# Patient Record
Sex: MALE | Race: WHITE | NOT HISPANIC OR LATINO | Employment: OTHER | URBAN - METROPOLITAN AREA
[De-identification: names, ages, dates, MRNs, and addresses within clinical notes are randomized per-mention and may not be internally consistent; named-entity substitution may affect disease eponyms.]

---

## 2014-06-19 LAB — CRC RECOMMENDATION EXT: NORMAL

## 2017-01-10 ENCOUNTER — LAB VISIT (OUTPATIENT)
Dept: LAB | Facility: HOSPITAL | Age: 57
End: 2017-01-10
Payer: MEDICARE

## 2017-01-10 ENCOUNTER — OFFICE VISIT (OUTPATIENT)
Dept: HEPATOLOGY | Facility: CLINIC | Age: 57
End: 2017-01-10
Payer: MEDICARE

## 2017-01-10 VITALS
HEART RATE: 82 BPM | BODY MASS INDEX: 31.36 KG/M2 | TEMPERATURE: 97 F | DIASTOLIC BLOOD PRESSURE: 69 MMHG | OXYGEN SATURATION: 94 % | SYSTOLIC BLOOD PRESSURE: 112 MMHG | RESPIRATION RATE: 18 BRPM | WEIGHT: 224 LBS | HEIGHT: 71 IN

## 2017-01-10 DIAGNOSIS — K70.30 CIRRHOSIS, LAENNEC'S: ICD-10-CM

## 2017-01-10 DIAGNOSIS — R18.8 OTHER ASCITES: ICD-10-CM

## 2017-01-10 DIAGNOSIS — K74.60 HEPATIC CIRRHOSIS, UNSPECIFIED HEPATIC CIRRHOSIS TYPE: Primary | ICD-10-CM

## 2017-01-10 DIAGNOSIS — I85.01 BLEEDING ESOPHAGEAL VARICES, UNSPECIFIED ESOPHAGEAL VARICES TYPE: ICD-10-CM

## 2017-01-10 DIAGNOSIS — F10.11 ALCOHOL ABUSE, IN REMISSION: ICD-10-CM

## 2017-01-10 DIAGNOSIS — K76.82 HEPATIC ENCEPHALOPATHY: ICD-10-CM

## 2017-01-10 LAB — AFP SERPL-MCNC: 4 NG/ML

## 2017-01-10 PROCEDURE — 99215 OFFICE O/P EST HI 40 MIN: CPT | Mod: S$PBB,,, | Performed by: INTERNAL MEDICINE

## 2017-01-10 PROCEDURE — 99214 OFFICE O/P EST MOD 30 MIN: CPT | Mod: PBBFAC | Performed by: INTERNAL MEDICINE

## 2017-01-10 PROCEDURE — 99999 PR PBB SHADOW E&M-EST. PATIENT-LVL IV: CPT | Mod: PBBFAC,,, | Performed by: INTERNAL MEDICINE

## 2017-01-10 RX ORDER — METFORMIN HYDROCHLORIDE 500 MG/1
500 TABLET ORAL 2 TIMES DAILY
Refills: 5 | COMMUNITY
Start: 2017-01-02 | End: 2018-10-10 | Stop reason: ALTCHOICE

## 2017-01-10 RX ORDER — CIPROFLOXACIN 500 MG/1
500 TABLET ORAL EVERY 12 HOURS
Refills: 0 | Status: ON HOLD | COMMUNITY
Start: 2016-10-26 | End: 2017-01-26 | Stop reason: ALTCHOICE

## 2017-01-10 NOTE — PATIENT INSTRUCTIONS
1. Increase lactulose to have 4-5 BMs per day. You must take the lactulose daily unless you have >>>5 BMS  2. Ultrasound to rule out fluid in [place]  3. Labs today  4. Upper endoscopy in [facility]  5. Change metformin to alternate diabetes medicine- you are at risk for lactic acidosis with this medicine if you have cirrhosis  6. Cut out salt for edema - I will check labs and see if I can increase the water pills.

## 2017-01-10 NOTE — PROGRESS NOTES
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 (stopped drinking several years ago-2012). His liver disease has been complicated by an esophageal variceal bleed, ascites. MELD has been <15. Labs from Oct reveal: Tbil 1.7, alb 3.7, INR 1.3, plts 51. EGD at [facility] 01/15 showed no varices.    Today, similar to his last visit, he is a bit confused. His mother confirms that he is not taking adequate amounts of lactulose.    He has seen a urologist and continues to intermittently be catheterized.HE is doing it 3 times per day. He is due to have a procedure on his prostate in the near future.(He underwent cystoscopy that did not show a malignancy)  MRI in Nov.- no cancer or fluid. May have partial thrombus in the [facility]    Outpatient Encounter Prescriptions as of 1/10/2017   Medication Sig Dispense Refill    B.ANI/L.ACI/L.SAL/L.PLAN/L.JITENDRA (PROBIOTIC FORMULA ORAL) Take by mouth.      ciprofloxacin HCl (CIPRO) 500 MG tablet Take 500 mg by mouth every 12 (twelve) hours.  0    cyclobenzaprine (FLEXERIL) 10 MG tablet Take 10 mg by mouth once daily.       escitalopram oxalate (LEXAPRO) 10 MG tablet Take 10 mg by mouth once daily.      fluticasone (FLONASE) 50 mcg/actuation nasal spray 1 spray by Each Nare route once daily.       furosemide (LASIX) 20 MG tablet TAKE 4 TABLETS BY MOUTH ONCE DAILY 120 tablet 7    gabapentin (NEURONTIN) 300 MG capsule Take 300 mg by mouth 3 (three) times daily.      hydrocodone-acetaminophen 10-325mg (NORCO)  mg Tab Take 10 tablets by mouth 3 (three) times daily.       insulin aspart protamine-insulin aspart (NOVOLOG 70/30) 100 unit/mL (70-30) InPn pen Inject 25 Units into the skin 3 (three) times daily before meals.       INSULIN GLARGINE,HUM.REC.ANLOG (LANTUS SOLOSTAR SUBQ) Inject 65 Units into the skin every evening.       lactobacillus acidophilus Cap Take 1 capsule by mouth once  daily.      lactulose (CHRONULAC) 10 gram/15 mL solution Take 30 mLs by mouth 3 (three) times daily.       levocetirizine (XYZAL) 5 MG tablet Take 5 mg by mouth once daily.  2    lisinopril 10 MG tablet Take 10 mg by mouth once daily.      metformin (GLUCOPHAGE) 500 MG tablet Take 500 mg by mouth 2 (two) times daily.  5    nebivolol (BYSTOLIC) 10 MG Tab Take 10 mg by mouth once daily.      NOVOLOG FLEXPEN 100 unit/mL InPn pen       pantoprazole (PROTONIX) 40 MG tablet Take 40 mg by mouth once daily.  3    rifAXIMin (XIFAXAN) 550 mg Tab Take 550 mg by mouth 2 (two) times daily.      spironolactone (ALDACTONE) 100 MG tablet Take 1 tablet (100 mg total) by mouth once daily. 30 tablet 11    tamsulosin (FLOMAX) 0.4 mg Cp24 Take 0.4 mg by mouth once daily.      trazodone (DESYREL) 150 MG tablet Take 300 mg by mouth nightly as needed.       ondansetron (ZOFRAN) 8 MG tablet Take 8 mg by mouth every 8 (eight) hours as needed.        No facility-administered encounter medications on file as of 1/10/2017.      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    HENT: Negative.    Eyes: Negative.    Respiratory: Negative.    Cardiovascular: Positive for leg swelling.    Gastrointestinal: Positive for abdominal distention.   Genitourinary: Negative.    Musculoskeletal: Negative.    Skin: Negative.    Neurological: Negative.    Psychiatric/Behavioral: Positive for decreased concentration.     Vitals:    01/10/17 0906   BP: 112/69   Pulse: 82   Resp: 18   Temp: 97.3 °F (36.3 °C)       Physical Exam   Constitutional: He is oriented to person, place, and time. He appears well-developed and well-nourished.   HENT:   Head: Normocephalic and atraumatic.   Eyes: Conjunctivae and EOM are normal. Pupils are equal, round, and reactive to light. No scleral icterus.   Neck: Normal range of motion. Neck supple. No thyromegaly present.   Cardiovascular: Normal rate, regular rhythm and normal heart sounds.    Pulmonary/Chest: Effort normal and breath sounds normal. He has no rales.   Abdominal: Soft. Bowel sounds are normal. He exhibits distension (umbilical hernia, easily reducible). He exhibits no mass. There is no tenderness.   Musculoskeletal: Normal range of motion. He exhibits edema.   Neurological: He is alert and oriented to person, place, and time.   Skin: Skin is warm and dry. No rash noted.   Psychiatric: He has a normal mood and affect.   Vitals reviewed.      Lab Results   Component Value Date     10/03/2016     11/19/2015    BUN 8 10/18/2016    BUN 6 10/03/2016    CREATININE 0.8 10/18/2016    CREATININE 0.8 10/03/2016    CALCIUM 8.6 10/18/2016    CALCIUM 8.5 (L) 10/03/2016     10/18/2016     10/03/2016    K 4.0 10/18/2016    K 3.3 (L) 10/03/2016     10/18/2016     10/03/2016    PROT 7.3 10/18/2016    PROT 7.4 11/19/2015    CO2 24.0 10/18/2016    CO2 25 10/03/2016    ANIONGAP 10 10/03/2016    WBC 4.6 10/18/2016    WBC 3.98 10/03/2016    RBC 5.28 10/18/2016    HGB 13.8 10/18/2016    HCT 45 10/18/2016    MCV 81 (L) 10/03/2016    MCH 26.9 (L) 10/03/2016    MCHC 33.3 10/03/2016     Lab Results   Component Value Date    RDW 17.3 (A) 10/18/2016    PLT  51 (L) 10/03/2016    MPV  10/18/2016      Comment:      n/a    MPV n/a 11/19/2015    GRAN 2.3 10/03/2016    GRAN 57.5 10/03/2016    LYMPH 30 10/18/2016    LYMPH 1.1 10/03/2016    MONO 10 10/18/2016    MONO 0.5 10/03/2016    EOSINOPHIL 2 10/18/2016    BASOPHIL 1 10/18/2016    EOS 0.1 10/03/2016    BASO 0.01 10/03/2016    APTT 24.3 07/08/2014    GROUPTRH A NEG 01/05/2015    CHOL 178 10/18/2016    TRIG 134 10/18/2016    TRIG 174 (H) 07/08/2014    HDL 35 10/18/2016    HDL 29 (L) 07/08/2014    CHOLHDL 25.7 07/08/2014    TOTALCHOLEST 3.9 07/08/2014    ALBUMIN 3.80 10/18/2016    ALBUMIN 3.7 10/03/2016    BILIDIR 0.80 (A) 10/18/2016    AST 32 10/18/2016    AST 51 (H) 10/03/2016    ALT 32 10/18/2016    ALT 27 10/03/2016    ALKPHOS 107 10/03/2016    LABPROT 13.3 (H) 10/03/2016    INR 1.3 (H) 10/03/2016    INR 1.1 01/15/2016    PSA 0.24 08/13/2014    QUANTIFERON Negative 08/13/2014     MELD-Na score: 14 at 10/3/2016  1:35 PM  MELD score: 13 at 10/3/2016  1:35 PM  Calculated from:  Serum Creatinine: 0.8 mg/dL (Rounded to 1) at 10/3/2016  1:35 PM  Serum Sodium: 136 mmol/L at 10/3/2016  1:35 PM  Total Bilirubin: 2.9 mg/dL at 10/3/2016  1:35 PM  INR(ratio): 1.3 at 10/3/2016  1:35 PM  Age: 56 years     Assessment and Plan:    Colin Reilly is a 56 y.o. male withCirrhosis  1. HE: Continue lactulose - titrate to have 4-5 bowel movements a day and keep a stool chart. Continue rifaximin  2. Cirrhosis, decompensated: check MELD labs today and every 12 weeks. Refer for liver transplant when >15.   3. Repeat upper endoscopy in 2017 to screen for EV.  4. Ascites and edema : check u/s to r/o ascites  5. Decompensated cirrhosis. Remains medically early for liver transplant. MRI now to screen for HCC  6. Return 3 months  7. Continue insulin for DM. meformin should be changed  8. BPH: f/u urology as scheduled.  .    Return 3 months. .

## 2017-01-10 NOTE — MR AVS SNAPSHOT
[facility] - Hepatology  [address]  [place] [address]  Phone: [phone]  Fax: [phone]                  Colin Reilly   1/10/2017 9:20 AM   Office Visit    Description:  Male : 1960   Provider:  Elizabeth Meneses MD   Department:  [facility] - Hepatology           Reason for Visit     Cirrhosis           Diagnoses this Visit        Comments    Hepatic cirrhosis, unspecified hepatic cirrhosis type    -  Primary            To Do List           Future Appointments        Provider Department Dept Phone    2017 7:00 AM LABORATORY, [facility] - [phone]    2017 7:00 AM LABORATORY, [facility] - [phone]    10/3/2017 7:00 AM LABORATORY, [facility] - [phone]      Goals (5 Years of Data)     None      [facility]     [facility] On [facility] Nurse Care Line -  Assistance  Registered nurses in the [facility] provide clinical advisement, health education, appointment booking, and other advisory services.  Call for this free service at [phone].             Medications           Message regarding Medications     Verify the changes and/or additions to your medication regime listed below are the same as discussed with your clinician today.  If any of these changes or additions are incorrect, please notify your healthcare provider.             Verify that the below list of medications is an accurate representation of the medications you are currently taking.  If none reported, the list may be blank. If incorrect, please contact your healthcare provider. Carry this list with you in case of emergency.           Current Medications     B.ANI/L.ACI/L.SAL/L.PLAN/L.JITENDRA (PROBIOTIC FORMULA ORAL) Take by mouth.    ciprofloxacin HCl (CIPRO) 500 MG tablet Take 500 mg by mouth every 12 (twelve) hours.    cyclobenzaprine (FLEXERIL) 10 MG tablet Take 10 mg by mouth  once daily.     escitalopram oxalate (LEXAPRO) 10 MG tablet Take 10 mg by mouth once daily.    fluticasone (FLONASE) 50 mcg/actuation nasal spray 1 spray by Each Nare route once daily.     furosemide (LASIX) 20 MG tablet TAKE 4 TABLETS BY MOUTH ONCE DAILY    gabapentin (NEURONTIN) 300 MG capsule Take 300 mg by mouth 3 (three) times daily.    hydrocodone-acetaminophen 10-325mg (NORCO)  mg Tab Take 10 tablets by mouth 3 (three) times daily.     insulin aspart protamine-insulin aspart (NOVOLOG 70/30) 100 unit/mL (70-30) InPn pen Inject 25 Units into the skin 3 (three) times daily before meals.     INSULIN GLARGINE,HUM.REC.ANLOG (LANTUS SOLOSTAR SUBQ) Inject 65 Units into the skin every evening.     lactobacillus acidophilus Cap Take 1 capsule by mouth once daily.    lactulose (CHRONULAC) 10 gram/15 mL solution Take 30 mLs by mouth 3 (three) times daily.     levocetirizine (XYZAL) 5 MG tablet Take 5 mg by mouth once daily.    lisinopril 10 MG tablet Take 10 mg by mouth once daily.    metformin (GLUCOPHAGE) 500 MG tablet Take 500 mg by mouth 2 (two) times daily.    nebivolol (BYSTOLIC) 10 MG Tab Take 10 mg by mouth once daily.    NOVOLOG FLEXPEN 100 unit/mL InPn pen     pantoprazole (PROTONIX) 40 MG tablet Take 40 mg by mouth once daily.    rifAXIMin (XIFAXAN) 550 mg Tab Take 550 mg by mouth 2 (two) times daily.    spironolactone (ALDACTONE) 100 MG tablet Take 1 tablet (100 mg total) by mouth once daily.    tamsulosin (FLOMAX) 0.4 mg Cp24 Take 0.4 mg by mouth once daily.    trazodone (DESYREL) 150 MG tablet Take 300 mg by mouth nightly as needed.     ondansetron (ZOFRAN) 8 MG tablet Take 8 mg by mouth every 8 (eight) hours as needed.            Clinical Reference Information           Vital Signs - Last Recorded  Most recent update: 1/10/2017  9:07 AM by Tasha Garsia MA    BP Pulse Temp Resp    112/69 (BP Location: Right arm, Patient Position: Sitting, BP Method: Automatic) 82 97.3 °F (36.3 °C) (Oral) 18     "Ht Wt SpO2 BMI    5' 11" (1.803 m) 101.6 kg (223 lb 15.8 oz) (!) 94% 31.24 kg/m2      Blood Pressure          Most Recent Value    BP  112/69      Allergies as of 1/10/2017     No Known Allergies      Immunizations Administered on Date of Encounter - 1/10/2017     None      Orders Placed During Today's Visit      Normal Orders This Visit    Case request GI: ESOPHAGOGASTRODUODENOSCOPY (EGD)     Future Labs/Procedures Expected by Expires    AFP tumor marker  1/10/2017 3/11/2018    CBC auto differential  1/10/2017 1/10/2018    Comprehensive metabolic panel  1/10/2017 1/10/2018    DRUGS OF ABUSE SCREEN, BLOOD  1/10/2017 3/11/2018    Ethanol  1/10/2017 3/11/2018    Phosphatidylethanol (PETH)  1/10/2017 3/11/2018    Protime-INR  1/10/2017 1/10/2018    US Abdomen Limited  1/10/2017 1/10/2018      Maintenance Dialysis History     Patient has no recorded history of maintenance dialysis.      [facility] Sign-Up     Activating your [facility] account is as easy as 1-2-3!     1) Visit [email or web address], select Sign Up Now, enter this activation code and your date of birth, then select Next.  [email or web address]  Expires: 2/24/2017 10:01 AM      2) Create a username and password to use when you visit [facility] in the future and select a security question in case you lose your password and select Next.    3) Enter your e-mail address and click Sign Up!    Additional Information  If you have questions, please e-mail [email or web address] or call [phone] to talk to our [facility] staff. Remember, [facility] is NOT to be used for urgent needs. For medical emergencies, dial 911.         Instructions    1. Increase lactulose to have 4-5 BMs per day. You must take the lactulose daily unless you have >>>5 BMS  2. Ultrasound to rule out fluid in [place]  3. Labs today  4. Upper endoscopy in [place]  5. Change metformin to alternate diabetes medicine- you are at risk for lactic acidosis with this medicine if you have cirrhosis  6. Cut " out salt for edema - I will check labs and see if I can increase the water pills.

## 2017-01-11 ENCOUNTER — TELEPHONE (OUTPATIENT)
Dept: HEPATOLOGY | Facility: CLINIC | Age: 57
End: 2017-01-11

## 2017-01-11 NOTE — TELEPHONE ENCOUNTER
----- Message from Elizabeth Meneses MD sent at 1/11/2017 12:32 PM CST -----  The Labs are stable - please let patient know.

## 2017-01-16 ENCOUNTER — TELEPHONE (OUTPATIENT)
Dept: RADIOLOGY | Facility: HOSPITAL | Age: 57
End: 2017-01-16

## 2017-01-17 ENCOUNTER — HOSPITAL ENCOUNTER (OUTPATIENT)
Dept: RADIOLOGY | Facility: HOSPITAL | Age: 57
Discharge: HOME | End: 2017-01-17
Attending: INTERNAL MEDICINE
Payer: MEDICARE

## 2017-01-17 DIAGNOSIS — K74.60 HEPATIC CIRRHOSIS, UNSPECIFIED HEPATIC CIRRHOSIS TYPE: ICD-10-CM

## 2017-01-17 PROCEDURE — 76705 ECHO EXAM OF ABDOMEN: CPT | Mod: 26,,, | Performed by: RADIOLOGY

## 2017-01-17 PROCEDURE — 76705 ECHO EXAM OF ABDOMEN: CPT | Mod: TC,PO

## 2017-01-19 ENCOUNTER — TELEPHONE (OUTPATIENT)
Dept: HEPATOLOGY | Facility: CLINIC | Age: 57
End: 2017-01-19

## 2017-01-19 NOTE — TELEPHONE ENCOUNTER
Message from Dr Meneses: No cancer - please let patient know.  I spoke with patient relayed message from Dr Meneses. Patient says thank you for the good news.

## 2017-01-26 ENCOUNTER — ANESTHESIA (OUTPATIENT)
Dept: ENDOSCOPY | Facility: HOSPITAL | Age: 57
End: 2017-01-26
Payer: MEDICARE

## 2017-01-26 ENCOUNTER — HOSPITAL ENCOUNTER (OUTPATIENT)
Facility: HOSPITAL | Age: 57
Discharge: HOME | End: 2017-01-26
Attending: INTERNAL MEDICINE | Admitting: INTERNAL MEDICINE
Payer: MEDICARE

## 2017-01-26 ENCOUNTER — SURGERY (OUTPATIENT)
Age: 57
End: 2017-01-26

## 2017-01-26 ENCOUNTER — ANESTHESIA EVENT (OUTPATIENT)
Dept: ENDOSCOPY | Facility: HOSPITAL | Age: 57
End: 2017-01-26
Payer: MEDICARE

## 2017-01-26 VITALS
RESPIRATION RATE: 18 BRPM | TEMPERATURE: 98 F | OXYGEN SATURATION: 96 % | SYSTOLIC BLOOD PRESSURE: 141 MMHG | HEART RATE: 80 BPM | HEIGHT: 71 IN | DIASTOLIC BLOOD PRESSURE: 87 MMHG | BODY MASS INDEX: 30.52 KG/M2 | WEIGHT: 218 LBS

## 2017-01-26 VITALS — RESPIRATION RATE: 18 BRPM

## 2017-01-26 DIAGNOSIS — K74.60 CIRRHOSIS: ICD-10-CM

## 2017-01-26 LAB
GLUCOSE SERPL-MCNC: 184 MG/DL (ref 70–110)
POCT GLUCOSE: 184 MG/DL (ref 70–110)

## 2017-01-26 PROCEDURE — 27201022: Performed by: INTERNAL MEDICINE

## 2017-01-26 PROCEDURE — 37000009 HC ANESTHESIA EA ADD 15 MINS: Performed by: INTERNAL MEDICINE

## 2017-01-26 PROCEDURE — 82962 GLUCOSE BLOOD TEST: CPT | Performed by: INTERNAL MEDICINE

## 2017-01-26 PROCEDURE — 37000008 HC ANESTHESIA 1ST 15 MINUTES: Performed by: INTERNAL MEDICINE

## 2017-01-26 PROCEDURE — 43244 EGD VARICES LIGATION: CPT | Mod: ,,, | Performed by: INTERNAL MEDICINE

## 2017-01-26 PROCEDURE — 25000003 PHARM REV CODE 250: Performed by: NURSE ANESTHETIST, CERTIFIED REGISTERED

## 2017-01-26 PROCEDURE — 43244 EGD VARICES LIGATION: CPT | Performed by: INTERNAL MEDICINE

## 2017-01-26 PROCEDURE — 25000003 PHARM REV CODE 250: Performed by: INTERNAL MEDICINE

## 2017-01-26 PROCEDURE — 63600175 PHARM REV CODE 636 W HCPCS: Performed by: NURSE ANESTHETIST, CERTIFIED REGISTERED

## 2017-01-26 RX ORDER — SODIUM CHLORIDE, SODIUM LACTATE, POTASSIUM CHLORIDE, CALCIUM CHLORIDE 600; 310; 30; 20 MG/100ML; MG/100ML; MG/100ML; MG/100ML
INJECTION, SOLUTION INTRAVENOUS CONTINUOUS PRN
Status: DISCONTINUED | OUTPATIENT
Start: 2017-01-26 | End: 2017-01-26

## 2017-01-26 RX ORDER — SODIUM CHLORIDE, SODIUM LACTATE, POTASSIUM CHLORIDE, CALCIUM CHLORIDE 600; 310; 30; 20 MG/100ML; MG/100ML; MG/100ML; MG/100ML
INJECTION, SOLUTION INTRAVENOUS CONTINUOUS
Status: DISCONTINUED | OUTPATIENT
Start: 2017-01-26 | End: 2017-01-26 | Stop reason: HOSPADM

## 2017-01-26 RX ORDER — PROPOFOL 10 MG/ML
INJECTION, EMULSION INTRAVENOUS
Status: DISCONTINUED | OUTPATIENT
Start: 2017-01-26 | End: 2017-01-26

## 2017-01-26 RX ORDER — LIDOCAINE HCL/PF 100 MG/5ML
SYRINGE (ML) INTRAVENOUS
Status: DISCONTINUED | OUTPATIENT
Start: 2017-01-26 | End: 2017-01-26

## 2017-01-26 RX ORDER — GLYCOPYRROLATE 0.2 MG/ML
INJECTION INTRAMUSCULAR; INTRAVENOUS
Status: DISCONTINUED | OUTPATIENT
Start: 2017-01-26 | End: 2017-01-26

## 2017-01-26 RX ADMIN — PROPOFOL 40 MG: 10 INJECTION, EMULSION INTRAVENOUS at 08:01

## 2017-01-26 RX ADMIN — PROPOFOL 70 MG: 10 INJECTION, EMULSION INTRAVENOUS at 08:01

## 2017-01-26 RX ADMIN — SODIUM CHLORIDE, SODIUM LACTATE, POTASSIUM CHLORIDE, AND CALCIUM CHLORIDE: 600; 310; 30; 20 INJECTION, SOLUTION INTRAVENOUS at 08:01

## 2017-01-26 RX ADMIN — SODIUM CHLORIDE, SODIUM LACTATE, POTASSIUM CHLORIDE, AND CALCIUM CHLORIDE: .6; .31; .03; .02 INJECTION, SOLUTION INTRAVENOUS at 08:01

## 2017-01-26 RX ADMIN — LIDOCAINE HYDROCHLORIDE 50 MG: 20 INJECTION, SOLUTION INTRAVENOUS at 08:01

## 2017-01-26 RX ADMIN — GLYCOPYRROLATE 0.2 MG: 0.2 INJECTION INTRAMUSCULAR; INTRAVENOUS at 08:01

## 2017-01-26 NOTE — PLAN OF CARE
Physician @ bedside to discuss with patient and family results and plan of care. Patient AAOX4, no distress noted.

## 2017-01-26 NOTE — ANESTHESIA RELEASE NOTE
"Anesthesia Release from PACU Note    Patient: Colin Reilly    Procedure(s) Performed: Procedure(s) (LRB):  ESOPHAGOGASTRODUODENOSCOPY (EGD) (Left)    Anesthesia type: MAC    Post pain: Adequate analgesia    Post assessment: no apparent anesthetic complications, tolerated procedure well and no evidence of recall    Last Vitals:   Visit Vitals    /69    Pulse 71    Temp 36.8 °C (98.2 °F)    Resp 18    Ht 5' 11" (1.803 m)    Wt 98.9 kg (218 lb)    SpO2 99%    BMI 30.4 kg/m2       Post vital signs: stable    Level of consciousness: responds to stimulation    Nausea/Vomiting: no nausea/no vomiting    Complications: none    Airway Patency: patent    Respiratory: unassisted    Cardiovascular: stable and blood pressure at baseline    Hydration: euvolemic  "

## 2017-01-26 NOTE — PLAN OF CARE
See anesthesia for meds, vs, and monitoring.Pt adequately sedated per all staff in procedure room. Final timeout

## 2017-01-26 NOTE — ANESTHESIA POSTPROCEDURE EVALUATION
"Anesthesia Post Evaluation    Patient: Colin Reilly    Procedure(s) Performed: Procedure(s) (LRB):  ESOPHAGOGASTRODUODENOSCOPY (EGD) (Left)    Final Anesthesia Type: MAC  Patient location during evaluation: PACU  Patient participation: Yes- Able to Participate  Level of consciousness: awake, awake and alert and oriented  Post-procedure vital signs: reviewed and stable  Pain management: adequate  Airway patency: patent  PONV status at discharge: No PONV  Anesthetic complications: no      Cardiovascular status: blood pressure returned to baseline  Respiratory status: unassisted, spontaneous ventilation and room air  Hydration status: euvolemic  Follow-up not needed.        Visit Vitals    /69    Pulse 71    Temp 36.8 °C (98.2 °F)    Resp 18    Ht 5' 11" (1.803 m)    Wt 98.9 kg (218 lb)    SpO2 99%    BMI 30.4 kg/m2       Pain/Lobito Score: No Data Recorded      "

## 2017-01-26 NOTE — DISCHARGE INSTRUCTIONS
Esophageal Varices     With esophageal varices, blood vessels in the esophagus become abnormally enlarged. They may then burst (rupture) and bleed.   Esophageal varices are enlarged veins at the lower end of the esophagus. The esophagus is the tube that carries food from your mouth to your stomach. Varices most often occur because of problems with blood flow in the liver caused by chronic liver disease. Normally, a blood vessel called the portal vein carries blood from the digestive organs to the liver. But with liver disease, blood flow can be blocked due to scarring of the liver. This increases the blood pressure in the portal vein (a condition known as portal hypertension). Blood then backs up in nearby veins in the esophagus and stomach, causing varices. Varices are a serious and deadly problem. Treatment is needed to prevent them from bursting (rupturing) and bleeding. If bleeding occurs, it can be fatal.  Symptoms of esophageal varices  Symptoms do not occur unless the varices are bleeding. This is an emergency problem. If you have any of the following symptoms, get medical attention right away:  · Vomiting blood  · Black, tarry, or bloody stools  · Feeling lightheaded, or fainting (loss of consciousness)  Diagnosing esophageal \varices  Youll likely be checked for varices if you have liver disease or other health problems that can cause them. Your healthcare provider will ask about your symptoms and health history. Youll also be examined. Tests are then done to confirm the problem. Tests can include:  · Upper endoscopy. This is done to see inside the upper digestive tract. During the test, an endoscope is used. This is a thin, flexible tube with a tiny camera on the end. Its inserted through your mouth. Its then guided down through your esophagus, stomach, and first part of your small intestine. This allows the provider to check for varices and find any bleeding.  · Imaging tests. These provide pictures  of the liver or blood flow in the liver. They allow the provider to check for enlarged veins around the liver and assess the risk of bleeding. Common imaging tests done include ultrasound and CT scans.  Treating esophageal varices  The goal of treatment is to reduce the risk of bleeding or to control bleeding. Treatment can include 1 or more of the following:  · Medicines. These may be prescribed to lower the blood pressure inside the enlarged veins. This reduces the risk of bleeding. Beta-blockers are the most common medicine used.  · Endoscopic therapy. These are treatments for enlarged or bleeding veins that are done using an endoscope. With ligation, small rubber bands are placed around the veins to close them off and stop any bleeding. With sclerotherapy, a blood-clotting medicine is injected into the veins to cause scarring and shrink them.  · Balloon tamponade. A tube with a balloon is guided down into your esophagus and stomach. The balloon is then filled with air. This puts pressure on enlarged or bleeding veins to control bleeding. This is a short-term (temporary) way to control bleeding until other treatments are available.   · Surgery. This may be done to place a tubelike device (stent) in the liver. The stent helps redirect blood flow in the liver to lower the blood pressure in enlarged veins. Sometimes, the enlarged veins may be connected to other nearby veins to redirect blood flow. In severe cases, a liver transplant may be needed. For this surgery, a diseased liver is replaced with a healthy liver from another person.   Follow-up  Regular visits with your provider are needed to check for bleeding of the varices. If bleeding occurs, it is likely to occur again. More treatments will then be needed in the future. Once endoscopic therapy (banding) is performed, regular follow-up endoscopic scans with banding are done to completely get rid of the varices. If you are given medicines to take by mouth, be  sure to take them as directed. Work closely with your provider to manage your condition. Know when to seek emergency care.  © 5557-8760 [email or web address], [employer]. [address] [place] [address], [place] [address]. All rights reserved. This information is not intended as a substitute for professional medical care. Always follow your healthcare professional's instructions.

## 2017-01-26 NOTE — H&P
Short Stay Endoscopy History and Physical    PCP - Preston Matthew Ii, MD    Procedure - EGD  ASA - II  Mallampati - per anesthesia  History of Anesthesia problems - no  Family history Anesthesia problems -  no     HPI:    Reason for EGD: surveillance of varices    Heartburn: No  Dysphagia: No  Follow up of ulcer: No  Anemia - no  GI bleeding - no  Abdominal pain: No  Diarrhea - no    ROS:  CONSTITUTIONAL: Denies weight change,  fatigue, fevers, chills, night sweats.  CARDIOVASCULAR: Denies chest pain, shortness of breath, orthopnea and edema.  RESPIRATORY: Denies cough, hemoptysis, dyspnea, and wheezing.  GI: See HPI.    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    Diabetes 7/8/2014    Esophageal varices     GERD (gastroesophageal reflux disease)     Hepatic encephalopathy 7/8/2014    Hepatitis C virus infection 7/8/2014     tx with interferon 3-4 mos- stopped for unclear reasons Tattoos-first at age 17 only risk factor    HTN (hypertension) 7/8/2014    Hypertension     Insulin dependent diabetes mellitus     Other ascites 7/8/2014    Renal mass, left 7/8/2014     6.3 x 5.7 x 5.6 complex mass    Splenomegaly 7/8/2014    Umbilical hernia 7/8/2014       Surgical History:   Past Surgical History   Procedure Laterality Date    Hernia repair      Cholecystectomy      Carpal tunnel release Bilateral     Neck surgery       fusion on C5 and C6    Vericose veins removed      Ulnar nerve transposition Left        Family History:  Family History   Problem Relation Age of Onset    Hyperlipidemia Mother     No Known Problems Father 36     accident on oil rig    No Known Problems Sister     Cancer Brother     Alcohol abuse Brother     No Known Problems Sister        Social History:   Social History   Substance Use Topics    Smoking  status: Former Smoker     Types: Cigarettes     Quit date: 1/4/2010    Smokeless tobacco: Former User     Quit date: 2/24/1990      Comment: former 1 pack/week quit 1/4/2010    Alcohol use No      Comment: former heavy beer but quit 1/4/2010       Allergies:   Review of patient's allergies indicates:  No Known Allergies    Medications:   No current facility-administered medications on file prior to encounter.      Current Outpatient Prescriptions on File Prior to Encounter   Medication Sig Dispense Refill    B.ANI/L.ACI/L.SAL/L.PLAN/L.JITENDRA (PROBIOTIC FORMULA ORAL) Take by mouth.      ciprofloxacin HCl (CIPRO) 500 MG tablet Take 500 mg by mouth every 12 (twelve) hours.  0    cyclobenzaprine (FLEXERIL) 10 MG tablet Take 10 mg by mouth once daily.       escitalopram oxalate (LEXAPRO) 10 MG tablet Take 10 mg by mouth once daily.      fluticasone (FLONASE) 50 mcg/actuation nasal spray 1 spray by Each Nare route once daily.       furosemide (LASIX) 20 MG tablet TAKE 4 TABLETS BY MOUTH ONCE DAILY 120 tablet 7    gabapentin (NEURONTIN) 300 MG capsule Take 300 mg by mouth 3 (three) times daily.      hydrocodone-acetaminophen 10-325mg (NORCO)  mg Tab Take 10 tablets by mouth 3 (three) times daily.       insulin aspart protamine-insulin aspart (NOVOLOG 70/30) 100 unit/mL (70-30) InPn pen Inject 25 Units into the skin 3 (three) times daily before meals.       INSULIN GLARGINE,HUM.REC.ANLOG (LANTUS SOLOSTAR SUBQ) Inject 65 Units into the skin every evening.       lactobacillus acidophilus Cap Take 1 capsule by mouth once daily.      lactulose (CHRONULAC) 10 gram/15 mL solution Take 30 mLs by mouth 3 (three) times daily.       levocetirizine (XYZAL) 5 MG tablet Take 5 mg by mouth once daily.  2    lisinopril 10 MG tablet Take 10 mg by mouth once daily.      metformin (GLUCOPHAGE) 500 MG tablet Take 500 mg by mouth 2 (two) times daily.  5    nebivolol (BYSTOLIC) 10 MG Tab Take 10 mg by mouth once daily.       NOVOLOG FLEXPEN 100 unit/mL InPn pen       ondansetron (ZOFRAN) 8 MG tablet Take 8 mg by mouth every 8 (eight) hours as needed.       pantoprazole (PROTONIX) 40 MG tablet Take 40 mg by mouth once daily.  3    rifAXIMin (XIFAXAN) 550 mg Tab Take 550 mg by mouth 2 (two) times daily.      spironolactone (ALDACTONE) 100 MG tablet Take 1 tablet (100 mg total) by mouth once daily. 30 tablet 11    tamsulosin (FLOMAX) 0.4 mg Cp24 Take 0.4 mg by mouth once daily.      trazodone (DESYREL) 150 MG tablet Take 300 mg by mouth nightly as needed.          Physical Exam:  Vital Signs: There were no vitals filed for this visit.  General Appearance: Well appearing in no acute distress  ENT: OP clear  Chest: CTA B  CV: RRR, no m/r/g  Abd: s/nt/nd/nabs  Ext: no edema    Labs:  Lab Results   Component Value Date    WBC 4.6 10/18/2016    HGB 13.8 10/18/2016    HCT 45 10/18/2016    MCV 81 (L) 10/03/2016    PLT 51 (L) 10/03/2016     Lab Results   Component Value Date    INR 1.3 (H) 10/03/2016    INR 1.3 (H) 04/05/2016    INR 1.1 01/15/2016     Lab Results   Component Value Date    IRON 50 08/13/2014    TIBC 392 08/13/2014    FERRITIN 16 (L) 08/13/2014         Assessment:  Patient Active Problem List   Diagnosis    Esophageal varices with bleeding    Cirrhosis, Laennec's    Umbilical hernia    Splenomegaly    GERD (gastroesophageal reflux disease)    Diabetes    HTN (hypertension)    Other ascites    Alcohol abuse, in remission    Hepatic encephalopathy    Chronic back pain     Cirrhosis  Esophageal varices surveillance    Plan:  I have explained the risks and benefits of endoscopy procedures to the patient including but not limited to bleeding, perforation, infection, and death. The patient wishes to proceed.

## 2017-01-26 NOTE — IP AVS SNAPSHOT
[address] [place] [address]           Patient Discharge Instructions     Our goal is to set you up for success. This packet includes information on your condition, medications, and your home care. It will help you to care for yourself so you don't get sicker and need to go back to the hospital.     Please ask your nurse if you have any questions.        There are many details to remember when preparing to leave the hospital. Here is what you will need to do:    1. Take your medicine. If you are prescribed medications, review your Medication List in the following pages. You may have new medications to  at the pharmacy and others that you'll need to stop taking. Review the instructions for how and when to take your medications. Talk with your doctor or nurses if you are unsure of what to do.     2. Go to your follow-up appointments. Specific follow-up information is listed in the following pages. Your may be contacted by a transition nurse or clinical provider about future appointments. Be sure we have all of the phone numbers to reach you, if needed. Please contact your provider's office if you are unable to make an appointment.     3. Watch for warning signs. Your doctor or nurse will give you detailed warning signs to watch for and when to call for assistance. These instructions may also include educational information about your condition. If you experience any of warning signs to your health, call your doctor.               ** Verify the list of medication(s) below is accurate and up to date. Carry this with you in case of emergency. If your medications have changed, please notify your healthcare provider.             Medication List      CONTINUE taking these medications        Additional Info                      cyclobenzaprine 10 MG tablet   Commonly known as:  FLEXERIL   Refills:  0   Dose:  10 mg    Instructions:  Take 10 mg by mouth once daily.      Begin Date    AM    Noon    PM    Bedtime       escitalopram oxalate 10 MG tablet   Commonly known as:  LEXAPRO   Refills:  0   Dose:  10 mg    Instructions:  Take 10 mg by mouth once daily.     Begin Date    AM    Noon    PM    Bedtime       FLOMAX 0.4 mg Cp24   Refills:  0   Dose:  0.4 mg   Generic drug:  tamsulosin    Instructions:  Take 0.4 mg by mouth once daily.     Begin Date    AM    Noon    PM    Bedtime       fluticasone 50 mcg/actuation nasal spray   Commonly known as:  FLONASE   Refills:  0   Dose:  1 spray    Instructions:  1 spray by Each Nare route once daily.     Begin Date    AM    Noon    PM    Bedtime       furosemide 20 MG tablet   Commonly known as:  LASIX   Quantity:  120 tablet   Refills:  7    Instructions:  TAKE 4 TABLETS BY MOUTH ONCE DAILY     Begin Date    AM    Noon    PM    Bedtime       gabapentin 300 MG capsule   Commonly known as:  NEURONTIN   Refills:  0   Dose:  300 mg    Instructions:  Take 300 mg by mouth 3 (three) times daily.     Begin Date    AM    Noon    PM    Bedtime       hydrocodone-acetaminophen 10-325mg  mg Tab   Commonly known as:  NORCO   Refills:  0   Dose:  10 tablet    Instructions:  Take 10 tablets by mouth 3 (three) times daily.     Begin Date    AM    Noon    PM    Bedtime       insulin aspart protamine-insulin aspart 100 unit/mL (70-30) Inpn pen   Commonly known as:  NovoLOG 70/30   Refills:  0   Dose:  25 Units    Instructions:  Inject 25 Units into the skin 3 (three) times daily before meals.     Begin Date    AM    Noon    PM    Bedtime       Lactobacillus acidophilus Cap   Refills:  0   Dose:  1 capsule    Instructions:  Take 1 capsule by mouth once daily.     Begin Date    AM    Noon    PM    Bedtime       lactulose 10 gram/15 mL solution   Commonly known as:  CHRONULAC   Refills:  0   Dose:  30 mL    Instructions:  Take 30 mLs by mouth 3 (three) times daily.     Begin Date    AM    Noon    PM    Bedtime       LANTUS SOLOSTAR SUBQ   Refills:  0    Dose:  65 Units    Instructions:  Inject 65 Units into the skin every evening.     Begin Date    AM    Noon    PM    Bedtime       levocetirizine 5 MG tablet   Commonly known as:  XYZAL   Refills:  2   Dose:  5 mg    Instructions:  Take 5 mg by mouth once daily.     Begin Date    AM    Noon    PM    Bedtime       lisinopril 10 MG tablet   Refills:  0   Dose:  10 mg    Instructions:  Take 10 mg by mouth once daily.     Begin Date    AM    Noon    PM    Bedtime       metformin 500 MG tablet   Commonly known as:  GLUCOPHAGE   Refills:  5   Dose:  500 mg    Instructions:  Take 500 mg by mouth 2 (two) times daily.     Begin Date    AM    Noon    PM    Bedtime       nebivolol 10 MG Tab   Commonly known as:  BYSTOLIC   Refills:  0   Dose:  10 mg    Instructions:  Take 10 mg by mouth once daily.     Begin Date    AM    Noon    PM    Bedtime       NOVOLOG FLEXPEN 100 unit/mL Inpn pen   Refills:  0   Generic drug:  insulin aspart      Begin Date    AM    Noon    PM    Bedtime       ondansetron 8 MG tablet   Commonly known as:  ZOFRAN   Refills:  0   Dose:  8 mg    Instructions:  Take 8 mg by mouth every 8 (eight) hours as needed.     Begin Date    AM    Noon    PM    Bedtime       pantoprazole 40 MG tablet   Commonly known as:  PROTONIX   Refills:  3   Dose:  40 mg    Instructions:  Take 40 mg by mouth once daily.     Begin Date    AM    Noon    PM    Bedtime       PROBIOTIC FORMULA ORAL   Refills:  0    Instructions:  Take by mouth.     Begin Date    AM    Noon    PM    Bedtime       rifAXIMin 550 mg Tab   Commonly known as:  XIFAXAN   Refills:  0   Dose:  550 mg    Instructions:  Take 550 mg by mouth 2 (two) times daily.     Begin Date    AM    Noon    PM    Bedtime       spironolactone 100 MG tablet   Commonly known as:  ALDACTONE   Quantity:  30 tablet   Refills:  11   Dose:  100 mg    Instructions:  Take 1 tablet (100 mg total) by mouth once daily.     Begin Date    AM    Noon    PM    Bedtime       trazodone 150 MG  tablet   Commonly known as:  DESYREL   Refills:  0   Dose:  300 mg    Instructions:  Take 300 mg by mouth nightly as needed.     Begin Date    AM    Noon    PM    Bedtime                  Please bring to all follow up appointments:    1. A copy of your discharge instructions.  2. All medicines you are currently taking in their original bottles.  3. Identification and insurance card.    Please arrive 15 minutes ahead of scheduled appointment time.    Please call 24 hours in advance if you must reschedule your appointment and/or time.        Your Scheduled Appointments     Apr 04, 2017  7:00 AM CDT   Non-Fasting Lab with LABORATORY, [facility] ([facility])    [address] [place] [id number]   [phone]            Jul 04, 2017  7:00 AM CDT   Non-Fasting Lab with LABORATORY, [facility] [place] [facility])    [address] [place] [id number]   [phone]            Oct 03, 2017  7:00 AM CDT   Non-Fasting Lab with LABORATORY, [facility]    [address] [place] [id number]   [phone]              Your Future Surgeries/Procedures     Jan 26, 2017   Surgery with Savannah Llanos MD   [facility] ([facility]) [address] [place] [address]   [phone]              Follow-up Information     Follow up with Preston Matthew Ii, MD.    Specialty:  Internal Medicine    Contact information:    [address] [place] [address] [address] [place] [address]  [phone]          Discharge Instructions     Future Orders    Activity as tolerated     Diet general     Questions:    Total calories:      Fat restriction, if any:      Protein restriction, if any:      Na restriction, if any:      Fluid restriction:      Additional restrictions:          Discharge  Instructions         Esophageal Varices     With esophageal varices, blood vessels in the esophagus become abnormally enlarged. They may then burst (rupture) and bleed.   Esophageal varices are enlarged veins at the lower end of the esophagus. The esophagus is the tube that carries food from your mouth to your stomach. Varices most often occur because of problems with blood flow in the liver caused by chronic liver disease. Normally, a blood vessel called the portal vein carries blood from the digestive organs to the liver. But with liver disease, blood flow can be blocked due to scarring of the liver. This increases the blood pressure in the portal vein (a condition known as portal hypertension). Blood then backs up in nearby veins in the esophagus and stomach, causing varices. Varices are a serious and deadly problem. Treatment is needed to prevent them from bursting (rupturing) and bleeding. If bleeding occurs, it can be fatal.  Symptoms of esophageal varices  Symptoms do not occur unless the varices are bleeding. This is an emergency problem. If you have any of the following symptoms, get medical attention right away:  · Vomiting blood  · Black, tarry, or bloody stools  · Feeling lightheaded, or fainting (loss of consciousness)  Diagnosing esophageal \varices  Youll likely be checked for varices if you have liver disease or other health problems that can cause them. Your healthcare provider will ask about your symptoms and health history. Youll also be examined. Tests are then done to confirm the problem. Tests can include:  · Upper endoscopy. This is done to see inside the upper digestive tract. During the test, an endoscope is used. This is a thin, flexible tube with a tiny camera on the end. Its inserted through your mouth. Its then guided down through your esophagus, stomach, and first part of your small intestine. This allows the provider to check for varices and find any bleeding.  · Imaging  tests. These provide pictures of the liver or blood flow in the liver. They allow the provider to check for enlarged veins around the liver and assess the risk of bleeding. Common imaging tests done include ultrasound and CT scans.  Treating esophageal varices  The goal of treatment is to reduce the risk of bleeding or to control bleeding. Treatment can include 1 or more of the following:  · Medicines. These may be prescribed to lower the blood pressure inside the enlarged veins. This reduces the risk of bleeding. Beta-blockers are the most common medicine used.  · Endoscopic therapy. These are treatments for enlarged or bleeding veins that are done using an endoscope. With ligation, small rubber bands are placed around the veins to close them off and stop any bleeding. With sclerotherapy, a blood-clotting medicine is injected into the veins to cause scarring and shrink them.  · Balloon tamponade. A tube with a balloon is guided down into your esophagus and stomach. The balloon is then filled with air. This puts pressure on enlarged or bleeding veins to control bleeding. This is a short-term (temporary) way to control bleeding until other treatments are available.   · Surgery. This may be done to place a tubelike device (stent) in the liver. The stent helps redirect blood flow in the liver to lower the blood pressure in enlarged veins. Sometimes, the enlarged veins may be connected to other nearby veins to redirect blood flow. In severe cases, a liver transplant may be needed. For this surgery, a diseased liver is replaced with a healthy liver from another person.   Follow-up  Regular visits with your provider are needed to check for bleeding of the varices. If bleeding occurs, it is likely to occur again. More treatments will then be needed in the future. Once endoscopic therapy (banding) is performed, regular follow-up endoscopic scans with banding are done to completely get rid of the varices. If you are given  "medicines to take by mouth, be sure to take them as directed. Work closely with your provider to manage your condition. Know when to seek emergency care.  © 5809-6826 [employer]. [address] [place] [address], [place] [address]. All rights reserved. This information is not intended as a substitute for professional medical care. Always follow your healthcare professional's instructions.            Admission Information     Date & Time Provider Department CSN    1/26/2017  7:45 AM Savannah Llanos MD [facility] [id number]      Care Providers     Provider Role Specialty Primary office phone    Savannah Llanos MD Attending Provider Gastroenterology [phone]    Savannah Llanos MD Surgeon  Gastroenterology [phone]      Your Vitals Were     BP Pulse Temp Resp Height Weight    110/69 71 98.2 °F (36.8 °C) 18 5' 11" (1.803 m) 98.9 kg (218 lb)    SpO2 BMI             99% 30.4 kg/m2         Recent Lab Values        7/8/2014 10/18/2016                       12:25 PM           A1C 9.8 (H) 9.3                     Allergies as of 1/26/2017     No Known Allergies      [facility]     [facility] Nurse Care Line - 24/7 Assistance  Unless otherwise directed by your provider, please contact [facility], our nurse care line that is available for 24/7 assistance.     Registered nurses in the [facility] provide clinical advisement, health education, appointment booking, and other advisory services.  Call for this free service at [phone].        Advance Directives     An advance directive is a document which, in the event you are no longer able to make decisions for yourself, tells your healthcare team what kind of treatment you do or do not want to receive, or who you would like to make those decisions for you.  If you do not currently have an advance directive, [facility] encourages you to create one.  For more information call:  [phone] ([phone]), " [phone] ([phone]),  or log on to [email or web address].        Smoking Cessation     If you would like to quit smoking:   You may be eligible for free services if you are a [place] resident and started smoking cigarettes before September 1, 1988.  Call the [employer]oking [employer]essation [employer] ([employer]) toll free at (442) 491-4538 or (270) 335-9216.   Call 6-358-QUIT-NOW if you do not meet the above criteria.            Language Assistance Services     ATTENTION: Language assistance services are available, free of charge. Please call [phone].      ATENCIÓN: Si habla español, tiene a lugo disposición servicios gratuitos de asistencia lingüística. Llame al [phone].     CHÚ Ý: N?u b?n nói Ti?ng Vi?t, có các d?ch v? h? tr? ngôn ng? mi?n phí dành cho b?n. G?i s? [phone].        Diabetes Discharge Instructions                                   [facility] Sign-Up     Activating your [facility] account is as easy as 1-2-3!     1) Visit [email or web address], select Sign Up Now, enter this activation code and your date of birth, then select Next.  [email or web address]  Expires: 2/24/2017 10:01 AM      2) Create a username and password to use when you visit [facility] in the future and select a security question in case you lose your password and select Next.    3) Enter your e-mail address and click Sign Up!    Additional Information  If you have questions, please e-mail [email or web address] or call [phone] to talk to our [facility] staff. Remember, [facility] is NOT to be used for urgent needs. For medical emergencies, dial 911.          [facility] complies with applicable Federal civil rights laws and does not discriminate on the basis of race, color, national origin, age, disability, or sex.

## 2017-01-26 NOTE — TRANSFER OF CARE
"Anesthesia Transfer of Care Note    Patient: Colin Reilly    Procedure(s) Performed: Procedure(s) (LRB):  ESOPHAGOGASTRODUODENOSCOPY (EGD) (Left)    Patient location: PACU    Anesthesia Type: MAC    Transport from OR: Transported from OR on room air with adequate spontaneous ventilation    Post pain: adequate analgesia    Post assessment: no apparent anesthetic complications    Post vital signs: stable    Level of consciousness: responds to stimulation    Nausea/Vomiting: no nausea/vomiting    Complications: none          Last vitals:   Visit Vitals    /69    Pulse 71    Temp 36.8 °C (98.2 °F)    Resp 18    Ht 5' 11" (1.803 m)    Wt 98.9 kg (218 lb)    SpO2 99%    BMI 30.4 kg/m2     "

## 2017-01-26 NOTE — ANESTHESIA PREPROCEDURE EVALUATION
01/26/2017  Colin Reilly is a 56 y.o., male.    [facility] Anesthesia Evaluation    I have reviewed the Patient Summary Reports.    I have reviewed the Nursing Notes.   I have reviewed the Medications.     Review of Systems  Anesthesia Hx:  No problems with previous Anesthesia  Denies Family Hx of Anesthesia complications.   Denies Personal Hx of Anesthesia complications.   Social:  Former Smoker    Hematology/Oncology:        Hematology Comments: See labs   Cardiovascular:   Exercise tolerance: good Hypertension, well controlled Stress test neg.   Pulmonary:  Pulmonary Normal    Renal/:  Renal/ Normal     Hepatic/GI:   GERD, well controlled Liver Disease, Hepatitis, C Cirrhosis   Musculoskeletal:  Musculoskeletal Normal    Neurological:  Neurology Normal    Endocrine:   Diabetes, type 2, using insulin    Dermatological:  Skin Normal    Psych:  Psychiatric Normal           Physical Exam  General:  Obesity    Airway/Jaw/Neck:  Airway Findings: Mouth Opening: Normal Tongue: Normal  General Airway Assessment: Adult, Average  Mallampati: II  TM Distance: Normal, at least 6 cm       Chest/Lungs:  Chest/Lungs Findings: Clear to auscultation, Normal Respiratory Rate     Heart/Vascular:  Heart Findings: Rate: Normal  Rhythm: Regular Rhythm  Sounds: Normal        Mental Status:  Mental Status Findings:  Cooperative, Alert and Oriented         Anesthesia Plan  Type of Anesthesia, risks & benefits discussed:  Anesthesia Type:  MAC  Patient's Preference:   Intra-op Monitoring Plan:   Intra-op Monitoring Plan Comments:   Post Op Pain Control Plan:   Post Op Pain Control Plan Comments:   Induction:   IV  Beta Blocker:  Patient is on a Beta-Blocker and has received one dose within the past 24 hours (No further documentation required).       Informed Consent: Patient understands risks and agrees with Anesthesia plan.   Questions answered. Anesthesia consent signed with patient.  ASA Score: 3     Day of Surgery Review of History & Physical: I have interviewed and examined the patient. I have reviewed the patient's H&P dated: 1/26/17. There are no significant changes.  H&P update referred to the surgeon.         Ready For Surgery From Anesthesia Perspective.

## 2017-01-26 NOTE — BRIEF OP NOTE
[facility] -   Brief Operative Note     SUMMARY     Surgery Date: 1/26/2017     Surgeon(s) and Role:     * Savannah Llanos MD - Primary    Assisting Surgeon: None    Pre-op Diagnosis:  Hepatic cirrhosis, unspecified hepatic cirrhosis type [K74.60]    Post-op Diagnosis:  Post-Op Diagnosis Codes:     * Hepatic cirrhosis, unspecified hepatic cirrhosis type [K74.60]    Procedure(s) (LRB):  ESOPHAGOGASTRODUODENOSCOPY (EGD) (Left)    Anesthesia: Monitor Anesthesia Care    Description of the findings of the procedure: Procedure completed. See Procedure note for details.     Findings/Key Components:  Procedure completed. See Procedure note for details.     Prosthetic/Devices: None    Estimated Blood Loss: None         Specimens:   Specimen     None          Discharge Note    SUMMARY     Admit Date: 1/26/2017    Discharge Date and Time: 1/26/2017    Hospital Course (synopsis of major diagnoses, care, treatment, and services provided during the course of the hospital stay): Procedure completed. See Procedure note for details.     Final Diagnosis: Post-Op Diagnosis Codes:     * Hepatic cirrhosis, unspecified hepatic cirrhosis type [K74.60]    Disposition: Discharge home when discharge criteria met.    Follow Up/Patient Instructions: With primary care physician as previously scheduled.    Medications:  Reconciled Home Medications: Current Discharge Medication List      CONTINUE these medications which have NOT CHANGED    Details   cyclobenzaprine (FLEXERIL) 10 MG tablet Take 10 mg by mouth once daily.       escitalopram oxalate (LEXAPRO) 10 MG tablet Take 10 mg by mouth once daily.      fluticasone (FLONASE) 50 mcg/actuation nasal spray 1 spray by Each Nare route once daily.       furosemide (LASIX) 20 MG tablet TAKE 4 TABLETS BY MOUTH ONCE DAILY  Qty: 120 tablet, Refills: 7    Associated Diagnoses: Cirrhosis, Laennec's      gabapentin (NEURONTIN) 300 MG capsule Take 300 mg by mouth 3 (three) times daily.       hydrocodone-acetaminophen 10-325mg (NORCO)  mg Tab Take 10 tablets by mouth 3 (three) times daily.       insulin aspart protamine-insulin aspart (NOVOLOG 70/30) 100 unit/mL (70-30) InPn pen Inject 25 Units into the skin 3 (three) times daily before meals.       INSULIN GLARGINE,HUM.REC.ANLOG (LANTUS SOLOSTAR SUBQ) Inject 65 Units into the skin every evening.       lactobacillus acidophilus Cap Take 1 capsule by mouth once daily.      lactulose (CHRONULAC) 10 gram/15 mL solution Take 30 mLs by mouth 3 (three) times daily.       levocetirizine (XYZAL) 5 MG tablet Take 5 mg by mouth once daily.  Refills: 2      lisinopril 10 MG tablet Take 10 mg by mouth once daily.      metformin (GLUCOPHAGE) 500 MG tablet Take 500 mg by mouth 2 (two) times daily.  Refills: 5      nebivolol (BYSTOLIC) 10 MG Tab Take 10 mg by mouth once daily.      pantoprazole (PROTONIX) 40 MG tablet Take 40 mg by mouth once daily.  Refills: 3      rifAXIMin (XIFAXAN) 550 mg Tab Take 550 mg by mouth 2 (two) times daily.      spironolactone (ALDACTONE) 100 MG tablet Take 1 tablet (100 mg total) by mouth once daily.  Qty: 30 tablet, Refills: 11    Associated Diagnoses: Edema, unspecified type      tamsulosin (FLOMAX) 0.4 mg Cp24 Take 0.4 mg by mouth once daily.      trazodone (DESYREL) 150 MG tablet Take 300 mg by mouth nightly as needed.       B.ANI/L.ACI/L.SAL/L.PLAN/L.JITENDRA (PROBIOTIC FORMULA ORAL) Take by mouth.      NOVOLOG FLEXPEN 100 unit/mL InPn pen       ondansetron (ZOFRAN) 8 MG tablet Take 8 mg by mouth every 8 (eight) hours as needed.              Discharge Procedure Orders  Diet general     Activity as tolerated     Case request GI: ESOPHAGOGASTRODUODENOSCOPY (EGD)   Order Specific Question Answer Comments   CPT Code: [place] UPPER GI ENDOSCOPY,DIAGNOSIS [11348]        Follow-up Information     Follow up with Preston Matthew Ii, MD.    Specialty:  Internal Medicine    Contact information:    [address] [place] [address] DR Lilo KRISHNAN  [id number]  [phone]

## 2017-02-15 ENCOUNTER — ANESTHESIA EVENT (OUTPATIENT)
Dept: ENDOSCOPY | Facility: HOSPITAL | Age: 57
End: 2017-02-15
Payer: MEDICARE

## 2017-02-15 ENCOUNTER — HOSPITAL ENCOUNTER (OUTPATIENT)
Facility: HOSPITAL | Age: 57
Discharge: HOME | End: 2017-02-15
Attending: INTERNAL MEDICINE | Admitting: INTERNAL MEDICINE
Payer: MEDICARE

## 2017-02-15 ENCOUNTER — SURGERY (OUTPATIENT)
Age: 57
End: 2017-02-15

## 2017-02-15 ENCOUNTER — ANESTHESIA (OUTPATIENT)
Dept: ENDOSCOPY | Facility: HOSPITAL | Age: 57
End: 2017-02-15
Payer: MEDICARE

## 2017-02-15 VITALS
OXYGEN SATURATION: 96 % | SYSTOLIC BLOOD PRESSURE: 102 MMHG | HEIGHT: 71 IN | DIASTOLIC BLOOD PRESSURE: 58 MMHG | RESPIRATION RATE: 18 BRPM | TEMPERATURE: 98 F | HEART RATE: 82 BPM

## 2017-02-15 VITALS — RESPIRATION RATE: 12 BRPM

## 2017-02-15 DIAGNOSIS — K74.60 CIRRHOSIS: ICD-10-CM

## 2017-02-15 DIAGNOSIS — I85.01 BLEEDING ESOPHAGEAL VARICES, UNSPECIFIED ESOPHAGEAL VARICES TYPE: Primary | ICD-10-CM

## 2017-02-15 PROCEDURE — 37000009 HC ANESTHESIA EA ADD 15 MINS: Performed by: INTERNAL MEDICINE

## 2017-02-15 PROCEDURE — 25000003 PHARM REV CODE 250: Performed by: NURSE ANESTHETIST, CERTIFIED REGISTERED

## 2017-02-15 PROCEDURE — 43235 EGD DIAGNOSTIC BRUSH WASH: CPT | Mod: ,,, | Performed by: INTERNAL MEDICINE

## 2017-02-15 PROCEDURE — 82962 GLUCOSE BLOOD TEST: CPT | Performed by: INTERNAL MEDICINE

## 2017-02-15 PROCEDURE — 43235 EGD DIAGNOSTIC BRUSH WASH: CPT | Performed by: INTERNAL MEDICINE

## 2017-02-15 PROCEDURE — 25000003 PHARM REV CODE 250: Performed by: INTERNAL MEDICINE

## 2017-02-15 PROCEDURE — 37000008 HC ANESTHESIA 1ST 15 MINUTES: Performed by: INTERNAL MEDICINE

## 2017-02-15 PROCEDURE — 63600175 PHARM REV CODE 636 W HCPCS: Performed by: NURSE ANESTHETIST, CERTIFIED REGISTERED

## 2017-02-15 RX ORDER — PANTOPRAZOLE SODIUM 40 MG/1
40 TABLET, DELAYED RELEASE ORAL 2 TIMES DAILY
Qty: 180 TABLET | Refills: 1 | Status: ON HOLD | OUTPATIENT
Start: 2017-02-15 | End: 2020-02-26 | Stop reason: SDUPTHER

## 2017-02-15 RX ORDER — SODIUM CHLORIDE, SODIUM LACTATE, POTASSIUM CHLORIDE, CALCIUM CHLORIDE 600; 310; 30; 20 MG/100ML; MG/100ML; MG/100ML; MG/100ML
INJECTION, SOLUTION INTRAVENOUS CONTINUOUS PRN
Status: DISCONTINUED | OUTPATIENT
Start: 2017-02-15 | End: 2017-02-15

## 2017-02-15 RX ORDER — PROPOFOL 10 MG/ML
INJECTION, EMULSION INTRAVENOUS
Status: DISCONTINUED | OUTPATIENT
Start: 2017-02-15 | End: 2017-02-15

## 2017-02-15 RX ORDER — SODIUM CHLORIDE, SODIUM LACTATE, POTASSIUM CHLORIDE, CALCIUM CHLORIDE 600; 310; 30; 20 MG/100ML; MG/100ML; MG/100ML; MG/100ML
INJECTION, SOLUTION INTRAVENOUS CONTINUOUS
Status: DISCONTINUED | OUTPATIENT
Start: 2017-02-15 | End: 2017-02-15 | Stop reason: HOSPADM

## 2017-02-15 RX ORDER — LIDOCAINE HCL/PF 100 MG/5ML
SYRINGE (ML) INTRAVENOUS
Status: DISCONTINUED | OUTPATIENT
Start: 2017-02-15 | End: 2017-02-15

## 2017-02-15 RX ADMIN — PROPOFOL 140 MG: 10 INJECTION, EMULSION INTRAVENOUS at 07:02

## 2017-02-15 RX ADMIN — LIDOCAINE HYDROCHLORIDE 75 MG: 20 INJECTION, SOLUTION INTRAVENOUS at 07:02

## 2017-02-15 RX ADMIN — SODIUM CHLORIDE, SODIUM LACTATE, POTASSIUM CHLORIDE, AND CALCIUM CHLORIDE: 600; 310; 30; 20 INJECTION, SOLUTION INTRAVENOUS at 07:02

## 2017-02-15 RX ADMIN — SODIUM CHLORIDE, SODIUM LACTATE, POTASSIUM CHLORIDE, AND CALCIUM CHLORIDE: .6; .31; .03; .02 INJECTION, SOLUTION INTRAVENOUS at 06:02

## 2017-02-15 NOTE — ANESTHESIA PREPROCEDURE EVALUATION
02/15/2017  Colin Reilly is a 56 y.o., male.    [facility] Anesthesia Evaluation    I have reviewed the Patient Summary Reports.    I have reviewed the Nursing Notes.   I have reviewed the Medications.     Review of Systems  Anesthesia Hx:  No problems with previous Anesthesia  Denies Family Hx of Anesthesia complications.   Denies Personal Hx of Anesthesia complications.   Social:  Former Smoker    Hematology/Oncology:        Hematology Comments: No labs since 10/2016 on file   Cardiovascular:   Exercise tolerance: good Hypertension, well controlled Denies MI.  Denies CAD.    Stress test neg.   Pulmonary:  Pulmonary Normal    Renal/:   BPH Has to cath self for urination   Hepatic/GI:   GERD, well controlled Liver Disease, Hepatitis, C Cirrhosis   Musculoskeletal:  Musculoskeletal Normal    Neurological:  Neurology Normal    Endocrine:   Diabetes, well controlled, type 2, using insulin    Dermatological:  Skin Normal    Psych:  Psychiatric Normal           Physical Exam  General:  Obesity    Airway/Jaw/Neck:  Airway Findings: Mouth Opening: Normal Tongue: Normal  General Airway Assessment: Adult, Average  Mallampati: II  TM Distance: Normal, at least 6 cm      Dental:  Dental Findings:    Chest/Lungs:  Chest/Lungs Findings: Clear to auscultation, Normal Respiratory Rate     Heart/Vascular:  Heart Findings: Rate: Normal  Rhythm: Regular Rhythm  Sounds: Normal        Mental Status:  Mental Status Findings:  Cooperative, Alert and Oriented         Anesthesia Plan  Type of Anesthesia, risks & benefits discussed:  Anesthesia Type:  MAC  Patient's Preference:   Intra-op Monitoring Plan:   Intra-op Monitoring Plan Comments:   Post Op Pain Control Plan:   Post Op Pain Control Plan Comments:   Induction:   IV  Beta Blocker:  Patient is on a Beta-Blocker and has received one dose within the past 24 hours (No further  documentation required).       Informed Consent: Patient understands risks and agrees with Anesthesia plan.  Questions answered. Anesthesia consent signed with patient.  ASA Score: 3     Day of Surgery Review of History & Physical: I have interviewed and examined the patient. I have reviewed the patient's H&P dated: 1/26/17. There are no significant changes.  H&P update referred to the surgeon.         Ready For Surgery From Anesthesia Perspective.

## 2017-02-15 NOTE — H&P
Short Stay Endoscopy History and Physical    PCP - Preston Matthew Ii, MD    Procedure - EGD  ASA - II  Mallampati - per anesthesia  History of Anesthesia problems - no  Family history Anesthesia problems -  no     HPI:    Reason for EGD: esophageal varices  Heartburn: No  Dysphagia: No  Follow up of ulcer: No  Anemia - no  GI bleeding - no  Abdominal pain: No  Diarrhea - no    ROS:  CONSTITUTIONAL: Denies weight change,  fatigue, fevers, chills, night sweats.  CARDIOVASCULAR: Denies chest pain, shortness of breath, orthopnea and edema.  RESPIRATORY: Denies cough, hemoptysis, dyspnea, and wheezing.  GI: See HPI.    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    Diabetes 7/8/2014    Esophageal varices     GERD (gastroesophageal reflux disease)     Hepatic encephalopathy 7/8/2014    Hepatitis C virus infection 7/8/2014     tx with interferon 3-4 mos- stopped for unclear reasons Tattoos-first at age 17 only risk factor    HTN (hypertension) 7/8/2014    Hypertension     Insulin dependent diabetes mellitus     Other ascites 7/8/2014    Renal mass, left 7/8/2014     6.3 x 5.7 x 5.6 complex mass    Splenomegaly 7/8/2014    Umbilical hernia 7/8/2014       Surgical History:   Past Surgical History   Procedure Laterality Date    Hernia repair      Cholecystectomy      Carpal tunnel release Bilateral     Neck surgery       fusion on C5 and C6    Vericose veins removed      Ulnar nerve transposition Left        Family History:  Family History   Problem Relation Age of Onset    Hyperlipidemia Mother     No Known Problems Father 36     accident on oil rig    No Known Problems Sister     Cancer Brother     Alcohol abuse Brother     No Known Problems Sister        Social History:   Social History   Substance Use Topics    Smoking status:  Former Smoker     Types: Cigarettes     Quit date: 1/4/2010    Smokeless tobacco: Former User     Quit date: 2/24/1990      Comment: former 1 pack/week quit 1/4/2010    Alcohol use No      Comment: former heavy beer but quit 1/4/2010       Allergies:   Review of patient's allergies indicates:  No Known Allergies    Medications:   No current facility-administered medications on file prior to encounter.      Current Outpatient Prescriptions on File Prior to Encounter   Medication Sig Dispense Refill    B.ANI/L.ACI/L.SAL/L.PLAN/L.JITENDRA (PROBIOTIC FORMULA ORAL) Take by mouth.      cyclobenzaprine (FLEXERIL) 10 MG tablet Take 10 mg by mouth once daily.       escitalopram oxalate (LEXAPRO) 10 MG tablet Take 10 mg by mouth once daily.      fluticasone (FLONASE) 50 mcg/actuation nasal spray 1 spray by Each Nare route once daily.       furosemide (LASIX) 20 MG tablet TAKE 4 TABLETS BY MOUTH ONCE DAILY 120 tablet 7    gabapentin (NEURONTIN) 300 MG capsule Take 300 mg by mouth 3 (three) times daily.      hydrocodone-acetaminophen 10-325mg (NORCO)  mg Tab Take 10 tablets by mouth 3 (three) times daily.       insulin aspart protamine-insulin aspart (NOVOLOG 70/30) 100 unit/mL (70-30) InPn pen Inject 25 Units into the skin 3 (three) times daily before meals.       lactobacillus acidophilus Cap Take 1 capsule by mouth once daily.      lactulose (CHRONULAC) 10 gram/15 mL solution Take 30 mLs by mouth 3 (three) times daily.       levocetirizine (XYZAL) 5 MG tablet Take 5 mg by mouth once daily.  2    lisinopril 10 MG tablet Take 10 mg by mouth once daily.      metformin (GLUCOPHAGE) 500 MG tablet Take 500 mg by mouth 2 (two) times daily.  5    nebivolol (BYSTOLIC) 10 MG Tab Take 10 mg by mouth once daily.      pantoprazole (PROTONIX) 40 MG tablet Take 40 mg by mouth once daily.  3    rifAXIMin (XIFAXAN) 550 mg Tab Take 550 mg by mouth 2 (two) times daily.      spironolactone (ALDACTONE) 100 MG tablet Take 1  tablet (100 mg total) by mouth once daily. 30 tablet 11    tamsulosin (FLOMAX) 0.4 mg Cp24 Take 0.4 mg by mouth once daily.      trazodone (DESYREL) 150 MG tablet Take 300 mg by mouth nightly as needed.       INSULIN GLARGINE,HUM.REC.ANLOG (LANTUS SOLOSTAR SUBQ) Inject 65 Units into the skin every evening.       NOVOLOG FLEXPEN 100 unit/mL InPn pen       [DISCONTINUED] ondansetron (ZOFRAN) 8 MG tablet Take 8 mg by mouth every 8 (eight) hours as needed.          Physical Exam:  Vital Signs:   Vitals:    02/15/17 0654   BP: (!) 166/76   Pulse: 76   Resp: 18   Temp: 98.2 °F (36.8 °C)     General Appearance: Well appearing in no acute distress  ENT: OP clear  Chest: CTA B  CV: RRR, no m/r/g  Abd: s/nt/nd/nabs  Ext: no edema    Labs:  Lab Results   Component Value Date    WBC 4.6 10/18/2016    HGB 13.8 10/18/2016    HCT 45 10/18/2016    MCV 81 (L) 10/03/2016    PLT 51 (L) 10/03/2016     Lab Results   Component Value Date    INR 1.3 (H) 10/03/2016    INR 1.3 (H) 04/05/2016    INR 1.1 01/15/2016     Lab Results   Component Value Date    IRON 50 08/13/2014    TIBC 392 08/13/2014    FERRITIN 16 (L) 08/13/2014         Assessment:  Patient Active Problem List   Diagnosis    Esophageal varices with bleeding    Cirrhosis, Laennec's    Umbilical hernia    Splenomegaly    GERD (gastroesophageal reflux disease)    Diabetes    HTN (hypertension)    Other ascites    Alcohol abuse, in remission    Hepatic encephalopathy    Chronic back pain    Cirrhosis     Esophageal varices    Plan:  I have explained the risks and benefits of endoscopy procedures to the patient including but not limited to bleeding, perforation, infection, and death. The patient wishes to proceed.

## 2017-02-15 NOTE — ANESTHESIA RELEASE NOTE
"Anesthesia Release from PACU Note    Patient: Colin Reilly    Procedure(s) Performed: Procedure(s) (LRB):  ESOPHAGOGASTRODUODENOSCOPY (EGD) (N/A)    Anesthesia type: MAC    Post pain: Adequate analgesia    Post assessment: no apparent anesthetic complications, tolerated procedure well and no evidence of recall    Last Vitals:   Visit Vitals    BP (!) 103/53 (BP Location: Right leg, Patient Position: Lying, BP Method: Automatic)    Pulse 76    Temp 36.8 °C (98.2 °F) (Oral)    Resp 16    Ht 5' 11" (1.803 m)    SpO2 95%       Post vital signs: stable    Level of consciousness: awake and alert     Nausea/Vomiting: no nausea/no vomiting    Complications: none    Airway Patency: patent    Respiratory: unassisted, spontaneous ventilation, room air    Cardiovascular: stable    Hydration: euvolemic  "

## 2017-02-15 NOTE — ANESTHESIA POSTPROCEDURE EVALUATION
"Anesthesia Post Evaluation    Patient: Colin Reilly    Procedure(s) Performed: Procedure(s) (LRB):  ESOPHAGOGASTRODUODENOSCOPY (EGD) (N/A)    Final Anesthesia Type: MAC  Patient location during evaluation: PACU  Patient participation: Yes- Able to Participate  Level of consciousness: awake and alert and oriented  Post-procedure vital signs: reviewed and stable  Pain management: adequate  Airway patency: patent  PONV status at discharge: No PONV  Anesthetic complications: no      Cardiovascular status: blood pressure returned to baseline  Respiratory status: unassisted, room air and spontaneous ventilation  Hydration status: euvolemic  Follow-up not needed.        Visit Vitals    BP (!) 103/53 (BP Location: Right leg, Patient Position: Lying, BP Method: Automatic)    Pulse 76    Temp 36.8 °C (98.2 °F) (Oral)    Resp 16    Ht 5' 11" (1.803 m)    SpO2 95%       Pain/Lobito Score: Pain Assessment Performed: Yes (2/15/2017  7:51 AM)  Presence of Pain: non-verbal indicators absent (2/15/2017  7:51 AM)  Pain Rating Prior to Med Admin: 0 (2/15/2017  7:51 AM)  Pain Rating Post Med Admin: 0 (2/15/2017  7:51 AM)  Lobito Score: 9 (2/15/2017  7:51 AM)      "

## 2017-02-15 NOTE — DISCHARGE INSTRUCTIONS
Esophageal Varices     With esophageal varices, blood vessels in the esophagus become abnormally enlarged. They may then burst (rupture) and bleed.   Esophageal varices are enlarged veins at the lower end of the esophagus. The esophagus is the tube that carries food from your mouth to your stomach. Varices most often occur because of problems with blood flow in the liver caused by chronic liver disease. Normally, a blood vessel called the portal vein carries blood from the digestive organs to the liver. But with liver disease, blood flow can be blocked due to scarring of the liver. This increases the blood pressure in the portal vein (a condition known as portal hypertension). Blood then backs up in nearby veins in the esophagus and stomach, causing varices. Varices are a serious and deadly problem. Treatment is needed to prevent them from bursting (rupturing) and bleeding. If bleeding occurs, it can be fatal.  Symptoms of esophageal varices  Symptoms do not occur unless the varices are bleeding. This is an emergency problem. If you have any of the following symptoms, get medical attention right away:  · Vomiting blood  · Black, tarry, or bloody stools  · Feeling lightheaded, or fainting (loss of consciousness)  Diagnosing esophageal \varices  Youll likely be checked for varices if you have liver disease or other health problems that can cause them. Your healthcare provider will ask about your symptoms and health history. Youll also be examined. Tests are then done to confirm the problem. Tests can include:  · Upper endoscopy. This is done to see inside the upper digestive tract. During the test, an endoscope is used. This is a thin, flexible tube with a tiny camera on the end. Its inserted through your mouth. Its then guided down through your esophagus, stomach, and first part of your small intestine. This allows the provider to check for varices and find any bleeding.  · Imaging tests. These provide pictures  of the liver or blood flow in the liver. They allow the provider to check for enlarged veins around the liver and assess the risk of bleeding. Common imaging tests done include ultrasound and CT scans.  Treating esophageal varices  The goal of treatment is to reduce the risk of bleeding or to control bleeding. Treatment can include 1 or more of the following:  · Medicines. These may be prescribed to lower the blood pressure inside the enlarged veins. This reduces the risk of bleeding. Beta-blockers are the most common medicine used.  · Endoscopic therapy. These are treatments for enlarged or bleeding veins that are done using an endoscope. With ligation, small rubber bands are placed around the veins to close them off and stop any bleeding. With sclerotherapy, a blood-clotting medicine is injected into the veins to cause scarring and shrink them.  · Balloon tamponade. A tube with a balloon is guided down into your esophagus and stomach. The balloon is then filled with air. This puts pressure on enlarged or bleeding veins to control bleeding. This is a short-term (temporary) way to control bleeding until other treatments are available.   · Surgery. This may be done to place a tubelike device (stent) in the liver. The stent helps redirect blood flow in the liver to lower the blood pressure in enlarged veins. Sometimes, the enlarged veins may be connected to other nearby veins to redirect blood flow. In severe cases, a liver transplant may be needed. For this surgery, a diseased liver is replaced with a healthy liver from another person.   Follow-up  Regular visits with your provider are needed to check for bleeding of the varices. If bleeding occurs, it is likely to occur again. More treatments will then be needed in the future. Once endoscopic therapy (banding) is performed, regular follow-up endoscopic scans with banding are done to completely get rid of the varices. If you are given medicines to take by mouth, be  sure to take them as directed. Work closely with your provider to manage your condition. Know when to seek emergency care.  Date Last Reviewed: 7/1/2016  © 1415-4002 [employer] [address] [place] [address], [place] [address]. All rights reserved. This information is not intended as a substitute for professional medical care. Always follow your healthcare professional's instructions.

## 2017-02-15 NOTE — IP AVS SNAPSHOT
[address] [place] [address]           Patient Discharge Instructions     Our goal is to set you up for success. This packet includes information on your condition, medications, and your home care. It will help you to care for yourself so you don't get sicker and need to go back to the hospital.     Please ask your nurse if you have any questions.        There are many details to remember when preparing to leave the hospital. Here is what you will need to do:    1. Take your medicine. If you are prescribed medications, review your Medication List in the following pages. You may have new medications to  at the pharmacy and others that you'll need to stop taking. Review the instructions for how and when to take your medications. Talk with your doctor or nurses if you are unsure of what to do.     2. Go to your follow-up appointments. Specific follow-up information is listed in the following pages. Your may be contacted by a transition nurse or clinical provider about future appointments. Be sure we have all of the phone numbers to reach you, if needed. Please contact your provider's office if you are unable to make an appointment.     3. Watch for warning signs. Your doctor or nurse will give you detailed warning signs to watch for and when to call for assistance. These instructions may also include educational information about your condition. If you experience any of warning signs to your health, call your doctor.               ** Verify the list of medication(s) below is accurate and up to date. Carry this with you in case of emergency. If your medications have changed, please notify your healthcare provider.             Medication List      CHANGE how you take these medications        Additional Info                      pantoprazole 40 MG tablet   Commonly known as:  PROTONIX   Quantity:  180 tablet   Refills:  1   Dose:  40 mg   What changed:  when to  take this    Instructions:  Take 1 tablet (40 mg total) by mouth 2 (two) times daily.     Begin Date    AM    Noon    PM    Bedtime         CONTINUE taking these medications        Additional Info                      cyclobenzaprine 10 MG tablet   Commonly known as:  FLEXERIL   Refills:  0   Dose:  10 mg    Instructions:  Take 10 mg by mouth once daily.     Begin Date    AM    Noon    PM    Bedtime       escitalopram oxalate 10 MG tablet   Commonly known as:  LEXAPRO   Refills:  0   Dose:  10 mg    Instructions:  Take 10 mg by mouth once daily.     Begin Date    AM    Noon    PM    Bedtime       FLOMAX 0.4 mg Cp24   Refills:  0   Dose:  0.4 mg   Generic drug:  tamsulosin    Instructions:  Take 0.4 mg by mouth once daily.     Begin Date    AM    Noon    PM    Bedtime       fluticasone 50 mcg/actuation nasal spray   Commonly known as:  FLONASE   Refills:  0   Dose:  1 spray    Instructions:  1 spray by Each Nare route once daily.     Begin Date    AM    Noon    PM    Bedtime       furosemide 20 MG tablet   Commonly known as:  LASIX   Quantity:  120 tablet   Refills:  7    Instructions:  TAKE 4 TABLETS BY MOUTH ONCE DAILY     Begin Date    AM    Noon    PM    Bedtime       gabapentin 300 MG capsule   Commonly known as:  NEURONTIN   Refills:  0   Dose:  300 mg    Instructions:  Take 300 mg by mouth 3 (three) times daily.     Begin Date    AM    Noon    PM    Bedtime       hydrocodone-acetaminophen 10-325mg  mg Tab   Commonly known as:  NORCO   Refills:  0   Dose:  10 tablet    Instructions:  Take 10 tablets by mouth 3 (three) times daily.     Begin Date    AM    Noon    PM    Bedtime       insulin aspart protamine-insulin aspart 100 unit/mL (70-30) Inpn pen   Commonly known as:  NovoLOG 70/30   Refills:  0   Dose:  25 Units    Instructions:  Inject 25 Units into the skin 3 (three) times daily before meals.     Begin Date    AM    Noon    PM    Bedtime       Lactobacillus acidophilus Cap   Refills:  0   Dose:  1  capsule    Instructions:  Take 1 capsule by mouth once daily.     Begin Date    AM    Noon    PM    Bedtime       lactulose 10 gram/15 mL solution   Commonly known as:  CHRONULAC   Refills:  0   Dose:  30 mL    Instructions:  Take 30 mLs by mouth 3 (three) times daily.     Begin Date    AM    Noon    PM    Bedtime       LANTUS SOLOSTAR SUBQ   Refills:  0   Dose:  65 Units    Instructions:  Inject 65 Units into the skin every evening.     Begin Date    AM    Noon    PM    Bedtime       levocetirizine 5 MG tablet   Commonly known as:  XYZAL   Refills:  2   Dose:  5 mg    Instructions:  Take 5 mg by mouth once daily.     Begin Date    AM    Noon    PM    Bedtime       lisinopril 10 MG tablet   Refills:  0   Dose:  10 mg    Instructions:  Take 10 mg by mouth once daily.     Begin Date    AM    Noon    PM    Bedtime       metformin 500 MG tablet   Commonly known as:  GLUCOPHAGE   Refills:  5   Dose:  500 mg    Instructions:  Take 500 mg by mouth 2 (two) times daily.     Begin Date    AM    Noon    PM    Bedtime       nebivolol 10 MG Tab   Commonly known as:  BYSTOLIC   Refills:  0   Dose:  10 mg    Instructions:  Take 10 mg by mouth once daily.     Begin Date    AM    Noon    PM    Bedtime       NOVOLOG FLEXPEN 100 unit/mL Inpn pen   Refills:  0   Generic drug:  insulin aspart      Begin Date    AM    Noon    PM    Bedtime       PROBIOTIC FORMULA ORAL   Refills:  0    Instructions:  Take by mouth.     Begin Date    AM    Noon    PM    Bedtime       rifAXIMin 550 mg Tab   Commonly known as:  XIFAXAN   Refills:  0   Dose:  550 mg    Instructions:  Take 550 mg by mouth 2 (two) times daily.     Begin Date    AM    Noon    PM    Bedtime       spironolactone 100 MG tablet   Commonly known as:  ALDACTONE   Quantity:  30 tablet   Refills:  11   Dose:  100 mg    Instructions:  Take 1 tablet (100 mg total) by mouth once daily.     Begin Date    AM    Noon    PM    Bedtime       trazodone 150 MG tablet   Commonly known as:  DESYREL    Refills:  0   Dose:  300 mg    Instructions:  Take 300 mg by mouth nightly as needed.     Begin Date    AM    Noon    PM    Bedtime            Where to Get Your Medications      These medications were sent to [facility] [place]  [address]  [address] [place] [address]     Phone:  [phone]     pantoprazole 40 MG tablet                  Please bring to all follow up appointments:    1. A copy of your discharge instructions.  2. All medicines you are currently taking in their original bottles.  3. Identification and insurance card.    Please arrive 15 minutes ahead of scheduled appointment time.    Please call 24 hours in advance if you must reschedule your appointment and/or time.        Your Scheduled Appointments     Apr 04, 2017  7:00 AM CDT   Non-Fasting Lab with LABORATORY, [facility] ([facility]) [address] [place] [address]   [phone]            Jul 04, 2017  7:00 AM CDT   Non-Fasting Lab with LABORATORY, [facility]) [address] [place] [address]   [phone]            Oct 03, 2017  7:00 AM CDT   Non-Fasting Lab with LABORATORY, [facility]) [address] [place] [address]   [phone]              Your Future Surgeries/Procedures     Feb 15, 2017   Surgery with Savannah Llanos MD   [facility] ([facility]) [address] [place] [address]   [phone]              Follow-up Information     Follow up with Preston Matthew Ii, MD.    Specialty:  Internal Medicine    Contact information:    [address] [place] [address] DR [place] [address] [phone]          Discharge Instructions     Future Orders    Activity as tolerated     Diet general     Questions:     Total calories:      Fat restriction, if any:      Protein restriction, if any:      Na restriction, if any:      Fluid restriction:      Additional restrictions:          Discharge Instructions         Esophageal Varices     With esophageal varices, blood vessels in the esophagus become abnormally enlarged. They may then burst (rupture) and bleed.   Esophageal varices are enlarged veins at the lower end of the esophagus. The esophagus is the tube that carries food from your mouth to your stomach. Varices most often occur because of problems with blood flow in the liver caused by chronic liver disease. Normally, a blood vessel called the portal vein carries blood from the digestive organs to the liver. But with liver disease, blood flow can be blocked due to scarring of the liver. This increases the blood pressure in the portal vein (a condition known as portal hypertension). Blood then backs up in nearby veins in the esophagus and stomach, causing varices. Varices are a serious and deadly problem. Treatment is needed to prevent them from bursting (rupturing) and bleeding. If bleeding occurs, it can be fatal.  Symptoms of esophageal varices  Symptoms do not occur unless the varices are bleeding. This is an emergency problem. If you have any of the following symptoms, get medical attention right away:  · Vomiting blood  · Black, tarry, or bloody stools  · Feeling lightheaded, or fainting (loss of consciousness)  Diagnosing esophageal \varices  Youll likely be checked for varices if you have liver disease or other health problems that can cause them. Your healthcare provider will ask about your symptoms and health history. Youll also be examined. Tests are then done to confirm the problem. Tests can include:  · Upper endoscopy. This is done to see inside the upper digestive tract. During the test, an endoscope is used. This is a thin, flexible tube with a tiny camera on the end. Its inserted through your mouth.  Its then guided down through your esophagus, stomach, and first part of your small intestine. This allows the provider to check for varices and find any bleeding.  · Imaging tests. These provide pictures of the liver or blood flow in the liver. They allow the provider to check for enlarged veins around the liver and assess the risk of bleeding. Common imaging tests done include ultrasound and CT scans.  Treating esophageal varices  The goal of treatment is to reduce the risk of bleeding or to control bleeding. Treatment can include 1 or more of the following:  · Medicines. These may be prescribed to lower the blood pressure inside the enlarged veins. This reduces the risk of bleeding. Beta-blockers are the most common medicine used.  · Endoscopic therapy. These are treatments for enlarged or bleeding veins that are done using an endoscope. With ligation, small rubber bands are placed around the veins to close them off and stop any bleeding. With sclerotherapy, a blood-clotting medicine is injected into the veins to cause scarring and shrink them.  · Balloon tamponade. A tube with a balloon is guided down into your esophagus and stomach. The balloon is then filled with air. This puts pressure on enlarged or bleeding veins to control bleeding. This is a short-term (temporary) way to control bleeding until other treatments are available.   · Surgery. This may be done to place a tubelike device (stent) in the liver. The stent helps redirect blood flow in the liver to lower the blood pressure in enlarged veins. Sometimes, the enlarged veins may be connected to other nearby veins to redirect blood flow. In severe cases, a liver transplant may be needed. For this surgery, a diseased liver is replaced with a healthy liver from another person.   Follow-up  Regular visits with your provider are needed to check for bleeding of the varices. If bleeding occurs, it is likely to occur again. More treatments will then be needed in  "the future. Once endoscopic therapy (banding) is performed, regular follow-up endoscopic scans with banding are done to completely get rid of the varices. If you are given medicines to take by mouth, be sure to take them as directed. Work closely with your provider to manage your condition. Know when to seek emergency care.  Date Last Reviewed: 7/1/2016  © 5127-2061 [employer]. [address] [place] [address], [place] [address]. All rights reserved. This information is not intended as a substitute for professional medical care. Always follow your healthcare professional's instructions.            Admission Information     Date & Time Provider Department CSN    2/15/2017  6:37 AM Savannah Llanos MD [facility] -  [id number]      Care Providers     Provider Role Specialty Primary office phone    Savannah Llanos MD Attending Provider Gastroenterology [phone]    Savannah Llanos MD Surgeon  Gastroenterology [phone]      Your Vitals Were     BP Pulse Temp Resp Height SpO2    103/53 (BP Location: Right leg, Patient Position: Lying, BP Method: Automatic) 76 98.2 °F (36.8 °C) (Oral) 16 5' 11" (1.803 m) 95%      Recent Lab Values        7/8/2014 10/18/2016                       12:25 PM           A1C 9.8 (H) 9.3                     Allergies as of 2/15/2017     No Known Allergies      [facility]     [facility] Nurse Care Line - 24/7 Assistance  Unless otherwise directed by your provider, please contact [facility], our nurse care line that is available for 24/7 assistance.     Registered nurses in the [facility] provide clinical advisement, health education, appointment booking, and other advisory services.  Call for this free service at [phone].        Advance Directives     An advance directive is a document which, in the event you are no longer able to make decisions for yourself, tells your healthcare team what kind of treatment you do or do not " want to receive, or who you would like to make those decisions for you.  If you do not currently have an advance directive, [facility] encourages you to create one.  For more information call:  [phone]), [phone] ([phone]),  or log on to [email or web address].        Smoking Cessation     If you would like to quit smoking:   You may be eligible for free services if you are a [place] resident and started smoking cigarettes before September 1, 1988.  Call the Smoking Cessation Trust (SCT) toll free at (266) 929-7605 or (959) 859-8990.   Call 2-077-QUIT-NOW if you do not meet the above criteria.            Language Assistance Services     ATTENTION: Language assistance services are available, free of charge. Please call [phone].      ATENCIÓN: Si habla español, tiene a lugo disposición servicios gratuitos de asistencia lingüística. Llame al [phone].     CHÚ Ý: N?u b?n nói Ti?ng Vi?t, có các d?ch v? h? tr? ngôn ng? mi?n phí dành cho b?n. G?i s? [phone].        Diabetes Discharge Instructions                                   [facility] Sign-Up     Activating your [facility] account is as easy as 1-2-3!     1) Visit [email or web address], select Sign Up Now, enter this activation code and your date of birth, then select Next.  [email or web address]  Expires: 2/24/2017 10:01 AM      2) Create a username and password to use when you visit [facility] in the future and select a security question in case you lose your password and select Next.    3) Enter your e-mail address and click Sign Up!    Additional Information  If you have questions, please e-mail [email or web address] or call [phone] to talk to our [facility] staff. Remember, [facility] is NOT to be used for urgent needs. For medical emergencies, dial 911.          [facility] complies with applicable Federal civil rights laws and does not discriminate on the basis of race, color, national origin, age,  disability, or sex.

## 2017-02-15 NOTE — BRIEF OP NOTE
[facility]  Brief Operative Note     SUMMARY     Surgery Date: 2/15/2017     Surgeon(s) and Role:     * Savannah Llanos MD - Primary    Assisting Surgeon: None    Pre-op Diagnosis:  Cirrhosis [K74.60]    Post-op Diagnosis:  Post-Op Diagnosis Codes:     * Cirrhosis [K74.60]    Procedure(s) (LRB):  ESOPHAGOGASTRODUODENOSCOPY (EGD) (N/A)    Anesthesia: Monitor Anesthesia Care    Description of the findings of the procedure: Procedure completed. See Procedure note for details.     Findings/Key Components:  Procedure completed. See Procedure note for details.     Prosthetic/Devices: None    Estimated Blood Loss: None         Specimens:   Specimen     None          Discharge Note    SUMMARY     Admit Date: 2/15/2017    Discharge Date and Time: 2/15/2017    Hospital Course (synopsis of major diagnoses, care, treatment, and services provided during the course of the hospital stay): Procedure completed. See Procedure note for details.     Final Diagnosis: Post-Op Diagnosis Codes:     * Cirrhosis [K74.60]    Disposition: Discharge home when discharge criteria met.    Follow Up/Patient Instructions: With primary care physician as previously scheduled.    Medications:  Reconciled Home Medications: Current Discharge Medication List      CONTINUE these medications which have CHANGED    Details   pantoprazole (PROTONIX) 40 MG tablet Take 1 tablet (40 mg total) by mouth 2 (two) times daily.  Qty: 180 tablet, Refills: 1         CONTINUE these medications which have NOT CHANGED    Details   B.ANI/L.ACI/L.SAL/L.PLAN/L.JITENDRA (PROBIOTIC FORMULA ORAL) Take by mouth.      cyclobenzaprine (FLEXERIL) 10 MG tablet Take 10 mg by mouth once daily.       escitalopram oxalate (LEXAPRO) 10 MG tablet Take 10 mg by mouth once daily.      fluticasone (FLONASE) 50 mcg/actuation nasal spray 1 spray by Each Nare route once daily.       furosemide (LASIX) 20 MG tablet TAKE 4 TABLETS BY MOUTH ONCE DAILY  Qty: 120 tablet, Refills: 7     Associated Diagnoses: Cirrhosis, Laennec's      gabapentin (NEURONTIN) 300 MG capsule Take 300 mg by mouth 3 (three) times daily.      hydrocodone-acetaminophen 10-325mg (NORCO)  mg Tab Take 10 tablets by mouth 3 (three) times daily.       insulin aspart protamine-insulin aspart (NOVOLOG 70/30) 100 unit/mL (70-30) InPn pen Inject 25 Units into the skin 3 (three) times daily before meals.       lactobacillus acidophilus Cap Take 1 capsule by mouth once daily.      lactulose (CHRONULAC) 10 gram/15 mL solution Take 30 mLs by mouth 3 (three) times daily.       levocetirizine (XYZAL) 5 MG tablet Take 5 mg by mouth once daily.  Refills: 2      lisinopril 10 MG tablet Take 10 mg by mouth once daily.      metformin (GLUCOPHAGE) 500 MG tablet Take 500 mg by mouth 2 (two) times daily.  Refills: 5      nebivolol (BYSTOLIC) 10 MG Tab Take 10 mg by mouth once daily.      rifAXIMin (XIFAXAN) 550 mg Tab Take 550 mg by mouth 2 (two) times daily.      spironolactone (ALDACTONE) 100 MG tablet Take 1 tablet (100 mg total) by mouth once daily.  Qty: 30 tablet, Refills: 11    Associated Diagnoses: Edema, unspecified type      tamsulosin (FLOMAX) 0.4 mg Cp24 Take 0.4 mg by mouth once daily.      trazodone (DESYREL) 150 MG tablet Take 300 mg by mouth nightly as needed.       INSULIN GLARGINE,HUM.REC.ANLOG (LANTUS SOLOSTAR SUBQ) Inject 65 Units into the skin every evening.       NOVOLOG FLEXPEN 100 unit/mL InPn pen              Discharge Procedure Orders  Diet general     Activity as tolerated     Case request GI: ESOPHAGOGASTRODUODENOSCOPY (EGD)   Order Specific Question Answer Comments   CPT Code: DE UPPER GI ENDOSCOPY,DIAGNOSIS [86838]        Follow-up Information     Follow up with Preston Matthew Ii, MD.    Specialty:  Internal Medicine    Contact information:    [address] [place] [address] DR [place] [place] [address] [phone]

## 2017-02-24 LAB — POCT GLUCOSE: 191 MG/DL (ref 70–110)

## 2017-03-16 ENCOUNTER — HISTORICAL (OUTPATIENT)
Dept: ADMINISTRATIVE | Facility: HOSPITAL | Age: 57
End: 2017-03-16

## 2017-03-22 ENCOUNTER — TELEPHONE (OUTPATIENT)
Dept: HEPATOLOGY | Facility: CLINIC | Age: 57
End: 2017-03-22

## 2017-03-22 ENCOUNTER — TELEPHONE (OUTPATIENT)
Dept: TRANSPLANT | Facility: HOSPITAL | Age: 57
End: 2017-03-22

## 2017-03-22 ENCOUNTER — NURSE TRIAGE (OUTPATIENT)
Dept: ADMINISTRATIVE | Facility: CLINIC | Age: 57
End: 2017-03-22

## 2017-03-22 DIAGNOSIS — N40.0 PROSTATE HYPERTROPHY: Primary | ICD-10-CM

## 2017-03-22 NOTE — TELEPHONE ENCOUNTER
MA spoke with message given. Pt verbalized understanding. Number given to schedule appt with Urology ([id number]).   Pt stated he also had labs done @ [facility] he wanted Dr. Meneses to see. I told patient I would get results and give them to Dr. Meneses,     Called [facility] spoke with Candice she will fax results from 03/20/2017.

## 2017-03-22 NOTE — TELEPHONE ENCOUNTER
Reason for Disposition   [1] Follow-up call from patient regarding patient's clinical status AND [2] information urgent    Answer Assessment - Initial Assessment Questions  Pt reported he has been having some problems with voiding, is having to use a catheter. Reported he has an enlarged prostate which they can't operate on as he was told he could bleed to death. Has had some liver pain this week. Had called earlier as he was requesting to speak to his dr. Attempt was made to call back at number he left which was his mother's home. He has a new number and requesting to speak to someone in dr's office.    Protocols used:  PCP CALL - NO TRIAGE-A-    Advised I would send a message to office for call back.

## 2017-03-22 NOTE — TELEPHONE ENCOUNTER
----- Message from Hetal Gee sent at 3/22/2017 12:38 PM CDT -----  Contact: patient  Would like to speak with Dr Meneses states that its important please call

## 2017-03-22 NOTE — TELEPHONE ENCOUNTER
Contact: patient  Would like to speak with Dr Meneses states that its important please call .    Call returned to the patient at the number listed. No Answer. Left message to return call to the clinic regarding his need to speak with Dr Meneses. DB

## 2017-03-22 NOTE — TELEPHONE ENCOUNTER
MA spoke with message given. Pt verbalized understanding. Number given to schedule appt with Urology ([id number]).   Pt stated he also had labs done @ [facility] he wanted Dr. Meneses to see. I told patient I would get results and give them to Dr. Meneses,      Called Anjel Carodna spoke with Candice she will fax results from 03/20/2017.

## 2017-03-23 ENCOUNTER — TELEPHONE (OUTPATIENT)
Dept: HEPATOLOGY | Facility: CLINIC | Age: 57
End: 2017-03-23

## 2017-03-23 DIAGNOSIS — K74.60 CIRRHOSIS OF LIVER WITH ASCITES, UNSPECIFIED HEPATIC CIRRHOSIS TYPE: Primary | ICD-10-CM

## 2017-03-23 DIAGNOSIS — R18.8 CIRRHOSIS OF LIVER WITH ASCITES, UNSPECIFIED HEPATIC CIRRHOSIS TYPE: Primary | ICD-10-CM

## 2017-03-23 LAB
ALBUMIN/GLOB SERPL ELPH: 1.1 {RATIO}
ALBUMIN: 3.3 (ref 3.4–5)
ALP SERPL-CCNC: 102 U/L (ref 25–125)
ALT SERPL W P-5'-P-CCNC: 42 U/L (ref 12–78)
AST SERPL-CCNC: 42 U/L (ref 15–37)
BILIRUB CONJ+UNCONJ SERPL-MCNC: 0.9 MG/DL (ref 0–0.8)
BILIRUB SERPL-MCNC: 1.5 MG/DL (ref 0.1–1.4)
BILIRUBIN DIRECT+TOT PNL SERPL-MCNC: 0.6 MG/DL (ref 0.01–0.4)
BUN BLD-MCNC: 5 MG/DL (ref 4–21)
CALCIUM SERPL-MCNC: 8 MG/DL (ref 8.7–10.7)
CHLORIDE SERPL-SCNC: 99 MMOL/L (ref 99–108)
CO2 SERPL-SCNC: 25 MMOL/L (ref 13–22)
CREAT SERPL-MCNC: 0.8 MG/DL (ref 0.6–1.3)
EGFR IF AFRICAN AMERICAN: >60
EOSINOPHIL NFR BLD: 3 %
ERYTHROCYTE [DISTWIDTH] IN BLOOD BY AUTOMATED COUNT: 17.1 % (ref 11.5–14.5)
EST. GFR  (NON AFRICAN AMERICAN): 60 MG/DL
GLOBULIN: 3
GLUCOSE SERPL-MCNC: 459 MG/DL (ref 74–106)
HCT VFR BLD AUTO: 37 % (ref 41–53)
HGB BLD-MCNC: 11.8 G/DL (ref 13.5–17.5)
INR PPP: 1.46 (ref 0–1.27)
LYMPHOCYTES %: 20 % (ref 18–52)
LYMPHOCYTES %: 6 % (ref 18–52)
MCH RBC QN AUTO: 28.3 PG (ref 26–34)
MCHC RBC AUTO-ENTMCNC: 32 G/DL (ref 30–37)
MCV RBC AUTO: 87.8 FL (ref 82–108)
MONOCYTES %: 12
NEUTROPHILS ABSOLUTE COUNT: 1 /ΜL (ref 2–9)
NEUTROPHILS RELATIVE PERCENT: 58 % (ref 46–78)
PLATELET # BLD AUTO: 39 K/ΜL (ref 150–399)
POTASSIUM SERPL-SCNC: 4.3 MMOL/L (ref 3.4–5.3)
PROTHROMBIN TIME, POC: 17.5 (ref 2–3)
RBC # BLD AUTO: 4.17 10^6/ΜL (ref 4.5–5.9)
SODIUM BLD-SCNC: 135 MMOL/L (ref 137–147)
TOTAL PROTEIN: 6.3 G/DL (ref 6.4–8.2)
WBC # BLD AUTO: 2 10^3/ML

## 2017-03-23 NOTE — TELEPHONE ENCOUNTER
Message from Dr Meneses.  Set up appt with urology at [facility]    Spoke with a patient on yesterday and he stated it is too hard to come to [place] for appointments. [place] closer.   Called [facility] where the patient states he has been getting his care and having his EGD's with Dr Llanos done.  Urology Consult available with Dr Mookie Mac on 5/4/17 at 3 pm. Appt letter placed in the mail. DB

## 2017-03-24 ENCOUNTER — TELEPHONE (OUTPATIENT)
Dept: HEPATOLOGY | Facility: CLINIC | Age: 57
End: 2017-03-24

## 2017-03-24 ENCOUNTER — TELEPHONE (OUTPATIENT)
Dept: GASTROENTEROLOGY | Facility: CLINIC | Age: 57
End: 2017-03-24

## 2017-03-24 NOTE — TELEPHONE ENCOUNTER
----- Message from Chelsea Clifford MA sent at 3/24/2017 11:58 AM CDT -----      ----- Message -----     From: Millicent Guillaume     Sent: 3/24/2017  10:46 AM       To: [facility]au[facility] Clinical Staff    would like callback as to having surgery before 5/4..[phone].

## 2017-03-24 NOTE — TELEPHONE ENCOUNTER
Returned call to pt who insisted that he needed to have the EGD done sooner than May 2017. Rescheduled EGD for 4/3/17 per pt request. New written instructions mailed to pt.

## 2017-03-24 NOTE — TELEPHONE ENCOUNTER
MA call patient to inform him that his labs are stable except his BS very high. Patient stated he already saw his PCP yesterday about this. He is trying his best to bring it down. RASHAD

## 2017-03-24 NOTE — TELEPHONE ENCOUNTER
----- Message from Elizabeth Meneses MD sent at 3/24/2017 12:49 PM CDT -----  The Labs are stable except BS very high- to see pcp - please let patient know.

## 2017-03-27 DIAGNOSIS — R60.9 EDEMA, UNSPECIFIED TYPE: ICD-10-CM

## 2017-03-27 RX ORDER — SPIRONOLACTONE 100 MG/1
100 TABLET, FILM COATED ORAL DAILY
Qty: 30 TABLET | Refills: 11 | Status: SHIPPED | OUTPATIENT
Start: 2017-03-27 | End: 2018-01-21 | Stop reason: SDUPTHER

## 2017-03-27 NOTE — TELEPHONE ENCOUNTER
----- Message from Hetal eGe sent at 3/27/2017  9:35 AM CDT -----  Contact: patient  Requesting a refill on spironolactone (ALDACTONE) 100 MG tablet would like for it to be sent to [facility] # [phone] and if needed pt can be reached at [phone]

## 2017-03-29 ENCOUNTER — TELEPHONE (OUTPATIENT)
Dept: HEPATOLOGY | Facility: CLINIC | Age: 57
End: 2017-03-29

## 2017-03-29 NOTE — TELEPHONE ENCOUNTER
----- Message from Heather Reynolds sent at 3/29/2017 10:52 AM CDT -----  Contact: pt   ..Requesting prescription refill for spironolactone (ALDACTONE) 100 MG tablet, please call

## 2017-03-29 NOTE — TELEPHONE ENCOUNTER
MA called patient back, inform patient that MD already sent his prescription to his [facility] pharmacy 3/27/17 Monday. Patient understood and verbalized understanding.RASHAD

## 2017-03-31 ENCOUNTER — TELEPHONE (OUTPATIENT)
Dept: GASTROENTEROLOGY | Facility: CLINIC | Age: 57
End: 2017-03-31

## 2017-03-31 NOTE — TELEPHONE ENCOUNTER
----- Message from Eduardo Damico sent at 3/31/2017 11:47 AM CDT -----  Pt is requesting a call from nurse in regards to r/s his procedure to Tuesday.              Please call pt back at [phone]

## 2017-04-04 ENCOUNTER — LAB VISIT (OUTPATIENT)
Dept: LAB | Facility: HOSPITAL | Age: 57
End: 2017-04-04
Attending: INTERNAL MEDICINE
Payer: MEDICARE

## 2017-04-04 DIAGNOSIS — K70.30 CIRRHOSIS, LAENNEC'S: ICD-10-CM

## 2017-04-04 LAB — AFP SERPL-MCNC: 4.8 NG/ML

## 2017-04-04 PROCEDURE — 82105 ALPHA-FETOPROTEIN SERUM: CPT

## 2017-04-04 PROCEDURE — 36415 COLL VENOUS BLD VENIPUNCTURE: CPT

## 2017-04-10 ENCOUNTER — ANESTHESIA (OUTPATIENT)
Dept: ENDOSCOPY | Facility: HOSPITAL | Age: 57
End: 2017-04-10
Payer: MEDICARE

## 2017-04-10 ENCOUNTER — ANESTHESIA EVENT (OUTPATIENT)
Dept: ENDOSCOPY | Facility: HOSPITAL | Age: 57
End: 2017-04-10
Payer: MEDICARE

## 2017-04-10 ENCOUNTER — HOSPITAL ENCOUNTER (OUTPATIENT)
Facility: HOSPITAL | Age: 57
Discharge: HOME | End: 2017-04-10
Attending: INTERNAL MEDICINE | Admitting: INTERNAL MEDICINE
Payer: MEDICARE

## 2017-04-10 ENCOUNTER — SURGERY (OUTPATIENT)
Age: 57
End: 2017-04-10

## 2017-04-10 VITALS
BODY MASS INDEX: 30.24 KG/M2 | DIASTOLIC BLOOD PRESSURE: 63 MMHG | SYSTOLIC BLOOD PRESSURE: 131 MMHG | WEIGHT: 216 LBS | TEMPERATURE: 98 F | HEART RATE: 81 BPM | OXYGEN SATURATION: 97 % | HEIGHT: 71 IN | RESPIRATION RATE: 16 BRPM

## 2017-04-10 VITALS — RESPIRATION RATE: 9 BRPM

## 2017-04-10 DIAGNOSIS — K74.60 CIRRHOSIS: ICD-10-CM

## 2017-04-10 LAB — POCT GLUCOSE: 186 MG/DL (ref 70–110)

## 2017-04-10 PROCEDURE — 43235 EGD DIAGNOSTIC BRUSH WASH: CPT | Performed by: INTERNAL MEDICINE

## 2017-04-10 PROCEDURE — 63600175 PHARM REV CODE 636 W HCPCS: Performed by: NURSE ANESTHETIST, CERTIFIED REGISTERED

## 2017-04-10 PROCEDURE — 43235 EGD DIAGNOSTIC BRUSH WASH: CPT | Mod: ,,, | Performed by: INTERNAL MEDICINE

## 2017-04-10 PROCEDURE — 37000008 HC ANESTHESIA 1ST 15 MINUTES: Performed by: INTERNAL MEDICINE

## 2017-04-10 PROCEDURE — 25000003 PHARM REV CODE 250: Performed by: NURSE ANESTHETIST, CERTIFIED REGISTERED

## 2017-04-10 PROCEDURE — 82962 GLUCOSE BLOOD TEST: CPT | Performed by: INTERNAL MEDICINE

## 2017-04-10 PROCEDURE — 25000003 PHARM REV CODE 250: Performed by: INTERNAL MEDICINE

## 2017-04-10 RX ORDER — PROPOFOL 10 MG/ML
INJECTION, EMULSION INTRAVENOUS
Status: DISCONTINUED | OUTPATIENT
Start: 2017-04-10 | End: 2017-04-10

## 2017-04-10 RX ORDER — SODIUM CHLORIDE, SODIUM LACTATE, POTASSIUM CHLORIDE, CALCIUM CHLORIDE 600; 310; 30; 20 MG/100ML; MG/100ML; MG/100ML; MG/100ML
INJECTION, SOLUTION INTRAVENOUS CONTINUOUS PRN
Status: DISCONTINUED | OUTPATIENT
Start: 2017-04-10 | End: 2017-04-10

## 2017-04-10 RX ORDER — LIDOCAINE HCL/PF 100 MG/5ML
SYRINGE (ML) INTRAVENOUS
Status: DISCONTINUED | OUTPATIENT
Start: 2017-04-10 | End: 2017-04-10

## 2017-04-10 RX ORDER — SODIUM CHLORIDE, SODIUM LACTATE, POTASSIUM CHLORIDE, CALCIUM CHLORIDE 600; 310; 30; 20 MG/100ML; MG/100ML; MG/100ML; MG/100ML
INJECTION, SOLUTION INTRAVENOUS CONTINUOUS
Status: DISCONTINUED | OUTPATIENT
Start: 2017-04-10 | End: 2017-04-10 | Stop reason: HOSPADM

## 2017-04-10 RX ADMIN — LIDOCAINE HYDROCHLORIDE 100 MG: 20 INJECTION, SOLUTION INTRAVENOUS at 01:04

## 2017-04-10 RX ADMIN — SODIUM CHLORIDE, SODIUM LACTATE, POTASSIUM CHLORIDE, AND CALCIUM CHLORIDE: 600; 310; 30; 20 INJECTION, SOLUTION INTRAVENOUS at 01:04

## 2017-04-10 RX ADMIN — PROPOFOL 200 MG: 10 INJECTION, EMULSION INTRAVENOUS at 01:04

## 2017-04-10 RX ADMIN — PROPOFOL 50 MG: 10 INJECTION, EMULSION INTRAVENOUS at 01:04

## 2017-04-10 RX ADMIN — SODIUM CHLORIDE, SODIUM LACTATE, POTASSIUM CHLORIDE, AND CALCIUM CHLORIDE: .6; .31; .03; .02 INJECTION, SOLUTION INTRAVENOUS at 11:04

## 2017-04-10 NOTE — ANESTHESIA POSTPROCEDURE EVALUATION
"Anesthesia Post Evaluation    Patient: Colin Reilly    Procedure(s) Performed: Procedure(s) (LRB):  ESOPHAGOGASTRODUODENOSCOPY (EGD) (N/A)    Final Anesthesia Type: MAC  Patient location during evaluation: PACU  Patient participation: Yes- Able to Participate  Level of consciousness: awake and alert and oriented  Post-procedure vital signs: reviewed and stable  Pain management: adequate  Airway patency: patent  PONV status at discharge: No PONV  Anesthetic complications: no      Cardiovascular status: blood pressure returned to baseline  Respiratory status: unassisted, spontaneous ventilation and room air  Hydration status: euvolemic  Follow-up not needed.        Visit Vitals    BP (!) 113/51 (BP Location: Right leg, Patient Position: Lying, BP Method: Automatic)    Pulse 77    Temp 37 °C (98.6 °F) (Oral)    Resp 16    Ht 5' 11" (1.803 m)    Wt 98 kg (216 lb)    SpO2 (!) 94%    BMI 30.13 kg/m2       Pain/Lobito Score: No Data Recorded      "

## 2017-04-10 NOTE — BRIEF OP NOTE
[facility] -   Brief Operative Note     SUMMARY     Surgery Date: 4/10/2017     Surgeon(s) and Role:     * Savannah Llanos MD - Primary    Assisting Surgeon: None    Pre-op Diagnosis:  Cirrhosis [K74.60]    Post-op Diagnosis:  Post-Op Diagnosis Codes:     * Cirrhosis [K74.60]    Procedure(s) (LRB):  ESOPHAGOGASTRODUODENOSCOPY (EGD) (N/A)    Anesthesia: Monitor Anesthesia Care    Description of the findings of the procedure: Procedure completed. See Procedure note for details.     Findings/Key Components:  Procedure completed. See Procedure note for details.     Prosthetic/Devices: None    Estimated Blood Loss: None         Specimens:   Specimen     None          Discharge Note    SUMMARY     Admit Date: 4/10/2017    Discharge Date and Time: 4/10/2017    Hospital Course (synopsis of major diagnoses, care, treatment, and services provided during the course of the hospital stay): Procedure completed. See Procedure note for details.     Final Diagnosis: Post-Op Diagnosis Codes:     * Cirrhosis [K74.60]    Disposition: Discharge home when discharge criteria met.    Follow Up/Patient Instructions: With primary care physician as previously scheduled.    Medications:  Reconciled Home Medications: Current Discharge Medication List      CONTINUE these medications which have NOT CHANGED    Details   B.ANI/L.ACI/L.SAL/L.PLAN/L.JITENDRA (PROBIOTIC FORMULA ORAL) Take by mouth.      cyclobenzaprine (FLEXERIL) 10 MG tablet Take 10 mg by mouth once daily.       escitalopram oxalate (LEXAPRO) 10 MG tablet Take 10 mg by mouth once daily.      furosemide (LASIX) 20 MG tablet TAKE 4 TABLETS BY MOUTH ONCE DAILY  Qty: 120 tablet, Refills: 7    Associated Diagnoses: Cirrhosis, Laennec's      gabapentin (NEURONTIN) 300 MG capsule Take 300 mg by mouth 3 (three) times daily.      hydrocodone-acetaminophen 10-325mg (NORCO)  mg Tab Take 10 tablets by mouth 3 (three) times daily.       insulin aspart protamine-insulin aspart  (NOVOLOG 70/30) 100 unit/mL (70-30) InPn pen Inject 25 Units into the skin 3 (three) times daily before meals.       INSULIN GLARGINE,HUM.REC.ANLOG (LANTUS SOLOSTAR SUBQ) Inject 65 Units into the skin every evening.       lactobacillus acidophilus Cap Take 1 capsule by mouth once daily.      lisinopril 10 MG tablet Take 10 mg by mouth once daily.      metformin (GLUCOPHAGE) 500 MG tablet Take 500 mg by mouth 2 (two) times daily.  Refills: 5      nebivolol (BYSTOLIC) 10 MG Tab Take 10 mg by mouth once daily.      NOVOLOG FLEXPEN 100 unit/mL InPn pen       pantoprazole (PROTONIX) 40 MG tablet Take 1 tablet (40 mg total) by mouth 2 (two) times daily.  Qty: 180 tablet, Refills: 1      rifAXIMin (XIFAXAN) 550 mg Tab Take 550 mg by mouth 2 (two) times daily.      spironolactone (ALDACTONE) 100 MG tablet Take 1 tablet (100 mg total) by mouth once daily.  Qty: 30 tablet, Refills: 11    Associated Diagnoses: Edema, unspecified type      tamsulosin (FLOMAX) 0.4 mg Cp24 Take 0.4 mg by mouth once daily.      trazodone (DESYREL) 150 MG tablet Take 300 mg by mouth nightly as needed.       fluticasone (FLONASE) 50 mcg/actuation nasal spray 1 spray by Each Nare route once daily.       lactulose (CHRONULAC) 10 gram/15 mL solution Take 30 mLs by mouth 3 (three) times daily.       levocetirizine (XYZAL) 5 MG tablet Take 5 mg by mouth once daily.  Refills: 2             Discharge Procedure Orders  Diet general     Activity as tolerated       Follow-up Information     Follow up with Preston Matthew Ii, MD.    Specialty:  Internal Medicine    Contact information:    [address] [place] [address] [address] [address] [phone]

## 2017-04-10 NOTE — TRANSFER OF CARE
"Anesthesia Transfer of Care Note    Patient: Colin Reilly    Procedure(s) Performed: Procedure(s) (LRB):  ESOPHAGOGASTRODUODENOSCOPY (EGD) (N/A)    Patient location: PACU    Anesthesia Type: MAC    Transport from OR: Transported from OR on room air with adequate spontaneous ventilation    Post pain: adequate analgesia    Post assessment: no apparent anesthetic complications    Post vital signs: stable    Level of consciousness: awake    Nausea/Vomiting: no nausea/vomiting    Complications: none          Last vitals:   Visit Vitals    BP (!) 113/51 (BP Location: Right leg, Patient Position: Lying, BP Method: Automatic)    Pulse 77    Temp 37 °C (98.6 °F) (Oral)    Resp 16    Ht 5' 11" (1.803 m)    Wt 98 kg (216 lb)    SpO2 (!) 94%    BMI 30.13 kg/m2     "

## 2017-04-10 NOTE — ANESTHESIA RELEASE NOTE
"Anesthesia Release from PACU Note    Patient: Colin Reilly    Procedure(s) Performed: Procedure(s) (LRB):  ESOPHAGOGASTRODUODENOSCOPY (EGD) (N/A)    Anesthesia type: MAC    Post pain: Adequate analgesia    Post assessment: no apparent anesthetic complications, tolerated procedure well and no evidence of recall    Last Vitals:   Visit Vitals    BP (!) 113/51 (BP Location: Right leg, Patient Position: Lying, BP Method: Automatic)    Pulse 77    Temp 37 °C (98.6 °F) (Oral)    Resp 16    Ht 5' 11" (1.803 m)    Wt 98 kg (216 lb)    SpO2 (!) 94%    BMI 30.13 kg/m2       Post vital signs: stable    Level of consciousness: awake, alert  and oriented    Nausea/Vomiting: no nausea/no vomiting    Complications: none    Airway Patency: patent    Respiratory: unassisted, spontaneous ventilation, room air    Cardiovascular: stable    Hydration: euvolemic  "

## 2017-04-10 NOTE — ANESTHESIA PREPROCEDURE EVALUATION
04/10/2017  Colin Reilly is a 56 y.o., male.    [facility] Anesthesia Evaluation    I have reviewed the Patient Summary Reports.    I have reviewed the Nursing Notes.   I have reviewed the Medications.     Review of Systems  Anesthesia Hx:  No problems with previous Anesthesia    Social:  Non-Smoker, No Alcohol Use    Hematology/Oncology:  Hematology Normal   Oncology Normal     EENT/Dental:EENT/Dental Normal   Cardiovascular:   Hypertension, well controlled    Renal/:  Renal/ Normal     Hepatic/GI:   GERD, well controlled Liver Disease, Hepatitis 0600 last drink off fluid.   Musculoskeletal:   Arthritis     Neurological:  Neurology Normal    Endocrine:   Diabetes, well controlled, type 2, using insulin    Dermatological:  Skin Normal    Psych:  Psychiatric Normal           Physical Exam  General:  Well nourished    Airway/Jaw/Neck:  Airway Findings: Mallampati: II                Anesthesia Plan  Type of Anesthesia, risks & benefits discussed:  Anesthesia Type:  MAC  Patient's Preference:   Intra-op Monitoring Plan:   Intra-op Monitoring Plan Comments:   Post Op Pain Control Plan:   Post Op Pain Control Plan Comments:   Induction:   IV  Beta Blocker:  Patient is not currently on a Beta-Blocker (No further documentation required).       Informed Consent: Patient understands risks and agrees with Anesthesia plan.  Questions answered. Anesthesia consent signed with patient.  ASA Score: 3     Day of Surgery Review of History & Physical: I have interviewed and examined the patient. I have reviewed the patient's H&P dated: 04/10/17. There are no significant changes.  H&P update referred to the surgeon.         Ready For Surgery From Anesthesia Perspective.

## 2017-04-10 NOTE — DISCHARGE INSTRUCTIONS
Esophageal Varices     With esophageal varices, blood vessels in the esophagus become abnormally enlarged. They may then burst (rupture) and bleed.   Esophageal varices are enlarged veins at the lower end of the esophagus. The esophagus is the tube that carries food from your mouth to your stomach. Varices most often occur because of problems with blood flow in the liver caused by chronic liver disease. Normally, a blood vessel called the portal vein carries blood from the digestive organs to the liver. But with liver disease, blood flow can be blocked due to scarring of the liver. This increases the blood pressure in the portal vein (a condition known as portal hypertension). Blood then backs up in nearby veins in the esophagus and stomach, causing varices. Varices are a serious and deadly problem. Treatment is needed to prevent them from bursting (rupturing) and bleeding. If bleeding occurs, it can be fatal.  Symptoms of esophageal varices  Symptoms do not occur unless the varices are bleeding. This is an emergency problem. If you have any of the following symptoms, get medical attention right away:  · Vomiting blood  · Black, tarry, or bloody stools  · Feeling lightheaded, or fainting (loss of consciousness)  Diagnosing esophageal \varices  Youll likely be checked for varices if you have liver disease or other health problems that can cause them. Your healthcare provider will ask about your symptoms and health history. Youll also be examined. Tests are then done to confirm the problem. Tests can include:  · Upper endoscopy. This is done to see inside the upper digestive tract. During the test, an endoscope is used. This is a thin, flexible tube with a tiny camera on the end. Its inserted through your mouth. Its then guided down through your esophagus, stomach, and first part of your small intestine. This allows the provider to check for varices and find any bleeding.  · Imaging tests. These provide pictures  of the liver or blood flow in the liver. They allow the provider to check for enlarged veins around the liver and assess the risk of bleeding. Common imaging tests done include ultrasound and CT scans.  Treating esophageal varices  The goal of treatment is to reduce the risk of bleeding or to control bleeding. Treatment can include 1 or more of the following:  · Medicines. These may be prescribed to lower the blood pressure inside the enlarged veins. This reduces the risk of bleeding. Beta-blockers are the most common medicine used.  · Endoscopic therapy. These are treatments for enlarged or bleeding veins that are done using an endoscope. With ligation, small rubber bands are placed around the veins to close them off and stop any bleeding. With sclerotherapy, a blood-clotting medicine is injected into the veins to cause scarring and shrink them.  · Balloon tamponade. A tube with a balloon is guided down into your esophagus and stomach. The balloon is then filled with air. This puts pressure on enlarged or bleeding veins to control bleeding. This is a short-term (temporary) way to control bleeding until other treatments are available.   · Surgery. This may be done to place a tubelike device (stent) in the liver. The stent helps redirect blood flow in the liver to lower the blood pressure in enlarged veins. Sometimes, the enlarged veins may be connected to other nearby veins to redirect blood flow. In severe cases, a liver transplant may be needed. For this surgery, a diseased liver is replaced with a healthy liver from another person.   Follow-up  Regular visits with your provider are needed to check for bleeding of the varices. If bleeding occurs, it is likely to occur again. More treatments will then be needed in the future. Once endoscopic therapy (banding) is performed, regular follow-up endoscopic scans with banding are done to completely get rid of the varices. If you are given medicines to take by mouth, be  sure to take them as directed. Work closely with your provider to manage your condition. Know when to seek emergency care.  Date Last Reviewed: 7/1/2016  © 9293-4055 [employer]. [address] [place] [address], [place] [address]. All rights reserved. This information is not intended as a substitute for professional medical care. Always follow your healthcare professional's instructions.

## 2017-04-10 NOTE — H&P
Short Stay Endoscopy History and Physical    PCP - Preston Matthew Ii, MD    Procedure - EGD  ASA - II  Mallampati - per anesthesia  History of Anesthesia problems - no  Family history Anesthesia problems -  no     HPI:    Reason for EGD: follow up of varices  Heartburn: No  Dysphagia: No  Follow up of ulcer: No  Anemia - yes  GI bleeding - no  Abdominal pain: No  Diarrhea - no    ROS:  CONSTITUTIONAL: Denies weight change,  fatigue, fevers, chills, night sweats.  CARDIOVASCULAR: Denies chest pain, shortness of breath, orthopnea and edema.  RESPIRATORY: Denies cough, hemoptysis, dyspnea, and wheezing.  GI: See HPI.    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    Diabetes 7/8/2014    Esophageal varices     GERD (gastroesophageal reflux disease)     Hepatic encephalopathy 7/8/2014    Hepatitis C virus infection 7/8/2014    tx with interferon 3-4 mos- stopped for unclear reasons Tattoos-first at age 17 only risk factor    HTN (hypertension) 7/8/2014    Hypertension     Insulin dependent diabetes mellitus     Other ascites 7/8/2014    Renal mass, left 7/8/2014    6.3 x 5.7 x 5.6 complex mass    Splenomegaly 7/8/2014    Umbilical hernia 7/8/2014       Surgical History:   Past Surgical History:   Procedure Laterality Date    CARPAL TUNNEL RELEASE Bilateral     CHOLECYSTECTOMY      HERNIA REPAIR      NECK SURGERY      fusion on C5 and C6    ULNAR NERVE TRANSPOSITION Left     vericose veins removed         Family History:  Family History   Problem Relation Age of Onset    Hyperlipidemia Mother     No Known Problems Father 36     accident on oil rig    No Known Problems Sister     Cancer Brother     Alcohol abuse Brother     No Known Problems Sister        Social History:   Social History   Substance Use Topics    Smoking  status: Former Smoker     Types: Cigarettes     Quit date: 1/4/2010    Smokeless tobacco: Former User     Quit date: 2/24/1990      Comment: former 1 pack/week quit 1/4/2010    Alcohol use No      Comment: former heavy beer but quit 1/4/2010       Allergies:   Review of patient's allergies indicates:  No Known Allergies    Medications:   No current facility-administered medications on file prior to encounter.      Current Outpatient Prescriptions on File Prior to Encounter   Medication Sig Dispense Refill    B.ANI/L.ACI/L.SAL/L.PLAN/L.JITENDRA (PROBIOTIC FORMULA ORAL) Take by mouth.      cyclobenzaprine (FLEXERIL) 10 MG tablet Take 10 mg by mouth once daily.       escitalopram oxalate (LEXAPRO) 10 MG tablet Take 10 mg by mouth once daily.      furosemide (LASIX) 20 MG tablet TAKE 4 TABLETS BY MOUTH ONCE DAILY 120 tablet 7    gabapentin (NEURONTIN) 300 MG capsule Take 300 mg by mouth 3 (three) times daily.      hydrocodone-acetaminophen 10-325mg (NORCO)  mg Tab Take 10 tablets by mouth 3 (three) times daily.       insulin aspart protamine-insulin aspart (NOVOLOG 70/30) 100 unit/mL (70-30) InPn pen Inject 25 Units into the skin 3 (three) times daily before meals.       INSULIN GLARGINE,HUM.REC.ANLOG (LANTUS SOLOSTAR SUBQ) Inject 65 Units into the skin every evening.       lactobacillus acidophilus Cap Take 1 capsule by mouth once daily.      lisinopril 10 MG tablet Take 10 mg by mouth once daily.      metformin (GLUCOPHAGE) 500 MG tablet Take 500 mg by mouth 2 (two) times daily.  5    nebivolol (BYSTOLIC) 10 MG Tab Take 10 mg by mouth once daily.      NOVOLOG FLEXPEN 100 unit/mL InPn pen       pantoprazole (PROTONIX) 40 MG tablet Take 1 tablet (40 mg total) by mouth 2 (two) times daily. 180 tablet 1    rifAXIMin (XIFAXAN) 550 mg Tab Take 550 mg by mouth 2 (two) times daily.      tamsulosin (FLOMAX) 0.4 mg Cp24 Take 0.4 mg by mouth once daily.      trazodone (DESYREL) 150 MG tablet Take 300 mg by  mouth nightly as needed.       fluticasone (FLONASE) 50 mcg/actuation nasal spray 1 spray by Each Nare route once daily.       lactulose (CHRONULAC) 10 gram/15 mL solution Take 30 mLs by mouth 3 (three) times daily.       levocetirizine (XYZAL) 5 MG tablet Take 5 mg by mouth once daily.  2       Physical Exam:  Vital Signs:   Vitals:    04/10/17 1146   BP: (!) 140/74   Pulse: 70   Resp: 18   Temp: 98.6 °F (37 °C)     General Appearance: Well appearing in no acute distress  ENT: OP clear  Chest: CTA B  CV: RRR, no m/r/g  Abd: s/nt/nd/nabs  Ext: no edema    Labs:  Lab Results   Component Value Date    WBC 2.0 03/20/2017    HGB 11.8 (A) 03/20/2017    HCT 37 (A) 03/20/2017    MCV 87.8 03/20/2017    PLT 39 (A) 03/20/2017     Lab Results   Component Value Date    INR 1.46 (A) 03/20/2017    INR 1.3 (H) 10/03/2016    INR 1.3 (H) 04/05/2016    PROTIME 17.5 (A) 03/20/2017     Lab Results   Component Value Date    IRON 50 08/13/2014    TIBC 392 08/13/2014    FERRITIN 16 (L) 08/13/2014         Assessment:  Patient Active Problem List   Diagnosis    Esophageal varices with bleeding    Cirrhosis, Laennec's    Umbilical hernia    Splenomegaly    GERD (gastroesophageal reflux disease)    Diabetes    HTN (hypertension)    Other ascites    Alcohol abuse, in remission    Hepatic encephalopathy    Chronic back pain    Cirrhosis       Esophageal varices  Plan:  I have explained the risks and benefits of endoscopy procedures to the patient including but not limited to bleeding, perforation, infection, and death. The patient wishes to proceed.

## 2017-04-10 NOTE — IP AVS SNAPSHOT
[address] [place] [address]           Patient Discharge Instructions   Our goal is to set you up for success. This packet includes information on your condition, medications, and your home care.  It will help you care for yourself to prevent having to return to the hospital.     Please ask your nurse if you have any questions.      There are many details to remember when preparing to leave the hospital. Here is what you will need to do:    1. Take your medicine. If you are prescribed medications, review your Medication List on the following pages. You may have new medications to  at the pharmacy and others that you'll need to stop taking. Review the instructions for how and when to take your medications. Talk with your doctor or nurses if you are unsure of what to do.     2. Go to your follow-up appointments. Specific follow-up information is listed in the following pages. Your may be contacted by a nurse or clinical provider about future appointments. Be sure we have all of the phone numbers to reach you. Please contact your provider's office if you are unable to make an appointment.     3. Watch for warning signs. Your doctor or nurse will give you detailed warning signs to watch for and when to call for assistance. These instructions may also include educational information about your condition. If you experience any of warning signs to your health, call your doctor.               ** Verify the list of medication(s) below is accurate and up to date. Carry this with you in case of emergency. If your medications have changed, please notify your healthcare provider.             Medication List      CONTINUE taking these medications        Additional Info                      cyclobenzaprine 10 MG tablet   Commonly known as:  FLEXERIL   Refills:  0   Dose:  10 mg    Instructions:  Take 10 mg by mouth once daily.     Begin Date    AM    Noon    PM    Bedtime        escitalopram oxalate 10 MG tablet   Commonly known as:  LEXAPRO   Refills:  0   Dose:  10 mg    Instructions:  Take 10 mg by mouth once daily.     Begin Date    AM    Noon    PM    Bedtime       FLOMAX 0.4 mg Cp24   Refills:  0   Dose:  0.4 mg   Generic drug:  tamsulosin    Instructions:  Take 0.4 mg by mouth once daily.     Begin Date    AM    Noon    PM    Bedtime       fluticasone 50 mcg/actuation nasal spray   Commonly known as:  FLONASE   Refills:  0   Dose:  1 spray    Instructions:  1 spray by Each Nare route once daily.     Begin Date    AM    Noon    PM    Bedtime       furosemide 20 MG tablet   Commonly known as:  LASIX   Quantity:  120 tablet   Refills:  7    Instructions:  TAKE 4 TABLETS BY MOUTH ONCE DAILY     Begin Date    AM    Noon    PM    Bedtime       gabapentin 300 MG capsule   Commonly known as:  NEURONTIN   Refills:  0   Dose:  300 mg    Instructions:  Take 300 mg by mouth 3 (three) times daily.     Begin Date    AM    Noon    PM    Bedtime       hydrocodone-acetaminophen 10-325mg  mg Tab   Commonly known as:  NORCO   Refills:  0   Dose:  10 tablet    Instructions:  Take 10 tablets by mouth 3 (three) times daily.     Begin Date    AM    Noon    PM    Bedtime       insulin aspart protamine-insulin aspart 100 unit/mL (70-30) Inpn pen   Commonly known as:  NovoLOG 70/30   Refills:  0   Dose:  25 Units    Instructions:  Inject 25 Units into the skin 3 (three) times daily before meals.     Begin Date    AM    Noon    PM    Bedtime       Lactobacillus acidophilus Cap   Refills:  0   Dose:  1 capsule    Instructions:  Take 1 capsule by mouth once daily.     Begin Date    AM    Noon    PM    Bedtime       lactulose 10 gram/15 mL solution   Commonly known as:  CHRONULAC   Refills:  0   Dose:  30 mL    Instructions:  Take 30 mLs by mouth 3 (three) times daily.     Begin Date    AM    Noon    PM    Bedtime       LANTUS SOLOSTAR SUBQ   Refills:  0   Dose:  65 Units    Instructions:  Inject 65  Units into the skin every evening.     Begin Date    AM    Noon    PM    Bedtime       levocetirizine 5 MG tablet   Commonly known as:  XYZAL   Refills:  2   Dose:  5 mg    Instructions:  Take 5 mg by mouth once daily.     Begin Date    AM    Noon    PM    Bedtime       lisinopril 10 MG tablet   Refills:  0   Dose:  10 mg    Instructions:  Take 10 mg by mouth once daily.     Begin Date    AM    Noon    PM    Bedtime       metformin 500 MG tablet   Commonly known as:  GLUCOPHAGE   Refills:  5   Dose:  500 mg    Instructions:  Take 500 mg by mouth 2 (two) times daily.     Begin Date    AM    Noon    PM    Bedtime       nebivolol 10 MG Tab   Commonly known as:  BYSTOLIC   Refills:  0   Dose:  10 mg    Instructions:  Take 10 mg by mouth once daily.     Begin Date    AM    Noon    PM    Bedtime       NOVOLOG FLEXPEN 100 unit/mL Inpn pen   Refills:  0   Generic drug:  insulin aspart      Begin Date    AM    Noon    PM    Bedtime       pantoprazole 40 MG tablet   Commonly known as:  PROTONIX   Quantity:  180 tablet   Refills:  1   Dose:  40 mg    Instructions:  Take 1 tablet (40 mg total) by mouth 2 (two) times daily.     Begin Date    AM    Noon    PM    Bedtime       PROBIOTIC FORMULA ORAL   Refills:  0    Instructions:  Take by mouth.     Begin Date    AM    Noon    PM    Bedtime       rifAXIMin 550 mg Tab   Commonly known as:  XIFAXAN   Refills:  0   Dose:  550 mg    Instructions:  Take 550 mg by mouth 2 (two) times daily.     Begin Date    AM    Noon    PM    Bedtime       spironolactone 100 MG tablet   Commonly known as:  ALDACTONE   Quantity:  30 tablet   Refills:  11   Dose:  100 mg    Instructions:  Take 1 tablet (100 mg total) by mouth once daily.     Begin Date    AM    Noon    PM    Bedtime       trazodone 150 MG tablet   Commonly known as:  DESYREL   Refills:  0   Dose:  300 mg    Instructions:  Take 300 mg by mouth nightly as needed.     Begin Date    AM    Noon    PM    Bedtime                  Please bring  to all follow up appointments:    1. A copy of your discharge instructions.  2. All medicines you are currently taking in their original bottles.  3. Identification and insurance card.    Please arrive 15 minutes ahead of scheduled appointment time.    Please call 24 hours in advance if you must reschedule your appointment and/or time.        Your Scheduled Appointments     May 04, 2017  3:00 PM CDT   Consult with MD Thomas [facility] ([facility])    [address] [facility] [address]   [phone]            Jul 04, 2017  7:00 AM CDT   Non-Fasting Lab with LABORATORY, [facility] ([facility])    [address] [facility] [id number] [phone]            Oct 03, 2017  7:00 AM CDT   Non-Fasting Lab with LABORATORY, [facility] ([facility])    [address] [place] [id number] [phone]              Follow-up Information     Follow up with Preston Matthew Ii, MD.    Specialty:  Internal Medicine    Contact information:    [address] [place] [address] [address] [place] [address] [phone]          Discharge Instructions     Future Orders    Activity as tolerated     Diet general     Questions:    Total calories:      Fat restriction, if any:      Protein restriction, if any:      Na restriction, if any:      Fluid restriction:      Additional restrictions:          Discharge Instructions         Esophageal Varices     With esophageal varices, blood vessels in the esophagus become abnormally enlarged. They may then burst (rupture) and bleed.   Esophageal varices are enlarged veins at the lower end of the esophagus. The esophagus is the tube that carries food from your mouth to your stomach. Varices most often occur because of problems with blood flow in the liver caused by chronic liver disease. Normally, a blood vessel  called the portal vein carries blood from the digestive organs to the liver. But with liver disease, blood flow can be blocked due to scarring of the liver. This increases the blood pressure in the portal vein (a condition known as portal hypertension). Blood then backs up in nearby veins in the esophagus and stomach, causing varices. Varices are a serious and deadly problem. Treatment is needed to prevent them from bursting (rupturing) and bleeding. If bleeding occurs, it can be fatal.  Symptoms of esophageal varices  Symptoms do not occur unless the varices are bleeding. This is an emergency problem. If you have any of the following symptoms, get medical attention right away:  · Vomiting blood  · Black, tarry, or bloody stools  · Feeling lightheaded, or fainting (loss of consciousness)  Diagnosing esophageal \varices  Youll likely be checked for varices if you have liver disease or other health problems that can cause them. Your healthcare provider will ask about your symptoms and health history. Youll also be examined. Tests are then done to confirm the problem. Tests can include:  · Upper endoscopy. This is done to see inside the upper digestive tract. During the test, an endoscope is used. This is a thin, flexible tube with a tiny camera on the end. Its inserted through your mouth. Its then guided down through your esophagus, stomach, and first part of your small intestine. This allows the provider to check for varices and find any bleeding.  · Imaging tests. These provide pictures of the liver or blood flow in the liver. They allow the provider to check for enlarged veins around the liver and assess the risk of bleeding. Common imaging tests done include ultrasound and CT scans.  Treating esophageal varices  The goal of treatment is to reduce the risk of bleeding or to control bleeding. Treatment can include 1 or more of the following:  · Medicines. These may be prescribed to lower the blood pressure  inside the enlarged veins. This reduces the risk of bleeding. Beta-blockers are the most common medicine used.  · Endoscopic therapy. These are treatments for enlarged or bleeding veins that are done using an endoscope. With ligation, small rubber bands are placed around the veins to close them off and stop any bleeding. With sclerotherapy, a blood-clotting medicine is injected into the veins to cause scarring and shrink them.  · Balloon tamponade. A tube with a balloon is guided down into your esophagus and stomach. The balloon is then filled with air. This puts pressure on enlarged or bleeding veins to control bleeding. This is a short-term (temporary) way to control bleeding until other treatments are available.   · Surgery. This may be done to place a tubelike device (stent) in the liver. The stent helps redirect blood flow in the liver to lower the blood pressure in enlarged veins. Sometimes, the enlarged veins may be connected to other nearby veins to redirect blood flow. In severe cases, a liver transplant may be needed. For this surgery, a diseased liver is replaced with a healthy liver from another person.   Follow-up  Regular visits with your provider are needed to check for bleeding of the varices. If bleeding occurs, it is likely to occur again. More treatments will then be needed in the future. Once endoscopic therapy (banding) is performed, regular follow-up endoscopic scans with banding are done to completely get rid of the varices. If you are given medicines to take by mouth, be sure to take them as directed. Work closely with your provider to manage your condition. Know when to seek emergency care.  Date Last Reviewed: 7/1/2016 © 2000-2016 [employer] [address] [place] [address], [place] [address]. All rights reserved. This information is not intended as a substitute for professional medical care. Always follow your healthcare professional's instructions.            Admission Information   "   Date & Time Provider Department CSN    4/10/2017  9:55 AM Savannah Llanos MD [facility] [id number]      Care Providers     Provider Role Specialty Primary office phone    Savannah Llanos MD Attending Provider Gastroenterology [phone]    Savannah Llanos MD Surgeon  Gastroenterology [phone]      Your Vitals Were     BP Pulse Temp Resp Height Weight    113/51 (BP Location: Right leg, Patient Position: Lying, BP Method: Automatic) 77 98.6 °F (37 °C) (Oral) 16 5' 11" (1.803 m) 98 kg (216 lb)    SpO2 BMI             94% 30.13 kg/m2         Recent Lab Values        7/8/2014 10/18/2016                       12:25 PM           A1C 9.8 (H) 9.3                     Allergies as of 4/10/2017     No Known Allergies      [facility]     [facility] Care Line - 24/7 Assistance  Unless otherwise directed by your provider, please contact [facility], our nurse care line that is available for 24/7 assistance.     Registered nurses in the [facility] provide clinical advisement, health education, appointment booking, and other advisory services.  Call for this free service at [phone].        Advance Directives     An advance directive is a document which, in the event you are no longer able to make decisions for yourself, tells your healthcare team what kind of treatment you do or do not want to receive, or who you would like to make those decisions for you.  If you do not currently have an advance directive, [facility] encourages you to create one.  For more information call:  [phone]), [phone] ([phone]),  or log on to [email or web address].        Smoking Cessation     If you would like to quit smoking:   You may be eligible for free services if you are a [place] resident and started smoking cigarettes before September 1, 1988.  Call the Smoking Cessation Trust (SCT) toll free at (127) 553-7752 or (303) 157-0973.   Call " 1-800-QUIT-NOW if you do not meet the above criteria.   Contact us via email: [email or web address]   View our website for more information: [email or web address]        Language Assistance Services     ATTENTION: Language assistance services are available, free of charge. Please call [phone].      ATENCIÓN: Si habla espbrenda, tiene a lugo disposición servicios gratuitos de asistencia lingüística. Llame al [phone].     CHÚ Ý: N?u b?n nói Ti?ng Vi?t, có các d?ch v? h? tr? ngôn ng? mi?n phí dành cho b?n. G?i s? [phone].        Diabetes Discharge Instructions                                   [facility] Sign-Up     Activating your [facility] account is as easy as 1-2-3!     1) Visit [email or web address], select Sign Up Now, enter this activation code and your date of birth, then select Next.  [email or web address]  Expires: 5/15/2017  2:18 PM      2) Create a username and password to use when you visit [facility] in the future and select a security question in case you lose your password and select Next.    3) Enter your e-mail address and click Sign Up!    Additional Information  If you have questions, please e-mail [email or web address] or call [phone] to talk to our [facility] staff. Remember, [facility] is NOT to be used for urgent needs. For medical emergencies, dial 911.          [facility] complies with applicable Federal civil rights laws and does not discriminate on the basis of race, color, national origin, age, disability, or sex.

## 2017-04-28 ENCOUNTER — HISTORICAL (OUTPATIENT)
Dept: ADMINISTRATIVE | Facility: HOSPITAL | Age: 57
End: 2017-04-28

## 2017-04-28 LAB
ABS NEUT (OLG): 1.98 X10(3)/MCL (ref 2.1–9.2)
ALBUMIN SERPL-MCNC: 3.7 GM/DL (ref 3.4–5)
ALBUMIN/GLOB SERPL: 1.1 RATIO (ref 1.1–2)
ALP SERPL-CCNC: 86 UNIT/L (ref 50–136)
ALT SERPL-CCNC: 28 UNIT/L (ref 12–78)
ANISOCYTOSIS BLD QL SMEAR: 1
APPEARANCE, UA: CLEAR
AST SERPL-CCNC: 30 UNIT/L (ref 15–37)
BACTERIA SPEC CULT: ABNORMAL /HPF
BASOPHILS NFR BLD MANUAL: 1 % (ref 0–2)
BILIRUB SERPL-MCNC: 2.1 MG/DL (ref 0.2–1)
BILIRUB UR QL STRIP: NEGATIVE
BILIRUBIN DIRECT+TOT PNL SERPL-MCNC: 0.8 MG/DL (ref 0–0.5)
BILIRUBIN DIRECT+TOT PNL SERPL-MCNC: 1.3 MG/DL (ref 0–0.8)
BUN SERPL-MCNC: 8 MG/DL (ref 7–18)
CALCIUM SERPL-MCNC: 8.5 MG/DL (ref 8.5–10.1)
CHLORIDE SERPL-SCNC: 104 MMOL/L (ref 98–107)
CHOLEST SERPL-MCNC: 145 MG/DL (ref 0–200)
CHOLEST/HDLC SERPL: 4.1 {RATIO} (ref 0–5)
CO2 SERPL-SCNC: 25 MMOL/L (ref 21–32)
COLOR UR: YELLOW
CREAT SERPL-MCNC: 0.7 MG/DL (ref 0.7–1.3)
CREAT UR-MCNC: 44.2 MG/DL
EOSINOPHIL NFR BLD MANUAL: 1 % (ref 0–8)
ERYTHROCYTE [DISTWIDTH] IN BLOOD BY AUTOMATED COUNT: 18.1 % (ref 11.5–17)
EST. AVERAGE GLUCOSE BLD GHB EST-MCNC: 186 MG/DL
GLOBULIN SER-MCNC: 3.3 GM/DL (ref 2.4–3.5)
GLUCOSE (UA): ABNORMAL
GLUCOSE SERPL-MCNC: 411 MG/DL (ref 74–106)
HBA1C MFR BLD: 8.1 % (ref 4.2–6.3)
HCT VFR BLD AUTO: 42.4 % (ref 42–52)
HDLC SERPL-MCNC: 35 MG/DL (ref 35–60)
HGB BLD-MCNC: 13.8 GM/DL (ref 14–18)
HGB UR QL STRIP: NEGATIVE
KETONES UR QL STRIP: NEGATIVE
LDLC SERPL CALC-MCNC: 85 MG/DL (ref 0–129)
LEUKOCYTE ESTERASE UR QL STRIP: NEGATIVE
LYMPHOCYTES NFR BLD MANUAL: 2 %
LYMPHOCYTES NFR BLD MANUAL: 22 % (ref 13–40)
MCH RBC QN AUTO: 27.9 PG (ref 27–31)
MCHC RBC AUTO-ENTMCNC: 32.5 GM/DL (ref 33–36)
MCV RBC AUTO: 85.7 FL (ref 80–94)
MICROALBUMIN UR-MCNC: 0.6 MG/DL
MICROALBUMIN/CREAT RATIO PNL UR: 13.6 MG/GM CR (ref 0–30)
MONOCYTES NFR BLD MANUAL: 7 % (ref 2–11)
NEUTROPHILS NFR BLD MANUAL: 66 % (ref 47–80)
NITRITE UR QL STRIP: NEGATIVE
OVALOCYTES BLD QL SMEAR: 1
PH UR STRIP: 5.5 [PH] (ref 5–9)
PLATELET # BLD AUTO: 48 X10(3)/MCL (ref 130–400)
PLATELET # BLD EST: NORMAL 10*3/UL
PMV BLD AUTO: ABNORMAL FL (ref 7.4–10.4)
POIKILOCYTOSIS BLD QL SMEAR: 1
POTASSIUM SERPL-SCNC: 4 MMOL/L (ref 3.5–5.1)
PROT SERPL-MCNC: 7 GM/DL (ref 6.4–8.2)
PROT UR QL STRIP: NEGATIVE
RBC # BLD AUTO: 4.95 X10(6)/MCL (ref 4.7–6.1)
RBC #/AREA URNS HPF: ABNORMAL /[HPF]
SODIUM SERPL-SCNC: 141 MMOL/L (ref 136–145)
SP GR UR STRIP: 1.02 (ref 1–1.03)
SQUAMOUS EPITHELIAL, UA: ABNORMAL
TRIGL SERPL-MCNC: 123 MG/DL (ref 30–150)
UROBILINOGEN UR STRIP-ACNC: 0.2
VLDLC SERPL CALC-MCNC: 25 MG/DL
WBC # SPEC AUTO: 3.6 X10(3)/MCL (ref 4.5–11.5)
WBC #/AREA URNS HPF: ABNORMAL /HPF

## 2017-05-04 ENCOUNTER — OFFICE VISIT (OUTPATIENT)
Dept: UROLOGY | Facility: CLINIC | Age: 57
End: 2017-05-04
Payer: MEDICARE

## 2017-05-04 VITALS
SYSTOLIC BLOOD PRESSURE: 100 MMHG | WEIGHT: 209.69 LBS | BODY MASS INDEX: 29.24 KG/M2 | HEART RATE: 80 BPM | DIASTOLIC BLOOD PRESSURE: 60 MMHG

## 2017-05-04 DIAGNOSIS — N40.0 BENIGN PROSTATIC HYPERPLASIA, PRESENCE OF LOWER URINARY TRACT SYMPTOMS UNSPECIFIED, UNSPECIFIED MORPHOLOGY: Primary | ICD-10-CM

## 2017-05-04 PROCEDURE — 99214 OFFICE O/P EST MOD 30 MIN: CPT | Mod: S$PBB,,, | Performed by: UROLOGY

## 2017-05-04 PROCEDURE — 99999 PR PBB SHADOW E&M-EST. PATIENT-LVL III: CPT | Mod: PBBFAC,,, | Performed by: UROLOGY

## 2017-05-04 PROCEDURE — 99213 OFFICE O/P EST LOW 20 MIN: CPT | Mod: PBBFAC | Performed by: UROLOGY

## 2017-05-04 NOTE — MR AVS SNAPSHOT
[facility]  [address] [facility] [address]  Phone: [phone]  Fax: [phone]                  Colin Reilly   2017 3:00 PM   Office Visit    Description:  Male : 1960   Provider:  Mookie Mac IV, MD   [facility]           Reason for Visit     Consult           Diagnoses this Visit        Comments    Benign prostatic hyperplasia, presence of lower urinary tract symptoms unspecified, unspecified morphology    -  Primary            To Do List           Future Appointments        Provider Department Dept Phone    2017 1:00 PM Mookie Mac IV, MD [facility] [phone]    2017 7:00 AM LABORATORY, [facility] -  [phone]    10/3/2017 7:00 AM LAB[facility]ATORY, [facility] -  [phone]      Goals (5 Years of Data)     None      [facility]     [facility] Care Line -  Assistance  Unless otherwise directed by your provider, please contact [facility], our nurse care line that is available for  assistance.     Registered nurses in the [facility] provide: appointment scheduling, clinical advisement, health education, and other advisory services.  Call: [phone] (toll free)               Medications           Message regarding Medications     Verify the changes and/or additions to your medication regime listed below are the same as discussed with your clinician today.  If any of these changes or additions are incorrect, please notify your healthcare provider.             Verify that the below list of medications is an accurate representation of the medications you are currently taking.  If none reported, the list may be blank. If incorrect, please contact your healthcare provider. Carry this list with you in case of emergency.           Current Medications     B.ANI/L.ACI/L.SAL/L.PLAN/L.JITENDRA (PROBIOTIC FORMULA ORAL) Take  by mouth.    cyclobenzaprine (FLEXERIL) 10 MG tablet Take 10 mg by mouth once daily.     escitalopram oxalate (LEXAPRO) 10 MG tablet Take 10 mg by mouth once daily.    fluticasone (FLONASE) 50 mcg/actuation nasal spray 1 spray by Each Nare route once daily.     furosemide (LASIX) 20 MG tablet TAKE 4 TABLETS BY MOUTH ONCE DAILY    gabapentin (NEURONTIN) 300 MG capsule Take 300 mg by mouth 3 (three) times daily.    hydrocodone-acetaminophen 10-325mg (NORCO)  mg Tab Take 10 tablets by mouth 3 (three) times daily.     insulin aspart protamine-insulin aspart (NOVOLOG 70/30) 100 unit/mL (70-30) InPn pen Inject 25 Units into the skin 3 (three) times daily before meals.     INSULIN GLARGINE,HUM.REC.ANLOG (LANTUS SOLOSTAR SUBQ) Inject 65 Units into the skin every evening.     lactobacillus acidophilus Cap Take 1 capsule by mouth once daily.    lactulose (CHRONULAC) 10 gram/15 mL solution Take 30 mLs by mouth 3 (three) times daily.     levocetirizine (XYZAL) 5 MG tablet Take 5 mg by mouth once daily.    lisinopril 10 MG tablet Take 10 mg by mouth once daily.    metformin (GLUCOPHAGE) 500 MG tablet Take 500 mg by mouth 2 (two) times daily.    nebivolol (BYSTOLIC) 10 MG Tab Take 10 mg by mouth once daily.    NOVOLOG FLEXPEN 100 unit/mL InPn pen     pantoprazole (PROTONIX) 40 MG tablet Take 1 tablet (40 mg total) by mouth 2 (two) times daily.    rifAXIMin (XIFAXAN) 550 mg Tab Take 550 mg by mouth 2 (two) times daily.    spironolactone (ALDACTONE) 100 MG tablet Take 1 tablet (100 mg total) by mouth once daily.    tamsulosin (FLOMAX) 0.4 mg Cp24 Take 0.4 mg by mouth once daily.    trazodone (DESYREL) 150 MG tablet Take 300 mg by mouth nightly as needed.            Clinical Reference Information           Your Vitals Were     BP Pulse Weight BMI       100/60 80 95.1 kg (209 lb 10.5 oz) 29.24 kg/m2       Blood Pressure          Most Recent Value    BP  100/60      Allergies as of 5/4/2017     No Known Allergies       Immunizations Administered on Date of Encounter - 5/4/2017     None      Maintenance Dialysis History     Patient has no recorded history of maintenance dialysis.      [facility] Sign-Up     Activating your [facility] account is as easy as 1-2-3!     1) Visit [email or web address], select Sign Up Now, enter this activation code and your date of birth, then select Next.  [email or web address]  Expires: 5/15/2017  2:18 PM      2) Create a username and password to use when you visit [facility] in the future and select a security question in case you lose your password and select Next.    3) Enter your e-mail address and click Sign Up!    Additional Information  If you have questions, please e-mail [email or web address] or call [phone] to talk to our [facility] staff. Remember, [facility] is NOT to be used for urgent needs. For medical emergencies, dial 911.         Language Assistance Services     ATTENTION: Language assistance services are available, free of charge. Please call [phone].      ATENCIÓN: Si habla español, tiene a lugo disposición servicios gratuitos de asistencia lingüística. Llame al [phone].     CHÚ Ý: N?u b?n nói Ti?ng Vi?t, có các d?ch v? h? tr? ngôn ng? mi?n phí dành cho b?n. G?i s? [phone].         [facility] complies with applicable Federal civil rights laws and does not discriminate on the basis of race, color, national origin, age, disability, or sex.

## 2017-05-04 NOTE — PROGRESS NOTES
Request for surgery clearance along with Dr. Mac's clinic note given to pt so that he could take it to Dr. Matthew for clearance.  Pt instructed to have Dr. Matthew fax us a letter of surgery clearance or he could bring it to us when he returns for his cystoscopy appt.  Patient verbalized understanding.

## 2017-05-04 NOTE — PROGRESS NOTES
Chief Complaint: History of Gross Hematuria    HPI:   5/4/17: Pt has been well followed in last year with two cysto (Seth and Deondre) and CT (normal).  CT Urogram 4/16 normal.  Has problems of SP 2-3 times a day comes and goes.  Drinks a lot of water not much caffeine.  Dr. Ann did SHIVA 3 mo ago.  Not really sure why he is here today we talked it around a good bit.  Pt does CIC 3-4 times a day.  He can't say if or why his bladder cant be fixed.  Doesn't know his history very well.  Has done CIC about a year or so.  Pt is interested in having TURP and looking at Dr. Stock cysto last year it seems indicated.  4/16: Deondre: 55 y.o. male  who presents for gross hematuria.  He reports gross hematuria a couple of months.  It occurred twice over the course of a day.  He reports lower abdominal pain at the time of gross hematuria.  He reports lower left back pain just before experiencing gross hematuria.  The back pain lasted for 2 days.  He reports getting his routine back pain injection 2 days after the pain began.   He has an urologist (Dr. Ann) in [place].  He denies having a work up for gross hematuria.  He reports hesitancy.  He takes flomax.  He is satisfied with how he is urinating with flomax.   He reports a good urinary stream and complete bladder emptying.  He has no urinary complaints.    He is on the liver transplant list for cirrhosis due to alcohol abuse. He reports 2 uncles having prostate cancer.     Allergies:  Review of patient's allergies indicates no known allergies.    Medications:  has a current medication list which includes the following prescription(s): b.ani/l.aci/l.sal/l.plan/l.dory, cyclobenzaprine, escitalopram oxalate, fluticasone, furosemide, gabapentin, hydrocodone-acetaminophen 10-325mg, insulin aspart protamine-insulin aspart, insulin glargine,hum.rec.anlog, lactobacillus acidophilus, lactulose, levocetirizine, lisinopril, metformin, nebivolol, novolog flexpen, pantoprazole,  rifaximin, spironolactone, tamsulosin, and trazodone.    Review of Systems:  General: No fever, chills, fatigability, or weight loss.  Skin: No rashes, itching, or changes in color or texture of skin.  Chest: Denies COPE, cyanosis, wheezing, cough, and sputum production.  Abdomen: Appetite fine. No weight loss. Denies diarrhea, abdominal pain, hematemesis, or blood in stool.  Musculoskeletal: No joint stiffness or swelling. Denies back pain.  : As above.  All other review of systems negative.    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    PSH:   has a past surgical history that includes Hernia repair; Cholecystectomy; Carpal tunnel release (Bilateral); Neck surgery; vericose veins removed; and Ulnar nerve transposition (Left).    FamHx: family history includes Alcohol abuse in his brother; Cancer in his brother; Hyperlipidemia in his mother; No Known Problems in his sister and sister; No Known Problems (age of onset: 36) in his father.    SocHx:  reports that he quit smoking about 7 years ago. His smoking use included Cigarettes. He quit smokeless tobacco use about 27 years ago. He reports that he does not drink alcohol or use illicit drugs.      Physical Exam:  Vitals:    05/04/17 1518   BP: 100/60   Pulse: 80     General: A&Ox3, no apparent distress, no deformities  Neck: No masses, normal thyroid  Lungs: normal inspiration, no use of accessory muscles  Heart: normal pulse, no arrhythmias  Abdomen: Soft, NT, ND  Skin: The skin is warm and dry. No  jaundice.  Ext: No c/c/e.  : deferred to cysto    Labs/Studies:   Urinalysis performed in clinic, summary: UA normal exc 1000 glucose  PSA    4/17: 4.8    Impression/Plan:   1. RTC to cysto and see if TURP is still indicated.  Needs PCP clearance enroute.

## 2017-05-04 NOTE — LETTER
May 4, 2017      Elizabeth Meneses MD  [address]  [facility] [address]           [facility]  [address] [place] [id number]  Phone: [phone]  Fax: [phone]          Patient: Colin Reilly   MR Number: [id number]   YOB: 1960   Date of Visit: 5/4/2017       Dear Dr. Elizabeth Meneses:    Thank you for referring Colin Reilly to me for evaluation. Attached you will find relevant portions of my assessment and plan of care.    If you have questions, please do not hesitate to call me. I look forward to following Colin Reilly along with you.    Sincerely,    Mookie Mac IV, MD    Enclosure  CC:  No Recipients    If you would like to receive this communication electronically, please contact [email or web address] or [phone] to request more information on [facility] access.    For providers and/or their staff who would like to refer a patient to [facility], please contact us through our one-stop-shop provider referral line, [facility], at [phone].    If you feel you have received this communication in error or would no longer like to receive these types of communications, please e-mail [email or web address]

## 2017-06-01 ENCOUNTER — OFFICE VISIT (OUTPATIENT)
Dept: UROLOGY | Facility: CLINIC | Age: 57
End: 2017-06-01
Payer: MEDICARE

## 2017-06-01 VITALS — WEIGHT: 209 LBS | BODY MASS INDEX: 29.15 KG/M2

## 2017-06-01 DIAGNOSIS — N40.1 BPH ASSOCIATED WITH NOCTURIA: Primary | ICD-10-CM

## 2017-06-01 DIAGNOSIS — N32.81 OAB (OVERACTIVE BLADDER): ICD-10-CM

## 2017-06-01 DIAGNOSIS — R35.1 BPH ASSOCIATED WITH NOCTURIA: Primary | ICD-10-CM

## 2017-06-01 LAB
BILIRUB SERPL-MCNC: NORMAL MG/DL
BLOOD URINE, POC: NORMAL
COLOR, POC UA: YELLOW
GLUCOSE UR QL STRIP: NORMAL
KETONES UR QL STRIP: NORMAL
LEUKOCYTE ESTERASE URINE, POC: NORMAL
NITRITE, POC UA: NORMAL
PH, POC UA: 6
POC RESIDUAL URINE VOLUME: 2 ML (ref 0–100)
PROTEIN, POC: NORMAL
SPECIFIC GRAVITY, POC UA: 1.01
UROBILINOGEN, POC UA: NORMAL

## 2017-06-01 PROCEDURE — 99999 PR PBB SHADOW E&M-EST. PATIENT-LVL I: CPT | Mod: PBBFAC,,, | Performed by: UROLOGY

## 2017-06-01 PROCEDURE — 81002 URINALYSIS NONAUTO W/O SCOPE: CPT | Mod: PBBFAC | Performed by: UROLOGY

## 2017-06-01 PROCEDURE — 52000 CYSTOURETHROSCOPY: CPT | Mod: S$GLB,,, | Performed by: UROLOGY

## 2017-06-01 PROCEDURE — 51798 US URINE CAPACITY MEASURE: CPT | Mod: PBBFAC | Performed by: UROLOGY

## 2017-06-01 PROCEDURE — 99211 OFF/OP EST MAY X REQ PHY/QHP: CPT | Mod: PBBFAC,25 | Performed by: UROLOGY

## 2017-06-01 PROCEDURE — 99499 UNLISTED E&M SERVICE: CPT | Mod: S$GLB,,, | Performed by: UROLOGY

## 2017-06-01 PROCEDURE — 52000 CYSTOURETHROSCOPY: CPT | Mod: PBBFAC | Performed by: UROLOGY

## 2017-06-01 RX ORDER — OXYBUTYNIN CHLORIDE 5 MG/1
5 TABLET ORAL NIGHTLY
Qty: 30 TABLET | Refills: 11 | Status: SHIPPED | OUTPATIENT
Start: 2017-06-01 | End: 2018-06-19 | Stop reason: SDUPTHER

## 2017-06-01 RX ORDER — TAMSULOSIN HYDROCHLORIDE 0.4 MG/1
0.4 CAPSULE ORAL DAILY
Qty: 30 CAPSULE | Refills: 11 | Status: SHIPPED | OUTPATIENT
Start: 2017-06-01 | End: 2018-06-19 | Stop reason: SDUPTHER

## 2017-06-01 NOTE — PROGRESS NOTES
Chief Complaint: History of Gross Hematuria    HPI:   6/1/17: MRI shows no  findings.  Cysto today shows BPH with moderate obstruction.  Describes OAB symptoms with CIC a few times at night with no product - does the CIC because he feels he needs to void.  5/4/17: Pt has been well followed in last year with two cysto (Seth and Deondre) and CT (normal).  CT Urogram 4/16 normal.  Has problems of SP 2-3 times a day comes and goes.  Drinks a lot of water not much caffeine.  Dr. Ann did SHIVA 3 mo ago.  Not really sure why he is here today we talked it around a good bit.  Pt does CIC 3-4 times a day.  He can't say if or why his bladder cant be fixed.  Doesn't know his history very well.  Has done CIC about a year or so.  Pt is interested in having TURP and looking at Dr. Montrell tang last year it seems indicated.  4/16: [place]: 55 y.o. male  who presents for gross hematuria.  He reports gross hematuria a couple of months.  It occurred twice over the course of a day.  He reports lower abdominal pain at the time of gross hematuria.  He reports lower left back pain just before experiencing gross hematuria.  The back pain lasted for 2 days.  He reports getting his routine back pain injection 2 days after the pain began.   He has an urologist (Dr. Ann) in [place].  He denies having a work up for gross hematuria.  He reports hesitancy.  He takes flomax.  He is satisfied with how he is urinating with flomax.   He reports a good urinary stream and complete bladder emptying.  He has no urinary complaints.    He is on the liver transplant list for cirrhosis due to alcohol abuse. He reports 2 uncles having prostate cancer.     Allergies:  Review of patient's allergies indicates no known allergies.    Medications:  has a current medication list which includes the following prescription(s): b.ani/l.aci/l.sal/l.plan/l.dory, cyclobenzaprine, escitalopram oxalate, fluticasone, furosemide, gabapentin, hydrocodone-acetaminophen  10-325mg, insulin aspart protamine-insulin aspart, insulin glargine,hum.rec.anlog, lactobacillus acidophilus, lactulose, levocetirizine, lisinopril, metformin, nebivolol, novolog flexpen, pantoprazole, rifaximin, spironolactone, tamsulosin, and trazodone.    Review of Systems:  General: No fever, chills, fatigability, or weight loss.  Skin: No rashes, itching, or changes in color or texture of skin.  Chest: Denies COPE, cyanosis, wheezing, cough, and sputum production.  Abdomen: Appetite fine. No weight loss. Denies diarrhea, abdominal pain, hematemesis, or blood in stool.  Musculoskeletal: No joint stiffness or swelling. Denies back pain.  : As above.  All other review of systems negative.    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    PSH:   has a past surgical history that includes Hernia repair; Cholecystectomy; Carpal tunnel release (Bilateral); Neck surgery; vericose veins removed; and Ulnar nerve transposition (Left).    FamHx: family history includes Alcohol abuse in his brother; Cancer in his brother; Hyperlipidemia in his mother; No Known Problems in his sister and sister; No Known Problems (age of onset: 36) in his father.    SocHx:  reports that he quit smoking about 7 years ago. His smoking use included Cigarettes. He quit smokeless tobacco use about 27 years ago. He reports that he does not drink alcohol or use drugs.      Physical Exam:  There were no vitals filed for this visit.  General: A&Ox3, no apparent distress, no deformities  Neck:  No masses, normal thyroid  Lungs: normal inspiration, no use of accessory muscles  Heart: normal pulse, no arrhythmias  Abdomen: Soft, NT, ND  Skin: The skin is warm and dry. No jaundice.  Ext: No c/c/e.  : SHIVA normal    Labs/Studies:   Urinalysis performed in clinic, summary: UA normal exc 1000 glucose  PSA    5/16: 0.18  Calibrated Uroflow:    6/1/17: voided 428 ml with Qmax 16 ml/sec and normal pattern      Procedure: Diagnostic Cystoscopy    Procedure in Detail: After proper consents were obtained, the patient was prepped and draped in normal sterile fashion for diagnostic cystoscopy. 5 ml of lidocaine jelly was instilled in the urethra. The flexible cystoscope was then introduced into the urethra, and advanced into the bladder under direct vision. The urethral mucosa appeared normal, and no strictures were noted. The sphincter appeared to be normal, and the veru montanum was unremarkable. The prostatic mucosa and the lateral lobes of the prostate were opposed and mildly obstructive. The bladder neck was normal. Inspection of the interior of the bladder was then carried out. The trigone was unremarkable, with no mucosal lesions. The ureteral orifices were normal in position and configuration. Systematic inspection of the mucosa of the bladder it was then carried out, rotating the cystoscope so that all areas of the left and right lateral walls, the dome of the bladder, and the posterior wall were all visualized. The cystoscope was then advanced further into the bladder, and maximum deflection of the scope was performed so that the bladder neck could be inspected. No mucosal lesions were noted there. The cystoscope was then removed, and the procedure terminated.     Findings: BPH with obstruction    Impression/Plan:   1. BPH but voids fine.  OAB evident.  Caffeine cessation.  CIC unnecessary.  Reviewed.    2. Continue flomax. Added oxybutinin qhs.  PSA/RTC 1 year

## 2017-07-03 ENCOUNTER — TELEPHONE (OUTPATIENT)
Dept: HEPATOLOGY | Facility: CLINIC | Age: 57
End: 2017-07-03

## 2017-07-03 NOTE — TELEPHONE ENCOUNTER
MA called patient back, patient would like to reschedule his labs in [facility] location 7/5/17.     Rescheduled patient as requested. RASHAD

## 2017-07-03 NOTE — TELEPHONE ENCOUNTER
----- Message from Marni Giles sent at 7/3/2017  2:41 PM CDT -----  Contact: self  Colin has his labs scheduled in [place] and pt is stating that he does not want to go to [place] and wants to have his labs done in [place].    Please contact Colin at [phone]    Thank you

## 2017-07-05 ENCOUNTER — HISTORICAL (OUTPATIENT)
Dept: ADMINISTRATIVE | Facility: HOSPITAL | Age: 57
End: 2017-07-05

## 2017-07-05 LAB
ABS NEUT (OLG): 1.46 X10(3)/MCL (ref 2.1–9.2)
ALBUMIN SERPL-MCNC: 3.4 GM/DL (ref 3.4–5)
ALBUMIN/GLOB SERPL: 1.1 {RATIO}
ALP SERPL-CCNC: 100 UNIT/L (ref 50–136)
ALT SERPL-CCNC: 32 UNIT/L (ref 12–78)
AST SERPL-CCNC: 38 UNIT/L (ref 15–37)
BILIRUB SERPL-MCNC: 1.8 MG/DL (ref 0.2–1)
BILIRUBIN DIRECT+TOT PNL SERPL-MCNC: 0.9 MG/DL (ref 0–0.2)
BILIRUBIN DIRECT+TOT PNL SERPL-MCNC: 0.9 MG/DL (ref 0–0.8)
BUN SERPL-MCNC: 4 MG/DL (ref 7–18)
CALCIUM SERPL-MCNC: 8.4 MG/DL (ref 8.5–10.1)
CHLORIDE SERPL-SCNC: 104 MMOL/L (ref 98–107)
CO2 SERPL-SCNC: 27 MMOL/L (ref 21–32)
CREAT SERPL-MCNC: 0.53 MG/DL (ref 0.7–1.3)
EOSINOPHIL NFR BLD MANUAL: 6 % (ref 0–8)
ERYTHROCYTE [DISTWIDTH] IN BLOOD BY AUTOMATED COUNT: 17.8 % (ref 11.5–17)
EST. AVERAGE GLUCOSE BLD GHB EST-MCNC: 237 MG/DL
GLOBULIN SER-MCNC: 3 GM/DL (ref 2.4–3.5)
GLUCOSE SERPL-MCNC: 176 MG/DL (ref 74–106)
HBA1C MFR BLD: 9.9 % (ref 4.2–6.3)
HCT VFR BLD AUTO: 40.6 % (ref 42–52)
HGB BLD-MCNC: 12.9 GM/DL (ref 14–18)
LYMPHOCYTES NFR BLD MANUAL: 16 % (ref 13–40)
MCH RBC QN AUTO: 28.2 PG (ref 27–31)
MCHC RBC AUTO-ENTMCNC: 31.8 GM/DL (ref 33–36)
MCV RBC AUTO: 88.6 FL (ref 80–94)
MONOCYTES NFR BLD MANUAL: 16 % (ref 2–11)
NEUTROPHILS NFR BLD MANUAL: 62 % (ref 47–80)
PLATELET # BLD AUTO: 46 X10(3)/MCL (ref 130–400)
PLATELET # BLD EST: ABNORMAL 10*3/UL
PMV BLD AUTO: ABNORMAL FL (ref 7.4–10.4)
POTASSIUM SERPL-SCNC: 3.8 MMOL/L (ref 3.5–5.1)
PROT SERPL-MCNC: 6.4 GM/DL (ref 6.4–8.2)
RBC # BLD AUTO: 4.58 X10(6)/MCL (ref 4.7–6.1)
SODIUM SERPL-SCNC: 138 MMOL/L (ref 136–145)
WBC # SPEC AUTO: 2.7 X10(3)/MCL (ref 4.5–11.5)

## 2017-07-06 ENCOUNTER — TELEPHONE (OUTPATIENT)
Dept: UROLOGY | Facility: CLINIC | Age: 57
End: 2017-07-06

## 2017-07-06 NOTE — TELEPHONE ENCOUNTER
----- Message from Anuel Ramos LPN sent at 7/6/2017  8:52 AM CDT -----  Contact: [phone]       ----- Message -----  From: Darion Aguilar  Sent: 7/6/2017   8:34 AM  To: [facility] Staff    Pt would like to know lab results.  Please call back at [phone]. Thanks-[facility]

## 2017-07-06 NOTE — TELEPHONE ENCOUNTER
Patient states he had labs drawn in [place] instead of having to drive all the way to [place]. He is requesting our fax number so the results can be faxed to our office. Fax number provided.

## 2017-07-06 NOTE — TELEPHONE ENCOUNTER
----- Message from Taylor Garay sent at 7/6/2017 12:05 PM CDT -----  This patient said he was returning your call.   Call him at [phone].                                              trotter

## 2017-07-24 ENCOUNTER — HISTORICAL (OUTPATIENT)
Dept: ADMINISTRATIVE | Facility: HOSPITAL | Age: 57
End: 2017-07-24

## 2017-07-24 LAB
ABS NEUT (OLG): 1.86 X10(3)/MCL (ref 2.1–9.2)
ALBUMIN SERPL-MCNC: 3.3 GM/DL (ref 3.4–5)
ALBUMIN/GLOB SERPL: 1 RATIO (ref 1.1–2)
ALP SERPL-CCNC: 99 UNIT/L (ref 50–136)
ALT SERPL-CCNC: 31 UNIT/L (ref 12–78)
AST SERPL-CCNC: 34 UNIT/L (ref 15–37)
BASOPHILS # BLD AUTO: 0 X10(3)/MCL (ref 0–0.2)
BASOPHILS NFR BLD AUTO: 1 %
BILIRUB SERPL-MCNC: 2.3 MG/DL (ref 0.2–1)
BILIRUBIN DIRECT+TOT PNL SERPL-MCNC: 1.1 MG/DL (ref 0–0.5)
BILIRUBIN DIRECT+TOT PNL SERPL-MCNC: 1.2 MG/DL (ref 0–0.8)
BUN SERPL-MCNC: 6 MG/DL (ref 7–18)
CALCIUM SERPL-MCNC: 8.5 MG/DL (ref 8.5–10.1)
CHLORIDE SERPL-SCNC: 105 MMOL/L (ref 98–107)
CO2 SERPL-SCNC: 23 MMOL/L (ref 21–32)
CREAT SERPL-MCNC: 0.7 MG/DL (ref 0.7–1.3)
EOSINOPHIL # BLD AUTO: 0.1 X10(3)/MCL (ref 0–0.9)
EOSINOPHIL NFR BLD AUTO: 2 %
ERYTHROCYTE [DISTWIDTH] IN BLOOD BY AUTOMATED COUNT: 18.6 % (ref 11.5–17)
EST. AVERAGE GLUCOSE BLD GHB EST-MCNC: 214 MG/DL
GLOBULIN SER-MCNC: 3.3 GM/DL (ref 2.4–3.5)
GLUCOSE SERPL-MCNC: 347 MG/DL (ref 74–106)
HBA1C MFR BLD: 9.1 % (ref 4.2–6.3)
HCT VFR BLD AUTO: 41 % (ref 42–52)
HGB BLD-MCNC: 13.1 GM/DL (ref 14–18)
LYMPHOCYTES # BLD AUTO: 0.7 X10(3)/MCL (ref 0.6–4.6)
LYMPHOCYTES NFR BLD AUTO: 24 %
MCH RBC QN AUTO: 28.5 PG (ref 27–31)
MCHC RBC AUTO-ENTMCNC: 32 GM/DL (ref 33–36)
MCV RBC AUTO: 89.1 FL (ref 80–94)
MONOCYTES # BLD AUTO: 0.3 X10(3)/MCL (ref 0.1–1.3)
MONOCYTES NFR BLD AUTO: 10 %
NEUTROPHILS # BLD AUTO: 1.86 X10(3)/MCL (ref 2.1–9.2)
NEUTROPHILS NFR BLD AUTO: 62 %
PLATELET # BLD AUTO: 42 X10(3)/MCL (ref 130–400)
PMV BLD AUTO: ABNORMAL FL (ref 7.4–10.4)
POTASSIUM SERPL-SCNC: 4 MMOL/L (ref 3.5–5.1)
PROT SERPL-MCNC: 6.6 GM/DL (ref 6.4–8.2)
RBC # BLD AUTO: 4.6 X10(6)/MCL (ref 4.7–6.1)
SODIUM SERPL-SCNC: 139 MMOL/L (ref 136–145)
WBC # SPEC AUTO: 3 X10(3)/MCL (ref 4.5–11.5)

## 2017-08-17 ENCOUNTER — TELEPHONE (OUTPATIENT)
Dept: HEPATOLOGY | Facility: CLINIC | Age: 57
End: 2017-08-17

## 2017-08-17 NOTE — TELEPHONE ENCOUNTER
----- Message from Luann Garcia sent at 8/16/2017  3:50 PM CDT -----  Contact: pt  Calling to get an appt FU with Dr Meneses please call him @ # [phone]

## 2017-08-18 ENCOUNTER — TELEPHONE (OUTPATIENT)
Dept: GASTROENTEROLOGY | Facility: CLINIC | Age: 57
End: 2017-08-18

## 2017-08-18 NOTE — TELEPHONE ENCOUNTER
----- Message from Patricia Patel sent at 8/17/2017  5:22 PM CDT -----  Contact: PT  PT is calling to get a colonoscopy    Callback: [phone]

## 2017-08-18 NOTE — TELEPHONE ENCOUNTER
Returned call to pt who asked to be in clinic sooner than 9/15/17. Scheduled appt with Maykel Miramontes for 8/25/17.

## 2017-08-25 ENCOUNTER — OFFICE VISIT (OUTPATIENT)
Dept: GASTROENTEROLOGY | Facility: CLINIC | Age: 57
End: 2017-08-25
Payer: MEDICARE

## 2017-08-25 ENCOUNTER — LAB VISIT (OUTPATIENT)
Dept: LAB | Facility: HOSPITAL | Age: 57
End: 2017-08-25
Attending: PHYSICIAN ASSISTANT
Payer: MEDICARE

## 2017-08-25 VITALS
DIASTOLIC BLOOD PRESSURE: 74 MMHG | WEIGHT: 218.25 LBS | BODY MASS INDEX: 31.25 KG/M2 | HEART RATE: 78 BPM | HEIGHT: 70 IN | SYSTOLIC BLOOD PRESSURE: 124 MMHG

## 2017-08-25 DIAGNOSIS — K70.31 ALCOHOLIC CIRRHOSIS OF LIVER WITH ASCITES: ICD-10-CM

## 2017-08-25 DIAGNOSIS — I85.00 ESOPHAGEAL VARICES WITHOUT BLEEDING, UNSPECIFIED ESOPHAGEAL VARICES TYPE: ICD-10-CM

## 2017-08-25 DIAGNOSIS — R13.14 DYSPHAGIA, PHARYNGOESOPHAGEAL: ICD-10-CM

## 2017-08-25 DIAGNOSIS — K62.5 RECTAL BLEEDING: ICD-10-CM

## 2017-08-25 DIAGNOSIS — K70.31 ALCOHOLIC CIRRHOSIS OF LIVER WITH ASCITES: Primary | ICD-10-CM

## 2017-08-25 LAB
ALBUMIN SERPL BCP-MCNC: 3.6 G/DL
ALP SERPL-CCNC: 84 U/L
ALT SERPL W/O P-5'-P-CCNC: 23 U/L
ANION GAP SERPL CALC-SCNC: 8 MMOL/L
AST SERPL-CCNC: 40 U/L
BASOPHILS # BLD AUTO: 0.02 K/UL
BASOPHILS NFR BLD: 0.8 %
BILIRUB SERPL-MCNC: 1.9 MG/DL
BUN SERPL-MCNC: 6 MG/DL
CALCIUM SERPL-MCNC: 8.4 MG/DL
CHLORIDE SERPL-SCNC: 106 MMOL/L
CO2 SERPL-SCNC: 22 MMOL/L
CREAT SERPL-MCNC: 0.8 MG/DL
DIFFERENTIAL METHOD: ABNORMAL
EOSINOPHIL # BLD AUTO: 0.1 K/UL
EOSINOPHIL NFR BLD: 2.3 %
ERYTHROCYTE [DISTWIDTH] IN BLOOD BY AUTOMATED COUNT: 18 %
EST. GFR  (AFRICAN AMERICAN): >60 ML/MIN/1.73 M^2
EST. GFR  (NON AFRICAN AMERICAN): >60 ML/MIN/1.73 M^2
GLUCOSE SERPL-MCNC: 278 MG/DL
HCT VFR BLD AUTO: 39.8 %
HGB BLD-MCNC: 13.3 G/DL
INR PPP: 1.3
LYMPHOCYTES # BLD AUTO: 0.9 K/UL
LYMPHOCYTES NFR BLD: 34.8 %
MCH RBC QN AUTO: 28.7 PG
MCHC RBC AUTO-ENTMCNC: 33.4 G/DL
MCV RBC AUTO: 86 FL
MONOCYTES # BLD AUTO: 0.2 K/UL
MONOCYTES NFR BLD: 8.6 %
NEUTROPHILS # BLD AUTO: 1.4 K/UL
NEUTROPHILS NFR BLD: 53.5 %
PLATELET # BLD AUTO: 40 K/UL
PLATELET BLD QL SMEAR: ABNORMAL
PMV BLD AUTO: ABNORMAL FL
POTASSIUM SERPL-SCNC: 3.5 MMOL/L
PROT SERPL-MCNC: 6.9 G/DL
PROTHROMBIN TIME: 13.5 SEC
RBC # BLD AUTO: 4.63 M/UL
SODIUM SERPL-SCNC: 136 MMOL/L
WBC # BLD AUTO: 2.56 K/UL

## 2017-08-25 PROCEDURE — 99214 OFFICE O/P EST MOD 30 MIN: CPT | Mod: S$GLB,,, | Performed by: PHYSICIAN ASSISTANT

## 2017-08-25 PROCEDURE — 3078F DIAST BP <80 MM HG: CPT | Mod: S$GLB,,, | Performed by: PHYSICIAN ASSISTANT

## 2017-08-25 PROCEDURE — 36415 COLL VENOUS BLD VENIPUNCTURE: CPT

## 2017-08-25 PROCEDURE — 3008F BODY MASS INDEX DOCD: CPT | Mod: S$GLB,,, | Performed by: PHYSICIAN ASSISTANT

## 2017-08-25 PROCEDURE — 86803 HEPATITIS C AB TEST: CPT

## 2017-08-25 PROCEDURE — 80053 COMPREHEN METABOLIC PANEL: CPT

## 2017-08-25 PROCEDURE — 85025 COMPLETE CBC W/AUTO DIFF WBC: CPT

## 2017-08-25 PROCEDURE — 3074F SYST BP LT 130 MM HG: CPT | Mod: S$GLB,,, | Performed by: PHYSICIAN ASSISTANT

## 2017-08-25 PROCEDURE — 85610 PROTHROMBIN TIME: CPT

## 2017-08-25 PROCEDURE — 99999 PR PBB SHADOW E&M-EST. PATIENT-LVL V: CPT | Mod: PBBFAC,,, | Performed by: PHYSICIAN ASSISTANT

## 2017-08-25 RX ORDER — SODIUM, POTASSIUM,MAG SULFATES 17.5-3.13G
SOLUTION, RECONSTITUTED, ORAL ORAL
Qty: 354 ML | Refills: 0 | Status: ON HOLD | OUTPATIENT
Start: 2017-08-25 | End: 2017-09-08 | Stop reason: HOSPADM

## 2017-08-25 RX ORDER — MIRTAZAPINE 15 MG/1
TABLET, FILM COATED ORAL
Refills: 2 | COMMUNITY
Start: 2017-08-04 | End: 2018-10-10 | Stop reason: ALTCHOICE

## 2017-08-25 RX ORDER — ESCITALOPRAM OXALATE 20 MG/1
20 TABLET ORAL DAILY
Refills: 2 | COMMUNITY
Start: 2017-08-04 | End: 2022-11-15

## 2017-08-25 NOTE — PATIENT INSTRUCTIONS

## 2017-08-25 NOTE — PROGRESS NOTES
Subjective:       Patient ID: Colin Reilly is a 56 y.o. male.    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 He stopped drinking around 2012. He reportedly had hepatitis C, but HCV AB in 2014 was negative. He was last seen by Dr. Meneses in January in [place]. He has also had several EGDs with Dr. Llanos. The patient is new to me.     Today he complains of rectal bleeding, nausea and abdominal pain. He first noticed the bleeding about a month ago. He reports having a BM 4-5 times a day because of Lactulose. The bleeding comes and goes. He says it is dark in color; almost black. He denies rectal pain or itching. He also has lower abdominal pain which comes and goes. He says it is a squeezing pain. It is temporarily relieved by a BM. He abdomen also swells at times.     He reports nausea, but no vomiting. He had dysphagia with certain foods like rice. He says it gets stuck at the base of his neck. He admits to eating fast and gulping. He had drank 12 Diet Cokes in the last two days. He denies regular NSAID use. No fever. No jaundice. He is not encephalopathic. It is not clear if he has had a colonoscopy before. We called his GI in [place], but they only had EGDs on record.    Review of Systems   Constitutional: Negative for fatigue and fever.   HENT: Negative for hearing loss.    Eyes: Negative for visual disturbance.   Respiratory: Negative for cough.    Cardiovascular: Negative for palpitations.   Gastrointestinal:        As per HPI.   Genitourinary: Positive for frequency. Negative for difficulty urinating, dysuria and hematuria.   Musculoskeletal: Positive for arthralgias and back pain.   Skin: Negative for color change and rash.   Neurological: Positive for numbness and headaches. Negative for dizziness, seizures, syncope and weakness.    Hematological: Bruises/bleeds easily.   Psychiatric/Behavioral: The patient is nervous/anxious.        Objective:      Physical Exam   Constitutional: He is oriented to person, place, and time. He appears well-developed and well-nourished.   HENT:   Head: Normocephalic and atraumatic.   Eyes: EOM are normal.   Neck: Normal range of motion. Neck supple.   Cardiovascular: Normal rate, regular rhythm and normal heart sounds.    No murmur heard.  Pulmonary/Chest: Effort normal and breath sounds normal. No respiratory distress. He has no wheezes.   Abdominal: Bowel sounds are normal. He exhibits distension (slightly tight). He exhibits no mass. There is no hepatomegaly. There is no tenderness.   RECTAL: External exam was normal. No hemorrhoids or fissure. Normal sphincter tone. No internal palpable mass. No gross blood.    Musculoskeletal: He exhibits no edema.   Neurological: He is alert and oriented to person, place, and time. No cranial nerve deficit. Gait normal.   Skin: Skin is warm and dry. No rash noted.   Psychiatric: He has a normal mood and affect.       Assessment:       1. Alcoholic cirrhosis of liver with ascites    2. Esophageal varices without bleeding, unspecified esophageal varices type    3. Rectal bleeding        Plan:       Colonoscopy -  I have explained the risks, benefits, and alternatives of the procedure(s) in detail. The patient voices understanding and all questions have been answered. The patient agrees to proceed as planned.   Due for repeat EGD in October.  Recommend low sodium diet.   Decrease intake of Diet Coke.   Continue Lactulose and Rifaximin at current dosing.   He would likely benefit from an Esophagram and Modified barium swallow to work up his dysphagia.     Orders Placed This Encounter   Procedures    US Abdomen Limited    CBC auto differential    Comprehensive metabolic panel    Hepatitis C antibody    Protime-INR     Return in about 4 weeks (around 9/22/2017).    Thank  you for the opportunity to participate in the care of this patient. This consult was designated to me by my supervising physician. A report will be sent to the referring provider.   Maykel Miramontes PA-C.

## 2017-08-28 LAB — HCV AB SERPL QL IA: NEGATIVE

## 2017-09-01 DIAGNOSIS — K70.30 CIRRHOSIS, LAENNEC'S: ICD-10-CM

## 2017-09-05 RX ORDER — FUROSEMIDE 20 MG/1
TABLET ORAL
Qty: 120 TABLET | Refills: 7 | Status: SHIPPED | OUTPATIENT
Start: 2017-09-05 | End: 2018-04-28 | Stop reason: SDUPTHER

## 2017-09-08 ENCOUNTER — ANESTHESIA EVENT (OUTPATIENT)
Dept: ENDOSCOPY | Facility: HOSPITAL | Age: 57
End: 2017-09-08
Payer: MEDICARE

## 2017-09-08 ENCOUNTER — ANESTHESIA (OUTPATIENT)
Dept: ENDOSCOPY | Facility: HOSPITAL | Age: 57
End: 2017-09-08
Payer: MEDICARE

## 2017-09-08 ENCOUNTER — SURGERY (OUTPATIENT)
Age: 57
End: 2017-09-08

## 2017-09-08 ENCOUNTER — HOSPITAL ENCOUNTER (OUTPATIENT)
Facility: HOSPITAL | Age: 57
Discharge: HOME | End: 2017-09-08
Attending: INTERNAL MEDICINE | Admitting: INTERNAL MEDICINE
Payer: MEDICARE

## 2017-09-08 VITALS
RESPIRATION RATE: 13 BRPM | WEIGHT: 230 LBS | BODY MASS INDEX: 32.2 KG/M2 | OXYGEN SATURATION: 99 % | TEMPERATURE: 99 F | DIASTOLIC BLOOD PRESSURE: 77 MMHG | SYSTOLIC BLOOD PRESSURE: 119 MMHG | HEIGHT: 71 IN | RESPIRATION RATE: 16 BRPM | HEART RATE: 80 BPM

## 2017-09-08 DIAGNOSIS — K62.5 RECTAL BLEEDING: ICD-10-CM

## 2017-09-08 LAB — POCT GLUCOSE: 247 MG/DL (ref 70–110)

## 2017-09-08 PROCEDURE — 37000009 HC ANESTHESIA EA ADD 15 MINS: Performed by: INTERNAL MEDICINE

## 2017-09-08 PROCEDURE — 37000008 HC ANESTHESIA 1ST 15 MINUTES: Performed by: INTERNAL MEDICINE

## 2017-09-08 PROCEDURE — 45378 DIAGNOSTIC COLONOSCOPY: CPT | Mod: ,,, | Performed by: INTERNAL MEDICINE

## 2017-09-08 PROCEDURE — 25000003 PHARM REV CODE 250: Performed by: INTERNAL MEDICINE

## 2017-09-08 PROCEDURE — 63600175 PHARM REV CODE 636 W HCPCS: Performed by: NURSE ANESTHETIST, CERTIFIED REGISTERED

## 2017-09-08 PROCEDURE — 45378 DIAGNOSTIC COLONOSCOPY: CPT | Performed by: INTERNAL MEDICINE

## 2017-09-08 RX ORDER — LIDOCAINE HCL/PF 100 MG/5ML
SYRINGE (ML) INTRAVENOUS
Status: DISCONTINUED | OUTPATIENT
Start: 2017-09-08 | End: 2017-09-08

## 2017-09-08 RX ORDER — SODIUM CHLORIDE, SODIUM LACTATE, POTASSIUM CHLORIDE, CALCIUM CHLORIDE 600; 310; 30; 20 MG/100ML; MG/100ML; MG/100ML; MG/100ML
INJECTION, SOLUTION INTRAVENOUS CONTINUOUS
Status: DISCONTINUED | OUTPATIENT
Start: 2017-09-08 | End: 2017-09-08 | Stop reason: HOSPADM

## 2017-09-08 RX ORDER — PROPOFOL 10 MG/ML
INJECTION, EMULSION INTRAVENOUS
Status: DISCONTINUED | OUTPATIENT
Start: 2017-09-08 | End: 2017-09-08

## 2017-09-08 RX ADMIN — PROPOFOL 40 MG: 10 INJECTION, EMULSION INTRAVENOUS at 01:09

## 2017-09-08 RX ADMIN — LIDOCAINE HYDROCHLORIDE 40 MG: 20 INJECTION, SOLUTION INTRAVENOUS at 01:09

## 2017-09-08 RX ADMIN — PROPOFOL 20 MG: 10 INJECTION, EMULSION INTRAVENOUS at 02:09

## 2017-09-08 RX ADMIN — PROPOFOL 70 MG: 10 INJECTION, EMULSION INTRAVENOUS at 01:09

## 2017-09-08 RX ADMIN — SODIUM CHLORIDE, SODIUM LACTATE, POTASSIUM CHLORIDE, AND CALCIUM CHLORIDE: .6; .31; .03; .02 INJECTION, SOLUTION INTRAVENOUS at 12:09

## 2017-09-08 RX ADMIN — PROPOFOL 40 MG: 10 INJECTION, EMULSION INTRAVENOUS at 02:09

## 2017-09-08 RX ADMIN — PROPOFOL 30 MG: 10 INJECTION, EMULSION INTRAVENOUS at 02:09

## 2017-09-08 RX ADMIN — PROPOFOL 90 MG: 10 INJECTION, EMULSION INTRAVENOUS at 01:09

## 2017-09-08 NOTE — ANESTHESIA PREPROCEDURE EVALUATION
09/08/2017  Colin Reilly is a 56 y.o., male.    Anesthesia Evaluation    I have reviewed the Patient Summary Reports.    I have reviewed the Nursing Notes.   I have reviewed the Medications.     Review of Systems  Anesthesia Hx:  No problems with previous Anesthesia  History of prior surgery of interest to airway management or planning: Denies Family Hx of Anesthesia complications.   Denies Personal Hx of Anesthesia complications.   Social:  Former Smoker Etoh abuse in remission.   Hematology/Oncology:         -- Anemia: Hematology Comments: Thrombocytopenia (Platelets 40k on 08/25)   Cardiovascular:   Hypertension    Pulmonary:  Pulmonary Normal    Renal/:   Hx Renal Mass Left   Hepatic/GI:   GERD Liver Disease, Hepatitis, C Hx Esophageal Varices  Hx Splenomegally  Hx Cirrhosis   Musculoskeletal:  Spine Disorders: Chronic Pain    Neurological:   Hx Hepatic Encephalopathy   Endocrine:   Diabetes        Physical Exam  General:  Well nourished    Airway/Jaw/Neck:  Airway Findings: Mouth Opening: Normal Tongue: Normal  General Airway Assessment: Adult  Mallampati: II  TM Distance: Normal, at least 6 cm          Chest/Lungs:  Chest/Lungs Findings: Normal Respiratory Rate     Heart/Vascular:  Heart Findings: Rate: Normal             Anesthesia Plan  Type of Anesthesia, risks & benefits discussed:  Anesthesia Type:  MAC  Patient's Preference:   Intra-op Monitoring Plan: standard ASA monitors  Intra-op Monitoring Plan Comments:   Post Op Pain Control Plan:   Post Op Pain Control Plan Comments:   Induction:   IV  Beta Blocker:  Patient is on a Beta-Blocker and has received one dose within the past 24 hours (No further documentation required).       Informed Consent: Patient understands risks and agrees with Anesthesia plan.  Questions answered. Anesthesia consent signed with patient.  ASA Score: 3     Day of  Surgery Review of History & Physical:    H&P update referred to the surgeon.         Ready For Surgery From Anesthesia Perspective.

## 2017-09-08 NOTE — ANESTHESIA POSTPROCEDURE EVALUATION
"Anesthesia Post Evaluation    Patient: Colin Reilly    Procedure(s) Performed: Procedure(s) (LRB):  COLONOSCOPY (N/A)    Final Anesthesia Type: MAC  Patient location during evaluation: GI PACU  Patient participation: Yes- Able to Participate  Level of consciousness: awake and alert, awake and oriented  Post-procedure vital signs: reviewed and stable  Pain management: adequate  Airway patency: patent  PONV status at discharge: No PONV  Anesthetic complications: no      Cardiovascular status: blood pressure returned to baseline and hemodynamically stable  Respiratory status: unassisted, spontaneous ventilation and room air  Hydration status: euvolemic  Follow-up not needed.        Visit Vitals  /61 (BP Location: Left arm, Patient Position: Lying)   Pulse 92   Temp 37 °C (98.6 °F) (Oral)   Resp 16   Ht 5' 11" (1.803 m)   Wt 104.3 kg (230 lb)   SpO2 99%   BMI 32.08 kg/m²       Pain/Lobito Score: Pain Assessment Performed: Yes (9/8/2017 12:43 PM)  Presence of Pain: complains of pain/discomfort (9/8/2017 12:43 PM)  Lobito Score: 9 (9/8/2017  2:12 PM)      "

## 2017-09-08 NOTE — H&P
Short Stay Endoscopy History and Physical    PCP - Preston Matthew Ii, MD    Procedure - Colonoscopy  ASA - II  Mallampati - per anesthesia  History of Anesthesia problems - no  Family history Anesthesia problems -  no     HPI:  This is a 56 y.o. male here for evaluation of :  rectal bleeding    Average Risk Screening:no  Family history of colon cancer: No  History of polyps: No  Anemia:yes  Blood in stools: yes  Diarrhea: yes  Abdominal Pain: yes    Review of Systems:  CONSTITUTIONAL: Denies weight change,  fatigue, fevers, chills, night sweats.  CARDIOVASCULAR: Denies chest pain, shortness of breath, orthopnea and edema.  RESPIRATORY: Denies cough, hemoptysis, dyspnea, and wheezing.  GI: See HPI.    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    Esophageal varices     GERD (gastroesophageal reflux disease)     Hepatic encephalopathy 7/8/2014    Hepatitis C virus infection 07/08/2014    tx with interferon 3-4 mos- stopped for unclear reasons Tattoos-first at age 17 only risk factor (HCVAB negative 08/2017)    HTN (hypertension) 7/8/2014    Hypertension     Insulin dependent diabetes mellitus     Renal mass, left 7/8/2014    6.3 x 5.7 x 5.6 complex mass    Splenomegaly 7/8/2014    Umbilical hernia 7/8/2014       Surgical History:   Past Surgical History:   Procedure Laterality Date    CARPAL TUNNEL RELEASE Bilateral     CHOLECYSTECTOMY      ESOPHAGOGASTRODUODENOSCOPY  01/2014    ESOPHAGOGASTRODUODENOSCOPY  02/15/2017    grade II varices    ESOPHAGOGASTRODUODENOSCOPY  04/10/2017    grade I varices    ESOPHAGOGASTRODUODENOSCOPY W/ BANDING  01/26/2017    grade II varices    HERNIA REPAIR      NECK SURGERY      fusion on C5 and C6    ULNAR NERVE TRANSPOSITION Left     vericose veins removed         Family History:   Family History   Problem  Relation Age of Onset    Hyperlipidemia Mother     No Known Problems Father 36     accident on oil rig    No Known Problems Sister     Cancer Brother     Alcohol abuse Brother     No Known Problems Sister        Social History:   Social History   Substance Use Topics    Smoking status: Former Smoker     Types: Cigarettes     Quit date: 1/4/2010    Smokeless tobacco: Former User     Quit date: 2/24/1990      Comment: former 1 pack/week quit 1/4/2010    Alcohol use No      Comment: former heavy beer but quit 1/4/2010       Allergies: Reviewed.    Medications:  No current facility-administered medications on file prior to encounter.      Current Outpatient Prescriptions on File Prior to Encounter   Medication Sig Dispense Refill    B.ANI/L.ACI/L.SAL/L.PLAN/L.JITENDRA (PROBIOTIC FORMULA ORAL) Take by mouth.      cyclobenzaprine (FLEXERIL) 10 MG tablet Take 10 mg by mouth once daily.       escitalopram oxalate (LEXAPRO) 20 MG tablet TK 1 T PO QD. DISCONTINUE TRAZODONE  2    fluticasone (FLONASE) 50 mcg/actuation nasal spray 1 spray by Each Nare route once daily.       gabapentin (NEURONTIN) 300 MG capsule Take 300 mg by mouth 3 (three) times daily.      insulin aspart protamine-insulin aspart (NOVOLOG 70/30) 100 unit/mL (70-30) InPn pen Inject 25 Units into the skin 3 (three) times daily before meals.       INSULIN GLARGINE,HUM.REC.ANLOG (LANTUS SOLOSTAR SUBQ) Inject 65 Units into the skin every evening.       lactobacillus acidophilus Cap Take 1 capsule by mouth once daily.      lactulose (CHRONULAC) 10 gram/15 mL solution Take 30 mLs by mouth 3 (three) times daily.       levocetirizine (XYZAL) 5 MG tablet Take 5 mg by mouth once daily.  2    lisinopril 10 MG tablet Take 10 mg by mouth once daily.      metformin (GLUCOPHAGE) 500 MG tablet Take 500 mg by mouth 2 (two) times daily.  5    mirtazapine (REMERON) 15 MG tablet TK 1 T PO HS  2    nebivolol (BYSTOLIC) 10 MG Tab Take 10 mg by mouth once daily.       NOVOLOG FLEXPEN 100 unit/mL InPn pen       oxybutynin (DITROPAN) 5 MG Tab Take 1 tablet (5 mg total) by mouth every evening. 30 tablet 11    rifAXIMin (XIFAXAN) 550 mg Tab Take 550 mg by mouth 2 (two) times daily.      spironolactone (ALDACTONE) 100 MG tablet Take 1 tablet (100 mg total) by mouth once daily. 30 tablet 11    SUPREP BOWEL PREP KIT 17.5-3.13-1.6 gram SolR Use as directed 354 mL 0    tamsulosin (FLOMAX) 0.4 mg Cp24 Take 1 capsule (0.4 mg total) by mouth once daily. 30 capsule 11    hydrocodone-acetaminophen 10-325mg (NORCO)  mg Tab Take 10 tablets by mouth 3 (three) times daily.       pantoprazole (PROTONIX) 40 MG tablet Take 1 tablet (40 mg total) by mouth 2 (two) times daily. 180 tablet 1    trazodone (DESYREL) 150 MG tablet Take 300 mg by mouth nightly as needed.          Physical Exam:  Vital Signs:   Vitals:    09/08/17 1244   BP: 128/70   Pulse: 84   Resp: 18   Temp: 98.6 °F (37 °C)     General Appearance: Well appearing in no acute distress  ENT: OP clear  Chest: CTA B  CV: RRR, no m/r/g  Abd: s/nt/nd/nabs  Ext: no edema    Labs:  Lab Results   Component Value Date    WBC 2.56 (L) 08/25/2017    HGB 13.3 (L) 08/25/2017    HCT 39.8 (L) 08/25/2017    MCV 86 08/25/2017    PLT 40 (L) 08/25/2017     Lab Results   Component Value Date    INR 1.3 (H) 08/25/2017    INR 1.46 (A) 03/20/2017    INR 1.3 (H) 10/03/2016    PROTIME 17.5 (A) 03/20/2017     Lab Results   Component Value Date    IRON 50 08/13/2014    TIBC 392 08/13/2014    FERRITIN 16 (L) 08/13/2014         IMPRESSION:  Patient Active Problem List   Diagnosis    Esophageal varices with bleeding    Cirrhosis, Laennec's    Umbilical hernia    Splenomegaly    GERD (gastroesophageal reflux disease)    Diabetes    HTN (hypertension)    Other ascites    Alcohol abuse, in remission    Hepatic encephalopathy    Chronic back pain    Cirrhosis    Rectal bleeding   Abdominal pain  Anemia      Plan:  I have explained the risks  and benefits of colonoscopy to the patient including but not limited to bleeding, perforation, infection, and death. The patient wishes to proceed with colonoscopy.

## 2017-10-17 ENCOUNTER — HISTORICAL (OUTPATIENT)
Dept: ADMINISTRATIVE | Facility: HOSPITAL | Age: 57
End: 2017-10-17

## 2017-10-17 LAB
ABS NEUT (OLG): 1.84 X10(3)/MCL (ref 2.1–9.2)
ALBUMIN SERPL-MCNC: 3.6 GM/DL (ref 3.4–5)
ALBUMIN/GLOB SERPL: 1.2 {RATIO}
ALP SERPL-CCNC: 101 UNIT/L (ref 50–136)
ALT SERPL-CCNC: 28 UNIT/L (ref 12–78)
APPEARANCE, UA: CLEAR
AST SERPL-CCNC: 32 UNIT/L (ref 15–37)
BACTERIA SPEC CULT: ABNORMAL /HPF
BASOPHILS # BLD AUTO: 0 X10(3)/MCL (ref 0–0.2)
BASOPHILS NFR BLD AUTO: 1 %
BILIRUB SERPL-MCNC: 2 MG/DL (ref 0.2–1)
BILIRUB UR QL STRIP: ABNORMAL
BILIRUBIN DIRECT+TOT PNL SERPL-MCNC: 0.9 MG/DL (ref 0–0.2)
BILIRUBIN DIRECT+TOT PNL SERPL-MCNC: 1.1 MG/DL (ref 0–0.8)
BUN SERPL-MCNC: 7 MG/DL (ref 7–18)
CALCIUM SERPL-MCNC: 8 MG/DL (ref 8.5–10.1)
CHLORIDE SERPL-SCNC: 103 MMOL/L (ref 98–107)
CHOLEST SERPL-MCNC: 151 MG/DL (ref 0–200)
CHOLEST/HDLC SERPL: 4.9 {RATIO} (ref 0–5)
CO2 SERPL-SCNC: 25 MMOL/L (ref 21–32)
COLOR UR: ABNORMAL
CREAT SERPL-MCNC: 0.62 MG/DL (ref 0.7–1.3)
CREAT UR-MCNC: 186 MG/DL
EOSINOPHIL # BLD AUTO: 0.1 X10(3)/MCL (ref 0–0.9)
EOSINOPHIL NFR BLD AUTO: 2 %
ERYTHROCYTE [DISTWIDTH] IN BLOOD BY AUTOMATED COUNT: 16.5 % (ref 11.5–17)
EST. AVERAGE GLUCOSE BLD GHB EST-MCNC: 286 MG/DL
GLOBULIN SER-MCNC: 3.1 GM/DL (ref 2.4–3.5)
GLUCOSE (UA): ABNORMAL
GLUCOSE SERPL-MCNC: 266 MG/DL (ref 74–106)
HBA1C MFR BLD: 11.6 % (ref 4.2–6.3)
HCT VFR BLD AUTO: 40.4 % (ref 42–52)
HDLC SERPL-MCNC: 31 MG/DL (ref 35–60)
HGB BLD-MCNC: 13.5 GM/DL (ref 14–18)
HGB UR QL STRIP: NEGATIVE
KETONES UR QL STRIP: NEGATIVE
LDLC SERPL CALC-MCNC: 90 MG/DL (ref 0–129)
LEUKOCYTE ESTERASE UR QL STRIP: ABNORMAL
LYMPHOCYTES # BLD AUTO: 0.6 X10(3)/MCL (ref 0.6–4.6)
LYMPHOCYTES NFR BLD AUTO: 20 %
MCH RBC QN AUTO: 29.2 PG (ref 27–31)
MCHC RBC AUTO-ENTMCNC: 33.4 GM/DL (ref 33–36)
MCV RBC AUTO: 87.4 FL (ref 80–94)
MICROALBUMIN UR-MCNC: 1.8 MG/DL
MICROALBUMIN/CREAT RATIO PNL UR: 9.5 MG/GM CR (ref 0–30)
MONOCYTES # BLD AUTO: 0.3 X10(3)/MCL (ref 0.1–1.3)
MONOCYTES NFR BLD AUTO: 12 %
NEUTROPHILS # BLD AUTO: 1.84 X10(3)/MCL (ref 1.4–7.9)
NEUTROPHILS NFR BLD AUTO: 66 %
NITRITE UR QL STRIP: NEGATIVE
PH UR STRIP: 6.5 [PH] (ref 5–9)
PLATELET # BLD AUTO: 44 X10(3)/MCL (ref 130–400)
PMV BLD AUTO: ABNORMAL FL (ref 7.4–10.4)
POTASSIUM SERPL-SCNC: 4.2 MMOL/L (ref 3.5–5.1)
PROT SERPL-MCNC: 6.7 GM/DL (ref 6.4–8.2)
PROT UR QL STRIP: ABNORMAL
RBC # BLD AUTO: 4.62 X10(6)/MCL (ref 4.7–6.1)
RBC #/AREA URNS HPF: ABNORMAL /[HPF]
SODIUM SERPL-SCNC: 138 MMOL/L (ref 136–145)
SP GR UR STRIP: 1.03 (ref 1–1.03)
SQUAMOUS EPITHELIAL, UA: ABNORMAL
TRIGL SERPL-MCNC: 148 MG/DL (ref 30–150)
TSH SERPL-ACNC: 0.97 MIU/ML (ref 0.36–3.74)
UROBILINOGEN UR STRIP-ACNC: 1
VLDLC SERPL CALC-MCNC: 30 MG/DL
WBC # SPEC AUTO: 2.8 X10(3)/MCL (ref 4.5–11.5)
WBC #/AREA URNS HPF: 22 /HPF (ref 0–3)

## 2017-10-19 LAB — FINAL CULTURE: NO GROWTH

## 2018-01-21 DIAGNOSIS — R60.9 EDEMA, UNSPECIFIED TYPE: ICD-10-CM

## 2018-01-21 RX ORDER — SPIRONOLACTONE 100 MG/1
TABLET, FILM COATED ORAL
Qty: 30 TABLET | Refills: 0 | Status: SHIPPED | OUTPATIENT
Start: 2018-01-21 | End: 2018-03-05 | Stop reason: SDUPTHER

## 2018-01-30 ENCOUNTER — HISTORICAL (OUTPATIENT)
Dept: ADMINISTRATIVE | Facility: HOSPITAL | Age: 58
End: 2018-01-30

## 2018-01-30 LAB
ABS NEUT (OLG): 3.04 X10(3)/MCL (ref 2.1–9.2)
ALBUMIN SERPL-MCNC: 3.3 GM/DL (ref 3.4–5)
ALBUMIN/GLOB SERPL: 0.9 {RATIO}
ALP SERPL-CCNC: 131 UNIT/L (ref 50–136)
ALT SERPL-CCNC: 30 UNIT/L (ref 12–78)
AST SERPL-CCNC: 42 UNIT/L (ref 15–37)
BASOPHILS # BLD AUTO: 0 X10(3)/MCL (ref 0–0.2)
BASOPHILS NFR BLD AUTO: 1 %
BILIRUB SERPL-MCNC: 2.4 MG/DL (ref 0.2–1)
BILIRUBIN DIRECT+TOT PNL SERPL-MCNC: 1.2 MG/DL (ref 0–0.2)
BILIRUBIN DIRECT+TOT PNL SERPL-MCNC: 1.2 MG/DL (ref 0–0.8)
BUN SERPL-MCNC: 6 MG/DL (ref 7–18)
CALCIUM SERPL-MCNC: 8.1 MG/DL (ref 8.5–10.1)
CHLORIDE SERPL-SCNC: 104 MMOL/L (ref 98–107)
CO2 SERPL-SCNC: 22 MMOL/L (ref 21–32)
CREAT SERPL-MCNC: 0.84 MG/DL (ref 0.7–1.3)
EOSINOPHIL # BLD AUTO: 0.1 X10(3)/MCL (ref 0–0.9)
EOSINOPHIL NFR BLD AUTO: 2 %
ERYTHROCYTE [DISTWIDTH] IN BLOOD BY AUTOMATED COUNT: 20.6 % (ref 11.5–17)
EST. AVERAGE GLUCOSE BLD GHB EST-MCNC: 249 MG/DL
GLOBULIN SER-MCNC: 3.7 GM/DL (ref 2.4–3.5)
GLUCOSE SERPL-MCNC: 242 MG/DL (ref 74–106)
HBA1C MFR BLD: 10.3 % (ref 4.2–6.3)
HCT VFR BLD AUTO: 41 % (ref 42–52)
HGB BLD-MCNC: 13.2 GM/DL (ref 14–18)
LYMPHOCYTES # BLD AUTO: 1 X10(3)/MCL (ref 0.6–4.6)
LYMPHOCYTES NFR BLD AUTO: 22 %
MCH RBC QN AUTO: 28 PG (ref 27–31)
MCHC RBC AUTO-ENTMCNC: 32.2 GM/DL (ref 33–36)
MCV RBC AUTO: 87 FL (ref 80–94)
MONOCYTES # BLD AUTO: 0.4 X10(3)/MCL (ref 0.1–1.3)
MONOCYTES NFR BLD AUTO: 8 %
NEUTROPHILS # BLD AUTO: 3.04 X10(3)/MCL (ref 1.4–7.9)
NEUTROPHILS NFR BLD AUTO: 66 %
PLATELET # BLD AUTO: 61 X10(3)/MCL (ref 130–400)
PMV BLD AUTO: ABNORMAL FL (ref 9.4–12.4)
POTASSIUM SERPL-SCNC: 4.1 MMOL/L (ref 3.5–5.1)
PROT SERPL-MCNC: 7 GM/DL (ref 6.4–8.2)
RBC # BLD AUTO: 4.71 X10(6)/MCL (ref 4.7–6.1)
SODIUM SERPL-SCNC: 135 MMOL/L (ref 136–145)
WBC # SPEC AUTO: 4.6 X10(3)/MCL (ref 4.5–11.5)

## 2018-02-05 ENCOUNTER — OFFICE VISIT (OUTPATIENT)
Dept: GASTROENTEROLOGY | Facility: CLINIC | Age: 58
End: 2018-02-05
Payer: MEDICARE

## 2018-02-05 VITALS
BODY MASS INDEX: 28.73 KG/M2 | HEIGHT: 71 IN | HEART RATE: 80 BPM | DIASTOLIC BLOOD PRESSURE: 56 MMHG | WEIGHT: 205.25 LBS | SYSTOLIC BLOOD PRESSURE: 110 MMHG

## 2018-02-05 DIAGNOSIS — R13.19 ESOPHAGEAL DYSPHAGIA: Primary | ICD-10-CM

## 2018-02-05 DIAGNOSIS — Z12.11 SPECIAL SCREENING FOR MALIGNANT NEOPLASMS, COLON: ICD-10-CM

## 2018-02-05 DIAGNOSIS — I85.10 SECONDARY ESOPHAGEAL VARICES WITHOUT BLEEDING: ICD-10-CM

## 2018-02-05 DIAGNOSIS — K76.6 PORTAL HYPERTENSION: ICD-10-CM

## 2018-02-05 DIAGNOSIS — K70.30 ALCOHOLIC CIRRHOSIS OF LIVER WITHOUT ASCITES: ICD-10-CM

## 2018-02-05 PROCEDURE — 99999 PR PBB SHADOW E&M-EST. PATIENT-LVL III: CPT | Mod: PBBFAC,,, | Performed by: INTERNAL MEDICINE

## 2018-02-05 PROCEDURE — 99215 OFFICE O/P EST HI 40 MIN: CPT | Mod: S$PBB,,, | Performed by: INTERNAL MEDICINE

## 2018-02-05 PROCEDURE — 99213 OFFICE O/P EST LOW 20 MIN: CPT | Mod: PBBFAC | Performed by: INTERNAL MEDICINE

## 2018-02-05 RX ORDER — SODIUM, POTASSIUM,MAG SULFATES 17.5-3.13G
SOLUTION, RECONSTITUTED, ORAL ORAL
Qty: 1 BOTTLE | Refills: 0 | Status: ON HOLD | OUTPATIENT
Start: 2018-02-05 | End: 2018-03-14 | Stop reason: CLARIF

## 2018-02-05 NOTE — PROGRESS NOTES
Subjective:       Patient ID: Colin Reilly is a 57 y.o. male.    Chief Complaint: Dysphagia (blood in sputum)    Dysphagia: Patient complains of difficulty swallowing. The dysphagia occurs with solids Symptoms have been present for approximately several months. The symptoms are stable. The dysphagia has been intermittent and has not been progressive.  He complains of occasional heartburn.  He denies melena, hematochezia, hematemesis, and coffee ground emesis.  This has been associated with no other symptoms.  He denies belching and eructation, bilious reflux, choking on food, cough and deep pressure at base of neck.  Mr. Reilly has a history of cirrhosis and esophageal varices that have been banded in the past. His last EGD was in April of 2017 and he had an incomplete colonoscopy September 2017 due to poor prep.     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    Esophageal varices     GERD (gastroesophageal reflux disease)     Hepatic encephalopathy 7/8/2014    Hepatitis C virus infection 07/08/2014    tx with interferon 3-4 mos- stopped for unclear reasons Tattoos-first at age 17 only risk factor (HCVAB negative 08/2017)    HTN (hypertension) 7/8/2014    Hypertension     Insulin dependent diabetes mellitus     Renal mass, left 7/8/2014    6.3 x 5.7 x 5.6 complex mass    Splenomegaly 7/8/2014    Umbilical hernia 7/8/2014     Past Surgical History:   Procedure Laterality Date    CARPAL TUNNEL RELEASE Bilateral     CHOLECYSTECTOMY      COLONOSCOPY N/A  9/8/2017    Procedure: COLONOSCOPY;  Surgeon: Savannah Llanos MD;  Location[facility];  Service: Endoscopy;  Laterality: N/A;    ESOPHAGOGASTRODUODENOSCOPY  01/2014    ESOPHAGOGASTRODUODENOSCOPY  02/15/2017    grade II varices    ESOPHAGOGASTRODUODENOSCOPY  04/10/2017    grade I varices    ESOPHAGOGASTRODUODENOSCOPY W/ BANDING  01/26/2017    grade II varices    HERNIA REPAIR      NECK SURGERY      fusion on C5 and C6    ULNAR NERVE TRANSPOSITION Left     vericose veins removed       Current Outpatient Prescriptions on File Prior to Visit   Medication Sig Dispense Refill    B.ANI/L.ACI/L.SAL/L.PLAN/L.JITENDRA (PROBIOTIC FORMULA ORAL) Take by mouth.      cyclobenzaprine (FLEXERIL) 10 MG tablet Take 10 mg by mouth once daily.       escitalopram oxalate (LEXAPRO) 20 MG tablet TK 1 T PO QD. DISCONTINUE TRAZODONE  2    fluticasone (FLONASE) 50 mcg/actuation nasal spray 1 spray by Each Nare route once daily.       furosemide (LASIX) 20 MG tablet TAKE 4 TABLETS BY MOUTH ONCE DAILY 120 tablet 7    gabapentin (NEURONTIN) 300 MG capsule Take 300 mg by mouth 3 (three) times daily.      hydrocodone-acetaminophen 10-325mg (NORCO)  mg Tab Take 10 tablets by mouth 3 (three) times daily.       insulin aspart protamine-insulin aspart (NOVOLOG 70/30) 100 unit/mL (70-30) InPn pen Inject 25 Units into the skin 3 (three) times daily before meals.       INSULIN GLARGINE,HUM.REC.ANLOG (LANTUS SOLOSTAR SUBQ) Inject 65 Units into the skin every evening.       lactobacillus acidophilus Cap Take 1 capsule by mouth once daily.      lactulose (CHRONULAC) 10 gram/15 mL solution Take 30 mLs by mouth 3 (three) times daily.       levocetirizine (XYZAL) 5 MG tablet Take 5 mg by mouth once daily.  2    lisinopril 10 MG tablet Take 10 mg by mouth once daily.      metformin (GLUCOPHAGE) 500 MG tablet Take 500 mg by mouth 2 (two) times daily.  5    mirtazapine (REMERON) 15 MG tablet TK 1 T PO HS  2    nebivolol (BYSTOLIC)  10 MG Tab Take 10 mg by mouth once daily.      NOVOLOG FLEXPEN 100 unit/mL InPn pen       oxybutynin (DITROPAN) 5 MG Tab Take 1 tablet (5 mg total) by mouth every evening. 30 tablet 11    rifAXIMin (XIFAXAN) 550 mg Tab Take 550 mg by mouth 2 (two) times daily.      spironolactone (ALDACTONE) 100 MG tablet TAKE 1 TABLET(100 MG) BY MOUTH EVERY DAY 30 tablet 0    tamsulosin (FLOMAX) 0.4 mg Cp24 Take 1 capsule (0.4 mg total) by mouth once daily. 30 capsule 11    trazodone (DESYREL) 150 MG tablet Take 300 mg by mouth nightly as needed.       pantoprazole (PROTONIX) 40 MG tablet Take 1 tablet (40 mg total) by mouth 2 (two) times daily. 180 tablet 1     No current facility-administered medications on file prior to visit.      Review of patient's allergies indicates:  No Known Allergies  Social History     Social History    Marital status:      Spouse name: N/A    Number of children: N/A    Years of education: N/A     Occupational History     Disabled     Social History Main Topics    Smoking status: Former Smoker     Types: Cigarettes     Quit date: 1/4/2010    Smokeless tobacco: Former User     Quit date: 2/24/1990      Comment: former 1 pack/week quit 1/4/2010    Alcohol use No      Comment: former heavy beer but quit 1/4/2010    Drug use: No    Sexual activity: No      Comment:      Other Topics Concern    Not on file     Social History Narrative           Review of Systems   Constitutional: Negative for activity change, appetite change, fatigue, fever and unexpected weight change.   HENT: Positive for trouble swallowing. Negative for congestion, ear pain, hearing loss, mouth sores and voice change.    Eyes: Negative for pain, redness and itching.   Respiratory: Negative for apnea, cough, choking, chest tightness, shortness of breath and wheezing.    Cardiovascular: Negative for chest pain, palpitations and leg swelling.   Gastrointestinal: Positive for diarrhea. Negative for  abdominal distention, abdominal pain, anal bleeding, blood in stool, constipation, nausea and vomiting.   Endocrine: Negative for cold intolerance, heat intolerance and polyphagia.   Genitourinary: Negative for dysuria, hematuria and urgency.   Musculoskeletal: Negative for arthralgias, joint swelling and neck pain.   Skin: Negative for pallor and rash.   Allergic/Immunologic: Negative for environmental allergies and food allergies.   Neurological: Negative for dizziness, facial asymmetry and light-headedness.   Hematological: Negative for adenopathy. Does not bruise/bleed easily.   Psychiatric/Behavioral: Negative for agitation, behavioral problems, confusion and decreased concentration.       Objective:      Physical Exam   Constitutional: He is oriented to person, place, and time. He appears well-developed and well-nourished.   HENT:   Head: Normocephalic and atraumatic.   Eyes: Conjunctivae are normal. Pupils are equal, round, and reactive to light.   Neck: Normal range of motion. Neck supple. No tracheal deviation present. No thyromegaly present.   Cardiovascular: Normal rate and regular rhythm.    Pulmonary/Chest: Effort normal and breath sounds normal. He has no wheezes. He has no rales. He exhibits no tenderness.   Abdominal: Soft. Bowel sounds are normal. He exhibits no distension and no mass. There is no tenderness. There is no guarding.   Musculoskeletal: Normal range of motion.   Lymphadenopathy:     He has no cervical adenopathy.   Neurological: He is alert and oriented to person, place, and time. No cranial nerve deficit.   Skin: Skin is warm and dry.   Psychiatric: He has a normal mood and affect. His behavior is normal.   Nursing note and vitals reviewed.      Results for NEGAR HARDING (MRN [id number]) as of 2/5/2018 08:51   Ref. Range 7/8/2014 12:25 8/13/2014 09:15 11/19/2015 00:00 1/15/2016 00:00 1/15/2016 00:00 10/18/2016 00:00 8/25/2017 12:03   Hep B Core Total Ab Unknown  Negative        Hep B S  Ab Unknown  Negative        Hepatitis B Surface Ag Unknown  Negative          Lab Summary Latest Ref Rng & Units 3/20/2017 8/25/2017   Hemoglobin 14.0 - 18.0 g/dL 11.8(A) 13.3(L)   Hematocrit 40.0 - 54.0 % 37(A) 39.8(L)   White count 3.90 - 12.70 K/uL 2.0 2.56(L)   Platelet count 150 - 350 K/uL 39(A) 40(L)   Sodium 136 - 145 mmol/L 135(A) 136   Potassium 3.5 - 5.1 mmol/L 4.3 3.5   Calcium 8.7 - 10.5 mg/dL 8.0(A) 8.4(L)   Phosphorus - (None) (None)   Creatinine 0.5 - 1.4 mg/dL 0.8 0.8   AST 10 - 40 U/L 42(A) 40   Alk Phos 55 - 135 U/L 102 84   Bilirubin 0.1 - 1.0 mg/dL 1.5(A) 1.9(H)   Glucose 70 - 110 mg/dL 459(A) 278(H)   Cholesterol - (None) (None)   HDL cholesterol - (None) (None)   Triglycerides - (None) (None)   LDL calc - (None) (None)   Total protein 6.0 - 8.4 g/dL (None) 6.9   Albumin 3.5 - 5.2 g/dL (None) 3.6   A1C - (None) (None)   PSA Screen - (None) (None)   Some recent data might be hidden         Assessment:       1. Esophageal dysphagia    2. Special screening for malignant neoplasms, colon    3. Alcoholic cirrhosis of liver without ascites    4. Portal hypertension    5. Secondary esophageal varices without bleeding        Plan:            Esophageal dysphagia  -     Case request GI: ESOPHAGOGASTRODUODENOSCOPY (EGD), COLONOSCOPY    Special screening for malignant neoplasms, colon  Comments:  Patient had poor prep in 2017  Orders:  -     Case request GI: ESOPHAGOGASTRODUODENOSCOPY (EGD), COLONOSCOPY  -     sodium,potassium,mag sulfates (SUPREP BOWEL PREP KIT) 17.5-3.13-1.6 gram SolR; As directed  Dispense: 1 Bottle; Refill: 0    Alcoholic cirrhosis of liver without ascites    Portal hypertension    Secondary esophageal varices without bleeding      Risks, benefits and alternative options were discussed with the patient. Risks including but not limited to infection, bleeding, heart or respiratory problems and perforation that may require surgery were all explained to the patient. The patient had a  chance for questions if there were doubts or concerns about the test. The referring provider will be notified that our consultation is complete and available through the patient's records.    Thanks for letting us participate in the care of this nice patient,

## 2018-02-05 NOTE — PATIENT INSTRUCTIONS
Esophageal dysphagia  -     Case request GI: ESOPHAGOGASTRODUODENOSCOPY (EGD), COLONOSCOPY    Special screening for malignant neoplasms, colon  Comments:  Patient had poor prep in 2017  Orders:  -     Case request GI: ESOPHAGOGASTRODUODENOSCOPY (EGD), COLONOSCOPY  -     sodium,potassium,mag sulfates (SUPREP BOWEL PREP KIT) 17.5-3.13-1.6 gram SolR; As directed  Dispense: 1 Bottle; Refill: 0    Alcoholic cirrhosis of liver without ascites    Portal hypertension    Secondary esophageal varices without bleeding      Thanks for trusting us with your healthcare needs and using [facility]. If you want to ask us a question, you can do so by replying to this message or by calling [phone].    Sincerely,    Peter Dover M.D.      To rate your experience with Dr. Dover please click on the link below:    [email or web address]          Cirrhosis    The liver is found on the right side of your belly (abdomen). It is just below the rib cage. The liver has many important jobs. It removes toxins from the blood. It also helps your blood clot to stop bleeding. Cirrhosis happens when the liver is scarred or injured. This damage is permanent. It can cause your liver to stop working (liver failure).   The two most common causes of cirrhosis are long-term heavy alcohol use and having hepatitis B or C. Other things that can damage the liver include toxins, certain medicines, and certain viruses.  Common symptoms of cirrhosis include:  · Tiredness or weakness  · Loss of appetite  · Nausea and vomiting  · Easy bleeding and bruising  · Swelling of the belly (abdomen)  · Weight loss  · Yellowing of the eyes or skin (jaundice)  · Itching  · Confusion  Treatment helps ease symptoms and prevent more liver damage. You may also get treatment to fight the hepatitis virus. Quitting alcohol will help slow down the disease getting worse. It may also prevent more complications. If cirrhosis gets worse  and becomes life threatening, you may need a liver transplant.   Home care  · Don't take medicines that can make liver damage worse. Your healthcare provider will tell you if any of the medicines you now take need to be changed. Talk with your provider before taking any medicine not prescribed. These include dietary supplements and herbs. Some of these may make liver damage worse.  · Talk with your healthcare provider about medicines that have acetaminophen or NSAIDs such as ibuprofen and naproxen. These can also harm your liver.   · Stop drinking alcohol. If you find it hard to stop drinking, seek professional help. Consider joining Alcoholics Anonymous or another type of treatment program for support.  · If you use IV drugs, you are at high risk for hepatitis B and C. Seek help to stop.   · Be sure to ask your healthcare provider about recommended vaccines. These include vaccines for viruses that can cause liver disease.  Follow-up care  Follow up with your healthcare provider or as advised by our staff.  For more information and to learn about support groups for people with liver disease, contact:  · [employer], www.liverfoundation.org, 818.661.9883  · [employer], www.hepfi.org, 468.442.2623  When to seek medical advice  Call your healthcare provider right away if you have any of the following:  · Rapid weight gain with increased size of your belly (abdomen) or leg swelling  · Yellow color of your skin or eyes (jaundice) gets worse  · Excess bleeding from cuts or injuries  Date Last Reviewed: 6/22/2015  © 7197-8069 [employer]. [address] [place] [address], [place] [address]. All rights reserved. This information is not intended as a substitute for professional medical care. Always follow your healthcare professional's instructions.        Esophageal Varices     With esophageal varices, blood vessels in the esophagus become abnormally enlarged. They may then burst  (rupture) and bleed.   Esophageal varices are enlarged veins at the lower end of the esophagus. The esophagus is the tube that carries food from your mouth to your stomach. Varices most often occur because of problems with blood flow in the liver caused by chronic liver disease. Normally, a blood vessel called the portal vein carries blood from the digestive organs to the liver. But with liver disease, blood flow can be blocked due to scarring of the liver. This increases the blood pressure in the portal vein (a condition known as portal hypertension). Blood then backs up in nearby veins in the esophagus and stomach, causing varices. Varices are a serious and deadly problem. Treatment is needed to prevent them from bursting (rupturing) and bleeding. If bleeding occurs, it can be fatal.  Symptoms of esophageal varices  Symptoms do not occur unless the varices are bleeding. This is an emergency problem. If you have any of the following symptoms, get medical attention right away:  · Vomiting blood  · Black, tarry, or bloody stools  · Feeling lightheaded, or fainting (loss of consciousness)  Diagnosing esophageal \varices  Youll likely be checked for varices if you have liver disease or other health problems that can cause them. Your healthcare provider will ask about your symptoms and health history. Youll also be examined. Tests are then done to confirm the problem. Tests can include:  · Upper endoscopy. This is done to see inside the upper digestive tract. During the test, an endoscope is used. This is a thin, flexible tube with a tiny camera on the end. Its inserted through your mouth. Its then guided down through your esophagus, stomach, and first part of your small intestine. This allows the provider to check for varices and find any bleeding.  · Imaging tests. These provide pictures of the liver or blood flow in the liver. They allow the provider to check for enlarged veins around the liver and assess the  risk of bleeding. Common imaging tests done include ultrasound and CT scans.  Treating esophageal varices  The goal of treatment is to reduce the risk of bleeding or to control bleeding. Treatment can include 1 or more of the following:  · Medicines. These may be prescribed to lower the blood pressure inside the enlarged veins. This reduces the risk of bleeding. Beta-blockers are the most common medicine used.  · Endoscopic therapy. These are treatments for enlarged or bleeding veins that are done using an endoscope. With ligation, small rubber bands are placed around the veins to close them off and stop any bleeding. With sclerotherapy, a blood-clotting medicine is injected into the veins to cause scarring and shrink them.  · Balloon tamponade. A tube with a balloon is guided down into your esophagus and stomach. The balloon is then filled with air. This puts pressure on enlarged or bleeding veins to control bleeding. This is a short-term (temporary) way to control bleeding until other treatments are available.   · Surgery. This may be done to place a tubelike device (stent) in the liver. The stent helps redirect blood flow in the liver to lower the blood pressure in enlarged veins. Sometimes, the enlarged veins may be connected to other nearby veins to redirect blood flow. In severe cases, a liver transplant may be needed. For this surgery, a diseased liver is replaced with a healthy liver from another person.   Follow-up  Regular visits with your provider are needed to check for bleeding of the varices. If bleeding occurs, it is likely to occur again. More treatments will then be needed in the future. Once endoscopic therapy (banding) is performed, regular follow-up endoscopic scans with banding are done to completely get rid of the varices. If you are given medicines to take by mouth, be sure to take them as directed. Work closely with your provider to manage your condition. Know when to seek emergency  care.  Date Last Reviewed: 7/1/2016  © 9283-9780 [employer]. [address] [place] [address], [place] [address]. All rights reserved. This information is not intended as a substitute for professional medical care. Always follow your healthcare professional's instructions.

## 2018-02-22 ENCOUNTER — HOSPITAL ENCOUNTER (OUTPATIENT)
Dept: MEDSURG UNIT | Facility: HOSPITAL | Age: 58
End: 2018-02-25
Attending: INTERNAL MEDICINE | Admitting: INTERNAL MEDICINE

## 2018-02-22 LAB
ABS NEUT (OLG): 6.46 X10(3)/MCL (ref 2.1–9.2)
ALBUMIN SERPL-MCNC: 3.4 GM/DL (ref 3.4–5)
ALBUMIN/GLOB SERPL: 0.9 RATIO (ref 1.1–2)
ALP SERPL-CCNC: 115 UNIT/L (ref 50–136)
ALT SERPL-CCNC: 31 UNIT/L (ref 12–78)
AMMONIA PLAS-MSCNC: 34 MCMOL/L (ref 11–32)
AMYLASE SERPL-CCNC: 46 UNIT/L (ref 25–115)
ANISOCYTOSIS BLD QL SMEAR: 1
APPEARANCE, UA: CLEAR
APTT PPP: 32.4 SECOND(S) (ref 24.8–36.9)
AST SERPL-CCNC: 37 UNIT/L (ref 15–37)
BACTERIA SPEC CULT: ABNORMAL /HPF
BASOPHILS # BLD AUTO: 0 X10(3)/MCL (ref 0–0.2)
BASOPHILS NFR BLD AUTO: 0 %
BILIRUB SERPL-MCNC: 2.1 MG/DL (ref 0.2–1)
BILIRUB UR QL STRIP: NEGATIVE
BILIRUBIN DIRECT+TOT PNL SERPL-MCNC: 1 MG/DL (ref 0–0.5)
BILIRUBIN DIRECT+TOT PNL SERPL-MCNC: 1.1 MG/DL (ref 0–0.8)
BUN SERPL-MCNC: 7 MG/DL (ref 7–18)
CALCIUM SERPL-MCNC: 8.4 MG/DL (ref 8.5–10.1)
CHLORIDE SERPL-SCNC: 104 MMOL/L (ref 98–107)
CK MB SERPL-MCNC: 0.7 NG/ML (ref 0.5–3.6)
CK SERPL-CCNC: 88 UNIT/L (ref 39–308)
CO2 SERPL-SCNC: 24 MMOL/L (ref 21–32)
COLOR UR: YELLOW
CREAT SERPL-MCNC: 0.61 MG/DL (ref 0.7–1.3)
EOSINOPHIL # BLD AUTO: 0 X10(3)/MCL (ref 0–0.9)
EOSINOPHIL NFR BLD AUTO: 0 %
ERYTHROCYTE [DISTWIDTH] IN BLOOD BY AUTOMATED COUNT: 20.3 % (ref 11.5–17)
GLOBULIN SER-MCNC: 3.6 GM/DL (ref 2.4–3.5)
GLUCOSE (UA): ABNORMAL
GLUCOSE SERPL-MCNC: 138 MG/DL (ref 74–106)
HCT VFR BLD AUTO: 39.8 % (ref 42–52)
HGB BLD-MCNC: 13.1 GM/DL (ref 14–18)
HGB UR QL STRIP: NEGATIVE
INR PPP: 1.32 (ref 0–1.27)
KETONES UR QL STRIP: NEGATIVE
LACTATE SERPL-SCNC: 1.8 MMOL/L (ref 0.4–2)
LEUKOCYTE ESTERASE UR QL STRIP: ABNORMAL
LIPASE SERPL-CCNC: 60 UNIT/L (ref 73–393)
LYMPHOCYTES # BLD AUTO: 0.7 X10(3)/MCL (ref 0.6–4.6)
LYMPHOCYTES NFR BLD AUTO: 9 % (ref 13–40)
MACROCYTES BLD QL SMEAR: 1
MAGNESIUM SERPL-MCNC: 1.8 MG/DL (ref 1.8–2.4)
MCH RBC QN AUTO: 28.6 PG (ref 27–31)
MCHC RBC AUTO-ENTMCNC: 32.9 GM/DL (ref 33–36)
MCV RBC AUTO: 86.9 FL (ref 80–94)
MONOCYTES # BLD AUTO: 0.6 X10(3)/MCL (ref 0.1–1.3)
MONOCYTES NFR BLD AUTO: 8 %
NEUTROPHILS # BLD AUTO: 6.46 X10(3)/MCL (ref 2.1–9.2)
NEUTROPHILS NFR BLD AUTO: 82 %
NITRITE UR QL STRIP: NEGATIVE
OVALOCYTES BLD QL SMEAR: 1
PH UR STRIP: 8 [PH] (ref 5–9)
PLATELET # BLD AUTO: 45 X10(3)/MCL (ref 130–400)
PLATELET # BLD EST: NORMAL 10*3/UL
PMV BLD AUTO: ABNORMAL FL (ref 7.4–10.4)
POIKILOCYTOSIS BLD QL SMEAR: 1
POTASSIUM SERPL-SCNC: 3.9 MMOL/L (ref 3.5–5.1)
PROT SERPL-MCNC: 7 GM/DL (ref 6.4–8.2)
PROT UR QL STRIP: NEGATIVE
PROTHROMBIN TIME: 16.8 SECOND(S) (ref 12.2–14.7)
RBC # BLD AUTO: 4.58 X10(6)/MCL (ref 4.7–6.1)
RBC #/AREA URNS HPF: ABNORMAL /[HPF]
SODIUM SERPL-SCNC: 135 MMOL/L (ref 136–145)
SP GR UR STRIP: 1.02 (ref 1–1.03)
SQUAMOUS EPITHELIAL, UA: ABNORMAL
TROPONIN I SERPL-MCNC: 0.02 NG/ML (ref 0.02–0.49)
TSH SERPL-ACNC: 1.42 MIU/ML (ref 0.36–3.74)
UA WBC MAN: ABNORMAL /HPF
UROBILINOGEN UR STRIP-ACNC: 1
WBC # SPEC AUTO: 7.9 X10(3)/MCL (ref 4.5–11.5)

## 2018-02-23 LAB
ABS NEUT (OLG): 3.7 X10(3)/MCL (ref 2.1–9.2)
BASOPHILS # BLD AUTO: 0 X10(3)/MCL (ref 0–0.2)
BASOPHILS NFR BLD AUTO: 0 %
EOSINOPHIL # BLD AUTO: 0 X10(3)/MCL (ref 0–0.9)
EOSINOPHIL NFR BLD AUTO: 0 %
ERYTHROCYTE [DISTWIDTH] IN BLOOD BY AUTOMATED COUNT: 20.4 % (ref 11.5–17)
HCT VFR BLD AUTO: 35.8 % (ref 42–52)
HGB BLD-MCNC: 12 GM/DL (ref 14–18)
LYMPHOCYTES # BLD AUTO: 0.4 X10(3)/MCL (ref 0.6–4.6)
LYMPHOCYTES NFR BLD AUTO: 10 %
MCH RBC QN AUTO: 28.7 PG (ref 27–31)
MCHC RBC AUTO-ENTMCNC: 33.5 GM/DL (ref 33–36)
MCV RBC AUTO: 85.6 FL (ref 80–94)
MONOCYTES # BLD AUTO: 0.4 X10(3)/MCL (ref 0.1–1.3)
MONOCYTES NFR BLD AUTO: 10 %
NEUTROPHILS # BLD AUTO: 3.7 X10(3)/MCL (ref 2.1–9.2)
NEUTROPHILS NFR BLD AUTO: 79 %
PLATELET # BLD AUTO: 40 X10(3)/MCL (ref 130–400)
PMV BLD AUTO: ABNORMAL FL (ref 7.4–10.4)
RBC # BLD AUTO: 4.18 X10(6)/MCL (ref 4.7–6.1)
WBC # SPEC AUTO: 4.7 X10(3)/MCL (ref 4.5–11.5)

## 2018-02-24 LAB
ABS NEUT (OLG): 0.91 X10(3)/MCL (ref 2.1–9.2)
ALBUMIN SERPL-MCNC: 2.8 GM/DL (ref 3.4–5)
ALBUMIN/GLOB SERPL: 0.9 {RATIO}
ALP SERPL-CCNC: 98 UNIT/L (ref 50–136)
ALT SERPL-CCNC: 26 UNIT/L (ref 12–78)
ANISOCYTOSIS BLD QL SMEAR: 1
AST SERPL-CCNC: 43 UNIT/L (ref 15–37)
BASOPHILS NFR BLD MANUAL: 2 % (ref 0–2)
BILIRUB SERPL-MCNC: 1.8 MG/DL (ref 0.2–1)
BILIRUBIN DIRECT+TOT PNL SERPL-MCNC: 0.8 MG/DL (ref 0–0.8)
BILIRUBIN DIRECT+TOT PNL SERPL-MCNC: 1 MG/DL (ref 0–0.2)
BUN SERPL-MCNC: 7 MG/DL (ref 7–18)
CALCIUM SERPL-MCNC: 7.6 MG/DL (ref 8.5–10.1)
CHLORIDE SERPL-SCNC: 106 MMOL/L (ref 98–107)
CO2 SERPL-SCNC: 23 MMOL/L (ref 21–32)
CREAT SERPL-MCNC: 0.78 MG/DL (ref 0.7–1.3)
EOSINOPHIL NFR BLD MANUAL: 1 % (ref 0–8)
ERYTHROCYTE [DISTWIDTH] IN BLOOD BY AUTOMATED COUNT: 20.2 % (ref 11.5–17)
GLOBULIN SER-MCNC: 3 GM/DL (ref 2.4–3.5)
GLUCOSE SERPL-MCNC: 460 MG/DL (ref 74–106)
HCT VFR BLD AUTO: 34.2 % (ref 42–52)
HGB BLD-MCNC: 11.2 GM/DL (ref 14–18)
LYMPHOCYTES NFR BLD MANUAL: 25 % (ref 13–40)
MACROCYTES BLD QL SMEAR: 1
MCH RBC QN AUTO: 29.1 PG (ref 27–31)
MCHC RBC AUTO-ENTMCNC: 32.7 GM/DL (ref 33–36)
MCV RBC AUTO: 88.8 FL (ref 80–94)
MONOCYTES NFR BLD MANUAL: 11 % (ref 2–11)
NEUTROPHILS NFR BLD MANUAL: 61 % (ref 47–80)
PLATELET # BLD AUTO: 27 X10(3)/MCL (ref 130–400)
PLATELET # BLD EST: NORMAL 10*3/UL
PMV BLD AUTO: ABNORMAL FL (ref 7.4–10.4)
POTASSIUM SERPL-SCNC: 4.4 MMOL/L (ref 3.5–5.1)
PROT SERPL-MCNC: 5.8 GM/DL (ref 6.4–8.2)
RBC # BLD AUTO: 3.85 X10(6)/MCL (ref 4.7–6.1)
RBC MORPH BLD: NORMAL
SODIUM SERPL-SCNC: 138 MMOL/L (ref 136–145)
WBC # SPEC AUTO: 2 X10(3)/MCL (ref 4.5–11.5)

## 2018-02-25 LAB
ABS NEUT (OLG): 0.9 X10(3)/MCL (ref 2.1–9.2)
ANISOCYTOSIS BLD QL SMEAR: 1
BASOPHILS NFR BLD MANUAL: 3 % (ref 0–2)
BUN SERPL-MCNC: 4 MG/DL (ref 7–18)
CALCIUM SERPL-MCNC: 8.1 MG/DL (ref 8.5–10.1)
CHLORIDE SERPL-SCNC: 103 MMOL/L (ref 98–107)
CO2 SERPL-SCNC: 28 MMOL/L (ref 21–32)
CREAT SERPL-MCNC: 0.61 MG/DL (ref 0.7–1.3)
EOSINOPHIL NFR BLD MANUAL: 6 % (ref 0–8)
ERYTHROCYTE [DISTWIDTH] IN BLOOD BY AUTOMATED COUNT: 20.3 % (ref 11.5–17)
GLUCOSE SERPL-MCNC: 341 MG/DL (ref 74–106)
HCT VFR BLD AUTO: 34.7 % (ref 42–52)
HGB BLD-MCNC: 11.3 GM/DL (ref 14–18)
LYMPHOCYTES NFR BLD MANUAL: 24 % (ref 13–40)
MACROCYTES BLD QL SMEAR: 1
MCH RBC QN AUTO: 29.3 PG (ref 27–31)
MCHC RBC AUTO-ENTMCNC: 32.6 GM/DL (ref 33–36)
MCV RBC AUTO: 89.9 FL (ref 80–94)
MONOCYTES NFR BLD MANUAL: 17 % (ref 2–11)
NEUTROPHILS NFR BLD MANUAL: 50 % (ref 47–80)
PLATELET # BLD AUTO: 34 X10(3)/MCL (ref 130–400)
PLATELET # BLD EST: ABNORMAL 10*3/UL
PMV BLD AUTO: ABNORMAL FL (ref 7.4–10.4)
POIKILOCYTOSIS BLD QL SMEAR: 1
POTASSIUM SERPL-SCNC: 3.8 MMOL/L (ref 3.5–5.1)
RBC # BLD AUTO: 3.86 X10(6)/MCL (ref 4.7–6.1)
SODIUM SERPL-SCNC: 137 MMOL/L (ref 136–145)
WBC # SPEC AUTO: 2 X10(3)/MCL (ref 4.5–11.5)

## 2018-03-01 ENCOUNTER — DOCUMENTATION ONLY (OUTPATIENT)
Dept: ENDOSCOPY | Facility: HOSPITAL | Age: 58
End: 2018-03-01

## 2018-03-01 NOTE — PROGRESS NOTES
3/1/2018 Pt rescheduled for c-scope due to endo lab schedule change.  Updated instructions mailed.

## 2018-03-05 DIAGNOSIS — R60.9 EDEMA, UNSPECIFIED TYPE: ICD-10-CM

## 2018-03-05 RX ORDER — SPIRONOLACTONE 100 MG/1
TABLET, FILM COATED ORAL
Qty: 30 TABLET | Refills: 0 | Status: ON HOLD | OUTPATIENT
Start: 2018-03-05 | End: 2018-03-14 | Stop reason: CLARIF

## 2018-03-14 ENCOUNTER — ANESTHESIA (OUTPATIENT)
Dept: ENDOSCOPY | Facility: HOSPITAL | Age: 58
End: 2018-03-14
Payer: MEDICARE

## 2018-03-14 ENCOUNTER — HOSPITAL ENCOUNTER (OUTPATIENT)
Facility: HOSPITAL | Age: 58
Discharge: HOME | End: 2018-03-14
Attending: INTERNAL MEDICINE | Admitting: INTERNAL MEDICINE
Payer: MEDICARE

## 2018-03-14 ENCOUNTER — SURGERY (OUTPATIENT)
Age: 58
End: 2018-03-14

## 2018-03-14 ENCOUNTER — ANESTHESIA EVENT (OUTPATIENT)
Dept: ENDOSCOPY | Facility: HOSPITAL | Age: 58
End: 2018-03-14
Payer: MEDICARE

## 2018-03-14 VITALS
BODY MASS INDEX: 29.4 KG/M2 | OXYGEN SATURATION: 95 % | HEART RATE: 81 BPM | WEIGHT: 210 LBS | RESPIRATION RATE: 18 BRPM | HEIGHT: 71 IN | TEMPERATURE: 97 F | SYSTOLIC BLOOD PRESSURE: 119 MMHG | DIASTOLIC BLOOD PRESSURE: 66 MMHG

## 2018-03-14 DIAGNOSIS — R13.10 DYSPHAGIA: ICD-10-CM

## 2018-03-14 DIAGNOSIS — I85.01 BLEEDING ESOPHAGEAL VARICES, UNSPECIFIED ESOPHAGEAL VARICES TYPE: Primary | ICD-10-CM

## 2018-03-14 LAB — POCT GLUCOSE: 195 MG/DL (ref 70–110)

## 2018-03-14 PROCEDURE — 88305 TISSUE EXAM BY PATHOLOGIST: CPT | Performed by: PATHOLOGY

## 2018-03-14 PROCEDURE — 37000008 HC ANESTHESIA 1ST 15 MINUTES: Performed by: INTERNAL MEDICINE

## 2018-03-14 PROCEDURE — 27201012 HC FORCEPS, HOT/COLD, DISP: Performed by: INTERNAL MEDICINE

## 2018-03-14 PROCEDURE — 82962 GLUCOSE BLOOD TEST: CPT | Performed by: INTERNAL MEDICINE

## 2018-03-14 PROCEDURE — 45380 COLONOSCOPY AND BIOPSY: CPT | Performed by: INTERNAL MEDICINE

## 2018-03-14 PROCEDURE — 43235 EGD DIAGNOSTIC BRUSH WASH: CPT | Performed by: INTERNAL MEDICINE

## 2018-03-14 PROCEDURE — 25000003 PHARM REV CODE 250: Performed by: INTERNAL MEDICINE

## 2018-03-14 PROCEDURE — 45380 COLONOSCOPY AND BIOPSY: CPT | Mod: ,,, | Performed by: INTERNAL MEDICINE

## 2018-03-14 PROCEDURE — 37000009 HC ANESTHESIA EA ADD 15 MINS: Performed by: INTERNAL MEDICINE

## 2018-03-14 PROCEDURE — 43235 EGD DIAGNOSTIC BRUSH WASH: CPT | Mod: 51,,, | Performed by: INTERNAL MEDICINE

## 2018-03-14 PROCEDURE — 88305 TISSUE EXAM BY PATHOLOGIST: CPT | Mod: 26,,, | Performed by: PATHOLOGY

## 2018-03-14 PROCEDURE — 25000003 PHARM REV CODE 250: Performed by: NURSE ANESTHETIST, CERTIFIED REGISTERED

## 2018-03-14 PROCEDURE — 63600175 PHARM REV CODE 636 W HCPCS: Performed by: NURSE ANESTHETIST, CERTIFIED REGISTERED

## 2018-03-14 RX ORDER — PROPOFOL 10 MG/ML
VIAL (ML) INTRAVENOUS
Status: DISCONTINUED | OUTPATIENT
Start: 2018-03-14 | End: 2018-03-14

## 2018-03-14 RX ORDER — GLYCOPYRROLATE 0.2 MG/ML
INJECTION INTRAMUSCULAR; INTRAVENOUS
Status: DISCONTINUED | OUTPATIENT
Start: 2018-03-14 | End: 2018-03-14

## 2018-03-14 RX ORDER — SODIUM CHLORIDE, SODIUM LACTATE, POTASSIUM CHLORIDE, CALCIUM CHLORIDE 600; 310; 30; 20 MG/100ML; MG/100ML; MG/100ML; MG/100ML
INJECTION, SOLUTION INTRAVENOUS CONTINUOUS
Status: DISCONTINUED | OUTPATIENT
Start: 2018-03-14 | End: 2018-03-14 | Stop reason: HOSPADM

## 2018-03-14 RX ORDER — LIDOCAINE HYDROCHLORIDE 10 MG/ML
INJECTION INFILTRATION; PERINEURAL
Status: DISCONTINUED | OUTPATIENT
Start: 2018-03-14 | End: 2018-03-14

## 2018-03-14 RX ADMIN — PROPOFOL 20 MG: 10 INJECTION, EMULSION INTRAVENOUS at 11:03

## 2018-03-14 RX ADMIN — PROPOFOL 30 MG: 10 INJECTION, EMULSION INTRAVENOUS at 11:03

## 2018-03-14 RX ADMIN — LIDOCAINE HYDROCHLORIDE 50 MG: 10 INJECTION, SOLUTION INFILTRATION; PERINEURAL at 11:03

## 2018-03-14 RX ADMIN — GLYCOPYRROLATE 0.2 MG: 0.2 INJECTION INTRAMUSCULAR; INTRAVENOUS at 11:03

## 2018-03-14 RX ADMIN — PROPOFOL 50 MG: 10 INJECTION, EMULSION INTRAVENOUS at 11:03

## 2018-03-14 RX ADMIN — PROPOFOL 40 MG: 10 INJECTION, EMULSION INTRAVENOUS at 11:03

## 2018-03-14 RX ADMIN — PROPOFOL 70 MG: 10 INJECTION, EMULSION INTRAVENOUS at 11:03

## 2018-03-14 RX ADMIN — SODIUM CHLORIDE, SODIUM LACTATE, POTASSIUM CHLORIDE, AND CALCIUM CHLORIDE: .6; .31; .03; .02 INJECTION, SOLUTION INTRAVENOUS at 09:03

## 2018-03-14 NOTE — H&P
Short Stay Endoscopy History and Physical    PCP - Preston Matthew Ii, MD    Procedure - EGD and colonoscopy  ASA - III  Mallampati - per anesthesia  History of Anesthesia problems - no  Family history Anesthesia problems -  no     HPI:  This is a 57 y.o. male here for evaluation of :     EGD  Reflux - yes  Dysphagia - yes  Has h/o varices  Abdominal pain - no  Diarrhea - no  Anemia - no  GI bleeding - no  Other - no    Colonoscopy  Screening -yes  History of polyps - no  Diarrhea - no  Anemia - no  Blood in stools - no  Abdominal pain - no  Other - no    ROS:  CONSTITUTIONAL: Denies weight change,  fatigue, fevers, chills, night sweats.  CARDIOVASCULAR: Denies chest pain, shortness of breath, orthopnea and edema.  RESPIRATORY: Denies cough, hemoptysis, dyspnea, and wheezing.  GI: See HPI.    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    Esophageal varices     GERD (gastroesophageal reflux disease)     Hepatic encephalopathy 7/8/2014    Hepatitis C virus infection 07/08/2014    tx with interferon 3-4 mos- stopped for unclear reasons Tattoos-first at age 17 only risk factor (HCVAB negative 08/2017)    HTN (hypertension) 7/8/2014    Hypertension     Insulin dependent diabetes mellitus     Renal mass, left 7/8/2014    6.3 x 5.7 x 5.6 complex mass    Splenomegaly 7/8/2014    Umbilical hernia 7/8/2014       Surgical History:   Past Surgical History:   Procedure Laterality Date    CARPAL TUNNEL RELEASE Bilateral     CHOLECYSTECTOMY      COLONOSCOPY N/A 9/8/2017    Procedure: COLONOSCOPY;  Surgeon: Savannah Llanos MD;  Location: [facility];  Service: Endoscopy;  Laterality: N/A;    ESOPHAGOGASTRODUODENOSCOPY  01/2014    ESOPHAGOGASTRODUODENOSCOPY  02/15/2017    grade II varices    ESOPHAGOGASTRODUODENOSCOPY  04/10/2017    grade I varices     ESOPHAGOGASTRODUODENOSCOPY W/ BANDING  01/26/2017    grade II varices    HERNIA REPAIR      NECK SURGERY      fusion on C5 and C6    ULNAR NERVE TRANSPOSITION Left     vericose veins removed         Family History:   Family History   Problem Relation Age of Onset    Hyperlipidemia Mother     No Known Problems Father 36     accident on oil rig    No Known Problems Sister     Cancer Brother      kidney    Alcohol abuse Brother     No Known Problems Sister        Social History:   Social History   Substance Use Topics    Smoking status: Former Smoker     Types: Cigarettes     Quit date: 1/4/2010    Smokeless tobacco: Former User     Types: Chew     Quit date: 2/24/1990      Comment: former 1 pack/week quit 1/4/2010    Alcohol use No      Comment: former heavy beer but quit 1/4/2010       Allergies:   Review of patient's allergies indicates:  No Known Allergies     Medications:   No current facility-administered medications on file prior to encounter.      Current Outpatient Prescriptions on File Prior to Encounter   Medication Sig Dispense Refill    B.ANI/L.ACI/L.SAL/L.PLAN/L.JITENDRA (PROBIOTIC FORMULA ORAL) Take by mouth.      cyclobenzaprine (FLEXERIL) 10 MG tablet Take 10 mg by mouth once daily.       escitalopram oxalate (LEXAPRO) 20 MG tablet TK 1 T PO QD. DISCONTINUE TRAZODONE  2    fluticasone (FLONASE) 50 mcg/actuation nasal spray 1 spray by Each Nare route once daily.       furosemide (LASIX) 20 MG tablet TAKE 4 TABLETS BY MOUTH ONCE DAILY 120 tablet 7    gabapentin (NEURONTIN) 300 MG capsule Take 300 mg by mouth 3 (three) times daily.      hydrocodone-acetaminophen 10-325mg (NORCO)  mg Tab Take 10 tablets by mouth 3 (three) times daily.       insulin aspart protamine-insulin aspart (NOVOLOG 70/30) 100 unit/mL (70-30) InPn pen Inject 25 Units into the skin 3 (three) times daily before meals.       INSULIN GLARGINE,HUM.REC.ANLOG (LANTUS SOLOSTAR SUBQ) Inject 65 Units into the skin every  evening.       lactobacillus acidophilus Cap Take 1 capsule by mouth once daily.      lactulose (CHRONULAC) 10 gram/15 mL solution Take 30 mLs by mouth 3 (three) times daily.       levocetirizine (XYZAL) 5 MG tablet Take 5 mg by mouth once daily.  2    lisinopril 10 MG tablet Take 10 mg by mouth once daily.      metformin (GLUCOPHAGE) 500 MG tablet Take 500 mg by mouth 2 (two) times daily.  5    mirtazapine (REMERON) 15 MG tablet TK 1 T PO HS  2    nebivolol (BYSTOLIC) 10 MG Tab Take 10 mg by mouth once daily.      oxybutynin (DITROPAN) 5 MG Tab Take 1 tablet (5 mg total) by mouth every evening. 30 tablet 11    pantoprazole (PROTONIX) 40 MG tablet Take 1 tablet (40 mg total) by mouth 2 (two) times daily. 180 tablet 1    tamsulosin (FLOMAX) 0.4 mg Cp24 Take 1 capsule (0.4 mg total) by mouth once daily. 30 capsule 11    trazodone (DESYREL) 150 MG tablet Take 300 mg by mouth nightly as needed.       [DISCONTINUED] NOVOLOG FLEXPEN 100 unit/mL InPn pen       [DISCONTINUED] rifAXIMin (XIFAXAN) 550 mg Tab Take 550 mg by mouth 2 (two) times daily.      [DISCONTINUED] sodium,potassium,mag sulfates (SUPREP BOWEL PREP KIT) 17.5-3.13-1.6 gram SolR As directed 1 Bottle 0       Physical Exam:  Vital Signs:   Vitals:    03/14/18 0938   BP: (!) 140/81   Pulse: 91   Resp: 20   Temp: 98.2 °F (36.8 °C)     General Appearance: Well appearing in no acute distress  ENT: OP clear  Chest: CTA B  CV: RRR, no m/r/g  Abd: s/nt/nd/nabs  Ext: no edema    Labs:Reviewed  Lab Results   Component Value Date    WBC 2.67 (L) 03/14/2018    HGB 11.9 (L) 03/14/2018    HCT 35.8 (L) 03/14/2018    MCV 87 03/14/2018    PLT 53 (L) 03/14/2018     Lab Results   Component Value Date    APTT 24.3 07/08/2014     No components found for: [id number]  Lab Results   Component Value Date    IRON 50 08/13/2014    TIBC 392 08/13/2014    FERRITIN 16 (L) 08/13/2014     Dysphagia  Esophageal varices  Screen for colon cancer    Plan:   I have explained the risks  and benefits of upper endoscopy and colonoscopy to the patient including but not limited to bleeding, perforation, infection, and death. The patient wishes to proceed.

## 2018-03-14 NOTE — ANESTHESIA POSTPROCEDURE EVALUATION
"Anesthesia Post Evaluation    Patient: Colin Reilly    Procedure(s) Performed: Procedure(s) (LRB):  ESOPHAGOGASTRODUODENOSCOPY (EGD) (Left)  COLONOSCOPY (Left)    Final Anesthesia Type: MAC  Patient location during evaluation: PACU  Patient participation: Yes- Able to Participate  Level of consciousness: awake  Post-procedure vital signs: reviewed and stable  Pain management: adequate  Airway patency: patent  PONV status at discharge: No PONV  Anesthetic complications: no      Cardiovascular status: blood pressure returned to baseline and hemodynamically stable  Respiratory status: unassisted, room air and spontaneous ventilation  Hydration status: euvolemic  Follow-up not needed.        Visit Vitals  BP (!) 140/81 (BP Location: Left arm, Patient Position: Lying)   Pulse 91   Temp 36.8 °C (98.2 °F) (Oral)   Resp 20   Ht 5' 11" (1.803 m)   Wt 95.3 kg (210 lb)   SpO2 97%   BMI 29.29 kg/m²       Pain/Lobito Score: Pain Assessment Performed: Yes (3/14/2018  9:42 AM)  Presence of Pain: denies (3/14/2018  9:42 AM)      "

## 2018-03-14 NOTE — DISCHARGE INSTRUCTIONS
Colonoscopy     A camera attached to a flexible tube with a viewing lens is used to take video pictures.     Colonoscopy is a test to view the inside of your lower digestive tract (colon and rectum). Sometimes it can show the last part of the small intestine (ileum). During the test, small pieces of tissue may be removed for testing. This is called a biopsy. Small growths, such as polyps, may also be removed.   Why is colonoscopy done?  The test is done to help look for colon cancer. And it can help find the source of abdominal pain, bleeding, and changes in bowel habits. It may be needed once a year, depending on factors such as your:  · Age  · Health history  · Family health history  · Symptoms  · Results from any prior colonoscopy  Risks and possible complications  These include:  · Bleeding               · A puncture or tear in the colon   · Risks of anesthesia  · A cancer lesion not being seen  Getting ready   To prepare for the test:  · Talk with your healthcare provider about the risks of the test (see below). Also ask your healthcare provider about alternatives to the test.  · Tell your healthcare provider about any medicines you take. Also tell him or her about any health conditions you may have.  · Make sure your rectum and colon are empty for the test. Follow the diet and bowel prep instructions exactly. If you dont, the test may need to be rescheduled.  · Plan for a friend or family member to drive you home after the test.     Colonoscopy provides an inside view of the entire colon.     You may discuss the results with your doctor right away or at a future visit.  During the test   The test is usually done in the hospital on an outpatient basis. This means you go home the same day. The procedure takes about 30 minutes. During that time:  · You are given relaxing (sedating) medicine through an IV line. You may be drowsy, or fully asleep.  · The healthcare provider will first give you a physical exam to  check for anal and rectal problems.  · Then the anus is lubricated and the scope inserted.  · If you are awake, you may have a feeling similar to needing to have a bowel movement. You may also feel pressure as air is pumped into the colon. Its OK to pass gas during the procedure.  · Biopsy, polyp removal, or other treatments may be done during the test.  After the test   You may have gas right after the test. It can help to try to pass it to help prevent later bloating. Your healthcare provider may discuss the results with you right away. Or you may need to schedule a follow-up visit to talk about the results. After the test, you can go back to your normal eating and other activities. You may be tired from the sedation and need to rest for a few hours.  Date Last Reviewed: 11/1/2016 © 2000-2017 [employer]. [address] [place] [address]. All rights reserved. This information is not intended as a substitute for professional medical care. Always follow your healthcare professional's instructions.        Understanding Colon and Rectal Polyps    The colon (also called the large intestine) is a muscular tube that forms the last part of the digestive tract. It absorbs water and stores food waste. The colon is about 4 to 6 feet long. The rectum is the last 6 inches of the colon. The colon and rectum have a smooth lining composed of millions of cells. Changes in these cells can lead to growths in the colon that can become cancerous and should be removed. Multiple tests are available to screen for colon cancer, but the colonoscopy is the most recommended test. During colonoscopy, these polyps can be removed. How often you need this test depends on many things including your condition, your family history, symptoms, and what the findings were at the previous colonoscopy.   When the colon lining changes  Changes that happen in the cells that line the colon or rectum can lead to growths called  polyps. Over a period of years, polyps can turn cancerous. Removing polyps early may prevent cancer from ever forming.  Polyps  Polyps are fleshy clumps of tissue that form on the lining of the colon or rectum. Small polyps are usually benign (not cancerous). However, over time, cells in a polyp can change and become cancerous. Certain types of polyps known as adenomatous polyps are premalignant. The risk for invasive cancer increases with the size of the polyp and certain cell and gene features. This means that they can become cancerous if they're not removed. Hyperplastic polyps are benign. They can grow quite large and not turn cancerous.   Cancer  Almost all colorectal cancers start when polyp cells begin growing abnormally. As a cancerous tumor grows, it may involve more and more of the colon or rectum. In time, cancer can also grow beyond the colon or rectum and spread to nearby organs or to glands called lymph nodes. The cells can also travel to other parts of the body. This is known as metastasis. The earlier a cancerous tumor is removed, the better the chance of preventing its spread.    Date Last Reviewed: 8/1/2016  © 8020-1362 [employer]. [address] [place] [address]. All rights reserved. This information is not intended as a substitute for professional medical care. Always follow your healthcare professional's instructions.        Upper GI Endoscopy     During endoscopy, a long, flexible tube is used to view the inside of your upper GI tract.      Upper GI endoscopy allows your healthcare provider to look directly into the beginning of your gastrointestinal (GI) tract. The esophagus, stomach, and duodenum (the first part of the small intestine) make up the upper GI tract.   Before the exam  Follow these and any other instructions you are given before your endoscopy. If you dont follow the healthcare providers instructions carefully, the test may need to be canceled or done  over:  · Don't eat or drink anything after midnight the night before your exam. If your exam is in the afternoon, drink only clear liquids in the morning. Don't eat or drink anything for 8 hours before the exam. In some cases, you may be able to take medicines with sips of water until 2 hours before the procedure. Speak with your healthcare provider about this.   · Bring your X-rays and any other test results you have.  · Because you will be sedated, arrange for an adult to drive you home after the exam.  · Tell your healthcare provider before the exam if you are taking any medicines or have any medical problems.  The procedure  Here is what to expect:  · You will lie on the endoscopy table. Usually patients lie on the left side.  · You will be monitored and given oxygen.  · Your throat may be numbed with a spray or gargle. You are given medicine through an intravenous (IV) line that will help you relax and remain comfortable. You may be awake or asleep during the procedure.  · The healthcare provider will put the endoscope in your mouth and down your esophagus. It is thinner than most pieces of food that you swallow. It will not affect your breathing. The medicine helps keep you from gagging.  · Air is put into your GI tract to expand it. It can make you burp.  · During the procedure, the healthcare provider can take biopsies (tissue samples), remove abnormalities, such as polyps, or treat abnormalities through a variety of devices placed through the endoscope. You will not feel this.   · The endoscope carries images of your upper GI tract to a video screen. If you are awake, you may be able to look at the images.  · After the procedure is done, you will rest for a time. An adult must drive you home.  When to call your healthcare provider  Contact your healthcare provider if you have:  · Black or tarry stools, or blood in your stool  · Fever  · Pain in your belly that does not go away  · Nausea and vomiting, or  vomiting blood   Date Last Reviewed: 7/1/2016 © 2000-2017 [employer]. [address] [place] [address], [place] [address]. All rights reserved. This information is not intended as a substitute for professional medical care. Always follow your healthcare professional's instructions.        Esophageal Varices     With esophageal varices, blood vessels in the esophagus become abnormally enlarged. They may then burst (rupture) and bleed.   Esophageal varices are enlarged veins at the lower end of the esophagus. The esophagus is the tube that carries food from your mouth to your stomach. Varices most often occur because of problems with blood flow in the liver caused by chronic liver disease. Normally, a blood vessel called the portal vein carries blood from the digestive organs to the liver. But with liver disease, blood flow can be blocked due to scarring of the liver. This increases the blood pressure in the portal vein (a condition known as portal hypertension). Blood then backs up in nearby veins in the esophagus and stomach, causing varices. Varices are a serious and deadly problem. Treatment is needed to prevent them from bursting (rupturing) and bleeding. If bleeding occurs, it can be fatal.  Symptoms of esophageal varices  Symptoms do not occur unless the varices are bleeding. This is an emergency problem. If you have any of the following symptoms, get medical attention right away:  · Vomiting blood  · Black, tarry, or bloody stools  · Feeling lightheaded, or fainting (loss of consciousness)  Diagnosing esophageal \varices  Youll likely be checked for varices if you have liver disease or other health problems that can cause them. Your healthcare provider will ask about your symptoms and health history. Youll also be examined. Tests are then done to confirm the problem. Tests can include:  · Upper endoscopy. This is done to see inside the upper digestive tract. During the test, an endoscope is used. This  is a thin, flexible tube with a tiny camera on the end. Its inserted through your mouth. Its then guided down through your esophagus, stomach, and first part of your small intestine. This allows the provider to check for varices and find any bleeding.  · Imaging tests. These provide pictures of the liver or blood flow in the liver. They allow the provider to check for enlarged veins around the liver and assess the risk of bleeding. Common imaging tests done include ultrasound and CT scans.  Treating esophageal varices  The goal of treatment is to reduce the risk of bleeding or to control bleeding. Treatment can include 1 or more of the following:  · Medicines. These may be prescribed to lower the blood pressure inside the enlarged veins. This reduces the risk of bleeding. Beta-blockers are the most common medicine used.  · Endoscopic therapy. These are treatments for enlarged or bleeding veins that are done using an endoscope. With ligation, small rubber bands are placed around the veins to close them off and stop any bleeding. With sclerotherapy, a blood-clotting medicine is injected into the veins to cause scarring and shrink them.  · Balloon tamponade. A tube with a balloon is guided down into your esophagus and stomach. The balloon is then filled with air. This puts pressure on enlarged or bleeding veins to control bleeding. This is a short-term (temporary) way to control bleeding until other treatments are available.   · Surgery. This may be done to place a tubelike device (stent) in the liver. The stent helps redirect blood flow in the liver to lower the blood pressure in enlarged veins. Sometimes, the enlarged veins may be connected to other nearby veins to redirect blood flow. In severe cases, a liver transplant may be needed. For this surgery, a diseased liver is replaced with a healthy liver from another person.   Follow-up  Regular visits with your provider are needed to check for bleeding of the  varices. If bleeding occurs, it is likely to occur again. More treatments will then be needed in the future. Once endoscopic therapy (banding) is performed, regular follow-up endoscopic scans with banding are done to completely get rid of the varices. If you are given medicines to take by mouth, be sure to take them as directed. Work closely with your provider to manage your condition. Know when to seek emergency care.  Date Last Reviewed: 7/1/2016  © 0964-8179 [employer]. [address] [place] [address], [place] [address]. All rights reserved. This information is not intended as a substitute for professional medical care. Always follow your healthcare professional's instructions.

## 2018-03-14 NOTE — ANESTHESIA PREPROCEDURE EVALUATION
03/14/2018  Colin Reilly is a 57 y.o., male.    Anesthesia Evaluation    I have reviewed the Patient Summary Reports.    I have reviewed the Nursing Notes.   I have reviewed the Medications.     Review of Systems  Anesthesia Hx:  No problems with previous Anesthesia  Denies Family Hx of Anesthesia complications.   Denies Personal Hx of Anesthesia complications.   Social:  Former Smoker, No Alcohol Use    Hematology/Oncology:  Hematology Normal   Oncology Normal     Cardiovascular:   Hypertension Denies MI.   Denies CABG/stent.         Pulmonary:   Denies COPD.  Denies Asthma.  Denies Sleep Apnea.    Renal/:   Denies Chronic Renal Disease.  Renal mass, left   Hepatic/GI:   Bowel Prep. GERD Liver Disease, Hepatitis, C Ascites  Hepatic encephalopathy  Esophageal varices  Cirrhosis, Laennec's  Splenomegaly   Musculoskeletal:  Musculoskeletal Normal    Neurological:   Denies CVA. Denies Seizures.   Chronic Pain Syndrome   Endocrine:   Diabetes Denies Hypothyroidism. Denies Hyperthyroidism.        Physical Exam  General:  Well nourished    Airway/Jaw/Neck:  Airway Findings: Mouth Opening: Normal Tongue: Normal  General Airway Assessment: Adult  Mallampati: II      Dental:  Dental Findings: In tact   Chest/Lungs:  Chest/Lungs Findings: Clear to auscultation, Normal Respiratory Rate     Heart/Vascular:  Heart Findings: Rate: Normal  Rhythm: Regular Rhythm  Sounds: Normal             Anesthesia Plan  Type of Anesthesia, risks & benefits discussed:  Anesthesia Type:  MAC  Patient's Preference:   Intra-op Monitoring Plan: standard ASA monitors  Intra-op Monitoring Plan Comments:   Post Op Pain Control Plan:   Post Op Pain Control Plan Comments:   Induction:   IV  Beta Blocker:  Patient is on a Beta-Blocker and has received one dose within the past 24 hours (No further documentation required).       Informed Consent:  Patient understands risks and agrees with Anesthesia plan.  Questions answered. Anesthesia consent signed with patient.  ASA Score: 2     Day of Surgery Review of History & Physical: I have interviewed and examined the patient. I have reviewed the patient's H&P dated:  There are no significant changes.  H&P update referred to the surgeon.         Ready For Surgery From Anesthesia Perspective.

## 2018-03-14 NOTE — PLAN OF CARE
MD at , speaking with deanly, all questions answered, pt denies c/o pain, VSS, dc instructions reviewed, verbalized understanding, pt ok to dc to home

## 2018-03-14 NOTE — TRANSFER OF CARE
"Anesthesia Transfer of Care Note    Patient: Colin Reilly    Procedure(s) Performed: Procedure(s) (LRB):  ESOPHAGOGASTRODUODENOSCOPY (EGD) (Left)  COLONOSCOPY (Left)    Patient location: PACU    Anesthesia Type: MAC    Transport from OR: Transported from OR on room air with adequate spontaneous ventilation    Post pain: adequate analgesia    Post assessment: no apparent anesthetic complications and tolerated procedure well    Post vital signs: stable    Level of consciousness: awake    Nausea/Vomiting: no nausea/vomiting    Complications: none    Transfer of care protocol was followed      Last vitals:   Visit Vitals  BP (!) 140/81 (BP Location: Left arm, Patient Position: Lying)   Pulse 91   Temp 36.8 °C (98.2 °F) (Oral)   Resp 20   Ht 5' 11" (1.803 m)   Wt 95.3 kg (210 lb)   SpO2 97%   BMI 29.29 kg/m²     "

## 2018-03-14 NOTE — ANESTHESIA RELEASE NOTE
"Anesthesia Release from PACU Note    Patient: Colin Reilly    Procedure(s) Performed: Procedure(s) (LRB):  ESOPHAGOGASTRODUODENOSCOPY (EGD) (Left)  COLONOSCOPY (Left)    Anesthesia type: MAC    Post pain: Adequate analgesia    Post assessment: no apparent anesthetic complications, tolerated procedure well and no evidence of recall    Last Vitals:   Visit Vitals  BP (!) 140/81 (BP Location: Left arm, Patient Position: Lying)   Pulse 91   Temp 36.8 °C (98.2 °F) (Oral)   Resp 20   Ht 5' 11" (1.803 m)   Wt 95.3 kg (210 lb)   SpO2 97%   BMI 29.29 kg/m²       Post vital signs: stable    Level of consciousness: awake    Nausea/Vomiting: no nausea/no vomiting    Complications: none    Airway Patency: patent    Respiratory: unassisted, spontaneous ventilation, room air    Cardiovascular: stable and blood pressure at baseline    Hydration: euvolemic  "

## 2018-03-15 ENCOUNTER — TELEPHONE (OUTPATIENT)
Dept: GASTROENTEROLOGY | Facility: CLINIC | Age: 58
End: 2018-03-15

## 2018-03-15 NOTE — TELEPHONE ENCOUNTER
----- Message from Tiffany Shin sent at 3/15/2018 11:20 AM CDT -----  Contact: pt  The pt states he is returning a missed call, the pt can be reached at [phone]///[id number]

## 2018-03-26 NOTE — PROVATION PATIENT INSTRUCTIONS
Discharge Summary/Instructions after an Endoscopic Procedure  Patient Name: Colin Reilly  Patient MRN: [id number]  Patient YOB: 1960  Wednesday, March 14, 2018 Savannah Llanos MD  RESTRICTIONS:  During your procedure today, you received medications for sedation.  These   medications may affect your judgment, balance and coordination.  Therefore,   for 24 hours, you have the following restrictions:   - DO NOT drive a car, operate machinery, make legal/financial decisions,   sign important papers or drink alcohol.    ACTIVITY:  The following day: return to full activity including work, except no heavy   lifting, straining or running for 3 days if polyps were removed.  DIET:  Eat and drink normally unless instructed otherwise.     TREATMENT FOR COMMON SIDE EFFECTS:  - Mild abdominal pain, nausea, belching, bloating or excessive gas:  rest,   eat lightly and use a heating pad.  - Sore Throat: treat with throat lozenges and/or gargle with warm salt   water.  - Because air was used during the procedure, expelling large amounts of air   from your rectum or belching is normal.  - If a bowel prep was taken, you may not have a bowel movement for 1-3 days.    This is normal.  SYMPTOMS TO WATCH FOR AND REPORT TO YOUR PHYSICIAN:  1. Abdominal pain or bloating, other than gas cramps.  2. Chest pain.  3. Back pain.  4. Signs of infection such as: chills or fever occurring within 24 hours   after the procedure.  5. Rectal bleeding, which would show as bright red, maroon, or black stools.   (A tablespoon of blood from the rectum is not serious, especially if   hemorrhoids are present.)  6. Vomiting.  7. Weakness or dizziness.  GO DIRECTLY TO THE NEAREST EMERGENCY ROOM IF YOU HAVE ANY OF THE FOLLOWING:      Difficulty breathing              Chills and/or fever over 101 F   Persistent vomiting and/or vomiting blood   Severe abdominal pain   Severe chest pain   Black, tarry stools   Bleeding- more than one  tablespoon   Any other symptom or condition that you feel may need urgent attention  Your doctor recommends these additional instructions:  If any biopsies were taken, your doctors clinic will contact you in 1 to 2   weeks with any results.  You have a contact number available for emergencies.  The signs and symptoms   of potential delayed complications were discussed with you.  You may return   to normal activities tomorrow.  Written discharge instructions were   provided to you.   Resume your previous diet.   Continue your present medications.   We are waiting for your pathology results.   Your physician has recommended a repeat colonoscopy in five years for   surveillance.   Return to your primary care physician as previously scheduled.  For questions, problems or results please call your physician Savannah Llanos MD at Work:  [phone]  If you have any questions about the above instructions, call the GI   department at [phone] or call the endoscopy unit at [phone]   from 7am until 3 pm.  [facility], EMERGENCY ROOM PHONE NUMBER:   [phone]  IF A COMPLICATION OR EMERGENCY SITUATION ARISES AND YOU ARE UNABLE TO REACH   YOUR PHYSICIAN - GO DIRECTLY TO THE EMERGENCY ROOM.  I have read or have had read to me these discharge instructions for my   procedure and have received a written copy.  I understand these   instructions and will follow-up with my physician if I have any questions.     __________________________________       _____________________________________  Nurse Signature                                          Patient/Designated   Responsible Party Signature  Savannah Llanos MD  3/14/2018 11:56:34 AM  This report has been verified and signed electronically.

## 2018-03-29 ENCOUNTER — TELEPHONE (OUTPATIENT)
Dept: GASTROENTEROLOGY | Facility: CLINIC | Age: 58
End: 2018-03-29

## 2018-03-29 NOTE — TELEPHONE ENCOUNTER
----- Message from Nicole Daniels sent at 3/29/2018  2:10 PM CDT -----  Contact: patient  Returning your call. Please call patient @ [phone]. Thanks, alexsandra

## 2018-04-03 DIAGNOSIS — R60.9 EDEMA, UNSPECIFIED TYPE: ICD-10-CM

## 2018-04-03 RX ORDER — SPIRONOLACTONE 100 MG/1
TABLET, FILM COATED ORAL
Qty: 30 TABLET | Refills: 0 | Status: SHIPPED | OUTPATIENT
Start: 2018-04-03 | End: 2018-04-28 | Stop reason: SDUPTHER

## 2018-04-12 NOTE — TELEPHONE ENCOUNTER
----- Message from Riley Vidales sent at 4/12/2018 11:03 AM CDT -----  Contact: Pt  Pt calling to get a refill on Xifan, he would like to know if he still needs to take it due to him having it prescribed by a doctor at [facility]. Pt stated he used pt assistance to get the medication in the past.     Patient Assistance   Contact number: [phone]    Dr. Jeancarlos Sanchez ([facility])   Office number: [phone]      Call back number: [phone]

## 2018-04-18 ENCOUNTER — TELEPHONE (OUTPATIENT)
Dept: PHARMACY | Facility: CLINIC | Age: 58
End: 2018-04-18

## 2018-04-28 DIAGNOSIS — K70.30 CIRRHOSIS, LAENNEC'S: ICD-10-CM

## 2018-04-28 DIAGNOSIS — R60.9 EDEMA, UNSPECIFIED TYPE: ICD-10-CM

## 2018-04-29 RX ORDER — FUROSEMIDE 20 MG/1
TABLET ORAL
Qty: 120 TABLET | Refills: 0 | Status: SHIPPED | OUTPATIENT
Start: 2018-04-29 | End: 2018-06-05 | Stop reason: SDUPTHER

## 2018-04-29 RX ORDER — SPIRONOLACTONE 100 MG/1
TABLET, FILM COATED ORAL
Qty: 30 TABLET | Refills: 0 | Status: SHIPPED | OUTPATIENT
Start: 2018-04-29 | End: 2019-01-05 | Stop reason: SDUPTHER

## 2018-05-15 ENCOUNTER — TELEPHONE (OUTPATIENT)
Dept: PHARMACY | Facility: CLINIC | Age: 58
End: 2018-05-15

## 2018-05-17 ENCOUNTER — TELEPHONE (OUTPATIENT)
Dept: PHARMACY | Facility: CLINIC | Age: 58
End: 2018-05-17

## 2018-06-04 ENCOUNTER — HISTORICAL (OUTPATIENT)
Dept: ADMINISTRATIVE | Facility: HOSPITAL | Age: 58
End: 2018-06-04

## 2018-06-04 LAB
ABS NEUT (OLG): 1.9 X10(3)/MCL (ref 2.1–9.2)
ALBUMIN SERPL-MCNC: 3.6 GM/DL (ref 3.4–5)
ALBUMIN/GLOB SERPL: 1 {RATIO}
ALP SERPL-CCNC: 126 UNIT/L (ref 50–136)
ALT SERPL-CCNC: 33 UNIT/L (ref 12–78)
ANISOCYTOSIS BLD QL SMEAR: 1
APPEARANCE, UA: CLEAR
AST SERPL-CCNC: 28 UNIT/L (ref 15–37)
BACTERIA SPEC CULT: ABNORMAL /HPF
BASOPHILS NFR BLD MANUAL: 1 % (ref 0–2)
BILIRUB SERPL-MCNC: 1.5 MG/DL (ref 0.2–1)
BILIRUB UR QL STRIP: NEGATIVE
BILIRUBIN DIRECT+TOT PNL SERPL-MCNC: 0.7 MG/DL (ref 0–0.2)
BILIRUBIN DIRECT+TOT PNL SERPL-MCNC: 0.8 MG/DL (ref 0–0.8)
BUN SERPL-MCNC: 5 MG/DL (ref 7–18)
CALCIUM SERPL-MCNC: 8.3 MG/DL (ref 8.5–10.1)
CHLORIDE SERPL-SCNC: 104 MMOL/L (ref 98–107)
CHOLEST SERPL-MCNC: 146 MG/DL (ref 0–200)
CHOLEST/HDLC SERPL: 4.4 {RATIO} (ref 0–5)
CO2 SERPL-SCNC: 26 MMOL/L (ref 21–32)
COLOR UR: YELLOW
CREAT SERPL-MCNC: 0.69 MG/DL (ref 0.7–1.3)
CREAT UR-MCNC: 37 MG/DL
EOSINOPHIL NFR BLD MANUAL: 3 % (ref 0–8)
ERYTHROCYTE [DISTWIDTH] IN BLOOD BY AUTOMATED COUNT: 17 % (ref 11.5–17)
EST. AVERAGE GLUCOSE BLD GHB EST-MCNC: 217 MG/DL
GLOBULIN SER-MCNC: 3.6 GM/DL (ref 2.4–3.5)
GLUCOSE (UA): ABNORMAL
GLUCOSE SERPL-MCNC: 303 MG/DL (ref 74–106)
HBA1C MFR BLD: 9.2 % (ref 4.2–6.3)
HCT VFR BLD AUTO: 41 % (ref 42–52)
HDLC SERPL-MCNC: 33 MG/DL (ref 35–60)
HGB BLD-MCNC: 13.4 GM/DL (ref 14–18)
HGB UR QL STRIP: NEGATIVE
KETONES UR QL STRIP: NEGATIVE
LDLC SERPL CALC-MCNC: 77 MG/DL (ref 0–129)
LEUKOCYTE ESTERASE UR QL STRIP: NEGATIVE
LYMPHOCYTES NFR BLD MANUAL: 23 % (ref 13–40)
MCH RBC QN AUTO: 29.7 PG (ref 27–31)
MCHC RBC AUTO-ENTMCNC: 32.7 GM/DL (ref 33–36)
MCV RBC AUTO: 90.9 FL (ref 80–94)
MICROALBUMIN UR-MCNC: 1.1 MG/DL
MICROALBUMIN/CREAT RATIO PNL UR: 29.7 MG/GM CR (ref 0–30)
MONOCYTES NFR BLD MANUAL: 5 % (ref 2–11)
NEUTROPHILS NFR BLD MANUAL: 65 % (ref 47–80)
NITRITE UR QL STRIP: NEGATIVE
PH UR STRIP: 6 [PH] (ref 5–9)
PLATELET # BLD AUTO: 40 X10(3)/MCL (ref 130–400)
PLATELET # BLD EST: NORMAL 10*3/UL
PMV BLD AUTO: ABNORMAL FL (ref 9.4–12.4)
POTASSIUM SERPL-SCNC: 3.4 MMOL/L (ref 3.5–5.1)
PROT SERPL-MCNC: 7.2 GM/DL (ref 6.4–8.2)
PROT UR QL STRIP: NEGATIVE
RBC # BLD AUTO: 4.51 X10(6)/MCL (ref 4.7–6.1)
RBC #/AREA URNS HPF: ABNORMAL /[HPF]
SODIUM SERPL-SCNC: 140 MMOL/L (ref 136–145)
SP GR UR STRIP: 1.01 (ref 1–1.03)
SQUAMOUS EPITHELIAL, UA: ABNORMAL
TRIGL SERPL-MCNC: 180 MG/DL (ref 30–150)
TSH SERPL-ACNC: 1.72 MIU/L (ref 0.36–3.74)
UROBILINOGEN UR STRIP-ACNC: 0.2
VLDLC SERPL CALC-MCNC: 36 MG/DL
WBC # SPEC AUTO: 3 X10(3)/MCL (ref 4.5–11.5)
WBC #/AREA URNS HPF: ABNORMAL /HPF

## 2018-06-05 DIAGNOSIS — K70.30 CIRRHOSIS, LAENNEC'S: ICD-10-CM

## 2018-06-05 RX ORDER — FUROSEMIDE 20 MG/1
TABLET ORAL
Qty: 120 TABLET | Refills: 0 | Status: SHIPPED | OUTPATIENT
Start: 2018-06-05 | End: 2018-07-02 | Stop reason: SDUPTHER

## 2018-06-19 ENCOUNTER — LAB VISIT (OUTPATIENT)
Dept: LAB | Facility: HOSPITAL | Age: 58
End: 2018-06-19
Attending: UROLOGY
Payer: MEDICARE

## 2018-06-19 ENCOUNTER — OFFICE VISIT (OUTPATIENT)
Dept: UROLOGY | Facility: CLINIC | Age: 58
End: 2018-06-19
Payer: MEDICARE

## 2018-06-19 VITALS
DIASTOLIC BLOOD PRESSURE: 64 MMHG | WEIGHT: 213.38 LBS | HEIGHT: 71 IN | BODY MASS INDEX: 29.87 KG/M2 | HEART RATE: 73 BPM | SYSTOLIC BLOOD PRESSURE: 110 MMHG

## 2018-06-19 DIAGNOSIS — Z12.5 ENCOUNTER FOR SCREENING FOR MALIGNANT NEOPLASM OF PROSTATE: ICD-10-CM

## 2018-06-19 DIAGNOSIS — N40.0 BENIGN PROSTATIC HYPERPLASIA, UNSPECIFIED WHETHER LOWER URINARY TRACT SYMPTOMS PRESENT: ICD-10-CM

## 2018-06-19 DIAGNOSIS — N32.81 OAB (OVERACTIVE BLADDER): ICD-10-CM

## 2018-06-19 DIAGNOSIS — N40.0 BENIGN PROSTATIC HYPERPLASIA, UNSPECIFIED WHETHER LOWER URINARY TRACT SYMPTOMS PRESENT: Primary | ICD-10-CM

## 2018-06-19 LAB
BILIRUB SERPL-MCNC: NORMAL MG/DL
BLOOD URINE, POC: NORMAL
COLOR, POC UA: YELLOW
COMPLEXED PSA SERPL-MCNC: 0.44 NG/ML
GLUCOSE UR QL STRIP: NORMAL
KETONES UR QL STRIP: NORMAL
LEUKOCYTE ESTERASE URINE, POC: NORMAL
NITRITE, POC UA: NORMAL
PH, POC UA: 6
PROTEIN, POC: NORMAL
SPECIFIC GRAVITY, POC UA: 1
UROBILINOGEN, POC UA: NORMAL

## 2018-06-19 PROCEDURE — 99214 OFFICE O/P EST MOD 30 MIN: CPT | Mod: S$PBB,,, | Performed by: UROLOGY

## 2018-06-19 PROCEDURE — 36415 COLL VENOUS BLD VENIPUNCTURE: CPT

## 2018-06-19 PROCEDURE — 99212 OFFICE O/P EST SF 10 MIN: CPT | Mod: PBBFAC | Performed by: UROLOGY

## 2018-06-19 PROCEDURE — 84153 ASSAY OF PSA TOTAL: CPT

## 2018-06-19 PROCEDURE — 99999 PR PBB SHADOW E&M-EST. PATIENT-LVL II: CPT | Mod: PBBFAC,,, | Performed by: UROLOGY

## 2018-06-19 PROCEDURE — 81002 URINALYSIS NONAUTO W/O SCOPE: CPT | Mod: PBBFAC | Performed by: UROLOGY

## 2018-06-19 RX ORDER — OXYBUTYNIN CHLORIDE 5 MG/1
5 TABLET ORAL NIGHTLY
Qty: 30 TABLET | Refills: 11 | Status: ON HOLD | OUTPATIENT
Start: 2018-06-19 | End: 2019-10-18 | Stop reason: HOSPADM

## 2018-06-19 RX ORDER — SILDENAFIL CITRATE 20 MG/1
20 TABLET ORAL
Qty: 50 TABLET | Refills: 11 | Status: ON HOLD | OUTPATIENT
Start: 2018-06-19 | End: 2019-10-18 | Stop reason: CLARIF

## 2018-06-19 RX ORDER — TAMSULOSIN HYDROCHLORIDE 0.4 MG/1
0.4 CAPSULE ORAL DAILY
Qty: 30 CAPSULE | Refills: 11 | Status: SHIPPED | OUTPATIENT
Start: 2018-06-19 | End: 2021-02-08

## 2018-06-19 RX ORDER — FINASTERIDE 5 MG/1
5 TABLET, FILM COATED ORAL DAILY
Qty: 30 TABLET | Refills: 11 | Status: SHIPPED | OUTPATIENT
Start: 2018-06-19 | End: 2022-11-15

## 2018-06-19 NOTE — PROGRESS NOTES
Chief Complaint: BPH    HPI:   6/19/18: Doing fine on the flomax, needs CIC if he doesn't use it.  Using oxybutinin at night.  Good daytime stream no problems.  Didn't get PSA.  His plan is working with the CIC intermittently.  Reviewed history in detail.  6/1/17: MRI shows no  findings.  Cysto today shows BPH with moderate obstruction.  Describes OAB symptoms with CIC a few times at night with no product - does the CIC because he feels he needs to void.  5/4/17: Pt has been well followed in last year with two cysto (Seth and Deondre) and CT (normal).  CT Urogram 4/16 normal.  Has problems of SP 2-3 times a day comes and goes.  Drinks a lot of water not much caffeine.  Dr. Ann did SHIVA 3 mo ago.  Not really sure why he is here today we talked it around a good bit.  Pt does CIC 3-4 times a day.  He can't say if or why his bladder cant be fixed.  Doesn't know his history very well.  Has done CIC about a year or so.  Pt is interested in having TURP and looking at Dr. Montrell tang last year it seems indicated.  4/16: Deondre: 55 y.o. male  who presents for gross hematuria.  He reports gross hematuria a couple of months.  It occurred twice over the course of a day.  He reports lower abdominal pain at the time of gross hematuria.  He reports lower left back pain just before experiencing gross hematuria.  The back pain lasted for 2 days.  He reports getting his routine back pain injection 2 days after the pain began.   He has an urologist (Dr. Ann) in [place].  He denies having a work up for gross hematuria.  He reports hesitancy.  He takes flomax.  He is satisfied with how he is urinating with flomax.   He reports a good urinary stream and complete bladder emptying.  He has no urinary complaints.    He is on the liver transplant list for cirrhosis due to alcohol abuse. He reports 2 uncles having prostate cancer.     Allergies:  Patient has no known allergies.    Medications:  has a current medication list which  includes the following prescription(s): b.ani/l.aci/l.sal/l.plan/l.dory, cyclobenzaprine, escitalopram oxalate, fluticasone, furosemide, gabapentin, hydrocodone-acetaminophen, insulin aspart protamine-insulin aspart, insulin glargine,hum.rec.anlog, lactobacillus acidophilus, lactulose, levocetirizine, lisinopril, metformin, mirtazapine, nebivolol, oxybutynin, rifaximin, spironolactone, tamsulosin, trazodone, and pantoprazole.    Review of Systems:  General: No fever, chills, fatigability, or weight loss.  Skin: No rashes, itching, or changes in color or texture of skin.  Chest: Denies COPE, cyanosis, wheezing, cough, and sputum production.  Abdomen: Appetite fine. No weight loss. Denies diarrhea, abdominal pain, hematemesis, or blood in stool.  Musculoskeletal: No joint stiffness or swelling. Denies back pain.  : As above.  All other review of systems negative.    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    PSH:   has a past surgical history that includes Hernia repair; Cholecystectomy; Carpal tunnel release (Bilateral); Neck surgery; vericose veins removed; Ulnar nerve transposition (Left); Esophagogastroduodenoscopy (01/2014); Esophagogastroduodenoscopy w/ banding (01/26/2017); Esophagogastroduodenoscopy (02/15/2017); Esophagogastroduodenoscopy (04/10/2017); Colonoscopy (N/A, 9/8/2017); and Colonoscopy (Left, 3/14/2018).    FamHx: family history includes Alcohol abuse in his brother; Cancer in his brother; Hyperlipidemia in his mother; No Known Problems in his sister and sister; No Known Problems (age of  onset: 36) in his father.    SocHx:  reports that he quit smoking about 8 years ago. His smoking use included Cigarettes. He quit smokeless tobacco use about 28 years ago. His smokeless tobacco use included Chew. He reports that he does not drink alcohol or use drugs.      Physical Exam:  Vitals:    06/19/18 1515   BP: 110/64   Pulse: 73     General: A&Ox3, no apparent distress, no deformities  Neck: No masses, normal thyroid  Lungs: normal inspiration, no use of accessory muscles  Heart: normal pulse, no arrhythmias  Abdomen: Soft, NT, ND  Skin: The skin is warm and dry. No jaundice.  Ext: No c/c/e.  :   6/17: SHIVA normal    Labs/Studies:   Urinalysis performed in clinic, summary: UA normal exc 1000 glucose  PSA    5/16: 0.18  Calibrated Uroflow:    6/1/17: voided 428 ml with Qmax 16 ml/sec and normal pattern    Impression/Plan:   1. BPH but voids fine.  OAB evident.  Caffeine cessation.  CIC unnecessary I thought but pt feels it is needed.  Reviewed.    2. Continue flomax,oxybutinin qhs.  Adding finasteride.  PSA/RTC 1 year  3. Sildenafil rx for ED

## 2018-07-02 DIAGNOSIS — K70.30 CIRRHOSIS, LAENNEC'S: ICD-10-CM

## 2018-07-02 RX ORDER — FUROSEMIDE 20 MG/1
TABLET ORAL
Qty: 120 TABLET | Refills: 0 | Status: SHIPPED | OUTPATIENT
Start: 2018-07-02 | End: 2018-08-06 | Stop reason: SDUPTHER

## 2018-07-06 ENCOUNTER — TELEPHONE (OUTPATIENT)
Dept: RADIOLOGY | Facility: HOSPITAL | Age: 58
End: 2018-07-06

## 2018-07-09 ENCOUNTER — HOSPITAL ENCOUNTER (OUTPATIENT)
Dept: RADIOLOGY | Facility: HOSPITAL | Age: 58
Discharge: HOME | End: 2018-07-09
Attending: UROLOGY
Payer: MEDICARE

## 2018-07-09 DIAGNOSIS — N32.81 OAB (OVERACTIVE BLADDER): ICD-10-CM

## 2018-07-09 DIAGNOSIS — N40.0 BENIGN PROSTATIC HYPERPLASIA, UNSPECIFIED WHETHER LOWER URINARY TRACT SYMPTOMS PRESENT: ICD-10-CM

## 2018-07-09 PROCEDURE — 76770 US EXAM ABDO BACK WALL COMP: CPT | Mod: TC,PO

## 2018-07-09 PROCEDURE — 76770 US EXAM ABDO BACK WALL COMP: CPT | Mod: 26,,, | Performed by: RADIOLOGY

## 2018-07-17 ENCOUNTER — TELEPHONE (OUTPATIENT)
Dept: UROLOGY | Facility: CLINIC | Age: 58
End: 2018-07-17

## 2018-07-17 NOTE — TELEPHONE ENCOUNTER
----- Message from Mary Urbano sent at 7/16/2018  4:48 PM CDT -----  Contact: self [phone]  Would like to consult with nurse regarding test results, please call back at [phone].  Md [place]

## 2018-08-06 DIAGNOSIS — K70.30 CIRRHOSIS, LAENNEC'S: ICD-10-CM

## 2018-08-06 RX ORDER — FUROSEMIDE 20 MG/1
TABLET ORAL
Qty: 360 TABLET | Refills: 0 | Status: SHIPPED | OUTPATIENT
Start: 2018-08-06 | End: 2019-03-04 | Stop reason: SDUPTHER

## 2018-08-06 RX ORDER — FUROSEMIDE 20 MG/1
TABLET ORAL
Qty: 360 TABLET | Refills: 0 | Status: SHIPPED | OUTPATIENT
Start: 2018-08-06 | End: 2018-10-10 | Stop reason: ALTCHOICE

## 2018-09-04 ENCOUNTER — TELEPHONE (OUTPATIENT)
Dept: HEPATOLOGY | Facility: CLINIC | Age: 58
End: 2018-09-04

## 2018-09-04 NOTE — TELEPHONE ENCOUNTER
----- Message from Margarette Romero sent at 9/4/2018  9:28 AM CDT -----  Contact: patient      ----- Message -----  From: Chaya Merino  Sent: 9/4/2018   8:42 AM  To: Hepatology Scheduling    Needs Advice    Reason for call:  Pt called to reschedule appt with    Communication Preference: [phone]  Additional Information: n/a

## 2018-09-05 ENCOUNTER — TELEPHONE (OUTPATIENT)
Dept: HEPATOLOGY | Facility: CLINIC | Age: 58
End: 2018-09-05

## 2018-09-05 NOTE — TELEPHONE ENCOUNTER
----- Message from Tasha Denson MA sent at 9/5/2018  3:15 PM CDT -----      ----- Message -----  From: Марина Montano  Sent: 9/5/2018  12:41 PM  To: [facility] Hepat Non-Clinical Staff    Pt needs to resched appt     Contact [phone]

## 2018-09-06 ENCOUNTER — TELEPHONE (OUTPATIENT)
Dept: HEPATOLOGY | Facility: CLINIC | Age: 58
End: 2018-09-06

## 2018-09-06 NOTE — TELEPHONE ENCOUNTER
----- Message from Margarette Romero sent at 9/6/2018  3:42 PM CDT -----  Contact: PATIENT      ----- Message -----  From: Angelique Yoo  Sent: 9/6/2018   3:36 PM  To: Hepatology Scheduling    Patient Requesting Appointment.     Reason for appt.: schedule Appointments   When is the first available appointment? n/a  Communication Preference: [phone]  Additional Information: n/a

## 2018-09-19 RX ORDER — SPIRONOLACTONE 25 MG/1
TABLET ORAL
Qty: 360 TABLET | Refills: 0 | Status: SHIPPED | OUTPATIENT
Start: 2018-09-19 | End: 2018-10-10 | Stop reason: ALTCHOICE

## 2018-10-10 ENCOUNTER — LAB VISIT (OUTPATIENT)
Dept: LAB | Facility: OTHER | Age: 58
End: 2018-10-10
Attending: INTERNAL MEDICINE
Payer: MEDICARE

## 2018-10-10 ENCOUNTER — OFFICE VISIT (OUTPATIENT)
Dept: HEPATOLOGY | Facility: CLINIC | Age: 58
End: 2018-10-10
Attending: INTERNAL MEDICINE
Payer: MEDICARE

## 2018-10-10 VITALS
TEMPERATURE: 98 F | OXYGEN SATURATION: 97 % | HEART RATE: 108 BPM | HEIGHT: 71 IN | DIASTOLIC BLOOD PRESSURE: 92 MMHG | SYSTOLIC BLOOD PRESSURE: 129 MMHG | RESPIRATION RATE: 16 BRPM | BODY MASS INDEX: 29.66 KG/M2 | WEIGHT: 211.88 LBS

## 2018-10-10 DIAGNOSIS — K74.60 HEPATIC CIRRHOSIS, UNSPECIFIED HEPATIC CIRRHOSIS TYPE, UNSPECIFIED WHETHER ASCITES PRESENT: Primary | ICD-10-CM

## 2018-10-10 DIAGNOSIS — K70.30 CIRRHOSIS, LAENNEC'S: ICD-10-CM

## 2018-10-10 DIAGNOSIS — K76.82 HEPATIC ENCEPHALOPATHY: ICD-10-CM

## 2018-10-10 DIAGNOSIS — K74.60 HEPATIC CIRRHOSIS, UNSPECIFIED HEPATIC CIRRHOSIS TYPE, UNSPECIFIED WHETHER ASCITES PRESENT: ICD-10-CM

## 2018-10-10 DIAGNOSIS — I85.01 BLEEDING ESOPHAGEAL VARICES, UNSPECIFIED ESOPHAGEAL VARICES TYPE: ICD-10-CM

## 2018-10-10 LAB
AFP SERPL-MCNC: 5.6 NG/ML
AFP SERPL-MCNC: 5.6 NG/ML
ALBUMIN SERPL BCP-MCNC: 3.4 G/DL
ALBUMIN SERPL BCP-MCNC: 3.4 G/DL
ALP SERPL-CCNC: 154 U/L
ALP SERPL-CCNC: 154 U/L
ALT SERPL W/O P-5'-P-CCNC: 30 U/L
ALT SERPL W/O P-5'-P-CCNC: 30 U/L
ANION GAP SERPL CALC-SCNC: 10 MMOL/L
ANION GAP SERPL CALC-SCNC: 10 MMOL/L
ANISOCYTOSIS BLD QL SMEAR: SLIGHT
ANISOCYTOSIS BLD QL SMEAR: SLIGHT
AST SERPL-CCNC: 55 U/L
AST SERPL-CCNC: 55 U/L
BASOPHILS # BLD AUTO: 0.03 K/UL
BASOPHILS # BLD AUTO: 0.03 K/UL
BASOPHILS NFR BLD: 0.9 %
BASOPHILS NFR BLD: 0.9 %
BILIRUB SERPL-MCNC: 3.7 MG/DL
BILIRUB SERPL-MCNC: 3.7 MG/DL
BUN SERPL-MCNC: 4 MG/DL
BUN SERPL-MCNC: 4 MG/DL
CALCIUM SERPL-MCNC: 8.3 MG/DL
CALCIUM SERPL-MCNC: 8.3 MG/DL
CHLORIDE SERPL-SCNC: 106 MMOL/L
CHLORIDE SERPL-SCNC: 106 MMOL/L
CO2 SERPL-SCNC: 22 MMOL/L
CO2 SERPL-SCNC: 22 MMOL/L
CREAT SERPL-MCNC: 0.7 MG/DL
CREAT SERPL-MCNC: 0.7 MG/DL
DIFFERENTIAL METHOD: ABNORMAL
DIFFERENTIAL METHOD: ABNORMAL
EOSINOPHIL # BLD AUTO: 0.1 K/UL
EOSINOPHIL # BLD AUTO: 0.1 K/UL
EOSINOPHIL NFR BLD: 2.6 %
EOSINOPHIL NFR BLD: 2.6 %
ERYTHROCYTE [DISTWIDTH] IN BLOOD BY AUTOMATED COUNT: 18.8 %
ERYTHROCYTE [DISTWIDTH] IN BLOOD BY AUTOMATED COUNT: 18.8 %
EST. GFR  (AFRICAN AMERICAN): >60 ML/MIN/1.73 M^2
EST. GFR  (AFRICAN AMERICAN): >60 ML/MIN/1.73 M^2
EST. GFR  (NON AFRICAN AMERICAN): >60 ML/MIN/1.73 M^2
EST. GFR  (NON AFRICAN AMERICAN): >60 ML/MIN/1.73 M^2
GIANT PLATELETS BLD QL SMEAR: PRESENT
GIANT PLATELETS BLD QL SMEAR: PRESENT
GLUCOSE SERPL-MCNC: 172 MG/DL
GLUCOSE SERPL-MCNC: 172 MG/DL
HCT VFR BLD AUTO: 41.1 %
HCT VFR BLD AUTO: 41.1 %
HGB BLD-MCNC: 13.5 G/DL
HGB BLD-MCNC: 13.5 G/DL
INR PPP: 1.2
INR PPP: 1.2
LYMPHOCYTES # BLD AUTO: 0.9 K/UL
LYMPHOCYTES # BLD AUTO: 0.9 K/UL
LYMPHOCYTES NFR BLD: 24.5 %
LYMPHOCYTES NFR BLD: 24.5 %
MCH RBC QN AUTO: 28.4 PG
MCH RBC QN AUTO: 28.4 PG
MCHC RBC AUTO-ENTMCNC: 32.8 G/DL
MCHC RBC AUTO-ENTMCNC: 32.8 G/DL
MCV RBC AUTO: 86 FL
MCV RBC AUTO: 86 FL
MONOCYTES # BLD AUTO: 0.4 K/UL
MONOCYTES # BLD AUTO: 0.4 K/UL
MONOCYTES NFR BLD: 12.1 %
MONOCYTES NFR BLD: 12.1 %
NEUTROPHILS # BLD AUTO: 2.1 K/UL
NEUTROPHILS # BLD AUTO: 2.1 K/UL
NEUTROPHILS NFR BLD: 59.9 %
NEUTROPHILS NFR BLD: 59.9 %
PLATELET # BLD AUTO: 53 K/UL
PLATELET # BLD AUTO: 53 K/UL
PLATELET BLD QL SMEAR: ABNORMAL
PLATELET BLD QL SMEAR: ABNORMAL
PMV BLD AUTO: ABNORMAL FL
PMV BLD AUTO: ABNORMAL FL
POLYCHROMASIA BLD QL SMEAR: ABNORMAL
POLYCHROMASIA BLD QL SMEAR: ABNORMAL
POTASSIUM SERPL-SCNC: 3.7 MMOL/L
POTASSIUM SERPL-SCNC: 3.7 MMOL/L
PROT SERPL-MCNC: 7.2 G/DL
PROT SERPL-MCNC: 7.2 G/DL
PROTHROMBIN TIME: 13.1 SEC
PROTHROMBIN TIME: 13.1 SEC
RBC # BLD AUTO: 4.76 M/UL
RBC # BLD AUTO: 4.76 M/UL
SODIUM SERPL-SCNC: 138 MMOL/L
SODIUM SERPL-SCNC: 138 MMOL/L
WBC # BLD AUTO: 3.47 K/UL
WBC # BLD AUTO: 3.47 K/UL

## 2018-10-10 PROCEDURE — 85025 COMPLETE CBC W/AUTO DIFF WBC: CPT

## 2018-10-10 PROCEDURE — 36415 COLL VENOUS BLD VENIPUNCTURE: CPT

## 2018-10-10 PROCEDURE — 82105 ALPHA-FETOPROTEIN SERUM: CPT

## 2018-10-10 PROCEDURE — 99215 OFFICE O/P EST HI 40 MIN: CPT | Mod: S$GLB,,, | Performed by: INTERNAL MEDICINE

## 2018-10-10 PROCEDURE — 80053 COMPREHEN METABOLIC PANEL: CPT

## 2018-10-10 PROCEDURE — 85610 PROTHROMBIN TIME: CPT

## 2018-10-10 NOTE — PATIENT INSTRUCTIONS
1. Blood tests here today  2. Blood tests in 3 months in [place]  3. US in [place] now  4. US and blood tests in 6 months here on the day you see me in f/u  5. Get records form [facility]  Return 6 months

## 2018-12-19 ENCOUNTER — HISTORICAL (OUTPATIENT)
Dept: ADMINISTRATIVE | Facility: HOSPITAL | Age: 58
End: 2018-12-19

## 2018-12-19 LAB
ABS NEUT (OLG): 1.98 X10(3)/MCL (ref 2.1–9.2)
ALBUMIN SERPL-MCNC: 3 GM/DL (ref 3.4–5)
ALBUMIN/GLOB SERPL: 0.9 RATIO (ref 1.1–2)
ALP SERPL-CCNC: 146 UNIT/L (ref 50–136)
ALT SERPL-CCNC: 31 UNIT/L (ref 12–78)
APPEARANCE, UA: CLEAR
AST SERPL-CCNC: 63 UNIT/L (ref 15–37)
BACTERIA SPEC CULT: ABNORMAL /HPF
BASOPHILS # BLD AUTO: 0 X10(3)/MCL (ref 0–0.2)
BASOPHILS NFR BLD AUTO: 1 %
BILIRUB SERPL-MCNC: 2.3 MG/DL (ref 0.2–1)
BILIRUB UR QL STRIP: NEGATIVE
BILIRUBIN DIRECT+TOT PNL SERPL-MCNC: 1.1 MG/DL (ref 0–0.8)
BILIRUBIN DIRECT+TOT PNL SERPL-MCNC: 1.2 MG/DL (ref 0–0.5)
BUN SERPL-MCNC: 6 MG/DL (ref 7–18)
CALCIUM SERPL-MCNC: 8.1 MG/DL (ref 8.5–10.1)
CHLORIDE SERPL-SCNC: 103 MMOL/L (ref 98–107)
CHOLEST SERPL-MCNC: 117 MG/DL (ref 0–200)
CHOLEST/HDLC SERPL: 3.5 {RATIO} (ref 0–5)
CO2 SERPL-SCNC: 26 MMOL/L (ref 21–32)
COLOR UR: YELLOW
CREAT SERPL-MCNC: 0.58 MG/DL (ref 0.7–1.3)
CREAT UR-MCNC: <13 MG/DL
EOSINOPHIL # BLD AUTO: 0.1 X10(3)/MCL (ref 0–0.9)
EOSINOPHIL NFR BLD AUTO: 2 %
ERYTHROCYTE [DISTWIDTH] IN BLOOD BY AUTOMATED COUNT: 18.3 % (ref 11.5–17)
EST. AVERAGE GLUCOSE BLD GHB EST-MCNC: 177 MG/DL
GLOBULIN SER-MCNC: 3.3 GM/DL (ref 2.4–3.5)
GLUCOSE (UA): NEGATIVE
GLUCOSE SERPL-MCNC: 174 MG/DL (ref 74–106)
HBA1C MFR BLD: 7.8 % (ref 4.2–6.3)
HCT VFR BLD AUTO: 36.3 % (ref 42–52)
HDLC SERPL-MCNC: 33 MG/DL (ref 35–60)
HGB BLD-MCNC: 10.9 GM/DL (ref 14–18)
HGB UR QL STRIP: NEGATIVE
KETONES UR QL STRIP: NEGATIVE
LDLC SERPL CALC-MCNC: 55 MG/DL (ref 0–129)
LEUKOCYTE ESTERASE UR QL STRIP: ABNORMAL
LYMPHOCYTES # BLD AUTO: 0.6 X10(3)/MCL (ref 0.6–4.6)
LYMPHOCYTES NFR BLD AUTO: 20 %
MCH RBC QN AUTO: 28.5 PG (ref 27–31)
MCHC RBC AUTO-ENTMCNC: 30 GM/DL (ref 33–36)
MCV RBC AUTO: 95 FL (ref 80–94)
MICROALBUMIN UR-MCNC: <0.5 MG/DL
MONOCYTES # BLD AUTO: 0.4 X10(3)/MCL (ref 0.1–1.3)
MONOCYTES NFR BLD AUTO: 12 %
NEUTROPHILS # BLD AUTO: 1.98 X10(3)/MCL (ref 2.1–9.2)
NEUTROPHILS NFR BLD AUTO: 65 %
NITRITE UR QL STRIP: NEGATIVE
PH UR STRIP: 5.5 [PH] (ref 5–9)
PLATELET # BLD AUTO: 51 X10(3)/MCL (ref 130–400)
PMV BLD AUTO: 11.9 FL (ref 9.4–12.4)
POTASSIUM SERPL-SCNC: 4 MMOL/L (ref 3.5–5.1)
PROT SERPL-MCNC: 6.3 GM/DL (ref 6.4–8.2)
PROT UR QL STRIP: NEGATIVE
RBC # BLD AUTO: 3.82 X10(6)/MCL (ref 4.7–6.1)
RBC #/AREA URNS HPF: ABNORMAL /[HPF]
SODIUM SERPL-SCNC: 138 MMOL/L (ref 136–145)
SP GR UR STRIP: 1.01 (ref 1–1.03)
SQUAMOUS EPITHELIAL, UA: ABNORMAL
TRIGL SERPL-MCNC: 146 MG/DL (ref 30–150)
TSH SERPL-ACNC: 2.57 MIU/L (ref 0.36–3.74)
UROBILINOGEN UR STRIP-ACNC: 0.2
VLDLC SERPL CALC-MCNC: 29 MG/DL
WBC # SPEC AUTO: 3 X10(3)/MCL (ref 4.5–11.5)
WBC #/AREA URNS HPF: ABNORMAL /HPF

## 2018-12-19 RX ORDER — SPIRONOLACTONE 25 MG/1
TABLET ORAL
Qty: 360 TABLET | Refills: 0 | Status: SHIPPED | OUTPATIENT
Start: 2018-12-19 | End: 2019-03-27 | Stop reason: SDUPTHER

## 2019-01-05 DIAGNOSIS — R60.9 EDEMA, UNSPECIFIED TYPE: ICD-10-CM

## 2019-01-05 RX ORDER — SPIRONOLACTONE 100 MG/1
TABLET, FILM COATED ORAL
Qty: 90 TABLET | Refills: 0 | Status: SHIPPED | OUTPATIENT
Start: 2019-01-05 | End: 2019-04-08 | Stop reason: ALTCHOICE

## 2019-01-25 ENCOUNTER — HISTORICAL (OUTPATIENT)
Dept: RADIOLOGY | Facility: HOSPITAL | Age: 59
End: 2019-01-25

## 2019-01-25 LAB
ALBUMIN SERPL-MCNC: 3.2 GM/DL (ref 3.4–5)
ALBUMIN/GLOB SERPL: 0.9 {RATIO}
ALP SERPL-CCNC: 139 UNIT/L (ref 50–136)
ALT SERPL-CCNC: 28 UNIT/L (ref 12–78)
AMMONIA PLAS-MSCNC: 87 MCMOL/L (ref 11–32)
ANISOCYTOSIS BLD QL SMEAR: 1
APTT PPP: 34.6 SECOND(S) (ref 24.8–36.9)
AST SERPL-CCNC: 31 UNIT/L (ref 15–37)
BILIRUB SERPL-MCNC: 2.4 MG/DL (ref 0.2–1)
BILIRUBIN DIRECT+TOT PNL SERPL-MCNC: 1.2 MG/DL (ref 0–0.2)
BILIRUBIN DIRECT+TOT PNL SERPL-MCNC: 1.2 MG/DL (ref 0–0.8)
BUN SERPL-MCNC: 7 MG/DL (ref 7–18)
CALCIUM SERPL-MCNC: 8.6 MG/DL (ref 8.5–10.1)
CHLORIDE SERPL-SCNC: 104 MMOL/L (ref 98–107)
CO2 SERPL-SCNC: 26 MMOL/L (ref 21–32)
CREAT SERPL-MCNC: 0.58 MG/DL (ref 0.7–1.3)
ERYTHROCYTE [DISTWIDTH] IN BLOOD BY AUTOMATED COUNT: 17.1 % (ref 11.5–17)
GLOBULIN SER-MCNC: 3.5 GM/DL (ref 2.4–3.5)
GLUCOSE SERPL-MCNC: 243 MG/DL (ref 74–106)
HCT VFR BLD AUTO: 38.2 % (ref 42–52)
HGB BLD-MCNC: 12.1 GM/DL (ref 14–18)
HYPOCHROMIA BLD QL SMEAR: 0
INR PPP: 1.4 (ref 0–1.3)
MACROCYTES BLD QL SMEAR: 1
MCH RBC QN AUTO: 28.1 PG (ref 27–31)
MCHC RBC AUTO-ENTMCNC: 31.7 GM/DL (ref 33–36)
MCV RBC AUTO: 88.8 FL (ref 80–94)
MICROCYTES BLD QL SMEAR: 0
PLATELET # BLD AUTO: 48 X10(3)/MCL (ref 130–400)
PLATELET # BLD EST: ABNORMAL 10*3/UL
PMV BLD AUTO: ABNORMAL FL (ref 7.4–10.4)
POIKILOCYTOSIS BLD QL SMEAR: 1
POLYCHROMASIA BLD QL SMEAR: 0
POTASSIUM SERPL-SCNC: 4.2 MMOL/L (ref 3.5–5.1)
PROT SERPL-MCNC: 6.7 GM/DL (ref 6.4–8.2)
PROTHROMBIN TIME: 17.9 SECOND(S) (ref 12.2–14.7)
RBC # BLD AUTO: 4.3 X10(6)/MCL (ref 4.7–6.1)
SODIUM SERPL-SCNC: 136 MMOL/L (ref 136–145)
WBC # SPEC AUTO: 3.2 X10(3)/MCL (ref 4.5–11.5)

## 2019-01-31 ENCOUNTER — HISTORICAL (OUTPATIENT)
Dept: ADMINISTRATIVE | Facility: HOSPITAL | Age: 59
End: 2019-01-31

## 2019-01-31 LAB
APPEARANCE, UA: CLEAR
BACTERIA SPEC CULT: ABNORMAL /HPF
BILIRUB UR QL STRIP: NEGATIVE
COLOR UR: YELLOW
GLUCOSE (UA): ABNORMAL
HGB UR QL STRIP: NEGATIVE
KETONES UR QL STRIP: NEGATIVE
LEUKOCYTE ESTERASE UR QL STRIP: NEGATIVE
NITRITE UR QL STRIP: NEGATIVE
PH UR STRIP: 5 [PH] (ref 5–9)
PROT UR QL STRIP: NEGATIVE
RBC #/AREA URNS HPF: ABNORMAL /[HPF]
SP GR UR STRIP: 1.02 (ref 1–1.03)
SQUAMOUS EPITHELIAL, UA: ABNORMAL
UROBILINOGEN UR STRIP-ACNC: 1
WBC #/AREA URNS HPF: ABNORMAL /HPF

## 2019-02-16 ENCOUNTER — TELEPHONE (OUTPATIENT)
Dept: TRANSPLANT | Facility: CLINIC | Age: 59
End: 2019-02-16

## 2019-02-16 DIAGNOSIS — K76.82 HEPATIC ENCEPHALOPATHY: Primary | ICD-10-CM

## 2019-02-16 NOTE — TELEPHONE ENCOUNTER
Rifaximin ordered- sent to local pharmacy- if he cant get it I will need to reorder through specialty pharmacy- please let pt know

## 2019-02-18 ENCOUNTER — TELEPHONE (OUTPATIENT)
Dept: HEPATOLOGY | Facility: CLINIC | Age: 59
End: 2019-02-18

## 2019-02-18 DIAGNOSIS — K76.82 HEPATIC ENCEPHALOPATHY: ICD-10-CM

## 2019-02-21 ENCOUNTER — HISTORICAL (OUTPATIENT)
Dept: ENDOSCOPY | Facility: HOSPITAL | Age: 59
End: 2019-02-21

## 2019-02-21 LAB — CRC RECOMMENDATION EXT: NORMAL

## 2019-02-22 ENCOUNTER — TELEPHONE (OUTPATIENT)
Dept: PHARMACY | Facility: CLINIC | Age: 59
End: 2019-02-22

## 2019-03-04 DIAGNOSIS — K70.30 CIRRHOSIS, LAENNEC'S: ICD-10-CM

## 2019-03-04 RX ORDER — FUROSEMIDE 20 MG/1
TABLET ORAL
Qty: 120 TABLET | Refills: 0 | Status: SHIPPED | OUTPATIENT
Start: 2019-03-04 | End: 2019-04-08

## 2019-03-21 ENCOUNTER — HISTORICAL (OUTPATIENT)
Dept: ENDOSCOPY | Facility: HOSPITAL | Age: 59
End: 2019-03-21

## 2019-03-22 ENCOUNTER — TELEPHONE (OUTPATIENT)
Dept: HEPATOLOGY | Facility: CLINIC | Age: 59
End: 2019-03-22

## 2019-03-22 DIAGNOSIS — K76.82 HEPATIC ENCEPHALOPATHY: ICD-10-CM

## 2019-03-25 ENCOUNTER — HISTORICAL (OUTPATIENT)
Dept: ADMINISTRATIVE | Facility: HOSPITAL | Age: 59
End: 2019-03-25

## 2019-03-25 LAB
ABS NEUT (OLG): 1.55 X10(3)/MCL (ref 2.1–9.2)
ALBUMIN SERPL-MCNC: 3.1 GM/DL (ref 3.4–5)
ALBUMIN/GLOB SERPL: 0.9 RATIO (ref 1.1–2)
ALP SERPL-CCNC: 149 UNIT/L (ref 50–136)
ALT SERPL-CCNC: 35 UNIT/L (ref 12–78)
APPEARANCE, UA: CLEAR
AST SERPL-CCNC: 40 UNIT/L (ref 15–37)
BACTERIA SPEC CULT: ABNORMAL /HPF
BASOPHILS NFR BLD MANUAL: 1 % (ref 0–2)
BILIRUB SERPL-MCNC: 2.4 MG/DL (ref 0.2–1)
BILIRUB UR QL STRIP: ABNORMAL
BILIRUBIN DIRECT+TOT PNL SERPL-MCNC: 1.1 MG/DL (ref 0–0.8)
BILIRUBIN DIRECT+TOT PNL SERPL-MCNC: 1.3 MG/DL (ref 0–0.5)
BUN SERPL-MCNC: 6 MG/DL (ref 7–18)
CALCIUM SERPL-MCNC: 8 MG/DL (ref 8.5–10.1)
CHLORIDE SERPL-SCNC: 106 MMOL/L (ref 98–107)
CHOLEST SERPL-MCNC: 166 MG/DL (ref 0–200)
CHOLEST/HDLC SERPL: 5.4 {RATIO} (ref 0–5)
CO2 SERPL-SCNC: 26 MMOL/L (ref 21–32)
COLOR UR: ABNORMAL
CREAT SERPL-MCNC: 0.52 MG/DL (ref 0.7–1.3)
CREAT UR-MCNC: 209 MG/DL
EOSINOPHIL NFR BLD MANUAL: 1 % (ref 0–8)
ERYTHROCYTE [DISTWIDTH] IN BLOOD BY AUTOMATED COUNT: 17.2 % (ref 11.5–17)
EST. AVERAGE GLUCOSE BLD GHB EST-MCNC: 235 MG/DL
GLOBULIN SER-MCNC: 3.3 GM/DL (ref 2.4–3.5)
GLUCOSE (UA): ABNORMAL
GLUCOSE SERPL-MCNC: 194 MG/DL (ref 74–106)
HBA1C MFR BLD: 9.8 % (ref 4.2–6.3)
HCT VFR BLD AUTO: 39 % (ref 42–52)
HDLC SERPL-MCNC: 31 MG/DL (ref 35–60)
HGB BLD-MCNC: 12.2 GM/DL (ref 14–18)
HGB UR QL STRIP: NEGATIVE
KETONES UR QL STRIP: ABNORMAL
LDLC SERPL CALC-MCNC: 102 MG/DL (ref 0–129)
LEUKOCYTE ESTERASE UR QL STRIP: NEGATIVE
LYMPHOCYTES NFR BLD MANUAL: 18 % (ref 13–40)
MCH RBC QN AUTO: 26.7 PG (ref 27–31)
MCHC RBC AUTO-ENTMCNC: 31.3 GM/DL (ref 33–36)
MCV RBC AUTO: 85.3 FL (ref 80–94)
MICROALBUMIN UR-MCNC: 1.6 MG/DL
MICROALBUMIN/CREAT RATIO PNL UR: 7.7 MG/GM CR (ref 0–30)
MONOCYTES NFR BLD MANUAL: 4 % (ref 2–11)
NEUTROPHILS NFR BLD MANUAL: 76 % (ref 47–80)
NITRITE UR QL STRIP: NEGATIVE
PH UR STRIP: 6 [PH] (ref 5–9)
PLATELET # BLD AUTO: 43 X10(3)/MCL (ref 130–400)
PLATELET # BLD EST: ABNORMAL 10*3/UL
PMV BLD AUTO: 12.2 FL (ref 7.4–10.4)
POIKILOCYTOSIS BLD QL SMEAR: 1
POTASSIUM SERPL-SCNC: 3.7 MMOL/L (ref 3.5–5.1)
PROT SERPL-MCNC: 6.4 GM/DL (ref 6.4–8.2)
PROT UR QL STRIP: NEGATIVE
RBC # BLD AUTO: 4.57 X10(6)/MCL (ref 4.7–6.1)
RBC #/AREA URNS HPF: ABNORMAL /[HPF]
SODIUM SERPL-SCNC: 139 MMOL/L (ref 136–145)
SP GR UR STRIP: 1.03 (ref 1–1.03)
SQUAMOUS EPITHELIAL, UA: ABNORMAL
TRIGL SERPL-MCNC: 166 MG/DL (ref 30–150)
TSH SERPL-ACNC: 2.64 MIU/L (ref 0.36–3.74)
UROBILINOGEN UR STRIP-ACNC: 1
VLDLC SERPL CALC-MCNC: 33 MG/DL
WBC # SPEC AUTO: 2.8 X10(3)/MCL (ref 4.5–11.5)
WBC #/AREA URNS HPF: ABNORMAL /HPF

## 2019-03-26 ENCOUNTER — TELEPHONE (OUTPATIENT)
Dept: PHARMACY | Facility: CLINIC | Age: 59
End: 2019-03-26

## 2019-03-26 NOTE — TELEPHONE ENCOUNTER
I called the drug company and patient's application is benefit review, they are just waiting on the insurance company to send what their cist is to the patient.

## 2019-03-27 RX ORDER — SPIRONOLACTONE 25 MG/1
TABLET ORAL
Qty: 360 TABLET | Refills: 0 | Status: SHIPPED | OUTPATIENT
Start: 2019-03-27 | End: 2019-04-08

## 2019-04-08 ENCOUNTER — LAB VISIT (OUTPATIENT)
Dept: LAB | Facility: OTHER | Age: 59
End: 2019-04-08
Attending: INTERNAL MEDICINE
Payer: MEDICARE

## 2019-04-08 ENCOUNTER — OFFICE VISIT (OUTPATIENT)
Dept: HEPATOLOGY | Facility: CLINIC | Age: 59
End: 2019-04-08
Attending: INTERNAL MEDICINE
Payer: MEDICARE

## 2019-04-08 VITALS
RESPIRATION RATE: 17 BRPM | WEIGHT: 227.06 LBS | OXYGEN SATURATION: 95 % | BODY MASS INDEX: 30.75 KG/M2 | DIASTOLIC BLOOD PRESSURE: 70 MMHG | SYSTOLIC BLOOD PRESSURE: 108 MMHG | TEMPERATURE: 98 F | HEART RATE: 82 BPM | HEIGHT: 72 IN

## 2019-04-08 DIAGNOSIS — K74.60 HEPATIC CIRRHOSIS, UNSPECIFIED HEPATIC CIRRHOSIS TYPE, UNSPECIFIED WHETHER ASCITES PRESENT: ICD-10-CM

## 2019-04-08 DIAGNOSIS — K70.30 CIRRHOSIS, LAENNEC'S: ICD-10-CM

## 2019-04-08 DIAGNOSIS — I85.01 BLEEDING ESOPHAGEAL VARICES, UNSPECIFIED ESOPHAGEAL VARICES TYPE: ICD-10-CM

## 2019-04-08 DIAGNOSIS — K76.82 HEPATIC ENCEPHALOPATHY: ICD-10-CM

## 2019-04-08 DIAGNOSIS — K74.60 HEPATIC CIRRHOSIS, UNSPECIFIED HEPATIC CIRRHOSIS TYPE, UNSPECIFIED WHETHER ASCITES PRESENT: Primary | ICD-10-CM

## 2019-04-08 DIAGNOSIS — F10.11 ALCOHOL ABUSE, IN REMISSION: ICD-10-CM

## 2019-04-08 LAB
ALBUMIN SERPL BCP-MCNC: 3.1 G/DL (ref 3.5–5.2)
ALP SERPL-CCNC: 126 U/L (ref 55–135)
ALT SERPL W/O P-5'-P-CCNC: 32 U/L (ref 10–44)
ANION GAP SERPL CALC-SCNC: 9 MMOL/L (ref 8–16)
ANISOCYTOSIS BLD QL SMEAR: SLIGHT
AST SERPL-CCNC: 67 U/L (ref 10–40)
BASOPHILS # BLD AUTO: 0.03 K/UL (ref 0–0.2)
BASOPHILS NFR BLD: 0.6 % (ref 0–1.9)
BILIRUB SERPL-MCNC: 2.6 MG/DL (ref 0.1–1)
BUN SERPL-MCNC: 6 MG/DL (ref 6–20)
CALCIUM SERPL-MCNC: 8.4 MG/DL (ref 8.7–10.5)
CHLORIDE SERPL-SCNC: 104 MMOL/L (ref 95–110)
CO2 SERPL-SCNC: 23 MMOL/L (ref 23–29)
CREAT SERPL-MCNC: 0.7 MG/DL (ref 0.5–1.4)
DIFFERENTIAL METHOD: ABNORMAL
EOSINOPHIL # BLD AUTO: 0.2 K/UL (ref 0–0.5)
EOSINOPHIL NFR BLD: 4.1 % (ref 0–8)
ERYTHROCYTE [DISTWIDTH] IN BLOOD BY AUTOMATED COUNT: 18.3 % (ref 11.5–14.5)
EST. GFR  (AFRICAN AMERICAN): >60 ML/MIN/1.73 M^2
EST. GFR  (NON AFRICAN AMERICAN): >60 ML/MIN/1.73 M^2
GLUCOSE SERPL-MCNC: 150 MG/DL (ref 70–110)
HCT VFR BLD AUTO: 36.2 % (ref 40–54)
HGB BLD-MCNC: 12 G/DL (ref 14–18)
INR PPP: 1.2 (ref 0.8–1.2)
LYMPHOCYTES # BLD AUTO: 1.3 K/UL (ref 1–4.8)
LYMPHOCYTES NFR BLD: 27.1 % (ref 18–48)
MCH RBC QN AUTO: 27.6 PG (ref 27–31)
MCHC RBC AUTO-ENTMCNC: 33.1 G/DL (ref 32–36)
MCV RBC AUTO: 83 FL (ref 82–98)
MONOCYTES # BLD AUTO: 0.6 K/UL (ref 0.3–1)
MONOCYTES NFR BLD: 12.2 % (ref 4–15)
NEUTROPHILS # BLD AUTO: 2.6 K/UL (ref 1.8–7.7)
NEUTROPHILS NFR BLD: 56 % (ref 38–73)
OVALOCYTES BLD QL SMEAR: ABNORMAL
PLATELET # BLD AUTO: 65 K/UL (ref 150–350)
PLATELET BLD QL SMEAR: ABNORMAL
PMV BLD AUTO: ABNORMAL FL (ref 9.2–12.9)
POIKILOCYTOSIS BLD QL SMEAR: SLIGHT
POTASSIUM SERPL-SCNC: 3.3 MMOL/L (ref 3.5–5.1)
PROT SERPL-MCNC: 6.2 G/DL (ref 6–8.4)
PROTHROMBIN TIME: 13.6 SEC (ref 9–12.5)
RBC # BLD AUTO: 4.35 M/UL (ref 4.6–6.2)
SODIUM SERPL-SCNC: 136 MMOL/L (ref 136–145)
WBC # BLD AUTO: 4.68 K/UL (ref 3.9–12.7)

## 2019-04-08 PROCEDURE — 99215 OFFICE O/P EST HI 40 MIN: CPT | Mod: S$GLB,,, | Performed by: INTERNAL MEDICINE

## 2019-04-08 PROCEDURE — 36415 COLL VENOUS BLD VENIPUNCTURE: CPT

## 2019-04-08 PROCEDURE — 80321 ALCOHOLS BIOMARKERS 1OR 2: CPT

## 2019-04-08 PROCEDURE — 99215 PR OFFICE/OUTPT VISIT, EST, LEVL V, 40-54 MIN: ICD-10-PCS | Mod: S$GLB,,, | Performed by: INTERNAL MEDICINE

## 2019-04-08 PROCEDURE — 85025 COMPLETE CBC W/AUTO DIFF WBC: CPT

## 2019-04-08 PROCEDURE — 80053 COMPREHEN METABOLIC PANEL: CPT

## 2019-04-08 PROCEDURE — 85610 PROTHROMBIN TIME: CPT

## 2019-04-08 RX ORDER — FUROSEMIDE 20 MG/1
120 TABLET ORAL DAILY
Qty: 180 TABLET | Refills: 11 | Status: ON HOLD | OUTPATIENT
Start: 2019-04-08 | End: 2019-10-18 | Stop reason: SDUPTHER

## 2019-04-08 RX ORDER — SPIRONOLACTONE 25 MG/1
TABLET ORAL
Qty: 360 TABLET | Refills: 11 | Status: ON HOLD | OUTPATIENT
Start: 2019-04-08 | End: 2019-10-18 | Stop reason: SDUPTHER

## 2019-04-08 NOTE — LETTER
April 14, 2019      Preston Matthew II, MD  [address] [place] [address] [address]  [place] [address]           [facility] Hepat[facility] [id number]  [address] [facility] [address]  [facility] [address]  Phone: [phone]  Fax: [phone]          Patient: Colin Reilly   MR Number: [id number]   YOB: 1960   Date of Visit: 4/8/2019       Dear Dr. Preston Matthew II:    Thank you for referring Colin Reilly to me for evaluation. Attached you will find relevant portions of my assessment and plan of care.    If you have questions, please do not hesitate to call me. I look forward to following Colin Reilly along with you.    Sincerely,    Elizabeth Meneses MD    Enclosure  CC:  No Recipients    If you would like to receive this communication electronically, please contact [email or web address] or [phone] to request more information on [employer] access.    For providers and/or their staff who would like to refer a patient to [facility], please contact us through our one-stop-shop provider referral line, [facility], at [phone].    If you feel you have received this communication in error or would no longer like to receive these types of communications, please e-mail [email or web address]

## 2019-04-08 NOTE — PATIENT INSTRUCTIONS
1. Labs today  2. Labs every 3 months in [place]  3. Increase diuretics: lasix 120 mg daily and aldactone to 150 mg daily  4. Do CT scan locally in [place]  5. Schedule a repeat EGD now  Return 3 months

## 2019-04-15 LAB — PHOSPHATIDYLETHANOL (PETH): NEGATIVE NG/ML

## 2019-04-22 ENCOUNTER — TELEPHONE (OUTPATIENT)
Dept: HEPATOLOGY | Facility: CLINIC | Age: 59
End: 2019-04-22

## 2019-04-24 DIAGNOSIS — R60.9 EDEMA, UNSPECIFIED TYPE: ICD-10-CM

## 2019-04-24 RX ORDER — SPIRONOLACTONE 100 MG/1
TABLET, FILM COATED ORAL
Qty: 90 TABLET | Refills: 0 | Status: SHIPPED | OUTPATIENT
Start: 2019-04-24 | End: 2019-08-11 | Stop reason: SDUPTHER

## 2019-05-01 ENCOUNTER — TELEPHONE (OUTPATIENT)
Dept: PHARMACY | Facility: CLINIC | Age: 59
End: 2019-05-01

## 2019-05-01 NOTE — TELEPHONE ENCOUNTER
Patient was through the drug program Xifaxan, a denial letter will be mailed out to the patient and doctor office. The patient can submit an appeal once they receive denial letter.

## 2019-05-29 ENCOUNTER — TELEPHONE (OUTPATIENT)
Dept: PHARMACY | Facility: CLINIC | Age: 59
End: 2019-05-29

## 2019-07-08 ENCOUNTER — HISTORICAL (OUTPATIENT)
Dept: LAB | Facility: HOSPITAL | Age: 59
End: 2019-07-08

## 2019-07-08 LAB
ABS NEUT (OLG): 1.9 X10(3)/MCL (ref 1.5–6.9)
ALBUMIN SERPL-MCNC: 2.8 GM/DL (ref 3.4–5)
ALBUMIN/GLOB SERPL: 0.7 RATIO
ALP SERPL-CCNC: 161 UNIT/L (ref 30–113)
ALT SERPL-CCNC: 24 UNIT/L (ref 10–45)
APPEARANCE, UA: CLEAR
AST SERPL-CCNC: 36 UNIT/L (ref 15–37)
BACTERIA SPEC CULT: NORMAL /HPF
BASOPHILS NFR BLD MANUAL: 0 % (ref 0–1)
BILIRUB SERPL-MCNC: 2.4 MG/DL (ref 0.1–0.9)
BILIRUB UR QL STRIP: NEGATIVE
BILIRUBIN DIRECT+TOT PNL SERPL-MCNC: 1.1 MG/DL
BILIRUBIN DIRECT+TOT PNL SERPL-MCNC: 1.3 MG/DL (ref 0–0.3)
BUN SERPL-MCNC: 5 MG/DL (ref 10–20)
CALCIUM SERPL-MCNC: 8 MG/DL (ref 8–10.5)
CHLORIDE SERPL-SCNC: 93 MMOL/L (ref 100–108)
CHOLEST SERPL-MCNC: 108 MG/DL (ref 140–200)
CHOLEST/HDLC SERPL: 4 MG/DL (ref 0–8)
CO2 SERPL-SCNC: 26 MMOL/L (ref 21–35)
COLOR UR: ABNORMAL
CREAT SERPL-MCNC: 0.88 MG/DL (ref 0.7–1.3)
EOSINOPHIL NFR BLD MANUAL: 2 % (ref 0–5)
ERYTHROCYTE [DISTWIDTH] IN BLOOD BY AUTOMATED COUNT: 16.6 % (ref 11.5–17)
EST. AVERAGE GLUCOSE BLD GHB EST-MCNC: 240 MG/DL
GLOBULIN SER-MCNC: 4.1 GM/DL
GLUCOSE (UA): >=1000 MG/DL
GLUCOSE SERPL-MCNC: 557 MG/DL (ref 75–116)
GRANULOCYTES NFR BLD MANUAL: 69 % (ref 47–80)
HBA1C MFR BLD: 10 % (ref 4.8–6)
HCT VFR BLD AUTO: 38.2 % (ref 42–52)
HDLC SERPL-MCNC: 25 MG/DL (ref 35–59)
HGB BLD-MCNC: 12.1 GM/DL (ref 14–18)
HGB UR QL STRIP: NEGATIVE
KETONES UR QL STRIP: NEGATIVE
LDLC SERPL CALC-MCNC: 68 MG/DL (ref 100–129)
LEUKOCYTE ESTERASE UR QL STRIP: ABNORMAL
LYMPHOCYTES NFR BLD MANUAL: 17 % (ref 15–40)
MCH RBC QN AUTO: 28 PG (ref 27–34)
MCHC RBC AUTO-ENTMCNC: 32 GM/DL (ref 31–36)
MCV RBC AUTO: 90 FL (ref 80–99)
MONOCYTES NFR BLD MANUAL: 12 % (ref 4–12)
NITRITE UR QL STRIP: NEGATIVE
PH UR STRIP: 7 [PH]
PLATELET # BLD AUTO: 51 X10(3)/MCL (ref 140–400)
PLATELET # BLD EST: ABNORMAL 10*3/UL
PMV BLD AUTO: ABNORMAL FL (ref 6.8–10)
POTASSIUM SERPL-SCNC: 3.7 MMOL/L (ref 3.6–5.2)
PROT SERPL-MCNC: 6.9 GM/DL (ref 6.4–8.2)
PROT UR QL STRIP: NEGATIVE
RBC # BLD AUTO: 4.26 X10(6)/MCL (ref 4.7–6.1)
RBC #/AREA URNS HPF: NORMAL /HPF
RBC MORPH BLD: NORMAL
SODIUM SERPL-SCNC: 128 MMOL/L (ref 135–145)
SP GR UR STRIP: <=1.005
SQUAMOUS EPITHELIAL, UA: NORMAL /LPF
TRIGL SERPL-MCNC: 176 MG/DL (ref 35–150)
TSH SERPL-ACNC: 1.48 MIU/ML (ref 0.36–3.74)
UROBILINOGEN UR STRIP-ACNC: 0.2 EU/DL
VLDLC SERPL CALC-MCNC: 35 MG/DL
WBC # SPEC AUTO: 2.8 X10(3)/MCL (ref 4.5–11.5)
WBC #/AREA URNS HPF: NORMAL /HPF

## 2019-07-31 NOTE — PROGRESS NOTES
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 (stopped drinking several years ago-2012). His liver disease has been complicated by an esophageal variceal bleed, ascites. MELD has been <15. Labs from 4/8/19 reveal: Tbil 2.6, alb 3.1, INR 1.2, plts 65. EGD 03/18 showed small varices.    His HE is well controlled. He is due for an US to screen for HCC. He has experienced an increase in his weight and has gained 15 lbs. He is not eating a low salt diet    MELD-Na score: 13 at 10/10/2018  2:23 PM  MELD score: 13 at 10/10/2018  2:23 PM  Calculated from:  Serum Creatinine: 0.7 mg/dL (Rounded to 1 mg/dL) at 10/10/2018  2:23 PM  Serum Sodium: 138 mmol/L (Rounded to 137 mmol/L) at 10/10/2018  2:23 PM  Total Bilirubin: 3.7 mg/dL at 10/10/2018  2:23 PM  INR(ratio): 1.2 at 10/10/2018  2:23 PM  Age: 58 years    Outpatient Encounter Medications as of 4/8/2019   Medication Sig Dispense Refill    B.ANI/L.ACI/L.SAL/L.PLAN/L.JITENDRA (PROBIOTIC FORMULA ORAL) Take by mouth.      cyclobenzaprine (FLEXERIL) 10 MG tablet Take 10 mg by mouth once daily.       escitalopram oxalate (LEXAPRO) 20 MG tablet TK 1 T PO QD. DISCONTINUE TRAZODONE  2    fluticasone (FLONASE) 50 mcg/actuation nasal spray 1 spray by Each Nare route once daily.       furosemide (LASIX) 20 MG tablet TAKE 4 TABLETS BY MOUTH ONCE DAILY 120 tablet 0    gabapentin (NEURONTIN) 300 MG capsule Take 600 mg by mouth 2 (two) times daily.       hydrocodone-acetaminophen 10-325mg (NORCO)  mg Tab Take 10 tablets by mouth 3 (three) times daily.       insulin aspart protamine-insulin aspart (NOVOLOG 70/30) 100 unit/mL (70-30) InPn pen Inject 25 Units into the skin 3 (three) times daily before meals.       INSULIN GLARGINE,HUM.REC.ANLOG (LANTUS SOLOSTAR SUBQ) Inject 75 Units into the skin every evening.       lactobacillus acidophilus Cap Take 1 capsule by mouth once daily.       lactulose (CHRONULAC) 10 gram/15 mL solution Take 30 mLs by mouth 3 (three) times daily.       lisinopril 10 MG tablet Take 10 mg by mouth once daily.      nebivolol (BYSTOLIC) 10 MG Tab Take 10 mg by mouth once daily.      oxybutynin (DITROPAN) 5 MG Tab Take 1 tablet (5 mg total) by mouth every evening. 30 tablet 11    rifAXIMin (XIFAXAN) 550 mg Tab Take 1 tablet (550 mg total) by mouth 2 (two) times daily. 30 tablet 11    sildenafil (REVATIO) 20 mg Tab Take 1 tablet (20 mg total) by mouth as needed. 50 tablet 11    spironolactone (ALDACTONE) 100 MG tablet TAKE 1 TABLET(100 MG) BY MOUTH EVERY DAY 90 tablet 0    spironolactone (ALDACTONE) 25 MG tablet TAKE 4 TABLETS BY MOUTH DAILY FOR FLUID 360 tablet 0    tamsulosin (FLOMAX) 0.4 mg Cp24 Take 1 capsule (0.4 mg total) by mouth once daily. 30 capsule 11    finasteride (PROSCAR) 5 mg tablet Take 1 tablet (5 mg total) by mouth once daily. 30 tablet 11    pantoprazole (PROTONIX) 40 MG tablet Take 1 tablet (40 mg total) by mouth 2 (two) times daily. 180 tablet 1     No facility-administered encounter medications on file as of 4/8/2019.      Review of patient's allergies indicates:  No Known Allergies  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    Esophageal varices     GERD (gastroesophageal reflux disease)     Hepatic encephalopathy 7/8/2014    Hepatitis C virus infection 07/08/2014    tx with interferon 3-4 mos- stopped for unclear reasons Tattoos-first at age 17 only risk factor (HCVAB negative 08/2017)    HTN (hypertension) 7/8/2014    Hypertension     Insulin dependent diabetes mellitus     Renal mass, left 7/8/2014    6.3 x 5.7 x 5.6 complex mass    Splenomegaly 7/8/2014    Umbilical hernia 7/8/2014       Review of Systems   Constitutional: Negative.    HENT: Negative.    Eyes: Negative.    Respiratory:  Negative.    Genitourinary: Negative.    Musculoskeletal: Negative.    Skin: Negative.    Neurological: Negative.      Vitals:    04/08/19 1435   BP: 108/70   Pulse: 82   Resp: 17   Temp: 98.2 °F (36.8 °C)       Physical Exam   Constitutional: He is oriented to person, place, and time. He appears well-developed and well-nourished.   HENT:   Head: Normocephalic and atraumatic.   Eyes: Pupils are equal, round, and reactive to light. Conjunctivae and EOM are normal. No scleral icterus.   Neck: Normal range of motion. Neck supple. No thyromegaly present.   Cardiovascular: Normal rate, regular rhythm and normal heart sounds.   Pulmonary/Chest: Effort normal and breath sounds normal. He has no rales.   Abdominal: Soft. Bowel sounds are normal. He exhibits distension (umbilical hernia, easily reducible). He exhibits no mass. There is no tenderness.   Musculoskeletal: Normal range of motion. He exhibits edema.   Neurological: He is alert and oriented to person, place, and time.   Skin: Skin is warm and dry. No rash noted.   Psychiatric: He has a normal mood and affect.   Vitals reviewed.      Lab Results   Component Value Date     (H) 10/10/2018     (H) 10/10/2018     11/19/2015    BUN 4 (L) 10/10/2018    BUN 4 (L) 10/10/2018    BUN 5 03/20/2017    CREATININE 0.7 10/10/2018    CREATININE 0.7 10/10/2018    CREATININE 0.8 10/18/2016    CALCIUM 8.3 (L) 10/10/2018    CALCIUM 8.3 (L) 10/10/2018    CALCIUM 8.6 10/18/2016     10/10/2018     10/10/2018     10/18/2016    K 3.7 10/10/2018    K 3.7 10/10/2018    K 4.0 10/18/2016     10/10/2018     10/10/2018     10/18/2016    PROT 7.2 10/10/2018    PROT 7.2 10/10/2018    CO2 22 (L) 10/10/2018    CO2 22 (L) 10/10/2018    CO2 24.0 10/18/2016    ANIONGAP 10 10/10/2018    ANIONGAP 10 10/10/2018    WBC 3.47 (L) 10/10/2018    WBC 3.47 (L) 10/10/2018    WBC 4.6 10/18/2016    RBC 4.76 10/10/2018    RBC 4.76 10/10/2018    HGB 13.5 (L)  10/10/2018    HGB 13.5 (L) 10/10/2018    HCT 41.1 10/10/2018    HCT 41.1 10/10/2018    MCV 86 10/10/2018    MCV 86 10/10/2018    MCH 28.4 10/10/2018    MCH 28.4 10/10/2018    MCHC 32.8 10/10/2018    MCHC 32.8 10/10/2018     Lab Results   Component Value Date    RDW 18.8 (H) 10/10/2018    RDW 18.8 (H) 10/10/2018    PLT 53 (L) 10/10/2018    PLT 53 (L) 10/10/2018    MPV SEE COMMENT 10/10/2018    MPV SEE COMMENT 10/10/2018    MPV n/a 11/19/2015    GRAN 2.1 10/10/2018    GRAN 59.9 10/10/2018    GRAN 2.1 10/10/2018    GRAN 59.9 10/10/2018    LYMPH 0.9 (L) 10/10/2018    LYMPH 24.5 10/10/2018    LYMPH 0.9 (L) 10/10/2018    LYMPH 24.5 10/10/2018    MONO 0.4 10/10/2018    MONO 12.1 10/10/2018    MONO 0.4 10/10/2018    MONO 12.1 10/10/2018    EOSINOPHIL 2.6 10/10/2018    EOSINOPHIL 2.6 10/10/2018    BASOPHIL 0.9 10/10/2018    BASOPHIL 0.9 10/10/2018    EOS 0.1 10/10/2018    EOS 0.1 10/10/2018    BASO 0.03 10/10/2018    BASO 0.03 10/10/2018    APTT 24.3 07/08/2014    GROUPTRH A NEG 01/05/2015    CHOL 178 10/18/2016    TRIG 134 10/18/2016    HDL 35 10/18/2016    CHOLHDL 25.7 07/08/2014    TOTALCHOLEST 3.9 07/08/2014    ALBUMIN 3.4 (L) 10/10/2018    ALBUMIN 3.4 (L) 10/10/2018    ALBUMIN 3.80 10/18/2016    BILIDIR 1.2 (H) 03/14/2018    AST 55 (H) 10/10/2018    AST 55 (H) 10/10/2018    AST 32 10/18/2016    ALT 30 10/10/2018    ALT 30 10/10/2018    ALT 32 10/18/2016    ALKPHOS 154 (H) 10/10/2018    ALKPHOS 154 (H) 10/10/2018    LABPROT 13.1 (H) 10/10/2018    LABPROT 13.1 (H) 10/10/2018    INR 1.2 10/10/2018    INR 1.2 10/10/2018    INR 1.1 01/15/2016    PSA 0.44 06/19/2018    QUANTIFERON Negative 08/13/2014     MELD-Na score: 13 at 10/10/2018  2:23 PM  MELD score: 13 at 10/10/2018  2:23 PM  Calculated from:  Serum Creatinine: 0.7 mg/dL (Rounded to 1 mg/dL) at 10/10/2018  2:23 PM  Serum Sodium: 138 mmol/L (Rounded to 137 mmol/L) at 10/10/2018  2:23 PM  Total Bilirubin: 3.7 mg/dL at 10/10/2018  2:23 PM  INR(ratio): 1.2 at 10/10/2018   2:23 PM  Age: 58 years     Assessment and Plan:    Colin Reilly is a 58 y.o. male withCirrhosis  1. HE: Continue lactulose. Continue rifaximin  2. Cirrhosis, decompensated: check MELD labs today and every 12 weeks. Refer for liver transplant when >15.   3. Repeat upper endoscopy in 2019 to screen for EV-had EV banded  4. Ascites and edema : continue diuretics  5. Decompensated cirrhosis. Remains medically early for liver transplant. US now to screen for HCC    .    Return 6 months. . . .    no

## 2019-08-11 DIAGNOSIS — R60.9 EDEMA, UNSPECIFIED TYPE: ICD-10-CM

## 2019-08-12 RX ORDER — SPIRONOLACTONE 100 MG/1
TABLET, FILM COATED ORAL
Qty: 90 TABLET | Refills: 0 | Status: ON HOLD | OUTPATIENT
Start: 2019-08-12 | End: 2019-10-18 | Stop reason: HOSPADM

## 2019-08-23 ENCOUNTER — TELEPHONE (OUTPATIENT)
Dept: HEPATOLOGY | Facility: CLINIC | Age: 59
End: 2019-08-23

## 2019-08-23 NOTE — TELEPHONE ENCOUNTER
----- Message from Caron Catherine sent at 8/22/2019  4:20 PM CDT -----  Contact: Pt  Needs Advice    Reason for call: Schedule f/u appt        Communication Preference: [phone]     Additional Information: N/A

## 2019-09-13 ENCOUNTER — TELEPHONE (OUTPATIENT)
Dept: HEPATOLOGY | Facility: CLINIC | Age: 59
End: 2019-09-13

## 2019-09-13 NOTE — TELEPHONE ENCOUNTER
MA called patient reschedule his 9/20 appt he said he have a ride issue. He accepted 10/3 mailed new appt reminder to patient.

## 2019-10-15 ENCOUNTER — TELEPHONE (OUTPATIENT)
Dept: HEPATOLOGY | Facility: CLINIC | Age: 59
End: 2019-10-15

## 2019-10-15 NOTE — TELEPHONE ENCOUNTER
----- Message from Elizabeth Meneses MD sent at 10/15/2019 11:46 AM CDT -----  Contact:   Pt  [phone]  If he is leaking ascites- he needs to go to the ER. Please call  ----- Message -----  From: Gwendolyn Flores MA  Sent: 10/15/2019  11:21 AM CDT  To: Elizabeth Meneses MD        ----- Message -----  From: Moriah Johnston  Sent: 10/15/2019  10:57 AM CDT  To: [facility] Staff    Pt called stating that his stomach has blown up a bit much,  Pt having water leaking out of his right side through his skin..  Pt seeking further medical attention ..   Call  The pt back to discuss

## 2019-10-15 NOTE — TELEPHONE ENCOUNTER
Call returned to the patient.  He stated he is leaking on his abd and near his belly button. My shirt was all wet. I am swollen and having pain.  Dr Meneses wants you to go to the ER.  I will do that.  Where will you go?  I will go to [facility].  Please keep us updated on what how you are feeling and what they said. Pt agreed.

## 2019-10-16 ENCOUNTER — HOSPITAL ENCOUNTER (OUTPATIENT)
Facility: HOSPITAL | Age: 59
LOS: 1 days | Discharge: HOME | End: 2019-10-18
Attending: EMERGENCY MEDICINE | Admitting: HOSPITALIST
Payer: MEDICARE

## 2019-10-16 ENCOUNTER — TELEPHONE (OUTPATIENT)
Dept: HEPATOLOGY | Facility: CLINIC | Age: 59
End: 2019-10-16

## 2019-10-16 ENCOUNTER — TELEPHONE (OUTPATIENT)
Dept: TRANSPLANT | Facility: CLINIC | Age: 59
End: 2019-10-16

## 2019-10-16 DIAGNOSIS — K76.6 PORTAL HYPERTENSION: ICD-10-CM

## 2019-10-16 DIAGNOSIS — R60.1 ANASARCA: ICD-10-CM

## 2019-10-16 DIAGNOSIS — E11.9 TYPE 2 DIABETES MELLITUS WITHOUT COMPLICATION, WITH LONG-TERM CURRENT USE OF INSULIN: ICD-10-CM

## 2019-10-16 DIAGNOSIS — R18.8 OTHER ASCITES: ICD-10-CM

## 2019-10-16 DIAGNOSIS — F10.11 ALCOHOL ABUSE, IN REMISSION: ICD-10-CM

## 2019-10-16 DIAGNOSIS — K74.60 DECOMPENSATED HEPATIC CIRRHOSIS: Primary | ICD-10-CM

## 2019-10-16 DIAGNOSIS — R10.84 GENERALIZED ABDOMINAL PAIN: ICD-10-CM

## 2019-10-16 DIAGNOSIS — I10 ESSENTIAL HYPERTENSION: ICD-10-CM

## 2019-10-16 DIAGNOSIS — K76.82 HEPATIC ENCEPHALOPATHY: ICD-10-CM

## 2019-10-16 DIAGNOSIS — I85.01 BLEEDING ESOPHAGEAL VARICES, UNSPECIFIED ESOPHAGEAL VARICES TYPE: ICD-10-CM

## 2019-10-16 DIAGNOSIS — R19.8 UMBILICUS DISCHARGE: ICD-10-CM

## 2019-10-16 DIAGNOSIS — K21.9 GASTROESOPHAGEAL REFLUX DISEASE WITHOUT ESOPHAGITIS: ICD-10-CM

## 2019-10-16 DIAGNOSIS — Z79.4 TYPE 2 DIABETES MELLITUS WITHOUT COMPLICATION, WITH LONG-TERM CURRENT USE OF INSULIN: ICD-10-CM

## 2019-10-16 DIAGNOSIS — K72.90 DECOMPENSATED HEPATIC CIRRHOSIS: Primary | ICD-10-CM

## 2019-10-16 DIAGNOSIS — K70.31 ASCITES DUE TO ALCOHOLIC CIRRHOSIS: ICD-10-CM

## 2019-10-16 DIAGNOSIS — D62 ACUTE BLOOD LOSS ANEMIA: ICD-10-CM

## 2019-10-16 DIAGNOSIS — R16.1 SPLENOMEGALY: ICD-10-CM

## 2019-10-16 DIAGNOSIS — G89.29 CHRONIC LOW BACK PAIN WITH SCIATICA, SCIATICA LATERALITY UNSPECIFIED, UNSPECIFIED BACK PAIN LATERALITY: ICD-10-CM

## 2019-10-16 DIAGNOSIS — K31.89 PORTAL HYPERTENSIVE GASTROPATHY: ICD-10-CM

## 2019-10-16 DIAGNOSIS — R18.8 OTHER ASCITES: Primary | ICD-10-CM

## 2019-10-16 DIAGNOSIS — R60.9 EDEMA, UNSPECIFIED TYPE: ICD-10-CM

## 2019-10-16 DIAGNOSIS — M54.40 CHRONIC LOW BACK PAIN WITH SCIATICA, SCIATICA LATERALITY UNSPECIFIED, UNSPECIFIED BACK PAIN LATERALITY: ICD-10-CM

## 2019-10-16 DIAGNOSIS — K70.30 CIRRHOSIS, LAENNEC'S: ICD-10-CM

## 2019-10-16 DIAGNOSIS — K76.6 PORTAL HYPERTENSIVE GASTROPATHY: ICD-10-CM

## 2019-10-16 LAB
ABO + RH BLD: NORMAL
ALBUMIN SERPL BCP-MCNC: 2.8 G/DL (ref 3.5–5.2)
ALP SERPL-CCNC: 156 U/L (ref 55–135)
ALT SERPL W/O P-5'-P-CCNC: 26 U/L (ref 10–44)
ANION GAP SERPL CALC-SCNC: 8 MMOL/L (ref 8–16)
APTT BLDCRRT: 28.1 SEC (ref 21–32)
AST SERPL-CCNC: 64 U/L (ref 10–40)
BASOPHILS # BLD AUTO: 0.02 K/UL (ref 0–0.2)
BASOPHILS NFR BLD: 0.7 % (ref 0–1.9)
BILIRUB SERPL-MCNC: 2.3 MG/DL (ref 0.1–1)
BLD GP AB SCN CELLS X3 SERPL QL: NORMAL
BUN SERPL-MCNC: 5 MG/DL (ref 6–20)
CALCIUM SERPL-MCNC: 7.5 MG/DL (ref 8.7–10.5)
CHLORIDE SERPL-SCNC: 104 MMOL/L (ref 95–110)
CO2 SERPL-SCNC: 25 MMOL/L (ref 23–29)
CREAT SERPL-MCNC: 0.8 MG/DL (ref 0.5–1.4)
DIFFERENTIAL METHOD: ABNORMAL
EOSINOPHIL # BLD AUTO: 0.1 K/UL (ref 0–0.5)
EOSINOPHIL NFR BLD: 2.5 % (ref 0–8)
ERYTHROCYTE [DISTWIDTH] IN BLOOD BY AUTOMATED COUNT: 19.5 % (ref 11.5–14.5)
EST. GFR  (AFRICAN AMERICAN): >60 ML/MIN/1.73 M^2
EST. GFR  (NON AFRICAN AMERICAN): >60 ML/MIN/1.73 M^2
GLUCOSE SERPL-MCNC: 134 MG/DL (ref 70–110)
HCT VFR BLD AUTO: 32.3 % (ref 40–54)
HGB BLD-MCNC: 10.3 G/DL (ref 14–18)
IMM GRANULOCYTES # BLD AUTO: 0.01 K/UL (ref 0–0.04)
IMM GRANULOCYTES NFR BLD AUTO: 0.4 % (ref 0–0.5)
INR PPP: 1.6 (ref 0.8–1.2)
LACTATE SERPL-SCNC: 1.5 MMOL/L (ref 0.5–2.2)
LIPASE SERPL-CCNC: 46 U/L (ref 4–60)
LYMPHOCYTES # BLD AUTO: 0.8 K/UL (ref 1–4.8)
LYMPHOCYTES NFR BLD: 27.5 % (ref 18–48)
MCH RBC QN AUTO: 28.8 PG (ref 27–31)
MCHC RBC AUTO-ENTMCNC: 31.9 G/DL (ref 32–36)
MCV RBC AUTO: 90 FL (ref 82–98)
MONOCYTES # BLD AUTO: 0.3 K/UL (ref 0.3–1)
MONOCYTES NFR BLD: 10.4 % (ref 4–15)
NEUTROPHILS # BLD AUTO: 1.6 K/UL (ref 1.8–7.7)
NEUTROPHILS NFR BLD: 58.5 % (ref 38–73)
NRBC BLD-RTO: 0 /100 WBC
PLATELET # BLD AUTO: 54 K/UL (ref 150–350)
PMV BLD AUTO: ABNORMAL FL (ref 9.2–12.9)
POTASSIUM SERPL-SCNC: 3.5 MMOL/L (ref 3.5–5.1)
PROT SERPL-MCNC: 6.3 G/DL (ref 6–8.4)
PROTHROMBIN TIME: 15.8 SEC (ref 9–12.5)
RBC # BLD AUTO: 3.58 M/UL (ref 4.6–6.2)
SODIUM SERPL-SCNC: 137 MMOL/L (ref 136–145)
WBC # BLD AUTO: 2.8 K/UL (ref 3.9–12.7)

## 2019-10-16 PROCEDURE — 83690 ASSAY OF LIPASE: CPT

## 2019-10-16 PROCEDURE — 96375 TX/PRO/DX INJ NEW DRUG ADDON: CPT

## 2019-10-16 PROCEDURE — 96374 THER/PROPH/DIAG INJ IV PUSH: CPT

## 2019-10-16 PROCEDURE — 99285 EMERGENCY DEPT VISIT HI MDM: CPT | Mod: ,,, | Performed by: EMERGENCY MEDICINE

## 2019-10-16 PROCEDURE — 83036 HEMOGLOBIN GLYCOSYLATED A1C: CPT

## 2019-10-16 PROCEDURE — 86850 RBC ANTIBODY SCREEN: CPT

## 2019-10-16 PROCEDURE — 85025 COMPLETE CBC W/AUTO DIFF WBC: CPT

## 2019-10-16 PROCEDURE — 85730 THROMBOPLASTIN TIME PARTIAL: CPT

## 2019-10-16 PROCEDURE — C9113 INJ PANTOPRAZOLE SODIUM, VIA: HCPCS | Performed by: STUDENT IN AN ORGANIZED HEALTH CARE EDUCATION/TRAINING PROGRAM

## 2019-10-16 PROCEDURE — 83605 ASSAY OF LACTIC ACID: CPT

## 2019-10-16 PROCEDURE — 85610 PROTHROMBIN TIME: CPT

## 2019-10-16 PROCEDURE — 80053 COMPREHEN METABOLIC PANEL: CPT

## 2019-10-16 PROCEDURE — 63600175 PHARM REV CODE 636 W HCPCS: Performed by: STUDENT IN AN ORGANIZED HEALTH CARE EDUCATION/TRAINING PROGRAM

## 2019-10-16 PROCEDURE — 99285 PR EMERGENCY DEPT VISIT,LEVEL V: ICD-10-PCS | Mod: ,,, | Performed by: EMERGENCY MEDICINE

## 2019-10-16 RX ORDER — PANTOPRAZOLE SODIUM 40 MG/10ML
80 INJECTION, POWDER, LYOPHILIZED, FOR SOLUTION INTRAVENOUS
Status: COMPLETED | OUTPATIENT
Start: 2019-10-16 | End: 2019-10-16

## 2019-10-16 RX ORDER — OCTREOTIDE ACETATE 100 UG/ML
50 INJECTION, SOLUTION INTRAVENOUS; SUBCUTANEOUS
Status: COMPLETED | OUTPATIENT
Start: 2019-10-16 | End: 2019-10-16

## 2019-10-16 RX ORDER — LACTULOSE 10 G/15ML
15 SOLUTION ORAL
Status: DISCONTINUED | OUTPATIENT
Start: 2019-10-16 | End: 2019-10-16

## 2019-10-16 RX ORDER — CIPROFLOXACIN 500 MG/1
500 TABLET ORAL EVERY 12 HOURS
Qty: 20 TABLET | Refills: 1 | Status: ON HOLD | OUTPATIENT
Start: 2019-10-16 | End: 2019-10-18 | Stop reason: CLARIF

## 2019-10-16 RX ORDER — MORPHINE SULFATE 4 MG/ML
4 INJECTION, SOLUTION INTRAMUSCULAR; INTRAVENOUS
Status: COMPLETED | OUTPATIENT
Start: 2019-10-16 | End: 2019-10-16

## 2019-10-16 RX ORDER — ONDANSETRON 2 MG/ML
4 INJECTION INTRAMUSCULAR; INTRAVENOUS
Status: COMPLETED | OUTPATIENT
Start: 2019-10-16 | End: 2019-10-16

## 2019-10-16 RX ADMIN — PANTOPRAZOLE SODIUM 80 MG: 40 INJECTION, POWDER, FOR SOLUTION INTRAVENOUS at 09:10

## 2019-10-16 RX ADMIN — ONDANSETRON 4 MG: 2 INJECTION INTRAMUSCULAR; INTRAVENOUS at 09:10

## 2019-10-16 RX ADMIN — OCTREOTIDE ACETATE 50 MCG: 100 INJECTION, SOLUTION INTRAVENOUS; SUBCUTANEOUS at 09:10

## 2019-10-16 RX ADMIN — MORPHINE SULFATE 4 MG: 4 INJECTION INTRAVENOUS at 09:10

## 2019-10-16 NOTE — TELEPHONE ENCOUNTER
Message from Dr Meneses from earlier today;  He needs to be admitted. Please have him come to our hospital. Sounds like he has a leaking umbilical hernia. Please have him start cipro 500 mg bid. He may need surgical repair.     Patient called back at home and stated Dr Meneses had called him. This is just a Follow Up Call to you.  I am preparing to come over there.  I have started taking my Cipro. Bring it with when you come to continue to take until you are admitted.  Voiced understanding.

## 2019-10-16 NOTE — TELEPHONE ENCOUNTER
Message from Dr Meneses;  He needs to be admitted. Please have him come to our hospital. Sounds like he has a leaking umbilical hernia. Please have him start cipro 500 mg bid. He may need surgical repair.    Patient called back at home and stated Dr Meneses had called him. This is just a Follow Up Call to you.  I am preparing to come over there.  I have started taking my Cipro. Bring it with when you come to continue to take until you are admitted.  Voiced understanding.

## 2019-10-16 NOTE — TELEPHONE ENCOUNTER
Call returned to the patient.  He wanted to let us know what was said at [facility] on yesterday 10/14/19.    They did an US and I had not fluid to tap.  Then they did a CT Scan because I had to drink that stuff.  My bowels are swollen and inflamed.    The fluid is still leaking through my skin on my shirt.  I need something for pain.  Where are you having pain? My belly button/hernia is poking out and my right rib cage. I have had 2 surgeries for that belly hernia, but it appears to just come around that area.  How long have you been having the pain?  For about 2-3 weeks. My stomach is big.  On a scale of 0-10, how would you classify your pain?  It is a 10.  They gave me 12 medicines and antibiotics yesterday.  We hope you feel better.    I will let Rafael Meneses know how you are doing? Please keep us updated.

## 2019-10-16 NOTE — TELEPHONE ENCOUNTER
Patient c/o leaking umbilical hernia. May need surgical repair. To start cipro 500 mg bid and come to our ER to be admitted.

## 2019-10-16 NOTE — TELEPHONE ENCOUNTER
Returned patient's call-umbilicus still leaking. Pt states has noticed some purulent material in the area as well. Recommend admission. May need surgery. Pt to come to [facility] ER and be evaluated. Admit to IML service. Provided pt with my cell number.

## 2019-10-17 ENCOUNTER — ANESTHESIA EVENT (OUTPATIENT)
Dept: ENDOSCOPY | Facility: HOSPITAL | Age: 59
End: 2019-10-17
Payer: MEDICARE

## 2019-10-17 PROBLEM — Z79.4 TYPE 2 DIABETES MELLITUS WITHOUT COMPLICATION, WITH LONG-TERM CURRENT USE OF INSULIN: Status: ACTIVE | Noted: 2019-10-17

## 2019-10-17 PROBLEM — R60.1 ANASARCA: Status: ACTIVE | Noted: 2019-10-17

## 2019-10-17 PROBLEM — K74.60 DECOMPENSATED HEPATIC CIRRHOSIS: Status: ACTIVE | Noted: 2019-10-17

## 2019-10-17 PROBLEM — R19.8 UMBILICUS DISCHARGE: Status: ACTIVE | Noted: 2019-10-17

## 2019-10-17 PROBLEM — K70.31 ASCITES DUE TO ALCOHOLIC CIRRHOSIS: Status: ACTIVE | Noted: 2019-10-17

## 2019-10-17 PROBLEM — E11.9 TYPE 2 DIABETES MELLITUS WITHOUT COMPLICATION, WITH LONG-TERM CURRENT USE OF INSULIN: Status: ACTIVE | Noted: 2019-10-17

## 2019-10-17 PROBLEM — D62 ACUTE BLOOD LOSS ANEMIA: Status: ACTIVE | Noted: 2019-10-17

## 2019-10-17 PROBLEM — K72.90 DECOMPENSATED HEPATIC CIRRHOSIS: Status: ACTIVE | Noted: 2019-10-17

## 2019-10-17 PROBLEM — K31.89 PORTAL HYPERTENSIVE GASTROPATHY: Status: ACTIVE | Noted: 2019-10-17

## 2019-10-17 PROBLEM — E87.70 FLUID OVERLOAD: Status: ACTIVE | Noted: 2019-10-17

## 2019-10-17 PROBLEM — K76.6 PORTAL HYPERTENSION: Status: ACTIVE | Noted: 2019-10-17

## 2019-10-17 PROBLEM — K42.9 UMBILICAL HERNIA WITHOUT OBSTRUCTION AND WITHOUT GANGRENE: Status: ACTIVE | Noted: 2019-10-17

## 2019-10-17 PROBLEM — K76.6 PORTAL HYPERTENSIVE GASTROPATHY: Status: ACTIVE | Noted: 2019-10-17

## 2019-10-17 LAB
BILIRUB UR QL STRIP: NEGATIVE
CLARITY UR REFRACT.AUTO: CLEAR
COLOR UR AUTO: NORMAL
ESTIMATED AVG GLUCOSE: 217 MG/DL (ref 68–131)
GLUCOSE SERPL-MCNC: 103 MG/DL (ref 70–110)
GLUCOSE UR QL STRIP: NEGATIVE
HBA1C MFR BLD HPLC: 9.2 % (ref 4–5.6)
HGB UR QL STRIP: NEGATIVE
KETONES UR QL STRIP: NEGATIVE
LEUKOCYTE ESTERASE UR QL STRIP: NEGATIVE
NITRITE UR QL STRIP: NEGATIVE
PH UR STRIP: 6 [PH] (ref 5–8)
POCT GLUCOSE: 103 MG/DL (ref 70–110)
POCT GLUCOSE: 116 MG/DL (ref 70–110)
POCT GLUCOSE: 156 MG/DL (ref 70–110)
POCT GLUCOSE: 183 MG/DL (ref 70–110)
POCT GLUCOSE: 87 MG/DL (ref 70–110)
PROT UR QL STRIP: NEGATIVE
SP GR UR STRIP: 1.02 (ref 1–1.03)
URN SPEC COLLECT METH UR: NORMAL

## 2019-10-17 PROCEDURE — 25000003 PHARM REV CODE 250: Performed by: HOSPITALIST

## 2019-10-17 PROCEDURE — C9399 UNCLASSIFIED DRUGS OR BIOLOG: HCPCS | Performed by: HOSPITALIST

## 2019-10-17 PROCEDURE — 96375 TX/PRO/DX INJ NEW DRUG ADDON: CPT

## 2019-10-17 PROCEDURE — 63600175 PHARM REV CODE 636 W HCPCS: Performed by: HOSPITALIST

## 2019-10-17 PROCEDURE — 99223 PR INITIAL HOSPITAL CARE,LEVL III: ICD-10-PCS | Mod: ,,, | Performed by: HOSPITALIST

## 2019-10-17 PROCEDURE — 99285 EMERGENCY DEPT VISIT HI MDM: CPT | Mod: 25

## 2019-10-17 PROCEDURE — 63600175 PHARM REV CODE 636 W HCPCS: Performed by: STUDENT IN AN ORGANIZED HEALTH CARE EDUCATION/TRAINING PROGRAM

## 2019-10-17 PROCEDURE — 99284 PR EMERGENCY DEPT VISIT,LEVEL IV: ICD-10-PCS | Mod: ,,, | Performed by: INTERNAL MEDICINE

## 2019-10-17 PROCEDURE — 99284 EMERGENCY DEPT VISIT MOD MDM: CPT | Mod: ,,, | Performed by: INTERNAL MEDICINE

## 2019-10-17 PROCEDURE — G0378 HOSPITAL OBSERVATION PER HR: HCPCS

## 2019-10-17 PROCEDURE — 25500020 PHARM REV CODE 255: Performed by: HOSPITALIST

## 2019-10-17 PROCEDURE — 81003 URINALYSIS AUTO W/O SCOPE: CPT

## 2019-10-17 PROCEDURE — 96367 TX/PROPH/DG ADDL SEQ IV INF: CPT

## 2019-10-17 PROCEDURE — 80321 ALCOHOLS BIOMARKERS 1OR 2: CPT

## 2019-10-17 PROCEDURE — 99223 1ST HOSP IP/OBS HIGH 75: CPT | Mod: ,,, | Performed by: HOSPITALIST

## 2019-10-17 RX ORDER — GLUCAGON 1 MG
1 KIT INJECTION
Status: DISCONTINUED | OUTPATIENT
Start: 2019-10-17 | End: 2019-10-18 | Stop reason: HOSPADM

## 2019-10-17 RX ORDER — IBUPROFEN 200 MG
24 TABLET ORAL
Status: DISCONTINUED | OUTPATIENT
Start: 2019-10-17 | End: 2019-10-18 | Stop reason: HOSPADM

## 2019-10-17 RX ORDER — GABAPENTIN 300 MG/1
600 CAPSULE ORAL 3 TIMES DAILY
Status: DISCONTINUED | OUTPATIENT
Start: 2019-10-17 | End: 2019-10-18 | Stop reason: HOSPADM

## 2019-10-17 RX ORDER — ACETAMINOPHEN 325 MG/1
650 TABLET ORAL EVERY 8 HOURS PRN
Status: DISCONTINUED | OUTPATIENT
Start: 2019-10-17 | End: 2019-10-18 | Stop reason: HOSPADM

## 2019-10-17 RX ORDER — LACTULOSE 10 G/15ML
30 SOLUTION ORAL 3 TIMES DAILY
Status: DISCONTINUED | OUTPATIENT
Start: 2019-10-17 | End: 2019-10-18 | Stop reason: HOSPADM

## 2019-10-17 RX ORDER — FUROSEMIDE 10 MG/ML
80 INJECTION INTRAMUSCULAR; INTRAVENOUS ONCE
Status: COMPLETED | OUTPATIENT
Start: 2019-10-17 | End: 2019-10-17

## 2019-10-17 RX ORDER — FUROSEMIDE 40 MG/1
80 TABLET ORAL DAILY
Status: DISCONTINUED | OUTPATIENT
Start: 2019-10-18 | End: 2019-10-17

## 2019-10-17 RX ORDER — SULFAMETHOXAZOLE AND TRIMETHOPRIM 800; 160 MG/1; MG/1
1 TABLET ORAL 2 TIMES DAILY
Status: DISCONTINUED | OUTPATIENT
Start: 2019-10-17 | End: 2019-10-18 | Stop reason: HOSPADM

## 2019-10-17 RX ORDER — HYDROCODONE BITARTRATE AND ACETAMINOPHEN 10; 325 MG/1; MG/1
1 TABLET ORAL EVERY 8 HOURS PRN
Status: DISCONTINUED | OUTPATIENT
Start: 2019-10-17 | End: 2019-10-18 | Stop reason: HOSPADM

## 2019-10-17 RX ORDER — FUROSEMIDE 10 MG/ML
80 INJECTION INTRAMUSCULAR; INTRAVENOUS 2 TIMES DAILY
Status: DISCONTINUED | OUTPATIENT
Start: 2019-10-17 | End: 2019-10-18 | Stop reason: HOSPADM

## 2019-10-17 RX ORDER — MORPHINE SULFATE 4 MG/ML
4 INJECTION, SOLUTION INTRAMUSCULAR; INTRAVENOUS
Status: COMPLETED | OUTPATIENT
Start: 2019-10-17 | End: 2019-10-17

## 2019-10-17 RX ORDER — FLUTICASONE PROPIONATE 50 MCG
1 SPRAY, SUSPENSION (ML) NASAL DAILY
Status: DISCONTINUED | OUTPATIENT
Start: 2019-10-17 | End: 2019-10-18 | Stop reason: HOSPADM

## 2019-10-17 RX ORDER — CIPROFLOXACIN 500 MG/1
500 TABLET ORAL EVERY 12 HOURS
Status: DISCONTINUED | OUTPATIENT
Start: 2019-10-17 | End: 2019-10-17

## 2019-10-17 RX ORDER — CEFTRIAXONE 1 G/1
1 INJECTION, POWDER, FOR SOLUTION INTRAMUSCULAR; INTRAVENOUS
Status: DISCONTINUED | OUTPATIENT
Start: 2019-10-18 | End: 2019-10-17

## 2019-10-17 RX ORDER — SULFAMETHOXAZOLE AND TRIMETHOPRIM 800; 160 MG/1; MG/1
1 TABLET ORAL 2 TIMES DAILY
COMMUNITY
End: 2019-12-05

## 2019-10-17 RX ORDER — OMEPRAZOLE 40 MG/1
40 CAPSULE, DELAYED RELEASE ORAL DAILY
Status: ON HOLD | COMMUNITY
End: 2019-10-18 | Stop reason: HOSPADM

## 2019-10-17 RX ORDER — POTASSIUM CHLORIDE 20 MEQ/1
40 TABLET, EXTENDED RELEASE ORAL ONCE
Status: COMPLETED | OUTPATIENT
Start: 2019-10-17 | End: 2019-10-17

## 2019-10-17 RX ORDER — SPIRONOLACTONE 100 MG/1
100 TABLET, FILM COATED ORAL DAILY
Status: DISCONTINUED | OUTPATIENT
Start: 2019-10-17 | End: 2019-10-18 | Stop reason: HOSPADM

## 2019-10-17 RX ORDER — HYDROCODONE BITARTRATE AND ACETAMINOPHEN 5; 300 MG/1; MG/1
1 TABLET ORAL EVERY 6 HOURS PRN
Status: ON HOLD | COMMUNITY
End: 2019-10-18 | Stop reason: SDUPTHER

## 2019-10-17 RX ORDER — NEBIVOLOL 5 MG/1
10 TABLET ORAL DAILY
Status: DISCONTINUED | OUTPATIENT
Start: 2019-10-17 | End: 2019-10-18 | Stop reason: HOSPADM

## 2019-10-17 RX ORDER — CIPROFLOXACIN 2 MG/ML
400 INJECTION, SOLUTION INTRAVENOUS
Status: DISCONTINUED | OUTPATIENT
Start: 2019-10-17 | End: 2019-10-17

## 2019-10-17 RX ORDER — FUROSEMIDE 40 MG/1
80 TABLET ORAL DAILY
Status: DISCONTINUED | OUTPATIENT
Start: 2019-10-17 | End: 2019-10-17

## 2019-10-17 RX ORDER — ESCITALOPRAM OXALATE 20 MG/1
20 TABLET ORAL DAILY
Status: DISCONTINUED | OUTPATIENT
Start: 2019-10-17 | End: 2019-10-18 | Stop reason: HOSPADM

## 2019-10-17 RX ORDER — LISINOPRIL 10 MG/1
10 TABLET ORAL DAILY
Status: DISCONTINUED | OUTPATIENT
Start: 2019-10-17 | End: 2019-10-18

## 2019-10-17 RX ORDER — INSULIN ASPART 100 [IU]/ML
7 INJECTION, SOLUTION INTRAVENOUS; SUBCUTANEOUS
Status: DISCONTINUED | OUTPATIENT
Start: 2019-10-17 | End: 2019-10-18 | Stop reason: HOSPADM

## 2019-10-17 RX ORDER — IBUPROFEN 200 MG
16 TABLET ORAL
Status: DISCONTINUED | OUTPATIENT
Start: 2019-10-17 | End: 2019-10-18 | Stop reason: HOSPADM

## 2019-10-17 RX ORDER — INSULIN ASPART 100 [IU]/ML
1-10 INJECTION, SOLUTION INTRAVENOUS; SUBCUTANEOUS
Status: DISCONTINUED | OUTPATIENT
Start: 2019-10-17 | End: 2019-10-18 | Stop reason: HOSPADM

## 2019-10-17 RX ORDER — TAMSULOSIN HYDROCHLORIDE 0.4 MG/1
0.4 CAPSULE ORAL DAILY
Status: DISCONTINUED | OUTPATIENT
Start: 2019-10-17 | End: 2019-10-18 | Stop reason: HOSPADM

## 2019-10-17 RX ORDER — FINASTERIDE 5 MG/1
5 TABLET, FILM COATED ORAL DAILY
Status: DISCONTINUED | OUTPATIENT
Start: 2019-10-17 | End: 2019-10-18 | Stop reason: HOSPADM

## 2019-10-17 RX ORDER — ONDANSETRON 2 MG/ML
4 INJECTION INTRAMUSCULAR; INTRAVENOUS EVERY 6 HOURS PRN
Status: DISCONTINUED | OUTPATIENT
Start: 2019-10-17 | End: 2019-10-18 | Stop reason: HOSPADM

## 2019-10-17 RX ORDER — PANTOPRAZOLE SODIUM 40 MG/1
40 TABLET, DELAYED RELEASE ORAL 2 TIMES DAILY
Status: DISCONTINUED | OUTPATIENT
Start: 2019-10-17 | End: 2019-10-18 | Stop reason: HOSPADM

## 2019-10-17 RX ORDER — IPRATROPIUM BROMIDE AND ALBUTEROL SULFATE 2.5; .5 MG/3ML; MG/3ML
3 SOLUTION RESPIRATORY (INHALATION) EVERY 4 HOURS PRN
Status: DISCONTINUED | OUTPATIENT
Start: 2019-10-17 | End: 2019-10-18 | Stop reason: HOSPADM

## 2019-10-17 RX ADMIN — CEFTRIAXONE 2 G: 2 INJECTION, SOLUTION INTRAVENOUS at 01:10

## 2019-10-17 RX ADMIN — PANTOPRAZOLE SODIUM 40 MG: 40 TABLET, DELAYED RELEASE ORAL at 10:10

## 2019-10-17 RX ADMIN — GABAPENTIN 600 MG: 300 CAPSULE ORAL at 03:10

## 2019-10-17 RX ADMIN — IOHEXOL 100 ML: 350 INJECTION, SOLUTION INTRAVENOUS at 03:10

## 2019-10-17 RX ADMIN — INSULIN DETEMIR 25 UNITS: 100 INJECTION, SOLUTION SUBCUTANEOUS at 04:10

## 2019-10-17 RX ADMIN — SPIRONOLACTONE 100 MG: 25 TABLET, FILM COATED ORAL at 08:10

## 2019-10-17 RX ADMIN — RIFAXIMIN 550 MG: 550 TABLET ORAL at 10:10

## 2019-10-17 RX ADMIN — FINASTERIDE 5 MG: 5 TABLET, FILM COATED ORAL at 08:10

## 2019-10-17 RX ADMIN — LACTULOSE 30 G: 20 SOLUTION ORAL at 03:10

## 2019-10-17 RX ADMIN — TAMSULOSIN HYDROCHLORIDE 0.4 MG: 0.4 CAPSULE ORAL at 08:10

## 2019-10-17 RX ADMIN — HYDROCODONE BITARTRATE AND ACETAMINOPHEN 1 TABLET: 10; 325 TABLET ORAL at 05:10

## 2019-10-17 RX ADMIN — POTASSIUM CHLORIDE 40 MEQ: 1500 TABLET, EXTENDED RELEASE ORAL at 05:10

## 2019-10-17 RX ADMIN — FUROSEMIDE 80 MG: 10 INJECTION, SOLUTION INTRAMUSCULAR; INTRAVENOUS at 05:10

## 2019-10-17 RX ADMIN — NEBIVOLOL HYDROCHLORIDE 10 MG: 2.5 TABLET ORAL at 11:10

## 2019-10-17 RX ADMIN — INSULIN ASPART 7 UNITS: 100 INJECTION, SOLUTION INTRAVENOUS; SUBCUTANEOUS at 01:10

## 2019-10-17 RX ADMIN — ESCITALOPRAM 20 MG: 5 TABLET, FILM COATED ORAL at 08:10

## 2019-10-17 RX ADMIN — SULFAMETHOXAZOLE AND TRIMETHOPRIM 1 TABLET: 800; 160 TABLET ORAL at 10:10

## 2019-10-17 RX ADMIN — INSULIN DETEMIR 25 UNITS: 100 INJECTION, SOLUTION SUBCUTANEOUS at 10:10

## 2019-10-17 RX ADMIN — PANTOPRAZOLE SODIUM 40 MG: 40 TABLET, DELAYED RELEASE ORAL at 08:10

## 2019-10-17 RX ADMIN — LACTULOSE 30 G: 20 SOLUTION ORAL at 10:10

## 2019-10-17 RX ADMIN — LISINOPRIL 10 MG: 10 TABLET ORAL at 08:10

## 2019-10-17 RX ADMIN — MORPHINE SULFATE 4 MG: 4 INJECTION INTRAVENOUS at 01:10

## 2019-10-17 RX ADMIN — GABAPENTIN 600 MG: 300 CAPSULE ORAL at 10:10

## 2019-10-17 RX ADMIN — GABAPENTIN 600 MG: 300 CAPSULE ORAL at 08:10

## 2019-10-17 RX ADMIN — PHYTONADIONE 10 MG: 10 INJECTION, EMULSION INTRAMUSCULAR; INTRAVENOUS; SUBCUTANEOUS at 05:10

## 2019-10-17 RX ADMIN — INSULIN ASPART 7 UNITS: 100 INJECTION, SOLUTION INTRAVENOUS; SUBCUTANEOUS at 06:10

## 2019-10-17 RX ADMIN — HYDROCODONE BITARTRATE AND ACETAMINOPHEN 1 TABLET: 10; 325 TABLET ORAL at 06:10

## 2019-10-17 RX ADMIN — RIFAXIMIN 550 MG: 550 TABLET ORAL at 08:10

## 2019-10-17 RX ADMIN — INSULIN ASPART 7 UNITS: 100 INJECTION, SOLUTION INTRAVENOUS; SUBCUTANEOUS at 08:10

## 2019-10-17 RX ADMIN — FUROSEMIDE 80 MG: 10 INJECTION, SOLUTION INTRAMUSCULAR; INTRAVENOUS at 04:10

## 2019-10-17 RX ADMIN — LACTULOSE 30 G: 20 SOLUTION ORAL at 08:10

## 2019-10-17 NOTE — ED NOTES
Hourly rounding complete. Pt lying in stretcher in low and locked position, side rails raised x2, call light and pt belongings in reach. Pt on continuous cardiac monitoring monitoring, BP circulating every 30 minutes, and pulse oximetry. Vital signs stable and pt in NAD. Pt verbalized no needs at this time. Family member x1 at bedside.

## 2019-10-17 NOTE — ASSESSMENT & PLAN NOTE
Mr Reilly is a 59 year old man with history of ETOH ESLD, with decompensations including variceal bleed, ascites, HCV s/p INF, encephalopathy, T2DM, who is presenting to the hospital with concern for umbilical hernia drainage.    Umbilical hernia is currently soft, easily reducible, and without evidence of skin breakdown or discharge. CTAP reviewed without significant ascites or other acute findings.    Plan  - antibiotics: ceftriaxone  - infectious workup: blood cultures, UA negative, CXR negative. CTAP without significant ascites for diagnostic paracentesis  - umbilical hernia. Currently soft, easily reducible and without skin breakdown. Discussed s/s concerning for incarceration/strangulation. No evidence to support at this time and given ESLD will not recommend elective surgery at this time.  - mentation appropriate. Continue lactulose and rifaxmin  - variceal status: EGD (3/14/18) Gr 1 varices  - HCC screening: negative CTAP (10/17/19). AFP 5.6 (10/2018), will update.  - Hx ETOH abuse. Check PETH.

## 2019-10-17 NOTE — ED NOTES
Bed: [facility]  Expected date:   Expected time:   Means of arrival:   Comments:

## 2019-10-17 NOTE — CONSULTS
[facility]  Hepatology  Consult Note    Patient Name: Colin Reilly  MRN: [id number]  Admission Date: 10/16/2019  Hospital Length of Stay: 0 days  Attending Provider: Annika Caro MD   Primary Care Physician: Preston Matthew Ii, MD  Principal Problem:Decompensated hepatic cirrhosis    Inpatient consult to Hepatology  Consult performed by: Casimiro Cadet MD  Consult ordered by: Annika Caro MD        Subjective:     Transplant status: No    HPI:  Mr Reilly is a 59 year old man with history of ETOH ESLD, with decompensations including variceal bleed, ascites, HCV s/p INF, encephalopathy, T2DM, who is presenting to the hospital with concern for umbilical hernia drainage.    He followed with Dr. Meneses, last seen in clinic 4/14/19, at which point was doing well. He had a MELD 13. Was planned for continued routine ESLD surveillance.    He reports he started to experience umbilical hernia leaking several days ago. He called the clinic and was advised to go to the ED for evaluation. He was seen by local ED ([facility]) and was discharged on antibiotics (cipro). He was subsequently recommended to present to our ED for evaluation due to concern for ongoing leaking.    He reports this has not happened previously. He notes he will intermittently develop pain at his umbilical hernia and has become hard in the past, but is generally soft/reducible. He notes the output was generally clear/straw colored but wife reported seeing a greenish tinge. No bloody output. Has decreased in output but reports it was present on admission. No diagnostic para at OSH. Denies any obstipation/constipation. Endorses tolerance of PO, no n/v. No fever/chill or sweats. Endorses increased peripheral edema.      Review of Systems   Constitutional: Negative for activity change, appetite change, chills, diaphoresis, fatigue, fever and unexpected weight change.   HENT: Negative for sore throat and trouble swallowing.     Eyes: Negative for visual disturbance.   Respiratory: Negative for chest tightness and shortness of breath.    Cardiovascular: Negative for chest pain and leg swelling.   Gastrointestinal: Positive for abdominal pain (currently resolved). Negative for abdominal distention, anal bleeding, blood in stool, constipation, diarrhea, nausea and vomiting.   Genitourinary: Negative for dysuria and hematuria.   Musculoskeletal: Negative for arthralgias and myalgias.   Skin: Negative for rash.   Neurological: Negative for dizziness, weakness, light-headedness and headaches.   Psychiatric/Behavioral: Negative for agitation and confusion.       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    Esophageal varices     GERD (gastroesophageal reflux disease)     Hepatic encephalopathy 7/8/2014    Hepatitis C virus infection 07/08/2014    tx with interferon 3-4 mos- stopped for unclear reasons Tattoos-first at age 17 only risk factor (HCVAB negative 08/2017)    HTN (hypertension) 7/8/2014    Hypertension     Insulin dependent diabetes mellitus     Renal mass, left 7/8/2014    6.3 x 5.7 x 5.6 complex mass    Splenomegaly 7/8/2014    Umbilical hernia 7/8/2014       Past Surgical History:   Procedure Laterality Date    CARPAL TUNNEL RELEASE Bilateral     CHOLECYSTECTOMY      COLONOSCOPY N/A 9/8/2017    Procedure: COLONOSCOPY;  Surgeon: Savannah Llanos MD;  Location: [facility];  Service: Endoscopy;  Laterality: N/A;    COLONOSCOPY Left 3/14/2018    Procedure: COLONOSCOPY;  Surgeon: Savannah Llanos MD;  Location: [facility];  Service: Endoscopy;  Laterality: Left;    ESOPHAGOGASTRODUODENOSCOPY  01/2014    ESOPHAGOGASTRODUODENOSCOPY  02/15/2017    grade II varices    ESOPHAGOGASTRODUODENOSCOPY  04/10/2017    grade I varices    ESOPHAGOGASTRODUODENOSCOPY W/ BANDING  01/26/2017     grade II varices    HERNIA REPAIR      NECK SURGERY      fusion on C5 and C6    ULNAR NERVE TRANSPOSITION Left     vericose veins removed         Family history of liver disease: No    Review of patient's allergies indicates:  No Known Allergies    Tobacco Use    Smoking status: Former Smoker     Types: Cigarettes     Last attempt to quit: 2010     Years since quittin.7    Smokeless tobacco: Former User     Types: Chew     Quit date: 1990    Tobacco comment: former 1 pack/week quit 2010   Substance and Sexual Activity    Alcohol use: No     Comment: former heavy beer but quit 2010    Drug use: No    Sexual activity: Never     Comment:          (Not in a hospital admission)    Objective:     Vital Signs (Most Recent):  Temp: 98.2 °F (36.8 °C) (10/17/19 0950)  Pulse: 78 (10/17/19 1427)  Resp: 18 (10/17/19 1427)  BP: 120/67 (10/17/19 1427)  SpO2: 97 % (10/17/19 1427) Vital Signs (24h Range):  Temp:  [98 °F (36.7 °C)-99.4 °F (37.4 °C)] 98.2 °F (36.8 °C)  Pulse:  [76-96] 78  Resp:  [13-20] 18  SpO2:  [93 %-100 %] 97 %  BP: (115-142)/(60-86) 120/67     Weight: 104.3 kg (230 lb) (10/16/19 1949)  Body mass index is 32.08 kg/m².    Physical Exam   Constitutional: He is oriented to person, place, and time. No distress.   Appears comfortable and no distress, talkative and friendly. Accompanied by mother.   HENT:   Head: Normocephalic and atraumatic.   Mouth/Throat: Oropharynx is clear and moist. No oropharyngeal exudate.   Eyes: Conjunctivae are normal. No scleral icterus.   Neck: No JVD present.   Cardiovascular: Normal rate, regular rhythm, normal heart sounds and intact distal pulses.   Pulmonary/Chest: Effort normal and breath sounds normal. No respiratory distress.   Abdominal: Soft. Bowel sounds are normal. He exhibits no distension and no mass. There is no tenderness. There is no rebound and no guarding.   Mild distension, tympanic. No obvious drainage from abdomen or umbilical  hernia. Hernia is soft, reducible.   Musculoskeletal: He exhibits edema. He exhibits no tenderness.   Lymphadenopathy:     He has no cervical adenopathy.   Neurological: He is alert and oriented to person, place, and time.   No asterixis.   Skin: Skin is warm. Capillary refill takes less than 2 seconds. He is not diaphoretic.   Psychiatric: He has a normal mood and affect. His behavior is normal. Judgment and thought content normal.   Nursing note and vitals reviewed.      MELD-Na score: 15 at 10/16/2019  9:14 PM  MELD score: 15 at 10/16/2019  9:14 PM  Calculated from:  Serum Creatinine: 0.8 mg/dL (Rounded to 1 mg/dL) at 10/16/2019  9:14 PM  Serum Sodium: 137 mmol/L at 10/16/2019  9:14 PM  Total Bilirubin: 2.3 mg/dL at 10/16/2019  9:14 PM  INR(ratio): 1.6 at 10/16/2019  9:14 PM  Age: 59 years    Significant Labs:  Labs within the past month have been reviewed.    Significant Imaging:  Labs: Reviewed    Assessment/Plan:     Cirrhosis  Mr Reilly is a 59 year old man with history of ETOH ESLD, with decompensations including variceal bleed, ascites, HCV s/p INF, encephalopathy, T2DM, who is presenting to the hospital with concern for umbilical hernia drainage.    Umbilical hernia is currently soft, easily reducible, and without evidence of skin breakdown or discharge. CTAP reviewed without significant ascites or other acute findings.    Plan  - antibiotics: ceftriaxone  - infectious workup: blood cultures, UA negative, CXR negative. CTAP without significant ascites for diagnostic paracentesis  - umbilical hernia. Currently soft, easily reducible and without skin breakdown. Discussed s/s concerning for incarceration/strangulation. No evidence to support at this time and given ESLD will not recommend elective surgery at this time.  - mentation appropriate. Continue lactulose and rifaxmin  - variceal status: EGD (3/14/18) Gr 1 varices  - HCC screening: negative CTAP (10/17/19). AFP 5.6 (10/2018), will update.  - Hx ETOH  abuse. Check PETH.        Thank you for your consult. I will follow-up with patient. Please contact us if you have any additional questions.    Casimiro Caedt MD  Hepatology  [facility]

## 2019-10-17 NOTE — HPI
Mr Reilly is a 59 year old man with history of ETOH ESLD, with decompensations including variceal bleed, ascites, HCV s/p INF, encephalopathy, T2DM, who is presenting to the hospital with concern for umbilical hernia drainage.    He followed with Dr. Meneses, last seen in clinic 4/14/19, at which point was doing well. He had a MELD 13. Was planned for continued routine ESLD surveillance.    He reports he started to experience umbilical hernia leaking several days ago. He called the clinic and was advised to go to the ED for evaluation. He was seen by local ED ([facility]) and was discharged on antibiotics (cipro). He was subsequently recommended to present to our ED for evaluation due to concern for ongoing leaking.    He reports this has not happened previously. He notes he will intermittently develop pain at his umbilical hernia and has become hard in the past, but is generally soft/reducible. He notes the output was generally clear/straw colored but wife reported seeing a greenish tinge. No bloody output. Has decreased in output but reports it was present on admission. No diagnostic para at OSH. Denies any obstipation/constipation. Endorses tolerance of PO, no n/v. No fever/chill or sweats. Endorses increased peripheral edema.

## 2019-10-17 NOTE — ED NOTES
Pt resting in bed. NAD, VSS, RR equal and unlabored. Side rails up x2 and bed wheels locked. Call bell within reach of patient and family. Will continue to monitor

## 2019-10-17 NOTE — SUBJECTIVE & OBJECTIVE
Review of Systems   Constitutional: Negative for activity change, appetite change, chills, diaphoresis, fatigue, fever and unexpected weight change.   HENT: Negative for sore throat and trouble swallowing.    Eyes: Negative for visual disturbance.   Respiratory: Negative for chest tightness and shortness of breath.    Cardiovascular: Negative for chest pain and leg swelling.   Gastrointestinal: Positive for abdominal pain (currently resolved). Negative for abdominal distention, anal bleeding, blood in stool, constipation, diarrhea, nausea and vomiting.   Genitourinary: Negative for dysuria and hematuria.   Musculoskeletal: Negative for arthralgias and myalgias.   Skin: Negative for rash.   Neurological: Negative for dizziness, weakness, light-headedness and headaches.   Psychiatric/Behavioral: Negative for agitation and confusion.       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    Esophageal varices     GERD (gastroesophageal reflux disease)     Hepatic encephalopathy 7/8/2014    Hepatitis C virus infection 07/08/2014    tx with interferon 3-4 mos- stopped for unclear reasons Tattoos-first at age 17 only risk factor (HCVAB negative 08/2017)    HTN (hypertension) 7/8/2014    Hypertension     Insulin dependent diabetes mellitus     Renal mass, left 7/8/2014    6.3 x 5.7 x 5.6 complex mass    Splenomegaly 7/8/2014    Umbilical hernia 7/8/2014       Past Surgical History:   Procedure Laterality Date    CARPAL TUNNEL RELEASE Bilateral     CHOLECYSTECTOMY      COLONOSCOPY N/A 9/8/2017    Procedure: COLONOSCOPY;  Surgeon: Savannah Llanos MD;  Location: [facility];  Service: Endoscopy;  Laterality: N/A;    COLONOSCOPY Left 3/14/2018    Procedure: COLONOSCOPY;  Surgeon: Savannah Llanos MD;  Location: [facility];  Service: Endoscopy;  Laterality: Left;     ESOPHAGOGASTRODUODENOSCOPY  2014    ESOPHAGOGASTRODUODENOSCOPY  02/15/2017    grade II varices    ESOPHAGOGASTRODUODENOSCOPY  04/10/2017    grade I varices    ESOPHAGOGASTRODUODENOSCOPY W/ BANDING  2017    grade II varices    HERNIA REPAIR      NECK SURGERY      fusion on C5 and C6    ULNAR NERVE TRANSPOSITION Left     vericose veins removed         Family history of liver disease: No    Review of patient's allergies indicates:  No Known Allergies    Tobacco Use    Smoking status: Former Smoker     Types: Cigarettes     Last attempt to quit: 2010     Years since quittin.7    Smokeless tobacco: Former User     Types: Chew     Quit date: 1990    Tobacco comment: former 1 pack/week quit 2010   Substance and Sexual Activity    Alcohol use: No     Comment: former heavy beer but quit 2010    Drug use: No    Sexual activity: Never     Comment:          (Not in a hospital admission)    Objective:     Vital Signs (Most Recent):  Temp: 98.2 °F (36.8 °C) (10/17/19 0950)  Pulse: 78 (10/17/19 1427)  Resp: 18 (10/17/19 1427)  BP: 120/67 (10/17/19 1427)  SpO2: 97 % (10/17/19 1427) Vital Signs (24h Range):  Temp:  [98 °F (36.7 °C)-99.4 °F (37.4 °C)] 98.2 °F (36.8 °C)  Pulse:  [76-96] 78  Resp:  [13-20] 18  SpO2:  [93 %-100 %] 97 %  BP: (115-142)/(60-86) 120/67     Weight: 104.3 kg (230 lb) (10/16/19 1949)  Body mass index is 32.08 kg/m².    Physical Exam   Constitutional: He is oriented to person, place, and time. No distress.   Appears comfortable and no distress, talkative and friendly. Accompanied by mother.   HENT:   Head: Normocephalic and atraumatic.   Mouth/Throat: Oropharynx is clear and moist. No oropharyngeal exudate.   Eyes: Conjunctivae are normal. No scleral icterus.   Neck: No JVD present.   Cardiovascular: Normal rate, regular rhythm, normal heart sounds and intact distal pulses.   Pulmonary/Chest: Effort normal and breath sounds normal. No respiratory distress.    Abdominal: Soft. Bowel sounds are normal. He exhibits no distension and no mass. There is no tenderness. There is no rebound and no guarding.   Mild distension, tympanic. No obvious drainage from abdomen or umbilical hernia. Hernia is soft, reducible.   Musculoskeletal: He exhibits edema. He exhibits no tenderness.   Lymphadenopathy:     He has no cervical adenopathy.   Neurological: He is alert and oriented to person, place, and time.   No asterixis.   Skin: Skin is warm. Capillary refill takes less than 2 seconds. He is not diaphoretic.   Psychiatric: He has a normal mood and affect. His behavior is normal. Judgment and thought content normal.   Nursing note and vitals reviewed.      MELD-Na score: 15 at 10/16/2019  9:14 PM  MELD score: 15 at 10/16/2019  9:14 PM  Calculated from:  Serum Creatinine: 0.8 mg/dL (Rounded to 1 mg/dL) at 10/16/2019  9:14 PM  Serum Sodium: 137 mmol/L at 10/16/2019  9:14 PM  Total Bilirubin: 2.3 mg/dL at 10/16/2019  9:14 PM  INR(ratio): 1.6 at 10/16/2019  9:14 PM  Age: 59 years    Significant Labs:  Labs within the past month have been reviewed.    Significant Imaging:  Labs: Reviewed

## 2019-10-17 NOTE — H&P
"[facility] Medicine  History & Physical    Patient Name: Colin Reilly  MRN: [id number]  Admission Date: 10/16/2019  Attending Physician: Annika Caro MD   Primary Care Provider: Preston Matthew Ii, MD    [facility] Medicine Team: Networked reference to record PCT  Jennifer Fung DO     Patient information was obtained from patient and ER records.     Subjective:     Principal Problem:Decompensated hepatic cirrhosis    Chief Complaint:   Chief Complaint   Patient presents with    Liver Failure     Patient reports that he is having liver problems. States that he is "leaking fluid from my stomach". Patient's abdomin is swollen.         HPI:     Patient is a 59 year old male with PMH HTN, IDDM2, BPH, chronic low back pain with opoid dependence, alcoholic liver cirrhosis (remained sober for several years) complicated by ascites (no previous paracentesis), portal hypertension, grade I EV, portal hypertensive gastropathy, umbilical hernia, splenomegaly, thrombocytopenia, hepatic encephalopathy presents to ED with worsening abdominal distention, diffuse abdominal pain and umbilical hernia leaking greenish yellow fluid. Symptoms progressive over last couple of months. He called hepatologist Dr. Keating about above symptoms who advised him to come to hospital for evaluation. Patient went to local ER ([facility]) yesterday when he was discharged on bactrim for suspected SBP. He also endorses bilateral leg swelling, weight gain and weeping of fluid from abdominal wall. Reports compliance with diuretics (lasix and aldactone), and denies fever, chills, N/V, chest pain, sob, confusion or lethargy. He endorses occasional red blood in his stool which he thinks is from bleeding hemorrhoid. He is found to have gross fluid overload with large ascites and bilateral leg swelling.               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    Esophageal varices     GERD (gastroesophageal reflux disease)     Hepatic encephalopathy 7/8/2014    Hepatitis C virus infection 07/08/2014    tx with interferon 3-4 mos- stopped for unclear reasons Tattoos-first at age 17 only risk factor (HCVAB negative 08/2017)    HTN (hypertension) 7/8/2014    Hypertension     Insulin dependent diabetes mellitus     Renal mass, left 7/8/2014    6.3 x 5.7 x 5.6 complex mass    Splenomegaly 7/8/2014    Umbilical hernia 7/8/2014       Past Surgical History:   Procedure Laterality Date    CARPAL TUNNEL RELEASE Bilateral     CHOLECYSTECTOMY      COLONOSCOPY N/A 9/8/2017    Procedure: COLONOSCOPY;  Surgeon: Savannah Llanos MD;  Location: [facility] ENDO;  Service: Endoscopy;  Laterality: N/A;    COLONOSCOPY Left 3/14/2018    Procedure: COLONOSCOPY;  Surgeon: Savannah Llanos MD;  Location: [facility] ENDO;  Service: Endoscopy;  Laterality: Left;    ESOPHAGOGASTRODUODENOSCOPY  01/2014    ESOPHAGOGASTRODUODENOSCOPY  02/15/2017    grade II varices    ESOPHAGOGASTRODUODENOSCOPY  04/10/2017    grade I varices    ESOPHAGOGASTRODUODENOSCOPY W/ BANDING  01/26/2017    grade II varices    HERNIA REPAIR      NECK SURGERY      fusion on C5 and C6    ULNAR NERVE TRANSPOSITION Left     vericose veins removed         Review of patient's allergies indicates:  No Known Allergies    No current facility-administered medications on file prior to encounter.      Current Outpatient Medications on File Prior to Encounter   Medication Sig    ciprofloxacin HCl (CIPRO) 500 MG tablet Take 1 tablet (500 mg total) by mouth every 12 (twelve) hours.    cyclobenzaprine (FLEXERIL) 10 MG tablet Take 10 mg by mouth once daily.     fluticasone (FLONASE) 50 mcg/actuation nasal spray 1 spray by Each Nare route once daily.     furosemide (LASIX) 20 MG tablet Take 6 tablets (120 mg total) by mouth once daily.    gabapentin  (NEURONTIN) 300 MG capsule Take 600 mg by mouth 3 (three) times daily.     insulin aspart protamine-insulin aspart (NOVOLOG 70/30) 100 unit/mL (70-30) InPn pen Inject 28 Units into the skin 3 (three) times daily before meals.     INSULIN GLARGINE,HUM.REC.ANLOG (LANTUS SOLOSTAR SUBQ) Inject 74 Units into the skin every evening.     lactobacillus acidophilus Cap Take 1 capsule by mouth once daily.    lactulose (CHRONULAC) 10 gram/15 mL solution Take 30 mLs by mouth 3 (three) times daily.     nebivolol (BYSTOLIC) 10 MG Tab Take 10 mg by mouth once daily.    pantoprazole (PROTONIX) 40 MG tablet Take 1 tablet (40 mg total) by mouth 2 (two) times daily.    rifAXIMin (XIFAXAN) 550 mg Tab Take 1 tablet (550 mg total) by mouth 2 (two) times daily.    spironolactone (ALDACTONE) 100 MG tablet TAKE 1 TABLET(100 MG) BY MOUTH EVERY DAY    spironolactone (ALDACTONE) 25 MG tablet TAKE 6 TABLETS BY MOUTH DAILY FOR FLUID    tamsulosin (FLOMAX) 0.4 mg Cp24 Take 1 capsule (0.4 mg total) by mouth once daily.    B.ANI/L.ACI/L.SAL/L.PLAN/L.JITENDRA (PROBIOTIC FORMULA ORAL) Take by mouth.    escitalopram oxalate (LEXAPRO) 20 MG tablet TK 1 T PO QD. DISCONTINUE TRAZODONE    finasteride (PROSCAR) 5 mg tablet Take 1 tablet (5 mg total) by mouth once daily.    hydrocodone-acetaminophen 10-325mg (NORCO)  mg Tab Take 10 tablets by mouth 3 (three) times daily.     lisinopril 10 MG tablet Take 10 mg by mouth once daily.    oxybutynin (DITROPAN) 5 MG Tab Take 1 tablet (5 mg total) by mouth every evening.    sildenafil (REVATIO) 20 mg Tab Take 1 tablet (20 mg total) by mouth as needed.     Family History     Problem Relation (Age of Onset)    Alcohol abuse Brother    Cancer Brother    Hyperlipidemia Mother    No Known Problems Father (36), Sister, Sister        Tobacco Use    Smoking status: Former Smoker     Types: Cigarettes     Last attempt to quit: 2010     Years since quittin.7    Smokeless tobacco: Former User      Types: Chew     Quit date: 2/24/1990    Tobacco comment: former 1 pack/week quit 1/4/2010   Substance and Sexual Activity    Alcohol use: No     Comment: former heavy beer but quit 1/4/2010    Drug use: No    Sexual activity: Never     Comment:      Review of Systems   Constitutional: Positive for unexpected weight change (weight gain ). Negative for activity change, appetite change, chills, diaphoresis, fatigue and fever.   HENT: Negative for congestion, dental problem, drooling, ear discharge, ear pain, facial swelling, hearing loss, mouth sores, nosebleeds, postnasal drip, rhinorrhea, sinus pressure, sneezing, sore throat, tinnitus, trouble swallowing and voice change.    Eyes: Negative for photophobia, pain, discharge, redness, itching and visual disturbance.   Respiratory: Negative for apnea, cough, choking, chest tightness, shortness of breath, wheezing and stridor.    Cardiovascular: Positive for leg swelling. Negative for chest pain and palpitations.   Gastrointestinal: Positive for abdominal distention, abdominal pain and blood in stool. Negative for anal bleeding, constipation, diarrhea, nausea, rectal pain and vomiting.        Leaking greenish yellow fluid from the umbilicus    Endocrine: Negative for cold intolerance, heat intolerance, polydipsia, polyphagia and polyuria.   Genitourinary: Negative for decreased urine volume, difficulty urinating, discharge, dysuria, enuresis, flank pain, frequency, genital sores, hematuria, penile pain, penile swelling, scrotal swelling, testicular pain and urgency.   Musculoskeletal: Negative for arthralgias, back pain, gait problem, joint swelling, myalgias, neck pain and neck stiffness.   Skin: Negative for color change, pallor, rash and wound.   Allergic/Immunologic: Negative for environmental allergies, food allergies and immunocompromised state.   Neurological: Negative for dizziness, tremors, seizures, syncope, facial asymmetry, speech difficulty,  weakness, light-headedness, numbness and headaches.   Hematological: Negative for adenopathy. Does not bruise/bleed easily.   Psychiatric/Behavioral: Negative for agitation, behavioral problems, confusion, decreased concentration, dysphoric mood, hallucinations, self-injury, sleep disturbance and suicidal ideas. The patient is not nervous/anxious and is not hyperactive.      Objective:     Vital Signs (Most Recent):  Temp: 99.4 °F (37.4 °C) (10/16/19 1949)  Pulse: 79 (10/17/19 0447)  Resp: 20 (10/17/19 0201)  BP: 122/71 (10/17/19 0447)  SpO2: (!) 94 % (10/17/19 0447) Vital Signs (24h Range):  Temp:  [99.4 °F (37.4 °C)] 99.4 °F (37.4 °C)  Pulse:  [76-96] 79  Resp:  [17-20] 20  SpO2:  [93 %-100 %] 94 %  BP: (115-142)/(60-86) 122/71     Weight: 104.3 kg (230 lb)  Body mass index is 32.08 kg/m².    Physical Exam   Constitutional: He is oriented to person, place, and time. He appears well-developed and well-nourished. No distress.   Not in distress   HENT:   Head: Normocephalic and atraumatic.   Nose: Nose normal.   Mouth/Throat: Oropharynx is clear and moist. No oropharyngeal exudate.   Eyes: Pupils are equal, round, and reactive to light. EOM are normal. Scleral icterus is present.   Neck: Normal range of motion. Neck supple. No JVD present. No tracheal deviation present. No thyromegaly present.   Cardiovascular: Normal rate, regular rhythm, normal heart sounds and intact distal pulses.   No murmur heard.  Pulmonary/Chest: Effort normal and breath sounds normal. No respiratory distress. He has no wheezes. He has no rales. He exhibits no tenderness.   Abdominal: Soft. Bowel sounds are normal. He exhibits distension (distented abdomen with large ascites ). He exhibits no mass. There is tenderness (mild diffuse TTP ). There is no rebound and no guarding. A hernia (umbilicar hearnia leaking serosanguinous fluid ) is present.   Musculoskeletal: Normal range of motion. He exhibits edema (3+ BL LE pitting edema ). He exhibits  no tenderness.   Lymphadenopathy:     He has no cervical adenopathy.   Neurological: He is alert and oriented to person, place, and time. He has normal reflexes. He displays normal reflexes. No cranial nerve deficit. He exhibits normal muscle tone. Coordination normal.   Positive mild asterixis    Skin: Skin is warm and dry. No rash noted. He is not diaphoretic. No erythema.   Psychiatric: He has a normal mood and affect. Judgment and thought content normal.         CRANIAL NERVES     CN III, IV, VI   Pupils are equal, round, and reactive to light.  Extraocular motions are normal.       Significant Labs:   Recent Results (from the past 24 hour(s))   CBC W/ AUTO DIFFERENTIAL    Collection Time: 10/16/19  9:14 PM   Result Value Ref Range    WBC 2.80 (L) 3.90 - 12.70 K/uL    RBC 3.58 (L) 4.60 - 6.20 M/uL    Hemoglobin 10.3 (L) 14.0 - 18.0 g/dL    Hematocrit 32.3 (L) 40.0 - 54.0 %    Mean Corpuscular Volume 90 82 - 98 fL    Mean Corpuscular Hemoglobin 28.8 27.0 - 31.0 pg    Mean Corpuscular Hemoglobin Conc 31.9 (L) 32.0 - 36.0 g/dL    RDW 19.5 (H) 11.5 - 14.5 %    Platelets 54 (L) 150 - 350 K/uL    MPV SEE COMMENT 9.2 - 12.9 fL    Immature Granulocytes 0.4 0.0 - 0.5 %    Gran # (ANC) 1.6 (L) 1.8 - 7.7 K/uL    Immature Grans (Abs) 0.01 0.00 - 0.04 K/uL    Lymph # 0.8 (L) 1.0 - 4.8 K/uL    Mono # 0.3 0.3 - 1.0 K/uL    Eos # 0.1 0.0 - 0.5 K/uL    Baso # 0.02 0.00 - 0.20 K/uL    nRBC 0 0 /100 WBC    Gran% 58.5 38.0 - 73.0 %    Lymph% 27.5 18.0 - 48.0 %    Mono% 10.4 4.0 - 15.0 %    Eosinophil% 2.5 0.0 - 8.0 %    Basophil% 0.7 0.0 - 1.9 %    Differential Method Automated    Comp. Metabolic Panel    Collection Time: 10/16/19  9:14 PM   Result Value Ref Range    Sodium 137 136 - 145 mmol/L    Potassium 3.5 3.5 - 5.1 mmol/L    Chloride 104 95 - 110 mmol/L    CO2 25 23 - 29 mmol/L    Glucose 134 (H) 70 - 110 mg/dL    BUN, Bld 5 (L) 6 - 20 mg/dL    Creatinine 0.8 0.5 - 1.4 mg/dL    Calcium 7.5 (L) 8.7 - 10.5 mg/dL    Total  Protein 6.3 6.0 - 8.4 g/dL    Albumin 2.8 (L) 3.5 - 5.2 g/dL    Total Bilirubin 2.3 (H) 0.1 - 1.0 mg/dL    Alkaline Phosphatase 156 (H) 55 - 135 U/L    AST 64 (H) 10 - 40 U/L    ALT 26 10 - 44 U/L    Anion Gap 8 8 - 16 mmol/L    eGFR if African American >60.0 >60 mL/min/1.73 m^2    eGFR if non African American >60.0 >60 mL/min/1.73 m^2   Lipase    Collection Time: 10/16/19  9:14 PM   Result Value Ref Range    Lipase 46 4 - 60 U/L   Lactic acid, plasma    Collection Time: 10/16/19  9:14 PM   Result Value Ref Range    Lactate (Lactic Acid) 1.5 0.5 - 2.2 mmol/L   Protime-INR    Collection Time: 10/16/19  9:14 PM   Result Value Ref Range    Prothrombin Time 15.8 (H) 9.0 - 12.5 sec    INR 1.6 (H) 0.8 - 1.2   Type & Screen    Collection Time: 10/16/19  9:14 PM   Result Value Ref Range    Group & Rh A NEG     Indirect Shani NEG    APTT    Collection Time: 10/16/19  9:14 PM   Result Value Ref Range    aPTT 28.1 21.0 - 32.0 sec   Urinalysis, Reflex to Urine Culture Urine, Clean Catch    Collection Time: 10/17/19  1:14 AM   Result Value Ref Range    Specimen UA Urine, Clean Catch     Color, UA Denita Yellow, Straw, Denita    Appearance, UA Clear Clear    pH, UA 6.0 5.0 - 8.0    Specific Gravity, UA 1.020 1.005 - 1.030    Protein, UA Negative Negative    Glucose, UA Negative Negative    Ketones, UA Negative Negative    Bilirubin (UA) Negative Negative    Occult Blood UA Negative Negative    Nitrite, UA Negative Negative    Leukocytes, UA Negative Negative   POCT Glucose, Hand-Held Device    Collection Time: 10/17/19  3:36 AM   Result Value Ref Range    POC Glucose 103 70 - 110 MG/DL         Significant Imaging: I have reviewed and interpreted all pertinent imaging results/findings within the past 24 hours.     EGD (03/14/18):    Impression:             - Z-line irregular, 40 cm from the incisors.                        - Non-bleeding grade I esophageal varices.                        - Portal hypertensive gastropathy.                         - Normal examined duodenum.                        - No specimens collected.    Assessment/Plan:     MELD-Na score: 15 at 10/16/2019  9:14 PM  MELD score: 15 at 10/16/2019  9:14 PM  Calculated from:  Serum Creatinine: 0.8 mg/dL (Rounded to 1 mg/dL) at 10/16/2019  9:14 PM  Serum Sodium: 137 mmol/L at 10/16/2019  9:14 PM  Total Bilirubin: 2.3 mg/dL at 10/16/2019  9:14 PM  INR(ratio): 1.6 at 10/16/2019  9:14 PM  Age: 59 years    Active Diagnoses:    Diagnosis Date Noted POA    PRINCIPAL PROBLEM:  Decompensated hepatic cirrhosis [K72.90] 10/17/2019 Yes    Ascites due to alcoholic cirrhosis [K70.31] 10/17/2019 Yes    Portal hypertension [K76.6] 10/17/2019 Yes    Portal hypertensive gastropathy [K76.6, K31.89] 10/17/2019 Yes    Fluid overload [E87.70] 10/17/2019 Yes    Type 2 diabetes mellitus without complication, with long-term current use of insulin [E11.9, Z79.4] 10/17/2019 Not Applicable    Cirrhosis [K74.60] 01/26/2017 Yes    Alcohol abuse, in remission [F10.11] 07/08/2014 Yes    Chronic back pain [M54.9, G89.29] 07/08/2014 Yes    GERD (gastroesophageal reflux disease) [K21.9] 07/08/2014 Yes    Hepatic encephalopathy [K72.90] 07/08/2014 Yes    HTN (hypertension) [I10] 07/08/2014 Yes    Other ascites [R18.8] 07/08/2014 Yes    Splenomegaly [R16.1] 07/08/2014 Yes    Umbilical hernia [K42.9] 07/08/2014 Yes      Problems Resolved During this Admission:     #Decompensated alcoholic liver cirrhosis with fluid overload  Umbilical hernia leaking ascitic fluid   Ascites  Leg swelling     MELD-Na score: 15 at 10/16/2019  9:14 PM  MELD score: 15 at 10/16/2019  9:14 PM  Calculated from:  Serum Creatinine: 0.8 mg/dL (Rounded to 1 mg/dL) at 10/16/2019  9:14 PM  Serum Sodium: 137 mmol/L at 10/16/2019  9:14 PM  Total Bilirubin: 2.3 mg/dL at 10/16/2019  9:14 PM  INR(ratio): 1.6 at 10/16/2019  9:14 PM  Age: 59 years     -LFT, INR, platelet and renal function is at baseline   -he does have large ascites with  leaking thru umbilical hernia   -will give lasix 80 mg IV X 1 now and then continue home dose po lasix and aldactone   -moderate diffuse abdominal TTP w/o signs of acute abdomen   -he is afebrile, no leukocytosis and does not appear toxic   -received empiric dose of rocephin in ED   -continue rocephin 2 gm daily   -need diagnostic and therapeutic paracentesis in am   -CT abdomen triple phase liver as ordered by hepatology   -lactulose and rifaximin for HE   -check PETH, FOBT, ammonia level  -continue bystolic daily for portal hypertension   -monitor MELD closely with daily labs     #HTN  -continue home dose lisinopril     #IDDM2  -on high dose basal and prepandial insulin at home   -will start on levemir 25U and novolog 7U TIDWM   -adjust insulin dose based on CBG and PO intake   -check A1C    #Chronic low back pain   -continue home regimen of norco, gabapentin     #BPH  -continue home dose proscar and flomax    VTE Risk Mitigation (From admission, onward)    None            Jennifer Fung DO  Department of Hospital Medicine   [facility]

## 2019-10-17 NOTE — ED PROVIDER NOTES
"Encounter Date: 10/16/2019       History     Chief Complaint   Patient presents with    Liver Failure     Patient reports that he is having liver problems. States that he is "leaking fluid from my stomach". Patient's abdomin is swollen.      59M with pmhx cirrhosis c/b ascites (no previous paracentesis), DM, HTN and umbilical hernia now presents to the ER with 3 weeks of gradually worsening abd distention, generalized sharp abd pain, leaking purulent fluid initially from his umbilical hernia and later from multiple sites on the right side of his distended abdomen. Patient also reports blood streaks in his vomitus (5 episodes since morning today) and blood streaks in his stool and blood on toilet paper x 3-4 days but not today. Mentions blood in stool is not new for him but the last time he had hematemesis was 1 month ago. Patient also reports intermittent confusion which has worsened lately. Reports worsening chest tightness, SOB in supine position and leg swelling during the same time associated with leg pain.     Denies headache, nausea, fever, chills, dizziness, diarrhea, cough, weakness/numbness/tingling.  Patient went to an OSH in [place] where he was given norco and bactrim yesterday. Patient has taken two doses of bactrim already.        Review of patient's allergies indicates:  No Known Allergies  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    Esophageal varices     GERD (gastroesophageal reflux disease)     Hepatic encephalopathy 7/8/2014    Hepatitis C virus infection 07/08/2014    tx with interferon 3-4 mos- stopped for unclear reasons Tattoos-first at age 17 only risk factor (HCVAB negative 08/2017)    HTN (hypertension) 7/8/2014    Hypertension     Insulin dependent diabetes mellitus     Renal mass, left 7/8/2014    6.3 x 5.7 x 5.6 complex mass    Splenomegaly " 2014    Umbilical hernia 2014     Past Surgical History:   Procedure Laterality Date    CARPAL TUNNEL RELEASE Bilateral     CHOLECYSTECTOMY      COLONOSCOPY N/A 2017    Procedure: COLONOSCOPY;  Surgeon: Savannah Llanos MD;  Location: [facility] ENDO;  Service: Endoscopy;  Laterality: N/A;    COLONOSCOPY Left 3/14/2018    Procedure: COLONOSCOPY;  Surgeon: Savannah Llanos MD;  Location: [facility] ENDO;  Service: Endoscopy;  Laterality: Left;    ESOPHAGOGASTRODUODENOSCOPY  2014    ESOPHAGOGASTRODUODENOSCOPY  02/15/2017    grade II varices    ESOPHAGOGASTRODUODENOSCOPY  04/10/2017    grade I varices    ESOPHAGOGASTRODUODENOSCOPY W/ BANDING  2017    grade II varices    HERNIA REPAIR      NECK SURGERY      fusion on C5 and C6    ULNAR NERVE TRANSPOSITION Left     vericose veins removed       Family History   Problem Relation Age of Onset    Hyperlipidemia Mother     No Known Problems Father 36        accident on oil rig    No Known Problems Sister     Cancer Brother         kidney    Alcohol abuse Brother     No Known Problems Sister      Social History     Tobacco Use    Smoking status: Former Smoker     Types: Cigarettes     Last attempt to quit: 2010     Years since quittin.7    Smokeless tobacco: Former User     Types: Chew     Quit date: 1990    Tobacco comment: former 1 pack/week quit 2010   Substance Use Topics    Alcohol use: No     Comment: former heavy beer but quit 2010    Drug use: No     Review of Systems   Constitutional: Negative for appetite change, chills and fever.   HENT: Negative for trouble swallowing.    Respiratory: Positive for chest tightness. Negative for cough and shortness of breath.    Cardiovascular: Positive for leg swelling. Negative for chest pain and palpitations.   Gastrointestinal: Positive for abdominal distention, abdominal pain, nausea and vomiting. Negative for constipation and diarrhea.   Genitourinary: Negative for  decreased urine volume, dysuria and hematuria.   Musculoskeletal: Positive for back pain.   Neurological: Negative for dizziness, weakness, light-headedness, numbness and headaches.   Psychiatric/Behavioral: Positive for confusion.       Physical Exam     Initial Vitals [10/16/19 1949]   BP Pulse Resp Temp SpO2   136/69 96 18 99.4 °F (37.4 °C) 98 %      MAP       --         Physical Exam    Constitutional: He appears well-developed and well-nourished. He is not diaphoretic. No distress.   HENT:   Head: Normocephalic and atraumatic.   Mouth/Throat: No oropharyngeal exudate.   Eyes: Conjunctivae and EOM are normal. Scleral icterus is present.   Neck: Normal range of motion. Neck supple. No JVD present.   Cardiovascular: Normal rate, regular rhythm and normal heart sounds.   Pulmonary/Chest: Breath sounds normal. No respiratory distress. He has no wheezes. He has no rales.   Abdominal: Soft. Bowel sounds are normal. He exhibits distension. There is tenderness (max in RUQ).   Protruded- reducible umbilical hernia, non-tender   Musculoskeletal: Normal range of motion. He exhibits no edema or tenderness.   Neurological: He is alert and oriented to person, place, and time. He has normal strength. No sensory deficit. GCS score is 15. GCS eye subscore is 4. GCS verbal subscore is 5. GCS motor subscore is 6.   Skin: Skin is warm and dry.         ED Course   Procedures  Labs Reviewed   CBC W/ AUTO DIFFERENTIAL   COMPREHENSIVE METABOLIC PANEL   LIPASE   URINALYSIS, REFLEX TO URINE CULTURE   LACTIC ACID, PLASMA   PROTIME-INR   TYPE & SCREEN     EKG Readings: (Independently Interpreted)   NSR         Imaging Results    None          Medical Decision Making:   Initial Assessment:   59M pmhx cirrhosis and umbilical hernia presents to the ER with worsening ascites and symptoms of GI bleeding (hematemesis and hematochezia) concerning for esophageal variceal bleed vs gastric variceal bleed vs gastric ulcer vs portal thrombosis vs SBP  vs complicated umbilical hernia vs Portal Hypertensive Gastropathy bleed.  Clinical Tests:   Lab Tests: Ordered  Radiological Study: Ordered  Medical Tests: Ordered  ED Management:  CBC, CMP, Lipase, Lactate, INR, UA    Initial workup showed leucopenia, elevated INR  Lipase and lactate wnl  UA not concerning for infection    Given protonix 80 iv, morphine for pain, ceftriaxone for prophylaxis, octreotide one dose 50mcg. Patient comfortable and mild pain on reassessment.    Based on the encounter with hepatologist and concerned about GI bleed vs SBP vs complicated umbilical hernia patient was admitted to the IML team for management.                      Clinical Impression:       ICD-10-CM ICD-9-CM   1. Ascites due to alcoholic cirrhosis K70.31 571.2   2. Generalized abdominal pain R10.84 789.07         Disposition:   Disposition: Admitted                        Armin Fitzpatrick MD  Resident  10/17/19 0153

## 2019-10-17 NOTE — ED NOTES
Pt c/o 10/10 RUQ abdominal pain, abdominal swelling and leaking fluid, chest pain, leg swelling, SOB, HA, N/V, seeing occasional spots in vision and itchiness all over body. Pt had 5 hematemesis episodes today. Bowel movements loose. Pt reports burning with urination, dark yellow urine. Pt denies fever, chills, cough, dizziness. Pt has +3 pitting edema in bilateral lower extremities, <3 second CR, and +2 DP. Pt abdomen distended, firm, and leaking clear and yellow fluid. Pt AAOx4.

## 2019-10-17 NOTE — ED NOTES
Patient on stretcher, Bed placed in low/locked position, side rails up x 2. Patient placed into position of comfort. Patient in NAD and offers no complaints at this time. Will continue to monitor.

## 2019-10-17 NOTE — UM SECONDARY REVIEW
EHR Determination :Obs approp  per Dr Sammy Martinez,  secure chat meesage sent to admitting service making them aware,[facility]

## 2019-10-17 NOTE — ANESTHESIA PREPROCEDURE EVALUATION
[facility]  Anesthesia Pre-Operative Evaluation         Patient Name: Colin Reilly  YOB: 1960  MRN: [id number]    SUBJECTIVE:     Pre-operative evaluation for Procedure(s) (LRB):  EGD (ESOPHAGOGASTRODUODENOSCOPY) (N/A)     10/17/2019    Colin Reilly is a 59 y.o. male w/ a significant PMHx of HTN, IDDM2, BPH, HCV (s/p treatment), chronic low back pain with opoid dependence, EtOH cirrhosis complicated by ascites (no previous paracentesis), portal hypertension, grade I EV, portal hypertensive gastropathy, umbilical hernia, splenomegaly, thrombocytopenia, hepatic encephalopathy presents to ED with worsening abdominal distention, diffuse abdominal pain and umbilical hernia leaking greenish yellow fluid..    Patient now presents for the above procedure(s).      LDA:        Peripheral IV - Single Lumen 10/17/19 1210 20 G Left Antecubital (Active)   Site Assessment Clean;Dry 10/17/2019 12:10 PM   Line Status Blood return noted;Saline locked;Flushed 10/17/2019 12:10 PM   Number of days: 0       Prev airway: None documented.    Drips: None documented.      Patient Active Problem List   Diagnosis    Esophageal varices with bleeding    Cirrhosis, Laennec's    Umbilical hernia    Splenomegaly    GERD (gastroesophageal reflux disease)    Diabetes    HTN (hypertension)    Other ascites    Alcohol abuse, in remission    Hepatic encephalopathy    Chronic back pain    Cirrhosis    Rectal bleeding    Dysphagia    Decompensated hepatic cirrhosis    Ascites due to alcoholic cirrhosis    Portal hypertension    Portal hypertensive gastropathy    Fluid overload    Type 2 diabetes mellitus without complication, with long-term current use of insulin       Review of patient's allergies indicates:  No Known Allergies    Current Inpatient Medications:   [START ON 10/18/2019] cefTRIAXone (ROCEPHIN) IVPB  1 g Intravenous Q24H    escitalopram oxalate  20 mg Oral Daily    finasteride  5 mg  Oral Daily    fluticasone propionate  1 spray Each Nostril Daily    [START ON 10/18/2019] furosemide  80 mg Oral Daily    gabapentin  600 mg Oral TID    insulin aspart U-100  7 Units Subcutaneous TIDWM    insulin detemir U-100  25 Units Subcutaneous QHS    lactulose  30 g Oral TID    lisinopril  10 mg Oral Daily    nebivolol  10 mg Oral Daily    pantoprazole  40 mg Oral BID    rifAXIMin  550 mg Oral BID    spironolactone  100 mg Oral Daily    tamsulosin  0.4 mg Oral Daily       No current facility-administered medications on file prior to encounter.      Current Outpatient Medications on File Prior to Encounter   Medication Sig Dispense Refill    ciprofloxacin HCl (CIPRO) 500 MG tablet Take 1 tablet (500 mg total) by mouth every 12 (twelve) hours. 20 tablet 1    cyclobenzaprine (FLEXERIL) 10 MG tablet Take 10 mg by mouth once daily.       fluticasone (FLONASE) 50 mcg/actuation nasal spray 1 spray by Each Nare route once daily.       furosemide (LASIX) 20 MG tablet Take 6 tablets (120 mg total) by mouth once daily. 180 tablet 11    gabapentin (NEURONTIN) 300 MG capsule Take 600 mg by mouth 3 (three) times daily.       insulin aspart protamine-insulin aspart (NOVOLOG 70/30) 100 unit/mL (70-30) InPn pen Inject 28 Units into the skin 3 (three) times daily before meals.       INSULIN GLARGINE,HUM.REC.ANLOG (LANTUS SOLOSTAR SUBQ) Inject 74 Units into the skin every evening.       lactobacillus acidophilus Cap Take 1 capsule by mouth once daily.      lactulose (CHRONULAC) 10 gram/15 mL solution Take 30 mLs by mouth 3 (three) times daily.       nebivolol (BYSTOLIC) 10 MG Tab Take 10 mg by mouth once daily.      pantoprazole (PROTONIX) 40 MG tablet Take 1 tablet (40 mg total) by mouth 2 (two) times daily. 180 tablet 1    rifAXIMin (XIFAXAN) 550 mg Tab Take 1 tablet (550 mg total) by mouth 2 (two) times daily. 30 tablet 11    spironolactone (ALDACTONE) 100 MG tablet TAKE 1 TABLET(100 MG) BY MOUTH  EVERY DAY 90 tablet 0    spironolactone (ALDACTONE) 25 MG tablet TAKE 6 TABLETS BY MOUTH DAILY FOR FLUID 360 tablet 11    tamsulosin (FLOMAX) 0.4 mg Cp24 Take 1 capsule (0.4 mg total) by mouth once daily. 30 capsule 11    B.ANI/L.ACI/L.SAL/L.PLAN/L.JITENDRA (PROBIOTIC FORMULA ORAL) Take by mouth.      escitalopram oxalate (LEXAPRO) 20 MG tablet TK 1 T PO QD. DISCONTINUE TRAZODONE  2    finasteride (PROSCAR) 5 mg tablet Take 1 tablet (5 mg total) by mouth once daily. 30 tablet 11    hydrocodone-acetaminophen 10-325mg (NORCO)  mg Tab Take 10 tablets by mouth 3 (three) times daily.       lisinopril 10 MG tablet Take 10 mg by mouth once daily.      oxybutynin (DITROPAN) 5 MG Tab Take 1 tablet (5 mg total) by mouth every evening. 30 tablet 11    sildenafil (REVATIO) 20 mg Tab Take 1 tablet (20 mg total) by mouth as needed. 50 tablet 11       Past Surgical History:   Procedure Laterality Date    CARPAL TUNNEL RELEASE Bilateral     CHOLECYSTECTOMY      COLONOSCOPY N/A 9/8/2017    Procedure: COLONOSCOPY;  Surgeon: Savannah Llanos MD;  Location: [facility] ENDO;  Service: Endoscopy;  Laterality: N/A;    COLONOSCOPY Left 3/14/2018    Procedure: COLONOSCOPY;  Surgeon: Savannah Llanos MD;  Location: [facility] ENDO;  Service: Endoscopy;  Laterality: Left;    ESOPHAGOGASTRODUODENOSCOPY  01/2014    ESOPHAGOGASTRODUODENOSCOPY  02/15/2017    grade II varices    ESOPHAGOGASTRODUODENOSCOPY  04/10/2017    grade I varices    ESOPHAGOGASTRODUODENOSCOPY W/ BANDING  01/26/2017    grade II varices    HERNIA REPAIR      NECK SURGERY      fusion on C5 and C6    ULNAR NERVE TRANSPOSITION Left     vericose veins removed         Social History     Socioeconomic History    Marital status:      Spouse name: Not on file    Number of children: Not on file    Years of education: Not on file    Highest education level: Not on file   Occupational History     Employer: Disabled   Social Needs    Financial resource  strain: Not on file    Food insecurity:     Worry: Not on file     Inability: Not on file    Transportation needs:     Medical: Not on file     Non-medical: Not on file   Tobacco Use    Smoking status: Former Smoker     Types: Cigarettes     Last attempt to quit: 2010     Years since quittin.7    Smokeless tobacco: Former User     Types: Chew     Quit date: 1990    Tobacco comment: former 1 pack/week quit 2010   Substance and Sexual Activity    Alcohol use: No     Comment: former heavy beer but quit 2010    Drug use: No    Sexual activity: Never     Comment:    Lifestyle    Physical activity:     Days per week: Not on file     Minutes per session: Not on file    Stress: Not on file   Relationships    Social connections:     Talks on phone: Not on file     Gets together: Not on file     Attends [employer] service: Not on file     Active member of club or organization: Not on file     Attends meetings of clubs or organizations: Not on file     Relationship status: Not on file   Other Topics Concern    Patient feels they ought to cut down on drinking/drug use Not Asked    Patient annoyed by others criticizing their drinking/drug use Not Asked    Patient has felt bad or guilty about drinking/drug use Not Asked    Patient has had a drink/used drugs as an eye opener in the AM Not Asked   Social History Narrative           OBJECTIVE:     Vital Signs Range (Last 24H):  Temp:  [36.7 °C (98 °F)-37.4 °C (99.4 °F)]   Pulse:  [76-96]   Resp:  [13-20]   BP: (115-142)/(60-86)   SpO2:  [93 %-100 %]       Significant Labs:  Lab Results   Component Value Date    WBC 2.80 (L) 10/16/2019    HGB 10.3 (L) 10/16/2019    HCT 32.3 (L) 10/16/2019    PLT 54 (L) 10/16/2019    CHOL 178 10/18/2016    TRIG 134 10/18/2016    HDL 35 10/18/2016    ALT 26 10/16/2019    AST 64 (H) 10/16/2019     10/16/2019    K 3.5 10/16/2019     10/16/2019    CREATININE 0.8 10/16/2019    BUN 5 (L)  10/16/2019    CO2 25 10/16/2019    PSA 0.44 06/19/2018    INR 1.6 (H) 10/16/2019    HGBA1C 9.2 (H) 10/16/2019       Diagnostic Studies: No relevant studies.    EKG:   Results for orders placed or performed in visit on 10/16/19   EKG 12-lead    Collection Time: 10/16/19  8:10 PM    Narrative    Test Reason : R07.9    Vent. Rate : 085 BPM     Atrial Rate : 085 BPM     P-R Int : 158 ms          QRS Dur : 100 ms      QT Int : 420 ms       P-R-T Axes : 059 -26 021 degrees     QTc Int : 499 ms    Normal sinus rhythm  Low voltage QRS in the anterior leads  Possible anterolateral infarct  Abnormal ECG  When compared with ECG of 19-SEP-2014 13:03,  Poor R wave progression now present across the precordial leads  Confirmed by Lexus Wise MD ([id number]) on 10/17/2019 1:20:19 PM    Referred By: [email or web address]   SELF           Confirmed By:Lexus Wise MD       2D ECHO:  TTE:  No results found for this or any previous visit.    KIMBERLEY:  No results found for this or any previous visit.    ASSESSMENT/PLAN:                                                                                                            10/17/2019  Colin Reilly is a 59 y.o., male.    Anesthesia Evaluation    I have reviewed the Patient Summary Reports.     I have reviewed the Nursing Notes.   I have reviewed the Medications.     Review of Systems  Anesthesia Hx:  No problems with previous Anesthesia  Denies Family Hx of Anesthesia complications.   Denies Personal Hx of Anesthesia complications.   Social:  Former Smoker, No Alcohol Use    Hematology/Oncology:     Oncology Normal    -- Anemia: Hematology Comments: 10.3 / 32.3    Cardiovascular:   Hypertension Denies MI.   Denies CABG/stent.         Pulmonary:   Denies COPD.  Denies Asthma.  Denies Sleep Apnea.    Renal/:   Denies Chronic Renal Disease.  Renal mass, left   Hepatic/GI:   Bowel Prep. GERD Liver Disease, Hepatitis, C Ascites  Hepatic encephalopathy  Esophageal varices  Cirrhosis,  Laennec's  Splenomegaly   Musculoskeletal:  Musculoskeletal Normal    Neurological:   Denies CVA. Denies Seizures.   Chronic Pain Syndrome   Endocrine:   Diabetes, poorly controlled, type 2 Denies Hypothyroidism. Denies Hyperthyroidism.        Physical Exam  General:  Well nourished, Obesity    Airway/Jaw/Neck:  Airway Findings: Mouth Opening: Normal Tongue: Normal  General Airway Assessment: Adult, Good  Mallampati: I        Eyes/Ears/Nose:  EYES/EARS/NOSE FINDINGS: Normal   Dental:  Dental Findings: In tact   Chest/Lungs:  Chest/Lungs Findings: Clear to auscultation, Normal Respiratory Rate     Heart/Vascular:  Heart Findings: Rate: Normal  Rhythm: Regular Rhythm  Sounds: Normal     Abdomen:  Abdomen Findings:  Normal, Soft, Ascites     Musculoskeletal:  Musculoskeletal Findings: Normal   Skin:  Skin Findings: Normal    Mental Status:  Mental Status Findings:  Cooperative, Alert and Oriented         Anesthesia Plan  Type of Anesthesia, risks & benefits discussed:  Anesthesia Type:  general, MAC  Patient's Preference:   Intra-op Monitoring Plan: standard ASA monitors  Intra-op Monitoring Plan Comments:   Post Op Pain Control Plan: per primary service following discharge from PACU  Post Op Pain Control Plan Comments:   Induction:   IV  Beta Blocker:  Patient is not currently on a Beta-Blocker (No further documentation required).       Informed Consent: Patient understands risks and agrees with Anesthesia plan.  Questions answered. Anesthesia consent signed with patient.  ASA Score: 3     Day of Surgery Review of History & Physical:    H&P update referred to the provider.         Ready For Surgery From Anesthesia Perspective.

## 2019-10-17 NOTE — ED NOTES
Dr. Fung made aware pt self catheterizes self at home when unable to empty entire bladder. Per Dr. Fung VORB straight cath if unable to void PRN.

## 2019-10-17 NOTE — ED NOTES
Hourly rounding complete. Pt lying in stretcher in low and locked position, side rails raised x2, call light and pt belongings in reach. Pt on continuous cardiac monitoring monitoring, BP circulating every 30 minutes, and pulse oximetry. Vital signs stable and pt in NAD. Pt verbalized no needs at this time.

## 2019-10-17 NOTE — ED NOTES
Assumed care of patient at this time.     Patient identifiers verified and correct for Colin Reilly.    LOC: The patient is awake, alert and aware of environment with an appropriate affect, the patient is oriented x 3 and speaking appropriately.  APPEARANCE: Patient resting comfortably and in no acute distress, patient is clean and well groomed, patient's clothing is properly fastened.  SKIN: The skin is warm and dry, color consistent with ethnicity, patient has normal skin turgor and moist mucus membranes, skin intact, no breakdown or bruising noted.  MUSCULOSKELETAL: Patient moving all extremities spontaneously. Patient has bilateral LE swelling.   RESPIRATORY: Airway is open and patent, respirations are spontaneous.  CARDIAC: Patient has a normal rate, no periphreal edema noted, capillary refill < 3 seconds.  ABDOMEN: pt has hx of ascites. Abdomen is swollen and taut. Pt reports pain in the RUQ region. 5/10 at this time

## 2019-10-18 ENCOUNTER — ANESTHESIA (OUTPATIENT)
Dept: ENDOSCOPY | Facility: HOSPITAL | Age: 59
End: 2019-10-18
Payer: MEDICARE

## 2019-10-18 VITALS
HEIGHT: 71 IN | HEART RATE: 98 BPM | RESPIRATION RATE: 16 BRPM | SYSTOLIC BLOOD PRESSURE: 122 MMHG | BODY MASS INDEX: 33.02 KG/M2 | OXYGEN SATURATION: 99 % | WEIGHT: 235.88 LBS | DIASTOLIC BLOOD PRESSURE: 95 MMHG | TEMPERATURE: 98 F

## 2019-10-18 DIAGNOSIS — K22.10 ULCER OF ESOPHAGUS WITHOUT BLEEDING: Primary | ICD-10-CM

## 2019-10-18 DIAGNOSIS — K74.60 ESOPHAGEAL VARICES IN CIRRHOSIS: ICD-10-CM

## 2019-10-18 DIAGNOSIS — I85.10 ESOPHAGEAL VARICES IN CIRRHOSIS: ICD-10-CM

## 2019-10-18 LAB
ALBUMIN SERPL BCP-MCNC: 2.6 G/DL (ref 3.5–5.2)
ALP SERPL-CCNC: 136 U/L (ref 55–135)
ALT SERPL W/O P-5'-P-CCNC: 23 U/L (ref 10–44)
ANION GAP SERPL CALC-SCNC: 6 MMOL/L (ref 8–16)
AST SERPL-CCNC: 56 U/L (ref 10–40)
BASOPHILS # BLD AUTO: 0.02 K/UL (ref 0–0.2)
BASOPHILS NFR BLD: 0.9 % (ref 0–1.9)
BILIRUB SERPL-MCNC: 2.9 MG/DL (ref 0.1–1)
BUN SERPL-MCNC: 7 MG/DL (ref 6–20)
CALCIUM SERPL-MCNC: 7.9 MG/DL (ref 8.7–10.5)
CHLORIDE SERPL-SCNC: 106 MMOL/L (ref 95–110)
CO2 SERPL-SCNC: 26 MMOL/L (ref 23–29)
CREAT SERPL-MCNC: 0.6 MG/DL (ref 0.5–1.4)
DIFFERENTIAL METHOD: ABNORMAL
EOSINOPHIL # BLD AUTO: 0.1 K/UL (ref 0–0.5)
EOSINOPHIL NFR BLD: 2.6 % (ref 0–8)
ERYTHROCYTE [DISTWIDTH] IN BLOOD BY AUTOMATED COUNT: 19.2 % (ref 11.5–14.5)
EST. GFR  (AFRICAN AMERICAN): >60 ML/MIN/1.73 M^2
EST. GFR  (NON AFRICAN AMERICAN): >60 ML/MIN/1.73 M^2
GLUCOSE SERPL-MCNC: 86 MG/DL (ref 70–110)
HCT VFR BLD AUTO: 33.2 % (ref 40–54)
HGB BLD-MCNC: 10.2 G/DL (ref 14–18)
IMM GRANULOCYTES # BLD AUTO: 0.01 K/UL (ref 0–0.04)
IMM GRANULOCYTES NFR BLD AUTO: 0.4 % (ref 0–0.5)
LYMPHOCYTES # BLD AUTO: 0.7 K/UL (ref 1–4.8)
LYMPHOCYTES NFR BLD: 30.4 % (ref 18–48)
MAGNESIUM SERPL-MCNC: 1.9 MG/DL (ref 1.6–2.6)
MCH RBC QN AUTO: 28.7 PG (ref 27–31)
MCHC RBC AUTO-ENTMCNC: 30.7 G/DL (ref 32–36)
MCV RBC AUTO: 94 FL (ref 82–98)
MONOCYTES # BLD AUTO: 0.3 K/UL (ref 0.3–1)
MONOCYTES NFR BLD: 15 % (ref 4–15)
NEUTROPHILS # BLD AUTO: 1.2 K/UL (ref 1.8–7.7)
NEUTROPHILS NFR BLD: 50.7 % (ref 38–73)
NRBC BLD-RTO: 0 /100 WBC
PHOSPHATE SERPL-MCNC: 2.8 MG/DL (ref 2.7–4.5)
PLATELET # BLD AUTO: 44 K/UL (ref 150–350)
PMV BLD AUTO: 11.7 FL (ref 9.2–12.9)
POCT GLUCOSE: 72 MG/DL (ref 70–110)
POCT GLUCOSE: 72 MG/DL (ref 70–110)
POCT GLUCOSE: 73 MG/DL (ref 70–110)
POTASSIUM SERPL-SCNC: 3.5 MMOL/L (ref 3.5–5.1)
PROT SERPL-MCNC: 5.9 G/DL (ref 6–8.4)
RBC # BLD AUTO: 3.55 M/UL (ref 4.6–6.2)
SODIUM SERPL-SCNC: 138 MMOL/L (ref 136–145)
WBC # BLD AUTO: 2.27 K/UL (ref 3.9–12.7)

## 2019-10-18 PROCEDURE — 82962 GLUCOSE BLOOD TEST: CPT | Mod: 91 | Performed by: INTERNAL MEDICINE

## 2019-10-18 PROCEDURE — 25000003 PHARM REV CODE 250: Performed by: NURSE ANESTHETIST, CERTIFIED REGISTERED

## 2019-10-18 PROCEDURE — 99217 PR OBSERVATION CARE DISCHARGE: ICD-10-PCS | Mod: ,,, | Performed by: HOSPITALIST

## 2019-10-18 PROCEDURE — G0378 HOSPITAL OBSERVATION PER HR: HCPCS

## 2019-10-18 PROCEDURE — 43235 EGD DIAGNOSTIC BRUSH WASH: CPT | Performed by: INTERNAL MEDICINE

## 2019-10-18 PROCEDURE — 37000008 HC ANESTHESIA 1ST 15 MINUTES: Performed by: INTERNAL MEDICINE

## 2019-10-18 PROCEDURE — 80053 COMPREHEN METABOLIC PANEL: CPT

## 2019-10-18 PROCEDURE — 85025 COMPLETE CBC W/AUTO DIFF WBC: CPT

## 2019-10-18 PROCEDURE — D9220A PRA ANESTHESIA: Mod: CRNA,,, | Performed by: NURSE ANESTHETIST, CERTIFIED REGISTERED

## 2019-10-18 PROCEDURE — 63600175 PHARM REV CODE 636 W HCPCS: Performed by: NURSE ANESTHETIST, CERTIFIED REGISTERED

## 2019-10-18 PROCEDURE — 37000009 HC ANESTHESIA EA ADD 15 MINS: Performed by: INTERNAL MEDICINE

## 2019-10-18 PROCEDURE — 43235 EGD DIAGNOSTIC BRUSH WASH: CPT | Mod: ,,, | Performed by: INTERNAL MEDICINE

## 2019-10-18 PROCEDURE — D9220A PRA ANESTHESIA: ICD-10-PCS | Mod: ANES,,, | Performed by: ANESTHESIOLOGY

## 2019-10-18 PROCEDURE — D9220A PRA ANESTHESIA: Mod: ANES,,, | Performed by: ANESTHESIOLOGY

## 2019-10-18 PROCEDURE — 84100 ASSAY OF PHOSPHORUS: CPT

## 2019-10-18 PROCEDURE — 99217 PR OBSERVATION CARE DISCHARGE: CPT | Mod: ,,, | Performed by: HOSPITALIST

## 2019-10-18 PROCEDURE — 36415 COLL VENOUS BLD VENIPUNCTURE: CPT

## 2019-10-18 PROCEDURE — 43235 PR EGD, FLEX, DIAGNOSTIC: ICD-10-PCS | Mod: ,,, | Performed by: INTERNAL MEDICINE

## 2019-10-18 PROCEDURE — D9220A PRA ANESTHESIA: ICD-10-PCS | Mod: CRNA,,, | Performed by: NURSE ANESTHETIST, CERTIFIED REGISTERED

## 2019-10-18 PROCEDURE — 82962 GLUCOSE BLOOD TEST: CPT | Performed by: INTERNAL MEDICINE

## 2019-10-18 PROCEDURE — 83735 ASSAY OF MAGNESIUM: CPT

## 2019-10-18 RX ORDER — INSULIN ASPART 100 [IU]/ML
24 INJECTION, SOLUTION INTRAVENOUS; SUBCUTANEOUS
Status: ON HOLD | COMMUNITY
End: 2020-05-24 | Stop reason: HOSPADM

## 2019-10-18 RX ORDER — PANTOPRAZOLE SODIUM 40 MG/1
40 TABLET, DELAYED RELEASE ORAL EVERY 12 HOURS
Qty: 60 TABLET | Refills: 2 | Status: SHIPPED | OUTPATIENT
Start: 2019-10-18 | End: 2019-10-18 | Stop reason: CLARIF

## 2019-10-18 RX ORDER — PROPOFOL 10 MG/ML
VIAL (ML) INTRAVENOUS CONTINUOUS PRN
Status: DISCONTINUED | OUTPATIENT
Start: 2019-10-18 | End: 2019-10-18

## 2019-10-18 RX ORDER — LIDOCAINE HCL/PF 100 MG/5ML
SYRINGE (ML) INTRAVENOUS
Status: DISCONTINUED | OUTPATIENT
Start: 2019-10-18 | End: 2019-10-18

## 2019-10-18 RX ORDER — FUROSEMIDE 40 MG/1
120 TABLET ORAL DAILY
Qty: 90 TABLET | Refills: 2 | Status: SHIPPED | OUTPATIENT
Start: 2019-10-18 | End: 2019-12-06

## 2019-10-18 RX ORDER — MIDAZOLAM HYDROCHLORIDE 1 MG/ML
INJECTION, SOLUTION INTRAMUSCULAR; INTRAVENOUS
Status: DISCONTINUED | OUTPATIENT
Start: 2019-10-18 | End: 2019-10-18

## 2019-10-18 RX ORDER — GLYCOPYRROLATE 0.2 MG/ML
INJECTION INTRAMUSCULAR; INTRAVENOUS
Status: DISCONTINUED | OUTPATIENT
Start: 2019-10-18 | End: 2019-10-18

## 2019-10-18 RX ORDER — HYDROCODONE BITARTRATE AND ACETAMINOPHEN 5; 325 MG/1; MG/1
1 TABLET ORAL DAILY PRN
Qty: 10 TABLET | Refills: 0 | Status: ON HOLD | OUTPATIENT
Start: 2019-10-18 | End: 2020-02-26 | Stop reason: HOSPADM

## 2019-10-18 RX ORDER — PROPOFOL 10 MG/ML
VIAL (ML) INTRAVENOUS
Status: DISCONTINUED | OUTPATIENT
Start: 2019-10-18 | End: 2019-10-18

## 2019-10-18 RX ORDER — SPIRONOLACTONE 100 MG/1
100 TABLET, FILM COATED ORAL DAILY
Qty: 30 TABLET | Refills: 2 | Status: SHIPPED | OUTPATIENT
Start: 2019-10-18 | End: 2019-12-06

## 2019-10-18 RX ADMIN — GLYCOPYRROLATE 0.2 MG: 0.2 INJECTION, SOLUTION INTRAMUSCULAR; INTRAVENOUS at 11:10

## 2019-10-18 RX ADMIN — PROPOFOL 50 MG: 10 INJECTION, EMULSION INTRAVENOUS at 11:10

## 2019-10-18 RX ADMIN — BENZOCAINE 1 EACH: 220 SPRAY, METERED PERIODONTAL at 11:10

## 2019-10-18 RX ADMIN — PROPOFOL 225 MCG/KG/MIN: 10 INJECTION, EMULSION INTRAVENOUS at 11:10

## 2019-10-18 RX ADMIN — LIDOCAINE HYDROCHLORIDE 100 MG: 20 INJECTION, SOLUTION INTRAVENOUS at 11:10

## 2019-10-18 RX ADMIN — MIDAZOLAM HYDROCHLORIDE 2 MG: 1 INJECTION, SOLUTION INTRAMUSCULAR; INTRAVENOUS at 11:10

## 2019-10-18 NOTE — H&P
[facility]  History & Physical    Subjective:      Chief Complaint/Reason for Admission:     EGD for varices    Colin Reilly is a 59 y.o. male.    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    Esophageal varices     GERD (gastroesophageal reflux disease)     Hepatic encephalopathy 7/8/2014    Hepatitis C virus infection 07/08/2014    tx with interferon 3-4 mos- stopped for unclear reasons Tattoos-first at age 17 only risk factor (HCVAB negative 08/2017)    HTN (hypertension) 7/8/2014    Hypertension     Insulin dependent diabetes mellitus     Renal mass, left 7/8/2014    6.3 x 5.7 x 5.6 complex mass    Splenomegaly 7/8/2014    Umbilical hernia 7/8/2014     Past Surgical History:   Procedure Laterality Date    CARPAL TUNNEL RELEASE Bilateral     CHOLECYSTECTOMY      COLONOSCOPY N/A 9/8/2017    Procedure: COLONOSCOPY;  Surgeon: Savannah Llanos MD;  Location: [facility];  Service: Endoscopy;  Laterality: N/A;    COLONOSCOPY Left 3/14/2018    Procedure: COLONOSCOPY;  Surgeon: Savannah Llanos MD;  Location: [facility];  Service: Endoscopy;  Laterality: Left;    ESOPHAGOGASTRODUODENOSCOPY  01/2014    ESOPHAGOGASTRODUODENOSCOPY  02/15/2017    grade II varices    ESOPHAGOGASTRODUODENOSCOPY  04/10/2017    grade I varices    ESOPHAGOGASTRODUODENOSCOPY W/ BANDING  01/26/2017    grade II varices    HERNIA REPAIR      NECK SURGERY      fusion on C5 and C6    ULNAR NERVE TRANSPOSITION Left     vericose veins removed       Family History   Problem Relation Age of Onset    Hyperlipidemia Mother     No Known Problems Father 36        accident on oil rig    No Known Problems Sister     Cancer Brother         kidney    Alcohol abuse Brother     No Known Problems Sister      Social History     Tobacco Use    Smoking status: Former Smoker      Types: Cigarettes     Last attempt to quit: 2010     Years since quittin.7    Smokeless tobacco: Former User     Types: Chew     Quit date: 1990    Tobacco comment: former 1 pack/week quit 2010   Substance Use Topics    Alcohol use: No     Comment: former heavy beer but quit 2010    Drug use: No       PTA Medications   Medication Sig    cyclobenzaprine (FLEXERIL) 10 MG tablet Take 10 mg by mouth 2 (two) times daily.     escitalopram oxalate (LEXAPRO) 20 MG tablet Take 20 mg by mouth once daily.     finasteride (PROSCAR) 5 mg tablet Take 1 tablet (5 mg total) by mouth once daily.    furosemide (LASIX) 20 MG tablet Take 6 tablets (120 mg total) by mouth once daily.    gabapentin (NEURONTIN) 300 MG capsule Take 300 mg by mouth 3 (three) times daily.     HYDROcodone-acetaminophen 5-300 mg Tab Take 1 tablet by mouth every 6 (six) hours as needed (pain).    insulin aspart protamine-insulin aspart (NOVOLOG 70/30) 100 unit/mL (70-30) InPn pen Inject 24 Units into the skin 3 (three) times daily before meals.     insulin detemir U-100 (LEVEMIR) 100 unit/mL injection Inject 74 Units into the skin every evening.    lactulose (CHRONULAC) 10 gram/15 mL solution Take 30 mLs by mouth 3 (three) times daily. May take up to  4 to 5 times daily if needed for bowel movements    nebivolol (BYSTOLIC) 10 MG Tab Take 10 mg by mouth once daily.    omeprazole (PRILOSEC) 40 MG capsule Take 40 mg by mouth once daily.    oxybutynin (DITROPAN) 5 MG Tab Take 1 tablet (5 mg total) by mouth every evening.    pantoprazole (PROTONIX) 40 MG tablet Take 1 tablet (40 mg total) by mouth 2 (two) times daily.    rifAXIMin (XIFAXAN) 550 mg Tab Take 1 tablet (550 mg total) by mouth 2 (two) times daily.    spironolactone (ALDACTONE) 100 MG tablet TAKE 1 TABLET(100 MG) BY MOUTH EVERY DAY    spironolactone (ALDACTONE) 25 MG tablet TAKE 6 TABLETS BY MOUTH DAILY FOR FLUID (Patient taking differently: TAKE 4 TABLETS BY MOUTH  DAILY FOR FLUID)    sulfamethoxazole-trimethoprim 800-160mg (BACTRIM DS) 800-160 mg Tab Take 1 tablet by mouth 2 (two) times daily.    tamsulosin (FLOMAX) 0.4 mg Cp24 Take 1 capsule (0.4 mg total) by mouth once daily.    ciprofloxacin HCl (CIPRO) 500 MG tablet Take 1 tablet (500 mg total) by mouth every 12 (twelve) hours.    fluticasone (FLONASE) 50 mcg/actuation nasal spray 1 spray by Each Nare route once daily.     hydrocodone-acetaminophen 10-325mg (NORCO)  mg Tab Take 10 tablets by mouth 3 (three) times daily.     lactobacillus acidophilus Cap Take 1 capsule by mouth once daily.    lisinopril 10 MG tablet Take 10 mg by mouth once daily.    sildenafil (REVATIO) 20 mg Tab Take 1 tablet (20 mg total) by mouth as needed.     Review of patient's allergies indicates:  No Known Allergies     Review of Systems   Constitutional: Negative for chills, fever and weight loss.   Respiratory: Negative for shortness of breath and wheezing.    Cardiovascular: Negative for chest pain.   Gastrointestinal: Negative for abdominal pain.       Objective:      Vital Signs (Most Recent)  Temp: 98 °F (36.7 °C) (10/18/19 1015)  Pulse: 95 (10/18/19 1015)  Resp: 16 (10/18/19 1015)  BP: (!) 142/82 (10/18/19 1015)  SpO2: 100 % (10/18/19 1015)    Vital Signs Range (Last 24H):  Temp:  [96.1 °F (35.6 °C)-98 °F (36.7 °C)]   Pulse:  [78-95]   Resp:  [16-18]   BP: (111-142)/(56-82)   SpO2:  [95 %-100 %]     Physical Exam   Constitutional: He appears well-developed and well-nourished.   Cardiovascular: Normal rate.   Pulmonary/Chest: Effort normal.   Abdominal: Soft.   Skin: Skin is warm and dry.   Psychiatric: He has a normal mood and affect. His behavior is normal. Judgment and thought content normal.            Assessment:      Active Hospital Problems    Diagnosis  POA    *Decompensated hepatic cirrhosis [K72.90]  Yes    Ascites due to alcoholic cirrhosis [K70.31]  Yes    Portal hypertension [K76.6]  Yes    Portal hypertensive  gastropathy [K76.6, K31.89]  Yes    Anasarca [R60.1]  Yes    Type 2 diabetes mellitus without complication, with long-term current use of insulin [E11.9, Z79.4]  Not Applicable    Acute blood loss anemia [D62]  Yes    Umbilicus discharge [R19.8]  Yes    Umbilical hernia without obstruction and without gangrene [K42.9]  Yes    Alcohol abuse, in remission [F10.11]  Yes     Quit 01/04, 2011      Chronic back pain [M54.9, G89.29]  Yes    GERD (gastroesophageal reflux disease) [K21.9]  Yes    Hepatic encephalopathy [K72.90]  Yes    HTN (hypertension) [I10]  Yes    Other ascites [R18.8]  Yes     Started around the time of the variceal bleeding      Splenomegaly [R16.1]  Yes      Resolved Hospital Problems   No resolved problems to display.       Plan:    EGD for esophageal varies surveillance

## 2019-10-18 NOTE — ANESTHESIA POSTPROCEDURE EVALUATION
Anesthesia Post Evaluation    Patient: Colin Reilly    Procedure(s) Performed: Procedure(s) (LRB):  EGD (ESOPHAGOGASTRODUODENOSCOPY) (N/A)    Final Anesthesia Type: general  Patient location during evaluation: [facility]  Patient participation: Yes- Able to Participate  Level of consciousness: awake and alert  Post-procedure vital signs: reviewed and stable  Pain management: adequate  Airway patency: patent  PONV status at discharge: No PONV  Anesthetic complications: no      Cardiovascular status: blood pressure returned to baseline  Respiratory status: unassisted  Hydration status: euvolemic  Follow-up not needed.          Vitals Value Taken Time   /63 10/18/2019 12:17 PM   Temp 36.7 °C (98 °F) 10/18/2019 11:58 AM   Pulse 102 10/18/2019 12:22 PM   Resp 20 10/18/2019 12:15 PM   SpO2 99 % 10/18/2019 12:22 PM   Vitals shown include unvalidated device data.      No case tracking events are documented in the log.      Pain/Lobito Score: Pain Rating Prior to Med Admin: 10 (10/17/2019  6:46 PM)  Pain Rating Post Med Admin: 0 (10/17/2019  7:46 PM)  Lobito Score: 9 (10/18/2019 12:08 PM)

## 2019-10-18 NOTE — PLAN OF CARE
HOSPITAL MEDICINE PLAN OF CARE    Patient admitted over night. Chart reviewed. Patient seen and evaluated on rounds today. Doing well with no acute distress.      escitalopram oxalate  20 mg Oral Daily    finasteride  5 mg Oral Daily    fluticasone propionate  1 spray Each Nostril Daily    furosemide  80 mg Intravenous BID    gabapentin  600 mg Oral TID    insulin aspart U-100  7 Units Subcutaneous TIDWM    insulin detemir U-100  25 Units Subcutaneous QHS    lactulose  30 g Oral TID    lisinopril  10 mg Oral Daily    nebivolol  10 mg Oral Daily    pantoprazole  40 mg Oral BID    rifAXIMin  550 mg Oral BID    spironolactone  100 mg Oral Daily    sulfamethoxazole-trimethoprim 800-160mg  1 tablet Oral BID    tamsulosin  0.4 mg Oral Daily             CT abd pelv    Cirrhotic liver morphology with stigmata of portal hypertension including massive splenomegaly, small volume ascites, and several large collateral vessels in the abdomen and abdominal wall including gastroesophageal varices.    Partially occlusive thrombus involving the extrahepatic portal vein and SMV, though improved when compared with prior abdominal MRI.    Umbilical hernia containing uncomplicated small bowel, mesenteric fat, and fluid.    Enlarged periportal/peripancreatic lymph nodes, possibly related to underlying chronic liver disease.    Diffuse mesenteric and body wall edema.      Plan :      Umbilical hernia without obstruction and without gangrene  Umbilicus discharge  - patient with green discharge from the umbilicus for 1 day. No fevers.  No erythema or abscess noted, CT abd with no incarceration or intra abd abscess. Likely discharge ? Small draining abscess  - no ascites pocket on CT for safe para  - PO bactrim   - discharge already resoled     Decompensated ETOH cirrhosis  Anasarca  MELD-Na score: 15 at 10/16/2019  9:14 PM  MELD score: 15 at 10/16/2019  9:14 PM  Calculated from:  Serum Creatinine: 0.8 mg/dL (Rounded to 1  mg/dL) at 10/16/2019  9:14 PM  Serum Sodium: 137 mmol/L at 10/16/2019  9:14 PM  Total Bilirubin: 2.3 mg/dL at 10/16/2019  9:14 PM  INR(ratio): 1.6 at 10/16/2019  9:14 PM  Age: 59 years     - sees [facility] in clinic  - meds as above- diuretics, lactulose, rifax  - no ascites pocket on CT for safe para  - IV lasix started for mildly increased LE edema and abd wall edema that has shown some fluid weeping   - check US liver given worsening fluid retention   - PETH pending     Acute blood loss anemia  - hbg from 13--> 10. Hx of EV banding 2 weeks ago. Has had some blood speckles in vomitus and concern for melenotic / bloody BMs  - plan for EGD on 10/18 , NPO after mn    Dispo= 1-2 days, based on EGD results    Based on patient's insurance, he qualifies for OBS status     Signing Physician:     Annika Caro MD  Department of Hospital Medicine   [facility]  Pager [phone]  Spectra [phone]  10/17/2019

## 2019-10-18 NOTE — DISCHARGE SUMMARY
DISCHARGE SUMMARY  Hospital Medicine    Team: [facility]    Patient Name: Colin Reilly  YOB: 1960    Admit Date: 10/16/2019    Discharge Date: 10/18/19    Discharge Attending Physician: KATIA Caro MD    Chief Complaint: umbilicus discharge , hepatology clinic advised patient to come to [facility] Diagnoses:  Active Hospital Problems    Diagnosis  POA    *Decompensated hepatic cirrhosis [K72.90]  Yes    Ascites due to alcoholic cirrhosis [K70.31]  Yes    Portal hypertension [K76.6]  Yes    Portal hypertensive gastropathy [K76.6, K31.89]  Yes    Anasarca [R60.1]  Yes    Type 2 diabetes mellitus without complication, with long-term current use of insulin [E11.9, Z79.4]  Not Applicable    Acute blood loss anemia [D62]  Yes    Umbilicus discharge [R19.8]  Yes    Umbilical hernia without obstruction and without gangrene [K42.9]  Yes    Alcohol abuse, in remission [F10.11]  Yes     Quit 01/04, 2011      Chronic back pain [M54.9, G89.29]  Yes    GERD (gastroesophageal reflux disease) [K21.9]  Yes    Hepatic encephalopathy [K72.90]  Yes    HTN (hypertension) [I10]  Yes    Other ascites [R18.8]  Yes     Started around the time of the variceal bleeding      Splenomegaly [R16.1]  Yes      Resolved Hospital Problems   No resolved problems to display.       Discharged Condition: Admit problems have resolved     HOSPITAL COURSE:      Initial Presentation:    As per admitting provider,     Patient is a 59 year old male with PMH HTN, IDDM2, BPH, chronic low back pain with opoid dependence, alcoholic liver cirrhosis (remained sober for several years) complicated by ascites (no previous paracentesis), portal hypertension, grade I EV, portal hypertensive gastropathy, umbilical hernia, splenomegaly, thrombocytopenia, hepatic encephalopathy presents to ED with worsening abdominal distention, diffuse abdominal pain and umbilical hernia leaking greenish yellow fluid. Symptoms progressive  over last couple of months. He called hepatologist Dr. Keating about above symptoms who advised him to come to hospital for evaluation. Patient went to local ER ([facility]) yesterday when he was discharged on bactrim for suspected SBP. He also endorses bilateral leg swelling, weight gain and weeping of fluid from abdominal wall. Reports compliance with diuretics (lasix and aldactone), and denies fever, chills, N/V, chest pain, sob, confusion or lethargy. He endorses occasional red blood in his stool which he thinks is from bleeding hemorrhoid. He is found to have gross fluid overload with large ascites and bilateral leg swelling.     **Addendum: [facility] - likely discharged on bactrim for NOT suspected SBP, but for skin cellulitis / small abscess       Course of Principle Problem for Admission:    Umbilical hernia without obstruction and without gangrene  Umbilicus discharge  - patient with green discharge from the umbilicus for 1 day. No fevers.  No erythema or abscess noted, CT abd with no incarceration or intra abd abscess. Likely uncertain discharge ? Small draining abscess  - [facility] - likely discharged on bactrim for NOT suspected SBP, but for skin cellulitis / small abscess  - no ascites pocket on CT for safe para  - no surgery required   - PO bactrim to complete 5 days course   - discharge already resoled . No pain. Recommended AGAINST any elective surgical repair       On the day of d/c, patient was doing well with no acute issues.   hgb stable. EGD with no bleeding, report below, favorable results  Umbilicus discharge resolved spontaneous. Recommended completing Bactrim course that was prescribed by OSH, prior to patient was advised to come to [facility] for eval     Physical Exam:  Constitutional:non-distressed, not diaphoretic.   HENT: NC/AT, external ears normal, oropharynx clear, MMM w/o exudates.   Eyes: PERRL, EOMI, conjunctiva normal, no discharge b/l, no scleral  icterus   Neck: normal ROM, supple  CV: RRR, no m/r/g, no carotid bruits, +2 peripheral pulses.  Pulmonary/Chest wall: Breathing comfortably w/o distress, on RA, CTAB  GI: Soft, non-tender, (+) BS, (+) BM   +midly distended  , small umbilical hernia as below, no drainage   Musculoskeletal: Normal ROM, no atrophy,  + 1 pitting edema in LE bilaterally    Neurological: AAO x 4, CN II-XI in tact, nl sensation, nl strength/tone  No asterixis   Skin: warm, dry   +pallor  Psych: normal mood and affect, normal behavior, thought content and judgement.              Other Medical Problems Addressed in the Hospital:    Decompensated ETOH cirrhosis  Anasarca  MELD-Na score: 15 at 10/16/2019  9:14 PM  MELD score: 15 at 10/16/2019  9:14 PM  Calculated from:  Serum Creatinine: 0.8 mg/dL (Rounded to 1 mg/dL) at 10/16/2019  9:14 PM  Serum Sodium: 137 mmol/L at 10/16/2019  9:14 PM  Total Bilirubin: 2.3 mg/dL at 10/16/2019  9:14 PM  INR(ratio): 1.6 at 10/16/2019  9:14 PM  Age: 59 years      - sees [facility] [place] [place] hepatology in clinic  - meds as above- diuretics, lactulose, rifax  - no ascites pocket on CT for safe para  - IV lasix started for mildly increased LE edema and abd wall edema that has shown some fluid weeping   - PETH pending   - d/c meds as below , cont PTA diuretics      Acute blood loss anemia  - hbg from 13--> 10. Hx of EV banding 2 weeks ago. Has had some blood speckles in vomitus and concern for melenotic / bloody BMs  - plan for EGD on 10/18 showed Prior esophageal Non-Bleeding  banding ulcers seen.   - PO PPI      CONSULTS: hepatology     PROCEDURES: EGD    Labs:    Chemistries:   Recent Labs   Lab 10/16/19  2114 10/18/19  0605    138   K 3.5 3.5    106   CO2 25 26   BUN 5* 7   CREATININE 0.8 0.6   CALCIUM 7.5* 7.9*   PROT 6.3 5.9*   BILITOT 2.3* 2.9*   ALKPHOS 156* 136*   ALT 26 23   AST 64* 56*   MG  --  1.9   PHOS  --  2.8        WBC:   Recent Labs   Lab 10/16/19  2114 10/18/19  0605   WBC 2.80* 2.27*     Bands:      CBC/Anemia Labs: Coags:    Recent Labs   Lab 10/16/19  2114 10/18/19  0605   WBC 2.80* 2.27*   HGB 10.3* 10.2*   HCT 32.3* 33.2*   PLT 54* 44*   MCV 90 94   RDW 19.5* 19.2*    Recent Labs   Lab 10/16/19  2114   INR 1.6*   APTT 28.1        Diagnostic Results:    CT abd pelv     Cirrhotic liver morphology with stigmata of portal hypertension including massive splenomegaly, small volume ascites, and several large collateral vessels in the abdomen and abdominal wall including gastroesophageal varices.    Partially occlusive thrombus involving the extrahepatic portal vein and SMV, though improved when compared with prior abdominal MRI.    Umbilical hernia containing uncomplicated small bowel, mesenteric fat, and fluid.    Enlarged periportal/peripancreatic lymph nodes, possibly related to underlying chronic liver disease.    Diffuse mesenteric and body wall edema.      EGD 10/18    Impression:           - Normal examined duodenum.                        - Portal hypertensive gastropathy.                        - 1 cm hiatal hernia. Prior esophageal Non-Bleeding                         banding ulcers seen.                        - No specimens collected.  Recommendation:       - Return patient to hospital [facility] for ongoing care.                        - Follow an antireflux regimen indefinitely.                        - Use Protonix (pantoprazole) 40 mg by mouth every                         12 hours for 12 weeks.                        - Repeat upper endoscopy in 3-6 months for                         surveillance.      Disposition:  Home       Future Scheduled Appointments:  Future Appointments   Date Time Provider Department Center   10/23/2019 10:40 AM Elizabeth Meneses MD [facility] [place]       Follow-up Plans from This Hospitalization:  Hep     Discharge Medication List:       Colin Reilly   Home Medication Instructions ART:[id number]    Printed on:10/19/19 1116   Medication Information                       cyclobenzaprine (FLEXERIL) 10 MG tablet  Take 10 mg by mouth 2 (two) times daily.              escitalopram oxalate (LEXAPRO) 20 MG tablet  Take 20 mg by mouth once daily.              finasteride (PROSCAR) 5 mg tablet  Take 1 tablet (5 mg total) by mouth once daily.             furosemide (LASIX) 40 MG tablet  Take 3 tablets (120 mg total) by mouth once daily.             gabapentin (NEURONTIN) 300 MG capsule  Take 300 mg by mouth 3 (three) times daily.              HYDROcodone-acetaminophen (NORCO) 5-325 mg per tablet  Take 1 tablet by mouth daily as needed (severe pain).             insulin aspart U-100 (NOVOLOG) 100 unit/mL injection  Inject 24 Units into the skin 3 (three) times daily before meals.             insulin detemir U-100 (LEVEMIR) 100 unit/mL injection  Inject 74 Units into the skin every evening.             lactulose (CHRONULAC) 10 gram/15 mL solution  Take 30 mLs by mouth 3 (three) times daily. May take up to  4 to 5 times daily if needed for bowel movements             nebivolol (BYSTOLIC) 10 MG Tab  Take 10 mg by mouth once daily.             pantoprazole (PROTONIX) 40 MG tablet  Take 1 tablet (40 mg total) by mouth 2 (two) times daily.             rifAXIMin (XIFAXAN) 550 mg Tab  Take 1 tablet (550 mg total) by mouth 2 (two) times daily.             spironolactone (ALDACTONE) 100 MG tablet  Take 1 tablet (100 mg total) by mouth once daily.             sulfamethoxazole-trimethoprim 800-160mg (BACTRIM DS) 800-160 mg Tab  Take 1 tablet by mouth 2 (two) times daily.             tamsulosin (FLOMAX) 0.4 mg Cp24  Take 1 capsule (0.4 mg total) by mouth once daily.                   At the time of discharge patient was told to take all medications as prescribed, to keep all followup appointments, and to call their primary care physician or return to the emergency room if they have any worsening or concerning symptoms.    Time spent on the discharge of the patient including review of hospital course  with the patient. reviewing discharge medications and arranging follow-up care 45 minutes.  Patient was seen and examined on the date of discharge and determined to be suitable for discharge.        Signing Physician:  Annika Caro MD

## 2019-10-18 NOTE — [employer] PATIENT INSTRUCTIONS
Discharge Summary/Instructions after an Endoscopic Procedure  Patient Name: Colin Reilly  Patient MRN: [id number]  Patient YOB: 1960  Friday, October 18, 2019  Flavio Escalera MD  RESTRICTIONS:  During your procedure today, you received medications for sedation.  These   medications may affect your judgment, balance and coordination.  Therefore,   for 24 hours, you have the following restrictions:   - DO NOT drive a car, operate machinery, make legal/financial decisions,   sign important papers or drink alcohol.    ACTIVITY:  Today: no heavy lifting, straining or running due to procedural   sedation/anesthesia.  The following day: return to full activity including work.  DIET:  Eat and drink normally unless instructed otherwise.     TREATMENT FOR COMMON SIDE EFFECTS:  - Mild abdominal pain, nausea, belching, bloating or excessive gas:  rest,   eat lightly and use a heating pad.  - Sore Throat: treat with throat lozenges and/or gargle with warm salt   water.  - Because air was used during the procedure, expelling large amounts of air   from your rectum or belching is normal.  - If a bowel prep was taken, you may not have a bowel movement for 1-3 days.    This is normal.  SYMPTOMS TO WATCH FOR AND REPORT TO YOUR PHYSICIAN:  1. Abdominal pain or bloating, other than gas cramps.  2. Chest pain.  3. Back pain.  4. Signs of infection such as: chills or fever occurring within 24 hours   after the procedure.  5. Rectal bleeding, which would show as bright red, maroon, or black stools.   (A tablespoon of blood from the rectum is not serious, especially if   hemorrhoids are present.)  6. Vomiting.  7. Weakness or dizziness.  GO DIRECTLY TO THE NEAREST EMERGENCY ROOM IF YOU HAVE ANY OF THE FOLLOWING:      Difficulty breathing              Chills and/or fever over 101 F   Persistent vomiting and/or vomiting blood   Severe abdominal pain   Severe chest pain   Black, tarry stools   Bleeding- more than one  tablespoon   Any other symptom or condition that you feel may need urgent attention  Your doctor recommends these additional instructions:  If any biopsies were taken, your doctors clinic will contact you in 1 to 2   weeks with any results.  - Return patient to hospital [facility] for ongoing care.   - Follow an antireflux regimen indefinitely.   - Use Protonix (pantoprazole) 40 mg by mouth every 12 hours for 12 weeks.   - Repeat upper endoscopy in 3-6 months for surveillance.   - Consider avoiding all non-steroidal anti-inflammatory drugs (aspirin,   ibuprofen, naproxen, etc.), unless needed for cardiovascular protection.    Recommend you discuss with your prescribing doctor (of your aspirin) to see   if cardiovascular benefits of your aspirin outweigh the risks of GI   bleeding.  - Discontinue alcohol consumption indefinitely.   - The findings and recommendations were discussed with the patient's primary   physician.   - The findings and recommendations were discussed with the patient.  For questions, problems or results please call your physician - Flavio Escalera MD at Work:  [phone].  [facility], EMERGENCY ROOM PHONE NUMBER: [phone]  IF A COMPLICATION OR EMERGENCY SITUATION ARISES AND YOU ARE UNABLE TO REACH   YOUR PHYSICIAN - GO DIRECTLY TO THE EMERGENCY ROOM.  Flavio Escalera MD  10/18/2019 11:59:13 AM  This report has been verified and signed electronically.  [employer]

## 2019-10-18 NOTE — TRANSFER OF CARE
"Anesthesia Transfer of Care Note    Patient: Colin Reilly    Procedure(s) Performed: Procedure(s) (LRB):  EGD (ESOPHAGOGASTRODUODENOSCOPY) (N/A)    Patient location: PACU    Anesthesia Type: general    Transport from OR: Transported from OR on room air with adequate spontaneous ventilation    Post pain: adequate analgesia    Post assessment: no apparent anesthetic complications and tolerated procedure well    Post vital signs: stable    Level of consciousness: awake, alert and oriented    Nausea/Vomiting: no nausea/vomiting    Complications: none    Transfer of care protocol was followed      Last vitals:   Visit Vitals  BP (!) 142/82 (BP Location: Left arm, Patient Position: Lying)   Pulse 95   Temp 36.7 °C (98 °F) (Temporal)   Resp 16   Ht 5' 11" (1.803 m)   Wt 107 kg (235 lb 14.3 oz)   SpO2 100%   BMI 32.90 kg/m²     "

## 2019-10-18 NOTE — NURSING TRANSFER
Nursing Transfer Note      10/18/2019     Transfer To: [id number] From [facility]    Transfer via wheelchair    Transfer with belongings    Transported by pct    Medicines sent: n/a    Chart send with patient: Yes    Notified: Nathalie    Patient reassessed at: 10/18/19 1235 (date, time)    Upon arrival to floor: bed in lowest position

## 2019-10-18 NOTE — PLAN OF CARE
Patient is currently off of the floor for a procedure, unable to complete the discharge planning assessment at this time. Will continue to follow.    Triny Swift RN  Ext [phone]

## 2019-10-18 NOTE — DISCHARGE INSTRUCTIONS

## 2019-10-21 NOTE — PLAN OF CARE
Future Appointments   Date Time Provider Department Center   10/23/2019 10:40 AM Elizabeth Meneses MD [facility] [place]          10/21/19 0927   Final Note   Assessment Type Final Discharge Note   Anticipated Discharge Disposition Home   What phone number can be called within the next 1-3 days to see how you are doing after discharge? [phone]   Hospital Follow Up  Appt(s) scheduled? Yes   Right Care Referral Info   Post Acute Recommendation No Care

## 2019-10-21 NOTE — PLAN OF CARE
10/21/19 0928   Post-Acute Status   Post-Acute Authorization Other   Other Status No Post-Acute Service Needs   Discharge Delays None known at this time

## 2019-10-23 LAB — PHOSPHATIDYLETHANOL (PETH): NEGATIVE NG/ML

## 2019-10-29 ENCOUNTER — TELEPHONE (OUTPATIENT)
Dept: TRANSPLANT | Facility: CLINIC | Age: 59
End: 2019-10-29

## 2019-11-04 ENCOUNTER — TELEPHONE (OUTPATIENT)
Dept: TRANSPLANT | Facility: CLINIC | Age: 59
End: 2019-11-04

## 2019-11-04 NOTE — TELEPHONE ENCOUNTER
----- Message from Nitinnayan Torres sent at 11/4/2019  4:57 PM CST -----  Contact: PT    Returned call to pt to let him know that he has been cleared for liver txp and his liver txp coord will call him and his caregiver to go over dates for him to start his work up.     ----- Message -----  From: Caron Catherine  Sent: 11/4/2019   4:24 PM CST  To: [facility] Pre-Liver Transplant Non-Clinical    Pt calling to inform his coordinator he received a letter from [employer] saying he was approved.    Would like to know if Dr. Meneses received a letter also    Call Back: [phone]

## 2019-11-08 ENCOUNTER — TELEPHONE (OUTPATIENT)
Dept: TRANSPLANT | Facility: CLINIC | Age: 59
End: 2019-11-08

## 2019-11-08 NOTE — TELEPHONE ENCOUNTER
Phone call returned to pt. Confirmed that financial clearance has been obtained from the insurance company to initiate liver transplant evaluation.  Informed patient that evaluation will take approx 2 to 5 days to complete depending on rather standard or Fast pass evaluation scheduled.  Informed pt that all testing will be done outpatient.  Patient voiced understanding of the need to have his caregiver present for the  and surgeon consult, as well as for the patient education.  All questions/ concerns regarding liver transplant evaluation answered/ addressed.  Patient requesting to schedule Fast Pass evaluation.   Will schedule appts for first available, 12/5 and 12//6.

## 2019-11-18 ENCOUNTER — TELEPHONE (OUTPATIENT)
Dept: TRANSPLANT | Facility: CLINIC | Age: 59
End: 2019-11-18

## 2019-11-18 NOTE — TELEPHONE ENCOUNTER
Patient's phone call returned.  Patient calling to confirm dates for liver transplant evaluation.  Informed patient that he will be scheduled for evaluation 12/5 and 12/6 as previously discussed.  Informed patient that he will receive a phone call closer to the date of appts to confirm that he remains available and to discuss appts in greater detail, including special instructions.  He verbalized understanding and denies any further questions at this time.

## 2019-11-25 DIAGNOSIS — K74.60 HEPATIC CIRRHOSIS, UNSPECIFIED HEPATIC CIRRHOSIS TYPE, UNSPECIFIED WHETHER ASCITES PRESENT: ICD-10-CM

## 2019-11-25 DIAGNOSIS — I85.01 BLEEDING ESOPHAGEAL VARICES, UNSPECIFIED ESOPHAGEAL VARICES TYPE: ICD-10-CM

## 2019-11-25 DIAGNOSIS — E11.9 TYPE 2 DIABETES MELLITUS WITHOUT COMPLICATION, WITH LONG-TERM CURRENT USE OF INSULIN: Primary | ICD-10-CM

## 2019-11-25 DIAGNOSIS — Z76.82 ORGAN TRANSPLANT CANDIDATE: ICD-10-CM

## 2019-11-25 DIAGNOSIS — K72.90 DECOMPENSATED HEPATIC CIRRHOSIS: ICD-10-CM

## 2019-11-25 DIAGNOSIS — K70.31 ASCITES DUE TO ALCOHOLIC CIRRHOSIS: ICD-10-CM

## 2019-11-25 DIAGNOSIS — K74.60 DECOMPENSATED HEPATIC CIRRHOSIS: ICD-10-CM

## 2019-11-25 DIAGNOSIS — Z11.4 ENCOUNTER FOR SCREENING FOR HUMAN IMMUNODEFICIENCY VIRUS (HIV): ICD-10-CM

## 2019-11-25 DIAGNOSIS — K70.30 CIRRHOSIS, LAENNEC'S: ICD-10-CM

## 2019-11-25 DIAGNOSIS — Z79.4 TYPE 2 DIABETES MELLITUS WITHOUT COMPLICATION, WITH LONG-TERM CURRENT USE OF INSULIN: Primary | ICD-10-CM

## 2019-12-04 ENCOUNTER — TELEPHONE (OUTPATIENT)
Dept: TRANSPLANT | Facility: CLINIC | Age: 59
End: 2019-12-04

## 2019-12-04 NOTE — TELEPHONE ENCOUNTER
Patient called to confirm that they will be attending the scheduled appointments for Liver Transplant Fast Pass Evaluation scheduled to start on 12/5/19  at 0730.  Patient confirms that caregivers will be present for the scheduled appointments.  Patient appointments reviewed along with location and special instructions.  Patient questions answered at this time and number provided to call the office if there is any problem.

## 2019-12-05 ENCOUNTER — INITIAL CONSULT (OUTPATIENT)
Dept: TRANSPLANT | Facility: CLINIC | Age: 59
End: 2019-12-05

## 2019-12-05 ENCOUNTER — CLINICAL SUPPORT (OUTPATIENT)
Dept: TRANSPLANT | Facility: CLINIC | Age: 59
End: 2019-12-05
Payer: MEDICARE

## 2019-12-05 ENCOUNTER — HOSPITAL ENCOUNTER (OUTPATIENT)
Dept: CARDIOLOGY | Facility: CLINIC | Age: 59
Discharge: HOME | End: 2019-12-05
Attending: INTERNAL MEDICINE
Payer: MEDICARE

## 2019-12-05 VITALS
SYSTOLIC BLOOD PRESSURE: 106 MMHG | HEART RATE: 88 BPM | OXYGEN SATURATION: 97 % | TEMPERATURE: 98 F | HEIGHT: 70 IN | OXYGEN SATURATION: 97 % | DIASTOLIC BLOOD PRESSURE: 52 MMHG | TEMPERATURE: 98 F | DIASTOLIC BLOOD PRESSURE: 52 MMHG | SYSTOLIC BLOOD PRESSURE: 106 MMHG | BODY MASS INDEX: 33.02 KG/M2 | RESPIRATION RATE: 16 BRPM | HEART RATE: 88 BPM | DIASTOLIC BLOOD PRESSURE: 52 MMHG | HEART RATE: 88 BPM | SYSTOLIC BLOOD PRESSURE: 106 MMHG | TEMPERATURE: 98 F | RESPIRATION RATE: 16 BRPM | RESPIRATION RATE: 16 BRPM | BODY MASS INDEX: 33.02 KG/M2 | WEIGHT: 230.63 LBS | BODY MASS INDEX: 33.02 KG/M2 | WEIGHT: 230.63 LBS | WEIGHT: 230.63 LBS | HEIGHT: 70 IN | OXYGEN SATURATION: 97 % | HEIGHT: 70 IN

## 2019-12-05 VITALS
SYSTOLIC BLOOD PRESSURE: 108 MMHG | WEIGHT: 218 LBS | BODY MASS INDEX: 30.52 KG/M2 | HEIGHT: 71 IN | HEART RATE: 84 BPM | DIASTOLIC BLOOD PRESSURE: 54 MMHG | RESPIRATION RATE: 16 BRPM

## 2019-12-05 DIAGNOSIS — K70.31 ASCITES DUE TO ALCOHOLIC CIRRHOSIS: ICD-10-CM

## 2019-12-05 DIAGNOSIS — Z01.818 PRE-TRANSPLANT EVALUATION FOR CHRONIC LIVER DISEASE: ICD-10-CM

## 2019-12-05 DIAGNOSIS — K74.60 DECOMPENSATED HEPATIC CIRRHOSIS: ICD-10-CM

## 2019-12-05 DIAGNOSIS — Z79.4 TYPE 2 DIABETES MELLITUS WITHOUT COMPLICATION, WITH LONG-TERM CURRENT USE OF INSULIN: ICD-10-CM

## 2019-12-05 DIAGNOSIS — Z76.82 ORGAN TRANSPLANT CANDIDATE: ICD-10-CM

## 2019-12-05 DIAGNOSIS — K72.90 DECOMPENSATED HEPATIC CIRRHOSIS: Primary | ICD-10-CM

## 2019-12-05 DIAGNOSIS — E11.9 TYPE 2 DIABETES MELLITUS WITHOUT COMPLICATION, WITH LONG-TERM CURRENT USE OF INSULIN: ICD-10-CM

## 2019-12-05 DIAGNOSIS — K74.60 HEPATIC CIRRHOSIS, UNSPECIFIED HEPATIC CIRRHOSIS TYPE, UNSPECIFIED WHETHER ASCITES PRESENT: ICD-10-CM

## 2019-12-05 DIAGNOSIS — K74.60 DECOMPENSATED HEPATIC CIRRHOSIS: Primary | ICD-10-CM

## 2019-12-05 DIAGNOSIS — K72.90 DECOMPENSATED HEPATIC CIRRHOSIS: ICD-10-CM

## 2019-12-05 DIAGNOSIS — I85.01 BLEEDING ESOPHAGEAL VARICES, UNSPECIFIED ESOPHAGEAL VARICES TYPE: ICD-10-CM

## 2019-12-05 DIAGNOSIS — K70.30 CIRRHOSIS, LAENNEC'S: ICD-10-CM

## 2019-12-05 DIAGNOSIS — R18.8 OTHER ASCITES: ICD-10-CM

## 2019-12-05 LAB
ASCENDING AORTA: 3.19 CM
AV INDEX (PROSTH): 0.85
AV MEAN GRADIENT: 6 MMHG
AV PEAK GRADIENT: 9 MMHG
AV VALVE AREA: 2.76 CM2
AV VELOCITY RATIO: 0.81
BSA FOR ECHO PROCEDURE: 2.23 M2
CV ECHO LV RWT: 0.34 CM
CV STRESS BASE HR: 84 BPM
DIASTOLIC BLOOD PRESSURE: 70 MMHG
DOP CALC AO PEAK VEL: 1.53 M/S
DOP CALC AO VTI: 30.5 CM
DOP CALC LVOT AREA: 3.2 CM2
DOP CALC LVOT DIAMETER: 2.03 CM
DOP CALC LVOT PEAK VEL: 1.24 M/S
DOP CALC LVOT STROKE VOLUME: 84.2 CM3
DOP CALCLVOT PEAK VEL VTI: 26.03 CM
E WAVE DECELERATION TIME: 158.4 MSEC
E/A RATIO: 1.27
E/E' RATIO: 5.09 M/S
ECHO LV POSTERIOR WALL: 0.98 CM (ref 0.6–1.1)
FRACTIONAL SHORTENING: 40 % (ref 28–44)
INTERVENTRICULAR SEPTUM: 0.96 CM (ref 0.6–1.1)
LA MAJOR: 5.42 CM
LA MINOR: 5.54 CM
LA WIDTH: 4.21 CM
LEFT ATRIUM SIZE: 5.07 CM
LEFT ATRIUM VOLUME INDEX: 45.4 ML/M2
LEFT ATRIUM VOLUME: 99.41 CM3
LEFT INTERNAL DIMENSION IN SYSTOLE: 3.45 CM (ref 2.1–4)
LEFT VENTRICLE DIASTOLIC VOLUME INDEX: 74.29 ML/M2
LEFT VENTRICLE DIASTOLIC VOLUME: 162.51 ML
LEFT VENTRICLE MASS INDEX: 101 G/M2
LEFT VENTRICLE SYSTOLIC VOLUME INDEX: 22.5 ML/M2
LEFT VENTRICLE SYSTOLIC VOLUME: 49.26 ML
LEFT VENTRICULAR INTERNAL DIMENSION IN DIASTOLE: 5.74 CM (ref 3.5–6)
LEFT VENTRICULAR MASS: 220.14 G
LV LATERAL E/E' RATIO: 4.42 M/S
LV SEPTAL E/E' RATIO: 6 M/S
MV PEAK A VEL: 0.66 M/S
MV PEAK E VEL: 0.84 M/S
OHS CV CPX 1 MINUTE RECOVERY HEART RATE: 130 BPM
OHS CV CPX 85 PERCENT MAX PREDICTED HEART RATE MALE: 138
OHS CV CPX MAX PREDICTED HEART RATE: 162
OHS CV CPX PATIENT IS FEMALE: 0
OHS CV CPX PATIENT IS MALE: 1
OHS CV CPX PEAK DIASTOLIC BLOOD PRESSURE: 55 MMHG
OHS CV CPX PEAK HEAR RATE: 137 BPM
OHS CV CPX PEAK RATE PRESSURE PRODUCT: NORMAL
OHS CV CPX PEAK SYSTOLIC BLOOD PRESSURE: 129 MMHG
OHS CV CPX PERCENT MAX PREDICTED HEART RATE ACHIEVED: 85
OHS CV CPX RATE PRESSURE PRODUCT PRESENTING: 9744
PISA TR MAX VEL: 2.81 M/S
PULM VEIN S/D RATIO: 0.97
PV PEAK D VEL: 0.34 M/S
PV PEAK S VEL: 0.33 M/S
RA MAJOR: 5.26 CM
RA PRESSURE: 3 MMHG
RA WIDTH: 3.34 CM
RIGHT VENTRICULAR END-DIASTOLIC DIMENSION: 3.09 CM
RV TISSUE DOPPLER FREE WALL SYSTOLIC VELOCITY 1 (APICAL 4 CHAMBER VIEW): 21.8 CM/S
SINUS: 3.13 CM
STJ: 3.22 CM
SYSTOLIC BLOOD PRESSURE: 116 MMHG
TDI LATERAL: 0.19 M/S
TDI SEPTAL: 0.14 M/S
TDI: 0.17 M/S
TR MAX PG: 32 MMHG
TRICUSPID ANNULAR PLANE SYSTOLIC EXCURSION: 2.05 CM
TV REST PULMONARY ARTERY PRESSURE: 35 MMHG

## 2019-12-05 PROCEDURE — 99999 PR PBB SHADOW E&M-EST. PATIENT-LVL III: ICD-10-PCS | Mod: PBBFAC,TXP,,

## 2019-12-05 PROCEDURE — 96372 PR INJECTION,THERAP/PROPH/DIAG2ST, IM OR SUBCUT: ICD-10-PCS | Mod: S$GLB,TXP,, | Performed by: INTERNAL MEDICINE

## 2019-12-05 PROCEDURE — 93320 DOPPLER ECHO COMPLETE: CPT | Mod: S$GLB,TXP,, | Performed by: INTERNAL MEDICINE

## 2019-12-05 PROCEDURE — 93320 STRESS ECHO (CUPID ONLY): ICD-10-PCS | Mod: S$GLB,TXP,, | Performed by: INTERNAL MEDICINE

## 2019-12-05 PROCEDURE — 97802 PR MED NUTR THER, 1ST, INDIV, EA 15 MIN: ICD-10-PCS | Mod: S$GLB,TXP,, | Performed by: DIETITIAN, REGISTERED

## 2019-12-05 PROCEDURE — 99214 PR OFFICE/OUTPT VISIT, EST, LEVL IV, 30-39 MIN: ICD-10-PCS | Mod: S$GLB,TXP,, | Performed by: SURGERY

## 2019-12-05 PROCEDURE — 99999 PR PBB SHADOW E&M-EST. PATIENT-LVL V: CPT | Mod: PBBFAC,TXP,, | Performed by: SURGERY

## 2019-12-05 PROCEDURE — 93325 DOPPLER ECHO COLOR FLOW MAPG: CPT | Mod: S$GLB,TXP,, | Performed by: INTERNAL MEDICINE

## 2019-12-05 PROCEDURE — 80307 DRUG TEST PRSMV CHEM ANLYZR: CPT | Mod: TXP

## 2019-12-05 PROCEDURE — 93325 STRESS ECHO (CUPID ONLY): ICD-10-PCS | Mod: S$GLB,TXP,, | Performed by: INTERNAL MEDICINE

## 2019-12-05 PROCEDURE — 99204 OFFICE O/P NEW MOD 45 MIN: CPT | Mod: S$GLB,TXP,, | Performed by: NURSE PRACTITIONER

## 2019-12-05 PROCEDURE — 99214 OFFICE O/P EST MOD 30 MIN: CPT | Mod: S$GLB,TXP,, | Performed by: SURGERY

## 2019-12-05 PROCEDURE — 93351 STRESS TTE COMPLETE: CPT | Mod: S$GLB,TXP,, | Performed by: INTERNAL MEDICINE

## 2019-12-05 PROCEDURE — 99204 PR OFFICE/OUTPT VISIT, NEW, LEVL IV, 45-59 MIN: ICD-10-PCS | Mod: S$GLB,TXP,, | Performed by: NURSE PRACTITIONER

## 2019-12-05 PROCEDURE — 96372 THER/PROPH/DIAG INJ SC/IM: CPT | Mod: S$GLB,TXP,, | Performed by: INTERNAL MEDICINE

## 2019-12-05 PROCEDURE — 97802 MEDICAL NUTRITION INDIV IN: CPT | Mod: S$GLB,TXP,, | Performed by: DIETITIAN, REGISTERED

## 2019-12-05 PROCEDURE — 81001 URINALYSIS AUTO W/SCOPE: CPT | Mod: TXP

## 2019-12-05 PROCEDURE — 99999 PR PBB SHADOW E&M-EST. PATIENT-LVL III: CPT | Mod: PBBFAC,TXP,,

## 2019-12-05 PROCEDURE — 93351 STRESS ECHO (CUPID ONLY): ICD-10-PCS | Mod: S$GLB,TXP,, | Performed by: INTERNAL MEDICINE

## 2019-12-05 PROCEDURE — 99999 PR PBB SHADOW E&M-EST. PATIENT-LVL V: ICD-10-PCS | Mod: PBBFAC,TXP,, | Performed by: SURGERY

## 2019-12-05 RX ORDER — CALCIUM CITRATE/VITAMIN D3 200MG-6.25
TABLET ORAL
COMMUNITY
Start: 2019-11-08 | End: 2020-05-26 | Stop reason: SDUPTHER

## 2019-12-05 RX ORDER — DOBUTAMINE HYDROCHLORIDE 200 MG/100ML
10 INJECTION INTRAVENOUS
Status: COMPLETED | OUTPATIENT
Start: 2019-12-05 | End: 2019-12-05

## 2019-12-05 RX ORDER — SUCRALFATE 1 G/1
1 TABLET ORAL 2 TIMES DAILY WITH MEALS
Refills: 2 | Status: ON HOLD | COMMUNITY
Start: 2019-11-19 | End: 2020-02-26 | Stop reason: HOSPADM

## 2019-12-05 RX ORDER — SYRINGE AND NEEDLE,INSULIN,1ML 31 GX5/16"
SYRINGE, EMPTY DISPOSABLE MISCELLANEOUS
COMMUNITY
Start: 2019-09-19

## 2019-12-05 RX ORDER — GLUCOSAM/CHON-MSM1/C/MANG/BOSW 500-416.6
TABLET ORAL
COMMUNITY
Start: 2019-11-20

## 2019-12-05 RX ORDER — OXYBUTYNIN CHLORIDE 5 MG/1
5 TABLET ORAL DAILY
COMMUNITY
Start: 2019-10-31 | End: 2022-11-07

## 2019-12-05 RX ORDER — ATROPINE SULFATE 0.1 MG/ML
1 INJECTION INTRAVENOUS
Status: COMPLETED | OUTPATIENT
Start: 2019-12-05 | End: 2019-12-05

## 2019-12-05 RX ADMIN — DOBUTAMINE HYDROCHLORIDE 10 MCG/KG/MIN: 200 INJECTION INTRAVENOUS at 11:12

## 2019-12-05 RX ADMIN — ATROPINE SULFATE 1 MG: 0.1 INJECTION INTRAVENOUS at 11:12

## 2019-12-05 NOTE — PROGRESS NOTES
Pre Transplant Infectious Diseases Consult  Liver Transplant Recipient Evaluation    Requesting Physician: Dr. Meneses    Reason for Visit:  Pre Transplant Evaluation    Organ:  Liver    Etiology of Liver Disease:  ETOH liver cirrhosis    History of Prior Transplant:  No    Currently taking immunosuppressants/steroids:  No    History of Splenectomy:  No    Infectious History:  Current/recent infections or currently taking antibiotics?  No  History of recurrent infections (sinuses, throat, bladder/kidneys, intestines, skin, dental, lung, catheter (HD/PD) related, or peritonitis/SBP)?  No  Any major hospitalizations due to infection?  No.  Noted recent hospitalization for small umbilical hernia abscess and fluid overload.  CT negative for abscess.  Treated with short course Bactrim    If diabetic, history of diabetic foot infection/osteomyelitis?  No diabetic foot wounds or bone infections   History of shingles?  No.    History of STDs (syphilis, viral hepatitis, HIV)?  No  Exposure to TB or ever had a positive TB skin test?  No  History of residence in coccidioides endemic areas ([facility]?  Yes ([place], [place])  Any foreign travel?  Yes - Worked on [employer] in [facility], [place].    Any associated illness?  No    Social/Environmental:  Occupational:  [employer].    Animal exposures (dogs, cats, farm animals, bird cages, fish tanks):  Yes - Dog. Fully vaccinated.   Hobbies (gardening, hike, fish/hunting, etc): indoor activities.    Consumption of raw/undercooked meat or seafood?  No  Any injectable or smoked recreational drug use?  No    Immunization History:  Childhood vaccines:  Yes  Last Flu shot:  October 2019 at [facility]  Tetanus/TDAP: 8/15/2014  Hepatitis A: 8/15/2014 one dose Hep A/B  Hepatitis B:  8/15/2014 one dose Hep A/B.    Prevnar-13: October 2019 at [facility]  Pneumovax-23: 11/20/2001  Shingles (Zostavax/Shingrix): denies   Other: denies    Serologies:  CMV IgG  Interpretation   Date Value Ref Range Status   08/13/2014 Reactive (A)  Final     Comment:     These results were obtained with the IMMULITE 1000 CMV IgG assay.   Values obtained with different manufacturers' assay methods   may not be used interchangeably.        Hepatitis A Antibody IgG   Date Value Ref Range Status   08/13/2014 Negative  Final     Hep B Core Total Ab   Date Value Ref Range Status   08/13/2014 Negative  Final     Hep B S Ab   Date Value Ref Range Status   08/13/2014 Negative  Final     Hepatitis B Surface Ag   Date Value Ref Range Status   08/13/2014 Negative  Final     HIV 1/2 Ag/Ab   Date Value Ref Range Status   08/13/2014 Negative Negative Final     RPR   Date Value Ref Range Status   08/13/2014 Non-reactive Non-reactive Final     Varicella Interpretation   Date Value Ref Range Status   08/13/2014 Positive (A) Negative Final     Comment:     <or=0.90     Negative        No detectable IgG antibody to Varicella zoster  by the GIN test. Such individuals are presumed to be   uninfected with Varicella zoster and to be susceptible to   primary infection.  0.91-1.09    Equivocal  >or=1.10     Positive        Indicates presence of detectable IgG antibody to Varicella   zoster by the GIN test. Indicative of previous or current   infection.          Review of Systems   Constitution: Negative for chills, decreased appetite, diaphoresis, fever, malaise/fatigue, night sweats, weight gain and weight loss.   HENT: Negative for congestion, ear pain, hearing loss, hoarse voice, sore throat and tinnitus.    Eyes: Negative for blurred vision, redness and visual disturbance.        Corrective lenses   Cardiovascular: Positive for leg swelling and palpitations. Negative for chest pain.   Respiratory: Negative for cough, hemoptysis, shortness of breath, sputum production and wheezing.    Endocrine: Negative for cold intolerance and heat intolerance.   Hematologic/Lymphatic: Negative for adenopathy. Does not  bruise/bleed easily.   Skin: Negative for dry skin, itching, rash and suspicious lesions.   Musculoskeletal: Positive for back pain. Negative for joint pain, myalgias and neck pain.   Gastrointestinal: Positive for abdominal pain and diarrhea (lactulose). Negative for constipation, heartburn, nausea and vomiting.   Genitourinary: Negative for dysuria, flank pain, frequency, hematuria, hesitancy and urgency.   Neurological: Positive for numbness (neuropathy and carpal tunnel) and paresthesias. Negative for dizziness, headaches and weakness.   Psychiatric/Behavioral: Negative for depression and memory loss. The patient has insomnia. The patient is not nervous/anxious.    Allergic/Immunologic: Negative for persistent infections.     Physical Exam   Constitutional: He is oriented to person, place, and time. He appears well-developed and well-nourished. No distress.   HENT:   Head: Normocephalic and atraumatic.   Mouth/Throat: Uvula is midline, oropharynx is clear and moist and mucous membranes are normal. He does not have dentures. No oral lesions. Abnormal dentition. No dental abscesses, lacerations or dental caries. No oropharyngeal exudate.   Edentulous uppers.   3 remaining teeth lower, eroded to gums      Eyes: Conjunctivae and lids are normal. No scleral icterus.   Neck: Neck supple.   Cardiovascular: Normal rate and regular rhythm. Exam reveals no gallop and no friction rub.   No murmur heard.  Pulmonary/Chest: Effort normal and breath sounds normal. No respiratory distress. He has no decreased breath sounds. He has no wheezes. He has no rhonchi. He has no rales.   Abdominal: Soft. Normal appearance and bowel sounds are normal. He exhibits distension. There is no hepatosplenomegaly. There is no tenderness. There is no guarding. A hernia (umbilical) is present.       Musculoskeletal: He exhibits edema.   Lymphadenopathy:        Head (right side): No submental, no submandibular, no tonsillar, no preauricular, no  posterior auricular and no occipital adenopathy present.        Head (left side): No submental, no submandibular, no tonsillar, no preauricular, no posterior auricular and no occipital adenopathy present.     He has no cervical adenopathy.     He has no axillary adenopathy.        Right: No inguinal, no supraclavicular and no epitrochlear adenopathy present.        Left: No inguinal, no supraclavicular and no epitrochlear adenopathy present.   Neurological: He is alert and oriented to person, place, and time. No cranial nerve deficit.   Skin: Skin is warm, dry and intact. No lesion and no rash noted. He is not diaphoretic. No erythema. No pallor.   Psychiatric: He has a normal mood and affect. His behavior is normal.   Vitals reviewed.           Counseling:   I discussed with the patient the risk for increased susceptibility to infections following transplantation including increased risk for infection right after transplant and if rejection should occur.  The patient has been counseled on the importance of vaccinations including but not limited to a yearly flu vaccine. Patient was also instructed to encourage that family/caretakers receive their flu vaccine yearly. The patient was encouraged to contact us about any problems that may develop after immunizations and possible side effects were reviewed.     Specific guidance has been provided to the patient regarding the patient's occupation, hobbies and activities to avoid future infectious complications. These include but are not limited to: avoiding raw/undercooked meats and seafood, avoiding unpasteurized milk/cheeses, proper (hand) hygiene, contact with animals and appropriate vaccination of animals, use of mosquito/tick precautions, avoiding walking barefoot, avoiding sick contacts, and seeking medical advice prior to foreign travel (specifically developing countries).     Transplant Candidacy: Based on available information, there are no identified significant  barriers to transplantation from an infectious disease standpoint pending acceptable serologies and subject to recommendations below.     Final determination of transplant candidacy will be made once evaluation is complete and reviewed by the Transplant Selection Committee.      ID recommendations:      1.  Vaccines today: Hepatitis A and Heplisav-B.  Rx given for Pneumovax 8 weeks after recently reported Prevnar in October. Rx given for Heplisav-B in one month, Hepatitis A in 6 months, and for Shingrix vaccine series when available  2.  Quantiferon Gold is pending.  If positive, please consult ID. If  Indeterminate, please draw T spot.  If T spot positive, please consult ID.    3.  RPR, strongyloides, HIV pending.  If positive, please consult ID  4.  History of living in coccidioides endemic area - added on Coccidioides antibody.  Notify ID if positive

## 2019-12-05 NOTE — PROGRESS NOTES
TRANSPLANT NUTRITIONAL ASSESSMENT    Referring Provider: Elizabeth Meneses MD    Reason for Visit: Pre-liver transplant work-up    Age: 59 y.o.  Sex: male    Patient Active Problem List   Diagnosis    Esophageal varices with bleeding    Cirrhosis, Laennec's    Umbilical hernia    Splenomegaly    GERD (gastroesophageal reflux disease)    Diabetes    HTN (hypertension)    Other ascites    Alcohol abuse, in remission    Hepatic encephalopathy    Chronic back pain    Cirrhosis    Rectal bleeding    Dysphagia    Decompensated hepatic cirrhosis    Ascites due to alcoholic cirrhosis    Portal hypertension    Portal hypertensive gastropathy    Anasarca    Type 2 diabetes mellitus without complication, with long-term current use of insulin    Acute blood loss anemia    Umbilicus discharge    Umbilical hernia without obstruction and without gangrene     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    Esophageal varices     GERD (gastroesophageal reflux disease)     Hepatic encephalopathy 7/8/2014    Hepatitis C virus infection 07/08/2014    tx with interferon 3-4 mos- stopped for unclear reasons Tattoos-first at age 17 only risk factor (HCVAB negative 08/2017)    HTN (hypertension) 7/8/2014    Hypertension     Insulin dependent diabetes mellitus     Renal mass, left 7/8/2014    6.3 x 5.7 x 5.6 complex mass    Splenomegaly 7/8/2014    Umbilical hernia 7/8/2014     Lab Results   Component Value Date     12/05/2019     11/19/2015    K 3.2 (L) 12/05/2019    K 4.0 10/18/2016    PHOS 2.8 10/18/2019    MG 1.9 10/18/2019    CHOL 178 10/18/2016    HDL 35 10/18/2016    TRIG 134 10/18/2016    ALBUMIN 2.6 (L) 12/05/2019    ALBUMIN 3.80 10/18/2016    HGBA1C 7.6 (H) 12/05/2019    HGBA1C 9.3 10/18/2016    CALCIUM 8.0 (L) 12/05/2019     CALCIUM 8.6 10/18/2016     Other Pertinent Labs: none    Current Outpatient Medications   Medication Sig    cyclobenzaprine (FLEXERIL) 10 MG tablet Take 10 mg by mouth 2 (two) times daily.     EASY TOUCH INSULIN SYRINGE 1 mL 31 gauge x 5/16 Syrg     escitalopram oxalate (LEXAPRO) 20 MG tablet Take 20 mg by mouth once daily.     finasteride (PROSCAR) 5 mg tablet Take 1 tablet (5 mg total) by mouth once daily.    furosemide (LASIX) 40 MG tablet Take 3 tablets (120 mg total) by mouth once daily.    gabapentin (NEURONTIN) 600 MG tablet Take 600 mg by mouth 3 (three) times daily.     HYDROcodone-acetaminophen (NORCO) 5-325 mg per tablet Take 1 tablet by mouth daily as needed (severe pain).    insulin aspart U-100 (NOVOLOG) 100 unit/mL injection Inject 24 Units into the skin 3 (three) times daily before meals.    insulin detemir U-100 (LEVEMIR) 100 unit/mL injection Inject 74 Units into the skin every evening.    lactulose (CHRONULAC) 10 gram/15 mL solution Take 30 mLs by mouth 3 (three) times daily. May take up to  4 to 5 times daily if needed for bowel movements    nebivolol (BYSTOLIC) 10 MG Tab Take 10 mg by mouth once daily.    oxybutynin (DITROPAN) 5 MG Tab Take 5 mg by mouth once daily.    pantoprazole (PROTONIX) 40 MG tablet Take 1 tablet (40 mg total) by mouth 2 (two) times daily. (Patient taking differently: Take 40 mg by mouth once daily. )    rifAXIMin (XIFAXAN) 550 mg Tab Take 1 tablet (550 mg total) by mouth 2 (two) times daily.    spironolactone (ALDACTONE) 100 MG tablet Take 1 tablet (100 mg total) by mouth once daily.    sucralfate (CARAFATE) 1 gram tablet Take 1 tablet by mouth 2 (two) times daily with meals.    tamsulosin (FLOMAX) 0.4 mg Cp24 Take 1 capsule (0.4 mg total) by mouth once daily.    TRUE METRIX GLUCOSE TEST STRIP Strp     TRUEPLUS LANCETS 30 gauge Misc      No current facility-administered medications for this visit.      Allergies: Patient has no known allergies.    Ht  "Readings from Last 1 Encounters:   12/05/19 5' 10.08" (1.78 m)     Wt Readings from Last 1 Encounters:   12/05/19 104.6 kg (230 lb 9.6 oz)      BMI: Body mass index is 33.01 kg/m².    Usual Weight: 216 lb- 220 lb  Weight Change/Time: fluctuates with fluid retention but generally stable  Current Diet: regular  Appetite/Current Intake: good   Exercise/Physical Activity: walking as tolerated, has edema which limits mobility at times, no assistance for ambulating  Nutritional/Herbal Supplements: none  Potential Food/Medication Interactions: no grapefruit - xifaxan  Chewing/Swallowing Problems: foods are generally soft texture due to tooth extraction and esoph varices  Symptoms: none  Assessment of Lab Values: K 3.2, Ha1c 7.6  Support System: caregiver present, voices support in nutrition/diet regimen, cooks meals at home    Estimated Kcal Need:  5089-1347 kcal (20-25 kcal/kg)  Estimated Protein Need: 105-157 gm (1-1.5 gm/kg)    Nutritional History:   Pt reports appetite is good, no n/v/d/abd pain/constipation is relieved by lactulose. Pt has been advised to limit salt intake in diet in the past. Pt has edema but can walk w/o assistance, no much activity/exercise. Pt eats most meals at home, may go to [facility] occasionally. Pt eats largest meal in afternoon, smaller meal in evening. Caregiver uses some salt in cooking, does not use seasoning blends or boxed mixes. Pt does not add more salt to his plate. Pt provided the following diet recall:  B: scrambled eggs (3+) or sausage egg sandwich or grits and eggs or pancakes and syrup  Beverages: water, diet soda, skim milk  L: chicken, rice, broccoli/cauliflower/brussel sprouts/cabbage/green beans, beef/pork/duck/fish, creamed potatoes  D: left overs from L or cereal or pancakes (breakfast foods)  Snack: [place] cakes, banana, apples    Nutritional Diagnoses  Problem: food- and nutrition-related knowledge deficit  Etiology: r/t extensive time frame since last diet " education re low sodium/adequate protein recommendations  Symptoms: aeb pt report, pt's chart    Educational Need? yes  Barriers: none identified  Discussed with: patient and caregiver  Interventions: Patient taught nutrition information regarding Pre-liver transplant work-up.    Reviewed Low Sodium packet (low Na diet, foods recommended/not recommended, food label strategies, flavoring tips, & sample menu).   Provided education on protein content in foods, goal intake per day, suggestions for ways to reach protein intake goal; nutrition supplements, snacks.   Continue to be physically active daily, walking.   Monitor BG and control with medication and diet.  Goals/Recommendations: diet adherence and small frequent meals and snacks  Actions Taken: instruct/provide written information  Strategies Used: problem solving, goal setting, motivational interviewing  Patient and/or family comprehend instructions: yes , adherence expected  Outcome: Verbalizes understanding  Monitoring:     Contact information provided, will f/u in clinic and communicate with the care team as needed.     Counseling Time: 15 minutes

## 2019-12-05 NOTE — PROGRESS NOTES
Patient seen in clinic with caregiver.  Consents reviewed and signatures obtained.   Patient given supplies needed to obtain urine specimen.    Patient's name and date of birth verified.   Instructions reviewed with the patient on the way the specimen should be obtained.   Patient stated that  they understood the information provided for the collection and  placement of the specimen. Specimen collected in clinicPatient given supplies and printed instructions for the collection of the 24 hour urine specimen.  Patient reports understanding the instructions provided to obtain the specimen and the storage of the specimen while at home.  Patient given instructed to bring the container to 2nd floor lab when the collection is completed.  Patient contact information verified.  Patient questions answered and concerns addressed.

## 2019-12-05 NOTE — PROGRESS NOTES
PHARM.D. PRE-TRANSPLANT NOTE:    This patient's medication therapy was evaluated as part of his pre-transplant evaluation.      The following general pharmacologic concerns were noted: resume lexapro to prevent withdrawal;     The following pharmacologic concerns related to HCV therapy were noted: n/a      This patient's medication profile was reviewed for considerations for DAA Hepatitis C therapy:    [x]  No current inducers of CYP 3A4 or PGP  [x]  No amiodarone on this patient's EMR profile in the last 24 months  [x]  No past or current atrial fibrillation on this patient's EMR profile       Current Outpatient Medications   Medication Sig Dispense Refill    cyclobenzaprine (FLEXERIL) 10 MG tablet Take 10 mg by mouth 2 (two) times daily.       EASY TOUCH INSULIN SYRINGE 1 mL 31 gauge x 5/16 Syrg       escitalopram oxalate (LEXAPRO) 20 MG tablet Take 20 mg by mouth once daily.   2    finasteride (PROSCAR) 5 mg tablet Take 1 tablet (5 mg total) by mouth once daily. 30 tablet 11    furosemide (LASIX) 40 MG tablet Take 3 tablets (120 mg total) by mouth once daily. 90 tablet 2    gabapentin (NEURONTIN) 600 MG tablet Take 600 mg by mouth 3 (three) times daily.       HYDROcodone-acetaminophen (NORCO) 5-325 mg per tablet Take 1 tablet by mouth daily as needed (severe pain). 10 tablet 0    insulin aspart U-100 (NOVOLOG) 100 unit/mL injection Inject 24 Units into the skin 3 (three) times daily before meals.      insulin detemir U-100 (LEVEMIR) 100 unit/mL injection Inject 74 Units into the skin every evening.      lactulose (CHRONULAC) 10 gram/15 mL solution Take 30 mLs by mouth 3 (three) times daily. May take up to  4 to 5 times daily if needed for bowel movements      nebivolol (BYSTOLIC) 10 MG Tab Take 10 mg by mouth once daily.      oxybutynin (DITROPAN) 5 MG Tab Take 5 mg by mouth once daily.      pantoprazole (PROTONIX) 40 MG tablet Take 1 tablet (40 mg total) by mouth 2 (two) times daily. (Patient taking  differently: Take 40 mg by mouth once daily. ) 180 tablet 1    rifAXIMin (XIFAXAN) 550 mg Tab Take 1 tablet (550 mg total) by mouth 2 (two) times daily. 30 tablet 11    spironolactone (ALDACTONE) 100 MG tablet Take 1 tablet (100 mg total) by mouth once daily. 30 tablet 2    sucralfate (CARAFATE) 1 gram tablet Take 1 tablet by mouth 2 (two) times daily with meals.  2    tamsulosin (FLOMAX) 0.4 mg Cp24 Take 1 capsule (0.4 mg total) by mouth once daily. 30 capsule 11    TRUE METRIX GLUCOSE TEST STRIP Strp       TRUEPLUS LANCETS 30 gauge Misc        No current facility-administered medications for this visit.          Currently  Melba is responsible for preparing / administering this patient's medications on a daily basis.  I am available for consultation and can be contacted, as needed by the other members of the Liver Transplant team.

## 2019-12-05 NOTE — PROGRESS NOTES
Transplant Surgery Liver Transplant Recipient Evaluation    Referring Physician:   Corresponding Physician:     Subjective:     Reason for Visit: evaluate liver transplant candidacy    History of Present Illness: Colin Reilly is a 59 y.o. year old male who is being evaluated for liver transplantation.    Transplant History: N/A    Native Liver Disease (UNOS terminology): Alcoholic Cirrhosis (based on medical records from referral).    Manifestations of liver disease: encephalopathy, fatigue and muscle wasting  MELD-Na score: 15 at 12/5/2019  7:50 AM  MELD score: 15 at 12/5/2019  7:50 AM  Calculated from:  Serum Creatinine: 0.7 mg/dL (Rounded to 1 mg/dL) at 12/5/2019  7:50 AM  Serum Sodium: 141 mmol/L (Rounded to 137 mmol/L) at 12/5/2019  7:50 AM  Total Bilirubin: 2.6 mg/dL at 12/5/2019  7:50 AM  INR(ratio): 1.6 at 12/5/2019  7:50 AM  Age: 59 years    PMH: reviewed  PSH: reviewed    Review of Systems   Constitutional: Negative for activity change, appetite change, chills and fever.   Respiratory: Negative for cough and shortness of breath.    Cardiovascular: Negative for chest pain and leg swelling.   Gastrointestinal: Negative for abdominal distention, constipation, diarrhea, nausea and vomiting.   Genitourinary: Negative for difficulty urinating and dysuria.   Skin: Negative for color change and rash.   Neurological: Negative for dizziness and light-headedness.   All other systems reviewed and are negative.        Objective:     Physical Exam:  Constitutional:   Vitals reviewed: yes   Well-nourished and well-groomed: yes  Eyes:   Sclerae icteric: no   Extraocular movements intact: yes  GI:    Bowel sounds normal: yes   Tenderness: no    If yes, quadrant/location: not applicable   Palpable masses: no    If yes, quadrant/location: not applicable   Hepatosplenomegaly: no   Ascites: no   Hernia: yes. Location: umbilical    If yes, type/location: umbilical hernia, not applicable   Surgical scars: yes    If yes,  type/location: laparoscopic port sites  Resp:   Effort normal: yes   Breath sounds normal: yes    CV:   Regular rate and rhythm: yes   Heart sounds normal: yes   Femoral pulses normal: yes   Extremities edematous: no  Skin:   Rashes or lesions present: no    If yes, describe:not applicable   Jaundice:: no    Musculoskeletal:   Gait normal: yes   Strength normal: yes  Psych:   Oriented to person, place, and time: yes   Affect and mood normal: yes    Additional comments: not applicable         Transplant Surgery - Candidacy   Assessment/Plan:   I see no surgical contraindication to liver transplantation. Based on available information, Colin Reilly is a suitable liver transplant candidate. Final determination of transplant candidacy will be made once evaluation is complete and reviewed by the Liver Transplant Selection Committee.    Fareed Tran MD     Counseling: We provided Colin Reilly with a group education session today.  We discussed liver transplantation at length with him, including risks, potential complications, and alternatives in the management of his liver disease.  The discussion included complications related to anesthesia, bleeding, infection, primary nonfunction, and vascular thrombosis.  I discussed the typical postoperative course, length of hospitalization, the need for long-term immunosuppression, and the need for long-term routine follow-up.  I discussed living-donor and -donor transplantation and the relative advantages and disadvantages of each.  I also discussed average waiting times for both living donation and  donation.  I discussed national and center-specific survival rates.  I also mentioned the potential benefit of multicenter listing to candidates listed with centers within more than one organ procurement organization.  All questions were answered.    PHS: I discussed the use of organs from donors with PHS increased-risk behavior, including the testing protocols  utilized, as well as data from the literature regarding the likelihood of transmission of hepatitis or HIV.  The patient is willing to consider such grafts.  DCD: I discussed the use of organs recovered by donation after cardiac death (DCD), including slightly decreased graft survival and greater risk of arterial and biliary complications. The potential advantage to the recipient is possibly receiving a transplant sooner by accepting such an organ. The patient is willing to consider such grafts.  HBcAb: I discussed the use of organs from donors with HBcAb in conjunction with long term use of HBV antiviral drugs, such as lamivudine. The small but measurable risk of hepatitis B seroconversion was discussed as well as the potentially life long need to continue antiviral drugs. The patient is willing to consider such grafts.  HCV Non-viremic recipient: I discussed the use of HCV-positive organs in naive recipients, including the risk of viral transmission to the patients or others, potential insurance barriers for antiviral medication coverage, risk for fibrosing cholestatic hepatitis, death or graft loss. The potential advantage to the recipient is the possibility of receiving a transplant sooner with decreased mortality risk by accepting such an organ. The patient is willing to consider such grafts.

## 2019-12-06 ENCOUNTER — OFFICE VISIT (OUTPATIENT)
Dept: HEPATOLOGY | Facility: CLINIC | Age: 59
End: 2019-12-06
Payer: MEDICARE

## 2019-12-06 ENCOUNTER — CLINICAL SUPPORT (OUTPATIENT)
Dept: INFECTIOUS DISEASES | Facility: CLINIC | Age: 59
End: 2019-12-06
Payer: MEDICARE

## 2019-12-06 VITALS
RESPIRATION RATE: 18 BRPM | WEIGHT: 229.25 LBS | BODY MASS INDEX: 32.1 KG/M2 | SYSTOLIC BLOOD PRESSURE: 112 MMHG | TEMPERATURE: 98 F | OXYGEN SATURATION: 99 % | HEIGHT: 71 IN | DIASTOLIC BLOOD PRESSURE: 56 MMHG | HEART RATE: 89 BPM

## 2019-12-06 DIAGNOSIS — K72.90 DECOMPENSATED HEPATIC CIRRHOSIS: Primary | ICD-10-CM

## 2019-12-06 DIAGNOSIS — K76.82 HEPATIC ENCEPHALOPATHY: ICD-10-CM

## 2019-12-06 DIAGNOSIS — I85.01 BLEEDING ESOPHAGEAL VARICES, UNSPECIFIED ESOPHAGEAL VARICES TYPE: ICD-10-CM

## 2019-12-06 DIAGNOSIS — K70.30 CIRRHOSIS, LAENNEC'S: ICD-10-CM

## 2019-12-06 DIAGNOSIS — K76.6 PORTAL HYPERTENSION: ICD-10-CM

## 2019-12-06 DIAGNOSIS — K74.60 DECOMPENSATED HEPATIC CIRRHOSIS: Primary | ICD-10-CM

## 2019-12-06 DIAGNOSIS — Z76.82 ORGAN TRANSPLANT CANDIDATE: ICD-10-CM

## 2019-12-06 LAB
AMPHET+METHAMPHET UR QL: NEGATIVE
BACTERIA #/AREA URNS AUTO: ABNORMAL /HPF
BARBITURATES UR QL SCN>200 NG/ML: NEGATIVE
BENZODIAZ UR QL SCN>200 NG/ML: NEGATIVE
BILIRUB UR QL STRIP: NEGATIVE
BZE UR QL SCN: NEGATIVE
CANNABINOIDS UR QL SCN: NEGATIVE
CLARITY UR REFRACT.AUTO: ABNORMAL
COLOR UR AUTO: ABNORMAL
CREAT UR-MCNC: 240 MG/DL (ref 23–375)
ETHANOL UR-MCNC: <10 MG/DL
GLUCOSE UR QL STRIP: NEGATIVE
HGB UR QL STRIP: NEGATIVE
KETONES UR QL STRIP: NEGATIVE
LEUKOCYTE ESTERASE UR QL STRIP: ABNORMAL
METHADONE UR QL SCN>300 NG/ML: NEGATIVE
MICROSCOPIC COMMENT: ABNORMAL
NITRITE UR QL STRIP: NEGATIVE
OPIATES UR QL SCN: NORMAL
PCP UR QL SCN>25 NG/ML: NEGATIVE
PH UR STRIP: 5 [PH] (ref 5–8)
PROT UR QL STRIP: NEGATIVE
SP GR UR STRIP: 1.02 (ref 1–1.03)
TOXICOLOGY INFORMATION: NORMAL
URN SPEC COLLECT METH UR: ABNORMAL
WBC #/AREA URNS AUTO: 41 /HPF (ref 0–5)

## 2019-12-06 PROCEDURE — 99215 OFFICE O/P EST HI 40 MIN: CPT | Mod: S$GLB,TXP,, | Performed by: INTERNAL MEDICINE

## 2019-12-06 PROCEDURE — 99999 PR PBB SHADOW E&M-EST. PATIENT-LVL III: ICD-10-PCS | Mod: PBBFAC,TXP,, | Performed by: INTERNAL MEDICINE

## 2019-12-06 PROCEDURE — G0010 ADMIN HEPATITIS B VACCINE: HCPCS | Mod: S$GLB,TXP,, | Performed by: NURSE PRACTITIONER

## 2019-12-06 PROCEDURE — 90471 IMMUNIZATION ADMIN: CPT | Mod: S$GLB,TXP,, | Performed by: NURSE PRACTITIONER

## 2019-12-06 PROCEDURE — 99215 PR OFFICE/OUTPT VISIT, EST, LEVL V, 40-54 MIN: ICD-10-PCS | Mod: S$GLB,TXP,, | Performed by: INTERNAL MEDICINE

## 2019-12-06 PROCEDURE — G0010 HEPATITIS B (RECOMBINANT) ADJUVANTED, 2 DOSE: ICD-10-PCS | Mod: S$GLB,TXP,, | Performed by: NURSE PRACTITIONER

## 2019-12-06 PROCEDURE — 90632 HEPATITIS A VACCINE ADULT IM: ICD-10-PCS | Mod: S$GLB,TXP,, | Performed by: NURSE PRACTITIONER

## 2019-12-06 PROCEDURE — 3008F PR BODY MASS INDEX (BMI) DOCUMENTED: ICD-10-PCS | Mod: CPTII,S$GLB,TXP, | Performed by: INTERNAL MEDICINE

## 2019-12-06 PROCEDURE — 3008F BODY MASS INDEX DOCD: CPT | Mod: CPTII,S$GLB,TXP, | Performed by: INTERNAL MEDICINE

## 2019-12-06 PROCEDURE — 3078F PR MOST RECENT DIASTOLIC BLOOD PRESSURE < 80 MM HG: ICD-10-PCS | Mod: CPTII,S$GLB,TXP, | Performed by: INTERNAL MEDICINE

## 2019-12-06 PROCEDURE — 90471 HEPATITIS A VACCINE ADULT IM: ICD-10-PCS | Mod: S$GLB,TXP,, | Performed by: NURSE PRACTITIONER

## 2019-12-06 PROCEDURE — 90739 HEPB VACC 2/4 DOSE ADULT IM: CPT | Mod: S$GLB,TXP,, | Performed by: NURSE PRACTITIONER

## 2019-12-06 PROCEDURE — 90739 HEPATITIS B (RECOMBINANT) ADJUVANTED, 2 DOSE: ICD-10-PCS | Mod: S$GLB,TXP,, | Performed by: NURSE PRACTITIONER

## 2019-12-06 PROCEDURE — 3078F DIAST BP <80 MM HG: CPT | Mod: CPTII,S$GLB,TXP, | Performed by: INTERNAL MEDICINE

## 2019-12-06 PROCEDURE — 3074F SYST BP LT 130 MM HG: CPT | Mod: CPTII,S$GLB,TXP, | Performed by: INTERNAL MEDICINE

## 2019-12-06 PROCEDURE — 3074F PR MOST RECENT SYSTOLIC BLOOD PRESSURE < 130 MM HG: ICD-10-PCS | Mod: CPTII,S$GLB,TXP, | Performed by: INTERNAL MEDICINE

## 2019-12-06 PROCEDURE — 90632 HEPA VACCINE ADULT IM: CPT | Mod: S$GLB,TXP,, | Performed by: NURSE PRACTITIONER

## 2019-12-06 PROCEDURE — 99999 PR PBB SHADOW E&M-EST. PATIENT-LVL III: CPT | Mod: PBBFAC,TXP,, | Performed by: INTERNAL MEDICINE

## 2019-12-06 RX ORDER — FUROSEMIDE 40 MG/1
160 TABLET ORAL DAILY
Qty: 120 TABLET | Refills: 2 | Status: ON HOLD | OUTPATIENT
Start: 2019-12-06 | End: 2020-02-26 | Stop reason: SDUPTHER

## 2019-12-06 RX ORDER — SPIRONOLACTONE 100 MG/1
150 TABLET, FILM COATED ORAL DAILY
Qty: 60 TABLET | Refills: 2 | Status: ON HOLD | OUTPATIENT
Start: 2019-12-06 | End: 2020-05-21 | Stop reason: HOSPADM

## 2019-12-06 NOTE — PATIENT INSTRUCTIONS
1. Increase lasix to 160 mg daily from 120 mg daily  2. spironolactone - increase to 150 mg daily from 100 mg daily  3. Labs in 1 week and then monthly   return 8 weeks

## 2019-12-06 NOTE — PROGRESS NOTES
MELD 15.  Appt card completed for pt to repeat labs in one week, in one month, and to RTC in two months w/ repeat labs.

## 2019-12-06 NOTE — PROGRESS NOTES
PRE EDUCATION TEACHING NOTE    Colin Reilly was seen in clinic today.  Handbook on pre-liver transplant information (see outline below) was given to the patient and time was allowed for questions.  Patient's caregiver accompanied him.  Informed consent signed and written information given on selection criteria.      LIVER TRANSPLANT WORK-UP EDUCATION  I. NATIONAL REGISTRY LISTING  A. Information for listing  B. Regions  C. Per UNOS, can be listed at more than one center  II. SURGERY  A. Length  B. Complications: bleeding, infection  C. Central lines, drains, Maddox catheter, incision, endotracheal tube, NG tube  D. Transfusions, cell saver  III. SHORT TERM RECOVERY  A. ICU, PICU, Hospital stay  IV. LONG TERM RECOVERY  A. Labs at home  B. Clinic visits  C. Complications: infection, rejection, readmissions  D. Normal immunity and immunosuppression  E. Incidence of re-admit in 1st year  V. REJECTION  A. Incidence  B. Treatment: Solumedrol, Prograf, Thymoglobulin (actions and side effects)  VI. IMMUNOSUPPRESSIVES  A. Prednisone  B. Imuran/Cellcept  C. Cyclosporin/Prograf  D. Rapamune  E. Need for lifetime compliance  F. Actions and side effects  G. Costs  VII. RECURRENCE OF VIRAL HEPATITIS

## 2019-12-06 NOTE — PROGRESS NOTES
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 (stopped drinking several years ago-2012). His liver disease has been complicated by an esophageal variceal bleed, ascites. MELD has been <15. Labs from 4/8/19 reveal: Tbil 2.6, alb 3.1, INR 1.2, plts 65. EGD 03/18 showed small varices.    His HE is well controlled. He is due for an US to screen for HCC. He has experienced an increase in his weight and has gained 15 lbs. He is not eating a low salt diet    --2 months ago had EGD with banding    MELD-Na score: 15 at 12/6/2019  8:22 AM  MELD score: 15 at 12/6/2019  8:22 AM  Calculated from:  Serum Creatinine: 0.7 mg/dL (Rounded to 1 mg/dL) at 12/6/2019  8:22 AM  Serum Sodium: 141 mmol/L (Rounded to 137 mmol/L) at 12/5/2019  7:50 AM  Total Bilirubin: 2.6 mg/dL at 12/5/2019  7:50 AM  INR(ratio): 1.6 at 12/5/2019  7:50 AM  Age: 59 years    Outpatient Encounter Medications as of 12/6/2019   Medication Sig Dispense Refill    cyclobenzaprine (FLEXERIL) 10 MG tablet Take 10 mg by mouth 2 (two) times daily.       EASY TOUCH INSULIN SYRINGE 1 mL 31 gauge x 5/16 Syrg       escitalopram oxalate (LEXAPRO) 20 MG tablet Take 20 mg by mouth once daily.   2    finasteride (PROSCAR) 5 mg tablet Take 1 tablet (5 mg total) by mouth once daily. 30 tablet 11    furosemide (LASIX) 40 MG tablet Take 3 tablets (120 mg total) by mouth once daily. 90 tablet 2    gabapentin (NEURONTIN) 600 MG tablet Take 600 mg by mouth 3 (three) times daily.       HYDROcodone-acetaminophen (NORCO) 5-325 mg per tablet Take 1 tablet by mouth daily as needed (severe pain). 10 tablet 0    insulin aspart U-100 (NOVOLOG) 100 unit/mL injection Inject 24 Units into the skin 3 (three) times daily before meals.      insulin detemir U-100 (LEVEMIR) 100 unit/mL injection Inject 74 Units into the skin every evening.      lactulose (CHRONULAC) 10 gram/15 mL solution Take  30 mLs by mouth 3 (three) times daily. May take up to  4 to 5 times daily if needed for bowel movements      nebivolol (BYSTOLIC) 10 MG Tab Take 10 mg by mouth once daily.      oxybutynin (DITROPAN) 5 MG Tab Take 5 mg by mouth once daily.      pantoprazole (PROTONIX) 40 MG tablet Take 1 tablet (40 mg total) by mouth 2 (two) times daily. (Patient taking differently: Take 40 mg by mouth once daily. ) 180 tablet 1    rifAXIMin (XIFAXAN) 550 mg Tab Take 1 tablet (550 mg total) by mouth 2 (two) times daily. 30 tablet 11    spironolactone (ALDACTONE) 100 MG tablet Take 1 tablet (100 mg total) by mouth once daily. 30 tablet 2    sucralfate (CARAFATE) 1 gram tablet Take 1 tablet by mouth 2 (two) times daily with meals.  2    tamsulosin (FLOMAX) 0.4 mg Cp24 Take 1 capsule (0.4 mg total) by mouth once daily. 30 capsule 11    TRUE METRIX GLUCOSE TEST STRIP Strp       TRUEPLUS LANCETS 30 gauge Misc       [] atropine injection 1 mg       [] DOBUTamine 500mg in D5W 250mL infusion (premix)        No facility-administered encounter medications on file as of 2019.      Review of patient's allergies indicates:  No Known Allergies  Past Medical History:   Diagnosis Date    Alcohol abuse, in remission 2014    Quit 2011    Alcohol abuse, in remission     Ascites     Chronic back pain 2014    Cirrhosis, Laennec's 2014    Esophageal varices     GERD (gastroesophageal reflux disease)     Hepatic encephalopathy 2014    Hepatitis C virus infection 2014    tx with interferon 3-4 mos- stopped for unclear reasons Tattoos-first at age 17 only risk factor (HCVAB negative 2017)    HTN (hypertension) 2014    Hypertension     Insulin dependent diabetes mellitus     Renal mass, left 2014    6.3 x 5.7 x 5.6 complex mass    Splenomegaly 2014    Umbilical hernia 2014       Review of Systems   Constitutional: Negative.    HENT: Negative.    Eyes: Negative.     Respiratory: Negative.    Genitourinary: Negative.    Musculoskeletal: Negative.    Skin: Negative.    Neurological: Negative.      Vitals:    12/06/19 1205   BP: (!) 112/56   Pulse: 89   Resp: 18   Temp: 98.1 °F (36.7 °C)       Physical Exam   Constitutional: He is oriented to person, place, and time. He appears well-developed and well-nourished.   HENT:   Head: Normocephalic and atraumatic.   Eyes: Pupils are equal, round, and reactive to light. Conjunctivae and EOM are normal. No scleral icterus.   Neck: Normal range of motion. Neck supple. No thyromegaly present.   Cardiovascular: Normal rate, regular rhythm and normal heart sounds.   Pulmonary/Chest: Effort normal and breath sounds normal. He has no rales.   Abdominal: Soft. Bowel sounds are normal. He exhibits distension (umbilical hernia, easily reducible). He exhibits no mass. There is no tenderness.   Musculoskeletal: Normal range of motion. He exhibits edema.   Neurological: He is alert and oriented to person, place, and time.   Skin: Skin is warm and dry. No rash noted.   Psychiatric: He has a normal mood and affect.   Vitals reviewed.      Lab Results   Component Value Date     12/05/2019     11/19/2015    BUN 5 (L) 12/05/2019    BUN 5 03/20/2017    CREATININE 0.7 12/06/2019    CREATININE 0.8 10/18/2016    CALCIUM 8.0 (L) 12/05/2019    CALCIUM 8.6 10/18/2016     12/05/2019     10/18/2016    K 3.2 (L) 12/05/2019    K 4.0 10/18/2016     12/05/2019     10/18/2016    PROT 5.8 (L) 12/05/2019    CO2 28 12/05/2019    CO2 24.0 10/18/2016    ANIONGAP 7 (L) 12/05/2019    WBC 3.15 (L) 12/05/2019    WBC 4.6 10/18/2016    RBC 3.52 (L) 12/05/2019    HGB 10.4 (L) 12/05/2019    HCT 32.8 (L) 12/05/2019    MCV 93 12/05/2019    MCH 29.5 12/05/2019    MCHC 31.7 (L) 12/05/2019     Lab Results   Component Value Date    RDW 18.3 (H) 12/05/2019    PLT 47 (L) 12/05/2019    MPV 12.1 12/05/2019    MPV n/a 11/19/2015    GRAN 1.7 (L)  12/05/2019    GRAN 52.6 12/05/2019    LYMPH 0.9 (L) 12/05/2019    LYMPH 28.3 12/05/2019    MONO 0.4 12/05/2019    MONO 14.0 12/05/2019    EOSINOPHIL 3.5 12/05/2019    BASOPHIL 1.0 12/05/2019    EOS 0.1 12/05/2019    BASO 0.03 12/05/2019    APTT 28.1 10/16/2019    GROUPTRH A NEG 12/05/2019    CHOL 178 10/18/2016    TRIG 134 10/18/2016    HDL 35 10/18/2016    CHOLHDL 25.7 07/08/2014    TOTALCHOLEST 3.9 07/08/2014    ALBUMIN 2.6 (L) 12/05/2019    ALBUMIN 3.80 10/18/2016    BILIDIR 1.4 (H) 12/05/2019    AST 45 (H) 12/05/2019    AST 32 10/18/2016    ALT 21 12/05/2019    ALT 32 10/18/2016    ALKPHOS 150 (H) 12/05/2019    MG 1.9 10/18/2019    LABPROT 15.3 (H) 12/05/2019    INR 1.6 (H) 12/05/2019    INR 1.1 01/15/2016    PSA 0.44 06/19/2018    QUANTIFERON Negative 08/13/2014     MELD-Na score: 15 at 12/6/2019  8:22 AM  MELD score: 15 at 12/6/2019  8:22 AM  Calculated from:  Serum Creatinine: 0.7 mg/dL (Rounded to 1 mg/dL) at 12/6/2019  8:22 AM  Serum Sodium: 141 mmol/L (Rounded to 137 mmol/L) at 12/5/2019  7:50 AM  Total Bilirubin: 2.6 mg/dL at 12/5/2019  7:50 AM  INR(ratio): 1.6 at 12/5/2019  7:50 AM  Age: 59 years     Assessment and Plan:    Colin Reilly is a 59 y.o. male withCirrhosis  1. HE: Continue lactulose. Continue rifaximin  2. Cirrhosis, decompensated: check MELD labs today and every 12 weeks. Refer for liver transplant when >15.   3. Repeat upper endoscopy in 2019 to screen for EV-had EV banded  4. Ascites and edema : continue diuretics  5. Decompensated cirrhosis. Remains medically early for liver transplant. US now to screen for HCC    .    Return 6 months. . . . .

## 2019-12-06 NOTE — Clinical Note
1. Increase lasix to 160 mg daily from 120 mg daily2. spironolactone - increase to 150 mg daily from 100 mg daily3. Labs in 1 week and then monthly return 8 weeks

## 2019-12-09 ENCOUNTER — TELEPHONE (OUTPATIENT)
Dept: TRANSPLANT | Facility: CLINIC | Age: 59
End: 2019-12-09

## 2019-12-09 NOTE — TELEPHONE ENCOUNTER
Phone call returned to [facility].  Medication dosage changes clarified....aldactone 100 mg tabs----150 mg/ day, lasix 40 mg tabs----160 mg/ day.  Per pharmacist, records updated.    ========================================  ----- Message from Carlos Cope sent at 12/9/2019 12:57 PM CST -----  Contact: Jody ([facility])  Called to speak with someone re spironolactone (ALDACTONE) & furosemide (LASIX)      Please contact [facility] pharmacist: [phone]

## 2019-12-09 NOTE — TELEPHONE ENCOUNTER
Called patient to discuss test results from liver transplant evaluation scheduled last week.  No answer at this time.  Message left on VM.  Return phone call requested.  Contact info provided.  Awaiting callback.

## 2019-12-11 ENCOUNTER — TELEPHONE (OUTPATIENT)
Dept: TRANSPLANT | Facility: CLINIC | Age: 59
End: 2019-12-11

## 2019-12-11 ENCOUNTER — HISTORICAL (OUTPATIENT)
Dept: ADMINISTRATIVE | Facility: HOSPITAL | Age: 59
End: 2019-12-11

## 2019-12-11 LAB
ABS NEUT (OLG): 1.71 X10(3)/MCL (ref 2.1–9.2)
ALBUMIN SERPL-MCNC: 2.7 GM/DL (ref 3.4–5)
ALBUMIN/GLOB SERPL: 0.8 RATIO (ref 1.1–2)
ALP SERPL-CCNC: 160 UNIT/L (ref 50–136)
ALT SERPL-CCNC: 27 UNIT/L (ref 12–78)
APPEARANCE, UA: CLEAR
AST SERPL-CCNC: 45 UNIT/L (ref 15–37)
BACTERIA SPEC CULT: NORMAL /HPF
BASOPHILS # BLD AUTO: 0 X10(3)/MCL (ref 0–0.2)
BASOPHILS NFR BLD AUTO: 1 %
BILIRUB SERPL-MCNC: 3.1 MG/DL (ref 0.2–1)
BILIRUB UR QL STRIP: NEGATIVE
BILIRUBIN DIRECT+TOT PNL SERPL-MCNC: 1.3 MG/DL (ref 0–0.5)
BILIRUBIN DIRECT+TOT PNL SERPL-MCNC: 1.8 MG/DL (ref 0–0.8)
BUN SERPL-MCNC: 3 MG/DL (ref 7–18)
CALCIUM SERPL-MCNC: 8.1 MG/DL (ref 8.5–10.1)
CHLORIDE SERPL-SCNC: 104 MMOL/L (ref 98–107)
CO2 SERPL-SCNC: 28 MMOL/L (ref 21–32)
COLOR UR: YELLOW
CREAT SERPL-MCNC: 0.7 MG/DL (ref 0.7–1.3)
CREAT UR-MCNC: 22 MG/DL
EOSINOPHIL # BLD AUTO: 0.1 X10(3)/MCL (ref 0–0.9)
EOSINOPHIL NFR BLD AUTO: 3 %
ERYTHROCYTE [DISTWIDTH] IN BLOOD BY AUTOMATED COUNT: 17.7 % (ref 11.5–17)
EST. AVERAGE GLUCOSE BLD GHB EST-MCNC: 183 MG/DL
GLOBULIN SER-MCNC: 3.5 GM/DL (ref 2.4–3.5)
GLUCOSE (UA): NEGATIVE
GLUCOSE SERPL-MCNC: 304 MG/DL (ref 74–106)
HAV IGM SERPL QL IA: NEGATIVE
HBA1C MFR BLD: 8 % (ref 4.2–6.3)
HBV CORE IGM SERPL QL IA: NEGATIVE
HBV SURFACE AG SERPL QL IA: NEGATIVE
HCT VFR BLD AUTO: 36.6 % (ref 42–52)
HCV AB SERPL QL IA: NEGATIVE
HEPATITIS PANEL INTERP: NORMAL
HGB BLD-MCNC: 11.4 GM/DL (ref 14–18)
HGB UR QL STRIP: NEGATIVE
HIV 1+2 AB+HIV1 P24 AG SERPL QL IA: NEGATIVE
IMM GRANULOCYTES # BLD AUTO: 0 10*3/UL
IMM GRANULOCYTES NFR BLD AUTO: 0 %
INR PPP: 1.8 (ref 0–1.3)
KETONES UR QL STRIP: NEGATIVE
LEUKOCYTE ESTERASE UR QL STRIP: NEGATIVE
LYMPHOCYTES # BLD AUTO: 0.7 X10(3)/MCL (ref 0.6–4.6)
LYMPHOCYTES NFR BLD AUTO: 24 %
MCH RBC QN AUTO: 29.3 PG (ref 27–31)
MCHC RBC AUTO-ENTMCNC: 31.1 GM/DL (ref 33–36)
MCV RBC AUTO: 94.1 FL (ref 80–94)
MICROALBUMIN UR-MCNC: <0.5 MG/DL
MICROALBUMIN/CREAT RATIO PNL UR: <22.7 MG/GM CR (ref 0–30)
MONOCYTES # BLD AUTO: 0.3 X10(3)/MCL (ref 0.1–1.3)
MONOCYTES NFR BLD AUTO: 11 %
NEUTROPHILS # BLD AUTO: 1.71 X10(3)/MCL (ref 2.1–9.2)
NEUTROPHILS NFR BLD AUTO: 60 %
NITRITE UR QL STRIP: NEGATIVE
PH UR STRIP: 6 [PH] (ref 5–9)
PLATELET # BLD AUTO: 52 X10(3)/MCL (ref 130–400)
PMV BLD AUTO: 12.3 FL (ref 9.4–12.4)
POTASSIUM SERPL-SCNC: 3.7 MMOL/L (ref 3.5–5.1)
PROT SERPL-MCNC: 6.2 GM/DL (ref 6.4–8.2)
PROT UR QL STRIP: NEGATIVE
PROTHROMBIN TIME: 21.1 SECOND(S) (ref 12–14)
RBC # BLD AUTO: 3.89 X10(6)/MCL (ref 4.7–6.1)
RBC #/AREA URNS HPF: NORMAL /HPF (ref 0–2)
SODIUM SERPL-SCNC: 140 MMOL/L (ref 136–145)
SP GR UR STRIP: 1.01 (ref 1–1.03)
SQUAMOUS EPITHELIAL, UA: NORMAL /HPF (ref 0–4)
TSH SERPL-ACNC: 1.25 MIU/L (ref 0.36–3.74)
UROBILINOGEN UR STRIP-ACNC: 0.2
WBC # SPEC AUTO: 2.8 X10(3)/MCL (ref 4.5–11.5)
WBC #/AREA URNS HPF: NORMAL /HPF (ref 0–3)

## 2019-12-11 NOTE — TELEPHONE ENCOUNTER
Patient's phone call returned regarding lab results and MD's recommendations.  No answer at this time.  Message left on VM requesting a callback to discuss above.  Contact info provided.    ==============================    ----- Message from Caron Catherine sent at 12/11/2019  3:48 PM CST -----  Type:  Patient Returning Call    Who Called: Pt    Who Left Message for Patient: Raisa    Does the patient know what this is regarding?: Yes    Would the patient rather a call back or a response via [facility]? Callback    Best Call Back Number: [phone]     Additional Information: N/A

## 2019-12-12 ENCOUNTER — DOCUMENTATION ONLY (OUTPATIENT)
Dept: TRANSPLANT | Facility: CLINIC | Age: 59
End: 2019-12-12

## 2019-12-12 LAB
EXT ALBUMIN: 2.7
EXT ALKALINE PHOSPHATASE: 160
EXT ALT: 27
EXT AST: 45
EXT BILIRUBIN DIRECT: 1.3 MG/DL
EXT BILIRUBIN TOTAL: 3.1
EXT BUN: 3
EXT CALCIUM: 8.1
EXT CHLORIDE: 104
EXT CO2: 28
EXT CREATININE: 0.7 MG/DL
EXT GFR MDRD NON AF AMER: >60
EXT GLUCOSE: 304
EXT HEMATOCRIT: 36.6
EXT HEMOGLOBIN: 11.4
EXT INR: 1.8
EXT PLATELETS: 52
EXT POTASSIUM: 3.7
EXT PROTEIN TOTAL: 6.2
EXT PT: 21.1
EXT SODIUM: 140 MMOL/L
EXT WBC: 2.8

## 2019-12-13 DIAGNOSIS — E55.9 VITAMIN D DEFICIENCY: Primary | ICD-10-CM

## 2019-12-13 NOTE — TELEPHONE ENCOUNTER
Patient informed of results of testing done during transplant evaluation and MD's recommendations.  He verbalized understanding and denies any questions.

## 2019-12-14 RX ORDER — ERGOCALCIFEROL 1.25 MG/1
50000 CAPSULE ORAL
Qty: 12 CAPSULE | Refills: 0 | Status: ON HOLD | OUTPATIENT
Start: 2019-12-14 | End: 2020-02-26 | Stop reason: HOSPADM

## 2019-12-15 ENCOUNTER — DOCUMENTATION ONLY (OUTPATIENT)
Dept: TRANSPLANT | Facility: CLINIC | Age: 59
End: 2019-12-15

## 2019-12-18 ENCOUNTER — TELEPHONE (OUTPATIENT)
Dept: TRANSPLANT | Facility: CLINIC | Age: 59
End: 2019-12-18

## 2019-12-18 ENCOUNTER — COMMITTEE REVIEW (OUTPATIENT)
Dept: TRANSPLANT | Facility: CLINIC | Age: 59
End: 2019-12-18

## 2019-12-18 NOTE — COMMITTEE REVIEW
Colin Melba's case presented to selection committee.  Patient has been deferred for liver transplant due to not having a secondary caregiver. Patient has follow-up appointment with Dr. Meneses to discuss.   I was present at the committee meeting and attest to the decision of the committee.    Rohith Orellana  12/19/2019

## 2019-12-18 NOTE — TELEPHONE ENCOUNTER
Spoke with patient an mother re selection committee decision: case deferred for listing since needs back-up care giver. He has identified a friend who has agreed to help. Also spoke with pt's mother who is an avid churchgoer and will identify additional people to help. Pt and mother do not have phone number for SW. Please provide them with this.

## 2019-12-19 ENCOUNTER — TELEPHONE (OUTPATIENT)
Dept: TRANSPLANT | Facility: CLINIC | Age: 59
End: 2019-12-19

## 2019-12-19 NOTE — TELEPHONE ENCOUNTER
(JAMMIE) received an in basket message from Dr. Meneses advising the patient (pt) reported pt did not have the SW contact information to provide to pt's identified caregivers; therefore, Dr. Meneses asked SW to contact the pt to provide accordingly.  JAMMIE telephoned the pt and was able to verify the pt had identified caregivers and would like to provide them with the SW contact information.  Information provided as requested and no other information or concerns were addressed.  SW remains available.

## 2019-12-20 ENCOUNTER — TELEPHONE (OUTPATIENT)
Dept: TRANSPLANT | Facility: CLINIC | Age: 59
End: 2019-12-20

## 2019-12-20 NOTE — TELEPHONE ENCOUNTER
Pt's phone call returned.  Patient reports that he has established a more solid caregiver plan to include back-up caregivers.  He reports that in addition to those provided to the SW, his aunt is also willing to be a backup caregiver.  Informed patient that his case will be represented in the liver selection committee meeting within the next few weeks and that he will be notified of the recommendations.  He voiced understanding.    ======================================================     ----- Message from Pb Osorio sent at 12/20/2019 10:44 AM CST -----  Contact: pt  Calling to speak with coordinator     Call back: [phone]

## 2020-01-06 ENCOUNTER — TELEPHONE (OUTPATIENT)
Dept: TRANSPLANT | Facility: CLINIC | Age: 60
End: 2020-01-06

## 2020-01-06 NOTE — TELEPHONE ENCOUNTER
Follow-up in regards to secondary caregiver plan:    SW spoke with pt via phone today ([phone]), pt reports he is in the ED in [place] with black stools.   Pt alert and oriented x 4 via phone.   Pts primary caregiver with him at bedside in ED.   SW did notify pts pre liver coordinator, Raisa Osorio that pt in ED in [place].      SW discussed with pt that we need to speak to his secondary caregivers to confirm their availability and provide caregiver education.    SW asking for names and contact information from patient.   Pt again asking to have his friends call SW, but pt will need to get in touch with them.    SW provided transplant SW contact information to pts primary caregiver, his mom Suzanne Eastman today.   Pts mother reports that two of pts friends have agreed to be alternate/secondary caregivers.   Darrell Meyers and Juan Hawley are the two identified friends.   Pts mother will have them contact transplant SW before Wednesday.   SW counseled patient and pts mom that patient will remain unacceptable candidate until secondary caregivers confirmed.   Both verbalized understanding.   No further psychosocial needs identified by pt or pts mom today.   JAMMIE remains available for all transplant resources, education and psychosocial support.

## 2020-01-07 ENCOUNTER — DOCUMENTATION ONLY (OUTPATIENT)
Dept: TRANSPLANT | Facility: CLINIC | Age: 60
End: 2020-01-07

## 2020-01-07 ENCOUNTER — TELEPHONE (OUTPATIENT)
Dept: TRANSPLANT | Facility: CLINIC | Age: 60
End: 2020-01-07

## 2020-01-07 NOTE — TELEPHONE ENCOUNTER
----- Message from Shanae Macdonald sent at 1/7/2020 11:35 AM CST -----    Rec'javed phone call from pt stating that he was return my call from this morning; told him that I did not call him and that the only person call him was the SW, but he sp to her yesterday(1/6).

## 2020-01-08 ENCOUNTER — TELEPHONE (OUTPATIENT)
Dept: TRANSPLANT | Facility: CLINIC | Age: 60
End: 2020-01-08

## 2020-01-08 ENCOUNTER — COMMITTEE REVIEW (OUTPATIENT)
Dept: TRANSPLANT | Facility: CLINIC | Age: 60
End: 2020-01-08

## 2020-01-08 NOTE — TELEPHONE ENCOUNTER
Patient's phone call returned.  Requesting a prescription for vitamin D.  Informed patient that prescription sent a few weeks ago to [facility].  Patient reports that this is no longer his preferred pharmacy and requesting to have prescription filled at [facility].  Will send as requested.  Also updated patient's preferred pharmacy in [facility].  Also informed patient that his case was presented in liver selection com meeting on today.  Informed him that he was approved for transplant and will be listed once financial clearance obtained from insurance company.  He voiced understanding.  He denies any questions/ concerns at this time.    ==============================================================    ----- Message from Susan Aragon MA sent at 1/8/2020  4:55 PM CST -----  Contact: pt call Back : [phone]      ----- Message -----  From: Deepa Flores  Sent: 1/8/2020   4:42 PM CST  To: [facility] Pre-Liver Transplant Non-Clinical    Patient calling about prescription Vitamin D      [id number] - [place] - [address] AT [facility] [id number] [address]  Phone: [phone] Fax: [phone]

## 2020-01-08 NOTE — COMMITTEE REVIEW
Colin Reilly's case presented to selection committee.  Patient has been accepted for liver transplant due to Alcoholic Cirrhosis and complications of end stage liver disease including hyperbilirubinemia, hypoalbuminemia, severe malnutrition, coagulopathy, hepatic encephalopathy, thrombocytopenia, ascites, portal hypertension, splenomegaly, gastroesophageal varices, pancytopenia, jaundice, partially occlusive thrombus involving the extrahepatic portal vein and SMV with a MELD score of 15.  Patient has no absolute contraindications for liver transplant.  Patient will be listed pending financial approval.    Patient will accept HBcAb positive livers.  Patient will accept HCVAB positive livers.  Patient will accept DCD livers.  Patient will accept HCV CEZAR positive livers  Patient will accept HBV CEZAR positive livers  I was present and agree with the committee's conclusions.

## 2020-01-08 NOTE — TELEPHONE ENCOUNTER
Secondary Caregiver Follow-up:    SW reached out to patient's primary caregiver, his mother Suzanne Eastman ([phone]) to discuss not hearing from identified secondary caregivers yet.   No answer on mother's phone, SW left voice mail for return call.   SW reached out to patient ([phone]) as well, no answer.       SW received a return call from patient this am.   Pt reports he is recovering from ED visit and endoscopy.    Pt has phone numbers to one if his friends who agreed to step in as secondary caregiver.   SW attempted to reach Mr. Esteban Hawley, phone [phone] this am, but no answer and no voice mail to leave a msg.     Pt needs to find the phone number to his other secondary caregiver and will either call back with this information or have Mr. Meyers call SW.       SW counseled patient that without any identified and confirmed secondary caregivers, he remains an unacceptable candidate for liver transplant.   Pt verbalized understanding.  SW remains available for all transplant resources, education and psychosocial support.    8:32 am   SW received a phone call from patient again this am.  Pt providing SW with contact information for another friend, Day Benton, phone [phone], who has agreed to step in as secondary caregiver.      JAMMIE reached out to Mrs. Benton this morning.  She confirms she can be an alternate caregiver for patient in [place].  She did raise questions about driving to [place] area and needing a car while local.  SW provided hospital address and directions to Mrs. Benton today.  She denies any obstacles in being a caregiver for patient.   SW provided caregiver education to Mrs. Benton today.      Suitability for Transplant: Patient presents as an acceptable candidate for transplant at this time. Based on psychosocial risk factors, patient presents as medium risk due to having only one secondary caregiver identified at this time.  Patient with reported depressive  symptoms which waiver (unmanaged). Protective factors:  Adequate medical insurance/finances, supportive family, lack of illicit substance abuse and ability to recognize depressive symptoms and a willingness to manage appropriately.

## 2020-01-10 ENCOUNTER — TELEPHONE (OUTPATIENT)
Dept: TRANSPLANT | Facility: HOSPITAL | Age: 60
End: 2020-01-10

## 2020-01-10 ENCOUNTER — TELEPHONE (OUTPATIENT)
Dept: TRANSPLANT | Facility: CLINIC | Age: 60
End: 2020-01-10

## 2020-01-10 ENCOUNTER — DOCUMENTATION ONLY (OUTPATIENT)
Dept: TRANSPLANT | Facility: CLINIC | Age: 60
End: 2020-01-10

## 2020-01-10 NOTE — TELEPHONE ENCOUNTER
(SW) returned the patient's (pt) telephone call and discussed confirmation of caregivers.  Pt's reported caregivers are as follows:      Primary: Suzanne Eastman, pt's mother, [phone];  Secondary: Day Benton, pt's friend, [phone].    SW advised should the pt have any reported changes regarding reported caregivers, pt would contact the SW team immediately.  Pt verbalized understanding and denied any other issues or concerns.  SW remains available.

## 2020-01-10 NOTE — TELEPHONE ENCOUNTER
Called patient to inform him that financial clearance has been obtained from the insurance company to proceed w/ listing, but more labs needed for updated MELD score first.  No answer at this time.  Message left on  requesting a call back to discuss lab appt and to give an update.  Contact info provided.  Awaiting callback.

## 2020-01-13 ENCOUNTER — TELEPHONE (OUTPATIENT)
Dept: TRANSPLANT | Facility: HOSPITAL | Age: 60
End: 2020-01-13

## 2020-01-13 NOTE — TELEPHONE ENCOUNTER
SW attempted to call patient back ([phone]) in response to receiving message below; however, pt did not answer.  SW left message requesting call back.    ----- Message from Caron Catherine sent at 1/13/2020  8:47 AM CST -----  Type:  Patient Returning Call    Who Called: Pt    Who Left Message for Patient: Eliana    Does the patient know what this is regarding?: Yes    Would the patient rather a call back or a response via [facility]? Call back    Best Call Back Number: [phone]     Additional Information: N/A

## 2020-01-13 NOTE — TELEPHONE ENCOUNTER
JAMMIE called pt back.  Pt reports receiving a voicemail message from liver transplant SW Eliana Ibrahim and was returning the call.  JAMMIE informed pt, after speaking with Eliana, that phone call can be disregarded as the phone call was to confirm pt's caregiver plan which has already been confirmed since then.  Pt expressed understanding regarding same and denies having any additional questions or concerns at this time.  SW remains available for f/u as needed.    ----- Message from Lyly Osorio RN sent at 1/13/2020 11:50 AM CST -----  Contact: pt       ----- Message -----  From: Pb Osorio  Sent: 1/13/2020  11:35 AM CST  To: [facility] Pre-Liver Transplant Clinical    Calling to speak with Eliana Ibrahim     Call back: [phone]

## 2020-01-20 ENCOUNTER — TELEPHONE (OUTPATIENT)
Dept: TRANSPLANT | Facility: CLINIC | Age: 60
End: 2020-01-20

## 2020-01-20 NOTE — TELEPHONE ENCOUNTER
Patient's phone call returned.  Patient requesting to reschedule f/u appt.  Informed patient that there is no availability in Feb 2020.  Asked patient rather he wants to keep appt as scheduled 1/31 or reschedule to March.  Patient reports that he is unable to keep appt on 1/31.  Appts rescheduled to March as requested.  Informed patient that labs and clinic appt scheduled 3/9.  He reports that appt date and times are acceptable.  Informed him that labs also needed for liver transplant listing.  But that he should not wait until March for transplant listing.  Patient reports that he will have labs drawn locally at [facility] in two weeks.  Will fax orders accordingly.  Appt reminder mailed.    ===============================================    ----- Message from Winter Vora RN sent at 1/20/2020 10:44 AM CST -----  Contact: Self      ----- Message -----  From: Alyssa Montoya  Sent: 1/20/2020   8:03 AM CST  To: [facility] Staff    Pt is calling to speak with Staff regarding rescheduling his appt to 2/4/20 or 2/5/20.   was unable to find availability for both until the end of February, 2020.  He is requesting a returned call & can be reached at [phone].    Thank you.

## 2020-01-28 ENCOUNTER — TELEPHONE (OUTPATIENT)
Dept: TRANSPLANT | Facility: CLINIC | Age: 60
End: 2020-01-28

## 2020-01-28 NOTE — TELEPHONE ENCOUNTER
SW returned phone call to pt ([phone]) upon receiving message below.  Pt reported calling to inform transplant team that he recently changed his mobile phone number and wanted to make sure that his new phone number was included in his contact information.  SW confirmed with pt that new phone number (above) is listed as his mobile number in his demographic information.  Pt expressed thanks and denies having any additional concerns at this time.    ----- Message from Daisy Grant sent at 1/28/2020  4:26 PM CST -----  Contact: Pt  Pt was calling to speak with Mona.    Pt# [phone]

## 2020-01-30 ENCOUNTER — TELEPHONE (OUTPATIENT)
Dept: TRANSPLANT | Facility: CLINIC | Age: 60
End: 2020-01-30

## 2020-01-30 NOTE — TELEPHONE ENCOUNTER
@1145 am  Patient's phone call returned.  Patient reports that his abdomen is distended, causing SOB, abdominal pain, and umbilical hernia larger.  Patient reports that abdominal swelling has worsened over the past week.  He also reports weeping to the abdomen.  Instructed pt to report to the nearest ED for assessment/ management.  Patient voiced understanding and agrees to do so.  He reports that he will report to [facility].    ======================================  ----- Message from Deepa Flores sent at 1/30/2020 11:37 AM CST -----  Contact: pt  Patient calling to speak with coordinator about ,stomach swelling very painful     Patient need advise    Call Back : [phone]

## 2020-01-31 ENCOUNTER — TELEPHONE (OUTPATIENT)
Dept: TRANSPLANT | Facility: CLINIC | Age: 60
End: 2020-01-31

## 2020-02-01 NOTE — TELEPHONE ENCOUNTER
@1145 am yesterday  Patient's phone call returned by his Transplant coordinator.  Patient reported that his abdomen was distended, causing SOB, abdominal pain, and umbilical hernia larger. Transplant coordinator Instructed pt to report to the nearest ED for assessment.    Patient called back today and left message that he was admitted to [facility] for abdominal burning, pain and swelling.     Called patient back. No answer. Unable to leave voice message. Will forward message to patient's coordinator to follow-up with patient.

## 2020-02-04 ENCOUNTER — TELEPHONE (OUTPATIENT)
Dept: TRANSPLANT | Facility: CLINIC | Age: 60
End: 2020-02-04

## 2020-02-05 ENCOUNTER — DOCUMENTATION ONLY (OUTPATIENT)
Dept: TRANSPLANT | Facility: CLINIC | Age: 60
End: 2020-02-05

## 2020-02-05 ENCOUNTER — TELEPHONE (OUTPATIENT)
Dept: TRANSPLANT | Facility: CLINIC | Age: 60
End: 2020-02-05

## 2020-02-05 ENCOUNTER — HISTORICAL (OUTPATIENT)
Dept: ADMINISTRATIVE | Facility: HOSPITAL | Age: 60
End: 2020-02-05

## 2020-02-05 LAB
ABS NEUT (OLG): 1.78 X10(3)/MCL (ref 2.1–9.2)
ALBUMIN SERPL-MCNC: 2.2 GM/DL (ref 3.4–5)
ALBUMIN/GLOB SERPL: 0.7 RATIO (ref 1.1–2)
ALP SERPL-CCNC: 167 UNIT/L (ref 50–136)
ALT SERPL-CCNC: 29 UNIT/L (ref 12–78)
AST SERPL-CCNC: 45 UNIT/L (ref 15–37)
BASOPHILS # BLD AUTO: 0 X10(3)/MCL (ref 0–0.2)
BASOPHILS NFR BLD AUTO: 1 %
BILIRUB SERPL-MCNC: 1.9 MG/DL (ref 0.2–1)
BILIRUBIN DIRECT+TOT PNL SERPL-MCNC: 0.8 MG/DL (ref 0–0.8)
BILIRUBIN DIRECT+TOT PNL SERPL-MCNC: 1.1 MG/DL (ref 0–0.5)
BUN SERPL-MCNC: 5 MG/DL (ref 7–18)
CALCIUM SERPL-MCNC: 7.1 MG/DL (ref 8.5–10.1)
CHLORIDE SERPL-SCNC: 106 MMOL/L (ref 98–107)
CO2 SERPL-SCNC: 24 MMOL/L (ref 21–32)
CREAT SERPL-MCNC: 0.82 MG/DL (ref 0.7–1.3)
EOSINOPHIL # BLD AUTO: 0.1 X10(3)/MCL (ref 0–0.9)
EOSINOPHIL NFR BLD AUTO: 4 %
ERYTHROCYTE [DISTWIDTH] IN BLOOD BY AUTOMATED COUNT: 17.4 % (ref 11.5–17)
EXT ALBUMIN: 2.2
EXT ALKALINE PHOSPHATASE: 167
EXT ALT: 29
EXT AST: 45
EXT BILIRUBIN DIRECT: 1.1 MG/DL
EXT BILIRUBIN TOTAL: 1.9
EXT BUN: 5
EXT CALCIUM: 7.1
EXT CHLORIDE: 106
EXT CO2: 24
EXT CREATININE: 0.82 MG/DL
EXT GFR MDRD NON AF AMER: >60
EXT GLUCOSE: 334
EXT HEMATOCRIT: 27.5
EXT HEMOGLOBIN: 8.3
EXT INR: 1.9
EXT PLATELETS: 50
EXT POTASSIUM: 3.4
EXT PROTEIN TOTAL: 5.3
EXT PT: 22.1
EXT SODIUM: 137 MMOL/L
EXT WBC: 2.9
GLOBULIN SER-MCNC: 3.1 GM/DL (ref 2.4–3.5)
GLUCOSE SERPL-MCNC: 334 MG/DL (ref 74–106)
HCT VFR BLD AUTO: 27.5 % (ref 42–52)
HGB BLD-MCNC: 8.3 GM/DL (ref 14–18)
IMM GRANULOCYTES # BLD AUTO: 0 10*3/UL
IMM GRANULOCYTES NFR BLD AUTO: 1 %
INR PPP: 1.9 (ref 0–1.3)
LYMPHOCYTES # BLD AUTO: 0.6 X10(3)/MCL (ref 0.6–4.6)
LYMPHOCYTES NFR BLD AUTO: 21 %
MCH RBC QN AUTO: 27.8 PG (ref 27–31)
MCHC RBC AUTO-ENTMCNC: 30.2 GM/DL (ref 33–36)
MCV RBC AUTO: 92 FL (ref 80–94)
MONOCYTES # BLD AUTO: 0.3 X10(3)/MCL (ref 0.1–1.3)
MONOCYTES NFR BLD AUTO: 12 %
NEUTROPHILS # BLD AUTO: 1.78 X10(3)/MCL (ref 2.1–9.2)
NEUTROPHILS NFR BLD AUTO: 62 %
PLATELET # BLD AUTO: 50 X10(3)/MCL (ref 130–400)
PMV BLD AUTO: 11.6 FL (ref 9.4–12.4)
POTASSIUM SERPL-SCNC: 3.4 MMOL/L (ref 3.5–5.1)
PROT SERPL-MCNC: 5.3 GM/DL (ref 6.4–8.2)
PROTHROMBIN TIME: 22.1 SECOND(S) (ref 12–14)
RBC # BLD AUTO: 2.99 X10(6)/MCL (ref 4.7–6.1)
SODIUM SERPL-SCNC: 137 MMOL/L (ref 136–145)
WBC # SPEC AUTO: 2.9 X10(3)/MCL (ref 4.5–11.5)

## 2020-02-05 NOTE — TELEPHONE ENCOUNTER
(1035 am)  Returned pt's phone call.  Called his cell.  No answer at this time.  Unable to leave a message since VM has not yet been set up.  Called patient's home phone.  Spoke w/ his mother.  She reports that he is not at home at the moment but agrees to have him to call back when he returns.  Contact info provided.  Awaiting callback.  -----------------------------------------------------  (1040 am)  Spoke w/ patient regarding appt scheduled 2/13.  Patient calling to confirm appt.  Informed patient that appt scheduled on 2/13 @ 8 am in [facility] [address].  Also informed patient that appt reminder mailed to him.  He verbalized understanding and denies any further questions at this time.    ===================================    ----- Message from Tasha Denson MA sent at 2/5/2020  8:59 AM CST -----  Contact: pt       ----- Message -----  From: Nirmal Amaral MA  Sent: 2/4/2020   2:42 PM CST  To: Tasha Denson MA        ----- Message -----  From: Sahra Javier  Sent: 2/4/2020   2:36 PM CST  To: Lewis [facility]le Staff    Pt is calling to speak with the nurse about his up coming appt on 2/13/2020 can you please call pt at [phone]    AME

## 2020-02-21 ENCOUNTER — ANESTHESIA EVENT (OUTPATIENT)
Dept: SURGERY | Facility: HOSPITAL | Age: 60
DRG: 354 | End: 2020-02-21
Payer: MEDICARE

## 2020-02-21 ENCOUNTER — TELEPHONE (OUTPATIENT)
Dept: TRANSPLANT | Facility: CLINIC | Age: 60
End: 2020-02-21

## 2020-02-21 ENCOUNTER — HOSPITAL ENCOUNTER (INPATIENT)
Facility: HOSPITAL | Age: 60
LOS: 5 days | Discharge: HOME | DRG: 354 | End: 2020-02-26
Attending: HOSPITALIST | Admitting: HOSPITALIST
Payer: MEDICARE

## 2020-02-21 DIAGNOSIS — K70.31 ASCITES DUE TO ALCOHOLIC CIRRHOSIS: ICD-10-CM

## 2020-02-21 DIAGNOSIS — K70.30 CIRRHOSIS, LAENNEC'S: ICD-10-CM

## 2020-02-21 DIAGNOSIS — D68.4 ACQUIRED COAGULATION FACTOR DEFICIENCY: ICD-10-CM

## 2020-02-21 DIAGNOSIS — K72.90 DECOMPENSATED HEPATIC CIRRHOSIS: ICD-10-CM

## 2020-02-21 DIAGNOSIS — R19.8 UMBILICUS DISCHARGE: ICD-10-CM

## 2020-02-21 DIAGNOSIS — E11.9 TYPE 2 DIABETES MELLITUS WITHOUT COMPLICATION, WITH LONG-TERM CURRENT USE OF INSULIN: ICD-10-CM

## 2020-02-21 DIAGNOSIS — K31.89 PORTAL HYPERTENSIVE GASTROPATHY: ICD-10-CM

## 2020-02-21 DIAGNOSIS — R16.1 SPLENOMEGALY: ICD-10-CM

## 2020-02-21 DIAGNOSIS — R07.9 CHEST PAIN: ICD-10-CM

## 2020-02-21 DIAGNOSIS — E87.6 HYPOKALEMIA: ICD-10-CM

## 2020-02-21 DIAGNOSIS — Z79.4 TYPE 2 DIABETES MELLITUS WITHOUT COMPLICATION, WITH LONG-TERM CURRENT USE OF INSULIN: ICD-10-CM

## 2020-02-21 DIAGNOSIS — K21.9 GASTROESOPHAGEAL REFLUX DISEASE WITHOUT ESOPHAGITIS: ICD-10-CM

## 2020-02-21 DIAGNOSIS — I85.01 BLEEDING ESOPHAGEAL VARICES, UNSPECIFIED ESOPHAGEAL VARICES TYPE: ICD-10-CM

## 2020-02-21 DIAGNOSIS — K76.6 PORTAL HYPERTENSIVE GASTROPATHY: ICD-10-CM

## 2020-02-21 DIAGNOSIS — K74.60 DECOMPENSATED HEPATIC CIRRHOSIS: ICD-10-CM

## 2020-02-21 DIAGNOSIS — K76.82 HEPATIC ENCEPHALOPATHY: ICD-10-CM

## 2020-02-21 DIAGNOSIS — K42.9 UMBILICAL HERNIA WITHOUT OBSTRUCTION AND WITHOUT GANGRENE: Primary | ICD-10-CM

## 2020-02-21 LAB
ABO + RH BLD: NORMAL
ALBUMIN SERPL BCP-MCNC: 2.3 G/DL (ref 3.5–5.2)
ALP SERPL-CCNC: 120 U/L (ref 55–135)
ALT SERPL W/O P-5'-P-CCNC: 24 U/L (ref 10–44)
AMMONIA PLAS-SCNC: 48 UMOL/L (ref 10–50)
ANION GAP SERPL CALC-SCNC: 7 MMOL/L (ref 8–16)
AST SERPL-CCNC: 54 U/L (ref 10–40)
BASOPHILS # BLD AUTO: 0.05 K/UL (ref 0–0.2)
BASOPHILS NFR BLD: 1 % (ref 0–1.9)
BILIRUB SERPL-MCNC: 3.1 MG/DL (ref 0.1–1)
BLD GP AB SCN CELLS X3 SERPL QL: NORMAL
BUN SERPL-MCNC: 7 MG/DL (ref 6–20)
CALCIUM SERPL-MCNC: 7.7 MG/DL (ref 8.7–10.5)
CHLORIDE SERPL-SCNC: 106 MMOL/L (ref 95–110)
CO2 SERPL-SCNC: 26 MMOL/L (ref 23–29)
CREAT SERPL-MCNC: 0.6 MG/DL (ref 0.5–1.4)
DIFFERENTIAL METHOD: ABNORMAL
EOSINOPHIL # BLD AUTO: 0.1 K/UL (ref 0–0.5)
EOSINOPHIL NFR BLD: 2.6 % (ref 0–8)
ERYTHROCYTE [DISTWIDTH] IN BLOOD BY AUTOMATED COUNT: 18.1 % (ref 11.5–14.5)
EST. GFR  (AFRICAN AMERICAN): >60 ML/MIN/1.73 M^2
EST. GFR  (NON AFRICAN AMERICAN): >60 ML/MIN/1.73 M^2
ETHANOL SERPL-MCNC: <10 MG/DL
GLUCOSE SERPL-MCNC: 95 MG/DL (ref 70–110)
HCT VFR BLD AUTO: 27.5 % (ref 40–54)
HGB BLD-MCNC: 8.1 G/DL (ref 14–18)
IMM GRANULOCYTES # BLD AUTO: 0.02 K/UL (ref 0–0.04)
IMM GRANULOCYTES NFR BLD AUTO: 0.4 % (ref 0–0.5)
INR PPP: 1.7 (ref 0.8–1.2)
LIPASE SERPL-CCNC: 27 U/L (ref 4–60)
LYMPHOCYTES # BLD AUTO: 0.7 K/UL (ref 1–4.8)
LYMPHOCYTES NFR BLD: 14 % (ref 18–48)
MAGNESIUM SERPL-MCNC: 1.8 MG/DL (ref 1.6–2.6)
MCH RBC QN AUTO: 27.2 PG (ref 27–31)
MCHC RBC AUTO-ENTMCNC: 29.5 G/DL (ref 32–36)
MCV RBC AUTO: 92 FL (ref 82–98)
MONOCYTES # BLD AUTO: 0.5 K/UL (ref 0.3–1)
MONOCYTES NFR BLD: 10.8 % (ref 4–15)
NEUTROPHILS # BLD AUTO: 3.5 K/UL (ref 1.8–7.7)
NEUTROPHILS NFR BLD: 71.2 % (ref 38–73)
NRBC BLD-RTO: 0 /100 WBC
PHOSPHATE SERPL-MCNC: 2.7 MG/DL (ref 2.7–4.5)
PLATELET # BLD AUTO: 54 K/UL (ref 150–350)
PMV BLD AUTO: 12.4 FL (ref 9.2–12.9)
POCT GLUCOSE: 158 MG/DL (ref 70–110)
POCT GLUCOSE: 158 MG/DL (ref 70–110)
POCT GLUCOSE: 172 MG/DL (ref 70–110)
POCT GLUCOSE: 87 MG/DL (ref 70–110)
POCT GLUCOSE: 94 MG/DL (ref 70–110)
POTASSIUM SERPL-SCNC: 3.6 MMOL/L (ref 3.5–5.1)
PROT SERPL-MCNC: 5.5 G/DL (ref 6–8.4)
PROTHROMBIN TIME: 16.2 SEC (ref 9–12.5)
RBC # BLD AUTO: 2.98 M/UL (ref 4.6–6.2)
SODIUM SERPL-SCNC: 139 MMOL/L (ref 136–145)
WBC # BLD AUTO: 4.92 K/UL (ref 3.9–12.7)

## 2020-02-21 PROCEDURE — 20600001 HC STEP DOWN PRIVATE ROOM: Mod: NTX

## 2020-02-21 PROCEDURE — 80320 DRUG SCREEN QUANTALCOHOLS: CPT | Mod: NTX

## 2020-02-21 PROCEDURE — 25000003 PHARM REV CODE 250: Mod: NTX | Performed by: HOSPITALIST

## 2020-02-21 PROCEDURE — 63600175 PHARM REV CODE 636 W HCPCS: Mod: NTX | Performed by: HOSPITALIST

## 2020-02-21 PROCEDURE — 99223 PR INITIAL HOSPITAL CARE,LEVL III: ICD-10-PCS | Mod: NTX,,, | Performed by: INTERNAL MEDICINE

## 2020-02-21 PROCEDURE — 80053 COMPREHEN METABOLIC PANEL: CPT | Mod: NTX

## 2020-02-21 PROCEDURE — 84100 ASSAY OF PHOSPHORUS: CPT | Mod: NTX

## 2020-02-21 PROCEDURE — 99223 PR INITIAL HOSPITAL CARE,LEVL III: ICD-10-PCS | Mod: AI,NTX,, | Performed by: HOSPITALIST

## 2020-02-21 PROCEDURE — 85610 PROTHROMBIN TIME: CPT | Mod: NTX

## 2020-02-21 PROCEDURE — 85025 COMPLETE CBC W/AUTO DIFF WBC: CPT | Mod: NTX

## 2020-02-21 PROCEDURE — C9399 UNCLASSIFIED DRUGS OR BIOLOG: HCPCS | Mod: NTX | Performed by: HOSPITALIST

## 2020-02-21 PROCEDURE — 99223 1ST HOSP IP/OBS HIGH 75: CPT | Mod: AI,NTX,, | Performed by: HOSPITALIST

## 2020-02-21 PROCEDURE — 83690 ASSAY OF LIPASE: CPT | Mod: NTX

## 2020-02-21 PROCEDURE — 80321 ALCOHOLS BIOMARKERS 1OR 2: CPT | Mod: NTX

## 2020-02-21 PROCEDURE — 82140 ASSAY OF AMMONIA: CPT | Mod: NTX

## 2020-02-21 PROCEDURE — 99223 1ST HOSP IP/OBS HIGH 75: CPT | Mod: NTX,,, | Performed by: INTERNAL MEDICINE

## 2020-02-21 PROCEDURE — 83735 ASSAY OF MAGNESIUM: CPT | Mod: NTX

## 2020-02-21 PROCEDURE — 86850 RBC ANTIBODY SCREEN: CPT | Mod: NTX

## 2020-02-21 RX ORDER — FUROSEMIDE 80 MG/1
160 TABLET ORAL DAILY
Status: DISCONTINUED | OUTPATIENT
Start: 2020-02-21 | End: 2020-02-21

## 2020-02-21 RX ORDER — OXYBUTYNIN CHLORIDE 5 MG/1
5 TABLET ORAL DAILY
Status: DISCONTINUED | OUTPATIENT
Start: 2020-02-21 | End: 2020-02-26 | Stop reason: HOSPADM

## 2020-02-21 RX ORDER — IBUPROFEN 200 MG
24 TABLET ORAL
Status: DISCONTINUED | OUTPATIENT
Start: 2020-02-21 | End: 2020-02-24

## 2020-02-21 RX ORDER — SPIRONOLACTONE 100 MG/1
200 TABLET, FILM COATED ORAL DAILY
Status: DISCONTINUED | OUTPATIENT
Start: 2020-02-22 | End: 2020-02-21

## 2020-02-21 RX ORDER — CYCLOBENZAPRINE HCL 5 MG
10 TABLET ORAL 2 TIMES DAILY
Status: DISCONTINUED | OUTPATIENT
Start: 2020-02-21 | End: 2020-02-26 | Stop reason: HOSPADM

## 2020-02-21 RX ORDER — ONDANSETRON 2 MG/ML
4 INJECTION INTRAMUSCULAR; INTRAVENOUS EVERY 8 HOURS PRN
Status: DISCONTINUED | OUTPATIENT
Start: 2020-02-21 | End: 2020-02-26 | Stop reason: HOSPADM

## 2020-02-21 RX ORDER — MORPHINE SULFATE 2 MG/ML
2 INJECTION, SOLUTION INTRAMUSCULAR; INTRAVENOUS ONCE
Status: COMPLETED | OUTPATIENT
Start: 2020-02-21 | End: 2020-02-21

## 2020-02-21 RX ORDER — NEBIVOLOL 5 MG/1
10 TABLET ORAL DAILY
Status: DISCONTINUED | OUTPATIENT
Start: 2020-02-21 | End: 2020-02-21

## 2020-02-21 RX ORDER — ESCITALOPRAM OXALATE 10 MG/1
20 TABLET ORAL DAILY
Status: DISCONTINUED | OUTPATIENT
Start: 2020-02-21 | End: 2020-02-26 | Stop reason: HOSPADM

## 2020-02-21 RX ORDER — SUCRALFATE 1 G/1
1 TABLET ORAL 2 TIMES DAILY WITH MEALS
Status: DISCONTINUED | OUTPATIENT
Start: 2020-02-21 | End: 2020-02-21

## 2020-02-21 RX ORDER — IBUPROFEN 200 MG
16 TABLET ORAL
Status: DISCONTINUED | OUTPATIENT
Start: 2020-02-21 | End: 2020-02-24

## 2020-02-21 RX ORDER — GABAPENTIN 300 MG/1
600 CAPSULE ORAL 3 TIMES DAILY
Status: DISCONTINUED | OUTPATIENT
Start: 2020-02-21 | End: 2020-02-26 | Stop reason: HOSPADM

## 2020-02-21 RX ORDER — TAMSULOSIN HYDROCHLORIDE 0.4 MG/1
0.4 CAPSULE ORAL DAILY
Status: DISCONTINUED | OUTPATIENT
Start: 2020-02-21 | End: 2020-02-26 | Stop reason: HOSPADM

## 2020-02-21 RX ORDER — HYDROCODONE BITARTRATE AND ACETAMINOPHEN 5; 325 MG/1; MG/1
1 TABLET ORAL EVERY 12 HOURS PRN
Status: DISCONTINUED | OUTPATIENT
Start: 2020-02-21 | End: 2020-02-21

## 2020-02-21 RX ORDER — HYDROCODONE BITARTRATE AND ACETAMINOPHEN 5; 325 MG/1; MG/1
1 TABLET ORAL EVERY 12 HOURS PRN
Status: DISCONTINUED | OUTPATIENT
Start: 2020-02-21 | End: 2020-02-22

## 2020-02-21 RX ORDER — INSULIN ASPART 100 [IU]/ML
15 INJECTION, SOLUTION INTRAVENOUS; SUBCUTANEOUS
Status: DISCONTINUED | OUTPATIENT
Start: 2020-02-21 | End: 2020-02-24

## 2020-02-21 RX ORDER — SPIRONOLACTONE 25 MG/1
50 TABLET ORAL ONCE
Status: DISCONTINUED | OUTPATIENT
Start: 2020-02-21 | End: 2020-02-21

## 2020-02-21 RX ORDER — SPIRONOLACTONE 50 MG/1
200 TABLET, FILM COATED ORAL DAILY
Status: DISCONTINUED | OUTPATIENT
Start: 2020-02-21 | End: 2020-02-24

## 2020-02-21 RX ORDER — MORPHINE SULFATE 2 MG/ML
2 INJECTION, SOLUTION INTRAMUSCULAR; INTRAVENOUS EVERY 4 HOURS PRN
Status: DISCONTINUED | OUTPATIENT
Start: 2020-02-21 | End: 2020-02-22

## 2020-02-21 RX ORDER — GLUCAGON 1 MG
1 KIT INJECTION
Status: DISCONTINUED | OUTPATIENT
Start: 2020-02-21 | End: 2020-02-24

## 2020-02-21 RX ORDER — PANTOPRAZOLE SODIUM 40 MG/1
40 TABLET, DELAYED RELEASE ORAL 2 TIMES DAILY
Status: DISCONTINUED | OUTPATIENT
Start: 2020-02-21 | End: 2020-02-26 | Stop reason: HOSPADM

## 2020-02-21 RX ORDER — FINASTERIDE 5 MG/1
5 TABLET, FILM COATED ORAL DAILY
Status: DISCONTINUED | OUTPATIENT
Start: 2020-02-21 | End: 2020-02-26 | Stop reason: HOSPADM

## 2020-02-21 RX ORDER — INSULIN ASPART 100 [IU]/ML
1-10 INJECTION, SOLUTION INTRAVENOUS; SUBCUTANEOUS
Status: DISCONTINUED | OUTPATIENT
Start: 2020-02-21 | End: 2020-02-24

## 2020-02-21 RX ORDER — LACTULOSE 10 G/15ML
20 SOLUTION ORAL 3 TIMES DAILY
Status: DISCONTINUED | OUTPATIENT
Start: 2020-02-21 | End: 2020-02-26 | Stop reason: HOSPADM

## 2020-02-21 RX ORDER — FUROSEMIDE 10 MG/ML
80 INJECTION INTRAMUSCULAR; INTRAVENOUS 2 TIMES DAILY
Status: DISCONTINUED | OUTPATIENT
Start: 2020-02-21 | End: 2020-02-26 | Stop reason: HOSPADM

## 2020-02-21 RX ORDER — ACETAMINOPHEN 325 MG/1
650 TABLET ORAL EVERY 8 HOURS PRN
Status: DISCONTINUED | OUTPATIENT
Start: 2020-02-21 | End: 2020-02-26 | Stop reason: HOSPADM

## 2020-02-21 RX ORDER — SODIUM CHLORIDE 0.9 % (FLUSH) 0.9 %
10 SYRINGE (ML) INJECTION
Status: DISCONTINUED | OUTPATIENT
Start: 2020-02-21 | End: 2020-02-26 | Stop reason: HOSPADM

## 2020-02-21 RX ADMIN — RIFAXIMIN 550 MG: 550 TABLET ORAL at 08:02

## 2020-02-21 RX ADMIN — INSULIN ASPART 1 UNITS: 100 INJECTION, SOLUTION INTRAVENOUS; SUBCUTANEOUS at 09:02

## 2020-02-21 RX ADMIN — FINASTERIDE 5 MG: 5 TABLET, FILM COATED ORAL at 10:02

## 2020-02-21 RX ADMIN — INSULIN DETEMIR 50 UNITS: 100 INJECTION, SOLUTION SUBCUTANEOUS at 09:02

## 2020-02-21 RX ADMIN — MORPHINE SULFATE 2 MG: 2 INJECTION, SOLUTION INTRAMUSCULAR; INTRAVENOUS at 10:02

## 2020-02-21 RX ADMIN — AMPICILLIN SODIUM AND SULBACTAM SODIUM 3 G: 2; 1 INJECTION, POWDER, FOR SOLUTION INTRAMUSCULAR; INTRAVENOUS at 10:02

## 2020-02-21 RX ADMIN — ESCITALOPRAM OXALATE 20 MG: 20 TABLET ORAL at 10:02

## 2020-02-21 RX ADMIN — MORPHINE SULFATE 2 MG: 2 INJECTION, SOLUTION INTRAMUSCULAR; INTRAVENOUS at 05:02

## 2020-02-21 RX ADMIN — LACTULOSE 20 G: 20 SOLUTION ORAL at 10:02

## 2020-02-21 RX ADMIN — NEBIVOLOL HYDROCHLORIDE 10 MG: 5 TABLET ORAL at 10:02

## 2020-02-21 RX ADMIN — LACTULOSE 20 G: 20 SOLUTION ORAL at 02:02

## 2020-02-21 RX ADMIN — RIFAXIMIN 550 MG: 550 TABLET ORAL at 10:02

## 2020-02-21 RX ADMIN — TAMSULOSIN HYDROCHLORIDE 0.4 MG: 0.4 CAPSULE ORAL at 10:02

## 2020-02-21 RX ADMIN — LACTULOSE 20 G: 20 SOLUTION ORAL at 08:02

## 2020-02-21 RX ADMIN — PANTOPRAZOLE SODIUM 40 MG: 40 TABLET, DELAYED RELEASE ORAL at 10:02

## 2020-02-21 RX ADMIN — INSULIN ASPART 15 UNITS: 100 INJECTION, SOLUTION INTRAVENOUS; SUBCUTANEOUS at 05:02

## 2020-02-21 RX ADMIN — FUROSEMIDE 80 MG: 10 INJECTION, SOLUTION INTRAMUSCULAR; INTRAVENOUS at 11:02

## 2020-02-21 RX ADMIN — PANTOPRAZOLE SODIUM 40 MG: 40 TABLET, DELAYED RELEASE ORAL at 08:02

## 2020-02-21 RX ADMIN — CYCLOBENZAPRINE HYDROCHLORIDE 10 MG: 5 TABLET, FILM COATED ORAL at 10:02

## 2020-02-21 RX ADMIN — INSULIN ASPART 15 UNITS: 100 INJECTION, SOLUTION INTRAVENOUS; SUBCUTANEOUS at 11:02

## 2020-02-21 RX ADMIN — OXYBUTYNIN CHLORIDE 5 MG: 5 TABLET ORAL at 10:02

## 2020-02-21 RX ADMIN — CYCLOBENZAPRINE HYDROCHLORIDE 10 MG: 5 TABLET, FILM COATED ORAL at 08:02

## 2020-02-21 RX ADMIN — SPIRONOLACTONE 200 MG: 50 TABLET ORAL at 10:02

## 2020-02-21 RX ADMIN — AMPICILLIN SODIUM AND SULBACTAM SODIUM 3 G: 2; 1 INJECTION, POWDER, FOR SOLUTION INTRAMUSCULAR; INTRAVENOUS at 05:02

## 2020-02-21 RX ADMIN — FUROSEMIDE 80 MG: 10 INJECTION, SOLUTION INTRAMUSCULAR; INTRAVENOUS at 08:02

## 2020-02-21 RX ADMIN — GABAPENTIN 600 MG: 300 CAPSULE ORAL at 02:02

## 2020-02-21 RX ADMIN — SUCRALFATE 1 G: 1 TABLET ORAL at 10:02

## 2020-02-21 RX ADMIN — GABAPENTIN 600 MG: 300 CAPSULE ORAL at 08:02

## 2020-02-21 RX ADMIN — FUROSEMIDE 160 MG: 80 TABLET ORAL at 10:02

## 2020-02-21 RX ADMIN — GABAPENTIN 600 MG: 300 CAPSULE ORAL at 10:02

## 2020-02-21 NOTE — PLAN OF CARE
CM met with patient and mother in room for [place] Planning Assessment.  Patient able  to answer questions.  Per patient he  lives with is mother Suzanne Sherwood [phone] in a house with 3 step(s) to enter.   Per patient he was independent with ADLS and used a straight cane at times  for ambulation.  Patient will have assistance from his mother  upon discharge.   All questions addressed.  CM will follow for needs.  Patient will have transportation from his mother upon discharge.       02/21/20 1300   Discharge Assessment   Assessment Type Discharge Planning Assessment   Confirmed/corrected address and phone number on facesheet? Yes   Assessment information obtained from? Patient   Prior to hospitilization cognitive status: Alert/Oriented   Prior to hospitalization functional status: Independent   Current cognitive status: Alert/Oriented   Current Functional Status: Independent   Lives With parent(s)  (Mother Suzanne [phone])   Able to Return to Prior Arrangements yes   Is patient able to care for self after discharge? Yes   Patient's perception of discharge disposition home or selfcare   Readmission Within the Last 30 Days other (see comments)  (Had parancentesis at [facility] 2 weeks ago)   If yes, most recent facility name: [facility]   Patient currently being followed by outpatient case management? No   Patient currently receives any other outside agency services? No   Equipment Currently Used at Home cane, straight   Do you have any problems affording any of your prescribed medications? No   Is the patient taking medications as prescribed? yes   Does the patient have transportation home? Yes   Transportation Anticipated family or friend will provide   Does the patient receive services at the [facility]? No   Discharge Plan A Home   Discharge Plan B Home   DME Needed Upon Discharge  none   Patient/Family in Agreement with Plan yes   Readmission Questionnaire   At the time of your discharge, did  someone talk to you about what your health problems were? Yes   At the time of discharge, did someone talk to you about what to watch out for regarding worsening of your health problem? Yes   At the time of discharge, did someone talk to you about what to do if you experienced worsening of your health problem? Yes   At the time of discharge, did someone talk to you about which medication to take when you left the hospital and which ones to stop taking? Yes   At the time of discharge, did someone talk to you about when and where to follow up with a doctor after you left the hospital? Yes   How often do you need to have someone help you when you read instructions, pamphlets, or other written material from your doctor or pharmacy? Never   Do you have problems taking your medications as prescribed? No   Do you have any problems affording any of  your prescribed medications? No   Do you have problems obtaining/receiving your medications? No   Does the patient have transportation to healthcare appointments? Yes   Living Arrangements house   Does the patient have family/friends to help with healtcare needs after discharge? yes   Does your caregiver provide all the help you need? Yes   Are you currently feeling confused? No   Are you currently having problems thinking? No   Are you currently having memory problems? No   In the last 7 days, my sleep quality was: [place] Matthew Ii, MD       [id number] - [place], [place] - [address] AT [facility] [address]  [place] [address]  Phone: [phone] Fax: [phone]    [facility]  [address]  [place] [address]  Phone: [phone] Fax: [phone]    [facility]  [address]  [place] [address]  Phone: [phone] Fax: [phone]    [facility] - [place], [place] - [address]  [address]  [facility] [address]  Phone:  [phone] Fax: [phone]      Payor: [employer] MANAGED MEDICARE / Plan: HUMAN MEDICARE HMO / Product Type: Capitation /

## 2020-02-21 NOTE — ANESTHESIA PREPROCEDURE EVALUATION
[facility]  Anesthesia Pre-Operative Evaluation         Patient Name: Colin Reilly  YOB: 1960  MRN: [id number]    SUBJECTIVE:     Pre-operative evaluation for Procedure(s) (LRB):  REPAIR, HERNIA, UMBILICAL, AGE 6 YEARS OR OLDER (N/A)     02/21/2020    Colin Reilly is a 59 y.o. male w/ a significant PMHx of HTN, IDDM2, BPH, HCV (s/p treatment), chronic low back pain with opoid dependence, EtOH cirrhosis complicated by ascites (had previous paracentesis at [facility]), portal hypertension, grade I EV s/p banding, portal hypertensive gastropathy, umbilical hernia, splenomegaly, thrombocytopenia, hepatic encephalopathy . Admitted for decompensated hepatic cirrhosis and ascitic fluid leaking from umbilical hernia.     Patient now presents for the above procedure(s).    12/5/2019 stress echo  · Normal left ventricular systolic function. The estimated ejection fraction is 65%  · The patient reached the end of the protocol.  · Indeterminate left ventricular diastolic function.  · Mild left ventricular enlargement.  · Mild left atrial enlargement.  · Mild tricuspid regurgitation.  · Mild right atrial enlargement.  · Normal right ventricular systolic function.  · Normal central venous pressure (3 mm Hg).  · The estimated PA systolic pressure is 35 mm Hg  · There were no arrhythmias during stress.  · The EKG portion of this study is negative for myocardial ischemia.  · The stress echo portion of this study is negative for myocardial ischemia.    LDA:        Peripheral IV - Single Lumen 02/20/20 1200 20 G Anterior;Left Wrist (Active)   Site Assessment Clean;Dry;Intact;No redness;No swelling;No warmth;No drainage 2/21/2020 12:00 PM   Line Status Flushed;Saline locked 2/21/2020  7:57 AM   Dressing Status Clean;Dry;Intact 2/21/2020  7:57 AM   Dressing Intervention Integrity maintained 2/21/2020  5:06 AM   Dressing Change Due 02/24/20 2/21/2020  5:06 AM   Site Change Due 02/24/20 2/21/2020  5:06 AM    Reason Not Rotated Not due 2/21/2020  5:06 AM   Number of days: 1            Drain/Device  02/21/20 0400 midline umbilical area gravity (Active)   Insertion Site swollen 2/21/2020  6:02 AM   Drainage Characteristics/Odor Serous 2/21/2020  6:02 AM   Drainage Amount Copious 2/21/2020  6:02 AM   General Output (mL) 100 2/21/2020  7:00 AM   Number of days: 0       Prev airway: None documented.    Drips: None documented.      Patient Active Problem List   Diagnosis    Esophageal varices with bleeding    Cirrhosis, Laennec's    Umbilical hernia    Splenomegaly    GERD (gastroesophageal reflux disease)    Diabetes    HTN (hypertension)    Other ascites    Alcohol abuse, in remission    Hepatic encephalopathy    Chronic back pain    Cirrhosis    Rectal bleeding    Dysphagia    Decompensated hepatic cirrhosis    Ascites due to alcoholic cirrhosis    Portal hypertension    Portal hypertensive gastropathy    Anasarca    Type 2 diabetes mellitus without complication, with long-term current use of insulin    Acute blood loss anemia    Umbilicus discharge    Umbilical hernia without obstruction and without gangrene       Review of patient's allergies indicates:  No Known Allergies    Current Inpatient Medications:   cyclobenzaprine  10 mg Oral BID    escitalopram oxalate  20 mg Oral Daily    finasteride  5 mg Oral Daily    furosemide (LASIX) IV  80 mg Intravenous BID    gabapentin  600 mg Oral TID    insulin aspart U-100  15 Units Subcutaneous TIDWM    insulin detemir U-100  50 Units Subcutaneous QHS    lactulose  20 g Oral TID    oxybutynin  5 mg Oral Daily    pantoprazole  40 mg Oral BID    rifAXIMin  550 mg Oral BID    spironolactone  200 mg Oral Daily    tamsulosin  0.4 mg Oral Daily       No current facility-administered medications on file prior to encounter.      Current Outpatient Medications on File Prior to Encounter   Medication Sig Dispense Refill    cyclobenzaprine (FLEXERIL) 10  MG tablet Take 10 mg by mouth 2 (two) times daily.       EASY TOUCH INSULIN SYRINGE 1 mL 31 gauge x 5/16 Syrg       ergocalciferol (ERGOCALCIFEROL) 50,000 unit Cap Take 1 capsule (50,000 Units total) by mouth every 7 days. 12 capsule 0    escitalopram oxalate (LEXAPRO) 20 MG tablet Take 20 mg by mouth once daily.   2    finasteride (PROSCAR) 5 mg tablet Take 1 tablet (5 mg total) by mouth once daily. 30 tablet 11    furosemide (LASIX) 40 MG tablet Take 4 tablets (160 mg total) by mouth once daily. 120 tablet 2    gabapentin (NEURONTIN) 600 MG tablet Take 600 mg by mouth 3 (three) times daily.       HYDROcodone-acetaminophen (NORCO) 5-325 mg per tablet Take 1 tablet by mouth daily as needed (severe pain). (Patient taking differently: Take 1 tablet by mouth every 12 (twelve) hours as needed (severe pain). ) 10 tablet 0    insulin aspart U-100 (NOVOLOG) 100 unit/mL injection Inject 24 Units into the skin 3 (three) times daily before meals.      insulin detemir U-100 (LEVEMIR) 100 unit/mL injection Inject 74 Units into the skin every evening.      lactulose (CHRONULAC) 10 gram/15 mL solution Take 30 mLs by mouth 3 (three) times daily. May take up to  4 to 5 times daily if needed for bowel movements      nebivolol (BYSTOLIC) 10 MG Tab Take 10 mg by mouth once daily.      oxybutynin (DITROPAN) 5 MG Tab Take 5 mg by mouth once daily.      pantoprazole (PROTONIX) 40 MG tablet Take 1 tablet (40 mg total) by mouth 2 (two) times daily. (Patient taking differently: Take 40 mg by mouth once daily. ) 180 tablet 1    rifAXIMin (XIFAXAN) 550 mg Tab Take 1 tablet (550 mg total) by mouth 2 (two) times daily. 30 tablet 11    spironolactone (ALDACTONE) 100 MG tablet Take 1.5 tablets (150 mg total) by mouth once daily. 60 tablet 2    sucralfate (CARAFATE) 1 gram tablet Take 1 tablet by mouth 2 (two) times daily with meals.  2    tamsulosin (FLOMAX) 0.4 mg Cp24 Take 1 capsule (0.4 mg total) by mouth once daily. 30  capsule 11    TRUE METRIX GLUCOSE TEST STRIP Strp       TRUEPLUS LANCETS 30 gauge Misc          Past Surgical History:   Procedure Laterality Date    CARPAL TUNNEL RELEASE Bilateral     CHOLECYSTECTOMY      lap    COLONOSCOPY N/A 9/8/2017    Procedure: COLONOSCOPY;  Surgeon: Savannah Llanos MD;  Location: [facility];  Service: Endoscopy;  Laterality: N/A;    COLONOSCOPY Left 3/14/2018    Procedure: COLONOSCOPY;  Surgeon: Savannah Llanos MD;  Location: [facility];  Service: Endoscopy;  Laterality: Left;    ESOPHAGOGASTRODUODENOSCOPY  01/2014    ESOPHAGOGASTRODUODENOSCOPY  02/15/2017    grade II varices    ESOPHAGOGASTRODUODENOSCOPY  04/10/2017    grade I varices    ESOPHAGOGASTRODUODENOSCOPY N/A 10/18/2019    Procedure: EGD (ESOPHAGOGASTRODUODENOSCOPY);  Surgeon: Flavio Escalera MD;  Location: [facility] ([address] [place] [address]);  Service: Endoscopy;  Laterality: N/A;    ESOPHAGOGASTRODUODENOSCOPY W/ BANDING  01/26/2017    grade II varices    HERNIA REPAIR      NECK SURGERY      fusion on C5 and C6    ULNAR NERVE TRANSPOSITION Left     vericose veins removed         Social History     Socioeconomic History    Marital status:      Spouse name: Not on file    Number of children: Not on file    Years of education: Not on file    Highest education level: Not on file   Occupational History     Employer: Disabled   Social Needs    Financial resource strain: Not on file    Food insecurity:     Worry: Not on file     Inability: Not on file    Transportation needs:     Medical: Not on file     Non-medical: Not on file   Tobacco Use    Smoking status: Former Smoker     Types: Cigarettes     Last attempt to quit: 1/4/2010     Years since quitting: 10.1    Smokeless tobacco: Former User     Types: Chew     Quit date: 2/24/1990    Tobacco comment: former 1 pack/week quit 1/4/2010   Substance and Sexual Activity    Alcohol use: No     Comment: former heavy beer but quit 1/4/2010    Drug use: No     Sexual activity: Never     Comment:    Lifestyle    Physical activity:     Days per week: Not on file     Minutes per session: Not on file    Stress: Not on file   Relationships    Social connections:     Talks on phone: Not on file     Gets together: Not on file     Attends Congregation service: Not on file     Active member of club or organization: Not on file     Attends meetings of clubs or organizations: Not on file     Relationship status: Not on file   Other Topics Concern    Patient feels they ought to cut down on drinking/drug use Not Asked    Patient annoyed by others criticizing their drinking/drug use Not Asked    Patient has felt bad or guilty about drinking/drug use Not Asked    Patient has had a drink/used drugs as an eye opener in the AM Not Asked   Social History Narrative           OBJECTIVE:     Vital Signs Range (Last 24H):  Temp:  [36.4 °C (97.5 °F)-36.8 °C (98.3 °F)]   Pulse:  []   Resp:  [16-18]   BP: (111-128)/(61-82)   SpO2:  [92 %-100 %]       Significant Labs:  Lab Results   Component Value Date    WBC 4.92 02/21/2020    HGB 8.1 (L) 02/21/2020    HCT 27.5 (L) 02/21/2020    PLT 54 (L) 02/21/2020    CHOL 178 10/18/2016    TRIG 134 10/18/2016    HDL 35 10/18/2016    ALT 24 02/21/2020    AST 54 (H) 02/21/2020     02/21/2020    K 3.6 02/21/2020     02/21/2020    CREATININE 0.6 02/21/2020    BUN 7 02/21/2020    CO2 26 02/21/2020    TSH 2.309 12/05/2019    PSA 0.44 06/19/2018    INR 1.7 (H) 02/21/2020    HGBA1C 7.6 (H) 12/05/2019       Diagnostic Studies: No relevant studies.    EKG:   Results for orders placed or performed in visit on 10/16/19   EKG 12-lead    Collection Time: 10/16/19  8:10 PM    Narrative    Test Reason : R07.9    Vent. Rate : 085 BPM     Atrial Rate : 085 BPM     P-R Int : 158 ms          QRS Dur : 100 ms      QT Int : 420 ms       P-R-T Axes : 059 -26 021 degrees     QTc Int : 499 ms    Normal sinus rhythm  Low voltage QRS in the  anterior leads  Possible anterolateral infarct  Abnormal ECG  When compared with ECG of 19-SEP-2014 13:03,  Poor R wave progression now present across the precordial leads  Confirmed by Billie CHAN, Lexus ([id number]) on 10/17/2019 1:20:19 PM    Referred By: [email or web address]   SELF           Confirmed By:Lexus Wise MD       ECHOCARDIOGRAM:  TTE:  STRESS:  No results found for this or any previous visit. \  Results for orders placed or performed during the hospital encounter of 12/05/19   Stress Echo Which stress agent will be used? Pharmacological; Color Flow Doppler? Yes   Result Value Ref Range    Ascending aorta 3.19 cm    STJ 3.22 cm    AV mean gradient 6 mmHg    Ao peak jarad 1.53 m/s    Ao VTI 30.50 cm    IVS 0.96 0.6 - 1.1 cm    LA size 5.07 cm    Left Atrium Major Axis 5.42 cm    Left Atrium Minor Axis 5.54 cm    LVIDD 5.74 3.5 - 6.0 cm    LVIDS 3.45 2.1 - 4.0 cm    LVOT diameter 2.03 cm    LVOT peak VTI 26.03 cm    PW 0.98 0.6 - 1.1 cm    MV Peak A Jarad 0.66 m/s    E wave decelartion time 158.40 msec    MV Peak E Jarad 0.84 m/s    PV Peak D Jarad 0.34 m/s    PV Peak S Jarad 0.33 m/s    RA Major Axis 5.26 cm    RA Width 3.34 cm    RVDD 3.09 cm    Sinus 3.13 cm    TAPSE 2.05 cm    TR Max Jarad 2.81 m/s    TDI LATERAL 0.19 m/s    TDI SEPTAL 0.14 m/s    LA WIDTH 4.21 cm    LV Diastolic Volume 162.51 mL    LV Systolic Volume 49.26 mL    RV S' 21.80 cm/s    LVOT peak jarad 1.24 m/s    LV LATERAL E/E' RATIO 4.42 m/s    LV SEPTAL E/E' RATIO 6.00 m/s    FS 40 %    LA volume 99.41 cm3    LV mass 220.14 g    Left Ventricle Relative Wall Thickness 0.34 cm    AV valve area 2.76 cm2    AV Velocity Ratio 0.81     AV index (prosthetic) 0.85     E/A ratio 1.27     Mean e' 0.17 m/s    Pulm vein S/D ratio 0.97     LVOT area 3.2 cm2    LVOT stroke volume 84.20 cm3    AV peak gradient 9 mmHg    E/E' ratio 5.09 m/s    LV Systolic Volume Index 22.5 mL/m2    LV Diastolic Volume Index 74.29 mL/m2    LA Volume Index 45.4 mL/m2    LV Mass Index 101 g/m2     Triscuspid Valve Regurgitation Peak Gradient 32 mmHg    BSA 2.23 m2    Systolic blood pressure 116 mmHg    Diastolic blood pressure 70 mmHg    HR at rest 84 bpm    RPP 9,744     Peak  bpm    Peak Systolic  mmHg    Peak Diatolic BP 55 mmHg    Peak RPP 17,673     Max Predicted      85% Max Predicted      % Max HR Achieved 85     1 Minute Recovery  bpm    [facility] CV CPX PATIENT IS MALE 1     [facility] CV CPX PATIENT IS FEMALE 0     Right Atrial Pressure (from IVC) 3 mmHg    TV rest pulmonary artery pressure 35 mmHg    Narrative    · Normal left ventricular systolic function. The estimated ejection   fraction is 65%  · The patient reached the end of the protocol.  · Indeterminate left ventricular diastolic function.  · Mild left ventricular enlargement.  · Mild left atrial enlargement.  · Mild tricuspid regurgitation.  · Mild right atrial enlargement.  · Normal right ventricular systolic function.  · Normal central venous pressure (3 mm Hg).  · The estimated PA systolic pressure is 35 mm Hg  · There were no arrhythmias during stress.  · The EKG portion of this study is negative for myocardial ischemia.  · The stress echo portion of this study is negative for myocardial   ischemia.              ASSESSMENT/PLAN:         Anesthesia Evaluation    I have reviewed the Patient Summary Reports.    I have reviewed the Nursing Notes.   I have reviewed the Medications.     Review of Systems  Anesthesia Hx:  No problems with previous Anesthesia  History of prior surgery of interest to airway management or planning: Denies Family Hx of Anesthesia complications.   Denies Personal Hx of Anesthesia complications.   Social:  Former Smoker, No Alcohol Use    Hematology/Oncology:     Oncology Normal    -- Anemia: Hematology Comments: 10.3 / 32.3    Cardiovascular:   Hypertension Denies MI.   Denies CABG/stent.         Pulmonary:   Denies COPD.  Denies Asthma.  Denies Sleep Apnea.    Renal/:   Denies Chronic Renal  Disease.  Renal mass, left   Hepatic/GI:   Bowel Prep. GERD Liver Disease, Hepatitis, C Ascites  Hepatic encephalopathy  Esophageal varices  Cirrhosis, Laennec's  Splenomegaly   Musculoskeletal:  Musculoskeletal Normal    Neurological:   Denies CVA. Denies Seizures.   Chronic Pain Syndrome   Endocrine:   Diabetes, poorly controlled, type 2 Denies Hypothyroidism. Denies Hyperthyroidism.        Physical Exam  General:  Obesity, Malnutrition, Jaundice    Airway/Jaw/Neck:  Airway Findings: Mouth Opening: Normal Tongue: Normal  General Airway Assessment: Adult  Mallampati: II  Improves to I with phonation.  TM Distance: Normal, at least 6 cm  Jaw/Neck Findings:  Neck ROM: Normal ROM     Eyes/Ears/Nose:  EYES/EARS/NOSE FINDINGS: Normal   Dental:  Dental Findings: Edentulous   Chest/Lungs:  Chest/Lungs Findings: Clear to auscultation, Normal Respiratory Rate     Heart/Vascular:  Heart Findings: Rate: Normal  Rhythm: Regular Rhythm  Sounds: Normal  Vascular Findings:  Edema: +3     Abdomen:  Abdomen Findings:  Soft, Distention, Tenderness, Ascites  Ascites fluid leaking into bag through umbilical hernia   Musculoskeletal:  Musculoskeletal Findings:    Skin:  Skin Findings:     Mental Status:  Mental Status Findings:  Cooperative, Alert and Oriented         Anesthesia Plan  Type of Anesthesia, risks & benefits discussed:  Anesthesia Type:  general  Patient's Preference:   Intra-op Monitoring Plan: standard ASA monitors  Intra-op Monitoring Plan Comments:   Post Op Pain Control Plan: per primary service following discharge from PACU, IV/PO Opioids PRN and multimodal analgesia  Post Op Pain Control Plan Comments:   Induction:   IV  Beta Blocker:         Informed Consent: Patient understands risks and agrees with Anesthesia plan.  Questions answered. Anesthesia consent signed with patient.  ASA Score: 3     Day of Surgery Review of History & Physical:            Ready For Surgery From Anesthesia Perspective.

## 2020-02-21 NOTE — CONSULTS
[facility]  Hepatology  Consult Note    Patient Name: Colin Reilly  MRN: [id number]  Admission Date: 2/21/2020  Hospital Length of Stay: 0 days  Code Status: Full Code   Attending Provider: Annika Caro MD   Consulting Provider: Johnathan Clark MD  Primary Care Physician: Preston Matthew Ii, MD  Principal Problem:Decompensated hepatic cirrhosis    Inpatient consult to Hepatology  Consult performed by: Johnathan Clark MD  Consult ordered by: Jennifer Fung DO        Subjective:     HPI: Colin Reilly is a 59 y.o. male with history of Etoh ESLD (decompensated with past variceal bleed s/p banding, PHG, ascites, HCV s/p INF, HE, DM2, transferred from OSH with concern of umbilical hernia drainage.     Patient had 2.5 L of fluid removed via paracentesis, 2 weeks ago, but has this umbilical hernia that was leaking so was transferred here for further management. He has diffuse abdominal pain, no nausea, or vomiting, has been having black stools for the past 2 weeks, (2g drop of hb from baseline). Abdomen is distended. Nursing placed a colostomy bag around the umbilical hernia draining clear yellow fluid. Last time seen in the hospital was seen for umbilical hernia drainage, reducible at the time, no acute findings on CT AP imaging, given no strangulation/incarceration, no plan for elective surgery given ESLD and elevated MELD making it a high risk surgery.     He follows Dr. Meneses in clinic, last seen on 12/6/19, stopped drinking etoh in 2012, MELD 15, HE well controlled on lactulose and rifaximin, gained 15 lbs, not compliant with low salt diet at that time. Plan was to continue lactulose, rifaximin, on 1/8/2020 patient was ccepted for liver transplant due to alcoholic cirrhosis and complications of ESLD including hyperbilirubinemia, hypoalbuminemia, severe, malnutrition, coagulopathy, Hepatic encephalopathy, thrombocytopenia, ascites, portal hypertension, splenomegaly,  gastroesophageal varices, pancytopenia, jaundice, partially occlusive thrombus involving extrahepatic portal vein and SMV and MELD score of 15, with no contraindications for liver transplant, financial approved. He was supposed to get outpatient labs and follow up in clinic, but missed 2 outpatient clinic appointments, so currently he is not listed for liver transplant.           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    Esophageal varices     GERD (gastroesophageal reflux disease)     Hepatic encephalopathy 7/8/2014    Hepatitis C virus infection 07/08/2014    tx with interferon 3-4 mos- stopped for unclear reasons Tattoos-first at age 17 only risk factor (HCVAB negative 08/2017)    HTN (hypertension) 7/8/2014    Hypertension     Insulin dependent diabetes mellitus     Renal mass, left 7/8/2014    6.3 x 5.7 x 5.6 complex mass    Splenomegaly 7/8/2014    Umbilical hernia 7/8/2014       Past Surgical History:   Procedure Laterality Date    CARPAL TUNNEL RELEASE Bilateral     CHOLECYSTECTOMY      lap    COLONOSCOPY N/A 9/8/2017    Procedure: COLONOSCOPY;  Surgeon: Savannah Llanos MD;  Location: [facility];  Service: Endoscopy;  Laterality: N/A;    COLONOSCOPY Left 3/14/2018    Procedure: COLONOSCOPY;  Surgeon: Savannah Llanos MD;  Location: [facility];  Service: Endoscopy;  Laterality: Left;    ESOPHAGOGASTRODUODENOSCOPY  01/2014    ESOPHAGOGASTRODUODENOSCOPY  02/15/2017    grade II varices    ESOPHAGOGASTRODUODENOSCOPY  04/10/2017    grade I varices    ESOPHAGOGASTRODUODENOSCOPY N/A 10/18/2019    Procedure: EGD (ESOPHAGOGASTRODUODENOSCOPY);  Surgeon: Flavio Escalera MD;  Location: [facility] ([address] [place] [address]);  Service: Endoscopy;  Laterality: N/A;    ESOPHAGOGASTRODUODENOSCOPY W/ BANDING  01/26/2017    grade II varices     HERNIA REPAIR      NECK SURGERY      fusion on C5 and C6    ULNAR NERVE TRANSPOSITION Left     vericose veins removed         Family History   Problem Relation Age of Onset    Hyperlipidemia Mother     No Known Problems Father 36        accident on oil rig    No Known Problems Sister     Cancer Brother         kidney    Alcohol abuse Brother     No Known Problems Sister        Social History     Socioeconomic History    Marital status:      Spouse name: Not on file    Number of children: Not on file    Years of education: Not on file    Highest education level: Not on file   Occupational History     Employer: Disabled   Social Needs    Financial resource strain: Not on file    Food insecurity:     Worry: Not on file     Inability: Not on file    Transportation needs:     Medical: Not on file     Non-medical: Not on file   Tobacco Use    Smoking status: Former Smoker     Types: Cigarettes     Last attempt to quit: 1/4/2010     Years since quitting: 10.1    Smokeless tobacco: Former User     Types: Chew     Quit date: 2/24/1990    Tobacco comment: former 1 pack/week quit 1/4/2010   Substance and Sexual Activity    Alcohol use: No     Comment: former heavy beer but quit 1/4/2010    Drug use: No    Sexual activity: Never     Comment:    Lifestyle    Physical activity:     Days per week: Not on file     Minutes per session: Not on file    Stress: Not on file   Relationships    Social connections:     Talks on phone: Not on file     Gets together: Not on file     Attends Taoism service: Not on file     Active member of club or organization: Not on file     Attends meetings of clubs or organizations: Not on file     Relationship status: Not on file   Other Topics Concern    Patient feels they ought to cut down on drinking/drug use Not Asked    Patient annoyed by others criticizing their drinking/drug use Not Asked    Patient has felt bad or guilty about drinking/drug use Not  Asked    Patient has had a drink/used drugs as an eye opener in the AM Not Asked   Social History Narrative           No current facility-administered medications on file prior to encounter.      Current Outpatient Medications on File Prior to Encounter   Medication Sig Dispense Refill    cyclobenzaprine (FLEXERIL) 10 MG tablet Take 10 mg by mouth 2 (two) times daily.       EASY TOUCH INSULIN SYRINGE 1 mL 31 gauge x 5/16 Syrg       ergocalciferol (ERGOCALCIFEROL) 50,000 unit Cap Take 1 capsule (50,000 Units total) by mouth every 7 days. 12 capsule 0    escitalopram oxalate (LEXAPRO) 20 MG tablet Take 20 mg by mouth once daily.   2    finasteride (PROSCAR) 5 mg tablet Take 1 tablet (5 mg total) by mouth once daily. 30 tablet 11    furosemide (LASIX) 40 MG tablet Take 4 tablets (160 mg total) by mouth once daily. 120 tablet 2    gabapentin (NEURONTIN) 600 MG tablet Take 600 mg by mouth 3 (three) times daily.       HYDROcodone-acetaminophen (NORCO) 5-325 mg per tablet Take 1 tablet by mouth daily as needed (severe pain). (Patient taking differently: Take 1 tablet by mouth every 12 (twelve) hours as needed (severe pain). ) 10 tablet 0    insulin aspart U-100 (NOVOLOG) 100 unit/mL injection Inject 24 Units into the skin 3 (three) times daily before meals.      insulin detemir U-100 (LEVEMIR) 100 unit/mL injection Inject 74 Units into the skin every evening.      lactulose (CHRONULAC) 10 gram/15 mL solution Take 30 mLs by mouth 3 (three) times daily. May take up to  4 to 5 times daily if needed for bowel movements      nebivolol (BYSTOLIC) 10 MG Tab Take 10 mg by mouth once daily.      oxybutynin (DITROPAN) 5 MG Tab Take 5 mg by mouth once daily.      pantoprazole (PROTONIX) 40 MG tablet Take 1 tablet (40 mg total) by mouth 2 (two) times daily. (Patient taking differently: Take 40 mg by mouth once daily. ) 180 tablet 1    rifAXIMin (XIFAXAN) 550 mg Tab Take 1 tablet (550 mg total) by mouth 2 (two)  times daily. 30 tablet 11    spironolactone (ALDACTONE) 100 MG tablet Take 1.5 tablets (150 mg total) by mouth once daily. 60 tablet 2    sucralfate (CARAFATE) 1 gram tablet Take 1 tablet by mouth 2 (two) times daily with meals.  2    tamsulosin (FLOMAX) 0.4 mg Cp24 Take 1 capsule (0.4 mg total) by mouth once daily. 30 capsule 11    TRUE METRIX GLUCOSE TEST STRIP Strp       TRUEPLUS LANCETS 30 gauge Misc          Review of patient's allergies indicates:  No Known Allergies    Review of Systems   Constitutional: Negative for chills, fever, malaise/fatigue and weight loss.   HENT: Negative for hearing loss and sore throat.    Eyes: Negative for blurred vision and double vision.   Respiratory: Negative for cough and shortness of breath.    Cardiovascular: Negative for chest pain and palpitations.   Gastrointestinal: Positive for abdominal pain and melena. Negative for nausea and vomiting.   Genitourinary: Negative for dysuria and urgency.   Musculoskeletal: Negative for myalgias and neck pain.   Skin: Negative for itching and rash.   Neurological: Negative for dizziness and loss of consciousness.        Objective:     Vitals:    02/21/20 0757   BP: 111/61   Pulse: 101   Resp: 17   Temp: 98.2 °F (36.8 °C)         Physical Exam   Constitutional: He is oriented to person, place, and time. No distress.   HENT:   Head: Normocephalic and atraumatic.   Dry mouth   Eyes: Scleral icterus is present.   Neck: Neck supple.   Cardiovascular: Normal rate and regular rhythm.   Pulmonary/Chest: Effort normal and breath sounds normal.   Abdominal: Soft. Bowel sounds are normal. He exhibits distension. There is generalized tenderness and tenderness in the periumbilical area. There is no rebound and no guarding. A hernia is present.       Musculoskeletal: He exhibits edema (LE 2 + bilateral pitting edema).   Neurological: He is alert and oriented to person, place, and time.   No asterixis   Skin: Skin is warm and dry. He is not  diaphoretic. No erythema.        Significant Labs:  Recent Labs   Lab 02/21/20  0628   HGB 8.1*       Lab Results   Component Value Date    WBC 4.92 02/21/2020    HGB 8.1 (L) 02/21/2020    HCT 27.5 (L) 02/21/2020    MCV 92 02/21/2020    PLT 54 (L) 02/21/2020       Lab Results   Component Value Date     02/21/2020    K 3.6 02/21/2020     02/21/2020    CO2 26 02/21/2020    BUN 7 02/21/2020    CREATININE 0.6 02/21/2020    CALCIUM 7.7 (L) 02/21/2020    ANIONGAP 7 (L) 02/21/2020    ESTGFRAFRICA >60.0 02/21/2020    EGFRNONAA >60.0 02/21/2020       Lab Results   Component Value Date    ALT 24 02/21/2020    AST 54 (H) 02/21/2020    GGT 52 12/05/2019    ALKPHOS 120 02/21/2020    BILITOT 3.1 (H) 02/21/2020       Lab Results   Component Value Date    INR 1.7 (H) 02/21/2020    INR 1.6 (H) 12/05/2019    INR 1.6 (H) 10/16/2019       Significant Imaging:  Reviewed pertinent radiology findings.       Assessment/Plan:     Colin Reilly is a 59 y.o. male with history of Etoh ESLD (decompensated with past variceal bleed s/p banding, PHG, ascites, HCV s/p INF, HE, DM2, transferred from OSH with concern of umbilical hernia drainage.     Problem List:  1. ESLD 2/2 EToh (last drink 2012)  2. Umbilical hernia with drainage, no signs of incarceration or strangulation, but no imaging present  3. Melena, hb drop of 2 from baseline of 10, vitally stable, BUN:cr wnl, history of PHG and post banding ulcers on EGD on 10/2019. Patient taking PPI 40 BID  4. Ascites: with umbilical hernia, and drainage, US abdomen with doppler pending read  5. HE: no asterixis, AAO X3, stable, resume home lactulose and rifaximin    Plan:  1. CT abdomen/pelvis to evaluate umbilical hernia, r/o strangulation/incarceration  2. Given pain can start on empiric ceftriaxone 2 g daily  3. Paracentesis, therapeutic and diagnostic for SBP  4. Discussed with GI regarding EGD, will monitor counts given patient is stable, will need repeat EGD for surveillance and  melena (likely PHG) (patient taking PPI 40 BID) continue PPI 40 BID  5. Resume home diuretics (spironolactone(increase to 200 daily), lasix (IV 80 BID)  6. resume home lactulose and rifaximin, titrate to 3 BM per day  7. After CT abdomen will consider surgery consult regarding symptomatic umbilical hernia with skin breakdown, no signs of strangulation.   8. Restrict to 2g na daily. Encourage protein shakes  9. US abdomen with doppler pending read  10.     Cirrhosis Counseling  - strict abstinence of alcohol use  - compliance with lactulose and rifaximin if you have developed hepatic encephalopathy (confusion due to high ammonia level)  - avoid non-steroidal anti-inflammatory drugs (NSAIDs) such as ibuprofen, Motrin, naprosyn, Alleve due to the risk of kidney damage  - can take acetaminophen (Tylenol), no more than 2000 mg per day  - low sodium (salt) 2 gram per day diet  - nutrition: 25-30kcal (calorie per body body weight in kilogram) per day  - no need to restrict protein in diet  - high protein diet: 1.2-1.5 gram/kg (protein per body weight in kilogram) per day to prevent muscle mass loss  - avoid fasting  - Late night snack with 50 gram of complex carbohydrates and protein  - resistance exercises for muscle strength  - avoid raw seafoods due to the risk of fatal Vibrio vulnificus infection  - ultrasound or MRI of the liver every 6 months for liver cancer screening (you are at risk of developing liver cancer due to scar tissue in the liver)  - Upper endoscopy every 1-2 years to screen for varices in the stomach and foodpipe which can burst and cause fatal bleeding      Thank you for involving us in the care of Colin Reilly. Please call with any additional questions, concerns or changes in the patient's clinical status.    Johnathan Clark MD  Gastroenterology Fellow PGY IV   [facility]

## 2020-02-21 NOTE — NURSING TRANSFER
Nursing Transfer Note      2/21/2020     Transfer to [facility] from [facility]    Transfer via stretcher    Transfer with pt's belongings and mother    Transported by [employer] sent: no    Chart send with patient: yes    Notified: Dr. Fung at 0427    Patient reassessed at: 0345    Upon arrival to floor: pt oriented to room, placed pt in new gown, and replaced urostomy bag over umbilical hernia due to leaking

## 2020-02-21 NOTE — TELEPHONE ENCOUNTER
"  LIVER WAIT LISTING NOTE    **NOTE:   IF ANY EXTERNAL LABS ARE USED FOR LISTING THE VALUES AND DATES MUST BE ENTERED IN EPIC TO GENERATE THIS NOTE**    Date of Financial clearance to list: 1/10/2020    [facility]:        Organ: Liver  Name:       Colin Reilly    : 1960          Gender:     male      MRN#: [id number]                                 State of Permanent Residence:  [address] [address]  Ethnicity: /White   Race:      White    CLINICAL INFORMATION       ABO  ABO Blood Group:   A NEG     ABO Confirmation: (THESE DATES MUST BE PRIOR TO THE LIST DATE AND SUPPORTED BY SEPARATE LAB REPORTS)    Internal Results    Lab Results   Component Value Date    GROUPTRH A NEG 2020    GROUPTRH A NEG 2019     No results found for: ABO    External Results    ABO Date 1:  ABO Result 1:  ABO Date 2:  ABO Result 2:     Are either of these ABO results based on External Labs? no  (If Yes, STOP and go to source document in Media Tab for verification).    VITALS  Height:    Ht Readings from Last 1 Encounters:   20 5' 11" (1.803 m)     Weight:    Wt Readings from Last 1 Encounters:   20 101.5 kg (223 lb 12.3 oz)       LIVER ORGAN INFORMATION  Candidate Medical Urgency Status: Active    Number of Previous Transplants: 0    MELD/PELD Data Collection:  Had dialysis twice, or 24 hours of CVVHD, within 1 week prior to the serum creatinine test: No  Encephalopathy: 1 - 2 Date: 2020  Ascites: moderate Date: 2020          MELD Score:  MELD-Na score: 17 at 2020  6:28 AM  MELD score: 17 at 2020  6:28 AM  Calculated from:  Serum Creatinine: 0.6 mg/dL (Rounded to 1 mg/dL) at 2020  6:28 AM  Serum Sodium: 139 mmol/L (Rounded to 137 mmol/L) at 2020  6:28 AM  Total Bilirubin: 3.1 mg/dL at 2020  6:28 AM  INR(ratio): 1.7 at 2020  6:28 AM  Age: 59 years  Lab Results   Component Value Date    ALBUMIN 2.3 (L) 2020       Additional Organs: none  Kidney: " "No    If Kidney is "Yes" above, check Diagnosis and enter the medical eligibility below for a simultaneous liver/kidney:    Diagnosis: Chronic kidney disease (CKD) with measured or calculated GFR less than or equal to 60 ml/min for greater than 90 consecutive days. At least one of the following must qualify for CKD:  Date Begun Dialysis CrCl (ml/min)  Must be < or = 30 eGFR (ml/min) Must be < or = 30    Yes/no:                    Diagnosis: Sustained acute kidney injury (must be confirmed at least once every 7 days). Please select at least one of the following criteria:  Date of test or treatment Dialysis received CrCl (ml/min) Must be < or = 30 eGFR (ml/min) Must be < or = 25 Number of days since previous test or treatment (must be less than or equal to 7 days)    Yes/No:                  Diagnosis: Metabolic disease, Check all diagnosis that apply:     Hyperoxaluria    Atypical hemolytic uremic syndrome (HUS) from mutations in factor H or factor I    Familial non-neuropathic systemic amyloidosis    Methylmalonic aciduria     Transplant nephrologist confirming candidate's most recent diagnosis for SLK: n/a               ## Please submit a separate Kidney Listing note for combined Liver/Kidney patients. ##    Will Recipient Accept?   Accept HBcAB Positive Organ:  yes  Accept HBV CEZAR Positive Organ:  yes  Accept HCV Antibody Positive Organ: yes   Accept HCV CEZAR Positive Organ:  yes                        Local: No                           Import: No  Accept DCD Organ:    yes  Minimum acceptable donor age:  5 years  Maximum acceptable donor age:  99 years  Minimum acceptable donor weight:  40 lbs    Maximum acceptable donor weight:  440 lb  Maximum miles the organ or  Recovery team will travel:   5000 miles    TCR Information    Citizenship: [place] Citizen   Country of permanent residence:   Year of entry to the [place]:   Highest education level: High School (9-12) or GED    Patient on Life Support: No  Functional Status: " 30% - severely disabled: hospitalization is indicated, death not imminent   Working for income: No  If no, reason not working: Disability  Previous Pancreas Islet Infusion - No  Source of payment: Public Insurance - Medicare FFS (Fee for service)  Diabetes: Type II  Any previous malignancy: No  Neoadjuvant Therapy: No  Has candidate ever had a diagnosis of HCC: No    Liver Medical Factors  Previous abdominal surgery: Yes  Spontaneous Bacterial Peritonitis: No  History of Portal Vein Thrombosis: Yes  Transjugular Intrahepatic Portosystemic Shunt: No

## 2020-02-21 NOTE — PROGRESS NOTES
LTS Staff    60 y/o male with EtOH cirrhosis, MELD 15-17, listed for liver transplant today. He has had refractory ascites and now has ascites leak from umbilical hernia (might actually be laparoscopic port site hernia as he is status post laparoscopic cholecystectomy).    On exam, has ~1-1.5 cm defect with suggestion of incarcerated omentum or similar tissue in sac.  No evidence of bowel incarceration or strangulation. The ascites is leaking through a punctate skin defect.    By ultrasound, he does not have enough ascites remaining in his abdomen to place a drain to divert the leak.    A/P Can attempt surgical repair of hernia with placement of intraabdominal drain. He is unlikely to have serious further decompensation of his liver disease from a procedure of this magnitude, but if that happens, he is now listed for transplant and we could proceed if necessary. Will make him NPO after midnight and schedule for OR in AM (Dr. Brunner is covering OR for our service - she is aware).    Blaine Martinez MD

## 2020-02-21 NOTE — H&P
"[facility]    History & Physical      Patient Name: Colin Reilly  MRN: [id number]  Admission Date: 2/21/2020  Attending Physician: Annika Caro MD   Primary Care Provider: Preston Matthew Ii, MD    Hospital Medicine Team: Networked reference to record PCT  Jennifer Fung DO     Patient information was obtained from patient and Outside ER record.     Subjective:     Principal Problem:Decompensated hepatic cirrhosis    Chief Complaint: "fluid leaking thru my umbilical hernia"       HPI:     Mr. Reilly  is a 59 year old male with PMH HTN, IDDM2, BPH, chronic low back pain with opoid dependence, alcoholic liver cirrhosis (remained sober for several years) complicated by ascites, portal hypertension, grade I EV s/p banding, portal hypertensive gastropathy, umbilical hernia, splenomegaly, thrombocytopenia, hepatic encephalopathy presents as a transfer from [facility] for hepatology evaluation of leakage of ascitic fluid thru umbilical hernia defect.     Patient states he had paracentesis for the first time about 2 weeks ago at [place] with 2.5 L fluid removal. He noticed clear yellow fluid started leaking thru umbilical hernia for last 2 days and [place] [facility] [place] advised him to come to hospital. He also reports mild diffuse abdominal pain and intermittent chills but denies fever, N/V/D, chest pain, sob, urinary symptoms, dark stool or bleeding from any sites. Patient states his abdominal distention improved after paracentesis but fluid started to build up again. He received morphine for pain prior at outside ED prior to transfer.    Patient reports some abdominal pain during my interview but not in distress. There is clear yellow fluid leaking thru abdominal hernia and collecting inside colostomy bag.           Past Medical History:   Diagnosis Date    Alcohol abuse, in remission 7/8/2014    Quit 01/04, 2011    Alcohol abuse, in remission     Ascites     Chronic " back pain 7/8/2014    Cirrhosis, Laennec's 7/8/2014    Esophageal varices     GERD (gastroesophageal reflux disease)     Hepatic encephalopathy 7/8/2014    Hepatitis C virus infection 07/08/2014    tx with interferon 3-4 mos- stopped for unclear reasons Tattoos-first at age 17 only risk factor (HCVAB negative 08/2017)    HTN (hypertension) 7/8/2014    Hypertension     Insulin dependent diabetes mellitus     Renal mass, left 7/8/2014    6.3 x 5.7 x 5.6 complex mass    Splenomegaly 7/8/2014    Umbilical hernia 7/8/2014       Past Surgical History:   Procedure Laterality Date    CARPAL TUNNEL RELEASE Bilateral     CHOLECYSTECTOMY      lap    COLONOSCOPY N/A 9/8/2017    Procedure: COLONOSCOPY;  Surgeon: Savannah Llanos MD;  Location: [facility];  Service: Endoscopy;  Laterality: N/A;    COLONOSCOPY Left 3/14/2018    Procedure: COLONOSCOPY;  Surgeon: Savannah Llanos MD;  Location: [facility];  Service: Endoscopy;  Laterality: Left;    ESOPHAGOGASTRODUODENOSCOPY  01/2014    ESOPHAGOGASTRODUODENOSCOPY  02/15/2017    grade II varices    ESOPHAGOGASTRODUODENOSCOPY  04/10/2017    grade I varices    ESOPHAGOGASTRODUODENOSCOPY N/A 10/18/2019    Procedure: EGD (ESOPHAGOGASTRODUODENOSCOPY);  Surgeon: Flavio Escalera MD;  Location: [facility] ([address] [place] [address]);  Service: Endoscopy;  Laterality: N/A;    ESOPHAGOGASTRODUODENOSCOPY W/ BANDING  01/26/2017    grade II varices    HERNIA REPAIR      NECK SURGERY      fusion on C5 and C6    ULNAR NERVE TRANSPOSITION Left     vericose veins removed         Review of patient's allergies indicates:  No Known Allergies    No current facility-administered medications on file prior to encounter.      Current Outpatient Medications on File Prior to Encounter   Medication Sig    cyclobenzaprine (FLEXERIL) 10 MG tablet Take 10 mg by mouth 2 (two) times daily.     EASY TOUCH INSULIN SYRINGE 1 mL 31 gauge x 5/16 Syrg     ergocalciferol (ERGOCALCIFEROL) 50,000 unit Cap  Take 1 capsule (50,000 Units total) by mouth every 7 days.    escitalopram oxalate (LEXAPRO) 20 MG tablet Take 20 mg by mouth once daily.     finasteride (PROSCAR) 5 mg tablet Take 1 tablet (5 mg total) by mouth once daily.    furosemide (LASIX) 40 MG tablet Take 4 tablets (160 mg total) by mouth once daily.    gabapentin (NEURONTIN) 600 MG tablet Take 600 mg by mouth 3 (three) times daily.     HYDROcodone-acetaminophen (NORCO) 5-325 mg per tablet Take 1 tablet by mouth daily as needed (severe pain). (Patient taking differently: Take 1 tablet by mouth every 12 (twelve) hours as needed (severe pain). )    insulin aspart U-100 (NOVOLOG) 100 unit/mL injection Inject 24 Units into the skin 3 (three) times daily before meals.    insulin detemir U-100 (LEVEMIR) 100 unit/mL injection Inject 74 Units into the skin every evening.    lactulose (CHRONULAC) 10 gram/15 mL solution Take 30 mLs by mouth 3 (three) times daily. May take up to  4 to 5 times daily if needed for bowel movements    nebivolol (BYSTOLIC) 10 MG Tab Take 10 mg by mouth once daily.    oxybutynin (DITROPAN) 5 MG Tab Take 5 mg by mouth once daily.    pantoprazole (PROTONIX) 40 MG tablet Take 1 tablet (40 mg total) by mouth 2 (two) times daily. (Patient taking differently: Take 40 mg by mouth once daily. )    rifAXIMin (XIFAXAN) 550 mg Tab Take 1 tablet (550 mg total) by mouth 2 (two) times daily.    spironolactone (ALDACTONE) 100 MG tablet Take 1.5 tablets (150 mg total) by mouth once daily.    sucralfate (CARAFATE) 1 gram tablet Take 1 tablet by mouth 2 (two) times daily with meals.    tamsulosin (FLOMAX) 0.4 mg Cp24 Take 1 capsule (0.4 mg total) by mouth once daily.    TRUE METRIX GLUCOSE TEST STRIP Strp     TRUEPLUS LANCETS 30 gauge Misc      Family History     Problem Relation (Age of Onset)    Alcohol abuse Brother    Cancer Brother    Hyperlipidemia Mother    No Known Problems Father (36), Sister, Sister        Tobacco Use    Smoking  status: Former Smoker     Types: Cigarettes     Last attempt to quit: 1/4/2010     Years since quitting: 10.1    Smokeless tobacco: Former User     Types: Chew     Quit date: 2/24/1990    Tobacco comment: former 1 pack/week quit 1/4/2010   Substance and Sexual Activity    Alcohol use: No     Comment: former heavy beer but quit 1/4/2010    Drug use: No    Sexual activity: Never     Comment:      Review of Systems   Constitutional: Positive for chills. Negative for activity change, appetite change, diaphoresis, fatigue, fever and unexpected weight change.   HENT: Negative for congestion, dental problem, drooling, ear discharge, ear pain, facial swelling, hearing loss, mouth sores, nosebleeds, postnasal drip, rhinorrhea, sinus pressure, sneezing, sore throat, tinnitus, trouble swallowing and voice change.    Eyes: Negative for photophobia, pain, discharge, redness, itching and visual disturbance.   Respiratory: Positive for cough. Negative for apnea, choking, chest tightness, shortness of breath, wheezing and stridor.    Cardiovascular: Positive for leg swelling. Negative for chest pain and palpitations.   Gastrointestinal: Positive for abdominal distention and abdominal pain. Negative for anal bleeding, blood in stool, constipation, diarrhea, nausea, rectal pain and vomiting.        Ascitic fluid leaking thru umbilical hernia defect   Endocrine: Negative for cold intolerance, heat intolerance, polydipsia, polyphagia and polyuria.   Genitourinary: Negative for decreased urine volume, difficulty urinating, discharge, dysuria, enuresis, flank pain, frequency, genital sores, hematuria, penile pain, penile swelling, scrotal swelling, testicular pain and urgency.   Musculoskeletal: Negative for arthralgias, back pain, gait problem, joint swelling, myalgias, neck pain and neck stiffness.   Skin: Negative for color change, pallor, rash and wound.   Allergic/Immunologic: Negative for environmental allergies, food  allergies and immunocompromised state.   Neurological: Negative for dizziness, tremors, seizures, syncope, facial asymmetry, speech difficulty, weakness, light-headedness, numbness and headaches.   Hematological: Negative for adenopathy. Does not bruise/bleed easily.   Psychiatric/Behavioral: Negative for agitation, behavioral problems, confusion, decreased concentration, dysphoric mood, hallucinations, self-injury, sleep disturbance and suicidal ideas. The patient is not nervous/anxious and is not hyperactive.      Objective:     Vital Signs (Most Recent):  Temp: 98.3 °F (36.8 °C) (02/21/20 0416)  Pulse: 94 (02/21/20 0416)  Resp: 16 (02/21/20 0416)  BP: 127/78 (02/21/20 0416)  SpO2: 100 % (02/21/20 0416) Vital Signs (24h Range):  Temp:  [97.5 °F (36.4 °C)-98.3 °F (36.8 °C)] 98.3 °F (36.8 °C)  Pulse:  [91-94] 94  Resp:  [16] 16  SpO2:  [99 %-100 %] 100 %  BP: (126-128)/(78-80) 127/78     Weight: 101.5 kg (223 lb 12.3 oz)  Body mass index is 31.21 kg/m².    Physical Exam   Constitutional: He is oriented to person, place, and time. He appears well-developed and well-nourished. No distress.   Chronically ill appearing but not in distress    HENT:   Head: Normocephalic and atraumatic.   Right Ear: External ear normal.   Left Ear: External ear normal.   Nose: Nose normal.   Mouth/Throat: Oropharynx is clear and moist. No oropharyngeal exudate.   Eyes: Pupils are equal, round, and reactive to light. Conjunctivae and EOM are normal. Right eye exhibits no discharge. Left eye exhibits no discharge. No scleral icterus.   Neck: Normal range of motion. Neck supple. No JVD present. No tracheal deviation present. No thyromegaly present.   Cardiovascular: Normal rate, regular rhythm, normal heart sounds and intact distal pulses. Exam reveals no gallop and no friction rub.   No murmur heard.  Pulmonary/Chest: Effort normal and breath sounds normal. No stridor. No respiratory distress. He has no wheezes. He has no rales. He exhibits  no tenderness.   Abdominal: Soft. Bowel sounds are normal. He exhibits distension. He exhibits no mass. There is tenderness (mild diffuse TTP ). A hernia (umbilical hernia with clear yellow ascitic fluid leaking thru small area of skin breakdown ) is present.   Musculoskeletal: Normal range of motion. He exhibits edema (2+ BL LE pitting edema ). He exhibits no tenderness or deformity.   Lymphadenopathy:     He has no cervical adenopathy.   Neurological: He is alert and oriented to person, place, and time. He has normal reflexes. He displays normal reflexes. No cranial nerve deficit. He exhibits normal muscle tone. Coordination normal.   Negative asterixis    Skin: Skin is warm and dry. No rash noted. He is not diaphoretic. No erythema. No pallor.   Psychiatric: He has a normal mood and affect. His behavior is normal. Judgment and thought content normal.         CRANIAL NERVES     CN III, IV, VI   Pupils are equal, round, and reactive to light.  Extraocular motions are normal.       Significant Labs: pending and need follow up    Significant Imaging: I have reviewed and interpreted all pertinent imaging results/findings within the past 24 hours.     EGD (10/18/2019):    Impression:           - Normal examined duodenum.                        - Portal hypertensive gastropathy.                        - 1 cm hiatal hernia. Prior esophageal Non-Bleeding                         banding ulcers seen.                        - No specimens collected.    Assessment/Plan:     Active Diagnoses:    Diagnosis Date Noted POA    PRINCIPAL PROBLEM:  Decompensated hepatic cirrhosis [K72.90] 10/17/2019 Yes    Ascites due to alcoholic cirrhosis [K70.31] 10/17/2019 Yes    Portal hypertensive gastropathy [K76.6, K31.89] 10/17/2019 Yes    Type 2 diabetes mellitus without complication, with long-term current use of insulin [E11.9, Z79.4] 10/17/2019 Not Applicable    Umbilicus discharge [R19.8] 10/17/2019 Yes    Umbilical hernia  without obstruction and without gangrene [K42.9] 10/17/2019 Yes    Chronic back pain [M54.9, G89.29] 07/08/2014 Yes    GERD (gastroesophageal reflux disease) [K21.9] 07/08/2014 Yes    Esophageal varices with bleeding [I85.01] 07/08/2014 Yes    Hepatic encephalopathy [K72.90] 07/08/2014 Yes    HTN (hypertension) [I10] 07/08/2014 Yes    Splenomegaly [R16.1] 07/08/2014 Yes      Problems Resolved During this Admission:     #Umbilical hernia discharge of ascitic fluid   Decompensated ETOH cirrhosis   Ascites   Esophageal varices s/p banding   Hepatic encephalopathy   Portal hypertension   Portal hypertensive gastropathy   Splenomegaly   Thrombocytopenia    -clear yellow ascitic fluid leaking thru umbilical hernia skin defect x 2 days   -fluid collecting inside colostomy bag placed by nurse   -has moderate abdominal distention with ascites and mild diffuse TTP   -no signs of acute abdomen and localized infection around umbilical hernia   -afebrile, no leukocytosis   -no signs of active bleeding   -appears gross fluid overload with BL LE edema and reaccumilaiton of ascites s/p paracentesis 2 weeks ago   -US liver with doppler and consider paracentesis   -check ethanol level and PETH  -monitor meld closely with daily labs   -continue lasix, aldactone, octreotide, rifaxin, nebivolol  -continue protonix and sucralfate for PHG  -consider surgery consult to see if patient will benefit from surgical repair of umbilical hernia defect   -consult hepatology     # Chronic back pain with opioid dependence   -continue home dose norco, gabapentin and flexeril   -morphine IVP prn breakthrough pain     #IDDM2 A1C ~8  -takes lantus 72 U and novolog 24 U TIDWM at home  -will start on levemir 50U and novolog 15 U TIDWM along with moderate SSI   -adjust insulin dose based on CBG    #BPH   -no issue   -continue home dose flomax, proscar and ditropan           VTE Risk Mitigation (From admission, onward)         Ordered     Place JULIETTE  hose  Until discontinued      02/21/20 0533     Place sequential compression device  Until discontinued      02/21/20 0533     IP VTE HIGH RISK PATIENT  Once      02/21/20 0533                  Jennifer Fung DO  Department of Hospital Medicine   [facility]

## 2020-02-21 NOTE — PLAN OF CARE
HOSPITAL MEDICINE PLAN OF CARE    Patient admitted over night. Chart reviewed. Patient seen and evaluated on rounds today.   Patient is feeling well with no acute issues.  Continues to drain ascitic fluid via his umbilicus/ umbilical hernia    IR paracentesis attempted but insufficient ascites identified for safe paracentesis        Plan :  - IV diuretics started  - IV antibiotics started  - liver Transplant surgery has been consulted.  Plan for surgical repair of the hernia with placement of intra-abdominal drain tomorrow  - patient listed for liver transplant on 02/21/2020.  Plan to transfer to LT S service tmrw       Patient's note was created using MModal Dictation.  Any errors in syntax may not have been identified and edited on initial review prior to signing this note.    Signing Physician:     Annika Caro MD  Department of Hospital Medicine   [facility]  Pager [phone]  Spectra [phone]  2/21/2020

## 2020-02-21 NOTE — PLAN OF CARE
(Physician in Lead of Transfers)   Outside Transfer Acceptance Note / Regional Referral Center      Upon patient arrival to floor, please call extension [phone] (if no answer, this will flip to a beeper, so enter your call back number) for Hospital Medicine admit team assignment and for additional admit orders for the patient.  Do not page the attending physician associated with the patient on arrival (this physician may not be on duty at the time of arrival).  Rather, always call [phone] to reach the triage physician for orders and team assignment.      Transferring Physician: Dr. Johnson    Accepting Physician: Mirta Mark MD    Date of Acceptance: 02/20/2020    Transferring Facility: [facility]     Reason for Transfer: needs hepatology and IR      Report from Transferring Physician/Hospital course: The patient is a 58 y/o male with PMH of alcoholic cirrhosis who presented to the above facility with fluid leaking from his umbilical hernia since around noon today. He was admitted a few weeks ago and had a paracentesis with improvement in his symptoms. However he has a skin defect at the sight of his umbilical hernia and started noted leakage of clear yellow fluid today. Per transferring MD, hernia not tender, fluid does not appear infected. Dr. Saucedo with hepatology aware of transfer and would like him admitted to medicine, preferably IML, then IR drainage in the am with possible transfer soon to liver transplant service. He will get a dose of ceftriaxone per Dr. Saucedo's advice. WBC 5K, creatinine of 0.76, Tbili 2.6, INR 1.8 with calculated MELD of 17. Mental status and vitals WNL.       Labs & Radiographs: see transfer records      To Do List:   1) Vitals q15 mins during the 1st hr of arrival then q30 mins during the 2nd hr   2) Telemetry  3) Admit to IML  4) IR consult for paracentesis   4) Hepatology consults in AM and plan to transfer to liver transplant service soon after he is listed       Mirta  MD Deedee  Hospital Medicine Staff

## 2020-02-21 NOTE — PLAN OF CARE
Problem: Adult Inpatient Plan of Care  Goal: Plan of Care Review  Outcome: Ongoing, Progressing   Pt AAOx4, VSS, afebrile. Urostomy bag applied over umbilical hernia due to copious amount of serous drainage. Morphine IV 2mg given for abdominal pain rated 10/10. Pt ambulates independently to restroom. Pt's mom at bedside. POC reviewed with patient. Strict I&O maintained. WCTM.

## 2020-02-22 ENCOUNTER — ANESTHESIA (OUTPATIENT)
Dept: SURGERY | Facility: HOSPITAL | Age: 60
DRG: 354 | End: 2020-02-22
Payer: MEDICARE

## 2020-02-22 PROBLEM — D68.4 ACQUIRED COAGULATION FACTOR DEFICIENCY: Status: ACTIVE | Noted: 2020-02-22

## 2020-02-22 PROBLEM — R19.8 UMBILICUS DISCHARGE: Status: RESOLVED | Noted: 2019-10-17 | Resolved: 2020-02-22

## 2020-02-22 PROBLEM — E87.6 HYPOKALEMIA: Status: ACTIVE | Noted: 2020-02-22

## 2020-02-22 LAB
ALBUMIN SERPL BCP-MCNC: 2.3 G/DL (ref 3.5–5.2)
ALBUMIN SERPL BCP-MCNC: 2.3 G/DL (ref 3.5–5.2)
ALP SERPL-CCNC: 124 U/L (ref 55–135)
ALP SERPL-CCNC: 137 U/L (ref 55–135)
ALT SERPL W/O P-5'-P-CCNC: 23 U/L (ref 10–44)
ALT SERPL W/O P-5'-P-CCNC: 23 U/L (ref 10–44)
ANION GAP SERPL CALC-SCNC: 7 MMOL/L (ref 8–16)
ANION GAP SERPL CALC-SCNC: 8 MMOL/L (ref 8–16)
AST SERPL-CCNC: 52 U/L (ref 10–40)
AST SERPL-CCNC: 55 U/L (ref 10–40)
BASOPHILS # BLD AUTO: 0.04 K/UL (ref 0–0.2)
BASOPHILS # BLD AUTO: 0.05 K/UL (ref 0–0.2)
BASOPHILS NFR BLD: 0.7 % (ref 0–1.9)
BASOPHILS NFR BLD: 1.2 % (ref 0–1.9)
BILIRUB SERPL-MCNC: 2.4 MG/DL (ref 0.1–1)
BILIRUB SERPL-MCNC: 2.6 MG/DL (ref 0.1–1)
BUN SERPL-MCNC: 7 MG/DL (ref 6–20)
BUN SERPL-MCNC: 7 MG/DL (ref 6–20)
CALCIUM SERPL-MCNC: 7.5 MG/DL (ref 8.7–10.5)
CALCIUM SERPL-MCNC: 7.8 MG/DL (ref 8.7–10.5)
CHLORIDE SERPL-SCNC: 103 MMOL/L (ref 95–110)
CHLORIDE SERPL-SCNC: 105 MMOL/L (ref 95–110)
CO2 SERPL-SCNC: 22 MMOL/L (ref 23–29)
CO2 SERPL-SCNC: 27 MMOL/L (ref 23–29)
CREAT SERPL-MCNC: 0.7 MG/DL (ref 0.5–1.4)
CREAT SERPL-MCNC: 0.8 MG/DL (ref 0.5–1.4)
DIFFERENTIAL METHOD: ABNORMAL
DIFFERENTIAL METHOD: ABNORMAL
EOSINOPHIL # BLD AUTO: 0.1 K/UL (ref 0–0.5)
EOSINOPHIL # BLD AUTO: 0.2 K/UL (ref 0–0.5)
EOSINOPHIL NFR BLD: 2.5 % (ref 0–8)
EOSINOPHIL NFR BLD: 2.6 % (ref 0–8)
ERYTHROCYTE [DISTWIDTH] IN BLOOD BY AUTOMATED COUNT: 18.1 % (ref 11.5–14.5)
ERYTHROCYTE [DISTWIDTH] IN BLOOD BY AUTOMATED COUNT: 18.3 % (ref 11.5–14.5)
EST. GFR  (AFRICAN AMERICAN): >60 ML/MIN/1.73 M^2
EST. GFR  (AFRICAN AMERICAN): >60 ML/MIN/1.73 M^2
EST. GFR  (NON AFRICAN AMERICAN): >60 ML/MIN/1.73 M^2
EST. GFR  (NON AFRICAN AMERICAN): >60 ML/MIN/1.73 M^2
GLUCOSE SERPL-MCNC: 115 MG/DL (ref 70–110)
GLUCOSE SERPL-MCNC: 126 MG/DL (ref 70–110)
HCT VFR BLD AUTO: 27.4 % (ref 40–54)
HCT VFR BLD AUTO: 29.2 % (ref 40–54)
HGB BLD-MCNC: 8.1 G/DL (ref 14–18)
HGB BLD-MCNC: 8.7 G/DL (ref 14–18)
IMM GRANULOCYTES # BLD AUTO: 0.02 K/UL (ref 0–0.04)
IMM GRANULOCYTES # BLD AUTO: 0.04 K/UL (ref 0–0.04)
IMM GRANULOCYTES NFR BLD AUTO: 0.5 % (ref 0–0.5)
IMM GRANULOCYTES NFR BLD AUTO: 0.7 % (ref 0–0.5)
INR PPP: 1.6 (ref 0.8–1.2)
LYMPHOCYTES # BLD AUTO: 0.8 K/UL (ref 1–4.8)
LYMPHOCYTES # BLD AUTO: 0.9 K/UL (ref 1–4.8)
LYMPHOCYTES NFR BLD: 15.1 % (ref 18–48)
LYMPHOCYTES NFR BLD: 19.5 % (ref 18–48)
MAGNESIUM SERPL-MCNC: 1.7 MG/DL (ref 1.6–2.6)
MCH RBC QN AUTO: 27.2 PG (ref 27–31)
MCH RBC QN AUTO: 27.2 PG (ref 27–31)
MCHC RBC AUTO-ENTMCNC: 29.6 G/DL (ref 32–36)
MCHC RBC AUTO-ENTMCNC: 29.8 G/DL (ref 32–36)
MCV RBC AUTO: 91 FL (ref 82–98)
MCV RBC AUTO: 92 FL (ref 82–98)
MONOCYTES # BLD AUTO: 0.5 K/UL (ref 0.3–1)
MONOCYTES # BLD AUTO: 0.6 K/UL (ref 0.3–1)
MONOCYTES NFR BLD: 11.1 % (ref 4–15)
MONOCYTES NFR BLD: 9.7 % (ref 4–15)
NEUTROPHILS # BLD AUTO: 2.7 K/UL (ref 1.8–7.7)
NEUTROPHILS # BLD AUTO: 4.3 K/UL (ref 1.8–7.7)
NEUTROPHILS NFR BLD: 65.1 % (ref 38–73)
NEUTROPHILS NFR BLD: 71.3 % (ref 38–73)
NRBC BLD-RTO: 0 /100 WBC
NRBC BLD-RTO: 0 /100 WBC
PHOSPHATE SERPL-MCNC: 3 MG/DL (ref 2.7–4.5)
PLATELET # BLD AUTO: 59 K/UL (ref 150–350)
PLATELET # BLD AUTO: 59 K/UL (ref 150–350)
PMV BLD AUTO: 12 FL (ref 9.2–12.9)
PMV BLD AUTO: 12.3 FL (ref 9.2–12.9)
POCT GLUCOSE: 109 MG/DL (ref 70–110)
POCT GLUCOSE: 110 MG/DL (ref 70–110)
POCT GLUCOSE: 139 MG/DL (ref 70–110)
POCT GLUCOSE: 94 MG/DL (ref 70–110)
POTASSIUM SERPL-SCNC: 2.7 MMOL/L (ref 3.5–5.1)
POTASSIUM SERPL-SCNC: 3.2 MMOL/L (ref 3.5–5.1)
PROT SERPL-MCNC: 5.5 G/DL (ref 6–8.4)
PROT SERPL-MCNC: 5.8 G/DL (ref 6–8.4)
PROTHROMBIN TIME: 15.7 SEC (ref 9–12.5)
RBC # BLD AUTO: 2.98 M/UL (ref 4.6–6.2)
RBC # BLD AUTO: 3.2 M/UL (ref 4.6–6.2)
SODIUM SERPL-SCNC: 135 MMOL/L (ref 136–145)
SODIUM SERPL-SCNC: 137 MMOL/L (ref 136–145)
WBC # BLD AUTO: 4.16 K/UL (ref 3.9–12.7)
WBC # BLD AUTO: 5.97 K/UL (ref 3.9–12.7)

## 2020-02-22 PROCEDURE — 85025 COMPLETE CBC W/AUTO DIFF WBC: CPT | Mod: NTX

## 2020-02-22 PROCEDURE — 63600175 PHARM REV CODE 636 W HCPCS: Mod: NTX | Performed by: STUDENT IN AN ORGANIZED HEALTH CARE EDUCATION/TRAINING PROGRAM

## 2020-02-22 PROCEDURE — 25000003 PHARM REV CODE 250: Mod: NTX | Performed by: NURSE ANESTHETIST, CERTIFIED REGISTERED

## 2020-02-22 PROCEDURE — 37000008 HC ANESTHESIA 1ST 15 MINUTES: Mod: NTX | Performed by: TRANSPLANT SURGERY

## 2020-02-22 PROCEDURE — 63600175 PHARM REV CODE 636 W HCPCS: Mod: NTX

## 2020-02-22 PROCEDURE — 25000003 PHARM REV CODE 250: Mod: NTX | Performed by: HOSPITALIST

## 2020-02-22 PROCEDURE — 82962 GLUCOSE BLOOD TEST: CPT | Mod: NTX | Performed by: TRANSPLANT SURGERY

## 2020-02-22 PROCEDURE — 25000003 PHARM REV CODE 250: Mod: NTX | Performed by: STUDENT IN AN ORGANIZED HEALTH CARE EDUCATION/TRAINING PROGRAM

## 2020-02-22 PROCEDURE — 71000039 HC RECOVERY, EACH ADD'L HOUR: Mod: NTX | Performed by: TRANSPLANT SURGERY

## 2020-02-22 PROCEDURE — 88302 TISSUE EXAM BY PATHOLOGIST: CPT | Mod: NTX | Performed by: PATHOLOGY

## 2020-02-22 PROCEDURE — 88302 PR  SURG PATH,LEVEL II: ICD-10-PCS | Mod: 26,NTX,, | Performed by: PATHOLOGY

## 2020-02-22 PROCEDURE — 99233 SBSQ HOSP IP/OBS HIGH 50: CPT | Mod: NTX,,, | Performed by: PHYSICIAN ASSISTANT

## 2020-02-22 PROCEDURE — 49585 PR REPAIR UMBILICAL HERN,5+Y/O,REDUC: ICD-10-PCS | Mod: NTX,,, | Performed by: TRANSPLANT SURGERY

## 2020-02-22 PROCEDURE — D9220A PRA ANESTHESIA: ICD-10-PCS | Mod: CRNA,NTX,, | Performed by: NURSE ANESTHETIST, CERTIFIED REGISTERED

## 2020-02-22 PROCEDURE — 84100 ASSAY OF PHOSPHORUS: CPT | Mod: NTX

## 2020-02-22 PROCEDURE — 80053 COMPREHEN METABOLIC PANEL: CPT | Mod: NTX

## 2020-02-22 PROCEDURE — D9220A PRA ANESTHESIA: Mod: ANES,NTX,, | Performed by: ANESTHESIOLOGY

## 2020-02-22 PROCEDURE — 88302 TISSUE EXAM BY PATHOLOGIST: CPT | Mod: 26,NTX,, | Performed by: PATHOLOGY

## 2020-02-22 PROCEDURE — 99233 PR SUBSEQUENT HOSPITAL CARE,LEVL III: ICD-10-PCS | Mod: NTX,,, | Performed by: PHYSICIAN ASSISTANT

## 2020-02-22 PROCEDURE — 85610 PROTHROMBIN TIME: CPT | Mod: NTX

## 2020-02-22 PROCEDURE — 71000015 HC POSTOP RECOV 1ST HR: Mod: NTX | Performed by: TRANSPLANT SURGERY

## 2020-02-22 PROCEDURE — 36000707: Mod: NTX | Performed by: TRANSPLANT SURGERY

## 2020-02-22 PROCEDURE — 20600001 HC STEP DOWN PRIVATE ROOM: Mod: NTX

## 2020-02-22 PROCEDURE — D9220A PRA ANESTHESIA: Mod: CRNA,NTX,, | Performed by: NURSE ANESTHETIST, CERTIFIED REGISTERED

## 2020-02-22 PROCEDURE — 37000009 HC ANESTHESIA EA ADD 15 MINS: Mod: NTX | Performed by: TRANSPLANT SURGERY

## 2020-02-22 PROCEDURE — C1729 CATH, DRAINAGE: HCPCS | Mod: NTX | Performed by: TRANSPLANT SURGERY

## 2020-02-22 PROCEDURE — 36000706: Mod: NTX | Performed by: TRANSPLANT SURGERY

## 2020-02-22 PROCEDURE — 63600175 PHARM REV CODE 636 W HCPCS: Mod: NTX | Performed by: HOSPITALIST

## 2020-02-22 PROCEDURE — 63600175 PHARM REV CODE 636 W HCPCS: Mod: NTX | Performed by: PHYSICIAN ASSISTANT

## 2020-02-22 PROCEDURE — 49585 PR REPAIR UMBILICAL HERN,5+Y/O,REDUC: CPT | Mod: NTX,,, | Performed by: TRANSPLANT SURGERY

## 2020-02-22 PROCEDURE — 71000033 HC RECOVERY, INTIAL HOUR: Mod: NTX | Performed by: TRANSPLANT SURGERY

## 2020-02-22 PROCEDURE — D9220A PRA ANESTHESIA: ICD-10-PCS | Mod: ANES,NTX,, | Performed by: ANESTHESIOLOGY

## 2020-02-22 PROCEDURE — 80053 COMPREHEN METABOLIC PANEL: CPT | Mod: 91,NTX

## 2020-02-22 PROCEDURE — 63600175 PHARM REV CODE 636 W HCPCS: Mod: NTX | Performed by: NURSE ANESTHETIST, CERTIFIED REGISTERED

## 2020-02-22 PROCEDURE — 25000003 PHARM REV CODE 250: Mod: NTX | Performed by: PHYSICIAN ASSISTANT

## 2020-02-22 PROCEDURE — 36415 COLL VENOUS BLD VENIPUNCTURE: CPT | Mod: NTX

## 2020-02-22 PROCEDURE — 83735 ASSAY OF MAGNESIUM: CPT | Mod: NTX

## 2020-02-22 RX ORDER — HEPARIN SODIUM 5000 [USP'U]/ML
5000 INJECTION, SOLUTION INTRAVENOUS; SUBCUTANEOUS EVERY 8 HOURS
Status: DISCONTINUED | OUTPATIENT
Start: 2020-02-22 | End: 2020-02-26 | Stop reason: HOSPADM

## 2020-02-22 RX ORDER — FENTANYL CITRATE 50 UG/ML
INJECTION, SOLUTION INTRAMUSCULAR; INTRAVENOUS
Status: DISCONTINUED | OUTPATIENT
Start: 2020-02-22 | End: 2020-02-22

## 2020-02-22 RX ORDER — POTASSIUM CHLORIDE 7.45 MG/ML
10 INJECTION INTRAVENOUS
Status: COMPLETED | OUTPATIENT
Start: 2020-02-22 | End: 2020-02-22

## 2020-02-22 RX ORDER — POTASSIUM CHLORIDE 7.45 MG/ML
10 INJECTION INTRAVENOUS ONCE
Status: DISCONTINUED | OUTPATIENT
Start: 2020-02-22 | End: 2020-02-22

## 2020-02-22 RX ORDER — PROPOFOL 10 MG/ML
VIAL (ML) INTRAVENOUS
Status: DISCONTINUED | OUTPATIENT
Start: 2020-02-22 | End: 2020-02-22

## 2020-02-22 RX ORDER — SODIUM CHLORIDE 9 MG/ML
INJECTION, SOLUTION INTRAVENOUS CONTINUOUS PRN
Status: DISCONTINUED | OUTPATIENT
Start: 2020-02-22 | End: 2020-02-22

## 2020-02-22 RX ORDER — HYDROMORPHONE HYDROCHLORIDE 1 MG/ML
0.2 INJECTION, SOLUTION INTRAMUSCULAR; INTRAVENOUS; SUBCUTANEOUS EVERY 5 MIN PRN
Status: DISCONTINUED | OUTPATIENT
Start: 2020-02-22 | End: 2020-02-22 | Stop reason: HOSPADM

## 2020-02-22 RX ORDER — HYDROCODONE BITARTRATE AND ACETAMINOPHEN 5; 325 MG/1; MG/1
1 TABLET ORAL EVERY 6 HOURS PRN
Status: DISCONTINUED | OUTPATIENT
Start: 2020-02-22 | End: 2020-02-26 | Stop reason: HOSPADM

## 2020-02-22 RX ORDER — LIDOCAINE HYDROCHLORIDE 20 MG/ML
INJECTION INTRAVENOUS
Status: DISCONTINUED | OUTPATIENT
Start: 2020-02-22 | End: 2020-02-22

## 2020-02-22 RX ORDER — SUCCINYLCHOLINE CHLORIDE 20 MG/ML
INJECTION INTRAMUSCULAR; INTRAVENOUS
Status: DISCONTINUED | OUTPATIENT
Start: 2020-02-22 | End: 2020-02-22

## 2020-02-22 RX ORDER — HYDROMORPHONE HYDROCHLORIDE 1 MG/ML
INJECTION, SOLUTION INTRAMUSCULAR; INTRAVENOUS; SUBCUTANEOUS
Status: COMPLETED
Start: 2020-02-22 | End: 2020-02-22

## 2020-02-22 RX ORDER — POTASSIUM CHLORIDE 20 MEQ/1
20 TABLET, EXTENDED RELEASE ORAL ONCE
Status: COMPLETED | OUTPATIENT
Start: 2020-02-22 | End: 2020-02-22

## 2020-02-22 RX ORDER — ROCURONIUM BROMIDE 10 MG/ML
INJECTION, SOLUTION INTRAVENOUS
Status: DISCONTINUED | OUTPATIENT
Start: 2020-02-22 | End: 2020-02-22

## 2020-02-22 RX ORDER — ONDANSETRON 2 MG/ML
INJECTION INTRAMUSCULAR; INTRAVENOUS
Status: DISCONTINUED | OUTPATIENT
Start: 2020-02-22 | End: 2020-02-22

## 2020-02-22 RX ADMIN — LIDOCAINE HYDROCHLORIDE 60 MG: 20 INJECTION, SOLUTION INTRAVENOUS at 08:02

## 2020-02-22 RX ADMIN — GABAPENTIN 600 MG: 300 CAPSULE ORAL at 03:02

## 2020-02-22 RX ADMIN — POTASSIUM CHLORIDE 10 MEQ: 10 INJECTION, SOLUTION INTRAVENOUS at 12:02

## 2020-02-22 RX ADMIN — RIFAXIMIN 550 MG: 550 TABLET ORAL at 11:02

## 2020-02-22 RX ADMIN — INSULIN ASPART 15 UNITS: 100 INJECTION, SOLUTION INTRAVENOUS; SUBCUTANEOUS at 01:02

## 2020-02-22 RX ADMIN — RIFAXIMIN 550 MG: 550 TABLET ORAL at 09:02

## 2020-02-22 RX ADMIN — OXYBUTYNIN CHLORIDE 5 MG: 5 TABLET ORAL at 11:02

## 2020-02-22 RX ADMIN — SUGAMMADEX 200 MG: 100 INJECTION, SOLUTION INTRAVENOUS at 08:02

## 2020-02-22 RX ADMIN — HYDROMORPHONE HYDROCHLORIDE 0.2 MG: 1 INJECTION, SOLUTION INTRAMUSCULAR; INTRAVENOUS; SUBCUTANEOUS at 09:02

## 2020-02-22 RX ADMIN — GABAPENTIN 600 MG: 300 CAPSULE ORAL at 09:02

## 2020-02-22 RX ADMIN — POTASSIUM CHLORIDE 10 MEQ: 10 INJECTION, SOLUTION INTRAVENOUS at 06:02

## 2020-02-22 RX ADMIN — ONDANSETRON 4 MG: 2 INJECTION INTRAMUSCULAR; INTRAVENOUS at 08:02

## 2020-02-22 RX ADMIN — HEPARIN SODIUM 5000 UNITS: 5000 INJECTION, SOLUTION INTRAVENOUS; SUBCUTANEOUS at 03:02

## 2020-02-22 RX ADMIN — CYCLOBENZAPRINE HYDROCHLORIDE 10 MG: 5 TABLET, FILM COATED ORAL at 09:02

## 2020-02-22 RX ADMIN — HEPARIN SODIUM 5000 UNITS: 5000 INJECTION, SOLUTION INTRAVENOUS; SUBCUTANEOUS at 09:02

## 2020-02-22 RX ADMIN — INSULIN DETEMIR 50 UNITS: 100 INJECTION, SOLUTION SUBCUTANEOUS at 09:02

## 2020-02-22 RX ADMIN — AMPICILLIN SODIUM AND SULBACTAM SODIUM 3 G: 2; 1 INJECTION, POWDER, FOR SOLUTION INTRAMUSCULAR; INTRAVENOUS at 05:02

## 2020-02-22 RX ADMIN — LACTULOSE 20 G: 20 SOLUTION ORAL at 09:02

## 2020-02-22 RX ADMIN — PROPOFOL 150 MG: 10 INJECTION, EMULSION INTRAVENOUS at 08:02

## 2020-02-22 RX ADMIN — ROCURONIUM BROMIDE 10 MG: 10 INJECTION, SOLUTION INTRAVENOUS at 08:02

## 2020-02-22 RX ADMIN — FINASTERIDE 5 MG: 5 TABLET, FILM COATED ORAL at 11:02

## 2020-02-22 RX ADMIN — FENTANYL CITRATE 50 MCG: 50 INJECTION, SOLUTION INTRAMUSCULAR; INTRAVENOUS at 08:02

## 2020-02-22 RX ADMIN — AMPICILLIN SODIUM AND SULBACTAM SODIUM 3 G: 2; 1 INJECTION, POWDER, FOR SOLUTION INTRAMUSCULAR; INTRAVENOUS at 06:02

## 2020-02-22 RX ADMIN — ROCURONIUM BROMIDE 20 MG: 10 INJECTION, SOLUTION INTRAVENOUS at 08:02

## 2020-02-22 RX ADMIN — HYDROCODONE BITARTRATE AND ACETAMINOPHEN 1 TABLET: 5; 325 TABLET ORAL at 03:02

## 2020-02-22 RX ADMIN — CYCLOBENZAPRINE HYDROCHLORIDE 10 MG: 5 TABLET, FILM COATED ORAL at 11:02

## 2020-02-22 RX ADMIN — SPIRONOLACTONE 200 MG: 50 TABLET ORAL at 11:02

## 2020-02-22 RX ADMIN — SODIUM CHLORIDE: 0.9 INJECTION, SOLUTION INTRAVENOUS at 07:02

## 2020-02-22 RX ADMIN — POTASSIUM CHLORIDE 10 MEQ: 10 INJECTION, SOLUTION INTRAVENOUS at 01:02

## 2020-02-22 RX ADMIN — HYDROCODONE BITARTRATE AND ACETAMINOPHEN 1 TABLET: 5; 325 TABLET ORAL at 09:02

## 2020-02-22 RX ADMIN — PANTOPRAZOLE SODIUM 40 MG: 40 TABLET, DELAYED RELEASE ORAL at 11:02

## 2020-02-22 RX ADMIN — TAMSULOSIN HYDROCHLORIDE 0.4 MG: 0.4 CAPSULE ORAL at 11:02

## 2020-02-22 RX ADMIN — HYDROCODONE BITARTRATE AND ACETAMINOPHEN 1 TABLET: 5; 325 TABLET ORAL at 10:02

## 2020-02-22 RX ADMIN — POTASSIUM CHLORIDE 10 MEQ: 10 INJECTION, SOLUTION INTRAVENOUS at 03:02

## 2020-02-22 RX ADMIN — ESCITALOPRAM OXALATE 20 MG: 20 TABLET ORAL at 11:02

## 2020-02-22 RX ADMIN — LACTULOSE 20 G: 20 SOLUTION ORAL at 12:02

## 2020-02-22 RX ADMIN — POTASSIUM CHLORIDE 20 MEQ: 1500 TABLET, EXTENDED RELEASE ORAL at 12:02

## 2020-02-22 RX ADMIN — SUCCINYLCHOLINE CHLORIDE 120 MG: 20 INJECTION, SOLUTION INTRAMUSCULAR; INTRAVENOUS at 08:02

## 2020-02-22 RX ADMIN — PANTOPRAZOLE SODIUM 40 MG: 40 TABLET, DELAYED RELEASE ORAL at 09:02

## 2020-02-22 RX ADMIN — FUROSEMIDE 80 MG: 10 INJECTION, SOLUTION INTRAMUSCULAR; INTRAVENOUS at 09:02

## 2020-02-22 RX ADMIN — INSULIN ASPART 15 UNITS: 100 INJECTION, SOLUTION INTRAVENOUS; SUBCUTANEOUS at 06:02

## 2020-02-22 RX ADMIN — AMPICILLIN SODIUM AND SULBACTAM SODIUM 3 G: 2; 1 INJECTION, POWDER, FOR SOLUTION INTRAMUSCULAR; INTRAVENOUS at 10:02

## 2020-02-22 NOTE — ASSESSMENT & PLAN NOTE
- listed for OLTx  - MELD-Na score: 15 at 2/22/2020  5:03 AM  MELD score: 15 at 2/22/2020  5:03 AM  Calculated from:  Serum Creatinine: 0.7 mg/dL (Rounded to 1 mg/dL) at 2/22/2020  5:03 AM  Serum Sodium: 137 mmol/L at 2/22/2020  5:03 AM  Total Bilirubin: 2.6 mg/dL at 2/22/2020  5:03 AM  INR(ratio): 1.6 at 2/22/2020  5:03 AM  Age: 59 years

## 2020-02-22 NOTE — HOSPITAL COURSE
Now s/p umbilical hernia repair 2/22. Tolerated procedure well. Per surgeon (Dr. Brunner), operation uncomplicated. Drain placed and pressure dressing applied to site. Dressing to remain in place for at least 48 hours, per surgeon. Drain to remain x 1 week.     Interval history: No acute events overnight. Continue diuresis. Currently listed with a MELD of 17. Patient backup for liver transplant today. Monitor.

## 2020-02-22 NOTE — ASSESSMENT & PLAN NOTE
- s/p banding in past  - last EGD 10/18/19 with portal hypertensive gastropathy and previously banded ulcers without sign of bleeding  - monitor

## 2020-02-22 NOTE — NURSING
Pt arrived to TSU room 25595. Notified Mariam RAMÍREZ of pt's arrival. Visi placed on pt. VSS, afebrile. Skin intact, except umbilical hernia and DG drain. Mother at bedside.

## 2020-02-22 NOTE — PROGRESS NOTES
[facility]  Liver Transplant  Progress Note    Patient Name: Colin Reilly  MRN: [id number]  Admission Date: 2/21/2020  Hospital Length of Stay: 1 days  Code Status: Full Code  Primary Care Provider: Preston Matthew Ii, MD  Post-Operative Day:     ORGAN:     Disease Etiology: Alcoholic Cirrhosis  Donor Type:     CDC High Risk:     Donor CMV Status:   Donor CMV Status:   Donor HBcAB:     Donor HCV Status:     Donor HBV CEZAR: Organ record is missing.  Donor HCV CEZAR: Organ record is missing.  Whole or Partial:   Biliary Anastomosis:   Arterial Anatomy:   Subjective:     History of Present Illness:  58y/o male, with ESLD alcoholic cirrhosis, DM, HCV s/p INF, presents with an umbilical hernia now draining ascites. He states he has had the hernia about 6 months and it has been draining about 2.5 weeks. He had 2.5L of fluid removed via paracentesis about 2 weeks ago that has since re-accummulated. He also complains of abdominal pain and intermittent nausea but has been tolerating a diet without issue. States he is having normal BMs. He has been listed for liver transplant during this hospitalization.         Interval History: Now s/p umbilical hernia repair 2/22. Tolerated procedure well. Per surgeon (Dr. Brunner), operation uncomplicated. Drain placed and pressure dressing applied to site. Dressing to remain in place for at least 48 hours, per surgeon. Drain to remain x 1 week. K low this AM. Replacing with IV + PO. AM dose of lasix held. Repeating labs at 1800. Currently listed with a MELD of 17. Monitor.    Scheduled Meds:   ampicillin-sulbactam (UNASYN) IVPB 3 g  3 g Intravenous Q8H    cyclobenzaprine  10 mg Oral BID    escitalopram oxalate  20 mg Oral Daily    finasteride  5 mg Oral Daily    furosemide (LASIX) IV  80 mg Intravenous BID    gabapentin  600 mg Oral TID    heparin (porcine)  5,000 Units Subcutaneous Q8H    insulin aspart U-100  15 Units Subcutaneous TIDWM    insulin detemir  U-100  50 Units Subcutaneous QHS    lactulose  20 g Oral TID    oxybutynin  5 mg Oral Daily    pantoprazole  40 mg Oral BID    potassium chloride in water  10 mEq Intravenous Q1H    rifAXIMin  550 mg Oral BID    spironolactone  200 mg Oral Daily    tamsulosin  0.4 mg Oral Daily     Continuous Infusions:  PRN Meds:acetaminophen, Dextrose 10% Bolus, Dextrose 10% Bolus, glucagon (human recombinant), glucose, glucose, HYDROcodone-acetaminophen, insulin aspart U-100, ondansetron, sodium chloride 0.9%    Review of Systems   Constitutional: Negative for appetite change, chills and fever.   HENT: Negative for facial swelling and trouble swallowing.    Respiratory: Negative for cough, shortness of breath and stridor.    Cardiovascular: Positive for leg swelling. Negative for chest pain and palpitations.   Gastrointestinal: Positive for abdominal distention and abdominal pain. Negative for constipation, diarrhea, nausea and vomiting.   Genitourinary: Negative for dysuria and urgency.   Skin: Positive for wound.   Neurological: Negative for dizziness, tremors and light-headedness.   Hematological: Bruises/bleeds easily.   Psychiatric/Behavioral: Negative for agitation, behavioral problems, confusion and decreased concentration.     Objective:     Vital Signs (Most Recent):  Temp: 98.5 °F (36.9 °C) (02/22/20 1048)  Pulse: 84 (02/22/20 1048)  Resp: 14 (02/22/20 1048)  BP: 131/85 (02/22/20 1048)  SpO2: 96 % (02/22/20 1048) Vital Signs (24h Range):  Temp:  [96.5 °F (35.8 °C)-98.6 °F (37 °C)] 98.5 °F (36.9 °C)  Pulse:  [] 84  Resp:  [13-19] 14  SpO2:  [92 %-100 %] 96 %  BP: ()/(52-85) 131/85     Weight: 101.5 kg (223 lb 12.3 oz)  Body mass index is 31.21 kg/m².    Intake/Output - Last 3 Shifts       02/20 0700 - 02/21 0659 02/21 0700 - 02/22 0659 02/22 0700 - 02/23 0659    P.O. 100 480     Total Intake(mL/kg) 100 (1) 480 (4.7)     Urine (mL/kg/hr)  0 (0)     Other 150 850     Stool 0 0     Total Output 150 850      Net -50 -370            Urine Occurrence  4 x     Stool Occurrence 0 x 4 x           Physical Exam   Constitutional: He is oriented to person, place, and time. No distress.   Temporal and distal extremity wasting    HENT:   Head: Normocephalic and atraumatic.   Eyes: EOM are normal. Scleral icterus is present.   Neck: Normal range of motion. Neck supple.   Cardiovascular: Normal rate and regular rhythm.   No murmur heard.  Pulmonary/Chest: Effort normal. No stridor. No respiratory distress. He has no wheezes.   Abdominal: Soft. He exhibits distension. There is tenderness (at hernia repair site). There is no guarding.   Musculoskeletal: He exhibits edema.   Neurological: He is alert and oriented to person, place, and time.   Skin: Skin is warm and dry. Capillary refill takes less than 2 seconds. He is not diaphoretic.   Jaundice     Psychiatric: He has a normal mood and affect. His behavior is normal. Thought content normal.   Nursing note and vitals reviewed.      Laboratory:  Immunosuppressants     None        CBC:   Recent Labs   Lab 02/22/20  0503   WBC 4.16   RBC 2.98*   HGB 8.1*   HCT 27.4*   PLT 59*   MCV 92   MCH 27.2   MCHC 29.6*     CMP:   Recent Labs   Lab 02/22/20  0503   *   CALCIUM 7.5*   ALBUMIN 2.3*   PROT 5.5*      K 2.7*   CO2 27      BUN 7   CREATININE 0.7   ALKPHOS 137*   ALT 23   AST 52*   BILITOT 2.6*       Diagnostic Results:  None    Debility/Functional status: Patient debilitated by evidence of Muscle wasting and atrophy, Weakness, Other malaise, Limitation of activities due to disability and Other reduced mobility. Physical and occupational therapy ordered daily to evaluate and treat. Debility was: present on admission.    Assessment/Plan:     * Umbilical hernia without obstruction and without gangrene  - s/p umbilical hernia repair 2/22, tolerated procedure well.  - Per surgeon (Dr. Brunner), operation uncomplicated.   - Drain placed and pressure dressing applied to  site. Dressing to remain in place for at least 48 hours, per surgeon. Drain to remain x 1 week.   - ADAT  - Monitor    Hypokalemia  - K 2.7 this AM.   - Replacing with IV + PO.   - AM dose of lasix held.   - Repeating labs at 1800.       Acquired coagulation factor deficiency  - d/t ESLD   - expect to improve post transplant  - monitor      Type 2 diabetes mellitus without complication, with long-term current use of insulin  - MDI + SSI   - Monitor      Portal hypertensive gastropathy  - monitor       Ascites due to alcoholic cirrhosis  - Continue diuretics and paracentesis PRN  - Monitor      Decompensated hepatic cirrhosis  - listed for OLTx  - MELD-Na score: 15 at 2/22/2020  5:03 AM  MELD score: 15 at 2/22/2020  5:03 AM  Calculated from:  Serum Creatinine: 0.7 mg/dL (Rounded to 1 mg/dL) at 2/22/2020  5:03 AM  Serum Sodium: 137 mmol/L at 2/22/2020  5:03 AM  Total Bilirubin: 2.6 mg/dL at 2/22/2020  5:03 AM  INR(ratio): 1.6 at 2/22/2020  5:03 AM  Age: 59 years      Chronic back pain  - Continue Norco for pain relief.  - Monitor.      Hepatic encephalopathy  - continue lactulose and rifaximin  - monitor      GERD (gastroesophageal reflux disease)  - Continue Protonix.      Splenomegaly  - due to liver disease  - monitor      Esophageal varices with bleeding  - s/p banding in past  - last EGD 10/18/19 with portal hypertensive gastropathy and previously banded ulcers without sign of bleeding  - monitor         VTE Risk Mitigation (From admission, onward)         Ordered     heparin (porcine) injection 5,000 Units  Every 8 hours      02/22/20 1120     Place JULIETTE hose  Until discontinued      02/21/20 0533     Place sequential compression device  Until discontinued      02/21/20 0533     IP VTE HIGH RISK PATIENT  Once      02/21/20 0533                The patients clinical status was discussed at multidisplinary rounds, involving transplant surgery, transplant medicine, pharmacy, nursing, nutrition, and social  work      Mariam Saenz PA-C  Liver Transplant  [facility]

## 2020-02-22 NOTE — HPI
58y/o male, with ESLD alcoholic cirrhosis, DM, HCV s/p INF, presents with an umbilical hernia now draining ascites. He states he has had the hernia about 6 months and it has been draining about 2.5 weeks. He had 2.5L of fluid removed via paracentesis about 2 weeks ago that has since re-accummulated. He also complains of abdominal pain and intermittent nausea but has been tolerating a diet without issue. States he is having normal BMs. He has been listed for liver transplant during this hospitalization.

## 2020-02-22 NOTE — HOSPITAL COURSE
Underwent US yesterday showing no fluid within his abdomen that could be drained by IR. Transplant consulted for possible surgical intervention. He has put out 750cc from his umbilicus. He was also started on Unasyn for possible SBP with the abdominal pain associated with ascites. He has been stable.

## 2020-02-22 NOTE — TRANSFER OF CARE
"Anesthesia Transfer of Care Note    Patient: Colin Reilly    Procedure(s) Performed: Procedure(s) (LRB):  REPAIR, HERNIA, UMBILICAL, AGE 6 YEARS OR OLDER (N/A)    Patient location: PACU    Anesthesia Type: general    Transport from OR: Transported from OR on 6-10 L/min O2 by face mask with adequate spontaneous ventilation    Post pain: adequate analgesia    Post assessment: no apparent anesthetic complications and tolerated procedure well    Post vital signs: stable    Level of consciousness: awake, alert and oriented    Nausea/Vomiting: no nausea/vomiting    Complications: none    Transfer of care protocol was followed      Last vitals:   Visit Vitals  /64   Pulse 88   Temp 36.8 °C (98.2 °F) (Temporal)   Resp 17   Ht 5' 11" (1.803 m)   Wt 101.5 kg (223 lb 12.3 oz)   SpO2 97%   BMI 31.21 kg/m²     "

## 2020-02-22 NOTE — PLAN OF CARE
Patient AAOx4, VS stable. POC reviewed with patient and his mother, both verbalized understanding. BGS and pain managed well, safety maintained throughout shift, no acute changes. WCTM

## 2020-02-22 NOTE — ANESTHESIA PROCEDURE NOTES
Intubation  Performed by: Lara Leyva CRNA  Authorized by: Andrew Sanchez MD     Intubation:     Induction:  Intravenous    Intubated:  Postinduction    Mask Ventilation:  N/a (RSI)    Attempts:  1    Attempted By:  CRNA    Method of Intubation:  Direct    Blade:  Burr 2    Laryngeal View Grade: Grade I - full view of chords      Difficult Airway Encountered?: No      Complications:  None    Airway Device:  Oral endotracheal tube    Airway Device Size:  7.5    Style/Cuff Inflation:  Cuffed    Inflation Amount (mL):  8    Tube secured:  22    Placement Verified By:  Capnometry and Colorimetric ETCO2 device    Complicating Factors:  None

## 2020-02-22 NOTE — SUBJECTIVE & OBJECTIVE
Scheduled Meds:   ampicillin-sulbactam (UNASYN) IVPB 3 g  3 g Intravenous Q8H    cyclobenzaprine  10 mg Oral BID    escitalopram oxalate  20 mg Oral Daily    finasteride  5 mg Oral Daily    furosemide (LASIX) IV  80 mg Intravenous BID    gabapentin  600 mg Oral TID    heparin (porcine)  5,000 Units Subcutaneous Q8H    insulin aspart U-100  15 Units Subcutaneous TIDWM    insulin detemir U-100  50 Units Subcutaneous QHS    lactulose  20 g Oral TID    oxybutynin  5 mg Oral Daily    pantoprazole  40 mg Oral BID    potassium chloride in water  10 mEq Intravenous Q1H    rifAXIMin  550 mg Oral BID    spironolactone  200 mg Oral Daily    tamsulosin  0.4 mg Oral Daily     Continuous Infusions:  PRN Meds:acetaminophen, Dextrose 10% Bolus, Dextrose 10% Bolus, glucagon (human recombinant), glucose, glucose, HYDROcodone-acetaminophen, insulin aspart U-100, ondansetron, sodium chloride 0.9%    Review of Systems   Constitutional: Negative for appetite change, chills and fever.   HENT: Negative for facial swelling and trouble swallowing.    Respiratory: Negative for cough, shortness of breath and stridor.    Cardiovascular: Positive for leg swelling. Negative for chest pain and palpitations.   Gastrointestinal: Positive for abdominal distention and abdominal pain. Negative for constipation, diarrhea, nausea and vomiting.   Genitourinary: Negative for dysuria and urgency.   Skin: Positive for wound.   Neurological: Negative for dizziness, tremors and light-headedness.   Hematological: Bruises/bleeds easily.   Psychiatric/Behavioral: Negative for agitation, behavioral problems, confusion and decreased concentration.     Objective:     Vital Signs (Most Recent):  Temp: 98.5 °F (36.9 °C) (02/22/20 1048)  Pulse: 84 (02/22/20 1048)  Resp: 14 (02/22/20 1048)  BP: 131/85 (02/22/20 1048)  SpO2: 96 % (02/22/20 1048) Vital Signs (24h Range):  Temp:  [96.5 °F (35.8 °C)-98.6 °F (37 °C)] 98.5 °F (36.9 °C)  Pulse:  []  84  Resp:  [13-19] 14  SpO2:  [92 %-100 %] 96 %  BP: ()/(52-85) 131/85     Weight: 101.5 kg (223 lb 12.3 oz)  Body mass index is 31.21 kg/m².    Intake/Output - Last 3 Shifts       02/20 0700 - 02/21 0659 02/21 0700 - 02/22 0659 02/22 0700 - 02/23 0659    P.O. 100 480     Total Intake(mL/kg) 100 (1) 480 (4.7)     Urine (mL/kg/hr)  0 (0)     Other 150 850     Stool 0 0     Total Output 150 850     Net -50 -370            Urine Occurrence  4 x     Stool Occurrence 0 x 4 x           Physical Exam   Constitutional: He is oriented to person, place, and time. No distress.   Temporal and distal extremity wasting    HENT:   Head: Normocephalic and atraumatic.   Eyes: EOM are normal. Scleral icterus is present.   Neck: Normal range of motion. Neck supple.   Cardiovascular: Normal rate and regular rhythm.   No murmur heard.  Pulmonary/Chest: Effort normal. No stridor. No respiratory distress. He has no wheezes.   Abdominal: Soft. He exhibits distension. There is tenderness (at hernia repair site). There is no guarding.   Musculoskeletal: He exhibits edema.   Neurological: He is alert and oriented to person, place, and time.   Skin: Skin is warm and dry. Capillary refill takes less than 2 seconds. He is not diaphoretic.   Jaundice     Psychiatric: He has a normal mood and affect. His behavior is normal. Thought content normal.   Nursing note and vitals reviewed.      Laboratory:  Immunosuppressants     None        CBC:   Recent Labs   Lab 02/22/20  0503   WBC 4.16   RBC 2.98*   HGB 8.1*   HCT 27.4*   PLT 59*   MCV 92   MCH 27.2   MCHC 29.6*     CMP:   Recent Labs   Lab 02/22/20  0503   *   CALCIUM 7.5*   ALBUMIN 2.3*   PROT 5.5*      K 2.7*   CO2 27      BUN 7   CREATININE 0.7   ALKPHOS 137*   ALT 23   AST 52*   BILITOT 2.6*       Diagnostic Results:  None    Debility/Functional status: Patient debilitated by evidence of Muscle wasting and atrophy, Weakness, Other malaise, Limitation of activities due  to disability and Other reduced mobility. Physical and occupational therapy ordered daily to evaluate and treat. Debility was: present on admission.

## 2020-02-22 NOTE — ASSESSMENT & PLAN NOTE
- s/p umbilical hernia repair 2/22, tolerated procedure well.  - Per surgeon (Dr. Brunner), operation uncomplicated.   - Drain placed and pressure dressing applied to site. Dressing to remain in place for at least 48 hours, per surgeon. Drain to remain x 1 week.   - ADAT  - Monitor

## 2020-02-22 NOTE — ASSESSMENT & PLAN NOTE
- K 2.7 this AM.   - Replacing with IV + PO.   - AM dose of lasix held.   - Repeating labs at 1800.

## 2020-02-22 NOTE — ASSESSMENT & PLAN NOTE
60y/o male, with ESLD from alcoholic cirrhosis, presents with an umbilical hernia draining ascites. He also has diffuse mild abdominal pain on unasyn for possible SBP. US done yesterday did not show enough ascites for drainage. Will plan for umbilical hernia repair this morning with drain placement in the OR. Discussed risks of procedure to include pain, infection, recurrence, bleeding, and injury to surrounding structures. He agrees to proceed.    -NPO since midnight  -OR for umbilical hernia repair and drain placement  -procedure consent and blood consent signed    Denisse Gabriel,   General Surgery   [phone]  2/22/2020 7:59 AM

## 2020-02-22 NOTE — PLAN OF CARE
Problem: Adult Inpatient Plan of Care  Goal: Plan of Care Review  Outcome: Ongoing, Progressing   NPO since midnight for hernia repair today. IV abx administered per order. Hernia still draining copious amount of clear yellow fluid. Morphine IV administered for abdominal pain rated 10/10. VSS. Frequent rounding maintained. No acute changes overnight. WCTM.

## 2020-02-22 NOTE — ANESTHESIA POSTPROCEDURE EVALUATION
Anesthesia Post Evaluation    Patient: Colin Reilly    Procedure(s) Performed: Procedure(s) (LRB):  REPAIR, HERNIA, PRIMARY WIHTOUT MESH AND PLACEMENT OF DRAIN (N/A)    Final Anesthesia Type: general    Patient location during evaluation: PACU  Patient participation: Yes- Able to Participate  Level of consciousness: awake and alert  Post-procedure vital signs: reviewed and stable  Pain management: adequate  Airway patency: patent    PONV status at discharge: No PONV  Anesthetic complications: no      Cardiovascular status: blood pressure returned to baseline  Respiratory status: unassisted  Hydration status: euvolemic  Follow-up not needed.          Vitals Value Taken Time   /80 2/22/2020 11:01 AM   Temp 36.9 °C (98.5 °F) 2/22/2020 10:48 AM   Pulse 81 2/22/2020 11:01 AM   Resp 6 2/22/2020 11:01 AM   SpO2 96 % 2/22/2020 11:01 AM   Vitals shown include unvalidated device data.      No case tracking events are documented in the log.      Pain/Lobito Score: Pain Rating Prior to Med Admin: 7 (2/22/2020  9:12 AM)  Pain Rating Post Med Admin: 3 (2/22/2020  9:56 AM)  Lobito Score: 10 (2/22/2020  9:56 AM)

## 2020-02-22 NOTE — NURSING TRANSFER
Nursing Transfer Note      2/22/2020     Transfer To: [id number]    Transfer via stretcher    Transfer with room air    Transported by PCT    Medicines sent: no    Chart send with patient: Yes    Notified: spouse

## 2020-02-22 NOTE — OP NOTE
DATE OF PROCEDURE: 02/22/2020    PRE-OP DIAGNOSIS: Umbilical Hernia    POST-OP DIAGNOSIS: same.    SURGEON: Dr. Sherri Brunner MD    RESIDENT: Denisse Gabriel MD     PROCEDURE(s):    INDICATION FOR PROCEDURE(s): Mr. Colin Reilly is a 60y/o male with ESLD who presented with an umbilical hernia draining ascites. US showed no large fluid collection that could be drained by IR so we offered surgical repair with drain placement. He agreed to proceed with open umbilical hernia repair with DG drain placement.     PROCEDURE IN DETAIL: The patient was identified in preoperative holding and brought back to the Operating Room. He was placed supine position and padded appropriately. General anesthesia was induced. His abdomen was prepped and draped in the standard sterile surgical fashion. A timeout was performed and all team members present agreed this was the correct procedure on the correct side of the correct patient. We also confirmed administration of appropriate preoperative antibiotics (he has been receiving antibiotics on the floor).     A semi-circular infra-umbilical incision was made and taken down to fascia. He was noted to have a hernia attached to his umbilicus coming through a small fascial defect. The hernia was  from the umbilicus and surrounding fascia using electrocautery. Once we confirmed no bowel was in the hernia, it was transected and sent to pathology. The fascial defect was noted to be about 1.5cm in size. At this time a drain was placed to the left of his umbilicus and oriented down towards the pelvis. It was then sutured in place. It was primary closed with a running #1PDS. Three interrupted sutures of 3-0 vicryl were placed to close the overlying subcutaneous tissue. The skin was then closed with staples. A pressure dressing was placed. The patient was then awakened from general anesthesia having tolerated the procedure well. He was taken to the post-anesthesia care unit in stable  condition. Dr. Brunner was present and scrubbed for the entirety of the case.     ANESTHESIA: General    COMPLICATIONS: none    ESTIMATED BLOOD LOSS: 5mL    SPECIMEN: Hernia sac    Patient mother was called after the case with updates.       Denisse Gabriel MD  Pager: [phone]  General Surgery R[facility]

## 2020-02-22 NOTE — SUBJECTIVE & OBJECTIVE
No current facility-administered medications on file prior to encounter.      Current Outpatient Medications on File Prior to Encounter   Medication Sig    cyclobenzaprine (FLEXERIL) 10 MG tablet Take 10 mg by mouth 2 (two) times daily.     EASY TOUCH INSULIN SYRINGE 1 mL 31 gauge x 5/16 Syrg     ergocalciferol (ERGOCALCIFEROL) 50,000 unit Cap Take 1 capsule (50,000 Units total) by mouth every 7 days.    escitalopram oxalate (LEXAPRO) 20 MG tablet Take 20 mg by mouth once daily.     finasteride (PROSCAR) 5 mg tablet Take 1 tablet (5 mg total) by mouth once daily.    furosemide (LASIX) 40 MG tablet Take 4 tablets (160 mg total) by mouth once daily.    gabapentin (NEURONTIN) 600 MG tablet Take 600 mg by mouth 3 (three) times daily.     HYDROcodone-acetaminophen (NORCO) 5-325 mg per tablet Take 1 tablet by mouth daily as needed (severe pain). (Patient taking differently: Take 1 tablet by mouth every 12 (twelve) hours as needed (severe pain). )    insulin aspart U-100 (NOVOLOG) 100 unit/mL injection Inject 24 Units into the skin 3 (three) times daily before meals.    insulin detemir U-100 (LEVEMIR) 100 unit/mL injection Inject 74 Units into the skin every evening.    lactulose (CHRONULAC) 10 gram/15 mL solution Take 30 mLs by mouth 3 (three) times daily. May take up to  4 to 5 times daily if needed for bowel movements    nebivolol (BYSTOLIC) 10 MG Tab Take 10 mg by mouth once daily.    oxybutynin (DITROPAN) 5 MG Tab Take 5 mg by mouth once daily.    pantoprazole (PROTONIX) 40 MG tablet Take 1 tablet (40 mg total) by mouth 2 (two) times daily. (Patient taking differently: Take 40 mg by mouth once daily. )    rifAXIMin (XIFAXAN) 550 mg Tab Take 1 tablet (550 mg total) by mouth 2 (two) times daily.    spironolactone (ALDACTONE) 100 MG tablet Take 1.5 tablets (150 mg total) by mouth once daily.    sucralfate (CARAFATE) 1 gram tablet Take 1 tablet by mouth 2 (two) times daily with meals.    tamsulosin  (FLOMAX) 0.4 mg Cp24 Take 1 capsule (0.4 mg total) by mouth once daily.   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    Esophageal varices     GERD (gastroesophageal reflux disease)     Hepatic encephalopathy 7/8/2014    Hepatitis C virus infection 07/08/2014    tx with interferon 3-4 mos- stopped for unclear reasons Tattoos-first at age 17 only risk factor (HCVAB negative 08/2017)    HTN (hypertension) 7/8/2014    Hypertension     Insulin dependent diabetes mellitus     Renal mass, left 7/8/2014    6.3 x 5.7 x 5.6 complex mass    Splenomegaly 7/8/2014    Umbilical hernia 7/8/2014     Past Surgical History:   Procedure Laterality Date    CARPAL TUNNEL RELEASE Bilateral     CHOLECYSTECTOMY      lap    COLONOSCOPY N/A 9/8/2017    Procedure: COLONOSCOPY;  Surgeon: Savannah Llanos MD;  Location: [facility];  Service: Endoscopy;  Laterality: N/A;    COLONOSCOPY Left 3/14/2018    Procedure: COLONOSCOPY;  Surgeon: Svaannah Llanos MD;  Location: [facility];  Service: Endoscopy;  Laterality: Left;    ESOPHAGOGASTRODUODENOSCOPY  01/2014    ESOPHAGOGASTRODUODENOSCOPY  02/15/2017    grade II varices    ESOPHAGOGASTRODUODENOSCOPY  04/10/2017    grade I varices    ESOPHAGOGASTRODUODENOSCOPY N/A 10/18/2019    Procedure: EGD (ESOPHAGOGASTRODUODENOSCOPY);  Surgeon: Flavio Escalera MD;  Location: [facility] ([address] [place] [address]);  Service: Endoscopy;  Laterality: N/A;    ESOPHAGOGASTRODUODENOSCOPY W/ BANDING  01/26/2017    grade II varices    HERNIA REPAIR      NECK SURGERY      fusion on C5 and C6    ULNAR NERVE TRANSPOSITION Left     vericose veins removed       Family History     Problem Relation (Age of  Onset)    Alcohol abuse Brother    Cancer Brother    Hyperlipidemia Mother    No Known Problems Father (36), Sister, Sister        Tobacco Use    Smoking status: Former Smoker     Types: Cigarettes     Last attempt to quit: 1/4/2010     Years since quitting: 10.1    Smokeless tobacco: Former User     Types: Chew     Quit date: 2/24/1990    Tobacco comment: former 1 pack/week quit 1/4/2010   Substance and Sexual Activity    Alcohol use: No     Comment: former heavy beer but quit 1/4/2010    Drug use: No    Sexual activity: Never     Comment:      Review of Systems   Constitutional: Negative for appetite change, chills and fever.   HENT: Negative for trouble swallowing.    Respiratory: Negative for shortness of breath.    Cardiovascular: Negative for chest pain.   Gastrointestinal: Positive for abdominal distention and abdominal pain. Negative for constipation, nausea and vomiting.   Genitourinary: Negative for difficulty urinating.   Skin: Positive for wound (umbilical hernia draining ascites).     Objective:     Vital Signs (Most Recent):  Temp: 98.2 °F (36.8 °C) (02/22/20 0700)  Pulse: 88 (02/22/20 0700)  Resp: 17 (02/22/20 0700)  BP: 116/64 (02/22/20 0707)  SpO2: 97 % (02/22/20 0700) Vital Signs (24h Range):  Temp:  [96.5 °F (35.8 °C)-98.3 °F (36.8 °C)] 98.2 °F (36.8 °C)  Pulse:  [] 88  Resp:  [17-18] 17  SpO2:  [92 %-99 %] 97 %  BP: (111-127)/(58-82) 116/64     Weight: 101.5 kg (223 lb 12.3 oz)  Body mass index is 31.21 kg/m².    Physical Exam   Constitutional: No distress.   HENT:   Head: Atraumatic.   Eyes: Pupils are equal, round, and reactive to light.   Cardiovascular: Normal rate.   Pulmonary/Chest: Effort normal. He has no wheezes.   Abdominal: Soft. He exhibits distension (moderately distended). There is tenderness (mildly diffusely tender. Reducible umbilical hernia with staw colored output.). There is no rebound and no guarding.   Musculoskeletal: He exhibits no edema.   Skin: Skin  is warm.   Psychiatric: He has a normal mood and affect.       Significant Labs:  CBC:   Recent Labs   Lab 02/22/20  0503   WBC 4.16   RBC 2.98*   HGB 8.1*   HCT 27.4*   PLT 59*   MCV 92   MCH 27.2   MCHC 29.6*     CMP:   Recent Labs   Lab 02/22/20  0503   *   CALCIUM 7.5*   ALBUMIN 2.3*   PROT 5.5*      K 2.7*   CO2 27      BUN 7   CREATININE 0.7   ALKPHOS 137*   ALT 23   AST 52*   BILITOT 2.6*       Significant Diagnostics:  Impression CT abd/pel 10/17/2019       Cirrhotic liver morphology with stigmata of portal hypertension including massive splenomegaly, small volume ascites, and several large collateral vessels in the abdomen and abdominal wall including gastroesophageal varices.    Partially occlusive thrombus involving the extrahepatic portal vein and SMV, though improved when compared with prior abdominal MRI.    Umbilical hernia containing uncomplicated small bowel, mesenteric fat, and fluid.    Enlarged periportal/peripancreatic lymph nodes, possibly related to underlying chronic liver disease.    Diffuse mesenteric and body wall edema.

## 2020-02-22 NOTE — HPI
60y/o male, with ESLD alcoholic cirrhosis, DM, HCV s/p INF, presents with an umbilical hernia now draining ascites. He had 2.5L of fluid removed via paracentesis about 2 weeks ago that has since re-accummulated. He also complains of abdominal pain and intermittent nausea but has been tolerating a diet without issue. States he is having normal BMs. He has been listed for liver transplant during this hospitalization.

## 2020-02-22 NOTE — H&P
[facility]  General Surgery  History & Physical    Patient Name: Colin Reilly  MRN: [id number]  Admission Date: 2/21/2020  Attending Physician: Blaine Martinez MD   Primary Care Provider: Preston Matthew Ii, MD    Patient information was obtained from patient and past medical records.     Subjective:     Chief Complaint/Reason for Admission: Draining umbilical hernia    History of Present Illness: 58y/o male, with ESLD alcoholic cirrhosis, DM, HCV s/p INF, presents with an umbilical hernia now draining ascites. He states he has had the hernia about 6 months and it has been draining about 2.5 weeks. He had 2.5L of fluid removed via paracentesis about 2 weeks ago that has since re-accummulated. He also complains of abdominal pain and intermittent nausea but has been tolerating a diet without issue. States he is having normal BMs. He has been listed for liver transplant during this hospitalization.       No current facility-administered medications on file prior to encounter.      Current Outpatient Medications on File Prior to Encounter   Medication Sig    cyclobenzaprine (FLEXERIL) 10 MG tablet Take 10 mg by mouth 2 (two) times daily.     EASY TOUCH INSULIN SYRINGE 1 mL 31 gauge x 5/16 Syrg     ergocalciferol (ERGOCALCIFEROL) 50,000 unit Cap Take 1 capsule (50,000 Units total) by mouth every 7 days.    escitalopram oxalate (LEXAPRO) 20 MG tablet Take 20 mg by mouth once daily.     finasteride (PROSCAR) 5 mg tablet Take 1 tablet (5 mg total) by mouth once daily.    furosemide (LASIX) 40 MG tablet Take 4 tablets (160 mg total) by mouth once daily.    gabapentin (NEURONTIN) 600 MG tablet Take 600 mg by mouth 3 (three) times daily.     HYDROcodone-acetaminophen (NORCO) 5-325 mg per tablet Take 1 tablet by mouth daily as needed (severe pain). (Patient taking differently: Take 1 tablet by mouth every 12 (twelve) hours as needed (severe pain). )    insulin aspart U-100 (NOVOLOG) 100 unit/mL  injection Inject 24 Units into the skin 3 (three) times daily before meals.    insulin detemir U-100 (LEVEMIR) 100 unit/mL injection Inject 74 Units into the skin every evening.    lactulose (CHRONULAC) 10 gram/15 mL solution Take 30 mLs by mouth 3 (three) times daily. May take up to  4 to 5 times daily if needed for bowel movements    nebivolol (BYSTOLIC) 10 MG Tab Take 10 mg by mouth once daily.    oxybutynin (DITROPAN) 5 MG Tab Take 5 mg by mouth once daily.    pantoprazole (PROTONIX) 40 MG tablet Take 1 tablet (40 mg total) by mouth 2 (two) times daily. (Patient taking differently: Take 40 mg by mouth once daily. )    rifAXIMin (XIFAXAN) 550 mg Tab Take 1 tablet (550 mg total) by mouth 2 (two) times daily.    spironolactone (ALDACTONE) 100 MG tablet Take 1.5 tablets (150 mg total) by mouth once daily.    sucralfate (CARAFATE) 1 gram tablet Take 1 tablet by mouth 2 (two) times daily with meals.    tamsulosin (FLOMAX) 0.4 mg Cp24 Take 1 capsule (0.4 mg total) by mouth once daily.   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    Esophageal varices     GERD (gastroesophageal reflux disease)     Hepatic encephalopathy 7/8/2014    Hepatitis C virus infection 07/08/2014    tx with interferon 3-4 mos- stopped for unclear reasons Tattoos-first at age 17 only risk factor (HCVAB negative 08/2017)    HTN (hypertension) 7/8/2014    Hypertension     Insulin dependent diabetes mellitus     Renal mass, left 7/8/2014    6.3 x 5.7 x 5.6 complex mass    Splenomegaly 7/8/2014    Umbilical hernia 7/8/2014     Past Surgical History:   Procedure Laterality Date    CARPAL TUNNEL RELEASE Bilateral      CHOLECYSTECTOMY      lap    COLONOSCOPY N/A 9/8/2017    Procedure: COLONOSCOPY;  Surgeon: Savannah Llanos MD;  Location: [facility] ENDO;  Service: Endoscopy;  Laterality: N/A;    COLONOSCOPY Left 3/14/2018    Procedure: COLONOSCOPY;  Surgeon: Savannah Llanos MD;  Location: [facility] ENDO;  Service: Endoscopy;  Laterality: Left;    ESOPHAGOGASTRODUODENOSCOPY  01/2014    ESOPHAGOGASTRODUODENOSCOPY  02/15/2017    grade II varices    ESOPHAGOGASTRODUODENOSCOPY  04/10/2017    grade I varices    ESOPHAGOGASTRODUODENOSCOPY N/A 10/18/2019    Procedure: EGD (ESOPHAGOGASTRODUODENOSCOPY);  Surgeon: Flavio Escalera MD;  Location: [facility] ([address] [place] [address]);  Service: Endoscopy;  Laterality: N/A;    ESOPHAGOGASTRODUODENOSCOPY W/ BANDING  01/26/2017    grade II varices    HERNIA REPAIR      NECK SURGERY      fusion on C5 and C6    ULNAR NERVE TRANSPOSITION Left     vericose veins removed       Family History     Problem Relation (Age of Onset)    Alcohol abuse Brother    Cancer Brother    Hyperlipidemia Mother    No Known Problems Father (36), Sister, Sister        Tobacco Use    Smoking status: Former Smoker     Types: Cigarettes     Last attempt to quit: 1/4/2010     Years since quitting: 10.1    Smokeless tobacco: Former User     Types: Chew     Quit date: 2/24/1990    Tobacco comment: former 1 pack/week quit 1/4/2010   Substance and Sexual Activity    Alcohol use: No     Comment: former heavy beer but quit 1/4/2010    Drug use: No    Sexual activity: Never     Comment:      Review of Systems   Constitutional: Negative for appetite change, chills and fever.   HENT: Negative for trouble swallowing.    Respiratory: Negative for shortness of breath.    Cardiovascular: Negative for chest pain.   Gastrointestinal: Positive for abdominal distention and abdominal pain. Negative for constipation, nausea and vomiting.   Genitourinary: Negative for difficulty urinating.   Skin: Positive for wound (umbilical hernia  draining ascites).     Objective:     Vital Signs (Most Recent):  Temp: 98.2 °F (36.8 °C) (02/22/20 0700)  Pulse: 88 (02/22/20 0700)  Resp: 17 (02/22/20 0700)  BP: 116/64 (02/22/20 0707)  SpO2: 97 % (02/22/20 0700) Vital Signs (24h Range):  Temp:  [96.5 °F (35.8 °C)-98.3 °F (36.8 °C)] 98.2 °F (36.8 °C)  Pulse:  [] 88  Resp:  [17-18] 17  SpO2:  [92 %-99 %] 97 %  BP: (111-127)/(58-82) 116/64     Weight: 101.5 kg (223 lb 12.3 oz)  Body mass index is 31.21 kg/m².    Physical Exam   Constitutional: No distress.   HENT:   Head: Atraumatic.   Eyes: Pupils are equal, round, and reactive to light.   Cardiovascular: Normal rate.   Pulmonary/Chest: Effort normal. He has no wheezes.   Abdominal: Soft. He exhibits distension (moderately distended). There is tenderness (mildly diffusely tender. Reducible umbilical hernia with staw colored output.). There is no rebound and no guarding.   Musculoskeletal: He exhibits no edema.   Skin: Skin is warm.   Psychiatric: He has a normal mood and affect.       Significant Labs:  CBC:   Recent Labs   Lab 02/22/20  0503   WBC 4.16   RBC 2.98*   HGB 8.1*   HCT 27.4*   PLT 59*   MCV 92   MCH 27.2   MCHC 29.6*     CMP:   Recent Labs   Lab 02/22/20  0503   *   CALCIUM 7.5*   ALBUMIN 2.3*   PROT 5.5*      K 2.7*   CO2 27      BUN 7   CREATININE 0.7   ALKPHOS 137*   ALT 23   AST 52*   BILITOT 2.6*       Significant Diagnostics:  Impression CT abd/pel 10/17/2019       Cirrhotic liver morphology with stigmata of portal hypertension including massive splenomegaly, small volume ascites, and several large collateral vessels in the abdomen and abdominal wall including gastroesophageal varices.    Partially occlusive thrombus involving the extrahepatic portal vein and SMV, though improved when compared with prior abdominal MRI.    Umbilical hernia containing uncomplicated small bowel, mesenteric fat, and fluid.    Enlarged periportal/peripancreatic lymph nodes, possibly related to  underlying chronic liver disease.    Diffuse mesenteric and body wall edema.         Assessment/Plan:     Umbilical hernia without obstruction and without gangrene  60y/o male, with ESLD from alcoholic cirrhosis, presents with an umbilical hernia draining ascites. He also has diffuse mild abdominal pain on unasyn for possible SBP. US done yesterday did not show enough ascites for drainage. Will plan for umbilical hernia repair this morning with drain placement in the OR. Discussed risks of procedure to include pain, infection, recurrence, bleeding, and injury to surrounding structures. He agrees to proceed.    -NPO since midnight  -OR for umbilical hernia repair and drain placement  -procedure consent and blood consent signed    [facility]  General Surgery   [phone]  2/22/2020 7:59 AM         VTE Risk Mitigation (From admission, onward)         Ordered     Place JULIETTE hose  Until discontinued      02/21/20 0533     Place sequential compression device  Until discontinued      02/21/20 0533     IP VTE HIGH RISK PATIENT  Once      02/21/20 0533                Denisse Gabriel MD  General Surgery  [facility]

## 2020-02-23 LAB
ALBUMIN SERPL BCP-MCNC: 2.3 G/DL (ref 3.5–5.2)
ALP SERPL-CCNC: 125 U/L (ref 55–135)
ALT SERPL W/O P-5'-P-CCNC: 24 U/L (ref 10–44)
ANION GAP SERPL CALC-SCNC: 9 MMOL/L (ref 8–16)
AST SERPL-CCNC: 51 U/L (ref 10–40)
BASOPHILS # BLD AUTO: 0.02 K/UL (ref 0–0.2)
BASOPHILS NFR BLD: 0.5 % (ref 0–1.9)
BILIRUB SERPL-MCNC: 2.5 MG/DL (ref 0.1–1)
BUN SERPL-MCNC: 6 MG/DL (ref 6–20)
CALCIUM SERPL-MCNC: 7.6 MG/DL (ref 8.7–10.5)
CHLORIDE SERPL-SCNC: 105 MMOL/L (ref 95–110)
CO2 SERPL-SCNC: 26 MMOL/L (ref 23–29)
CREAT SERPL-MCNC: 0.7 MG/DL (ref 0.5–1.4)
DIFFERENTIAL METHOD: ABNORMAL
EOSINOPHIL # BLD AUTO: 0.1 K/UL (ref 0–0.5)
EOSINOPHIL NFR BLD: 1.8 % (ref 0–8)
ERYTHROCYTE [DISTWIDTH] IN BLOOD BY AUTOMATED COUNT: 18.6 % (ref 11.5–14.5)
EST. GFR  (AFRICAN AMERICAN): >60 ML/MIN/1.73 M^2
EST. GFR  (NON AFRICAN AMERICAN): >60 ML/MIN/1.73 M^2
GLUCOSE SERPL-MCNC: 108 MG/DL (ref 70–110)
HCT VFR BLD AUTO: 30.1 % (ref 40–54)
HGB BLD-MCNC: 9.2 G/DL (ref 14–18)
IMM GRANULOCYTES # BLD AUTO: 0.03 K/UL (ref 0–0.04)
IMM GRANULOCYTES NFR BLD AUTO: 0.7 % (ref 0–0.5)
INR PPP: 1.6 (ref 0.8–1.2)
LYMPHOCYTES # BLD AUTO: 0.7 K/UL (ref 1–4.8)
LYMPHOCYTES NFR BLD: 14.9 % (ref 18–48)
MAGNESIUM SERPL-MCNC: 1.6 MG/DL (ref 1.6–2.6)
MCH RBC QN AUTO: 27.5 PG (ref 27–31)
MCHC RBC AUTO-ENTMCNC: 30.6 G/DL (ref 32–36)
MCV RBC AUTO: 90 FL (ref 82–98)
MONOCYTES # BLD AUTO: 0.4 K/UL (ref 0.3–1)
MONOCYTES NFR BLD: 8.2 % (ref 4–15)
NEUTROPHILS # BLD AUTO: 3.2 K/UL (ref 1.8–7.7)
NEUTROPHILS NFR BLD: 73.9 % (ref 38–73)
NRBC BLD-RTO: 0 /100 WBC
PHOSPHATE SERPL-MCNC: 3.2 MG/DL (ref 2.7–4.5)
PLATELET # BLD AUTO: 64 K/UL (ref 150–350)
PMV BLD AUTO: 11.8 FL (ref 9.2–12.9)
POCT GLUCOSE: 115 MG/DL (ref 70–110)
POCT GLUCOSE: 119 MG/DL (ref 70–110)
POCT GLUCOSE: 121 MG/DL (ref 70–110)
POCT GLUCOSE: 121 MG/DL (ref 70–110)
POCT GLUCOSE: 158 MG/DL (ref 70–110)
POTASSIUM SERPL-SCNC: 3.1 MMOL/L (ref 3.5–5.1)
PROT SERPL-MCNC: 5.8 G/DL (ref 6–8.4)
PROTHROMBIN TIME: 15.7 SEC (ref 9–12.5)
RBC # BLD AUTO: 3.35 M/UL (ref 4.6–6.2)
SODIUM SERPL-SCNC: 140 MMOL/L (ref 136–145)
WBC # BLD AUTO: 4.37 K/UL (ref 3.9–12.7)

## 2020-02-23 PROCEDURE — 99223 1ST HOSP IP/OBS HIGH 75: CPT | Mod: NTX,,, | Performed by: INTERNAL MEDICINE

## 2020-02-23 PROCEDURE — P9047 ALBUMIN (HUMAN), 25%, 50ML: HCPCS | Mod: JG,NTX | Performed by: PHYSICIAN ASSISTANT

## 2020-02-23 PROCEDURE — 63600175 PHARM REV CODE 636 W HCPCS: Mod: NTX | Performed by: STUDENT IN AN ORGANIZED HEALTH CARE EDUCATION/TRAINING PROGRAM

## 2020-02-23 PROCEDURE — 25000003 PHARM REV CODE 250: Mod: NTX | Performed by: HOSPITALIST

## 2020-02-23 PROCEDURE — 20600001 HC STEP DOWN PRIVATE ROOM: Mod: NTX

## 2020-02-23 PROCEDURE — 25000003 PHARM REV CODE 250: Mod: NTX | Performed by: STUDENT IN AN ORGANIZED HEALTH CARE EDUCATION/TRAINING PROGRAM

## 2020-02-23 PROCEDURE — 84100 ASSAY OF PHOSPHORUS: CPT | Mod: NTX

## 2020-02-23 PROCEDURE — 99223 PR INITIAL HOSPITAL CARE,LEVL III: ICD-10-PCS | Mod: NTX,,, | Performed by: INTERNAL MEDICINE

## 2020-02-23 PROCEDURE — 36415 COLL VENOUS BLD VENIPUNCTURE: CPT | Mod: NTX

## 2020-02-23 PROCEDURE — 63600175 PHARM REV CODE 636 W HCPCS: Mod: NTX | Performed by: HOSPITALIST

## 2020-02-23 PROCEDURE — 80053 COMPREHEN METABOLIC PANEL: CPT | Mod: NTX

## 2020-02-23 PROCEDURE — 63600175 PHARM REV CODE 636 W HCPCS: Mod: NTX | Performed by: PHYSICIAN ASSISTANT

## 2020-02-23 PROCEDURE — 83735 ASSAY OF MAGNESIUM: CPT | Mod: NTX

## 2020-02-23 PROCEDURE — 85610 PROTHROMBIN TIME: CPT | Mod: NTX

## 2020-02-23 PROCEDURE — 85025 COMPLETE CBC W/AUTO DIFF WBC: CPT | Mod: NTX

## 2020-02-23 PROCEDURE — 25000003 PHARM REV CODE 250: Mod: NTX | Performed by: PHYSICIAN ASSISTANT

## 2020-02-23 RX ORDER — POTASSIUM CHLORIDE 20 MEQ/1
40 TABLET, EXTENDED RELEASE ORAL 2 TIMES DAILY
Status: COMPLETED | OUTPATIENT
Start: 2020-02-23 | End: 2020-02-23

## 2020-02-23 RX ORDER — CIPROFLOXACIN 500 MG/1
500 TABLET ORAL EVERY 12 HOURS
Status: DISCONTINUED | OUTPATIENT
Start: 2020-02-23 | End: 2020-02-26

## 2020-02-23 RX ORDER — ALBUMIN HUMAN 250 G/1000ML
25 SOLUTION INTRAVENOUS EVERY 8 HOURS
Status: COMPLETED | OUTPATIENT
Start: 2020-02-23 | End: 2020-02-24

## 2020-02-23 RX ADMIN — OXYBUTYNIN CHLORIDE 5 MG: 5 TABLET ORAL at 08:02

## 2020-02-23 RX ADMIN — INSULIN ASPART 15 UNITS: 100 INJECTION, SOLUTION INTRAVENOUS; SUBCUTANEOUS at 12:02

## 2020-02-23 RX ADMIN — ALBUMIN (HUMAN) 25 G: 12.5 SOLUTION INTRAVENOUS at 11:02

## 2020-02-23 RX ADMIN — RIFAXIMIN 550 MG: 550 TABLET ORAL at 08:02

## 2020-02-23 RX ADMIN — PANTOPRAZOLE SODIUM 40 MG: 40 TABLET, DELAYED RELEASE ORAL at 08:02

## 2020-02-23 RX ADMIN — TAMSULOSIN HYDROCHLORIDE 0.4 MG: 0.4 CAPSULE ORAL at 08:02

## 2020-02-23 RX ADMIN — GABAPENTIN 600 MG: 300 CAPSULE ORAL at 08:02

## 2020-02-23 RX ADMIN — HYDROCODONE BITARTRATE AND ACETAMINOPHEN 1 TABLET: 5; 325 TABLET ORAL at 12:02

## 2020-02-23 RX ADMIN — AMPICILLIN SODIUM AND SULBACTAM SODIUM 3 G: 2; 1 INJECTION, POWDER, FOR SOLUTION INTRAMUSCULAR; INTRAVENOUS at 06:02

## 2020-02-23 RX ADMIN — INSULIN ASPART 2 UNITS: 100 INJECTION, SOLUTION INTRAVENOUS; SUBCUTANEOUS at 05:02

## 2020-02-23 RX ADMIN — GABAPENTIN 600 MG: 300 CAPSULE ORAL at 03:02

## 2020-02-23 RX ADMIN — HEPARIN SODIUM 5000 UNITS: 5000 INJECTION, SOLUTION INTRAVENOUS; SUBCUTANEOUS at 08:02

## 2020-02-23 RX ADMIN — CYCLOBENZAPRINE HYDROCHLORIDE 10 MG: 5 TABLET, FILM COATED ORAL at 08:02

## 2020-02-23 RX ADMIN — LACTULOSE 20 G: 20 SOLUTION ORAL at 08:02

## 2020-02-23 RX ADMIN — HYDROCODONE BITARTRATE AND ACETAMINOPHEN 1 TABLET: 5; 325 TABLET ORAL at 06:02

## 2020-02-23 RX ADMIN — POTASSIUM CHLORIDE 40 MEQ: 1500 TABLET, FILM COATED, EXTENDED RELEASE ORAL at 09:02

## 2020-02-23 RX ADMIN — LACTULOSE 20 G: 20 SOLUTION ORAL at 03:02

## 2020-02-23 RX ADMIN — ESCITALOPRAM OXALATE 20 MG: 20 TABLET ORAL at 08:02

## 2020-02-23 RX ADMIN — FINASTERIDE 5 MG: 5 TABLET, FILM COATED ORAL at 08:02

## 2020-02-23 RX ADMIN — HYDROCODONE BITARTRATE AND ACETAMINOPHEN 1 TABLET: 5; 325 TABLET ORAL at 08:02

## 2020-02-23 RX ADMIN — FUROSEMIDE 80 MG: 10 INJECTION, SOLUTION INTRAMUSCULAR; INTRAVENOUS at 08:02

## 2020-02-23 RX ADMIN — HEPARIN SODIUM 5000 UNITS: 5000 INJECTION, SOLUTION INTRAVENOUS; SUBCUTANEOUS at 06:02

## 2020-02-23 RX ADMIN — INSULIN DETEMIR 50 UNITS: 100 INJECTION, SOLUTION SUBCUTANEOUS at 08:02

## 2020-02-23 RX ADMIN — SPIRONOLACTONE 200 MG: 50 TABLET ORAL at 08:02

## 2020-02-23 RX ADMIN — POTASSIUM CHLORIDE 40 MEQ: 1500 TABLET, FILM COATED, EXTENDED RELEASE ORAL at 08:02

## 2020-02-23 RX ADMIN — INSULIN ASPART 15 UNITS: 100 INJECTION, SOLUTION INTRAVENOUS; SUBCUTANEOUS at 09:02

## 2020-02-23 RX ADMIN — CIPROFLOXACIN HYDROCHLORIDE 500 MG: 500 TABLET, FILM COATED ORAL at 08:02

## 2020-02-23 RX ADMIN — ALBUMIN (HUMAN) 25 G: 12.5 SOLUTION INTRAVENOUS at 08:02

## 2020-02-23 RX ADMIN — HEPARIN SODIUM 5000 UNITS: 5000 INJECTION, SOLUTION INTRAVENOUS; SUBCUTANEOUS at 03:02

## 2020-02-23 RX ADMIN — INSULIN ASPART 15 UNITS: 100 INJECTION, SOLUTION INTRAVENOUS; SUBCUTANEOUS at 05:02

## 2020-02-23 NOTE — NURSING
Pt educated on the need to measure his urine and to not empty DG. Pt able to repeat back teaching.  Will continue to monitor.

## 2020-02-23 NOTE — PLAN OF CARE
Pt AAOx4, VSS, afebrile. Pt up independently in room and to bathroom, encouraging pt to walk the halls. Incision and DG site continue to leak large amount of serous drainage, exu-dry dressing changed only once this shift. DG refills quickly after emptying, emptying as needed, 850cc out this shift. Albumin given today. Pt with c/o abd pain, Norco given as needed with moderate relief. On regular diet, pt with good appetite. BG monitoring, insulin given as ordered, BG remain within good range. Encouraging pt to measure urine in urinal, and reminding him to not empty DG drain by himself. Plan of care reviewed with pt, pt in bed, bed in lowest position, wheels locked, call light in reach, instructed to call staff for assistance.

## 2020-02-23 NOTE — NURSING
"Pt upset that this nurse allowed him to sleep and did not wake him up every hour when she was in the room emptying his DG drain. Pt was verbally abusive to this nurse stating "your lying you never came back in this room". OC called to bedside to speak to Pt. Pt will be made aware every time his DG is drained. Will continue to monitor.   "

## 2020-02-23 NOTE — PLAN OF CARE
Pt is AAOx3.Pt is ambulatory and independent.Pt able to perform all ADL's without assistance. CBG monitored AC HS.  SS coverage given when appropriate.Family at bedside. Pt is in good spirits.  Pt denies pian and/or discomfort at this time.No breakdown noted, po fluids encouraged.Standard precautions maintained.  Pt turns independently, pt is aware of bony area and pressure reduction positions V/S stable, within normal limits. DG x1. DG filling every 40 to 60 minutes.Pt remains injury and fall free, non skid footwear donned, call light within reach, personal items within reach, bed in low/locked position, pt able to voice needs all needs voiced have been met at this time.

## 2020-02-23 NOTE — PROGRESS NOTES
Liver Transplant Service  Hepatology progress Note        HPI: Mr. Reilly is a 59 y.o. year old male who has a h/o ESLD secondary to chronic hepatitis C.         Interval History: No acute events overnight.         Sub/Obj:   DG drain output - 1.7 L in 24 hour      Labs:  CBC:   Recent Labs   Lab 02/23/20 0718   WBC 4.37   RBC 3.35*   HGB 9.2*   HCT 30.1*   PLT 64*   MCV 90   MCH 27.5   MCHC 30.6*     CMP:   Recent Labs   Lab 02/23/20 0718      CALCIUM 7.6*   ALBUMIN 2.3*   PROT 5.8*      K 3.1*   CO2 26      BUN 6   CREATININE 0.7   ALKPHOS 125   ALT 24   AST 51*   BILITOT 2.5*     Coagulation:   Recent Labs   Lab 02/23/20 0718   INR 1.6*     Labs within the past 24 hours have been reviewed.      MELD-Na score: 15 at 2/23/2020  7:18 AM  MELD score: 15 at 2/23/2020  7:18 AM  Calculated from:  Serum Creatinine: 0.7 mg/dL (Rounded to 1 mg/dL) at 2/23/2020  7:18 AM  Serum Sodium: 140 mmol/L (Rounded to 137 mmol/L) at 2/23/2020  7:18 AM  Total Bilirubin: 2.5 mg/dL at 2/23/2020  7:18 AM  INR(ratio): 1.6 at 2/23/2020  7:18 AM  Age: 59 years      Assessment/Plan:    1. ESLD- Currently listed for  liver. Stable for transplant at this time.     2. Umbilical hernia repair - POD#1, advance diet, keep DG drain for high output, will give IV albumin for replacement/hydration    3. Anemia of chronic disease- H&H stable. Monitor.     4. Hepatic encephalopathy- Cont with Lactulose and Rifaximin for HE control..     5. Electrolytes- hypokalemia - will replete    VTE prophylaxis: Heparin SQ    Julio Milton MD  Staff Physician  Hepatology and Liver Transplant  [facility]  [facility]

## 2020-02-24 ENCOUNTER — TELEPHONE (OUTPATIENT)
Dept: TRANSPLANT | Facility: CLINIC | Age: 60
End: 2020-02-24

## 2020-02-24 LAB
ALBUMIN SERPL BCP-MCNC: 3.1 G/DL (ref 3.5–5.2)
ALP SERPL-CCNC: 107 U/L (ref 55–135)
ALT SERPL W/O P-5'-P-CCNC: 20 U/L (ref 10–44)
ANION GAP SERPL CALC-SCNC: 11 MMOL/L (ref 8–16)
AST SERPL-CCNC: 39 U/L (ref 10–40)
BASOPHILS # BLD AUTO: 0.03 K/UL (ref 0–0.2)
BASOPHILS NFR BLD: 0.7 % (ref 0–1.9)
BILIRUB SERPL-MCNC: 3.3 MG/DL (ref 0.1–1)
BUN SERPL-MCNC: 9 MG/DL (ref 6–20)
CALCIUM SERPL-MCNC: 8.1 MG/DL (ref 8.7–10.5)
CHLORIDE SERPL-SCNC: 104 MMOL/L (ref 95–110)
CO2 SERPL-SCNC: 25 MMOL/L (ref 23–29)
CREAT SERPL-MCNC: 0.8 MG/DL (ref 0.5–1.4)
DIFFERENTIAL METHOD: ABNORMAL
EOSINOPHIL # BLD AUTO: 0.1 K/UL (ref 0–0.5)
EOSINOPHIL NFR BLD: 3.2 % (ref 0–8)
ERYTHROCYTE [DISTWIDTH] IN BLOOD BY AUTOMATED COUNT: 18.9 % (ref 11.5–14.5)
EST. GFR  (AFRICAN AMERICAN): >60 ML/MIN/1.73 M^2
EST. GFR  (NON AFRICAN AMERICAN): >60 ML/MIN/1.73 M^2
GLUCOSE SERPL-MCNC: 88 MG/DL (ref 70–110)
HCT VFR BLD AUTO: 29.8 % (ref 40–54)
HGB BLD-MCNC: 8.9 G/DL (ref 14–18)
IMM GRANULOCYTES # BLD AUTO: 0.02 K/UL (ref 0–0.04)
IMM GRANULOCYTES NFR BLD AUTO: 0.5 % (ref 0–0.5)
INR PPP: 1.7 (ref 0.8–1.2)
LYMPHOCYTES # BLD AUTO: 0.9 K/UL (ref 1–4.8)
LYMPHOCYTES NFR BLD: 22.1 % (ref 18–48)
MAGNESIUM SERPL-MCNC: 1.7 MG/DL (ref 1.6–2.6)
MCH RBC QN AUTO: 27.2 PG (ref 27–31)
MCHC RBC AUTO-ENTMCNC: 29.9 G/DL (ref 32–36)
MCV RBC AUTO: 91 FL (ref 82–98)
MONOCYTES # BLD AUTO: 0.4 K/UL (ref 0.3–1)
MONOCYTES NFR BLD: 10.9 % (ref 4–15)
NEUTROPHILS # BLD AUTO: 2.5 K/UL (ref 1.8–7.7)
NEUTROPHILS NFR BLD: 62.6 % (ref 38–73)
NRBC BLD-RTO: 0 /100 WBC
PHOSPHATE SERPL-MCNC: 2.6 MG/DL (ref 2.7–4.5)
PLATELET # BLD AUTO: 63 K/UL (ref 150–350)
PMV BLD AUTO: 12.2 FL (ref 9.2–12.9)
POCT GLUCOSE: 105 MG/DL (ref 70–110)
POCT GLUCOSE: 127 MG/DL (ref 70–110)
POCT GLUCOSE: 141 MG/DL (ref 70–110)
POCT GLUCOSE: 64 MG/DL (ref 70–110)
POCT GLUCOSE: 92 MG/DL (ref 70–110)
POTASSIUM SERPL-SCNC: 3.1 MMOL/L (ref 3.5–5.1)
PROT SERPL-MCNC: 5.9 G/DL (ref 6–8.4)
PROTHROMBIN TIME: 16.4 SEC (ref 9–12.5)
RBC # BLD AUTO: 3.27 M/UL (ref 4.6–6.2)
SODIUM SERPL-SCNC: 140 MMOL/L (ref 136–145)
WBC # BLD AUTO: 4.03 K/UL (ref 3.9–12.7)

## 2020-02-24 PROCEDURE — 63600175 PHARM REV CODE 636 W HCPCS: Mod: NTX | Performed by: NURSE PRACTITIONER

## 2020-02-24 PROCEDURE — C9399 UNCLASSIFIED DRUGS OR BIOLOG: HCPCS | Mod: NTX | Performed by: NURSE PRACTITIONER

## 2020-02-24 PROCEDURE — 25000003 PHARM REV CODE 250: Mod: NTX | Performed by: PHYSICIAN ASSISTANT

## 2020-02-24 PROCEDURE — 25000003 PHARM REV CODE 250: Mod: NTX | Performed by: STUDENT IN AN ORGANIZED HEALTH CARE EDUCATION/TRAINING PROGRAM

## 2020-02-24 PROCEDURE — 84100 ASSAY OF PHOSPHORUS: CPT | Mod: NTX

## 2020-02-24 PROCEDURE — 63600175 PHARM REV CODE 636 W HCPCS: Mod: NTX | Performed by: PHYSICIAN ASSISTANT

## 2020-02-24 PROCEDURE — 99222 1ST HOSP IP/OBS MODERATE 55: CPT | Mod: NTX,,, | Performed by: NURSE PRACTITIONER

## 2020-02-24 PROCEDURE — 25000003 PHARM REV CODE 250: Mod: NTX | Performed by: HOSPITALIST

## 2020-02-24 PROCEDURE — 25000003 PHARM REV CODE 250: Mod: NTX | Performed by: NURSE PRACTITIONER

## 2020-02-24 PROCEDURE — 80053 COMPREHEN METABOLIC PANEL: CPT | Mod: NTX

## 2020-02-24 PROCEDURE — 83735 ASSAY OF MAGNESIUM: CPT | Mod: NTX

## 2020-02-24 PROCEDURE — P9047 ALBUMIN (HUMAN), 25%, 50ML: HCPCS | Mod: JG,NTX | Performed by: PHYSICIAN ASSISTANT

## 2020-02-24 PROCEDURE — 36415 COLL VENOUS BLD VENIPUNCTURE: CPT | Mod: NTX

## 2020-02-24 PROCEDURE — 85025 COMPLETE CBC W/AUTO DIFF WBC: CPT | Mod: NTX

## 2020-02-24 PROCEDURE — 63600175 PHARM REV CODE 636 W HCPCS: Mod: NTX | Performed by: STUDENT IN AN ORGANIZED HEALTH CARE EDUCATION/TRAINING PROGRAM

## 2020-02-24 PROCEDURE — 99233 SBSQ HOSP IP/OBS HIGH 50: CPT | Mod: NTX,,, | Performed by: NURSE PRACTITIONER

## 2020-02-24 PROCEDURE — 99222 PR INITIAL HOSPITAL CARE,LEVL II: ICD-10-PCS | Mod: NTX,,, | Performed by: NURSE PRACTITIONER

## 2020-02-24 PROCEDURE — 20600001 HC STEP DOWN PRIVATE ROOM: Mod: NTX

## 2020-02-24 PROCEDURE — 99233 PR SUBSEQUENT HOSPITAL CARE,LEVL III: ICD-10-PCS | Mod: NTX,,, | Performed by: NURSE PRACTITIONER

## 2020-02-24 PROCEDURE — 85610 PROTHROMBIN TIME: CPT | Mod: NTX

## 2020-02-24 RX ORDER — INSULIN ASPART 100 [IU]/ML
15 INJECTION, SOLUTION INTRAVENOUS; SUBCUTANEOUS
Status: DISCONTINUED | OUTPATIENT
Start: 2020-02-24 | End: 2020-02-26 | Stop reason: HOSPADM

## 2020-02-24 RX ORDER — GLUCAGON 1 MG
1 KIT INJECTION
Status: DISCONTINUED | OUTPATIENT
Start: 2020-02-24 | End: 2020-02-26 | Stop reason: HOSPADM

## 2020-02-24 RX ORDER — IBUPROFEN 200 MG
24 TABLET ORAL
Status: DISCONTINUED | OUTPATIENT
Start: 2020-02-24 | End: 2020-02-26 | Stop reason: HOSPADM

## 2020-02-24 RX ORDER — SPIRONOLACTONE 50 MG/1
300 TABLET, FILM COATED ORAL DAILY
Status: DISCONTINUED | OUTPATIENT
Start: 2020-02-25 | End: 2020-02-26 | Stop reason: HOSPADM

## 2020-02-24 RX ORDER — BENZONATATE 100 MG/1
100 CAPSULE ORAL 3 TIMES DAILY PRN
Status: DISCONTINUED | OUTPATIENT
Start: 2020-02-24 | End: 2020-02-26 | Stop reason: HOSPADM

## 2020-02-24 RX ORDER — IBUPROFEN 200 MG
16 TABLET ORAL
Status: DISCONTINUED | OUTPATIENT
Start: 2020-02-24 | End: 2020-02-26 | Stop reason: HOSPADM

## 2020-02-24 RX ORDER — POTASSIUM CHLORIDE 20 MEQ/1
40 TABLET, EXTENDED RELEASE ORAL 2 TIMES DAILY
Status: COMPLETED | OUTPATIENT
Start: 2020-02-24 | End: 2020-02-24

## 2020-02-24 RX ORDER — INSULIN ASPART 100 [IU]/ML
1-10 INJECTION, SOLUTION INTRAVENOUS; SUBCUTANEOUS
Status: DISCONTINUED | OUTPATIENT
Start: 2020-02-24 | End: 2020-02-26 | Stop reason: HOSPADM

## 2020-02-24 RX ADMIN — RIFAXIMIN 550 MG: 550 TABLET ORAL at 08:02

## 2020-02-24 RX ADMIN — POTASSIUM CHLORIDE 40 MEQ: 1500 TABLET, EXTENDED RELEASE ORAL at 12:02

## 2020-02-24 RX ADMIN — GABAPENTIN 600 MG: 300 CAPSULE ORAL at 03:02

## 2020-02-24 RX ADMIN — HEPARIN SODIUM 5000 UNITS: 5000 INJECTION, SOLUTION INTRAVENOUS; SUBCUTANEOUS at 05:02

## 2020-02-24 RX ADMIN — PANTOPRAZOLE SODIUM 40 MG: 40 TABLET, DELAYED RELEASE ORAL at 09:02

## 2020-02-24 RX ADMIN — FUROSEMIDE 80 MG: 10 INJECTION, SOLUTION INTRAMUSCULAR; INTRAVENOUS at 08:02

## 2020-02-24 RX ADMIN — CYCLOBENZAPRINE HYDROCHLORIDE 10 MG: 5 TABLET, FILM COATED ORAL at 09:02

## 2020-02-24 RX ADMIN — TAMSULOSIN HYDROCHLORIDE 0.4 MG: 0.4 CAPSULE ORAL at 08:02

## 2020-02-24 RX ADMIN — PANTOPRAZOLE SODIUM 40 MG: 40 TABLET, DELAYED RELEASE ORAL at 08:02

## 2020-02-24 RX ADMIN — HEPARIN SODIUM 5000 UNITS: 5000 INJECTION, SOLUTION INTRAVENOUS; SUBCUTANEOUS at 09:02

## 2020-02-24 RX ADMIN — HYDROCODONE BITARTRATE AND ACETAMINOPHEN 1 TABLET: 5; 325 TABLET ORAL at 06:02

## 2020-02-24 RX ADMIN — BENZONATATE 100 MG: 100 CAPSULE ORAL at 09:02

## 2020-02-24 RX ADMIN — LACTULOSE 20 G: 20 SOLUTION ORAL at 09:02

## 2020-02-24 RX ADMIN — FINASTERIDE 5 MG: 5 TABLET, FILM COATED ORAL at 08:02

## 2020-02-24 RX ADMIN — FUROSEMIDE 80 MG: 10 INJECTION, SOLUTION INTRAMUSCULAR; INTRAVENOUS at 09:02

## 2020-02-24 RX ADMIN — HEPARIN SODIUM 5000 UNITS: 5000 INJECTION, SOLUTION INTRAVENOUS; SUBCUTANEOUS at 03:02

## 2020-02-24 RX ADMIN — GABAPENTIN 600 MG: 300 CAPSULE ORAL at 08:02

## 2020-02-24 RX ADMIN — CIPROFLOXACIN HYDROCHLORIDE 500 MG: 500 TABLET, FILM COATED ORAL at 09:02

## 2020-02-24 RX ADMIN — INSULIN DETEMIR 40 UNITS: 100 INJECTION, SOLUTION SUBCUTANEOUS at 09:02

## 2020-02-24 RX ADMIN — ALBUMIN (HUMAN) 25 G: 12.5 SOLUTION INTRAVENOUS at 05:02

## 2020-02-24 RX ADMIN — GUAIFENESIN AND DEXTROMETHORPHAN HYDROBROMIDE 1 TABLET: 600; 30 TABLET, EXTENDED RELEASE ORAL at 09:02

## 2020-02-24 RX ADMIN — HYDROCODONE BITARTRATE AND ACETAMINOPHEN 1 TABLET: 5; 325 TABLET ORAL at 08:02

## 2020-02-24 RX ADMIN — OXYBUTYNIN CHLORIDE 5 MG: 5 TABLET ORAL at 08:02

## 2020-02-24 RX ADMIN — CYCLOBENZAPRINE HYDROCHLORIDE 10 MG: 5 TABLET, FILM COATED ORAL at 08:02

## 2020-02-24 RX ADMIN — INSULIN ASPART 15 UNITS: 100 INJECTION, SOLUTION INTRAVENOUS; SUBCUTANEOUS at 05:02

## 2020-02-24 RX ADMIN — HYDROCODONE BITARTRATE AND ACETAMINOPHEN 1 TABLET: 5; 325 TABLET ORAL at 12:02

## 2020-02-24 RX ADMIN — GABAPENTIN 600 MG: 300 CAPSULE ORAL at 09:02

## 2020-02-24 RX ADMIN — RIFAXIMIN 550 MG: 550 TABLET ORAL at 09:02

## 2020-02-24 RX ADMIN — ESCITALOPRAM OXALATE 20 MG: 20 TABLET ORAL at 08:02

## 2020-02-24 RX ADMIN — INSULIN ASPART 15 UNITS: 100 INJECTION, SOLUTION INTRAVENOUS; SUBCUTANEOUS at 08:02

## 2020-02-24 RX ADMIN — POTASSIUM CHLORIDE 40 MEQ: 1500 TABLET, EXTENDED RELEASE ORAL at 09:02

## 2020-02-24 RX ADMIN — LACTULOSE 20 G: 20 SOLUTION ORAL at 03:02

## 2020-02-24 RX ADMIN — SPIRONOLACTONE 200 MG: 50 TABLET ORAL at 08:02

## 2020-02-24 RX ADMIN — CIPROFLOXACIN HYDROCHLORIDE 500 MG: 500 TABLET, FILM COATED ORAL at 08:02

## 2020-02-24 RX ADMIN — LACTULOSE 20 G: 20 SOLUTION ORAL at 08:02

## 2020-02-24 NOTE — ASSESSMENT & PLAN NOTE
- listed for OLTx  - MELD-Na score: 17 at 2/24/2020  7:04 AM  MELD score: 17 at 2/24/2020  7:04 AM  Calculated from:  Serum Creatinine: 0.8 mg/dL (Rounded to 1 mg/dL) at 2/24/2020  7:04 AM  Serum Sodium: 140 mmol/L (Rounded to 137 mmol/L) at 2/24/2020  7:04 AM  Total Bilirubin: 3.3 mg/dL at 2/24/2020  7:04 AM  INR(ratio): 1.7 at 2/24/2020  7:04 AM  Age: 59 years

## 2020-02-24 NOTE — ASSESSMENT & PLAN NOTE
- s/p banding in past.  - last EGD 10/18/19 with portal hypertensive gastropathy and previously banded ulcers without sign of bleeding.  - monitor.

## 2020-02-24 NOTE — HPI
Reason for Consult: Management of T2DM, Hyperglycemia     Surgical Procedure and Date: Umbilical hernia repair 2/22/20    Diabetes diagnosis year: 2002    Lab Results   Component Value Date    HGBA1C 7.6 (H) 12/05/2019       Home Diabetes Medications: Levemir 74 units q HS, Novolog 24 units with meals    How often checking glucose at home? 3x per day   BG readings on regimen: 150-200s  Hypoglycemia on the regimen?  No  Missed doses on regimen?  Yes    Diabetes Complications include:     Hyperglycemia and Diabetic peripheral neuropathy     Complicating diabetes co morbidities:   CIRRHOSIS      HPI:   Patient is a 59 y.o. male with a diagnosis of DM2, cirrhosis, and umbilical hernia, who is s/p repair on 2/22/20.  Patient with uncontrolled DM2 on MDI.  Patient's DM managed per his PCP in [place], [place].  Endocrinology consulted for DM/BG management.

## 2020-02-24 NOTE — ASSESSMENT & PLAN NOTE
S/p repair on 2/22/20  Managed per primary team  Avoid hypoglycemia  Optimize BG control for surgical wound healing

## 2020-02-24 NOTE — PLAN OF CARE
"Pt aao x4. He is up independent in room. Call bell within reach. Mother at bedside. Mother stated that she notices that patient is "talking out of his head at times". Pt has been aao x4 every time I am in room with him. CONSTANCE FAUST notified. Pain controlled with prn pain med. Possible d/c on Wednesday. Will continue to monitor.   "

## 2020-02-24 NOTE — PLAN OF CARE
Pt is AAOx3.Pt is ambulatory and independent.Pt able to perform all ADL's without assistance. CBG monitored AC HS.  SS coverage given when appropriate.Pt is in good spirits.  Pt denies pian and/or discomfort at this time.No breakdown noted, po fluids encouraged. Pt forgetful needs to be reminded not to open DG drain. Pt turns independently, pt is aware of bony area and pressure reduction positions V/S stable, within normal limits.Pt remains injury and fall free, non skid footwear donned, call light within reach, personal items within reach, bed in low/locked position, pt able to voice needs all needs voiced have been met at this time.

## 2020-02-24 NOTE — ASSESSMENT & PLAN NOTE
Managed per primary team  Liver listed - awaiting transplant  May affect BG readings  Avoid hypoglycemia

## 2020-02-24 NOTE — ASSESSMENT & PLAN NOTE
BG goal 140 - 180     Decrease Levemir to 40 units q HS  Continue Novolog 15 units with meals  Moderate dose correction scale  BG monitoring AC/HS    Discharge planning: [facility]

## 2020-02-24 NOTE — SUBJECTIVE & OBJECTIVE
Interval HPI:   Overnight events: Remains in TSU.  BG below goal today, mild hypoglycemia noted at lunch.  Eatin%  Nausea: No  Hypoglycemia and intervention: Yes, BG 64 - mealtime insulin held and patient ate lunch  Fever: No  TPN and/or TF: No    PMH, PSH, FH, SH reviewed     Review of Systems    Constitutional: Negative for weight changes.  Eyes: Positive for visual disturbance, reports blurry vision - wears corrective lenses.  Respiratory: Positive for productive cough.   Cardiovascular: Positive for chest pain.  Gastrointestinal: Positive for nausea.  Endocrine: Positive for polyuria, polydipsia.  Musculoskeletal: Positive for chronic back pain.  Skin: Negative for rash.  Neurological: Negative for syncope.  Positive for dizziness.  Psychiatric/Behavioral: Positive for severe depression, reports suicidal ideations in the past but no active thought of suicide or homicide at this time.    Current Medications and/or Treatments Impacting Glycemic Control  Immunotherapy:    Immunosuppressants     None        Steroids:   Hormones (From admission, onward)    None        Pressors:    Autonomic Drugs (From admission, onward)    None        Hyperglycemia/Diabetes Medications:   Antihyperglycemics (From admission, onward)    Start     Stop Route Frequency Ordered    20 2100  insulin detemir U-100 pen 50 Units      -- SubQ Nightly 20 0533    20 0715  insulin aspart U-100 pen 15 Units      -- SubQ 3 times daily with meals 20 0533    20 0632  insulin aspart U-100 pen 1-10 Units      -- SubQ Before meals & nightly PRN 20 0533             PHYSICAL EXAMINATION:  Vitals:    20 1145   BP: 104/68   Pulse: 91   Resp: 12   Temp: 98 °F (36.7 °C)     Body mass index is 30.47 kg/m².    Physical Exam     Constitutional: Well developed, well nourished, NAD.  ENT: External ears no masses with nose patent; normal hearing.  Neck: Supple; trachea midline.  Cardiovascular: Normal heart sounds,  no LE edema. DP +2 bilaterally.  Lungs: Normal effort; lungs anterior bilaterally clear to auscultation.  Abdomen: Soft, distended, no masses, no hernias.  MS: No clubbing or cyanosis of nails noted; unable to assess gait.  Skin: No rashes, lesions, or ulcers; no nodules. Injection sites are ok. No lipo hypertropthy or atrophy.  Midline abdominal incision covered with gauze dressing.  Psychiatric: Good judgement and insight; normal mood and affect.  Neurological: Cranial nerves are grossly intact.  Foot: Nails in fair condition, no amputations noted.

## 2020-02-24 NOTE — ASSESSMENT & PLAN NOTE
- s/p umbilical hernia repair 2/22, tolerated procedure well.  - Per surgeon (Dr. rBunner), operation uncomplicated.   - Drain placed and pressure dressing applied to site. Dressing to remain in place for at least 48 hours, per surgeon. Drain to remain x 1 week.   - ADAT.  - Monitor.

## 2020-02-24 NOTE — PROGRESS NOTES
[facility]  Liver Transplant  Progress Note    Patient Name: Colin Reilly  MRN: [id number]  Admission Date: 2/21/2020  Hospital Length of Stay: 3 days  Code Status: Full Code  Primary Care Provider: Preston Matthew Ii, MD  Post-Operative Day:     ORGAN:     Disease Etiology: Alcoholic Cirrhosis  Donor Type:     CDC High Risk:     Donor CMV Status:   Donor CMV Status:   Donor HBcAB:     Donor HCV Status:     Donor HBV CEZAR: Organ record is missing.  Donor HCV CEZAR: Organ record is missing.  Whole or Partial:   Biliary Anastomosis:   Arterial Anatomy:   Subjective:     History of Present Illness:  60y/o male, with ESLD alcoholic cirrhosis, DM, HCV s/p INF, presents with an umbilical hernia now draining ascites. He states he has had the hernia about 6 months and it has been draining about 2.5 weeks. He had 2.5L of fluid removed via paracentesis about 2 weeks ago that has since re-accummulated. He also complains of abdominal pain and intermittent nausea but has been tolerating a diet without issue. States he is having normal BMs. He has been listed for liver transplant during this hospitalization.           Hospital Course:  Now s/p umbilical hernia repair 2/22. Tolerated procedure well. Per surgeon (Dr. Brunner), operation uncomplicated. Drain placed and pressure dressing applied to site. Dressing to remain in place for at least 48 hours, per surgeon. Drain to remain x 1 week.     Interval history: No acute events overnight. Drain continues to drain a copious amount of fluid, 2.3 L in 24 hrs. Continue lasix bid. K low this AM. Replacing with PO potassium. Increase Spironolactone to 300 mg daily. Currently listed with a MELD of 17. Monitor.    Scheduled Meds:   ciprofloxacin HCl  500 mg Oral Q12H    cyclobenzaprine  10 mg Oral BID    escitalopram oxalate  20 mg Oral Daily    finasteride  5 mg Oral Daily    furosemide (LASIX) IV  80 mg Intravenous BID    gabapentin  600 mg Oral TID    heparin  (porcine)  5,000 Units Subcutaneous Q8H    insulin aspart U-100  15 Units Subcutaneous TIDWM    insulin detemir U-100  50 Units Subcutaneous QHS    lactulose  20 g Oral TID    oxybutynin  5 mg Oral Daily    pantoprazole  40 mg Oral BID    potassium chloride  40 mEq Oral BID    rifAXIMin  550 mg Oral BID    [START ON 2/25/2020] spironolactone  300 mg Oral Daily    tamsulosin  0.4 mg Oral Daily     Continuous Infusions:  PRN Meds:acetaminophen, Dextrose 10% Bolus, Dextrose 10% Bolus, glucagon (human recombinant), glucose, glucose, HYDROcodone-acetaminophen, insulin aspart U-100, ondansetron, sodium chloride 0.9%    Review of Systems   Constitutional: Negative for activity change, appetite change, chills, fatigue and fever.   HENT: Negative for congestion, facial swelling and trouble swallowing.    Eyes: Negative.    Respiratory: Negative for cough, chest tightness, shortness of breath and stridor.    Cardiovascular: Positive for leg swelling. Negative for chest pain and palpitations.   Gastrointestinal: Positive for abdominal distention and abdominal pain. Negative for constipation, diarrhea, nausea and vomiting.   Endocrine: Negative.    Genitourinary: Negative for dysuria and urgency.   Skin: Positive for wound.   Allergic/Immunologic: Negative for immunocompromised state.   Neurological: Negative for dizziness, tremors and light-headedness.   Hematological: Bruises/bleeds easily.   Psychiatric/Behavioral: Negative for agitation, behavioral problems, confusion and decreased concentration.     Objective:     Vital Signs (Most Recent):  Temp: 98.4 °F (36.9 °C) (02/24/20 0731)  Pulse: 91 (02/24/20 1145)  Resp: 12 (02/24/20 1145)  BP: 104/68 (02/24/20 1145)  SpO2: 96 % (02/24/20 1145) Vital Signs (24h Range):  Temp:  [97.9 °F (36.6 °C)-98.6 °F (37 °C)] 98.4 °F (36.9 °C)  Pulse:  [91-97] 91  Resp:  [10-13] 12  SpO2:  [95 %-98 %] 96 %  BP: (104-123)/(66-72) 104/68     Weight: 99.1 kg (218 lb 7.6 oz)  Body mass  index is 30.47 kg/m².    Intake/Output - Last 3 Shifts       02/22 0700 - 02/23 0659 02/23 0700 - 02/24 0659 02/24 0700 - 02/25 0659    P.O. 1560 1560 360    IV Piggyback 500      Total Intake(mL/kg) 2060 (20.7) 1560 (15.7) 360 (3.6)    Urine (mL/kg/hr) 0 (0) 485 (0.2)     Emesis/NG output  0     Drains 1760 2330 140    Other  0     Stool 0 0     Total Output 1760 2815 140    Net +300 -1255 +220           Urine Occurrence 6 x 4 x     Stool Occurrence 1 x 4 x     Emesis Occurrence  0 x           Physical Exam   Constitutional: He is oriented to person, place, and time. No distress.   Temporal and distal extremity wasting    HENT:   Head: Normocephalic and atraumatic.   Eyes: EOM are normal. Scleral icterus is present.   Neck: Normal range of motion. Neck supple.   Cardiovascular: Normal rate and regular rhythm.   No murmur heard.  Pulmonary/Chest: Effort normal. No stridor. No respiratory distress. He has no wheezes.   Abdominal: Soft. He exhibits distension. There is tenderness (at hernia repair site). There is no guarding.   Musculoskeletal: He exhibits edema.   Neurological: He is alert and oriented to person, place, and time.   Skin: Skin is warm and dry. Capillary refill takes less than 2 seconds. He is not diaphoretic.   Jaundice     Psychiatric: He has a normal mood and affect. His behavior is normal. Thought content normal.   Nursing note and vitals reviewed.      Laboratory:  Immunosuppressants     None        CBC:   Recent Labs   Lab 02/24/20  0704   WBC 4.03   RBC 3.27*   HGB 8.9*   HCT 29.8*   PLT 63*   MCV 91   MCH 27.2   MCHC 29.9*     CMP:   Recent Labs   Lab 02/24/20  0704   GLU 88   CALCIUM 8.1*   ALBUMIN 3.1*   PROT 5.9*      K 3.1*   CO2 25      BUN 9   CREATININE 0.8   ALKPHOS 107   ALT 20   AST 39   BILITOT 3.3*     Labs within the past 24 hours have been reviewed.    Diagnostic Results:  I have personally reviewed all pertinent imaging studies.    Debility/Functional status: No  debility noted.    Assessment/Plan:     * Umbilical hernia without obstruction and without gangrene  - s/p umbilical hernia repair 2/22, tolerated procedure well.  - Per surgeon (Dr. Brunner), operation uncomplicated.   - Drain placed and pressure dressing applied to site. Dressing to remain in place for at least 48 hours, per surgeon. Drain to remain x 1 week.   - ADAT.  - Monitor.    Hypokalemia  - Potassium decreased.   - Replacing with PO.   - Monitor.      Acquired coagulation factor deficiency  - d/t ESLD.  - expect to improve post transplant.  - monitor.      Type 2 diabetes mellitus without complication, with long-term current use of insulin  - MDI + SSI.   - monitor.      Portal hypertensive gastropathy  - monitor.      Ascites due to alcoholic cirrhosis  - Continue diuretics and paracentesis PRN.  - Monitor.      Decompensated hepatic cirrhosis  - listed for OLTx  - MELD-Na score: 17 at 2/24/2020  7:04 AM  MELD score: 17 at 2/24/2020  7:04 AM  Calculated from:  Serum Creatinine: 0.8 mg/dL (Rounded to 1 mg/dL) at 2/24/2020  7:04 AM  Serum Sodium: 140 mmol/L (Rounded to 137 mmol/L) at 2/24/2020  7:04 AM  Total Bilirubin: 3.3 mg/dL at 2/24/2020  7:04 AM  INR(ratio): 1.7 at 2/24/2020  7:04 AM  Age: 59 years      Chronic back pain  - Continue Norco for pain relief.  - Monitor.      Hepatic encephalopathy  - continue lactulose and rifaximin.  - monitor.      GERD (gastroesophageal reflux disease)  - Continue Protonix.      Splenomegaly  - due to liver disease.  - monitor.      Esophageal varices with bleeding  - s/p banding in past.  - last EGD 10/18/19 with portal hypertensive gastropathy and previously banded ulcers without sign of bleeding.  - monitor.        VTE Risk Mitigation (From admission, onward)         Ordered     heparin (porcine) injection 5,000 Units  Every 8 hours      02/22/20 1120     Place JULIETTE hose  Until discontinued      02/21/20 0533     Place sequential compression device  Until  discontinued      02/21/20 0533     IP VTE HIGH RISK PATIENT  Once      02/21/20 0533                The patients clinical status was discussed at multidisplinary rounds, involving transplant surgery, transplant medicine, pharmacy, nursing, nutrition, and social work    Discharge Planning: not a candidate for dc at this time.       Denae Patel NP  Liver Transplant  [facility]

## 2020-02-24 NOTE — CONSULTS
[facility]  Endocrinology  Diabetes Consult Note    Consult Requested by: Blaine Martinez MD   Reason for admit: Umbilical hernia without obstruction and without gangrene    HISTORY OF PRESENT ILLNESS:  Reason for Consult: Management of T2DM, Hyperglycemia     Surgical Procedure and Date: Umbilical hernia repair 20    Diabetes diagnosis year:     Lab Results   Component Value Date    HGBA1C 7.6 (H) 2019       Home Diabetes Medications: Levemir 74 units q HS, Novolog 24 units with meals    How often checking glucose at home? 3x per day   BG readings on regimen: 150-200s  Hypoglycemia on the regimen?  No  Missed doses on regimen?  Yes    Diabetes Complications include:     Hyperglycemia and Diabetic peripheral neuropathy     Complicating diabetes co morbidities:   CIRRHOSIS      HPI:   Patient is a 59 y.o. male with a diagnosis of DM2, cirrhosis, and umbilical hernia, who is s/p repair on 20.  Patient with uncontrolled DM2 on MDI.  Patient's DM managed per his PCP in [place], [place].  Endocrinology consulted for DM/BG management.            Interval HPI:   Overnight events: Remains in TSU.  BG below goal today, mild hypoglycemia noted at lunch.  Eatin%  Nausea: No  Hypoglycemia and intervention: Yes, BG 64 - mealtime insulin held and patient ate lunch  Fever: No  TPN and/or TF: No    PMH, PSH, FH, SH reviewed     Review of Systems    Constitutional: Negative for weight changes.  Eyes: Positive for visual disturbance, reports blurry vision - wears corrective lenses.  Respiratory: Positive for productive cough.   Cardiovascular: Positive for chest pain.  Gastrointestinal: Positive for nausea.  Endocrine: Positive for polyuria, polydipsia.  Musculoskeletal: Positive for chronic back pain.  Skin: Negative for rash.  Neurological: Negative for syncope.  Positive for dizziness.  Psychiatric/Behavioral: Positive for severe depression, reports suicidal ideations in the past but no  active thought of suicide or homicide at this time.    Current Medications and/or Treatments Impacting Glycemic Control  Immunotherapy:    Immunosuppressants     None        Steroids:   Hormones (From admission, onward)    None        Pressors:    Autonomic Drugs (From admission, onward)    None        Hyperglycemia/Diabetes Medications:   Antihyperglycemics (From admission, onward)    Start     Stop Route Frequency Ordered    02/21/20 2100  insulin detemir U-100 pen 50 Units      -- SubQ Nightly 02/21/20 0533    02/21/20 0715  insulin aspart U-100 pen 15 Units      -- SubQ 3 times daily with meals 02/21/20 0533    02/21/20 0632  insulin aspart U-100 pen 1-10 Units      -- SubQ Before meals & nightly PRN 02/21/20 0533             PHYSICAL EXAMINATION:  Vitals:    02/24/20 1145   BP: 104/68   Pulse: 91   Resp: 12   Temp: 98 °F (36.7 °C)     Body mass index is 30.47 kg/m².    Physical Exam     Constitutional: Well developed, well nourished, NAD.  ENT: External ears no masses with nose patent; normal hearing.  Neck: Supple; trachea midline.  Cardiovascular: Normal heart sounds, no LE edema. DP +2 bilaterally.  Lungs: Normal effort; lungs anterior bilaterally clear to auscultation.  Abdomen: Soft, distended, no masses, no hernias.  MS: No clubbing or cyanosis of nails noted; unable to assess gait.  Skin: No rashes, lesions, or ulcers; no nodules. Injection sites are ok. No lipo hypertropthy or atrophy.  Midline abdominal incision covered with gauze dressing.  Psychiatric: Good judgement and insight; normal mood and affect.  Neurological: Cranial nerves are grossly intact.  Foot: Nails in fair condition, no amputations noted.      Labs Reviewed and Include   Recent Labs   Lab 02/24/20  0704   GLU 88   CALCIUM 8.1*   ALBUMIN 3.1*   PROT 5.9*      K 3.1*   CO2 25      BUN 9   CREATININE 0.8   ALKPHOS 107   ALT 20   AST 39   BILITOT 3.3*     Lab Results   Component Value Date    WBC 4.03 02/24/2020    HGB 8.9  (L) 02/24/2020    HCT 29.8 (L) 02/24/2020    MCV 91 02/24/2020    PLT 63 (L) 02/24/2020     No results for input(s): TSH, FREET4 in the last 168 hours.  Lab Results   Component Value Date    HGBA1C 7.6 (H) 12/05/2019       Nutritional status:   Body mass index is 30.47 kg/m².  Lab Results   Component Value Date    ALBUMIN 3.1 (L) 02/24/2020    ALBUMIN 2.3 (L) 02/23/2020    ALBUMIN 2.3 (L) 02/22/2020     No results found for: PREALBUMIN    Estimated Creatinine Clearance: 120.7 mL/min (based on SCr of 0.8 mg/dL).    Accu-Checks  Recent Labs     02/22/20  0855 02/22/20  1119 02/22/20  1731 02/22/20  2252 02/23/20  0842 02/23/20  1205 02/23/20  1745 02/23/20  2044 02/24/20  0755 02/24/20  1211   POCTGLUCOSE 94 109 110 119* 115* 121* 158* 121* 92 64*        ASSESSMENT and PLAN    * Umbilical hernia without obstruction and without gangrene  S/p repair on 2/22/20  Managed per primary team  Avoid hypoglycemia  Optimize BG control for surgical wound healing        Type 2 diabetes mellitus without complication, with long-term current use of insulin  BG goal 140 - 180     Decrease Levemir to 40 units q HS  Continue Novolog 15 units with meals  Moderate dose correction scale  BG monitoring AC/HS    Discharge planning: TBD        Decompensated hepatic cirrhosis  Managed per primary team  Liver listed - awaiting transplant  May affect BG readings  Avoid hypoglycemia            Plan discussed with patient and family at bedside.     Zack Villalta NP  Endocrinology  [facility]

## 2020-02-24 NOTE — SUBJECTIVE & OBJECTIVE
Scheduled Meds:   ciprofloxacin HCl  500 mg Oral Q12H    cyclobenzaprine  10 mg Oral BID    escitalopram oxalate  20 mg Oral Daily    finasteride  5 mg Oral Daily    furosemide (LASIX) IV  80 mg Intravenous BID    gabapentin  600 mg Oral TID    heparin (porcine)  5,000 Units Subcutaneous Q8H    insulin aspart U-100  15 Units Subcutaneous TIDWM    insulin detemir U-100  50 Units Subcutaneous QHS    lactulose  20 g Oral TID    oxybutynin  5 mg Oral Daily    pantoprazole  40 mg Oral BID    potassium chloride  40 mEq Oral BID    rifAXIMin  550 mg Oral BID    [START ON 2/25/2020] spironolactone  300 mg Oral Daily    tamsulosin  0.4 mg Oral Daily     Continuous Infusions:  PRN Meds:acetaminophen, Dextrose 10% Bolus, Dextrose 10% Bolus, glucagon (human recombinant), glucose, glucose, HYDROcodone-acetaminophen, insulin aspart U-100, ondansetron, sodium chloride 0.9%    Review of Systems   Constitutional: Negative for activity change, appetite change, chills, fatigue and fever.   HENT: Negative for congestion, facial swelling and trouble swallowing.    Eyes: Negative.    Respiratory: Negative for cough, chest tightness, shortness of breath and stridor.    Cardiovascular: Positive for leg swelling. Negative for chest pain and palpitations.   Gastrointestinal: Positive for abdominal distention and abdominal pain. Negative for constipation, diarrhea, nausea and vomiting.   Endocrine: Negative.    Genitourinary: Negative for dysuria and urgency.   Skin: Positive for wound.   Allergic/Immunologic: Negative for immunocompromised state.   Neurological: Negative for dizziness, tremors and light-headedness.   Hematological: Bruises/bleeds easily.   Psychiatric/Behavioral: Negative for agitation, behavioral problems, confusion and decreased concentration.     Objective:     Vital Signs (Most Recent):  Temp: 98.4 °F (36.9 °C) (02/24/20 0731)  Pulse: 91 (02/24/20 1145)  Resp: 12 (02/24/20 1145)  BP: 104/68 (02/24/20  1145)  SpO2: 96 % (02/24/20 1145) Vital Signs (24h Range):  Temp:  [97.9 °F (36.6 °C)-98.6 °F (37 °C)] 98.4 °F (36.9 °C)  Pulse:  [91-97] 91  Resp:  [10-13] 12  SpO2:  [95 %-98 %] 96 %  BP: (104-123)/(66-72) 104/68     Weight: 99.1 kg (218 lb 7.6 oz)  Body mass index is 30.47 kg/m².    Intake/Output - Last 3 Shifts       02/22 0700 - 02/23 0659 02/23 0700 - 02/24 0659 02/24 0700 - 02/25 0659    P.O. 1560 1560 360    IV Piggyback 500      Total Intake(mL/kg) 2060 (20.7) 1560 (15.7) 360 (3.6)    Urine (mL/kg/hr) 0 (0) 485 (0.2)     Emesis/NG output  0     Drains 1760 2330 140    Other  0     Stool 0 0     Total Output 1760 2815 140    Net +300 -1255 +220           Urine Occurrence 6 x 4 x     Stool Occurrence 1 x 4 x     Emesis Occurrence  0 x           Physical Exam   Constitutional: He is oriented to person, place, and time. No distress.   Temporal and distal extremity wasting    HENT:   Head: Normocephalic and atraumatic.   Eyes: EOM are normal. Scleral icterus is present.   Neck: Normal range of motion. Neck supple.   Cardiovascular: Normal rate and regular rhythm.   No murmur heard.  Pulmonary/Chest: Effort normal. No stridor. No respiratory distress. He has no wheezes.   Abdominal: Soft. He exhibits distension. There is tenderness (at hernia repair site). There is no guarding.   Musculoskeletal: He exhibits edema.   Neurological: He is alert and oriented to person, place, and time.   Skin: Skin is warm and dry. Capillary refill takes less than 2 seconds. He is not diaphoretic.   Jaundice     Psychiatric: He has a normal mood and affect. His behavior is normal. Thought content normal.   Nursing note and vitals reviewed.      Laboratory:  Immunosuppressants     None        CBC:   Recent Labs   Lab 02/24/20  0704   WBC 4.03   RBC 3.27*   HGB 8.9*   HCT 29.8*   PLT 63*   MCV 91   MCH 27.2   MCHC 29.9*     CMP:   Recent Labs   Lab 02/24/20  0704   GLU 88   CALCIUM 8.1*   ALBUMIN 3.1*   PROT 5.9*      K 3.1*    CO2 25      BUN 9   CREATININE 0.8   ALKPHOS 107   ALT 20   AST 39   BILITOT 3.3*     Labs within the past 24 hours have been reviewed.    Diagnostic Results:  I have personally reviewed all pertinent imaging studies.    Debility/Functional status: No debility noted.

## 2020-02-24 NOTE — PROGRESS NOTES
Admit Note     Met with patient and pt's mother, Suzanne, to assess needs. Patient is a 59 y.o.  male, admitted for hernia repair surgery completed on 2/22/20.      Patient admitted as a transfer from [facility] on 2/21/2020 .  At this time, patient presents as alert and oriented x 4, pleasant, good eye contact, well groomed, recall good, concentration/judgement good, average intelligence, calm, communicative and cooperative.  At this time, patients caregiver presents as alert and oriented x 4, pleasant, good eye contact, well groomed, recall good, concentration/judgement good, average intelligence, calm, communicative and cooperative.    Household/Family Systems     Patient resides with patient's mother, at [address]  [place] [address].  Support system includes pt's mother Suzanne.  Patient does not have dependents that are need of being cared for.     Patients primary caregiver is Suzanne Sherwood, patients mother, phone number [phone].  Confirmed patients contact information is [phone] (home);   Telephone Information:   Mobile [phone]   .    During admission, patient's caregiver plans to stay in patient's room.  Confirmed patient and patients caregivers do not have access to reliable transportation. However, pt's mother contacting a family member to assist upon pt's discharge from the hospital setting.    Cognitive Status/Learning     Patient reports reading ability as 12th grade and states patient does have difficulty with memory and vision (does wears prescription glasses).  Patient reports patient learns best by combination of written, verbal and demonstration.   Needed: No.   Highest education level: High School (9-12) or GED    Vocation/Disability   .  Working for Income: No  If no, reason not working: Disability  Patient is disabled due to neck and back injury since 2002.  Prior to disability, patient  was employed as an employer in the [employer]  industry.    Adherence     Patient reports a high level of adherence to patients health care regimen.  Adherence counseling and education provided. Patient verbalizes understanding.    Substance Use    Patient reports the following substance usage.    Tobacco: none, patient denies any use.  Alcohol: none, patient denies any use.  Illicit Drugs/Non-prescribed Medications: none, patient denies any use.  Patient states clear understanding of the potential impact of substance use.  Substance abstinence/cessation counseling, education and resources provided and reviewed.     Services Utilizing/ADLS    Infusion Service: Prior to admission, patient utilizing? no  Home Health: Prior to admission, patient utilizing? no; past () [facility] for PT/SN as a result of a back injury/nect surgery  DME: Prior to admission, yes; cane (when needed)  Pulmonary/Cardiac Rehab: Prior to admission, no  Dialysis:  Prior to admission, no  Transplant Specialty Pharmacy:  Prior to admission, no.    Prior to admission, patient reports patient was independent with ADLS and was driving.  Patient reports patient is not able to care for self at this time due to compromised medical condition (as documented in medical record) and physical weakness..  Patient indicates a willingness to care for self once medically cleared to do so.    Insurance/Medications    Insured by   Payor/Plan Subscr  Sex Relation Sub. Ins. ID Effective Group Num   1. [employer] MANAGE* NEGAR HARDING IDALMIS 1960 Male  [id number] [birth year] [id number]                                   [address] [address]      Primary Insurance (for UNOS reporting): Public Insurance - Medicare FFS (Fee For Service)  Secondary Insurance (for UNOS reporting): None    Patient reports patient is able to obtain and afford medications at this time and at time of discharge.    Living Will/Healthcare Power of     Patient states patient does not have a LW and/or HCPA.    provided education regarding LW and HCPA and the completion of forms.    Coping/Mental Health    Patient endorsed present depressive symptoms to include: tearful episodes and insomnia.  Pt is not presently prescribed any depressive/psychotropic medications; however, inquired about resuming a regimen with Lexapro 20mg as prescribed in the past. SW advised pt's attending provider of the request  Patient indicates mental health difficulties with long standing depression post pt's brother's death and received mental health treatment with [facility] between [phone] (no current treatment).     Discharge Planning    At time of discharge, patient plans to return to patient's home under the care of pt's mother.  Patients mother will contact a family member to assist with transporting the  Patient to their home.  Per rounds today, expected discharge date has not been medically determined at this time. Patient and patients caregiver  verbalize understanding and are involved in treatment planning and discharge process.    Additional Concerns    Patient's caretaker denies additional needs and/or concerns at this time. Patient is being followed for needs, education, resources, information, emotional support, supportive counseling, and for supportive and skilled discharge plan of care.  providing ongoing psychosocial support, education, resources and d/c planning as needed.  SW remains available.  remains available. Patient's caregiver verbalizes understanding and agreement with information reviewed,  availability and how to access available resources as needed. Patient denies additional needs and/or concerns at this time. Patient verbalizes understanding and agreement with information reviewed, social work availability, and how to access available resources as needed.

## 2020-02-25 ENCOUNTER — TELEPHONE (OUTPATIENT)
Dept: TRANSPLANT | Facility: CLINIC | Age: 60
End: 2020-02-25

## 2020-02-25 LAB
ALBUMIN SERPL BCP-MCNC: 2.6 G/DL (ref 3.5–5.2)
ALP SERPL-CCNC: 96 U/L (ref 55–135)
ALT SERPL W/O P-5'-P-CCNC: 16 U/L (ref 10–44)
ANION GAP SERPL CALC-SCNC: 7 MMOL/L (ref 8–16)
AST SERPL-CCNC: 33 U/L (ref 10–40)
BASOPHILS # BLD AUTO: 0.04 K/UL (ref 0–0.2)
BASOPHILS NFR BLD: 1.1 % (ref 0–1.9)
BILIRUB SERPL-MCNC: 2.7 MG/DL (ref 0.1–1)
BUN SERPL-MCNC: 9 MG/DL (ref 6–20)
CALCIUM SERPL-MCNC: 7.7 MG/DL (ref 8.7–10.5)
CHLORIDE SERPL-SCNC: 104 MMOL/L (ref 95–110)
CO2 SERPL-SCNC: 25 MMOL/L (ref 23–29)
CREAT SERPL-MCNC: 0.8 MG/DL (ref 0.5–1.4)
DIFFERENTIAL METHOD: ABNORMAL
EOSINOPHIL # BLD AUTO: 0.1 K/UL (ref 0–0.5)
EOSINOPHIL NFR BLD: 3.7 % (ref 0–8)
ERYTHROCYTE [DISTWIDTH] IN BLOOD BY AUTOMATED COUNT: 19.2 % (ref 11.5–14.5)
EST. GFR  (AFRICAN AMERICAN): >60 ML/MIN/1.73 M^2
EST. GFR  (NON AFRICAN AMERICAN): >60 ML/MIN/1.73 M^2
GLUCOSE SERPL-MCNC: 95 MG/DL (ref 70–110)
HCT VFR BLD AUTO: 29.9 % (ref 40–54)
HGB BLD-MCNC: 9 G/DL (ref 14–18)
IMM GRANULOCYTES # BLD AUTO: 0.02 K/UL (ref 0–0.04)
IMM GRANULOCYTES NFR BLD AUTO: 0.5 % (ref 0–0.5)
INR PPP: 1.7 (ref 0.8–1.2)
LYMPHOCYTES # BLD AUTO: 0.9 K/UL (ref 1–4.8)
LYMPHOCYTES NFR BLD: 22.4 % (ref 18–48)
MAGNESIUM SERPL-MCNC: 1.5 MG/DL (ref 1.6–2.6)
MCH RBC QN AUTO: 27 PG (ref 27–31)
MCHC RBC AUTO-ENTMCNC: 30.1 G/DL (ref 32–36)
MCV RBC AUTO: 90 FL (ref 82–98)
MONOCYTES # BLD AUTO: 0.5 K/UL (ref 0.3–1)
MONOCYTES NFR BLD: 13.5 % (ref 4–15)
NEUTROPHILS # BLD AUTO: 2.2 K/UL (ref 1.8–7.7)
NEUTROPHILS NFR BLD: 58.8 % (ref 38–73)
NRBC BLD-RTO: 0 /100 WBC
PHOSPHATE SERPL-MCNC: 3 MG/DL (ref 2.7–4.5)
PLATELET # BLD AUTO: 65 K/UL (ref 150–350)
PMV BLD AUTO: 12.9 FL (ref 9.2–12.9)
POCT GLUCOSE: 113 MG/DL (ref 70–110)
POCT GLUCOSE: 139 MG/DL (ref 70–110)
POCT GLUCOSE: 142 MG/DL (ref 70–110)
POCT GLUCOSE: 164 MG/DL (ref 70–110)
POTASSIUM SERPL-SCNC: 3.5 MMOL/L (ref 3.5–5.1)
PROT SERPL-MCNC: 5.2 G/DL (ref 6–8.4)
PROTHROMBIN TIME: 16.8 SEC (ref 9–12.5)
RBC # BLD AUTO: 3.33 M/UL (ref 4.6–6.2)
SODIUM SERPL-SCNC: 136 MMOL/L (ref 136–145)
WBC # BLD AUTO: 3.79 K/UL (ref 3.9–12.7)

## 2020-02-25 PROCEDURE — 84100 ASSAY OF PHOSPHORUS: CPT | Mod: NTX

## 2020-02-25 PROCEDURE — 25000003 PHARM REV CODE 250: Mod: NTX | Performed by: PHYSICIAN ASSISTANT

## 2020-02-25 PROCEDURE — 63600175 PHARM REV CODE 636 W HCPCS: Mod: NTX | Performed by: STUDENT IN AN ORGANIZED HEALTH CARE EDUCATION/TRAINING PROGRAM

## 2020-02-25 PROCEDURE — 83735 ASSAY OF MAGNESIUM: CPT | Mod: NTX

## 2020-02-25 PROCEDURE — 63600175 PHARM REV CODE 636 W HCPCS: Mod: JG,NTX | Performed by: NURSE PRACTITIONER

## 2020-02-25 PROCEDURE — 25000003 PHARM REV CODE 250: Mod: NTX | Performed by: NURSE PRACTITIONER

## 2020-02-25 PROCEDURE — 63600175 PHARM REV CODE 636 W HCPCS: Mod: NTX | Performed by: NURSE PRACTITIONER

## 2020-02-25 PROCEDURE — 85025 COMPLETE CBC W/AUTO DIFF WBC: CPT | Mod: NTX

## 2020-02-25 PROCEDURE — 80053 COMPREHEN METABOLIC PANEL: CPT | Mod: NTX

## 2020-02-25 PROCEDURE — 20600001 HC STEP DOWN PRIVATE ROOM: Mod: NTX

## 2020-02-25 PROCEDURE — P9047 ALBUMIN (HUMAN), 25%, 50ML: HCPCS | Mod: JG,NTX | Performed by: NURSE PRACTITIONER

## 2020-02-25 PROCEDURE — 25000003 PHARM REV CODE 250: Mod: NTX | Performed by: HOSPITALIST

## 2020-02-25 PROCEDURE — 85610 PROTHROMBIN TIME: CPT | Mod: NTX

## 2020-02-25 PROCEDURE — 63600175 PHARM REV CODE 636 W HCPCS: Mod: NTX | Performed by: PHYSICIAN ASSISTANT

## 2020-02-25 PROCEDURE — 25000003 PHARM REV CODE 250: Mod: NTX | Performed by: STUDENT IN AN ORGANIZED HEALTH CARE EDUCATION/TRAINING PROGRAM

## 2020-02-25 PROCEDURE — 36415 COLL VENOUS BLD VENIPUNCTURE: CPT | Mod: NTX

## 2020-02-25 RX ORDER — LIDOCAINE HYDROCHLORIDE 10 MG/ML
1 INJECTION INFILTRATION; PERINEURAL ONCE
Status: COMPLETED | OUTPATIENT
Start: 2020-02-25 | End: 2020-02-25

## 2020-02-25 RX ORDER — POTASSIUM CHLORIDE 20 MEQ/1
40 TABLET, EXTENDED RELEASE ORAL ONCE
Status: COMPLETED | OUTPATIENT
Start: 2020-02-25 | End: 2020-02-25

## 2020-02-25 RX ORDER — ALBUMIN HUMAN 250 G/1000ML
25 SOLUTION INTRAVENOUS
Status: COMPLETED | OUTPATIENT
Start: 2020-02-25 | End: 2020-02-26

## 2020-02-25 RX ADMIN — HEPARIN SODIUM 5000 UNITS: 5000 INJECTION, SOLUTION INTRAVENOUS; SUBCUTANEOUS at 09:02

## 2020-02-25 RX ADMIN — INSULIN ASPART 15 UNITS: 100 INJECTION, SOLUTION INTRAVENOUS; SUBCUTANEOUS at 04:02

## 2020-02-25 RX ADMIN — FUROSEMIDE 80 MG: 10 INJECTION, SOLUTION INTRAMUSCULAR; INTRAVENOUS at 09:02

## 2020-02-25 RX ADMIN — FINASTERIDE 5 MG: 5 TABLET, FILM COATED ORAL at 08:02

## 2020-02-25 RX ADMIN — CIPROFLOXACIN HYDROCHLORIDE 500 MG: 500 TABLET, FILM COATED ORAL at 09:02

## 2020-02-25 RX ADMIN — PANTOPRAZOLE SODIUM 40 MG: 40 TABLET, DELAYED RELEASE ORAL at 08:02

## 2020-02-25 RX ADMIN — ALBUMIN (HUMAN) 25 G: 12.5 SOLUTION INTRAVENOUS at 05:02

## 2020-02-25 RX ADMIN — CYCLOBENZAPRINE HYDROCHLORIDE 10 MG: 5 TABLET, FILM COATED ORAL at 08:02

## 2020-02-25 RX ADMIN — HEPARIN SODIUM 5000 UNITS: 5000 INJECTION, SOLUTION INTRAVENOUS; SUBCUTANEOUS at 02:02

## 2020-02-25 RX ADMIN — FUROSEMIDE 80 MG: 10 INJECTION, SOLUTION INTRAMUSCULAR; INTRAVENOUS at 08:02

## 2020-02-25 RX ADMIN — GABAPENTIN 600 MG: 300 CAPSULE ORAL at 08:02

## 2020-02-25 RX ADMIN — ESCITALOPRAM OXALATE 20 MG: 20 TABLET ORAL at 08:02

## 2020-02-25 RX ADMIN — CYCLOBENZAPRINE HYDROCHLORIDE 10 MG: 5 TABLET, FILM COATED ORAL at 09:02

## 2020-02-25 RX ADMIN — LACTULOSE 20 G: 20 SOLUTION ORAL at 09:02

## 2020-02-25 RX ADMIN — OXYBUTYNIN CHLORIDE 5 MG: 5 TABLET ORAL at 08:02

## 2020-02-25 RX ADMIN — CIPROFLOXACIN HYDROCHLORIDE 500 MG: 500 TABLET, FILM COATED ORAL at 08:02

## 2020-02-25 RX ADMIN — PANTOPRAZOLE SODIUM 40 MG: 40 TABLET, DELAYED RELEASE ORAL at 09:02

## 2020-02-25 RX ADMIN — INSULIN ASPART 15 UNITS: 100 INJECTION, SOLUTION INTRAVENOUS; SUBCUTANEOUS at 08:02

## 2020-02-25 RX ADMIN — INSULIN DETEMIR 40 UNITS: 100 INJECTION, SOLUTION SUBCUTANEOUS at 09:02

## 2020-02-25 RX ADMIN — RIFAXIMIN 550 MG: 550 TABLET ORAL at 08:02

## 2020-02-25 RX ADMIN — HYDROCODONE BITARTRATE AND ACETAMINOPHEN 1 TABLET: 5; 325 TABLET ORAL at 08:02

## 2020-02-25 RX ADMIN — INSULIN ASPART 15 UNITS: 100 INJECTION, SOLUTION INTRAVENOUS; SUBCUTANEOUS at 12:02

## 2020-02-25 RX ADMIN — SPIRONOLACTONE 300 MG: 50 TABLET ORAL at 08:02

## 2020-02-25 RX ADMIN — RIFAXIMIN 550 MG: 550 TABLET ORAL at 09:02

## 2020-02-25 RX ADMIN — TAMSULOSIN HYDROCHLORIDE 0.4 MG: 0.4 CAPSULE ORAL at 08:02

## 2020-02-25 RX ADMIN — LACTULOSE 20 G: 20 SOLUTION ORAL at 02:02

## 2020-02-25 RX ADMIN — INSULIN ASPART 1 UNITS: 100 INJECTION, SOLUTION INTRAVENOUS; SUBCUTANEOUS at 09:02

## 2020-02-25 RX ADMIN — GABAPENTIN 600 MG: 300 CAPSULE ORAL at 02:02

## 2020-02-25 RX ADMIN — LACTULOSE 20 G: 20 SOLUTION ORAL at 08:02

## 2020-02-25 RX ADMIN — GABAPENTIN 600 MG: 300 CAPSULE ORAL at 09:02

## 2020-02-25 RX ADMIN — HYDROCODONE BITARTRATE AND ACETAMINOPHEN 1 TABLET: 5; 325 TABLET ORAL at 02:02

## 2020-02-25 RX ADMIN — HYDROCODONE BITARTRATE AND ACETAMINOPHEN 1 TABLET: 5; 325 TABLET ORAL at 09:02

## 2020-02-25 RX ADMIN — LIDOCAINE HYDROCHLORIDE 1 ML: 10 INJECTION, SOLUTION INFILTRATION; PERINEURAL at 10:02

## 2020-02-25 RX ADMIN — POTASSIUM CHLORIDE 40 MEQ: 1500 TABLET, EXTENDED RELEASE ORAL at 11:02

## 2020-02-25 RX ADMIN — ALBUMIN (HUMAN) 25 G: 12.5 SOLUTION INTRAVENOUS at 11:02

## 2020-02-25 NOTE — CARE UPDATE
BG goal 140-180    Remains in TSU, NAEON. BG at or slightly below goal with scheduled insulin.     Plan:  Continue Levemir 40 units HS.   Continue novolog 15 units with meals.   BG monitoring ac/hs and low dose correction scale.     Discharge planning: TBD     Endocrine to continue to follow    ** Please call Endocrine for any BG related issues **

## 2020-02-25 NOTE — PROGRESS NOTES
Liver Transplant   Progress Note        HPI:  Mr. Reilly is a 59 y.o. year old male who is ESLD and listed for liver transplant. Pt now s/p hernia repair.       Interval History: No acute events overnight. Pt reports increased pain to hernia repair site.         Sub/Obj: midline small inc with staples FIDEL.       Labs:  CBC:   Recent Labs   Lab 02/25/20  0631   WBC 3.79*   RBC 3.33*   HGB 9.0*   HCT 29.9*   PLT 65*   MCV 90   MCH 27.0   MCHC 30.1*     CMP:   Recent Labs   Lab 02/25/20  0631   GLU 95   CALCIUM 7.7*   ALBUMIN 2.6*   PROT 5.2*      K 3.5   CO2 25      BUN 9   CREATININE 0.8   ALKPHOS 96   ALT 16   AST 33   BILITOT 2.7*     Coagulation:   Recent Labs   Lab 02/25/20  0631   INR 1.7*     Labs within the past 24 hours have been reviewed.        Assessment/Plan:     1. ESLD 2/2 ETOH.       2. Anemia of chronic disease- H&H stable. Monitor.     3. S/p hernia repair. progressing well post-op.   4. Albumin for drain output replacement.   5. Cont with lasix.     Drain removed:  Site cleaned with alcohol, lidocaine 1% used to numb area to place prolene 3.0 suture to site.  Drain intact at time of removal.  Patient tolerated procedure well.  Will continue to monitor for any complications.            Pt seen, examined and discussed with collaborating staff transplant surgeon.

## 2020-02-25 NOTE — NURSING
DG placed to wall suction per order, 40 cc pulled.  Air leaks eliminated, no additional fluid out.  RN notified CONSTANCE Cline NP, no new orders received.  RN will continue to monitor.

## 2020-02-25 NOTE — NURSING
Pt and mother request to see  asap. Mother states they would like  to arrange transportation after DC. This nurse asked if they would be able to have family come up and get them mother said yes but was hoping to have the hospital take care of it.

## 2020-02-25 NOTE — TELEPHONE ENCOUNTER
PATIENT NAME: Colin RAYGOZA [facility] #: [id number]     [address]    Lab Results   Component Value Date    CREATININE 0.8 02/24/2020     02/24/2020    BILITOT 3.3 (H) 02/24/2020    ALBUMIN 3.1 (L) 02/24/2020    INR 1.7 (H) 02/24/2020       Encephalopathy: 1 - 2  Ascites: none  Dialysis: no     Recertification requestor: Anuj Majano

## 2020-02-25 NOTE — SUBJECTIVE & OBJECTIVE
"Interval HPI:   Overnight events: Remains in TSU. NAEON. BG at or slightly below goal with scheduled insulin. Discharge today to [facility]- may d/c to home pending possible transplant match.   Eatin%  Nausea: No  Hypoglycemia and intervention: No  Fever: No  TPN and/or TF: No      /79 (BP Location: Right arm, Patient Position: Lying)   Pulse 97   Temp 97.6 °F (36.4 °C) (Axillary)   Resp 16   Ht 5' 11" (1.803 m)   Wt 94.5 kg (208 lb 5.4 oz)   SpO2 96%   BMI 29.06 kg/m²     Labs Reviewed and Include    Recent Labs   Lab 20  0631   GLU 95   CALCIUM 7.7*   ALBUMIN 2.6*   PROT 5.2*      K 3.5   CO2 25      BUN 9   CREATININE 0.8   ALKPHOS 96   ALT 16   AST 33   BILITOT 2.7*     Lab Results   Component Value Date    WBC 3.79 (L) 2020    HGB 9.0 (L) 2020    HCT 29.9 (L) 2020    MCV 90 2020    PLT 65 (L) 2020     No results for input(s): TSH, FREET4 in the last 168 hours.  Lab Results   Component Value Date    HGBA1C 7.6 (H) 2019       Nutritional status:   Body mass index is 29.06 kg/m².  Lab Results   Component Value Date    ALBUMIN 2.6 (L) 2020    ALBUMIN 3.1 (L) 2020    ALBUMIN 2.3 (L) 2020     No results found for: PREALBUMIN    Estimated Creatinine Clearance: 118.2 mL/min (based on SCr of 0.8 mg/dL).    Accu-Checks  Recent Labs     20  0842 20  1205 20  1745 20  2044 20  0755 20  1211 20  1309 20  1655 20  2155 20  0753   POCTGLUCOSE 115* 121* 158* 121* 92 64* 105 127* 141* 113*       Current Medications and/or Treatments Impacting Glycemic Control  Immunotherapy:    Immunosuppressants     None        Steroids:   Hormones (From admission, onward)    None        Pressors:    Autonomic Drugs (From admission, onward)    None        Hyperglycemia/Diabetes Medications:   Antihyperglycemics (From admission, onward)    Start     Stop Route Frequency Ordered    20 " 2100  insulin detemir U-100 pen 40 Units      -- SubQ Nightly 02/24/20 1511    02/24/20 1645  insulin aspart U-100 pen 15 Units      -- SubQ 3 times daily with meals 02/24/20 1511    02/24/20 1610  insulin aspart U-100 pen 1-10 Units      -- SubQ Before meals & nightly PRN 02/24/20 1511

## 2020-02-25 NOTE — PLAN OF CARE
Pt is AAOx3.Pt is ambulatory and independent.Pt able to perform all ADL's without assistance. CBG monitored AC HS.  SS coverage given when appropriate.Pt is in good spirits.  Mentation is clear. Cough loose productive, greenish sputum noted prn medications given. Pt denies pian and/or discomfort at this time.No breakdown noted, po fluids encouraged.Standard precautions maintained.  Pt turns independently, pt is aware of bony area and pressure reduction positions V/S stable, within normal limits.Pt remains injury and fall free, non skid footwear donned, call light within reach, personal items within reach, bed in low/locked position, pt able to voice needs all needs voiced have been met at this time.

## 2020-02-25 NOTE — ASSESSMENT & PLAN NOTE
BG goal 140 - 180     Decrease Levemir to 32 units q HS  Continue Novolog 15 units with meals?  Moderate dose correction scale  BG monitoring AC/HS    Discharge planning: Levemir 25 units HS tonight and novolog 15 units with meals until NPO. Once home and resuming regular diet- Levemir 50 units HS and novolog 24 units with meals plus correction scale 180/25. BG logs given. S/sx and treatment of hypogylcemia reviewed. Patient to notify PCP if BG trends up with reduced basal insulin.        Vaccine Information Statement    Influenza (Flu) Vaccine (Inactivated or Recombinant): What You Need to Know    Many Vaccine Information Statements are available in Telugu and other languages. See www.immunize.org/vis  Hojas de información sobre vacunas están disponibles en español y en muchos otros idiomas. Visite www.immunize.org/vis    1. Why get vaccinated? Influenza vaccine can prevent influenza (flu). Flu is a contagious disease that spreads around the [facility] every year, usually between October and May. Anyone can get the flu, but it is more dangerous for some people. Infants and young children, people 72years of age and older, pregnant women, and people with certain health conditions or a weakened immune system are at greatest risk of flu complications. Pneumonia, bronchitis, sinus infections and ear infections are examples of flu-related complications. If you have a medical condition, such as heart disease, cancer or diabetes, flu can make it worse. Flu can cause fever and chills, sore throat, muscle aches, fatigue, cough, headache, and runny or stuffy nose. Some people may have vomiting and diarrhea, though this is more common in children than adults. Each year thousands of people in the [facility] die from flu, and many more are hospitalized. Flu vaccine prevents millions of illnesses and flu-related visits to the doctor each year. 2. Influenza vaccines     CDC recommends everyone 10months of age and older get vaccinated every flu season. Children 6 months through 6years of age may need 2 doses during a single flu season. Everyone else needs only 1 dose each flu season. It takes about 2 weeks for protection to develop after vaccination. There are many flu viruses, and they are always changing. Each year a new flu vaccine is made to protect against three or four viruses that are likely to cause disease in the upcoming flu season.  Even when the vaccine doesnt exactly match these viruses, it may still provide some protection. Influenza vaccine does not cause flu. Influenza vaccine may be given at the same time as other vaccines. 3. Talk with your health care provider    Tell your vaccine provider if the person getting the vaccine:   Has had an allergic reaction after a previous dose of influenza vaccine, or has any severe, life-threatening allergies.  Has ever had Guillain-Barré Syndrome (also called GBS). In some cases, your health care provider may decide to postpone influenza vaccination to a future visit. People with minor illnesses, such as a cold, may be vaccinated. People who are moderately or severely ill should usually wait until they recover before getting influenza vaccine. Your health care provider can give you more information. 4. Risks of a reaction     Soreness, redness, and swelling where shot is given, fever, muscle aches, and headache can happen after influenza vaccine.  There may be a very small increased risk of Guillain-Barré Syndrome (GBS) after inactivated influenza vaccine (the flu shot). [facility] children who get the flu shot along with pneumococcal vaccine (PCV13), and/or DTaP vaccine at the same time might be slightly more likely to have a seizure caused by fever. Tell your health care provider if a child who is getting flu vaccine has ever had a seizure. People sometimes faint after medical procedures, including vaccination. Tell your provider if you feel dizzy or have vision changes or ringing in the ears. As with any medicine, there is a very remote chance of a vaccine causing a severe allergic reaction, other serious injury, or death. 5. What if there is a serious problem? An allergic reaction could occur after the vaccinated person leaves the clinic.  If you see signs of a severe allergic reaction (hives, swelling of the face and throat, difficulty breathing, a fast heartbeat, dizziness, or weakness), call 9-1-1 and get the person to the nearest hospital.    For other signs that concern you, call your health care provider. Adverse reactions should be reported to the Vaccine Adverse Event Reporting System (VAERS). Your health care provider will usually file this report, or you can do it yourself. Visit the VAERS website at www.vaers. [facility].gov or call 1-277.713.9948. VAERS is only for reporting reactions, and VAERS staff do not give medical advice. 6. The National Vaccine Injury Compensation Program    The [facility] Vaccine Injury Compensation Program (VICP) is a federal program that was created to compensate people who may have been injured by certain vaccines. Visit the VICP website at www.hrsa.gov/vaccinecompensation or call 0-657.456.7838 to learn about the program and about filing a claim. There is a time limit to file a claim for compensation. 7. How can I learn more?  Ask your health care provider.  Call your local or state health department.  Contact the Centers for Disease Control and Prevention (CDC):  - Call 7-515.164.1657 (9-246-XGN-INFO) or  - Visit CDCs influenza website at www.cdc.gov/flu    Vaccine Information Statement (Interim)  Inactivated Influenza Vaccine   8/15/2019  42 EVELIN Thakur [id number]   Department of Health and Human Services  Centers for Disease Control and Prevention    Office Use Only

## 2020-02-26 ENCOUNTER — TELEPHONE (OUTPATIENT)
Dept: TRANSPLANT | Facility: CLINIC | Age: 60
End: 2020-02-26

## 2020-02-26 VITALS
TEMPERATURE: 99 F | BODY MASS INDEX: 28.44 KG/M2 | RESPIRATION RATE: 18 BRPM | DIASTOLIC BLOOD PRESSURE: 58 MMHG | WEIGHT: 203.13 LBS | HEIGHT: 71 IN | OXYGEN SATURATION: 94 % | SYSTOLIC BLOOD PRESSURE: 119 MMHG | HEART RATE: 100 BPM

## 2020-02-26 PROBLEM — E87.6 HYPOKALEMIA: Status: RESOLVED | Noted: 2020-02-22 | Resolved: 2020-02-26

## 2020-02-26 LAB
ALBUMIN SERPL BCP-MCNC: 3.2 G/DL (ref 3.5–5.2)
ALP SERPL-CCNC: 86 U/L (ref 55–135)
ALT SERPL W/O P-5'-P-CCNC: 14 U/L (ref 10–44)
ANION GAP SERPL CALC-SCNC: 9 MMOL/L (ref 8–16)
AST SERPL-CCNC: 31 U/L (ref 10–40)
BASOPHILS # BLD AUTO: 0.02 K/UL (ref 0–0.2)
BASOPHILS NFR BLD: 0.7 % (ref 0–1.9)
BILIRUB SERPL-MCNC: 2.5 MG/DL (ref 0.1–1)
BUN SERPL-MCNC: 10 MG/DL (ref 6–20)
CALCIUM SERPL-MCNC: 8 MG/DL (ref 8.7–10.5)
CHLORIDE SERPL-SCNC: 103 MMOL/L (ref 95–110)
CO2 SERPL-SCNC: 24 MMOL/L (ref 23–29)
CREAT SERPL-MCNC: 0.8 MG/DL (ref 0.5–1.4)
DIFFERENTIAL METHOD: ABNORMAL
EOSINOPHIL # BLD AUTO: 0.1 K/UL (ref 0–0.5)
EOSINOPHIL NFR BLD: 4.8 % (ref 0–8)
ERYTHROCYTE [DISTWIDTH] IN BLOOD BY AUTOMATED COUNT: 18.7 % (ref 11.5–14.5)
EST. GFR  (AFRICAN AMERICAN): >60 ML/MIN/1.73 M^2
EST. GFR  (NON AFRICAN AMERICAN): >60 ML/MIN/1.73 M^2
GLUCOSE SERPL-MCNC: 71 MG/DL (ref 70–110)
HCT VFR BLD AUTO: 26.6 % (ref 40–54)
HGB BLD-MCNC: 7.9 G/DL (ref 14–18)
IMM GRANULOCYTES # BLD AUTO: 0.02 K/UL (ref 0–0.04)
IMM GRANULOCYTES NFR BLD AUTO: 0.7 % (ref 0–0.5)
INR PPP: 1.7 (ref 0.8–1.2)
LYMPHOCYTES # BLD AUTO: 0.7 K/UL (ref 1–4.8)
LYMPHOCYTES NFR BLD: 25.3 % (ref 18–48)
MAGNESIUM SERPL-MCNC: 1.7 MG/DL (ref 1.6–2.6)
MCH RBC QN AUTO: 26.9 PG (ref 27–31)
MCHC RBC AUTO-ENTMCNC: 29.7 G/DL (ref 32–36)
MCV RBC AUTO: 91 FL (ref 82–98)
MONOCYTES # BLD AUTO: 0.5 K/UL (ref 0.3–1)
MONOCYTES NFR BLD: 16 % (ref 4–15)
NEUTROPHILS # BLD AUTO: 1.5 K/UL (ref 1.8–7.7)
NEUTROPHILS NFR BLD: 52.5 % (ref 38–73)
NRBC BLD-RTO: 0 /100 WBC
PHOSPHATE SERPL-MCNC: 3.4 MG/DL (ref 2.7–4.5)
PLATELET # BLD AUTO: 52 K/UL (ref 150–350)
PMV BLD AUTO: 12.6 FL (ref 9.2–12.9)
POCT GLUCOSE: 198 MG/DL (ref 70–110)
POCT GLUCOSE: 77 MG/DL (ref 70–110)
POTASSIUM SERPL-SCNC: 3.3 MMOL/L (ref 3.5–5.1)
PROT SERPL-MCNC: 5.5 G/DL (ref 6–8.4)
PROTHROMBIN TIME: 16.8 SEC (ref 9–12.5)
RBC # BLD AUTO: 2.94 M/UL (ref 4.6–6.2)
SODIUM SERPL-SCNC: 136 MMOL/L (ref 136–145)
WBC # BLD AUTO: 2.93 K/UL (ref 3.9–12.7)

## 2020-02-26 PROCEDURE — 63600175 PHARM REV CODE 636 W HCPCS: Mod: JG,NTX | Performed by: NURSE PRACTITIONER

## 2020-02-26 PROCEDURE — 25000003 PHARM REV CODE 250: Mod: NTX | Performed by: PHYSICIAN ASSISTANT

## 2020-02-26 PROCEDURE — 63600175 PHARM REV CODE 636 W HCPCS: Mod: NTX | Performed by: STUDENT IN AN ORGANIZED HEALTH CARE EDUCATION/TRAINING PROGRAM

## 2020-02-26 PROCEDURE — 25000003 PHARM REV CODE 250: Mod: NTX | Performed by: NURSE PRACTITIONER

## 2020-02-26 PROCEDURE — P9047 ALBUMIN (HUMAN), 25%, 50ML: HCPCS | Mod: JG,NTX | Performed by: PHYSICIAN ASSISTANT

## 2020-02-26 PROCEDURE — 80053 COMPREHEN METABOLIC PANEL: CPT | Mod: NTX

## 2020-02-26 PROCEDURE — 99239 HOSP IP/OBS DSCHRG MGMT >30: CPT | Mod: NTX,,, | Performed by: PHYSICIAN ASSISTANT

## 2020-02-26 PROCEDURE — 36415 COLL VENOUS BLD VENIPUNCTURE: CPT | Mod: NTX

## 2020-02-26 PROCEDURE — 99232 SBSQ HOSP IP/OBS MODERATE 35: CPT | Mod: NTX,,, | Performed by: NURSE PRACTITIONER

## 2020-02-26 PROCEDURE — 85610 PROTHROMBIN TIME: CPT | Mod: NTX

## 2020-02-26 PROCEDURE — 63600175 PHARM REV CODE 636 W HCPCS: Mod: JG,NTX | Performed by: PHYSICIAN ASSISTANT

## 2020-02-26 PROCEDURE — 25000003 PHARM REV CODE 250: Mod: NTX | Performed by: STUDENT IN AN ORGANIZED HEALTH CARE EDUCATION/TRAINING PROGRAM

## 2020-02-26 PROCEDURE — P9047 ALBUMIN (HUMAN), 25%, 50ML: HCPCS | Mod: JG,NTX | Performed by: NURSE PRACTITIONER

## 2020-02-26 PROCEDURE — 99239 PR HOSPITAL DISCHARGE DAY,>30 MIN: ICD-10-PCS | Mod: NTX,,, | Performed by: PHYSICIAN ASSISTANT

## 2020-02-26 PROCEDURE — 25000003 PHARM REV CODE 250: Mod: NTX | Performed by: HOSPITALIST

## 2020-02-26 PROCEDURE — 99232 PR SUBSEQUENT HOSPITAL CARE,LEVL II: ICD-10-PCS | Mod: NTX,,, | Performed by: NURSE PRACTITIONER

## 2020-02-26 PROCEDURE — 85025 COMPLETE CBC W/AUTO DIFF WBC: CPT | Mod: NTX

## 2020-02-26 PROCEDURE — 84100 ASSAY OF PHOSPHORUS: CPT | Mod: NTX

## 2020-02-26 PROCEDURE — 83735 ASSAY OF MAGNESIUM: CPT | Mod: NTX

## 2020-02-26 RX ORDER — CIPROFLOXACIN 500 MG/1
500 TABLET ORAL DAILY
Qty: 30 TABLET | Refills: 2 | Status: ON HOLD | OUTPATIENT
Start: 2020-02-26 | End: 2020-05-21 | Stop reason: HOSPADM

## 2020-02-26 RX ORDER — INSULIN ASPART 100 [IU]/ML
28 INJECTION, SOLUTION INTRAVENOUS; SUBCUTANEOUS
Status: CANCELLED
Start: 2020-02-26

## 2020-02-26 RX ORDER — PANTOPRAZOLE SODIUM 40 MG/1
40 TABLET, DELAYED RELEASE ORAL DAILY
Status: ON HOLD
Start: 2020-02-26 | End: 2020-05-21 | Stop reason: HOSPADM

## 2020-02-26 RX ORDER — FUROSEMIDE 40 MG/1
180 TABLET ORAL DAILY
Qty: 120 TABLET | Refills: 2 | Status: ON HOLD
Start: 2020-02-26 | End: 2020-03-02 | Stop reason: SDUPTHER

## 2020-02-26 RX ORDER — CIPROFLOXACIN 500 MG/1
500 TABLET ORAL DAILY
Qty: 30 TABLET | Refills: 11 | Status: CANCELLED | OUTPATIENT
Start: 2020-02-26

## 2020-02-26 RX ORDER — LIDOCAINE HYDROCHLORIDE 10 MG/ML
1 INJECTION INFILTRATION; PERINEURAL ONCE
Status: DISCONTINUED | OUTPATIENT
Start: 2020-02-26 | End: 2020-02-26 | Stop reason: HOSPADM

## 2020-02-26 RX ORDER — HYDROCODONE BITARTRATE AND ACETAMINOPHEN 10; 325 MG/1; MG/1
1 TABLET ORAL EVERY 12 HOURS PRN
Refills: 0
Start: 2020-02-26 | End: 2020-05-28 | Stop reason: SDUPTHER

## 2020-02-26 RX ORDER — POTASSIUM CHLORIDE 20 MEQ/1
40 TABLET, EXTENDED RELEASE ORAL ONCE
Status: COMPLETED | OUTPATIENT
Start: 2020-02-26 | End: 2020-02-26

## 2020-02-26 RX ORDER — ALBUMIN HUMAN 250 G/1000ML
25 SOLUTION INTRAVENOUS EVERY 6 HOURS
Status: DISCONTINUED | OUTPATIENT
Start: 2020-02-26 | End: 2020-02-26 | Stop reason: HOSPADM

## 2020-02-26 RX ORDER — CIPROFLOXACIN 500 MG/1
500 TABLET ORAL EVERY 24 HOURS
Status: DISCONTINUED | OUTPATIENT
Start: 2020-02-27 | End: 2020-02-26 | Stop reason: HOSPADM

## 2020-02-26 RX ORDER — PANTOPRAZOLE SODIUM 40 MG/1
40 TABLET, DELAYED RELEASE ORAL DAILY
Qty: 30 TABLET | Refills: 11 | Status: CANCELLED | OUTPATIENT
Start: 2020-02-26

## 2020-02-26 RX ORDER — LEVOCETIRIZINE DIHYDROCHLORIDE 5 MG/1
5 TABLET, FILM COATED ORAL NIGHTLY
Qty: 30 TABLET | Refills: 11 | Status: ON HOLD | COMMUNITY
Start: 2020-02-26 | End: 2020-05-22 | Stop reason: HOSPADM

## 2020-02-26 RX ORDER — LIDOCAINE HYDROCHLORIDE 10 MG/ML
INJECTION INFILTRATION; PERINEURAL
Status: DISCONTINUED
Start: 2020-02-26 | End: 2020-02-26 | Stop reason: HOSPADM

## 2020-02-26 RX ADMIN — HYDROCODONE BITARTRATE AND ACETAMINOPHEN 1 TABLET: 5; 325 TABLET ORAL at 12:02

## 2020-02-26 RX ADMIN — OXYBUTYNIN CHLORIDE 5 MG: 5 TABLET ORAL at 09:02

## 2020-02-26 RX ADMIN — ALBUMIN (HUMAN) 25 G: 12.5 SOLUTION INTRAVENOUS at 04:02

## 2020-02-26 RX ADMIN — ALBUMIN (HUMAN) 25 G: 12.5 SOLUTION INTRAVENOUS at 11:02

## 2020-02-26 RX ADMIN — PANTOPRAZOLE SODIUM 40 MG: 40 TABLET, DELAYED RELEASE ORAL at 09:02

## 2020-02-26 RX ADMIN — INSULIN ASPART 15 UNITS: 100 INJECTION, SOLUTION INTRAVENOUS; SUBCUTANEOUS at 01:02

## 2020-02-26 RX ADMIN — LACTULOSE 20 G: 20 SOLUTION ORAL at 09:02

## 2020-02-26 RX ADMIN — CYCLOBENZAPRINE HYDROCHLORIDE 10 MG: 5 TABLET, FILM COATED ORAL at 09:02

## 2020-02-26 RX ADMIN — FINASTERIDE 5 MG: 5 TABLET, FILM COATED ORAL at 09:02

## 2020-02-26 RX ADMIN — GABAPENTIN 600 MG: 300 CAPSULE ORAL at 09:02

## 2020-02-26 RX ADMIN — POTASSIUM CHLORIDE 40 MEQ: 1500 TABLET, EXTENDED RELEASE ORAL at 09:02

## 2020-02-26 RX ADMIN — ESCITALOPRAM OXALATE 20 MG: 20 TABLET ORAL at 09:02

## 2020-02-26 RX ADMIN — TAMSULOSIN HYDROCHLORIDE 0.4 MG: 0.4 CAPSULE ORAL at 09:02

## 2020-02-26 RX ADMIN — FUROSEMIDE 80 MG: 10 INJECTION, SOLUTION INTRAMUSCULAR; INTRAVENOUS at 09:02

## 2020-02-26 RX ADMIN — RIFAXIMIN 550 MG: 550 TABLET ORAL at 09:02

## 2020-02-26 RX ADMIN — CIPROFLOXACIN HYDROCHLORIDE 500 MG: 500 TABLET, FILM COATED ORAL at 09:02

## 2020-02-26 RX ADMIN — SPIRONOLACTONE 300 MG: 50 TABLET ORAL at 09:02

## 2020-02-26 RX ADMIN — GABAPENTIN 600 MG: 300 CAPSULE ORAL at 02:02

## 2020-02-26 NOTE — TELEPHONE ENCOUNTER
BACK-UP ORGAN OFFER NOTE @0830    Notified by Geremias Harris, , of backup liver offer with donor information.  Donor and recipient information read back and verified.  Spoke with Audra Garcia RN and Colin Reilly and identified no acute medical changes.  Spoke with Owen Cline NP who states patient's ok to proceed with transplant. Audra and patient advised further instructions in regards to NPO and medication adjustments will be given once surgery times have been determined.  Patient verbalized understanding that he has been called as a backup.  ==============================    @2110  Donor surgery times received from Geremias Lomeli, , with instructions for patient to fast past 2300.  Nubia Vazquez RN and María Pozo NP notified.

## 2020-02-26 NOTE — PHYSICIAN QUERY
PT Name: Colin Reilly  MR #: [id number]    Physician Query Form - Nutrition Clarification     CDS/: Servando Marquez Jr, RN               Contact information:[email or web address]     This form is a permanent document in the medical record.     Query Date: 2020    By submitting this query, we are merely seeking further clarification of documentation.. Please utilize your independent clinical judgment when addressing the question(s) below.    The Medical record contains the following:   Indicators  Supporting Clinical Findings Location in Medical Record   x % of Estimated Energy Intake over a time frame from p.o., TF, or TPN Eatin%  Nausea: No    Eatin%  Nausea: No    Endocrinology Consult Note     Endocrinology Subjective & Objective Note   x Weight Status over a time frame Constitutional: Negative for chills, fever, malaise/fatigue and weight loss.     Hepatology Consult Note   x Subcutaneous Fat and/or Muscle Loss Temporal and distal extremity wasting    Liver Transplant Progress Note   x Fluid Accumulation or Edema Musculoskeletal: He exhibits edema    Liver Transplant Progress Note    Reduced  Strength     x Wt / BMI / Usual Body Weight  Weight: 99.1 kg (218 lb 7.6 oz)  Body mass index is 30.47 kg/m².    Liver Transplant Progress Note    Delayed Wound Healing / Failure to Thrive     x Acute or Chronic Illness Ascites due to alcoholic cirrhosis      ESLD  ETOH  Liver Transplant Progress Note     Liver Transplant Progress Note    Medication     x Treatment low sodium (salt) 2 gram per day diet  - nutrition: 25-30kcal (calorie per body body weight in kilogram) per day  - no need to restrict protein in diet  - high protein diet: 1.2-1.5 gram/kg (protein per body weight in kilogram) per day to prevent muscle mass loss  - avoid fasting  - Late night snack with 50 gram of complex carbohydrates and protein  - resistance exercises for muscle  strength   2/21 Hepatology Consult Note  (Cirrhosis Counseling)   x Other Physical Exam  General:  Obesity, Malnutrition, Jaundice      Physical Exam   Constitutional: He is oriented to person, place, and time. He appears well-developed and well-nourished 2/21 Anesthesia Preprocedure Note      2/21 H&P     AND / ASPEN Clinical Characteristics (October 2011)  A minimum of two characteristics is recommended for diagnosing either moderate or severe malnutrition   Mild Malnutrition Moderate Malnutrition Severe Malnutrition   Energy Intake from p.o., TF or TPN. < 75% intake of estimated energy needs for less than 7 days < 75% intake of estimated energy needs for greater than 7 days < 50% intake of estimated energy needs for > 5 days   Weight Loss 1-2% in 1 month  5% in 3 months  7.5% in 6 months  10% in 1 year 1-2 % in 1 week  5% in 1 month  7.5% in 3 months  10% in 6 months  20% in 1 year > 2% in 1 week  > 5% in 1 month  > 7.5% in 3 months  > 10% in 6 months  > 20% in 1 year   Physical Findings     None *Mild subcutaneous fat and/or muscle loss  *Mild fluid accumulation  *Stage II decubitus  *Surgical wound or non-healing wound *Mod/severe subcutaneous fat and/or muscle loss  *Mod/severe fluid accumulation  *Stage III or IV decubitus  *Non-healing surgical wound     Provider, please specify diagnosis or diagnoses associated with above clinical findings.  Further Specify the Nutrition Diagnosis     [  ] Patient is Well-Nourished   [  ] Mild Protein-Calorie Malnutrition   [  X] Moderate Protein-Calorie Malnutrition   [  ] Other Nutritional Diagnosis (please specify):    [  ] Other:    [  ] Clinically Undetermined       Please document in your progress notes daily for the duration of treatment until resolved and include in your discharge summary.

## 2020-02-26 NOTE — PROGRESS NOTES
Discharge Medication Note:    Hospital Course:  Mr Reilly is a pre liver patient admitted for an umbilical hernia repair on 2/22/20.  Pt was diuresed aggressively while inpatient with lasix 80mg IV BID and spironolactone 300mg daily. Pt discharged on home medications including home diuretic doses.  Cipro prophy added - pt to  from home pharmacy.     Met with Colin Reilly at discharge to review discharge medications and to update the blue medication card.       Colin Reilly   Home Medication Instructions ART:[id number]    Printed on:02/26/20 0632   Medication Information                      ciprofloxacin HCl (CIPRO) 500 MG tablet  Take 1 tablet (500 mg total) by mouth once daily.             cyclobenzaprine (FLEXERIL) 10 MG tablet  Take 10 mg by mouth 2 (two) times daily.              EASY TOUCH INSULIN SYRINGE 1 mL 31 gauge x 5/16 Syrg               escitalopram oxalate (LEXAPRO) 20 MG tablet  Take 20 mg by mouth once daily.              finasteride (PROSCAR) 5 mg tablet  Take 1 tablet (5 mg total) by mouth once daily.             furosemide (LASIX) 40 MG tablet  Take 4.5 tablets (180 mg total) by mouth once daily.             gabapentin (NEURONTIN) 600 MG tablet  Take 600 mg by mouth 3 (three) times daily.              HYDROcodone-acetaminophen (NORCO)  mg per tablet  Take 1 tablet by mouth every 12 (twelve) hours as needed for Pain.             insulin aspart U-100 (NOVOLOG) 100 unit/mL injection  Inject 24 Units into the skin 3 (three) times daily before meals.             insulin detemir U-100 (LEVEMIR) 100 unit/mL injection  Inject 74 Units into the skin every evening.             lactulose (CHRONULAC) 10 gram/15 mL solution  Take 30 mLs by mouth 3 (three) times daily. May take up to  4 to 5 times daily if needed for bowel movements             levocetirizine (XYZAL) 5 MG tablet  Take 1 tablet (5 mg total) by mouth every evening.             nebivolol (BYSTOLIC) 10 MG Tab  Take 10 mg by  mouth once daily.             oxybutynin (DITROPAN) 5 MG Tab  Take 5 mg by mouth once daily.             pantoprazole (PROTONIX) 40 MG tablet  Take 1 tablet (40 mg total) by mouth once daily.             rifAXIMin (XIFAXAN) 550 mg Tab  Take 1 tablet (550 mg total) by mouth 2 (two) times daily.             spironolactone (ALDACTONE) 100 MG tablet  Take 1.5 tablets (150 mg total) by mouth once daily.             tamsulosin (FLOMAX) 0.4 mg Cp24  Take 1 capsule (0.4 mg total) by mouth once daily.             TRUE METRIX GLUCOSE TEST STRIP Strp               TRUEPLUS LANCETS 30 gauge Misc                   Pt was provided with prescriptions for the following meds:  E-rx: Cipro  Printed rx: none  Phone-in or faxed rx: none to none pharmacy per pt request.    The following medications have been placed on HOLD and should be restarted in the outpatient setting (when appropriate): none    Colin Reilly verbalized understanding and had the opportunity to ask questions.

## 2020-02-26 NOTE — PROGRESS NOTES
"[facility]  Endocrinology  Progress Note    Admit Date: 2020     Reason for Consult: Management of T2DM, Hyperglycemia     Surgical Procedure and Date: Umbilical hernia repair 20    Diabetes diagnosis year:     Lab Results   Component Value Date    HGBA1C 7.6 (H) 2019       Home Diabetes Medications: Levemir 74 units q HS, Novolog 24 units with meals    How often checking glucose at home? 3x per day   BG readings on regimen: 150-200s  Hypoglycemia on the regimen?  No  Missed doses on regimen?  Yes    Diabetes Complications include:     Hyperglycemia and Diabetic peripheral neuropathy     Complicating diabetes co morbidities:   CIRRHOSIS      HPI:   Patient is a 59 y.o. male with a diagnosis of DM2, cirrhosis, and umbilical hernia, who is s/p repair on 20.  Patient with uncontrolled DM2 on MDI.  Patient's DM managed per his PCP in [place], [place].  Endocrinology consulted for DM/BG management.            Interval HPI:   Overnight events: Remains in TSU. NAEON. BG at or slightly below goal with scheduled insulin. Discharge today to [facility]- may d/c to home pending possible transplant match.   Eatin%  Nausea: No  Hypoglycemia and intervention: No  Fever: No  TPN and/or TF: No      /79 (BP Location: Right arm, Patient Position: Lying)   Pulse 97   Temp 97.6 °F (36.4 °C) (Axillary)   Resp 16   Ht 5' 11" (1.803 m)   Wt 94.5 kg (208 lb 5.4 oz)   SpO2 96%   BMI 29.06 kg/m²      Labs Reviewed and Include    Recent Labs   Lab 20  0631   GLU 95   CALCIUM 7.7*   ALBUMIN 2.6*   PROT 5.2*      K 3.5   CO2 25      BUN 9   CREATININE 0.8   ALKPHOS 96   ALT 16   AST 33   BILITOT 2.7*     Lab Results   Component Value Date    WBC 3.79 (L) 2020    HGB 9.0 (L) 2020    HCT 29.9 (L) 2020    MCV 90 2020    PLT 65 (L) 2020     No results for input(s): TSH, FREET4 in the last 168 hours.  Lab Results   Component Value Date    " HGBA1C 7.6 (H) 12/05/2019       Nutritional status:   Body mass index is 29.06 kg/m².  Lab Results   Component Value Date    ALBUMIN 2.6 (L) 02/25/2020    ALBUMIN 3.1 (L) 02/24/2020    ALBUMIN 2.3 (L) 02/23/2020     No results found for: PREALBUMIN    Estimated Creatinine Clearance: 118.2 mL/min (based on SCr of 0.8 mg/dL).    Accu-Checks  Recent Labs     02/23/20  0842 02/23/20  1205 02/23/20  1745 02/23/20  2044 02/24/20  0755 02/24/20  1211 02/24/20  1309 02/24/20  1655 02/24/20  2155 02/25/20  0753   POCTGLUCOSE 115* 121* 158* 121* 92 64* 105 127* 141* 113*       Current Medications and/or Treatments Impacting Glycemic Control  Immunotherapy:    Immunosuppressants     None        Steroids:   Hormones (From admission, onward)    None        Pressors:    Autonomic Drugs (From admission, onward)    None        Hyperglycemia/Diabetes Medications:   Antihyperglycemics (From admission, onward)    Start     Stop Route Frequency Ordered    02/24/20 2100  insulin detemir U-100 pen 40 Units      -- SubQ Nightly 02/24/20 1511    02/24/20 1645  insulin aspart U-100 pen 15 Units      -- SubQ 3 times daily with meals 02/24/20 1511    02/24/20 1610  insulin aspart U-100 pen 1-10 Units      -- SubQ Before meals & nightly PRN 02/24/20 1511          ASSESSMENT and PLAN    * Umbilical hernia without obstruction and without gangrene  S/p repair on 2/22/20  Managed per primary team  Avoid hypoglycemia  Optimize BG control for surgical wound healing        Type 2 diabetes mellitus without complication, with long-term current use of insulin  BG goal 140 - 180     Decrease Levemir to 32 units q HS  Continue Novolog 15 units with meals?  Moderate dose correction scale  BG monitoring AC/HS    Discharge planning: Levemir 25 units HS tonight and novolog 15 units with meals until NPO. Once home and resuming regular diet- Levemir 50 units HS and novolog 24 units with meals plus correction scale 180/25. BG logs given. S/sx and treatment of  hypogylcemia reviewed. Patient to notify PCP if BG trends up with reduced basal insulin.         Decompensated hepatic cirrhosis  Managed per primary team  Liver listed - awaiting transplant  May affect BG readings  Avoid hypoglycemia            Sherri Rey NP  Endocrinology  [facility]

## 2020-02-26 NOTE — TELEPHONE ENCOUNTER
BACK-UP ORGAN OFFER NOTE    Notified by León Fitzgerald, , of backup liver offer with donor information.  Donor and recipient information read back and verified.  Spoke with Colin Reilly and identified no acute medical issues in telephone assessment.  Patient instructed not NPO, he can continue to eat and drink at this time. Patient verbalized understanding that he has been called as a backup.  Patient is being discharged to the  for lodging.  He is backup on 2 cases at this time.

## 2020-02-26 NOTE — TELEPHONE ENCOUNTER
ON CALL NOTE @0840:    Notified by Geremias Fitzgerald, , organ accepted by primary center.  Patient released from back-up.  Patient, DARRELL Hooker, and TSU RN notified and advised patient ok to resume diet.

## 2020-02-26 NOTE — PLAN OF CARE
Pt is back up liver tx for this am. Pt is NPO. Incision to umbilicus is cdi.BG WDL free from falls. Pain controlled with PRN meds.Albumin administered.

## 2020-02-26 NOTE — TELEPHONE ENCOUNTER
BACK-UP ORGAN OFFER NOTE    Notified by León Fitzgerald, , of backup liver offer with donor information.  Donor and recipient information read back and verified.  Spoke with Colin Reilly and identified no acute medical issues in telephone assessment.  Patient verbalized understanding that he has been called as a backup.    Patient is currently hospitalized but is being discharged from the hospital.  Patient lives greater than 2 hours from campus.  Discussed with Dr Teresa - pt to stay in [facility] for the night.  Patient notified and verbalized understanding.

## 2020-02-26 NOTE — PROGRESS NOTES
Discharge:    Patient scheduled to discharge today, 2/26/20. SW met with patient and patient's mother/caregiver, Suzanne, in patient's room. Patient was observed sitting upright in bed. Patient and caregiver were AAOx4, and communicative. Patient will discharge via car to home. Patient's mother reported being informed that there would be a  to arrange transportation for their transport back to their vehicle in [place]. SW informed patient and caregiver that this was previously discussed with them that they are responsible for their own transport to the hospital in [place] where pt's car is. Caregiver reported understanding and stated that she would be calling someone ASAP to transport them back home, as the drive is reportedly 4 hrs to [place], [place].  Patient discharging home with no additional needs. The patient reported adequate coping and denies any current mental health difficulties. SW will continue to follow patient for resources, support and discharge planning as appropriate. SW remains available.

## 2020-02-26 NOTE — PROGRESS NOTES
Patient for discharge.  PDOL appts made.   Reviewed key concepts: Medication schedule, having 3-4 BM's daily to prevent encephalopathy, attending labs and follow up appts, following a high protein low na diet, exercising and communicating with your coordinator any out of the ordinary symptoms.   Patient stated understanding. Patient's mother present as well.     Patient also had an umbilical hernia repair and will follow up in surgery clinic on 3/19.   Lab orders faxed to outpatient lab and copy of orders provided to patient should they be needed.

## 2020-02-26 NOTE — DISCHARGE SUMMARY
[facility]  Liver Transplant  Discharge Summary      Patient Name: Colin Reilly  MRN: [id number]  Admission Date: 2/21/2020  Hospital Length of Stay: 5 days  Discharge Date and Time:  02/26/2020 1:27 PM  Attending Physician: Blaine Martinez MD   Discharging Provider: Nicolette Motta PA-C  Primary Care Provider: Preston Matthew Ii, MD  HPI:   60y/o male, with ESLD alcoholic cirrhosis, DM, HCV s/p INF, presents with an umbilical hernia now draining ascites. He states he has had the hernia about 6 months and it has been draining about 2.5 weeks. He had 2.5L of fluid removed via paracentesis about 2 weeks ago that has since re-accummulated. He also complains of abdominal pain and intermittent nausea but has been tolerating a diet without issue. States he is having normal BMs. He has been listed for liver transplant during this hospitalization.        Procedure(s) (LRB):  REPAIR, HERNIA, PRIMARY WIHTOUT MESH AND PLACEMENT OF DRAIN (N/A)     Hospital Course:    Mr. Reilly is now s/p umbilical hernia repair on 2/22/20. Tolerated procedure well. Per surgeon (Dr. Brunner), operation uncomplicated. Drain placed and pressure dressing applied to site. Drain removed 2/25 without issus. Progressed well post operatively. Small incision c/d/i with staples in place. Tolerating diet. +flatus/BM. Ambulating without issues. Pain controlled with PO medication. Patient with volume overload - diuresed with IV lasix while inpatient. He will discharge on home dose of lasix 80 mg PO BID and an increased dose of spirolactone dose to 300 mg daily.  He will follow up with labs next week, and in hepatology and surgery clinic per coordinator. Patient and caregiver expressed understanding of discharge instructions an importance of follow up.     Final Active Diagnoses:    Diagnosis Date Noted POA    PRINCIPAL PROBLEM:  Umbilical hernia without obstruction and without gangrene [K42.9] 10/17/2019 Yes    Acquired coagulation  factor deficiency [D68.4] 02/22/2020 Yes    Ascites due to alcoholic cirrhosis [K70.31] 10/17/2019 Yes    Decompensated hepatic cirrhosis [K72.90] 10/17/2019 Yes    Portal hypertensive gastropathy [K76.6, K31.89] 10/17/2019 Yes    Type 2 diabetes mellitus without complication, with long-term current use of insulin [E11.9, Z79.4] 10/17/2019 Not Applicable    Chronic back pain [M54.9, G89.29] 07/08/2014 Yes    GERD (gastroesophageal reflux disease) [K21.9] 07/08/2014 Yes    Esophageal varices with bleeding [I85.01] 07/08/2014 Yes    Hepatic encephalopathy [K72.90] 07/08/2014 Yes    Splenomegaly [R16.1] 07/08/2014 Yes      Problems Resolved During this Admission:    Diagnosis Date Noted Date Resolved POA    Hypokalemia [E87.6] 02/22/2020 02/26/2020 No    Umbilicus discharge [R19.8] 10/17/2019 02/22/2020 Yes       Consults (From admission, onward)        Status Ordering Provider     Inpatient consult to Endocrinology  Once     Provider:  (Not yet assigned)    Completed MARIELY BARBER     Inpatient consult to Hepatology  Once     Provider:  (Not yet assigned)    Completed NORBERTO LOWE          Pending Diagnostic Studies:     Procedure Component Value Units Date/Time    Phosphatidylethanol (PETH) [[id number]] Collected:  02/21/20 0628    Order Status:  Sent Lab Status:  In process Updated:  02/21/20 0640    Specimen:  Blood     Specimen to Pathology, Surgery Other [[id number]] Collected:  02/22/20 0840    Order Status:  Sent Lab Status:  In process Updated:  02/24/20 0752        Significant Diagnostic Studies: Labs:   CMP   Recent Labs   Lab 02/25/20  0631 02/26/20  0645    136   K 3.5 3.3*    103   CO2 25 24   GLU 95 71   BUN 9 10   CREATININE 0.8 0.8   CALCIUM 7.7* 8.0*   PROT 5.2* 5.5*   ALBUMIN 2.6* 3.2*   BILITOT 2.7* 2.5*   ALKPHOS 96 86   AST 33 31   ALT 16 14   ANIONGAP 7* 9   ESTGFRAFRICA >60.0 >60.0   EGFRNONAA >60.0 >60.0   , CBC   Recent Labs   Lab 02/25/20  0631 02/26/20  0645   WBC  3.79* 2.93*   HGB 9.0* 7.9*   HCT 29.9* 26.6*   PLT 65* 52*    and All labs within the past 24 hours have been reviewed    The patients clinical status was discussed at multidisplinary rounds, involving transplant surgery, transplant medicine, pharmacy, nursing, nutrition, and social work    Discharged Condition: good    Disposition: Home or Self Care    Follow Up:    Patient Instructions:      Diet Adult Regular     Order Specific Question Answer Comments   Fluid restriction: Fluid - 2000mL      Notify your health care provider if you experience any of the following:  increased confusion or weakness     Notify your health care provider if you experience any of the following:  persistent dizziness, light-headedness, or visual disturbances     Notify your health care provider if you experience any of the following:  worsening rash     Notify your health care provider if you experience any of the following:  severe persistent headache     Notify your health care provider if you experience any of the following:  difficulty breathing or increased cough     Notify your health care provider if you experience any of the following:  redness, tenderness, or signs of infection (pain, swelling, redness, odor or green/yellow discharge around incision site)     Notify your health care provider if you experience any of the following:  severe uncontrolled pain     Notify your health care provider if you experience any of the following:  persistent nausea and vomiting or diarrhea     Notify your health care provider if you experience any of the following:  temperature >100.4     Medications:  Reconciled Home Medications:      Medication List      START taking these medications    ciprofloxacin HCl 500 MG tablet  Commonly known as:  CIPRO  Take 1 tablet (500 mg total) by mouth once daily.     HYDROcodone-acetaminophen  mg per tablet  Commonly known as:  NORCO  Take 1 tablet by mouth every 12 (twelve) hours as needed for  Pain.  Replaces:  HYDROcodone-acetaminophen 5-325 mg per tablet     levocetirizine 5 MG tablet  Commonly known as:  XYZAL  Take 1 tablet (5 mg total) by mouth every evening.        CHANGE how you take these medications    furosemide 40 MG tablet  Commonly known as:  LASIX  Take 4.5 tablets (180 mg total) by mouth once daily.  What changed:  how much to take        CONTINUE taking these medications    cyclobenzaprine 10 MG tablet  Commonly known as:  FLEXERIL  Take 10 mg by mouth 2 (two) times daily.     Easy Touch Insulin Syringe 1 mL 31 gauge x 5/16 Syrg  Generic drug:  insulin syringe-needle U-100     escitalopram oxalate 20 MG tablet  Commonly known as:  LEXAPRO  Take 20 mg by mouth once daily.     finasteride 5 mg tablet  Commonly known as:  PROSCAR  Take 1 tablet (5 mg total) by mouth once daily.     gabapentin 600 MG tablet  Commonly known as:  NEURONTIN  Take 600 mg by mouth 3 (three) times daily.     insulin aspart U-100 100 unit/mL injection  Commonly known as:  NOVOLOG  Inject 24 Units into the skin 3 (three) times daily before meals.     insulin detemir U-100 100 unit/mL injection  Commonly known as:  LEVEMIR  Inject 74 Units into the skin every evening.     lactulose 10 gram/15 mL solution  Commonly known as:  CHRONULAC  Take 30 mLs by mouth 3 (three) times daily. May take up to  4 to 5 times daily if needed for bowel movements     nebivolol 10 MG Tab  Commonly known as:  BYSTOLIC  Take 10 mg by mouth once daily.     oxybutynin 5 MG Tab  Commonly known as:  DITROPAN  Take 5 mg by mouth once daily.     pantoprazole 40 MG tablet  Commonly known as:  PROTONIX  Take 1 tablet (40 mg total) by mouth once daily.     rifAXIMin 550 mg Tab  Commonly known as:  XIFAXAN  Take 1 tablet (550 mg total) by mouth 2 (two) times daily.     spironolactone 100 MG tablet  Commonly known as:  ALDACTONE  Take 1.5 tablets (150 mg total) by mouth once daily.     tamsulosin 0.4 mg Cap  Commonly known as:  Flomax  Take 1 capsule  (0.4 mg total) by mouth once daily.     True Metrix Glucose Test Strip Strp  Generic drug:  blood sugar diagnostic     TRUEplus Lancets 30 gauge Misc  Generic drug:  lancets        STOP taking these medications    ergocalciferol 50,000 unit Cap  Commonly known as:  ERGOCALCIFEROL     HYDROcodone-acetaminophen 5-325 mg per tablet  Commonly known as:  NORCO  Replaced by:  HYDROcodone-acetaminophen  mg per tablet     sucralfate 1 gram tablet  Commonly known as:  CARAFATE          Time spent caring for patient (Greater than 1/2 spent in direct face-to-face contact): > 30 minutes    Nicolette Motta PA-C  Liver Transplant  [facility]

## 2020-02-27 ENCOUNTER — TELEPHONE (OUTPATIENT)
Dept: TRANSPLANT | Facility: CLINIC | Age: 60
End: 2020-02-27

## 2020-02-27 ENCOUNTER — DOCUMENTATION ONLY (OUTPATIENT)
Dept: TRANSPLANT | Facility: HOSPITAL | Age: 60
End: 2020-02-27

## 2020-02-27 ENCOUNTER — HOSPITAL ENCOUNTER (INPATIENT)
Facility: HOSPITAL | Age: 60
LOS: 4 days | Discharge: HOME | DRG: 442 | End: 2020-03-02
Attending: EMERGENCY MEDICINE | Admitting: TRANSPLANT SURGERY
Payer: MEDICARE

## 2020-02-27 ENCOUNTER — NURSE TRIAGE (OUTPATIENT)
Dept: ADMINISTRATIVE | Facility: CLINIC | Age: 60
End: 2020-02-27

## 2020-02-27 DIAGNOSIS — E11.9 TYPE 2 DIABETES MELLITUS WITHOUT COMPLICATION, WITH LONG-TERM CURRENT USE OF INSULIN: ICD-10-CM

## 2020-02-27 DIAGNOSIS — Z01.818 PRE-OP TESTING: ICD-10-CM

## 2020-02-27 DIAGNOSIS — Z79.4 TYPE 2 DIABETES MELLITUS WITHOUT COMPLICATION, WITH LONG-TERM CURRENT USE OF INSULIN: ICD-10-CM

## 2020-02-27 DIAGNOSIS — D68.4 ACQUIRED COAGULATION FACTOR DEFICIENCY: ICD-10-CM

## 2020-02-27 DIAGNOSIS — K72.10 END STAGE LIVER DISEASE: ICD-10-CM

## 2020-02-27 DIAGNOSIS — K31.89 PORTAL HYPERTENSIVE GASTROPATHY: ICD-10-CM

## 2020-02-27 DIAGNOSIS — R18.8 OTHER ASCITES: ICD-10-CM

## 2020-02-27 DIAGNOSIS — K42.9 UMBILICAL HERNIA WITHOUT OBSTRUCTION AND WITHOUT GANGRENE: ICD-10-CM

## 2020-02-27 DIAGNOSIS — K76.6 PORTAL HYPERTENSIVE GASTROPATHY: ICD-10-CM

## 2020-02-27 DIAGNOSIS — K70.30 CIRRHOSIS, LAENNEC'S: ICD-10-CM

## 2020-02-27 DIAGNOSIS — K76.82 HEPATIC ENCEPHALOPATHY: ICD-10-CM

## 2020-02-27 LAB
ALBUMIN FLD-MCNC: 0.7 G/DL
ALBUMIN SERPL BCP-MCNC: 3.5 G/DL (ref 3.5–5.2)
ALP SERPL-CCNC: 103 U/L (ref 55–135)
ALT SERPL W/O P-5'-P-CCNC: 16 U/L (ref 10–44)
ANION GAP SERPL CALC-SCNC: 10 MMOL/L (ref 8–16)
APPEARANCE FLD: NORMAL
AST SERPL-CCNC: 57 U/L (ref 10–40)
BASOPHILS # BLD AUTO: 0.03 K/UL (ref 0–0.2)
BASOPHILS NFR BLD: 0.8 % (ref 0–1.9)
BILIRUB SERPL-MCNC: 2.2 MG/DL (ref 0.1–1)
BODY FLD TYPE: NORMAL
BUN SERPL-MCNC: 8 MG/DL (ref 6–20)
CALCIUM SERPL-MCNC: 8.4 MG/DL (ref 8.7–10.5)
CHLORIDE SERPL-SCNC: 104 MMOL/L (ref 95–110)
CO2 SERPL-SCNC: 22 MMOL/L (ref 23–29)
COLOR FLD: YELLOW
CREAT SERPL-MCNC: 0.8 MG/DL (ref 0.5–1.4)
DIFFERENTIAL METHOD: ABNORMAL
EOSINOPHIL # BLD AUTO: 0.1 K/UL (ref 0–0.5)
EOSINOPHIL NFR BLD: 3.7 % (ref 0–8)
ERYTHROCYTE [DISTWIDTH] IN BLOOD BY AUTOMATED COUNT: 18.8 % (ref 11.5–14.5)
EST. GFR  (AFRICAN AMERICAN): >60 ML/MIN/1.73 M^2
EST. GFR  (NON AFRICAN AMERICAN): >60 ML/MIN/1.73 M^2
GLUCOSE SERPL-MCNC: 177 MG/DL (ref 70–110)
HCT VFR BLD AUTO: 30.9 % (ref 40–54)
HGB BLD-MCNC: 9 G/DL (ref 14–18)
IMM GRANULOCYTES # BLD AUTO: 0.02 K/UL (ref 0–0.04)
IMM GRANULOCYTES NFR BLD AUTO: 0.6 % (ref 0–0.5)
INR PPP: 1.7 (ref 0.8–1.2)
LYMPHOCYTES # BLD AUTO: 0.5 K/UL (ref 1–4.8)
LYMPHOCYTES NFR BLD: 14.7 % (ref 18–48)
LYMPHOCYTES NFR FLD MANUAL: 73 %
MCH RBC QN AUTO: 26.7 PG (ref 27–31)
MCHC RBC AUTO-ENTMCNC: 29.1 G/DL (ref 32–36)
MCV RBC AUTO: 92 FL (ref 82–98)
MONOCYTES # BLD AUTO: 0.5 K/UL (ref 0.3–1)
MONOCYTES NFR BLD: 13.6 % (ref 4–15)
MONOS+MACROS NFR FLD MANUAL: 22 %
NEUTROPHILS # BLD AUTO: 2.4 K/UL (ref 1.8–7.7)
NEUTROPHILS NFR BLD: 66.6 % (ref 38–73)
NEUTROPHILS NFR FLD MANUAL: 5 %
NRBC BLD-RTO: 0 /100 WBC
PLATELET # BLD AUTO: 52 K/UL (ref 150–350)
PMV BLD AUTO: 13.5 FL (ref 9.2–12.9)
POCT GLUCOSE: 149 MG/DL (ref 70–110)
POCT GLUCOSE: 174 MG/DL (ref 70–110)
POTASSIUM SERPL-SCNC: 4.4 MMOL/L (ref 3.5–5.1)
PROT SERPL-MCNC: 6.3 G/DL (ref 6–8.4)
PROTHROMBIN TIME: 16.6 SEC (ref 9–12.5)
RBC # BLD AUTO: 3.37 M/UL (ref 4.6–6.2)
SODIUM SERPL-SCNC: 136 MMOL/L (ref 136–145)
SPECIMEN SOURCE: NORMAL
WBC # BLD AUTO: 3.54 K/UL (ref 3.9–12.7)
WBC # FLD: 711 /CU MM

## 2020-02-27 PROCEDURE — 99222 1ST HOSP IP/OBS MODERATE 55: CPT | Mod: NTX,,, | Performed by: NURSE PRACTITIONER

## 2020-02-27 PROCEDURE — 99285 EMERGENCY DEPT VISIT HI MDM: CPT | Mod: NTX

## 2020-02-27 PROCEDURE — 89051 BODY FLUID CELL COUNT: CPT | Mod: NTX

## 2020-02-27 PROCEDURE — 99222 PR INITIAL HOSPITAL CARE,LEVL II: ICD-10-PCS | Mod: NTX,,, | Performed by: NURSE PRACTITIONER

## 2020-02-27 PROCEDURE — 82042 OTHER SOURCE ALBUMIN QUAN EA: CPT | Mod: NTX

## 2020-02-27 PROCEDURE — 25000003 PHARM REV CODE 250: Mod: NTX | Performed by: PHYSICIAN ASSISTANT

## 2020-02-27 PROCEDURE — 87102 FUNGUS ISOLATION CULTURE: CPT | Mod: NTX

## 2020-02-27 PROCEDURE — 99223 PR INITIAL HOSPITAL CARE,LEVL III: ICD-10-PCS | Mod: AI,NTX,, | Performed by: PHYSICIAN ASSISTANT

## 2020-02-27 PROCEDURE — C9399 UNCLASSIFIED DRUGS OR BIOLOG: HCPCS | Mod: NTX | Performed by: NURSE PRACTITIONER

## 2020-02-27 PROCEDURE — 99285 PR EMERGENCY DEPT VISIT,LEVEL V: ICD-10-PCS | Mod: NTX,,, | Performed by: PHYSICIAN ASSISTANT

## 2020-02-27 PROCEDURE — 63600175 PHARM REV CODE 636 W HCPCS: Mod: NTX | Performed by: NURSE PRACTITIONER

## 2020-02-27 PROCEDURE — 87070 CULTURE OTHR SPECIMN AEROBIC: CPT | Mod: NTX

## 2020-02-27 PROCEDURE — 85025 COMPLETE CBC W/AUTO DIFF WBC: CPT | Mod: NTX

## 2020-02-27 PROCEDURE — 87075 CULTR BACTERIA EXCEPT BLOOD: CPT | Mod: NTX

## 2020-02-27 PROCEDURE — 20600001 HC STEP DOWN PRIVATE ROOM: Mod: NTX

## 2020-02-27 PROCEDURE — 80053 COMPREHEN METABOLIC PANEL: CPT | Mod: NTX

## 2020-02-27 PROCEDURE — 85610 PROTHROMBIN TIME: CPT | Mod: NTX

## 2020-02-27 PROCEDURE — 99285 EMERGENCY DEPT VISIT HI MDM: CPT | Mod: NTX,,, | Performed by: PHYSICIAN ASSISTANT

## 2020-02-27 PROCEDURE — 99223 1ST HOSP IP/OBS HIGH 75: CPT | Mod: AI,NTX,, | Performed by: PHYSICIAN ASSISTANT

## 2020-02-27 PROCEDURE — 94761 N-INVAS EAR/PLS OXIMETRY MLT: CPT | Mod: NTX

## 2020-02-27 PROCEDURE — 87205 SMEAR GRAM STAIN: CPT | Mod: NTX

## 2020-02-27 RX ORDER — HYDROCODONE BITARTRATE AND ACETAMINOPHEN 10; 325 MG/1; MG/1
1 TABLET ORAL EVERY 6 HOURS PRN
Status: DISCONTINUED | OUTPATIENT
Start: 2020-02-27 | End: 2020-03-01

## 2020-02-27 RX ORDER — PANTOPRAZOLE SODIUM 40 MG/1
40 TABLET, DELAYED RELEASE ORAL DAILY
Status: DISCONTINUED | OUTPATIENT
Start: 2020-02-27 | End: 2020-03-02 | Stop reason: HOSPADM

## 2020-02-27 RX ORDER — ONDANSETRON 8 MG/1
8 TABLET, ORALLY DISINTEGRATING ORAL EVERY 8 HOURS PRN
Status: DISCONTINUED | OUTPATIENT
Start: 2020-02-27 | End: 2020-03-01

## 2020-02-27 RX ORDER — TAMSULOSIN HYDROCHLORIDE 0.4 MG/1
0.4 CAPSULE ORAL DAILY
Status: DISCONTINUED | OUTPATIENT
Start: 2020-02-27 | End: 2020-03-02 | Stop reason: HOSPADM

## 2020-02-27 RX ORDER — IBUPROFEN 200 MG
16 TABLET ORAL
Status: DISCONTINUED | OUTPATIENT
Start: 2020-02-27 | End: 2020-02-28

## 2020-02-27 RX ORDER — NEBIVOLOL 5 MG/1
10 TABLET ORAL DAILY
Status: DISCONTINUED | OUTPATIENT
Start: 2020-02-27 | End: 2020-02-28

## 2020-02-27 RX ORDER — SODIUM CHLORIDE 0.9 % (FLUSH) 0.9 %
3 SYRINGE (ML) INJECTION
Status: DISCONTINUED | OUTPATIENT
Start: 2020-02-27 | End: 2020-03-02 | Stop reason: HOSPADM

## 2020-02-27 RX ORDER — OXYBUTYNIN CHLORIDE 5 MG/1
5 TABLET ORAL DAILY
Status: DISCONTINUED | OUTPATIENT
Start: 2020-02-27 | End: 2020-03-02 | Stop reason: HOSPADM

## 2020-02-27 RX ORDER — GLUCAGON 1 MG
1 KIT INJECTION
Status: DISCONTINUED | OUTPATIENT
Start: 2020-02-27 | End: 2020-02-27

## 2020-02-27 RX ORDER — HEPARIN SODIUM 5000 [USP'U]/ML
5000 INJECTION, SOLUTION INTRAVENOUS; SUBCUTANEOUS EVERY 8 HOURS
Status: DISCONTINUED | OUTPATIENT
Start: 2020-02-27 | End: 2020-03-02 | Stop reason: HOSPADM

## 2020-02-27 RX ORDER — INSULIN ASPART 100 [IU]/ML
18 INJECTION, SOLUTION INTRAVENOUS; SUBCUTANEOUS
Status: DISCONTINUED | OUTPATIENT
Start: 2020-02-27 | End: 2020-02-28

## 2020-02-27 RX ORDER — CYCLOBENZAPRINE HCL 5 MG
10 TABLET ORAL 2 TIMES DAILY
Status: DISCONTINUED | OUTPATIENT
Start: 2020-02-27 | End: 2020-03-02 | Stop reason: HOSPADM

## 2020-02-27 RX ORDER — HYDROCODONE BITARTRATE AND ACETAMINOPHEN 10; 325 MG/1; MG/1
1 TABLET ORAL EVERY 12 HOURS
Status: DISCONTINUED | OUTPATIENT
Start: 2020-02-27 | End: 2020-02-27

## 2020-02-27 RX ORDER — CIPROFLOXACIN 500 MG/1
500 TABLET ORAL DAILY
Status: DISCONTINUED | OUTPATIENT
Start: 2020-02-28 | End: 2020-03-02 | Stop reason: HOSPADM

## 2020-02-27 RX ORDER — SPIRONOLACTONE 50 MG/1
150 TABLET, FILM COATED ORAL DAILY
Status: DISCONTINUED | OUTPATIENT
Start: 2020-02-27 | End: 2020-03-02 | Stop reason: HOSPADM

## 2020-02-27 RX ORDER — INSULIN ASPART 100 [IU]/ML
1-10 INJECTION, SOLUTION INTRAVENOUS; SUBCUTANEOUS
Status: DISCONTINUED | OUTPATIENT
Start: 2020-02-27 | End: 2020-02-28

## 2020-02-27 RX ORDER — IBUPROFEN 200 MG
24 TABLET ORAL
Status: DISCONTINUED | OUTPATIENT
Start: 2020-02-27 | End: 2020-02-28

## 2020-02-27 RX ORDER — LACTULOSE 10 G/15ML
20 SOLUTION ORAL 3 TIMES DAILY
Status: DISCONTINUED | OUTPATIENT
Start: 2020-02-27 | End: 2020-02-28

## 2020-02-27 RX ORDER — GABAPENTIN 300 MG/1
600 CAPSULE ORAL 3 TIMES DAILY
Status: DISCONTINUED | OUTPATIENT
Start: 2020-02-27 | End: 2020-03-02 | Stop reason: HOSPADM

## 2020-02-27 RX ORDER — CETIRIZINE HYDROCHLORIDE 5 MG/1
5 TABLET ORAL DAILY
Status: DISCONTINUED | OUTPATIENT
Start: 2020-02-27 | End: 2020-03-02 | Stop reason: HOSPADM

## 2020-02-27 RX ORDER — FINASTERIDE 5 MG/1
5 TABLET, FILM COATED ORAL DAILY
Status: DISCONTINUED | OUTPATIENT
Start: 2020-02-27 | End: 2020-03-02 | Stop reason: HOSPADM

## 2020-02-27 RX ORDER — GLUCAGON 1 MG
1 KIT INJECTION
Status: DISCONTINUED | OUTPATIENT
Start: 2020-02-27 | End: 2020-02-28

## 2020-02-27 RX ADMIN — CYCLOBENZAPRINE 10 MG: 10 TABLET, FILM COATED ORAL at 12:02

## 2020-02-27 RX ADMIN — CETIRIZINE HYDROCHLORIDE 5 MG: 5 TABLET ORAL at 12:02

## 2020-02-27 RX ADMIN — RIFAXIMIN 550 MG: 550 TABLET ORAL at 08:02

## 2020-02-27 RX ADMIN — TAMSULOSIN HYDROCHLORIDE 0.4 MG: 0.4 CAPSULE ORAL at 12:02

## 2020-02-27 RX ADMIN — NEBIVOLOL HYDROCHLORIDE 10 MG: 5 TABLET ORAL at 01:02

## 2020-02-27 RX ADMIN — INSULIN ASPART 1 UNITS: 100 INJECTION, SOLUTION INTRAVENOUS; SUBCUTANEOUS at 09:02

## 2020-02-27 RX ADMIN — OXYBUTYNIN CHLORIDE 5 MG: 5 TABLET ORAL at 12:02

## 2020-02-27 RX ADMIN — LACTULOSE 20 G: 20 SOLUTION ORAL at 04:02

## 2020-02-27 RX ADMIN — GABAPENTIN 600 MG: 300 CAPSULE ORAL at 08:02

## 2020-02-27 RX ADMIN — PANTOPRAZOLE SODIUM 40 MG: 40 TABLET, DELAYED RELEASE ORAL at 12:02

## 2020-02-27 RX ADMIN — HYDROCODONE BITARTRATE AND ACETAMINOPHEN 1 TABLET: 10; 325 TABLET ORAL at 05:02

## 2020-02-27 RX ADMIN — CYCLOBENZAPRINE 10 MG: 10 TABLET, FILM COATED ORAL at 08:02

## 2020-02-27 RX ADMIN — RIFAXIMIN 550 MG: 550 TABLET ORAL at 12:02

## 2020-02-27 RX ADMIN — LACTULOSE 20 G: 20 SOLUTION ORAL at 08:02

## 2020-02-27 RX ADMIN — FINASTERIDE 5 MG: 5 TABLET, FILM COATED ORAL at 12:02

## 2020-02-27 RX ADMIN — INSULIN DETEMIR 24 UNITS: 100 INJECTION, SOLUTION SUBCUTANEOUS at 09:02

## 2020-02-27 RX ADMIN — HYDROCODONE BITARTRATE AND ACETAMINOPHEN 1 TABLET: 10; 325 TABLET ORAL at 12:02

## 2020-02-27 RX ADMIN — FUROSEMIDE 180 MG: 40 TABLET ORAL at 01:02

## 2020-02-27 RX ADMIN — GABAPENTIN 600 MG: 300 CAPSULE ORAL at 04:02

## 2020-02-27 RX ADMIN — SPIRONOLACTONE 150 MG: 50 TABLET ORAL at 01:02

## 2020-02-27 NOTE — ED NOTES
Patient is staying in the [facility] as the back up  for a liver transplant.  Leaking fluid from umbilical hernia repair site.  Belly is distended.  Reports lower back pain.

## 2020-02-27 NOTE — ED PROVIDER NOTES
Encounter Date: 2/27/2020       History     Chief Complaint   Patient presents with    Liver Failure     st[facility] in [facility] , leaking from parancenthesis site,      Mr Reilly is a 59yoM who presents for fluid leaking from surgical site; pertinent PMHx h/o decompensated liver failure 2/2 alcoholic cirrhosis (currently listed), POD 5 umbilical hernia repair without mesh 2 days post drain removal, chronic back pain, DM 2.  Patient reports recurrence fluid leaking from umbilical hernia site and drain site.  Reports fluid has been clear and yellow.  Enough fluid to soak 2 towels this morning.  Denies associated fever/chills, nausea, vomiting, constipation or diarrhea, decreased UOP.  Reports abdominal distention is baseline. No increased confusion per wife.  The patients available PMH, PSH, Social History, medications, allergies, and triage vital signs were reviewed just prior to their medical evaluation.  A ten point review of systems was completed and is negative except as documented above.  Patient denies any other acute medical complaint.    Please be advised this text was dictated with Ensequence*Live Calendars software and may contain errors due to translation.           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    Esophageal varices     GERD (gastroesophageal reflux disease)     Hepatic encephalopathy 7/8/2014    Hepatitis C virus infection 07/08/2014    tx with interferon 3-4 mos- stopped for unclear reasons Tattoos-first at age 17 only risk factor (HCVAB negative 08/2017)    HTN (hypertension) 7/8/2014    Hypertension     Insulin dependent diabetes mellitus     Renal mass, left 7/8/2014    6.3 x 5.7 x 5.6 complex mass    Splenomegaly 7/8/2014    Umbilical hernia 7/8/2014     Past Surgical  History:   Procedure Laterality Date    CARPAL TUNNEL RELEASE Bilateral     CHOLECYSTECTOMY      lap    COLONOSCOPY N/A 9/8/2017    Procedure: COLONOSCOPY;  Surgeon: Savannah Llanos MD;  Location: [facility];  Service: Endoscopy;  Laterality: N/A;    COLONOSCOPY Left 3/14/2018    Procedure: COLONOSCOPY;  Surgeon: Savannah Llanos MD;  Location: [facility];  Service: Endoscopy;  Laterality: Left;    ESOPHAGOGASTRODUODENOSCOPY  01/2014    ESOPHAGOGASTRODUODENOSCOPY  02/15/2017    grade II varices    ESOPHAGOGASTRODUODENOSCOPY  04/10/2017    grade I varices    ESOPHAGOGASTRODUODENOSCOPY N/A 10/18/2019    Procedure: EGD (ESOPHAGOGASTRODUODENOSCOPY);  Surgeon: Flavio Escalera MD;  [facility]  Service: Endoscopy;  Laterality: N/A;    ESOPHAGOGASTRODUODENOSCOPY W/ BANDING  01/26/2017    grade II varices    HERNIA REPAIR      NECK SURGERY      fusion on C5 and C6    ULNAR NERVE TRANSPOSITION Left     UMBILICAL HERNIA REPAIR N/A 2/22/2020    Procedure: REPAIR, HERNIA, PRIMARY WIHTOUT MESH AND PLACEMENT OF DRAIN;  Surgeon: Sherri Brunner MD;  [facility];  Service: Transplant;  Laterality: N/A;    vericose veins removed       Family History   Problem Relation Age of Onset    Hyperlipidemia Mother     No Known Problems Father 36        accident on oil rig    No Known Problems Sister     Cancer Brother         kidney    Alcohol abuse Brother     No Known Problems Sister      Social History     Tobacco Use    Smoking status: Former Smoker     Types: Cigarettes     Last attempt to quit: 1/4/2010     Years since quitting: 10.1    Smokeless tobacco: Former User     Types: Chew     Quit date: 2/24/1990    Tobacco comment: former 1 pack/week quit 1/4/2010   Substance Use Topics    Alcohol use: No     Comment: former heavy beer but quit 1/4/2010    Drug use: No     Review of Systems   Constitutional: Negative for chills and fever.   Respiratory: Negative for  shortness of breath.    Cardiovascular: Negative for chest pain.   Gastrointestinal: Positive for abdominal distention (baseline per patient). Negative for constipation, diarrhea, nausea and vomiting.   Genitourinary: Negative for decreased urine volume and dysuria.   Musculoskeletal: Back pain: unchanged.   Skin: Positive for wound (post op problem).   Neurological: Negative for dizziness and weakness.   Psychiatric/Behavioral: Negative for confusion.       Physical Exam     Initial Vitals [02/27/20 0847]   BP Pulse Resp Temp SpO2   122/64 100 18 99.4 °F (37.4 °C) 96 %      MAP       --         Physical Exam    Vitals reviewed.  Constitutional: He appears well-developed. He is not diaphoretic. No distress.   Chronically ill appearing   HENT:   Head: Normocephalic and atraumatic.   Right Ear: External ear normal.   Left Ear: External ear normal.   Mouth/Throat: Oropharynx is clear and moist. No oropharyngeal exudate.   Eyes: Conjunctivae and EOM are normal. Pupils are equal, round, and reactive to light. Scleral icterus is present.   Cardiovascular: Normal rate, regular rhythm and intact distal pulses.   Pulmonary/Chest: Breath sounds normal. He has no wheezes. He has no rhonchi. He has no rales.   Abdominal: Soft. He exhibits distension (global). There is tenderness (mild). There is no rebound and no guarding.   Umbilical repair site with serous drainage, consistent with peritonitic fluid   Musculoskeletal: Normal range of motion.   Neurological: He is alert and oriented to person, place, and time. He has normal strength. No cranial nerve deficit.   Skin: Skin is warm and dry. Capillary refill takes less than 2 seconds.   Icteric throughout   Psychiatric: He has a normal mood and affect. His behavior is normal. Judgment and thought content normal.         ED Course   Procedures  Labs Reviewed   CBC W/ AUTO DIFFERENTIAL - Abnormal; Notable for the following components:       Result Value    WBC 3.54 (*)     RBC 3.37  (*)     Hemoglobin 9.0 (*)     Hematocrit 30.9 (*)     Mean Corpuscular Hemoglobin 26.7 (*)     Mean Corpuscular Hemoglobin Conc 29.1 (*)     RDW 18.8 (*)     Platelets 52 (*)     MPV 13.5 (*)     Immature Granulocytes 0.6 (*)     Lymph # 0.5 (*)     Lymph% 14.7 (*)     All other components within normal limits   COMPREHENSIVE METABOLIC PANEL - Abnormal; Notable for the following components:    CO2 22 (*)     Glucose 177 (*)     Calcium 8.4 (*)     Total Bilirubin 2.2 (*)     AST 57 (*)     All other components within normal limits   PROTIME-INR - Abnormal; Notable for the following components:    Prothrombin Time 16.6 (*)     INR 1.7 (*)     All other components within normal limits   CULTURE, FUNGUS   CULTURE, FLUID  (AEROBIC) WITH GRAM STAIN   CULTURE, ANAEROBIC   WBC & DIFF,BODY FLUID   ALBUMIN, PERITONEAL, PLEURAL FLUID OR DG DRAINAGE, IN-HOUSE          Imaging Results    None          Medical Decision Making:   History:   Old Medical Records: I decided to obtain old medical records.  Old Records Summarized: records from previous admission(s) and records from clinic visits.  Initial Assessment:   Patient with ESLD on transplant list presents for post operative peritonitic drainage from umbilical hernia site. VSS, afebrile, neuro intact  Differential Diagnosis:   DDx includes post op complication, SBP. Physical exam and history taking lower clinical suspicion for acute abdomen, septic shock, abscess collection.   Clinical Tests:   Lab Tests: Ordered and Reviewed  ED Management:  Transplant surgery consulted and will see the patient at the bedside. Labs show continued Tbili elevation, though sample appears hemolyzed. No acute CBC abnormality. VSS.   Update: transplant to admit patient for post op drainage from umbilical repair site. Patient agreed to plan of care and voiced understanding.    Halle Zeng PA-C  02/28/2020    I discussed the following case, diagnosis and plan of care with attending  physician.    Other:   I have discussed this case with another health care provider.                                 Clinical Impression:       ICD-10-CM ICD-9-CM   1. End stage liver disease K72.90 572.8   2. Acquired coagulation factor deficiency D68.4 286.7   3. Hepatic encephalopathy K72.90 572.2   4. Portal hypertensive gastropathy K76.6 572.3    K31.89 537.89   5. Pre-op testing Z01.818 V72.84   6. Type 2 diabetes mellitus without complication, with long-term current use of insulin E11.9 250.00    Z79.4 V58.67         Disposition:   Disposition: Admitted  Condition: Fair     ED Disposition Condition    Admit                           Halle Zeng PA-C  02/28/20 2028

## 2020-02-27 NOTE — PLAN OF CARE
Pt arrived to U  12 for paracentesis.  Name verified using two identifiers.  Allergies verified. Awaiting consent.  Will continue to monitor.

## 2020-02-27 NOTE — H&P
[facility]  Liver Transplant  History & Physical    Patient Name: Colin Reilly  MRN: [id number]  Admission Date: 2/27/2020  Code Status: Full Code  Primary Care Provider: Preston Matthew Ii, MD    Subjective:     History of Present Illness:  60y/o male, with ESLD secondary to ETOH listed with MELD 17, DM, HCV s/p INF, and hx of an umbilical hernia s/p umbilical hernia repair on 2/22/20  who presents to the ED today with increased abdominal pain and increased drainage from his surgical incision. Patient progressed well from his hernia repair and was discharged yesterday afternoon. Overnight he developed increased drainage from his incision soaking through numerous weight to dry dressings and worsening abdominal pain. He is having regular bowel movements and passing gas. He denies fever/chills, worsening nausea, vomiting, change in bowel habits, dysuria, chest pain, and sob. Labs stable. Vitals stable. He is also back up for liver transplant at this time. Plan to admit to LTS for further management. Patient discussed with Dr. Brunner.     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    Esophageal varices     GERD (gastroesophageal reflux disease)     Hepatic encephalopathy 7/8/2014    Hepatitis C virus infection 07/08/2014    tx with interferon 3-4 mos- stopped for unclear reasons Tattoos-first at age 17 only risk factor (HCVAB negative 08/2017)    HTN (hypertension) 7/8/2014    Hypertension     Insulin dependent diabetes mellitus     Renal mass, left 7/8/2014    6.3 x 5.7 x 5.6 complex mass    Splenomegaly 7/8/2014    Umbilical hernia 7/8/2014       Past Surgical History:   Procedure Laterality Date    CARPAL TUNNEL RELEASE Bilateral     CHOLECYSTECTOMY      lap    COLONOSCOPY N/A 9/8/2017    Procedure: COLONOSCOPY;   Surgeon: Svaannah Llanos MD;  Location: [facility];  Service: Endoscopy;  Laterality: N/A;    COLONOSCOPY Left 3/14/2018    Procedure: COLONOSCOPY;  Surgeon: Savannah Llanos MD;  Location: [facility];  Service: Endoscopy;  Laterality: Left;    ESOPHAGOGASTRODUODENOSCOPY  01/2014    ESOPHAGOGASTRODUODENOSCOPY  02/15/2017    grade II varices    ESOPHAGOGASTRODUODENOSCOPY  04/10/2017    grade I varices    ESOPHAGOGASTRODUODENOSCOPY N/A 10/18/2019    Procedure: EGD (ESOPHAGOGASTRODUODENOSCOPY);  Surgeon: Flavio Escalera MD;  [facility]: [facility] (2ND FLR);  Service: Endoscopy;  Laterality: N/A;    ESOPHAGOGASTRODUODENOSCOPY W/ BANDING  01/26/2017    grade II varices    HERNIA REPAIR      NECK SURGERY      fusion on C5 and C6    ULNAR NERVE TRANSPOSITION Left     UMBILICAL HERNIA REPAIR N/A 2/22/2020    Procedure: REPAIR, HERNIA, PRIMARY WIHTOUT MESH AND PLACEMENT OF DRAIN;  Surgeon: Sherri Brunner MD;  [facility];  Service: Transplant;  Laterality: N/A;    vericose veins removed         Review of patient's allergies indicates:  No Known Allergies    Family History     Problem Relation (Age of Onset)    Alcohol abuse Brother    Cancer Brother    Hyperlipidemia Mother    No Known Problems Father (36), Sister, Sister        Tobacco Use    Smoking status: Former Smoker     Types: Cigarettes     Last attempt to quit: 1/4/2010     Years since quitting: 10.1    Smokeless tobacco: Former User     Types: Chew     Quit date: 2/24/1990    Tobacco comment: former 1 pack/week quit 1/4/2010   Substance and Sexual Activity    Alcohol use: No     Comment: former heavy beer but quit 1/4/2010    Drug use: No    Sexual activity: Never     Comment:          (Not in a hospital admission)    Review of Systems   Constitutional: Negative for activity change, appetite change, chills, diaphoresis and fever.   HENT: Negative for congestion, sinus pressure, sinus pain, sore throat and trouble  swallowing.    Eyes: Negative for visual disturbance.   Respiratory: Negative for chest tightness, shortness of breath and stridor.    Cardiovascular: Negative for chest pain, palpitations and leg swelling.   Gastrointestinal: Positive for abdominal distention and abdominal pain. Negative for constipation, diarrhea, nausea and vomiting.   Genitourinary: Negative for decreased urine volume, difficulty urinating, dysuria and flank pain.   Musculoskeletal: Negative for neck pain and neck stiffness.   Skin: Positive for wound. Negative for color change and rash.   Allergic/Immunologic: Positive for immunocompromised state.   Neurological: Negative for dizziness, tremors, syncope, light-headedness and headaches.   Psychiatric/Behavioral: Negative for agitation, confusion, decreased concentration and hallucinations. The patient is not nervous/anxious.      Objective:     Vital Signs (Most Recent):  Temp: 98.3 °F (36.8 °C) (02/27/20 1115)  Pulse: 92 (02/27/20 1115)  Resp: 18 (02/27/20 1115)  BP: 121/73 (02/27/20 1115)  SpO2: 96 % (02/27/20 1115) Vital Signs (24h Range):  Temp:  [98.3 °F (36.8 °C)-99.4 °F (37.4 °C)] 98.3 °F (36.8 °C)  Pulse:  [] 92  Resp:  [18-20] 18  SpO2:  [94 %-96 %] 96 %  BP: (105-122)/(58-73) 121/73     Weight: 95.3 kg (210 lb)  Body mass index is 29.29 kg/m².    No intake or output data in the 24 hours ending 02/27/20 1135    Physical Exam   Constitutional: He is oriented to person, place, and time. No distress.   HENT:   Head: Normocephalic and atraumatic.   Eyes: Right eye exhibits no discharge. Left eye exhibits no discharge. No scleral icterus.   Neck: Normal range of motion. Neck supple.   Cardiovascular: Normal rate, regular rhythm, normal heart sounds and intact distal pulses.   Pulmonary/Chest: Effort normal and breath sounds normal. He has no wheezes. He has no rales.   Abdominal: Soft. Bowel sounds are normal. He exhibits distension. There is tenderness. There is no guarding.    moderately TTP in RLQ. No peritoneal signs. Surgical incision intact with serous drainage. Former drain site intact with sutures in place.    Musculoskeletal: He exhibits no edema.   Neurological: He is alert and oriented to person, place, and time. No cranial nerve deficit.   Skin: Skin is warm and dry. Capillary refill takes less than 2 seconds. He is not diaphoretic.   Psychiatric: Judgment and thought content normal.   Nursing note and vitals reviewed.      Laboratory:  CBC:   Recent Labs   Lab 02/27/20  0905   WBC 3.54*   RBC 3.37*   HGB 9.0*   HCT 30.9*   PLT 52*   MCV 92   MCH 26.7*   MCHC 29.1*     CMP:   Recent Labs   Lab 02/27/20  0905   *   CALCIUM 8.4*   ALBUMIN 3.5   PROT 6.3      K 4.4   CO2 22*      BUN 8   CREATININE 0.8   ALKPHOS 103   ALT 16   AST 57*   BILITOT 2.2*     Labs within the past 24 hours have been reviewed.    Diagnostic Results:  I have personally reviewed all pertinent imaging studies.    Assessment/Plan:     * Umbilical hernia  - s/p hernia repair 2/22  - admitted with copious serous drainage from incision        - send for paracentesis to check for peritonitis       - continue norco       - monitor      End stage liver disease  - ESLD 2/2 Etoh listed for liver transplant with MELD 17  - s/p umbilical hernia repair 2/27/20    MELD-Na score: 16 at 2/27/2020  9:05 AM  MELD score: 15 at 2/27/2020  9:05 AM  Calculated from:  Serum Creatinine: 0.8 mg/dL (Rounded to 1 mg/dL) at 2/27/2020  9:05 AM  Serum Sodium: 136 mmol/L at 2/27/2020  9:05 AM  Total Bilirubin: 2.2 mg/dL at 2/27/2020  9:05 AM  INR(ratio): 1.7 at 2/27/2020  9:05 AM  Age: 59 years        Acquired coagulation factor deficiency  - 2/2 ESLD  - monitor PT/INR      Type 2 diabetes mellitus without complication, with long-term current use of insulin  - resume home insulin, consult endocrine for assistnace  - sliding scale      Portal hypertensive gastropathy  - 2/2 ESLD  - listed for liver  transplant      Hepatic encephalopathy  - chronic  - continue lactulose      Other ascites  - para today  - continue PO lasix/aldactone              Nicolette Motta PA-C  Liver Transplant  [facility]

## 2020-02-27 NOTE — CONSULTS
[facility]  Endocrinology  Diabetes Consult Note    Consult Requested by: Sherri Brunner MD   Reason for admit: Umbilical hernia    HISTORY OF PRESENT ILLNESS:  Reason for Consult: Management of T2DM, Hyperglycemia      Surgical Procedure and Date: Umbilical hernia repair 2/22/20     Diabetes diagnosis year: 2002           Lab Results   Component Value Date     HGBA1C 7.6 (H) 12/05/2019       Home Diabetes Medications: Levemir 25 units HS tonight and novolog 15 units with meals until NPO.         How often checking glucose at home? 3x per day   BG readings on regimen: 150-200s  Hypoglycemia on the regimen?  No  Missed doses on regimen?  Yes     Diabetes Complications include:     Hyperglycemia and Diabetic peripheral neuropathy      Complicating diabetes co morbidities:   CIRRHOSIS        HPI:   Patient is a 59 y.o. male with a diagnosis of DM2, ESLD secondary to ETOH listed with MELD 17, DM, HCV s/p INF, and hx of an umbilical hernia s/p umbilical hernia repair on 2/22/20  who presents to the ED today with increased abdominal pain and increased drainage from his surgical incision. Patient's DM managed per his PCP in [place], [place].  Endocrinology consulted for DM/BG management.      Interval HPI:   Overnight events: Admitted to TSU. BG well controlled with basal insulin - NPO for tests and patient back up for liver transplant.   Eating:   NPO  Nausea: No  Hypoglycemia and intervention: No  Fever: No  TPN and/or TF: No    PMH, PSH, FH, SH  reviewed       Review of Systems   Constitutional: Negative for weight changes.  Eyes: Negative for visual disturbance.  Respiratory: Negative for cough.   Cardiovascular: Negative for chest pain.  Gastrointestinal: Negative for nausea.  Endocrine: Negative for polyuria, polydipsia.  Musculoskeletal: Negative for back pain.  Skin: Negative for rash.  Neurological: Negative for syncope.  Psychiatric/Behavioral: Negative for depression.      Current Medications  and/or Treatments Impacting Glycemic Control  Immunotherapy:    Immunosuppressants     None        Steroids:   Hormones (From admission, onward)    None        Pressors:    Autonomic Drugs (From admission, onward)    None        Hyperglycemia/Diabetes Medications:   Antihyperglycemics (From admission, onward)    Start     Stop Route Frequency Ordered    02/27/20 2100  insulin detemir U-100 pen 24 Units      -- SubQ Nightly 02/27/20 1754    02/27/20 1854  insulin aspart U-100 pen 1-10 Units      -- SubQ Before meals & nightly PRN 02/27/20 1754    02/27/20 1200  insulin aspart U-100 pen 18 Units      -- SubQ 3 times daily with meals 02/27/20 1059             PHYSICAL EXAMINATION:  Vitals:    02/27/20 1655   BP:    Pulse:    Resp:    Temp: 97.9 °F (36.6 °C)     Body mass index is 28.17 kg/m².    Physical Exam   Constitutional: Well developed, well nourished, NAD.  ENT: External ears no masses with nose patent; normal hearing.  Neck: Supple; trachea midline.   Cardiovascular: Normal heart sounds, no LE edema. DP +2 bilaterally.  Lungs: Normal effort; lungs anterior bilaterally clear to auscultation.  Abdomen: Soft, no masses, no hernias. LLQ dressing. Umbilical incision with staples lance and draining.   MS: No clubbing or cyanosis of nails noted;  unable to assess gait.  Skin: No rashes, lesions, or ulcers; no nodules.   Psychiatric: Good judgement and insight; normal mood and affect.  Neurological: Cranial nerves are grossly intact.   Foot: nails in good condition, no amputations noted.          Labs Reviewed and Include   Recent Labs   Lab 02/27/20  0905   *   CALCIUM 8.4*   ALBUMIN 3.5   PROT 6.3      K 4.4   CO2 22*      BUN 8   CREATININE 0.8   ALKPHOS 103   ALT 16   AST 57*   BILITOT 2.2*     Lab Results   Component Value Date    WBC 3.54 (L) 02/27/2020    HGB 9.0 (L) 02/27/2020    HCT 30.9 (L) 02/27/2020    MCV 92 02/27/2020    PLT 52 (L) 02/27/2020     No results for input(s): TSH, FREET4 in  the last 168 hours.  Lab Results   Component Value Date    HGBA1C 7.6 (H) 12/05/2019       Nutritional status:   Body mass index is 28.17 kg/m².  Lab Results   Component Value Date    ALBUMIN 3.5 02/27/2020    ALBUMIN 3.2 (L) 02/26/2020    ALBUMIN 2.6 (L) 02/25/2020     No results found for: PREALBUMIN    Estimated Creatinine Clearance: 116.5 mL/min (based on SCr of 0.8 mg/dL).    Accu-Checks  Recent Labs     02/24/20  2155 02/25/20  0753 02/25/20  1159 02/25/20  1643 02/25/20  2113 02/26/20  0756 02/26/20  1232 02/27/20  1332   POCTGLUCOSE 141* 113* 142* 139* 164* 77 198* 149*        ASSESSMENT and PLAN    * Umbilical hernia  Managed per primary.   Optimize BG for surgical wound healing.         Type 2 diabetes mellitus without complication, with long-term current use of insulin  BG goal 140-180.       Levemir 24 units HS (FBG at goal).   BG monitoring ac/hs and moderate dose correction scale.       Please notify endocrine when diet is advanced to adjust insulin orders.        End stage liver disease  Managed per primary.   May impact BG           Plan discussed with patient, family, and RN at bedside.     Sherri Rey NP  Endocrinology  [facility]

## 2020-02-27 NOTE — PROCEDURES
Radiology Post-Procedure Note    Pre Op Diagnosis: Ascites  Post Op Diagnosis: Same    Procedure: Ultrasound Guided Paracentesis    Procedure performed by: Eduardo Yang MD. Rahul Lazaro MD    Written Informed Consent Obtained: Yes  Specimen Removed: YES   Estimated Blood Loss: Minimal    Findings:   Successful paracentesis with 20 cc fluid sent for laboratory analysis.  Albumin administered PRN per protocol.    Patient tolerated procedure well.    Eduardo Yang MD  PGY-3 Radiology Resident  [address] [facility]  Pager: [phone]

## 2020-02-27 NOTE — ASSESSMENT & PLAN NOTE
BG goal 140-180.       Levemir 24 units HS (FBG at goal).   BG monitoring ac/hs and moderate dose correction scale.       Please notify endocrine when diet is advanced to adjust insulin orders.

## 2020-02-27 NOTE — NURSING
Pt. Arrived from ED.  No signs of evident distress or complaint of pain.  VSS.  Pt. Placed on visi.  Pt. On telemetry.  Pt. Oriented to room and unit.  Pt. Expressed understanding.  Family member at bedside.  Will continue to monitor.

## 2020-02-27 NOTE — HPI
Reason for Consult: Management of T2DM, Hyperglycemia      Surgical Procedure and Date: Umbilical hernia repair 2/22/20     Diabetes diagnosis year: 2002           Lab Results   Component Value Date     HGBA1C 7.6 (H) 12/05/2019       Home Diabetes Medications: Levemir 25 units HS tonight and novolog 15 units with meals until NPO.         How often checking glucose at home? 3x per day   BG readings on regimen: 150-200s  Hypoglycemia on the regimen?  No  Missed doses on regimen?  Yes     Diabetes Complications include:     Hyperglycemia and Diabetic peripheral neuropathy      Complicating diabetes co morbidities:   CIRRHOSIS        HPI:   Patient is a 59 y.o. male with a diagnosis of DM2, ESLD secondary to ETOH listed with MELD 17, DM, HCV s/p INF, and hx of an umbilical hernia s/p umbilical hernia repair on 2/22/20  who presents to the ED today with increased abdominal pain and increased drainage from his surgical incision. Patient's DM managed per his PCP in [place], [place].  Endocrinology consulted for DM/BG management.

## 2020-02-27 NOTE — TELEPHONE ENCOUNTER
BACK-UP ORGAN OFFER NOTE    Notified by León Fitzgerald, , of backup liver offer with donor information.  Donor and recipient information read back and verified.  Spoke with Colin Reilly and identified no acute medical issues in telephone assessment.  Patient instructed NPO at 0800. Patient verbalized understanding that he has been called as a backup. He is backup for another case and is in  now.  He knows he needs to check out of [facility] and we will call with updates.

## 2020-02-27 NOTE — H&P
Inpatient Radiology Pre-procedure Note    History of Present Illness:  Colin Reilly is a 59 y.o. male who presents for u/s guided therapeutic paracentesis.    Admission H&P reviewed.    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    Esophageal varices     GERD (gastroesophageal reflux disease)     Hepatic encephalopathy 7/8/2014    Hepatitis C virus infection 07/08/2014    tx with interferon 3-4 mos- stopped for unclear reasons Tattoos-first at age 17 only risk factor (HCVAB negative 08/2017)    HTN (hypertension) 7/8/2014    Hypertension     Insulin dependent diabetes mellitus     Renal mass, left 7/8/2014    6.3 x 5.7 x 5.6 complex mass    Splenomegaly 7/8/2014    Umbilical hernia 7/8/2014     Past Surgical History:   Procedure Laterality Date    CARPAL TUNNEL RELEASE Bilateral     CHOLECYSTECTOMY      lap    COLONOSCOPY N/A 9/8/2017    Procedure: COLONOSCOPY;  Surgeon: Savannah Llanos MD;  Location: [facility];  Service: Endoscopy;  Laterality: N/A;    COLONOSCOPY Left 3/14/2018    Procedure: COLONOSCOPY;  Surgeon: Savannah Llanos MD;  Location: [facility];  Service: Endoscopy;  Laterality: Left;    ESOPHAGOGASTRODUODENOSCOPY  01/2014    ESOPHAGOGASTRODUODENOSCOPY  02/15/2017    grade II varices    ESOPHAGOGASTRODUODENOSCOPY  04/10/2017    grade I varices    ESOPHAGOGASTRODUODENOSCOPY N/A 10/18/2019    Procedure: EGD (ESOPHAGOGASTRODUODENOSCOPY);  Surgeon: Flavio Escalera MD;  Location: [facility] ([address] [place] [address]);  Service: Endoscopy;  Laterality: N/A;    ESOPHAGOGASTRODUODENOSCOPY W/ BANDING  01/26/2017    grade II varices    HERNIA REPAIR      NECK SURGERY      fusion on C5 and C6    ULNAR NERVE TRANSPOSITION Left     UMBILICAL HERNIA REPAIR N/A 2/22/2020    Procedure: REPAIR, HERNIA, PRIMARY WIHTOUT MESH AND PLACEMENT  OF DRAIN;  Surgeon: Sherri Brunner MD;  Location: [facility] OR [address] [place] [address];  Service: Transplant;  Laterality: N/A;    vericose veins removed         Review of Systems:   As documented in primary team H&P    Home Meds:   Prior to Admission medications    Medication Sig Start Date End Date Taking? Authorizing Provider   ciprofloxacin HCl (CIPRO) 500 MG tablet Take 1 tablet (500 mg total) by mouth once daily. 2/26/20  Yes Sherri Brunner MD   cyclobenzaprine (FLEXERIL) 10 MG tablet Take 10 mg by mouth 2 (two) times daily.    Yes Historical Provider, MD   EASY TOUCH INSULIN SYRINGE 1 mL 31 gauge x 5/16 Syrg  9/19/19  Yes Historical Provider, MD   escitalopram oxalate (LEXAPRO) 20 MG tablet Take 20 mg by mouth once daily.  8/4/17  Yes Historical Provider, MD   finasteride (PROSCAR) 5 mg tablet Take 1 tablet (5 mg total) by mouth once daily. 6/19/18 12/6/28 Yes Mookie Mac IV, MD   furosemide (LASIX) 40 MG tablet Take 4.5 tablets (180 mg total) by mouth once daily. 2/26/20  Yes Sherri Brunner MD   gabapentin (NEURONTIN) 600 MG tablet Take 600 mg by mouth 3 (three) times daily.    Yes Historical Provider, MD   HYDROcodone-acetaminophen (NORCO)  mg per tablet Take 1 tablet by mouth every 12 (twelve) hours as needed for Pain. 2/26/20  Yes Sherri Brunner MD   insulin aspart U-100 (NOVOLOG) 100 unit/mL injection Inject 24 Units into the skin 3 (three) times daily before meals.   Yes Historical Provider, MD   insulin detemir U-100 (LEVEMIR) 100 unit/mL injection Inject 74 Units into the skin every evening.   Yes Historical Provider, MD   lactulose (CHRONULAC) 10 gram/15 mL solution Take 30 mLs by mouth 3 (three) times daily. May take up to  4 to 5 times daily if needed for bowel movements   Yes Historical Provider, MD   levocetirizine (XYZAL) 5 MG tablet Take 1 tablet (5 mg total) by mouth every evening. 2/26/20 2/25/21 Yes Sherri Brunner MD   nebivolol (BYSTOLIC) 10 MG Tab Take 10 mg by mouth once daily.    Yes Historical Provider, MD   oxybutynin (DITROPAN) 5 MG Tab Take 5 mg by mouth once daily. 10/31/19  Yes Historical Provider, MD   pantoprazole (PROTONIX) 40 MG tablet Take 1 tablet (40 mg total) by mouth once daily. 2/26/20 8/24/20 Yes Sherri Brunner MD   rifAXIMin (XIFAXAN) 550 mg Tab Take 1 tablet (550 mg total) by mouth 2 (two) times daily. 3/22/19  Yes Elizabeth Meneses MD   spironolactone (ALDACTONE) 100 MG tablet Take 1.5 tablets (150 mg total) by mouth once daily. 12/6/19  Yes Elizabeth Meneses MD   tamsulosin (FLOMAX) 0.4 mg Cp24 Take 1 capsule (0.4 mg total) by mouth once daily. 6/19/18  Yes Mookie Mac IV, MD   TRUE METRIX GLUCOSE TEST STRIP Strp  11/8/19  Yes Historical Provider, MD   TRUEPLUS LANCETS 30 gauge Misc  11/20/19  Yes Historical Provider, MD     Scheduled Meds:    cetirizine  5 mg Oral Daily    [START ON 2/28/2020] ciprofloxacin HCl  500 mg Oral Daily    cyclobenzaprine  10 mg Oral BID    finasteride  5 mg Oral Daily    furosemide  180 mg Oral Daily    gabapentin  600 mg Oral TID    heparin (porcine)  5,000 Units Subcutaneous Q8H    HYDROcodone-acetaminophen  1 tablet Oral Q12H    insulin aspart U-100  18 Units Subcutaneous TIDWM    insulin detemir U-100  37 Units Subcutaneous QHS    lactulose  20 g Oral TID    nebivolol  10 mg Oral Daily    oxybutynin  5 mg Oral Daily    pantoprazole  40 mg Oral Daily    rifAXIMin  550 mg Oral BID    spironolactone  150 mg Oral Daily    tamsulosin  0.4 mg Oral Daily     Continuous Infusions:   PRN Meds:Dextrose 10% Bolus, glucagon (human recombinant), ondansetron, promethazine (PHENERGAN) IVPB, sodium chloride 0.9%  Anticoagulants/Antiplatelets: Heparin sq    Allergies: Review of patient's allergies indicates:  No Known Allergies  Sedation Hx: have not been any systemic reactions    Labs:  Recent Labs   Lab 02/27/20  0905   INR 1.7*       Recent Labs   Lab 02/27/20  0905   WBC 3.54*   HGB 9.0*   HCT 30.9*   MCV 92   PLT 52*       Recent Labs   Lab 02/26/20  0645 02/27/20  0905   GLU 71 177*    136   K 3.3* 4.4    104   CO2 24 22*   BUN 10 8   CREATININE 0.8 0.8   CALCIUM 8.0* 8.4*   MG 1.7  --    ALT 14 16   AST 31 57*   ALBUMIN 3.2* 3.5   BILITOT 2.5* 2.2*         Vitals:  Temp: 97.5 °F (36.4 °C) (02/27/20 1223)  Pulse: 106 (02/27/20 1315)  Resp: 17 (02/27/20 1315)  BP: 126/80 (02/27/20 1315)  SpO2: 98 % (02/27/20 1315)     Physical Exam:  ASA: 3  General: no acute distress  Mental Status: alert and oriented to person, place and time  HEENT: normocephalic, atraumatic  Chest: unlabored breathing  Heart: regular heart rate  Abdomen: distended  Extremity: moves all extremities    Plan: U/s guided therapeutic paracentesis  Sedation Plan: Local Anesthetic    Andrea Hale, NP [facility]  Interventional Radiology

## 2020-02-27 NOTE — TELEPHONE ENCOUNTER
"    Reason for Disposition   Patient already left for the hospital/clinic    Protocols used: NO CONTACT OR DUPLICATE CONTACT CALL-A-OH    Colin is in the ED now for evaluation.  No triage. States he is "leaking a whole lot of fluid from my incision.  I woke up and my bed was soaked."  He is on the wait list for liver transplant, Coordinator Lyly Osorio RN.  Message to liver pre-transplant team.  Please contact caller directly with any additional care advice.  Encouraged him to call his coordinator for any concerns during clinic hours , from  from Monday - Friday.  He states understanding. Please contact caller directly with any additional care advice.     No BPA used  "

## 2020-02-27 NOTE — TELEPHONE ENCOUNTER
Notified patient that he is still backup to first case but second case has been cancelled due to donor quality.  He was then offered to be backup on a third case which he accepted.

## 2020-02-27 NOTE — SUBJECTIVE & OBJECTIVE
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    Esophageal varices     GERD (gastroesophageal reflux disease)     Hepatic encephalopathy 7/8/2014    Hepatitis C virus infection 07/08/2014    tx with interferon 3-4 mos- stopped for unclear reasons Tattoos-first at age 17 only risk factor (HCVAB negative 08/2017)    HTN (hypertension) 7/8/2014    Hypertension     Insulin dependent diabetes mellitus     Renal mass, left 7/8/2014    6.3 x 5.7 x 5.6 complex mass    Splenomegaly 7/8/2014    Umbilical hernia 7/8/2014       Past Surgical History:   Procedure Laterality Date    CARPAL TUNNEL RELEASE Bilateral     CHOLECYSTECTOMY      lap    COLONOSCOPY N/A 9/8/2017    Procedure: COLONOSCOPY;  Surgeon: Savannah Llanos MD;  Location: [facility];  Service: Endoscopy;  Laterality: N/A;    COLONOSCOPY Left 3/14/2018    Procedure: COLONOSCOPY;  Surgeon: Savannah Llanos MD;  Location: [facility];  Service: Endoscopy;  Laterality: Left;    ESOPHAGOGASTRODUODENOSCOPY  01/2014    ESOPHAGOGASTRODUODENOSCOPY  02/15/2017    grade II varices    ESOPHAGOGASTRODUODENOSCOPY  04/10/2017    grade I varices    ESOPHAGOGASTRODUODENOSCOPY N/A 10/18/2019    Procedure: EGD (ESOPHAGOGASTRODUODENOSCOPY);  Surgeon: Flavio Escalera MD;  Location: [facility] ([address] [place] [address]);  Service: Endoscopy;  Laterality: N/A;    ESOPHAGOGASTRODUODENOSCOPY W/ BANDING  01/26/2017    grade II varices    HERNIA REPAIR      NECK SURGERY      fusion on C5 and C6    ULNAR NERVE TRANSPOSITION Left     UMBILICAL HERNIA REPAIR N/A 2/22/2020    Procedure: REPAIR, HERNIA, PRIMARY WIHTOUT MESH AND PLACEMENT OF DRAIN;  Surgeon: Sherri Brunner MD;  Location: [facility] OR [address] [place] [address];  Service: Transplant;  Laterality: N/A;    vericose veins removed         Review of patient's allergies  indicates:  No Known Allergies    Family History     Problem Relation (Age of Onset)    Alcohol abuse Brother    Cancer Brother    Hyperlipidemia Mother    No Known Problems Father (36), Sister, Sister        Tobacco Use    Smoking status: Former Smoker     Types: Cigarettes     Last attempt to quit: 1/4/2010     Years since quitting: 10.1    Smokeless tobacco: Former User     Types: Chew     Quit date: 2/24/1990    Tobacco comment: former 1 pack/week quit 1/4/2010   Substance and Sexual Activity    Alcohol use: No     Comment: former heavy beer but quit 1/4/2010    Drug use: No    Sexual activity: Never     Comment:          (Not in a hospital admission)    Review of Systems   Constitutional: Negative for activity change, appetite change, chills, diaphoresis and fever.   HENT: Negative for congestion, sinus pressure, sinus pain, sore throat and trouble swallowing.    Eyes: Negative for visual disturbance.   Respiratory: Negative for chest tightness, shortness of breath and stridor.    Cardiovascular: Negative for chest pain, palpitations and leg swelling.   Gastrointestinal: Positive for abdominal distention and abdominal pain. Negative for constipation, diarrhea, nausea and vomiting.   Genitourinary: Negative for decreased urine volume, difficulty urinating, dysuria and flank pain.   Musculoskeletal: Negative for neck pain and neck stiffness.   Skin: Positive for wound. Negative for color change and rash.   Allergic/Immunologic: Positive for immunocompromised state.   Neurological: Negative for dizziness, tremors, syncope, light-headedness and headaches.   Psychiatric/Behavioral: Negative for agitation, confusion, decreased concentration and hallucinations. The patient is not nervous/anxious.      Objective:     Vital Signs (Most Recent):  Temp: 98.3 °F (36.8 °C) (02/27/20 1115)  Pulse: 92 (02/27/20 1115)  Resp: 18 (02/27/20 1115)  BP: 121/73 (02/27/20 1115)  SpO2: 96 % (02/27/20 1115) Vital Signs  (24h Range):  Temp:  [98.3 °F (36.8 °C)-99.4 °F (37.4 °C)] 98.3 °F (36.8 °C)  Pulse:  [] 92  Resp:  [18-20] 18  SpO2:  [94 %-96 %] 96 %  BP: (105-122)/(58-73) 121/73     Weight: 95.3 kg (210 lb)  Body mass index is 29.29 kg/m².    No intake or output data in the 24 hours ending 02/27/20 1135    Physical Exam   Constitutional: He is oriented to person, place, and time. No distress.   HENT:   Head: Normocephalic and atraumatic.   Eyes: Right eye exhibits no discharge. Left eye exhibits no discharge. No scleral icterus.   Neck: Normal range of motion. Neck supple.   Cardiovascular: Normal rate, regular rhythm, normal heart sounds and intact distal pulses.   Pulmonary/Chest: Effort normal and breath sounds normal. He has no wheezes. He has no rales.   Abdominal: Soft. Bowel sounds are normal. He exhibits distension. There is tenderness. There is no guarding.   moderately TTP in RLQ. No peritoneal signs. Surgical incision intact with serous drainage. Former drain site intact with sutures in place.    Musculoskeletal: He exhibits no edema.   Neurological: He is alert and oriented to person, place, and time. No cranial nerve deficit.   Skin: Skin is warm and dry. Capillary refill takes less than 2 seconds. He is not diaphoretic.   Psychiatric: Judgment and thought content normal.   Nursing note and vitals reviewed.      Laboratory:  CBC:   Recent Labs   Lab 02/27/20  0905   WBC 3.54*   RBC 3.37*   HGB 9.0*   HCT 30.9*   PLT 52*   MCV 92   MCH 26.7*   MCHC 29.1*     CMP:   Recent Labs   Lab 02/27/20  0905   *   CALCIUM 8.4*   ALBUMIN 3.5   PROT 6.3      K 4.4   CO2 22*      BUN 8   CREATININE 0.8   ALKPHOS 103   ALT 16   AST 57*   BILITOT 2.2*     Labs within the past 24 hours have been reviewed.    Diagnostic Results:  I have personally reviewed all pertinent imaging studies.

## 2020-02-27 NOTE — TELEPHONE ENCOUNTER
ON CALL NOTE    @1:15 pm  Called patient to follow-up on his ED visit.  Patient reports that he has been admitted.  Informed patient that report has been taken on his case and he has been cleared by surgeon on call to remain back-up for liver transplant and he is to remain NPO at this time.  He verbalized understanding and denies any further questions/ concerns.    @5:45 pm  Received call from .  Spoke w/ patient's nurse, Jessica.  She reports that patient ate 1/4 of meal, including meatloaf and potatoes.  Will notify the appropriate staff in the event patient becomes primary for liver transplant.  Instructed her to have patient to fast as previously instructed.    @7:50 pm  Per , patient released as backup for liver transplant and may resume diet.  Attempted to contact pt's nurse.  Spoke w/ the charge nurse.  Informed her that patient no longer needs to fast and may resume his diet.  She agrees to relay this to pt's nurse.

## 2020-02-27 NOTE — HPI
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 Patient progressed well from his hernia repair and was discharged yesterday afternoon. Overnight he developed increased drainage from his incision soaking through numerous weight to dry dressings and worsening abdominal pain. He is having regular bowel movements and passing gas. He denies fever/chills, worsening nausea, vomiting, change in bowel habits, dysuria, chest pain, and sob. Labs stable. Vitals stable. He is also back up for liver transplant at this time. Plan to admit to LTS for further management. Patient discussed with Dr. Brunner.

## 2020-02-27 NOTE — ASSESSMENT & PLAN NOTE
- s/p hernia repair 2/22  - admitted with copious serous drainage from incision   - send for paracentesis to check for peritonitis

## 2020-02-27 NOTE — PLAN OF CARE
Paracentesis complete. Only able to send labs. Pt tolerated well. Dressing to L abd clean, dry, and intact.Specimens sent per lab order. Report called to floor nurse. Pt transported back to room with transport via stretcher.

## 2020-02-27 NOTE — SUBJECTIVE & OBJECTIVE
Interval HPI:   Overnight events: Admitted to TSU. BG well controlled with basal insulin - NPO for tests and patient back up for liver transplant.   Eating:   NPO  Nausea: No  Hypoglycemia and intervention: No  Fever: No  TPN and/or TF: No    PMH, PSH, FH, SH  reviewed       Review of Systems   Constitutional: Negative for weight changes.  Eyes: Negative for visual disturbance.  Respiratory: Negative for cough.   Cardiovascular: Negative for chest pain.  Gastrointestinal: Negative for nausea.  Endocrine: Negative for polyuria, polydipsia.  Musculoskeletal: Negative for back pain.  Skin: Negative for rash.  Neurological: Negative for syncope.  Psychiatric/Behavioral: Negative for depression.      Current Medications and/or Treatments Impacting Glycemic Control  Immunotherapy:    Immunosuppressants     None        Steroids:   Hormones (From admission, onward)    None        Pressors:    Autonomic Drugs (From admission, onward)    None        Hyperglycemia/Diabetes Medications:   Antihyperglycemics (From admission, onward)    Start     Stop Route Frequency Ordered    02/27/20 2100  insulin detemir U-100 pen 24 Units      -- SubQ Nightly 02/27/20 1754    02/27/20 1854  insulin aspart U-100 pen 1-10 Units      -- SubQ Before meals & nightly PRN 02/27/20 1754    02/27/20 1200  insulin aspart U-100 pen 18 Units      -- SubQ 3 times daily with meals 02/27/20 1059             PHYSICAL EXAMINATION:  Vitals:    02/27/20 1655   BP:    Pulse:    Resp:    Temp: 97.9 °F (36.6 °C)     Body mass index is 28.17 kg/m².    Physical Exam   Constitutional: Well developed, well nourished, NAD.  ENT: External ears no masses with nose patent; normal hearing.  Neck: Supple; trachea midline.   Cardiovascular: Normal heart sounds, no LE edema. DP +2 bilaterally.  Lungs: Normal effort; lungs anterior bilaterally clear to auscultation.  Abdomen: Soft, no masses, no hernias. LLQ dressing. Umbilical incision with staples lance and draining.   MS: No  clubbing or cyanosis of nails noted;  unable to assess gait.  Skin: No rashes, lesions, or ulcers; no nodules.   Psychiatric: Good judgement and insight; normal mood and affect.  Neurological: Cranial nerves are grossly intact.   Foot: nails in good condition, no amputations noted.

## 2020-02-27 NOTE — TELEPHONE ENCOUNTER
Pt called c/o of fluid draining from incision drain and belly button. Pt is s/p hernia repair 2/22/2020. Pt's drain area was stitched yesterday due to fluid draining. Discharge recommendation was for Pt to apply pressure to the drain site to help with fluid and schedule para within a week. Pt states he has been applying pressure over an hour with fluid still draining. Pt states he has soaked 2 towels and his pants. Pt has been informed by the call coordinator that he has been released as back up from one case and he need to check out of the  and to wait around in the hospital for further instructions due to being back up to another case. Pt will go to the ED to see about the fluid draining. Notified by call nurse FRAN Bailey and informed Pt due to the complications he's having with all the fluid draining he has been released from back up and can go home. Pt states understanding.

## 2020-02-28 ENCOUNTER — TELEPHONE (OUTPATIENT)
Dept: TRANSPLANT | Facility: CLINIC | Age: 60
End: 2020-02-28

## 2020-02-28 ENCOUNTER — ANESTHESIA EVENT (OUTPATIENT)
Dept: SURGERY | Facility: HOSPITAL | Age: 60
DRG: 442 | End: 2020-02-28
Payer: MEDICARE

## 2020-02-28 ENCOUNTER — ANESTHESIA (OUTPATIENT)
Dept: SURGERY | Facility: HOSPITAL | Age: 60
DRG: 442 | End: 2020-02-28
Payer: MEDICARE

## 2020-02-28 LAB
ABO + RH BLD: NORMAL
ALBUMIN SERPL BCP-MCNC: 3.3 G/DL (ref 3.5–5.2)
ALBUMIN SERPL BCP-MCNC: 3.5 G/DL (ref 3.5–5.2)
ALP SERPL-CCNC: 110 U/L (ref 55–135)
ALT SERPL W/O P-5'-P-CCNC: 18 U/L (ref 10–44)
ANION GAP SERPL CALC-SCNC: 8 MMOL/L (ref 8–16)
APTT BLDCRRT: 27.7 SEC (ref 21–32)
APTT BLDCRRT: 34.1 SEC (ref 21–32)
AST SERPL-CCNC: 48 U/L (ref 10–40)
BASOPHILS # BLD AUTO: 0.03 K/UL (ref 0–0.2)
BASOPHILS NFR BLD: 1.1 % (ref 0–1.9)
BILIRUB DIRECT SERPL-MCNC: 1.4 MG/DL (ref 0.1–0.3)
BILIRUB SERPL-MCNC: 2.7 MG/DL (ref 0.1–1)
BILIRUB UR QL STRIP: NEGATIVE
BLD GP AB SCN CELLS X3 SERPL QL: NORMAL
BUN SERPL-MCNC: 8 MG/DL (ref 6–20)
CA-I BLDV-SCNC: 1.05 MMOL/L (ref 1.06–1.42)
CALCIUM SERPL-MCNC: 8.2 MG/DL (ref 8.7–10.5)
CHLORIDE SERPL-SCNC: 100 MMOL/L (ref 95–110)
CLARITY UR REFRACT.AUTO: CLEAR
CO2 SERPL-SCNC: 26 MMOL/L (ref 23–29)
COLOR UR AUTO: YELLOW
CREAT SERPL-MCNC: 0.9 MG/DL (ref 0.5–1.4)
DIFFERENTIAL METHOD: ABNORMAL
EOSINOPHIL # BLD AUTO: 0.1 K/UL (ref 0–0.5)
EOSINOPHIL NFR BLD: 4.6 % (ref 0–8)
ERYTHROCYTE [DISTWIDTH] IN BLOOD BY AUTOMATED COUNT: 18.5 % (ref 11.5–14.5)
EST. GFR  (AFRICAN AMERICAN): >60 ML/MIN/1.73 M^2
EST. GFR  (NON AFRICAN AMERICAN): >60 ML/MIN/1.73 M^2
ESTIMATED AVG GLUCOSE: 146 MG/DL (ref 68–131)
FIBRINOGEN PPP-MCNC: 102 MG/DL (ref 182–366)
FIBRINOGEN PPP-MCNC: 120 MG/DL (ref 182–366)
GLUCOSE SERPL-MCNC: 110 MG/DL (ref 70–110)
GLUCOSE SERPL-MCNC: 226 MG/DL (ref 70–110)
GLUCOSE UR QL STRIP: NEGATIVE
HBA1C MFR BLD HPLC: 6.7 % (ref 4–5.6)
HBV SURFACE AG SERPL QL IA: NEGATIVE
HCT VFR BLD AUTO: 26.4 % (ref 40–54)
HCT VFR BLD AUTO: 32 % (ref 40–54)
HGB BLD-MCNC: 8.2 G/DL (ref 14–18)
HGB BLD-MCNC: 9.2 G/DL (ref 14–18)
HGB UR QL STRIP: NEGATIVE
HIV 1+2 AB+HIV1 P24 AG SERPL QL IA: NEGATIVE
IMM GRANULOCYTES # BLD AUTO: 0.02 K/UL (ref 0–0.04)
IMM GRANULOCYTES NFR BLD AUTO: 0.8 % (ref 0–0.5)
INR PPP: 1.6 (ref 0.8–1.2)
INR PPP: 1.8 (ref 0.8–1.2)
KETONES UR QL STRIP: NEGATIVE
LEUKOCYTE ESTERASE UR QL STRIP: NEGATIVE
LYMPHOCYTES # BLD AUTO: 0.6 K/UL (ref 1–4.8)
LYMPHOCYTES NFR BLD: 23.4 % (ref 18–48)
MAGNESIUM SERPL-MCNC: 1.6 MG/DL (ref 1.6–2.6)
MAGNESIUM SERPL-MCNC: 1.8 MG/DL (ref 1.6–2.6)
MCH RBC QN AUTO: 26.3 PG (ref 27–31)
MCHC RBC AUTO-ENTMCNC: 28.8 G/DL (ref 32–36)
MCV RBC AUTO: 91 FL (ref 82–98)
MONOCYTES # BLD AUTO: 0.3 K/UL (ref 0.3–1)
MONOCYTES NFR BLD: 13 % (ref 4–15)
NEUTROPHILS # BLD AUTO: 1.5 K/UL (ref 1.8–7.7)
NEUTROPHILS NFR BLD: 57.1 % (ref 38–73)
NITRITE UR QL STRIP: NEGATIVE
NRBC BLD-RTO: 0 /100 WBC
PH UR STRIP: 7 [PH] (ref 5–8)
PHOSPHATIDYLETHANOL (PETH): NEGATIVE NG/ML
PLATELET # BLD AUTO: 45 K/UL (ref 150–350)
PLATELET # BLD AUTO: 54 K/UL (ref 150–350)
PMV BLD AUTO: ABNORMAL FL (ref 9.2–12.9)
PMV BLD AUTO: ABNORMAL FL (ref 9.2–12.9)
POCT GLUCOSE: 111 MG/DL (ref 70–110)
POCT GLUCOSE: 138 MG/DL (ref 70–110)
POCT GLUCOSE: 223 MG/DL (ref 70–110)
POCT GLUCOSE: 67 MG/DL (ref 70–110)
POCT GLUCOSE: 77 MG/DL (ref 70–110)
POCT GLUCOSE: 89 MG/DL (ref 70–110)
POTASSIUM SERPL-SCNC: 3.6 MMOL/L (ref 3.5–5.1)
POTASSIUM SERPL-SCNC: 4.1 MMOL/L (ref 3.5–5.1)
PROT SERPL-MCNC: 6.4 G/DL (ref 6–8.4)
PROT UR QL STRIP: NEGATIVE
PROTHROMBIN TIME: 15.9 SEC (ref 9–12.5)
PROTHROMBIN TIME: 17.5 SEC (ref 9–12.5)
RBC # BLD AUTO: 3.5 M/UL (ref 4.6–6.2)
SODIUM SERPL-SCNC: 134 MMOL/L (ref 136–145)
SODIUM SERPL-SCNC: 135 MMOL/L (ref 136–145)
SP GR UR STRIP: 1.01 (ref 1–1.03)
URN SPEC COLLECT METH UR: NORMAL
WBC # BLD AUTO: 2.61 K/UL (ref 3.9–12.7)

## 2020-02-28 PROCEDURE — 88332 PR  PATH CONSULT IN SURG,W ADDN FRZ SEC: ICD-10-PCS | Mod: 26,NTX,, | Performed by: PATHOLOGY

## 2020-02-28 PROCEDURE — 87086 URINE CULTURE/COLONY COUNT: CPT | Mod: NTX

## 2020-02-28 PROCEDURE — 37000008 HC ANESTHESIA 1ST 15 MINUTES: Mod: NTX | Performed by: TRANSPLANT SURGERY

## 2020-02-28 PROCEDURE — P9017 PLASMA 1 DONOR FRZ W/IN 8 HR: HCPCS | Mod: NTX

## 2020-02-28 PROCEDURE — D9220A PRA ANESTHESIA: Mod: CRNA,NTX,, | Performed by: NURSE ANESTHETIST, CERTIFIED REGISTERED

## 2020-02-28 PROCEDURE — 25000003 PHARM REV CODE 250: Mod: NTX | Performed by: PHYSICIAN ASSISTANT

## 2020-02-28 PROCEDURE — 36000706: Mod: NTX | Performed by: TRANSPLANT SURGERY

## 2020-02-28 PROCEDURE — 37000009 HC ANESTHESIA EA ADD 15 MINS: Mod: NTX | Performed by: TRANSPLANT SURGERY

## 2020-02-28 PROCEDURE — 86901 BLOOD TYPING SEROLOGIC RH(D): CPT | Mod: NTX

## 2020-02-28 PROCEDURE — P9021 RED BLOOD CELLS UNIT: HCPCS | Mod: NTX

## 2020-02-28 PROCEDURE — D9220A PRA ANESTHESIA: ICD-10-PCS | Mod: ANES,NTX,, | Performed by: ANESTHESIOLOGY

## 2020-02-28 PROCEDURE — 88313 SPECIAL STAINS GROUP 2: CPT | Mod: 26,NTX,, | Performed by: PATHOLOGY

## 2020-02-28 PROCEDURE — 36415 COLL VENOUS BLD VENIPUNCTURE: CPT | Mod: NTX

## 2020-02-28 PROCEDURE — 63600175 PHARM REV CODE 636 W HCPCS: Mod: NTX | Performed by: PHYSICIAN ASSISTANT

## 2020-02-28 PROCEDURE — 80053 COMPREHEN METABOLIC PANEL: CPT | Mod: NTX

## 2020-02-28 PROCEDURE — 82248 BILIRUBIN DIRECT: CPT | Mod: NTX

## 2020-02-28 PROCEDURE — 88332 PATH CONSLTJ SURG EA ADD BLK: CPT | Mod: NTX | Performed by: PATHOLOGY

## 2020-02-28 PROCEDURE — D9220A PRA ANESTHESIA: ICD-10-PCS | Mod: CRNA,NTX,, | Performed by: NURSE ANESTHETIST, CERTIFIED REGISTERED

## 2020-02-28 PROCEDURE — 93005 ELECTROCARDIOGRAM TRACING: CPT | Mod: NTX

## 2020-02-28 PROCEDURE — 88331 PATH CONSLTJ SURG 1 BLK 1SPC: CPT | Mod: 26,NTX,, | Performed by: PATHOLOGY

## 2020-02-28 PROCEDURE — 85384 FIBRINOGEN ACTIVITY: CPT | Mod: NTX

## 2020-02-28 PROCEDURE — 36620 PR INSERT CATH,ART,PERCUT,SHORTTERM: ICD-10-PCS | Mod: 59,NTX,, | Performed by: ANESTHESIOLOGY

## 2020-02-28 PROCEDURE — 87517 HEPATITIS B DNA QUANT: CPT | Mod: NTX

## 2020-02-28 PROCEDURE — 36620 INSERTION CATHETER ARTERY: CPT | Mod: 59,NTX,, | Performed by: ANESTHESIOLOGY

## 2020-02-28 PROCEDURE — 36000707: Mod: NTX | Performed by: TRANSPLANT SURGERY

## 2020-02-28 PROCEDURE — 88307 PR  SURG PATH,LEVEL V: ICD-10-PCS | Mod: 26,NTX,, | Performed by: PATHOLOGY

## 2020-02-28 PROCEDURE — 86703 HIV-1/HIV-2 1 RESULT ANTBDY: CPT | Mod: NTX

## 2020-02-28 PROCEDURE — 82330 ASSAY OF CALCIUM: CPT | Mod: NTX

## 2020-02-28 PROCEDURE — P9047 ALBUMIN (HUMAN), 25%, 50ML: HCPCS | Mod: JG,NTX | Performed by: PHYSICIAN ASSISTANT

## 2020-02-28 PROCEDURE — 85610 PROTHROMBIN TIME: CPT | Mod: 91,NTX

## 2020-02-28 PROCEDURE — 93503 INSERT/PLACE HEART CATHETER: CPT | Mod: 59,NTX,, | Performed by: ANESTHESIOLOGY

## 2020-02-28 PROCEDURE — 85014 HEMATOCRIT: CPT | Mod: NTX

## 2020-02-28 PROCEDURE — 99233 SBSQ HOSP IP/OBS HIGH 50: CPT | Mod: NTX,,, | Performed by: PHYSICIAN ASSISTANT

## 2020-02-28 PROCEDURE — 93503 PR INSERT/PLACE FLOW DIRECT CATH: ICD-10-PCS | Mod: 59,NTX,, | Performed by: ANESTHESIOLOGY

## 2020-02-28 PROCEDURE — 83735 ASSAY OF MAGNESIUM: CPT | Mod: 91,NTX

## 2020-02-28 PROCEDURE — 82962 GLUCOSE BLOOD TEST: CPT | Mod: NTX | Performed by: TRANSPLANT SURGERY

## 2020-02-28 PROCEDURE — 85018 HEMOGLOBIN: CPT | Mod: NTX

## 2020-02-28 PROCEDURE — 84295 ASSAY OF SERUM SODIUM: CPT | Mod: NTX

## 2020-02-28 PROCEDURE — D9220A PRA ANESTHESIA: Mod: ANES,NTX,, | Performed by: ANESTHESIOLOGY

## 2020-02-28 PROCEDURE — 87522 HEPATITIS C REVRS TRNSCRPJ: CPT | Mod: NTX

## 2020-02-28 PROCEDURE — 85025 COMPLETE CBC W/AUTO DIFF WBC: CPT | Mod: NTX

## 2020-02-28 PROCEDURE — 83735 ASSAY OF MAGNESIUM: CPT | Mod: NTX

## 2020-02-28 PROCEDURE — 88331 PR  PATH CONSULT IN SURG,W FRZ SEC: ICD-10-PCS | Mod: 26,NTX,, | Performed by: PATHOLOGY

## 2020-02-28 PROCEDURE — 25000003 PHARM REV CODE 250: Mod: NTX | Performed by: NURSE ANESTHETIST, CERTIFIED REGISTERED

## 2020-02-28 PROCEDURE — 81003 URINALYSIS AUTO W/O SCOPE: CPT | Mod: NTX

## 2020-02-28 PROCEDURE — 63600175 PHARM REV CODE 636 W HCPCS: Mod: NTX | Performed by: TRANSPLANT SURGERY

## 2020-02-28 PROCEDURE — 71000033 HC RECOVERY, INTIAL HOUR: Mod: NTX | Performed by: TRANSPLANT SURGERY

## 2020-02-28 PROCEDURE — 88313 PR  SPECIAL STAINS,GROUP II: ICD-10-PCS | Mod: 26,NTX,, | Performed by: PATHOLOGY

## 2020-02-28 PROCEDURE — 88332 PATH CONSLTJ SURG EA ADD BLK: CPT | Mod: 26,NTX,, | Performed by: PATHOLOGY

## 2020-02-28 PROCEDURE — 99000 SPECIMEN HANDLING OFFICE-LAB: CPT | Mod: NTX

## 2020-02-28 PROCEDURE — 99232 SBSQ HOSP IP/OBS MODERATE 35: CPT | Mod: NTX,,, | Performed by: NURSE PRACTITIONER

## 2020-02-28 PROCEDURE — 85049 AUTOMATED PLATELET COUNT: CPT | Mod: NTX

## 2020-02-28 PROCEDURE — 84132 ASSAY OF SERUM POTASSIUM: CPT | Mod: NTX

## 2020-02-28 PROCEDURE — 93010 ELECTROCARDIOGRAM REPORT: CPT | Mod: NTX,,, | Performed by: INTERNAL MEDICINE

## 2020-02-28 PROCEDURE — 85730 THROMBOPLASTIN TIME PARTIAL: CPT | Mod: 91,NTX

## 2020-02-28 PROCEDURE — 83036 HEMOGLOBIN GLYCOSYLATED A1C: CPT | Mod: NTX

## 2020-02-28 PROCEDURE — 82947 ASSAY GLUCOSE BLOOD QUANT: CPT | Mod: NTX

## 2020-02-28 PROCEDURE — 99233 PR SUBSEQUENT HOSPITAL CARE,LEVL III: ICD-10-PCS | Mod: NTX,,, | Performed by: PHYSICIAN ASSISTANT

## 2020-02-28 PROCEDURE — 85730 THROMBOPLASTIN TIME PARTIAL: CPT | Mod: NTX

## 2020-02-28 PROCEDURE — 88313 SPECIAL STAINS GROUP 2: CPT | Mod: 59,NTX | Performed by: PATHOLOGY

## 2020-02-28 PROCEDURE — 20600001 HC STEP DOWN PRIVATE ROOM: Mod: NTX

## 2020-02-28 PROCEDURE — 85610 PROTHROMBIN TIME: CPT | Mod: NTX

## 2020-02-28 PROCEDURE — 87340 HEPATITIS B SURFACE AG IA: CPT | Mod: NTX

## 2020-02-28 PROCEDURE — 85384 FIBRINOGEN ACTIVITY: CPT | Mod: 91,NTX

## 2020-02-28 PROCEDURE — 88331 PATH CONSLTJ SURG 1 BLK 1SPC: CPT | Mod: NTX | Performed by: PATHOLOGY

## 2020-02-28 PROCEDURE — 94761 N-INVAS EAR/PLS OXIMETRY MLT: CPT | Mod: NTX

## 2020-02-28 PROCEDURE — 82040 ASSAY OF SERUM ALBUMIN: CPT | Mod: NTX

## 2020-02-28 PROCEDURE — 63600175 PHARM REV CODE 636 W HCPCS: Mod: NTX | Performed by: NURSE ANESTHETIST, CERTIFIED REGISTERED

## 2020-02-28 PROCEDURE — 93010 EKG 12-LEAD: ICD-10-PCS | Mod: NTX,,, | Performed by: INTERNAL MEDICINE

## 2020-02-28 PROCEDURE — 88307 TISSUE EXAM BY PATHOLOGIST: CPT | Mod: 26,NTX,, | Performed by: PATHOLOGY

## 2020-02-28 PROCEDURE — 88307 TISSUE EXAM BY PATHOLOGIST: CPT | Mod: NTX | Performed by: PATHOLOGY

## 2020-02-28 PROCEDURE — 86850 RBC ANTIBODY SCREEN: CPT | Mod: 91,NTX

## 2020-02-28 PROCEDURE — 86920 COMPATIBILITY TEST SPIN: CPT | Mod: NTX

## 2020-02-28 PROCEDURE — 99232 PR SUBSEQUENT HOSPITAL CARE,LEVL II: ICD-10-PCS | Mod: NTX,,, | Performed by: NURSE PRACTITIONER

## 2020-02-28 RX ORDER — FENTANYL CITRATE 50 UG/ML
25 INJECTION, SOLUTION INTRAMUSCULAR; INTRAVENOUS EVERY 5 MIN PRN
Status: DISCONTINUED | OUTPATIENT
Start: 2020-02-28 | End: 2020-03-01

## 2020-02-28 RX ORDER — SODIUM CHLORIDE 0.9 % (FLUSH) 0.9 %
10 SYRINGE (ML) INJECTION
Status: DISCONTINUED | OUTPATIENT
Start: 2020-02-28 | End: 2020-03-02 | Stop reason: HOSPADM

## 2020-02-28 RX ORDER — INSULIN ASPART 100 [IU]/ML
0-5 INJECTION, SOLUTION INTRAVENOUS; SUBCUTANEOUS EVERY 6 HOURS PRN
Status: DISCONTINUED | OUTPATIENT
Start: 2020-02-28 | End: 2020-02-29

## 2020-02-28 RX ORDER — GLUCAGON 1 MG
1 KIT INJECTION
Status: DISCONTINUED | OUTPATIENT
Start: 2020-02-28 | End: 2020-02-29

## 2020-02-28 RX ORDER — METHYLPREDNISOLONE SODIUM SUCCINATE 500 MG/8ML
500 INJECTION INTRAMUSCULAR; INTRAVENOUS
Status: DISCONTINUED | OUTPATIENT
Start: 2020-02-28 | End: 2020-02-28

## 2020-02-28 RX ORDER — SUCCINYLCHOLINE CHLORIDE 20 MG/ML
INJECTION INTRAMUSCULAR; INTRAVENOUS
Status: DISCONTINUED | OUTPATIENT
Start: 2020-02-28 | End: 2020-02-28 | Stop reason: HOSPADM

## 2020-02-28 RX ORDER — SODIUM CHLORIDE 9 MG/ML
INJECTION, SOLUTION INTRAVENOUS CONTINUOUS PRN
Status: DISCONTINUED | OUTPATIENT
Start: 2020-02-28 | End: 2020-02-28 | Stop reason: HOSPADM

## 2020-02-28 RX ORDER — FENTANYL CITRATE 50 UG/ML
INJECTION, SOLUTION INTRAMUSCULAR; INTRAVENOUS
Status: DISCONTINUED | OUTPATIENT
Start: 2020-02-28 | End: 2020-02-28 | Stop reason: HOSPADM

## 2020-02-28 RX ORDER — FUROSEMIDE 40 MG/1
160 TABLET ORAL DAILY
Status: DISCONTINUED | OUTPATIENT
Start: 2020-02-29 | End: 2020-03-02 | Stop reason: HOSPADM

## 2020-02-28 RX ORDER — MUPIROCIN 20 MG/G
OINTMENT TOPICAL
Status: DISCONTINUED | OUTPATIENT
Start: 2020-02-28 | End: 2020-02-28

## 2020-02-28 RX ORDER — MANNITOL 250 MG/ML
INJECTION, SOLUTION INTRAVENOUS
Status: DISCONTINUED | OUTPATIENT
Start: 2020-02-28 | End: 2020-02-28 | Stop reason: HOSPADM

## 2020-02-28 RX ORDER — FUROSEMIDE 10 MG/ML
INJECTION INTRAMUSCULAR; INTRAVENOUS
Status: DISCONTINUED | OUTPATIENT
Start: 2020-02-28 | End: 2020-02-28 | Stop reason: HOSPADM

## 2020-02-28 RX ORDER — ONDANSETRON 2 MG/ML
4 INJECTION INTRAMUSCULAR; INTRAVENOUS ONCE AS NEEDED
Status: DISCONTINUED | OUTPATIENT
Start: 2020-02-28 | End: 2020-03-01

## 2020-02-28 RX ORDER — INSULIN ASPART 100 [IU]/ML
15 INJECTION, SOLUTION INTRAVENOUS; SUBCUTANEOUS
Status: DISCONTINUED | OUTPATIENT
Start: 2020-02-28 | End: 2020-02-28

## 2020-02-28 RX ORDER — HEPARIN SODIUM 1000 [USP'U]/ML
INJECTION, SOLUTION INTRAVENOUS; SUBCUTANEOUS
Status: DISCONTINUED | OUTPATIENT
Start: 2020-02-28 | End: 2020-02-28 | Stop reason: HOSPADM

## 2020-02-28 RX ORDER — LIDOCAINE HCL/PF 100 MG/5ML
SYRINGE (ML) INTRAVENOUS
Status: DISCONTINUED | OUTPATIENT
Start: 2020-02-28 | End: 2020-02-28 | Stop reason: HOSPADM

## 2020-02-28 RX ORDER — VECURONIUM BROMIDE FOR INJECTION 1 MG/ML
INJECTION, POWDER, LYOPHILIZED, FOR SOLUTION INTRAVENOUS
Status: DISCONTINUED | OUTPATIENT
Start: 2020-02-28 | End: 2020-02-28 | Stop reason: HOSPADM

## 2020-02-28 RX ORDER — PROPOFOL 10 MG/ML
VIAL (ML) INTRAVENOUS
Status: DISCONTINUED | OUTPATIENT
Start: 2020-02-28 | End: 2020-02-28 | Stop reason: HOSPADM

## 2020-02-28 RX ORDER — MIDAZOLAM HYDROCHLORIDE 1 MG/ML
INJECTION INTRAMUSCULAR; INTRAVENOUS
Status: DISCONTINUED | OUTPATIENT
Start: 2020-02-28 | End: 2020-02-28 | Stop reason: HOSPADM

## 2020-02-28 RX ORDER — ALBUMIN HUMAN 250 G/1000ML
25 SOLUTION INTRAVENOUS ONCE
Status: COMPLETED | OUTPATIENT
Start: 2020-02-28 | End: 2020-02-28

## 2020-02-28 RX ADMIN — METHYLPREDNISOLONE SODIUM SUCCINATE 500 MG: 500 INJECTION, POWDER, FOR SOLUTION INTRAMUSCULAR; INTRAVENOUS at 08:02

## 2020-02-28 RX ADMIN — HEPARIN SODIUM 5000 UNITS: 5000 INJECTION, SOLUTION INTRAVENOUS; SUBCUTANEOUS at 01:02

## 2020-02-28 RX ADMIN — CETIRIZINE HYDROCHLORIDE 5 MG: 5 TABLET ORAL at 08:02

## 2020-02-28 RX ADMIN — SODIUM CHLORIDE, SODIUM GLUCONATE, SODIUM ACETATE, POTASSIUM CHLORIDE, MAGNESIUM CHLORIDE, SODIUM PHOSPHATE, DIBASIC, AND POTASSIUM PHOSPHATE: .53; .5; .37; .037; .03; .012; .00082 INJECTION, SOLUTION INTRAVENOUS at 08:02

## 2020-02-28 RX ADMIN — PROPOFOL 50 MG: 10 INJECTION, EMULSION INTRAVENOUS at 08:02

## 2020-02-28 RX ADMIN — HYDROCODONE BITARTRATE AND ACETAMINOPHEN 1 TABLET: 10; 325 TABLET ORAL at 01:02

## 2020-02-28 RX ADMIN — FUROSEMIDE 90 MG: 10 INJECTION, SOLUTION INTRAMUSCULAR; INTRAVENOUS at 08:02

## 2020-02-28 RX ADMIN — LACTULOSE 20 G: 20 SOLUTION ORAL at 08:02

## 2020-02-28 RX ADMIN — ALBUMIN (HUMAN) 25 G: 12.5 SOLUTION INTRAVENOUS at 08:02

## 2020-02-28 RX ADMIN — MIDAZOLAM HYDROCHLORIDE 0.5 MG: 1 INJECTION, SOLUTION INTRAMUSCULAR; INTRAVENOUS at 07:02

## 2020-02-28 RX ADMIN — SUGAMMADEX 400 MG: 100 INJECTION, SOLUTION INTRAVENOUS at 09:02

## 2020-02-28 RX ADMIN — FINASTERIDE 5 MG: 5 TABLET, FILM COATED ORAL at 08:02

## 2020-02-28 RX ADMIN — VECURONIUM BROMIDE FOR INJECTION 8 MG: 1 INJECTION, POWDER, LYOPHILIZED, FOR SOLUTION INTRAVENOUS at 08:02

## 2020-02-28 RX ADMIN — PROPOFOL 130 MG: 10 INJECTION, EMULSION INTRAVENOUS at 07:02

## 2020-02-28 RX ADMIN — GABAPENTIN 600 MG: 300 CAPSULE ORAL at 08:02

## 2020-02-28 RX ADMIN — RIFAXIMIN 550 MG: 550 TABLET ORAL at 08:02

## 2020-02-28 RX ADMIN — VECURONIUM BROMIDE FOR INJECTION 3 MG: 1 INJECTION, POWDER, LYOPHILIZED, FOR SOLUTION INTRAVENOUS at 08:02

## 2020-02-28 RX ADMIN — SUCCINYLCHOLINE CHLORIDE 140 MG: 20 INJECTION, SOLUTION INTRAMUSCULAR; INTRAVENOUS at 07:02

## 2020-02-28 RX ADMIN — LIDOCAINE HYDROCHLORIDE 60 MG: 20 INJECTION, SOLUTION INTRAVENOUS at 07:02

## 2020-02-28 RX ADMIN — INSULIN ASPART 18 UNITS: 100 INJECTION, SOLUTION INTRAVENOUS; SUBCUTANEOUS at 08:02

## 2020-02-28 RX ADMIN — CIPROFLOXACIN HYDROCHLORIDE 500 MG: 500 TABLET, FILM COATED ORAL at 08:02

## 2020-02-28 RX ADMIN — PANTOPRAZOLE SODIUM 40 MG: 40 TABLET, DELAYED RELEASE ORAL at 08:02

## 2020-02-28 RX ADMIN — HEPARIN SODIUM 5000 UNITS: 5000 INJECTION, SOLUTION INTRAVENOUS; SUBCUTANEOUS at 08:02

## 2020-02-28 RX ADMIN — INSULIN ASPART 4 UNITS: 100 INJECTION, SOLUTION INTRAVENOUS; SUBCUTANEOUS at 08:02

## 2020-02-28 RX ADMIN — FUROSEMIDE 180 MG: 40 TABLET ORAL at 08:02

## 2020-02-28 RX ADMIN — MIDAZOLAM HYDROCHLORIDE 1 MG: 1 INJECTION, SOLUTION INTRAMUSCULAR; INTRAVENOUS at 07:02

## 2020-02-28 RX ADMIN — SPIRONOLACTONE 150 MG: 50 TABLET ORAL at 08:02

## 2020-02-28 RX ADMIN — OXYBUTYNIN CHLORIDE 5 MG: 5 TABLET ORAL at 08:02

## 2020-02-28 RX ADMIN — FENTANYL CITRATE 100 MCG: 50 INJECTION, SOLUTION INTRAMUSCULAR; INTRAVENOUS at 07:02

## 2020-02-28 RX ADMIN — CYCLOBENZAPRINE 10 MG: 10 TABLET, FILM COATED ORAL at 08:02

## 2020-02-28 RX ADMIN — AMPICILLIN SODIUM AND SULBACTAM SODIUM 3 G: 2; 1 INJECTION, POWDER, FOR SOLUTION INTRAMUSCULAR; INTRAVENOUS at 08:02

## 2020-02-28 RX ADMIN — HYDROCODONE BITARTRATE AND ACETAMINOPHEN 1 TABLET: 10; 325 TABLET ORAL at 08:02

## 2020-02-28 RX ADMIN — MANNITOL 45 G: 250 INJECTION, SOLUTION INTRAVENOUS at 08:02

## 2020-02-28 RX ADMIN — VECURONIUM BROMIDE FOR INJECTION 2 MG: 1 INJECTION, POWDER, LYOPHILIZED, FOR SOLUTION INTRAVENOUS at 07:02

## 2020-02-28 RX ADMIN — SODIUM CHLORIDE: 9 INJECTION, SOLUTION INTRAVENOUS at 06:02

## 2020-02-28 RX ADMIN — HYDROCODONE BITARTRATE AND ACETAMINOPHEN 1 TABLET: 10; 325 TABLET ORAL at 02:02

## 2020-02-28 RX ADMIN — HEPARIN SODIUM 5000 UNITS: 5000 INJECTION, SOLUTION INTRAVENOUS; SUBCUTANEOUS at 02:02

## 2020-02-28 RX ADMIN — SODIUM CHLORIDE, SODIUM GLUCONATE, SODIUM ACETATE, POTASSIUM CHLORIDE, MAGNESIUM CHLORIDE, SODIUM PHOSPHATE, DIBASIC, AND POTASSIUM PHOSPHATE: .53; .5; .37; .037; .03; .012; .00082 INJECTION, SOLUTION INTRAVENOUS at 07:02

## 2020-02-28 NOTE — TELEPHONE ENCOUNTER
PATIENT NAME: Colin RAYGOZA [facility] #: [id number]     Meld 17    Lab Results   Component Value Date    CREATININE 0.9 02/28/2020     (L) 02/28/2020    BILITOT 2.7 (H) 02/28/2020    ALBUMIN 3.5 02/28/2020    INR 1.6 (H) 02/28/2020       Encephalopathy: 1 - 2  Ascites: none  Dialysis: no     Recertification requestor: Anuj Majano

## 2020-02-28 NOTE — NURSING TRANSFER
Nursing Transfer Note      2/28/2020     Transfer: transfer to Pre from [facility] [id number]    Transfer via :  Wheelchair with transport    Transfer with : Mother    Transported by transport    Medicines sent: n/a    Chart send with patient: yes    Notified: Cady Augustin RN    Patient reassessed at:  02/28/2020, 16:45    Upon arrival to floor: No signs of evident distress.  VSS.  No complaint of pain.

## 2020-02-28 NOTE — PROGRESS NOTES
Pt informed by coordinator that he was a backup, and to remain NPO. Pt thought that was only until 1500, so ate 50% of dinner tray. Informed coordinator.     Did not check BG prior to pt eating. Sherri NP aware.

## 2020-02-28 NOTE — ANESTHESIA PREPROCEDURE EVALUATION
02/28/2020  Colin Reilly is a 59 y.o., male.      Pre-operative evaluation for Procedure(s) (LRB):  TRANSPLANT, LIVER (N/A)    Encounter Diagnoses   Name Primary?    End stage liver disease     Acquired coagulation factor deficiency     Hepatic encephalopathy     Portal hypertensive gastropathy     Pre-op testing        Review of patient's allergies indicates:  No Known Allergies    No current facility-administered medications on file prior to encounter.      Current Outpatient Medications on File Prior to Encounter   Medication Sig Dispense Refill    ciprofloxacin HCl (CIPRO) 500 MG tablet Take 1 tablet (500 mg total) by mouth once daily. 30 tablet 2    cyclobenzaprine (FLEXERIL) 10 MG tablet Take 10 mg by mouth 2 (two) times daily.       EASY TOUCH INSULIN SYRINGE 1 mL 31 gauge x 5/16 Syrg       escitalopram oxalate (LEXAPRO) 20 MG tablet Take 20 mg by mouth once daily.   2    finasteride (PROSCAR) 5 mg tablet Take 1 tablet (5 mg total) by mouth once daily. 30 tablet 11    furosemide (LASIX) 40 MG tablet Take 4.5 tablets (180 mg total) by mouth once daily. 120 tablet 2    gabapentin (NEURONTIN) 600 MG tablet Take 600 mg by mouth 3 (three) times daily.       HYDROcodone-acetaminophen (NORCO)  mg per tablet Take 1 tablet by mouth every 12 (twelve) hours as needed for Pain.  0    insulin aspart U-100 (NOVOLOG) 100 unit/mL injection Inject 24 Units into the skin 3 (three) times daily before meals.      insulin detemir U-100 (LEVEMIR) 100 unit/mL injection Inject 74 Units into the skin every evening.      lactulose (CHRONULAC) 10 gram/15 mL solution Take 30 mLs by mouth 3 (three) times daily. May take up to  4 to 5 times daily if needed for bowel movements      levocetirizine (XYZAL) 5 MG tablet Take 1 tablet (5 mg total) by mouth every evening. 30 tablet 11    nebivolol (BYSTOLIC) 10  MG Tab Take 10 mg by mouth once daily.      oxybutynin (DITROPAN) 5 MG Tab Take 5 mg by mouth once daily.      pantoprazole (PROTONIX) 40 MG tablet Take 1 tablet (40 mg total) by mouth once daily.      rifAXIMin (XIFAXAN) 550 mg Tab Take 1 tablet (550 mg total) by mouth 2 (two) times daily. 30 tablet 11    spironolactone (ALDACTONE) 100 MG tablet Take 1.5 tablets (150 mg total) by mouth once daily. 60 tablet 2    tamsulosin (FLOMAX) 0.4 mg Cp24 Take 1 capsule (0.4 mg total) by mouth once daily. 30 capsule 11    TRUE METRIX GLUCOSE TEST STRIP Strp       TRUEPLUS LANCETS 30 gauge Misc          Social History     Tobacco Use   Smoking Status Former Smoker    Types: Cigarettes    Last attempt to quit: 1/4/2010    Years since quitting: 10.1   Smokeless Tobacco Former User    Types: Chew    Quit date: 2/24/1990   Tobacco Comment    former 1 pack/week quit 1/4/2010       Social History     Substance and Sexual Activity   Alcohol Use No    Comment: former heavy beer but quit 1/4/2010       Patient Active Problem List   Diagnosis    Esophageal varices with bleeding    Cirrhosis, Laennec's    Umbilical hernia    Splenomegaly    GERD (gastroesophageal reflux disease)    Diabetes    Other ascites    Alcohol abuse, in remission    Hepatic encephalopathy    Chronic back pain    Cirrhosis    Rectal bleeding    Dysphagia    Decompensated hepatic cirrhosis    Ascites due to alcoholic cirrhosis    Portal hypertension    Portal hypertensive gastropathy    Anasarca    Type 2 diabetes mellitus without complication, with long-term current use of insulin    Acute blood loss anemia    Umbilical hernia without obstruction and without gangrene    Acquired coagulation factor deficiency    End stage liver disease       Past Surgical History:   Procedure Laterality Date    CARPAL TUNNEL RELEASE Bilateral     CHOLECYSTECTOMY      lap    COLONOSCOPY N/A 9/8/2017    Procedure: COLONOSCOPY;  Surgeon: Savannah FARIAS  MD Viet;  Location: [facility];  Service: Endoscopy;  Laterality: N/A;    COLONOSCOPY Left 3/14/2018    Procedure: COLONOSCOPY;  Surgeon: Savannah Llanos MD;  Location: [facility];  Service: Endoscopy;  Laterality: Left;    ESOPHAGOGASTRODUODENOSCOPY  01/2014    ESOPHAGOGASTRODUODENOSCOPY  02/15/2017    grade II varices    ESOPHAGOGASTRODUODENOSCOPY  04/10/2017    grade I varices    ESOPHAGOGASTRODUODENOSCOPY N/A 10/18/2019    Procedure: EGD (ESOPHAGOGASTRODUODENOSCOPY);  Surgeon: Flavio Escalera MD;  [facility]  Service: Endoscopy;  Laterality: N/A;    ESOPHAGOGASTRODUODENOSCOPY W/ BANDING  01/26/2017    grade II varices    HERNIA REPAIR      NECK SURGERY      fusion on C5 and C6    ULNAR NERVE TRANSPOSITION Left     UMBILICAL HERNIA REPAIR N/A 2/22/2020    Procedure: REPAIR, HERNIA, PRIMARY WIHTOUT MESH AND PLACEMENT OF DRAIN;  Surgeon: Sherri Brunner MD;  [facility];  Service: Transplant;  Laterality: N/A;    vericose veins removed             Recent Labs     02/27/20  0905   HCT 30.9*     Recent Labs     02/27/20  0905   PLT 52*     Recent Labs     02/27/20  0905   K 4.4     Recent Labs     02/27/20  0905   CREATININE 0.8     Recent Labs     02/27/20  0905   *     No results for input(s): PT in the last 72 hours.                    Anesthesia Evaluation         Review of Systems  Anesthesia Hx:  No problems with previous Anesthesia   Hematology/Oncology:  Hematology Normal   Oncology Normal     Cardiovascular:   Hypertension (on bysto.ic for portal htn), well controlled Denies MI.    Denies Angina. Walks a mile straight most days without difficulty   Pulmonary:  Pulmonary Normal  Denies COPD.  Denies Asthma.  Denies Shortness of breath.    Renal/:   Denies Chronic Renal Disease.     Neurological:   Denies TIA. Denies CVA. Denies Seizures.    Endocrine:   Diabetes, type 2, using insulin        Physical Exam  General:  Well nourished     Airway/Jaw/Neck:  Airway Findings: Mouth Opening: Normal Tongue: Normal  General Airway Assessment: Adult, Average  Mallampati: II  TM Distance: Normal, at least 6 cm  Jaw/Neck Findings:  Neck ROM: Normal ROM  Few teeth, plate in neck          Mental Status:  Mental Status Findings:  Cooperative, Alert and Oriented         Anesthesia Plan  Type of Anesthesia, risks & benefits discussed:  Anesthesia Type:  general  Patient's Preference:   Intra-op Monitoring Plan: arterial line, central line, [place], cardiac output and standard ASA monitors  Intra-op Monitoring Plan Comments:   Post Op Pain Control Plan:   Post Op Pain Control Plan Comments: As per surgeon's plan  Induction:   IV  Beta Blocker:         Informed Consent: Patient understands risks and agrees with Anesthesia plan.  Questions answered. Anesthesia consent signed with patient.  ASA Score: 4     Day of Surgery Review of History & Physical:    H&P update referred to the surgeon.         Ready For Surgery From Anesthesia Perspective.     Placement Date: 02/22/20; Placement Time: 0817 (created via procedure documentation); Method of Intubation: Direct laryngoscopy; Mask Ventilation: Not Attempted (RSI); Intubated: Postinduction; Blade: Burr #2; Airway Device Size: 7.5; Placement Verified By: Capnometry, Colorimetric EtCO2 device; Complicating Factors: None; Complications: None; Removal Date: 02/22/20;  Removal Time: 0847    Conclusion     · Normal left ventricular systolic function. The estimated ejection fraction is 65%  · The patient reached the end of the protocol.  · Indeterminate left ventricular diastolic function.  · Mild left ventricular enlargement.  · Mild left atrial enlargement.  · Mild tricuspid regurgitation.  · Mild right atrial enlargement.  · Normal right ventricular systolic function.  · Normal central venous pressure (3 mm Hg).  · The estimated PA systolic pressure is 35 mm Hg  · There were no arrhythmias during stress.  · The EKG  portion of this study is negative for myocardial ischemia.  · The stress echo portion of this study is negative for myocardial ischemia.

## 2020-02-28 NOTE — PROGRESS NOTES
[facility]  Liver Transplant  Progress Note    Patient Name: Colin Reilly  MRN: [id number]  Admission Date: 2/27/2020  Hospital Length of Stay: 1 days  Code Status: Full Code  Primary Care Provider: Preston Matthew Ii, MD  Post-Operative Day:     ORGAN:   LIVER  Disease Etiology: Alcoholic Cirrhosis  Donor Type:   Donation after Brain Death  CDC High Risk:   No  Donor CMV Status:   Donor CMV Status: Negative  Donor HBcAB:   Negative  Donor HCV Status:   Negative  Donor HBV CEZAR: Negative  Donor HCV CEZAR: Negative  Whole or Partial:   Biliary Anastomosis:   Arterial Anatomy:   Subjective:     History of Present Illness:  58y/o male, with ESLD secondary to ETOH listed with MELD 17, DM, HCV s/p INF, and hx of an umbilical hernia s/p umbilical hernia repair on 2/22/20  who presents to the ED today with increased abdominal pain and increased drainage from his surgical incision. Patient progressed well from his hernia repair and was discharged yesterday afternoon. Overnight he developed increased drainage from his incision soaking through numerous weight to dry dressings and worsening abdominal pain. He is having regular bowel movements and passing gas. He denies fever/chills, worsening nausea, vomiting, change in bowel habits, dysuria, chest pain, and sob. Labs stable. Vitals stable. He is also back up for liver transplant at this time. Plan to admit to LTS for further management. Patient discussed with Dr. Brunner.       Interval history:   NAEON. Diagnostic paracentesis yesterday negative for peritonitis (711 WBC, 5% segs). No fluid pocket to remove additional fluid identified. He is primary for a liver transplant today. NPO since 10am. Monitor.     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    Esophageal varices     GERD  (gastroesophageal reflux disease)     Hepatic encephalopathy 7/8/2014    Hepatitis C virus infection 07/08/2014    tx with interferon 3-4 mos- stopped for unclear reasons Tattoos-first at age 17 only risk factor (HCVAB negative 08/2017)    HTN (hypertension) 7/8/2014    Hypertension     Insulin dependent diabetes mellitus     Renal mass, left 7/8/2014    6.3 x 5.7 x 5.6 complex mass    Splenomegaly 7/8/2014    Umbilical hernia 7/8/2014       Past Surgical History:   Procedure Laterality Date    CARPAL TUNNEL RELEASE Bilateral     CHOLECYSTECTOMY      lap    COLONOSCOPY N/A 9/8/2017    Procedure: COLONOSCOPY;  Surgeon: Savannah Llanos MD;  Location: [facility];  Service: Endoscopy;  Laterality: N/A;    COLONOSCOPY Left 3/14/2018    Procedure: COLONOSCOPY;  Surgeon: Savannah Llanos MD;  Location: [facility];  Service: Endoscopy;  Laterality: Left;    ESOPHAGOGASTRODUODENOSCOPY  01/2014    ESOPHAGOGASTRODUODENOSCOPY  02/15/2017    grade II varices    ESOPHAGOGASTRODUODENOSCOPY  04/10/2017    grade I varices    ESOPHAGOGASTRODUODENOSCOPY N/A 10/18/2019    Procedure: EGD (ESOPHAGOGASTRODUODENOSCOPY);  Surgeon: Flavio Escalera MD;  Location: [facility] ([address] [place] [address]);  Service: Endoscopy;  Laterality: N/A;    ESOPHAGOGASTRODUODENOSCOPY W/ BANDING  01/26/2017    grade II varices    HERNIA REPAIR      NECK SURGERY      fusion on C5 and C6    ULNAR NERVE TRANSPOSITION Left     UMBILICAL HERNIA REPAIR N/A 2/22/2020    Procedure: REPAIR, HERNIA, PRIMARY WIHTOUT MESH AND PLACEMENT OF DRAIN;  Surgeon: Sherri Brunner MD;  [facility];  Service: Transplant;  Laterality: N/A;    vericose veins removed         Review of patient's allergies indicates:  No Known Allergies    Family History     Problem Relation (Age of Onset)    Alcohol abuse Brother    Cancer Brother    Hyperlipidemia Mother    No Known Problems Father (36), Sister, Sister        Tobacco Use    Smoking status: Former Smoker      Types: Cigarettes     Last attempt to quit: 1/4/2010     Years since quitting: 10.1    Smokeless tobacco: Former User     Types: Chew     Quit date: 2/24/1990    Tobacco comment: former 1 pack/week quit 1/4/2010   Substance and Sexual Activity    Alcohol use: No     Comment: former heavy beer but quit 1/4/2010    Drug use: No    Sexual activity: Never     Comment:        PTA Medications   Medication Sig    ciprofloxacin HCl (CIPRO) 500 MG tablet Take 1 tablet (500 mg total) by mouth once daily.    cyclobenzaprine (FLEXERIL) 10 MG tablet Take 10 mg by mouth 2 (two) times daily.     EASY TOUCH INSULIN SYRINGE 1 mL 31 gauge x 5/16 Syrg     escitalopram oxalate (LEXAPRO) 20 MG tablet Take 20 mg by mouth once daily.     finasteride (PROSCAR) 5 mg tablet Take 1 tablet (5 mg total) by mouth once daily.    furosemide (LASIX) 40 MG tablet Take 4.5 tablets (180 mg total) by mouth once daily.    gabapentin (NEURONTIN) 600 MG tablet Take 600 mg by mouth 3 (three) times daily.     HYDROcodone-acetaminophen (NORCO)  mg per tablet Take 1 tablet by mouth every 12 (twelve) hours as needed for Pain.    insulin aspart U-100 (NOVOLOG) 100 unit/mL injection Inject 24 Units into the skin 3 (three) times daily before meals.    insulin detemir U-100 (LEVEMIR) 100 unit/mL injection Inject 74 Units into the skin every evening.    lactulose (CHRONULAC) 10 gram/15 mL solution Take 30 mLs by mouth 3 (three) times daily. May take up to  4 to 5 times daily if needed for bowel movements    levocetirizine (XYZAL) 5 MG tablet Take 1 tablet (5 mg total) by mouth every evening.    nebivolol (BYSTOLIC) 10 MG Tab Take 10 mg by mouth once daily.    oxybutynin (DITROPAN) 5 MG Tab Take 5 mg by mouth once daily.    pantoprazole (PROTONIX) 40 MG tablet Take 1 tablet (40 mg total) by mouth once daily.    rifAXIMin (XIFAXAN) 550 mg Tab Take 1 tablet (550 mg total) by mouth 2 (two) times daily.    spironolactone  (ALDACTONE) 100 MG tablet Take 1.5 tablets (150 mg total) by mouth once daily.    tamsulosin (FLOMAX) 0.4 mg Cp24 Take 1 capsule (0.4 mg total) by mouth once daily.    TRUE METRIX GLUCOSE TEST STRIP Strp     TRUEPLUS LANCETS 30 gauge [facility]        Review of Systems   Constitutional: Negative for activity change, appetite change, chills, diaphoresis and fever.   HENT: Negative for congestion, sinus pressure, sinus pain, sore throat and trouble swallowing.    Eyes: Negative for visual disturbance.   Respiratory: Negative for chest tightness, shortness of breath and stridor.    Cardiovascular: Negative for chest pain, palpitations and leg swelling.   Gastrointestinal: Positive for abdominal distention and abdominal pain. Negative for constipation, diarrhea, nausea and vomiting.   Genitourinary: Negative for decreased urine volume, difficulty urinating, dysuria and flank pain.   Musculoskeletal: Negative for neck pain and neck stiffness.   Skin: Positive for wound. Negative for color change and rash.   Allergic/Immunologic: Positive for immunocompromised state.   Neurological: Negative for dizziness, tremors, syncope, light-headedness and headaches.   Psychiatric/Behavioral: Negative for agitation, confusion, decreased concentration and hallucinations. The patient is not nervous/anxious.      Objective:     Vital Signs (Most Recent):  Temp: 98.3 °F (36.8 °C) (02/28/20 1208)  Pulse: 93 (02/28/20 1100)  Resp: 19 (02/28/20 1100)  BP: 103/67 (02/28/20 1100)  SpO2: 98 % (02/28/20 1100) Vital Signs (24h Range):  Temp:  [97.9 °F (36.6 °C)-98.3 °F (36.8 °C)] 98.3 °F (36.8 °C)  Pulse:  [] 93  Resp:  [15-20] 19  SpO2:  [96 %-100 %] 98 %  BP: ()/(58-80) 103/67     Weight: 91.6 kg (202 lb)  Body mass index is 28.17 kg/m².      Intake/Output Summary (Last 24 hours) at 2/28/2020 1302  Last data filed at 2/28/2020 1100  Gross per 24 hour   Intake --   Output 460 ml   Net -460 ml       Physical Exam   Constitutional: He is  oriented to person, place, and time. No distress.   HENT:   Head: Normocephalic and atraumatic.   Eyes: Right eye exhibits no discharge. Left eye exhibits no discharge. No scleral icterus.   Neck: Normal range of motion. Neck supple.   Cardiovascular: Normal rate, regular rhythm, normal heart sounds and intact distal pulses.   Pulmonary/Chest: Effort normal and breath sounds normal. He has no wheezes. He has no rales.   Abdominal: Soft. Bowel sounds are normal. He exhibits distension. There is tenderness. There is no guarding.   midlyTTP in RLQ and RUQ. No peritoneal signs. Surgical incision intact with serous drainage. Former drain site intact with sutures in place.    Musculoskeletal: He exhibits no edema.   Neurological: He is alert and oriented to person, place, and time. No cranial nerve deficit.   Skin: Skin is warm and dry. Capillary refill takes less than 2 seconds. He is not diaphoretic.   Psychiatric: He has a normal mood and affect. His behavior is normal. Judgment and thought content normal.   Nursing note and vitals reviewed.    Laboratory:  CBC:   Recent Labs   Lab 02/28/20  0846   WBC 2.61*   RBC 3.50*   HGB 9.2*   HCT 32.0*   PLT 54*   MCV 91   MCH 26.3*   MCHC 28.8*     CMP:   Recent Labs   Lab 02/28/20  0846   *   CALCIUM 8.2*   ALBUMIN 3.5   PROT 6.4   *   K 3.6   CO2 26      BUN 8   CREATININE 0.9   ALKPHOS 110   ALT 18   AST 48*   BILITOT 2.7*     Labs within the past 24 hours have been reviewed.    Diagnostic Results:  I have personally reviewed all pertinent imaging studies.    Assessment/Plan:     * Umbilical hernia  - s/p hernia repair 2/22  - admitted with copious serous drainage from incision   - paracentesis negative for peritonitis  - primary for liver transplant today    End stage liver disease  - ESLD 2/2 Etoh listed for liver transplant with MELD 17  - s/p umbilical hernia repair 2/27/20  - primary for liver transplant today  - NPO since 10am  - pre op labs and  diagnostic testing reviewed  - transplant and anesthesia to obtain consents    Acquired coagulation factor deficiency  - 2/2 ESLD  - monitor PT/INR      Type 2 diabetes mellitus without complication, with long-term current use of insulin  - resume home insulin, consult endocrine for assistnace  - sliding scale      Portal hypertensive gastropathy  - 2/2 ESLD  - listed for liver transplant      Hepatic encephalopathy  - chronic  - continue lactulose      Other ascites  - para negative for peritonitis          VTE Risk Mitigation (From admission, onward)         Ordered     IP VTE HIGH RISK PATIENT  Once      02/28/20 0816     Send SCD stockings and JULIETTE hose with patient to OR  Continuous      02/28/20 0816     heparin (porcine) injection 5,000 Units  Every 8 hours      02/27/20 1002                The patients clinical status was discussed at multidisplinary rounds, involving transplant surgery, transplant medicine, pharmacy, nursing, nutrition, and social work    Discharge Planning:  No Patient Care Coordination Note on file.      Nicolette Motta PA-C  Liver Transplant  [facility]

## 2020-02-28 NOTE — SUBJECTIVE & OBJECTIVE
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    Esophageal varices     GERD (gastroesophageal reflux disease)     Hepatic encephalopathy 7/8/2014    Hepatitis C virus infection 07/08/2014    tx with interferon 3-4 mos- stopped for unclear reasons Tattoos-first at age 17 only risk factor (HCVAB negative 08/2017)    HTN (hypertension) 7/8/2014    Hypertension     Insulin dependent diabetes mellitus     Renal mass, left 7/8/2014    6.3 x 5.7 x 5.6 complex mass    Splenomegaly 7/8/2014    Umbilical hernia 7/8/2014       Past Surgical History:   Procedure Laterality Date    CARPAL TUNNEL RELEASE Bilateral     CHOLECYSTECTOMY      lap    COLONOSCOPY N/A 9/8/2017    Procedure: COLONOSCOPY;  Surgeon: Savannah Llanos MD;  Location: [facility];  Service: Endoscopy;  Laterality: N/A;    COLONOSCOPY Left 3/14/2018    Procedure: COLONOSCOPY;  Surgeon: Savannah Llanos MD;  Location: [facility];  Service: Endoscopy;  Laterality: Left;    ESOPHAGOGASTRODUODENOSCOPY  01/2014    ESOPHAGOGASTRODUODENOSCOPY  02/15/2017    grade II varices    ESOPHAGOGASTRODUODENOSCOPY  04/10/2017    grade I varices    ESOPHAGOGASTRODUODENOSCOPY N/A 10/18/2019    Procedure: EGD (ESOPHAGOGASTRODUODENOSCOPY);  Surgeon: Flavio Escalera MD;  Location: [facility] ([address] [place] [address]);  Service: Endoscopy;  Laterality: N/A;    ESOPHAGOGASTRODUODENOSCOPY W/ BANDING  01/26/2017    grade II varices    HERNIA REPAIR      NECK SURGERY      fusion on C5 and C6    ULNAR NERVE TRANSPOSITION Left     UMBILICAL HERNIA REPAIR N/A 2/22/2020    Procedure: REPAIR, HERNIA, PRIMARY WIHTOUT MESH AND PLACEMENT OF DRAIN;  Surgeon: Sherri Brunner MD;  Location: [facility] [address] [place] [address];  Service: Transplant;  Laterality: N/A;    vericose veins removed         Review of patient's allergies  indicates:  No Known Allergies    Family History     Problem Relation (Age of Onset)    Alcohol abuse Brother    Cancer Brother    Hyperlipidemia Mother    No Known Problems Father (36), Sister, Sister        Tobacco Use    Smoking status: Former Smoker     Types: Cigarettes     Last attempt to quit: 1/4/2010     Years since quitting: 10.1    Smokeless tobacco: Former User     Types: Chew     Quit date: 2/24/1990    Tobacco comment: former 1 pack/week quit 1/4/2010   Substance and Sexual Activity    Alcohol use: No     Comment: former heavy beer but quit 1/4/2010    Drug use: No    Sexual activity: Never     Comment:        PTA Medications   Medication Sig    ciprofloxacin HCl (CIPRO) 500 MG tablet Take 1 tablet (500 mg total) by mouth once daily.    cyclobenzaprine (FLEXERIL) 10 MG tablet Take 10 mg by mouth 2 (two) times daily.     EASY TOUCH INSULIN SYRINGE 1 mL 31 gauge x 5/16 Syrg     escitalopram oxalate (LEXAPRO) 20 MG tablet Take 20 mg by mouth once daily.     finasteride (PROSCAR) 5 mg tablet Take 1 tablet (5 mg total) by mouth once daily.    furosemide (LASIX) 40 MG tablet Take 4.5 tablets (180 mg total) by mouth once daily.    gabapentin (NEURONTIN) 600 MG tablet Take 600 mg by mouth 3 (three) times daily.     HYDROcodone-acetaminophen (NORCO)  mg per tablet Take 1 tablet by mouth every 12 (twelve) hours as needed for Pain.    insulin aspart U-100 (NOVOLOG) 100 unit/mL injection Inject 24 Units into the skin 3 (three) times daily before meals.    insulin detemir U-100 (LEVEMIR) 100 unit/mL injection Inject 74 Units into the skin every evening.    lactulose (CHRONULAC) 10 gram/15 mL solution Take 30 mLs by mouth 3 (three) times daily. May take up to  4 to 5 times daily if needed for bowel movements    levocetirizine (XYZAL) 5 MG tablet Take 1 tablet (5 mg total) by mouth every evening.    nebivolol (BYSTOLIC) 10 MG Tab Take 10 mg by mouth once daily.    oxybutynin  (DITROPAN) 5 MG Tab Take 5 mg by mouth once daily.    pantoprazole (PROTONIX) 40 MG tablet Take 1 tablet (40 mg total) by mouth once daily.    rifAXIMin (XIFAXAN) 550 mg Tab Take 1 tablet (550 mg total) by mouth 2 (two) times daily.    spironolactone (ALDACTONE) 100 MG tablet Take 1.5 tablets (150 mg total) by mouth once daily.    tamsulosin (FLOMAX) 0.4 mg Cp24 Take 1 capsule (0.4 mg total) by mouth once daily.    TRUE METRIX GLUCOSE TEST STRIP Strp     TRUEPLUS LANCETS 30 gauge [facility]        Review of Systems   Constitutional: Negative for activity change, appetite change, chills, diaphoresis and fever.   HENT: Negative for congestion, sinus pressure, sinus pain, sore throat and trouble swallowing.    Eyes: Negative for visual disturbance.   Respiratory: Negative for chest tightness, shortness of breath and stridor.    Cardiovascular: Negative for chest pain, palpitations and leg swelling.   Gastrointestinal: Positive for abdominal distention and abdominal pain. Negative for constipation, diarrhea, nausea and vomiting.   Genitourinary: Negative for decreased urine volume, difficulty urinating, dysuria and flank pain.   Musculoskeletal: Negative for neck pain and neck stiffness.   Skin: Positive for wound. Negative for color change and rash.   Allergic/Immunologic: Positive for immunocompromised state.   Neurological: Negative for dizziness, tremors, syncope, light-headedness and headaches.   Psychiatric/Behavioral: Negative for agitation, confusion, decreased concentration and hallucinations. The patient is not nervous/anxious.      Objective:     Vital Signs (Most Recent):  Temp: 98.3 °F (36.8 °C) (02/28/20 1208)  Pulse: 93 (02/28/20 1100)  Resp: 19 (02/28/20 1100)  BP: 103/67 (02/28/20 1100)  SpO2: 98 % (02/28/20 1100) Vital Signs (24h Range):  Temp:  [97.9 °F (36.6 °C)-98.3 °F (36.8 °C)] 98.3 °F (36.8 °C)  Pulse:  [] 93  Resp:  [15-20] 19  SpO2:  [96 %-100 %] 98 %  BP: ()/(58-80) 103/67      Weight: 91.6 kg (202 lb)  Body mass index is 28.17 kg/m².      Intake/Output Summary (Last 24 hours) at 2/28/2020 1302  Last data filed at 2/28/2020 1100  Gross per 24 hour   Intake --   Output 460 ml   Net -460 ml       Physical Exam   Constitutional: He is oriented to person, place, and time. No distress.   HENT:   Head: Normocephalic and atraumatic.   Eyes: Right eye exhibits no discharge. Left eye exhibits no discharge. No scleral icterus.   Neck: Normal range of motion. Neck supple.   Cardiovascular: Normal rate, regular rhythm, normal heart sounds and intact distal pulses.   Pulmonary/Chest: Effort normal and breath sounds normal. He has no wheezes. He has no rales.   Abdominal: Soft. Bowel sounds are normal. He exhibits distension. There is tenderness. There is no guarding.   midlyTTP in RLQ and RUQ. No peritoneal signs. Surgical incision intact with serous drainage. Former drain site intact with sutures in place.    Musculoskeletal: He exhibits no edema.   Neurological: He is alert and oriented to person, place, and time. No cranial nerve deficit.   Skin: Skin is warm and dry. Capillary refill takes less than 2 seconds. He is not diaphoretic.   Psychiatric: He has a normal mood and affect. His behavior is normal. Judgment and thought content normal.   Nursing note and vitals reviewed.    Laboratory:  CBC:   Recent Labs   Lab 02/28/20  0846   WBC 2.61*   RBC 3.50*   HGB 9.2*   HCT 32.0*   PLT 54*   MCV 91   MCH 26.3*   MCHC 28.8*     CMP:   Recent Labs   Lab 02/28/20  0846   *   CALCIUM 8.2*   ALBUMIN 3.5   PROT 6.4   *   K 3.6   CO2 26      BUN 8   CREATININE 0.9   ALKPHOS 110   ALT 18   AST 48*   BILITOT 2.7*     Labs within the past 24 hours have been reviewed.    Diagnostic Results:  I have personally reviewed all pertinent imaging studies.

## 2020-02-28 NOTE — HOSPITAL COURSE
Interval history:   NAEON. Diagnostic paracentesis 2/27/20, negative for peritonitis (711 WBC, 5% segs). No fluid pocket to remove additional fluid identified. He was primary for a liver transplant yesterday. Was taken to the OR, and intubated but donor organ was not suitable hence, he was extubated and brought back to his room.  Today, he feels fine, denies any discomfort.  Afebrile, BP and HR ok, output is minimal in the colostomy bag over hernia repair incision. Nontender.

## 2020-02-28 NOTE — ASSESSMENT & PLAN NOTE
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

## 2020-02-28 NOTE — NURSING
Notified Oneida Candelario of pt. Blood pressure 90/58.  Instructed to hold flomax and bistolic and admin diuretics.  Will admin and continue to monitor.

## 2020-02-28 NOTE — TELEPHONE ENCOUNTER
ORGAN OFFER NOTE    Notified by León Fitzgerald, , that patient is no longer backup but is now primary for this liver transplant offer.  Colin Reilly currently inpatient. No acute issues noted.  Spoke with patient and his mother.  Informed both that patient is now primary for liver transplant. Patient instructed NPO after 10 am. Patient's mother also aware.  Patient verbalized understanding and denies any questions/ concerns at this time.

## 2020-02-28 NOTE — SUBJECTIVE & OBJECTIVE
"Interval HPI:   Overnight events: Remains in TSU. NAEON. BG slightly above goal this AM. Prandial insulin started per primary.   Eatin%  Now NPO for surgery   Nausea: No  Hypoglycemia and intervention: Yes  mild   Fever: No  TPN and/or TF: No      BP (!) 100/57 (BP Location: Left arm, Patient Position: Lying)   Pulse 90   Temp 97.9 °F (36.6 °C) (Oral)   Resp 18   Ht 5' 11" (1.803 m)   Wt 91.8 kg (202 lb 6.1 oz)   SpO2 100%   BMI 28.23 kg/m²     Labs Reviewed and Include    Recent Labs   Lab 20  0846   *   CALCIUM 8.2*   ALBUMIN 3.5   PROT 6.4   *   K 3.6   CO2 26      BUN 8   CREATININE 0.9   ALKPHOS 110   ALT 18   AST 48*   BILITOT 2.7*     Lab Results   Component Value Date    WBC 2.61 (L) 2020    HGB 9.2 (L) 2020    HCT 32.0 (L) 2020    MCV 91 2020    PLT 54 (L) 2020     No results for input(s): TSH, FREET4 in the last 168 hours.  Lab Results   Component Value Date    HGBA1C 6.7 (H) 2020       Nutritional status:   Body mass index is 28.23 kg/m².  Lab Results   Component Value Date    ALBUMIN 3.5 2020    ALBUMIN 3.5 2020    ALBUMIN 3.2 (L) 2020     No results found for: PREALBUMIN    Estimated Creatinine Clearance: 103.6 mL/min (based on SCr of 0.9 mg/dL).    Accu-Checks  Recent Labs     20  2113 20  0756 20  1232 20  1332 20  2109 20  0834 20  1223 20  1246 20  1425 20  1704   POCTGLUCOSE 164* 77 198* 149* 174* 223* 67* 77 89 111*       Current Medications and/or Treatments Impacting Glycemic Control  Immunotherapy:    Immunosuppressants     None        Steroids:   Hormones (From admission, onward)    Start     Stop Route Frequency Ordered    20 0816  methylPREDNISolone sodium succinate injection 500 mg  (Med - Immunosuppression Induction Therapy (Methylprednisolone))      -- IV On Call Procedure 20 0816        Pressors:    Autonomic Drugs " (From admission, onward)    None        Hyperglycemia/Diabetes Medications:   Antihyperglycemics (From admission, onward)    Start     Stop Route Frequency Ordered    02/28/20 2100  insulin detemir U-100 pen 30 Units      -- SubQ Nightly 02/28/20 0848    02/28/20 1130  insulin aspart U-100 pen 15 Units      -- SubQ 3 times daily with meals 02/28/20 0848    02/27/20 1854  insulin aspart U-100 pen 1-10 Units      -- SubQ Before meals & nightly PRN 02/27/20 1754

## 2020-02-28 NOTE — ASSESSMENT & PLAN NOTE
- s/p hernia repair 2/22  - admitted with copious serous drainage from incision   - paracentesis negative for peritonitis  - primary for liver transplant today

## 2020-02-28 NOTE — PLAN OF CARE
VSS.  No signs of evident distress.  Pt. Received pain medication as per MAR.  NPO from 10am.  Collection bag placed over umbilicus to collect drainage.  Pt. Capillary blood glucose noted to be 70-80 this afternoon.  Monitored closely.  MD aware.  Pt. Taken off visi.  Transferred to Pre op for liver transplant.  Pre op assessment in chart.  Report called to Cady Augustin RN.  Ipad cleaned and returned to charging station.      Mother with pt.

## 2020-02-28 NOTE — PLAN OF CARE
VSS.  No signs of evident distress.  Pt. Complains of abdominal pain.  Medication given as per MAR.  Pt. With clear yellow fluid draining from incision below belly button.  MD aware.  Pt. To IR for paracentesis.  Samples sent to lab.  No additional fluid removed as per report from IR.  Pt. Instructed to remain NPO at this time.  Pt. Expressed understanding but ate dinner.  Pt. Reeducated.  Pt. Expressed understanding.  Family at bedside.      Will continue to monitor.

## 2020-02-28 NOTE — PROGRESS NOTES
"[facility]  Endocrinology  Progress Note    Admit Date: 2020     Reason for Consult: Management of T2DM, Hyperglycemia      Surgical Procedure and Date: Umbilical hernia repair 20     Diabetes diagnosis year:            Lab Results   Component Value Date     HGBA1C 7.6 (H) 2019       Home Diabetes Medications: Levemir 25 units HS tonight and novolog 15 units with meals until NPO.         How often checking glucose at home? 3x per day   BG readings on regimen: 150-200s  Hypoglycemia on the regimen?  No  Missed doses on regimen?  Yes     Diabetes Complications include:     Hyperglycemia and Diabetic peripheral neuropathy      Complicating diabetes co morbidities:   CIRRHOSIS        HPI:   Patient is a 59 y.o. male with a diagnosis of DM2, ESLD secondary to ETOH listed with MELD 17, DM, HCV s/p INF, and hx of an umbilical hernia s/p umbilical hernia repair on 20  who presents to the ED today with increased abdominal pain and increased drainage from his surgical incision. Patient's DM managed per his PCP in [place], [place].  Endocrinology consulted for DM/BG management.      Interval HPI:   Overnight events: Remains in TSU. NAEON. BG slightly above goal this AM. Prandial insulin started per primary.   Eatin%  Now NPO for surgery   Nausea: No  Hypoglycemia and intervention: Yes  mild   Fever: No  TPN and/or TF: No      BP (!) 100/57 (BP Location: Left arm, Patient Position: Lying)   Pulse 90   Temp 97.9 °F (36.6 °C) (Oral)   Resp 18   Ht 5' 11" (1.803 m)   Wt 91.8 kg (202 lb 6.1 oz)   SpO2 100%   BMI 28.23 kg/m²      Labs Reviewed and Include    Recent Labs   Lab 20  0846   *   CALCIUM 8.2*   ALBUMIN 3.5   PROT 6.4   *   K 3.6   CO2 26      BUN 8   CREATININE 0.9   ALKPHOS 110   ALT 18   AST 48*   BILITOT 2.7*     Lab Results   Component Value Date    WBC 2.61 (L) 2020    HGB 9.2 (L) 2020    HCT 32.0 (L) 2020    MCV " 91 02/28/2020    PLT 54 (L) 02/28/2020     No results for input(s): TSH, FREET4 in the last 168 hours.  Lab Results   Component Value Date    HGBA1C 6.7 (H) 02/28/2020       Nutritional status:   Body mass index is 28.23 kg/m².  Lab Results   Component Value Date    ALBUMIN 3.5 02/28/2020    ALBUMIN 3.5 02/27/2020    ALBUMIN 3.2 (L) 02/26/2020     No results found for: PREALBUMIN    Estimated Creatinine Clearance: 103.6 mL/min (based on SCr of 0.9 mg/dL).    Accu-Checks  Recent Labs     02/25/20  2113 02/26/20  0756 02/26/20  1232 02/27/20  1332 02/27/20  2109 02/28/20  0834 02/28/20  1223 02/28/20  1246 02/28/20  1425 02/28/20  1704   POCTGLUCOSE 164* 77 198* 149* 174* 223* 67* 77 89 111*       Current Medications and/or Treatments Impacting Glycemic Control  Immunotherapy:    Immunosuppressants     None        Steroids:   Hormones (From admission, onward)    Start     Stop Route Frequency Ordered    02/28/20 0816  methylPREDNISolone sodium succinate injection 500 mg  (Med - Immunosuppression Induction Therapy (Methylprednisolone))      -- IV On Call Procedure 02/28/20 0816        Pressors:    Autonomic Drugs (From admission, onward)    None        Hyperglycemia/Diabetes Medications:   Antihyperglycemics (From admission, onward)    Start     Stop Route Frequency Ordered    02/28/20 2100  insulin detemir U-100 pen 30 Units      -- SubQ Nightly 02/28/20 0848    02/28/20 1130  insulin aspart U-100 pen 15 Units      -- SubQ 3 times daily with meals 02/28/20 0848    02/27/20 1854  insulin aspart U-100 pen 1-10 Units      -- SubQ Before meals & nightly PRN 02/27/20 1754          ASSESSMENT and PLAN    * Umbilical hernia  Managed per primary.   Optimize BG for surgical wound healing.         Type 2 diabetes mellitus without complication, with long-term current use of insulin  BG goal 140-180.       Levemir 30 units HS   Decrease to novolog 15 units with meals.   BG monitoring ac/hs and moderate dose correction scale.        ADDENDUM: now npo for surgery. BG monitoring every 6 hours until surgery then start  IV insulin infusion protocol  Requires intensive BG monitoring while on protocol         End stage liver disease  Managed per primary.   May impact BG           Sherri Rey NP  Endocrinology  [facility]

## 2020-02-28 NOTE — ASSESSMENT & PLAN NOTE
BG goal 140-180.       Levemir 30 units HS   Decrease to novolog 15 units with meals.   BG monitoring ac/hs and moderate dose correction scale.       ADDENDUM: now npo for surgery. BG monitoring every 6 hours until surgery then start  IV insulin infusion protocol  Requires intensive BG monitoring while on protocol

## 2020-02-28 NOTE — PLAN OF CARE
Pre-operative Discussion Note  Liver Transplant Surgery    Colin Reilly is a 59 y.o. male admitted for liver transplant.  I discussed the planned procedure in detail, including expected hospital course and outcomes, benefits, risks, and potential complications.  Complications discussed included death, graft failure, bleeding, infection, rejection, and neurologic problems.  I discussed the risks of anesthesia, as well as the potential need for re-operation.  The possibility of other complications not specifically mentioned was also discussed.  Also, I discussed the need for lifelong immunosuppression and the possibility of serious complications from immunosuppressive drugs.    The discussion included the risks that the patient will incur if he elects to not have the proposed procedure.    Relevant donor-specific risk factors were disclosed and discussed with the patient, including:   none    Specific PHS Increased Risk Behavior criteria for the organ donor include:  None    HCV Infection Not applicable.    Hepatocellular Carcinoma Recurrence: Not applicable.    All questions were answered.  The patient and available family members voice understanding and agree to proceed with the transplant.    UNOS Patient Status  Note on scores:  ICU = 10 = total assistance  TSU = 20-30 = partial assistance  Outpatient admitted for transplant requiring medical care in last year = 40-50 = partial assistance  Scores 60 or higher indicate no assistance, meaning no need for medical care in last year. This would be very unusual for a transplant candidate.    Functional Status: 50% - Requires considerable assistance and frequent medical care  Physical Capacity: No Limitations     Discussion held with patient and his mother at bedside at approx 1230pm today.    MELD-Na score: 17 at 2/28/2020  8:46 AM  MELD score: 15 at 2/28/2020  8:46 AM  Calculated from:  Serum Creatinine: 0.9 mg/dL (Rounded to 1 mg/dL) at 2/28/2020  8:46 AM  Serum  Sodium: 134 mmol/L at 2/28/2020  8:46 AM  Total Bilirubin: 2.7 mg/dL at 2/28/2020  8:46 AM  INR(ratio): 1.6 at 2/28/2020  8:46 AM  Age: 59 years    Umbilical hernia with ascites leak.  Para yesterday. No sbp. No sepsis

## 2020-02-29 ENCOUNTER — NURSE TRIAGE (OUTPATIENT)
Dept: ADMINISTRATIVE | Facility: CLINIC | Age: 60
End: 2020-02-29

## 2020-02-29 ENCOUNTER — DOCUMENTATION ONLY (OUTPATIENT)
Dept: TRANSPLANT | Facility: HOSPITAL | Age: 60
End: 2020-02-29

## 2020-02-29 ENCOUNTER — TELEPHONE (OUTPATIENT)
Dept: TRANSPLANT | Facility: HOSPITAL | Age: 60
End: 2020-02-29

## 2020-02-29 ENCOUNTER — TELEPHONE (OUTPATIENT)
Dept: TRANSPLANT | Facility: CLINIC | Age: 60
End: 2020-02-29

## 2020-02-29 LAB
ALBUMIN SERPL BCP-MCNC: 3.7 G/DL (ref 3.5–5.2)
ALP SERPL-CCNC: 95 U/L (ref 55–135)
ALT SERPL W/O P-5'-P-CCNC: 18 U/L (ref 10–44)
ANION GAP SERPL CALC-SCNC: 13 MMOL/L (ref 8–16)
AST SERPL-CCNC: 43 U/L (ref 10–40)
BACTERIA #/AREA URNS AUTO: NORMAL /HPF
BACTERIA UR CULT: NO GROWTH
BASOPHILS # BLD AUTO: 0 K/UL (ref 0–0.2)
BASOPHILS NFR BLD: 0 % (ref 0–1.9)
BILIRUB SERPL-MCNC: 3.5 MG/DL (ref 0.1–1)
BILIRUB UR QL STRIP: NEGATIVE
BUN SERPL-MCNC: 17 MG/DL (ref 6–20)
CALCIUM SERPL-MCNC: 8.4 MG/DL (ref 8.7–10.5)
CHLORIDE SERPL-SCNC: 95 MMOL/L (ref 95–110)
CLARITY UR REFRACT.AUTO: CLEAR
CO2 SERPL-SCNC: 24 MMOL/L (ref 23–29)
COLOR UR AUTO: YELLOW
CREAT SERPL-MCNC: 1.2 MG/DL (ref 0.5–1.4)
DIFFERENTIAL METHOD: ABNORMAL
EOSINOPHIL # BLD AUTO: 0 K/UL (ref 0–0.5)
EOSINOPHIL NFR BLD: 0 % (ref 0–8)
ERYTHROCYTE [DISTWIDTH] IN BLOOD BY AUTOMATED COUNT: 17.8 % (ref 11.5–14.5)
EST. GFR  (AFRICAN AMERICAN): >60 ML/MIN/1.73 M^2
EST. GFR  (NON AFRICAN AMERICAN): >60 ML/MIN/1.73 M^2
GLUCOSE SERPL-MCNC: 528 MG/DL (ref 70–110)
GLUCOSE UR QL STRIP: ABNORMAL
HCT VFR BLD AUTO: 32.8 % (ref 40–54)
HGB BLD-MCNC: 9.5 G/DL (ref 14–18)
HGB UR QL STRIP: NEGATIVE
IMM GRANULOCYTES # BLD AUTO: 0.01 K/UL (ref 0–0.04)
IMM GRANULOCYTES NFR BLD AUTO: 0.4 % (ref 0–0.5)
INR PPP: 1.7 (ref 0.8–1.2)
KETONES UR QL STRIP: NEGATIVE
LEUKOCYTE ESTERASE UR QL STRIP: NEGATIVE
LYMPHOCYTES # BLD AUTO: 0.3 K/UL (ref 1–4.8)
LYMPHOCYTES NFR BLD: 9.9 % (ref 18–48)
MAGNESIUM SERPL-MCNC: 1.9 MG/DL (ref 1.6–2.6)
MCH RBC QN AUTO: 26.2 PG (ref 27–31)
MCHC RBC AUTO-ENTMCNC: 29 G/DL (ref 32–36)
MCV RBC AUTO: 90 FL (ref 82–98)
MICROSCOPIC COMMENT: NORMAL
MONOCYTES # BLD AUTO: 0 K/UL (ref 0.3–1)
MONOCYTES NFR BLD: 1.4 % (ref 4–15)
NEUTROPHILS # BLD AUTO: 2.5 K/UL (ref 1.8–7.7)
NEUTROPHILS NFR BLD: 88.3 % (ref 38–73)
NITRITE UR QL STRIP: NEGATIVE
NRBC BLD-RTO: 0 /100 WBC
PH UR STRIP: 5 [PH] (ref 5–8)
PLATELET # BLD AUTO: 47 K/UL (ref 150–350)
PMV BLD AUTO: ABNORMAL FL (ref 9.2–12.9)
POCT GLUCOSE: 105 MG/DL (ref 70–110)
POCT GLUCOSE: 246 MG/DL (ref 70–110)
POCT GLUCOSE: 345 MG/DL (ref 70–110)
POCT GLUCOSE: 370 MG/DL (ref 70–110)
POTASSIUM SERPL-SCNC: 4.2 MMOL/L (ref 3.5–5.1)
PROT SERPL-MCNC: 6.6 G/DL (ref 6–8.4)
PROT UR QL STRIP: NEGATIVE
PROTHROMBIN TIME: 16.8 SEC (ref 9–12.5)
RBC # BLD AUTO: 3.63 M/UL (ref 4.6–6.2)
RBC #/AREA URNS AUTO: 1 /HPF (ref 0–4)
SODIUM SERPL-SCNC: 132 MMOL/L (ref 136–145)
SP GR UR STRIP: 1.02 (ref 1–1.03)
SQUAMOUS #/AREA URNS AUTO: 0 /HPF
URN SPEC COLLECT METH UR: ABNORMAL
WBC # BLD AUTO: 2.83 K/UL (ref 3.9–12.7)
WBC #/AREA URNS AUTO: 2 /HPF (ref 0–5)
YEAST UR QL AUTO: NORMAL

## 2020-02-29 PROCEDURE — 36415 COLL VENOUS BLD VENIPUNCTURE: CPT | Mod: NTX

## 2020-02-29 PROCEDURE — 99232 PR SUBSEQUENT HOSPITAL CARE,LEVL II: ICD-10-PCS | Mod: NTX,,, | Performed by: NURSE PRACTITIONER

## 2020-02-29 PROCEDURE — 20600001 HC STEP DOWN PRIVATE ROOM: Mod: NTX

## 2020-02-29 PROCEDURE — 25000003 PHARM REV CODE 250: Mod: NTX | Performed by: STUDENT IN AN ORGANIZED HEALTH CARE EDUCATION/TRAINING PROGRAM

## 2020-02-29 PROCEDURE — 83735 ASSAY OF MAGNESIUM: CPT | Mod: NTX

## 2020-02-29 PROCEDURE — 63600175 PHARM REV CODE 636 W HCPCS: Mod: NTX | Performed by: NURSE PRACTITIONER

## 2020-02-29 PROCEDURE — 80053 COMPREHEN METABOLIC PANEL: CPT | Mod: NTX

## 2020-02-29 PROCEDURE — 81001 URINALYSIS AUTO W/SCOPE: CPT | Mod: NTX

## 2020-02-29 PROCEDURE — 87040 BLOOD CULTURE FOR BACTERIA: CPT | Mod: 59,NTX

## 2020-02-29 PROCEDURE — 99232 SBSQ HOSP IP/OBS MODERATE 35: CPT | Mod: NTX,,, | Performed by: NURSE PRACTITIONER

## 2020-02-29 PROCEDURE — 63600175 PHARM REV CODE 636 W HCPCS: Mod: NTX | Performed by: STUDENT IN AN ORGANIZED HEALTH CARE EDUCATION/TRAINING PROGRAM

## 2020-02-29 PROCEDURE — C9399 UNCLASSIFIED DRUGS OR BIOLOG: HCPCS | Mod: NTX | Performed by: NURSE PRACTITIONER

## 2020-02-29 PROCEDURE — 85025 COMPLETE CBC W/AUTO DIFF WBC: CPT | Mod: NTX

## 2020-02-29 PROCEDURE — 85610 PROTHROMBIN TIME: CPT | Mod: NTX

## 2020-02-29 PROCEDURE — 99233 PR SUBSEQUENT HOSPITAL CARE,LEVL III: ICD-10-PCS | Mod: GC,NTX,, | Performed by: INTERNAL MEDICINE

## 2020-02-29 PROCEDURE — 99233 SBSQ HOSP IP/OBS HIGH 50: CPT | Mod: GC,NTX,, | Performed by: INTERNAL MEDICINE

## 2020-02-29 RX ORDER — IBUPROFEN 200 MG
24 TABLET ORAL
Status: DISCONTINUED | OUTPATIENT
Start: 2020-02-29 | End: 2020-03-02 | Stop reason: HOSPADM

## 2020-02-29 RX ORDER — GLUCAGON 1 MG
1 KIT INJECTION
Status: DISCONTINUED | OUTPATIENT
Start: 2020-02-29 | End: 2020-03-02 | Stop reason: HOSPADM

## 2020-02-29 RX ORDER — IBUPROFEN 200 MG
16 TABLET ORAL
Status: DISCONTINUED | OUTPATIENT
Start: 2020-02-29 | End: 2020-03-02 | Stop reason: HOSPADM

## 2020-02-29 RX ORDER — INSULIN ASPART 100 [IU]/ML
1-10 INJECTION, SOLUTION INTRAVENOUS; SUBCUTANEOUS
Status: DISCONTINUED | OUTPATIENT
Start: 2020-02-29 | End: 2020-03-02 | Stop reason: HOSPADM

## 2020-02-29 RX ORDER — INSULIN ASPART 100 [IU]/ML
22 INJECTION, SOLUTION INTRAVENOUS; SUBCUTANEOUS
Status: DISCONTINUED | OUTPATIENT
Start: 2020-02-29 | End: 2020-03-01

## 2020-02-29 RX ORDER — INSULIN ASPART 100 [IU]/ML
18 INJECTION, SOLUTION INTRAVENOUS; SUBCUTANEOUS
Status: DISCONTINUED | OUTPATIENT
Start: 2020-02-29 | End: 2020-02-29

## 2020-02-29 RX ADMIN — RIFAXIMIN 550 MG: 550 TABLET ORAL at 09:02

## 2020-02-29 RX ADMIN — INSULIN ASPART 10 UNITS: 100 INJECTION, SOLUTION INTRAVENOUS; SUBCUTANEOUS at 12:02

## 2020-02-29 RX ADMIN — CYCLOBENZAPRINE 10 MG: 10 TABLET, FILM COATED ORAL at 09:02

## 2020-02-29 RX ADMIN — INSULIN ASPART 10 UNITS: 100 INJECTION, SOLUTION INTRAVENOUS; SUBCUTANEOUS at 05:02

## 2020-02-29 RX ADMIN — INSULIN ASPART 1 UNITS: 100 INJECTION, SOLUTION INTRAVENOUS; SUBCUTANEOUS at 05:02

## 2020-02-29 RX ADMIN — GABAPENTIN 600 MG: 300 CAPSULE ORAL at 02:02

## 2020-02-29 RX ADMIN — HYDROCODONE BITARTRATE AND ACETAMINOPHEN 1 TABLET: 10; 325 TABLET ORAL at 10:02

## 2020-02-29 RX ADMIN — OXYBUTYNIN CHLORIDE 5 MG: 5 TABLET ORAL at 09:02

## 2020-02-29 RX ADMIN — GABAPENTIN 600 MG: 300 CAPSULE ORAL at 09:02

## 2020-02-29 RX ADMIN — RIFAXIMIN 550 MG: 550 TABLET ORAL at 08:02

## 2020-02-29 RX ADMIN — CETIRIZINE HYDROCHLORIDE 5 MG: 5 TABLET ORAL at 09:02

## 2020-02-29 RX ADMIN — INSULIN ASPART 18 UNITS: 100 INJECTION, SOLUTION INTRAVENOUS; SUBCUTANEOUS at 09:02

## 2020-02-29 RX ADMIN — HEPARIN SODIUM 5000 UNITS: 5000 INJECTION, SOLUTION INTRAVENOUS; SUBCUTANEOUS at 05:02

## 2020-02-29 RX ADMIN — HYDROCODONE BITARTRATE AND ACETAMINOPHEN 1 TABLET: 10; 325 TABLET ORAL at 09:02

## 2020-02-29 RX ADMIN — PANTOPRAZOLE SODIUM 40 MG: 40 TABLET, DELAYED RELEASE ORAL at 09:02

## 2020-02-29 RX ADMIN — INSULIN ASPART 22 UNITS: 100 INJECTION, SOLUTION INTRAVENOUS; SUBCUTANEOUS at 12:02

## 2020-02-29 RX ADMIN — CYCLOBENZAPRINE 10 MG: 10 TABLET, FILM COATED ORAL at 08:02

## 2020-02-29 RX ADMIN — FINASTERIDE 5 MG: 5 TABLET, FILM COATED ORAL at 09:02

## 2020-02-29 RX ADMIN — HYDROCODONE BITARTRATE AND ACETAMINOPHEN 1 TABLET: 10; 325 TABLET ORAL at 04:02

## 2020-02-29 RX ADMIN — INSULIN ASPART 22 UNITS: 100 INJECTION, SOLUTION INTRAVENOUS; SUBCUTANEOUS at 05:02

## 2020-02-29 RX ADMIN — GABAPENTIN 600 MG: 300 CAPSULE ORAL at 08:02

## 2020-02-29 RX ADMIN — SPIRONOLACTONE 150 MG: 50 TABLET ORAL at 09:02

## 2020-02-29 RX ADMIN — TAMSULOSIN HYDROCHLORIDE 0.4 MG: 0.4 CAPSULE ORAL at 09:02

## 2020-02-29 RX ADMIN — CIPROFLOXACIN HYDROCHLORIDE 500 MG: 500 TABLET, FILM COATED ORAL at 09:02

## 2020-02-29 RX ADMIN — INSULIN DETEMIR 30 UNITS: 100 INJECTION, SOLUTION SUBCUTANEOUS at 09:02

## 2020-02-29 RX ADMIN — FUROSEMIDE 160 MG: 40 TABLET ORAL at 09:02

## 2020-02-29 NOTE — TELEPHONE ENCOUNTER
PATIENT NAME: Colin RAYGOZA [facility] #: [id number]    Lab Results   Component Value Date    CREATININE 1.2 02/29/2020     (L) 02/29/2020    BILITOT 3.5 (H) 02/29/2020    ALBUMIN 3.7 02/29/2020    INR 1.7 (H) 02/29/2020   MELD-Na score: 22 at 2/29/2020  9:18 AM  MELD score: 19 at 2/29/2020  9:18 AM  Calculated from:  Serum Creatinine: 1.2 mg/dL at 2/29/2020  9:18 AM  Serum Sodium: 132 mmol/L at 2/29/2020  9:18 AM  Total Bilirubin: 3.5 mg/dL at 2/29/2020  9:18 AM  INR(ratio): 1.7 at 2/29/2020  9:18 AM  Age: 59 years    Encephalopathy: 1 - 2  Ascites: moderate  Dialysis: no     Recertification requestor: Yamile Saucedo

## 2020-02-29 NOTE — ANESTHESIA POSTPROCEDURE EVALUATION
Anesthesia Post Evaluation    Patient: Colin Reilly    Procedure(s) Performed: Procedure(s) (LRB):  TRANSPLANT, LIVER  ABORTED (N/A)    Final Anesthesia Type: general    Patient location during evaluation: PACU  Patient participation: Yes- Able to Participate  Level of consciousness: awake and alert and oriented  Post-procedure vital signs: reviewed and stable  Pain management: adequate  Airway patency: patent    PONV status at discharge: No PONV  Anesthetic complications: no      Cardiovascular status: stable  Respiratory status: unassisted, spontaneous ventilation and room air  Hydration status: euvolemic  Follow-up not needed.  Comments: He was awake shortly after surgery, CRNAs maintained care of the patient (he was actually in PACU) because recovery was on hold and they were hesitant to remove the central lines and arterial lines.          Vitals Value Taken Time   /56 2/28/2020  9:56 PM   Temp 37.2 °C (99 °F) 2/28/2020  9:45 PM   Pulse 97 2/28/2020  9:59 PM   Resp 14 2/28/2020  9:59 PM   SpO2 96 % 2/28/2020  9:59 PM   Vitals shown include unvalidated device data.      No case tracking events are documented in the log.      Pain/Lobito Score: Pain Rating Prior to Med Admin: 10 (2/28/2020  2:37 PM)  Pain Rating Post Med Admin: 4 (2/28/2020  3:37 PM)

## 2020-02-29 NOTE — OP NOTE
Patient was under anesthesia and lines were placed.  Donor liver [id number] was brought to the OR and biopsied.    Biopsy demonstrated excessive macro and micro steatosis.   The liver was declined. The patient was awakened and brought to the recovery room.   The patient and his mother were informed      Rahul Quan Jr

## 2020-02-29 NOTE — PROGRESS NOTES
"[facility]  Endocrinology  Progress Note    Admit Date: 2020     Reason for Consult: Management of T2DM, Hyperglycemia      Surgical Procedure and Date: Umbilical hernia repair 20     Diabetes diagnosis year:            Lab Results   Component Value Date     HGBA1C 7.6 (H) 2019       Home Diabetes Medications: Levemir 25 units HS tonight and novolog 15 units with meals until NPO.         How often checking glucose at home? 3x per day   BG readings on regimen: 150-200s  Hypoglycemia on the regimen?  No  Missed doses on regimen?  Yes     Diabetes Complications include:     Hyperglycemia and Diabetic peripheral neuropathy      Complicating diabetes co morbidities:   CIRRHOSIS        HPI:   Patient is a 59 y.o. male with a diagnosis of DM2, ESLD secondary to ETOH listed with MELD 17, DM, HCV s/p INF, and hx of an umbilical hernia s/p umbilical hernia repair on 20  who presents to the ED today with increased abdominal pain and increased drainage from his surgical incision. Patient's DM managed per his PCP in [place], [place].  Endocrinology consulted for DM/BG management.      Interval HPI:   Overnight events:Remains in TSU. Transplant aborted overnight. Solumedrol 500 mg yesterday evening. BG above goal this AM.   Eatin% - outside food (ate cupcake and drank coke between breakfast and lunch)   Nausea: No  Hypoglycemia and intervention: No  Fever: No  TPN and/or TF: No      BP (!) 99/54 (BP Location: Left arm, Patient Position: Lying)   Pulse 104   Temp 97.8 °F (36.6 °C) (Oral)   Resp 18   Ht 5' 11" (1.803 m)   Wt 91.8 kg (202 lb 6.1 oz)   SpO2 95%   BMI 28.23 kg/m²      Labs Reviewed and Include    Recent Labs   Lab 20  0918   *   CALCIUM 8.4*   ALBUMIN 3.7   PROT 6.6   *   K 4.2   CO2 24   CL 95   BUN 17   CREATININE 1.2   ALKPHOS 95   ALT 18   AST 43*   BILITOT 3.5*     Lab Results   Component Value Date    WBC 2.83 (L) 2020    HGB 9.5 " (L) 02/29/2020    HCT 32.8 (L) 02/29/2020    MCV 90 02/29/2020    PLT 47 (L) 02/29/2020     No results for input(s): TSH, FREET4 in the last 168 hours.  Lab Results   Component Value Date    HGBA1C 6.7 (H) 02/28/2020       Nutritional status:   Body mass index is 28.23 kg/m².  Lab Results   Component Value Date    ALBUMIN 3.7 02/29/2020    ALBUMIN 3.3 (L) 02/28/2020    ALBUMIN 3.5 02/28/2020     No results found for: PREALBUMIN    Estimated Creatinine Clearance: 77.7 mL/min (based on SCr of 1.2 mg/dL).    Accu-Checks  Recent Labs     02/27/20  1332 02/27/20  2109 02/28/20  0834 02/28/20  1223 02/28/20  1246 02/28/20  1425 02/28/20  1704 02/28/20  2320 02/29/20  0517 02/29/20  0739   POCTGLUCOSE 149* 174* 223* 67* 77 89 111* 138* 246* 345*       Current Medications and/or Treatments Impacting Glycemic Control  Immunotherapy:    Immunosuppressants     None        Steroids:   Hormones (From admission, onward)    None        Pressors:    Autonomic Drugs (From admission, onward)    None        Hyperglycemia/Diabetes Medications:   Antihyperglycemics (From admission, onward)    Start     Stop Route Frequency Ordered    02/29/20 1230  insulin aspart U-100 pen 22 Units      -- SubQ 3 times daily with meals 02/29/20 1223    02/29/20 0915  insulin detemir U-100 pen 30 Units      -- SubQ Daily 02/29/20 0806    02/29/20 0906  insulin aspart U-100 pen 1-10 Units      -- SubQ Before meals & nightly PRN 02/29/20 0806          ASSESSMENT and PLAN    * Umbilical hernia  Managed per primary.   Optimize BG for surgical wound healing.         Type 2 diabetes mellitus without complication, with long-term current use of insulin  BG goal 140-180.       Levemir 30 units HS    novolog 18 units with meals.   BG monitoring ac/hs and moderate dose correction scale.       ADDENDUM: Increase novolog 22 units with meals. Spot check POCT at 1400         End stage liver disease  Managed per primary.   May impact BG           Sherri Rey,  NP  Endocrinology  [facility]

## 2020-02-29 NOTE — ASSESSMENT & PLAN NOTE
- s/p hernia repair 2/22, leakage of ascites from repair site, small amount in colostomy bag over leakage site.  Afebrile, nontender  - admitted with copious serous drainage from incision   - paracentesis negative for peritonitis  - primary for liver transplant today

## 2020-02-29 NOTE — TRANSFER OF CARE
"Anesthesia Transfer of Care Note    Patient: Colin Reilly    Procedure(s) Performed: Procedure(s) (LRB):  TRANSPLANT, LIVER  ABORTED (N/A)    Patient location: PACU    Anesthesia Type: general    Transport from OR: Transported from OR on 2-3 L/min O2 by NC with adequate spontaneous ventilation    Post pain: adequate analgesia    Post assessment: no apparent anesthetic complications and tolerated procedure well    Post vital signs: stable    Level of consciousness: awake    Nausea/Vomiting: no nausea/vomiting    Complications: none    Transfer of care protocol was followed      Last vitals:   Visit Vitals  /72 (BP Location: Left arm, Patient Position: Lying)   Pulse 97   Temp 36.6 °C (97.9 °F) (Oral)   Resp 19   Ht 5' 11" (1.803 m)   Wt 91.8 kg (202 lb 6.1 oz)   SpO2 96%   BMI 28.23 kg/m²     "

## 2020-02-29 NOTE — NURSING TRANSFER
Nursing Transfer Note      2/28/2020     Transfer To: [id number] from PACU    Transfer via stretcher    Transfer with cardiac monitoring    Transported by PCT/Transport    Medicines sent: Insulin pens x2 in blue folder    Chart send with patient: Yes    Notified: Mother    Patient reassessed at: upon arrival to room

## 2020-02-29 NOTE — ANESTHESIA PROCEDURE NOTES
Arterial    Diagnosis: esld    Patient location during procedure: done in OR  Procedure start time: 2/28/2020 7:45 PM  Procedure end time: 2/28/2020 8:00 PM    Staffing  Authorizing Provider: Suraj Sapp Jr., MD  Performing Provider: Suraj Sapp Jr., MD    Anesthesiologist was present at the time of the procedure.    Preanesthetic Checklist  Completed: patient identified, site marked, surgical consent, pre-op evaluation, timeout performed, IV checked, risks and benefits discussed, monitors and equipment checked and anesthesia consent givenArterial  Skin Prep: chlorhexidine gluconate  Local Infiltration: none  Orientation: right  Location: femoral  Catheter Size: 4 Fr Cook  Catheter placement by Ultrasound guidance. Heme positive aspiration all ports.  Vessel Caliber: medium, patent, compressibility normal  Needle advanced into vessel with real time Ultrasound guidance.  Guidewire confirmed in vessel.  Sterile sheath used.Insertion Attempts: 1  Assessment  Dressing: secured with tape and tegaderm and sutured in place and taped (biopatch)  Patient: Tolerated well

## 2020-02-29 NOTE — TELEPHONE ENCOUNTER
Pt calling form in the hospital to see if he can eta. Advised he needs to hit the call light and speak to a nurse at the hospital     Reason for Disposition   Unable to complete triage due to phone connection issues    Protocols used: ST NO CONTACT OR DUPLICATE CONTACT CALL-A-AH

## 2020-02-29 NOTE — ASSESSMENT & PLAN NOTE
- resume home insulin, consult endocrine for assistnace  - sliding scale.  - , informed endocrine, they are managing glucose control

## 2020-02-29 NOTE — ANESTHESIA RELEASE NOTE
"Anesthesia Release from PACU Note    Patient: Coiln Reilly    Procedure(s) Performed: Procedure(s) (LRB):  TRANSPLANT, LIVER  ABORTED (N/A)    Anesthesia type: GEN    Post pain: Adequate analgesia reported    Post assessment: no apparent anesthetic complications, tolerated procedure well and no evidence of recall    Post vital signs: BP (!) 117/56 (BP Location: Right arm, Patient Position: Lying)   Pulse 97   Temp 37.2 °C (99 °F) (Temporal)   Resp 19   Ht 5' 11" (1.803 m)   Wt 91.8 kg (202 lb 6.1 oz)   SpO2 96%   BMI 28.23 kg/m²     Level of consciousness: awake, alert and oriented    Nausea/Vomiting: no nausea/no vomiting    Complications: none    Airway Patency: patent    Respiratory: unassisted, spontaneous ventilation, room air    Cardiovascular: stable and blood pressure at baseline    Hydration: euvolemic    "

## 2020-02-29 NOTE — NURSING
Lab called with crit serum glucose 528, checked CBG >500. Notified MD and endocrine teams. New orders to increase  prandial and correction scales. Administered updated amounts. Also, noted that patient had just eaten breakfast and snack after having been NPO since yesterday. Will continue to monitor. Reinforced teaching with patient and caregiver about checking CBG prior to eating and without snacking between meals.

## 2020-02-29 NOTE — ANESTHESIA PROCEDURE NOTES
Central Line    Diagnosis: esld  Patient location during procedure: done in OR  Procedure start time: 2/28/2020 8:00 PM  Procedure end time: 2/28/2020 8:15 PM    Staffing  Authorizing Provider: Suraj Sapp Jr., MD  Performing Provider: Suraj Sapp Jr., MD    Staffing  Anesthesiologist: Suraj Sapp Jr., MD  Performed: anesthesiologist   Anesthesiologist was present at the time of the procedure.  Preanesthetic Checklist  Completed: patient identified, site marked, surgical consent, pre-op evaluation, timeout performed, IV checked, risks and benefits discussed, monitors and equipment checked and anesthesia consent given  Indication   Indication: vascular access, med administration     Anesthesia   general anesthesia    Central Line   Skin Prep: skin prepped with ChloraPrep, skin prep agent completely dried prior to procedure  maximum sterile barriers used during central venous catheter insertion  hand hygiene performed prior to central venous catheter insertion  Location: right, internal jugular.   Catheter type: triple lumen  Catheter Size: 12 Fr  Inserted Catheter Length: 16 cm  Ultrasound: vascular probe with ultrasound  Vessel Caliber: medium, patent, compressibility normal  Needle advanced into vessel with real time Ultrasound guidance.  Guidewire confirmed in vessel.   Manometry: Venous cannualation confirmed by visual estimation of blood vessel pressure using manometry.  Insertion Attempts: 1   Securement:line sutured, chlorhexidine patch, sterile dressing applied and blood return through all ports    Post-Procedure   Adverse Events:none    Guidewire Guidewire removed intact.

## 2020-02-29 NOTE — ASSESSMENT & PLAN NOTE
- ESLD 2/2 Etoh listed for liver transplant with MELD 19 today - recertified  - s/p umbilical hernia repair 2/27/20  - was primary yesterday, but donor liver not suitable, he was intubated, then extubated, and returned to room.    - NPO since 10am  - pre op labs and diagnostic testing reviewed  - transplant and anesthesia to obtain consents

## 2020-02-29 NOTE — ANESTHESIA PROCEDURE NOTES
Marquette Kimberly Line    Diagnosis: esld  Patient location during procedure: done in OR  Procedure start time: 2/28/2020 8:15 PM  Procedure end time: 2/28/2020 8:30 PM    Staffing  Authorizing Provider: Suraj Sapp Jr., MD  Performing Provider: Suraj Sapp Jr., MD    Anesthesiologist was present at the time of the procedure.  Preanesthetic Checklist  Completed: patient identified, site marked, surgical consent, pre-op evaluation, timeout performed, IV checked, risks and benefits discussed, monitors and equipment checked and anesthesia consent given  [place] Kimberly Line  Skin Prep: chlorhexidine gluconate  Local Infiltration: none  Location: right,  internal jugular vein  Vessel Caliber: medium, patent, compressibility normal  Introducer: 14 Fr double lumen,   Device: CCO/Oximetric Catheter  Catheter placement by yes. Heme positive aspiration all ports. PAC floated with balloon up not wedgedSterile sheath used  Locked at: 52 cm.Insertion Attempts: 1  Indication: intravenous therapy, hemodynamic monitoring  Ultrasound Guidance  Needle advanced into vessel with real time Ultrasound guidance.  Guidewire confirmed in vessel.  Sterile sheath used.  Assessment  Central Line Bundle Protocol followed. Hand hygiene before procedure, surgical cap worn, surgical mask worn, sterile surgical gloves worn, large sterile drape used.  Verification: blood return  Dressing: secured with tape and tegaderm and sutured in place and taped (biopatch and sorbaview dressing)  Patient: Tolerated Well

## 2020-02-29 NOTE — PLAN OF CARE
Vital signs stable. Afebrile. Alert, oriented and following commands. Pain tolerable. Denies nausea. Cordis, central line, RIP and arterial lines discontinued by anesthesia and sites remain free of drainage or other s/s of bleeding. POC reviewed and understanding verbalized. Mother updated via telephone.

## 2020-02-29 NOTE — PROGRESS NOTES
Progress Note  Transplant Surgery    Admit Date: 2/27/2020  Post-operative Day:   Hospital Day: 3    ORGAN:   LIVER  Disease Etiology: Alcoholic Cirrhosis  Donor Type:   Donation after Brain Death  CDC High Risk:   No  Donor CMV Status:   Donor CMV Status: Negative  Donor HBcAB:   Negative  Donor HCV Status:   Negative  Whole or Partial:   Biliary Anastomosis:   Arterial Anatomy:     SUBJECTIVE:   Interval History:  NAEON.  Liver transplant cancelled due to poor quality.  Hernia repair site w minimal output in ostomy bag.  No plan for CT at this time.  Patient w good appetite, denies pain, ambulating around unit w/o complaint.  Monitor.    Follow-up For: Procedure(s) (LRB):  TRANSPLANT, LIVER  ABORTED (N/A)    Scheduled Meds:   cetirizine  5 mg Oral Daily    ciprofloxacin HCl  500 mg Oral Daily    cyclobenzaprine  10 mg Oral BID    finasteride  5 mg Oral Daily    furosemide  160 mg Oral Daily    gabapentin  600 mg Oral TID    heparin (porcine)  5,000 Units Subcutaneous Q8H    insulin aspart U-100  22 Units Subcutaneous TIDWM    insulin detemir U-100  30 Units Subcutaneous Daily    oxybutynin  5 mg Oral Daily    pantoprazole  40 mg Oral Daily    rifAXIMin  550 mg Oral BID    spironolactone  150 mg Oral Daily    tamsulosin  0.4 mg Oral Daily     Continuous Infusions:  PRN Meds:Dextrose 10% Bolus, Dextrose 10% Bolus, Dextrose 10% Bolus, Dextrose 10% Bolus, fentaNYL, glucagon (human recombinant), glucose, glucose, HYDROcodone-acetaminophen, insulin aspart U-100, ondansetron, ondansetron, promethazine (PHENERGAN) IVPB, sodium chloride 0.9%, sodium chloride 0.9%    Review of patient's allergies indicates:  No Known Allergies    ROS:  Constitutional: No fevers or chills,  Denies weight loss,  Energy is fair  CV: No chest pain, denies palpitations  Pulm: No cough, No shortness of breath  GI: (-) dysphagia, denies odynophagia, denies sore throat or hoarseness.  (-) nausea, no vomiting and no hematemesis.  No  abdominal pain.  No change in bowel movements.    MSK: No arthritis.    : No dysuria, No hematuria or dark urine  Neuro: No syncope, No seizure  Psych: No anxiety, No depression      OBJECTIVE:     Vital Signs (Most Recent)  Temp: 97.8 °F (36.6 °C) (02/29/20 1233)  Pulse: 104 (02/29/20 1233)  Resp: 18 (02/29/20 1233)  BP: (!) 99/54 (02/29/20 1233)  SpO2: 95 % (02/29/20 1233)    Vital Signs Range (Last 24H):  Temp:  [97.8 °F (36.6 °C)-99 °F (37.2 °C)]   Pulse:  []   Resp:  [11-20]   BP: ()/(54-72)   SpO2:  [93 %-100 %]     I & O (Last 24H):    Intake/Output Summary (Last 24 hours) at 2/29/2020 1404  Last data filed at 2/29/2020 0900  Gross per 24 hour   Intake 3223 ml   Output 2150 ml   Net 1073 ml       Physical Exam:  General Appearance: Well developed, well appearing patient in no acute distress  HEENT:  Normocephalic; No scleral icterus, no conjunctival pallor, no nose bleeds,   no tonsillar erythema Lips, mucosa, and tongue pink and dry; dentition noted fair  NECK: Neck supple, no lymphadenopathy.   Lungs: BBS dimished throughout/CTA, no wheezes, no crackles, Nonlabored at rest  Heart:  Regular rate and rhythm, S1, S2 normal  Abdomen: Soft, non tender, non distended.  No guarding or rebound,  No masses or organomegaly.   Bowel sounds active in all four quadrants. No ascites.  Surgical site w ostomy bag w minimal serous fluid in bag, site otherwise CDI.  Extremities: 2+ pulses, no clubbing, cyanosis or edema  Neurologic: CN II-XII intact, no asterixis      Wound/Incision:  clean, dry, intact    Labs:  Lab Results   Component Value Date    WBC 2.83 (L) 02/29/2020    HGB 9.5 (L) 02/29/2020    HCT 32.8 (L) 02/29/2020    PLT 47 (L) 02/29/2020    CHOL 178 10/18/2016    TRIG 134 10/18/2016    HDL 35 10/18/2016    ALT 18 02/29/2020    AST 43 (H) 02/29/2020     (L) 02/29/2020    K 4.2 02/29/2020    CL 95 02/29/2020    CREATININE 1.2 02/29/2020    BUN 17 02/29/2020    CO2 24 02/29/2020    TSH 2.309  12/05/2019    PSA 0.44 06/19/2018    INR 1.7 (H) 02/29/2020    HGBA1C 6.7 (H) 02/28/2020         Diagnostic Results:  Labs: Reviewed    ASSESSMENT/PLAN:   1. Patient admitted S/p hernia repair w increased output.  With diuresis, output has improved.  Patient was primary for liver transplant yesterday evening.  Transplant canceled due to poor donor quality.  Cont diuresis and monitor.      2. Anemia of chronic disease- H&H stable. Monitor.     3. Hypervolemia- cont Lasix 160 mg PO qd.  Cont strict I/O's.    MELD-Na score: 22 at 2/29/2020  9:18 AM  MELD score: 19 at 2/29/2020  9:18 AM  Calculated from:  Serum Creatinine: 1.2 mg/dL at 2/29/2020  9:18 AM  Serum Sodium: 132 mmol/L at 2/29/2020  9:18 AM  Total Bilirubin: 3.5 mg/dL at 2/29/2020  9:18 AM  INR(ratio): 1.7 at 2/29/2020  9:18 AM  Age: 59 years      Pt seen, examined and discussed with collaborating staff transplant surgeon.

## 2020-02-29 NOTE — SUBJECTIVE & OBJECTIVE
"Interval HPI:   Overnight events:Remains in TSU. Transplant aborted overnight. Solumedrol 500 mg yesterday evening. BG above goal this AM.   Eatin% - outside food (ate cupcake and drank coke between breakfast and lunch)   Nausea: No  Hypoglycemia and intervention: No  Fever: No  TPN and/or TF: No      BP (!) 99/54 (BP Location: Left arm, Patient Position: Lying)   Pulse 104   Temp 97.8 °F (36.6 °C) (Oral)   Resp 18   Ht 5' 11" (1.803 m)   Wt 91.8 kg (202 lb 6.1 oz)   SpO2 95%   BMI 28.23 kg/m²     Labs Reviewed and Include    Recent Labs   Lab 20  0918   *   CALCIUM 8.4*   ALBUMIN 3.7   PROT 6.6   *   K 4.2   CO2 24   CL 95   BUN 17   CREATININE 1.2   ALKPHOS 95   ALT 18   AST 43*   BILITOT 3.5*     Lab Results   Component Value Date    WBC 2.83 (L) 2020    HGB 9.5 (L) 2020    HCT 32.8 (L) 2020    MCV 90 2020    PLT 47 (L) 2020     No results for input(s): TSH, FREET4 in the last 168 hours.  Lab Results   Component Value Date    HGBA1C 6.7 (H) 2020       Nutritional status:   Body mass index is 28.23 kg/m².  Lab Results   Component Value Date    ALBUMIN 3.7 2020    ALBUMIN 3.3 (L) 2020    ALBUMIN 3.5 2020     No results found for: PREALBUMIN    Estimated Creatinine Clearance: 77.7 mL/min (based on SCr of 1.2 mg/dL).    Accu-Checks  Recent Labs     20  1332 20  2109 20  0834 20  1223 20  1246 20  1425 20  1704 20  2320 20  0517 20  0739   POCTGLUCOSE 149* 174* 223* 67* 77 89 111* 138* 246* 345*       Current Medications and/or Treatments Impacting Glycemic Control  Immunotherapy:    Immunosuppressants     None        Steroids:   Hormones (From admission, onward)    None        Pressors:    Autonomic Drugs (From admission, onward)    None        Hyperglycemia/Diabetes Medications:   Antihyperglycemics (From admission, onward)    Start     Stop Route Frequency Ordered    " 02/29/20 1230  insulin aspart U-100 pen 22 Units      -- SubQ 3 times daily with meals 02/29/20 1223    02/29/20 0915  insulin detemir U-100 pen 30 Units      -- SubQ Daily 02/29/20 0806    02/29/20 0906  insulin aspart U-100 pen 1-10 Units      -- SubQ Before meals & nightly PRN 02/29/20 0806

## 2020-02-29 NOTE — PROGRESS NOTES
[facility]  Liver Transplant  Progress Note    Patient Name: Colin Reilly  MRN: [id number]  Admission Date: 2/27/2020  Hospital Length of Stay: 2 days  Code Status: Full Code  Primary Care Provider: Preston Matthew Ii, MD  Post-Operative Day:     ORGAN:   LIVER  Disease Etiology: Alcoholic Cirrhosis  Donor Type:   Donation after Brain Death  CDC High Risk:   No  Donor CMV Status:   Donor CMV Status: Negative  Donor HBcAB:   Negative  Donor HCV Status:   Negative  Donor HBV CEZAR: Negative  Donor HCV CEZAR: Negative  Whole or Partial:   Biliary Anastomosis:   Arterial Anatomy:   Subjective:     History of Present Illness:  58y/o male, with ESLD secondary to ETOH listed with MELD 17, DM, HCV s/p INF, and hx of an umbilical hernia s/p umbilical hernia repair on 2/22/20  who presents to the ED today with increased abdominal pain and increased drainage from his surgical incision. Patient progressed well from his hernia repair and was discharged yesterday afternoon. Overnight he developed increased drainage from his incision soaking through numerous weight to dry dressings and worsening abdominal pain. He is having regular bowel movements and passing gas. He denies fever/chills, worsening nausea, vomiting, change in bowel habits, dysuria, chest pain, and sob. Labs stable. Vitals stable. He is also back up for liver transplant at this time. Plan to admit to LTS for further management. Patient discussed with Dr. Brunner.     Hospital Course:  Interval history:   NAEON. Diagnostic paracentesis 2/27/20, negative for peritonitis (711 WBC, 5% segs). No fluid pocket to remove additional fluid identified. He was primary for a liver transplant yesterday. Was taken to the OR, and intubated but donor organ was not suitable hence, he was extubated and brought back to his room.  Today, he feels fine, denies any discomfort.  Afebrile, BP and HR ok, output is minimal in the colostomy bag over hernia repair incision.  Nontender.     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    Esophageal varices     GERD (gastroesophageal reflux disease)     Hepatic encephalopathy 7/8/2014    Hepatitis C virus infection 07/08/2014    tx with interferon 3-4 mos- stopped for unclear reasons Tattoos-first at age 17 only risk factor (HCVAB negative 08/2017)    HTN (hypertension) 7/8/2014    Hypertension     Insulin dependent diabetes mellitus     Renal mass, left 7/8/2014    6.3 x 5.7 x 5.6 complex mass    Splenomegaly 7/8/2014    Umbilical hernia 7/8/2014       Past Surgical History:   Procedure Laterality Date    CARPAL TUNNEL RELEASE Bilateral     CHOLECYSTECTOMY      lap    COLONOSCOPY N/A 9/8/2017    Procedure: COLONOSCOPY;  Surgeon: Savannah Llanos MD;  Location: [facility];  Service: Endoscopy;  Laterality: N/A;    COLONOSCOPY Left 3/14/2018    Procedure: COLONOSCOPY;  Surgeon: Savannah Llanos MD;  Location: [facility];  Service: Endoscopy;  Laterality: Left;    ESOPHAGOGASTRODUODENOSCOPY  01/2014    ESOPHAGOGASTRODUODENOSCOPY  02/15/2017    grade II varices    ESOPHAGOGASTRODUODENOSCOPY  04/10/2017    grade I varices    ESOPHAGOGASTRODUODENOSCOPY N/A 10/18/2019    Procedure: EGD (ESOPHAGOGASTRODUODENOSCOPY);  Surgeon: Flavio Escalera MD;  Location: [facility] ([address] [place] [address]);  Service: Endoscopy;  Laterality: N/A;    ESOPHAGOGASTRODUODENOSCOPY W/ BANDING  01/26/2017    grade II varices    HERNIA REPAIR      NECK SURGERY      fusion on C5 and C6    ULNAR NERVE TRANSPOSITION Left     UMBILICAL HERNIA REPAIR N/A 2/22/2020    Procedure: REPAIR, HERNIA, PRIMARY WIHTOUT MESH AND PLACEMENT OF DRAIN;  Surgeon: Sherri Brunner MD;  Location: [facility] OR [address] [place] [address];  Service: Transplant;  Laterality: N/A;    vericose veins removed         Review of patient's  allergies indicates:  No Known Allergies    Family History     Problem Relation (Age of Onset)    Alcohol abuse Brother    Cancer Brother    Hyperlipidemia Mother    No Known Problems Father (36), Sister, Sister        Tobacco Use    Smoking status: Former Smoker     Types: Cigarettes     Last attempt to quit: 1/4/2010     Years since quitting: 10.1    Smokeless tobacco: Former User     Types: Chew     Quit date: 2/24/1990    Tobacco comment: former 1 pack/week quit 1/4/2010   Substance and Sexual Activity    Alcohol use: No     Comment: former heavy beer but quit 1/4/2010    Drug use: No    Sexual activity: Never     Comment:        PTA Medications   Medication Sig    ciprofloxacin HCl (CIPRO) 500 MG tablet Take 1 tablet (500 mg total) by mouth once daily.    cyclobenzaprine (FLEXERIL) 10 MG tablet Take 10 mg by mouth 2 (two) times daily.     EASY TOUCH INSULIN SYRINGE 1 mL 31 gauge x 5/16 Syrg     escitalopram oxalate (LEXAPRO) 20 MG tablet Take 20 mg by mouth once daily.     finasteride (PROSCAR) 5 mg tablet Take 1 tablet (5 mg total) by mouth once daily.    furosemide (LASIX) 40 MG tablet Take 4.5 tablets (180 mg total) by mouth once daily.    gabapentin (NEURONTIN) 600 MG tablet Take 600 mg by mouth 3 (three) times daily.     HYDROcodone-acetaminophen (NORCO)  mg per tablet Take 1 tablet by mouth every 12 (twelve) hours as needed for Pain.    insulin aspart U-100 (NOVOLOG) 100 unit/mL injection Inject 24 Units into the skin 3 (three) times daily before meals.    insulin detemir U-100 (LEVEMIR) 100 unit/mL injection Inject 74 Units into the skin every evening.    lactulose (CHRONULAC) 10 gram/15 mL solution Take 30 mLs by mouth 3 (three) times daily. May take up to  4 to 5 times daily if needed for bowel movements    levocetirizine (XYZAL) 5 MG tablet Take 1 tablet (5 mg total) by mouth every evening.    nebivolol (BYSTOLIC) 10 MG Tab Take 10 mg by mouth once daily.     oxybutynin (DITROPAN) 5 MG Tab Take 5 mg by mouth once daily.    pantoprazole (PROTONIX) 40 MG tablet Take 1 tablet (40 mg total) by mouth once daily.    rifAXIMin (XIFAXAN) 550 mg Tab Take 1 tablet (550 mg total) by mouth 2 (two) times daily.    spironolactone (ALDACTONE) 100 MG tablet Take 1.5 tablets (150 mg total) by mouth once daily.    tamsulosin (FLOMAX) 0.4 mg Cp24 Take 1 capsule (0.4 mg total) by mouth once daily.    TRUE METRIX GLUCOSE TEST STRIP Strp     TRUEPLUS LANCETS 30 gauge [facility]        Review of Systems   Constitutional: Negative for activity change, appetite change, chills, diaphoresis and fever.   HENT: Negative for congestion, sinus pressure, sinus pain, sore throat and trouble swallowing.    Eyes: Negative for visual disturbance.   Respiratory: Negative for chest tightness, shortness of breath and stridor.    Cardiovascular: Negative for chest pain, palpitations and leg swelling.   Gastrointestinal: Positive for abdominal distention and abdominal pain. Negative for constipation, diarrhea, nausea and vomiting.   Genitourinary: Negative for decreased urine volume, difficulty urinating, dysuria and flank pain.   Musculoskeletal: Negative for neck pain and neck stiffness.   Skin: Positive for wound. Negative for color change and rash.   Allergic/Immunologic: Positive for immunocompromised state.   Neurological: Negative for dizziness, tremors, syncope, light-headedness and headaches.   Psychiatric/Behavioral: Negative for agitation, confusion, decreased concentration and hallucinations. The patient is not nervous/anxious.      Objective:     Vital Signs (Most Recent):  Temp: 98.4 °F (36.9 °C) (02/28/20 2315)  Pulse: 105 (02/29/20 0515)  Resp: 20 (02/29/20 0515)  BP: 110/72(forearm) (02/29/20 0515)  SpO2: 95 % (02/29/20 0515) Vital Signs (24h Range):  Temp:  [97.9 °F (36.6 °C)-99 °F (37.2 °C)] 98.4 °F (36.9 °C)  Pulse:  [] 105  Resp:  [11-20] 20  SpO2:  [93 %-100 %] 95 %  BP:  ()/(54-72) 110/72     Weight: 91.8 kg (202 lb 6.1 oz)  Body mass index is 28.23 kg/m².      Intake/Output Summary (Last 24 hours) at 2/29/2020 0807  Last data filed at 2/29/2020 0514  Gross per 24 hour   Intake 3403 ml   Output 2610 ml   Net 793 ml       Physical Exam   Constitutional: He is oriented to person, place, and time. No distress.   HENT:   Head: Normocephalic and atraumatic.   Eyes: Right eye exhibits no discharge. Left eye exhibits no discharge. No scleral icterus.   Neck: Normal range of motion. Neck supple.   Cardiovascular: Normal rate, regular rhythm, normal heart sounds and intact distal pulses.   Pulmonary/Chest: Effort normal and breath sounds normal. He has no wheezes. He has no rales.   Abdominal: Soft. Bowel sounds are normal. He exhibits distension. There is tenderness. There is no guarding.   midlyTTP in RLQ and RUQ. No peritoneal signs. Surgical incision intact with serous drainage. Former drain site intact with sutures in place.    Musculoskeletal: He exhibits no edema.   Neurological: He is alert and oriented to person, place, and time. No cranial nerve deficit.   Skin: Skin is warm and dry. Capillary refill takes less than 2 seconds. He is not diaphoretic.   Psychiatric: He has a normal mood and affect. His behavior is normal. Judgment and thought content normal.   Nursing note and vitals reviewed.    Laboratory:  CBC:   Recent Labs   Lab 02/28/20  0846 02/28/20 1953   WBC 2.61*  --    RBC 3.50*  --    HGB 9.2* 8.2*   HCT 32.0* 26.4*   PLT 54* 45*   MCV 91  --    MCH 26.3*  --    MCHC 28.8*  --      CMP:   Recent Labs   Lab 02/28/20  0846 02/28/20 1953   * 110   CALCIUM 8.2*  --    ALBUMIN 3.5 3.3*   PROT 6.4  --    * 135*   K 3.6 4.1   CO2 26  --      --    BUN 8  --    CREATININE 0.9  --    ALKPHOS 110  --    ALT 18  --    AST 48*  --    BILITOT 2.7*  --      Labs within the past 24 hours have been reviewed.    MELD-Na score: 19 at 2/28/2020  7:53 PM  MELD  score: 17 at 2/28/2020  7:53 PM  Calculated from:  Serum Creatinine: 0.9 mg/dL (Rounded to 1 mg/dL) at 2/28/2020  8:46 AM  Serum Sodium: 135 mmol/L at 2/28/2020  7:53 PM  Total Bilirubin: 2.7 mg/dL at 2/28/2020  8:46 AM  INR(ratio): 1.8 at 2/28/2020  7:53 PM  Age: 59 years    Diagnostic Results:  I have personally reviewed all pertinent imaging studies.    Assessment/Plan:     * Umbilical hernia  - s/p hernia repair 2/22, leakage of ascites from repair site, small amount in colostomy bag over leakage site.  Afebrile, nontender  - admitted with copious serous drainage from incision   - paracentesis negative for peritonitis  - primary for liver transplant today    End stage liver disease  - ESLD 2/2 Etoh listed for liver transplant with MELD 19 today - recertified  - s/p umbilical hernia repair 2/27/20  - was primary yesterday, but donor liver not suitable, he was intubated, then extubated, and returned to room.    - NPO since 10am  - pre op labs and diagnostic testing reviewed  - transplant and anesthesia to obtain consents    Acquired coagulation factor deficiency  - 2/2 ESLD  - monitor PT/INR      Type 2 diabetes mellitus without complication, with long-term current use of insulin  - resume home insulin, consult endocrine for assistnace  - sliding scale.  - , informed endocrine, they are managing glucose control      Portal hypertensive gastropathy  - 2/2 ESLD  - listed for liver transplant      Hepatic encephalopathy  - chronic  - continue lactulose      Other ascites  - para negative for peritonitis          VTE Risk Mitigation (From admission, onward)         Ordered     heparin (porcine) injection 5,000 Units  Every 8 hours      02/27/20 1002                The patients clinical status was discussed at multidisplinary rounds, involving transplant surgery, transplant medicine, pharmacy, nursing, nutrition, and social work    Discharge Planning:  No Patient Care Coordination Note on file.      Yamile RODRIGUEZ  MD Lewis  Liver Transplant  [facility]

## 2020-02-29 NOTE — SUBJECTIVE & OBJECTIVE
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    Esophageal varices     GERD (gastroesophageal reflux disease)     Hepatic encephalopathy 7/8/2014    Hepatitis C virus infection 07/08/2014    tx with interferon 3-4 mos- stopped for unclear reasons Tattoos-first at age 17 only risk factor (HCVAB negative 08/2017)    HTN (hypertension) 7/8/2014    Hypertension     Insulin dependent diabetes mellitus     Renal mass, left 7/8/2014    6.3 x 5.7 x 5.6 complex mass    Splenomegaly 7/8/2014    Umbilical hernia 7/8/2014       Past Surgical History:   Procedure Laterality Date    CARPAL TUNNEL RELEASE Bilateral     CHOLECYSTECTOMY      lap    COLONOSCOPY N/A 9/8/2017    Procedure: COLONOSCOPY;  Surgeon: Savannah Llanos MD;  Location: [facility];  Service: Endoscopy;  Laterality: N/A;    COLONOSCOPY Left 3/14/2018    Procedure: COLONOSCOPY;  Surgeon: Savannah Llanos MD;  Location: [facility];  Service: Endoscopy;  Laterality: Left;    ESOPHAGOGASTRODUODENOSCOPY  01/2014    ESOPHAGOGASTRODUODENOSCOPY  02/15/2017    grade II varices    ESOPHAGOGASTRODUODENOSCOPY  04/10/2017    grade I varices    ESOPHAGOGASTRODUODENOSCOPY N/A 10/18/2019    Procedure: EGD (ESOPHAGOGASTRODUODENOSCOPY);  Surgeon: Flavio Escalera MD;  Location: [facility] ([address] [place] [address]);  Service: Endoscopy;  Laterality: N/A;    ESOPHAGOGASTRODUODENOSCOPY W/ BANDING  01/26/2017    grade II varices    HERNIA REPAIR      NECK SURGERY      fusion on C5 and C6    ULNAR NERVE TRANSPOSITION Left     UMBILICAL HERNIA REPAIR N/A 2/22/2020    Procedure: REPAIR, HERNIA, PRIMARY WIHTOUT MESH AND PLACEMENT OF DRAIN;  Surgeon: Sherri Brunner MD;  Location: [facility] OR [address] [place] [address];  Service: Transplant;  Laterality: N/A;    vericose veins removed         Review of patient's allergies  indicates:  No Known Allergies    Family History     Problem Relation (Age of Onset)    Alcohol abuse Brother    Cancer Brother    Hyperlipidemia Mother    No Known Problems Father (36), Sister, Sister        Tobacco Use    Smoking status: Former Smoker     Types: Cigarettes     Last attempt to quit: 1/4/2010     Years since quitting: 10.1    Smokeless tobacco: Former User     Types: Chew     Quit date: 2/24/1990    Tobacco comment: former 1 pack/week quit 1/4/2010   Substance and Sexual Activity    Alcohol use: No     Comment: former heavy beer but quit 1/4/2010    Drug use: No    Sexual activity: Never     Comment:        PTA Medications   Medication Sig    ciprofloxacin HCl (CIPRO) 500 MG tablet Take 1 tablet (500 mg total) by mouth once daily.    cyclobenzaprine (FLEXERIL) 10 MG tablet Take 10 mg by mouth 2 (two) times daily.     EASY TOUCH INSULIN SYRINGE 1 mL 31 gauge x 5/16 Syrg     escitalopram oxalate (LEXAPRO) 20 MG tablet Take 20 mg by mouth once daily.     finasteride (PROSCAR) 5 mg tablet Take 1 tablet (5 mg total) by mouth once daily.    furosemide (LASIX) 40 MG tablet Take 4.5 tablets (180 mg total) by mouth once daily.    gabapentin (NEURONTIN) 600 MG tablet Take 600 mg by mouth 3 (three) times daily.     HYDROcodone-acetaminophen (NORCO)  mg per tablet Take 1 tablet by mouth every 12 (twelve) hours as needed for Pain.    insulin aspart U-100 (NOVOLOG) 100 unit/mL injection Inject 24 Units into the skin 3 (three) times daily before meals.    insulin detemir U-100 (LEVEMIR) 100 unit/mL injection Inject 74 Units into the skin every evening.    lactulose (CHRONULAC) 10 gram/15 mL solution Take 30 mLs by mouth 3 (three) times daily. May take up to  4 to 5 times daily if needed for bowel movements    levocetirizine (XYZAL) 5 MG tablet Take 1 tablet (5 mg total) by mouth every evening.    nebivolol (BYSTOLIC) 10 MG Tab Take 10 mg by mouth once daily.    oxybutynin  (DITROPAN) 5 MG Tab Take 5 mg by mouth once daily.    pantoprazole (PROTONIX) 40 MG tablet Take 1 tablet (40 mg total) by mouth once daily.    rifAXIMin (XIFAXAN) 550 mg Tab Take 1 tablet (550 mg total) by mouth 2 (two) times daily.    spironolactone (ALDACTONE) 100 MG tablet Take 1.5 tablets (150 mg total) by mouth once daily.    tamsulosin (FLOMAX) 0.4 mg Cp24 Take 1 capsule (0.4 mg total) by mouth once daily.    TRUE METRIX GLUCOSE TEST STRIP Strp     TRUEPLUS LANCETS 30 gauge [facility]        Review of Systems   Constitutional: Negative for activity change, appetite change, chills, diaphoresis and fever.   HENT: Negative for congestion, sinus pressure, sinus pain, sore throat and trouble swallowing.    Eyes: Negative for visual disturbance.   Respiratory: Negative for chest tightness, shortness of breath and stridor.    Cardiovascular: Negative for chest pain, palpitations and leg swelling.   Gastrointestinal: Positive for abdominal distention and abdominal pain. Negative for constipation, diarrhea, nausea and vomiting.   Genitourinary: Negative for decreased urine volume, difficulty urinating, dysuria and flank pain.   Musculoskeletal: Negative for neck pain and neck stiffness.   Skin: Positive for wound. Negative for color change and rash.   Allergic/Immunologic: Positive for immunocompromised state.   Neurological: Negative for dizziness, tremors, syncope, light-headedness and headaches.   Psychiatric/Behavioral: Negative for agitation, confusion, decreased concentration and hallucinations. The patient is not nervous/anxious.      Objective:     Vital Signs (Most Recent):  Temp: 98.4 °F (36.9 °C) (02/28/20 2315)  Pulse: 105 (02/29/20 0515)  Resp: 20 (02/29/20 0515)  BP: 110/72(forearm) (02/29/20 0515)  SpO2: 95 % (02/29/20 0515) Vital Signs (24h Range):  Temp:  [97.9 °F (36.6 °C)-99 °F (37.2 °C)] 98.4 °F (36.9 °C)  Pulse:  [] 105  Resp:  [11-20] 20  SpO2:  [93 %-100 %] 95 %  BP: ()/(54-72) 110/72      Weight: 91.8 kg (202 lb 6.1 oz)  Body mass index is 28.23 kg/m².      Intake/Output Summary (Last 24 hours) at 2/29/2020 0807  Last data filed at 2/29/2020 0514  Gross per 24 hour   Intake 3403 ml   Output 2610 ml   Net 793 ml       Physical Exam   Constitutional: He is oriented to person, place, and time. No distress.   HENT:   Head: Normocephalic and atraumatic.   Eyes: Right eye exhibits no discharge. Left eye exhibits no discharge. No scleral icterus.   Neck: Normal range of motion. Neck supple.   Cardiovascular: Normal rate, regular rhythm, normal heart sounds and intact distal pulses.   Pulmonary/Chest: Effort normal and breath sounds normal. He has no wheezes. He has no rales.   Abdominal: Soft. Bowel sounds are normal. He exhibits distension. There is tenderness. There is no guarding.   midlyTTP in RLQ and RUQ. No peritoneal signs. Surgical incision intact with serous drainage. Former drain site intact with sutures in place.    Musculoskeletal: He exhibits no edema.   Neurological: He is alert and oriented to person, place, and time. No cranial nerve deficit.   Skin: Skin is warm and dry. Capillary refill takes less than 2 seconds. He is not diaphoretic.   Psychiatric: He has a normal mood and affect. His behavior is normal. Judgment and thought content normal.   Nursing note and vitals reviewed.    Laboratory:  CBC:   Recent Labs   Lab 02/28/20  0846 02/28/20 1953   WBC 2.61*  --    RBC 3.50*  --    HGB 9.2* 8.2*   HCT 32.0* 26.4*   PLT 54* 45*   MCV 91  --    MCH 26.3*  --    MCHC 28.8*  --      CMP:   Recent Labs   Lab 02/28/20  0846 02/28/20 1953   * 110   CALCIUM 8.2*  --    ALBUMIN 3.5 3.3*   PROT 6.4  --    * 135*   K 3.6 4.1   CO2 26  --      --    BUN 8  --    CREATININE 0.9  --    ALKPHOS 110  --    ALT 18  --    AST 48*  --    BILITOT 2.7*  --      Labs within the past 24 hours have been reviewed.    MELD-Na score: 19 at 2/28/2020  7:53 PM  MELD score: 17 at 2/28/2020   7:53 PM  Calculated from:  Serum Creatinine: 0.9 mg/dL (Rounded to 1 mg/dL) at 2/28/2020  8:46 AM  Serum Sodium: 135 mmol/L at 2/28/2020  7:53 PM  Total Bilirubin: 2.7 mg/dL at 2/28/2020  8:46 AM  INR(ratio): 1.8 at 2/28/2020  7:53 PM  Age: 59 years    Diagnostic Results:  I have personally reviewed all pertinent imaging studies.

## 2020-02-29 NOTE — PROGRESS NOTES
[facility]  Liver Transplant  Progress Note    Patient Name: Colin Reilly  MRN: [id number]  Admission Date: 2/27/2020  Hospital Length of Stay: 2 days  Code Status: Full Code  Primary Care Provider: Preston Matthew Ii, MD  Post-Operative Day:     ORGAN:   LIVER  Disease Etiology: Alcoholic Cirrhosis  Donor Type:   Donation after Brain Death  CDC High Risk:   No  Donor CMV Status:   Donor CMV Status: Negative  Donor HBcAB:   Negative  Donor HCV Status:   Negative  Donor HBV CEZAR: Negative  Donor HCV CEZAR: Negative  Whole or Partial:   Biliary Anastomosis:   Arterial Anatomy:   Subjective:     History of Present Illness:  60y/o male, with ESLD secondary to ETOH listed with MELD 17, DM, HCV s/p INF, and hx of an umbilical hernia s/p umbilical hernia repair on 2/22/20  who presents to the ED today with increased abdominal pain and increased drainage from his surgical incision. Patient progressed well from his hernia repair and was discharged yesterday afternoon. Overnight he developed increased drainage from his incision soaking through numerous weight to dry dressings and worsening abdominal pain. He is having regular bowel movements and passing gas. He denies fever/chills, worsening nausea, vomiting, change in bowel habits, dysuria, chest pain, and sob. Labs stable. Vitals stable. He is also back up for liver transplant at this time. Plan to admit to LTS for further management. Patient discussed with Dr. Brunner.     Hospital Course:  Interval history:   NAEON. Diagnostic paracentesis 2/27/20, negative for peritonitis (711 WBC, 5% segs). No fluid pocket to remove additional fluid identified. He was primary for a liver transplant yesterday. Was taken to the OR, and intubated but donor organ was not suitable hence, he was extubated and brought back to his room.  Today, he feels fine, denies any discomfort.  Afebrile, BP and HR ok, output is minimal in the colostomy bag over hernia repair incision.  Nontender.     No new subjective & objective note has been filed under this hospital service since the last note was generated.    Assessment/Plan:     * Umbilical hernia  - s/p hernia repair 2/22, leakage of ascites from repair site, small amount in colostomy bag over leakage site.  Afebrile, nontender  - admitted with copious serous drainage from incision   - paracentesis negative for peritonitis  - primary for liver transplant today    End stage liver disease  - ESLD 2/2 Etoh listed for liver transplant with MELD 19 today - recertified  - s/p umbilical hernia repair 2/27/20  - was primary yesterday, but donor liver not suitable, he was intubated, then extubated, and returned to room.    - NPO since 10am  - pre op labs and diagnostic testing reviewed  - transplant and anesthesia to obtain consents    Acquired coagulation factor deficiency  - 2/2 ESLD  - monitor PT/INR      Type 2 diabetes mellitus without complication, with long-term current use of insulin  - resume home insulin, consult endocrine for assistnace  - sliding scale.  - , informed endocrine, they are managing glucose control      Portal hypertensive gastropathy  - 2/2 ESLD  - listed for liver transplant      Hepatic encephalopathy  - chronic  - continue lactulose      Other ascites  - para negative for peritonitis          VTE Risk Mitigation (From admission, onward)         Ordered     heparin (porcine) injection 5,000 Units  Every 8 hours      02/27/20 1002                The patients clinical status was discussed at multidisplinary rounds, involving transplant surgery, transplant medicine, pharmacy, nursing, nutrition, and social work    Discharge Planning:  No Patient Care Coordination Note on file.      Yamile Saucedo MD  Liver Transplant  [facility]

## 2020-02-29 NOTE — ANESTHESIA PROCEDURE NOTES
Intubation  Performed by: Patricia Stone CRNA  Authorized by: Suraj Sapp Jr., MD     Intubation:     Induction:  Intravenous    Intubated:  Postinduction    Mask Ventilation:  Easy mask    Attempts:  1    Attempted By:  CRNA    Blade:  Burr 2    Laryngeal View Grade: Grade I - full view of chords      Difficult Airway Encountered?: No      Complications:  None    Airway Device:  Oral endotracheal tube    Airway Device Size:  7.5    Style/Cuff Inflation:  Cuffed (inflated to minimal occlusive pressure)    Tube secured:  21    Secured at:  The lips    Placement Verified By:  Capnometry    Complicating Factors:  None    Findings Post-Intubation:  BS equal bilateral

## 2020-02-29 NOTE — TELEPHONE ENCOUNTER
PATIENT NAME: Colin RAYGOZA [facility] #: [id number]    Lab Results   Component Value Date    CREATININE 0.9 02/28/2020     (L) 02/28/2020    BILITOT 2.7 (H) 02/28/2020    ALBUMIN 3.3 (L) 02/28/2020    INR 1.8 (H) 02/28/2020       Encephalopathy: 1 - 2  Ascites: moderate  Dialysis: no     Recertification requestor: Yamile Saucedo

## 2020-03-01 LAB
ALBUMIN SERPL BCP-MCNC: 3.2 G/DL (ref 3.5–5.2)
ALP SERPL-CCNC: 93 U/L (ref 55–135)
ALT SERPL W/O P-5'-P-CCNC: 17 U/L (ref 10–44)
ANION GAP SERPL CALC-SCNC: 9 MMOL/L (ref 8–16)
AST SERPL-CCNC: 32 U/L (ref 10–40)
BASOPHILS # BLD AUTO: 0.01 K/UL (ref 0–0.2)
BASOPHILS NFR BLD: 0.2 % (ref 0–1.9)
BILIRUB SERPL-MCNC: 1.8 MG/DL (ref 0.1–1)
BUN SERPL-MCNC: 18 MG/DL (ref 6–20)
CALCIUM SERPL-MCNC: 8.2 MG/DL (ref 8.7–10.5)
CHLORIDE SERPL-SCNC: 97 MMOL/L (ref 95–110)
CO2 SERPL-SCNC: 24 MMOL/L (ref 23–29)
CREAT SERPL-MCNC: 0.8 MG/DL (ref 0.5–1.4)
DIFFERENTIAL METHOD: ABNORMAL
EOSINOPHIL # BLD AUTO: 0 K/UL (ref 0–0.5)
EOSINOPHIL NFR BLD: 0.3 % (ref 0–8)
ERYTHROCYTE [DISTWIDTH] IN BLOOD BY AUTOMATED COUNT: 17.4 % (ref 11.5–14.5)
EST. GFR  (AFRICAN AMERICAN): >60 ML/MIN/1.73 M^2
EST. GFR  (NON AFRICAN AMERICAN): >60 ML/MIN/1.73 M^2
GLUCOSE SERPL-MCNC: 110 MG/DL (ref 70–110)
GLUCOSE SERPL-MCNC: 314 MG/DL (ref 70–110)
HCO3 UR-SCNC: 28 MMOL/L (ref 24–28)
HCT VFR BLD AUTO: 26.9 % (ref 40–54)
HCT VFR BLD CALC: 26 %PCV (ref 36–54)
HGB BLD-MCNC: 8.1 G/DL (ref 14–18)
IMM GRANULOCYTES # BLD AUTO: 0.03 K/UL (ref 0–0.04)
IMM GRANULOCYTES NFR BLD AUTO: 0.5 % (ref 0–0.5)
INR PPP: 1.9 (ref 0.8–1.2)
LYMPHOCYTES # BLD AUTO: 0.5 K/UL (ref 1–4.8)
LYMPHOCYTES NFR BLD: 8.9 % (ref 18–48)
MAGNESIUM SERPL-MCNC: 2 MG/DL (ref 1.6–2.6)
MCH RBC QN AUTO: 27 PG (ref 27–31)
MCHC RBC AUTO-ENTMCNC: 30.1 G/DL (ref 32–36)
MCV RBC AUTO: 90 FL (ref 82–98)
MONOCYTES # BLD AUTO: 0.4 K/UL (ref 0.3–1)
MONOCYTES NFR BLD: 7.7 % (ref 4–15)
NEUTROPHILS # BLD AUTO: 4.7 K/UL (ref 1.8–7.7)
NEUTROPHILS NFR BLD: 82.4 % (ref 38–73)
NRBC BLD-RTO: 0 /100 WBC
PCO2 BLDA: 41.5 MMHG (ref 35–45)
PH SMN: 7.44 [PH] (ref 7.35–7.45)
PHOSPHATE SERPL-MCNC: 3.7 MG/DL (ref 2.7–4.5)
PLATELET # BLD AUTO: 43 K/UL (ref 150–350)
PMV BLD AUTO: ABNORMAL FL (ref 9.2–12.9)
PO2 BLDA: 350 MMHG (ref 80–100)
POC BE: 4 MMOL/L
POC IONIZED CALCIUM: 1.11 MMOL/L (ref 1.06–1.42)
POC SATURATED O2: 100 % (ref 95–100)
POC TCO2: 29 MMOL/L (ref 23–27)
POCT GLUCOSE: 143 MG/DL (ref 70–110)
POCT GLUCOSE: 208 MG/DL (ref 70–110)
POCT GLUCOSE: 282 MG/DL (ref 70–110)
POCT GLUCOSE: 291 MG/DL (ref 70–110)
POTASSIUM BLD-SCNC: 4 MMOL/L (ref 3.5–5.1)
POTASSIUM SERPL-SCNC: 4.5 MMOL/L (ref 3.5–5.1)
PROT SERPL-MCNC: 5.6 G/DL (ref 6–8.4)
PROTHROMBIN TIME: 18 SEC (ref 9–12.5)
RBC # BLD AUTO: 3 M/UL (ref 4.6–6.2)
SAMPLE: ABNORMAL
SODIUM BLD-SCNC: 135 MMOL/L (ref 136–145)
SODIUM SERPL-SCNC: 130 MMOL/L (ref 136–145)
WBC # BLD AUTO: 5.72 K/UL (ref 3.9–12.7)

## 2020-03-01 PROCEDURE — 36415 COLL VENOUS BLD VENIPUNCTURE: CPT | Mod: NTX

## 2020-03-01 PROCEDURE — 20600001 HC STEP DOWN PRIVATE ROOM: Mod: NTX

## 2020-03-01 PROCEDURE — 80053 COMPREHEN METABOLIC PANEL: CPT | Mod: NTX

## 2020-03-01 PROCEDURE — 25000003 PHARM REV CODE 250: Mod: NTX | Performed by: STUDENT IN AN ORGANIZED HEALTH CARE EDUCATION/TRAINING PROGRAM

## 2020-03-01 PROCEDURE — 25000003 PHARM REV CODE 250: Mod: NTX | Performed by: NURSE PRACTITIONER

## 2020-03-01 PROCEDURE — 63600175 PHARM REV CODE 636 W HCPCS: Mod: NTX | Performed by: NURSE PRACTITIONER

## 2020-03-01 PROCEDURE — 85610 PROTHROMBIN TIME: CPT | Mod: NTX

## 2020-03-01 PROCEDURE — 85025 COMPLETE CBC W/AUTO DIFF WBC: CPT | Mod: NTX

## 2020-03-01 PROCEDURE — 83735 ASSAY OF MAGNESIUM: CPT | Mod: NTX

## 2020-03-01 PROCEDURE — 99232 SBSQ HOSP IP/OBS MODERATE 35: CPT | Mod: NTX,,, | Performed by: NURSE PRACTITIONER

## 2020-03-01 PROCEDURE — 84100 ASSAY OF PHOSPHORUS: CPT | Mod: NTX

## 2020-03-01 PROCEDURE — 99232 PR SUBSEQUENT HOSPITAL CARE,LEVL II: ICD-10-PCS | Mod: NTX,,, | Performed by: NURSE PRACTITIONER

## 2020-03-01 RX ORDER — HYDROCODONE BITARTRATE AND ACETAMINOPHEN 10; 325 MG/1; MG/1
1 TABLET ORAL EVERY 6 HOURS PRN
Status: DISCONTINUED | OUTPATIENT
Start: 2020-03-01 | End: 2020-03-02 | Stop reason: HOSPADM

## 2020-03-01 RX ORDER — LACTULOSE 10 G/15ML
15 SOLUTION ORAL 3 TIMES DAILY
Status: DISCONTINUED | OUTPATIENT
Start: 2020-03-01 | End: 2020-03-02 | Stop reason: HOSPADM

## 2020-03-01 RX ORDER — INSULIN ASPART 100 [IU]/ML
18 INJECTION, SOLUTION INTRAVENOUS; SUBCUTANEOUS
Status: DISCONTINUED | OUTPATIENT
Start: 2020-03-01 | End: 2020-03-02 | Stop reason: HOSPADM

## 2020-03-01 RX ORDER — INSULIN ASPART 100 [IU]/ML
15 INJECTION, SOLUTION INTRAVENOUS; SUBCUTANEOUS
Status: DISCONTINUED | OUTPATIENT
Start: 2020-03-01 | End: 2020-03-01

## 2020-03-01 RX ORDER — HYDROCODONE BITARTRATE AND ACETAMINOPHEN 5; 325 MG/1; MG/1
1 TABLET ORAL EVERY 6 HOURS PRN
Status: DISCONTINUED | OUTPATIENT
Start: 2020-03-01 | End: 2020-03-02 | Stop reason: HOSPADM

## 2020-03-01 RX ADMIN — GABAPENTIN 600 MG: 300 CAPSULE ORAL at 08:03

## 2020-03-01 RX ADMIN — FUROSEMIDE 160 MG: 40 TABLET ORAL at 08:03

## 2020-03-01 RX ADMIN — INSULIN ASPART 6 UNITS: 100 INJECTION, SOLUTION INTRAVENOUS; SUBCUTANEOUS at 08:03

## 2020-03-01 RX ADMIN — TAMSULOSIN HYDROCHLORIDE 0.4 MG: 0.4 CAPSULE ORAL at 08:03

## 2020-03-01 RX ADMIN — HYDROCODONE BITARTRATE AND ACETAMINOPHEN 1 TABLET: 10; 325 TABLET ORAL at 02:03

## 2020-03-01 RX ADMIN — CIPROFLOXACIN HYDROCHLORIDE 500 MG: 500 TABLET, FILM COATED ORAL at 08:03

## 2020-03-01 RX ADMIN — PANTOPRAZOLE SODIUM 40 MG: 40 TABLET, DELAYED RELEASE ORAL at 08:03

## 2020-03-01 RX ADMIN — HYDROCODONE BITARTRATE AND ACETAMINOPHEN 1 TABLET: 10; 325 TABLET ORAL at 07:03

## 2020-03-01 RX ADMIN — INSULIN ASPART 15 UNITS: 100 INJECTION, SOLUTION INTRAVENOUS; SUBCUTANEOUS at 12:03

## 2020-03-01 RX ADMIN — INSULIN ASPART 18 UNITS: 100 INJECTION, SOLUTION INTRAVENOUS; SUBCUTANEOUS at 04:03

## 2020-03-01 RX ADMIN — RIFAXIMIN 550 MG: 550 TABLET ORAL at 07:03

## 2020-03-01 RX ADMIN — LACTULOSE 15 G: 20 SOLUTION ORAL at 02:03

## 2020-03-01 RX ADMIN — RIFAXIMIN 550 MG: 550 TABLET ORAL at 08:03

## 2020-03-01 RX ADMIN — INSULIN ASPART 15 UNITS: 100 INJECTION, SOLUTION INTRAVENOUS; SUBCUTANEOUS at 08:03

## 2020-03-01 RX ADMIN — INSULIN ASPART 6 UNITS: 100 INJECTION, SOLUTION INTRAVENOUS; SUBCUTANEOUS at 12:03

## 2020-03-01 RX ADMIN — CETIRIZINE HYDROCHLORIDE 5 MG: 5 TABLET ORAL at 08:03

## 2020-03-01 RX ADMIN — GABAPENTIN 600 MG: 300 CAPSULE ORAL at 02:03

## 2020-03-01 RX ADMIN — SPIRONOLACTONE 150 MG: 50 TABLET ORAL at 08:03

## 2020-03-01 RX ADMIN — OXYBUTYNIN CHLORIDE 5 MG: 5 TABLET ORAL at 08:03

## 2020-03-01 RX ADMIN — INSULIN ASPART 4 UNITS: 100 INJECTION, SOLUTION INTRAVENOUS; SUBCUTANEOUS at 04:03

## 2020-03-01 RX ADMIN — FINASTERIDE 5 MG: 5 TABLET, FILM COATED ORAL at 08:03

## 2020-03-01 RX ADMIN — CYCLOBENZAPRINE 10 MG: 10 TABLET, FILM COATED ORAL at 07:03

## 2020-03-01 RX ADMIN — TAMSULOSIN HYDROCHLORIDE 0.4 MG: 0.4 CAPSULE ORAL at 07:03

## 2020-03-01 RX ADMIN — CYCLOBENZAPRINE 10 MG: 10 TABLET, FILM COATED ORAL at 08:03

## 2020-03-01 RX ADMIN — GABAPENTIN 600 MG: 300 CAPSULE ORAL at 07:03

## 2020-03-01 NOTE — PROGRESS NOTES
Progress Note  Transplant Surgery    Admit Date: 2/27/2020  Post-operative Day: 6  Hospital Day: 4    ORGAN:   LIVER  Disease Etiology: Acute Alcoholic Hepatitis  Donor Type:   Donation after Brain Death  CDC High Risk:   No  Donor CMV Status:   Donor CMV Status: Positive  Donor HBcAB:   Negative  Donor HCV Status:   Negative  Whole or Partial: Whole Liver  Biliary Anastomosis: End to End  Arterial Anatomy: Standard    SUBJECTIVE:   Interval History:  No acute events overnight.  Appetite good.  No drainage noted from umbilical hernia repair.  Pain well managed.  Cont to monitor Na.  Monitor.    MELD-Na score: 22 at 3/1/2020  8:28 AM  MELD score: 16 at 3/1/2020  8:28 AM  Calculated from:  Serum Creatinine: 0.8 mg/dL (Rounded to 1 mg/dL) at 3/1/2020  6:57 AM  Serum Sodium: 130 mmol/L at 3/1/2020  6:57 AM  Total Bilirubin: 1.8 mg/dL at 3/1/2020  6:57 AM  INR(ratio): 1.9 at 3/1/2020  8:28 AM  Age: 59 years      Follow-up For:   Procedure(s) (LRB):  TRANSPLANT, LIVER  ABORTED (N/A)    Scheduled Meds:   cetirizine  5 mg Oral Daily    ciprofloxacin HCl  500 mg Oral Daily    cyclobenzaprine  10 mg Oral BID    finasteride  5 mg Oral Daily    furosemide  160 mg Oral Daily    gabapentin  600 mg Oral TID    heparin (porcine)  5,000 Units Subcutaneous Q8H    insulin aspart U-100  18 Units Subcutaneous TIDWM    [START ON 3/2/2020] insulin detemir U-100  36 Units Subcutaneous QHS    lactulose  15 g Oral TID    oxybutynin  5 mg Oral Daily    pantoprazole  40 mg Oral Daily    rifAXIMin  550 mg Oral BID    spironolactone  150 mg Oral Daily    tamsulosin  0.4 mg Oral Daily     Continuous Infusions:  PRN Meds:Dextrose 10% Bolus, Dextrose 10% Bolus, Dextrose 10% Bolus, Dextrose 10% Bolus, glucagon (human recombinant), glucose, glucose, HYDROcodone-acetaminophen, insulin aspart U-100, ondansetron, promethazine (PHENERGAN) IVPB, sodium chloride 0.9%, sodium chloride 0.9%    Review of patient's allergies indicates:  No Known  Allergies    ROS:  Constitutional: Negative for chills, fever and unexpected weight change.   HENT: Negative for postnasal drip and trouble swallowing.    Eyes: Negative for visual disturbance.   Respiratory: Negative for cough, shortness of breath and wheezing.    Cardiovascular: Neg for leg swelling. Negative for chest pain and palpitations.   Gastrointestinal: Positive for abdominal distention and abdominal pain. Negative for blood in stool, constipation, diarrhea, nausea, rectal pain and vomiting.   Genitourinary:  Negative for difficulty urinating, dysuria and hematuria.   Musculoskeletal: Negative for arthralgias and gait problem.   Skin: Positive for color change and wound. Negative for rash.   Neurological: Negative for dizziness and light-headedness.   Psychiatric/Behavioral: AOX4.  The patient is pleasant/cooperative.      OBJECTIVE:     Vital Signs (Most Recent)  Temp: 98.2 °F (36.8 °C) (03/01/20 1227)  Pulse: 103 (03/01/20 1227)  Resp: 10 (03/01/20 1227)  BP: (!) 118/57 (03/01/20 1227)  SpO2: 98 % (03/01/20 1227)    Vital Signs Range (Last 24H):  Temp:  [97.8 °F (36.6 °C)-98.5 °F (36.9 °C)]   Pulse:  [103-111]   Resp:  [10-20]   BP: (102-120)/(52-65)   SpO2:  [95 %-100 %]     I & O (Last 24H):    Intake/Output Summary (Last 24 hours) at 3/1/2020 1411  Last data filed at 3/1/2020 1410  Gross per 24 hour   Intake 1920 ml   Output 3600 ml   Net -1680 ml       Physical Exam:  Physical Exam   Constitutional: The patient is oriented to person, place, and time. Patient  appears well-developed. No distress.  (+) hand and temporal muscle wasting noted   HENT:  Head: Normocephalic and atraumatic.   Eyes: Pupils are equal, round, and reactive to light. Scleral icterus is present.   Neck: Normal range of motion. Neck supple.   Cardiovascular: Normal rate, regular rhythm, normal heart sounds and intact distal pulses. No murmur heard.  Pulmonary/Chest: Effort normal and breath sounds normal. No respiratory distress.  The patient has no wheezes.   Abdominal: Soft. Normal appearance and bowel sounds are normal. The patient exhibits distension. Patient exhibits no ascites and no mass. There is no hepatosplenomegaly. There is tenderness (as expected post surgery). There is no rigidity, no rebound, no guarding and negative Spain's sign.   Musculoskeletal: Normal range of motion. Patient exhibits no tenderness.   Lymphadenopathy:  no cervical adenopathy.   Neurological: The patient is alert and oriented to person, place, and time.   Skin: Skin is warm and dry. Capillary refill takes 2 to 3 seconds. Patient is not diaphoretic.   Psychiatric: The patient has a normal mood and affect. Their behavior is normal      Labs:  Lab Results   Component Value Date    WBC 8.83 02/29/2020    HGB 8.2 (L) 02/29/2020    HCT 25.1 (L) 02/29/2020    MCV 95 02/29/2020    PLT 63 (L) 02/29/2020       CMP  Sodium   Date Value Ref Range Status   03/01/2020 130 (L) 136 - 145 mmol/L Final   10/18/2016 136  Final     Potassium   Date Value Ref Range Status   03/01/2020 4.5 3.5 - 5.1 mmol/L Final   10/18/2016 4.0  Final     Chloride   Date Value Ref Range Status   03/01/2020 97 95 - 110 mmol/L Final   10/18/2016 103  Final     CO2   Date Value Ref Range Status   03/01/2020 24 23 - 29 mmol/L Final   10/18/2016 24.0  Final     Glucose   Date Value Ref Range Status   03/01/2020 314 (H) 70 - 110 mg/dL Final   11/19/2015 433  Final     BUN, Bld   Date Value Ref Range Status   03/01/2020 18 6 - 20 mg/dL Final     BUN   Date Value Ref Range Status   03/20/2017 5 4 - 21 mg/dL Final     Creatinine   Date Value Ref Range Status   03/01/2020 0.8 0.5 - 1.4 mg/dL Final   10/18/2016 0.8 mg/dL Final     Calcium   Date Value Ref Range Status   03/01/2020 8.2 (L) 8.7 - 10.5 mg/dL Final   10/18/2016 8.6 mg/dL Final     Total Protein   Date Value Ref Range Status   03/01/2020 5.6 (L) 6.0 - 8.4 g/dL Final     Albumin   Date Value Ref Range Status   03/01/2020 3.2 (L) 3.5 - 5.2 g/dL  Final   10/18/2016 3.80  Final     Total Bilirubin   Date Value Ref Range Status   03/01/2020 1.8 (H) 0.1 - 1.0 mg/dL Final     Comment:     For infants and newborns, interpretation of results should be based  on gestational age, weight and in agreement with clinical  observations.  Premature Infant recommended reference ranges:  Up to 24 hours.............<8.0 mg/dL  Up to 48 hours............<12.0 mg/dL  3-5 days..................<15.0 mg/dL  6-29 days.................<15.0 mg/dL       Alkaline Phosphatase   Date Value Ref Range Status   03/01/2020 93 55 - 135 U/L Final     AST   Date Value Ref Range Status   03/01/2020 32 10 - 40 U/L Final   10/18/2016 32 U/L Final     ALT   Date Value Ref Range Status   03/01/2020 17 10 - 44 U/L Final   10/18/2016 32 U/L Final     Anion Gap   Date Value Ref Range Status   03/01/2020 9 8 - 16 mmol/L Final     eGFR if    Date Value Ref Range Status   03/01/2020 >60.0 >60 mL/min/1.73 m^2 Final     eGFR if non    Date Value Ref Range Status   03/01/2020 >60.0 >60 mL/min/1.73 m^2 Final     Comment:     Calculation used to obtain the estimated glomerular filtration  rate (eGFR) is the CKD-EPI equation.        No results found for: TACROLIMUS    Diagnostic Results:  Labs: Reviewed    ASSESSMENT/PLAN:     1. S/p hermia repair transplant- admitted with increased output from incision and then primary for liver transplant that was cancelled due to poor organ quality.  Site now CDI.  Pain well managed. Monitor.    2. Anemia of chronic disease- H&H stable. Monitor.     3. Malnutrition- good appetite    4. ESLD- listed for liver transplant 2/2 ETOH  MELD-Na score: 22 at 3/1/2020  8:28 AM  MELD score: 16 at 3/1/2020  8:28 AM  Calculated from:  Serum Creatinine: 0.8 mg/dL (Rounded to 1 mg/dL) at 3/1/2020  6:57 AM  Serum Sodium: 130 mmol/L at 3/1/2020  6:57 AM  Total Bilirubin: 1.8 mg/dL at 3/1/2020  6:57 AM  INR(ratio): 1.9 at 3/1/2020  8:28 AM  Age: 59  years        Pt seen, examined and discussed with collaborating staff transplant surgeon.     Discussed plan of care.  No plan for discharge today.

## 2020-03-01 NOTE — PLAN OF CARE
- AAOx4. VSS. Afebrile.  - R UA 22g/ L FA 18g, CDI. Tele monitoring NSR.   - Prn lortab given for abd pain.  - Hernia site w ostomy bag. No drainage.   - Liver transplant attempted 2/28 but organ not viable.  - Po lasix continued. UO adequate.   - Scheduled lactulose continued. 2-4 BM goal per day.   - Low Na diet. ACHS. Meal time and SSI given.  - Plan to monitor overnight and DC in am.  - Up independently ad liya. Mother @ bedside. In NAD. WCTM.

## 2020-03-01 NOTE — ASSESSMENT & PLAN NOTE
BG goal 140-180.       Levemir 36 units HS - starting tomorrow as basal insulin given this AM. - patient prefers/ gets better control with HS dosing.   resume novolog 18 units with meals.   BG monitoring ac/hs and moderate dose correction scale.

## 2020-03-01 NOTE — SUBJECTIVE & OBJECTIVE
"Interval HPI:   Overnight events:Remains in TSU. MYLES. BG trending down overnight with scheduled insulin and correction scale coverage. BG above goal this AM; denies snacking/ eating after dinner   Eatin%  Nausea: No  Hypoglycemia and intervention: No  Fever: No  TPN and/or TF: No      BP (!) 102/52 (BP Location: Left arm, Patient Position: Sitting)   Pulse 107   Temp 98.2 °F (36.8 °C) (Oral)   Resp 18   Ht 5' 11" (1.803 m)   Wt 91.8 kg (202 lb 6.1 oz)   SpO2 95%   BMI 28.23 kg/m²     Labs Reviewed and Include    Recent Labs   Lab 20  0918   *   CALCIUM 8.4*   ALBUMIN 3.7   PROT 6.6   *   K 4.2   CO2 24   CL 95   BUN 17   CREATININE 1.2   ALKPHOS 95   ALT 18   AST 43*   BILITOT 3.5*     Lab Results   Component Value Date    WBC 2.83 (L) 2020    HGB 9.5 (L) 2020    HCT 32.8 (L) 2020    MCV 90 2020    PLT 47 (L) 2020     No results for input(s): TSH, FREET4 in the last 168 hours.  Lab Results   Component Value Date    HGBA1C 6.7 (H) 2020       Nutritional status:   Body mass index is 28.23 kg/m².  Lab Results   Component Value Date    ALBUMIN 3.7 2020    ALBUMIN 3.3 (L) 2020    ALBUMIN 3.5 2020     No results found for: PREALBUMIN    Estimated Creatinine Clearance: 77.7 mL/min (based on SCr of 1.2 mg/dL).    Accu-Checks  Recent Labs     20  0834 20  1223 20  1246 20  1425 20  1704 20  2320 20  0517 20  0739 20  1710 20   POCTGLUCOSE 223* 67* 77 89 111* 138* 246* 345* 370* 105       Current Medications and/or Treatments Impacting Glycemic Control  Immunotherapy:    Immunosuppressants     None        Steroids:   Hormones (From admission, onward)    None        Pressors:    Autonomic Drugs (From admission, onward)    None        Hyperglycemia/Diabetes Medications:   Antihyperglycemics (From admission, onward)    Start     Stop Route Frequency Ordered    20 1230  " insulin aspart U-100 pen 22 Units      -- SubQ 3 times daily with meals 02/29/20 1223    02/29/20 0915  insulin detemir U-100 pen 30 Units      -- SubQ Daily 02/29/20 0806    02/29/20 0906  insulin aspart U-100 pen 1-10 Units      -- SubQ Before meals & nightly PRN 02/29/20 0806

## 2020-03-01 NOTE — PROGRESS NOTES
"[facility]  Endocrinology  Progress Note    Admit Date: 2020     Reason for Consult: Management of T2DM, Hyperglycemia      Surgical Procedure and Date: Umbilical hernia repair 20     Diabetes diagnosis year:            Lab Results   Component Value Date     HGBA1C 7.6 (H) 2019       Home Diabetes Medications: Levemir 25 units HS tonight and novolog 15 units with meals until NPO.         How often checking glucose at home? 3x per day   BG readings on regimen: 150-200s  Hypoglycemia on the regimen?  No  Missed doses on regimen?  Yes     Diabetes Complications include:     Hyperglycemia and Diabetic peripheral neuropathy      Complicating diabetes co morbidities:   CIRRHOSIS        HPI:   Patient is a 59 y.o. male with a diagnosis of DM2, ESLD secondary to ETOH listed with MELD 17, DM, HCV s/p INF, and hx of an umbilical hernia s/p umbilical hernia repair on 20  who presents to the ED today with increased abdominal pain and increased drainage from his surgical incision. Patient's DM managed per his PCP in [place], [place].  Endocrinology consulted for DM/BG management.      Interval HPI:   Overnight events:Remains in TSU. NAEON. BG trending down overnight with scheduled insulin and correction scale coverage. BG above goal this AM; denies snacking/ eating after dinner   Eatin%  Nausea: No  Hypoglycemia and intervention: No  Fever: No  TPN and/or TF: No      BP (!) 102/52 (BP Location: Left arm, Patient Position: Sitting)   Pulse 107   Temp 98.2 °F (36.8 °C) (Oral)   Resp 18   Ht 5' 11" (1.803 m)   Wt 91.8 kg (202 lb 6.1 oz)   SpO2 95%   BMI 28.23 kg/m²      Labs Reviewed and Include    Recent Labs   Lab 20  0918   *   CALCIUM 8.4*   ALBUMIN 3.7   PROT 6.6   *   K 4.2   CO2 24   CL 95   BUN 17   CREATININE 1.2   ALKPHOS 95   ALT 18   AST 43*   BILITOT 3.5*     Lab Results   Component Value Date    WBC 2.83 (L) 2020    HGB 9.5 (L) " 02/29/2020    HCT 32.8 (L) 02/29/2020    MCV 90 02/29/2020    PLT 47 (L) 02/29/2020     No results for input(s): TSH, FREET4 in the last 168 hours.  Lab Results   Component Value Date    HGBA1C 6.7 (H) 02/28/2020       Nutritional status:   Body mass index is 28.23 kg/m².  Lab Results   Component Value Date    ALBUMIN 3.7 02/29/2020    ALBUMIN 3.3 (L) 02/28/2020    ALBUMIN 3.5 02/28/2020     No results found for: PREALBUMIN    Estimated Creatinine Clearance: 77.7 mL/min (based on SCr of 1.2 mg/dL).    Accu-Checks  Recent Labs     02/28/20  0834 02/28/20  1223 02/28/20  1246 02/28/20  1425 02/28/20  1704 02/28/20  2320 02/29/20  0517 02/29/20  0739 02/29/20  1710 02/29/20 2022   POCTGLUCOSE 223* 67* 77 89 111* 138* 246* 345* 370* 105       Current Medications and/or Treatments Impacting Glycemic Control  Immunotherapy:    Immunosuppressants     None        Steroids:   Hormones (From admission, onward)    None        Pressors:    Autonomic Drugs (From admission, onward)    None        Hyperglycemia/Diabetes Medications:   Antihyperglycemics (From admission, onward)    Start     Stop Route Frequency Ordered    02/29/20 1230  insulin aspart U-100 pen 22 Units      -- SubQ 3 times daily with meals 02/29/20 1223    02/29/20 0915  insulin detemir U-100 pen 30 Units      -- SubQ Daily 02/29/20 0806    02/29/20 0906  insulin aspart U-100 pen 1-10 Units      -- SubQ Before meals & nightly PRN 02/29/20 0806          ASSESSMENT and PLAN    * Umbilical hernia  Managed per primary.   Optimize BG for surgical wound healing.         Type 2 diabetes mellitus without complication, with long-term current use of insulin  BG goal 140-180.       Levemir 36 units HS - starting tomorrow as basal insulin given this AM. - patient prefers/ gets better control with HS dosing.   resume novolog 18 units with meals.   BG monitoring ac/hs and moderate dose correction scale.               End stage liver disease  Managed per primary.   May impact  BG Sherri Rey, NP  Endocrinology  [facility]

## 2020-03-02 ENCOUNTER — TELEPHONE (OUTPATIENT)
Dept: TRANSPLANT | Facility: CLINIC | Age: 60
End: 2020-03-02

## 2020-03-02 VITALS
RESPIRATION RATE: 18 BRPM | SYSTOLIC BLOOD PRESSURE: 131 MMHG | OXYGEN SATURATION: 99 % | DIASTOLIC BLOOD PRESSURE: 67 MMHG | TEMPERATURE: 98 F | HEIGHT: 71 IN | WEIGHT: 199.5 LBS | BODY MASS INDEX: 27.93 KG/M2 | HEART RATE: 102 BPM

## 2020-03-02 LAB
ALBUMIN SERPL BCP-MCNC: 3.1 G/DL (ref 3.5–5.2)
ALP SERPL-CCNC: 84 U/L (ref 55–135)
ALT SERPL W/O P-5'-P-CCNC: 17 U/L (ref 10–44)
ANION GAP SERPL CALC-SCNC: 6 MMOL/L (ref 8–16)
AST SERPL-CCNC: 36 U/L (ref 10–40)
BACTERIA FLD AEROBE CULT: NO GROWTH
BASOPHILS # BLD AUTO: 0.01 K/UL (ref 0–0.2)
BASOPHILS NFR BLD: 0.3 % (ref 0–1.9)
BILIRUB SERPL-MCNC: 1.9 MG/DL (ref 0.1–1)
BUN SERPL-MCNC: 12 MG/DL (ref 6–20)
CALCIUM SERPL-MCNC: 7.6 MG/DL (ref 8.7–10.5)
CHLORIDE SERPL-SCNC: 99 MMOL/L (ref 95–110)
CO2 SERPL-SCNC: 29 MMOL/L (ref 23–29)
CREAT SERPL-MCNC: 0.7 MG/DL (ref 0.5–1.4)
DIFFERENTIAL METHOD: ABNORMAL
EOSINOPHIL # BLD AUTO: 0.1 K/UL (ref 0–0.5)
EOSINOPHIL NFR BLD: 2.9 % (ref 0–8)
ERYTHROCYTE [DISTWIDTH] IN BLOOD BY AUTOMATED COUNT: 17.8 % (ref 11.5–14.5)
EST. GFR  (AFRICAN AMERICAN): >60 ML/MIN/1.73 M^2
EST. GFR  (NON AFRICAN AMERICAN): >60 ML/MIN/1.73 M^2
GLUCOSE SERPL-MCNC: 203 MG/DL (ref 70–110)
GRAM STN SPEC: NORMAL
GRAM STN SPEC: NORMAL
HBV DNA SERPL NAA+PROBE-ACNC: <10 IU/ML
HBV DNA SERPL NAA+PROBE-LOG IU: <1 LOG (10) IU/ML
HBV DNA SERPL QL NAA+PROBE: NOT DETECTED
HCT VFR BLD AUTO: 27 % (ref 40–54)
HCV RNA SERPL NAA+PROBE-LOG IU: <1.08 LOG (10) IU/ML
HCV RNA SERPL QL NAA+PROBE: NOT DETECTED IU/ML
HCV RNA SPEC NAA+PROBE-ACNC: <12 IU/ML
HGB BLD-MCNC: 8.1 G/DL (ref 14–18)
IMM GRANULOCYTES # BLD AUTO: 0.01 K/UL (ref 0–0.04)
IMM GRANULOCYTES NFR BLD AUTO: 0.3 % (ref 0–0.5)
INR PPP: 1.8 (ref 0.8–1.2)
LYMPHOCYTES # BLD AUTO: 0.7 K/UL (ref 1–4.8)
LYMPHOCYTES NFR BLD: 19.8 % (ref 18–48)
MAGNESIUM SERPL-MCNC: 1.8 MG/DL (ref 1.6–2.6)
MCH RBC QN AUTO: 27 PG (ref 27–31)
MCHC RBC AUTO-ENTMCNC: 30 G/DL (ref 32–36)
MCV RBC AUTO: 90 FL (ref 82–98)
MONOCYTES # BLD AUTO: 0.4 K/UL (ref 0.3–1)
MONOCYTES NFR BLD: 12.4 % (ref 4–15)
NEUTROPHILS # BLD AUTO: 2.2 K/UL (ref 1.8–7.7)
NEUTROPHILS NFR BLD: 64.3 % (ref 38–73)
NRBC BLD-RTO: 0 /100 WBC
PHOSPHATE SERPL-MCNC: 2.6 MG/DL (ref 2.7–4.5)
PLATELET # BLD AUTO: 45 K/UL (ref 150–350)
PMV BLD AUTO: ABNORMAL FL (ref 9.2–12.9)
POCT GLUCOSE: 261 MG/DL (ref 70–110)
POCT GLUCOSE: >500 MG/DL (ref 70–110)
POCT GLUCOSE: >500 MG/DL (ref 70–110)
POTASSIUM SERPL-SCNC: 3.4 MMOL/L (ref 3.5–5.1)
PROT SERPL-MCNC: 5.5 G/DL (ref 6–8.4)
PROTHROMBIN TIME: 17.5 SEC (ref 9–12.5)
RBC # BLD AUTO: 3 M/UL (ref 4.6–6.2)
SODIUM SERPL-SCNC: 134 MMOL/L (ref 136–145)
WBC # BLD AUTO: 3.39 K/UL (ref 3.9–12.7)

## 2020-03-02 PROCEDURE — 36415 COLL VENOUS BLD VENIPUNCTURE: CPT | Mod: NTX

## 2020-03-02 PROCEDURE — 25000003 PHARM REV CODE 250: Mod: NTX | Performed by: STUDENT IN AN ORGANIZED HEALTH CARE EDUCATION/TRAINING PROGRAM

## 2020-03-02 PROCEDURE — 25000003 PHARM REV CODE 250: Mod: NTX | Performed by: PHYSICIAN ASSISTANT

## 2020-03-02 PROCEDURE — 99232 PR SUBSEQUENT HOSPITAL CARE,LEVL II: ICD-10-PCS | Mod: NTX,,, | Performed by: NURSE PRACTITIONER

## 2020-03-02 PROCEDURE — 80053 COMPREHEN METABOLIC PANEL: CPT | Mod: NTX

## 2020-03-02 PROCEDURE — 83735 ASSAY OF MAGNESIUM: CPT | Mod: NTX

## 2020-03-02 PROCEDURE — 25000003 PHARM REV CODE 250: Mod: NTX | Performed by: NURSE PRACTITIONER

## 2020-03-02 PROCEDURE — 84100 ASSAY OF PHOSPHORUS: CPT | Mod: NTX

## 2020-03-02 PROCEDURE — 85025 COMPLETE CBC W/AUTO DIFF WBC: CPT | Mod: NTX

## 2020-03-02 PROCEDURE — 99232 SBSQ HOSP IP/OBS MODERATE 35: CPT | Mod: NTX,,, | Performed by: NURSE PRACTITIONER

## 2020-03-02 PROCEDURE — 85610 PROTHROMBIN TIME: CPT | Mod: NTX

## 2020-03-02 RX ORDER — POTASSIUM CHLORIDE 20 MEQ/1
40 TABLET, EXTENDED RELEASE ORAL EVERY 4 HOURS
Status: DISCONTINUED | OUTPATIENT
Start: 2020-03-02 | End: 2020-03-02 | Stop reason: HOSPADM

## 2020-03-02 RX ORDER — FUROSEMIDE 40 MG/1
160 TABLET ORAL DAILY
Qty: 120 TABLET | Refills: 2 | Status: ON HOLD
Start: 2020-03-02 | End: 2020-05-24 | Stop reason: HOSPADM

## 2020-03-02 RX ADMIN — OXYBUTYNIN CHLORIDE 5 MG: 5 TABLET ORAL at 09:03

## 2020-03-02 RX ADMIN — INSULIN ASPART 18 UNITS: 100 INJECTION, SOLUTION INTRAVENOUS; SUBCUTANEOUS at 08:03

## 2020-03-02 RX ADMIN — RIFAXIMIN 550 MG: 550 TABLET ORAL at 09:03

## 2020-03-02 RX ADMIN — HYDROCODONE BITARTRATE AND ACETAMINOPHEN 1 TABLET: 10; 325 TABLET ORAL at 10:03

## 2020-03-02 RX ADMIN — PANTOPRAZOLE SODIUM 40 MG: 40 TABLET, DELAYED RELEASE ORAL at 09:03

## 2020-03-02 RX ADMIN — SPIRONOLACTONE 150 MG: 50 TABLET ORAL at 09:03

## 2020-03-02 RX ADMIN — GABAPENTIN 600 MG: 300 CAPSULE ORAL at 09:03

## 2020-03-02 RX ADMIN — LACTULOSE 15 G: 20 SOLUTION ORAL at 08:03

## 2020-03-02 RX ADMIN — INSULIN ASPART 6 UNITS: 100 INJECTION, SOLUTION INTRAVENOUS; SUBCUTANEOUS at 08:03

## 2020-03-02 RX ADMIN — CIPROFLOXACIN HYDROCHLORIDE 500 MG: 500 TABLET, FILM COATED ORAL at 09:03

## 2020-03-02 RX ADMIN — POTASSIUM CHLORIDE 40 MEQ: 1500 TABLET, EXTENDED RELEASE ORAL at 10:03

## 2020-03-02 RX ADMIN — FINASTERIDE 5 MG: 5 TABLET, FILM COATED ORAL at 09:03

## 2020-03-02 RX ADMIN — FUROSEMIDE 160 MG: 40 TABLET ORAL at 09:03

## 2020-03-02 RX ADMIN — CYCLOBENZAPRINE 10 MG: 10 TABLET, FILM COATED ORAL at 09:03

## 2020-03-02 RX ADMIN — CETIRIZINE HYDROCHLORIDE 5 MG: 5 TABLET ORAL at 09:03

## 2020-03-02 RX ADMIN — TAMSULOSIN HYDROCHLORIDE 0.4 MG: 0.4 CAPSULE ORAL at 09:03

## 2020-03-02 RX ADMIN — HYDROCODONE BITARTRATE AND ACETAMINOPHEN 1 TABLET: 10; 325 TABLET ORAL at 04:03

## 2020-03-02 NOTE — PROGRESS NOTES
Discharge Note  SW unable to meet with pt in TSU room prior to pt's discharge and departure from the hospital today.  SW called pt ([phone]) this afternoon to f/u.  Pt reports just making it home at time of SW's call.  Pt reports adequate coping.  Pt expressed appreciation regarding care received and complimented TSU and LTS staff on quality of care provided.  Pt denies having any questions or psychosocial concerns in need of SW assistance at this time.  Pt aware of SW availability in the event concerns arise.  SW remains available for further support and will f/u as needed.

## 2020-03-02 NOTE — DISCHARGE SUMMARY
[facility]  Liver Transplant  Discharge Summary      Patient Name: Colin Reilly  MRN: [id number]  Admission Date: 2/27/2020  Hospital Length of Stay: 4 days  Discharge Date and Time:  03/02/2020 11:31 AM  Attending Physician: Mary att. providers found   Discharging Provider: Nicolette Motta PA-C  Primary Care Provider: Preston Matthew Ii, MD  HPI:   58y/o male, with ESLD secondary to ETOH listed with MELD 17, DM, HCV s/p INF, and hx of an umbilical hernia s/p umbilical hernia repair on 2/22/20  who presents to the ED today with increased abdominal pain and increased drainage from his surgical incision. Patient progressed well from his hernia repair and was discharged yesterday afternoon. Overnight he developed increased drainage from his incision soaking through numerous weight to dry dressings and worsening abdominal pain. He is having regular bowel movements and passing gas. He denies fever/chills, worsening nausea, vomiting, change in bowel habits, dysuria, chest pain, and sob. Labs stable. Vitals stable. He is also back up for liver transplant at this time. Plan to admit to LTS for further management. Patient discussed with Dr. Brunner.     Procedure(s) (LRB):  TRANSPLANT, LIVER  ABORTED (N/A)     Hospital Course:    Mr. Reilly underwent paracentesis 2/27/20 which was negative for peritonitis (711 WBC, 5% segs). No fluid pocket to remove additional fluid identified. He was primary for a liver transplant- was taken to the OR and intubated, but donor organ was not suitable. He was subsequently extubated and brought back to his room. Vitals remained stable. Drainage from hernia incision resolved. Pain improved. He is ambulating without issues, tolerating his diet, and having regular bowel movements on lactulose. He is now stable for discharge. He is listed for transplant with a MELD of 22. He will follow up in surgery clinic per coordinator. He will follow up with labs per coordinator. Pt and  caregiver expressed understanding of discharge instructions and importance of follow up.     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] 02/22/2020 Yes    Portal hypertensive gastropathy [K76.6, K31.89] 10/17/2019 Yes    Type 2 diabetes mellitus without complication, with long-term current use of insulin [E11.9, Z79.4] 10/17/2019 Not Applicable    Hepatic encephalopathy [K72.90] 07/08/2014 Yes    Other ascites [R18.8] 07/08/2014 Yes      Problems Resolved During this Admission:    Diagnosis Date Noted Date Resolved POA    PRINCIPAL PROBLEM:  Umbilical hernia [K42.9] 07/08/2014 03/02/2020 Yes       Consults (From admission, onward)        Status Ordering Provider     Inpatient consult to Endocrinology  Once     Provider:  (Not yet assigned)    Completed MIGUEL BAUTISTA     Inpatient consult to Liver Transplant  Once     Provider:  (Not yet assigned)    Acknowledged SIA MAR          Pending Diagnostic Studies:     Procedure Component Value Units Date/Time    Freeze and Hold -BB HEP [[id number]] Collected:  02/28/20 1104    Order Status:  Sent Lab Status:  No result     Specimen:  Blood     Hepatitis C RNA, quantitative, PCR [[id number]] Collected:  02/28/20 0845    Order Status:  Sent Lab Status:  In process Updated:  02/28/20 0921    Specimen:  Blood     Specimen to Pathology, Surgery Liver [[id number]] Collected:  02/28/20 2113    Order Status:  Sent Lab Status:  In process Updated:  02/28/20 2113        Significant Diagnostic Studies: Labs:   CMP   Recent Labs   Lab 03/01/20  0657 03/02/20  0712   * 134*   K 4.5 3.4*   CL 97 99   CO2 24 29   * 203*   BUN 18 12   CREATININE 0.8 0.7   CALCIUM 8.2* 7.6*   PROT 5.6* 5.5*   ALBUMIN 3.2* 3.1*   BILITOT 1.8* 1.9*   ALKPHOS 93 84   AST 32 36   ALT 17 17   ANIONGAP 9 6*   ESTGFRAFRICA >60.0 >60.0   EGFRNONAA >60.0 >60.0   , CBC    Recent Labs   Lab 03/01/20  0657 03/02/20  0712   WBC 5.72 3.39*   HGB 8.1* 8.1*   HCT 26.9* 27.0*   PLT 43* 45*    and All labs within the past 24 hours have been reviewed    The patients clinical status was discussed at multidisplinary rounds, involving transplant surgery, transplant medicine, pharmacy, nursing, nutrition, and social work    Discharged Condition: good    Disposition: Home or Self Care    Follow Up:    Patient Instructions:      Diet Adult Regular     Order Specific Question Answer Comments   Additional restrictions: Low Sodium,2gm      Notify your health care provider if you experience any of the following:  increased confusion or weakness     Notify your health care provider if you experience any of the following:  persistent dizziness, light-headedness, or visual disturbances     Notify your health care provider if you experience any of the following:  worsening rash     Notify your health care provider if you experience any of the following:  severe persistent headache     Notify your health care provider if you experience any of the following:  difficulty breathing or increased cough     Notify your health care provider if you experience any of the following:  redness, tenderness, or signs of infection (pain, swelling, redness, odor or green/yellow discharge around incision site)     Notify your health care provider if you experience any of the following:  severe uncontrolled pain     Notify your health care provider if you experience any of the following:  persistent nausea and vomiting or diarrhea     Notify your health care provider if you experience any of the following:  temperature >100.4     No dressing needed     Medications:  Reconciled Home Medications:      Medication List      CHANGE how you take these medications    furosemide 40 MG tablet  Commonly known as:  LASIX  Take 4 tablets (160 mg total) by mouth once daily.  What changed:  how much to take        CONTINUE taking these  medications    ciprofloxacin HCl 500 MG tablet  Commonly known as:  CIPRO  Take 1 tablet (500 mg total) by mouth once daily.     cyclobenzaprine 10 MG tablet  Commonly known as:  FLEXERIL  Take 10 mg by mouth 2 (two) times daily.     Easy Touch Insulin Syringe 1 mL 31 gauge x 5/16 Syrg  Generic drug:  insulin syringe-needle U-100     escitalopram oxalate 20 MG tablet  Commonly known as:  LEXAPRO  Take 20 mg by mouth once daily.     finasteride 5 mg tablet  Commonly known as:  PROSCAR  Take 1 tablet (5 mg total) by mouth once daily.     gabapentin 600 MG tablet  Commonly known as:  NEURONTIN  Take 600 mg by mouth 3 (three) times daily.     HYDROcodone-acetaminophen  mg per tablet  Commonly known as:  NORCO  Take 1 tablet by mouth every 12 (twelve) hours as needed for Pain.     insulin aspart U-100 100 unit/mL injection  Commonly known as:  NOVOLOG  Inject 24 Units into the skin 3 (three) times daily before meals.     insulin detemir U-100 100 unit/mL injection  Commonly known as:  LEVEMIR  Inject 74 Units into the skin every evening.     lactulose 10 gram/15 mL solution  Commonly known as:  CHRONULAC  Take 30 mLs by mouth 3 (three) times daily. May take up to  4 to 5 times daily if needed for bowel movements     levocetirizine 5 MG tablet  Commonly known as:  XYZAL  Take 1 tablet (5 mg total) by mouth every evening.     oxybutynin 5 MG Tab  Commonly known as:  DITROPAN  Take 5 mg by mouth once daily.     pantoprazole 40 MG tablet  Commonly known as:  PROTONIX  Take 1 tablet (40 mg total) by mouth once daily.     rifAXIMin 550 mg Tab  Commonly known as:  XIFAXAN  Take 1 tablet (550 mg total) by mouth 2 (two) times daily.     spironolactone 100 MG tablet  Commonly known as:  ALDACTONE  Take 1.5 tablets (150 mg total) by mouth once daily.     tamsulosin 0.4 mg Cap  Commonly known as:  Flomax  Take 1 capsule (0.4 mg total) by mouth once daily.     True Metrix Glucose Test Strip Strp  Generic drug:  blood sugar  diagnostic     TRUEplus Lancets 30 gauge Misc  Generic drug:  lancets        STOP taking these medications    nebivolol 10 MG Tab  Commonly known as:  BYSTOLIC          Time spent caring for patient (Greater than 1/2 spent in direct face-to-face contact): > 30 minutes    Nicolette Motta PA-C  Liver Transplant  [facility]

## 2020-03-02 NOTE — PROGRESS NOTES
Discharge Medication Note:    Hospital Course:  Mr Reilly is a pre liver transplant patient s/p recent hernia repair on 2/22/20, readmitted for increased output from incision.  Patient then was primary for liver transplant, however it was ultimately canceled due to poor organ quality.  This admission, home lasix decreased from 180mg daily to 160mg daily and home nebivolol held for low BP.  No other med changes.    Met with Colin Reilly at discharge to review discharge medications and to update the blue medication card.       Colin Reilly   Home Medication Instructions ART:[id number]    Printed on:03/02/20 4527   Medication Information                      ciprofloxacin HCl (CIPRO) 500 MG tablet  Take 1 tablet (500 mg total) by mouth once daily.             cyclobenzaprine (FLEXERIL) 10 MG tablet  Take 10 mg by mouth 2 (two) times daily.              EASY TOUCH INSULIN SYRINGE 1 mL 31 gauge x 5/16 Syrg               escitalopram oxalate (LEXAPRO) 20 MG tablet  Take 20 mg by mouth once daily.              finasteride (PROSCAR) 5 mg tablet  Take 1 tablet (5 mg total) by mouth once daily.             furosemide (LASIX) 40 MG tablet  Take 4 tablets (160 mg total) by mouth once daily.             gabapentin (NEURONTIN) 600 MG tablet  Take 600 mg by mouth 3 (three) times daily.              HYDROcodone-acetaminophen (NORCO)  mg per tablet  Take 1 tablet by mouth every 12 (twelve) hours as needed for Pain.             insulin aspart U-100 (NOVOLOG) 100 unit/mL injection  Inject 24 Units into the skin 3 (three) times daily before meals.             insulin detemir U-100 (LEVEMIR) 100 unit/mL injection  Inject 74 Units into the skin every evening.             lactulose (CHRONULAC) 10 gram/15 mL solution  Take 30 mLs by mouth 3 (three) times daily. May take up to  4 to 5 times daily if needed for bowel movements             levocetirizine (XYZAL) 5 MG tablet  Take 1 tablet (5 mg total) by mouth every evening.              oxybutynin (DITROPAN) 5 MG Tab  Take 5 mg by mouth once daily.             pantoprazole (PROTONIX) 40 MG tablet  Take 1 tablet (40 mg total) by mouth once daily.             rifAXIMin (XIFAXAN) 550 mg Tab  Take 1 tablet (550 mg total) by mouth 2 (two) times daily.             spironolactone (ALDACTONE) 100 MG tablet  Take 1.5 tablets (150 mg total) by mouth once daily.             tamsulosin (FLOMAX) 0.4 mg Cp24  Take 1 capsule (0.4 mg total) by mouth once daily.             TRUE METRIX GLUCOSE TEST STRIP Strp               TRUEPLUS LANCETS 30 gauge Misc                   Pt was provided with prescriptions for the following meds:  E-rx: none  Printed rx: none  Phone-in or faxed rx: none to none pharmacy per pt request.    The following medications have been placed on HOLD and should be restarted in the outpatient setting (when appropriate): nebivolol (BP)    Colin Reilly verbalized understanding and had the opportunity to ask questions.

## 2020-03-02 NOTE — NURSING
Pt and mother expressed understanding of discharge instructions and follow up appointments.  Peripheral IV removed.  Telly removed.  Ipad on .  VSS.  No signs of evident distress or complaint of pain.  Pt. To ambulate off unit.

## 2020-03-02 NOTE — TELEPHONE ENCOUNTER
"BACK UP OFFER:  DONOR IS PHS INCREASED RISK BEHAVIOR AND HEPATITIS C POSITIVE     Notified by León Fitzgerald, , of liver offer with donor information.  Donor and recipient information read back and verified.  Spoke with Colin Reilly and identified no acute medical issues during telephone assessment.  Patient is inpatient and Dr. Lewis nettlesed proceeding with backup  offer.    Patient verbalized understanding that he/she has been called as backup recipient.    The donor's reported social history includes PHS increased risk behavior.  ELY DETERMINED TO BE hepatitis c antibody positive / CEZAR negative or   IF DONOR IS DEFINITELY DETERMINED TO BE HEPATITIS C ANTIBODY POSITIVE / CEZAR POSITIVE IN ADDITION TO THE P[facility] INCREASED RISK BEHAVIORS:    This donor is CEZAR positive.          The risk of getting HIV or other hepatitis viruses from this donor is very small compared to the risk of dying while waiting for another liver. The risk of clinical illness from any transmitted infection is also small due to the availability of highly effective oral drugs for all three of these viruses. The risk of clinical illness from any transmitted infection is much less than this due to the availability of highly effective oral drugs for all three of these viruses. While the patient has the right to decline any organ for any reason, available scientific data do not support turning down an organ based on Hepatitis C or behavioral risk factors as the risks associated with waiting longer for the transplant are many times greater. Informed patient that Dr. Quan thinks that this is a good liver for the patient.     Patient was informed that if he turned down the offer A transplant doctor will call you after we hang up".  Patient was also informed that if he turned down this donor, he would not be punished or removed from the transplant list, that he would remain on the list at his current status/MELD score. However, by " "waiting on the list longer rather than receiving this transplant, he will face a greater risk of death than any risk from the slight possibility of transmitted infection.    Patient was also informed that he would have the opportunity to see and talk to the surgeon when he got to the hospital and before he went down to the operating room.    Patient declined the offer to be back up for two reasons:   -patient plans to be discharged tomorrow and has "important business to take care of at home".  -patient has a friend/Evangelical member that was transplanted at [facility] that received  Hepatitis C positive liver 6 months ago and he has not been able to clear the virus.    Explained to patient that there is probably more information regarding his friend/Evangelical member that we do not know and that we have transplanted >75 patients with Hep C positive liver that have done well.  Patient continued to decline the offer.      Asked SANGEETHA RAMÍREZ to pass on in report for the Hepatologist to speak to patient about Hepatitis C donors on rounds in the morning.    "

## 2020-03-02 NOTE — ASSESSMENT & PLAN NOTE
BG goal 140-180.       Levemir 36 units HS - starting tomorrow as basal insulin given this AM. - patient prefers/ gets better control with HS dosing.   resume novolog 18 units with meals.   BG monitoring ac/hs and moderate dose correction scale.       Discharge plans- Once home and resuming regular diet- Levemir 50 units HS and novolog 24 units with meals plus correction scale 180/25. BG logs given. S/sx and treatment of hypogylcemia reviewed. Patient to notify PCP if BG trends up with reduced basal insulin.

## 2020-03-02 NOTE — PROGRESS NOTES
"[facility]  Endocrinology  Progress Note    Admit Date: 2020     Reason for Consult: Management of T2DM, Hyperglycemia      Surgical Procedure and Date: Umbilical hernia repair 20     Diabetes diagnosis year:            Lab Results   Component Value Date     HGBA1C 7.6 (H) 2019       Home Diabetes Medications: Levemir 25 units HS tonight and novolog 15 units with meals until NPO.         How often checking glucose at home? 3x per day   BG readings on regimen: 150-200s  Hypoglycemia on the regimen?  No  Missed doses on regimen?  Yes     Diabetes Complications include:     Hyperglycemia and Diabetic peripheral neuropathy      Complicating diabetes co morbidities:   CIRRHOSIS        HPI:   Patient is a 59 y.o. male with a diagnosis of DM2, ESLD secondary to ETOH listed with MELD 17, DM, HCV s/p INF, and hx of an umbilical hernia s/p umbilical hernia repair on 20  who presents to the ED today with increased abdominal pain and increased drainage from his surgical incision. Patient's DM managed per his PCP in [place], [place].  Endocrinology consulted for DM/BG management.      Interval HPI:   Overnight events:  Remains in TSU. Discharge today. BG above goal with scheduled insulin and prn correction scale.   Eatin%  Nausea: No  Hypoglycemia and intervention: No  Fever: No  TPN and/or TF: No      /67 (BP Location: Right arm, Patient Position: Lying)   Pulse 102   Temp 97.7 °F (36.5 °C) (Oral)   Resp 18   Ht 5' 11" (1.803 m)   Wt 90.5 kg (199 lb 8.3 oz)   SpO2 99%   BMI 27.83 kg/m²      Labs Reviewed and Include    Recent Labs   Lab 20  0712   *   CALCIUM 7.6*   ALBUMIN 3.1*   PROT 5.5*   *   K 3.4*   CO2 29   CL 99   BUN 12   CREATININE 0.7   ALKPHOS 84   ALT 17   AST 36   BILITOT 1.9*     Lab Results   Component Value Date    WBC 3.39 (L) 2020    HGB 8.1 (L) 2020    HCT 27.0 (L) 2020    MCV 90 2020    PLT 45 (L) " 03/02/2020     No results for input(s): TSH, FREET4 in the last 168 hours.  Lab Results   Component Value Date    HGBA1C 6.7 (H) 02/28/2020       Nutritional status:   Body mass index is 27.83 kg/m².  Lab Results   Component Value Date    ALBUMIN 3.1 (L) 03/02/2020    ALBUMIN 3.2 (L) 03/01/2020    ALBUMIN 3.7 02/29/2020     No results found for: PREALBUMIN    Estimated Creatinine Clearance: 132.4 mL/min (based on SCr of 0.7 mg/dL).    Accu-Checks  Recent Labs     02/29/20  0739 02/29/20  1218 02/29/20  1405 02/29/20  1710 02/29/20  2022 03/01/20  0802 03/01/20  1201 03/01/20  1640 03/01/20 2016 03/02/20  0828   POCTGLUCOSE 345* >500* >500* 370* 105 282* 291* 208* 143* 261*       Current Medications and/or Treatments Impacting Glycemic Control  Immunotherapy:    Immunosuppressants     None        Steroids:   Hormones (From admission, onward)    None        Pressors:    Autonomic Drugs (From admission, onward)    None        Hyperglycemia/Diabetes Medications:   Antihyperglycemics (From admission, onward)    Start     Stop Route Frequency Ordered    03/02/20 2100  insulin detemir U-100 pen 36 Units      -- SubQ Nightly 03/01/20 1321    03/01/20 1645  insulin aspart U-100 pen 18 Units      -- SubQ 3 times daily with meals 03/01/20 1321    02/29/20 0906  insulin aspart U-100 pen 1-10 Units      -- SubQ Before meals & nightly PRN 02/29/20 0806          ASSESSMENT and PLAN    Type 2 diabetes mellitus without complication, with long-term current use of insulin  BG goal 140-180.       Levemir 36 units HS - starting tomorrow as basal insulin given this AM. - patient prefers/ gets better control with HS dosing.   resume novolog 18 units with meals.   BG monitoring ac/hs and moderate dose correction scale.       Discharge plans- Once home and resuming regular diet- Levemir 50 units HS and novolog 24 units with meals plus correction scale 180/25. BG logs given. S/sx and treatment of hypogylcemia reviewed. Patient to notify PCP  if BG trends up with reduced basal insulin.               End stage liver disease  Managed per primary.   May impact BG           Sherri Rey NP  Endocrinology  [facility]

## 2020-03-02 NOTE — SUBJECTIVE & OBJECTIVE
"Interval HPI:   Overnight events:  Remains in TSU. Discharge today. BG above goal with scheduled insulin and prn correction scale.   Eatin%  Nausea: No  Hypoglycemia and intervention: No  Fever: No  TPN and/or TF: No      /67 (BP Location: Right arm, Patient Position: Lying)   Pulse 102   Temp 97.7 °F (36.5 °C) (Oral)   Resp 18   Ht 5' 11" (1.803 m)   Wt 90.5 kg (199 lb 8.3 oz)   SpO2 99%   BMI 27.83 kg/m²     Labs Reviewed and Include    Recent Labs   Lab 20  0712   *   CALCIUM 7.6*   ALBUMIN 3.1*   PROT 5.5*   *   K 3.4*   CO2 29   CL 99   BUN 12   CREATININE 0.7   ALKPHOS 84   ALT 17   AST 36   BILITOT 1.9*     Lab Results   Component Value Date    WBC 3.39 (L) 2020    HGB 8.1 (L) 2020    HCT 27.0 (L) 2020    MCV 90 2020    PLT 45 (L) 2020     No results for input(s): TSH, FREET4 in the last 168 hours.  Lab Results   Component Value Date    HGBA1C 6.7 (H) 2020       Nutritional status:   Body mass index is 27.83 kg/m².  Lab Results   Component Value Date    ALBUMIN 3.1 (L) 2020    ALBUMIN 3.2 (L) 2020    ALBUMIN 3.7 2020     No results found for: PREALBUMIN    Estimated Creatinine Clearance: 132.4 mL/min (based on SCr of 0.7 mg/dL).    Accu-Checks  Recent Labs     20  0739 20  1218 20  1405 20  1710 20  20220  0802 20  1201 20  1640 20  2016 20  0828   POCTGLUCOSE 345* >500* >500* 370* 105 282* 291* 208* 143* 261*       Current Medications and/or Treatments Impacting Glycemic Control  Immunotherapy:    Immunosuppressants     None        Steroids:   Hormones (From admission, onward)    None        Pressors:    Autonomic Drugs (From admission, onward)    None        Hyperglycemia/Diabetes Medications:   Antihyperglycemics (From admission, onward)    Start     Stop Route Frequency Ordered    20 2100  insulin detemir U-100 pen 36 Units      -- SubQ Nightly " 03/01/20 1321    03/01/20 1645  insulin aspart U-100 pen 18 Units      -- SubQ 3 times daily with meals 03/01/20 1321    02/29/20 0906  insulin aspart U-100 pen 1-10 Units      -- SubQ Before meals & nightly PRN 02/29/20 0806

## 2020-03-03 LAB
BLD PROD TYP BPU: NORMAL
BLOOD UNIT EXPIRATION DATE: NORMAL
BLOOD UNIT TYPE CODE: 6200
BLOOD UNIT TYPE: NORMAL
CODING SYSTEM: NORMAL
DISPENSE STATUS: NORMAL
FINAL PATHOLOGIC DIAGNOSIS: NORMAL
NUM UNITS TRANS FFP: NORMAL
NUM UNITS TRANS PACKED RBC: NORMAL
TRANS ERYTHROCYTES VOL PATIENT: NORMAL ML

## 2020-03-04 ENCOUNTER — PATIENT OUTREACH (OUTPATIENT)
Dept: ADMINISTRATIVE | Facility: CLINIC | Age: 60
End: 2020-03-04

## 2020-03-04 ENCOUNTER — TELEPHONE (OUTPATIENT)
Dept: TRANSPLANT | Facility: CLINIC | Age: 60
End: 2020-03-04

## 2020-03-04 DIAGNOSIS — K70.31 ASCITES DUE TO ALCOHOLIC CIRRHOSIS: ICD-10-CM

## 2020-03-04 DIAGNOSIS — Z76.82 ORGAN TRANSPLANT CANDIDATE: ICD-10-CM

## 2020-03-04 DIAGNOSIS — Z12.5 ENCOUNTER FOR SCREENING FOR MALIGNANT NEOPLASM OF PROSTATE: ICD-10-CM

## 2020-03-04 DIAGNOSIS — F10.11 ALCOHOL ABUSE, IN REMISSION: Primary | ICD-10-CM

## 2020-03-04 DIAGNOSIS — R18.8 OTHER ASCITES: Primary | ICD-10-CM

## 2020-03-04 NOTE — TELEPHONE ENCOUNTER
Called patient to inform him that first available paracentesis here at [facility] is 3/13 @ 8am.  Patient reports that he has abdominal discomfort and that fluid is pushing on his umbilical hernia causing some leakage at hernia site.  Instructed patient to report to his local ED.  He agrees to report to [facility].

## 2020-03-04 NOTE — TELEPHONE ENCOUNTER
Patient's phone call returned to [phone].  Patient requesting to schedule a paracentesis for first available.  Encouraged patient to try to schedule locally.  He agrees to try to schedule in [place] or [place].  Will also attempt to schedule here in the event that his local provider is unable to coordinate for patient or if there is no availability in the near future.  Will send request to IR dept and notify patient once scheduled.  Pt notified and is in agreement with this plan.

## 2020-03-04 NOTE — TELEPHONE ENCOUNTER
Called patient to inform him that labs will need to be added on to appts on 3/19.  He verbalized understanding.  Per his request, lab appt scheduled prior to appt.  Scheduled at 1:30 on 2nd floor.  Patient aware.  Appt reminder mailed.

## 2020-03-05 LAB
BACTERIA BLD CULT: NORMAL
BACTERIA BLD CULT: NORMAL
BACTERIA SPEC ANAEROBE CULT: NORMAL

## 2020-03-05 NOTE — PATIENT INSTRUCTIONS
Understanding Sepsis  Sepsis is a severe response the body has to an infection. It is most often caused by bacteria. It is also known as septicemia, or systemic inflammatory response syndrome (SIRS). Sepsis is a medical emergency. It needs to be treated right away.  What is sepsis?  Sepsis is when the body reacts to an infection with a severe inflammatory response. It can be caused by bacteria, fungus, or a virus. Sepsis can cause many kinds of problems around the body. It can lead severe low blood pressure (shock) and organ failure. This can lead to death if not treated.  Sepsis is most common in:  · Infants and older adults  · People with an infection such as pneumonia, meningitis, or a urinary tract infection  · People who have an illness such as cancer, AIDS, or diabetes  · People being treated with chemotherapy medications or radiation  · People who have had a transplant  Symptoms of sepsis  Symptoms of sepsis can include:  · Chills and shaking  · Rapid heartbeat  · Rapid breathing  · Shortness of breath  · Severe nausea or uncontrolled vomiting  · Confusion  · Dizziness  · Decreased urination  · Severe pain, including in the back or joints   Diagnosing sepsis  If your health care provider thinks you may have sepsis, you will be given tests. You may have blood and urine tests. These are done to look for bacteria, viruses, or fungus. You may also have X-rays or other imaging tests. These may be done to look at your organs to locate the source of infection.  Treating sepsis  If you have sepsis, your health care provider will give you antibiotics through a thin, flexible tube put into a vein in your arm (IV). You will also be given fluids through the IV. You may also be given nutrition or other medications through your IV. Your health care provider will talk with you about other treatments you may need. These may include using an oxygen mask or a ventilator to help with breathing. Treatment may last at least 7  to 10 days. Sepsis must be treated in the hospital.    © 3002-2120 [employer]. [address] [place] [address], [place] [address]. All rights reserved. This information is not intended as a substitute for professional medical care. Always follow your healthcare professional's instructions.

## 2020-03-06 ENCOUNTER — TELEPHONE (OUTPATIENT)
Dept: TRANSPLANT | Facility: CLINIC | Age: 60
End: 2020-03-06

## 2020-03-07 ENCOUNTER — NURSE TRIAGE (OUTPATIENT)
Dept: ADMINISTRATIVE | Facility: CLINIC | Age: 60
End: 2020-03-07

## 2020-03-07 NOTE — TELEPHONE ENCOUNTER
Reason for Disposition   [1] Follow-up call to recent contact AND [2] information only call, no triage required    Additional Information   Negative: [1] Caller is not with the adult (patient) AND [2] reporting urgent symptoms   Negative: Lab result questions   Negative: Medication questions   Negative: Caller can't be reached by phone   Negative: Caller has already spoken to PCP or another triager   Negative: RN needs further essential information from caller in order to complete triage   Negative: Requesting regular office appointment   Negative: [1] Caller requesting NON-URGENT health information AND [2] PCP's office is the best resource   Negative: [1] Caller is not with the adult (patient) AND [2] probable NON-URGENT symptoms   Negative: Question about upcoming scheduled test, no triage required and triager able to answer question   Negative: General information question, no triage required and triager able to answer question   Negative: Health Information question, no triage required and triager able to answer question    Protocols used: INFORMATION ONLY CALL-A-  Liver waitlist 2/21/20  BPA n/a   CC:  pt states he rec'd call re back up for liver tx. Pt states he is currently admitted in [facility] in ER. Pt unable to be backup for possible liver. Pt had infection at hernia site at belly button. notified liver transplant coord.

## 2020-03-07 NOTE — TELEPHONE ENCOUNTER
BACK-UP ORGAN OFFER NOTE    (@10 pm)  Notified by Geremias Fitzgerald, , of backup liver offer with donor information.  Donor and recipient information read back and verified.  Attempted to reach patient via cell and home.  No answer on either.  Unable to leave message on cell, VM not yet set up.  Message however left on home phone requesting a callback ASAP.  Contact info provided.    (@ 11 pm)  Spoke with Colin Reilly and identified no acute medical issues in telephone assessment.  Patient reports that he was seen in local ED as recommended on yesterday.  Additional sutures placed and then dc'd.  Paracentesis not done, but symptoms have improved.  Pt denies fever.  Patient verbalized understanding that he has been called as a backup.    (@ 1105 pm)  Spoke w/ pt.  Instructed him to fast after 6:30 am and to report to the 11th floor waiting room at [facility] at 2:30 pm.  Informed patient that he will not be admitted, but he is to remain in the waiting room and available by phone for any further instructions.  He verbalized understanding and agrees to do so.    (@1155 pm)  Received phone call from patient w/ reports that his transportation is unavailable to bring him to [facility] for backup offer.  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  Discussed the importance of having dependable transportation.  Patient voiced understanding and reports that he is working on finding another person who can provide transportation when his friend is unavailable.     notified of above.

## 2020-03-09 NOTE — TELEPHONE ENCOUNTER
Called [facility] to obtain information regarding patient's admission and his current condition.  Per the , patient discharged today at 11am.  Called patient.  He reports that he is feeling better and is now at home.  Patient reports that on 3/7, he had severe pain to abdomen, temp of 100.3,  and yellowish drainage w/ foul odor from umbilical site so went to local ED.  Admitted and was told that he had an infection.  IV antibiotics given and dc'd w/ oral antibiotics (cipro) x1 week.  Patient also reports that his glucose was in the 500s at that time.  Patient now has a dressing to hernia site and minimal drainage noted, pain has lessened, and now afebrile.  He denies any other symptoms or any acute issues at this time.  Will inform MD of above.

## 2020-03-11 ENCOUNTER — HOSPITAL ENCOUNTER (OUTPATIENT)
Facility: HOSPITAL | Age: 60
Discharge: HOME | End: 2020-03-13
Attending: SURGERY | Admitting: SURGERY
Payer: MEDICARE

## 2020-03-11 ENCOUNTER — DOCUMENTATION ONLY (OUTPATIENT)
Dept: TRANSPLANT | Facility: CLINIC | Age: 60
End: 2020-03-11

## 2020-03-11 DIAGNOSIS — R10.9 ABDOMINAL PAIN: ICD-10-CM

## 2020-03-11 DIAGNOSIS — K70.31 ASCITES DUE TO ALCOHOLIC CIRRHOSIS: ICD-10-CM

## 2020-03-11 DIAGNOSIS — K72.10 END STAGE LIVER DISEASE: ICD-10-CM

## 2020-03-11 DIAGNOSIS — K76.6 PORTAL HYPERTENSIVE GASTROPATHY: ICD-10-CM

## 2020-03-11 DIAGNOSIS — K31.89 PORTAL HYPERTENSIVE GASTROPATHY: ICD-10-CM

## 2020-03-11 DIAGNOSIS — E11.9 TYPE 2 DIABETES MELLITUS WITHOUT COMPLICATION, WITH LONG-TERM CURRENT USE OF INSULIN: ICD-10-CM

## 2020-03-11 DIAGNOSIS — K76.82 HEPATIC ENCEPHALOPATHY: ICD-10-CM

## 2020-03-11 DIAGNOSIS — K42.9 UMBILICAL HERNIA WITHOUT OBSTRUCTION AND WITHOUT GANGRENE: ICD-10-CM

## 2020-03-11 DIAGNOSIS — Z79.4 TYPE 2 DIABETES MELLITUS WITHOUT COMPLICATION, WITH LONG-TERM CURRENT USE OF INSULIN: ICD-10-CM

## 2020-03-11 DIAGNOSIS — R10.9 ABDOMINAL PAIN, UNSPECIFIED ABDOMINAL LOCATION: ICD-10-CM

## 2020-03-11 DIAGNOSIS — D68.4 ACQUIRED COAGULATION FACTOR DEFICIENCY: ICD-10-CM

## 2020-03-11 PROCEDURE — 20600001 HC STEP DOWN PRIVATE ROOM: Mod: NTX

## 2020-03-11 PROCEDURE — G0379 DIRECT REFER HOSPITAL OBSERV: HCPCS | Mod: NTX

## 2020-03-11 PROCEDURE — G0378 HOSPITAL OBSERVATION PER HR: HCPCS | Mod: NTX

## 2020-03-11 RX ORDER — GABAPENTIN 300 MG/1
600 CAPSULE ORAL 3 TIMES DAILY
Status: DISCONTINUED | OUTPATIENT
Start: 2020-03-12 | End: 2020-03-13 | Stop reason: HOSPADM

## 2020-03-11 RX ORDER — OXYBUTYNIN CHLORIDE 5 MG/1
5 TABLET ORAL DAILY
Status: DISCONTINUED | OUTPATIENT
Start: 2020-03-12 | End: 2020-03-13 | Stop reason: HOSPADM

## 2020-03-11 RX ORDER — DEXTROSE MONOHYDRATE 100 MG/ML
25 INJECTION, SOLUTION INTRAVENOUS
Status: DISCONTINUED | OUTPATIENT
Start: 2020-03-12 | End: 2020-03-13 | Stop reason: HOSPADM

## 2020-03-11 RX ORDER — INSULIN ASPART 100 [IU]/ML
0-5 INJECTION, SOLUTION INTRAVENOUS; SUBCUTANEOUS
Status: DISCONTINUED | OUTPATIENT
Start: 2020-03-12 | End: 2020-03-13 | Stop reason: HOSPADM

## 2020-03-11 RX ORDER — ACETAMINOPHEN 325 MG/1
650 TABLET ORAL EVERY 8 HOURS PRN
Status: DISCONTINUED | OUTPATIENT
Start: 2020-03-11 | End: 2020-03-13 | Stop reason: HOSPADM

## 2020-03-11 RX ORDER — PANTOPRAZOLE SODIUM 40 MG/1
40 TABLET, DELAYED RELEASE ORAL DAILY
Status: DISCONTINUED | OUTPATIENT
Start: 2020-03-12 | End: 2020-03-13 | Stop reason: HOSPADM

## 2020-03-11 RX ORDER — SPIRONOLACTONE 50 MG/1
150 TABLET, FILM COATED ORAL DAILY
Status: DISCONTINUED | OUTPATIENT
Start: 2020-03-12 | End: 2020-03-13 | Stop reason: HOSPADM

## 2020-03-11 RX ORDER — SODIUM CHLORIDE 0.9 % (FLUSH) 0.9 %
10 SYRINGE (ML) INJECTION
Status: DISCONTINUED | OUTPATIENT
Start: 2020-03-11 | End: 2020-03-13 | Stop reason: HOSPADM

## 2020-03-11 RX ORDER — IBUPROFEN 200 MG
16 TABLET ORAL
Status: DISCONTINUED | OUTPATIENT
Start: 2020-03-12 | End: 2020-03-13 | Stop reason: HOSPADM

## 2020-03-11 RX ORDER — INSULIN ASPART 100 [IU]/ML
18 INJECTION, SOLUTION INTRAVENOUS; SUBCUTANEOUS
Status: DISCONTINUED | OUTPATIENT
Start: 2020-03-12 | End: 2020-03-12

## 2020-03-11 RX ORDER — CETIRIZINE HYDROCHLORIDE 5 MG/1
5 TABLET ORAL NIGHTLY
Status: DISCONTINUED | OUTPATIENT
Start: 2020-03-12 | End: 2020-03-13 | Stop reason: HOSPADM

## 2020-03-11 RX ORDER — FINASTERIDE 5 MG/1
5 TABLET, FILM COATED ORAL DAILY
Status: DISCONTINUED | OUTPATIENT
Start: 2020-03-12 | End: 2020-03-13 | Stop reason: HOSPADM

## 2020-03-11 RX ORDER — IBUPROFEN 200 MG
24 TABLET ORAL
Status: DISCONTINUED | OUTPATIENT
Start: 2020-03-12 | End: 2020-03-13 | Stop reason: HOSPADM

## 2020-03-11 RX ORDER — DEXTROSE MONOHYDRATE 100 MG/ML
12.5 INJECTION, SOLUTION INTRAVENOUS
Status: DISCONTINUED | OUTPATIENT
Start: 2020-03-12 | End: 2020-03-13 | Stop reason: HOSPADM

## 2020-03-11 RX ORDER — LACTULOSE 10 G/15ML
20 SOLUTION ORAL 3 TIMES DAILY
Status: DISCONTINUED | OUTPATIENT
Start: 2020-03-12 | End: 2020-03-13 | Stop reason: HOSPADM

## 2020-03-11 RX ORDER — CIPROFLOXACIN 500 MG/1
500 TABLET ORAL DAILY
Status: DISCONTINUED | OUTPATIENT
Start: 2020-03-12 | End: 2020-03-13 | Stop reason: HOSPADM

## 2020-03-11 RX ORDER — GLUCAGON 1 MG
1 KIT INJECTION
Status: DISCONTINUED | OUTPATIENT
Start: 2020-03-12 | End: 2020-03-13 | Stop reason: HOSPADM

## 2020-03-11 RX ORDER — ONDANSETRON 8 MG/1
8 TABLET, ORALLY DISINTEGRATING ORAL EVERY 8 HOURS PRN
Status: DISCONTINUED | OUTPATIENT
Start: 2020-03-11 | End: 2020-03-13 | Stop reason: HOSPADM

## 2020-03-11 RX ORDER — CYCLOBENZAPRINE HCL 5 MG
10 TABLET ORAL 2 TIMES DAILY
Status: DISCONTINUED | OUTPATIENT
Start: 2020-03-12 | End: 2020-03-13 | Stop reason: HOSPADM

## 2020-03-11 RX ORDER — TAMSULOSIN HYDROCHLORIDE 0.4 MG/1
0.4 CAPSULE ORAL DAILY
Status: DISCONTINUED | OUTPATIENT
Start: 2020-03-12 | End: 2020-03-13 | Stop reason: HOSPADM

## 2020-03-11 RX ORDER — FUROSEMIDE 40 MG/1
160 TABLET ORAL DAILY
Status: DISCONTINUED | OUTPATIENT
Start: 2020-03-12 | End: 2020-03-12

## 2020-03-11 RX ORDER — HYDROCODONE BITARTRATE AND ACETAMINOPHEN 10; 325 MG/1; MG/1
1 TABLET ORAL EVERY 12 HOURS PRN
Status: DISCONTINUED | OUTPATIENT
Start: 2020-03-12 | End: 2020-03-13 | Stop reason: HOSPADM

## 2020-03-11 RX ORDER — ESCITALOPRAM OXALATE 10 MG/1
20 TABLET ORAL DAILY
Status: DISCONTINUED | OUTPATIENT
Start: 2020-03-12 | End: 2020-03-13 | Stop reason: HOSPADM

## 2020-03-11 NOTE — ASSESSMENT & PLAN NOTE
- paracentesis 2/27 negative for peritonitis  - Para to r/o SBP  - continue lasix and spironolactone

## 2020-03-11 NOTE — ASSESSMENT & PLAN NOTE
- resume 75% home insulin doses, reports labile BG but denies any hypoglycemia.   - consult endocrine for assistnace

## 2020-03-11 NOTE — PROGRESS NOTES
Recent local hospitalization for hernia issues discussed w/ Jayda Brunner and Marc.  Due to elevated temp, abdominal pain, and yellowish drainage from umbilical site w/ foul odor, patient to be admitted to [facility].

## 2020-03-11 NOTE — HPI
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 Surgery uncomplicated but has had recurrent abdominal pain and increased drainage from surgical incision. Patient recently admitted 2/27-3/2 with similar symptoms, underwent paracentesis 2/27, which was negative for peritonitis. He re-presented to [facility] on 3/7/20 with c/o low grade fever and abdominal pain (Tmax 100.3), discharged on PO ciprofloxacin. Patient is being admitted for infectious work up. His last fever was 4 days ago at OSH, no further fever or chills. He complains of abd pain and distention, serous drg from LLQ old paracentesis site with suture in place. Umbilical hernia repair incision CDI, no s/s infection, small umbilical hernia easily reducible with no tenderness. He denies N/V, SOB, chest pain, change in bowel or bladder function.

## 2020-03-11 NOTE — ASSESSMENT & PLAN NOTE
- s/p hernia repair 2/22. Staples due to be removed around 3/14 (21 days post-op)  - admitted 2/27-3/2 with copious serous drainage from incision  - incision CDI, no SSI infection

## 2020-03-11 NOTE — ASSESSMENT & PLAN NOTE
- ESLD 2/2 ETOH listed for liver transplant with MELD 22  - s/p umbilical hernia repair 2/27/20    MELD-Na score: 17 at 3/2/2020  7:12 AM  MELD score: 15 at 3/2/2020  7:12 AM  Calculated from:  Serum Creatinine: 0.7 mg/dL (Rounded to 1 mg/dL) at 3/2/2020  7:12 AM  Serum Sodium: 134 mmol/L at 3/2/2020  7:12 AM  Total Bilirubin: 1.9 mg/dL at 3/2/2020  7:12 AM  INR(ratio): 1.8 at 3/2/2020  7:12 AM  Age: 59 years

## 2020-03-12 LAB
ALBUMIN SERPL BCP-MCNC: 2.9 G/DL (ref 3.5–5.2)
ALBUMIN SERPL BCP-MCNC: 2.9 G/DL (ref 3.5–5.2)
ALP SERPL-CCNC: 101 U/L (ref 55–135)
ALP SERPL-CCNC: 116 U/L (ref 55–135)
ALT SERPL W/O P-5'-P-CCNC: 17 U/L (ref 10–44)
ALT SERPL W/O P-5'-P-CCNC: 18 U/L (ref 10–44)
ANION GAP SERPL CALC-SCNC: 10 MMOL/L (ref 8–16)
ANION GAP SERPL CALC-SCNC: 6 MMOL/L (ref 8–16)
AST SERPL-CCNC: 34 U/L (ref 10–40)
AST SERPL-CCNC: 35 U/L (ref 10–40)
BACTERIA #/AREA URNS AUTO: NORMAL /HPF
BASOPHILS # BLD AUTO: 0.02 K/UL (ref 0–0.2)
BASOPHILS # BLD AUTO: 0.02 K/UL (ref 0–0.2)
BASOPHILS NFR BLD: 0.8 % (ref 0–1.9)
BASOPHILS NFR BLD: 0.8 % (ref 0–1.9)
BILIRUB SERPL-MCNC: 2.1 MG/DL (ref 0.1–1)
BILIRUB SERPL-MCNC: 2.5 MG/DL (ref 0.1–1)
BILIRUB UR QL STRIP: NEGATIVE
BUN SERPL-MCNC: 4 MG/DL (ref 6–20)
BUN SERPL-MCNC: 5 MG/DL (ref 6–20)
CALCIUM SERPL-MCNC: 7.5 MG/DL (ref 8.7–10.5)
CALCIUM SERPL-MCNC: 7.7 MG/DL (ref 8.7–10.5)
CHLORIDE SERPL-SCNC: 103 MMOL/L (ref 95–110)
CHLORIDE SERPL-SCNC: 109 MMOL/L (ref 95–110)
CLARITY UR REFRACT.AUTO: CLEAR
CO2 SERPL-SCNC: 22 MMOL/L (ref 23–29)
CO2 SERPL-SCNC: 24 MMOL/L (ref 23–29)
COLOR UR AUTO: YELLOW
CREAT SERPL-MCNC: 0.6 MG/DL (ref 0.5–1.4)
CREAT SERPL-MCNC: 0.7 MG/DL (ref 0.5–1.4)
DIFFERENTIAL METHOD: ABNORMAL
DIFFERENTIAL METHOD: ABNORMAL
EOSINOPHIL # BLD AUTO: 0.1 K/UL (ref 0–0.5)
EOSINOPHIL # BLD AUTO: 0.2 K/UL (ref 0–0.5)
EOSINOPHIL NFR BLD: 5.4 % (ref 0–8)
EOSINOPHIL NFR BLD: 6.4 % (ref 0–8)
ERYTHROCYTE [DISTWIDTH] IN BLOOD BY AUTOMATED COUNT: 17.9 % (ref 11.5–14.5)
ERYTHROCYTE [DISTWIDTH] IN BLOOD BY AUTOMATED COUNT: 17.9 % (ref 11.5–14.5)
EST. GFR  (AFRICAN AMERICAN): >60 ML/MIN/1.73 M^2
EST. GFR  (AFRICAN AMERICAN): >60 ML/MIN/1.73 M^2
EST. GFR  (NON AFRICAN AMERICAN): >60 ML/MIN/1.73 M^2
EST. GFR  (NON AFRICAN AMERICAN): >60 ML/MIN/1.73 M^2
GLUCOSE SERPL-MCNC: 355 MG/DL (ref 70–110)
GLUCOSE SERPL-MCNC: 56 MG/DL (ref 70–110)
GLUCOSE UR QL STRIP: ABNORMAL
HCT VFR BLD AUTO: 27.1 % (ref 40–54)
HCT VFR BLD AUTO: 29 % (ref 40–54)
HGB BLD-MCNC: 8.1 G/DL (ref 14–18)
HGB BLD-MCNC: 8.4 G/DL (ref 14–18)
HGB UR QL STRIP: NEGATIVE
IMM GRANULOCYTES # BLD AUTO: 0.01 K/UL (ref 0–0.04)
IMM GRANULOCYTES # BLD AUTO: 0.01 K/UL (ref 0–0.04)
IMM GRANULOCYTES NFR BLD AUTO: 0.4 % (ref 0–0.5)
IMM GRANULOCYTES NFR BLD AUTO: 0.4 % (ref 0–0.5)
INR PPP: 1.6 (ref 0.8–1.2)
INR PPP: 1.6 (ref 0.8–1.2)
KETONES UR QL STRIP: NEGATIVE
LEUKOCYTE ESTERASE UR QL STRIP: NEGATIVE
LYMPHOCYTES # BLD AUTO: 0.6 K/UL (ref 1–4.8)
LYMPHOCYTES # BLD AUTO: 0.7 K/UL (ref 1–4.8)
LYMPHOCYTES NFR BLD: 23.2 % (ref 18–48)
LYMPHOCYTES NFR BLD: 26.7 % (ref 18–48)
MAGNESIUM SERPL-MCNC: 1.7 MG/DL (ref 1.6–2.6)
MAGNESIUM SERPL-MCNC: 1.8 MG/DL (ref 1.6–2.6)
MCH RBC QN AUTO: 26.3 PG (ref 27–31)
MCH RBC QN AUTO: 26.5 PG (ref 27–31)
MCHC RBC AUTO-ENTMCNC: 29 G/DL (ref 32–36)
MCHC RBC AUTO-ENTMCNC: 29.9 G/DL (ref 32–36)
MCV RBC AUTO: 88 FL (ref 82–98)
MCV RBC AUTO: 92 FL (ref 82–98)
MICROSCOPIC COMMENT: NORMAL
MONOCYTES # BLD AUTO: 0.3 K/UL (ref 0.3–1)
MONOCYTES # BLD AUTO: 0.3 K/UL (ref 0.3–1)
MONOCYTES NFR BLD: 11.6 % (ref 4–15)
MONOCYTES NFR BLD: 13.2 % (ref 4–15)
NEUTROPHILS # BLD AUTO: 1.4 K/UL (ref 1.8–7.7)
NEUTROPHILS # BLD AUTO: 1.4 K/UL (ref 1.8–7.7)
NEUTROPHILS NFR BLD: 53.5 % (ref 38–73)
NEUTROPHILS NFR BLD: 57.6 % (ref 38–73)
NITRITE UR QL STRIP: NEGATIVE
NRBC BLD-RTO: 0 /100 WBC
NRBC BLD-RTO: 0 /100 WBC
PH UR STRIP: 6 [PH] (ref 5–8)
PHOSPHATE SERPL-MCNC: 2.7 MG/DL (ref 2.7–4.5)
PHOSPHATE SERPL-MCNC: 2.7 MG/DL (ref 2.7–4.5)
PLATELET # BLD AUTO: 40 K/UL (ref 150–350)
PLATELET # BLD AUTO: 40 K/UL (ref 150–350)
PMV BLD AUTO: ABNORMAL FL (ref 9.2–12.9)
PMV BLD AUTO: ABNORMAL FL (ref 9.2–12.9)
POCT GLUCOSE: 103 MG/DL (ref 70–110)
POCT GLUCOSE: 122 MG/DL (ref 70–110)
POCT GLUCOSE: 163 MG/DL (ref 70–110)
POCT GLUCOSE: 164 MG/DL (ref 70–110)
POCT GLUCOSE: 410 MG/DL (ref 70–110)
POCT GLUCOSE: 50 MG/DL (ref 70–110)
POTASSIUM SERPL-SCNC: 3.3 MMOL/L (ref 3.5–5.1)
POTASSIUM SERPL-SCNC: 3.4 MMOL/L (ref 3.5–5.1)
PROT SERPL-MCNC: 5.4 G/DL (ref 6–8.4)
PROT SERPL-MCNC: 5.5 G/DL (ref 6–8.4)
PROT UR QL STRIP: NEGATIVE
PROTHROMBIN TIME: 15.4 SEC (ref 9–12.5)
PROTHROMBIN TIME: 15.5 SEC (ref 9–12.5)
RBC # BLD AUTO: 3.08 M/UL (ref 4.6–6.2)
RBC # BLD AUTO: 3.17 M/UL (ref 4.6–6.2)
RBC #/AREA URNS AUTO: 0 /HPF (ref 0–4)
SODIUM SERPL-SCNC: 135 MMOL/L (ref 136–145)
SODIUM SERPL-SCNC: 139 MMOL/L (ref 136–145)
SP GR UR STRIP: 1.02 (ref 1–1.03)
SQUAMOUS #/AREA URNS AUTO: 1 /HPF
URN SPEC COLLECT METH UR: ABNORMAL
WBC # BLD AUTO: 2.5 K/UL (ref 3.9–12.7)
WBC # BLD AUTO: 2.58 K/UL (ref 3.9–12.7)
WBC #/AREA URNS AUTO: 1 /HPF (ref 0–5)
YEAST UR QL AUTO: NORMAL

## 2020-03-12 PROCEDURE — 81001 URINALYSIS AUTO W/SCOPE: CPT | Mod: NTX

## 2020-03-12 PROCEDURE — 20600001 HC STEP DOWN PRIVATE ROOM: Mod: NTX

## 2020-03-12 PROCEDURE — 36415 COLL VENOUS BLD VENIPUNCTURE: CPT | Mod: NTX

## 2020-03-12 PROCEDURE — 87040 BLOOD CULTURE FOR BACTERIA: CPT | Mod: NTX

## 2020-03-12 PROCEDURE — 85610 PROTHROMBIN TIME: CPT | Mod: 91,NTX

## 2020-03-12 PROCEDURE — 85025 COMPLETE CBC W/AUTO DIFF WBC: CPT | Mod: NTX

## 2020-03-12 PROCEDURE — C9399 UNCLASSIFIED DRUGS OR BIOLOG: HCPCS | Mod: NTX | Performed by: GENERAL ACUTE CARE HOSPITAL

## 2020-03-12 PROCEDURE — 94761 N-INVAS EAR/PLS OXIMETRY MLT: CPT | Mod: NTX

## 2020-03-12 PROCEDURE — 84100 ASSAY OF PHOSPHORUS: CPT | Mod: 91,NTX

## 2020-03-12 PROCEDURE — 25000003 PHARM REV CODE 250: Mod: NTX | Performed by: NURSE PRACTITIONER

## 2020-03-12 PROCEDURE — G0378 HOSPITAL OBSERVATION PER HR: HCPCS | Mod: NTX

## 2020-03-12 PROCEDURE — 99233 SBSQ HOSP IP/OBS HIGH 50: CPT | Mod: NTX,,, | Performed by: NURSE PRACTITIONER

## 2020-03-12 PROCEDURE — C9399 UNCLASSIFIED DRUGS OR BIOLOG: HCPCS | Mod: NTX | Performed by: NURSE PRACTITIONER

## 2020-03-12 PROCEDURE — 99214 OFFICE O/P EST MOD 30 MIN: CPT | Mod: GC,NTX,, | Performed by: INTERNAL MEDICINE

## 2020-03-12 PROCEDURE — 63600175 PHARM REV CODE 636 W HCPCS: Mod: NTX | Performed by: GENERAL ACUTE CARE HOSPITAL

## 2020-03-12 PROCEDURE — 63600175 PHARM REV CODE 636 W HCPCS: Mod: NTX | Performed by: NURSE PRACTITIONER

## 2020-03-12 PROCEDURE — 83735 ASSAY OF MAGNESIUM: CPT | Mod: NTX

## 2020-03-12 PROCEDURE — 99223 PR INITIAL HOSPITAL CARE,LEVL III: ICD-10-PCS | Mod: AI,NTX,, | Performed by: NURSE PRACTITIONER

## 2020-03-12 PROCEDURE — 99233 PR SUBSEQUENT HOSPITAL CARE,LEVL III: ICD-10-PCS | Mod: NTX,,, | Performed by: NURSE PRACTITIONER

## 2020-03-12 PROCEDURE — 84100 ASSAY OF PHOSPHORUS: CPT | Mod: NTX

## 2020-03-12 PROCEDURE — 99223 1ST HOSP IP/OBS HIGH 75: CPT | Mod: AI,NTX,, | Performed by: NURSE PRACTITIONER

## 2020-03-12 PROCEDURE — 80053 COMPREHEN METABOLIC PANEL: CPT | Mod: 91,NTX

## 2020-03-12 PROCEDURE — 99214 PR OFFICE/OUTPT VISIT, EST, LEVL IV, 30-39 MIN: ICD-10-PCS | Mod: GC,NTX,, | Performed by: INTERNAL MEDICINE

## 2020-03-12 PROCEDURE — 85610 PROTHROMBIN TIME: CPT | Mod: NTX

## 2020-03-12 PROCEDURE — 80053 COMPREHEN METABOLIC PANEL: CPT | Mod: NTX

## 2020-03-12 PROCEDURE — 83735 ASSAY OF MAGNESIUM: CPT | Mod: 91,NTX

## 2020-03-12 RX ORDER — LIDOCAINE HYDROCHLORIDE 10 MG/ML
1 INJECTION INFILTRATION; PERINEURAL ONCE
Status: DISCONTINUED | OUTPATIENT
Start: 2020-03-12 | End: 2020-03-13 | Stop reason: HOSPADM

## 2020-03-12 RX ORDER — INSULIN ASPART 100 [IU]/ML
15 INJECTION, SOLUTION INTRAVENOUS; SUBCUTANEOUS
Status: DISCONTINUED | OUTPATIENT
Start: 2020-03-12 | End: 2020-03-13 | Stop reason: HOSPADM

## 2020-03-12 RX ADMIN — TAMSULOSIN HYDROCHLORIDE 0.4 MG: 0.4 CAPSULE ORAL at 08:03

## 2020-03-12 RX ADMIN — ESCITALOPRAM OXALATE 20 MG: 10 TABLET ORAL at 08:03

## 2020-03-12 RX ADMIN — GABAPENTIN 600 MG: 300 CAPSULE ORAL at 08:03

## 2020-03-12 RX ADMIN — LACTULOSE 20 G: 20 SOLUTION ORAL at 02:03

## 2020-03-12 RX ADMIN — CETIRIZINE HYDROCHLORIDE 5 MG: 5 TABLET ORAL at 08:03

## 2020-03-12 RX ADMIN — INSULIN ASPART 3 UNITS: 100 INJECTION, SOLUTION INTRAVENOUS; SUBCUTANEOUS at 12:03

## 2020-03-12 RX ADMIN — CYCLOBENZAPRINE HYDROCHLORIDE 10 MG: 5 TABLET, FILM COATED ORAL at 09:03

## 2020-03-12 RX ADMIN — GABAPENTIN 600 MG: 300 CAPSULE ORAL at 02:03

## 2020-03-12 RX ADMIN — SPIRONOLACTONE 150 MG: 50 TABLET ORAL at 08:03

## 2020-03-12 RX ADMIN — INSULIN ASPART 15 UNITS: 100 INJECTION, SOLUTION INTRAVENOUS; SUBCUTANEOUS at 04:03

## 2020-03-12 RX ADMIN — OXYBUTYNIN CHLORIDE 5 MG: 5 TABLET ORAL at 08:03

## 2020-03-12 RX ADMIN — RIFAXIMIN 550 MG: 550 TABLET ORAL at 08:03

## 2020-03-12 RX ADMIN — PANTOPRAZOLE SODIUM 40 MG: 40 TABLET, DELAYED RELEASE ORAL at 08:03

## 2020-03-12 RX ADMIN — CIPROFLOXACIN 500 MG: 500 TABLET, FILM COATED ORAL at 08:03

## 2020-03-12 RX ADMIN — HYDROCODONE BITARTRATE AND ACETAMINOPHEN 1 TABLET: 10; 325 TABLET ORAL at 02:03

## 2020-03-12 RX ADMIN — Medication 24 G: at 08:03

## 2020-03-12 RX ADMIN — FINASTERIDE 5 MG: 5 TABLET, FILM COATED ORAL at 08:03

## 2020-03-12 RX ADMIN — FUROSEMIDE 160 MG: 40 TABLET ORAL at 08:03

## 2020-03-12 RX ADMIN — LACTULOSE 20 G: 20 SOLUTION ORAL at 08:03

## 2020-03-12 RX ADMIN — INSULIN DETEMIR 25 UNITS: 100 INJECTION, SOLUTION SUBCUTANEOUS at 09:03

## 2020-03-12 RX ADMIN — INSULIN DETEMIR 55 UNITS: 100 INJECTION, SOLUTION SUBCUTANEOUS at 12:03

## 2020-03-12 NOTE — ASSESSMENT & PLAN NOTE
Hx of ESLD 2/2 alcoholic cirrhosis. Patient currently being worked up for liver transplant  - continue management per primary team

## 2020-03-12 NOTE — ASSESSMENT & PLAN NOTE
- ESLD 2/2 ETOH listed for liver transplant with MELD 22  - s/p umbilical hernia repair 2/27/20    MELD-Na score: 15 at 3/12/2020  6:53 AM  MELD score: 15 at 3/12/2020  6:53 AM  Calculated from:  Serum Creatinine: 0.6 mg/dL (Rounded to 1 mg/dL) at 3/12/2020  6:53 AM  Serum Sodium: 139 mmol/L (Rounded to 137 mmol/L) at 3/12/2020  6:53 AM  Total Bilirubin: 2.5 mg/dL at 3/12/2020  6:53 AM  INR(ratio): 1.6 at 3/12/2020  6:53 AM  Age: 59 years

## 2020-03-12 NOTE — H&P
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 Surgery uncomplicated but has had recurrent abdominal pain and increased drainage from surgical incision. Patient recently admitted 2/27-3/2 with similar symptoms, underwent paracentesis 2/27, which was negative for peritonitis. He re-presented to [facility] on 3/7/20 with c/o low grade fever and abdominal pain (Tmax 100.3), discharged on PO ciprofloxacin. Patient is being admitted for infectious work up. His last fever was 4 days ago at OSH, no further fever or chills. He complains of abd pain and distention, serous drg from LLQ old paracentesis site with suture in place. Umbilical hernia repair incision CDI, no s/s infection, small umbilical hernia easily reducible with no tenderness. He denies N/V, SOB, chest pain, change in bowel or bladder function.     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    Esophageal varices     GERD (gastroesophageal reflux disease)     Hepatic encephalopathy 7/8/2014    Hepatitis C virus infection 07/08/2014    tx with interferon 3-4 mos- stopped for unclear reasons Tattoos-first at age 17 only risk factor (HCVAB negative 08/2017)    HTN (hypertension) 7/8/2014    Hypertension     Insulin dependent diabetes mellitus     Renal mass, left 7/8/2014    6.3 x 5.7 x 5.6 complex mass    Splenomegaly 7/8/2014    Umbilical hernia 7/8/2014       Past Surgical History:   Procedure  Laterality Date    CARPAL TUNNEL RELEASE Bilateral     CHOLECYSTECTOMY      lap    COLONOSCOPY N/A 9/8/2017    Procedure: COLONOSCOPY;  Surgeon: Savannah Llanos MD;  Location[facility];  Service: Endoscopy;  Laterality: N/A;    COLONOSCOPY Left 3/14/2018    Procedure: COLONOSCOPY;  Surgeon: Savannah Llanos MD;  Location: [facility];  Service: Endoscopy;  Laterality: Left;    ESOPHAGOGASTRODUODENOSCOPY  01/2014    ESOPHAGOGASTRODUODENOSCOPY  02/15/2017    grade II varices    ESOPHAGOGASTRODUODENOSCOPY  04/10/2017    grade I varices    ESOPHAGOGASTRODUODENOSCOPY N/A 10/18/2019    Procedure: EGD (ESOPHAGOGASTRODUODENOSCOPY);  Surgeon: Flavio Escalera MD;  Location: [facility] (2ND FLR);  Service: Endoscopy;  Laterality: N/A;    ESOPHAGOGASTRODUODENOSCOPY W/ BANDING  01/26/2017    grade II varices    HERNIA REPAIR      NECK SURGERY      fusion on C5 and C6    ULNAR NERVE TRANSPOSITION Left     UMBILICAL HERNIA REPAIR N/A 2/22/2020    Procedure: REPAIR, HERNIA, PRIMARY WIHTOUT MESH AND PLACEMENT OF DRAIN;  Surgeon: Sherri Brunner MD;  Location: [facility] F[facility];  Service: Transplant;  Laterality: N/A;    vericose veins removed         Review of patient's allergies indicates:  No Known Allergies    Family History     Problem Relation (Age of Onset)    Alcohol abuse Brother    Cancer Brother    Hyperlipidemia Mother    No Known Problems Father (36), Sister, Sister        Tobacco Use    Smoking status: Former Smoker     Types: Cigarettes     Last attempt to quit: 1/4/2010     Years since quitting: 10.1    Smokeless tobacco: Former User     Types: Chew     Quit date: 2/24/1990    Tobacco comment: former 1 pack/week quit 1/4/2010   Substance and Sexual Activity    Alcohol use: No     Comment: former heavy beer but quit 1/4/2010    Drug use: No    Sexual activity: Never     Comment:        PTA Medications   Medication Sig    ciprofloxacin HCl (CIPRO) 500 MG tablet Take 1 tablet (500 mg  total) by mouth once daily.    cyclobenzaprine (FLEXERIL) 10 MG tablet Take 10 mg by mouth 2 (two) times daily.     EASY TOUCH INSULIN SYRINGE 1 mL 31 gauge x 5/16 Syrg     escitalopram oxalate (LEXAPRO) 20 MG tablet Take 20 mg by mouth once daily.     finasteride (PROSCAR) 5 mg tablet Take 1 tablet (5 mg total) by mouth once daily.    furosemide (LASIX) 40 MG tablet Take 4 tablets (160 mg total) by mouth once daily.    gabapentin (NEURONTIN) 600 MG tablet Take 600 mg by mouth 3 (three) times daily.     HYDROcodone-acetaminophen (NORCO)  mg per tablet Take 1 tablet by mouth every 12 (twelve) hours as needed for Pain.    insulin aspart U-100 (NOVOLOG) 100 unit/mL injection Inject 24 Units into the skin 3 (three) times daily before meals.    insulin detemir U-100 (LEVEMIR) 100 unit/mL injection Inject 74 Units into the skin every evening.    lactulose (CHRONULAC) 10 gram/15 mL solution Take 30 mLs by mouth 3 (three) times daily. May take up to  4 to 5 times daily if needed for bowel movements    levocetirizine (XYZAL) 5 MG tablet Take 1 tablet (5 mg total) by mouth every evening.    oxybutynin (DITROPAN) 5 MG Tab Take 5 mg by mouth once daily.    pantoprazole (PROTONIX) 40 MG tablet Take 1 tablet (40 mg total) by mouth once daily.    rifAXIMin (XIFAXAN) 550 mg Tab Take 1 tablet (550 mg total) by mouth 2 (two) times daily.    spironolactone (ALDACTONE) 100 MG tablet Take 1.5 tablets (150 mg total) by mouth once daily.    tamsulosin (FLOMAX) 0.4 mg Cp24 Take 1 capsule (0.4 mg total) by mouth once daily.    TRUE METRIX GLUCOSE TEST STRIP Strp     TRUEPLUS LANCETS 30 gauge [facility] [place] [facility]        Review of Systems   Constitutional: Negative for activity change, appetite change, chills, diaphoresis and fever.   HENT: Negative for congestion, sinus pressure, sinus pain, sore throat and trouble swallowing.    Eyes: Negative for visual disturbance.   Respiratory: Negative for chest tightness, shortness of breath  and stridor.    Cardiovascular: Positive for leg swelling. Negative for chest pain and palpitations.   Gastrointestinal: Positive for abdominal distention and abdominal pain. Negative for constipation, diarrhea, nausea and vomiting.   Genitourinary: Negative for decreased urine volume, difficulty urinating, dysuria and flank pain.   Musculoskeletal: Negative for neck pain and neck stiffness.   Skin: Positive for wound. Negative for color change and rash.   Allergic/Immunologic: Negative for immunocompromised state.   Neurological: Negative for dizziness, tremors, syncope, light-headedness and headaches.   Psychiatric/Behavioral: Negative for agitation, confusion, decreased concentration and hallucinations. The patient is not nervous/anxious.      Objective:     Vital Signs (Most Recent):  Temp: 98.6 °F (37 °C) (03/11/20 2342) Vital Signs (24h Range):  Temp:  [98.6 °F (37 °C)] 98.6 °F (37 °C)     Weight: 95.2 kg (209 lb 14.1 oz)  Body mass index is 29.27 kg/m².    No intake or output data in the 24 hours ending 03/12/20 0000    Physical Exam   Constitutional: He is oriented to person, place, and time. No distress.   HENT:   Head: Normocephalic and atraumatic.   Eyes: Right eye exhibits no discharge. Left eye exhibits no discharge. No scleral icterus.   Neck: Normal range of motion. Neck supple.   Cardiovascular: Normal rate, regular rhythm, normal heart sounds and intact distal pulses.   Pulmonary/Chest: Effort normal and breath sounds normal. He has no wheezes. He has no rales.   Abdominal: Soft. Bowel sounds are normal. He exhibits distension. There is no tenderness. There is no guarding. A hernia (small umbilical hernia easily reducible) is present.       Musculoskeletal: He exhibits no edema.   Neurological: He is alert and oriented to person, place, and time. No cranial nerve deficit.   Skin: Skin is warm and dry. Capillary refill takes less than 2 seconds. He is not diaphoretic.   Psychiatric: He has a normal  mood and affect. His behavior is normal. Judgment and thought content normal.   Nursing note and vitals reviewed.      Laboratory:  pending    Diagnostic Results:  I have personally reviewed all pertinent imaging studies.    Assessment/Plan:     * Abdominal pain  - Admit with abd pain, distention, and low grade fever on 3/7 (tmax 100.3)  - Infectious work up: CXR, blood and urine cultures  - Para in AM to r/o SBP  - continue cipro    End stage liver disease  - ESLD 2/2 ETOH listed for liver transplant with MELD 22  - s/p umbilical hernia repair 2/27/20    MELD-Na score: 17 at 3/2/2020  7:12 AM  MELD score: 15 at 3/2/2020  7:12 AM  Calculated from:  Serum Creatinine: 0.7 mg/dL (Rounded to 1 mg/dL) at 3/2/2020  7:12 AM  Serum Sodium: 134 mmol/L at 3/2/2020  7:12 AM  Total Bilirubin: 1.9 mg/dL at 3/2/2020  7:12 AM  INR(ratio): 1.8 at 3/2/2020  7:12 AM  Age: 59 years    Acquired coagulation factor deficiency  - 2/2 ESLD  - monitor PT/INR    Umbilical hernia without obstruction and without gangrene  - s/p hernia repair 2/22. Staples due to be removed around 3/14 (21 days post-op)  - admitted 2/27-3/2 with copious serous drainage from incision  - incision CDI, no SSI infection    Type 2 diabetes mellitus without complication, with long-term current use of insulin  - resume 75% home insulin doses, reports labile BG but denies any hypoglycemia.   - consult endocrine for assistnace    Ascites due to alcoholic cirrhosis  - paracentesis 2/27 negative for peritonitis  - Para to r/o SBP  - continue lasix and spironolactone     Hepatic encephalopathy  - chronic  - continue lactulose and rifaximin        Aisha Joshi, TERE  Liver Transplant  [facility]

## 2020-03-12 NOTE — NURSING
Pre-breakfast CBG 50, patient is asymptomatic. Eating breakfast at this time. Administered 24g glucose tablets, will recheck.     0850: rechecked CBG 89

## 2020-03-12 NOTE — SUBJECTIVE & OBJECTIVE
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    Esophageal varices     GERD (gastroesophageal reflux disease)     Hepatic encephalopathy 7/8/2014    Hepatitis C virus infection 07/08/2014    tx with interferon 3-4 mos- stopped for unclear reasons Tattoos-first at age 17 only risk factor (HCVAB negative 08/2017)    HTN (hypertension) 7/8/2014    Hypertension     Insulin dependent diabetes mellitus     Renal mass, left 7/8/2014    6.3 x 5.7 x 5.6 complex mass    Splenomegaly 7/8/2014    Umbilical hernia 7/8/2014       Past Surgical History:   Procedure Laterality Date    CARPAL TUNNEL RELEASE Bilateral     CHOLECYSTECTOMY      lap    COLONOSCOPY N/A 9/8/2017    Procedure: COLONOSCOPY;  Surgeon: Savannah Llanos MD;  Location: [facility];  Service: Endoscopy;  Laterality: N/A;    COLONOSCOPY Left 3/14/2018    Procedure: COLONOSCOPY;  Surgeon: Savannah Llanos MD;  Location: [facility];  Service: Endoscopy;  Laterality: Left;    ESOPHAGOGASTRODUODENOSCOPY  01/2014    ESOPHAGOGASTRODUODENOSCOPY  02/15/2017    grade II varices    ESOPHAGOGASTRODUODENOSCOPY  04/10/2017    grade I varices    ESOPHAGOGASTRODUODENOSCOPY N/A 10/18/2019    Procedure: EGD (ESOPHAGOGASTRODUODENOSCOPY);  Surgeon: Flavio Escalera MD;  Location: [facility] ([address] [place] [address]);  Service: Endoscopy;  Laterality: N/A;    ESOPHAGOGASTRODUODENOSCOPY W/ BANDING  01/26/2017    grade II varices    HERNIA REPAIR      NECK SURGERY      fusion on C5 and C6    ULNAR NERVE TRANSPOSITION Left     UMBILICAL HERNIA REPAIR N/A 2/22/2020    Procedure: REPAIR, HERNIA, PRIMARY WIHTOUT MESH AND PLACEMENT OF DRAIN;  Surgeon: Sherri Brunner MD;  Location[facility] OR [address] [place] [address];  Service: Transplant;  Laterality: N/A;    vericose veins removed         Review of patient's allergies  indicates:  No Known Allergies    Family History     Problem Relation (Age of Onset)    Alcohol abuse Brother    Cancer Brother    Hyperlipidemia Mother    No Known Problems Father (36), Sister, Sister        Tobacco Use    Smoking status: Former Smoker     Types: Cigarettes     Last attempt to quit: 1/4/2010     Years since quitting: 10.1    Smokeless tobacco: Former User     Types: Chew     Quit date: 2/24/1990    Tobacco comment: former 1 pack/week quit 1/4/2010   Substance and Sexual Activity    Alcohol use: No     Comment: former heavy beer but quit 1/4/2010    Drug use: No    Sexual activity: Never     Comment:        PTA Medications   Medication Sig    ciprofloxacin HCl (CIPRO) 500 MG tablet Take 1 tablet (500 mg total) by mouth once daily.    cyclobenzaprine (FLEXERIL) 10 MG tablet Take 10 mg by mouth 2 (two) times daily.     EASY TOUCH INSULIN SYRINGE 1 mL 31 gauge x 5/16 Syrg     escitalopram oxalate (LEXAPRO) 20 MG tablet Take 20 mg by mouth once daily.     finasteride (PROSCAR) 5 mg tablet Take 1 tablet (5 mg total) by mouth once daily.    furosemide (LASIX) 40 MG tablet Take 4 tablets (160 mg total) by mouth once daily.    gabapentin (NEURONTIN) 600 MG tablet Take 600 mg by mouth 3 (three) times daily.     HYDROcodone-acetaminophen (NORCO)  mg per tablet Take 1 tablet by mouth every 12 (twelve) hours as needed for Pain.    insulin aspart U-100 (NOVOLOG) 100 unit/mL injection Inject 24 Units into the skin 3 (three) times daily before meals.    insulin detemir U-100 (LEVEMIR) 100 unit/mL injection Inject 74 Units into the skin every evening.    lactulose (CHRONULAC) 10 gram/15 mL solution Take 30 mLs by mouth 3 (three) times daily. May take up to  4 to 5 times daily if needed for bowel movements    levocetirizine (XYZAL) 5 MG tablet Take 1 tablet (5 mg total) by mouth every evening.    oxybutynin (DITROPAN) 5 MG Tab Take 5 mg by mouth once daily.    pantoprazole  (PROTONIX) 40 MG tablet Take 1 tablet (40 mg total) by mouth once daily.    rifAXIMin (XIFAXAN) 550 mg Tab Take 1 tablet (550 mg total) by mouth 2 (two) times daily.    spironolactone (ALDACTONE) 100 MG tablet Take 1.5 tablets (150 mg total) by mouth once daily.    tamsulosin (FLOMAX) 0.4 mg Cp24 Take 1 capsule (0.4 mg total) by mouth once daily.    TRUE METRIX GLUCOSE TEST STRIP Strp     TRUEPLUS LANCETS 30 gauge [facility]        Review of Systems   Constitutional: Negative for activity change, appetite change, chills, diaphoresis and fever.   HENT: Negative for congestion, sinus pressure, sinus pain, sore throat and trouble swallowing.    Eyes: Negative for visual disturbance.   Respiratory: Negative for chest tightness, shortness of breath and stridor.    Cardiovascular: Positive for leg swelling. Negative for chest pain and palpitations.   Gastrointestinal: Positive for abdominal distention and abdominal pain. Negative for constipation, diarrhea, nausea and vomiting.   Genitourinary: Negative for decreased urine volume, difficulty urinating, dysuria and flank pain.   Musculoskeletal: Negative for neck pain and neck stiffness.   Skin: Positive for wound. Negative for color change and rash.   Allergic/Immunologic: Negative for immunocompromised state.   Neurological: Negative for dizziness, tremors, syncope, light-headedness and headaches.   Psychiatric/Behavioral: Negative for agitation, confusion, decreased concentration and hallucinations. The patient is not nervous/anxious.      Objective:     Vital Signs (Most Recent):  Temp: 98.7 °F (37.1 °C) (03/12/20 0800)  Pulse: 87 (03/12/20 1004)  Resp: 18 (03/12/20 1004)  BP: 118/77 (03/12/20 1004)  SpO2: 99 % (03/12/20 1004) Vital Signs (24h Range):  Temp:  [98.6 °F (37 °C)-98.7 °F (37.1 °C)] 98.7 °F (37.1 °C)  Pulse:  [] 87  Resp:  [14-18] 18  SpO2:  [98 %-99 %] 99 %  BP: (113-129)/(74-84) 118/77     Weight: 91.7 kg (202 lb 2.6 oz)  Body mass index is 28.2  kg/m².      Intake/Output Summary (Last 24 hours) at 3/12/2020 1141  Last data filed at 3/12/2020 1000  Gross per 24 hour   Intake 300 ml   Output 400 ml   Net -100 ml       Physical Exam   Constitutional: He is oriented to person, place, and time. No distress.   HENT:   Head: Normocephalic and atraumatic.   Eyes: Right eye exhibits no discharge. Left eye exhibits no discharge. No scleral icterus.   Neck: Normal range of motion. Neck supple.   Cardiovascular: Normal rate, regular rhythm, normal heart sounds and intact distal pulses.   Pulmonary/Chest: Effort normal and breath sounds normal. He has no wheezes. He has no rales.   Abdominal: Soft. Bowel sounds are normal. He exhibits distension. There is no tenderness. There is no guarding. A hernia (small umbilical hernia easily reducible) is present.       Musculoskeletal: He exhibits no edema.   Neurological: He is alert and oriented to person, place, and time. No cranial nerve deficit.   Skin: Skin is warm and dry. Capillary refill takes less than 2 seconds. He is not diaphoretic.   Psychiatric: He has a normal mood and affect. His behavior is normal. Judgment and thought content normal.   Nursing note and vitals reviewed.      Laboratory:  Lab Results   Component Value Date    WBC 2.58 (L) 03/12/2020    HGB 8.1 (L) 03/12/2020    HCT 27.1 (L) 03/12/2020    MCV 88 03/12/2020    PLT 40 (L) 03/12/2020       CMP  Sodium   Date Value Ref Range Status   03/12/2020 139 136 - 145 mmol/L Final   10/18/2016 136  Final     Potassium   Date Value Ref Range Status   03/12/2020 3.4 (L) 3.5 - 5.1 mmol/L Final   10/18/2016 4.0  Final     Chloride   Date Value Ref Range Status   03/12/2020 109 95 - 110 mmol/L Final   10/18/2016 103  Final     CO2   Date Value Ref Range Status   03/12/2020 24 23 - 29 mmol/L Final   10/18/2016 24.0  Final     Glucose   Date Value Ref Range Status   03/12/2020 56 (L) 70 - 110 mg/dL Final   11/19/2015 433  Final     BUN, Bld   Date Value Ref Range  Status   03/12/2020 4 (L) 6 - 20 mg/dL Final     BUN   Date Value Ref Range Status   03/20/2017 5 4 - 21 mg/dL Final     Creatinine   Date Value Ref Range Status   03/12/2020 0.6 0.5 - 1.4 mg/dL Final   10/18/2016 0.8 mg/dL Final     Calcium   Date Value Ref Range Status   03/12/2020 7.7 (L) 8.7 - 10.5 mg/dL Final   10/18/2016 8.6 mg/dL Final     Total Protein   Date Value Ref Range Status   03/12/2020 5.4 (L) 6.0 - 8.4 g/dL Final     Albumin   Date Value Ref Range Status   03/12/2020 2.9 (L) 3.5 - 5.2 g/dL Final   10/18/2016 3.80  Final     Total Bilirubin   Date Value Ref Range Status   03/12/2020 2.5 (H) 0.1 - 1.0 mg/dL Final     Comment:     For infants and newborns, interpretation of results should be based  on gestational age, weight and in agreement with clinical  observations.  Premature Infant recommended reference ranges:  Up to 24 hours.............<8.0 mg/dL  Up to 48 hours............<12.0 mg/dL  3-5 days..................<15.0 mg/dL  6-29 days.................<15.0 mg/dL       Alkaline Phosphatase   Date Value Ref Range Status   03/12/2020 101 55 - 135 U/L Final     AST   Date Value Ref Range Status   03/12/2020 34 10 - 40 U/L Final   10/18/2016 32 U/L Final     ALT   Date Value Ref Range Status   03/12/2020 17 10 - 44 U/L Final   10/18/2016 32 U/L Final     Anion Gap   Date Value Ref Range Status   03/12/2020 6 (L) 8 - 16 mmol/L Final     eGFR if    Date Value Ref Range Status   03/12/2020 >60.0 >60 mL/min/1.73 m^2 Final     eGFR if non    Date Value Ref Range Status   03/12/2020 >60.0 >60 mL/min/1.73 m^2 Final     Comment:     Calculation used to obtain the estimated glomerular filtration  rate (eGFR) is the CKD-EPI equation.            Diagnostic Results:  Abdominal ultrasound with doppler: pending

## 2020-03-12 NOTE — HOSPITAL COURSE
Admitted with history of low grade fever and abdominal pain.  Has been afebrile since admission.  Attempted paracentesis this am without fluid to drain.  Will obtain abdominal ultrasound with doppler today.  Otherwise feeling well with no symptoms.

## 2020-03-12 NOTE — ASSESSMENT & PLAN NOTE
- Admit with abd pain, distention, and low grade fever on 3/7 (tmax 100.3)  - Infectious work up: CXR, blood and urine cultures  - unable to perform Para in to r/o SBP  - continue cipro

## 2020-03-12 NOTE — ASSESSMENT & PLAN NOTE
- Admit with abd pain, distention, and low grade fever on 3/7 (tmax 100.3)  - Infectious work up: CXR, blood and urine cultures  - Para in AM to r/o SBP  - continue cipro

## 2020-03-12 NOTE — HPI
Mr. 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 He is currently being worked up for possible liver transplant. Patient was recently admitted 2/27-3/2 following hernia repair with similar symptoms and underwent paracentesis which was negative for peritonitis. He re-presented to [facility] on 3/7 with low grade fever and abdominal pain, at which time he was discharged on po ciprofloxacin. Patient presents again with same symptoms and is admitted for infectious workup. Endocrinology is consulted for diabetes management.

## 2020-03-12 NOTE — PROGRESS NOTES
Pt paracentesis cancelled per MD Carballo. No visble fluid pocket noted on ultrasound. VSS. Report called to RN [phone]. Transport requested. Will continue to monitor.

## 2020-03-12 NOTE — SUBJECTIVE & OBJECTIVE
"Interval HPI:   Overnight events:  on admission last night. Patient was given 55U detemir and 3U sliding scale. BGL 56 this morning.  Eatin%  Nausea: No  Hypoglycemia and intervention: Yes  Fever: No  TPN and/or TF: No    /79 (BP Location: Right arm, Patient Position: Lying)   Pulse 89   Temp 98.4 °F (36.9 °C) (Oral)   Resp 13   Ht 5' 11" (1.803 m)   Wt 91.7 kg (202 lb 2.6 oz)   SpO2 99%   BMI 28.20 kg/m²     Labs Reviewed and Include    Recent Labs   Lab 20  0653   GLU 56*   CALCIUM 7.7*   ALBUMIN 2.9*   PROT 5.4*      K 3.4*   CO2 24      BUN 4*   CREATININE 0.6   ALKPHOS 101   ALT 17   AST 34   BILITOT 2.5*     Lab Results   Component Value Date    WBC 2.58 (L) 2020    HGB 8.1 (L) 2020    HCT 27.1 (L) 2020    MCV 88 2020    PLT 40 (L) 2020     No results for input(s): TSH, FREET4 in the last 168 hours.  Lab Results   Component Value Date    HGBA1C 6.7 (H) 2020       Nutritional status:   Body mass index is 28.2 kg/m².  Lab Results   Component Value Date    ALBUMIN 2.9 (L) 2020    ALBUMIN 2.9 (L) 2020    ALBUMIN 3.1 (L) 2020     No results found for: PREALBUMIN    Estimated Creatinine Clearance: 155.5 mL/min (based on SCr of 0.6 mg/dL).    Accu-Checks  Recent Labs     20  0010 20  0833 20  0923 20  1251   POCTGLUCOSE 410* 50* 103 163*       Current Medications and/or Treatments Impacting Glycemic Control  Immunotherapy:    Immunosuppressants     None        Steroids:   Hormones (From admission, onward)    None        Pressors:    Autonomic Drugs (From admission, onward)    None        Hyperglycemia/Diabetes Medications:   Antihyperglycemics (From admission, onward)    Start     Stop Route Frequency Ordered    20 2100  insulin detemir U-100 pen 25 Units      -- SubQ Nightly 20 1002    03/12/20 1100  insulin aspart U-100 injection 15 Units      -- SubQ 3 times daily before meals " 03/12/20 1002    03/12/20 0040  insulin aspart U-100 pen 0-5 Units      -- SubQ Before meals & nightly PRN 03/11/20 [id number]

## 2020-03-12 NOTE — CONSULTS
"[facility]  Endocrinology  Diabetes Consult Note    Consult Requested by: Nakul Teresa MD   Reason for admit: Abdominal pain    HISTORY OF   PRESENT ILLNESS:  Mr. 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 He is currently being worked up for possible liver transplant. Patient was recently admitted -3/2 following hernia repair with similar symptoms and underwent paracentesis which was negative for peritonitis. He re-presented to [facility] on 3/7 with low grade fever and abdominal pain, at which time he was discharged on po ciprofloxacin. Patient presents again with same symptoms and is admitted for infectious workup. Endocrinology is consulted for diabetes management.    Interval HPI:   Overnight events:  on admission last night. Patient was given 55U detemir and 3U sliding scale. BGL 56 this morning.  Eatin%  Nausea: No  Hypoglycemia and intervention: Yes  Fever: No  TPN and/or TF: No    /79 (BP Location: Right arm, Patient Position: Lying)   Pulse 89   Temp 98.4 °F (36.9 °C) (Oral)   Resp 13   Ht 5' 11" (1.803 m)   Wt 91.7 kg (202 lb 2.6 oz)   SpO2 99%   BMI 28.20 kg/m²      Labs Reviewed and Include    Recent Labs   Lab 20  0653   GLU 56*   CALCIUM 7.7*   ALBUMIN 2.9*   PROT 5.4*      K 3.4*   CO2 24      BUN 4*   CREATININE 0.6   ALKPHOS 101   ALT 17   AST 34   BILITOT 2.5*     Lab Results   Component Value Date    WBC 2.58 (L) 2020    HGB 8.1 (L) 2020    HCT 27.1 (L) 2020    MCV 88 2020    PLT 40 (L) 2020     No results for input(s): TSH, FREET4 in the last 168 hours.  Lab Results   Component Value Date    HGBA1C 6.7 (H) 2020       Nutritional status:   Body mass index is 28.2 kg/m².  Lab Results   Component Value Date    ALBUMIN 2.9 (L) 2020    ALBUMIN 2.9 " (L) 03/12/2020    ALBUMIN 3.1 (L) 03/02/2020     No results found for: PREALBUMIN    Estimated Creatinine Clearance: 155.5 mL/min (based on SCr of 0.6 mg/dL).    Accu-Checks  Recent Labs     03/12/20  0010 03/12/20  0833 03/12/20  0923 03/12/20  1251   POCTGLUCOSE 410* 50* 103 163*       Current Medications and/or Treatments Impacting Glycemic Control  Immunotherapy:    Immunosuppressants     None        Steroids:   Hormones (From admission, onward)    None        Pressors:    Autonomic Drugs (From admission, onward)    None        Hyperglycemia/Diabetes Medications:   Antihyperglycemics (From admission, onward)    Start     Stop Route Frequency Ordered    03/12/20 2100  insulin detemir U-100 pen 25 Units      -- SubQ Nightly 03/12/20 1002    03/12/20 1100  insulin aspart U-100 injection 15 Units      -- SubQ 3 times daily before meals 03/12/20 1002    03/12/20 0040  insulin aspart U-100 pen 0-5 Units      -- SubQ Before meals & nightly PRN 03/11/20 2340          Labs Reviewed and Include   Recent Labs   Lab 03/12/20  0653   GLU 56*   CALCIUM 7.7*   ALBUMIN 2.9*   PROT 5.4*      K 3.4*   CO2 24      BUN 4*   CREATININE 0.6   ALKPHOS 101   ALT 17   AST 34   BILITOT 2.5*     Lab Results   Component Value Date    WBC 2.58 (L) 03/12/2020    HGB 8.1 (L) 03/12/2020    HCT 27.1 (L) 03/12/2020    MCV 88 03/12/2020    PLT 40 (L) 03/12/2020     No results for input(s): TSH, FREET4 in the last 168 hours.  Lab Results   Component Value Date    HGBA1C 6.7 (H) 02/28/2020       Nutritional status:   Body mass index is 28.2 kg/m².  Lab Results   Component Value Date    ALBUMIN 2.9 (L) 03/12/2020    ALBUMIN 2.9 (L) 03/12/2020    ALBUMIN 3.1 (L) 03/02/2020     No results found for: PREALBUMIN    Estimated Creatinine Clearance: 155.5 mL/min (based on SCr of 0.6 mg/dL).    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 No evidence of DKA.  Patient was given 55U detemir and 3U sliding scale > BGL 50 the following morning    Plan:  - 25U detemir nightly + 15U aspart TIDWM  - glucose checks AC QHS + LDSSI  - may require higher doses of insulin, will continue to monitor and adjust accordingly  - glucose goal: 140-180      End stage liver disease  Hx of ESLD 2/2 alcoholic cirrhosis. Patient currently being worked up for liver transplant  - continue management per primary team          Plan discussed with patient, family, and RN at bedside.     Wali Traylor MD  Endocrinology  [facility]

## 2020-03-12 NOTE — SUBJECTIVE & OBJECTIVE
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    Esophageal varices     GERD (gastroesophageal reflux disease)     Hepatic encephalopathy 7/8/2014    Hepatitis C virus infection 07/08/2014    tx with interferon 3-4 mos- stopped for unclear reasons Tattoos-first at age 17 only risk factor (HCVAB negative 08/2017)    HTN (hypertension) 7/8/2014    Hypertension     Insulin dependent diabetes mellitus     Renal mass, left 7/8/2014    6.3 x 5.7 x 5.6 complex mass    Splenomegaly 7/8/2014    Umbilical hernia 7/8/2014       Past Surgical History:   Procedure Laterality Date    CARPAL TUNNEL RELEASE Bilateral     CHOLECYSTECTOMY      lap    COLONOSCOPY N/A 9/8/2017    Procedure: COLONOSCOPY;  Surgeon: Savannah Llanos MD;  Location: [facility];  Service: Endoscopy;  Laterality: N/A;    COLONOSCOPY Left 3/14/2018    Procedure: COLONOSCOPY;  Surgeon: Savannah Llanos MD;  Location: [facility];  Service: Endoscopy;  Laterality: Left;    ESOPHAGOGASTRODUODENOSCOPY  01/2014    ESOPHAGOGASTRODUODENOSCOPY  02/15/2017    grade II varices    ESOPHAGOGASTRODUODENOSCOPY  04/10/2017    grade I varices    ESOPHAGOGASTRODUODENOSCOPY N/A 10/18/2019    Procedure: EGD (ESOPHAGOGASTRODUODENOSCOPY);  Surgeon: Flavio Escalera MD;  Location: [facility] ([address] [place] [address]);  Service: Endoscopy;  Laterality: N/A;    ESOPHAGOGASTRODUODENOSCOPY W/ BANDING  01/26/2017    grade II varices    HERNIA REPAIR      NECK SURGERY      fusion on C5 and C6    ULNAR NERVE TRANSPOSITION Left     UMBILICAL HERNIA REPAIR N/A 2/22/2020    Procedure: REPAIR, HERNIA, PRIMARY WIHTOUT MESH AND PLACEMENT OF DRAIN;  Surgeon: Sherri Brunner MD;  Location: [facility] OR [address] [place] [address];  Service: Transplant;  Laterality: N/A;    vericose veins removed         Review of patient's allergies  indicates:  No Known Allergies    Family History     Problem Relation (Age of Onset)    Alcohol abuse Brother    Cancer Brother    Hyperlipidemia Mother    No Known Problems Father (36), Sister, Sister        Tobacco Use    Smoking status: Former Smoker     Types: Cigarettes     Last attempt to quit: 1/4/2010     Years since quitting: 10.1    Smokeless tobacco: Former User     Types: Chew     Quit date: 2/24/1990    Tobacco comment: former 1 pack/week quit 1/4/2010   Substance and Sexual Activity    Alcohol use: No     Comment: former heavy beer but quit 1/4/2010    Drug use: No    Sexual activity: Never     Comment:        PTA Medications   Medication Sig    ciprofloxacin HCl (CIPRO) 500 MG tablet Take 1 tablet (500 mg total) by mouth once daily.    cyclobenzaprine (FLEXERIL) 10 MG tablet Take 10 mg by mouth 2 (two) times daily.     EASY TOUCH INSULIN SYRINGE 1 mL 31 gauge x 5/16 Syrg     escitalopram oxalate (LEXAPRO) 20 MG tablet Take 20 mg by mouth once daily.     finasteride (PROSCAR) 5 mg tablet Take 1 tablet (5 mg total) by mouth once daily.    furosemide (LASIX) 40 MG tablet Take 4 tablets (160 mg total) by mouth once daily.    gabapentin (NEURONTIN) 600 MG tablet Take 600 mg by mouth 3 (three) times daily.     HYDROcodone-acetaminophen (NORCO)  mg per tablet Take 1 tablet by mouth every 12 (twelve) hours as needed for Pain.    insulin aspart U-100 (NOVOLOG) 100 unit/mL injection Inject 24 Units into the skin 3 (three) times daily before meals.    insulin detemir U-100 (LEVEMIR) 100 unit/mL injection Inject 74 Units into the skin every evening.    lactulose (CHRONULAC) 10 gram/15 mL solution Take 30 mLs by mouth 3 (three) times daily. May take up to  4 to 5 times daily if needed for bowel movements    levocetirizine (XYZAL) 5 MG tablet Take 1 tablet (5 mg total) by mouth every evening.    oxybutynin (DITROPAN) 5 MG Tab Take 5 mg by mouth once daily.    pantoprazole  (PROTONIX) 40 MG tablet Take 1 tablet (40 mg total) by mouth once daily.    rifAXIMin (XIFAXAN) 550 mg Tab Take 1 tablet (550 mg total) by mouth 2 (two) times daily.    spironolactone (ALDACTONE) 100 MG tablet Take 1.5 tablets (150 mg total) by mouth once daily.    tamsulosin (FLOMAX) 0.4 mg Cp24 Take 1 capsule (0.4 mg total) by mouth once daily.    TRUE METRIX GLUCOSE TEST STRIP Strp     TRUEPLUS LANCETS 30 gauge [facility] [place] [place]        Review of Systems   Constitutional: Negative for activity change, appetite change, chills, diaphoresis and fever.   HENT: Negative for congestion, sinus pressure, sinus pain, sore throat and trouble swallowing.    Eyes: Negative for visual disturbance.   Respiratory: Negative for chest tightness, shortness of breath and stridor.    Cardiovascular: Positive for leg swelling. Negative for chest pain and palpitations.   Gastrointestinal: Positive for abdominal distention and abdominal pain. Negative for constipation, diarrhea, nausea and vomiting.   Genitourinary: Negative for decreased urine volume, difficulty urinating, dysuria and flank pain.   Musculoskeletal: Negative for neck pain and neck stiffness.   Skin: Positive for wound. Negative for color change and rash.   Allergic/Immunologic: Negative for immunocompromised state.   Neurological: Negative for dizziness, tremors, syncope, light-headedness and headaches.   Psychiatric/Behavioral: Negative for agitation, confusion, decreased concentration and hallucinations. The patient is not nervous/anxious.      Objective:     Vital Signs (Most Recent):  Temp: 98.6 °F (37 °C) (03/11/20 2342) Vital Signs (24h Range):  Temp:  [98.6 °F (37 °C)] 98.6 °F (37 °C)     Weight: 95.2 kg (209 lb 14.1 oz)  Body mass index is 29.27 kg/m².    No intake or output data in the 24 hours ending 03/12/20 0000    Physical Exam   Constitutional: He is oriented to person, place, and time. No distress.   HENT:   Head: Normocephalic and atraumatic.   Eyes: Right eye  exhibits no discharge. Left eye exhibits no discharge. No scleral icterus.   Neck: Normal range of motion. Neck supple.   Cardiovascular: Normal rate, regular rhythm, normal heart sounds and intact distal pulses.   Pulmonary/Chest: Effort normal and breath sounds normal. He has no wheezes. He has no rales.   Abdominal: Soft. Bowel sounds are normal. He exhibits distension. There is no tenderness. There is no guarding. A hernia (small umbilical hernia easily reducible) is present.       Musculoskeletal: He exhibits no edema.   Neurological: He is alert and oriented to person, place, and time. No cranial nerve deficit.   Skin: Skin is warm and dry. Capillary refill takes less than 2 seconds. He is not diaphoretic.   Psychiatric: He has a normal mood and affect. His behavior is normal. Judgment and thought content normal.   Nursing note and vitals reviewed.      Laboratory:  pending    Diagnostic Results:  I have personally reviewed all pertinent imaging studies.

## 2020-03-12 NOTE — ASSESSMENT & PLAN NOTE
Patient with hx of ESLD and insulin-dependent T2DM (since ?2002)  Home regimen: detemir 74U nightly and aspart 24U TIDWM  HbA1c 6.7%  Glucose on arrival: 410. No evidence of DKA.  Patient was given 55U detemir and 3U sliding scale > BGL 50 the following morning    Plan:  - 25U detemir nightly + 15U aspart TIDWM  - glucose checks AC QHS + LDSSI  - may require higher doses of insulin, will continue to monitor and adjust accordingly  - glucose goal: 140-180

## 2020-03-12 NOTE — H&P
Inpatient Radiology Pre-procedure Note    History of Present Illness:  Colin Reilly is a 59 y.o. male who presents for u/s guided therapeutic paracentesis.    Admission H&P reviewed.    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    Esophageal varices     GERD (gastroesophageal reflux disease)     Hepatic encephalopathy 7/8/2014    Hepatitis C virus infection 07/08/2014    tx with interferon 3-4 mos- stopped for unclear reasons Tattoos-first at age 17 only risk factor (HCVAB negative 08/2017)    HTN (hypertension) 7/8/2014    Hypertension     Insulin dependent diabetes mellitus     Renal mass, left 7/8/2014    6.3 x 5.7 x 5.6 complex mass    Splenomegaly 7/8/2014    Umbilical hernia 7/8/2014     Past Surgical History:   Procedure Laterality Date    CARPAL TUNNEL RELEASE Bilateral     CHOLECYSTECTOMY      lap    COLONOSCOPY N/A 9/8/2017    Procedure: COLONOSCOPY;  Surgeon: Savannah Llanos MD;  Location: [facility];  Service: Endoscopy;  Laterality: N/A;    COLONOSCOPY Left 3/14/2018    Procedure: COLONOSCOPY;  Surgeon: Savannah Llanos MD;  Location: [facility];  Service: Endoscopy;  Laterality: Left;    ESOPHAGOGASTRODUODENOSCOPY  01/2014    ESOPHAGOGASTRODUODENOSCOPY  02/15/2017    grade II varices    ESOPHAGOGASTRODUODENOSCOPY  04/10/2017    grade I varices    ESOPHAGOGASTRODUODENOSCOPY N/A 10/18/2019    Procedure: EGD (ESOPHAGOGASTRODUODENOSCOPY);  Surgeon: Flavio Escalera MD;  Location: [facility] ([address] [place] [address]);  Service: Endoscopy;  Laterality: N/A;    ESOPHAGOGASTRODUODENOSCOPY W/ BANDING  01/26/2017    grade II varices    HERNIA REPAIR      NECK SURGERY      fusion on C5 and C6    ULNAR NERVE TRANSPOSITION Left     UMBILICAL HERNIA REPAIR N/A 2/22/2020    Procedure: REPAIR, HERNIA, PRIMARY WIHTOUT MESH AND PLACEMENT  OF DRAIN;  Surgeon: Sherri Brunner MD;  Location: [facility] OR [address] [place] [address];  Service: Transplant;  Laterality: N/A;    vericose veins removed         Review of Systems:   As documented in primary team H&P    Home Meds:   Prior to Admission medications    Medication Sig Start Date End Date Taking? Authorizing Provider   ciprofloxacin HCl (CIPRO) 500 MG tablet Take 1 tablet (500 mg total) by mouth once daily. 2/26/20   Sherri Brunner MD   cyclobenzaprine (FLEXERIL) 10 MG tablet Take 10 mg by mouth 2 (two) times daily.     Historical Provider, MD   EASY TOUCH INSULIN SYRINGE 1 mL 31 gauge x 5/16 Syrg  9/19/19   Historical Provider, MD   escitalopram oxalate (LEXAPRO) 20 MG tablet Take 20 mg by mouth once daily.  8/4/17   Historical Provider, MD   finasteride (PROSCAR) 5 mg tablet Take 1 tablet (5 mg total) by mouth once daily. 6/19/18 12/6/28  Mookie Mac IV, MD   furosemide (LASIX) 40 MG tablet Take 4 tablets (160 mg total) by mouth once daily. 3/2/20   Fareed Tran MD   gabapentin (NEURONTIN) 600 MG tablet Take 600 mg by mouth 3 (three) times daily.     Historical Provider, MD   HYDROcodone-acetaminophen (NORCO)  mg per tablet Take 1 tablet by mouth every 12 (twelve) hours as needed for Pain. 2/26/20   Sherri Brunner MD   insulin aspart U-100 (NOVOLOG) 100 unit/mL injection Inject 24 Units into the skin 3 (three) times daily before meals.    Historical Provider, MD   insulin detemir U-100 (LEVEMIR) 100 unit/mL injection Inject 74 Units into the skin every evening.    Historical Provider, MD   lactulose (CHRONULAC) 10 gram/15 mL solution Take 30 mLs by mouth 3 (three) times daily. May take up to  4 to 5 times daily if needed for bowel movements    Historical Provider, MD   levocetirizine (XYZAL) 5 MG tablet Take 1 tablet (5 mg total) by mouth every evening. 2/26/20 2/25/21  Sherri Brunner MD   oxybutynin (DITROPAN) 5 MG Tab Take 5 mg by mouth once daily. 10/31/19   Historical Provider, MD    pantoprazole (PROTONIX) 40 MG tablet Take 1 tablet (40 mg total) by mouth once daily. 2/26/20 8/24/20  Sherri Brunner MD   rifAXIMin (XIFAXAN) 550 mg Tab Take 1 tablet (550 mg total) by mouth 2 (two) times daily. 3/22/19   Elizabeth Meneses MD   spironolactone (ALDACTONE) 100 MG tablet Take 1.5 tablets (150 mg total) by mouth once daily. 12/6/19   Elizabeth Meneses MD   tamsulosin (FLOMAX) 0.4 mg Cp24 Take 1 capsule (0.4 mg total) by mouth once daily. 6/19/18   Mookie Mac IV, MD   TRUE METRIX GLUCOSE TEST STRIP Strp  11/8/19   Historical Provider, MD   TRUEPLUS LANCETS 30 gauge Misc  11/20/19   Historical Provider, MD     Scheduled Meds:    cetirizine  5 mg Oral QHS    ciprofloxacin HCl  500 mg Oral Daily    cyclobenzaprine  10 mg Oral BID    escitalopram oxalate  20 mg Oral Daily    finasteride  5 mg Oral Daily    gabapentin  600 mg Oral TID    insulin aspart U-100  15 Units Subcutaneous TID AC    insulin detemir U-100  25 Units Subcutaneous QHS    lactulose  20 g Oral TID    oxybutynin  5 mg Oral Daily    pantoprazole  40 mg Oral Daily    rifAXIMin  550 mg Oral BID    spironolactone  150 mg Oral Daily    tamsulosin  0.4 mg Oral Daily     Continuous Infusions:   PRN Meds:acetaminophen, dextrose 10 % in water (D10W), dextrose 10 % in water (D10W), glucagon (human recombinant), glucose, glucose, HYDROcodone-acetaminophen, insulin aspart U-100, ondansetron, sodium chloride 0.9%  Anticoagulants/Antiplatelets: no anticoagulation    Allergies: Review of patient's allergies indicates:  No Known Allergies  Sedation Hx: have not been any systemic reactions     Labs:  Recent Labs   Lab 03/12/20  0653   INR 1.6*       Recent Labs   Lab 03/12/20  0653   WBC 2.58*   HGB 8.1*   HCT 27.1*   MCV 88   PLT 40*      Recent Labs   Lab 03/12/20  0653   GLU 56*      K 3.4*      CO2 24   BUN 4*   CREATININE 0.6   CALCIUM 7.7*   MG 1.7   ALT 17   AST 34   ALBUMIN 2.9*   BILITOT 2.5*          Vitals:  Temp: 98.6 °F (37 °C) (03/11/20 2342)  Pulse: 87 (03/12/20 1004)  Resp: 18 (03/12/20 1004)  BP: 118/77 (03/12/20 1004)  SpO2: 99 % (03/12/20 1004)     Physical Exam:  ASA: 3  General: no acute distress  Mental Status: alert and oriented to person, place and time  HEENT: normocephalic, atraumatic  Chest: unlabored breathing  Heart: regular heart rate  Abdomen: distended  Extremity: moves all extremities    Plan: U/s guided therapeutic paracentesis  Sedation Plan: Local Anesthetic    Andrea Hale, NP [facility]  Interventional Radiology

## 2020-03-12 NOTE — PROGRESS NOTES
[facility]  Liver Transplant  Progress Note    Patient Name: Colin Reilly  MRN: [id number]  Admission Date: 3/11/2020  Hospital Length of Stay: 1 days  Code Status: Full Code  Primary Care Provider: Preston Matthew Ii, MD  Post-Operative Day:     ORGAN:     Disease Etiology: Alcoholic Cirrhosis  Donor Type:     CDC High Risk:     Donor CMV Status:   Donor CMV Status:   Donor HBcAB:     Donor HCV Status:     Donor HBV CEZAR: Organ record is missing.  Donor HCV CEZAR: Organ record is missing.  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 Surgery uncomplicated but has had recurrent abdominal pain and increased drainage from surgical incision. Patient recently admitted 2/27-3/2 with similar symptoms, underwent paracentesis 2/27, which was negative for peritonitis. He re-presented to [facility] on 3/7/20 with c/o low grade fever and abdominal pain (Tmax 100.3), discharged on PO ciprofloxacin. Patient is being admitted for infectious work up. His last fever was 4 days ago at OSH, no further fever or chills. He complains of abd pain and distention, serous drg from LLQ old paracentesis site with suture in place. Umbilical hernia repair incision CDI, no s/s infection, small umbilical hernia easily reducible with no tenderness. He denies N/V, SOB, chest pain, change in bowel or bladder function.     Hospital Course:  Admitted with history of low grade fever and abdominal pain.  Has been afebrile since admission.  Attempted paracentesis this am without fluid to drain.  Will obtain abdominal ultrasound with doppler today.  Otherwise feeling well with no symptoms.      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    Esophageal varices     GERD (gastroesophageal reflux disease)     Hepatic encephalopathy 7/8/2014    Hepatitis C virus infection 07/08/2014    tx with interferon 3-4 mos- stopped for unclear reasons Tattoos-first at age 17 only risk factor (HCVAB negative 08/2017)    HTN (hypertension) 7/8/2014    Hypertension     Insulin dependent diabetes mellitus     Renal mass, left 7/8/2014    6.3 x 5.7 x 5.6 complex mass    Splenomegaly 7/8/2014    Umbilical hernia 7/8/2014       Past Surgical History:   Procedure Laterality Date    CARPAL TUNNEL RELEASE Bilateral     CHOLECYSTECTOMY      lap    COLONOSCOPY N/A 9/8/2017    Procedure: COLONOSCOPY;  Surgeon: Savannah Llanos MD;  Location: [facility];  Service: Endoscopy;  Laterality: N/A;    COLONOSCOPY Left 3/14/2018    Procedure: COLONOSCOPY;  Surgeon: Savannah Llanos MD;  Location: [facility];  Service: Endoscopy;  Laterality: Left;    ESOPHAGOGASTRODUODENOSCOPY  01/2014    ESOPHAGOGASTRODUODENOSCOPY  02/15/2017    grade II varices    ESOPHAGOGASTRODUODENOSCOPY  04/10/2017    grade I varices    ESOPHAGOGASTRODUODENOSCOPY N/A 10/18/2019    Procedure: EGD (ESOPHAGOGASTRODUODENOSCOPY);  Surgeon: Flavio Escalera MD;  Location: [facility] ([address] [place] [address]);  Service: Endoscopy;  Laterality: N/A;    ESOPHAGOGASTRODUODENOSCOPY W/ BANDING  01/26/2017    grade II varices    HERNIA REPAIR      NECK SURGERY      fusion on C5 and C6    ULNAR NERVE TRANSPOSITION Left     UMBILICAL HERNIA REPAIR N/A 2/22/2020    Procedure: REPAIR, HERNIA, PRIMARY WIHTOUT MESH AND PLACEMENT OF DRAIN;  Surgeon: Sherri Brunner MD;  [facility];  Service: Transplant;  Laterality: N/A;    vericose veins removed         Review of patient's allergies indicates:  No Known Allergies    Family History     Problem Relation (Age of Onset)    Alcohol abuse Brother     Cancer Brother    Hyperlipidemia Mother    No Known Problems Father (36), Sister, Sister        Tobacco Use    Smoking status: Former Smoker     Types: Cigarettes     Last attempt to quit: 1/4/2010     Years since quitting: 10.1    Smokeless tobacco: Former User     Types: Chew     Quit date: 2/24/1990    Tobacco comment: former 1 pack/week quit 1/4/2010   Substance and Sexual Activity    Alcohol use: No     Comment: former heavy beer but quit 1/4/2010    Drug use: No    Sexual activity: Never     Comment:        PTA Medications   Medication Sig    ciprofloxacin HCl (CIPRO) 500 MG tablet Take 1 tablet (500 mg total) by mouth once daily.    cyclobenzaprine (FLEXERIL) 10 MG tablet Take 10 mg by mouth 2 (two) times daily.     EASY TOUCH INSULIN SYRINGE 1 mL 31 gauge x 5/16 Syrg     escitalopram oxalate (LEXAPRO) 20 MG tablet Take 20 mg by mouth once daily.     finasteride (PROSCAR) 5 mg tablet Take 1 tablet (5 mg total) by mouth once daily.    furosemide (LASIX) 40 MG tablet Take 4 tablets (160 mg total) by mouth once daily.    gabapentin (NEURONTIN) 600 MG tablet Take 600 mg by mouth 3 (three) times daily.     HYDROcodone-acetaminophen (NORCO)  mg per tablet Take 1 tablet by mouth every 12 (twelve) hours as needed for Pain.    insulin aspart U-100 (NOVOLOG) 100 unit/mL injection Inject 24 Units into the skin 3 (three) times daily before meals.    insulin detemir U-100 (LEVEMIR) 100 unit/mL injection Inject 74 Units into the skin every evening.    lactulose (CHRONULAC) 10 gram/15 mL solution Take 30 mLs by mouth 3 (three) times daily. May take up to  4 to 5 times daily if needed for bowel movements    levocetirizine (XYZAL) 5 MG tablet Take 1 tablet (5 mg total) by mouth every evening.    oxybutynin (DITROPAN) 5 MG Tab Take 5 mg by mouth once daily.    pantoprazole (PROTONIX) 40 MG tablet Take 1 tablet (40 mg total) by mouth once daily.    rifAXIMin (XIFAXAN) 550 mg Tab Take 1 tablet  (550 mg total) by mouth 2 (two) times daily.    spironolactone (ALDACTONE) 100 MG tablet Take 1.5 tablets (150 mg total) by mouth once daily.    tamsulosin (FLOMAX) 0.4 mg Cp24 Take 1 capsule (0.4 mg total) by mouth once daily.    TRUE METRIX GLUCOSE TEST STRIP Strp     TRUEPLUS LANCETS 30 gauge [facility]        Review of Systems   Constitutional: Negative for activity change, appetite change, chills, diaphoresis and fever.   HENT: Negative for congestion, sinus pressure, sinus pain, sore throat and trouble swallowing.    Eyes: Negative for visual disturbance.   Respiratory: Negative for chest tightness, shortness of breath and stridor.    Cardiovascular: Positive for leg swelling. Negative for chest pain and palpitations.   Gastrointestinal: Positive for abdominal distention and abdominal pain. Negative for constipation, diarrhea, nausea and vomiting.   Genitourinary: Negative for decreased urine volume, difficulty urinating, dysuria and flank pain.   Musculoskeletal: Negative for neck pain and neck stiffness.   Skin: Positive for wound. Negative for color change and rash.   Allergic/Immunologic: Negative for immunocompromised state.   Neurological: Negative for dizziness, tremors, syncope, light-headedness and headaches.   Psychiatric/Behavioral: Negative for agitation, confusion, decreased concentration and hallucinations. The patient is not nervous/anxious.      Objective:     Vital Signs (Most Recent):  Temp: 98.7 °F (37.1 °C) (03/12/20 0800)  Pulse: 87 (03/12/20 1004)  Resp: 18 (03/12/20 1004)  BP: 118/77 (03/12/20 1004)  SpO2: 99 % (03/12/20 1004) Vital Signs (24h Range):  Temp:  [98.6 °F (37 °C)-98.7 °F (37.1 °C)] 98.7 °F (37.1 °C)  Pulse:  [] 87  Resp:  [14-18] 18  SpO2:  [98 %-99 %] 99 %  BP: (113-129)/(74-84) 118/77     Weight: 91.7 kg (202 lb 2.6 oz)  Body mass index is 28.2 kg/m².      Intake/Output Summary (Last 24 hours) at 3/12/2020 1141  Last data filed at 3/12/2020 1000  Gross per 24 hour    Intake 300 ml   Output 400 ml   Net -100 ml       Physical Exam   Constitutional: He is oriented to person, place, and time. No distress.   HENT:   Head: Normocephalic and atraumatic.   Eyes: Right eye exhibits no discharge. Left eye exhibits no discharge. No scleral icterus.   Neck: Normal range of motion. Neck supple.   Cardiovascular: Normal rate, regular rhythm, normal heart sounds and intact distal pulses.   Pulmonary/Chest: Effort normal and breath sounds normal. He has no wheezes. He has no rales.   Abdominal: Soft. Bowel sounds are normal. He exhibits distension. There is no tenderness. There is no guarding. A hernia (small umbilical hernia easily reducible) is present.       Musculoskeletal: He exhibits no edema.   Neurological: He is alert and oriented to person, place, and time. No cranial nerve deficit.   Skin: Skin is warm and dry. Capillary refill takes less than 2 seconds. He is not diaphoretic.   Psychiatric: He has a normal mood and affect. His behavior is normal. Judgment and thought content normal.   Nursing note and vitals reviewed.      Laboratory:  Lab Results   Component Value Date    WBC 2.58 (L) 03/12/2020    HGB 8.1 (L) 03/12/2020    HCT 27.1 (L) 03/12/2020    MCV 88 03/12/2020    PLT 40 (L) 03/12/2020       CMP  Sodium   Date Value Ref Range Status   03/12/2020 139 136 - 145 mmol/L Final   10/18/2016 136  Final     Potassium   Date Value Ref Range Status   03/12/2020 3.4 (L) 3.5 - 5.1 mmol/L Final   10/18/2016 4.0  Final     Chloride   Date Value Ref Range Status   03/12/2020 109 95 - 110 mmol/L Final   10/18/2016 103  Final     CO2   Date Value Ref Range Status   03/12/2020 24 23 - 29 mmol/L Final   10/18/2016 24.0  Final     Glucose   Date Value Ref Range Status   03/12/2020 56 (L) 70 - 110 mg/dL Final   11/19/2015 433  Final     BUN, Bld   Date Value Ref Range Status   03/12/2020 4 (L) 6 - 20 mg/dL Final     BUN   Date Value Ref Range Status   03/20/2017 5 4 - 21 mg/dL Final      Creatinine   Date Value Ref Range Status   03/12/2020 0.6 0.5 - 1.4 mg/dL Final   10/18/2016 0.8 mg/dL Final     Calcium   Date Value Ref Range Status   03/12/2020 7.7 (L) 8.7 - 10.5 mg/dL Final   10/18/2016 8.6 mg/dL Final     Total Protein   Date Value Ref Range Status   03/12/2020 5.4 (L) 6.0 - 8.4 g/dL Final     Albumin   Date Value Ref Range Status   03/12/2020 2.9 (L) 3.5 - 5.2 g/dL Final   10/18/2016 3.80  Final     Total Bilirubin   Date Value Ref Range Status   03/12/2020 2.5 (H) 0.1 - 1.0 mg/dL Final     Comment:     For infants and newborns, interpretation of results should be based  on gestational age, weight and in agreement with clinical  observations.  Premature Infant recommended reference ranges:  Up to 24 hours.............<8.0 mg/dL  Up to 48 hours............<12.0 mg/dL  3-5 days..................<15.0 mg/dL  6-29 days.................<15.0 mg/dL       Alkaline Phosphatase   Date Value Ref Range Status   03/12/2020 101 55 - 135 U/L Final     AST   Date Value Ref Range Status   03/12/2020 34 10 - 40 U/L Final   10/18/2016 32 U/L Final     ALT   Date Value Ref Range Status   03/12/2020 17 10 - 44 U/L Final   10/18/2016 32 U/L Final     Anion Gap   Date Value Ref Range Status   03/12/2020 6 (L) 8 - 16 mmol/L Final     eGFR if    Date Value Ref Range Status   03/12/2020 >60.0 >60 mL/min/1.73 m^2 Final     eGFR if non    Date Value Ref Range Status   03/12/2020 >60.0 >60 mL/min/1.73 m^2 Final     Comment:     Calculation used to obtain the estimated glomerular filtration  rate (eGFR) is the CKD-EPI equation.            Diagnostic Results:  Abdominal ultrasound with doppler: pending    Assessment/Plan:     * Abdominal pain  - Admit with abd pain, distention, and low grade fever on 3/7 (tmax 100.3)  - Infectious work up: CXR, blood and urine cultures  - unable to perform Para in to r/o SBP  - continue cipro    End stage liver disease  - ESLD 2/2 ETOH listed for liver  transplant with MELD 22  - s/p umbilical hernia repair 2/27/20    MELD-Na score: 15 at 3/12/2020  6:53 AM  MELD score: 15 at 3/12/2020  6:53 AM  Calculated from:  Serum Creatinine: 0.6 mg/dL (Rounded to 1 mg/dL) at 3/12/2020  6:53 AM  Serum Sodium: 139 mmol/L (Rounded to 137 mmol/L) at 3/12/2020  6:53 AM  Total Bilirubin: 2.5 mg/dL at 3/12/2020  6:53 AM  INR(ratio): 1.6 at 3/12/2020  6:53 AM  Age: 59 years    Acquired coagulation factor deficiency  - 2/2 ESLD  - monitor PT/INR    Umbilical hernia without obstruction and without gangrene  - s/p hernia repair 2/22. Staples due to be removed around 3/14 (21 days post-op)  - admitted 2/27-3/2 with copious serous drainage from incision  - incision CDI, no SSI infection    Type 2 diabetes mellitus without complication, with long-term current use of insulin  - resume 75% home insulin doses, reports labile BG but denies any hypoglycemia.   - consult endocrine for assistnace    Portal hypertensive gastropathy  - no signs of bleeding  - abdominal ultrasound to assess vessels  - monitor      Ascites due to alcoholic cirrhosis  - paracentesis 2/27 negative for peritonitis  - Para to r/o SBP - unable to drain fluid 3/12/20  - continue lasix and spironolactone     Hepatic encephalopathy  - chronic  - continue lactulose and rifaximin      VTE Risk Mitigation (From admission, onward)         Ordered     IP VTE LOW RISK PATIENT  Once      03/11/20 2338     Place JULIETTE hose  Until discontinued      03/11/20 2338                The patients clinical status was discussed at multidisplinary rounds, involving transplant surgery, transplant medicine, pharmacy, nursing, nutrition, and social work    Discharge Planning:  No Patient Care Coordination Note on file.      Lara Duque, NP  Liver Transplant  [facility]

## 2020-03-12 NOTE — ASSESSMENT & PLAN NOTE
Patient with hx of ESLD 2/2 alcoholic cirrhosis, T2DM, and umbilical hernia s/p repair (2/22/20)  - no evidence of DKA  - agree with infectious workup  - continue management per primary team

## 2020-03-12 NOTE — CARE UPDATE
During ultrasound evaluation, insufficient ascites identified for safe paracentesis. No paracentesis performed.    Images captured show small pocket of fluid; however, bowel appears to oscillate in and out of vision. Risk outweighs benefit. Please re-consult when Mr. Reilly appears to have accumulated a larger pocket of fluid. Thank you.    Case discussed with Dr. Kait Ordonez, NP  Interventional Radiology  [facility]

## 2020-03-12 NOTE — PROGRESS NOTES
Admit Note     Met with patient and mother to assess needs. Patient is a 59 y.o.  male, admitted for ESLD with abdominal pain, leaking from old para site and fever.  Patient is listed for liver transplant.      Patient admitted from home on 3/11/2020 .  At this time, patient presents as alert and oriented x 4, pleasant, good eye contact, recall good, calm, communicative, cooperative and asking and answering questions appropriately.  At this time, patients caregiver presents as alert and oriented x 4, pleasant, good eye contact, well groomed, recall good, concentration/judgement good, average intelligence, calm, communicative, cooperative and asking and answering questions appropriately.    Household/Family Systems     Patient resides with patient's mother, at [address]  [place] [address].  Support system includes mother Suzanne and patient's aunt Day Benton, phone [phone] .  Patient does not have dependents that are need of being cared for.     Patients primary caregiver is Suzanne Malik, patients mother, phone number [phone].  Confirmed patients contact information is [phone] (home);   Telephone Information:   Mobile [phone]   .    During admission, patient's caregiver plans to stay in patient's room.  Confirmed patient and patients caregivers do have access to reliable transportation.  Patient was asking about using Uber or if [employer] provides transportation.  From what this SW is able to find from [employer] website and past knowledge, [employer] does not provide transportation services.   SW discussed if patient chooses to use Uber it would be at cost to him.  Patient reports the hospital at home provided an Uber transport for him in the past.     Cognitive Status/Learning     Patient reports reading ability as 12th grade and states patient does have difficulty with memory and vision with wearing prescription eyeglasses.  Patient reports patient learns best by  verbal and written information and demonstration.   Needed: No.   Highest education level: High School (9-12) or GED    Vocation/Disability   .  Working for Income: No  If no, reason not working: Disability  Patient is disabled due to neck and back injury since .  Prior to disability, patient  was employed as in oil industry.    Adherence     Patient reports a high level of adherence to patients health care regimen.  Adherence counseling and education provided. Patient verbalizes understanding.    Substance Use    Patient reports the following substance usage.    Tobacco: none, patient denies any use.  Alcohol: none, patient denies any use.  Illicit Drugs/Non-prescribed Medications: none, patient denies any use.  Patient states clear understanding of the potential impact of substance use.  Substance abstinence/cessation counseling, education and resources provided and reviewed.     Services Utilizing/ADLS    Infusion Service: Prior to admission, patient utilizing? no  Home Health: Prior to admission, patient utilizing? not current, but with [facility] in past  DME: Prior to admission, not using but has a cane at home to use as needed  Pulmonary/Cardiac Rehab: Prior to admission, no  Dialysis:  Prior to admission, no  Transplant Specialty Pharmacy:  Prior to admission, no.    Prior to admission, patient reports patient was independent with ADLS and was driving.  Patient reports patient is not able to care for self at this time due to compromised medical condition (as documented in medical record) and physical weakness..  Patient indicates a willingness to care for self once medically cleared to do so.    Insurance/Medications    Insured by   Payor/Plan Subscr  Sex Relation Sub. Ins. ID Effective Group Num   1. [employer] MANAGE* [employer]ARDING 1960 Male  [id number] 7/1/19 [id number]                                   [address] [address]      Primary Insurance (for UNOS reporting): Public  Insurance - Medicare FFS (Fee For Service)  Secondary Insurance (for UNOS reporting): None    Patient reports patient is able to obtain and afford medications at this time and at time of discharge.    Living Will/Healthcare Power of     Patient states patient does not have a LW and/or HCPA.   provided education regarding LW and HCPA and the completion of forms.    Coping/Mental Health    Patient is coping well with the aid of  family members, friends and ramon. Patient endorses symptoms of anxiety as he waits for liver transplant.  Patient reports having symptoms of depression and anxiety in past where he was enrolled in a Behavioral Mental Health clinic in past.  Patient on no current medication, but has taken Lexapro 20mg in past.   Pt denies any current SI/HI.   Patient reports that his puppy helps him when he is feeling down.   Patient denies mental health difficulties.     Discharge Planning    At time of discharge, patient plans to return to patient's home under the care of self/mother.  Patients mother will transport patient.  Per rounds today, expected discharge date is possible for tomorrow. Patient and patients caregiver  verbalize understanding and are involved in treatment planning and discharge process.    Additional Concerns    Patient's caretaker denies additional needs and/or concerns at this time. Patient is being followed for needs, education, resources, information, emotional support, supportive counseling, and for supportive and skilled discharge plan of care.  providing ongoing psychosocial support, education, resources and d/c planning as needed.  SW remains available. Patient's caregiver verbalizes understanding and agreement with information reviewed,  availability and how to access available resources as needed. Patient denies additional needs and/or concerns at this time. Patient verbalizes understanding and agreement with information  reviewed, social work availability, and how to access available resources as needed.

## 2020-03-12 NOTE — ASSESSMENT & PLAN NOTE
- paracentesis 2/27 negative for peritonitis  - Para to r/o SBP - unable to drain fluid 3/12/20  - continue lasix and spironolactone

## 2020-03-12 NOTE — PLAN OF CARE
Pt has mom at bedside. Pt has call bell in reach, non slip socks on, and bedrails up x2. Pt encouraged to wash hands. Pt getting infectious workup with blood cultures and urine cultures.

## 2020-03-13 ENCOUNTER — TELEPHONE (OUTPATIENT)
Dept: TRANSPLANT | Facility: CLINIC | Age: 60
End: 2020-03-13

## 2020-03-13 VITALS
HEART RATE: 109 BPM | HEIGHT: 71 IN | TEMPERATURE: 98 F | OXYGEN SATURATION: 97 % | RESPIRATION RATE: 16 BRPM | SYSTOLIC BLOOD PRESSURE: 118 MMHG | WEIGHT: 202.19 LBS | DIASTOLIC BLOOD PRESSURE: 71 MMHG | BODY MASS INDEX: 28.31 KG/M2

## 2020-03-13 LAB
ALBUMIN SERPL BCP-MCNC: 2.7 G/DL (ref 3.5–5.2)
ALP SERPL-CCNC: 107 U/L (ref 55–135)
ALT SERPL W/O P-5'-P-CCNC: 18 U/L (ref 10–44)
ANION GAP SERPL CALC-SCNC: 9 MMOL/L (ref 8–16)
ANISOCYTOSIS BLD QL SMEAR: SLIGHT
AST SERPL-CCNC: 34 U/L (ref 10–40)
BASOPHILS # BLD AUTO: 0.02 K/UL (ref 0–0.2)
BASOPHILS NFR BLD: 0.8 % (ref 0–1.9)
BILIRUB SERPL-MCNC: 2 MG/DL (ref 0.1–1)
BUN SERPL-MCNC: 5 MG/DL (ref 6–20)
CALCIUM SERPL-MCNC: 7.8 MG/DL (ref 8.7–10.5)
CHLORIDE SERPL-SCNC: 107 MMOL/L (ref 95–110)
CO2 SERPL-SCNC: 24 MMOL/L (ref 23–29)
CREAT SERPL-MCNC: 0.6 MG/DL (ref 0.5–1.4)
DIFFERENTIAL METHOD: ABNORMAL
EOSINOPHIL # BLD AUTO: 0.2 K/UL (ref 0–0.5)
EOSINOPHIL NFR BLD: 7.2 % (ref 0–8)
ERYTHROCYTE [DISTWIDTH] IN BLOOD BY AUTOMATED COUNT: 18.1 % (ref 11.5–14.5)
EST. GFR  (AFRICAN AMERICAN): >60 ML/MIN/1.73 M^2
EST. GFR  (NON AFRICAN AMERICAN): >60 ML/MIN/1.73 M^2
GLUCOSE SERPL-MCNC: 109 MG/DL (ref 70–110)
HCT VFR BLD AUTO: 28.8 % (ref 40–54)
HGB BLD-MCNC: 8.3 G/DL (ref 14–18)
IMM GRANULOCYTES # BLD AUTO: 0.01 K/UL (ref 0–0.04)
IMM GRANULOCYTES NFR BLD AUTO: 0.4 % (ref 0–0.5)
INR PPP: 1.6 (ref 0.8–1.2)
LYMPHOCYTES # BLD AUTO: 0.7 K/UL (ref 1–4.8)
LYMPHOCYTES NFR BLD: 28.7 % (ref 18–48)
MAGNESIUM SERPL-MCNC: 1.8 MG/DL (ref 1.6–2.6)
MCH RBC QN AUTO: 25.6 PG (ref 27–31)
MCHC RBC AUTO-ENTMCNC: 28.8 G/DL (ref 32–36)
MCV RBC AUTO: 89 FL (ref 82–98)
MONOCYTES # BLD AUTO: 0.3 K/UL (ref 0.3–1)
MONOCYTES NFR BLD: 13.1 % (ref 4–15)
NEUTROPHILS # BLD AUTO: 1.2 K/UL (ref 1.8–7.7)
NEUTROPHILS NFR BLD: 49.8 % (ref 38–73)
NRBC BLD-RTO: 0 /100 WBC
OVALOCYTES BLD QL SMEAR: ABNORMAL
PHOSPHATE SERPL-MCNC: 3.4 MG/DL (ref 2.7–4.5)
PLATELET # BLD AUTO: 49 K/UL (ref 150–350)
PLATELET BLD QL SMEAR: ABNORMAL
PMV BLD AUTO: ABNORMAL FL (ref 9.2–12.9)
POCT GLUCOSE: 105 MG/DL (ref 70–110)
POIKILOCYTOSIS BLD QL SMEAR: SLIGHT
POTASSIUM SERPL-SCNC: 3.7 MMOL/L (ref 3.5–5.1)
PROT SERPL-MCNC: 5 G/DL (ref 6–8.4)
PROTHROMBIN TIME: 15.4 SEC (ref 9–12.5)
RBC # BLD AUTO: 3.24 M/UL (ref 4.6–6.2)
SODIUM SERPL-SCNC: 140 MMOL/L (ref 136–145)
WBC # BLD AUTO: 2.37 K/UL (ref 3.9–12.7)

## 2020-03-13 PROCEDURE — 80053 COMPREHEN METABOLIC PANEL: CPT | Mod: NTX

## 2020-03-13 PROCEDURE — 83735 ASSAY OF MAGNESIUM: CPT | Mod: NTX

## 2020-03-13 PROCEDURE — G0378 HOSPITAL OBSERVATION PER HR: HCPCS | Mod: NTX

## 2020-03-13 PROCEDURE — 85025 COMPLETE CBC W/AUTO DIFF WBC: CPT | Mod: NTX

## 2020-03-13 PROCEDURE — 99214 OFFICE O/P EST MOD 30 MIN: CPT | Mod: GC,NTX,, | Performed by: INTERNAL MEDICINE

## 2020-03-13 PROCEDURE — 85610 PROTHROMBIN TIME: CPT | Mod: NTX

## 2020-03-13 PROCEDURE — 25000003 PHARM REV CODE 250: Mod: NTX | Performed by: NURSE PRACTITIONER

## 2020-03-13 PROCEDURE — 99238 PR HOSPITAL DISCHARGE DAY,<30 MIN: ICD-10-PCS | Mod: NTX,,, | Performed by: NURSE PRACTITIONER

## 2020-03-13 PROCEDURE — 99214 PR OFFICE/OUTPT VISIT, EST, LEVL IV, 30-39 MIN: ICD-10-PCS | Mod: GC,NTX,, | Performed by: INTERNAL MEDICINE

## 2020-03-13 PROCEDURE — 99238 HOSP IP/OBS DSCHRG MGMT 30/<: CPT | Mod: NTX,,, | Performed by: NURSE PRACTITIONER

## 2020-03-13 PROCEDURE — 36415 COLL VENOUS BLD VENIPUNCTURE: CPT | Mod: NTX

## 2020-03-13 PROCEDURE — 84100 ASSAY OF PHOSPHORUS: CPT | Mod: NTX

## 2020-03-13 RX ORDER — FUROSEMIDE 40 MG/1
160 TABLET ORAL DAILY
Status: DISCONTINUED | OUTPATIENT
Start: 2020-03-13 | End: 2020-03-13 | Stop reason: HOSPADM

## 2020-03-13 RX ADMIN — TAMSULOSIN HYDROCHLORIDE 0.4 MG: 0.4 CAPSULE ORAL at 08:03

## 2020-03-13 RX ADMIN — LACTULOSE 20 G: 20 SOLUTION ORAL at 09:03

## 2020-03-13 RX ADMIN — FINASTERIDE 5 MG: 5 TABLET, FILM COATED ORAL at 08:03

## 2020-03-13 RX ADMIN — PANTOPRAZOLE SODIUM 40 MG: 40 TABLET, DELAYED RELEASE ORAL at 08:03

## 2020-03-13 RX ADMIN — HYDROCODONE BITARTRATE AND ACETAMINOPHEN 1 TABLET: 10; 325 TABLET ORAL at 01:03

## 2020-03-13 RX ADMIN — CIPROFLOXACIN 500 MG: 500 TABLET, FILM COATED ORAL at 08:03

## 2020-03-13 RX ADMIN — ESCITALOPRAM OXALATE 20 MG: 10 TABLET ORAL at 08:03

## 2020-03-13 RX ADMIN — SPIRONOLACTONE 150 MG: 50 TABLET ORAL at 08:03

## 2020-03-13 RX ADMIN — CYCLOBENZAPRINE HYDROCHLORIDE 10 MG: 5 TABLET, FILM COATED ORAL at 08:03

## 2020-03-13 RX ADMIN — GABAPENTIN 600 MG: 300 CAPSULE ORAL at 08:03

## 2020-03-13 RX ADMIN — OXYBUTYNIN CHLORIDE 5 MG: 5 TABLET ORAL at 08:03

## 2020-03-13 RX ADMIN — RIFAXIMIN 550 MG: 550 TABLET ORAL at 08:03

## 2020-03-13 NOTE — PLAN OF CARE
Pt AAOx4. Pt free from falls. Pt wears non slip socks when ambulating. Pt bed low and locked position. Pt afebrile. Pt IV site without redness or edema. Educated pt on importance of hand washing. Pt has denied any pain or discomfort this shift.

## 2020-03-13 NOTE — PROGRESS NOTES
Discharge Medication Note:    Hospital Course:  Mr Reilly is a pre liver patient admitted with low grade fever and abdominal pain.  Pt remains afebrile since admission and maintained on home cipro prophlylaxis.  No medication changes this admission - patient feeling well at time of discharge.    Met with Colin Reilly at discharge to review discharge medications and to update the blue medication card.       Colin Reilly   Home Medication Instructions ART:[id number]    Printed on:03/13/20 3107   Medication Information                      ciprofloxacin HCl (CIPRO) 500 MG tablet  Take 1 tablet (500 mg total) by mouth once daily.             cyclobenzaprine (FLEXERIL) 10 MG tablet  Take 10 mg by mouth 2 (two) times daily.              EASY TOUCH INSULIN SYRINGE 1 mL 31 gauge x 5/16 Syrg               escitalopram oxalate (LEXAPRO) 20 MG tablet  Take 20 mg by mouth once daily.              finasteride (PROSCAR) 5 mg tablet  Take 1 tablet (5 mg total) by mouth once daily.             furosemide (LASIX) 40 MG tablet  Take 4 tablets (160 mg total) by mouth once daily.             gabapentin (NEURONTIN) 600 MG tablet  Take 600 mg by mouth 3 (three) times daily.              HYDROcodone-acetaminophen (NORCO)  mg per tablet  Take 1 tablet by mouth every 12 (twelve) hours as needed for Pain.             insulin aspart U-100 (NOVOLOG) 100 unit/mL injection  Inject 24 Units into the skin 3 (three) times daily before meals.             insulin detemir U-100 (LEVEMIR) 100 unit/mL injection  Inject 74 Units into the skin every evening.             lactulose (CHRONULAC) 10 gram/15 mL solution  Take 30 mLs by mouth 3 (three) times daily. May take up to  4 to 5 times daily if needed for bowel movements             levocetirizine (XYZAL) 5 MG tablet  Take 1 tablet (5 mg total) by mouth every evening.             oxybutynin (DITROPAN) 5 MG Tab  Take 5 mg by mouth once daily.             pantoprazole (PROTONIX) 40 MG  tablet  Take 1 tablet (40 mg total) by mouth once daily.             rifAXIMin (XIFAXAN) 550 mg Tab  Take 1 tablet (550 mg total) by mouth 2 (two) times daily.             spironolactone (ALDACTONE) 100 MG tablet  Take 1.5 tablets (150 mg total) by mouth once daily.             tamsulosin (FLOMAX) 0.4 mg Cp24  Take 1 capsule (0.4 mg total) by mouth once daily.             TRUE METRIX GLUCOSE TEST STRIP Strp               TRUEPLUS LANCETS 30 gauge Misc                   Pt was provided with prescriptions for the following meds:  E-rx: none  Printed rx: none  Phone-in or faxed rx: none to none pharmacy per pt request.    The following medications have been placed on HOLD and should be restarted in the outpatient setting (when appropriate): none    Colin Reilly verbalized understanding and had the opportunity to ask questions.

## 2020-03-13 NOTE — PROGRESS NOTES
"[facility]  Endocrinology  Progress Note    Admit Date: 3/11/2020     Mr. Colin Reilly is a 58yo M with hx of ESLD 2/2 alcoholic cirrhosis, HCV s/p INF, DMT2, and umbilical hernia s/p repair on 20 who presents with abdominal pain and distension. He is currently being worked up for possible liver transplant. Patient was recently admitted -3/2 following hernia repair with similar symptoms and underwent paracentesis which was negative for peritonitis. He re-presented to [facility] on 3/7 with low grade fever and abdominal pain, at which time he was discharged on po ciprofloxacin. Patient presents again with same symptoms and is admitted for infectious workup. Endocrinology is consulted for diabetes management.    Interval HPI:   Overnight events: NAEON. Glucose levels maintained at goal. Continuing current insulin regimen.  Eatin%  Nausea: No  Hypoglycemia and intervention: No  Fever: No  TPN and/or TF: No      /71 (BP Location: Right arm, Patient Position: Sitting)   Pulse 109   Temp 98.4 °F (36.9 °C) (Oral)   Resp 16   Ht 5' 11" (1.803 m)   Wt 91.7 kg (202 lb 2.6 oz)   SpO2 97%   BMI 28.20 kg/m²      Labs Reviewed and Include    Recent Labs   Lab 20  0705      CALCIUM 7.8*   ALBUMIN 2.7*   PROT 5.0*      K 3.7   CO2 24      BUN 5*   CREATININE 0.6   ALKPHOS 107   ALT 18   AST 34   BILITOT 2.0*     Lab Results   Component Value Date    WBC 2.37 (L) 2020    HGB 8.3 (L) 2020    HCT 28.8 (L) 2020    MCV 89 2020    PLT 49 (L) 2020     No results for input(s): TSH, FREET4 in the last 168 hours.  Lab Results   Component Value Date    HGBA1C 6.7 (H) 2020       Nutritional status:   Body mass index is 28.2 kg/m².  Lab Results   Component Value Date    ALBUMIN 2.7 (L) 2020    ALBUMIN 2.9 (L) 2020    ALBUMIN 2.9 (L) 2020     No results found for: PREALBUMIN    Estimated Creatinine Clearance: " 155.5 mL/min (based on SCr of 0.6 mg/dL).    Accu-Checks  Recent Labs     03/12/20  0010 03/12/20  0833 03/12/20  0923 03/12/20  1251 03/12/20  1741 03/12/20  2135   POCTGLUCOSE 410* 50* 103 163* 164* 122*       Current Medications and/or Treatments Impacting Glycemic Control  Immunotherapy:    Immunosuppressants     None        Steroids:   Hormones (From admission, onward)    None        Pressors:    Autonomic Drugs (From admission, onward)    None        Hyperglycemia/Diabetes Medications:   Antihyperglycemics (From admission, onward)    Start     Stop Route Frequency Ordered    03/12/20 2100  insulin detemir U-100 pen 25 Units      -- SubQ Nightly 03/12/20 1002    03/12/20 1100  insulin aspart U-100 injection 15 Units      -- SubQ 3 times daily before meals 03/12/20 1002    03/12/20 0040  insulin aspart U-100 pen 0-5 Units      -- SubQ Before meals & nightly PRN 03/11/20 2340          ASSESSMENT and PLAN    * Abdominal pain  Patient with hx of ESLD 2/2 alcoholic cirrhosis, T2DM, and umbilical hernia s/p repair (2/22/20)  - no evidence of DKA  - agree with infectious workup  - continue management per primary team      Type 2 diabetes mellitus without complication, with long-term current use of insulin  Patient with hx of ESLD and insulin-dependent T2DM (since ?2002)  Home regimen: detemir 74U nightly and aspart 24U TIDWM  HbA1c 6.7%  Glucose on arrival: 410. No evidence of DKA.  Patient was given 55U detemir and 3U sliding scale > BGL 50 the following morning    Plan:  - Patient's glucose levels maintained at goal on current insulin regimen  - continue 25U detemir nightly + 15U aspart TIDWM  - glucose checks AC QHS + LDSSI  - may require higher doses of insulin at home depending on diet, however discussed with patient more comfortable letting him run a little high than to become too hypoglycemic with higher doses of insulin at home.  - follow-up with endocrine outpatient      End stage liver disease  Hx of ESLD  2/2 alcoholic cirrhosis. Patient currently being worked up for liver transplant  - continue management per primary team          Wali Traylor MD  Endocrinology  [facility]

## 2020-03-13 NOTE — DISCHARGE SUMMARY
[facility]  Liver Transplant  Discharge Summary      Patient Name: Colin Reilly  MRN: [id number]  Admission Date: 3/11/2020  Hospital Length of Stay: 2 days  Discharge Date and Time:  03/13/2020 10:54 AM  Attending Physician: Nakul Teresa MD   Discharging Provider: Lara Duque NP  Primary Care Provider: Preston Matthew Ii, MD  HPI:   Colin Reilly is a 60y/o male, with ESLD secondary to ETOH listed with MELD 22, DM, HCV s/p INF, and hx of an umbilical hernia s/p umbilical hernia repair on 2/22/20. Surgery uncomplicated but has had recurrent abdominal pain and increased drainage from surgical incision. Patient recently admitted 2/27-3/2 with similar symptoms, underwent paracentesis 2/27, which was negative for peritonitis. He re-presented to [facility] on 3/7/20 with c/o low grade fever and abdominal pain (Tmax 100.3), discharged on PO ciprofloxacin. Patient is being admitted for infectious work up. His last fever was 4 days ago at OSH, no further fever or chills. He complains of abd pain and distention, serous drg from LLQ old paracentesis site with suture in place. Umbilical hernia repair incision CDI, no s/s infection, small umbilical hernia easily reducible with no tenderness. He denies N/V, SOB, chest pain, change in bowel or bladder function.     * No surgery found *     Hospital Course:    Admitted with history of low grade fever and abdominal pain.  Has been afebrile since admission.  Attempted paracentesis 3/12 without fluid to drain. Abdominal ultrasound with doppler reviewed.  Otherwise feeling well with no symptoms.      Patient stable and ready for discharge.  Will have labs and follow up appointment on 3/19 along with PDOL protocol.      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] 02/22/2020  Yes    Ascites due to alcoholic cirrhosis [K70.31] 10/17/2019 Yes    Type 2 diabetes mellitus without complication, with long-term current use of insulin [E11.9, Z79.4] 10/17/2019 Not Applicable    Umbilical hernia without obstruction and without gangrene [K42.9] 10/17/2019 Yes    Portal hypertensive gastropathy [K76.6, K31.89] 10/17/2019 Yes    Hepatic encephalopathy [K72.90] 07/08/2014 Yes      Problems Resolved During this Admission:       Consults (From admission, onward)        Status Ordering Provider     Inpatient consult to Endocrinology  Once     Provider:  (Not yet assigned)    Completed FRANCO HUFFMAN          Pending Diagnostic Studies:     None        Significant Diagnostic Studies: Labs:   CMP   Recent Labs   Lab 03/12/20  0115 03/12/20  0653 03/13/20  0705   * 139 140   K 3.3* 3.4* 3.7    109 107   CO2 22* 24 24   * 56* 109   BUN 5* 4* 5*   CREATININE 0.7 0.6 0.6   CALCIUM 7.5* 7.7* 7.8*   PROT 5.5* 5.4* 5.0*   ALBUMIN 2.9* 2.9* 2.7*   BILITOT 2.1* 2.5* 2.0*   ALKPHOS 116 101 107   AST 35 34 34   ALT 18 17 18   ANIONGAP 10 6* 9   ESTGFRAFRICA >60.0 >60.0 >60.0   EGFRNONAA >60.0 >60.0 >60.0   , CBC   Recent Labs   Lab 03/12/20  0115 03/12/20  0653 03/13/20  0705   WBC 2.50* 2.58* 2.37*   HGB 8.4* 8.1* 8.3*   HCT 29.0* 27.1* 28.8*   PLT 40* 40* 49*    and INR   Lab Results   Component Value Date    INR 1.6 (H) 03/13/2020    INR 1.6 (H) 03/12/2020    INR 1.6 (H) 03/12/2020    PROTIME 17.5 (A) 03/20/2017       The patients clinical status was discussed at multidisplinary rounds, involving transplant surgery, transplant medicine, pharmacy, nursing, nutrition, and social work    Discharged Condition: good    Disposition: Home or Self Care    Follow Up:    Patient Instructions:      Diet Adult Regular     Notify your health care provider if you experience any of the following:  temperature >100.4     Notify your health care provider if you experience any of the following:  persistent  nausea and vomiting or diarrhea     Notify your health care provider if you experience any of the following:  severe uncontrolled pain     Notify your health care provider if you experience any of the following:  redness, tenderness, or signs of infection (pain, swelling, redness, odor or green/yellow discharge around incision site)     Notify your health care provider if you experience any of the following:  difficulty breathing or increased cough     Notify your health care provider if you experience any of the following:  severe persistent headache     Notify your health care provider if you experience any of the following:  worsening rash     Notify your health care provider if you experience any of the following:  persistent dizziness, light-headedness, or visual disturbances     Notify your health care provider if you experience any of the following:  increased confusion or weakness     No dressing needed     Activity as tolerated     Medications:  Reconciled Home Medications:      Medication List      CONTINUE taking these medications    ciprofloxacin HCl 500 MG tablet  Commonly known as:  CIPRO  Take 1 tablet (500 mg total) by mouth once daily.     cyclobenzaprine 10 MG tablet  Commonly known as:  FLEXERIL  Take 10 mg by mouth 2 (two) times daily.     EASY TOUCH INSULIN SYRINGE 1 mL 31 gauge x 5/16 Syrg  Generic drug:  insulin syringe-needle U-100     escitalopram oxalate 20 MG tablet  Commonly known as:  LEXAPRO  Take 20 mg by mouth once daily.     finasteride 5 mg tablet  Commonly known as:  PROSCAR  Take 1 tablet (5 mg total) by mouth once daily.     furosemide 40 MG tablet  Commonly known as:  LASIX  Take 4 tablets (160 mg total) by mouth once daily.     gabapentin 600 MG tablet  Commonly known as:  NEURONTIN  Take 600 mg by mouth 3 (three) times daily.     HYDROcodone-acetaminophen  mg per tablet  Commonly known as:  NORCO  Take 1 tablet by mouth every 12 (twelve) hours as needed for Pain.      insulin aspart U-100 100 unit/mL injection  Commonly known as:  NOVOLOG  Inject 24 Units into the skin 3 (three) times daily before meals.     insulin detemir U-100 100 unit/mL injection  Commonly known as:  LEVEMIR  Inject 74 Units into the skin every evening.     lactulose 10 gram/15 mL solution  Commonly known as:  CHRONULAC  Take 30 mLs by mouth 3 (three) times daily. May take up to  4 to 5 times daily if needed for bowel movements     levocetirizine 5 MG tablet  Commonly known as:  XYZAL  Take 1 tablet (5 mg total) by mouth every evening.     oxybutynin 5 MG Tab  Commonly known as:  DITROPAN  Take 5 mg by mouth once daily.     pantoprazole 40 MG tablet  Commonly known as:  PROTONIX  Take 1 tablet (40 mg total) by mouth once daily.     rifAXIMin 550 mg Tab  Commonly known as:  XIFAXAN  Take 1 tablet (550 mg total) by mouth 2 (two) times daily.     spironolactone 100 MG tablet  Commonly known as:  ALDACTONE  Take 1.5 tablets (150 mg total) by mouth once daily.     tamsulosin 0.4 mg Cap  Commonly known as:  FLOMAX  Take 1 capsule (0.4 mg total) by mouth once daily.     TRUE METRIX GLUCOSE TEST STRIP Strp  Generic drug:  blood sugar diagnostic     TRUEPLUS LANCETS 30 gauge Misc  Generic drug:  lancets          Time spent caring for patient (Greater than 1/2 spent in direct face-to-face contact): < 30 minutes    Lara Duque NP  Liver Transplant  [facility]

## 2020-03-13 NOTE — SUBJECTIVE & OBJECTIVE
"Interval HPI:   Overnight events: NAEON. Glucose levels maintained at goal. Continuing current insulin regimen.  Eatin%  Nausea: No  Hypoglycemia and intervention: No  Fever: No  TPN and/or TF: No      /71 (BP Location: Right arm, Patient Position: Sitting)   Pulse 109   Temp 98.4 °F (36.9 °C) (Oral)   Resp 16   Ht 5' 11" (1.803 m)   Wt 91.7 kg (202 lb 2.6 oz)   SpO2 97%   BMI 28.20 kg/m²     Labs Reviewed and Include    Recent Labs   Lab 20  0705      CALCIUM 7.8*   ALBUMIN 2.7*   PROT 5.0*      K 3.7   CO2 24      BUN 5*   CREATININE 0.6   ALKPHOS 107   ALT 18   AST 34   BILITOT 2.0*     Lab Results   Component Value Date    WBC 2.37 (L) 2020    HGB 8.3 (L) 2020    HCT 28.8 (L) 2020    MCV 89 2020    PLT 49 (L) 2020     No results for input(s): TSH, FREET4 in the last 168 hours.  Lab Results   Component Value Date    HGBA1C 6.7 (H) 2020       Nutritional status:   Body mass index is 28.2 kg/m².  Lab Results   Component Value Date    ALBUMIN 2.7 (L) 2020    ALBUMIN 2.9 (L) 2020    ALBUMIN 2.9 (L) 2020     No results found for: PREALBUMIN    Estimated Creatinine Clearance: 155.5 mL/min (based on SCr of 0.6 mg/dL).    Accu-Checks  Recent Labs     20  0010 20  0833 20  0923 20  1251 20  1741 20  2135   POCTGLUCOSE 410* 50* 103 163* 164* 122*       Current Medications and/or Treatments Impacting Glycemic Control  Immunotherapy:    Immunosuppressants     None        Steroids:   Hormones (From admission, onward)    None        Pressors:    Autonomic Drugs (From admission, onward)    None        Hyperglycemia/Diabetes Medications:   Antihyperglycemics (From admission, onward)    Start     Stop Route Frequency Ordered    20 2100  insulin detemir U-100 pen 25 Units      -- SubQ Nightly 20 1002    20 1100  insulin aspart U-100 injection 15 Units      -- SubQ 3 times daily " before meals 03/12/20 1002    03/12/20 0040  insulin aspart U-100 pen 0-5 Units      -- SubQ Before meals & nightly PRN 03/11/20 3630

## 2020-03-13 NOTE — PLAN OF CARE
Patient is AAOx4, VSS, C/o severe abdominal pain, controlled with current regimen. IR was unable to complete paracentesis, team aware. Ultrasound completed, results pending. Decreased PO lasix. Umbilical hernia tender, staples underneath remain intact, no drainage. Plan is to remove staples next week. Insulin orders adjusted, prandial decreased to 15 units. Pre-dinner , no correction needed. Possible discharge tomorrow.

## 2020-03-13 NOTE — ASSESSMENT & PLAN NOTE
Patient with hx of ESLD and insulin-dependent T2DM (since ?2002)  Home regimen: detemir 74U nightly and aspart 24U TIDWM  HbA1c 6.7%  Glucose on arrival: 410. No evidence of DKA.  Patient was given 55U detemir and 3U sliding scale > BGL 50 the following morning    Plan:  - Patient's glucose levels maintained at goal on current insulin regimen  - continue 25U detemir nightly + 15U aspart TIDWM  - glucose checks AC QHS + LDSSI  - may require higher doses of insulin at home depending on diet, however discussed with patient more comfortable letting him run a little high than to become too hypoglycemic with higher doses of insulin at home.  - follow-up with endocrine outpatient

## 2020-03-13 NOTE — NURSING
Patient is AAOx4, VSS, pain is controlled. Changed dressing on abdomen. Reviewed AVS, all questions answered. Patient is aware of follow up appointment. No changes to medications this stay. No prescriptions to deliver from pharmacy. Removed visi equipment. Deactivated and collected ipad. Removed right forearm PIV, catheter tip intact. Patient refused transport service. He ambulated out of room with personal belongings in hand and mom at his side.

## 2020-03-13 NOTE — PROGRESS NOTES
Patient discharged today.  Has labs and surgeon appointment next week and Hepatologist the following week.  No change to appointments.

## 2020-03-13 NOTE — PROGRESS NOTES
Discharge Note:    SW met with pt and pts mother at bedside today to discuss dc plans for today.   Pt presents alert and oriented x 4 engaged with good eye contact, communicative.  Patient and pts mother asking and answering questions appropriately today.   Pt and pts mother in agreement with dc plans for today.    Pt will dc back to home in [place], [place] under care of self and mother, Suzanne Sherwood, phone [phone].  Pts mother will provide them transportation to home today in her vehicle.    Pt denies any coping issues today, reports mood is good and ready to return to his dog at home.  Patient ready for discharge to home.   Pt with no psychosocial needs identified for discharge today.   SW did remind patient and pts mother about fundraising, encouraging them to speak with family and friends about fundraising efforts for post transplant expenses, including gas expenses.    Pt and pts mom verbalize understanding.     SW remains available outpatient for all transplant resources, education and psychosocial support.

## 2020-03-14 ENCOUNTER — TELEPHONE (OUTPATIENT)
Dept: TRANSPLANT | Facility: CLINIC | Age: 60
End: 2020-03-14

## 2020-03-14 NOTE — TELEPHONE ENCOUNTER
Called patient with back up liver offer.  Patient refused, he just got home from being discharged from hospital today.

## 2020-03-14 NOTE — TELEPHONE ENCOUNTER
PDOL call documentation    Called patient per PDOL protocol.  Patient denies any acute pain or distress/ issues.      Patient reports continued ascites:    1.       Is the patient experiencing shortness of breath or having difficulty             breathing? No    a.      If yes, notify hepatologist   2.       Is the patient having any abdominal swelling? Yes -  Patient requires sylvain and agrees to notify the transplant team if paracentesis is warranted.    3.       Is the patient gaining weight? No    a.      If yes, ask how much and notify hepatologist   4.       Has the patient had any new symptoms since discharge?No    a.      If yes, notify hepatologist  5.       Is the patient clear on the medication they should be taking? Yes  6.        Does the patient know how to reach [facility]? Yes    If other diagnosis:    1. How is patient feeling since discharge from the hospital? Back to baseline.  He denies any drainage from umbilical surgical site.  He also denies fever.    Patient agrees to call back if any issues arise.  Contact info provided.

## 2020-03-17 ENCOUNTER — TELEPHONE (OUTPATIENT)
Dept: HEPATOLOGY | Facility: CLINIC | Age: 60
End: 2020-03-17

## 2020-03-17 LAB
BACTERIA BLD CULT: NORMAL
BACTERIA BLD CULT: NORMAL

## 2020-03-18 ENCOUNTER — TELEPHONE (OUTPATIENT)
Dept: TRANSPLANT | Facility: CLINIC | Age: 60
End: 2020-03-18

## 2020-03-18 ENCOUNTER — HISTORICAL (OUTPATIENT)
Dept: ADMINISTRATIVE | Facility: HOSPITAL | Age: 60
End: 2020-03-18

## 2020-03-18 LAB
ABS NEUT (OLG): 1.9 X10(3)/MCL (ref 2.1–9.2)
ALBUMIN SERPL-MCNC: 2.7 GM/DL (ref 3.4–5)
ALBUMIN/GLOB SERPL: 0.9 {RATIO}
ALP SERPL-CCNC: 163 UNIT/L (ref 50–136)
ALT SERPL-CCNC: 27 UNIT/L (ref 12–78)
AST SERPL-CCNC: 47 UNIT/L (ref 15–37)
BASOPHILS # BLD AUTO: 0 X10(3)/MCL (ref 0–0.2)
BASOPHILS NFR BLD AUTO: 1 %
BILIRUB SERPL-MCNC: 2.4 MG/DL (ref 0.2–1)
BILIRUBIN DIRECT+TOT PNL SERPL-MCNC: 1.1 MG/DL (ref 0–0.8)
BILIRUBIN DIRECT+TOT PNL SERPL-MCNC: 1.3 MG/DL (ref 0–0.2)
BUN SERPL-MCNC: 7 MG/DL (ref 7–18)
CALCIUM SERPL-MCNC: 7.5 MG/DL (ref 8.5–10.1)
CHLORIDE SERPL-SCNC: 106 MMOL/L (ref 98–107)
CO2 SERPL-SCNC: 26 MMOL/L (ref 21–32)
CREAT SERPL-MCNC: 0.7 MG/DL (ref 0.7–1.3)
EOSINOPHIL # BLD AUTO: 0.2 X10(3)/MCL (ref 0–0.9)
EOSINOPHIL NFR BLD AUTO: 7 %
ERYTHROCYTE [DISTWIDTH] IN BLOOD BY AUTOMATED COUNT: 18.2 % (ref 11.5–17)
GLOBULIN SER-MCNC: 3.1 GM/DL (ref 2.4–3.5)
GLUCOSE SERPL-MCNC: 94 MG/DL (ref 74–106)
HCT VFR BLD AUTO: 28.9 % (ref 42–52)
HGB BLD-MCNC: 8.3 GM/DL (ref 14–18)
INR PPP: 2 (ref 0–1.3)
LYMPHOCYTES # BLD AUTO: 0.7 X10(3)/MCL (ref 0.6–4.6)
LYMPHOCYTES NFR BLD AUTO: 21 %
MCH RBC QN AUTO: 25.3 PG (ref 27–31)
MCHC RBC AUTO-ENTMCNC: 28.7 GM/DL (ref 33–36)
MCV RBC AUTO: 88.1 FL (ref 80–94)
MONOCYTES # BLD AUTO: 0.4 X10(3)/MCL (ref 0.1–1.3)
MONOCYTES NFR BLD AUTO: 12 %
NEUTROPHILS # BLD AUTO: 1.9 X10(3)/MCL (ref 2.1–9.2)
NEUTROPHILS NFR BLD AUTO: 60 %
PLATELET # BLD AUTO: 43 X10(3)/MCL (ref 130–400)
PMV BLD AUTO: ABNORMAL FL (ref 7.4–10.4)
POTASSIUM SERPL-SCNC: 2.9 MMOL/L (ref 3.5–5.1)
PROT SERPL-MCNC: 5.8 GM/DL (ref 6.4–8.2)
PROTHROMBIN TIME: 21.9 SECOND(S) (ref 11.1–13.7)
RBC # BLD AUTO: 3.28 X10(6)/MCL (ref 4.7–6.1)
SODIUM SERPL-SCNC: 141 MMOL/L (ref 136–145)
WBC # SPEC AUTO: 3.2 X10(3)/MCL (ref 4.5–11.5)

## 2020-03-18 NOTE — TELEPHONE ENCOUNTER
Patient's phone call returned.  Patient reports that he is unable to keep appts as scheduled on tomorrow.  He reports concerns r/t to Covid 19 and the drive of 4 hours.  Discussed options for keeping appts or rescheduling to next week.  Patient adamant that he will need to cancel and will not be able to come in as scheduled.  He states that he had labs drawn today and will have staples removed from umbilical hernia surgical site on tomorrow at [facility].  He denies any leakage/ drainage from site.  He denies fever, abdominal pain, or any acute issues.  Instructed patient to keep transplant dept updated on his condition.  He agrees to do so.  Will notify MD of above.  Appts cancelled as requested.    ===============================    ----- Message from Daria Graham sent at 3/18/2020  2:15 PM CDT -----  Contact: PT   PT has an appointment tomorrow to get his staples removed and wanted to see if he could get them removed from [facility] instead of having to come all the way out here.    Callback: [phone]

## 2020-03-19 ENCOUNTER — TELEPHONE (OUTPATIENT)
Dept: TRANSPLANT | Facility: CLINIC | Age: 60
End: 2020-03-19

## 2020-03-19 NOTE — TELEPHONE ENCOUNTER
PT called re released from back up and able to eat and return home. Pt questioned his staples from the hernia repair. Dr Garcia confirmed he did need his staples out. Pt scheduled for appt on Friday at  1030 am he will [facility] in the [facility] till [facility].  Pt agreed.

## 2020-03-19 NOTE — TELEPHONE ENCOUNTER
"[facility] INCREASED RISK BEHAVIOR DONOR ORGAN OFFER NOTE    Notified by León Fitzgerald, , of liver offer with donor information.  Donor and recipient information read back and verified.  Spoke with Colin Reilly and identified no acute medical issues during telephone assessment.  Patient instructed to remain home and he can continue to eat at this time. We do not have any times for the txp.     Patient verbalized understanding that he/she has been called as backup recipient.    The donor's reported social history includes PHS increased risk behavior    The risk of getting HIV or other hepatitis viruses from this donor is very small compared to the risk of dying while waiting for another liver. The risk of clinical illness from any transmitted infection is also small due to the availability of highly effective oral drugs for all three of these viruses. While the patient has the right to decline any organ for any reason, available scientific data do not support turning down an organ based on Hepatitis C or behavioral risk factors as the risks associated with waiting longer for the transplant are many times greater. Informed patient that Dr. Thakkar thinks that this is a good liver for the patient.     Patient was also informed that if he turned down the offer, "A transplant doctor will call you after we hang up". Patient was also informed that if he turned down this donor, he would not be punished or removed from the transplant list, that he would remain on the list at his current status/MELD score. However, by waiting on the list longer rather than receiving this transplant, he will face a greater risk of death than any risk from the slight possibility of transmitted infection.    Patient was also informed that he would have the opportunity to see and talk to the surgeon when he got to the hospital and before he went down to the operating room.    Patient understands risk and agrees to proceed with " transplant.

## 2020-03-20 ENCOUNTER — OFFICE VISIT (OUTPATIENT)
Dept: TRANSPLANT | Facility: CLINIC | Age: 60
End: 2020-03-20
Payer: MEDICARE

## 2020-03-20 VITALS
WEIGHT: 214.31 LBS | DIASTOLIC BLOOD PRESSURE: 77 MMHG | SYSTOLIC BLOOD PRESSURE: 132 MMHG | RESPIRATION RATE: 16 BRPM | HEIGHT: 71 IN | OXYGEN SATURATION: 100 % | HEART RATE: 100 BPM | BODY MASS INDEX: 30 KG/M2 | TEMPERATURE: 99 F

## 2020-03-20 DIAGNOSIS — K42.9 UMBILICAL HERNIA WITHOUT OBSTRUCTION AND WITHOUT GANGRENE: Primary | ICD-10-CM

## 2020-03-20 PROCEDURE — 99024 POSTOP FOLLOW-UP VISIT: CPT | Mod: NTX,S$GLB,, | Performed by: SURGERY

## 2020-03-20 PROCEDURE — 99024 PR POST-OP FOLLOW-UP VISIT: ICD-10-PCS | Mod: NTX,S$GLB,, | Performed by: SURGERY

## 2020-03-20 PROCEDURE — 99999 PR PBB SHADOW E&M-EST. PATIENT-LVL III: ICD-10-PCS | Mod: PBBFAC,TXP,,

## 2020-03-20 PROCEDURE — 99999 PR PBB SHADOW E&M-EST. PATIENT-LVL III: CPT | Mod: PBBFAC,TXP,,

## 2020-03-20 NOTE — PROGRESS NOTES
Post op hernia repair.   No complaints.  incision well healed    A/p remove staples  Awaiting liver transplant

## 2020-03-23 ENCOUNTER — TELEPHONE (OUTPATIENT)
Dept: TRANSPLANT | Facility: CLINIC | Age: 60
End: 2020-03-23

## 2020-03-23 LAB
FINAL PATHOLOGIC DIAGNOSIS: NORMAL
FROZEN SECTION DIAGNOSIS: NORMAL
FROZEN SECTION FOOTNOTE: NORMAL
GROSS: NORMAL

## 2020-03-23 NOTE — TELEPHONE ENCOUNTER
Patient's phone call returned.  Patient reports that he is calling to give an update.  He reports that his abdomen is distended and will likely need a paracentesis soon.  He also reports that [facility] a backup offer, but was not allowed to have his mother to accompany him.  He reports that it will be difficult to come in for appts w/o mother since she provides transportation.  Informed patient that it is better to try to coordinate outpatient paracentesis locally instead.  Patient reports that he will call local GI MD, Dr. Chandler to assist w/ coordination or go to [facility] ED.  Urged patient to attempt to avoid ED whenever possible. He voiced understanding and agrees to f/u w/ local GI MD [facility].  Instructed patient to call back for any issues w/ scheduling sylvain or for any acute issues. He agrees to do so.    =========================     ----- Message from Daisy Grant sent at 3/23/2020 12:36 PM CDT -----  Contact: Pt  Pt would like to speak with nurse.    Pt# [phone]

## 2020-03-25 ENCOUNTER — TELEPHONE (OUTPATIENT)
Dept: TRANSPLANT | Facility: CLINIC | Age: 60
End: 2020-03-25

## 2020-03-25 ENCOUNTER — DOCUMENTATION ONLY (OUTPATIENT)
Dept: TRANSPLANT | Facility: CLINIC | Age: 60
End: 2020-03-25

## 2020-03-25 DIAGNOSIS — E87.6 HYPOKALEMIA: Primary | ICD-10-CM

## 2020-03-25 LAB
EXT ALBUMIN: 2.7
EXT ALKALINE PHOSPHATASE: 163
EXT ALT: 27
EXT AST: 47
EXT BILIRUBIN DIRECT: 1.3 MG/DL
EXT BILIRUBIN TOTAL: 2.4
EXT BUN: 7
EXT CALCIUM: 7.5
EXT CHLORIDE: 106
EXT CO2: 26
EXT CREATININE: 0.7 MG/DL
EXT GFR MDRD NON AF AMER: >60
EXT GLUCOSE: 94
EXT HEMATOCRIT: 28.9
EXT HEMOGLOBIN: 8.3
EXT INR: 2
EXT PLATELETS: 43
EXT POTASSIUM: 2.9
EXT PROTEIN TOTAL: 5.8
EXT PT: 21.9
EXT SODIUM: 141 MMOL/L
EXT WBC: 3.2

## 2020-03-25 NOTE — TELEPHONE ENCOUNTER
Potassium chloride 25 mEq twice a day escripted to helen, talked to patient.  He will go pick it up, I have told him to take 25 mEq BID for 3 days, although there are extra pills in the bottle. PLEASE make sure to check with patient to stop after 3 days, total 6 tabs, this time.  If he has low K again, we can use the remaining supply in his bottle. Please get a BMP on Monday.

## 2020-03-26 ENCOUNTER — TELEPHONE (OUTPATIENT)
Dept: TRANSPLANT | Facility: CLINIC | Age: 60
End: 2020-03-26

## 2020-03-26 LAB — FUNGUS SPEC CULT: NORMAL

## 2020-03-26 NOTE — TELEPHONE ENCOUNTER
Called patient to confirm that he has started taking K supplement as recommended by Dr. Saucedo.  He reports that he started taking K today.  Instructed patient to take 6 doses over three days, 2 doses today, 2 doses tomorrow, and 2 doses on Saturday.  Reminded him that he may have xtra doses, but he is not to take any K supplements unless instructed to do otherwise.  He verbalized understanding.  He agrees to repeat labs on Monday as recommended.  Appt card completed to schedule locally.

## 2020-03-26 NOTE — TELEPHONE ENCOUNTER
Spoke w/ patient regarding prescription for KCl.  [facility] requesting clarification.  Prescription clarified as requested.  Patient aware.    ===================================    ----- Message from Daisy Grant sent at 3/26/2020  4:32 PM CDT -----  Contact: Pt  Pt is calling to speak with nurse.    Pt# [phone]

## 2020-03-27 ENCOUNTER — DOCUMENTATION ONLY (OUTPATIENT)
Dept: TRANSPLANT | Facility: CLINIC | Age: 60
End: 2020-03-27

## 2020-03-27 ENCOUNTER — TELEPHONE (OUTPATIENT)
Dept: TRANSPLANT | Facility: CLINIC | Age: 60
End: 2020-03-27

## 2020-03-27 NOTE — TELEPHONE ENCOUNTER
Received phone call from patient.  He reports that he had paracentesis outpatient locally today..2 L removed.  He reports some relief, and that paracentesis was uneventful, but that more fluid could have been removed. Umbilicus protruding, but no leakage noted.  He denies any pain or distress and agrees to call back for any acute issues.

## 2020-03-29 ENCOUNTER — DOCUMENTATION ONLY (OUTPATIENT)
Dept: TRANSPLANT | Facility: HOSPITAL | Age: 60
End: 2020-03-29

## 2020-03-30 ENCOUNTER — TELEPHONE (OUTPATIENT)
Dept: TRANSPLANT | Facility: CLINIC | Age: 60
End: 2020-03-30

## 2020-03-30 NOTE — TELEPHONE ENCOUNTER
PATIENT NAME: Colin RAYGOZA [facility] #: [id number]    Lab Results   Component Value Date    EXTINR 2.0 03/18/2020    EXTCREATININ 0.70 03/18/2020    EXTSODIUM 141 03/18/2020    EXTBILITOTAL 2.4 03/18/2020    EXTALBUMIN 2.7 03/18/2020     MELD 18  Encephalopathy: 1 - 2  Ascites: moderate  Dialysis: no     Recertification requestor: Jeannine Ku

## 2020-03-30 NOTE — TELEPHONE ENCOUNTER
Patient's phone call returned.  Patient reports that he has two doses of potassium left to take.  Reminded him of the need to repeat labs on Wednesday.  Lab orders already faxed to [facility].  Patient also states that he was called in as primary for transplant but unable to come in since person who provides transportation was not willing to bring him to [facility].  Patient working on an alternate person to provide transportation and hopes to have that person identified by the next transplant offer.    ========================    ----- Message from Corine Lane sent at 3/30/2020  1:32 PM CDT -----  Contact: pt   Pt is calling for an an update on his transplant care.please call

## 2020-03-31 ENCOUNTER — TELEPHONE (OUTPATIENT)
Dept: TRANSPLANT | Facility: CLINIC | Age: 60
End: 2020-03-31

## 2020-03-31 NOTE — TELEPHONE ENCOUNTER
Patient's phone call returned.  Patient reports that he has one dose of K Cl left to take (due tonight).  Calling to confirm that labs still needed.  Informed him that labs needed to reassess K, and that lab orders sent to [facility] and he will be notified of any further recommendations. He verbalized understanding and denies any further questions at this time.    ========================      Patricia Patel sent at 3/31/2020  3:52 PM CDT -----  Contact: PT  PT called to speak with coordinator Melvina - regarding blood work    Callback: [phone]

## 2020-04-01 ENCOUNTER — HISTORICAL (OUTPATIENT)
Dept: LAB | Facility: HOSPITAL | Age: 60
End: 2020-04-01

## 2020-04-01 LAB
ABS NEUT (OLG): 2 X10(3)/MCL (ref 1.5–6.9)
ALBUMIN SERPL-MCNC: 2.9 GM/DL (ref 3.4–5)
ALBUMIN/GLOB SERPL: 0.8 RATIO
ALP SERPL-CCNC: 159 UNIT/L (ref 30–113)
ALT SERPL-CCNC: 27 UNIT/L (ref 10–45)
AMPHET UR QL SCN: NORMAL
AST SERPL-CCNC: 51 UNIT/L (ref 15–37)
BARBITURATE SCN PRESENT UR: NORMAL
BENZODIAZ UR QL SCN: NORMAL
BILIRUB SERPL-MCNC: 2.7 MG/DL (ref 0.1–0.9)
BILIRUBIN DIRECT+TOT PNL SERPL-MCNC: 1.3 MG/DL (ref 0–0.3)
BILIRUBIN DIRECT+TOT PNL SERPL-MCNC: 1.4 MG/DL
BUN SERPL-MCNC: 8 MG/DL (ref 10–20)
CALCIUM SERPL-MCNC: 8 MG/DL (ref 8–10.5)
CANNABINOIDS UR QL SCN: NORMAL
CHLORIDE SERPL-SCNC: 107 MMOL/L (ref 100–108)
CHOLEST SERPL-MCNC: 107 MG/DL (ref 140–200)
CHOLEST/HDLC SERPL: 2 MG/DL (ref 0–8)
CO2 SERPL-SCNC: 25 MMOL/L (ref 21–35)
COCAINE UR QL SCN: NORMAL
CREAT SERPL-MCNC: 0.73 MG/DL (ref 0.7–1.3)
EOSINOPHIL NFR BLD MANUAL: 1 % (ref 0–5)
ERYTHROCYTE [DISTWIDTH] IN BLOOD BY AUTOMATED COUNT: 19.6 % (ref 11.5–17)
EST. AVERAGE GLUCOSE BLD GHB EST-MCNC: 151 MG/DL
GLOBULIN SER-MCNC: 3.5 GM/DL
GLUCOSE SERPL-MCNC: 152 MG/DL (ref 75–116)
HBA1C MFR BLD: 6.9 % (ref 4.8–6)
HCT VFR BLD AUTO: 32.8 % (ref 42–52)
HDLC SERPL-MCNC: 46 MG/DL (ref 35–59)
HGB BLD-MCNC: 9.9 GM/DL (ref 14–18)
LDLC SERPL CALC-MCNC: 52 MG/DL (ref 100–129)
LYMPHOCYTES NFR BLD MANUAL: 26 % (ref 15–40)
MCH RBC QN AUTO: 26 PG (ref 27–34)
MCHC RBC AUTO-ENTMCNC: 30 GM/DL (ref 31–36)
MCV RBC AUTO: 87 FL (ref 80–99)
MDMA UR QL SCN: NORMAL
METHADONE UR QL SCN: NORMAL
MONOCYTES NFR BLD MANUAL: 8 % (ref 4–12)
NEUTROPHILS NFR BLD MANUAL: 65 % (ref 47–80)
OPIATES UR QL SCN: NORMAL
PCP UR QL: NORMAL
PH UR STRIP.AUTO: 7 [PH] (ref 5–8)
PLATELET # BLD AUTO: 56 X10(3)/MCL (ref 140–400)
PLATELET # BLD EST: ABNORMAL 10*3/UL
PMV BLD AUTO: ABNORMAL FL (ref 6.8–10)
POTASSIUM SERPL-SCNC: 4.5 MMOL/L (ref 3.6–5.2)
PROT SERPL-MCNC: 6.4 GM/DL (ref 6.4–8.2)
RBC # BLD AUTO: 3.78 X10(6)/MCL (ref 4.7–6.1)
RBC MORPH BLD: NORMAL
SODIUM SERPL-SCNC: 139 MMOL/L (ref 135–145)
TEMPERATURE, URINE (OHS): 24 DEGC (ref 20–25)
TRIGL SERPL-MCNC: 48 MG/DL (ref 35–150)
TSH SERPL-ACNC: 2.04 MIU/ML (ref 0.36–3.74)
VLDLC SERPL CALC-MCNC: 10 MG/DL
WBC # SPEC AUTO: 3.2 X10(3)/MCL (ref 4.5–11.5)

## 2020-04-02 ENCOUNTER — DOCUMENTATION ONLY (OUTPATIENT)
Dept: TRANSPLANT | Facility: CLINIC | Age: 60
End: 2020-04-02

## 2020-04-08 ENCOUNTER — TELEPHONE (OUTPATIENT)
Dept: TRANSPLANT | Facility: CLINIC | Age: 60
End: 2020-04-08

## 2020-04-08 NOTE — TELEPHONE ENCOUNTER
Phone call returned to pt.  Patient reports that last paracentesis done last week.  Abdomen distended again with leakage of fluid noted at puncture site.  Next para scheduled for tomorrow.  Calling to confirm that this is ok.  Informed patient that this is ok....sylvain should be scheduled no more often than weekly and that albumin replacement should be given.  He verbalized understanding and denies any questions.    =========================    ----- Message from Daria Graham sent at 4/8/2020  2:59 PM CDT -----  Contact: PT   PT is returning a missed call from his coordinator from yesterday. Please call back    Callback: [phone]

## 2020-04-15 ENCOUNTER — DOCUMENTATION ONLY (OUTPATIENT)
Dept: TRANSPLANT | Facility: CLINIC | Age: 60
End: 2020-04-15

## 2020-04-15 ENCOUNTER — TELEPHONE (OUTPATIENT)
Dept: TRANSPLANT | Facility: CLINIC | Age: 60
End: 2020-04-15

## 2020-04-15 LAB
EXT ALBUMIN: 2.7
EXT ALKALINE PHOSPHATASE: 127
EXT ALT: 19
EXT AST: 42
EXT BILIRUBIN TOTAL: 3.2
EXT BUN: 7
EXT CHLORIDE: 103
EXT CO2: 21
EXT CREATININE: 0.67 MG/DL
EXT GFR MDRD NON AF AMER: 122
EXT GLUCOSE: 180
EXT HEMATOCRIT: 33.2
EXT HEMOGLOBIN: 10.5
EXT INR: 1.8
EXT PLATELETS: 49
EXT POTASSIUM: 3
EXT PROTEIN TOTAL: 5.9
EXT PT: 20.6
EXT SODIUM: 132 MMOL/L
EXT WBC: 4.3

## 2020-04-15 NOTE — TELEPHONE ENCOUNTER
PATIENT NAME: Colin RAYGOZA [facility] #: [id number]    Lab Results   Component Value Date    EXTINR 1.8 04/15/2020    EXTCREATININ 0.67 04/15/2020    EXTSODIUM 132 04/15/2020    EXTBILITOTAL 3.2 04/15/2020    EXTALBUMIN 2.7 04/15/2020       Encephalopathy: 1 - 2  Ascites: moderate  Dialysis: no     MELD 21     Recertification requestor: Lyly Osorio

## 2020-04-15 NOTE — TELEPHONE ENCOUNTER
Reviewed Care Everywhere for updated labs.  Patient currently inpatient.  Went to local ED on 4/12 after c/o abd pain and serous leakage since recent para. He also developed erythema around the site, as well as fevers.  Admitted w/ possible SBP.  Repeat para attempted.....Minimal ascitic fluid overlies the liver contour. The amount of ascites is insufficient to attempt paracentesis safely. No acute abdominal pathology is evident.  Patient now feeling well and afebrile.  Flu test administered..negative. K normal at admit but low today (remains inpatient).  MELD today 21.  Will re-certify and forward labs, as well as above to MD.

## 2020-04-16 ENCOUNTER — TELEPHONE (OUTPATIENT)
Dept: TRANSPLANT | Facility: CLINIC | Age: 60
End: 2020-04-16

## 2020-04-16 NOTE — TELEPHONE ENCOUNTER
Patient's phone call returned.  Patient reports that he was dc'd from the hospital on yesterday and calling to give an update.  Patient reports that he is now afebrile.  Continues to have some abdominal discomfort/ distention.  3L of fluid removed w/ last para while inpatient.  Patient also reports that his navel is protruding again.  No drainage noted.  Will schedule VV for first available.  Patient confirms that he has a smart phone.  Appt card completed.    =========================================    ----- Message from Pb Osorio sent at 4/16/2020  4:54 PM CDT -----  Contact: pt  Calling to speak with coordinator     Call back: [phone]

## 2020-04-23 NOTE — PROGRESS NOTES
[facility]  Liver Transplant  Progress Note    Established Patient - Audio Only Telehealth Visit     The patient location is: his home  The chief complaint leading to consultation is: waitlist management.  Visit type: Virtual visit with audio only (telephone)  Spent 45 min talking to patient, and his mother.      The reason for the audio only service rather than synchronous audio and video virtual visit was related to technical difficulties or patient preference/necessity.     Each patient to whom I provide medical services by telemedicine is:  (1) informed of the relationship between the physician and patient and the respective role of any other health care provider with respect to management of the patient; and (2) notified that they may decline to receive medical services by telemedicine and may withdraw from such care at any time. Patient verbally consented to receive this service via voice-only telephone call.      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  Also has DM, HCV s/p INF, and hx of an umbilical hernia, s/p umbilical hernia repair on 2/22/20 who presents for clinic follow-up.    Today, he reports:  -   On Easter Sunday (4/12/20), he went to the ER at [facility] in [place], because of Temp 103 degree, admitted for 3 days, his abd was drained about 6 liters fluid, had infection in the fluid, he was given Cefdinir IV in hosp for 3 days, then sent home on 300 mg twice daily x 10 days, 2 more pills left, and  doxycycline oral 100 mg twice daily, fever disappeared after two days.  He reported this to pre-txp coordinator when he was afebrile.  Has recurrence of umbilical hernia, has two herniae, one is in the umbilicus and is  bigger than  "pre-surgical size, and second hernia is to the right side above the umbilicus, it is small, 4" long.     -  Last night he had 4-5 black stools, had nausea, no vomiting. Had one black stool this morning.  When I called, he was on his way back from the nearest [employer] store shopping for [employer] when I called for this audio visit.  Feels dizzy when he stands up, and his balance is off.  I am sending him to the local ER at [facility], in [place], [place]. His mother will drive him there.      No symptoms of confusion/disorientation, has chronic right sided abd pain, but no fever, cough, shortness of breath.       Review of Systems   Constitutional: Negative for chills, fever and weight loss.   HENT: Negative for hearing loss, nosebleeds and sore throat.    Eyes: Negative for blurred vision.   Respiratory: Negative for cough, hemoptysis, shortness of breath and wheezing.    Cardiovascular: Negative for chest pain, palpitations and leg swelling.   Gastrointestinal: Positive for abdominal pain, melena and nausea. Negative for blood in stool, constipation, diarrhea, heartburn and vomiting.   Genitourinary: Negative for dysuria, frequency and hematuria.   Musculoskeletal: Negative for falls, joint pain and myalgias.   Skin: Negative for rash.   Neurological: Positive for weakness. Negative for tremors, seizures and headaches.        Balance is off this morning   Endo/Heme/Allergies: Bruises/bleeds easily.   Psychiatric/Behavioral: Negative for depression and suicidal ideas. The patient is nervous/anxious.      Vital signs: not performed due to audio visit.      Physical findings: as reported to me by patient and his mother:   His temp this morning was 98.7  Patient stated his abd tenderness upon self exam.    Patient also has some leg swelling.  He is communicating well and appropriately on the phone.      Current Outpatient Medications   Medication Sig    ciprofloxacin HCl (CIPRO) 500 MG tablet Take 1 tablet (500 mg " total) by mouth once daily.    cyclobenzaprine (FLEXERIL) 10 MG tablet Take 10 mg by mouth 2 (two) times daily.     EASY TOUCH INSULIN SYRINGE 1 mL 31 gauge x 5/16 Syrg     escitalopram oxalate (LEXAPRO) 20 MG tablet Take 20 mg by mouth once daily.     finasteride (PROSCAR) 5 mg tablet Take 1 tablet (5 mg total) by mouth once daily.    furosemide (LASIX) 40 MG tablet Take 4 tablets (160 mg total) by mouth once daily.    gabapentin (NEURONTIN) 600 MG tablet Take 600 mg by mouth 3 (three) times daily.     HYDROcodone-acetaminophen (NORCO)  mg per tablet Take 1 tablet by mouth every 12 (twelve) hours as needed for Pain.    insulin aspart U-100 (NOVOLOG) 100 unit/mL injection Inject 24 Units into the skin 3 (three) times daily before meals.    insulin detemir U-100 (LEVEMIR) 100 unit/mL injection Inject 74 Units into the skin every evening.    lactulose (CHRONULAC) 10 gram/15 mL solution Take 30 mLs by mouth 3 (three) times daily. May take up to  4 to 5 times daily if needed for bowel movements    levocetirizine (XYZAL) 5 MG tablet Take 1 tablet (5 mg total) by mouth every evening.    oxybutynin (DITROPAN) 5 MG Tab Take 5 mg by mouth once daily.    pantoprazole (PROTONIX) 40 MG tablet Take 1 tablet (40 mg total) by mouth once daily.    potassium bicarbonate & potassium chloride (K-LYTE CL) 25 mEq TbEF Take 1 tablet (25 mEq total) by mouth 2 (two) times daily with meals.    rifAXIMin (XIFAXAN) 550 mg Tab Take 1 tablet (550 mg total) by mouth 2 (two) times daily.    spironolactone (ALDACTONE) 100 MG tablet Take 1.5 tablets (150 mg total) by mouth once daily.    tamsulosin (FLOMAX) 0.4 mg Cp24 Take 1 capsule (0.4 mg total) by mouth once daily.    TRUE METRIX GLUCOSE TEST STRIP Strp     TRUEPLUS LANCETS 30 gauge Misc      No current facility-administered medications for this visit.         Assessment and plan:      -  Decompensated cirrhosis secondary to alcohol and hep C. Currently listed for liver  transplant with MELD 21, which expires on 5/15/20.   -  Cirrhosis complications:    Ascites returned after hernia repair,   SBP found when presented to local ER on 4/12/20, given Cefdinir IV, sent home on oral and doxycycline x 10 days   S/P umbilical hernia repair on 2/22/20.    H/o Esophageal variceal bleed and anemia - had melena last night and this morning, last Hgb 10.5 on 4/15/20.     Hypoalbuminemia 2.7.     Had HE, seems well-oriented,    No HCC.    -  H/o Hep C treated with regimen containing interferon, lately HCV RNA not detected.  Neg also for Hep B.       PLAN:  -  Needs to go to the ER at [facility], to evaluate, manage GI Bleed. His mother will drive him there.          Yamile Saucedo MD  Liver Transplant  [facility]                               This service was not originating from a related E/M service provided within the previous 7 days nor will  to an E/M service or procedure within the next 24 hours or my soonest available appointment.  Prevailing standard of care was able to be met in this audio-only visit.

## 2020-04-24 ENCOUNTER — OFFICE VISIT (OUTPATIENT)
Dept: TRANSPLANT | Facility: CLINIC | Age: 60
End: 2020-04-24
Payer: MEDICARE

## 2020-04-24 DIAGNOSIS — K92.1 MELENA: Primary | ICD-10-CM

## 2020-04-24 DIAGNOSIS — K65.2 SBP (SPONTANEOUS BACTERIAL PERITONITIS): ICD-10-CM

## 2020-04-24 PROCEDURE — 99214 OFFICE O/P EST MOD 30 MIN: CPT | Mod: TXP,,, | Performed by: INTERNAL MEDICINE

## 2020-04-24 PROCEDURE — 99214 PR OFFICE/OUTPT VISIT, EST, LEVL IV, 30-39 MIN: ICD-10-PCS | Mod: TXP,,, | Performed by: INTERNAL MEDICINE

## 2020-04-24 NOTE — LETTER
April 24, 2020                     [facility] - Liver Transplant  [address]  [facility] [address]  Phone: [phone]   Patient: Colin Reilly   MR Number: [id number]   YOB: 1960   Date of Visit: 4/24/2020       Dear      Thank you for referring Colin Reilly to me for evaluation. Attached you will find relevant portions of my assessment and plan of care.    If you have questions, please do not hesitate to call me. I look forward to following Colin Reilly along with you.    Sincerely,    Yamile Saucedo MD    Enclosure    If you would like to receive this communication electronically, please contact [email or web address] or [phone] to request [facility] Link access.    [facility] Link is a tool which provides read-only access to select patient information with whom you have a relationship. Its easy to use and provides real time access to review your patients record including encounter summaries, notes, results, and demographic information.    If you feel you have received this communication in error or would no longer like to receive these types of communications, please e-mail [email or web address]

## 2020-04-29 ENCOUNTER — HISTORICAL (OUTPATIENT)
Dept: RADIOLOGY | Facility: HOSPITAL | Age: 60
End: 2020-04-29

## 2020-05-04 ENCOUNTER — DOCUMENTATION ONLY (OUTPATIENT)
Dept: TRANSPLANT | Facility: CLINIC | Age: 60
End: 2020-05-04

## 2020-05-04 NOTE — NURSING
Spoke w/ patient today.  He reports that he was seen in local ED ([facility]).  Although the recommendation was for patient to report to ED for melena, he states that he went for low BS.  He reports that he was admitted on Tuesday of last week and dc'd on Thursday.  Patient reports that his stool was checked for blood and was negative.  He reports getting blood products while inpatient, but reports that per his recollection, received plts.  He denies black tarry stools, bloody stools since d/c, but that BMs have decreased.  He did report increased swelling to lower extremities and abdomen...next paracentesis will likely be scheduled Thursday or Friday of this week.  He also reports a decrease in UOP.  Confirms that he is taking diuretics as prescribed.  He denies any other acute issues/ pain/ distress.  Attempted to review medical records in Care Everywhere but receiving error message...[place] was unable to confirm the patient's identity. Documents could not be requested.  Appt card completed for pt to have labs drawn this week and to schedule a virtual visit w/ labs within 1-2 weeks (PDOL appt).  Patient aware.

## 2020-05-05 ENCOUNTER — TELEPHONE (OUTPATIENT)
Dept: TRANSPLANT | Facility: CLINIC | Age: 60
End: 2020-05-05

## 2020-05-05 NOTE — TELEPHONE ENCOUNTER
Pb BURGESS [facility] Pre-Liver Transplant Clinical   Caller: pt (Today,  2:58 PM)             Calling to speak with coordinator     Call back: [phone] (home)     ____________________  Phone call returned to patient.  Patient wants to discuss how long he has been on the waiting list with her transplant coordinator. Message sent to coordinator.

## 2020-05-07 ENCOUNTER — HISTORICAL (OUTPATIENT)
Dept: RADIOLOGY | Facility: HOSPITAL | Age: 60
End: 2020-05-07

## 2020-05-11 ENCOUNTER — TELEPHONE (OUTPATIENT)
Dept: TRANSPLANT | Facility: CLINIC | Age: 60
End: 2020-05-11

## 2020-05-12 ENCOUNTER — TELEPHONE (OUTPATIENT)
Dept: TRANSPLANT | Facility: CLINIC | Age: 60
End: 2020-05-12

## 2020-05-12 NOTE — TELEPHONE ENCOUNTER
"Patient's phone call returned.  Patient makes c/o worsening abdominal pain and distention.  Abdominal pain now severe. Patient unable to describe the pain, but does report that he likely needs another paracentesis.Patient reports that he has two hernias...umbilical and next to umbilicus.  He reports "bubbling around the navel" and feels as though the smaller hernia (on the side) will rip, especially when standing up and having a BM.  He also reports that BM today was black.  He denies any other symptoms at this time.  Instructed patient to go to ED, and upon arrival, notify staff that he is listed for transplant so that the doctors there can consult our transplant team and possibly transfer him to [facility].  He voiced understanding and agrees to do so.  He reports that he will likely call an ambulance and go to [facility].  Will notify inpatient team of above.  "

## 2020-05-13 ENCOUNTER — TELEPHONE (OUTPATIENT)
Dept: TRANSPLANT | Facility: CLINIC | Age: 60
End: 2020-05-13

## 2020-05-13 NOTE — TELEPHONE ENCOUNTER
Call returned with no answer. Voice message left requesting a return call as needed. Contact numbers provided.     ----- Message from Pb Osorio sent at 5/13/2020  1:07 PM CDT -----  Contact: pt (mother)  Calling to speak with Cady Woods      Call back: [phone]

## 2020-05-13 NOTE — TELEPHONE ENCOUNTER
Return call received from Suzanne (mom). Says she received a call this morning informing her that Colin should be at the ED this morning for labs and assessment of abdominal pain, and there will be an ambulance waiting to transfer him to [facility].      Ms Palacios advised I'm unable to find documentation of that conversation.  Advised, based on documentation of communication between PAPO Osorio and Colin yesterday, he was instructed to report to the local ED and notify staff that he's listed for transplant so local providers can consult the the transplant team and coordinate transfer to [facility] as needed.  Understanding expressed.

## 2020-05-13 NOTE — TELEPHONE ENCOUNTER
Call returned. Patient says he's calling to let Raisa know that he's at [facility]'s ED for stomach pain, as she instructed.  Says he's waiting to be seen by the doctor and waiting on an ambulance to [facility].    Advised per review of prior note, he's to be seen at [facility] and transferred to [facility], if deemed necessary.  Will have to await ED physician's assessment before making transfer plans. Patient expressed understanding.    ----- Message from Carlos Cope sent at 5/13/2020 10:48 AM CDT -----  Contact: pt: [phone]  Pt is calling to speak with Lyly, state he's in the ED at [facility]      Please contact pt: [phone]

## 2020-05-14 ENCOUNTER — TELEPHONE (OUTPATIENT)
Dept: TRANSPLANT | Facility: CLINIC | Age: 60
End: 2020-05-14

## 2020-05-14 ENCOUNTER — DOCUMENTATION ONLY (OUTPATIENT)
Dept: TRANSPLANT | Facility: CLINIC | Age: 60
End: 2020-05-14

## 2020-05-14 LAB
EXT ALBUMIN: 2.8 (ref 3.5–5)
EXT ALKALINE PHOSPHATASE: 134 (ref 40–150)
EXT ALT: 21 (ref 0–55)
EXT AST: 47 (ref 5–34)
EXT BILIRUBIN TOTAL: 3 (ref ?–1.5)
EXT BUN: 4.3 (ref 8.4–25.7)
EXT CALCIUM: 7.5 (ref 8.4–10.2)
EXT CHLORIDE: 109 (ref 98–107)
EXT CO2: 21 (ref 22–29)
EXT CREATININE: 0.62 MG/DL (ref 0.73–1.18)
EXT GFR MDRD NON AF AMER: 60 (ref 60–?)
EXT GLUCOSE: 202 (ref 74–100)
EXT HEMATOCRIT: 30.3 (ref 42–52)
EXT HEMOGLOBIN: 9.1 (ref 14–18)
EXT INR: 2 (ref 0–1)
EXT PLATELETS: 63 (ref 130–400)
EXT POTASSIUM: 3.8 (ref 3.5–5.1)
EXT PROTEIN TOTAL: 5.6 (ref 6.4–8.3)
EXT PT: 21 (ref 11–14)
EXT SODIUM: 137 MMOL/L (ref 136–145)
EXT WBC: 4.4 (ref 4.5–11.5)

## 2020-05-14 NOTE — TELEPHONE ENCOUNTER
----- Message from Pb Osorio sent at 5/14/2020 12:51 PM CDT -----  Contact: pt  Calling to speak with coordinator     Call back: [phone]

## 2020-05-14 NOTE — TELEPHONE ENCOUNTER
Returned call to Pt. Stated he wanted to inform Raisa his coordinator that he was seen by the MD in the ED at [facility] on yesterday for stomach pain. Pt was given a CT of the abdomen and had labs drawn. Pt was discharged and asked to follow up with his hepatologist. Informed Pt I will update his coordinator and we will request a report from his visit on yesterday. Pt states understanding. Fax sent to medical records [phone] for pt's hospital visit along with labs and CT.

## 2020-05-15 ENCOUNTER — TELEPHONE (OUTPATIENT)
Dept: TRANSPLANT | Facility: CLINIC | Age: 60
End: 2020-05-15

## 2020-05-15 ENCOUNTER — HOSPITAL ENCOUNTER (INPATIENT)
Facility: HOSPITAL | Age: 60
LOS: 9 days | Discharge: HOME HEALTH | DRG: 005 | End: 2020-05-24
Attending: EMERGENCY MEDICINE | Admitting: TRANSPLANT SURGERY
Payer: MEDICARE

## 2020-05-15 DIAGNOSIS — Z91.89 AT RISK FOR LONG QT SYNDROME: ICD-10-CM

## 2020-05-15 DIAGNOSIS — T38.0X5A ADVERSE EFFECT OF CORTICOSTEROIDS, INITIAL ENCOUNTER: ICD-10-CM

## 2020-05-15 DIAGNOSIS — K74.60 DECOMPENSATED HEPATIC CIRRHOSIS: ICD-10-CM

## 2020-05-15 DIAGNOSIS — K70.31 ASCITES DUE TO ALCOHOLIC CIRRHOSIS: ICD-10-CM

## 2020-05-15 DIAGNOSIS — D62 ACUTE BLOOD LOSS ANEMIA: ICD-10-CM

## 2020-05-15 DIAGNOSIS — D69.6 THROMBOCYTOPENIA, UNSPECIFIED: ICD-10-CM

## 2020-05-15 DIAGNOSIS — N17.9 AKI (ACUTE KIDNEY INJURY): ICD-10-CM

## 2020-05-15 DIAGNOSIS — R10.84 GENERALIZED ABDOMINAL PAIN: ICD-10-CM

## 2020-05-15 DIAGNOSIS — Z79.60 LONG-TERM USE OF IMMUNOSUPPRESSANT MEDICATION: ICD-10-CM

## 2020-05-15 DIAGNOSIS — R78.81 POSITIVE BLOOD CULTURE IN CADAVERIC DONOR: ICD-10-CM

## 2020-05-15 DIAGNOSIS — Z00.5 POSITIVE BLOOD CULTURE IN CADAVERIC DONOR: ICD-10-CM

## 2020-05-15 DIAGNOSIS — K21.9 GASTROESOPHAGEAL REFLUX DISEASE WITHOUT ESOPHAGITIS: ICD-10-CM

## 2020-05-15 DIAGNOSIS — Z76.82 LIVER TRANSPLANT CANDIDATE: ICD-10-CM

## 2020-05-15 DIAGNOSIS — Z79.4 TYPE 2 DIABETES MELLITUS WITHOUT COMPLICATION, WITH LONG-TERM CURRENT USE OF INSULIN: ICD-10-CM

## 2020-05-15 DIAGNOSIS — K42.9 UMBILICAL HERNIA WITHOUT OBSTRUCTION AND WITHOUT GANGRENE: ICD-10-CM

## 2020-05-15 DIAGNOSIS — R10.9 ABDOMINAL PAIN: ICD-10-CM

## 2020-05-15 DIAGNOSIS — E11.9 TYPE 2 DIABETES MELLITUS WITHOUT COMPLICATION, WITH LONG-TERM CURRENT USE OF INSULIN: ICD-10-CM

## 2020-05-15 DIAGNOSIS — R10.9 ABDOMINAL PAIN, UNSPECIFIED ABDOMINAL LOCATION: ICD-10-CM

## 2020-05-15 DIAGNOSIS — Z29.89 PROPHYLACTIC IMMUNOTHERAPY: ICD-10-CM

## 2020-05-15 DIAGNOSIS — E87.6 HYPOKALEMIA: ICD-10-CM

## 2020-05-15 DIAGNOSIS — Z91.89 AT RISK FOR OPPORTUNISTIC INFECTIONS: ICD-10-CM

## 2020-05-15 DIAGNOSIS — K76.82 HEPATIC ENCEPHALOPATHY: ICD-10-CM

## 2020-05-15 DIAGNOSIS — Z94.4 S/P LIVER TRANSPLANT: Primary | ICD-10-CM

## 2020-05-15 DIAGNOSIS — F05 ACUTE CONFUSIONAL STATE: ICD-10-CM

## 2020-05-15 DIAGNOSIS — D68.4 ACQUIRED COAGULATION FACTOR DEFICIENCY: ICD-10-CM

## 2020-05-15 DIAGNOSIS — D63.8 ANEMIA OF CHRONIC DISEASE: ICD-10-CM

## 2020-05-15 DIAGNOSIS — Z01.818 PRE-OP EVALUATION: ICD-10-CM

## 2020-05-15 DIAGNOSIS — K72.90 DECOMPENSATED HEPATIC CIRRHOSIS: ICD-10-CM

## 2020-05-15 LAB
ABO + RH BLD: NORMAL
ALBUMIN FLD-MCNC: <0.3 G/DL
ALBUMIN SERPL BCP-MCNC: 2.3 G/DL (ref 3.5–5.2)
ALP SERPL-CCNC: 97 U/L (ref 55–135)
ALT SERPL W/O P-5'-P-CCNC: 15 U/L (ref 10–44)
AMMONIA PLAS-SCNC: 62 UMOL/L (ref 10–50)
ANION GAP SERPL CALC-SCNC: 7 MMOL/L (ref 8–16)
ANISOCYTOSIS BLD QL SMEAR: SLIGHT
APPEARANCE FLD: NORMAL
AST SERPL-CCNC: 32 U/L (ref 10–40)
BASO STIPL BLD QL SMEAR: ABNORMAL
BASOPHILS # BLD AUTO: 0.02 K/UL (ref 0–0.2)
BASOPHILS NFR BLD: 0.3 % (ref 0–1.9)
BILIRUB SERPL-MCNC: 4.5 MG/DL (ref 0.1–1)
BILIRUB UR QL STRIP: NEGATIVE
BLD GP AB SCN CELLS X3 SERPL QL: NORMAL
BODY FLD TYPE: NORMAL
BUN SERPL-MCNC: 12 MG/DL (ref 6–20)
CALCIUM SERPL-MCNC: 7.1 MG/DL (ref 8.7–10.5)
CHLORIDE SERPL-SCNC: 102 MMOL/L (ref 95–110)
CLARITY UR REFRACT.AUTO: ABNORMAL
CO2 SERPL-SCNC: 20 MMOL/L (ref 23–29)
COLOR FLD: YELLOW
COLOR UR AUTO: ABNORMAL
CREAT SERPL-MCNC: 0.7 MG/DL (ref 0.5–1.4)
DACRYOCYTES BLD QL SMEAR: ABNORMAL
DIFFERENTIAL METHOD: ABNORMAL
EOSINOPHIL # BLD AUTO: 0 K/UL (ref 0–0.5)
EOSINOPHIL NFR BLD: 0.1 % (ref 0–8)
ERYTHROCYTE [DISTWIDTH] IN BLOOD BY AUTOMATED COUNT: 19.9 % (ref 11.5–14.5)
EST. GFR  (AFRICAN AMERICAN): >60 ML/MIN/1.73 M^2
EST. GFR  (NON AFRICAN AMERICAN): >60 ML/MIN/1.73 M^2
GIANT PLATELETS BLD QL SMEAR: PRESENT
GLUCOSE SERPL-MCNC: 142 MG/DL (ref 70–110)
GLUCOSE SERPL-MCNC: 143 MG/DL (ref 70–110)
GLUCOSE UR QL STRIP: NEGATIVE
GRAM STN SPEC: NORMAL
GRAM STN SPEC: NORMAL
HCT VFR BLD AUTO: 29.1 % (ref 40–54)
HGB BLD-MCNC: 8.4 G/DL (ref 14–18)
HGB UR QL STRIP: NEGATIVE
HYPOCHROMIA BLD QL SMEAR: ABNORMAL
IMM GRANULOCYTES # BLD AUTO: 0.04 K/UL (ref 0–0.04)
IMM GRANULOCYTES NFR BLD AUTO: 0.5 % (ref 0–0.5)
INR PPP: 2.1 (ref 0.8–1.2)
KETONES UR QL STRIP: NEGATIVE
LEUKOCYTE ESTERASE UR QL STRIP: NEGATIVE
LYMPHOCYTES # BLD AUTO: 0.6 K/UL (ref 1–4.8)
LYMPHOCYTES NFR BLD: 7.3 % (ref 18–48)
LYMPHOCYTES NFR FLD MANUAL: 89 %
MAGNESIUM SERPL-MCNC: 1.7 MG/DL (ref 1.6–2.6)
MCH RBC QN AUTO: 25.9 PG (ref 27–31)
MCHC RBC AUTO-ENTMCNC: 28.9 G/DL (ref 32–36)
MCV RBC AUTO: 90 FL (ref 82–98)
MONOCYTES # BLD AUTO: 0.6 K/UL (ref 0.3–1)
MONOCYTES NFR BLD: 7.2 % (ref 4–15)
MONOS+MACROS NFR FLD MANUAL: 5 %
NEUTROPHILS # BLD AUTO: 6.5 K/UL (ref 1.8–7.7)
NEUTROPHILS NFR BLD: 84.6 % (ref 38–73)
NEUTROPHILS NFR FLD MANUAL: 6 %
NITRITE UR QL STRIP: NEGATIVE
NRBC BLD-RTO: 0 /100 WBC
OVALOCYTES BLD QL SMEAR: ABNORMAL
PH UR STRIP: 5 [PH] (ref 5–8)
PHOSPHATE SERPL-MCNC: 2.2 MG/DL (ref 2.7–4.5)
PLATELET # BLD AUTO: 41 K/UL (ref 150–350)
PLATELET BLD QL SMEAR: ABNORMAL
PMV BLD AUTO: ABNORMAL FL (ref 9.2–12.9)
POCT GLUCOSE: 113 MG/DL (ref 70–110)
POCT GLUCOSE: 136 MG/DL (ref 70–110)
POCT GLUCOSE: 142 MG/DL (ref 70–110)
POCT GLUCOSE: 144 MG/DL (ref 70–110)
POCT GLUCOSE: 165 MG/DL (ref 70–110)
POIKILOCYTOSIS BLD QL SMEAR: SLIGHT
POLYCHROMASIA BLD QL SMEAR: ABNORMAL
POTASSIUM SERPL-SCNC: 4.1 MMOL/L (ref 3.5–5.1)
PROT SERPL-MCNC: 5.1 G/DL (ref 6–8.4)
PROT UR QL STRIP: NEGATIVE
PROTHROMBIN TIME: 20.1 SEC (ref 9–12.5)
RBC # BLD AUTO: 3.24 M/UL (ref 4.6–6.2)
SARS-COV-2 RDRP RESP QL NAA+PROBE: NEGATIVE
SODIUM SERPL-SCNC: 129 MMOL/L (ref 136–145)
SP GR UR STRIP: 1.02 (ref 1–1.03)
SPECIMEN SOURCE: NORMAL
URN SPEC COLLECT METH UR: ABNORMAL
WBC # BLD AUTO: 7.68 K/UL (ref 3.9–12.7)
WBC # FLD: 376 /CU MM

## 2020-05-15 PROCEDURE — 87086 URINE CULTURE/COLONY COUNT: CPT | Mod: NTX

## 2020-05-15 PROCEDURE — 87075 CULTR BACTERIA EXCEPT BLOOD: CPT | Mod: NTX

## 2020-05-15 PROCEDURE — 63600175 PHARM REV CODE 636 W HCPCS: Mod: NTX | Performed by: NURSE PRACTITIONER

## 2020-05-15 PROCEDURE — 82140 ASSAY OF AMMONIA: CPT | Mod: NTX

## 2020-05-15 PROCEDURE — 80053 COMPREHEN METABOLIC PANEL: CPT | Mod: NTX

## 2020-05-15 PROCEDURE — 84100 ASSAY OF PHOSPHORUS: CPT | Mod: NTX

## 2020-05-15 PROCEDURE — U0002 COVID-19 LAB TEST NON-CDC: HCPCS | Mod: NTX

## 2020-05-15 PROCEDURE — 86850 RBC ANTIBODY SCREEN: CPT | Mod: NTX

## 2020-05-15 PROCEDURE — 36415 COLL VENOUS BLD VENIPUNCTURE: CPT | Mod: NTX

## 2020-05-15 PROCEDURE — 96374 THER/PROPH/DIAG INJ IV PUSH: CPT

## 2020-05-15 PROCEDURE — 25000003 PHARM REV CODE 250: Mod: NTX | Performed by: PHYSICIAN ASSISTANT

## 2020-05-15 PROCEDURE — 63600175 PHARM REV CODE 636 W HCPCS: Mod: NTX | Performed by: PHYSICIAN ASSISTANT

## 2020-05-15 PROCEDURE — 99284 EMERGENCY DEPT VISIT MOD MDM: CPT | Mod: NTX,,, | Performed by: PHYSICIAN ASSISTANT

## 2020-05-15 PROCEDURE — 99223 1ST HOSP IP/OBS HIGH 75: CPT | Mod: AI,NTX,, | Performed by: PHYSICIAN ASSISTANT

## 2020-05-15 PROCEDURE — 20600001 HC STEP DOWN PRIVATE ROOM: Mod: NTX

## 2020-05-15 PROCEDURE — 63600175 PHARM REV CODE 636 W HCPCS: Mod: NTX | Performed by: TRANSPLANT SURGERY

## 2020-05-15 PROCEDURE — 87070 CULTURE OTHR SPECIMN AEROBIC: CPT | Mod: NTX

## 2020-05-15 PROCEDURE — 85025 COMPLETE CBC W/AUTO DIFF WBC: CPT | Mod: NTX

## 2020-05-15 PROCEDURE — 99284 PR EMERGENCY DEPT VISIT,LEVEL IV: ICD-10-PCS | Mod: NTX,,, | Performed by: PHYSICIAN ASSISTANT

## 2020-05-15 PROCEDURE — 81003 URINALYSIS AUTO W/O SCOPE: CPT | Mod: NTX

## 2020-05-15 PROCEDURE — 99285 EMERGENCY DEPT VISIT HI MDM: CPT | Mod: 25

## 2020-05-15 PROCEDURE — 82042 OTHER SOURCE ALBUMIN QUAN EA: CPT | Mod: NTX

## 2020-05-15 PROCEDURE — C9399 UNCLASSIFIED DRUGS OR BIOLOG: HCPCS | Mod: NTX | Performed by: NURSE PRACTITIONER

## 2020-05-15 PROCEDURE — 89051 BODY FLUID CELL COUNT: CPT | Mod: NTX

## 2020-05-15 PROCEDURE — 25000003 PHARM REV CODE 250: Mod: NTX | Performed by: NURSE PRACTITIONER

## 2020-05-15 PROCEDURE — 87205 SMEAR GRAM STAIN: CPT | Mod: NTX

## 2020-05-15 PROCEDURE — 87040 BLOOD CULTURE FOR BACTERIA: CPT | Mod: 59,NTX

## 2020-05-15 PROCEDURE — 83735 ASSAY OF MAGNESIUM: CPT | Mod: NTX

## 2020-05-15 PROCEDURE — 99223 PR INITIAL HOSPITAL CARE,LEVL III: ICD-10-PCS | Mod: AI,NTX,, | Performed by: PHYSICIAN ASSISTANT

## 2020-05-15 PROCEDURE — 85610 PROTHROMBIN TIME: CPT | Mod: NTX

## 2020-05-15 RX ORDER — ONDANSETRON 8 MG/1
8 TABLET, ORALLY DISINTEGRATING ORAL EVERY 6 HOURS PRN
Status: DISCONTINUED | OUTPATIENT
Start: 2020-05-15 | End: 2020-05-19

## 2020-05-15 RX ORDER — GLUCAGON 1 MG
1 KIT INJECTION
Status: DISCONTINUED | OUTPATIENT
Start: 2020-05-15 | End: 2020-05-20

## 2020-05-15 RX ORDER — PANTOPRAZOLE SODIUM 40 MG/1
40 TABLET, DELAYED RELEASE ORAL DAILY
Status: DISCONTINUED | OUTPATIENT
Start: 2020-05-15 | End: 2020-05-18

## 2020-05-15 RX ORDER — LACTULOSE 10 G/15ML
30 SOLUTION ORAL 3 TIMES DAILY
Status: DISCONTINUED | OUTPATIENT
Start: 2020-05-15 | End: 2020-05-18

## 2020-05-15 RX ORDER — ESCITALOPRAM OXALATE 10 MG/1
20 TABLET ORAL DAILY
Status: DISCONTINUED | OUTPATIENT
Start: 2020-05-15 | End: 2020-05-24 | Stop reason: HOSPADM

## 2020-05-15 RX ORDER — IBUPROFEN 200 MG
24 TABLET ORAL
Status: DISCONTINUED | OUTPATIENT
Start: 2020-05-15 | End: 2020-05-15

## 2020-05-15 RX ORDER — LIDOCAINE HYDROCHLORIDE 10 MG/ML
1 INJECTION, SOLUTION EPIDURAL; INFILTRATION; INTRACAUDAL; PERINEURAL ONCE
Status: DISCONTINUED | OUTPATIENT
Start: 2020-05-15 | End: 2020-05-18

## 2020-05-15 RX ORDER — IBUPROFEN 200 MG
16 TABLET ORAL
Status: DISCONTINUED | OUTPATIENT
Start: 2020-05-15 | End: 2020-05-15

## 2020-05-15 RX ORDER — MORPHINE SULFATE 4 MG/ML
4 INJECTION, SOLUTION INTRAMUSCULAR; INTRAVENOUS
Status: COMPLETED | OUTPATIENT
Start: 2020-05-15 | End: 2020-05-15

## 2020-05-15 RX ORDER — OXYBUTYNIN CHLORIDE 5 MG/1
5 TABLET ORAL DAILY
Status: DISCONTINUED | OUTPATIENT
Start: 2020-05-15 | End: 2020-05-24 | Stop reason: HOSPADM

## 2020-05-15 RX ORDER — GLUCAGON 1 MG
1 KIT INJECTION
Status: DISCONTINUED | OUTPATIENT
Start: 2020-05-15 | End: 2020-05-15

## 2020-05-15 RX ORDER — ACETAMINOPHEN 325 MG/1
650 TABLET ORAL EVERY 8 HOURS PRN
Status: DISCONTINUED | OUTPATIENT
Start: 2020-05-15 | End: 2020-05-24 | Stop reason: HOSPADM

## 2020-05-15 RX ORDER — TAMSULOSIN HYDROCHLORIDE 0.4 MG/1
0.4 CAPSULE ORAL DAILY
Status: DISCONTINUED | OUTPATIENT
Start: 2020-05-15 | End: 2020-05-24 | Stop reason: HOSPADM

## 2020-05-15 RX ORDER — SODIUM CHLORIDE 0.9 % (FLUSH) 0.9 %
10 SYRINGE (ML) INJECTION
Status: DISCONTINUED | OUTPATIENT
Start: 2020-05-15 | End: 2020-05-24

## 2020-05-15 RX ORDER — IBUPROFEN 200 MG
16 TABLET ORAL
Status: DISCONTINUED | OUTPATIENT
Start: 2020-05-15 | End: 2020-05-20

## 2020-05-15 RX ORDER — ONDANSETRON 8 MG/1
8 TABLET, ORALLY DISINTEGRATING ORAL EVERY 8 HOURS PRN
Status: DISCONTINUED | OUTPATIENT
Start: 2020-05-15 | End: 2020-05-15

## 2020-05-15 RX ORDER — INSULIN ASPART 100 [IU]/ML
18 INJECTION, SOLUTION INTRAVENOUS; SUBCUTANEOUS
Status: DISCONTINUED | OUTPATIENT
Start: 2020-05-15 | End: 2020-05-15

## 2020-05-15 RX ORDER — INSULIN ASPART 100 [IU]/ML
1-10 INJECTION, SOLUTION INTRAVENOUS; SUBCUTANEOUS
Status: DISCONTINUED | OUTPATIENT
Start: 2020-05-15 | End: 2020-05-18

## 2020-05-15 RX ORDER — INSULIN ASPART 100 [IU]/ML
18 INJECTION, SOLUTION INTRAVENOUS; SUBCUTANEOUS
Status: DISCONTINUED | OUTPATIENT
Start: 2020-05-15 | End: 2020-05-16

## 2020-05-15 RX ORDER — IBUPROFEN 200 MG
24 TABLET ORAL
Status: DISCONTINUED | OUTPATIENT
Start: 2020-05-15 | End: 2020-05-20

## 2020-05-15 RX ORDER — CIPROFLOXACIN 500 MG/1
500 TABLET ORAL DAILY
Status: DISCONTINUED | OUTPATIENT
Start: 2020-05-15 | End: 2020-05-15

## 2020-05-15 RX ADMIN — TAMSULOSIN HYDROCHLORIDE 0.4 MG: 0.4 CAPSULE ORAL at 08:05

## 2020-05-15 RX ADMIN — PANTOPRAZOLE SODIUM 40 MG: 40 TABLET, DELAYED RELEASE ORAL at 08:05

## 2020-05-15 RX ADMIN — LACTULOSE 30 G: 20 SOLUTION ORAL at 08:05

## 2020-05-15 RX ADMIN — INSULIN ASPART 18 UNITS: 100 INJECTION, SOLUTION INTRAVENOUS; SUBCUTANEOUS at 08:05

## 2020-05-15 RX ADMIN — CIPROFLOXACIN 500 MG: 500 TABLET, FILM COATED ORAL at 08:05

## 2020-05-15 RX ADMIN — RIFAXIMIN 550 MG: 550 TABLET ORAL at 08:05

## 2020-05-15 RX ADMIN — LACTULOSE 30 G: 20 SOLUTION ORAL at 02:05

## 2020-05-15 RX ADMIN — RIFAXIMIN 550 MG: 550 TABLET ORAL at 09:05

## 2020-05-15 RX ADMIN — CEFTRIAXONE 2 G: 2 INJECTION, SOLUTION INTRAVENOUS at 11:05

## 2020-05-15 RX ADMIN — INSULIN DETEMIR 22 UNITS: 100 INJECTION, SOLUTION SUBCUTANEOUS at 09:05

## 2020-05-15 RX ADMIN — ESCITALOPRAM OXALATE 20 MG: 10 TABLET ORAL at 08:05

## 2020-05-15 RX ADMIN — ONDANSETRON 8 MG: 8 TABLET, ORALLY DISINTEGRATING ORAL at 05:05

## 2020-05-15 RX ADMIN — OXYBUTYNIN CHLORIDE 5 MG: 5 TABLET ORAL at 08:05

## 2020-05-15 RX ADMIN — ONDANSETRON 8 MG: 8 TABLET, ORALLY DISINTEGRATING ORAL at 09:05

## 2020-05-15 RX ADMIN — INSULIN ASPART 18 UNITS: 100 INJECTION, SOLUTION INTRAVENOUS; SUBCUTANEOUS at 01:05

## 2020-05-15 RX ADMIN — MORPHINE SULFATE 4 MG: 4 INJECTION, SOLUTION INTRAMUSCULAR; INTRAVENOUS at 04:05

## 2020-05-15 NOTE — ASSESSMENT & PLAN NOTE
- Admit with abd pain, distention  - Infectious work up: CXR, blood and urine cultures  - Para ordered to r/o SBP  - continue cipro

## 2020-05-15 NOTE — PROGRESS NOTES
Admit Note     Met with patient to assess needs. Patient is a 59 y.o.  male, admitted for ESLD with abdominal pain.  Patient is listed for liver transplant.      Patient transferred from [facility] on 5/15/2020 .  At this time, patient presents as alert and oriented x 4, pleasant, good eye contact, recall good, calm, communicative, cooperative and asking and answering questions appropriately.  At this time, patients caregiver presents as not present due to COVID-19 restrictions.     Household/Family Systems     Patient resides with patient's mother, at [address]  [place] [address].  Support system includes mother Suzanne and patient's aunt Day Benton, phone [phone] .  Patient does not have dependents that are need of being cared for.     Patients primary caregiver is Suzanne Malik, patients mother, phone number [phone].  Confirmed patients contact information is [phone] (home);   Telephone Information:   Mobile [phone]       During admission, patient's caregiver plans to stay at home.  Confirmed patient and patients caregivers do have access to reliable transportation.     Cognitive Status/Learning     Patient reports reading ability as 12th grade and states patient does have difficulty with memory and vision with wearing prescription eyeglasses.  Patient reports patient learns best by verbal and written information and demonstration.   Needed: No.   Highest education level: High School (9-12) or GED    Vocation/Disability     Working for Income: No  If no, reason not working: Disability  Patient is disabled due to neck and back injury since 2002.  Prior to disability, patient  was employed as in oil industry.    Adherence     Patient reports a high level of adherence to patients health care regimen.  Adherence counseling and education provided. Patient verbalizes understanding.    Substance Use    Patient reports the following substance usage.    Tobacco: none,  patient denies any use.  Alcohol: none, patient denies any use.  Illicit Drugs/Non-prescribed Medications: none, patient denies any use.  Patient states clear understanding of the potential impact of substance use.  Substance abstinence/cessation counseling, education and resources provided and reviewed.     Services Utilizing/ADLS    Infusion Service: Prior to admission, patient utilizing? no  Home Health: Prior to admission, patient utilizing? not current, but with [facility] in past  DME: Prior to admission, not using but has a cane at home to use as needed  Pulmonary/Cardiac Rehab: Prior to admission, no  Dialysis:  Prior to admission, no  Transplant Specialty Pharmacy:  Prior to admission, no.    Prior to admission, patient reports patient was independent with ADLS and was driving.  Patient reports patient is not able to care for self at this time due to compromised medical condition (as documented in medical record) and physical weakness..  Patient indicates a willingness to care for self once medically cleared to do so.    Insurance/Medications    Insured by   Payor/Plan Subscr  Sex Relation Sub. Ins. ID Effective Group Num   1. [employer] MANAGE* NEGAR HARDING IDALMIS 1960 Male  [id number] 7/[birth year]/19 [id number]                                   [address] [address]      Primary Insurance (for UNOS reporting): Public Insurance - Medicare FFS (Fee For Service)  Secondary Insurance (for UNOS reporting): None    Patient reports patient is able to obtain and afford medications at this time and at time of discharge.    Living Will/Healthcare Power of     Patient states patient does not have a LW and/or HCPA.   provided education regarding LW and HCPA and the completion of forms.    Coping/Mental Health    Patient is coping well with the aid of  family members, friends and ramon. Patient endorses symptoms of anxiety as he waits for liver transplant.  Patient reports having symptoms of  depression and anxiety in past where he was enrolled in a Behavioral Mental Health clinic in past.  Patient on no current medication.  Pt denies any current SI/HI.   Patient reports that his puppy helps him when he is feeling down.   Patient denies mental health difficulties.     Discharge Planning    At time of discharge, patient plans to return to patient's home under the care of self/mother.  Patients mother will transport patient.  Per rounds today, expected discharge date has not been medically determined at this time. Patient and patients caregiver  verbalize understanding and are involved in treatment planning and discharge process.    Additional Concerns    Patient's caretaker denies additional needs and/or concerns at this time. Patient is being followed for needs, education, resources, information, emotional support, supportive counseling, and for supportive and skilled discharge plan of care.  providing ongoing psychosocial support, education, resources and d/c planning as needed.  SW remains available. Patient's caregiver verbalizes understanding and agreement with information reviewed,  availability and how to access available resources as needed. Patient denies additional needs and/or concerns at this time. Patient verbalizes understanding and agreement with information reviewed, social work availability, and how to access available resources as needed.

## 2020-05-15 NOTE — ASSESSMENT & PLAN NOTE
- s/p hernia repair 2/22.   - admitted 2/27-3/2 with copious serous drainage from incision  - incision CDI, no SSI infection

## 2020-05-15 NOTE — ASSESSMENT & PLAN NOTE
-180     -Decrease Levemir to 22 units HS. Basal BG slightly below goal.   - Continue Novolog 18 units TIDWM. Patient responding well to current treatment.   - Moderate Dose SQ Insulin Correction Scale.  - BG Monitoring AC/HS     ** Please call Endocrine for any BG related issues **  ** Please notify Endocrine for any change and/or advance in diet**    Discharge planning:   TBD. Please notify endocrinology prior to discharge.

## 2020-05-15 NOTE — H&P
Inpatient Radiology Pre-procedure Note    History of Present Illness:  Colin Reilly is a 59 y.o. male who presents for ultrasound guided paracentesis.  Admission H&P reviewed.  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    Esophageal varices     GERD (gastroesophageal reflux disease)     Hepatic encephalopathy 7/8/2014    Hepatitis C virus infection 07/08/2014    tx with interferon 3-4 mos- stopped for unclear reasons Tattoos-first at age 17 only risk factor (HCVAB negative 08/2017)    HTN (hypertension) 7/8/2014    Hypertension     Insulin dependent diabetes mellitus     Renal mass, left 7/8/2014    6.3 x 5.7 x 5.6 complex mass    Splenomegaly 7/8/2014    Umbilical hernia 7/8/2014     Past Surgical History:   Procedure Laterality Date    CARPAL TUNNEL RELEASE Bilateral     CHOLECYSTECTOMY      lap    COLONOSCOPY N/A 9/8/2017    Procedure: COLONOSCOPY;  Surgeon: Savannah Llanos MD;  Location: [facility];  Service: Endoscopy;  Laterality: N/A;    COLONOSCOPY Left 3/14/2018    Procedure: COLONOSCOPY;  Surgeon: Savannah Llanos MD;  Location: [facility];  Service: Endoscopy;  Laterality: Left;    ESOPHAGOGASTRODUODENOSCOPY  01/2014    ESOPHAGOGASTRODUODENOSCOPY  02/15/2017    grade II varices    ESOPHAGOGASTRODUODENOSCOPY  04/10/2017    grade I varices    ESOPHAGOGASTRODUODENOSCOPY N/A 10/18/2019    Procedure: EGD (ESOPHAGOGASTRODUODENOSCOPY);  Surgeon: Flavio Escalera MD;  Location: [facility] ([address] [place] [address]);  Service: Endoscopy;  Laterality: N/A;    ESOPHAGOGASTRODUODENOSCOPY W/ BANDING  01/26/2017    grade II varices    HERNIA REPAIR      NECK SURGERY      fusion on C5 and C6    ULNAR NERVE TRANSPOSITION Left     UMBILICAL HERNIA REPAIR N/A 2/22/2020    Procedure: REPAIR, HERNIA, PRIMARY WIHTOUT MESH AND PLACEMENT OF DRAIN;   Surgeon: Sherri Brunner MD;  Location: [facility] OR [address] [place] [address];  Service: Transplant;  Laterality: N/A;    vericose veins removed         Review of Systems:   As documented in primary team H&P    Home Meds:   Prior to Admission medications    Medication Sig Start Date End Date Taking? Authorizing Provider   ciprofloxacin HCl (CIPRO) 500 MG tablet Take 1 tablet (500 mg total) by mouth once daily. 2/26/20   Sherri Brunner MD   cyclobenzaprine (FLEXERIL) 10 MG tablet Take 10 mg by mouth 2 (two) times daily.     Historical Provider, MD   EASY TOUCH INSULIN SYRINGE 1 mL 31 gauge x 5/16 Syrg  9/19/19   Historical Provider, MD   escitalopram oxalate (LEXAPRO) 20 MG tablet Take 20 mg by mouth once daily.  8/4/17   Historical Provider, MD   finasteride (PROSCAR) 5 mg tablet Take 1 tablet (5 mg total) by mouth once daily. 6/19/18 12/6/28  oMokie Mac IV, MD   furosemide (LASIX) 40 MG tablet Take 4 tablets (160 mg total) by mouth once daily. 3/2/20   Fareed Tran MD   gabapentin (NEURONTIN) 600 MG tablet Take 600 mg by mouth 3 (three) times daily.     Historical Provider, MD   HYDROcodone-acetaminophen (NORCO)  mg per tablet Take 1 tablet by mouth every 12 (twelve) hours as needed for Pain. 2/26/20   Sherri Brunner MD   insulin aspart U-100 (NOVOLOG) 100 unit/mL injection Inject 24 Units into the skin 3 (three) times daily before meals.    Historical Provider, MD   insulin detemir U-100 (LEVEMIR) 100 unit/mL injection Inject 74 Units into the skin every evening.    Historical Provider, MD   lactulose (CHRONULAC) 10 gram/15 mL solution Take 30 mLs by mouth 3 (three) times daily. May take up to  4 to 5 times daily if needed for bowel movements    Historical Provider, MD   levocetirizine (XYZAL) 5 MG tablet Take 1 tablet (5 mg total) by mouth every evening. 2/26/20 2/25/21  Sherri Brunner MD   oxybutynin (DITROPAN) 5 MG Tab Take 5 mg by mouth once daily. 10/31/19   Historical Provider, MD   pantoprazole  (PROTONIX) 40 MG tablet Take 1 tablet (40 mg total) by mouth once daily. 2/26/20 8/24/20  Sherri Brunner MD   potassium bicarbonate & potassium chloride (K-LYTE CL) 25 mEq TbEF Take 1 tablet (25 mEq total) by mouth 2 (two) times daily with meals. 3/25/20   Yamile Saucedo MD   rifAXIMin (XIFAXAN) 550 mg Tab Take 1 tablet (550 mg total) by mouth 2 (two) times daily. 3/22/19   Elizabeth Meneses MD   spironolactone (ALDACTONE) 100 MG tablet Take 1.5 tablets (150 mg total) by mouth once daily. 12/6/19   Elizabeth Meneses MD   tamsulosin (FLOMAX) 0.4 mg Cp24 Take 1 capsule (0.4 mg total) by mouth once daily. 6/19/18   Mookie Mac IV, MD   TRUE METRIX GLUCOSE TEST STRIP Strp  11/8/19   Historical Provider, MD   TRUEPLUS LANCETS 30 gauge Misc  11/20/19   Historical Provider, MD     Scheduled Meds:    ciprofloxacin HCl  500 mg Oral Daily    escitalopram oxalate  20 mg Oral Daily    insulin aspart U-100  18 Units Subcutaneous TID AC    lactulose  30 g Oral TID    lidocaine (PF) 10 mg/ml (1%)  1 mL Other Once    oxybutynin  5 mg Oral Daily    pantoprazole  40 mg Oral Daily    rifAXIMin  550 mg Oral BID    tamsulosin  0.4 mg Oral Daily     Continuous Infusions:   PRN Meds:acetaminophen, Dextrose 10% Bolus, Dextrose 10% Bolus, glucagon (human recombinant), glucose, glucose, ondansetron, sodium chloride 0.9%  Anticoagulants/Antiplatelets: no anticoagulation    Allergies: Review of patient's allergies indicates:  No Known Allergies  Sedation Hx: have not been any systemic reactions    Vitals:  Temp: 98.2 °F (36.8 °C) (05/15/20 0915)  Pulse: 101 (05/15/20 0915)  Resp: 18 (05/15/20 0915)  BP: (!) 126/59 (05/15/20 0915)  SpO2: 96 % (05/15/20 0915)     Physical Exam:  ASA: 3  Mallampati: n/a    General: no acute distress  Mental Status: alert and oriented to person, place and time  HEENT: normocephalic, atraumatic  Chest: unlabored breathing  Heart: regular heart rate  Abdomen: distended  Extremity: moves all  extremities    Plan: ultrasound guided paracentesis  Sedation Plan: local    GOVIND Kuo, FNP  Interventional Radiology  [phone] clinic

## 2020-05-15 NOTE — SUBJECTIVE & OBJECTIVE
Interval HPI:   Overnight events:   BG is reasonably controlled on current SQ insulin regimen at time of consult.   Diet Low Sodium, 2gm       Eatin% - 100%  Nausea: No  Hypoglycemia and intervention: No  Fever: No  TPN and/or TF: No  If yes, type of TF/TPN and rate: None    PMH, PSH, FH, SH updated and reviewed     ROS:  Unable to obtain due to: Sedation,Intubation,Altered mental status,Critical illness,Reviewed ROS from note dated 05/15/2020    Review of Systems  Attempted to contact patient via telephone x2 with no answer.   Will re-attempt at later time so as to obtain full HPI.     Current Medications and/or Treatments Impacting Glycemic Control  Immunotherapy:    Immunosuppressants     None        Steroids:   Hormones (From admission, onward)    None        Pressors:    Autonomic Drugs (From admission, onward)    None        Hyperglycemia/Diabetes Medications:   Antihyperglycemics (From admission, onward)    Start     Stop Route Frequency Ordered    05/15/20 1100  insulin aspart U-100 pen 18 Units      -- SubQ 3 times daily before meals 05/15/20 0855             PHYSICAL EXAMINATION:  Vitals:    05/15/20 1035   BP: (!) 120/54   Pulse: 102   Resp: 16   Temp: 97.7 °F (36.5 °C)     Body mass index is 30.13 kg/m².    Physical Exam   Deferred secondary to COVID-19 pandemic precautions.

## 2020-05-15 NOTE — HPI
Reason for Consult: Management of T2DM, Hyperglycemia     Surgical Procedure and Date: Liver Transplant 5/17/20    Diabetes diagnosis year: 2002    Home Diabetes Medications:    - Levemir 50 units HS (per chart review)  - Novolog 24 units TIDWM (per chart review)    How often checking glucose at home? MARILIN   BG readings on regimen: MARILIN  Hypoglycemia on the regimen?  MARILIN  Missed doses on regimen?  MARILIN    Diabetes Complications include:     Hyperglycemia and Diabetic peripheral neuropathy      Complicating diabetes co morbidities:   CIRRHOSIS    HPI:   Patient is a 59 y.o. 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 Surgery uncomplicated but has had recurrent abdominal pain and increased drainage from surgical incision. Patient recently admitted 3/11-3/13 with similar symptoms, attempted paracentesis at this time- without fluid to drain. He re-presented to [facility] on 5/15 with c/o fever and abdominal pain (Tmax 103) x 1 week, distention and dark colored stools, received 1g Rocephin and transferred to [facility] for further care. Patient is being admitted for suspected SBP and infectious work-up.  COVID-19 rapid test negative on admission. Plan for paracentesis. Endocrinology consulted to manage hypeglycemia/DM2 during admission to [facility].    Lab Results   Component Value Date    HGBA1C 6.7 (H) 02/28/2020

## 2020-05-15 NOTE — SUBJECTIVE & OBJECTIVE
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    Esophageal varices     GERD (gastroesophageal reflux disease)     Hepatic encephalopathy 7/8/2014    Hepatitis C virus infection 07/08/2014    tx with interferon 3-4 mos- stopped for unclear reasons Tattoos-first at age 17 only risk factor (HCVAB negative 08/2017)    HTN (hypertension) 7/8/2014    Hypertension     Insulin dependent diabetes mellitus     Renal mass, left 7/8/2014    6.3 x 5.7 x 5.6 complex mass    Splenomegaly 7/8/2014    Umbilical hernia 7/8/2014       Past Surgical History:   Procedure Laterality Date    CARPAL TUNNEL RELEASE Bilateral     CHOLECYSTECTOMY      lap    COLONOSCOPY N/A 9/8/2017    Procedure: COLONOSCOPY;  Surgeon: Savannah Llanos MD;  Location: [facility];  Service: Endoscopy;  Laterality: N/A;    COLONOSCOPY Left 3/14/2018    Procedure: COLONOSCOPY;  Surgeon: Savannah Llanos MD;  Location: [facility];  Service: Endoscopy;  Laterality: Left;    ESOPHAGOGASTRODUODENOSCOPY  01/2014    ESOPHAGOGASTRODUODENOSCOPY  02/15/2017    grade II varices    ESOPHAGOGASTRODUODENOSCOPY  04/10/2017    grade I varices    ESOPHAGOGASTRODUODENOSCOPY N/A 10/18/2019    Procedure: EGD (ESOPHAGOGASTRODUODENOSCOPY);  Surgeon: Flavio Escalera MD;  Location: [facility] ([address] [place] [address]);  Service: Endoscopy;  Laterality: N/A;    ESOPHAGOGASTRODUODENOSCOPY W/ BANDING  01/26/2017    grade II varices    HERNIA REPAIR      NECK SURGERY      fusion on C5 and C6    ULNAR NERVE TRANSPOSITION Left     UMBILICAL HERNIA REPAIR N/A 2/22/2020    Procedure: REPAIR, HERNIA, PRIMARY WIHTOUT MESH AND PLACEMENT OF DRAIN;  Surgeon: Sherri Brunner MD;  Location: [facility] OR [address] [place] [address];  Service: Transplant;  Laterality: N/A;    vericose veins removed         Review of patient's allergies  indicates:  No Known Allergies    Family History     Problem Relation (Age of Onset)    Alcohol abuse Brother    Cancer Brother    Hyperlipidemia Mother    No Known Problems Father (36), Sister, Sister        Tobacco Use    Smoking status: Former Smoker     Types: Cigarettes     Last attempt to quit: 1/4/2010     Years since quitting: 10.3    Smokeless tobacco: Former User     Types: Chew     Quit date: 2/24/1990    Tobacco comment: former 1 pack/week quit 1/4/2010   Substance and Sexual Activity    Alcohol use: No     Comment: former heavy beer but quit 1/4/2010    Drug use: No    Sexual activity: Never     Comment:        PTA Medications   Medication Sig    ciprofloxacin HCl (CIPRO) 500 MG tablet Take 1 tablet (500 mg total) by mouth once daily.    cyclobenzaprine (FLEXERIL) 10 MG tablet Take 10 mg by mouth 2 (two) times daily.     EASY TOUCH INSULIN SYRINGE 1 mL 31 gauge x 5/16 Syrg     escitalopram oxalate (LEXAPRO) 20 MG tablet Take 20 mg by mouth once daily.     finasteride (PROSCAR) 5 mg tablet Take 1 tablet (5 mg total) by mouth once daily.    furosemide (LASIX) 40 MG tablet Take 4 tablets (160 mg total) by mouth once daily.    gabapentin (NEURONTIN) 600 MG tablet Take 600 mg by mouth 3 (three) times daily.     HYDROcodone-acetaminophen (NORCO)  mg per tablet Take 1 tablet by mouth every 12 (twelve) hours as needed for Pain.    insulin aspart U-100 (NOVOLOG) 100 unit/mL injection Inject 24 Units into the skin 3 (three) times daily before meals.    insulin detemir U-100 (LEVEMIR) 100 unit/mL injection Inject 74 Units into the skin every evening.    lactulose (CHRONULAC) 10 gram/15 mL solution Take 30 mLs by mouth 3 (three) times daily. May take up to  4 to 5 times daily if needed for bowel movements    levocetirizine (XYZAL) 5 MG tablet Take 1 tablet (5 mg total) by mouth every evening.    oxybutynin (DITROPAN) 5 MG Tab Take 5 mg by mouth once daily.    pantoprazole  (PROTONIX) 40 MG tablet Take 1 tablet (40 mg total) by mouth once daily.    potassium bicarbonate & potassium chloride (K-LYTE CL) 25 mEq TbEF Take 1 tablet (25 mEq total) by mouth 2 (two) times daily with meals.    rifAXIMin (XIFAXAN) 550 mg Tab Take 1 tablet (550 mg total) by mouth 2 (two) times daily.    spironolactone (ALDACTONE) 100 MG tablet Take 1.5 tablets (150 mg total) by mouth once daily.    tamsulosin (FLOMAX) 0.4 mg Cp24 Take 1 capsule (0.4 mg total) by mouth once daily.    TRUE METRIX GLUCOSE TEST STRIP Strp     TRUEPLUS LANCETS 30 gauge [facility]        Review of Systems   Constitutional: Positive for fever. Negative for activity change, appetite change, chills and diaphoresis.   HENT: Negative for congestion, sinus pressure, sinus pain, sore throat and trouble swallowing.    Eyes: Negative for visual disturbance.   Respiratory: Negative for chest tightness, shortness of breath and stridor.    Cardiovascular: Positive for leg swelling. Negative for chest pain and palpitations.   Gastrointestinal: Positive for abdominal distention and abdominal pain. Negative for constipation, diarrhea, nausea and vomiting.   Genitourinary: Negative for decreased urine volume, difficulty urinating, dysuria and flank pain.   Musculoskeletal: Negative for neck pain and neck stiffness.   Skin: Positive for wound. Negative for color change and rash.   Allergic/Immunologic: Negative for immunocompromised state.   Neurological: Negative for dizziness, tremors, syncope, light-headedness and headaches.   Psychiatric/Behavioral: Negative for agitation, confusion, decreased concentration and hallucinations. The patient is not nervous/anxious.      Objective:     Vital Signs (Most Recent):  Temp: 97.6 °F (36.4 °C) (05/15/20 0515)  Pulse: 86 (05/15/20 0515)  Resp: 16 (05/15/20 0515)  BP: (!) 106/55 (05/15/20 0515)  SpO2: 100 % (05/15/20 0515) Vital Signs (24h Range):  Temp:  [97.6 °F (36.4 °C)-102.9 °F (39.4 °C)] 97.6 °F (36.4  °C)  Pulse:  [] 86  Resp:  [15-18] 16  SpO2:  [97 %-100 %] 100 %  BP: ()/(43-66) 106/55     Weight: 98 kg (216 lb 0.8 oz)  Body mass index is 30.13 kg/m².    No intake or output data in the 24 hours ending 05/15/20 0555    Physical Exam   Constitutional: He is oriented to person, place, and time. No distress.   HENT:   Head: Normocephalic and atraumatic.   Eyes: Right eye exhibits no discharge. Left eye exhibits no discharge. No scleral icterus.   Neck: Normal range of motion. Neck supple.   Cardiovascular: Normal rate, regular rhythm, normal heart sounds and intact distal pulses.   Pulmonary/Chest: Effort normal and breath sounds normal. He has no wheezes. He has no rales.   Abdominal: Soft. Bowel sounds are normal. He exhibits distension. There is no tenderness. There is no guarding. A hernia (small umbilical hernia easily reducible) is present.       Musculoskeletal: He exhibits no edema.   Neurological: He is alert and oriented to person, place, and time. No cranial nerve deficit.   Skin: Skin is warm and dry. Capillary refill takes less than 2 seconds. He is not diaphoretic.   Psychiatric: He has a normal mood and affect. His behavior is normal. Judgment and thought content normal.   Nursing note and vitals reviewed.      Laboratory:  pending    Diagnostic Results:  None

## 2020-05-15 NOTE — ED NOTES
LOC: The patient is awake, alert, and oriented to place, time, situation. Affect is appropriate.  Speech is appropriate and clear.     APPEARANCE: Patient resting comfortably in no acute distress.  Patient is clean and well groomed.    SKIN: The skin is warm and dry; color is jaundice.  Patient has normal skin turgor and moist mucus membranes.  Skin intact; no breakdown, rash. Patient has bruise to L flank and R medial elbow.    MUSCULOSKELETAL: Patient moving upper and lower extremities without difficulty.  Denies weakness and fatigue. Denies pain. Uses walker to ambulate long distances at baseline. Soft mass noted to R thoracic back; non-tender.    RESPIRATORY: Airway is open and patent. Respirations spontaneous, even, easy, and non-labored.  Patient has a normal effort and rate.  No accessory muscle use noted. Denies cough. Patient denies sob.    CARDIAC:  Normal rhythm and rate noted. BLE edema 2+. No complaints of chest pain.     ABDOMEN: Reports RUQ & LUQ abdominal pain. Tender to palpate. +Abdominal distention. +Dark urine. Umbilicus hernia noted. Patient denies n/v/d.     NEUROLOGIC: Eyes open spontaneously.  Behavior appropriate to situation.  Follows commands; facial expression symmetrical.  Purposeful motor response noted; normal sensation in all extremities. Patient denies headache and dizziness.

## 2020-05-15 NOTE — PROGRESS NOTES
Pt tolerated procedure well 1.3L removed from para / pt reports nausea resolved and ready to return to room

## 2020-05-15 NOTE — TELEPHONE ENCOUNTER
PATIENT NAME: Colin RAYGOZA [facility] #: [id number]    Lab Results   Component Value Date    CREATININE 0.7 05/15/2020     (L) 05/15/2020    BILITOT 4.5 (H) 05/15/2020    ALBUMIN 2.3 (L) 05/15/2020    INR 2.1 (H) 05/15/2020     MELD 25  Encephalopathy: 1 - 2  Ascites: moderate  Dialysis: no     Recertification requestor: Jeannine Ku

## 2020-05-15 NOTE — PROGRESS NOTES
Pt arrived from in-patient room #88083H / AAO w/ NAD noted pt moved to US room and ready for procedure

## 2020-05-15 NOTE — CONSULTS
[facility]  Endocrinology  Diabetes Consult Note    Consult Requested by: Sherri Brunner MD   Reason for admit: Abdominal pain    HISTORY OF PRESENT ILLNESS:  Reason for Consult: Management of T2DM, Hyperglycemia     Surgical Procedure and Date: N/A    Diabetes diagnosis year:     Home Diabetes Medications:    - Levemir 50 units HS (per chart review)  - Novolog 24 units TIDWM (per chart review)    How often checking glucose at home? MARILIN   BG readings on regimen: MARILIN  Hypoglycemia on the regimen?  MARILIN  Missed doses on regimen?  MARILIN    Diabetes Complications include:     Hyperglycemia and Diabetic peripheral neuropathy      Complicating diabetes co morbidities:   CIRRHOSIS    HPI:   Patient is a 59 y.o. 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 Surgery uncomplicated but has had recurrent abdominal pain and increased drainage from surgical incision. Patient recently admitted 3/11-3/13 with similar symptoms, attempted paracentesis at this time- without fluid to drain. He re-presented to [facility] on 5/15 with c/o fever and abdominal pain (Tmax 103) x 1 week, distention and dark colored stools, received 1g Rocephin and transferred to [facility] [place]. Patient is being admitted for suspected SBP and infectious work-up.  COVID-19 rapid test negative on admission. Plan for paracentesis. Endocrinology consulted to manage hypeglycemia/DM2 during admission to [facility].    Lab Results   Component Value Date    HGBA1C 6.7 (H) 2020       Interval HPI:   Overnight events:   BG is reasonably controlled on current SQ insulin regimen at time of consult.   Diet Low Sodium, 2gm       Eatin% - 100%  Nausea: No  Hypoglycemia and intervention: No  Fever: No  TPN and/or TF: No  If yes, type of TF/TPN and rate: None    PMH, PSH, FH, SH updated and reviewed     ROS:  Unable to  obtain due to: Sedation,Intubation,Altered mental status,Critical illness,Reviewed ROS from note dated 05/15/2020    Review of Systems  Attempted to contact patient via telephone x2 with no answer.   Will re-attempt at later time so as to obtain full HPI.     Current Medications and/or Treatments Impacting Glycemic Control  Immunotherapy:    Immunosuppressants     None        Steroids:   Hormones (From admission, onward)    None        Pressors:    Autonomic Drugs (From admission, onward)    None        Hyperglycemia/Diabetes Medications:   Antihyperglycemics (From admission, onward)    Start     Stop Route Frequency Ordered    05/15/20 1100  insulin aspart U-100 pen 18 Units      -- SubQ 3 times daily before meals 05/15/20 0855             PHYSICAL EXAMINATION:  Vitals:    05/15/20 1035   BP: (!) 120/54   Pulse: 102   Resp: 16   Temp: 97.7 °F (36.5 °C)     Body mass index is 30.13 kg/m².    Physical Exam   Deferred secondary to COVID-19 pandemic precautions.        Labs Reviewed and Include   Recent Labs   Lab 05/15/20  0455   *   CALCIUM 7.1*   ALBUMIN 2.3*   PROT 5.1*   *   K 4.1   CO2 20*      BUN 12   CREATININE 0.7   ALKPHOS 97   ALT 15   AST 32   BILITOT 4.5*     Lab Results   Component Value Date    WBC 7.68 05/15/2020    HGB 8.4 (L) 05/15/2020    HCT 29.1 (L) 05/15/2020    MCV 90 05/15/2020    PLT 41 (L) 05/15/2020     No results for input(s): TSH, FREET4 in the last 168 hours.  Lab Results   Component Value Date    HGBA1C 6.7 (H) 02/28/2020       Nutritional status:   Body mass index is 30.13 kg/m².  Lab Results   Component Value Date    ALBUMIN 2.3 (L) 05/15/2020    ALBUMIN 2.7 (L) 03/13/2020    ALBUMIN 2.9 (L) 03/12/2020     No results found for: PREALBUMIN    Estimated Creatinine Clearance: 137.3 mL/min (based on SCr of 0.7 mg/dL).    Accu-Checks  Recent Labs     05/15/20  0459 05/15/20  0740 05/15/20  1200   POCTGLUCOSE 142* 136* 113*        ASSESSMENT and PLAN    * Abdominal  pain  Managed per primary team  Avoid hypoglycemia        Type 2 diabetes mellitus without complication, with long-term current use of insulin   -180     -Decrease Levemir to 22 units HS. Basal BG slightly below goal.   - Continue Novolog 18 units TIDWM. Patient responding well to current treatment.   - Moderate Dose SQ Insulin Correction Scale.  - BG Monitoring AC/HS     ** Please call Endocrine for any BG related issues **  ** Please notify Endocrine for any change and/or advance in diet**    Discharge planning:   TBD. Please notify endocrinology prior to discharge.         Anemia of chronic disease  May affect accuracy of HbA1c results.               Yogesh Goldstein, NP  Endocrinology  [facility]

## 2020-05-15 NOTE — ED TRIAGE NOTES
He presents to the ED via EMS for abdominal pain. Patient was transferred from [facility] for liver transplant surgery admission. He reports having generalized abdominal pain for approximately one week. Describes pain as constant and throbbing. Rate pain 6/10. Reports associated fever 103 and abdominal distension. Reports last paracentesis one week ago. He denies chills, nausea, vomiting, sob, chest pain, dysuria, diarrhea, or constipation. He is a former smoker and denies alcohol use. PMHx of hepatitis C, d-CHF with 65%EF, HTN, DM2, GERD, hx of esophageal varices, cirrhosis, and liver transplant wait list.

## 2020-05-15 NOTE — ED NOTES
Patient transported to [facility] with transfer papers and all personal belongings including wallet and cell phone. NAD noted. No needs or complaints at this time.

## 2020-05-15 NOTE — ASSESSMENT & PLAN NOTE
- paracentesis 2/27 negative for peritonitis  - Para to r/o SBP  - on home lasix and aldactone. Will assess need to restart

## 2020-05-15 NOTE — PLAN OF CARE
C/O abd pain. Paracentesis completed. 1300ml removed. Pain decreased. C/O nausea in IR. Zofran given and nausea relieved. Glucoses monitored. Insulin given as ordered. Afebrile. Cipro discontinued. Rocephin IV started. Pt oriented. Lactulose continues. Reinforced to wear non-skid socks and call for assistance OOB to prevent falling. Verbalized understanding. Bed alarm in use. Walker placed at BS.

## 2020-05-15 NOTE — HPI
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 Surgery uncomplicated but has had recurrent abdominal pain and increased drainage from surgical incision. Patient recently admitted 3/11-3/13 with similar symptoms, attempted paracentesis at this time- without fluid to drain. Patient previously negative for peritonitis. He re-presented to [facility] on 5/15 with c/o fever and abdominal pain (Tmax 103) x 1 week, distention and dark colored stools, received 1g Rocephin and transferred to [facility] for further care. Patient is being admitted for suspected SBP and infectious work-up.  His last fever was 5/14 of 100.5. Reports last paracentesis about 1 week ago. Umbilical hernia repair incision CDI, no s/s infection, small umbilical hernia easily reducible with no tenderness. He denies N/V, SOB, chest pain, change in bladder function. COVID-19 rapid test negative on admission. Plan for paracentesis. VSS. Monitor

## 2020-05-15 NOTE — ASSESSMENT & PLAN NOTE
- 2/2 ESLD  - H/H stable  - reports having dark colored stools in the past week. no overt signs of bleeding  - cont to monitor

## 2020-05-15 NOTE — ED PROVIDER NOTES
Encounter Date: 5/14/2020       History     Chief Complaint   Patient presents with    [facility] Transfer- Liver Eval     Patient transferred from [facility] for evaluation of possible liver transplant. Patient has fever. Patient needs rapid COVID swab     Patient is a 59 year old male with PMHx of hepatitis C, d-CHF with 65%EF, HTN, DM2, GERD, hx of esophageal varices, cirrhosis, and liver transplant wait list. He presents to the ED via EMS for abdominal pain. Patient was transferred from [facility] for liver transplant surgery admission. He reports having generalized abdominal pain for approximately one week. Describes pain as constant and throbbing. Rate pain 6/10. Reports associated fever 103 and abdominal distension. Reports last paracentesis one week ago. He denies chills, nausea, vomiting, sob, chest pain, dysuria, diarrhea, or constipation. He is a former smoker and denies alcohol use.    The history is provided by the patient and medical records. No  was used.     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    Esophageal varices     GERD (gastroesophageal reflux disease)     Hepatic encephalopathy 7/8/2014    Hepatitis C virus infection 07/08/2014    tx with interferon 3-4 mos- stopped for unclear reasons Tattoos-first at age 17 only risk factor (HCVAB negative 08/2017)    HTN (hypertension) 7/8/2014    Hypertension     Insulin dependent diabetes mellitus     Renal mass, left 7/8/2014    6.3 x 5.7 x 5.6 complex mass    Splenomegaly 7/8/2014    Umbilical hernia 7/8/2014     Past Surgical History:   Procedure Laterality Date    CARPAL TUNNEL RELEASE Bilateral     CHOLECYSTECTOMY      lap    COLONOSCOPY N/A 9/8/2017    Procedure:  COLONOSCOPY;  Surgeon: Savannah Llanos MD;  Location: [facility];  Service: Endoscopy;  Laterality: N/A;    COLONOSCOPY Left 3/14/2018    Procedure: COLONOSCOPY;  Surgeon: Savannah Llanos MD;  Location: [facility];  Service: Endoscopy;  Laterality: Left;    ESOPHAGOGASTRODUODENOSCOPY  01/2014    ESOPHAGOGASTRODUODENOSCOPY  02/15/2017    grade II varices    ESOPHAGOGASTRODUODENOSCOPY  04/10/2017    grade I varices    ESOPHAGOGASTRODUODENOSCOPY N/A 10/18/2019    Procedure: EGD (ESOPHAGOGASTRODUODENOSCOPY);  Surgeon: Flavio Escalera MD;  Location: [facility] ([facility]);  Service: Endoscopy;  Laterality: N/A;    ESOPHAGOGASTRODUODENOSCOPY W/ BANDING  01/26/2017    grade II varices    HERNIA REPAIR      NECK SURGERY      fusion on C5 and C6    ULNAR NERVE TRANSPOSITION Left     UMBILICAL HERNIA REPAIR N/A 2/22/2020    Procedure: REPAIR, HERNIA, PRIMARY WIHTOUT MESH AND PLACEMENT OF DRAIN;  Surgeon: Sherri Brunner MD;  [facility];  Service: Transplant;  Laterality: N/A;    vericose veins removed       Family History   Problem Relation Age of Onset    Hyperlipidemia Mother     No Known Problems Father 36        accident on oil rig    No Known Problems Sister     Cancer Brother         kidney    Alcohol abuse Brother     No Known Problems Sister      Social History     Tobacco Use    Smoking status: Former Smoker     Types: Cigarettes     Last attempt to quit: 1/4/2010     Years since quitting: 10.3    Smokeless tobacco: Former User     Types: Chew     Quit date: 2/24/1990    Tobacco comment: former 1 pack/week quit 1/4/2010   Substance Use Topics    Alcohol use: No     Comment: former heavy beer but quit 1/4/2010    Drug use: No     Review of Systems   Constitutional: Positive for fever.   HENT: Negative for sore throat.    Respiratory: Negative for shortness of breath.    Cardiovascular: Negative for chest pain.   Gastrointestinal: Positive for abdominal distention and  abdominal pain. Negative for nausea and vomiting.   Genitourinary: Negative for dysuria.   Musculoskeletal: Negative for back pain.   Skin: Negative for rash.   Neurological: Negative for weakness.   Hematological: Does not bruise/bleed easily.       Physical Exam     Initial Vitals [05/15/20 0352]   BP Pulse Resp Temp SpO2   (!) 140/58 95 18 98.2 °F (36.8 °C) 100 %      MAP       --         Physical Exam    Vitals reviewed.  Constitutional: He appears well-developed and well-nourished. No distress.   HENT:   Head: Normocephalic.   Eyes: Conjunctivae are normal.   Neck: Normal range of motion.   Cardiovascular: Normal rate and regular rhythm.   No murmur heard.  Pulmonary/Chest: Breath sounds normal. No respiratory distress. He has no wheezes. He has no rales.   Abdominal: Soft. Bowel sounds are normal. He exhibits distension and ascites. There is splenomegaly. There is generalized tenderness. A hernia (umbilical) is present.   Diffuse erythema over lower abdomen with edema. ecchymosis over left flank.    Musculoskeletal: Normal range of motion. He exhibits edema (3+ pitting edema of b/l LE).   Neurological: He is alert and oriented to person, place, and time.   Skin: Skin is warm and dry. No erythema.         ED Course   Procedures  Labs Reviewed   CULTURE, BLOOD   CULTURE, BLOOD   CULTURE, URINE   SARS-COV-2 RNA AMPLIFICATION, QUAL   URINALYSIS   POCT GLUCOSE MONITORING CONTINUOUS             Medical Decision Making:   History:   Old Medical Records: I decided to obtain old medical records.  Old Records Summarized: records from another hospital.       <> Summary of Records: Patient found to have Hyponatremia 125. MELD score of 20. CT abdomen pelvis with contrast found to have nonocclusive portal vein thrombus appears diminished. Liver is cirrhotic with stigmata of portal hypertension including recanalized umbilical vein, large gastroesophageal varices, splenomegaly, and ascites.   Patient received 1 g rocephin  IV.  Clinical Tests:   Lab Tests: Ordered and Reviewed  Other:   I have discussed this case with another health care provider.       <> Summary of the Discussion: Case discussed with liver transplant surgery for admission.       APC / Resident Notes:   Patient is a 59 year old male presents to the ED for emergent evaluation of abdominal pain.     Patient to be directly admitted to liver transplant surgery. Patient presents to ED for COVID screening. Will order COVID. Will continue to monitor.     Differential diagnoses include, but are not limited to:COVID.     COVID negative.     Patient admitted to liver transplant surgery for further management.     I have discussed and reviewed with my supervising physician.        Clinical Impression:       ICD-10-CM ICD-9-CM   1. Generalized abdominal pain R10.84 789.07   2. Liver transplant candidate Z76.82 V49.83         Disposition:   Disposition: Admitted  Condition: Fair     ED Disposition Condition    Admit                           Destiney Rosenberg PA-C  05/15/20 0550

## 2020-05-15 NOTE — H&P
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 Surgery uncomplicated but has had recurrent abdominal pain and increased drainage from surgical incision. Patient recently admitted 3/11-3/13 with similar symptoms, attempted paracentesis at this time- without fluid to drain. Patient previously negative for peritonitis. He re-presented to [facility] on 5/15 with c/o fever and abdominal pain (Tmax 103) x 1 week, distention and dark colored stools, received 1g Rocephin and transferred to [facility] for further care. Patient is being admitted for suspected SBP and infectious work-up.  His last fever was 5/14 of 100.5. Reports last paracentesis about 1 week ago. Umbilical hernia repair incision CDI, no s/s infection, small umbilical hernia easily reducible with no tenderness. He denies N/V, SOB, chest pain, change in bladder function. COVID-19 rapid test negative on admission. Plan for paracentesis. VSS. 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    Esophageal varices     GERD (gastroesophageal reflux disease)     Hepatic encephalopathy 7/8/2014    Hepatitis C virus infection 07/08/2014    tx with interferon 3-4 mos- stopped for unclear reasons Tattoos-first at age 17 only risk factor (HCVAB negative 08/2017)    HTN (hypertension) 7/8/2014    Hypertension     Insulin dependent diabetes mellitus     Renal mass, left 7/8/2014    6.3 x 5.7 x 5.6 complex mass     Splenomegaly 7/8/2014    Umbilical hernia 7/8/2014       Past Surgical History:   Procedure Laterality Date    CARPAL TUNNEL RELEASE Bilateral     CHOLECYSTECTOMY      lap    COLONOSCOPY N/A 9/8/2017    Procedure: COLONOSCOPY;  Surgeon: Savannah Llanos MD;  Location: [facility];  Service: Endoscopy;  Laterality: N/A;    COLONOSCOPY Left 3/14/2018    Procedure: COLONOSCOPY;  Surgeon: Savannah Llanos MD;  Location: [facility];  Service: Endoscopy;  Laterality: Left;    ESOPHAGOGASTRODUODENOSCOPY  01/2014    ESOPHAGOGASTRODUODENOSCOPY  02/15/2017    grade II varices    ESOPHAGOGASTRODUODENOSCOPY  04/10/2017    grade I varices    ESOPHAGOGASTRODUODENOSCOPY N/A 10/18/2019    Procedure: EGD (ESOPHAGOGASTRODUODENOSCOPY);  Surgeon: Flavio Escalera MD;  Location: [facility] (2ND FLR);  Service: Endoscopy;  Laterality: N/A;    ESOPHAGOGASTRODUODENOSCOPY W/ BANDING  01/26/2017    grade II varices    HERNIA REPAIR      NECK SURGERY      fusion on C5 and C6    ULNAR NERVE TRANSPOSITION Left     UMBILICAL HERNIA REPAIR N/A 2/22/2020    Procedure: REPAIR, HERNIA, PRIMARY WIHTOUT MESH AND PLACEMENT OF DRAIN;  Surgeon: Sherri Brunner MD;  [facility];  Service: Transplant;  Laterality: N/A;    vericose veins removed         Review of patient's allergies indicates:  No Known Allergies    Family History     Problem Relation (Age of Onset)    Alcohol abuse Brother    Cancer Brother    Hyperlipidemia Mother    No Known Problems Father (36), Sister, Sister        Tobacco Use    Smoking status: Former Smoker     Types: Cigarettes     Last attempt to quit: 1/4/2010     Years since quitting: 10.3    Smokeless tobacco: Former User     Types: Chew     Quit date: 2/24/1990    Tobacco comment: former 1 pack/week quit 1/4/2010   Substance and Sexual Activity    Alcohol use: No     Comment: former heavy beer but quit 1/4/2010    Drug use: No    Sexual activity: Never     Comment:        PTA  Medications   Medication Sig    ciprofloxacin HCl (CIPRO) 500 MG tablet Take 1 tablet (500 mg total) by mouth once daily.    cyclobenzaprine (FLEXERIL) 10 MG tablet Take 10 mg by mouth 2 (two) times daily.     EASY TOUCH INSULIN SYRINGE 1 mL 31 gauge x 5/16 Syrg     escitalopram oxalate (LEXAPRO) 20 MG tablet Take 20 mg by mouth once daily.     finasteride (PROSCAR) 5 mg tablet Take 1 tablet (5 mg total) by mouth once daily.    furosemide (LASIX) 40 MG tablet Take 4 tablets (160 mg total) by mouth once daily.    gabapentin (NEURONTIN) 600 MG tablet Take 600 mg by mouth 3 (three) times daily.     HYDROcodone-acetaminophen (NORCO)  mg per tablet Take 1 tablet by mouth every 12 (twelve) hours as needed for Pain.    insulin aspart U-100 (NOVOLOG) 100 unit/mL injection Inject 24 Units into the skin 3 (three) times daily before meals.    insulin detemir U-100 (LEVEMIR) 100 unit/mL injection Inject 74 Units into the skin every evening.    lactulose (CHRONULAC) 10 gram/15 mL solution Take 30 mLs by mouth 3 (three) times daily. May take up to  4 to 5 times daily if needed for bowel movements    levocetirizine (XYZAL) 5 MG tablet Take 1 tablet (5 mg total) by mouth every evening.    oxybutynin (DITROPAN) 5 MG Tab Take 5 mg by mouth once daily.    pantoprazole (PROTONIX) 40 MG tablet Take 1 tablet (40 mg total) by mouth once daily.    potassium bicarbonate & potassium chloride (K-LYTE CL) 25 mEq TbEF Take 1 tablet (25 mEq total) by mouth 2 (two) times daily with meals.    rifAXIMin (XIFAXAN) 550 mg Tab Take 1 tablet (550 mg total) by mouth 2 (two) times daily.    spironolactone (ALDACTONE) 100 MG tablet Take 1.5 tablets (150 mg total) by mouth once daily.    tamsulosin (FLOMAX) 0.4 mg Cp24 Take 1 capsule (0.4 mg total) by mouth once daily.    TRUE METRIX GLUCOSE TEST STRIP Strp     TRUEPLUS LANCETS 30 gauge Misc        Review of Systems   Constitutional: Positive for fever. Negative for activity  change, appetite change, chills and diaphoresis.   HENT: Negative for congestion, sinus pressure, sinus pain, sore throat and trouble swallowing.    Eyes: Negative for visual disturbance.   Respiratory: Negative for chest tightness, shortness of breath and stridor.    Cardiovascular: Positive for leg swelling. Negative for chest pain and palpitations.   Gastrointestinal: Positive for abdominal distention and abdominal pain. Negative for constipation, diarrhea, nausea and vomiting.   Genitourinary: Negative for decreased urine volume, difficulty urinating, dysuria and flank pain.   Musculoskeletal: Negative for neck pain and neck stiffness.   Skin: Positive for wound. Negative for color change and rash.   Allergic/Immunologic: Negative for immunocompromised state.   Neurological: Negative for dizziness, tremors, syncope, light-headedness and headaches.   Psychiatric/Behavioral: Negative for agitation, confusion, decreased concentration and hallucinations. The patient is not nervous/anxious.      Objective:     Vital Signs (Most Recent):  Temp: 97.6 °F (36.4 °C) (05/15/20 0515)  Pulse: 86 (05/15/20 0515)  Resp: 16 (05/15/20 0515)  BP: (!) 106/55 (05/15/20 0515)  SpO2: 100 % (05/15/20 0515) Vital Signs (24h Range):  Temp:  [97.6 °F (36.4 °C)-102.9 °F (39.4 °C)] 97.6 °F (36.4 °C)  Pulse:  [] 86  Resp:  [15-18] 16  SpO2:  [97 %-100 %] 100 %  BP: ()/(43-66) 106/55     Weight: 98 kg (216 lb 0.8 oz)  Body mass index is 30.13 kg/m².    No intake or output data in the 24 hours ending 05/15/20 0555    Physical Exam   Constitutional: He is oriented to person, place, and time. No distress.   HENT:   Head: Normocephalic and atraumatic.   Eyes: Right eye exhibits no discharge. Left eye exhibits no discharge. No scleral icterus.   Neck: Normal range of motion. Neck supple.   Cardiovascular: Normal rate, regular rhythm, normal heart sounds and intact distal pulses.   Pulmonary/Chest: Effort normal and breath sounds  normal. He has no wheezes. He has no rales.   Abdominal: Soft. Bowel sounds are normal. He exhibits distension. There is no tenderness. There is no guarding. A hernia (small umbilical hernia easily reducible) is present.       Musculoskeletal: He exhibits no edema.   Neurological: He is alert and oriented to person, place, and time. No cranial nerve deficit.   Skin: Skin is warm and dry. Capillary refill takes less than 2 seconds. He is not diaphoretic.   Psychiatric: He has a normal mood and affect. His behavior is normal. Judgment and thought content normal.   Nursing note and vitals reviewed.      Laboratory:  pending    Diagnostic Results:  None    Assessment/Plan:     * Abdominal pain  - Admit with abd pain, distention  - Infectious work up: CXR, blood and urine cultures  - Para ordered to r/o SBP  - continue cipro    Anemia of chronic disease  - 2/2 ESLD  - H/H stable  - reports having dark colored stools in the past week. no overt signs of bleeding  - cont to monitor      Acquired coagulation factor deficiency  - 2/2 ESLD  - monitor PT/INR      Umbilical hernia without obstruction and without gangrene  - s/p hernia repair 2/22.   - admitted 2/27-3/2 with copious serous drainage from incision  - incision CDI, no SSI infection       Type 2 diabetes mellitus without complication, with long-term current use of insulin  - resume 75% home insulin doses  - consult endocrine for assistnace    Ascites due to alcoholic cirrhosis  - paracentesis 2/27 negative for peritonitis  - Para to r/o SBP  - on home lasix and aldactone. Will assess need to restart      Decompensated hepatic cirrhosis  - ESLD 2/2 ETOH listed for liver transplant with MELD 21  - s/p umbilical hernia repair 2/27/20    MELD-Na score: 25 at 5/15/2020  4:55 AM  MELD score: 20 at 5/15/2020  4:55 AM  Calculated from:  Serum Creatinine: 0.7 mg/dL (Rounded to 1 mg/dL) at 5/15/2020  4:55 AM  Serum Sodium: 129 mmol/L at 5/15/2020  4:55 AM  Total Bilirubin: 4.5  mg/dL at 5/15/2020  4:55 AM  INR(ratio): 2.1 at 5/15/2020  4:55 AM  Age: 59 years    Hepatic encephalopathy  - chronic  - continue lactulose and rifaximin    GERD (gastroesophageal reflux disease)  - cont protonix          MELD-Na score: 25 at 5/15/2020  4:55 AM  MELD score: 20 at 5/15/2020  4:55 AM  Calculated from:  Serum Creatinine: 0.7 mg/dL (Rounded to 1 mg/dL) at 5/15/2020  4:55 AM  Serum Sodium: 129 mmol/L at 5/15/2020  4:55 AM  Total Bilirubin: 4.5 mg/dL at 5/15/2020  4:55 AM  INR(ratio): 2.1 at 5/15/2020  4:55 AM  Age: 59 years      Discharge Planning:  No Patient Care Coordination Note on file.      Huong Hines PA-C  Liver Transplant  [facility]

## 2020-05-15 NOTE — PROCEDURES
Radiology Post-Procedure Note    Pre Op Diagnosis: Ascites  Post Op Diagnosis: Same    Procedure: Ultrasound Guided Paracentesis    Procedure performed by: Reji CUNHA, Luann     Written Informed Consent Obtained: Yes  Specimen Removed: YES clear yellow  Estimated Blood Loss: Minimal    Findings:   Successful paracentesis.  Albumin administered PRN per protocol.    Patient tolerated procedure well.    Luann Mendoza, APRN, FNP  Interventional Radiology  [phone] clinic

## 2020-05-15 NOTE — ASSESSMENT & PLAN NOTE
- ESLD 2/2 ETOH listed for liver transplant with MELD 21  - s/p umbilical hernia repair 2/27/20    MELD-Na score: 25 at 5/15/2020  4:55 AM  MELD score: 20 at 5/15/2020  4:55 AM  Calculated from:  Serum Creatinine: 0.7 mg/dL (Rounded to 1 mg/dL) at 5/15/2020  4:55 AM  Serum Sodium: 129 mmol/L at 5/15/2020  4:55 AM  Total Bilirubin: 4.5 mg/dL at 5/15/2020  4:55 AM  INR(ratio): 2.1 at 5/15/2020  4:55 AM  Age: 59 years

## 2020-05-16 LAB
ALBUMIN SERPL BCP-MCNC: 2.3 G/DL (ref 3.5–5.2)
ALP SERPL-CCNC: 102 U/L (ref 55–135)
ALT SERPL W/O P-5'-P-CCNC: 18 U/L (ref 10–44)
ANION GAP SERPL CALC-SCNC: 6 MMOL/L (ref 8–16)
AST SERPL-CCNC: 36 U/L (ref 10–40)
BACTERIA UR CULT: NO GROWTH
BASOPHILS # BLD AUTO: 0.04 K/UL (ref 0–0.2)
BASOPHILS NFR BLD: 0.5 % (ref 0–1.9)
BILIRUB SERPL-MCNC: 3.4 MG/DL (ref 0.1–1)
BUN SERPL-MCNC: 19 MG/DL (ref 6–20)
CALCIUM SERPL-MCNC: 7.5 MG/DL (ref 8.7–10.5)
CHLORIDE SERPL-SCNC: 102 MMOL/L (ref 95–110)
CO2 SERPL-SCNC: 23 MMOL/L (ref 23–29)
CREAT SERPL-MCNC: 0.8 MG/DL (ref 0.5–1.4)
DIFFERENTIAL METHOD: ABNORMAL
EOSINOPHIL # BLD AUTO: 0.2 K/UL (ref 0–0.5)
EOSINOPHIL NFR BLD: 2.2 % (ref 0–8)
ERYTHROCYTE [DISTWIDTH] IN BLOOD BY AUTOMATED COUNT: 19.5 % (ref 11.5–14.5)
EST. GFR  (AFRICAN AMERICAN): >60 ML/MIN/1.73 M^2
EST. GFR  (NON AFRICAN AMERICAN): >60 ML/MIN/1.73 M^2
GLUCOSE SERPL-MCNC: 108 MG/DL (ref 70–110)
HCT VFR BLD AUTO: 27.3 % (ref 40–54)
HGB BLD-MCNC: 8.3 G/DL (ref 14–18)
IMM GRANULOCYTES # BLD AUTO: 0.13 K/UL (ref 0–0.04)
IMM GRANULOCYTES NFR BLD AUTO: 1.5 % (ref 0–0.5)
INR PPP: 1.9 (ref 0.8–1.2)
LYMPHOCYTES # BLD AUTO: 0.6 K/UL (ref 1–4.8)
LYMPHOCYTES NFR BLD: 6.6 % (ref 18–48)
MAGNESIUM SERPL-MCNC: 2 MG/DL (ref 1.6–2.6)
MCH RBC QN AUTO: 26.9 PG (ref 27–31)
MCHC RBC AUTO-ENTMCNC: 30.4 G/DL (ref 32–36)
MCV RBC AUTO: 88 FL (ref 82–98)
MONOCYTES # BLD AUTO: 0.9 K/UL (ref 0.3–1)
MONOCYTES NFR BLD: 10.7 % (ref 4–15)
NEUTROPHILS # BLD AUTO: 6.8 K/UL (ref 1.8–7.7)
NEUTROPHILS NFR BLD: 78.5 % (ref 38–73)
NRBC BLD-RTO: 0 /100 WBC
PHOSPHATE SERPL-MCNC: 2.2 MG/DL (ref 2.7–4.5)
PLATELET # BLD AUTO: 45 K/UL (ref 150–350)
PMV BLD AUTO: ABNORMAL FL (ref 9.2–12.9)
POCT GLUCOSE: 109 MG/DL (ref 70–110)
POCT GLUCOSE: 109 MG/DL (ref 70–110)
POCT GLUCOSE: 119 MG/DL (ref 70–110)
POCT GLUCOSE: 99 MG/DL (ref 70–110)
POTASSIUM SERPL-SCNC: 3.4 MMOL/L (ref 3.5–5.1)
PROT SERPL-MCNC: 5.4 G/DL (ref 6–8.4)
PROTHROMBIN TIME: 18.5 SEC (ref 9–12.5)
RBC # BLD AUTO: 3.09 M/UL (ref 4.6–6.2)
SODIUM SERPL-SCNC: 131 MMOL/L (ref 136–145)
WBC # BLD AUTO: 8.69 K/UL (ref 3.9–12.7)

## 2020-05-16 PROCEDURE — 63600175 PHARM REV CODE 636 W HCPCS: Mod: NTX | Performed by: NURSE PRACTITIONER

## 2020-05-16 PROCEDURE — 99233 PR SUBSEQUENT HOSPITAL CARE,LEVL III: ICD-10-PCS | Mod: NTX,,, | Performed by: INTERNAL MEDICINE

## 2020-05-16 PROCEDURE — 99232 SBSQ HOSP IP/OBS MODERATE 35: CPT | Mod: NTX,,, | Performed by: NURSE PRACTITIONER

## 2020-05-16 PROCEDURE — 20600001 HC STEP DOWN PRIVATE ROOM: Mod: NTX

## 2020-05-16 PROCEDURE — 99233 SBSQ HOSP IP/OBS HIGH 50: CPT | Mod: NTX,,, | Performed by: INTERNAL MEDICINE

## 2020-05-16 PROCEDURE — 36415 COLL VENOUS BLD VENIPUNCTURE: CPT | Mod: NTX

## 2020-05-16 PROCEDURE — 94761 N-INVAS EAR/PLS OXIMETRY MLT: CPT | Mod: NTX

## 2020-05-16 PROCEDURE — 80053 COMPREHEN METABOLIC PANEL: CPT | Mod: NTX

## 2020-05-16 PROCEDURE — 84100 ASSAY OF PHOSPHORUS: CPT | Mod: NTX

## 2020-05-16 PROCEDURE — 99232 PR SUBSEQUENT HOSPITAL CARE,LEVL II: ICD-10-PCS | Mod: NTX,,, | Performed by: NURSE PRACTITIONER

## 2020-05-16 PROCEDURE — 85610 PROTHROMBIN TIME: CPT | Mod: NTX

## 2020-05-16 PROCEDURE — 25000003 PHARM REV CODE 250: Mod: NTX | Performed by: PHYSICIAN ASSISTANT

## 2020-05-16 PROCEDURE — 25000003 PHARM REV CODE 250: Mod: NTX | Performed by: NURSE PRACTITIONER

## 2020-05-16 PROCEDURE — 85025 COMPLETE CBC W/AUTO DIFF WBC: CPT | Mod: NTX

## 2020-05-16 PROCEDURE — 83735 ASSAY OF MAGNESIUM: CPT | Mod: NTX

## 2020-05-16 PROCEDURE — 25000003 PHARM REV CODE 250: Mod: NTX | Performed by: TRANSPLANT SURGERY

## 2020-05-16 RX ORDER — HYDROCODONE BITARTRATE AND ACETAMINOPHEN 10; 325 MG/1; MG/1
1 TABLET ORAL EVERY 8 HOURS PRN
Status: DISCONTINUED | OUTPATIENT
Start: 2020-05-16 | End: 2020-05-22

## 2020-05-16 RX ORDER — POTASSIUM CHLORIDE 20 MEQ/1
20 TABLET, EXTENDED RELEASE ORAL ONCE
Status: COMPLETED | OUTPATIENT
Start: 2020-05-16 | End: 2020-05-16

## 2020-05-16 RX ORDER — INSULIN ASPART 100 [IU]/ML
14 INJECTION, SOLUTION INTRAVENOUS; SUBCUTANEOUS
Status: DISCONTINUED | OUTPATIENT
Start: 2020-05-16 | End: 2020-05-18

## 2020-05-16 RX ADMIN — PANTOPRAZOLE SODIUM 40 MG: 40 TABLET, DELAYED RELEASE ORAL at 09:05

## 2020-05-16 RX ADMIN — HYDROCODONE BITARTRATE AND ACETAMINOPHEN 1 TABLET: 10; 325 TABLET ORAL at 09:05

## 2020-05-16 RX ADMIN — LACTULOSE 30 G: 20 SOLUTION ORAL at 09:05

## 2020-05-16 RX ADMIN — DIBASIC SODIUM PHOSPHATE, MONOBASIC POTASSIUM PHOSPHATE AND MONOBASIC SODIUM PHOSPHATE 2 TABLET: 852; 155; 130 TABLET ORAL at 09:05

## 2020-05-16 RX ADMIN — OXYBUTYNIN CHLORIDE 5 MG: 5 TABLET ORAL at 09:05

## 2020-05-16 RX ADMIN — TAMSULOSIN HYDROCHLORIDE 0.4 MG: 0.4 CAPSULE ORAL at 09:05

## 2020-05-16 RX ADMIN — INSULIN ASPART 14 UNITS: 100 INJECTION, SOLUTION INTRAVENOUS; SUBCUTANEOUS at 05:05

## 2020-05-16 RX ADMIN — LACTULOSE 30 G: 20 SOLUTION ORAL at 02:05

## 2020-05-16 RX ADMIN — INSULIN ASPART 18 UNITS: 100 INJECTION, SOLUTION INTRAVENOUS; SUBCUTANEOUS at 09:05

## 2020-05-16 RX ADMIN — CEFTRIAXONE 2 G: 2 INJECTION, SOLUTION INTRAVENOUS at 10:05

## 2020-05-16 RX ADMIN — POTASSIUM CHLORIDE 20 MEQ: 1500 TABLET, EXTENDED RELEASE ORAL at 10:05

## 2020-05-16 RX ADMIN — INSULIN ASPART 14 UNITS: 100 INJECTION, SOLUTION INTRAVENOUS; SUBCUTANEOUS at 12:05

## 2020-05-16 RX ADMIN — RIFAXIMIN 550 MG: 550 TABLET ORAL at 09:05

## 2020-05-16 RX ADMIN — DIBASIC SODIUM PHOSPHATE, MONOBASIC POTASSIUM PHOSPHATE AND MONOBASIC SODIUM PHOSPHATE 2 TABLET: 852; 155; 130 TABLET ORAL at 10:05

## 2020-05-16 RX ADMIN — HYDROCODONE BITARTRATE AND ACETAMINOPHEN 1 TABLET: 10; 325 TABLET ORAL at 05:05

## 2020-05-16 RX ADMIN — ESCITALOPRAM OXALATE 20 MG: 10 TABLET ORAL at 09:05

## 2020-05-16 NOTE — ASSESSMENT & PLAN NOTE
- ESLD 2/2 ETOH listed for liver transplant with MELD 25 (expires 5/22/20)  - s/p umbilical hernia repair 2/27/20    MELD-Na score: 22 at 5/16/2020  6:18 AM  MELD score: 18 at 5/16/2020  6:18 AM  Calculated from:  Serum Creatinine: 0.8 mg/dL (Rounded to 1 mg/dL) at 5/16/2020  6:18 AM  Serum Sodium: 131 mmol/L at 5/16/2020  6:18 AM  Total Bilirubin: 3.4 mg/dL at 5/16/2020  6:18 AM  INR(ratio): 1.9 at 5/16/2020  6:18 AM  Age: 59 years

## 2020-05-16 NOTE — PROGRESS NOTES
[facility]  Endocrinology  Progress Note    Admit Date: 5/15/2020     Reason for Consult: Management of T2DM, Hyperglycemia     Surgical Procedure and Date: N/A    Diabetes diagnosis year:     Home Diabetes Medications:    - Levemir 50 units HS (per chart review)  - Novolog 24 units TIDWM (per chart review)    How often checking glucose at home? MARILIN   BG readings on regimen: MARILIN  Hypoglycemia on the regimen?  MARILIN  Missed doses on regimen?  MARILIN    Diabetes Complications include:     Hyperglycemia and Diabetic peripheral neuropathy      Complicating diabetes co morbidities:   CIRRHOSIS    HPI:   Patient is a 59 y.o. 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 Surgery uncomplicated but has had recurrent abdominal pain and increased drainage from surgical incision. Patient recently admitted 3/11-3/13 with similar symptoms, attempted paracentesis at this time- without fluid to drain. He re-presented to [facility] on 5/15 with c/o fever and abdominal pain (Tmax 103) x 1 week, distention and dark colored stools, received 1g Rocephin and transferred to [facility] for further care. Patient is being admitted for suspected SBP and infectious work-up.  COVID-19 rapid test negative on admission. Plan for paracentesis. Endocrinology consulted to manage hypeglycemia/DM2 during admission to [facility].    Lab Results   Component Value Date    HGBA1C 6.7 (H) 2020       Interval HPI:   Overnight events:  BG is below goal on current SQ insulin regimen. Attempted to call patient x2 with no answer. However, patient appears to be progressing towards goals as noted per chart review.     Diet Low Sodium, 2gm       Eatin%  Nausea: No  Hypoglycemia and intervention: No  Fever: No  TPN and/or TF: No  If yes, type of TF/TPN and rate: None    /78   Pulse 103   Temp 98.3 °F (36.8 °C) (Oral)   " Resp 14   Ht 5' 11" (1.803 m)   Wt 100 kg (220 lb 7.4 oz)   SpO2 96%   BMI 30.75 kg/m²      Labs Reviewed and Include    Recent Labs   Lab 05/16/20  0618      CALCIUM 7.5*   ALBUMIN 2.3*   PROT 5.4*   *   K 3.4*   CO2 23      BUN 19   CREATININE 0.8   ALKPHOS 102   ALT 18   AST 36   BILITOT 3.4*     Lab Results   Component Value Date    WBC 8.69 05/16/2020    HGB 8.3 (L) 05/16/2020    HCT 27.3 (L) 05/16/2020    MCV 88 05/16/2020    PLT 45 (L) 05/16/2020     No results for input(s): TSH, FREET4 in the last 168 hours.  Lab Results   Component Value Date    HGBA1C 6.7 (H) 02/28/2020       Nutritional status:   Body mass index is 30.75 kg/m².  Lab Results   Component Value Date    ALBUMIN 2.3 (L) 05/16/2020    ALBUMIN 2.3 (L) 05/15/2020    ALBUMIN 2.7 (L) 03/13/2020     No results found for: PREALBUMIN    Estimated Creatinine Clearance: 121.3 mL/min (based on SCr of 0.8 mg/dL).    Accu-Checks  Recent Labs     05/15/20  0459 05/15/20  0740 05/15/20  1200 05/15/20  1642 05/15/20  2109 05/16/20  0819   POCTGLUCOSE 142* 136* 113* 144* 165* 99       Current Medications and/or Treatments Impacting Glycemic Control  Immunotherapy:    Immunosuppressants     None        Steroids:   Hormones (From admission, onward)    None        Pressors:    Autonomic Drugs (From admission, onward)    None        Hyperglycemia/Diabetes Medications:   Antihyperglycemics (From admission, onward)    Start     Stop Route Frequency Ordered    05/15/20 2100  insulin detemir U-100 pen 22 Units      -- SubQ Nightly 05/15/20 1218    05/15/20 1645  insulin aspart U-100 pen 18 Units      -- SubQ 3 times daily with meals 05/15/20 1218    05/15/20 1318  insulin aspart U-100 pen 1-10 Units      -- SubQ Before meals & nightly PRN 05/15/20 1218          ASSESSMENT and PLAN    * Abdominal pain  Managed per primary team  Avoid hypoglycemia        Type 2 diabetes mellitus without complication, with long-term current use of insulin   " -180     -Decrease Levemir to 18 units HS. Basal BG below goal. 20% decrease  - Decrease Novolog to 14 units TIDWM. 20% decrease  - Moderate Dose SQ Insulin Correction Scale.  - BG Monitoring AC/HS     ** Please call Endocrine for any BG related issues **  ** Please notify Endocrine for any change and/or advance in diet**    Discharge planning:   TBD. Please notify endocrinology prior to discharge.         Anemia of chronic disease  May affect accuracy of HbA1c results.             Yogesh Goldstein NP  Endocrinology  [facility]

## 2020-05-16 NOTE — SUBJECTIVE & OBJECTIVE
"Interval HPI:   Overnight events:  BG is below goal on current SQ insulin regimen. Attempted to call patient x2 with no answer. However, patient appears to be progressing towards goals as noted per chart review.     Diet Low Sodium, 2gm       Eatin%  Nausea: No  Hypoglycemia and intervention: No  Fever: No  TPN and/or TF: No  If yes, type of TF/TPN and rate: None    /78   Pulse 103   Temp 98.3 °F (36.8 °C) (Oral)   Resp 14   Ht 5' 11" (1.803 m)   Wt 100 kg (220 lb 7.4 oz)   SpO2 96%   BMI 30.75 kg/m²     Labs Reviewed and Include    Recent Labs   Lab 20  0618      CALCIUM 7.5*   ALBUMIN 2.3*   PROT 5.4*   *   K 3.4*   CO2 23      BUN 19   CREATININE 0.8   ALKPHOS 102   ALT 18   AST 36   BILITOT 3.4*     Lab Results   Component Value Date    WBC 8.69 2020    HGB 8.3 (L) 2020    HCT 27.3 (L) 2020    MCV 88 2020    PLT 45 (L) 2020     No results for input(s): TSH, FREET4 in the last 168 hours.  Lab Results   Component Value Date    HGBA1C 6.7 (H) 2020       Nutritional status:   Body mass index is 30.75 kg/m².  Lab Results   Component Value Date    ALBUMIN 2.3 (L) 2020    ALBUMIN 2.3 (L) 05/15/2020    ALBUMIN 2.7 (L) 2020     No results found for: PREALBUMIN    Estimated Creatinine Clearance: 121.3 mL/min (based on SCr of 0.8 mg/dL).    Accu-Checks  Recent Labs     05/15/20  0459 05/15/20  0740 05/15/20  1200 05/15/20  1642 05/15/20  2109 20  0819   POCTGLUCOSE 142* 136* 113* 144* 165* 99       Current Medications and/or Treatments Impacting Glycemic Control  Immunotherapy:    Immunosuppressants     None        Steroids:   Hormones (From admission, onward)    None        Pressors:    Autonomic Drugs (From admission, onward)    None        Hyperglycemia/Diabetes Medications:   Antihyperglycemics (From admission, onward)    Start     Stop Route Frequency Ordered    05/15/20 2100  insulin detemir U-100 pen 22 Units      -- " SubQ Nightly 05/15/20 1218    05/15/20 1645  insulin aspart U-100 pen 18 Units      -- SubQ 3 times daily with meals 05/15/20 1218    05/15/20 1318  insulin aspart U-100 pen 1-10 Units      -- SubQ Before meals & nightly PRN 05/15/20 1218

## 2020-05-16 NOTE — PLAN OF CARE
Pt is AAOx3.Pt is ambulatory and independent.Pt able to perform all ADL's without assistance. CBG monitored AC HS.  SS coverage given when appropriate.Pt denies pian and/or discomfort at this time.Pt turns independently, pt is aware of bony area and pressure reduction positions V/S stable, within normal limits.Pt remains injury and fall free, non skid footwear donned, call light within reach, personal items within reach, bed in low/locked position, pt able to voice needs all needs voiced have been met at this time.

## 2020-05-16 NOTE — SUBJECTIVE & OBJECTIVE
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    Esophageal varices     GERD (gastroesophageal reflux disease)     Hepatic encephalopathy 7/8/2014    Hepatitis C virus infection 07/08/2014    tx with interferon 3-4 mos- stopped for unclear reasons Tattoos-first at age 17 only risk factor (HCVAB negative 08/2017)    HTN (hypertension) 7/8/2014    Hypertension     Insulin dependent diabetes mellitus     Renal mass, left 7/8/2014    6.3 x 5.7 x 5.6 complex mass    Splenomegaly 7/8/2014    Umbilical hernia 7/8/2014       Past Surgical History:   Procedure Laterality Date    CARPAL TUNNEL RELEASE Bilateral     CHOLECYSTECTOMY      lap    COLONOSCOPY N/A 9/8/2017    Procedure: COLONOSCOPY;  Surgeon: Savannah Llanos MD;  Location: [facility];  Service: Endoscopy;  Laterality: N/A;    COLONOSCOPY Left 3/14/2018    Procedure: COLONOSCOPY;  Surgeon: Savannah Llanos MD;  Location: [facility];  Service: Endoscopy;  Laterality: Left;    ESOPHAGOGASTRODUODENOSCOPY  01/2014    ESOPHAGOGASTRODUODENOSCOPY  02/15/2017    grade II varices    ESOPHAGOGASTRODUODENOSCOPY  04/10/2017    grade I varices    ESOPHAGOGASTRODUODENOSCOPY N/A 10/18/2019    Procedure: EGD (ESOPHAGOGASTRODUODENOSCOPY);  Surgeon: Flavio Escalera MD;  Location: [facility] ([address] [place] [address]);  Service: Endoscopy;  Laterality: N/A;    ESOPHAGOGASTRODUODENOSCOPY W/ BANDING  01/26/2017    grade II varices    HERNIA REPAIR      NECK SURGERY      fusion on C5 and C6    ULNAR NERVE TRANSPOSITION Left     UMBILICAL HERNIA REPAIR N/A 2/22/2020    Procedure: REPAIR, HERNIA, PRIMARY WIHTOUT MESH AND PLACEMENT OF DRAIN;  Surgeon: Sherri Brunner MD;  Location: [facility] OR [address] [place] [address];  Service: Transplant;  Laterality: N/A;    vericose veins removed         Review of patient's allergies  indicates:  No Known Allergies    Family History     Problem Relation (Age of Onset)    Alcohol abuse Brother    Cancer Brother    Hyperlipidemia Mother    No Known Problems Father (36), Sister, Sister        Tobacco Use    Smoking status: Former Smoker     Types: Cigarettes     Last attempt to quit: 1/4/2010     Years since quitting: 10.3    Smokeless tobacco: Former User     Types: Chew     Quit date: 2/24/1990    Tobacco comment: former 1 pack/week quit 1/4/2010   Substance and Sexual Activity    Alcohol use: No     Comment: former heavy beer but quit 1/4/2010    Drug use: No    Sexual activity: Never     Comment:        PTA Medications   Medication Sig    ciprofloxacin HCl (CIPRO) 500 MG tablet Take 1 tablet (500 mg total) by mouth once daily.    cyclobenzaprine (FLEXERIL) 10 MG tablet Take 10 mg by mouth 2 (two) times daily.     EASY TOUCH INSULIN SYRINGE 1 mL 31 gauge x 5/16 Syrg     escitalopram oxalate (LEXAPRO) 20 MG tablet Take 20 mg by mouth once daily.     finasteride (PROSCAR) 5 mg tablet Take 1 tablet (5 mg total) by mouth once daily.    furosemide (LASIX) 40 MG tablet Take 4 tablets (160 mg total) by mouth once daily.    gabapentin (NEURONTIN) 600 MG tablet Take 600 mg by mouth 3 (three) times daily.     HYDROcodone-acetaminophen (NORCO)  mg per tablet Take 1 tablet by mouth every 12 (twelve) hours as needed for Pain.    insulin aspart U-100 (NOVOLOG) 100 unit/mL injection Inject 24 Units into the skin 3 (three) times daily before meals.    insulin detemir U-100 (LEVEMIR) 100 unit/mL injection Inject 74 Units into the skin every evening.    lactulose (CHRONULAC) 10 gram/15 mL solution Take 30 mLs by mouth 3 (three) times daily. May take up to  4 to 5 times daily if needed for bowel movements    levocetirizine (XYZAL) 5 MG tablet Take 1 tablet (5 mg total) by mouth every evening.    oxybutynin (DITROPAN) 5 MG Tab Take 5 mg by mouth once daily.    pantoprazole  (PROTONIX) 40 MG tablet Take 1 tablet (40 mg total) by mouth once daily.    potassium bicarbonate & potassium chloride (K-LYTE CL) 25 mEq TbEF Take 1 tablet (25 mEq total) by mouth 2 (two) times daily with meals.    rifAXIMin (XIFAXAN) 550 mg Tab Take 1 tablet (550 mg total) by mouth 2 (two) times daily.    spironolactone (ALDACTONE) 100 MG tablet Take 1.5 tablets (150 mg total) by mouth once daily.    tamsulosin (FLOMAX) 0.4 mg Cp24 Take 1 capsule (0.4 mg total) by mouth once daily.    TRUE METRIX GLUCOSE TEST STRIP Strp     TRUEPLUS LANCETS 30 gauge [facility]        Review of Systems   Constitutional: Negative for activity change, appetite change, chills, diaphoresis and fever.   HENT: Negative for congestion, sinus pressure, sinus pain, sore throat and trouble swallowing.    Eyes: Negative for visual disturbance.   Respiratory: Negative for chest tightness, shortness of breath and stridor.    Cardiovascular: Positive for leg swelling. Negative for chest pain and palpitations.   Gastrointestinal: Positive for abdominal distention and abdominal pain. Negative for constipation, diarrhea, nausea and vomiting.   Genitourinary: Negative for decreased urine volume, difficulty urinating, dysuria and flank pain.   Musculoskeletal: Negative for neck pain and neck stiffness.   Skin: Positive for wound. Negative for color change and rash.   Allergic/Immunologic: Negative for immunocompromised state.   Neurological: Negative for dizziness, tremors, syncope, light-headedness and headaches.   Psychiatric/Behavioral: Negative for agitation, confusion, decreased concentration and hallucinations. The patient is not nervous/anxious.      Objective:     Vital Signs (Most Recent):  Temp: 98.2 °F (36.8 °C) (05/16/20 0545)  Pulse: 104 (05/16/20 0545)  Resp: 16 (05/16/20 0545)  BP: 125/69 (05/16/20 0545)  SpO2: 97 % (05/16/20 0545) Vital Signs (24h Range):  Temp:  [97.6 °F (36.4 °C)-98.3 °F (36.8 °C)] 98.2 °F (36.8 °C)  Pulse:   [] 104  Resp:  [16-18] 16  SpO2:  [95 %-99 %] 97 %  BP: (106-133)/(54-69) 125/69     Weight: 100 kg (220 lb 7.4 oz)  Body mass index is 30.75 kg/m².      Intake/Output Summary (Last 24 hours) at 5/16/2020 0658  Last data filed at 5/16/2020 0600  Gross per 24 hour   Intake 1115 ml   Output 1550 ml   Net -435 ml       Physical Exam   Constitutional: He is oriented to person, place, and time. No distress.   HENT:   Head: Normocephalic and atraumatic.   Eyes: Right eye exhibits no discharge. Left eye exhibits no discharge. No scleral icterus.   Neck: Normal range of motion. Neck supple.   Cardiovascular: Normal rate, regular rhythm, normal heart sounds and intact distal pulses.   Pulmonary/Chest: Effort normal and breath sounds normal. He has no wheezes. He has no rales.   Abdominal: Soft. Bowel sounds are normal. He exhibits distension. There is no tenderness. There is no guarding. A hernia (small umbilical hernia easily reducible) is present.       Musculoskeletal: He exhibits no edema.   Neurological: He is alert and oriented to person, place, and time. No cranial nerve deficit.   Skin: Skin is warm and dry. Capillary refill takes less than 2 seconds. He is not diaphoretic.   Psychiatric: He has a normal mood and affect. His behavior is normal. Judgment and thought content normal.   Nursing note and vitals reviewed.      Laboratory:  Lab Results   Component Value Date    WBC 2.58 (L) 03/12/2020    HGB 8.1 (L) 03/12/2020    HCT 27.1 (L) 03/12/2020    MCV 88 03/12/2020    PLT 40 (L) 03/12/2020       CMP  Sodium   Date Value Ref Range Status   05/15/2020 129 (L) 136 - 145 mmol/L Final   10/18/2016 136  Final     Potassium   Date Value Ref Range Status   05/15/2020 4.1 3.5 - 5.1 mmol/L Final   10/18/2016 4.0  Final     Chloride   Date Value Ref Range Status   05/15/2020 102 95 - 110 mmol/L Final   10/18/2016 103  Final     CO2   Date Value Ref Range Status   05/15/2020 20 (L) 23 - 29 mmol/L Final   10/18/2016 24.0   Final     Glucose   Date Value Ref Range Status   05/15/2020 143 (H) 70 - 110 mg/dL Final   11/19/2015 433  Final     BUN, Bld   Date Value Ref Range Status   05/15/2020 12 6 - 20 mg/dL Final     BUN   Date Value Ref Range Status   03/20/2017 5 4 - 21 mg/dL Final     Creatinine   Date Value Ref Range Status   05/15/2020 0.7 0.5 - 1.4 mg/dL Final   10/18/2016 0.8 mg/dL Final     Calcium   Date Value Ref Range Status   05/15/2020 7.1 (L) 8.7 - 10.5 mg/dL Final   10/18/2016 8.6 mg/dL Final     Total Protein   Date Value Ref Range Status   05/15/2020 5.1 (L) 6.0 - 8.4 g/dL Final     Albumin   Date Value Ref Range Status   05/15/2020 2.3 (L) 3.5 - 5.2 g/dL Final   10/18/2016 3.80  Final     Total Bilirubin   Date Value Ref Range Status   05/15/2020 4.5 (H) 0.1 - 1.0 mg/dL Final     Comment:     For infants and newborns, interpretation of results should be based  on gestational age, weight and in agreement with clinical  observations.  Premature Infant recommended reference ranges:  Up to 24 hours.............<8.0 mg/dL  Up to 48 hours............<12.0 mg/dL  3-5 days..................<15.0 mg/dL  6-29 days.................<15.0 mg/dL       Alkaline Phosphatase   Date Value Ref Range Status   05/15/2020 97 55 - 135 U/L Final     AST   Date Value Ref Range Status   05/15/2020 32 10 - 40 U/L Final   10/18/2016 32 U/L Final     ALT   Date Value Ref Range Status   05/15/2020 15 10 - 44 U/L Final   10/18/2016 32 U/L Final     Anion Gap   Date Value Ref Range Status   05/15/2020 7 (L) 8 - 16 mmol/L Final     eGFR if    Date Value Ref Range Status   05/15/2020 >60.0 >60 mL/min/1.73 m^2 Final     eGFR if non    Date Value Ref Range Status   05/15/2020 >60.0 >60 mL/min/1.73 m^2 Final     Comment:     Calculation used to obtain the estimated glomerular filtration  rate (eGFR) is the CKD-EPI equation.            Diagnostic Results:  Abdominal ultrasound with doppler: pending

## 2020-05-16 NOTE — ASSESSMENT & PLAN NOTE
- Admit with abd pain, distention  - Infectious work up: CXR, blood and urine cultures - neg to date  - Para done to r/o SBP, no SBP based on cell count, waiting for culture results.    - continue rocephin today, but if cx neg, will switch to cipro prophylactic.

## 2020-05-16 NOTE — HOSPITAL COURSE
Pt is now s/p OLTX from 5/17/20 2/2 ETOH and Hep C. Intra op course unremarkable. LFTs trending down, POD 1 US satisfactory. Donor noted with Blood culture 1/2 with Klebsiella and Pseudomonas. ID recommending 7 day course of cefepime. Pt currently on zosyn, so will cont with zosyn and f/u cxs to possibly deescalate. Pt progressing well post-op but noted with mild episodes of AMS. Pt inadvertently pulled CVC out during episode of AMS on 5/19. O2 sats remained stable at this time. Mentation improved after starting seroquel qhs. Pt with slight bump in Cr on 5/20. Cr now improved s/p IV albumin x 2. Pt with pitting BLE, diuresing well with lasix.     Interval History:   NAEO. POD 6 this AM. Pt denies complaints this AM. Mentation remains improved. LFTs continue to trend down. POD 6 US this AM shows mildly elevated RIs, likely 2/2 edema, otherwise satisfactory. Diuresing well with IV lasix for volume overload. Cr 0.8. Plan to give lasix 40 mg IV again today. Pt receiving zosyn for positive donor blood cultures with pseudomonas and klebsiella- plan to transition to PO cipro upon discharge for total of 2 weeks of abx. Pt is still on insulin gtt- transition to long acting insulin today per endocrine. Pt is progressing well surgically- pain controlled, passing gas,+flatus/BM, tolerating diet. PT/OT following for strengthening, recommend  PT/OT upon discharge. Plan for possible discharge on 5/24 if his BG is controlled while off insulin gtt. Monitor.

## 2020-05-16 NOTE — PROGRESS NOTES
[facility]  Liver Transplant  Progress Note    Patient Name: Colin Reilly  MRN: [id number]  Admission Date: 5/15/2020  Hospital Length of Stay: 1 days  Code Status: Full Code  Primary Care Provider: Preston Matthew Ii, MD  Post-Operative Day:     ORGAN:   LIVER  Disease Etiology: Alcoholic Cirrhosis  Donor Type:   Donation after Brain Death  CDC High Risk:   No  Donor CMV Status:   Donor CMV Status: Negative  Donor HBcAB:   Negative  Donor HCV Status:   Negative  Donor HBV CEZAR: Negative  Donor HCV CEZAR: Negative  Whole or Partial:   Biliary Anastomosis:   Arterial Anatomy:   Subjective:     History of Present Illness:  Colin Reilly is a 60y/o male, with 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 Surgery uncomplicated but has had recurrent abdominal pain and increased drainage from surgical incision. Patient recently admitted 3/11-3/13 with similar symptoms, attempted paracentesis at this time- without fluid to drain. Patient previously negative for peritonitis. He re-presented to [facility] on 5/15 with c/o fever and abdominal pain (Tmax 103) x 1 week, distention and dark colored stools, received 1g Rocephin and transferred to [facility] for further care. Patient is being admitted for suspected SBP and infectious work-up.  His last fever was 5/14 of 100.5. Reports last paracentesis about 1 week ago. Umbilical hernia repair incision CDI, no s/s infection, small umbilical hernia easily reducible with no tenderness. He denies N/V, SOB, chest pain, change in bladder function. COVID-19 rapid test negative on admission. Plan for paracentesis. VSS. Monitor    Hospital Course:  Interval History:  Patient reports no abd pain since paracentesis 5/15/20, 1300 mL removed, fluid had no evidence of SBP (,, segs 6%).  Abd Herniae umbilical (repaired) and periumbilical - no pain/tenderness.  No leakage of ascites  from para site or around umbilicus. Patient remains active walking in the room,  Mentation good. Alert, oriented.  Listed with MELD 25 until 5/22/20.  Today's MELD is 22.  If he looks as good tomorrow, plan on D/C tomorrow.  His mother will pick him up.       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    Esophageal varices     GERD (gastroesophageal reflux disease)     Hepatic encephalopathy 7/8/2014    Hepatitis C virus infection 07/08/2014    tx with interferon 3-4 mos- stopped for unclear reasons Tattoos-first at age 17 only risk factor (HCVAB negative 08/2017)    HTN (hypertension) 7/8/2014    Hypertension     Insulin dependent diabetes mellitus     Renal mass, left 7/8/2014    6.3 x 5.7 x 5.6 complex mass    Splenomegaly 7/8/2014    Umbilical hernia 7/8/2014       Past Surgical History:   Procedure Laterality Date    CARPAL TUNNEL RELEASE Bilateral     CHOLECYSTECTOMY      lap    COLONOSCOPY N/A 9/8/2017    Procedure: COLONOSCOPY;  Surgeon: Savannah Llanos MD;  Location: [facility] ENDO;  Service: Endoscopy;  Laterality: N/A;    COLONOSCOPY Left 3/14/2018    Procedure: COLONOSCOPY;  Surgeon: Savannah Llanos MD;  Location: [facility] ENDO;  Service: Endoscopy;  Laterality: Left;    ESOPHAGOGASTRODUODENOSCOPY  01/2014    ESOPHAGOGASTRODUODENOSCOPY  02/15/2017    grade II varices    ESOPHAGOGASTRODUODENOSCOPY  04/10/2017    grade I varices    ESOPHAGOGASTRODUODENOSCOPY N/A 10/18/2019    Procedure: EGD  (ESOPHAGOGASTRODUODENOSCOPY);  Surgeon: Flavio Escalera MD;  Location: [facility] (2ND FLR);  Service: Endoscopy;  Laterality: N/A;    ESOPHAGOGASTRODUODENOSCOPY W/ BANDING  01/26/2017    grade II varices    HERNIA REPAIR      NECK SURGERY      fusion on C5 and C6    ULNAR NERVE TRANSPOSITION Left     UMBILICAL HERNIA REPAIR N/A 2/22/2020    Procedure: REPAIR, HERNIA, PRIMARY WIHTOUT MESH AND PLACEMENT OF DRAIN;  Surgeon: Sherri Brunner MD;  [facility];  Service: Transplant;  Laterality: N/A;    vericose veins removed         Review of patient's allergies indicates:  No Known Allergies    Family History     Problem Relation (Age of Onset)    Alcohol abuse Brother    Cancer Brother    Hyperlipidemia Mother    No Known Problems Father (36), Sister, Sister        Tobacco Use    Smoking status: Former Smoker     Types: Cigarettes     Last attempt to quit: 1/4/2010     Years since quitting: 10.3    Smokeless tobacco: Former User     Types: Chew     Quit date: 2/24/1990    Tobacco comment: former 1 pack/week quit 1/4/2010   Substance and Sexual Activity    Alcohol use: No     Comment: former heavy beer but quit 1/4/2010    Drug use: No    Sexual activity: Never     Comment:        PTA Medications   Medication Sig    ciprofloxacin HCl (CIPRO) 500 MG tablet Take 1 tablet (500 mg total) by mouth once daily.    cyclobenzaprine (FLEXERIL) 10 MG tablet Take 10 mg by mouth 2 (two) times daily.     EASY TOUCH INSULIN SYRINGE 1 mL 31 gauge x 5/16 Syrg     escitalopram oxalate (LEXAPRO) 20 MG tablet Take 20 mg by mouth once daily.     finasteride (PROSCAR) 5 mg tablet Take 1 tablet (5 mg total) by mouth once daily.    furosemide (LASIX) 40 MG tablet Take 4 tablets (160 mg total) by mouth once daily.    gabapentin (NEURONTIN) 600 MG tablet Take 600 mg by mouth 3 (three) times daily.     HYDROcodone-acetaminophen (NORCO)  mg per tablet Take 1 tablet by mouth every 12 (twelve) hours  as needed for Pain.    insulin aspart U-100 (NOVOLOG) 100 unit/mL injection Inject 24 Units into the skin 3 (three) times daily before meals.    insulin detemir U-100 (LEVEMIR) 100 unit/mL injection Inject 74 Units into the skin every evening.    lactulose (CHRONULAC) 10 gram/15 mL solution Take 30 mLs by mouth 3 (three) times daily. May take up to  4 to 5 times daily if needed for bowel movements    levocetirizine (XYZAL) 5 MG tablet Take 1 tablet (5 mg total) by mouth every evening.    oxybutynin (DITROPAN) 5 MG Tab Take 5 mg by mouth once daily.    pantoprazole (PROTONIX) 40 MG tablet Take 1 tablet (40 mg total) by mouth once daily.    potassium bicarbonate & potassium chloride (K-LYTE CL) 25 mEq TbEF Take 1 tablet (25 mEq total) by mouth 2 (two) times daily with meals.    rifAXIMin (XIFAXAN) 550 mg Tab Take 1 tablet (550 mg total) by mouth 2 (two) times daily.    spironolactone (ALDACTONE) 100 MG tablet Take 1.5 tablets (150 mg total) by mouth once daily.    tamsulosin (FLOMAX) 0.4 mg Cp24 Take 1 capsule (0.4 mg total) by mouth once daily.    TRUE METRIX GLUCOSE TEST STRIP Strp     TRUEPLUS LANCETS 30 gauge [facility]        Review of Systems   Constitutional: Negative for activity change, appetite change, chills, diaphoresis and fever.   HENT: Negative for congestion, sinus pressure, sinus pain, sore throat and trouble swallowing.    Eyes: Negative for visual disturbance.   Respiratory: Negative for chest tightness, shortness of breath and stridor.    Cardiovascular: Positive for leg swelling. Negative for chest pain and palpitations.   Gastrointestinal: Positive for abdominal distention and abdominal pain. Negative for constipation, diarrhea, nausea and vomiting.   Genitourinary: Negative for decreased urine volume, difficulty urinating, dysuria and flank pain.   Musculoskeletal: Negative for neck pain and neck stiffness.   Skin: Positive for wound. Negative for color change and rash.    Allergic/Immunologic: Negative for immunocompromised state.   Neurological: Negative for dizziness, tremors, syncope, light-headedness and headaches.   Psychiatric/Behavioral: Negative for agitation, confusion, decreased concentration and hallucinations. The patient is not nervous/anxious.      Objective:     Vital Signs (Most Recent):  Temp: 98.2 °F (36.8 °C) (05/16/20 0545)  Pulse: 104 (05/16/20 0545)  Resp: 16 (05/16/20 0545)  BP: 125/69 (05/16/20 0545)  SpO2: 97 % (05/16/20 0545) Vital Signs (24h Range):  Temp:  [97.6 °F (36.4 °C)-98.3 °F (36.8 °C)] 98.2 °F (36.8 °C)  Pulse:  [] 104  Resp:  [16-18] 16  SpO2:  [95 %-99 %] 97 %  BP: (106-133)/(54-69) 125/69     Weight: 100 kg (220 lb 7.4 oz)  Body mass index is 30.75 kg/m².      Intake/Output Summary (Last 24 hours) at 5/16/2020 0658  Last data filed at 5/16/2020 0600  Gross per 24 hour   Intake 1115 ml   Output 1550 ml   Net -435 ml       Physical Exam   Constitutional: He is oriented to person, place, and time. No distress.   HENT:   Head: Normocephalic and atraumatic.   Eyes: Right eye exhibits no discharge. Left eye exhibits no discharge. No scleral icterus.   Neck: Normal range of motion. Neck supple.   Cardiovascular: Normal rate, regular rhythm, normal heart sounds and intact distal pulses.   Pulmonary/Chest: Effort normal and breath sounds normal. He has no wheezes. He has no rales.   Abdominal: Soft. Bowel sounds are normal. He exhibits distension. There is no tenderness. There is no guarding. A hernia (small umbilical hernia easily reducible) is present.       Musculoskeletal: He exhibits no edema.   Neurological: He is alert and oriented to person, place, and time. No cranial nerve deficit.   Skin: Skin is warm and dry. Capillary refill takes less than 2 seconds. He is not diaphoretic.   Psychiatric: He has a normal mood and affect. His behavior is normal. Judgment and thought content normal.   Nursing note and vitals  reviewed.      Laboratory:  Lab Results   Component Value Date    WBC 2.58 (L) 03/12/2020    HGB 8.1 (L) 03/12/2020    HCT 27.1 (L) 03/12/2020    MCV 88 03/12/2020    PLT 40 (L) 03/12/2020       CMP  Sodium   Date Value Ref Range Status   05/15/2020 129 (L) 136 - 145 mmol/L Final   10/18/2016 136  Final     Potassium   Date Value Ref Range Status   05/15/2020 4.1 3.5 - 5.1 mmol/L Final   10/18/2016 4.0  Final     Chloride   Date Value Ref Range Status   05/15/2020 102 95 - 110 mmol/L Final   10/18/2016 103  Final     CO2   Date Value Ref Range Status   05/15/2020 20 (L) 23 - 29 mmol/L Final   10/18/2016 24.0  Final     Glucose   Date Value Ref Range Status   05/15/2020 143 (H) 70 - 110 mg/dL Final   11/19/2015 433  Final     BUN, Bld   Date Value Ref Range Status   05/15/2020 12 6 - 20 mg/dL Final     BUN   Date Value Ref Range Status   03/20/2017 5 4 - 21 mg/dL Final     Creatinine   Date Value Ref Range Status   05/15/2020 0.7 0.5 - 1.4 mg/dL Final   10/18/2016 0.8 mg/dL Final     Calcium   Date Value Ref Range Status   05/15/2020 7.1 (L) 8.7 - 10.5 mg/dL Final   10/18/2016 8.6 mg/dL Final     Total Protein   Date Value Ref Range Status   05/15/2020 5.1 (L) 6.0 - 8.4 g/dL Final     Albumin   Date Value Ref Range Status   05/15/2020 2.3 (L) 3.5 - 5.2 g/dL Final   10/18/2016 3.80  Final     Total Bilirubin   Date Value Ref Range Status   05/15/2020 4.5 (H) 0.1 - 1.0 mg/dL Final     Comment:     For infants and newborns, interpretation of results should be based  on gestational age, weight and in agreement with clinical  observations.  Premature Infant recommended reference ranges:  Up to 24 hours.............<8.0 mg/dL  Up to 48 hours............<12.0 mg/dL  3-5 days..................<15.0 mg/dL  6-29 days.................<15.0 mg/dL       Alkaline Phosphatase   Date Value Ref Range Status   05/15/2020 97 55 - 135 U/L Final     AST   Date Value Ref Range Status   05/15/2020 32 10 - 40 U/L Final   10/18/2016 32 U/L  Final     ALT   Date Value Ref Range Status   05/15/2020 15 10 - 44 U/L Final   10/18/2016 32 U/L Final     Anion Gap   Date Value Ref Range Status   05/15/2020 7 (L) 8 - 16 mmol/L Final     eGFR if    Date Value Ref Range Status   05/15/2020 >60.0 >60 mL/min/1.73 m^2 Final     eGFR if non    Date Value Ref Range Status   05/15/2020 >60.0 >60 mL/min/1.73 m^2 Final     Comment:     Calculation used to obtain the estimated glomerular filtration  rate (eGFR) is the CKD-EPI equation.            Diagnostic Results:  Abdominal ultrasound with doppler: pending    Assessment/Plan:     * Abdominal pain  - Admit with abd pain, distention  - Infectious work up: CXR, blood and urine cultures - neg to date  - Para done to r/o SBP, no SBP based on cell count, waiting for culture results.    - continue rocephin today, but if cx neg, will switch to cipro prophylactic.     Liver transplant candidate  Listed with Meld 25, expires 5/22/20. Today's meld 22.      Anemia of chronic disease  - 2/2 ESLD  - H/H stable  - reports having dark colored stools in the past week. no overt signs of bleeding  - cont to monitor      Acquired coagulation factor deficiency  - 2/2 ESLD  - monitor PT/INR      Umbilical hernia without obstruction and without gangrene  - s/p hernia repair 2/22.   - admitted 2/27-3/2 with copious serous drainage from incision  - incision CDI, no SSI infection       Type 2 diabetes mellitus without complication, with long-term current use of insulin  - resume 75% home insulin doses  - consult endocrine for assistnace    Ascites due to alcoholic cirrhosis  - paracentesis 2/27 negative for peritonitis  - Para  on 5/15/20 to r/o SBP, 1300 ml removed, , segs 6% - no SBP. On rocephin pending culture results.  Will transition to cipro prophylactic if neg cultures.  - on home lasix and aldactone. Will assess need to restart      Decompensated hepatic cirrhosis  - ESLD 2/2 ETOH listed for liver  transplant with MELD 25 (expires 5/22/20)  - s/p umbilical hernia repair 2/27/20    MELD-Na score: 22 at 5/16/2020  6:18 AM  MELD score: 18 at 5/16/2020  6:18 AM  Calculated from:  Serum Creatinine: 0.8 mg/dL (Rounded to 1 mg/dL) at 5/16/2020  6:18 AM  Serum Sodium: 131 mmol/L at 5/16/2020  6:18 AM  Total Bilirubin: 3.4 mg/dL at 5/16/2020  6:18 AM  INR(ratio): 1.9 at 5/16/2020  6:18 AM  Age: 59 years    Hepatic encephalopathy  - chronic  - continue lactulose and rifaximin  -  No asterixis    GERD (gastroesophageal reflux disease)  - cont protonix          VTE Risk Mitigation (From admission, onward)         Ordered     Place JULIETTE hose  Until discontinued      05/15/20 0435     IP VTE LOW RISK PATIENT  Once      05/15/20 0435                The patients clinical status was discussed at multidisplinary rounds, involving transplant surgery, transplant medicine, pharmacy, nursing, nutrition, and social work    Discharge Planning:  No Patient Care Coordination Note on file.      Yamile Saucedo MD  Liver Transplant  [facility]

## 2020-05-16 NOTE — ASSESSMENT & PLAN NOTE
- paracentesis 2/27 negative for peritonitis  - Para on 5/15/20 to r/o SBP, 1300 ml removed, , segs 6% - no SBP. On rocephin pending culture results.  Will transition to cipro prophylactic if neg cultures.  - on home lasix and aldactone. Will assess need to restart

## 2020-05-16 NOTE — ASSESSMENT & PLAN NOTE
-180     -Decrease Levemir to 18 units HS. Basal BG below goal. 20% decrease  - Decrease Novolog to 14 units TIDWM. 20% decrease  - Moderate Dose SQ Insulin Correction Scale.  - BG Monitoring AC/HS     ** Please call Endocrine for any BG related issues **  ** Please notify Endocrine for any change and/or advance in diet**    Discharge planning:   TBD. Please notify endocrinology prior to discharge.

## 2020-05-17 ENCOUNTER — ANESTHESIA EVENT (OUTPATIENT)
Dept: SURGERY | Facility: HOSPITAL | Age: 60
DRG: 005 | End: 2020-05-17
Payer: MEDICARE

## 2020-05-17 ENCOUNTER — ANESTHESIA (OUTPATIENT)
Dept: SURGERY | Facility: HOSPITAL | Age: 60
DRG: 005 | End: 2020-05-17
Payer: MEDICARE

## 2020-05-17 ENCOUNTER — TELEPHONE (OUTPATIENT)
Dept: TRANSPLANT | Facility: CLINIC | Age: 60
End: 2020-05-17

## 2020-05-17 LAB
ABO + RH BLD: NORMAL
ALBUMIN SERPL BCP-MCNC: 1.8 G/DL (ref 3.5–5.2)
ALBUMIN SERPL BCP-MCNC: 2 G/DL (ref 3.5–5.2)
ALBUMIN SERPL BCP-MCNC: 2.6 G/DL (ref 3.5–5.2)
ALP SERPL-CCNC: 98 U/L (ref 55–135)
ALT SERPL W/O P-5'-P-CCNC: 17 U/L (ref 10–44)
ANION GAP SERPL CALC-SCNC: 9 MMOL/L (ref 8–16)
APTT BLDCRRT: 29.1 SEC (ref 21–32)
APTT BLDCRRT: 33.6 SEC (ref 21–32)
APTT BLDCRRT: 37.6 SEC (ref 21–32)
APTT BLDCRRT: 48.7 SEC (ref 21–32)
AST SERPL-CCNC: 38 U/L (ref 10–40)
BASOPHILS # BLD AUTO: 0.08 K/UL (ref 0–0.2)
BASOPHILS NFR BLD: 1 % (ref 0–1.9)
BILIRUB DIRECT SERPL-MCNC: 1.9 MG/DL (ref 0.1–0.3)
BILIRUB SERPL-MCNC: 3.3 MG/DL (ref 0.1–1)
BLD GP AB SCN CELLS X3 SERPL QL: NORMAL
BLD PROD TYP BPU: NORMAL
BLOOD UNIT EXPIRATION DATE: NORMAL
BLOOD UNIT TYPE CODE: 6200
BLOOD UNIT TYPE CODE: 8400
BLOOD UNIT TYPE CODE: 8400
BLOOD UNIT TYPE: NORMAL
BUN SERPL-MCNC: 13 MG/DL (ref 6–20)
CA-I BLDV-SCNC: 0.97 MMOL/L (ref 1.06–1.42)
CA-I BLDV-SCNC: 0.98 MMOL/L (ref 1.06–1.42)
CA-I BLDV-SCNC: 1.01 MMOL/L (ref 1.06–1.42)
CALCIUM SERPL-MCNC: 7.7 MG/DL (ref 8.7–10.5)
CHLORIDE SERPL-SCNC: 103 MMOL/L (ref 95–110)
CO2 SERPL-SCNC: 21 MMOL/L (ref 23–29)
CODING SYSTEM: NORMAL
CREAT SERPL-MCNC: 0.7 MG/DL (ref 0.5–1.4)
DIFFERENTIAL METHOD: ABNORMAL
DISPENSE STATUS: NORMAL
EOSINOPHIL # BLD AUTO: 0.2 K/UL (ref 0–0.5)
EOSINOPHIL NFR BLD: 2.5 % (ref 0–8)
ERYTHROCYTE [DISTWIDTH] IN BLOOD BY AUTOMATED COUNT: 20 % (ref 11.5–14.5)
EST. GFR  (AFRICAN AMERICAN): >60 ML/MIN/1.73 M^2
EST. GFR  (NON AFRICAN AMERICAN): >60 ML/MIN/1.73 M^2
ESTIMATED AVG GLUCOSE: 105 MG/DL (ref 68–131)
FIBRINOGEN PPP-MCNC: 185 MG/DL (ref 182–366)
FIBRINOGEN PPP-MCNC: 231 MG/DL (ref 182–366)
FIBRINOGEN PPP-MCNC: 247 MG/DL (ref 182–366)
FIBRINOGEN PPP-MCNC: 301 MG/DL (ref 182–366)
GLUCOSE SERPL-MCNC: 109 MG/DL (ref 70–110)
GLUCOSE SERPL-MCNC: 128 MG/DL (ref 70–110)
GLUCOSE SERPL-MCNC: 177 MG/DL (ref 70–110)
GRAM STN SPEC: NORMAL
GRAM STN SPEC: NORMAL
HBA1C MFR BLD HPLC: 5.3 % (ref 4–5.6)
HCT VFR BLD AUTO: 22.7 % (ref 40–54)
HCT VFR BLD AUTO: 23.3 % (ref 40–54)
HCT VFR BLD AUTO: 27.1 % (ref 40–54)
HCT VFR BLD AUTO: 29.8 % (ref 40–54)
HGB BLD-MCNC: 6.9 G/DL (ref 14–18)
HGB BLD-MCNC: 7.2 G/DL (ref 14–18)
HGB BLD-MCNC: 8.4 G/DL (ref 14–18)
HGB BLD-MCNC: 8.8 G/DL (ref 14–18)
IMM GRANULOCYTES # BLD AUTO: 0.27 K/UL (ref 0–0.04)
IMM GRANULOCYTES NFR BLD AUTO: 3.4 % (ref 0–0.5)
INR PPP: 1.6 (ref 0.8–1.2)
INR PPP: 2 (ref 0.8–1.2)
INR PPP: 2 (ref 0.8–1.2)
INR PPP: 2.1 (ref 0.8–1.2)
LYMPHOCYTES # BLD AUTO: 0.6 K/UL (ref 1–4.8)
LYMPHOCYTES NFR BLD: 7.6 % (ref 18–48)
MAGNESIUM SERPL-MCNC: 1.6 MG/DL (ref 1.6–2.6)
MAGNESIUM SERPL-MCNC: 1.7 MG/DL (ref 1.6–2.6)
MAGNESIUM SERPL-MCNC: 2 MG/DL (ref 1.6–2.6)
MCH RBC QN AUTO: 26.1 PG (ref 27–31)
MCHC RBC AUTO-ENTMCNC: 29.5 G/DL (ref 32–36)
MCV RBC AUTO: 88 FL (ref 82–98)
MONOCYTES # BLD AUTO: 0.9 K/UL (ref 0.3–1)
MONOCYTES NFR BLD: 11 % (ref 4–15)
NEUTROPHILS # BLD AUTO: 6 K/UL (ref 1.8–7.7)
NEUTROPHILS NFR BLD: 74.5 % (ref 38–73)
NRBC BLD-RTO: 0 /100 WBC
PHOSPHATE SERPL-MCNC: 3.1 MG/DL (ref 2.7–4.5)
PLATELET # BLD AUTO: 123 K/UL (ref 150–350)
PLATELET # BLD AUTO: 140 K/UL (ref 150–350)
PLATELET # BLD AUTO: 38 K/UL (ref 150–350)
PLATELET # BLD AUTO: 65 K/UL (ref 150–350)
PMV BLD AUTO: 11.5 FL (ref 9.2–12.9)
PMV BLD AUTO: 12.2 FL (ref 9.2–12.9)
PMV BLD AUTO: 13.2 FL (ref 9.2–12.9)
PMV BLD AUTO: ABNORMAL FL (ref 9.2–12.9)
POCT GLUCOSE: 125 MG/DL (ref 70–110)
POCT GLUCOSE: 171 MG/DL (ref 70–110)
POOLED CRYOPPT GVN BPU: NORMAL
POTASSIUM SERPL-SCNC: 2.9 MMOL/L (ref 3.5–5.1)
POTASSIUM SERPL-SCNC: 3.2 MMOL/L (ref 3.5–5.1)
POTASSIUM SERPL-SCNC: 3.7 MMOL/L (ref 3.5–5.1)
PROT SERPL-MCNC: 5.6 G/DL (ref 6–8.4)
PROTHROMBIN TIME: 15.9 SEC (ref 9–12.5)
PROTHROMBIN TIME: 18.7 SEC (ref 9–12.5)
PROTHROMBIN TIME: 18.9 SEC (ref 9–12.5)
PROTHROMBIN TIME: 20.2 SEC (ref 9–12.5)
RBC # BLD AUTO: 3.37 M/UL (ref 4.6–6.2)
SODIUM SERPL-SCNC: 132 MMOL/L (ref 136–145)
SODIUM SERPL-SCNC: 133 MMOL/L (ref 136–145)
SODIUM SERPL-SCNC: 134 MMOL/L (ref 136–145)
TRANS PLATPHERESIS VOL PATIENT: NORMAL ML
TRANS PLATPHERESIS VOL PATIENT: NORMAL ML
WBC # BLD AUTO: 8.03 K/UL (ref 3.9–12.7)

## 2020-05-17 PROCEDURE — 27100088 HC CELL SAVER

## 2020-05-17 PROCEDURE — 47135 PR TRANSPLANT LIVER,ALLOTRANSPLANT: ICD-10-PCS | Mod: 79,,, | Performed by: TRANSPLANT SURGERY

## 2020-05-17 PROCEDURE — 83735 ASSAY OF MAGNESIUM: CPT | Mod: 91

## 2020-05-17 PROCEDURE — 84460 ALANINE AMINO (ALT) (SGPT): CPT

## 2020-05-17 PROCEDURE — 47135 PR TRANSPLANT LIVER,ALLOTRANSPLANT: ICD-10-PCS | Mod: 79,82,, | Performed by: TRANSPLANT SURGERY

## 2020-05-17 PROCEDURE — 84450 TRANSFERASE (AST) (SGOT): CPT

## 2020-05-17 PROCEDURE — P9017 PLASMA 1 DONOR FRZ W/IN 8 HR: HCPCS

## 2020-05-17 PROCEDURE — 88313 PR  SPECIAL STAINS,GROUP II: ICD-10-PCS | Mod: 26,,, | Performed by: PATHOLOGY

## 2020-05-17 PROCEDURE — D9220A PRA ANESTHESIA: ICD-10-PCS | Mod: ANES,,, | Performed by: ANESTHESIOLOGY

## 2020-05-17 PROCEDURE — 27201423 OPTIME MED/SURG SUP & DEVICES STERILE SUPPLY: Performed by: TRANSPLANT SURGERY

## 2020-05-17 PROCEDURE — 85610 PROTHROMBIN TIME: CPT | Mod: NTX

## 2020-05-17 PROCEDURE — 88309 TISSUE EXAM BY PATHOLOGIST: CPT | Mod: 26,,, | Performed by: PATHOLOGY

## 2020-05-17 PROCEDURE — 85025 COMPLETE CBC W/AUTO DIFF WBC: CPT | Mod: NTX

## 2020-05-17 PROCEDURE — 63600175 PHARM REV CODE 636 W HCPCS: Performed by: NURSE ANESTHETIST, CERTIFIED REGISTERED

## 2020-05-17 PROCEDURE — 47135 TRANSPLANTATION OF LIVER: CPT | Mod: 79,,, | Performed by: TRANSPLANT SURGERY

## 2020-05-17 PROCEDURE — 83036 HEMOGLOBIN GLYCOSYLATED A1C: CPT | Mod: NTX

## 2020-05-17 PROCEDURE — 25000003 PHARM REV CODE 250: Performed by: NURSE ANESTHETIST, CERTIFIED REGISTERED

## 2020-05-17 PROCEDURE — 85384 FIBRINOGEN ACTIVITY: CPT | Mod: 91

## 2020-05-17 PROCEDURE — D9220A PRA ANESTHESIA: ICD-10-PCS | Mod: CRNA,,, | Performed by: NURSE ANESTHETIST, CERTIFIED REGISTERED

## 2020-05-17 PROCEDURE — 93503 INSERT/PLACE HEART CATHETER: CPT | Mod: 59,,, | Performed by: ANESTHESIOLOGY

## 2020-05-17 PROCEDURE — 36000930 HC OR TIME LEV VII 1ST 15 MIN: Performed by: TRANSPLANT SURGERY

## 2020-05-17 PROCEDURE — 82330 ASSAY OF CALCIUM: CPT | Mod: 91

## 2020-05-17 PROCEDURE — 36556 PR INSERT NON-TUNNEL CV CATH 5+ YRS OLD: ICD-10-PCS | Mod: 59,,, | Performed by: ANESTHESIOLOGY

## 2020-05-17 PROCEDURE — 84295 ASSAY OF SERUM SODIUM: CPT | Mod: 91

## 2020-05-17 PROCEDURE — 93503 PR INSERT/PLACE FLOW DIRECT CATH: ICD-10-PCS | Mod: 59,,, | Performed by: ANESTHESIOLOGY

## 2020-05-17 PROCEDURE — 81300005 HC LIVER TRANSPORT, GROUND 4-5 HOURS

## 2020-05-17 PROCEDURE — 63600175 PHARM REV CODE 636 W HCPCS: Mod: NTX | Performed by: NURSE PRACTITIONER

## 2020-05-17 PROCEDURE — 88307 TISSUE EXAM BY PATHOLOGIST: CPT | Performed by: PATHOLOGY

## 2020-05-17 PROCEDURE — 36620 ARTERIAL: ICD-10-PCS | Mod: 59,,, | Performed by: ANESTHESIOLOGY

## 2020-05-17 PROCEDURE — 93010 ELECTROCARDIOGRAM REPORT: CPT | Mod: ,,, | Performed by: INTERNAL MEDICINE

## 2020-05-17 PROCEDURE — 88309 TISSUE EXAM BY PATHOLOGIST: CPT | Performed by: PATHOLOGY

## 2020-05-17 PROCEDURE — 87070 CULTURE OTHR SPECIMN AEROBIC: CPT

## 2020-05-17 PROCEDURE — 85384 FIBRINOGEN ACTIVITY: CPT | Mod: NTX

## 2020-05-17 PROCEDURE — 37000009 HC ANESTHESIA EA ADD 15 MINS: Performed by: TRANSPLANT SURGERY

## 2020-05-17 PROCEDURE — 85730 THROMBOPLASTIN TIME PARTIAL: CPT | Mod: NTX

## 2020-05-17 PROCEDURE — 85520 HEPARIN ASSAY: CPT

## 2020-05-17 PROCEDURE — D9220A PRA ANESTHESIA: Mod: ANES,,, | Performed by: ANESTHESIOLOGY

## 2020-05-17 PROCEDURE — 85014 HEMATOCRIT: CPT | Mod: 91

## 2020-05-17 PROCEDURE — 99233 SBSQ HOSP IP/OBS HIGH 50: CPT | Mod: NTX,,, | Performed by: INTERNAL MEDICINE

## 2020-05-17 PROCEDURE — 87075 CULTR BACTERIA EXCEPT BLOOD: CPT

## 2020-05-17 PROCEDURE — 36415 COLL VENOUS BLD VENIPUNCTURE: CPT | Mod: NTX

## 2020-05-17 PROCEDURE — 86850 RBC ANTIBODY SCREEN: CPT | Mod: NTX

## 2020-05-17 PROCEDURE — 63600175 PHARM REV CODE 636 W HCPCS: Mod: JG | Performed by: TRANSPLANT SURGERY

## 2020-05-17 PROCEDURE — P9012 CRYOPRECIPITATE EACH UNIT: HCPCS

## 2020-05-17 PROCEDURE — 87206 SMEAR FLUORESCENT/ACID STAI: CPT

## 2020-05-17 PROCEDURE — P9045 ALBUMIN (HUMAN), 5%, 250 ML: HCPCS | Mod: JG | Performed by: NURSE ANESTHETIST, CERTIFIED REGISTERED

## 2020-05-17 PROCEDURE — 88307 TISSUE EXAM BY PATHOLOGIST: CPT | Mod: 26,,, | Performed by: PATHOLOGY

## 2020-05-17 PROCEDURE — 47135 TRANSPLANTATION OF LIVER: CPT | Mod: 79,82,, | Performed by: TRANSPLANT SURGERY

## 2020-05-17 PROCEDURE — 86920 COMPATIBILITY TEST SPIN: CPT | Mod: NTX

## 2020-05-17 PROCEDURE — 99233 PR SUBSEQUENT HOSPITAL CARE,LEVL III: ICD-10-PCS | Mod: NTX,,, | Performed by: INTERNAL MEDICINE

## 2020-05-17 PROCEDURE — 99231 SBSQ HOSP IP/OBS SF/LOW 25: CPT | Mod: NTX,,, | Performed by: NURSE PRACTITIONER

## 2020-05-17 PROCEDURE — 27000191 HC C-V MONITORING

## 2020-05-17 PROCEDURE — 93010 EKG 12-LEAD: ICD-10-PCS | Mod: ,,, | Performed by: INTERNAL MEDICINE

## 2020-05-17 PROCEDURE — 86703 HIV-1/HIV-2 1 RESULT ANTBDY: CPT | Mod: NTX

## 2020-05-17 PROCEDURE — P9035 PLATELET PHERES LEUKOREDUCED: HCPCS

## 2020-05-17 PROCEDURE — 80053 COMPREHEN METABOLIC PANEL: CPT | Mod: NTX

## 2020-05-17 PROCEDURE — 99231 PR SUBSEQUENT HOSPITAL CARE,LEVL I: ICD-10-PCS | Mod: NTX,,, | Performed by: NURSE PRACTITIONER

## 2020-05-17 PROCEDURE — 82947 ASSAY GLUCOSE BLOOD QUANT: CPT

## 2020-05-17 PROCEDURE — 47143 PR TRANSPLANT,PREP DONOR LIVER, WHOLE: ICD-10-PCS | Mod: 79,51,, | Performed by: TRANSPLANT SURGERY

## 2020-05-17 PROCEDURE — 85730 THROMBOPLASTIN TIME PARTIAL: CPT | Mod: 91

## 2020-05-17 PROCEDURE — 36556 INSERT NON-TUNNEL CV CATH: CPT | Mod: 59,,, | Performed by: ANESTHESIOLOGY

## 2020-05-17 PROCEDURE — 84100 ASSAY OF PHOSPHORUS: CPT | Mod: NTX

## 2020-05-17 PROCEDURE — 88307 PR  SURG PATH,LEVEL V: ICD-10-PCS | Mod: 26,,, | Performed by: PATHOLOGY

## 2020-05-17 PROCEDURE — 85049 AUTOMATED PLATELET COUNT: CPT | Mod: 91

## 2020-05-17 PROCEDURE — 81300000 HC LIVER ACQUISITION CHARGE

## 2020-05-17 PROCEDURE — 85610 PROTHROMBIN TIME: CPT | Mod: 91

## 2020-05-17 PROCEDURE — 87340 HEPATITIS B SURFACE AG IA: CPT | Mod: NTX

## 2020-05-17 PROCEDURE — 37000008 HC ANESTHESIA 1ST 15 MINUTES: Performed by: TRANSPLANT SURGERY

## 2020-05-17 PROCEDURE — 86901 BLOOD TYPING SEROLOGIC RH(D): CPT | Mod: NTX

## 2020-05-17 PROCEDURE — 85014 HEMATOCRIT: CPT

## 2020-05-17 PROCEDURE — 85018 HEMOGLOBIN: CPT

## 2020-05-17 PROCEDURE — 87102 FUNGUS ISOLATION CULTURE: CPT

## 2020-05-17 PROCEDURE — P9021 RED BLOOD CELLS UNIT: HCPCS

## 2020-05-17 PROCEDURE — 93005 ELECTROCARDIOGRAM TRACING: CPT

## 2020-05-17 PROCEDURE — 83735 ASSAY OF MAGNESIUM: CPT | Mod: NTX

## 2020-05-17 PROCEDURE — 88313 SPECIAL STAINS GROUP 2: CPT | Mod: 26,,, | Performed by: PATHOLOGY

## 2020-05-17 PROCEDURE — 25000003 PHARM REV CODE 250: Performed by: TRANSPLANT SURGERY

## 2020-05-17 PROCEDURE — 88309 PR  SURG PATH,LEVEL VI: ICD-10-PCS | Mod: 26,,, | Performed by: PATHOLOGY

## 2020-05-17 PROCEDURE — 87522 HEPATITIS C REVRS TRNSCRPJ: CPT | Mod: NTX

## 2020-05-17 PROCEDURE — 99000 SPECIMEN HANDLING OFFICE-LAB: CPT | Mod: NTX

## 2020-05-17 PROCEDURE — 25000003 PHARM REV CODE 250: Mod: NTX | Performed by: NURSE PRACTITIONER

## 2020-05-17 PROCEDURE — 87205 SMEAR GRAM STAIN: CPT

## 2020-05-17 PROCEDURE — D9220A PRA ANESTHESIA: Mod: CRNA,,, | Performed by: NURSE ANESTHETIST, CERTIFIED REGISTERED

## 2020-05-17 PROCEDURE — 85018 HEMOGLOBIN: CPT | Mod: 91

## 2020-05-17 PROCEDURE — 36620 INSERTION CATHETER ARTERY: CPT | Mod: 59,,, | Performed by: ANESTHESIOLOGY

## 2020-05-17 PROCEDURE — 84132 ASSAY OF SERUM POTASSIUM: CPT

## 2020-05-17 PROCEDURE — 82040 ASSAY OF SERUM ALBUMIN: CPT

## 2020-05-17 PROCEDURE — 94761 N-INVAS EAR/PLS OXIMETRY MLT: CPT | Mod: NTX

## 2020-05-17 PROCEDURE — C1729 CATH, DRAINAGE: HCPCS | Performed by: TRANSPLANT SURGERY

## 2020-05-17 PROCEDURE — 85049 AUTOMATED PLATELET COUNT: CPT

## 2020-05-17 PROCEDURE — 36000931 HC OR TIME LEV VII EA ADD 15 MIN: Performed by: TRANSPLANT SURGERY

## 2020-05-17 PROCEDURE — 87116 MYCOBACTERIA CULTURE: CPT

## 2020-05-17 PROCEDURE — 27201041 HC RESERVOIR, CARDIOTOMY

## 2020-05-17 PROCEDURE — 85002 BLEEDING TIME TEST: CPT

## 2020-05-17 PROCEDURE — 82040 ASSAY OF SERUM ALBUMIN: CPT | Mod: 91

## 2020-05-17 PROCEDURE — 25000003 PHARM REV CODE 250: Mod: NTX | Performed by: PHYSICIAN ASSISTANT

## 2020-05-17 PROCEDURE — 88313 SPECIAL STAINS GROUP 2: CPT | Mod: 59 | Performed by: PATHOLOGY

## 2020-05-17 PROCEDURE — 87517 HEPATITIS B DNA QUANT: CPT | Mod: NTX

## 2020-05-17 PROCEDURE — 82947 ASSAY GLUCOSE BLOOD QUANT: CPT | Mod: 91

## 2020-05-17 PROCEDURE — 82248 BILIRUBIN DIRECT: CPT | Mod: NTX

## 2020-05-17 PROCEDURE — 84132 ASSAY OF SERUM POTASSIUM: CPT | Mod: 91

## 2020-05-17 PROCEDURE — 63600175 PHARM REV CODE 636 W HCPCS: Mod: NTX | Performed by: TRANSPLANT SURGERY

## 2020-05-17 PROCEDURE — 86965 POOLING BLOOD PLATELETS: CPT

## 2020-05-17 PROCEDURE — 12000002 HC ACUTE/MED SURGE SEMI-PRIVATE ROOM

## 2020-05-17 PROCEDURE — P9045 ALBUMIN (HUMAN), 5%, 250 ML: HCPCS | Mod: JG | Performed by: TRANSPLANT SURGERY

## 2020-05-17 RX ORDER — ROCURONIUM BROMIDE 10 MG/ML
INJECTION, SOLUTION INTRAVENOUS
Status: DISCONTINUED | OUTPATIENT
Start: 2020-05-17 | End: 2020-05-18

## 2020-05-17 RX ORDER — ALBUMIN HUMAN 50 G/1000ML
SOLUTION INTRAVENOUS CONTINUOUS PRN
Status: DISCONTINUED | OUTPATIENT
Start: 2020-05-17 | End: 2020-05-18

## 2020-05-17 RX ORDER — HEPARIN SODIUM 1000 [USP'U]/ML
INJECTION, SOLUTION INTRAVENOUS; SUBCUTANEOUS
Status: DISCONTINUED | OUTPATIENT
Start: 2020-05-17 | End: 2020-05-18 | Stop reason: HOSPADM

## 2020-05-17 RX ORDER — MUPIROCIN 20 MG/G
OINTMENT TOPICAL
Status: DISCONTINUED | OUTPATIENT
Start: 2020-05-17 | End: 2020-05-18 | Stop reason: HOSPADM

## 2020-05-17 RX ORDER — CIPROFLOXACIN 250 MG/1
500 TABLET, FILM COATED ORAL EVERY 24 HOURS
Status: DISCONTINUED | OUTPATIENT
Start: 2020-05-17 | End: 2020-05-18

## 2020-05-17 RX ORDER — HYDROCODONE BITARTRATE AND ACETAMINOPHEN 500; 5 MG/1; MG/1
TABLET ORAL
Status: DISCONTINUED | OUTPATIENT
Start: 2020-05-17 | End: 2020-05-20

## 2020-05-17 RX ORDER — MANNITOL 250 MG/ML
INJECTION, SOLUTION INTRAVENOUS
Status: DISCONTINUED | OUTPATIENT
Start: 2020-05-17 | End: 2020-05-18

## 2020-05-17 RX ORDER — FENTANYL CITRATE 50 UG/ML
INJECTION, SOLUTION INTRAMUSCULAR; INTRAVENOUS
Status: DISCONTINUED | OUTPATIENT
Start: 2020-05-17 | End: 2020-05-18

## 2020-05-17 RX ORDER — METOPROLOL TARTRATE 1 MG/ML
INJECTION, SOLUTION INTRAVENOUS
Status: DISCONTINUED | OUTPATIENT
Start: 2020-05-17 | End: 2020-05-18

## 2020-05-17 RX ORDER — PROPOFOL 10 MG/ML
VIAL (ML) INTRAVENOUS
Status: DISCONTINUED | OUTPATIENT
Start: 2020-05-17 | End: 2020-05-18

## 2020-05-17 RX ORDER — FUROSEMIDE 40 MG/1
40 TABLET ORAL DAILY
Status: DISCONTINUED | OUTPATIENT
Start: 2020-05-17 | End: 2020-05-18

## 2020-05-17 RX ORDER — MIDAZOLAM HYDROCHLORIDE 1 MG/ML
INJECTION INTRAMUSCULAR; INTRAVENOUS
Status: DISCONTINUED | OUTPATIENT
Start: 2020-05-17 | End: 2020-05-18

## 2020-05-17 RX ORDER — ALBUMIN HUMAN 50 G/1000ML
SOLUTION INTRAVENOUS
Status: COMPLETED | OUTPATIENT
Start: 2020-05-17 | End: 2020-05-17

## 2020-05-17 RX ORDER — HEPARIN SODIUM 1000 [USP'U]/ML
INJECTION, SOLUTION INTRAVENOUS; SUBCUTANEOUS
Status: DISCONTINUED | OUTPATIENT
Start: 2020-05-17 | End: 2020-05-18

## 2020-05-17 RX ORDER — SUCCINYLCHOLINE CHLORIDE 20 MG/ML
INJECTION INTRAMUSCULAR; INTRAVENOUS
Status: DISCONTINUED | OUTPATIENT
Start: 2020-05-17 | End: 2020-05-18

## 2020-05-17 RX ORDER — SPIRONOLACTONE 25 MG/1
100 TABLET ORAL DAILY
Status: DISCONTINUED | OUTPATIENT
Start: 2020-05-17 | End: 2020-05-18

## 2020-05-17 RX ORDER — LIDOCAINE HYDROCHLORIDE 40 MG/ML
INJECTION, SOLUTION RETROBULBAR
Status: DISCONTINUED | OUTPATIENT
Start: 2020-05-17 | End: 2020-05-18 | Stop reason: HOSPADM

## 2020-05-17 RX ORDER — FUROSEMIDE 10 MG/ML
INJECTION INTRAMUSCULAR; INTRAVENOUS
Status: DISCONTINUED | OUTPATIENT
Start: 2020-05-17 | End: 2020-05-18

## 2020-05-17 RX ORDER — ONDANSETRON 2 MG/ML
INJECTION INTRAMUSCULAR; INTRAVENOUS
Status: DISCONTINUED | OUTPATIENT
Start: 2020-05-17 | End: 2020-05-18

## 2020-05-17 RX ORDER — VASOPRESSIN 20 [USP'U]/ML
INJECTION, SOLUTION INTRAMUSCULAR; SUBCUTANEOUS
Status: DISCONTINUED | OUTPATIENT
Start: 2020-05-17 | End: 2020-05-18

## 2020-05-17 RX ORDER — METHYLPREDNISOLONE SODIUM SUCCINATE 500 MG/8ML
500 INJECTION INTRAMUSCULAR; INTRAVENOUS
Status: DISCONTINUED | OUTPATIENT
Start: 2020-05-17 | End: 2020-05-18 | Stop reason: HOSPADM

## 2020-05-17 RX ORDER — LIDOCAINE HCL/PF 100 MG/5ML
SYRINGE (ML) INTRAVENOUS
Status: DISCONTINUED | OUTPATIENT
Start: 2020-05-17 | End: 2020-05-18

## 2020-05-17 RX ADMIN — PANTOPRAZOLE SODIUM 40 MG: 40 TABLET, DELAYED RELEASE ORAL at 08:05

## 2020-05-17 RX ADMIN — ALBUMIN (HUMAN): 12.5 SOLUTION INTRAVENOUS at 07:05

## 2020-05-17 RX ADMIN — VASOPRESSIN 3 UNITS: 20 INJECTION INTRAVENOUS at 08:05

## 2020-05-17 RX ADMIN — PROPOFOL 150 MG: 10 INJECTION, EMULSION INTRAVENOUS at 03:05

## 2020-05-17 RX ADMIN — CALCIUM CHLORIDE 500 MG: 100 INJECTION, SOLUTION INTRAVENOUS at 07:05

## 2020-05-17 RX ADMIN — FUROSEMIDE 100 MG: 10 INJECTION, SOLUTION INTRAMUSCULAR; INTRAVENOUS at 04:05

## 2020-05-17 RX ADMIN — FENTANYL CITRATE 50 MCG: 50 INJECTION, SOLUTION INTRAMUSCULAR; INTRAVENOUS at 07:05

## 2020-05-17 RX ADMIN — FENTANYL CITRATE 50 MCG: 50 INJECTION, SOLUTION INTRAMUSCULAR; INTRAVENOUS at 06:05

## 2020-05-17 RX ADMIN — PIPERACILLIN SODIUM AND TAZOBACTAM SODIUM 3.38 G: 3; .375 INJECTION, POWDER, LYOPHILIZED, FOR SOLUTION INTRAVENOUS at 08:05

## 2020-05-17 RX ADMIN — METOPROLOL TARTRATE 1 MG: 5 INJECTION, SOLUTION INTRAVENOUS at 10:05

## 2020-05-17 RX ADMIN — ROCURONIUM BROMIDE 20 MG: 10 INJECTION, SOLUTION INTRAVENOUS at 06:05

## 2020-05-17 RX ADMIN — OXYBUTYNIN CHLORIDE 5 MG: 5 TABLET ORAL at 08:05

## 2020-05-17 RX ADMIN — SUCCINYLCHOLINE CHLORIDE 200 MG: 20 INJECTION, SOLUTION INTRAMUSCULAR; INTRAVENOUS at 03:05

## 2020-05-17 RX ADMIN — ROCURONIUM BROMIDE 20 MG: 10 INJECTION, SOLUTION INTRAVENOUS at 05:05

## 2020-05-17 RX ADMIN — SODIUM CHLORIDE, SODIUM GLUCONATE, SODIUM ACETATE, POTASSIUM CHLORIDE, MAGNESIUM CHLORIDE, SODIUM PHOSPHATE, DIBASIC, AND POTASSIUM PHOSPHATE: .53; .5; .37; .037; .03; .012; .00082 INJECTION, SOLUTION INTRAVENOUS at 09:05

## 2020-05-17 RX ADMIN — SPIRONOLACTONE 100 MG: 50 TABLET, FILM COATED ORAL at 08:05

## 2020-05-17 RX ADMIN — VASOPRESSIN 1 UNITS: 20 INJECTION INTRAVENOUS at 07:05

## 2020-05-17 RX ADMIN — AMPICILLIN SODIUM AND SULBACTAM SODIUM 3 G: 2; 1 INJECTION, POWDER, FOR SOLUTION INTRAMUSCULAR; INTRAVENOUS at 04:05

## 2020-05-17 RX ADMIN — FUROSEMIDE 40 MG: 40 TABLET ORAL at 08:05

## 2020-05-17 RX ADMIN — FENTANYL CITRATE 100 MCG: 50 INJECTION, SOLUTION INTRAMUSCULAR; INTRAVENOUS at 03:05

## 2020-05-17 RX ADMIN — SODIUM CHLORIDE 2 UNITS/HR: 9 INJECTION, SOLUTION INTRAVENOUS at 11:05

## 2020-05-17 RX ADMIN — VASOPRESSIN 2 UNITS: 20 INJECTION INTRAVENOUS at 08:05

## 2020-05-17 RX ADMIN — SODIUM CHLORIDE, SODIUM GLUCONATE, SODIUM ACETATE, POTASSIUM CHLORIDE, MAGNESIUM CHLORIDE, SODIUM PHOSPHATE, DIBASIC, AND POTASSIUM PHOSPHATE: .53; .5; .37; .037; .03; .012; .00082 INJECTION, SOLUTION INTRAVENOUS at 04:05

## 2020-05-17 RX ADMIN — PIPERACILLIN SODIUM AND TAZOBACTAM SODIUM 3.38 G: 3; .375 INJECTION, POWDER, LYOPHILIZED, FOR SOLUTION INTRAVENOUS at 10:05

## 2020-05-17 RX ADMIN — RIFAXIMIN 550 MG: 550 TABLET ORAL at 08:05

## 2020-05-17 RX ADMIN — PIPERACILLIN SODIUM AND TAZOBACTAM SODIUM 3.38 G: 3; .375 INJECTION, POWDER, LYOPHILIZED, FOR SOLUTION INTRAVENOUS at 06:05

## 2020-05-17 RX ADMIN — ROCURONIUM BROMIDE 30 MG: 10 INJECTION, SOLUTION INTRAVENOUS at 04:05

## 2020-05-17 RX ADMIN — CALCIUM CHLORIDE 500 MG: 100 INJECTION, SOLUTION INTRAVENOUS at 05:05

## 2020-05-17 RX ADMIN — LIDOCAINE HYDROCHLORIDE 80 MG: 20 INJECTION, SOLUTION INTRAVENOUS at 03:05

## 2020-05-17 RX ADMIN — HEPARIN SODIUM 3000 UNITS: 1000 INJECTION, SOLUTION INTRAVENOUS; SUBCUTANEOUS at 07:05

## 2020-05-17 RX ADMIN — VASOPRESSIN 0.06 UNITS/MIN: 20 INJECTION INTRAVENOUS at 07:05

## 2020-05-17 RX ADMIN — ROCURONIUM BROMIDE 30 MG: 10 INJECTION, SOLUTION INTRAVENOUS at 07:05

## 2020-05-17 RX ADMIN — TAMSULOSIN HYDROCHLORIDE 0.4 MG: 0.4 CAPSULE ORAL at 08:05

## 2020-05-17 RX ADMIN — FUROSEMIDE 100 MG: 10 INJECTION, SOLUTION INTRAMUSCULAR; INTRAVENOUS at 10:05

## 2020-05-17 RX ADMIN — FENTANYL CITRATE 100 MCG: 50 INJECTION, SOLUTION INTRAMUSCULAR; INTRAVENOUS at 05:05

## 2020-05-17 RX ADMIN — ROCURONIUM BROMIDE 20 MG: 10 INJECTION, SOLUTION INTRAVENOUS at 09:05

## 2020-05-17 RX ADMIN — MANNITOL 25 G: 250 INJECTION, SOLUTION INTRAVENOUS at 10:05

## 2020-05-17 RX ADMIN — ESCITALOPRAM OXALATE 20 MG: 10 TABLET ORAL at 08:05

## 2020-05-17 RX ADMIN — ROCURONIUM BROMIDE 20 MG: 10 INJECTION, SOLUTION INTRAVENOUS at 10:05

## 2020-05-17 RX ADMIN — LACTULOSE 30 G: 20 SOLUTION ORAL at 08:05

## 2020-05-17 RX ADMIN — MIDAZOLAM HYDROCHLORIDE 2 MG: 1 INJECTION, SOLUTION INTRAMUSCULAR; INTRAVENOUS at 03:05

## 2020-05-17 RX ADMIN — MANNITOL 25 G: 250 INJECTION, SOLUTION INTRAVENOUS at 04:05

## 2020-05-17 RX ADMIN — INSULIN ASPART 14 UNITS: 100 INJECTION, SOLUTION INTRAVENOUS; SUBCUTANEOUS at 09:05

## 2020-05-17 RX ADMIN — SODIUM CHLORIDE, SODIUM GLUCONATE, SODIUM ACETATE, POTASSIUM CHLORIDE, MAGNESIUM CHLORIDE, SODIUM PHOSPHATE, DIBASIC, AND POTASSIUM PHOSPHATE: .53; .5; .37; .037; .03; .012; .00082 INJECTION, SOLUTION INTRAVENOUS at 07:05

## 2020-05-17 RX ADMIN — METHYLPREDNISOLONE SODIUM SUCCINATE 500 MG: 500 INJECTION, POWDER, FOR SOLUTION INTRAMUSCULAR; INTRAVENOUS at 04:05

## 2020-05-17 RX ADMIN — CIPROFLOXACIN 500 MG: 500 TABLET, FILM COATED ORAL at 08:05

## 2020-05-17 NOTE — ANESTHESIA PREPROCEDURE EVALUATION
[facility]  Anesthesia Pre-Operative Evaluation         Patient Name: Colin Reilly  YOB: 1960  MRN: [id number]    SUBJECTIVE:     Pre-operative evaluation for Procedure(s) (LRB):  TRANSPLANT, LIVER (N/A)     05/17/2020    Colin Reilly is a 59 y.o. 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    He rencently admitted to hospital w abdominal pain, drainage from incision site, he underwent paracentesis 2/27/20 which was negative for peritonitis (711 WBC, 5% segs). No fluid pocket to remove additional fluid identified. He was primary for a liver transplant- was taken to the OR on 2/28 and intubated, but donor organ was not suitable. He was subsequently extubated and brought back to his room.        He re-presented to [facility] on 5/15 with c/o fever and abdominal pain (Tmax 103) x 1 week, distention and dark colored stools, received 1g Rocephin and transferred to [facility] [place] [place] for further care. Patient is being admitted for suspected SBP and infectious work-up.  His last fever was 5/14 of 100.5. Reports last paracentesis about 1 week ago. Umbilical hernia repair incision CDI, no s/s infection, small umbilical hernia easily reducible with no tenderness. Denies any abdominal pain this morning, no sob or difficulty breathing, no significant reflux.     Last meal was about 6am this morning w scrabbled eggs    Patient now presents for the above procedure(s).      LDA:        Peripheral IV - Single Lumen 05/16/20 1029 20 G Anterior;Left Forearm (Active)   Site Assessment Clean;Dry;Intact;No redness;No swelling 5/17/2020  7:50 AM   Line Status Saline locked 5/17/2020  7:50 AM   Dressing Status Clean;Dry;Intact 5/17/2020  7:50  AM   Dressing Intervention Integrity maintained 5/16/2020  7:45 PM   Dressing Change Due 05/20/20 5/16/2020 10:32 AM   Site Change Due 05/20/20 5/16/2020 10:32 AM   Reason Not Rotated Not due 5/16/2020  7:45 PM   Number of days: 0       Prev airway:   Airway (Primary) Present Prior to Hospital Arrival?: No; Placement Date: 02/28/20; Placement Time: 1943 (created via procedure documentation); Method of Intubation: Direct laryngoscopy; Inserted by: CRNA; Airway Device: Endotracheal Tube; Mask Ventilation: Easy; Intubated: Postinduction; Blade: Burr #2; Airway Device Size: 7.5; Style: Cuffed; Cuff Inflation: Minimal occlusive pressure; Placement Verified By: Capnometry, Auscultation, ETT Condensation; Grade: Grade I; Complicating Factors: None; Intubation Findings: Bilateral breath sounds, Positive EtCO2, Atraumatic/Condition of teeth unchanged;  Depth of Insertion: 21; Securment: Lips; Complications: None; Breath Sounds: Equal Bilateral; Insertion Attempts: 1; Removal          Drips: None documented.      Patient Active Problem List   Diagnosis    Esophageal varices with bleeding    Cirrhosis, Laennec's    Splenomegaly    GERD (gastroesophageal reflux disease)    Diabetes    Other ascites    Alcohol abuse, in remission    Hepatic encephalopathy    Chronic back pain    Cirrhosis    Rectal bleeding    Dysphagia    Decompensated hepatic cirrhosis    Ascites due to alcoholic cirrhosis    Portal hypertension    Portal hypertensive gastropathy    Anasarca    Type 2 diabetes mellitus without complication, with long-term current use of insulin    Acute blood loss anemia    Umbilical hernia without obstruction and without gangrene    Acquired coagulation factor deficiency    End stage liver disease    Abdominal pain    Melena    SBP (spontaneous bacterial peritonitis)    Anemia of chronic disease    Liver transplant candidate       Review of patient's allergies indicates:  No Known  Allergies    Current Inpatient Medications:   ciprofloxacin HCl  500 mg Oral Daily    escitalopram oxalate  20 mg Oral Daily    furosemide  40 mg Oral Daily    insulin aspart U-100  14 Units Subcutaneous TIDWM    insulin detemir U-100  18 Units Subcutaneous QHS    lactulose  30 g Oral TID    lidocaine (PF) 10 mg/ml (1%)  1 mL Other Once    oxybutynin  5 mg Oral Daily    pantoprazole  40 mg Oral Daily    rifAXIMin  550 mg Oral BID    spironolactone  100 mg Oral Daily    tamsulosin  0.4 mg Oral Daily       No current facility-administered medications on file prior to encounter.      Current Outpatient Medications on File Prior to Encounter   Medication Sig Dispense Refill    ciprofloxacin HCl (CIPRO) 500 MG tablet Take 1 tablet (500 mg total) by mouth once daily. 30 tablet 2    cyclobenzaprine (FLEXERIL) 10 MG tablet Take 10 mg by mouth 2 (two) times daily.       EASY TOUCH INSULIN SYRINGE 1 mL 31 gauge x 5/16 Syrg       escitalopram oxalate (LEXAPRO) 20 MG tablet Take 20 mg by mouth once daily.   2    finasteride (PROSCAR) 5 mg tablet Take 1 tablet (5 mg total) by mouth once daily. 30 tablet 11    furosemide (LASIX) 40 MG tablet Take 4 tablets (160 mg total) by mouth once daily. 120 tablet 2    gabapentin (NEURONTIN) 600 MG tablet Take 600 mg by mouth 3 (three) times daily.       HYDROcodone-acetaminophen (NORCO)  mg per tablet Take 1 tablet by mouth every 12 (twelve) hours as needed for Pain.  0    insulin aspart U-100 (NOVOLOG) 100 unit/mL injection Inject 24 Units into the skin 3 (three) times daily before meals.      insulin detemir U-100 (LEVEMIR) 100 unit/mL injection Inject 74 Units into the skin every evening.      lactulose (CHRONULAC) 10 gram/15 mL solution Take 30 mLs by mouth 3 (three) times daily. May take up to  4 to 5 times daily if needed for bowel movements      levocetirizine (XYZAL) 5 MG tablet Take 1 tablet (5 mg total) by mouth every evening. 30 tablet 11     oxybutynin (DITROPAN) 5 MG Tab Take 5 mg by mouth once daily.      pantoprazole (PROTONIX) 40 MG tablet Take 1 tablet (40 mg total) by mouth once daily.      potassium bicarbonate & potassium chloride (K-LYTE CL) 25 mEq TbEF Take 1 tablet (25 mEq total) by mouth 2 (two) times daily with meals. 15 tablet 3    rifAXIMin (XIFAXAN) 550 mg Tab Take 1 tablet (550 mg total) by mouth 2 (two) times daily. 30 tablet 11    spironolactone (ALDACTONE) 100 MG tablet Take 1.5 tablets (150 mg total) by mouth once daily. 60 tablet 2    tamsulosin (FLOMAX) 0.4 mg Cp24 Take 1 capsule (0.4 mg total) by mouth once daily. 30 capsule 11    TRUE METRIX GLUCOSE TEST STRIP Strp       TRUEPLUS LANCETS 30 gauge Misc          Past Surgical History:   Procedure Laterality Date    CARPAL TUNNEL RELEASE Bilateral     CHOLECYSTECTOMY      lap    COLONOSCOPY N/A 9/8/2017    Procedure: COLONOSCOPY;  Surgeon: Savannah Llanos MD;  Location: [facility];  Service: Endoscopy;  Laterality: N/A;    COLONOSCOPY Left 3/14/2018    Procedure: COLONOSCOPY;  Surgeon: Savannah Llanos MD;  Location: [facility];  Service: Endoscopy;  Laterality: Left;    ESOPHAGOGASTRODUODENOSCOPY  01/2014    ESOPHAGOGASTRODUODENOSCOPY  02/15/2017    grade II varices    ESOPHAGOGASTRODUODENOSCOPY  04/10/2017    grade I varices    ESOPHAGOGASTRODUODENOSCOPY N/A 10/18/2019    Procedure: EGD (ESOPHAGOGASTRODUODENOSCOPY);  Surgeon: Flavio Escalera MD;  Location: [facility] ([address] [place] [address]);  Service: Endoscopy;  Laterality: N/A;    ESOPHAGOGASTRODUODENOSCOPY W/ BANDING  01/26/2017    grade II varices    HERNIA REPAIR      NECK SURGERY      fusion on C5 and C6    ULNAR NERVE TRANSPOSITION Left     UMBILICAL HERNIA REPAIR N/A 2/22/2020    Procedure: REPAIR, HERNIA, PRIMARY WIHTOUT MESH AND PLACEMENT OF DRAIN;  Surgeon: Sherri Brunner MD;  Location: [facility] OR [address] [place] [address];  Service: Transplant;  Laterality: N/A;    vericose veins removed         Social History      Socioeconomic History    Marital status:      Spouse name: Not on file    Number of children: Not on file    Years of education: Not on file    Highest education level: Not on file   Occupational History     Employer: Disabled   Social Needs    Financial resource strain: Not on file    Food insecurity:     Worry: Not on file     Inability: Not on file    Transportation needs:     Medical: Not on file     Non-medical: Not on file   Tobacco Use    Smoking status: Former Smoker     Types: Cigarettes     Last attempt to quit: 1/4/2010     Years since quitting: 10.3    Smokeless tobacco: Former User     Types: Chew     Quit date: 2/24/1990    Tobacco comment: former 1 pack/week quit 1/4/2010   Substance and Sexual Activity    Alcohol use: No     Comment: former heavy beer but quit 1/4/2010    Drug use: No    Sexual activity: Never     Comment:    Lifestyle    Physical activity:     Days per week: Not on file     Minutes per session: Not on file    Stress: Not on file   Relationships    Social connections:     Talks on phone: Not on file     Gets together: Not on file     Attends Synagogue service: Not on file     Active member of club or organization: Not on file     Attends meetings of clubs or organizations: Not on file     Relationship status: Not on file   Other Topics Concern    Patient feels they ought to cut down on drinking/drug use Not Asked    Patient annoyed by others criticizing their drinking/drug use Not Asked    Patient has felt bad or guilty about drinking/drug use Not Asked    Patient has had a drink/used drugs as an eye opener in the AM Not Asked   Social History Narrative           OBJECTIVE:     Vital Signs Range (Last 24H):  Temp:  [36.3 °C (97.4 °F)-36.8 °C (98.2 °F)]   Pulse:  []   Resp:  [7-18]   BP: (113-133)/(64-83)   SpO2:  [96 %-99 %]       Significant Labs:  Lab Results   Component Value Date    WBC 8.03 05/17/2020    HGB 8.8 (L)  05/17/2020    HCT 29.8 (L) 05/17/2020    PLT 65 (L) 05/17/2020    CHOL 178 10/18/2016    TRIG 134 10/18/2016    HDL 35 10/18/2016    ALT 17 05/17/2020    AST 38 05/17/2020     (L) 05/17/2020    K 3.2 (L) 05/17/2020     05/17/2020    CREATININE 0.7 05/17/2020    BUN 13 05/17/2020    CO2 21 (L) 05/17/2020    TSH 2.309 12/05/2019    PSA 0.44 06/19/2018    INR 1.6 (H) 05/17/2020    HGBA1C 6.7 (H) 02/28/2020       Diagnostic Studies: No relevant studies.    EKG:   Results for orders placed or performed in visit on 05/15/20   EKG 12-lead    Collection Time: 05/15/20  4:04 AM    Narrative    Test Reason :     Vent. Rate : 092 BPM     Atrial Rate : 092 BPM     P-R Int : 156 ms          QRS Dur : 092 ms      QT Int : 398 ms       P-R-T Axes : 076 -17 037 degrees     QTc Int : 492 ms    Normal sinus rhythm  Low voltage QRS      When compared with ECG of 28-FEB-2020 08:51,  T wave inversion no longer evident in Inferior leads  Confirmed by RICK LAINEZ MD ([id number]) on 5/15/2020 9:23:12 AM    Referred By: PROVIDER NOTINSYSTEM           Confirmed By:RICK LAINEZ MD       2D ECHO:  TTE:    Pharmacological stress echo w/ color flow doppler   Order# [id number]   Reading physician: Abilio Chatterjee MD Ordering physician: Elizabeth Meneses MD Study date: 12/5/19   Reason for Exam   Priority: Routine   Dx: Type 2 diabetes mellitus without complication, with long-term current use of insulin [E11.9, Z79.4 (ICD-10-CM)]; Ascites due to alcoholic cirrhosis [K70.31 (ICD-10-CM)]; Hepatic cirrhosis, unspecified hepatic cirrhosis type, unspecified whether ascites present [K74.60 (ICD-10-CM)]; Cirrhosis, Laennec's [K70.30 (ICD-10-CM)]; Decompensated hepatic cirrhosis [K72.90 (ICD-10-CM)]; Bleeding esophageal varices, unspecified esophageal varices type [I85.01 (ICD-10-CM)]; Organ transplant candidate [Z76.82 (ICD-10-CM)]   Comments:    Staff     Performing Sonographer Supporting Staff   Lisa TAVERAS  "Gely, LAN    Vitals     Height Weight BMI (Calculated) BSA (Calculated - sq m) BP   5' 11" (1.803 m) 98.9 kg (218 lb) 30.4 2.23 sq meters 108/54      Conclusion     · Normal left ventricular systolic function. The estimated ejection fraction is 65%  · The patient reached the end of the protocol.  · Indeterminate left ventricular diastolic function.  · Mild left ventricular enlargement.  · Mild left atrial enlargement.  · Mild tricuspid regurgitation.  · Mild right atrial enlargement.  · Normal right ventricular systolic function.  · Normal central venous pressure (3 mm Hg).  · The estimated PA systolic pressure is 35 mm Hg  · There were no arrhythmias during stress.  · The EKG portion of this study is negative for myocardial ischemia.  · The stress echo portion of this study is negative for myocardial ischemia.         KIMBERLEY:  No results found for this or any previous visit.    ASSESSMENT/PLAN:       Anesthesia Evaluation    I have reviewed the Patient Summary Reports.    I have reviewed the Nursing Notes.   I have reviewed the Medications.     Review of Systems  Anesthesia Hx:  No problems with previous Anesthesia  History of prior surgery of interest to airway management or planning: Previous anesthesia: General Denies Family Hx of Anesthesia complications.   Denies Personal Hx of Anesthesia complications.   Social:  Former Smoker  Denies Tobacco Use. Alcohol Use:   Alcohol Abuse: alcohol use is in recovery   Hematology/Oncology:         -- Anemia: Blood Loss Anemia Anemia of Chronic Disease Chronic and anticipate RBC transfusion   --  Thrombocytopenia: chronic and anticipate platelet transfusion Secondary to: Hypersplenism and Consumptive Platelet disorder     Cardiovascular:   Hypertension, well controlled Denies Valvular problems/Murmurs.  Denies MI.  Denies CAD.    Denies CABG/stent.  Denies Dysrhythmias.  CHF no hyperlipidemia COPE  Denies Coronary Artery Disease.  Hypertension, Essential Hypertension , Pt in " optimal range, systolic < 120 and diastolic < 80, Well Controlled on Rx    Pulmonary:   Denies COPD.  Denies Asthma.  Denies Shortness of breath.  Denies Recent URI.  Denies Asthma.  Denies Chronic Obstructive Pulmonary Disease (COPD).    Renal/:  Denies Kidney Function/Disease    Hepatic/GI:   GERD, well controlled Liver Disease, Hepatitis, C  Esophageal / Stomach Disorders Gerd Controlled by chronic antireflux medication.  Hernia, Umbilical Hernia Liver Disease, Alcoholic Liver Disease, Hepatitis, chronic, Viral Hepatitis , Cirrhosis , MELD Score of 25 Portal Hypertension, Ascites, Esophageal Varices, Thrombocytopenia, Awaiting Liver Transplant    Neurological:   Denies TIA. Denies CVA. Denies Seizures.  Denies Seizure Disorder  Denies CVA - Cerebrovasular Accident  Denies TIA - Transient Ischemic Attack    Endocrine:   Diabetes, well controlled, type 2, using insulin Denies Hypothyroidism.  Diabetes, Type 2 Diabetes , controlled by insulin. , most recent HgA1c value was 5.3 on 5/17/20.  Denies Thyroid Disease  Denies Metabolic Disorders  Psych:   Psychiatric History          Physical Exam  General:  Malnutrition, Obesity    Airway/Jaw/Neck:  Airway Findings: Mouth Opening: Small, but > 3cm Tongue: Normal  General Airway Assessment: Adult  Mallampati: III  Improves to II with phonation.  TM Distance: 4 - 6 cm  Jaw/Neck Findings:  Neck ROM: Normal ROM     Eyes/Ears/Nose:  EYES/EARS/NOSE FINDINGS: Normal   Dental:  Dental Findings: Edentulous   Chest/Lungs:  Chest/Lungs Findings: Decreased Breath Sounds Bilateral, Normal Respiratory Rate     Heart/Vascular:  Heart Findings: Rate: Normal  Rhythm: Regular Rhythm  Sounds: Normal  Heart murmur: negative Vascular Findings:  Edema Locations: LLE, RLE  Edema: +3     Abdomen:  Abdomen Findings:  Normal, Soft, Distention, Tenderness, Ascites, Hepatomegaly, Splenomegaly      Skin:  Skin Findings:  Petechiae, Ecchymosis, Scar(s)  Left flank swollen and petechia noted;  umbilical hernia noted, no tenderness   Mental Status:  Mental Status Findings:  Cooperative, Alert and Oriented         Anesthesia Plan  Type of Anesthesia, risks & benefits discussed:  Anesthesia Type:  general  Patient's Preference:   Intra-op Monitoring Plan: arterial line, central line, [place], cardiac output and standard ASA monitors  Intra-op Monitoring Plan Comments:   Post Op Pain Control Plan: multimodal analgesia, IV/PO Opioids PRN and per primary service following discharge from PACU  Post Op Pain Control Plan Comments:   Induction:   IV  Beta Blocker:  Patient is not currently on a Beta-Blocker (No further documentation required).       Informed Consent: Patient understands risks and agrees with Anesthesia plan.  Questions answered. Anesthesia consent signed with patient.  ASA Score: 4  emergent   Day of Surgery Review of History & Physical:    H&P update referred to the surgeon.         Ready For Surgery From Anesthesia Perspective.

## 2020-05-17 NOTE — TELEPHONE ENCOUNTER
ORGAN OFFER NOTE    Notified by Geremias Fitzgerald, , of liver offer with donor information.  Donor and recipient information read back and verified.  Spoke with Colin Reilly and identified no acute medical issues during telephone assessment.  Patient verbalized understanding.  Patient is currently admitted.  I spoke to Dr Saucedo about patient and he is transplantable.

## 2020-05-17 NOTE — PLAN OF CARE
Pt is AAOx3.Pt is ambulatory and independent.CBG monitored AC HS.  SS coverage given when appropriate.Pt denies pian and/or discomfort at this time.No breakdown noted, po fluids encouraged.Pt turns independently, pt is aware of bony area and pressure reduction positions Pt remains injury and fall free, non skid footwear donned, call light within reach, personal items within reach, bed in low/locked position, pt able to voice needs all needs voiced have been met at this time.

## 2020-05-17 NOTE — SIGNIFICANT EVENT
Pre-operative Discussion Note  Liver Transplant Surgery    Colin Reilly is a 59 y.o. male admitted for liver transplant.  I discussed the planned procedure in detail, including expected hospital course and outcomes, benefits, risks, and potential complications.  Complications discussed included death, graft failure, bleeding, infection, rejection, and neurologic problems.  I discussed the risks of anesthesia, as well as the potential need for re-operation.  The possibility of other complications not specifically mentioned was also discussed.  Also, I discussed the need for lifelong immunosuppression and the possibility of serious complications from immunosuppressive drugs.    The discussion included the risks that the patient will incur if he elects to not have the proposed procedure.    Relevant donor-specific risk factors were disclosed and discussed with the patient, including:   DCD: I discussed the use of organs recovered by donation after cardiac death (DCD), including slightly decreased graft survival and greater risk of arterial and biliary complications. The potential advantage to the recipient is possibly receiving a transplant sooner by accepting such an organ. The patient is willing to consider such grafts.    Specific PHS Increased Risk Behavior criteria for the organ donor include:  none    HCV Infection Not applicable.    Hepatocellular Carcinoma Recurrence: Not applicable.    COVID-19: I discussed the possibility of COVID-19 transmission with the patient. Although CEZAR testing is available for the virus using a technique which in theory should be very accurate, there is no data yet regarding the likelihood of a  false-negative test leading to virus transmission. Based on accuracy of testing for other viruses, it is expected that this risk is extremely small.     I also discussed that transplant immunosuppression will increase susceptibility to COVID-19 and other viruses, and that although we use  stringent precautions to protect patients from infection, it is possible for a transplant recipient to contract this infection. If COVID-19 infection should occur, it would be a serious matter with a significant risk of death.    All questions were answered.  The patient and available family members voice understanding and agree to proceed with the transplant.      Functional Status: 20% - Very sick, hospitalization necessary: active treatment necessary  Physical Capacity: No Limitations

## 2020-05-17 NOTE — ANESTHESIA PROCEDURE NOTES
Arterial    Diagnosis: liver transplant  Doctor requesting consult: Ariane    Patient location during procedure: done in OR  Procedure start time: 5/17/2020 4:10 PM  Timeout: 5/17/2020 4:05 PM  Procedure end time: 5/17/2020 4:11 PM    Staffing  Authorizing Provider: Fernando Maher MD  Performing Provider: Jessica Godwin CRNA    Anesthesiologist was present at the time of the procedure.    Preanesthetic Checklist  Completed: patient identified, site marked, surgical consent, pre-op evaluation, timeout performed, IV checked, risks and benefits discussed, monitors and equipment checked and anesthesia consent givenArterial  Skin Prep: chlorhexidine gluconate  Local Infiltration: none  Orientation: left  Location: radial  Catheter Size: 20 G  Catheter placement by Anatomical landmarks. Heme positive aspiration all ports.Insertion Attempts: 1  Assessment  Dressing: secured with tape and tegaderm  Patient: Tolerated well

## 2020-05-17 NOTE — PROGRESS NOTES
[facility]  Liver Transplant  Progress Note    Patient Name: Colin Reilly  MRN: [id number]  Admission Date: 5/15/2020  Hospital Length of Stay: 2 days  Code Status: Full Code  Primary Care Provider: Preston Matthew Ii, MD  Post-Operative Day:     ORGAN:   LIVER  Disease Etiology: Alcoholic Cirrhosis  Donor Type:   Donation after Circulatory Death   CDC High Risk:   No  Donor CMV Status:   Donor CMV Status: Negative  Donor HBcAB:   Negative  Donor HCV Status:   Negative  Donor HBV CEZAR: Negative  Donor HCV CEZAR: Negative  Whole or Partial:   Biliary Anastomosis:   Arterial Anatomy:   Subjective:     History of Present Illness:  Colin Reilly is a 58y/o male, with 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 Surgery uncomplicated but has had recurrent abdominal pain and increased drainage from surgical incision. Patient recently admitted 3/11-3/13 with similar symptoms, attempted paracentesis at this time- without fluid to drain. Patient previously negative for peritonitis. He re-presented to [facility] on 5/15 with c/o fever and abdominal pain (Tmax 103) x 1 week, distention and dark colored stools, received 1g Rocephin and transferred to [facility] for further care. Patient is being admitted for suspected SBP and infectious work-up.  His last fever was 5/14 of 100.5. Reports last paracentesis about 1 week ago. Umbilical hernia repair incision CDI, no s/s infection, small umbilical hernia easily reducible with no tenderness. He denies N/V, SOB, chest pain, change in bladder function. COVID-19 rapid test negative on admission. Plan for paracentesis. VSS. Monitor    Hospital Course:  Interval History:  Patient reports no abd pain since paracentesis 5/15/20, 1300 mL removed, fluid had no evidence of SBP (,, segs 6%).  Abd Herniae umbilical (repaired) and periumbilical - no pain/tenderness.  No leakage of  ascites from para site or around umbilicus. Patient remains active walking in the room,  Mentation good. Alert, oriented.  Listed with MELD 25 until 5/22/20.  Today's MELD is 19.  Patient has an offer for liver transplant today.   Therefore, he will be prepared for surgery.        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    Esophageal varices     GERD (gastroesophageal reflux disease)     Hepatic encephalopathy 7/8/2014    Hepatitis C virus infection 07/08/2014    tx with interferon 3-4 mos- stopped for unclear reasons Tattoos-first at age 17 only risk factor (HCVAB negative 08/2017)    HTN (hypertension) 7/8/2014    Hypertension     Insulin dependent diabetes mellitus     Renal mass, left 7/8/2014    6.3 x 5.7 x 5.6 complex mass    Splenomegaly 7/8/2014    Umbilical hernia 7/8/2014       Past Surgical History:   Procedure Laterality Date    CARPAL TUNNEL RELEASE Bilateral     CHOLECYSTECTOMY      lap    COLONOSCOPY N/A 9/8/2017    Procedure: COLONOSCOPY;  Surgeon: Savannah Llanos MD;  Location: [facility] ENDO;  Service: Endoscopy;  Laterality: N/A;    COLONOSCOPY Left 3/14/2018    Procedure: COLONOSCOPY;  Surgeon: Savannah Llanos MD;  Location: [facility] ENDO;  Service: Endoscopy;  Laterality: Left;    ESOPHAGOGASTRODUODENOSCOPY  01/2014    ESOPHAGOGASTRODUODENOSCOPY  02/15/2017    grade II varices    ESOPHAGOGASTRODUODENOSCOPY  04/10/2017    grade I varices    ESOPHAGOGASTRODUODENOSCOPY N/A 10/18/2019     Procedure: EGD (ESOPHAGOGASTRODUODENOSCOPY);  Surgeon: Flavio Escalera MD;  Location: [facility] (2ND FLR);  Service: Endoscopy;  Laterality: N/A;    ESOPHAGOGASTRODUODENOSCOPY W/ BANDING  01/26/2017    grade II varices    HERNIA REPAIR      NECK SURGERY      fusion on C5 and C6    ULNAR NERVE TRANSPOSITION Left     UMBILICAL HERNIA REPAIR N/A 2/22/2020    Procedure: REPAIR, HERNIA, PRIMARY WIHTOUT MESH AND PLACEMENT OF DRAIN;  Surgeon: Sherri Brunenr MD;  [facility];  Service: Transplant;  Laterality: N/A;    vericose veins removed         Review of patient's allergies indicates:  No Known Allergies    Family History     Problem Relation (Age of Onset)    Alcohol abuse Brother    Cancer Brother    Hyperlipidemia Mother    No Known Problems Father (36), Sister, Sister        Tobacco Use    Smoking status: Former Smoker     Types: Cigarettes     Last attempt to quit: 1/4/2010     Years since quitting: 10.3    Smokeless tobacco: Former User     Types: Chew     Quit date: 2/24/1990    Tobacco comment: former 1 pack/week quit 1/4/2010   Substance and Sexual Activity    Alcohol use: No     Comment: former heavy beer but quit 1/4/2010    Drug use: No    Sexual activity: Never     Comment:        PTA Medications   Medication Sig    ciprofloxacin HCl (CIPRO) 500 MG tablet Take 1 tablet (500 mg total) by mouth once daily.    cyclobenzaprine (FLEXERIL) 10 MG tablet Take 10 mg by mouth 2 (two) times daily.     EASY TOUCH INSULIN SYRINGE 1 mL 31 gauge x 5/16 Syrg     escitalopram oxalate (LEXAPRO) 20 MG tablet Take 20 mg by mouth once daily.     finasteride (PROSCAR) 5 mg tablet Take 1 tablet (5 mg total) by mouth once daily.    furosemide (LASIX) 40 MG tablet Take 4 tablets (160 mg total) by mouth once daily.    gabapentin (NEURONTIN) 600 MG tablet Take 600 mg by mouth 3 (three) times daily.     HYDROcodone-acetaminophen (NORCO)  mg per tablet Take 1 tablet by mouth every 12  (twelve) hours as needed for Pain.    insulin aspart U-100 (NOVOLOG) 100 unit/mL injection Inject 24 Units into the skin 3 (three) times daily before meals.    insulin detemir U-100 (LEVEMIR) 100 unit/mL injection Inject 74 Units into the skin every evening.    lactulose (CHRONULAC) 10 gram/15 mL solution Take 30 mLs by mouth 3 (three) times daily. May take up to  4 to 5 times daily if needed for bowel movements    levocetirizine (XYZAL) 5 MG tablet Take 1 tablet (5 mg total) by mouth every evening.    oxybutynin (DITROPAN) 5 MG Tab Take 5 mg by mouth once daily.    pantoprazole (PROTONIX) 40 MG tablet Take 1 tablet (40 mg total) by mouth once daily.    potassium bicarbonate & potassium chloride (K-LYTE CL) 25 mEq TbEF Take 1 tablet (25 mEq total) by mouth 2 (two) times daily with meals.    rifAXIMin (XIFAXAN) 550 mg Tab Take 1 tablet (550 mg total) by mouth 2 (two) times daily.    spironolactone (ALDACTONE) 100 MG tablet Take 1.5 tablets (150 mg total) by mouth once daily.    tamsulosin (FLOMAX) 0.4 mg Cp24 Take 1 capsule (0.4 mg total) by mouth once daily.    TRUE METRIX GLUCOSE TEST STRIP Strp     TRUEPLUS LANCETS 30 gauge [facility]        Review of Systems   Constitutional: Negative for activity change, appetite change, chills, diaphoresis and fever.   HENT: Negative for congestion, sinus pressure, sinus pain, sore throat and trouble swallowing.    Eyes: Negative for visual disturbance.   Respiratory: Negative for chest tightness, shortness of breath and stridor.    Cardiovascular: Positive for leg swelling. Negative for chest pain and palpitations.   Gastrointestinal: Positive for abdominal distention and abdominal pain. Negative for constipation, diarrhea, nausea and vomiting.   Genitourinary: Negative for decreased urine volume, difficulty urinating, dysuria and flank pain.   Musculoskeletal: Negative for neck pain and neck stiffness.   Skin: Positive for wound. Negative for color change and rash.    Allergic/Immunologic: Negative for immunocompromised state.   Neurological: Negative for dizziness, tremors, syncope, light-headedness and headaches.   Psychiatric/Behavioral: Negative for agitation, confusion, decreased concentration and hallucinations. The patient is not nervous/anxious.      Objective:     Vital Signs (Most Recent):  Temp: 98.2 °F (36.8 °C) (05/17/20 1104)  Pulse: 104 (05/17/20 1104)  Resp: 18 (05/17/20 1104)  BP: 119/63 (05/17/20 1104)  SpO2: 100 % (05/17/20 1104) Vital Signs (24h Range):  Temp:  [97.4 °F (36.3 °C)-98.2 °F (36.8 °C)] 98.2 °F (36.8 °C)  Pulse:  [] 104  Resp:  [7-18] 18  SpO2:  [97 %-100 %] 100 %  BP: (113-133)/(63-83) 119/63     Weight: 99.9 kg (220 lb 3.8 oz)  Body mass index is 30.72 kg/m².      Intake/Output Summary (Last 24 hours) at 5/17/2020 1134  Last data filed at 5/17/2020 0400  Gross per 24 hour   Intake 1080 ml   Output 725 ml   Net 355 ml       Physical Exam   Constitutional: He is oriented to person, place, and time. No distress.   HENT:   Head: Normocephalic and atraumatic.   Eyes: Right eye exhibits no discharge. Left eye exhibits no discharge. No scleral icterus.   Neck: Normal range of motion. Neck supple.   Cardiovascular: Normal rate, regular rhythm, normal heart sounds and intact distal pulses.   Pulmonary/Chest: Effort normal and breath sounds normal. He has no wheezes. He has no rales.   Abdominal: Soft. Bowel sounds are normal. He exhibits distension. There is no tenderness. There is no guarding. A hernia (small umbilical hernia easily reducible) is present.       Musculoskeletal: He exhibits no edema.   Neurological: He is alert and oriented to person, place, and time. No cranial nerve deficit.   Skin: Skin is warm and dry. Capillary refill takes less than 2 seconds. He is not diaphoretic.   Ecchymosis left flank region   Psychiatric: He has a normal mood and affect. His behavior is normal. Judgment and thought content normal.   Nursing note and  vitals reviewed.      Laboratory:  Lab Results   Component Value Date    WBC 2.58 (L) 03/12/2020    HGB 8.1 (L) 03/12/2020    HCT 27.1 (L) 03/12/2020    MCV 88 03/12/2020    PLT 40 (L) 03/12/2020       CMP  Sodium   Date Value Ref Range Status   05/17/2020 133 (L) 136 - 145 mmol/L Final   10/18/2016 136  Final     Potassium   Date Value Ref Range Status   05/17/2020 3.2 (L) 3.5 - 5.1 mmol/L Final   10/18/2016 4.0  Final     Chloride   Date Value Ref Range Status   05/17/2020 103 95 - 110 mmol/L Final   10/18/2016 103  Final     CO2   Date Value Ref Range Status   05/17/2020 21 (L) 23 - 29 mmol/L Final   10/18/2016 24.0  Final     Glucose   Date Value Ref Range Status   05/17/2020 128 (H) 70 - 110 mg/dL Final   11/19/2015 433  Final     BUN, Bld   Date Value Ref Range Status   05/17/2020 13 6 - 20 mg/dL Final     BUN   Date Value Ref Range Status   03/20/2017 5 4 - 21 mg/dL Final     Creatinine   Date Value Ref Range Status   05/17/2020 0.7 0.5 - 1.4 mg/dL Final   10/18/2016 0.8 mg/dL Final     Calcium   Date Value Ref Range Status   05/17/2020 7.7 (L) 8.7 - 10.5 mg/dL Final   10/18/2016 8.6 mg/dL Final     Total Protein   Date Value Ref Range Status   05/17/2020 5.6 (L) 6.0 - 8.4 g/dL Final     Albumin   Date Value Ref Range Status   05/17/2020 2.6 (L) 3.5 - 5.2 g/dL Final   10/18/2016 3.80  Final     Total Bilirubin   Date Value Ref Range Status   05/17/2020 3.3 (H) 0.1 - 1.0 mg/dL Final     Comment:     For infants and newborns, interpretation of results should be based  on gestational age, weight and in agreement with clinical  observations.  Premature Infant recommended reference ranges:  Up to 24 hours.............<8.0 mg/dL  Up to 48 hours............<12.0 mg/dL  3-5 days..................<15.0 mg/dL  6-29 days.................<15.0 mg/dL       Alkaline Phosphatase   Date Value Ref Range Status   05/17/2020 98 55 - 135 U/L Final     AST   Date Value Ref Range Status   05/17/2020 38 10 - 40 U/L Final    10/18/2016 32 U/L Final     ALT   Date Value Ref Range Status   05/17/2020 17 10 - 44 U/L Final   10/18/2016 32 U/L Final     Anion Gap   Date Value Ref Range Status   05/17/2020 9 8 - 16 mmol/L Final     eGFR if    Date Value Ref Range Status   05/17/2020 >60.0 >60 mL/min/1.73 m^2 Final     eGFR if non    Date Value Ref Range Status   05/17/2020 >60.0 >60 mL/min/1.73 m^2 Final     Comment:     Calculation used to obtain the estimated glomerular filtration  rate (eGFR) is the CKD-EPI equation.            Diagnostic Results:  Abdominal ultrasound with doppler: pending    Assessment/Plan:     * Abdominal pain  - Admit with abd pain, distention  - Infectious work up: CXR, blood and urine cultures - neg to date  - Para done to r/o SBP, no SBP based on cell count, waiting for culture results.    - continue rocephin today, but if cx neg, will switch to cipro prophylactic.     Liver transplant candidate  Listed with Meld 25, expires 5/22/20. Today's meld 22.  -  Has donor offer today.        Anemia of chronic disease  - 2/2 ESLD  - H/H stable  - reports having dark colored stools in the past week. no overt signs of bleeding  - cont to monitor      Acquired coagulation factor deficiency  - 2/2 ESLD  - monitor PT/INR      Umbilical hernia without obstruction and without gangrene  - s/p hernia repair 2/22.   - admitted 2/27-3/2 with copious serous drainage from incision  - incision CDI, no SSI infection       Type 2 diabetes mellitus without complication, with long-term current use of insulin  - resume 75% home insulin doses  - consult endocrine for assistnace    Ascites due to alcoholic cirrhosis  - paracentesis 2/27 negative for peritonitis  - Para on 5/15/20 to r/o SBP, 1300 ml removed, , segs 6% - no SBP. On rocephin pending culture results.  Will transition to cipro  -  Start lasix 40 mg daily and aldactone 100 mg daily.   -  Para tomorrow, then if ok, d/c home.      Decompensated  hepatic cirrhosis  - ESLD 2/2 ETOH listed for liver transplant with MELD 25 (expires 5/22/20)  - s/p umbilical hernia repair 2/27/20    MELD-Na score: 19 at 5/17/2020  7:06 AM  MELD score: 16 at 5/17/2020  7:06 AM  Calculated from:  Serum Creatinine: 0.7 mg/dL (Rounded to 1 mg/dL) at 5/17/2020  7:06 AM  Serum Sodium: 133 mmol/L at 5/17/2020  7:06 AM  Total Bilirubin: 3.3 mg/dL at 5/17/2020  7:06 AM  INR(ratio): 1.6 at 5/17/2020  7:06 AM  Age: 59 years    Hepatic encephalopathy  - chronic  -  Mental status normal  - continue lactulose and rifaximin  -  No asterixis    GERD (gastroesophageal reflux disease)  - cont protonix          VTE Risk Mitigation (From admission, onward)         Ordered     Place JULIETTE hose  Until discontinued      05/15/20 0435     IP VTE LOW RISK PATIENT  Once      05/15/20 0435                The patients clinical status was discussed at multidisplinary rounds, involving transplant surgery, transplant medicine, pharmacy, nursing, nutrition, and social work    Discharge Planning:  No Patient Care Coordination Note on file.      Yamile Saucedo MD  Liver Transplant  [facility]

## 2020-05-17 NOTE — SUBJECTIVE & OBJECTIVE
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    Esophageal varices     GERD (gastroesophageal reflux disease)     Hepatic encephalopathy 7/8/2014    Hepatitis C virus infection 07/08/2014    tx with interferon 3-4 mos- stopped for unclear reasons Tattoos-first at age 17 only risk factor (HCVAB negative 08/2017)    HTN (hypertension) 7/8/2014    Hypertension     Insulin dependent diabetes mellitus     Renal mass, left 7/8/2014    6.3 x 5.7 x 5.6 complex mass    Splenomegaly 7/8/2014    Umbilical hernia 7/8/2014       Past Surgical History:   Procedure Laterality Date    CARPAL TUNNEL RELEASE Bilateral     CHOLECYSTECTOMY      lap    COLONOSCOPY N/A 9/8/2017    Procedure: COLONOSCOPY;  Surgeon: Savannah Llanos MD;  Location: [facility];  Service: Endoscopy;  Laterality: N/A;    COLONOSCOPY Left 3/14/2018    Procedure: COLONOSCOPY;  Surgeon: Savannah Llanos MD;  Location: [facility];  Service: Endoscopy;  Laterality: Left;    ESOPHAGOGASTRODUODENOSCOPY  01/2014    ESOPHAGOGASTRODUODENOSCOPY  02/15/2017    grade II varices    ESOPHAGOGASTRODUODENOSCOPY  04/10/2017    grade I varices    ESOPHAGOGASTRODUODENOSCOPY N/A 10/18/2019    Procedure: EGD (ESOPHAGOGASTRODUODENOSCOPY);  Surgeon: Flavio Escalera MD;  Location: [facility] ([address] [place] [address]);  Service: Endoscopy;  Laterality: N/A;    ESOPHAGOGASTRODUODENOSCOPY W/ BANDING  01/26/2017    grade II varices    HERNIA REPAIR      NECK SURGERY      fusion on C5 and C6    ULNAR NERVE TRANSPOSITION Left     UMBILICAL HERNIA REPAIR N/A 2/22/2020    Procedure: REPAIR, HERNIA, PRIMARY WIHTOUT MESH AND PLACEMENT OF DRAIN;  Surgeon: Sherri Brunner MD;  Location: [facility] OR [address] [place] [address];  Service: Transplant;  Laterality: N/A;    vericose veins removed         Review of patient's allergies  indicates:  No Known Allergies    Family History     Problem Relation (Age of Onset)    Alcohol abuse Brother    Cancer Brother    Hyperlipidemia Mother    No Known Problems Father (36), Sister, Sister        Tobacco Use    Smoking status: Former Smoker     Types: Cigarettes     Last attempt to quit: 1/4/2010     Years since quitting: 10.3    Smokeless tobacco: Former User     Types: Chew     Quit date: 2/24/1990    Tobacco comment: former 1 pack/week quit 1/4/2010   Substance and Sexual Activity    Alcohol use: No     Comment: former heavy beer but quit 1/4/2010    Drug use: No    Sexual activity: Never     Comment:        PTA Medications   Medication Sig    ciprofloxacin HCl (CIPRO) 500 MG tablet Take 1 tablet (500 mg total) by mouth once daily.    cyclobenzaprine (FLEXERIL) 10 MG tablet Take 10 mg by mouth 2 (two) times daily.     EASY TOUCH INSULIN SYRINGE 1 mL 31 gauge x 5/16 Syrg     escitalopram oxalate (LEXAPRO) 20 MG tablet Take 20 mg by mouth once daily.     finasteride (PROSCAR) 5 mg tablet Take 1 tablet (5 mg total) by mouth once daily.    furosemide (LASIX) 40 MG tablet Take 4 tablets (160 mg total) by mouth once daily.    gabapentin (NEURONTIN) 600 MG tablet Take 600 mg by mouth 3 (three) times daily.     HYDROcodone-acetaminophen (NORCO)  mg per tablet Take 1 tablet by mouth every 12 (twelve) hours as needed for Pain.    insulin aspart U-100 (NOVOLOG) 100 unit/mL injection Inject 24 Units into the skin 3 (three) times daily before meals.    insulin detemir U-100 (LEVEMIR) 100 unit/mL injection Inject 74 Units into the skin every evening.    lactulose (CHRONULAC) 10 gram/15 mL solution Take 30 mLs by mouth 3 (three) times daily. May take up to  4 to 5 times daily if needed for bowel movements    levocetirizine (XYZAL) 5 MG tablet Take 1 tablet (5 mg total) by mouth every evening.    oxybutynin (DITROPAN) 5 MG Tab Take 5 mg by mouth once daily.    pantoprazole  (PROTONIX) 40 MG tablet Take 1 tablet (40 mg total) by mouth once daily.    potassium bicarbonate & potassium chloride (K-LYTE CL) 25 mEq TbEF Take 1 tablet (25 mEq total) by mouth 2 (two) times daily with meals.    rifAXIMin (XIFAXAN) 550 mg Tab Take 1 tablet (550 mg total) by mouth 2 (two) times daily.    spironolactone (ALDACTONE) 100 MG tablet Take 1.5 tablets (150 mg total) by mouth once daily.    tamsulosin (FLOMAX) 0.4 mg Cp24 Take 1 capsule (0.4 mg total) by mouth once daily.    TRUE METRIX GLUCOSE TEST STRIP Strp     TRUEPLUS LANCETS 30 gauge [facility]        Review of Systems   Constitutional: Negative for activity change, appetite change, chills, diaphoresis and fever.   HENT: Negative for congestion, sinus pressure, sinus pain, sore throat and trouble swallowing.    Eyes: Negative for visual disturbance.   Respiratory: Negative for chest tightness, shortness of breath and stridor.    Cardiovascular: Positive for leg swelling. Negative for chest pain and palpitations.   Gastrointestinal: Positive for abdominal distention and abdominal pain. Negative for constipation, diarrhea, nausea and vomiting.   Genitourinary: Negative for decreased urine volume, difficulty urinating, dysuria and flank pain.   Musculoskeletal: Negative for neck pain and neck stiffness.   Skin: Positive for wound. Negative for color change and rash.   Allergic/Immunologic: Negative for immunocompromised state.   Neurological: Negative for dizziness, tremors, syncope, light-headedness and headaches.   Psychiatric/Behavioral: Negative for agitation, confusion, decreased concentration and hallucinations. The patient is not nervous/anxious.      Objective:     Vital Signs (Most Recent):  Temp: 98.2 °F (36.8 °C) (05/17/20 1104)  Pulse: 104 (05/17/20 1104)  Resp: 18 (05/17/20 1104)  BP: 119/63 (05/17/20 1104)  SpO2: 100 % (05/17/20 1104) Vital Signs (24h Range):  Temp:  [97.4 °F (36.3 °C)-98.2 °F (36.8 °C)] 98.2 °F (36.8 °C)  Pulse:   [] 104  Resp:  [7-18] 18  SpO2:  [97 %-100 %] 100 %  BP: (113-133)/(63-83) 119/63     Weight: 99.9 kg (220 lb 3.8 oz)  Body mass index is 30.72 kg/m².      Intake/Output Summary (Last 24 hours) at 5/17/2020 1134  Last data filed at 5/17/2020 0400  Gross per 24 hour   Intake 1080 ml   Output 725 ml   Net 355 ml       Physical Exam   Constitutional: He is oriented to person, place, and time. No distress.   HENT:   Head: Normocephalic and atraumatic.   Eyes: Right eye exhibits no discharge. Left eye exhibits no discharge. No scleral icterus.   Neck: Normal range of motion. Neck supple.   Cardiovascular: Normal rate, regular rhythm, normal heart sounds and intact distal pulses.   Pulmonary/Chest: Effort normal and breath sounds normal. He has no wheezes. He has no rales.   Abdominal: Soft. Bowel sounds are normal. He exhibits distension. There is no tenderness. There is no guarding. A hernia (small umbilical hernia easily reducible) is present.       Musculoskeletal: He exhibits no edema.   Neurological: He is alert and oriented to person, place, and time. No cranial nerve deficit.   Skin: Skin is warm and dry. Capillary refill takes less than 2 seconds. He is not diaphoretic.   Ecchymosis left flank region   Psychiatric: He has a normal mood and affect. His behavior is normal. Judgment and thought content normal.   Nursing note and vitals reviewed.      Laboratory:  Lab Results   Component Value Date    WBC 2.58 (L) 03/12/2020    HGB 8.1 (L) 03/12/2020    HCT 27.1 (L) 03/12/2020    MCV 88 03/12/2020    PLT 40 (L) 03/12/2020       CMP  Sodium   Date Value Ref Range Status   05/17/2020 133 (L) 136 - 145 mmol/L Final   10/18/2016 136  Final     Potassium   Date Value Ref Range Status   05/17/2020 3.2 (L) 3.5 - 5.1 mmol/L Final   10/18/2016 4.0  Final     Chloride   Date Value Ref Range Status   05/17/2020 103 95 - 110 mmol/L Final   10/18/2016 103  Final     CO2   Date Value Ref Range Status   05/17/2020 21 (L) 23 -  29 mmol/L Final   10/18/2016 24.0  Final     Glucose   Date Value Ref Range Status   05/17/2020 128 (H) 70 - 110 mg/dL Final   11/19/2015 433  Final     BUN, Bld   Date Value Ref Range Status   05/17/2020 13 6 - 20 mg/dL Final     BUN   Date Value Ref Range Status   03/20/2017 5 4 - 21 mg/dL Final     Creatinine   Date Value Ref Range Status   05/17/2020 0.7 0.5 - 1.4 mg/dL Final   10/18/2016 0.8 mg/dL Final     Calcium   Date Value Ref Range Status   05/17/2020 7.7 (L) 8.7 - 10.5 mg/dL Final   10/18/2016 8.6 mg/dL Final     Total Protein   Date Value Ref Range Status   05/17/2020 5.6 (L) 6.0 - 8.4 g/dL Final     Albumin   Date Value Ref Range Status   05/17/2020 2.6 (L) 3.5 - 5.2 g/dL Final   10/18/2016 3.80  Final     Total Bilirubin   Date Value Ref Range Status   05/17/2020 3.3 (H) 0.1 - 1.0 mg/dL Final     Comment:     For infants and newborns, interpretation of results should be based  on gestational age, weight and in agreement with clinical  observations.  Premature Infant recommended reference ranges:  Up to 24 hours.............<8.0 mg/dL  Up to 48 hours............<12.0 mg/dL  3-5 days..................<15.0 mg/dL  6-29 days.................<15.0 mg/dL       Alkaline Phosphatase   Date Value Ref Range Status   05/17/2020 98 55 - 135 U/L Final     AST   Date Value Ref Range Status   05/17/2020 38 10 - 40 U/L Final   10/18/2016 32 U/L Final     ALT   Date Value Ref Range Status   05/17/2020 17 10 - 44 U/L Final   10/18/2016 32 U/L Final     Anion Gap   Date Value Ref Range Status   05/17/2020 9 8 - 16 mmol/L Final     eGFR if    Date Value Ref Range Status   05/17/2020 >60.0 >60 mL/min/1.73 m^2 Final     eGFR if non    Date Value Ref Range Status   05/17/2020 >60.0 >60 mL/min/1.73 m^2 Final     Comment:     Calculation used to obtain the estimated glomerular filtration  rate (eGFR) is the CKD-EPI equation.            Diagnostic Results:  Abdominal ultrasound with doppler:  pending

## 2020-05-17 NOTE — PROGRESS NOTES
[facility]  Endocrinology  Progress Note    Admit Date: 5/15/2020     Reason for Consult: Management of T2DM, Hyperglycemia     Surgical Procedure and Date: N/A    Diabetes diagnosis year: 2002    Home Diabetes Medications:    - Levemir 50 units HS (per chart review)  - Novolog 24 units TIDWM (per chart review)    How often checking glucose at home? MARILIN   BG readings on regimen: MARILIN  Hypoglycemia on the regimen?  MARILIN  Missed doses on regimen?  MARILIN    Diabetes Complications include:     Hyperglycemia and Diabetic peripheral neuropathy      Complicating diabetes co morbidities:   CIRRHOSIS    HPI:   Patient is a 59 y.o. 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 Surgery uncomplicated but has had recurrent abdominal pain and increased drainage from surgical incision. Patient recently admitted 3/11-3/13 with similar symptoms, attempted paracentesis at this time- without fluid to drain. He re-presented to [facility] on 5/15 with c/o fever and abdominal pain (Tmax 103) x 1 week, distention and dark colored stools, received 1g Rocephin and transferred [place] [facility] [place]. Patient is being admitted for suspected SBP and infectious work-up.  COVID-19 rapid test negative on admission. Plan for paracentesis. Endocrinology consulted to manage hypeglycemia/DM2 during admission to [facility].    Lab Results   Component Value Date    HGBA1C 6.7 (H) 02/28/2020       Interval HPI:   Overnight events:   BG is reasonably controlled on current SQ insulin regimen at this time. Patient is scheduled for liver transplant today. Expect increase in insulin needs secondary to steroid therapy.    Diet NPO Except for: Sips with Medication    Eating:   NPO  Nausea: No  Hypoglycemia and intervention: No  Fever: No  TPN and/or TF: No  If yes, type of TF/TPN and rate: None    /63 (Patient Position:  "Lying)   Pulse 104   Temp 98.2 °F (36.8 °C) (Oral)   Resp 18   Ht 5' 11" (1.803 m)   Wt 99.9 kg (220 lb 3.8 oz)   SpO2 100%   BMI 30.72 kg/m²      Labs Reviewed and Include    Recent Labs   Lab 05/17/20  0706   *   CALCIUM 7.7*   ALBUMIN 2.6*   PROT 5.6*   *   K 3.2*   CO2 21*      BUN 13   CREATININE 0.7   ALKPHOS 98   ALT 17   AST 38   BILITOT 3.3*     Lab Results   Component Value Date    WBC 8.03 05/17/2020    HGB 8.8 (L) 05/17/2020    HCT 29.8 (L) 05/17/2020    MCV 88 05/17/2020    PLT 65 (L) 05/17/2020     No results for input(s): TSH, FREET4 in the last 168 hours.  Lab Results   Component Value Date    HGBA1C 6.7 (H) 02/28/2020       Nutritional status:   Body mass index is 30.72 kg/m².  Lab Results   Component Value Date    ALBUMIN 2.6 (L) 05/17/2020    ALBUMIN 2.3 (L) 05/16/2020    ALBUMIN 2.3 (L) 05/15/2020     No results found for: PREALBUMIN    Estimated Creatinine Clearance: 138.5 mL/min (based on SCr of 0.7 mg/dL).    Accu-Checks  Recent Labs     05/15/20  0459 05/15/20  0740 05/15/20  1200 05/15/20  1642 05/15/20  2109 05/16/20  0819 05/16/20  1210 05/16/20  1649 05/16/20  2109 05/17/20  0759   POCTGLUCOSE 142* 136* 113* 144* 165* 99 119* 109 109 125*       Current Medications and/or Treatments Impacting Glycemic Control  Immunotherapy:    Immunosuppressants     None        Steroids:   Hormones (From admission, onward)    None        Pressors:    Autonomic Drugs (From admission, onward)    None        Hyperglycemia/Diabetes Medications:   Antihyperglycemics (From admission, onward)    Start     Stop Route Frequency Ordered    05/16/20 2100  insulin detemir U-100 pen 18 Units      -- SubQ Nightly 05/16/20 1051    05/16/20 1130  insulin aspart U-100 pen 14 Units      -- SubQ 3 times daily with meals 05/16/20 1051    05/15/20 1318  insulin aspart U-100 pen 1-10 Units      -- SubQ Before meals & nightly PRN 05/15/20 1218          ASSESSMENT and PLAN    * Abdominal pain  Managed " per primary team  Avoid hypoglycemia        Type 2 diabetes mellitus without complication, with long-term current use of insulin   -180     - Levemir to 18 units HS. Basal BG below goal. 20% decrease  -  Novolog to 14 units TIDWM. 20% decrease  - Moderate Dose SQ Insulin Correction Scale.  - BG Monitoring AC/HS     Change to intensive IV insulin infusion protocol with q 1 hour checks at time of surgery.     ** Please call Endocrine for any BG related issues **  ** Please notify Endocrine for any change and/or advance in diet**    Discharge planning:   TBD. Please notify endocrinology prior to discharge.         Anemia of chronic disease  May affect accuracy of HbA1c results.             Yogesh Goldstein, NP  Endocrinology  [facility]

## 2020-05-17 NOTE — ANESTHESIA PROCEDURE NOTES
Central Line    Diagnosis: ESLD  Doctor requesting consult: Ariane  Patient location during procedure: done in OR  Procedure start time: 5/17/2020 4:22 PM  Timeout: 5/17/2020 4:21 PM  Procedure end time: 5/17/2020 4:34 PM    Staffing  Authorizing Provider: Fernando Maher MD  Performing Provider: Yolanda Snow CRNA    Staffing  Anesthesiologist: Fernando Maher MD  Performed: anesthesiologist   Anesthesiologist was present at the time of the procedure.  Preanesthetic Checklist  Completed: patient identified, site marked, surgical consent, pre-op evaluation, timeout performed, IV checked, risks and benefits discussed, monitors and equipment checked and anesthesia consent given  Indication   Indication: hemodynamic monitoring, vascular access, med administration     Anesthesia   general anesthesia    Central Line   Skin Prep: skin prepped with ChloraPrep, skin prep agent completely dried prior to procedure  maximum sterile barriers used during central venous catheter insertion  hand hygiene performed prior to central venous catheter insertion  Location: right, internal jugular.   Catheter type: introducer  Catheter Size: 9 Fr  Inserted Catheter Length: 10 cm  Ultrasound: vascular probe with ultrasound  Vessel Caliber: large, patent  Vascular Doppler:  not done, compressibility normal  Needle advanced into vessel with real time Ultrasound guidance.  Guidewire confirmed in vessel.  Manometry: none  Insertion Attempts: 1   Securement:line sutured, sterile dressing applied and chlorhexidine patch    Post-Procedure   Adverse Events:none    Guidewire Guidewire removed intact.

## 2020-05-17 NOTE — ANESTHESIA PROCEDURE NOTES
[place] [facility]    Diagnosis: ESLD  Doctor requesting consult: Ariane  Patient location during procedure: done in OR  Procedure start time: 5/17/2020 4:34 PM  Procedure end time: 5/17/2020 4:36 PM    Staffing  Authorizing Provider: Fernando Maher MD  Performing Provider: Yolanda Snow CRNA    Anesthesiologist was present at the time of the procedure.  Preanesthetic Checklist  Completed: patient identified, site marked, surgical consent, pre-op evaluation, timeout performed, IV checked, risks and benefits discussed, monitors and equipment checked and anesthesia consent given  [place] [facility]  Skin Prep: chlorhexidine gluconate and isopropyl alcohol  Local Infiltration: none  Location: right,  internal jugular vein  Vessel Caliber: large, patent, compressibility normal  Vascular Doppler:  not done  Introducer: 9 Fr single lumen, manometry not used.  Device: CCO/Oximetric Catheter  Catheter Size: 7 Fr  Catheter placement by yes. Heme positive aspiration all ports. PAC floated with balloon up not wedgedSterile sheath used  Locked at: 45 cm.Insertion Attempts: 1  Indication: hemodynamic monitoring  Ultrasound Guidance  Needle advanced into vessel with real time Ultrasound guidance.  Guidewire confirmed in vessel.  Sterile sheath used.  Assessment  Central Line Bundle Protocol followed. Hand hygiene before procedure, surgical cap worn, surgical mask worn, sterile surgical gloves worn, large sterile drape used.  Verification: blood return and ultrasound  Dressing: secured with tape and tegaderm  Patient: Tolerated Well

## 2020-05-17 NOTE — ANESTHESIA PROCEDURE NOTES
Central Line    Diagnosis: ESLD  Doctor requesting consult: Ariane  Patient location during procedure: done in OR  Procedure start time: 5/17/2020 4:22 PM  Timeout: 5/17/2020 4:21 PM  Procedure end time: 5/17/2020 4:34 PM    Staffing  Authorizing Provider: Fernando Maher MD  Performing Provider: Yolanda Snow CRNA    Staffing  Anesthesiologist: Fernando Maher MD  Performed: anesthesiologist   Anesthesiologist was present at the time of the procedure.  Preanesthetic Checklist  Completed: patient identified, site marked, surgical consent, pre-op evaluation, timeout performed, IV checked, risks and benefits discussed, monitors and equipment checked and anesthesia consent given  Indication   Indication: med administration, vascular access     Anesthesia   general anesthesia    Central Line   Skin Prep: skin prepped with ChloraPrep, skin prep agent completely dried prior to procedure  maximum sterile barriers used during central venous catheter insertion  hand hygiene performed prior to central venous catheter insertion  Location: right, internal jugular.   Catheter type: triple lumen  Catheter Size: 12 Fr  Inserted Catheter Length: 14 cm  Ultrasound: vascular probe with ultrasound  Vessel Caliber: large, patent  Vascular Doppler:  not done, compressibility normal  Needle advanced into vessel with real time Ultrasound guidance.  Guidewire confirmed in vessel.  Manometry: none  Insertion Attempts: 1   Securement:line sutured, chlorhexidine patch, sterile dressing applied and blood return through all ports    Post-Procedure   Adverse Events:none    Guidewire Guidewire removed intact.

## 2020-05-17 NOTE — PLAN OF CARE
Yessy Terry RN spoke with Suzanne Sherwood (pt's mother).  Informed her that the procedure. Has begun and patient is stable.  She did not have any further questions at this time.

## 2020-05-17 NOTE — ANESTHESIA PROCEDURE NOTES
Arterial    Diagnosis: ESLD  Doctor requesting consult: Ariane    Patient location during procedure: done in OR  Procedure start time: 5/17/2020 4:06 PM  Procedure end time: 5/17/2020 4:16 PM    Staffing  Authorizing Provider: Fernando Maher MD  Performing Provider: Yolanda Snow CRNA    Anesthesiologist was present at the time of the procedure.    Preanesthetic Checklist  Completed: patient identified, site marked, surgical consent, pre-op evaluation, timeout performed, IV checked, risks and benefits discussed, monitors and equipment checked and anesthesia consent givenArterial  Skin Prep: chlorhexidine gluconate and isopropyl alcohol  Local Infiltration: none  Orientation: right  Location: femoral  Catheter Size: 16 G  Catheter placement by Ultrasound guidance. Heme positive aspiration all ports.  Vessel Caliber: medium, patent, compressibility normal  Vascular Doppler:  not done  Needle advanced into vessel with real time Ultrasound guidance.  Guidewire confirmed in vessel.  Sterile sheath used.Insertion Attempts: 1  Assessment  Dressing: secured with tape and tegaderm  Patient: Tolerated well

## 2020-05-17 NOTE — ASSESSMENT & PLAN NOTE
- paracentesis 2/27 negative for peritonitis  - Para on 5/15/20 to r/o SBP, 1300 ml removed, , segs 6% - no SBP. On rocephin pending culture results.  Will transition to cipro  -  Start lasix 40 mg daily and aldactone 100 mg daily.   -  Para tomorrow, then if ok, d/c home.

## 2020-05-17 NOTE — SUBJECTIVE & OBJECTIVE
"Interval HPI:   Overnight events:   BG is reasonably controlled on current SQ insulin regimen at this time. Patient is scheduled for liver transplant today. Expect increase in insulin needs secondary to steroid therapy.    Diet NPO Except for: Sips with Medication    Eating:   NPO  Nausea: No  Hypoglycemia and intervention: No  Fever: No  TPN and/or TF: No  If yes, type of TF/TPN and rate: None    /63 (Patient Position: Lying)   Pulse 104   Temp 98.2 °F (36.8 °C) (Oral)   Resp 18   Ht 5' 11" (1.803 m)   Wt 99.9 kg (220 lb 3.8 oz)   SpO2 100%   BMI 30.72 kg/m²     Labs Reviewed and Include    Recent Labs   Lab 05/17/20  0706   *   CALCIUM 7.7*   ALBUMIN 2.6*   PROT 5.6*   *   K 3.2*   CO2 21*      BUN 13   CREATININE 0.7   ALKPHOS 98   ALT 17   AST 38   BILITOT 3.3*     Lab Results   Component Value Date    WBC 8.03 05/17/2020    HGB 8.8 (L) 05/17/2020    HCT 29.8 (L) 05/17/2020    MCV 88 05/17/2020    PLT 65 (L) 05/17/2020     No results for input(s): TSH, FREET4 in the last 168 hours.  Lab Results   Component Value Date    HGBA1C 6.7 (H) 02/28/2020       Nutritional status:   Body mass index is 30.72 kg/m².  Lab Results   Component Value Date    ALBUMIN 2.6 (L) 05/17/2020    ALBUMIN 2.3 (L) 05/16/2020    ALBUMIN 2.3 (L) 05/15/2020     No results found for: PREALBUMIN    Estimated Creatinine Clearance: 138.5 mL/min (based on SCr of 0.7 mg/dL).    Accu-Checks  Recent Labs     05/15/20  0459 05/15/20  0740 05/15/20  1200 05/15/20  1642 05/15/20  2109 05/16/20  0819 05/16/20  1210 05/16/20  1649 05/16/20  2109 05/17/20  0759   POCTGLUCOSE 142* 136* 113* 144* 165* 99 119* 109 109 125*       Current Medications and/or Treatments Impacting Glycemic Control  Immunotherapy:    Immunosuppressants     None        Steroids:   Hormones (From admission, onward)    None        Pressors:    Autonomic Drugs (From admission, onward)    None        Hyperglycemia/Diabetes Medications: "   Antihyperglycemics (From admission, onward)    Start     Stop Route Frequency Ordered    05/16/20 2100  insulin detemir U-100 pen 18 Units      -- SubQ Nightly 05/16/20 1051    05/16/20 1130  insulin aspart U-100 pen 14 Units      -- SubQ 3 times daily with meals 05/16/20 1051    05/15/20 1318  insulin aspart U-100 pen 1-10 Units      -- SubQ Before meals & nightly PRN 05/15/20 1218

## 2020-05-17 NOTE — ASSESSMENT & PLAN NOTE
- ESLD 2/2 ETOH listed for liver transplant with MELD 25 (expires 5/22/20)  - s/p umbilical hernia repair 2/27/20    MELD-Na score: 19 at 5/17/2020  7:06 AM  MELD score: 16 at 5/17/2020  7:06 AM  Calculated from:  Serum Creatinine: 0.7 mg/dL (Rounded to 1 mg/dL) at 5/17/2020  7:06 AM  Serum Sodium: 133 mmol/L at 5/17/2020  7:06 AM  Total Bilirubin: 3.3 mg/dL at 5/17/2020  7:06 AM  INR(ratio): 1.6 at 5/17/2020  7:06 AM  Age: 59 years

## 2020-05-17 NOTE — ASSESSMENT & PLAN NOTE
-180     - Levemir to 18 units HS. Basal BG below goal. 20% decrease  -  Novolog to 14 units TIDWM. 20% decrease  - Moderate Dose SQ Insulin Correction Scale.  - BG Monitoring AC/HS     Change to intensive IV insulin infusion protocol with q 1 hour checks at time of surgery.     ** Please call Endocrine for any BG related issues **  ** Please notify Endocrine for any change and/or advance in diet**    Discharge planning:   TBD. Please notify endocrinology prior to discharge.

## 2020-05-18 ENCOUNTER — TELEPHONE (OUTPATIENT)
Dept: TRANSPLANT | Facility: CLINIC | Age: 60
End: 2020-05-18

## 2020-05-18 LAB
ABO + RH BLD: NORMAL
ALBUMIN SERPL BCP-MCNC: 2.2 G/DL (ref 3.5–5.2)
ALBUMIN SERPL BCP-MCNC: 2.7 G/DL (ref 3.5–5.2)
ALLENS TEST: ABNORMAL
ALP SERPL-CCNC: 105 U/L (ref 55–135)
ALP SERPL-CCNC: 105 U/L (ref 55–135)
ALP SERPL-CCNC: 83 U/L (ref 55–135)
ALT SERPL W/O P-5'-P-CCNC: 558 U/L (ref 10–44)
ALT SERPL W/O P-5'-P-CCNC: 718 U/L (ref 10–44)
ALT SERPL W/O P-5'-P-CCNC: 722 U/L (ref 10–44)
ALT SERPL W/O P-5'-P-CCNC: 722 U/L (ref 10–44)
AMYLASE SERPL-CCNC: 15 U/L (ref 20–110)
ANION GAP SERPL CALC-SCNC: 10 MMOL/L (ref 8–16)
ANION GAP SERPL CALC-SCNC: 10 MMOL/L (ref 8–16)
ANION GAP SERPL CALC-SCNC: 14 MMOL/L (ref 8–16)
ANION GAP SERPL CALC-SCNC: 14 MMOL/L (ref 8–16)
ANION GAP SERPL CALC-SCNC: 7 MMOL/L (ref 8–16)
ANISOCYTOSIS BLD QL SMEAR: SLIGHT
APTT BLDCRRT: 30.2 SEC (ref 21–32)
APTT BLDCRRT: 37.9 SEC (ref 21–32)
AST SERPL-CCNC: 1041 U/L (ref 10–40)
AST SERPL-CCNC: 1352 U/L (ref 10–40)
AST SERPL-CCNC: 1352 U/L (ref 10–40)
AST SERPL-CCNC: 1823 U/L (ref 10–40)
AST SERPL-CCNC: 2018 U/L (ref 10–40)
AST SERPL-CCNC: 2018 U/L (ref 10–40)
AST SERPL-CCNC: 455 U/L (ref 10–40)
AST SERPL-CCNC: 566 U/L (ref 10–40)
AST SERPL-CCNC: 737 U/L (ref 10–40)
BACTERIA SPEC AEROBE CULT: NO GROWTH
BASOPHILS # BLD AUTO: 0.01 K/UL (ref 0–0.2)
BASOPHILS # BLD AUTO: 0.01 K/UL (ref 0–0.2)
BASOPHILS # BLD AUTO: 0.04 K/UL (ref 0–0.2)
BASOPHILS # BLD AUTO: ABNORMAL K/UL (ref 0–0.2)
BASOPHILS NFR BLD: 0 % (ref 0–1.9)
BASOPHILS NFR BLD: 0.1 % (ref 0–1.9)
BASOPHILS NFR BLD: 0.1 % (ref 0–1.9)
BASOPHILS NFR BLD: 0.4 % (ref 0–1.9)
BILIRUB DIRECT SERPL-MCNC: 2.8 MG/DL (ref 0.1–0.3)
BILIRUB SERPL-MCNC: 3.6 MG/DL (ref 0.1–1)
BILIRUB SERPL-MCNC: 3.6 MG/DL (ref 0.1–1)
BILIRUB SERPL-MCNC: 3.9 MG/DL (ref 0.1–1)
BLD GP AB SCN CELLS X3 SERPL QL: NORMAL
BUN SERPL-MCNC: 16 MG/DL (ref 6–20)
BUN SERPL-MCNC: 16 MG/DL (ref 6–20)
BUN SERPL-MCNC: 18 MG/DL (ref 6–20)
CA-I BLDV-SCNC: 1.1 MMOL/L (ref 1.06–1.42)
CALCIUM SERPL-MCNC: 6.8 MG/DL (ref 8.7–10.5)
CALCIUM SERPL-MCNC: 6.9 MG/DL (ref 8.7–10.5)
CALCIUM SERPL-MCNC: 6.9 MG/DL (ref 8.7–10.5)
CALCIUM SERPL-MCNC: 7.1 MG/DL (ref 8.7–10.5)
CALCIUM SERPL-MCNC: 7.1 MG/DL (ref 8.7–10.5)
CHLORIDE SERPL-SCNC: 103 MMOL/L (ref 95–110)
CHLORIDE SERPL-SCNC: 103 MMOL/L (ref 95–110)
CHLORIDE SERPL-SCNC: 104 MMOL/L (ref 95–110)
CHLORIDE SERPL-SCNC: 104 MMOL/L (ref 95–110)
CHLORIDE SERPL-SCNC: 106 MMOL/L (ref 95–110)
CO2 SERPL-SCNC: 21 MMOL/L (ref 23–29)
CO2 SERPL-SCNC: 21 MMOL/L (ref 23–29)
CO2 SERPL-SCNC: 23 MMOL/L (ref 23–29)
CO2 SERPL-SCNC: 23 MMOL/L (ref 23–29)
CO2 SERPL-SCNC: 26 MMOL/L (ref 23–29)
CREAT SERPL-MCNC: 0.7 MG/DL (ref 0.5–1.4)
CREAT SERPL-MCNC: 0.8 MG/DL (ref 0.5–1.4)
CREAT SERPL-MCNC: 0.8 MG/DL (ref 0.5–1.4)
DELSYS: ABNORMAL
DELSYS: ABNORMAL
DIFFERENTIAL METHOD: ABNORMAL
EOSINOPHIL # BLD AUTO: 0 K/UL (ref 0–0.5)
EOSINOPHIL # BLD AUTO: ABNORMAL K/UL (ref 0–0.5)
EOSINOPHIL NFR BLD: 0 % (ref 0–8)
ERYTHROCYTE [DISTWIDTH] IN BLOOD BY AUTOMATED COUNT: 19.5 % (ref 11.5–14.5)
ERYTHROCYTE [DISTWIDTH] IN BLOOD BY AUTOMATED COUNT: 19.7 % (ref 11.5–14.5)
ERYTHROCYTE [DISTWIDTH] IN BLOOD BY AUTOMATED COUNT: 19.9 % (ref 11.5–14.5)
ERYTHROCYTE [DISTWIDTH] IN BLOOD BY AUTOMATED COUNT: 19.9 % (ref 11.5–14.5)
ERYTHROCYTE [SEDIMENTATION RATE] IN BLOOD BY WESTERGREN METHOD: 22 MM/H
EST. GFR  (AFRICAN AMERICAN): >60 ML/MIN/1.73 M^2
EST. GFR  (NON AFRICAN AMERICAN): >60 ML/MIN/1.73 M^2
ETCO2: 37
ETCO2: 46
FIBRINOGEN PPP-MCNC: 287 MG/DL (ref 182–366)
FIO2: 60
GLUCOSE SERPL-MCNC: 107 MG/DL (ref 70–110)
GLUCOSE SERPL-MCNC: 112 MG/DL (ref 70–110)
GLUCOSE SERPL-MCNC: 134 MG/DL (ref 70–110)
GLUCOSE SERPL-MCNC: 147 MG/DL (ref 70–110)
GLUCOSE SERPL-MCNC: 183 MG/DL (ref 70–110)
GLUCOSE SERPL-MCNC: 184 MG/DL (ref 70–110)
GLUCOSE SERPL-MCNC: 205 MG/DL (ref 70–110)
GLUCOSE SERPL-MCNC: 209 MG/DL (ref 70–110)
GLUCOSE SERPL-MCNC: 223 MG/DL (ref 70–110)
GLUCOSE SERPL-MCNC: 223 MG/DL (ref 70–110)
GLUCOSE SERPL-MCNC: 322 MG/DL (ref 70–110)
GLUCOSE SERPL-MCNC: 322 MG/DL (ref 70–110)
GLUCOSE SERPL-MCNC: 50 MG/DL (ref 70–110)
HBV SURFACE AG SERPL QL IA: NEGATIVE
HCO3 UR-SCNC: 21.7 MMOL/L (ref 24–28)
HCO3 UR-SCNC: 22.8 MMOL/L (ref 24–28)
HCO3 UR-SCNC: 22.8 MMOL/L (ref 24–28)
HCO3 UR-SCNC: 23.2 MMOL/L (ref 24–28)
HCO3 UR-SCNC: 23.7 MMOL/L (ref 24–28)
HCO3 UR-SCNC: 23.8 MMOL/L (ref 24–28)
HCO3 UR-SCNC: 23.8 MMOL/L (ref 24–28)
HCO3 UR-SCNC: 24.4 MMOL/L (ref 24–28)
HCO3 UR-SCNC: 24.6 MMOL/L (ref 24–28)
HCO3 UR-SCNC: 25.2 MMOL/L (ref 24–28)
HCO3 UR-SCNC: 27.7 MMOL/L (ref 24–28)
HCO3 UR-SCNC: 28 MMOL/L (ref 24–28)
HCO3 UR-SCNC: 8.5 MMOL/L (ref 24–28)
HCT VFR BLD AUTO: 23.7 % (ref 40–54)
HCT VFR BLD AUTO: 24.2 % (ref 40–54)
HCT VFR BLD AUTO: 24.7 % (ref 40–54)
HCT VFR BLD AUTO: 24.7 % (ref 40–54)
HCT VFR BLD AUTO: 26 % (ref 40–54)
HCT VFR BLD AUTO: 26.4 % (ref 40–54)
HCT VFR BLD AUTO: 28.3 % (ref 40–54)
HCT VFR BLD CALC: 21 %PCV (ref 36–54)
HCT VFR BLD CALC: 21 %PCV (ref 36–54)
HCT VFR BLD CALC: 25 %PCV (ref 36–54)
HCT VFR BLD CALC: 27 %PCV (ref 36–54)
HCT VFR BLD CALC: 27 %PCV (ref 36–54)
HCT VFR BLD CALC: 29 %PCV (ref 36–54)
HCT VFR BLD CALC: 29 %PCV (ref 36–54)
HCT VFR BLD CALC: <15 %PCV (ref 36–54)
HGB BLD-MCNC: 7.2 G/DL (ref 14–18)
HGB BLD-MCNC: 7.5 G/DL (ref 14–18)
HGB BLD-MCNC: 7.9 G/DL (ref 14–18)
HGB BLD-MCNC: 8.2 G/DL (ref 14–18)
HGB BLD-MCNC: 8.5 G/DL (ref 14–18)
HIV 1+2 AB+HIV1 P24 AG SERPL QL IA: NEGATIVE
HYPOCHROMIA BLD QL SMEAR: ABNORMAL
IMM GRANULOCYTES # BLD AUTO: 0.21 K/UL (ref 0–0.04)
IMM GRANULOCYTES # BLD AUTO: 0.22 K/UL (ref 0–0.04)
IMM GRANULOCYTES # BLD AUTO: 0.28 K/UL (ref 0–0.04)
IMM GRANULOCYTES # BLD AUTO: ABNORMAL K/UL (ref 0–0.04)
IMM GRANULOCYTES NFR BLD AUTO: 2.4 % (ref 0–0.5)
IMM GRANULOCYTES NFR BLD AUTO: 2.4 % (ref 0–0.5)
IMM GRANULOCYTES NFR BLD AUTO: 2.7 % (ref 0–0.5)
IMM GRANULOCYTES NFR BLD AUTO: ABNORMAL % (ref 0–0.5)
INR PPP: 1.6 (ref 0.8–1.2)
INR PPP: 1.7 (ref 0.8–1.2)
INR PPP: 1.8 (ref 0.8–1.2)
LACTATE SERPL-SCNC: 2.2 MMOL/L (ref 0.5–2.2)
LACTATE SERPL-SCNC: 4.3 MMOL/L (ref 0.5–2.2)
LDH SERPL L TO P-CCNC: 2115 U/L (ref 110–260)
LDH SERPL L TO P-CCNC: 4.14 MMOL/L (ref 0.36–1.25)
LYMPHOCYTES # BLD AUTO: 0.3 K/UL (ref 1–4.8)
LYMPHOCYTES # BLD AUTO: 0.3 K/UL (ref 1–4.8)
LYMPHOCYTES # BLD AUTO: 0.4 K/UL (ref 1–4.8)
LYMPHOCYTES # BLD AUTO: ABNORMAL K/UL (ref 1–4.8)
LYMPHOCYTES NFR BLD: 0 % (ref 18–48)
LYMPHOCYTES NFR BLD: 2 % (ref 18–48)
LYMPHOCYTES NFR BLD: 2.9 % (ref 18–48)
LYMPHOCYTES NFR BLD: 3.5 % (ref 18–48)
LYMPHOCYTES NFR BLD: 4 % (ref 18–48)
MAGNESIUM SERPL-MCNC: 1.6 MG/DL (ref 1.6–2.6)
MAGNESIUM SERPL-MCNC: 1.7 MG/DL (ref 1.6–2.6)
MAGNESIUM SERPL-MCNC: 1.9 MG/DL (ref 1.6–2.6)
MCH RBC QN AUTO: 26.5 PG (ref 27–31)
MCH RBC QN AUTO: 26.8 PG (ref 27–31)
MCH RBC QN AUTO: 26.9 PG (ref 27–31)
MCH RBC QN AUTO: 27 PG (ref 27–31)
MCH RBC QN AUTO: 27.2 PG (ref 27–31)
MCHC RBC AUTO-ENTMCNC: 30 G/DL (ref 32–36)
MCHC RBC AUTO-ENTMCNC: 30.4 G/DL (ref 32–36)
MCHC RBC AUTO-ENTMCNC: 31 G/DL (ref 32–36)
MCHC RBC AUTO-ENTMCNC: 31.1 G/DL (ref 32–36)
MCV RBC AUTO: 87 FL (ref 82–98)
MCV RBC AUTO: 88 FL (ref 82–98)
MCV RBC AUTO: 90 FL (ref 82–98)
MIN VOL: 12
MODE: ABNORMAL
MODE: ABNORMAL
MONOCYTES # BLD AUTO: 0.4 K/UL (ref 0.3–1)
MONOCYTES # BLD AUTO: 0.4 K/UL (ref 0.3–1)
MONOCYTES # BLD AUTO: 0.7 K/UL (ref 0.3–1)
MONOCYTES # BLD AUTO: ABNORMAL K/UL (ref 0.3–1)
MONOCYTES NFR BLD: 2 % (ref 4–15)
MONOCYTES NFR BLD: 3.8 % (ref 4–15)
MONOCYTES NFR BLD: 4.3 % (ref 4–15)
MONOCYTES NFR BLD: 8.1 % (ref 4–15)
NEUTROPHILS # BLD AUTO: 7.5 K/UL (ref 1.8–7.7)
NEUTROPHILS # BLD AUTO: 8.2 K/UL (ref 1.8–7.7)
NEUTROPHILS # BLD AUTO: 9.2 K/UL (ref 1.8–7.7)
NEUTROPHILS NFR BLD: 85.4 % (ref 38–73)
NEUTROPHILS NFR BLD: 89.7 % (ref 38–73)
NEUTROPHILS NFR BLD: 90.2 % (ref 38–73)
NEUTROPHILS NFR BLD: 95 % (ref 38–73)
NEUTROPHILS NFR BLD: 95 % (ref 38–73)
NEUTROPHILS NFR BLD: 96 % (ref 38–73)
NEUTROPHILS NFR BLD: 96 % (ref 38–73)
NEUTS BAND NFR BLD MANUAL: 1 %
NEUTS BAND NFR BLD MANUAL: 1 %
NEUTS BAND NFR BLD MANUAL: 2 %
NRBC BLD-RTO: 0 /100 WBC
OVALOCYTES BLD QL SMEAR: ABNORMAL
PCO2 BLDA: 12.9 MMHG (ref 35–45)
PCO2 BLDA: 32.3 MMHG (ref 35–45)
PCO2 BLDA: 34.2 MMHG (ref 35–45)
PCO2 BLDA: 34.5 MMHG (ref 35–45)
PCO2 BLDA: 36.4 MMHG (ref 35–45)
PCO2 BLDA: 37.5 MMHG (ref 35–45)
PCO2 BLDA: 39 MMHG (ref 35–45)
PCO2 BLDA: 43.2 MMHG (ref 35–45)
PCO2 BLDA: 45 MMHG (ref 35–45)
PCO2 BLDA: 45.8 MMHG (ref 35–45)
PCO2 BLDA: 46.3 MMHG (ref 35–45)
PCO2 BLDA: 47 MMHG (ref 35–45)
PCO2 BLDA: 48.7 MMHG (ref 35–45)
PEEP: 8
PH SMN: 7.29 [PH] (ref 7.35–7.45)
PH SMN: 7.31 [PH] (ref 7.35–7.45)
PH SMN: 7.32 [PH] (ref 7.35–7.45)
PH SMN: 7.32 [PH] (ref 7.35–7.45)
PH SMN: 7.34 [PH] (ref 7.35–7.45)
PH SMN: 7.35 [PH] (ref 7.35–7.45)
PH SMN: 7.35 [PH] (ref 7.35–7.45)
PH SMN: 7.41 [PH] (ref 7.35–7.45)
PH SMN: 7.43 [PH] (ref 7.35–7.45)
PH SMN: 7.46 [PH] (ref 7.35–7.45)
PH SMN: 7.46 [PH] (ref 7.35–7.45)
PH SMN: 7.49 [PH] (ref 7.35–7.45)
PH SMN: 7.52 [PH] (ref 7.35–7.45)
PHOSPHATE SERPL-MCNC: 3 MG/DL (ref 2.7–4.5)
PHOSPHATE SERPL-MCNC: 5.4 MG/DL (ref 2.7–4.5)
PIP: 25
PLATELET # BLD AUTO: 129 K/UL (ref 150–350)
PLATELET # BLD AUTO: 40 K/UL (ref 150–350)
PLATELET # BLD AUTO: 43 K/UL (ref 150–350)
PLATELET # BLD AUTO: 58 K/UL (ref 150–350)
PLATELET # BLD AUTO: 66 K/UL (ref 150–350)
PLATELET # BLD AUTO: 66 K/UL (ref 150–350)
PLATELET # BLD AUTO: 76 K/UL (ref 150–350)
PLATELET BLD QL SMEAR: ABNORMAL
PMV BLD AUTO: 11.3 FL (ref 9.2–12.9)
PMV BLD AUTO: 11.7 FL (ref 9.2–12.9)
PMV BLD AUTO: 11.7 FL (ref 9.2–12.9)
PMV BLD AUTO: 11.8 FL (ref 9.2–12.9)
PMV BLD AUTO: 12.2 FL (ref 9.2–12.9)
PMV BLD AUTO: 12.4 FL (ref 9.2–12.9)
PMV BLD AUTO: ABNORMAL FL (ref 9.2–12.9)
PO2 BLDA: 100 MMHG (ref 80–100)
PO2 BLDA: 124 MMHG (ref 80–100)
PO2 BLDA: 145 MMHG (ref 80–100)
PO2 BLDA: 160 MMHG (ref 80–100)
PO2 BLDA: 173 MMHG (ref 80–100)
PO2 BLDA: 176 MMHG (ref 80–100)
PO2 BLDA: 181 MMHG (ref 80–100)
PO2 BLDA: 311 MMHG (ref 80–100)
PO2 BLDA: 332 MMHG (ref 80–100)
PO2 BLDA: 335 MMHG (ref 80–100)
PO2 BLDA: 365 MMHG (ref 80–100)
PO2 BLDA: 51 MMHG (ref 40–60)
PO2 BLDA: 98 MMHG (ref 80–100)
POC BE: -1 MMOL/L
POC BE: -16 MMOL/L
POC BE: -2 MMOL/L
POC BE: -2 MMOL/L
POC BE: -3 MMOL/L
POC BE: -4 MMOL/L
POC BE: -4 MMOL/L
POC BE: 1 MMOL/L
POC BE: 4 MMOL/L
POC BE: 5 MMOL/L
POC IONIZED CALCIUM: 0.49 MMOL/L (ref 1.06–1.42)
POC IONIZED CALCIUM: 0.96 MMOL/L (ref 1.06–1.42)
POC IONIZED CALCIUM: 0.99 MMOL/L (ref 1.06–1.42)
POC IONIZED CALCIUM: 1.01 MMOL/L (ref 1.06–1.42)
POC IONIZED CALCIUM: 1.04 MMOL/L (ref 1.06–1.42)
POC IONIZED CALCIUM: 1.04 MMOL/L (ref 1.06–1.42)
POC IONIZED CALCIUM: 1.05 MMOL/L (ref 1.06–1.42)
POC IONIZED CALCIUM: 1.08 MMOL/L (ref 1.06–1.42)
POC SATURATED O2: 100 % (ref 95–100)
POC SATURATED O2: 82 % (ref 95–100)
POC SATURATED O2: 97 % (ref 95–100)
POC SATURATED O2: 98 % (ref 95–100)
POC SATURATED O2: 99 % (ref 95–100)
POC TCO2: 23 MMOL/L (ref 23–27)
POC TCO2: 24 MMOL/L (ref 23–27)
POC TCO2: 24 MMOL/L (ref 23–27)
POC TCO2: 25 MMOL/L (ref 23–27)
POC TCO2: 25 MMOL/L (ref 24–29)
POC TCO2: 26 MMOL/L (ref 23–27)
POC TCO2: 26 MMOL/L (ref 23–27)
POC TCO2: 27 MMOL/L (ref 23–27)
POC TCO2: 29 MMOL/L (ref 23–27)
POC TCO2: 29 MMOL/L (ref 23–27)
POC TCO2: 9 MMOL/L (ref 23–27)
POCT GLUCOSE: 103 MG/DL (ref 70–110)
POCT GLUCOSE: 115 MG/DL (ref 70–110)
POCT GLUCOSE: 115 MG/DL (ref 70–110)
POCT GLUCOSE: 140 MG/DL (ref 70–110)
POCT GLUCOSE: 173 MG/DL (ref 70–110)
POCT GLUCOSE: 207 MG/DL (ref 70–110)
POCT GLUCOSE: 224 MG/DL (ref 70–110)
POCT GLUCOSE: 227 MG/DL (ref 70–110)
POCT GLUCOSE: 234 MG/DL (ref 70–110)
POCT GLUCOSE: 236 MG/DL (ref 70–110)
POCT GLUCOSE: 237 MG/DL (ref 70–110)
POCT GLUCOSE: 242 MG/DL (ref 70–110)
POCT GLUCOSE: 248 MG/DL (ref 70–110)
POCT GLUCOSE: 249 MG/DL (ref 70–110)
POCT GLUCOSE: 256 MG/DL (ref 70–110)
POCT GLUCOSE: 279 MG/DL (ref 70–110)
POCT GLUCOSE: 292 MG/DL (ref 70–110)
POCT GLUCOSE: 299 MG/DL (ref 70–110)
POCT GLUCOSE: 319 MG/DL (ref 70–110)
POCT GLUCOSE: 324 MG/DL (ref 70–110)
POCT GLUCOSE: 335 MG/DL (ref 70–110)
POCT GLUCOSE: 98 MG/DL (ref 70–110)
POIKILOCYTOSIS BLD QL SMEAR: SLIGHT
POLYCHROMASIA BLD QL SMEAR: ABNORMAL
POTASSIUM BLD-SCNC: 2.9 MMOL/L (ref 3.5–5.1)
POTASSIUM BLD-SCNC: 3.2 MMOL/L (ref 3.5–5.1)
POTASSIUM BLD-SCNC: 3.4 MMOL/L (ref 3.5–5.1)
POTASSIUM BLD-SCNC: 3.6 MMOL/L (ref 3.5–5.1)
POTASSIUM BLD-SCNC: 3.6 MMOL/L (ref 3.5–5.1)
POTASSIUM BLD-SCNC: 3.7 MMOL/L (ref 3.5–5.1)
POTASSIUM BLD-SCNC: 3.7 MMOL/L (ref 3.5–5.1)
POTASSIUM BLD-SCNC: <2 MMOL/L (ref 3.5–5.1)
POTASSIUM SERPL-SCNC: 2.8 MMOL/L (ref 3.5–5.1)
POTASSIUM SERPL-SCNC: 3.1 MMOL/L (ref 3.5–5.1)
POTASSIUM SERPL-SCNC: 3.1 MMOL/L (ref 3.5–5.1)
POTASSIUM SERPL-SCNC: 3.7 MMOL/L (ref 3.5–5.1)
POTASSIUM SERPL-SCNC: 3.7 MMOL/L (ref 3.5–5.1)
POTASSIUM SERPL-SCNC: 3.8 MMOL/L (ref 3.5–5.1)
PROT SERPL-MCNC: 4.4 G/DL (ref 6–8.4)
PROTHROMBIN TIME: 16 SEC (ref 9–12.5)
PROTHROMBIN TIME: 16.7 SEC (ref 9–12.5)
PROTHROMBIN TIME: 17 SEC (ref 9–12.5)
PROTHROMBIN TIME: 17.4 SEC (ref 9–12.5)
PROTHROMBIN TIME: 17.6 SEC (ref 9–12.5)
PROTHROMBIN TIME: 17.6 SEC (ref 9–12.5)
PROTHROMBIN TIME: 17.7 SEC (ref 9–12.5)
PROTHROMBIN TIME: 17.7 SEC (ref 9–12.5)
RBC # BLD AUTO: 2.72 M/UL (ref 4.6–6.2)
RBC # BLD AUTO: 2.79 M/UL (ref 4.6–6.2)
RBC # BLD AUTO: 2.83 M/UL (ref 4.6–6.2)
RBC # BLD AUTO: 2.83 M/UL (ref 4.6–6.2)
RBC # BLD AUTO: 2.95 M/UL (ref 4.6–6.2)
RBC # BLD AUTO: 3.02 M/UL (ref 4.6–6.2)
RBC # BLD AUTO: 3.15 M/UL (ref 4.6–6.2)
SAMPLE: ABNORMAL
SITE: ABNORMAL
SODIUM BLD-SCNC: 133 MMOL/L (ref 136–145)
SODIUM BLD-SCNC: 134 MMOL/L (ref 136–145)
SODIUM BLD-SCNC: 134 MMOL/L (ref 136–145)
SODIUM BLD-SCNC: 135 MMOL/L (ref 136–145)
SODIUM BLD-SCNC: 135 MMOL/L (ref 136–145)
SODIUM BLD-SCNC: 136 MMOL/L (ref 136–145)
SODIUM BLD-SCNC: 138 MMOL/L (ref 136–145)
SODIUM BLD-SCNC: 151 MMOL/L (ref 136–145)
SODIUM SERPL-SCNC: 137 MMOL/L (ref 136–145)
SODIUM SERPL-SCNC: 138 MMOL/L (ref 136–145)
SODIUM SERPL-SCNC: 138 MMOL/L (ref 136–145)
SODIUM SERPL-SCNC: 139 MMOL/L (ref 136–145)
SP02: 98
TACROLIMUS BLD-MCNC: <1.5 NG/ML (ref 5–15)
VT: 520
WBC # BLD AUTO: 10.24 K/UL (ref 3.9–12.7)
WBC # BLD AUTO: 23.77 K/UL (ref 3.9–12.7)
WBC # BLD AUTO: 8.8 K/UL (ref 3.9–12.7)
WBC # BLD AUTO: 9.12 K/UL (ref 3.9–12.7)
WBC # BLD AUTO: 9.37 K/UL (ref 3.9–12.7)
WBC # BLD AUTO: 9.45 K/UL (ref 3.9–12.7)
WBC # BLD AUTO: 9.45 K/UL (ref 3.9–12.7)

## 2020-05-18 PROCEDURE — 63600175 PHARM REV CODE 636 W HCPCS: Performed by: TRANSPLANT SURGERY

## 2020-05-18 PROCEDURE — 94770 HC EXHALED C02 TEST: CPT

## 2020-05-18 PROCEDURE — P9045 ALBUMIN (HUMAN), 5%, 250 ML: HCPCS | Mod: JG

## 2020-05-18 PROCEDURE — 37799 UNLISTED PX VASCULAR SURGERY: CPT

## 2020-05-18 PROCEDURE — 94002 VENT MGMT INPAT INIT DAY: CPT

## 2020-05-18 PROCEDURE — S0028 INJECTION, FAMOTIDINE, 20 MG: HCPCS | Performed by: SURGERY

## 2020-05-18 PROCEDURE — 94761 N-INVAS EAR/PLS OXIMETRY MLT: CPT

## 2020-05-18 PROCEDURE — 25000003 PHARM REV CODE 250: Performed by: STUDENT IN AN ORGANIZED HEALTH CARE EDUCATION/TRAINING PROGRAM

## 2020-05-18 PROCEDURE — 85025 COMPLETE CBC W/AUTO DIFF WBC: CPT

## 2020-05-18 PROCEDURE — 93010 EKG 12-LEAD: ICD-10-PCS | Mod: ,,, | Performed by: INTERNAL MEDICINE

## 2020-05-18 PROCEDURE — 99233 SBSQ HOSP IP/OBS HIGH 50: CPT | Mod: ,,, | Performed by: NURSE PRACTITIONER

## 2020-05-18 PROCEDURE — 84100 ASSAY OF PHOSPHORUS: CPT

## 2020-05-18 PROCEDURE — 63600175 PHARM REV CODE 636 W HCPCS: Performed by: STUDENT IN AN ORGANIZED HEALTH CARE EDUCATION/TRAINING PROGRAM

## 2020-05-18 PROCEDURE — 86920 COMPATIBILITY TEST SPIN: CPT

## 2020-05-18 PROCEDURE — 85730 THROMBOPLASTIN TIME PARTIAL: CPT | Mod: 91

## 2020-05-18 PROCEDURE — 85007 BL SMEAR W/DIFF WBC COUNT: CPT

## 2020-05-18 PROCEDURE — 82150 ASSAY OF AMYLASE: CPT

## 2020-05-18 PROCEDURE — 80048 BASIC METABOLIC PNL TOTAL CA: CPT

## 2020-05-18 PROCEDURE — 94003 VENT MGMT INPAT SUBQ DAY: CPT

## 2020-05-18 PROCEDURE — 93010 ELECTROCARDIOGRAM REPORT: CPT | Mod: ,,, | Performed by: INTERNAL MEDICINE

## 2020-05-18 PROCEDURE — 25000003 PHARM REV CODE 250: Performed by: PHYSICIAN ASSISTANT

## 2020-05-18 PROCEDURE — 99900035 HC TECH TIME PER 15 MIN (STAT)

## 2020-05-18 PROCEDURE — 85384 FIBRINOGEN ACTIVITY: CPT

## 2020-05-18 PROCEDURE — 20000000 HC ICU ROOM

## 2020-05-18 PROCEDURE — 25000003 PHARM REV CODE 250: Performed by: SURGERY

## 2020-05-18 PROCEDURE — 99233 PR SUBSEQUENT HOSPITAL CARE,LEVL III: ICD-10-PCS | Mod: ,,, | Performed by: NURSE PRACTITIONER

## 2020-05-18 PROCEDURE — 99900017 HC EXTUBATION W/PARAMETERS (STAT)

## 2020-05-18 PROCEDURE — 63600175 PHARM REV CODE 636 W HCPCS: Mod: JG

## 2020-05-18 PROCEDURE — 99900026 HC AIRWAY MAINTENANCE (STAT)

## 2020-05-18 PROCEDURE — S5010 5% DEXTROSE AND 0.45% SALINE: HCPCS | Performed by: SURGERY

## 2020-05-18 PROCEDURE — 80053 COMPREHEN METABOLIC PANEL: CPT | Mod: 91

## 2020-05-18 PROCEDURE — 93005 ELECTROCARDIOGRAM TRACING: CPT

## 2020-05-18 PROCEDURE — 80053 COMPREHEN METABOLIC PANEL: CPT

## 2020-05-18 PROCEDURE — 94150 VITAL CAPACITY TEST: CPT

## 2020-05-18 PROCEDURE — 83735 ASSAY OF MAGNESIUM: CPT | Mod: 91

## 2020-05-18 PROCEDURE — 86901 BLOOD TYPING SEROLOGIC RH(D): CPT

## 2020-05-18 PROCEDURE — 85610 PROTHROMBIN TIME: CPT

## 2020-05-18 PROCEDURE — 82330 ASSAY OF CALCIUM: CPT

## 2020-05-18 PROCEDURE — 83615 LACTATE (LD) (LDH) ENZYME: CPT

## 2020-05-18 PROCEDURE — 85027 COMPLETE CBC AUTOMATED: CPT | Mod: 91

## 2020-05-18 PROCEDURE — 82803 BLOOD GASES ANY COMBINATION: CPT

## 2020-05-18 PROCEDURE — 83605 ASSAY OF LACTIC ACID: CPT | Mod: 91

## 2020-05-18 PROCEDURE — 27000221 HC OXYGEN, UP TO 24 HOURS

## 2020-05-18 PROCEDURE — 63600175 PHARM REV CODE 636 W HCPCS: Performed by: SURGERY

## 2020-05-18 PROCEDURE — 85610 PROTHROMBIN TIME: CPT | Mod: 91

## 2020-05-18 PROCEDURE — 83605 ASSAY OF LACTIC ACID: CPT

## 2020-05-18 PROCEDURE — 80197 ASSAY OF TACROLIMUS: CPT

## 2020-05-18 PROCEDURE — 84450 TRANSFERASE (AST) (SGOT): CPT | Mod: 91

## 2020-05-18 PROCEDURE — 82248 BILIRUBIN DIRECT: CPT

## 2020-05-18 PROCEDURE — 85730 THROMBOPLASTIN TIME PARTIAL: CPT

## 2020-05-18 RX ORDER — FUROSEMIDE 10 MG/ML
20 INJECTION INTRAMUSCULAR; INTRAVENOUS ONCE
Status: COMPLETED | OUTPATIENT
Start: 2020-05-18 | End: 2020-05-18

## 2020-05-18 RX ORDER — ALBUMIN HUMAN 50 G/1000ML
25 SOLUTION INTRAVENOUS ONCE
Status: COMPLETED | OUTPATIENT
Start: 2020-05-18 | End: 2020-05-18

## 2020-05-18 RX ORDER — DEXTROSE MONOHYDRATE AND SODIUM CHLORIDE 5; .45 G/100ML; G/100ML
INJECTION, SOLUTION INTRAVENOUS CONTINUOUS
Status: DISCONTINUED | OUTPATIENT
Start: 2020-05-18 | End: 2020-05-18

## 2020-05-18 RX ORDER — PREDNISONE 20 MG/1
20 TABLET ORAL DAILY
Status: DISCONTINUED | OUTPATIENT
Start: 2020-05-24 | End: 2020-05-24 | Stop reason: HOSPADM

## 2020-05-18 RX ORDER — FENTANYL CITRATE 50 UG/ML
100 INJECTION, SOLUTION INTRAMUSCULAR; INTRAVENOUS ONCE
Status: COMPLETED | OUTPATIENT
Start: 2020-05-18 | End: 2020-05-18

## 2020-05-18 RX ORDER — QUETIAPINE FUMARATE 25 MG/1
25 TABLET, FILM COATED ORAL DAILY
Status: DISCONTINUED | OUTPATIENT
Start: 2020-05-18 | End: 2020-05-20

## 2020-05-18 RX ORDER — MAGNESIUM SULFATE HEPTAHYDRATE 40 MG/ML
4 INJECTION, SOLUTION INTRAVENOUS
Status: DISCONTINUED | OUTPATIENT
Start: 2020-05-18 | End: 2020-05-19

## 2020-05-18 RX ORDER — MYCOPHENOLATE MOFETIL 250 MG/1
1000 CAPSULE ORAL 2 TIMES DAILY
Qty: 240 CAPSULE | Refills: 2 | Status: SHIPPED | OUTPATIENT
Start: 2020-05-18 | End: 2020-08-04 | Stop reason: SDUPTHER

## 2020-05-18 RX ORDER — ALBUMIN HUMAN 50 G/1000ML
SOLUTION INTRAVENOUS
Status: COMPLETED
Start: 2020-05-18 | End: 2020-05-18

## 2020-05-18 RX ORDER — ALBUMIN HUMAN 50 G/1000ML
25 SOLUTION INTRAVENOUS ONCE
Status: DISCONTINUED | OUTPATIENT
Start: 2020-05-18 | End: 2020-05-18 | Stop reason: ALTCHOICE

## 2020-05-18 RX ORDER — HYDROMORPHONE HYDROCHLORIDE 1 MG/ML
0.5 INJECTION, SOLUTION INTRAMUSCULAR; INTRAVENOUS; SUBCUTANEOUS
Status: DISCONTINUED | OUTPATIENT
Start: 2020-05-18 | End: 2020-05-19

## 2020-05-18 RX ORDER — MAGNESIUM SULFATE HEPTAHYDRATE 40 MG/ML
2 INJECTION, SOLUTION INTRAVENOUS
Status: DISCONTINUED | OUTPATIENT
Start: 2020-05-18 | End: 2020-05-19

## 2020-05-18 RX ORDER — HEPARIN SODIUM 5000 [USP'U]/ML
5000 INJECTION, SOLUTION INTRAVENOUS; SUBCUTANEOUS EVERY 8 HOURS
Status: DISCONTINUED | OUTPATIENT
Start: 2020-05-19 | End: 2020-05-24 | Stop reason: HOSPADM

## 2020-05-18 RX ORDER — FENTANYL CITRATE 50 UG/ML
INJECTION, SOLUTION INTRAMUSCULAR; INTRAVENOUS
Status: COMPLETED
Start: 2020-05-18 | End: 2020-05-18

## 2020-05-18 RX ORDER — NYSTATIN 100000 [USP'U]/ML
500000 SUSPENSION ORAL
Status: DISCONTINUED | OUTPATIENT
Start: 2020-05-18 | End: 2020-05-20

## 2020-05-18 RX ORDER — VALGANCICLOVIR 450 MG/1
450 TABLET, FILM COATED ORAL DAILY
Status: DISCONTINUED | OUTPATIENT
Start: 2020-05-28 | End: 2020-05-24 | Stop reason: HOSPADM

## 2020-05-18 RX ORDER — METHYLPREDNISOLONE SOD SUCC 125 MG
100 VIAL (EA) INJECTION EVERY 12 HOURS
Status: COMPLETED | OUTPATIENT
Start: 2020-05-19 | End: 2020-05-19

## 2020-05-18 RX ORDER — MUPIROCIN 20 MG/G
1 OINTMENT TOPICAL 2 TIMES DAILY
Status: COMPLETED | OUTPATIENT
Start: 2020-05-18 | End: 2020-05-22

## 2020-05-18 RX ORDER — MYCOPHENOLATE MOFETIL 200 MG/ML
1000 POWDER, FOR SUSPENSION ORAL 2 TIMES DAILY
Status: DISCONTINUED | OUTPATIENT
Start: 2020-05-18 | End: 2020-05-20

## 2020-05-18 RX ORDER — FAMOTIDINE 20 MG/1
20 TABLET, FILM COATED ORAL NIGHTLY
Qty: 30 TABLET | Refills: 0 | Status: SHIPPED | OUTPATIENT
Start: 2020-05-18 | End: 2023-02-27

## 2020-05-18 RX ORDER — FAMOTIDINE 10 MG/ML
20 INJECTION INTRAVENOUS EVERY 12 HOURS
Status: DISCONTINUED | OUTPATIENT
Start: 2020-05-18 | End: 2020-05-19

## 2020-05-18 RX ORDER — POTASSIUM CHLORIDE 29.8 MG/ML
80 INJECTION INTRAVENOUS
Status: DISCONTINUED | OUTPATIENT
Start: 2020-05-18 | End: 2020-05-19

## 2020-05-18 RX ORDER — METHYLPREDNISOLONE SOD SUCC 125 MG
60 VIAL (EA) INJECTION EVERY 12 HOURS
Status: COMPLETED | OUTPATIENT
Start: 2020-05-21 | End: 2020-05-21

## 2020-05-18 RX ORDER — METHYLPREDNISOLONE SOD SUCC 125 MG
80 VIAL (EA) INJECTION EVERY 12 HOURS
Status: COMPLETED | OUTPATIENT
Start: 2020-05-20 | End: 2020-05-20

## 2020-05-18 RX ORDER — PROPOFOL 10 MG/ML
5 INJECTION, EMULSION INTRAVENOUS CONTINUOUS
Status: DISCONTINUED | OUTPATIENT
Start: 2020-05-18 | End: 2020-05-19

## 2020-05-18 RX ORDER — VALGANCICLOVIR 450 MG/1
450 TABLET, FILM COATED ORAL DAILY
Qty: 30 TABLET | Refills: 2 | Status: SHIPPED | OUTPATIENT
Start: 2020-05-18 | End: 2021-02-08 | Stop reason: ALTCHOICE

## 2020-05-18 RX ORDER — PANTOPRAZOLE SODIUM 40 MG/10ML
40 INJECTION, POWDER, LYOPHILIZED, FOR SOLUTION INTRAVENOUS DAILY
Status: DISCONTINUED | OUTPATIENT
Start: 2020-05-18 | End: 2020-05-18

## 2020-05-18 RX ORDER — SULFAMETHOXAZOLE AND TRIMETHOPRIM 400; 80 MG/1; MG/1
1 TABLET ORAL EVERY MORNING
Status: DISCONTINUED | OUTPATIENT
Start: 2020-05-25 | End: 2020-05-24 | Stop reason: HOSPADM

## 2020-05-18 RX ORDER — SODIUM CHLORIDE 450 MG/100ML
INJECTION, SOLUTION INTRAVENOUS CONTINUOUS
Status: DISCONTINUED | OUTPATIENT
Start: 2020-05-18 | End: 2020-05-19

## 2020-05-18 RX ORDER — TACROLIMUS 1 MG/1
6 CAPSULE ORAL EVERY 12 HOURS
Qty: 360 CAPSULE | Refills: 11 | Status: SHIPPED | OUTPATIENT
Start: 2020-05-18 | End: 2020-05-22 | Stop reason: SDUPTHER

## 2020-05-18 RX ORDER — POTASSIUM CHLORIDE 14.9 MG/ML
60 INJECTION INTRAVENOUS
Status: DISCONTINUED | OUTPATIENT
Start: 2020-05-18 | End: 2020-05-19

## 2020-05-18 RX ORDER — POTASSIUM CHLORIDE 29.8 MG/ML
40 INJECTION INTRAVENOUS
Status: DISCONTINUED | OUTPATIENT
Start: 2020-05-18 | End: 2020-05-19

## 2020-05-18 RX ORDER — SULFAMETHOXAZOLE AND TRIMETHOPRIM 400; 80 MG/1; MG/1
1 TABLET ORAL DAILY
Qty: 30 TABLET | Refills: 5 | Status: SHIPPED | OUTPATIENT
Start: 2020-05-18 | End: 2020-11-14

## 2020-05-18 RX ORDER — PROPOFOL 10 MG/ML
5 INJECTION, EMULSION INTRAVENOUS CONTINUOUS PRN
Status: DISCONTINUED | OUTPATIENT
Start: 2020-05-18 | End: 2020-05-18

## 2020-05-18 RX ORDER — PREDNISONE 5 MG/1
TABLET ORAL
Qty: 70 TABLET | Refills: 0 | Status: SHIPPED | OUTPATIENT
Start: 2020-05-18 | End: 2021-02-08 | Stop reason: ALTCHOICE

## 2020-05-18 RX ADMIN — Medication 1000 MG: at 09:05

## 2020-05-18 RX ADMIN — TAMSULOSIN HYDROCHLORIDE 0.4 MG: 0.4 CAPSULE ORAL at 08:05

## 2020-05-18 RX ADMIN — Medication 1000 MG: at 04:05

## 2020-05-18 RX ADMIN — ALBUMIN (HUMAN) 25 G: 12.5 SOLUTION INTRAVENOUS at 02:05

## 2020-05-18 RX ADMIN — ALBUMIN HUMAN 25 G: 50 SOLUTION INTRAVENOUS at 02:05

## 2020-05-18 RX ADMIN — PIPERACILLIN AND TAZOBACTAM 4.5 G: 4; .5 INJECTION, POWDER, FOR SOLUTION INTRAVENOUS at 09:05

## 2020-05-18 RX ADMIN — Medication 1 MG: at 05:05

## 2020-05-18 RX ADMIN — PIPERACILLIN AND TAZOBACTAM 4.5 G: 4; .5 INJECTION, POWDER, FOR SOLUTION INTRAVENOUS at 05:05

## 2020-05-18 RX ADMIN — PHYTONADIONE 10 MG: 10 INJECTION, EMULSION INTRAMUSCULAR; INTRAVENOUS; SUBCUTANEOUS at 02:05

## 2020-05-18 RX ADMIN — NYSTATIN 500000 UNITS: 500000 SUSPENSION ORAL at 08:05

## 2020-05-18 RX ADMIN — NYSTATIN 500000 UNITS: 500000 SUSPENSION ORAL at 09:05

## 2020-05-18 RX ADMIN — MUPIROCIN 1 G: 20 OINTMENT TOPICAL at 09:05

## 2020-05-18 RX ADMIN — ALBUMIN HUMAN 25 G: 50 SOLUTION INTRAVENOUS at 03:05

## 2020-05-18 RX ADMIN — HYDROCODONE BITARTRATE AND ACETAMINOPHEN 1 TABLET: 10; 325 TABLET ORAL at 09:05

## 2020-05-18 RX ADMIN — POTASSIUM CHLORIDE 40 MEQ: 29.8 INJECTION, SOLUTION INTRAVENOUS at 03:05

## 2020-05-18 RX ADMIN — FUROSEMIDE 20 MG: 10 INJECTION, SOLUTION INTRAMUSCULAR; INTRAVENOUS at 11:05

## 2020-05-18 RX ADMIN — QUETIAPINE FUMARATE 25 MG: 25 TABLET ORAL at 01:05

## 2020-05-18 RX ADMIN — OXYBUTYNIN CHLORIDE 5 MG: 5 TABLET ORAL at 08:05

## 2020-05-18 RX ADMIN — PHYTONADIONE 10 MG: 10 INJECTION, EMULSION INTRAMUSCULAR; INTRAVENOUS; SUBCUTANEOUS at 06:05

## 2020-05-18 RX ADMIN — ALBUMIN (HUMAN) 25 G: 12.5 SOLUTION INTRAVENOUS at 03:05

## 2020-05-18 RX ADMIN — HYDROMORPHONE HYDROCHLORIDE 0.5 MG: 1 INJECTION, SOLUTION INTRAMUSCULAR; INTRAVENOUS; SUBCUTANEOUS at 02:05

## 2020-05-18 RX ADMIN — FENTANYL CITRATE 100 MCG: 50 INJECTION INTRAMUSCULAR; INTRAVENOUS at 02:05

## 2020-05-18 RX ADMIN — FENTANYL CITRATE 100 MCG: 50 INJECTION INTRAMUSCULAR; INTRAVENOUS at 01:05

## 2020-05-18 RX ADMIN — DEXTROSE AND SODIUM CHLORIDE: 5; .45 INJECTION, SOLUTION INTRAVENOUS at 01:05

## 2020-05-18 RX ADMIN — MAGNESIUM SULFATE IN WATER 2 G: 40 INJECTION, SOLUTION INTRAVENOUS at 03:05

## 2020-05-18 RX ADMIN — FAMOTIDINE 20 MG: 10 INJECTION, SOLUTION INTRAVENOUS at 08:05

## 2020-05-18 RX ADMIN — DEXTROSE 300 MG: 5 SOLUTION INTRAVENOUS at 03:05

## 2020-05-18 RX ADMIN — FAMOTIDINE 20 MG: 10 INJECTION, SOLUTION INTRAVENOUS at 09:05

## 2020-05-18 RX ADMIN — Medication 1 MG: at 08:05

## 2020-05-18 RX ADMIN — CALCIUM GLUCONATE 2 G: 98 INJECTION, SOLUTION INTRAVENOUS at 03:05

## 2020-05-18 RX ADMIN — DEXTROSE 300 MG: 5 SOLUTION INTRAVENOUS at 02:05

## 2020-05-18 RX ADMIN — NYSTATIN 500000 UNITS: 500000 SUSPENSION ORAL at 01:05

## 2020-05-18 RX ADMIN — MUPIROCIN 1 G: 20 OINTMENT TOPICAL at 08:05

## 2020-05-18 RX ADMIN — ESCITALOPRAM OXALATE 20 MG: 10 TABLET ORAL at 08:05

## 2020-05-18 RX ADMIN — SODIUM CHLORIDE 37.6 UNITS/HR: 9 INJECTION, SOLUTION INTRAVENOUS at 02:05

## 2020-05-18 RX ADMIN — PHYTONADIONE 10 MG: 10 INJECTION, EMULSION INTRAMUSCULAR; INTRAVENOUS; SUBCUTANEOUS at 09:05

## 2020-05-18 RX ADMIN — POTASSIUM CHLORIDE 80 MEQ: 29.8 INJECTION, SOLUTION INTRAVENOUS at 10:05

## 2020-05-18 RX ADMIN — HYDROMORPHONE HYDROCHLORIDE 0.5 MG: 1 INJECTION, SOLUTION INTRAMUSCULAR; INTRAVENOUS; SUBCUTANEOUS at 05:05

## 2020-05-18 RX ADMIN — FENTANYL CITRATE 100 MCG: 50 INJECTION, SOLUTION INTRAMUSCULAR; INTRAVENOUS at 01:05

## 2020-05-18 RX ADMIN — SODIUM CHLORIDE 20.6 UNITS/HR: 9 INJECTION, SOLUTION INTRAVENOUS at 11:05

## 2020-05-18 RX ADMIN — HYDROMORPHONE HYDROCHLORIDE 0.5 MG: 1 INJECTION, SOLUTION INTRAMUSCULAR; INTRAVENOUS; SUBCUTANEOUS at 08:05

## 2020-05-18 NOTE — OR NURSING
Family updated throughout procedure via telephone by Bushra MONTENEGRO.  Updated at end of procedure by Dr. Thakkar.  Donor vessels and tissues delivered to blood bank per protocol by CONSTANCE Ghotra RN.

## 2020-05-18 NOTE — OP NOTE
Operative Report    Date of Procedure: 5/17/2020    Surgeon: Ed Peace MD  First Assistant: PAULIE Crenshaw    Pre-operative Diagnosis: Allograft liver for transplantation  Post-operative Diagnosis: Same    Procedure(s) Performed:   1. Back Table Preparation of Liver with needle biopsy.    Anesthesia: Not applicable  Estimated Blood Loss: Not applicable  Fluids Administered: Not applicable    Findings: Estimated steatosis 0-10%, normal vascular anatomy.  Drains: Not applicable  Specimens: Core biopsy of allograft liver      Preamble  Indications: This report describes only the backbench preparation of the liver prior to transplantation.  The transplant operation itself is described in a separate report.    ABO Confirmation: Immediately following arrival of the donor organ and prior to implantation, a formal ABO confirmation was done according to hospital and [facility] policies.  I confirmed the [facility] ID number of the donor organ and the donor and recipient ABO types, directly verifying these data by comparison with the [facility] report.  This confirmation was personally done by an attending surgeon and circulating nurse, and is officially documented elsewhere.    Time-Out: A complete time out was carried out prior to the procedure, with confirmation of patient identity, correct procedure, correct operative site, appropriate antibiotic prophylaxis, review of any known allergies, and presence of all needed equipment.    Procedure in Detail  The liver was recovered from the transport cooler and inspected for vascular anatomy and overall suitability for transplantation, with findings as noted above.  The remnant of the diaphragm was dissected away.  The phrenic veins and adrenal vein were identified and ligated or sutured as appropriate.  The portal vein and hepatic artery were mobilized toward the hilum of the liver, and extrahepatic branches were ligated.  No vascular reconstruction was required.  The vessels  were tested for leaks.  A needle biopsy was obtained for permanent section histology using a spring-loaded biopsy device. The liver was kept in ice temperature organ preservation solution until the time of implantation.

## 2020-05-18 NOTE — OP NOTE
Certification of Assistant at Surgery       Surgery Date: 5/17/2020     Participating Surgeons:  Surgeon(s) and Role:     * Danny Thakkar MD - Primary     * Ed Peace MD - Assisting     * Nicky Iverson MD - Fellow    Procedures:  Procedure(s) (LRB):  TRANSPLANT, LIVER (N/A)    Assistant Surgeon's Certification of Necessity:  I understand that section 1842 (b) (6) (d) of the Social Security Act generally prohibits Medicare Part B reasonable charge payment for the services of assistants at surgery in teaching hospitals when qualified residents are available to furnish such services. I certify that the services for which payment is claimed were medically necessary, and that no qualified resident was available to perform the services. I further understand that these services are subject to post-payment review by the Medicare carrier.      Ed Peace MD    05/17/2020  9:27 PM

## 2020-05-18 NOTE — PROGRESS NOTES
"Transplant Note:     SW attempted to visit pt at bedside twice. Pt was observed sedated and resting. Thus SW unable to complete SW with Pt. SW contacted pt's caregiver Suzanne Malik ([phone]). Caregiver reported coping "alright", further explaining emotional difficulty with distance from pt, due to COVID-19 protocol of not allowing caregivers in hospital setting with pt, as COVID-19 precaution. SW offered emotional support.     SW explained to caregiver the financial report and [facility] information. Caregiver reported will be present at the time of discharge to transport pt home. Caregiver stated will be rotating with back up caregiver, to ensure pt does have a caregiver residing with pt locally to assist pt 24/7. SW inquired for e-mail address to provide financial report to caregiver, caregiver reported not having an e-mail to received emailed documents. Thus SW will attempt to visit pt at bedside, If pt presents aaox4, SW will  provide financial report. If SW visit unsuccessfully complete, SW will attempt to complete report with caregiver via telephone. SW has emailed  apartment to place pt on waitlist. [facility] confirmed pt's addition to waitlist. SW providing ongoing psychosocial and counseling support, education, resources, assistance and discharge planning as indicated. Following and available.     "

## 2020-05-18 NOTE — TELEPHONE ENCOUNTER
"Called patient's mother who is his primary caregiver.  Inquired about how this coordinator could get post transplant education materials to her.  She states she is still at home and does not plan to come to [place] until Mr. Reilly is discharged to [facility].  She states she does not have an email and does not know anyone who has one.  Confirmed mailing address and will mail post transplant booklet " My New Journey: Living Safely After My Liver Transplant". Encouraged her to read booklet once received so that teaching could take place over the phone.     Patient's mother not allowed to stay in hospital due to Covid 19 precautions.   "

## 2020-05-18 NOTE — RESPIRATORY THERAPY
Patient received from OR to SICU [id number]. Patient intubated with #7.5/ 22 @ the [facility]. Patient placed on documented vent settings. Tolerating well. Ambu bag at bedside. Will continue to monitor.

## 2020-05-18 NOTE — PLAN OF CARE
Recommendations  1. When able to extubate, ADAT to Diabetic with texture per SLP.   2. If unable to extubate, recommend intiating trickle TF of Peptamen Intense VHP.   3. Post transplant diet education to be provided once stepped down to TSU and appropriate for education.   4. Hand  strength to be updated once transferred to TSU.   5. RD to monitor.    Goals: Patient to receive nutrition by RD follow-up  Nutrition Goal Status: new    Full assessment completed, see RD Note 5/18/2020.

## 2020-05-18 NOTE — CONSULTS
"  [facility]  Adult Nutrition  Consult Note    SUMMARY     Recommendations  1. When able to extubate, ADAT to Diabetic with texture per SLP.   2. If unable to extubate, recommend intiating trickle TF of Peptamen Intense VHP.   3. Post transplant diet education to be provided once stepped down to TSU and appropriate for education.   4. Hand  strength to be updated once transferred to TSU.   5. RD to monitor.    Goals: Patient to receive nutrition by RD follow-up  Nutrition Goal Status: new  Communication of RD Recs: reviewed with RN    Reason for Assessment  Reason For Assessment: consult  Diagnosis: transplant/postoperative complications(s/p OLTx 5/17)  Relevant Medical History: ESLD 2/2 alcohol abuse, DM, HCV, HTN  Interdisciplinary Rounds: did not attend  General Information Comments: RD working remotely. Patient is POD#1 s/p OLTx. Remains intubated, sedated. Per chart review, patient's UBW ranged from 205-220lb over the past year. Admit weight within that range, indicates no weight loss and likely some fluid changes. Unable to determine PO intake PTA. Due to recent hospital wide restrictions to limit the spread of COVID-19, no NFPE performed at this time. NFPE to be performed at a later date.  Nutrition Discharge Planning: Post transplant diet education to be provided once stepped down to TSU and appropriate for education.    Nutrition Risk Screen  Nutrition Risk Screen: no indicators present    Nutrition/Diet History  Food Allergies: NKFA  Factors Affecting Nutritional Intake: NPO, on mechanical ventilation    Anthropometrics  Temp: 97 °F (36.1 °C)  Height Method: Stated  Height: 5' 11" (180.3 cm)  Height (inches): 71 in  Weight Method: Standard Scale(pt wearing heavy robe)  Weight: 99.9 kg (220 lb 3.8 oz)  Weight (lb): 220.24 lb  Ideal Body Weight (IBW), Male: 172 lb  % Ideal Body Weight, Male (lb): 125.61 %  BMI (Calculated): 30.7  BMI Grade: 30 - 34.9- obesity - grade " I    Lab/Procedures/Meds  Pertinent Labs Reviewed: reviewed  Pertinent Labs Comments: Glu 322, POCT Glu 249-335, HgbA1c 5.3, Alb 2.7  Pertinent Medications Reviewed: reviewed  Pertinent Medications Comments: famotidine, methylprednisolone, pantoprazole, prograf, insulin drip, propofol    Estimated/Assessed Needs  Weight Used For Calorie Calculations: 97.2 kg (214 lb 4.6 oz)(admit weight)  Energy Calorie Requirements (kcal): 2093 kcal/day  Energy Need Method: [facility]  Protein Requirements: 117-136g/day(1.2-1.4 g/kg)  Weight Used For Protein Calculations: 97.2 kg (214 lb 4.6 oz)(admit weight)  Fluid Requirements (mL): 1 mL/kcal or per MD  Estimated Fluid Requirement Method: RDA Method  RDA Method (mL): 2093    Nutrition Prescription Ordered  Current Diet Order: NPO    Evaluation of Received Nutrient/Fluid Intake  Other Calories (kcal): 554(propofol)  I/O: noted  Comments: LBM 5/17  % Intake of Estimated Energy Needs: 0 - 25 %  % Meal Intake: NPO    Nutrition Risk  Level of Risk/Frequency of Follow-up: high(2x/week)     Assessment and Plan  Nutrition Problem  Increased nutrient needs    Related to (etiology):   Physiological causes related to healing    Signs and Symptoms (as evidenced by):   S/p OLTx 5/17     Interventions (treatment strategy):  Collaboration of nutrition care with other providers    Nutrition Diagnosis Status:   New    Monitor and Evaluation  Food and Nutrient Intake: energy intake  Food and Nutrient Adminstration: diet order  Anthropometric Measurements: weight, weight change  Biochemical Data, Medical Tests and Procedures: electrolyte and renal panel, gastrointestinal profile, inflammatory profile  Nutrition-Focused Physical Findings: overall appearance     Nutrition Follow-Up  RD Follow-up?: Yes

## 2020-05-18 NOTE — PROGRESS NOTES
TRANSPLANT NOTE:    Admit Date: 5/15/2020    ORGAN:   LIVER  Disease Etiology: Alcoholic Cirrhosis  Donor CMV Status: Negative  Donor HCV Status: Negative  Donor HBcAb: Negative  Donor HBV CEZAR: Negative  Donor HCV CEZAR: Negative  Whole or Partial: Whole Liver  Biliary Anastomosis: End to End  Arterial Anatomy: Luanne Reilly is a 59 y.o. male s/p    Donation after Circulatory Death  liver transplant on 5/17/2020 (Liver) for Alcoholic Cirrhosis.  This patient will follow the Steroid Induction protocol.  This patients immunosuppression will include a steroid taper over 5 weeks, Cellcept for 3 months and Prograf maintenance.  Opportunistic infection prophylaxis will include Valcyte for 3 months (CMV D- R+), Bactrim for 6 months, and nystatin.  I have reviewed the pre-op medications and those have been restarted those, as appropriate.

## 2020-05-18 NOTE — H&P
History & Physical  Surgical Intensive Care    SUBJECTIVE:     Chief Complaint/Reason for Admission: ESLD 2/2 EtOH for OLTxp    History of Present Illness:  Patient is a 59 y.o. male with ESLD secondary to ETOH listed with MELD 22, DM, HCV s/p INF, and hx of an umbilical hernia s/p umbilical hernia repair on 2/22/20 who was admitted for paracentesis after some drainage was noted from hernia repair incision who then underwent OLTxp 5/18. The was was relatively uneventful, he received 1x pRBC, 2x FFP, 2x Plts, 1x cryo. He was brought to the SICU intubated and sedated in stable condition.    PTA Medications   Medication Sig    ciprofloxacin HCl (CIPRO) 500 MG tablet Take 1 tablet (500 mg total) by mouth once daily.    cyclobenzaprine (FLEXERIL) 10 MG tablet Take 10 mg by mouth 2 (two) times daily.     EASY TOUCH INSULIN SYRINGE 1 mL 31 gauge x 5/16 Syrg     escitalopram oxalate (LEXAPRO) 20 MG tablet Take 20 mg by mouth once daily.     finasteride (PROSCAR) 5 mg tablet Take 1 tablet (5 mg total) by mouth once daily.    furosemide (LASIX) 40 MG tablet Take 4 tablets (160 mg total) by mouth once daily.    gabapentin (NEURONTIN) 600 MG tablet Take 600 mg by mouth 3 (three) times daily.     HYDROcodone-acetaminophen (NORCO)  mg per tablet Take 1 tablet by mouth every 12 (twelve) hours as needed for Pain.    insulin aspart U-100 (NOVOLOG) 100 unit/mL injection Inject 24 Units into the skin 3 (three) times daily before meals.    insulin detemir U-100 (LEVEMIR) 100 unit/mL injection Inject 74 Units into the skin every evening.    lactulose (CHRONULAC) 10 gram/15 mL solution Take 30 mLs by mouth 3 (three) times daily. May take up to  4 to 5 times daily if needed for bowel movements    levocetirizine (XYZAL) 5 MG tablet Take 1 tablet (5 mg total) by mouth every evening.    oxybutynin (DITROPAN) 5 MG Tab Take 5 mg by mouth once daily.    pantoprazole (PROTONIX) 40 MG tablet Take 1 tablet (40 mg total) by  mouth once daily.    potassium bicarbonate & potassium chloride (K-LYTE CL) 25 mEq TbEF Take 1 tablet (25 mEq total) by mouth 2 (two) times daily with meals.    rifAXIMin (XIFAXAN) 550 mg Tab Take 1 tablet (550 mg total) by mouth 2 (two) times daily.    spironolactone (ALDACTONE) 100 MG tablet Take 1.5 tablets (150 mg total) by mouth once daily.    tamsulosin (FLOMAX) 0.4 mg Cp24 Take 1 capsule (0.4 mg total) by mouth once daily.   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    Esophageal varices     GERD (gastroesophageal reflux disease)     Hepatic encephalopathy 7/8/2014    Hepatitis C virus infection 07/08/2014    tx with interferon 3-4 mos- stopped for unclear reasons Tattoos-first at age 17 only risk factor (HCVAB negative 08/2017)    HTN (hypertension) 7/8/2014    Hypertension     Insulin dependent diabetes mellitus     Renal mass, left 7/8/2014    6.3 x 5.7 x 5.6 complex mass    Splenomegaly 7/8/2014    Umbilical hernia 7/8/2014     Past Surgical History:   Procedure Laterality Date    CARPAL TUNNEL RELEASE Bilateral     CHOLECYSTECTOMY      lap    COLONOSCOPY N/A 9/8/2017    Procedure: COLONOSCOPY;  Surgeon: Savannah Llanos MD;  [facility] ENDO;  Service: Endoscopy;  Laterality: N/A;    COLONOSCOPY Left 3/14/2018    Procedure: COLONOSCOPY;  Surgeon: Savannah Llanos MD;  Location[facility];  Service: Endoscopy;  Laterality: Left;    ESOPHAGOGASTRODUODENOSCOPY  01/2014    ESOPHAGOGASTRODUODENOSCOPY  02/15/2017    grade II varices    ESOPHAGOGASTRODUODENOSCOPY  04/10/2017    grade I varices    ESOPHAGOGASTRODUODENOSCOPY N/A 10/18/2019    Procedure: EGD  (ESOPHAGOGASTRODUODENOSCOPY);  Surgeon: Flavio Escalera MD;  Location: [facility] ENDO (2ND FLR);  Service: Endoscopy;  Laterality: N/A;    ESOPHAGOGASTRODUODENOSCOPY W/ BANDING  01/26/2017    grade II varices    HERNIA REPAIR      NECK SURGERY      fusion on C5 and C6    ULNAR NERVE TRANSPOSITION Left     UMBILICAL HERNIA REPAIR N/A 2/22/2020    Procedure: REPAIR, HERNIA, PRIMARY WIHTOUT MESH AND PLACEMENT OF DRAIN;  Surgeon: Sherri Brunner MD;  [facility];  Service: Transplant;  Laterality: N/A;    vericose veins removed       Family History   Problem Relation Age of Onset    Hyperlipidemia Mother     No Known Problems Father 36        accident on oil rig    No Known Problems Sister     Cancer Brother         kidney    Alcohol abuse Brother     No Known Problems Sister      Social History     Tobacco Use    Smoking status: Former Smoker     Types: Cigarettes     Last attempt to quit: 1/4/2010     Years since quitting: 10.3    Smokeless tobacco: Former User     Types: Chew     Quit date: 2/24/1990    Tobacco comment: former 1 pack/week quit 1/4/2010   Substance Use Topics    Alcohol use: No     Comment: former heavy beer but quit 1/4/2010    Drug use: No        Review of Systems:  Review of Systems   Unable to perform ROS: Intubated         OBJECTIVE:     Vital Signs (Most Recent)  Temp: 99.1 °F (37.3 °C) (05/18/20 0100)  Pulse: (!) 113 (05/18/20 0100)  Resp: 16 (05/18/20 0100)  BP: 127/69 (05/17/20 1531)  SpO2: (!) 93 % (05/18/20 0100)  Ventilator Data (Last 24H):     Vent Mode: A/C  Oxygen Concentration (%):  [60] 60  Resp Rate Total:  [18 br/min] 18 br/min  Vt Set:  [500 mL] 500 mL  PEEP/CPAP:  [5 cmH20] 5 cmH20  Mean Airway Pressure:  [9.7 cmH20] 9.7 cmH20    Hemodynamic Parameters (Last 24H):       Physical Exam    Lines/Drains:  Introducer 05/17/20 1622 (Active)   Number of days: 0       Pulmonary Artery Catheter Assessment  05/17/20 1634 (Active)   Number of days: 0         Introducer with Double Lumen 05/17/20 1622 (Active)   Number of days: 0       Percutaneous Central Line Insertion/Assessment - Triple Lumen  05/17/20 1622 (Active)   Number of days: 0            Peripheral IV - Single Lumen 05/16/20 1029 20 G Anterior;Left Forearm (Active)   Site Assessment Clean;Dry;Intact;No redness;No swelling 5/17/2020  7:50 AM   Line Status Saline locked 5/17/2020  7:50 AM   Dressing Status Clean;Dry;Intact 5/17/2020  7:50 AM   Dressing Intervention Integrity maintained 5/16/2020  7:45 PM   Dressing Change Due 05/20/20 5/16/2020 10:32 AM   Site Change Due 05/20/20 5/16/2020 10:32 AM   Reason Not Rotated Not due 5/16/2020  7:45 PM   Number of days: 1            Arterial Line 05/17/20 1610 Left Radial (Active)   Number of days: 0            Arterial Line 05/17/20 1606 Right Femoral (Active)   Number of days: 0            Closed/Suction Drain Right Abdomen Bulb 19 Fr. (Active)   Output (mL) 31 mL 5/18/2020  1:04 AM   Number of days:             Closed/Suction Drain Right Abdomen Bulb 19 Fr. (Active)   Output (mL) 38 mL 5/18/2020  1:04 AM   Number of days:             Urethral Catheter 05/17/20 1618 Non-latex;Straight-tip 16 Fr. (Active)   Output (mL) 325 mL 5/18/2020  1:00 AM   Number of days: 0       Laboratory  CBC:   Recent Labs   Lab 05/17/20  0707  05/17/20 2322 05/18/20  0107   WBC 8.03  --   --   --    RBC 3.37*  --   --   --    HGB 8.8*   < > 8.4*  --    HCT 29.8*   < > 27.1* 27*   PLT 65*   < > 140*  --    MCV 88  --   --   --    MCH 26.1*  --   --   --    MCHC 29.5*  --   --   --     < > = values in this interval not displayed.     CMP:   Recent Labs   Lab 05/17/20  0706  05/17/20 2322   *   < > 205*   CALCIUM 7.7*  --   --    ALBUMIN 2.6*   < > 2.2*   PROT 5.6*  --   --    *   < > 137   K 3.2*   < > 3.8   CO2 21*  --   --      --   --    BUN 13  --   --    CREATININE 0.7  --   --    ALKPHOS 98  --   --    ALT 17  --  718*   AST 38  --  1,823*   BILITOT 3.3*  --   --      < > = values in this interval not displayed.     Coagulation:   Recent Labs   Lab 05/17/20  2322   LABPROT 18.7*   INR 2.0*   APTT 37.6*     ABGs:   Recent Labs   Lab 05/18/20  0110   PH 7.319*   PCO2 46.3*   PO2 51   HCO3 23.8*   POCSATURATED 82*   BE -2     Lactate 4.14    Chest X-Ray: X - Ray Chest Pa And Lateral (for Routine Admit)    Result Date: 5/17/2020  No acute abnormality. Electronically signed by resident: Adam Maza Date:    05/17/2020 Time:    13:25 Electronically signed by: Jeffrey De Jesus DO Date:    05/17/2020 Time:    13:30        ASSESSMENT/PLAN:       Plan:    Neuro:   -Fentanyl PRN  - Propofol gtt  -Daily sedation vacations    Pulmonary:   -Intubated  -Will wean vent as tolerated for SpO2>92    Vent Mode: A/C  Oxygen Concentration (%):  [60] 60  Resp Rate Total:  [18 br/min] 18 br/min  Vt Set:  [500 mL] 500 mL  PEEP/CPAP:  [5 cmH20] 5 cmH20  Mean Airway Pressure:  [9.7 cmH20] 9.7 cmH20  Recent Labs   Lab 05/18/20  0110   PH 7.319*   PCO2 46.3*   PO2 51   HCO3 23.8*   POCSATURATED 82*   BE -2     -Daily SBT, CXR while intubated    Cardiac:  -SBP goal >100  -HDS not requiring pressors    Renal:   -Maddox in place - adequate UOP  -Trend Bun/Cr     Fluids/Electrolytes/Nutrition/GI:   -Nutritional status - NPO  -replace lytes PRN  -D5 1/2NS at 50  -Famotidine for GI ppx  -OGT to LIWS    Hematology/Oncology:  -Trend H/H  -Trend INR  -Heparin 5000 subq q8h for dvt ppx:    Infectious Disease:   -Afebrile  -Trend WBC  -PPx Abx for opportunistic infections - valcyte, bactrim, posa  - Immunosuppression per Txp Hepatology    Endocrine:  -Glucose goal of 120-150  -Insulin ggt per protocol    Dispo:  -Continue care in the ICU setting    Florencio Mars  General Surgery Resident, PGY 3  [phone]  05/18/2020

## 2020-05-18 NOTE — PROGRESS NOTES
Pharmacist Renal Dose Adjustment Note    Colin Reilly is a 59 y.o. male being treated with the medication piperacillin-tazobactam    Patient Data:    Vital Signs (Most Recent):  Temp: 96.6 °F (35.9 °C) (05/18/20 0700)  Pulse: 86 (05/18/20 0700)  Resp: 20 (05/18/20 0700)  BP: 123/71 (05/18/20 0700)  SpO2: 98 % (05/18/20 0700) Vital Signs (72h Range):  Temp:  [96.6 °F (35.9 °C)-99.1 °F (37.3 °C)]   Pulse:  []   Resp:  [7-23]   BP: (106-133)/(54-83)   SpO2:  [93 %-100 %]   Arterial Line BP: (103-124)/(48-62)      Recent Labs   Lab 05/17/20  0706 05/18/20  0127 05/18/20  0500   CREATININE 0.7 0.7  0.7 0.8  0.8     Serum creatinine: 0.8 mg/dL 05/18/20 0500  Estimated creatinine clearance: 121.1 mL/min    Piperacillin-tazobactam 3.375mg IV q6h was changed to 4.5g IV q8h    Angelika ArreguinD, BCPS  [id number]

## 2020-05-18 NOTE — PROGRESS NOTES
Patient in ICU. Patient alert but remains confused.  Provided with Post Liver Transplant Handbook: My New Journey, Living Safely After My Liver Transplant.  Nurse states when patient more alert he will encourage patient to read.  Will call patient's mother with update on his status.

## 2020-05-18 NOTE — TRANSFER OF CARE
"Anesthesia Transfer of Care Note    Patient: Colin Reilly    Procedure(s) Performed: Procedure(s) (LRB):  TRANSPLANT, LIVER (N/A)  THROMBECTOMY    Patient location: ICU    Anesthesia Type: general    Transport from OR: Transported from OR intubated on 100% O2 by AMBU with assisted ventilation. Continuous ECG monitoring in transport. Continuous SpO2 monitoring in transport. Continuos invasive BP monitoring in transport    Post pain: adequate analgesia    Post assessment: no apparent anesthetic complications and tolerated procedure well    Post vital signs: stable    Level of consciousness: sedated    Nausea/Vomiting: no nausea/vomiting    Complications: none    Transfer of care protocol was followedComments: See ICU vent settings      Last vitals:   Visit Vitals  /69 (Patient Position: Lying)   Pulse 100   Temp 36.8 °C (98.2 °F) (Oral)   Resp 18   Ht 5' 11" (1.803 m)   Wt 99.9 kg (220 lb 3.8 oz)   SpO2 98%   BMI 30.72 kg/m²     "

## 2020-05-18 NOTE — ASSESSMENT & PLAN NOTE
BG goal 140 - 180     Continue IV insulin infusion protocol  Requires intensive BG monitoring while on protocol     Discharge planning: RUSTAM

## 2020-05-18 NOTE — PROGRESS NOTES
[facility]  Endocrinology  Progress Note    Admit Date: 5/15/2020     Reason for Consult: Management of T2DM, Hyperglycemia     Surgical Procedure and Date: N/A    Diabetes diagnosis year: 2002    Home Diabetes Medications:    - Levemir 50 units HS (per chart review)  - Novolog 24 units TIDWM (per chart review)    How often checking glucose at home? MARILIN   BG readings on regimen: MARILIN  Hypoglycemia on the regimen?  MARILIN  Missed doses on regimen?  MARILIN    Diabetes Complications include:     Hyperglycemia and Diabetic peripheral neuropathy      Complicating diabetes co morbidities:   CIRRHOSIS    HPI:   Patient is a 59 y.o. 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 Surgery uncomplicated but has had recurrent abdominal pain and increased drainage from surgical incision. Patient recently admitted 3/11-3/13 with similar symptoms, attempted paracentesis at this time- without fluid to drain. He re-presented to [facility] on 5/15 with c/o fever and abdominal pain (Tmax 103) x 1 week, distention and dark colored stools, received 1g Rocephin and transferred to [facility] for further care. Patient is being admitted for suspected SBP and infectious work-up.  COVID-19 rapid test negative on admission. Plan for paracentesis. Endocrinology consulted to manage hypeglycemia/DM2 during admission to [facility].    Lab Results   Component Value Date    HGBA1C 6.7 (H) 02/28/2020       Interval HPI:   Overnight events:   BG remains above goal on intensive IV insulin infusion protocol. Patient remains sedated as noted per chart review.   Diet NPO Except for: Sips with Medication  1 Day Post-Op    Eating:   NPO  Nausea: No  Hypoglycemia and intervention: No  Fever: No  TPN and/or TF: No  If yes, type of TF/TPN and rate: None    /65   Pulse 91   Temp 97.8 °F (36.6 °C) (Core ([place]))   Resp 12   Ht 5'  "11" (1.803 m)   Wt 99.9 kg (220 lb 3.8 oz)   SpO2 98%   BMI 30.72 kg/m²      Labs Reviewed and Include    Recent Labs   Lab 05/18/20  0500  05/18/20  1300   *  322*  --   --    CALCIUM 7.1*  7.1*  --   --    ALBUMIN 2.7*  --   --    PROT 4.4*  --   --      137  --   --    K 3.1*  3.1*  --   --    CO2 23  23  --   --      104  --   --    BUN 18  18  --   --    CREATININE 0.8  0.8  --   --    ALKPHOS 83  --   --    *  --   --    AST 1,352*  1,352*   < > 737*   BILITOT 3.9*  --   --     < > = values in this interval not displayed.     Lab Results   Component Value Date    WBC 10.24 05/18/2020    HGB 7.9 (L) 05/18/2020    HCT 26.0 (L) 05/18/2020    MCV 88 05/18/2020    PLT 58 (L) 05/18/2020     No results for input(s): TSH, FREET4 in the last 168 hours.  Lab Results   Component Value Date    HGBA1C 5.3 05/17/2020       Nutritional status:   Body mass index is 30.72 kg/m².  Lab Results   Component Value Date    ALBUMIN 2.7 (L) 05/18/2020    ALBUMIN 2.2 (L) 05/18/2020    ALBUMIN 2.2 (L) 05/18/2020     No results found for: PREALBUMIN    Estimated Creatinine Clearance: 121.1 mL/min (based on SCr of 0.8 mg/dL).    Accu-Checks  Recent Labs     05/18/20  0455 05/18/20  0602 05/18/20  0716 05/18/20  0810 05/18/20  0914 05/18/20  1000 05/18/20  1104 05/18/20  1206 05/18/20  1308 05/18/20  1357   POCTGLUCOSE 324* 335* 319* 299* 292* 279* 237* 227* 242* 224*       Current Medications and/or Treatments Impacting Glycemic Control  Immunotherapy:    Immunosuppressants         Stop Route Frequency     tacrolimus (PROGRAF) 1 mg/mL oral syringe      -- Oral 2 times daily     mycophenolate mofetil 200 mg/mL suspension 1,000 mg      -- Oral 2 times daily        Steroids:   Hormones (From admission, onward)    Start     Stop Route Frequency Ordered    05/24/20 0900  predniSONE tablet 20 mg  (methylprednisolone taper panel)      -- Oral Daily 05/18/20 0055    05/23/20 0900  methylPREDNISolone sodium " succinate injection 20 mg  (methylprednisolone taper panel)      05/24 0859 IV Every 12 hours 05/18/20 0055    05/22/20 0900  methylPREDNISolone sodium succinate injection 40 mg  (methylprednisolone taper panel)      05/23 0859 IV Every 12 hours 05/18/20 0055    05/21/20 0900  methylPREDNISolone sodium succinate injection 60 mg  (methylprednisolone taper panel)      05/22 0859 IV Every 12 hours 05/18/20 0055    05/20/20 0900  methylPREDNISolone sodium succinate injection 80 mg  (methylprednisolone taper panel)      05/21 0859 IV Every 12 hours 05/18/20 0055    05/19/20 0900  methylPREDNISolone sodium succinate injection 100 mg  (methylprednisolone taper panel)      05/20 0859 IV Every 12 hours 05/18/20 0055        Pressors:    Autonomic Drugs (From admission, onward)    None        Hyperglycemia/Diabetes Medications:   Antihyperglycemics (From admission, onward)    Start     Stop Route Frequency Ordered    05/18/20 0245  insulin regular 100 Units in sodium chloride 0.9% 100 mL infusion     Question:  Insulin Rate Adjustment (DO NOT MODIFY ANSWER)  Answer:  [email or web address]    -- IV Continuous 05/18/20 0138          ASSESSMENT and PLAN    * Abdominal pain  Managed per primary team  Avoid hypoglycemia        Type 2 diabetes mellitus without complication, with long-term current use of insulin  BG goal 140 - 180     Continue IV insulin infusion protocol  Requires intensive BG monitoring while on protocol     Discharge planning: TBD        Anemia of chronic disease  May affect accuracy of HbA1c results.             Yogesh Goldstein NP  Endocrinology  [facility]

## 2020-05-18 NOTE — CARE UPDATE
Patient extubated per order and placed on 3L NC. Patient tolerating well and will continue to monitor.

## 2020-05-18 NOTE — PROGRESS NOTES
Pt arrived to [id number] with MDs and anesthesia at bedside. Cardiac monitoring applied, pt connected to vent per RT. VSS at this time. Orders noted and carried out.     Skin note: No skin breakdown noted. Bruising to L abd/flank. Waffle mattress inflated, heel and sacral foams, and boots in place to prevent skin breakdown.

## 2020-05-18 NOTE — PLAN OF CARE
"      SICU PLAN OF CARE NOTE    Dx: Abdominal pain    Shift Events: admitted s/p liver transplant. 1L albumin given.     Neuro: follows commands, moves all extremities     Vital Signs: /65 (BP Location: Right arm, Patient Position: Lying)   Pulse 92   Temp 99.1 °F (37.3 °C) (Oral)   Resp 20   Ht 5' 11" (1.803 m)   Wt 99.9 kg (220 lb 3.8 oz)   SpO2 97%   BMI 30.72 kg/m²     Respiratory: sats >95% on AC 40% 8 PEEP    Gtts: Insulin, Propofol, D5 1/2 NS    Urine Output: 150-345 ml/hr    Drains: DG x2 with serous output      Labs/Accuchecks: q4 labs, q1 accuchecks     Skin: no breakdown noted.        "

## 2020-05-18 NOTE — PLAN OF CARE
Plan of care notation:    Pt extubated per plan today after sedation weaned, and tolerates well without s/s of distress.  Pt noted confused after extubation, with improvement as day progresses (pt oriented to person, place and time but not situation and is mildly confused). Lines discontinued per orders, and no visibile site complications assessed after removals.  Accu-checks performed hourly, with Insulin IV titrated per orders (currently off per standing orders).  Labs sent per orders.  Liver ultrasound performed by technician this morning.  Dr. Tran and team present to bedside, aware of all patient data.  Plan of care reviewed with pt, but reinforcement of education required.  Emotional support offered.

## 2020-05-18 NOTE — SUBJECTIVE & OBJECTIVE
"Interval HPI:   Overnight events:   BG remains above goal on intensive IV insulin infusion protocol. Patient remains sedated as noted per chart review.   Diet NPO Except for: Sips with Medication  1 Day Post-Op    Eating:   NPO  Nausea: No  Hypoglycemia and intervention: No  Fever: No  TPN and/or TF: No  If yes, type of TF/TPN and rate: None    /65   Pulse 91   Temp 97.8 °F (36.6 °C) (Core ([place]))   Resp 12   Ht 5' 11" (1.803 m)   Wt 99.9 kg (220 lb 3.8 oz)   SpO2 98%   BMI 30.72 kg/m²     Labs Reviewed and Include    Recent Labs   Lab 05/18/20  0500  05/18/20  1300   *  322*  --   --    CALCIUM 7.1*  7.1*  --   --    ALBUMIN 2.7*  --   --    PROT 4.4*  --   --      137  --   --    K 3.1*  3.1*  --   --    CO2 23  23  --   --      104  --   --    BUN 18  18  --   --    CREATININE 0.8  0.8  --   --    ALKPHOS 83  --   --    *  --   --    AST 1,352*  1,352*   < > 737*   BILITOT 3.9*  --   --     < > = values in this interval not displayed.     Lab Results   Component Value Date    WBC 10.24 05/18/2020    HGB 7.9 (L) 05/18/2020    HCT 26.0 (L) 05/18/2020    MCV 88 05/18/2020    PLT 58 (L) 05/18/2020     No results for input(s): TSH, FREET4 in the last 168 hours.  Lab Results   Component Value Date    HGBA1C 5.3 05/17/2020       Nutritional status:   Body mass index is 30.72 kg/m².  Lab Results   Component Value Date    ALBUMIN 2.7 (L) 05/18/2020    ALBUMIN 2.2 (L) 05/18/2020    ALBUMIN 2.2 (L) 05/18/2020     No results found for: PREALBUMIN    Estimated Creatinine Clearance: 121.1 mL/min (based on SCr of 0.8 mg/dL).    Accu-Checks  Recent Labs     05/18/20  0455 05/18/20  0602 05/18/20  0716 05/18/20  0810 05/18/20  0914 05/18/20  1000 05/18/20  1104 05/18/20  1206 05/18/20  1308 05/18/20  1357   POCTGLUCOSE 324* 335* 319* 299* 292* 279* 237* 227* 242* 224*       Current Medications and/or Treatments Impacting Glycemic Control  Immunotherapy:    Immunosuppressants  "        Stop Route Frequency     tacrolimus (PROGRAF) 1 mg/mL oral syringe      -- Oral 2 times daily     mycophenolate mofetil 200 mg/mL suspension 1,000 mg      -- Oral 2 times daily        Steroids:   Hormones (From admission, onward)    Start     Stop Route Frequency Ordered    05/24/20 0900  predniSONE tablet 20 mg  (methylprednisolone taper panel)      -- Oral Daily 05/18/20 0055    05/23/20 0900  methylPREDNISolone sodium succinate injection 20 mg  (methylprednisolone taper panel)      05/24 0859 IV Every 12 hours 05/18/20 0055    05/22/20 0900  methylPREDNISolone sodium succinate injection 40 mg  (methylprednisolone taper panel)      05/23 0859 IV Every 12 hours 05/18/20 0055    05/21/20 0900  methylPREDNISolone sodium succinate injection 60 mg  (methylprednisolone taper panel)      05/22 0859 IV Every 12 hours 05/18/20 0055    05/20/20 0900  methylPREDNISolone sodium succinate injection 80 mg  (methylprednisolone taper panel)      05/21 0859 IV Every 12 hours 05/18/20 0055    05/19/20 0900  methylPREDNISolone sodium succinate injection 100 mg  (methylprednisolone taper panel)      05/20 0859 IV Every 12 hours 05/18/20 0055        Pressors:    Autonomic Drugs (From admission, onward)    None        Hyperglycemia/Diabetes Medications:   Antihyperglycemics (From admission, onward)    Start     Stop Route Frequency Ordered    05/18/20 0245  insulin regular 100 Units in sodium chloride 0.9% 100 mL infusion     Question:  Insulin Rate Adjustment (DO NOT MODIFY ANSWER)  Answer:  [email or web address] [id number]    -- IV Continuous 05/18/20 0138

## 2020-05-18 NOTE — PT/OT/SLP PROGRESS
Physical Therapy      Patient Name:  Colin Reilly   MRN:  [id number]    Patient not seen today secondary to (Hold this date per RN 2* pt recently extubated; femoral a-line in place and not appropriate for OOB mobility at this time.). Will follow-up for PT evaluation at next scheduled session as able.    Gracia Cochran, PT, DPT   5/18/2020  [phone]

## 2020-05-18 NOTE — OP NOTE
Operative Report    Date of Procedure: 5/17/2020  Date of Transplant (UNOS): 5/17/20    Surgeons:  Surgeon(s) and Role:     * Danny Thakkar MD - Primary     * Ed Peace MD - Assisting     * Nicky Iverson MD - Fellow    First Assistant Attestation:  The presence of an additional attending surgeon functioning as first assistant was required due to the complexity of the procedure relative to any available residents. I certify that no resident was available who was qualified to serve as first assistant. Duties performed by the assistant included assisting the primary surgeon.    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 Partial portal vein thrombosis.      Preamble  Indications and Patient Counseling: The patient is a 59 y.o. year-old male with Alcoholic Cirrhosis, Cirrhosis: Type C (UNOS terminology) who has been evaluated for a liver transplant.  The procedure was thoroughly discussed with the patient, including potential risks, complications, and alternatives. Specific complications mentioned included death, graft non-function, bleeding, infection, rejection, renal failure, respiratory failure, and neurologic deficits, as well as the possibility of other complications not specifically mentioned.    Donor Risk Factors:  Prior to the operation, the patient was advised of any donor-specific risk factors requiring specific  disclosure. Factors in this case included nothing that required specific disclosure.      Specific PHS Increased Risk Behavior criteria for the organ donor include:  None    All questions were answered, the patient voiced appropriate understanding, and he agreed to proceed with the planned procedure.    ABO Confirmation: Immediately following arrival of the donor organ and prior to implantation, a formal ABO confirmation was done according to hospital and UNOS policies.  I confirmed the UNOS ID number of the donor organ ([id number]) and the donor and recipient ABO types, directly verifying these data by comparison with the UNOS Match Run report ([id number].  This confirmation was personally done by an attending surgeon and circulating nurse, and is officially documented elsewhere.    Time-Out: A complete time out was carried out prior to incision, with confirmation of patient identity, correct procedure, correct operative site, appropriate antibiotic prophylaxis, review of any known allergies, and presence of all needed equipment.    Procedure in Detail  Following the induction of general endotracheal anesthesia, appropriate arterial and venous lines were placed by the anesthesia team.  Care was taken to pad all pressure points and avoid any potential traction injuries from positioning. The urinary bladder was catheterized.  Sequential compression boots and Josefa Huggers were used. The chest, abdomen, and upper thighs were sterilely prepped and draped.     The abdomen was entered via a wide bilateral subcostal incision. 100 mL of ascites was removed. The round ligament was ligated and divided. The falciform, left triangular, and gastrohepatic ligaments were divided using the electocautery, with 2-0 silk ties as needed. Adhesions between the abdominal viscera and the abdominal wall and the undersurface of the liver were divided as needed using cautery dissection and silk ties or suture ligatures. (~1 hour). The Giles  self-retaining retractor was placed to provide exposure. Hilar dissection was then carried out, with ligation of the right and left hepatic arteries as well as the cystic duct and the common hepatic duct. The recipient arterial anatomy was found to be: standard. The portal vein was exposed circumferentially from its bifurcation down to the superior border of the pancreas. The portal vein was found to be partial patent.  Attention was next directed to the right side of the liver where the right triangular ligament was taken down, exposing the bare area of the liver, and the IVC. The infrahepatic IVC was isolated, followed by mobilization of the retrohepatic cava, division of the right adrenal vein, and isolation of the suprahepatic IVC. The patient was given 3000 units of heparin prior to caval cross-clamping. Portal vein thrombosis was managed with thromboendvenectomy. After assuring adequate stability after cross-clamping, the native liver was excised and the allograft liver was brought to the operative field.  The liver was perfused with cold 5% albumin during implantation to displace the organ preservation solution.  IVC reconstruction technique consisted of end to end ivc using 3-0 and 4-0 polypropylene as appropriate.  The donor portal vein was then anastomosed to the recipient portal inflow site (end portal vein) with 5-0 polypropylene. Once this was completed, anesthesia was notified and the liver was re-perfused. Copious irrigation with warm saline and temporary finger occlusion of portal inflow were used as needed to avoid any problems with hypothermia. Temporary packing, electocautery, and polypropylene suture ligatures were used as appropriate to provide hemostasis. Once this was satisfactory, attention was directed to the arterial reconstruction. As per the protocol for livers from DCD donors, the allograft hepatic artery was infused with 2 mg of TPA and 5 mg of verapamil. Arterial inflow was provided to  the graft by anastomosing the recipient right-left hepatic branch patch to the donor  common hepatic artery using 7-0 polypropylene. The liver was assessed for adequacy of perfusion, which was satisfactory. The gallbladder was then removed from the allograft liver, and a temporary packing period was carried out to help assure hemostatis.  Attention was next directed toward the bile duct. The donor bile duct was prepared and assessed for adequate vascular supply. Biliary reconstruction was then performed by anastomosing the recipient common hepatic duct to the donor duct using 6-0 PDS sutures.  The biliary stent used was: none.  A final check for hemostasis was made. 2 19f Santosh drains were placed through separate incisions below the transverse abdominal incision and placed in the usual positions. The cavity was irrigated with saline. The abdomen was closed in layers using running #1 PDS suture. The incision was irrigated and the skin was closed with staples. At the end of the case all instrument, needle, and sponge counts were correct. The patient was taken to the ICU in good condition.     Fluids Administered:   Crystalloid (mL) 5,000   Colloid (mL) 500   RBC (Units) 1   FFP (Units) 1   Cryoprecipitate (Units)  1   Platelets (Units) 2   Cell Saver (CCs) 506      Specimens: Explant liver  Drains: 19f Santosh drains x 2    Additional Findings:    Estimated Blood Loss: 1,500 mL  Ascites Evacuated: 100 mL    Ischemic Times:  Time of portal reperfusion: 5/17/2020  8:43 PM  Time of arterial reperfusion: 5/18/2020  9:43 PM  Anastomosis (warm ischemia) time: 43 minutes  Cold ischemia time: 300 minutes    Surgical Data:  Graft type (whole vs. partial): whole liver  IVC reconstruction: end to end ivc  Portal vein status: partial patent  Portal graft performed? no  Donor arterial anatomy: standard  Donor arterial inflow: common hepatic artery  Recipient arterial anatomy: standard  Recipient arterial inflow: right-left hepatic  branch patch  Arterial graft performed? no  Biliary reconstruction: end to end (donor common bile duct to recipient common hepatic duct)  Biliary stent: none  Disposition of Vessels from donor of transplanted organ: sent to blood bank  Damage during procurement: no  Procurement damage comments: no    Donor Factors:  UNOS ID: )[id number]  UNOS Match [place]: [id number]  Donor type: donation after circulatory death   Donor with reported PHS increased risk behavior: no  Donor CMV serologic status: Negative  Donor with known cancer: no  Donor HCV serologic status: Negative  Donor HBcAb: Negative  Donor HBV CEZAR: Negative  Donor HCV CEZAR: Negative  Liver weight: 1256 grams

## 2020-05-19 ENCOUNTER — TELEPHONE (OUTPATIENT)
Dept: TRANSPLANT | Facility: CLINIC | Age: 60
End: 2020-05-19

## 2020-05-19 PROBLEM — Z94.4 S/P LIVER TRANSPLANT: Status: ACTIVE | Noted: 2020-05-19

## 2020-05-19 LAB
ALBUMIN SERPL BCP-MCNC: 2.3 G/DL (ref 3.5–5.2)
ALP SERPL-CCNC: 85 U/L (ref 55–135)
ALT SERPL W/O P-5'-P-CCNC: 377 U/L (ref 10–44)
ANION GAP SERPL CALC-SCNC: 6 MMOL/L (ref 8–16)
ANION GAP SERPL CALC-SCNC: 9 MMOL/L (ref 8–16)
APTT BLDCRRT: 32.6 SEC (ref 21–32)
AST SERPL-CCNC: 369 U/L (ref 10–40)
AST SERPL-CCNC: 395 U/L (ref 10–40)
BACTERIA BLD CULT: NORMAL
BACTERIA BLD CULT: NORMAL
BASOPHILS # BLD AUTO: 0.01 K/UL (ref 0–0.2)
BASOPHILS # BLD AUTO: 0.03 K/UL (ref 0–0.2)
BASOPHILS NFR BLD: 0.1 % (ref 0–1.9)
BASOPHILS NFR BLD: 0.3 % (ref 0–1.9)
BILIRUB DIRECT SERPL-MCNC: 1.1 MG/DL (ref 0.1–0.3)
BILIRUB SERPL-MCNC: 1.6 MG/DL (ref 0.1–1)
BLD PROD TYP BPU: NORMAL
BLOOD UNIT EXPIRATION DATE: NORMAL
BLOOD UNIT TYPE CODE: 6200
BLOOD UNIT TYPE: NORMAL
BUN SERPL-MCNC: 22 MG/DL (ref 6–20)
BUN SERPL-MCNC: 25 MG/DL (ref 6–20)
CALCIUM SERPL-MCNC: 6.8 MG/DL (ref 8.7–10.5)
CALCIUM SERPL-MCNC: 6.9 MG/DL (ref 8.7–10.5)
CHLORIDE SERPL-SCNC: 106 MMOL/L (ref 95–110)
CHLORIDE SERPL-SCNC: 107 MMOL/L (ref 95–110)
CO2 SERPL-SCNC: 21 MMOL/L (ref 23–29)
CO2 SERPL-SCNC: 25 MMOL/L (ref 23–29)
CODING SYSTEM: NORMAL
CREAT SERPL-MCNC: 0.8 MG/DL (ref 0.5–1.4)
CREAT SERPL-MCNC: 0.8 MG/DL (ref 0.5–1.4)
DIFFERENTIAL METHOD: ABNORMAL
DIFFERENTIAL METHOD: ABNORMAL
DISPENSE STATUS: NORMAL
EOSINOPHIL # BLD AUTO: 0 K/UL (ref 0–0.5)
EOSINOPHIL # BLD AUTO: 0 K/UL (ref 0–0.5)
EOSINOPHIL NFR BLD: 0 % (ref 0–8)
EOSINOPHIL NFR BLD: 0 % (ref 0–8)
ERYTHROCYTE [DISTWIDTH] IN BLOOD BY AUTOMATED COUNT: 19.7 % (ref 11.5–14.5)
ERYTHROCYTE [DISTWIDTH] IN BLOOD BY AUTOMATED COUNT: 19.8 % (ref 11.5–14.5)
EST. GFR  (AFRICAN AMERICAN): >60 ML/MIN/1.73 M^2
EST. GFR  (AFRICAN AMERICAN): >60 ML/MIN/1.73 M^2
EST. GFR  (NON AFRICAN AMERICAN): >60 ML/MIN/1.73 M^2
EST. GFR  (NON AFRICAN AMERICAN): >60 ML/MIN/1.73 M^2
GLUCOSE SERPL-MCNC: 111 MG/DL (ref 70–110)
GLUCOSE SERPL-MCNC: 138 MG/DL (ref 70–110)
HCT VFR BLD AUTO: 24.8 % (ref 40–54)
HCT VFR BLD AUTO: 25.5 % (ref 40–54)
HCT VFR BLD AUTO: 27.5 % (ref 40–54)
HGB BLD-MCNC: 7.6 G/DL (ref 14–18)
HGB BLD-MCNC: 7.7 G/DL (ref 14–18)
HGB BLD-MCNC: 8.6 G/DL (ref 14–18)
IMM GRANULOCYTES # BLD AUTO: 0.21 K/UL (ref 0–0.04)
IMM GRANULOCYTES # BLD AUTO: 0.28 K/UL (ref 0–0.04)
IMM GRANULOCYTES NFR BLD AUTO: 2.4 % (ref 0–0.5)
IMM GRANULOCYTES NFR BLD AUTO: 2.4 % (ref 0–0.5)
INR PPP: 1.5 (ref 0.8–1.2)
INR PPP: 1.6 (ref 0.8–1.2)
LYMPHOCYTES # BLD AUTO: 0.3 K/UL (ref 1–4.8)
LYMPHOCYTES # BLD AUTO: 0.4 K/UL (ref 1–4.8)
LYMPHOCYTES NFR BLD: 3.3 % (ref 18–48)
LYMPHOCYTES NFR BLD: 3.8 % (ref 18–48)
MAGNESIUM SERPL-MCNC: 1.8 MG/DL (ref 1.6–2.6)
MCH RBC QN AUTO: 26.6 PG (ref 27–31)
MCH RBC QN AUTO: 27 PG (ref 27–31)
MCHC RBC AUTO-ENTMCNC: 30.2 G/DL (ref 32–36)
MCHC RBC AUTO-ENTMCNC: 30.6 G/DL (ref 32–36)
MCV RBC AUTO: 88 FL (ref 82–98)
MCV RBC AUTO: 88 FL (ref 82–98)
MONOCYTES # BLD AUTO: 0.4 K/UL (ref 0.3–1)
MONOCYTES # BLD AUTO: 0.7 K/UL (ref 0.3–1)
MONOCYTES NFR BLD: 4.5 % (ref 4–15)
MONOCYTES NFR BLD: 5.7 % (ref 4–15)
NEUTROPHILS # BLD AUTO: 10.3 K/UL (ref 1.8–7.7)
NEUTROPHILS # BLD AUTO: 7.8 K/UL (ref 1.8–7.7)
NEUTROPHILS NFR BLD: 88.3 % (ref 38–73)
NEUTROPHILS NFR BLD: 89.2 % (ref 38–73)
NRBC BLD-RTO: 0 /100 WBC
NRBC BLD-RTO: 0 /100 WBC
NUM UNITS TRANS FFP: NORMAL
PHOSPHATE SERPL-MCNC: 2.6 MG/DL (ref 2.7–4.5)
PLATELET # BLD AUTO: 41 K/UL (ref 150–350)
PLATELET # BLD AUTO: 55 K/UL (ref 150–350)
PMV BLD AUTO: 11.1 FL (ref 9.2–12.9)
PMV BLD AUTO: 12.5 FL (ref 9.2–12.9)
POCT GLUCOSE: 109 MG/DL (ref 70–110)
POCT GLUCOSE: 128 MG/DL (ref 70–110)
POCT GLUCOSE: 143 MG/DL (ref 70–110)
POCT GLUCOSE: 173 MG/DL (ref 70–110)
POCT GLUCOSE: 173 MG/DL (ref 70–110)
POCT GLUCOSE: 174 MG/DL (ref 70–110)
POCT GLUCOSE: 178 MG/DL (ref 70–110)
POCT GLUCOSE: 178 MG/DL (ref 70–110)
POCT GLUCOSE: 185 MG/DL (ref 70–110)
POCT GLUCOSE: 185 MG/DL (ref 70–110)
POCT GLUCOSE: 192 MG/DL (ref 70–110)
POCT GLUCOSE: 194 MG/DL (ref 70–110)
POCT GLUCOSE: 195 MG/DL (ref 70–110)
POCT GLUCOSE: 201 MG/DL (ref 70–110)
POCT GLUCOSE: 205 MG/DL (ref 70–110)
POCT GLUCOSE: 206 MG/DL (ref 70–110)
POCT GLUCOSE: 212 MG/DL (ref 70–110)
POCT GLUCOSE: 213 MG/DL (ref 70–110)
POCT GLUCOSE: 216 MG/DL (ref 70–110)
POCT GLUCOSE: 217 MG/DL (ref 70–110)
POCT GLUCOSE: 238 MG/DL (ref 70–110)
POCT GLUCOSE: 240 MG/DL (ref 70–110)
POCT GLUCOSE: 245 MG/DL (ref 70–110)
POTASSIUM SERPL-SCNC: 3.4 MMOL/L (ref 3.5–5.1)
POTASSIUM SERPL-SCNC: 3.6 MMOL/L (ref 3.5–5.1)
PROT SERPL-MCNC: 4.1 G/DL (ref 6–8.4)
PROTHROMBIN TIME: 15.1 SEC (ref 9–12.5)
PROTHROMBIN TIME: 15.1 SEC (ref 9–12.5)
RBC # BLD AUTO: 2.82 M/UL (ref 4.6–6.2)
RBC # BLD AUTO: 2.9 M/UL (ref 4.6–6.2)
SODIUM SERPL-SCNC: 137 MMOL/L (ref 136–145)
SODIUM SERPL-SCNC: 137 MMOL/L (ref 136–145)
TACROLIMUS BLD-MCNC: 4 NG/ML (ref 5–15)
WBC # BLD AUTO: 11.62 K/UL (ref 3.9–12.7)
WBC # BLD AUTO: 8.73 K/UL (ref 3.9–12.7)

## 2020-05-19 PROCEDURE — 99233 SBSQ HOSP IP/OBS HIGH 50: CPT | Mod: ,,, | Performed by: NURSE PRACTITIONER

## 2020-05-19 PROCEDURE — 85610 PROTHROMBIN TIME: CPT | Mod: 91

## 2020-05-19 PROCEDURE — 63600175 PHARM REV CODE 636 W HCPCS: Performed by: SURGERY

## 2020-05-19 PROCEDURE — 85730 THROMBOPLASTIN TIME PARTIAL: CPT

## 2020-05-19 PROCEDURE — 99233 SBSQ HOSP IP/OBS HIGH 50: CPT | Mod: 24,GC,, | Performed by: SURGERY

## 2020-05-19 PROCEDURE — 25000003 PHARM REV CODE 250: Performed by: TRANSPLANT SURGERY

## 2020-05-19 PROCEDURE — 80048 BASIC METABOLIC PNL TOTAL CA: CPT

## 2020-05-19 PROCEDURE — 97530 THERAPEUTIC ACTIVITIES: CPT

## 2020-05-19 PROCEDURE — 25000003 PHARM REV CODE 250: Performed by: STUDENT IN AN ORGANIZED HEALTH CARE EDUCATION/TRAINING PROGRAM

## 2020-05-19 PROCEDURE — 85018 HEMOGLOBIN: CPT

## 2020-05-19 PROCEDURE — 80053 COMPREHEN METABOLIC PANEL: CPT

## 2020-05-19 PROCEDURE — 83735 ASSAY OF MAGNESIUM: CPT

## 2020-05-19 PROCEDURE — 85014 HEMATOCRIT: CPT

## 2020-05-19 PROCEDURE — 84450 TRANSFERASE (AST) (SGOT): CPT

## 2020-05-19 PROCEDURE — 94761 N-INVAS EAR/PLS OXIMETRY MLT: CPT

## 2020-05-19 PROCEDURE — 25000003 PHARM REV CODE 250: Performed by: SURGERY

## 2020-05-19 PROCEDURE — 87040 BLOOD CULTURE FOR BACTERIA: CPT | Mod: 59

## 2020-05-19 PROCEDURE — 20600001 HC STEP DOWN PRIVATE ROOM

## 2020-05-19 PROCEDURE — 36415 COLL VENOUS BLD VENIPUNCTURE: CPT

## 2020-05-19 PROCEDURE — 99233 PR SUBSEQUENT HOSPITAL CARE,LEVL III: ICD-10-PCS | Mod: 24,GC,, | Performed by: SURGERY

## 2020-05-19 PROCEDURE — 63600175 PHARM REV CODE 636 W HCPCS: Performed by: STUDENT IN AN ORGANIZED HEALTH CARE EDUCATION/TRAINING PROGRAM

## 2020-05-19 PROCEDURE — 25000003 PHARM REV CODE 250: Performed by: PHYSICIAN ASSISTANT

## 2020-05-19 PROCEDURE — 99233 PR SUBSEQUENT HOSPITAL CARE,LEVL III: ICD-10-PCS | Mod: ,,, | Performed by: NURSE PRACTITIONER

## 2020-05-19 PROCEDURE — 82248 BILIRUBIN DIRECT: CPT

## 2020-05-19 PROCEDURE — 84100 ASSAY OF PHOSPHORUS: CPT

## 2020-05-19 PROCEDURE — 63600175 PHARM REV CODE 636 W HCPCS: Performed by: TRANSPLANT SURGERY

## 2020-05-19 PROCEDURE — 97161 PT EVAL LOW COMPLEX 20 MIN: CPT

## 2020-05-19 PROCEDURE — 80197 ASSAY OF TACROLIMUS: CPT

## 2020-05-19 PROCEDURE — 85025 COMPLETE CBC W/AUTO DIFF WBC: CPT

## 2020-05-19 PROCEDURE — S0028 INJECTION, FAMOTIDINE, 20 MG: HCPCS | Performed by: SURGERY

## 2020-05-19 RX ORDER — LIDOCAINE HYDROCHLORIDE 10 MG/ML
10 INJECTION INFILTRATION; PERINEURAL ONCE
Status: COMPLETED | OUTPATIENT
Start: 2020-05-19 | End: 2020-05-19

## 2020-05-19 RX ORDER — CEFEPIME HYDROCHLORIDE 2 G/1
2 INJECTION, POWDER, FOR SOLUTION INTRAVENOUS
Status: DISCONTINUED | OUTPATIENT
Start: 2020-05-19 | End: 2020-05-20

## 2020-05-19 RX ORDER — FUROSEMIDE 10 MG/ML
80 INJECTION INTRAMUSCULAR; INTRAVENOUS ONCE
Status: COMPLETED | OUTPATIENT
Start: 2020-05-19 | End: 2020-05-19

## 2020-05-19 RX ORDER — FAMOTIDINE 20 MG/1
20 TABLET, FILM COATED ORAL NIGHTLY
Status: DISCONTINUED | OUTPATIENT
Start: 2020-05-19 | End: 2020-05-24 | Stop reason: HOSPADM

## 2020-05-19 RX ORDER — FINASTERIDE 5 MG/1
5 TABLET, FILM COATED ORAL DAILY
Status: DISCONTINUED | OUTPATIENT
Start: 2020-05-19 | End: 2020-05-24 | Stop reason: HOSPADM

## 2020-05-19 RX ORDER — POSACONAZOLE 100 MG/1
300 TABLET, DELAYED RELEASE ORAL NIGHTLY
Status: DISCONTINUED | OUTPATIENT
Start: 2020-05-19 | End: 2020-05-24 | Stop reason: HOSPADM

## 2020-05-19 RX ORDER — LABETALOL HCL 20 MG/4 ML
10 SYRINGE (ML) INTRAVENOUS ONCE
Status: COMPLETED | OUTPATIENT
Start: 2020-05-19 | End: 2020-05-19

## 2020-05-19 RX ADMIN — NYSTATIN 500000 UNITS: 500000 SUSPENSION ORAL at 09:05

## 2020-05-19 RX ADMIN — POSACONAZOLE 300 MG: 100 TABLET, COATED ORAL at 08:05

## 2020-05-19 RX ADMIN — LIDOCAINE HYDROCHLORIDE 10 ML: 10 INJECTION, SOLUTION INFILTRATION; PERINEURAL at 11:05

## 2020-05-19 RX ADMIN — Medication 1 MG: at 08:05

## 2020-05-19 RX ADMIN — HEPARIN SODIUM 5000 UNITS: 5000 INJECTION INTRAVENOUS; SUBCUTANEOUS at 06:05

## 2020-05-19 RX ADMIN — PIPERACILLIN AND TAZOBACTAM 4.5 G: 4; .5 INJECTION, POWDER, FOR SOLUTION INTRAVENOUS at 04:05

## 2020-05-19 RX ADMIN — Medication 10 MG: at 03:05

## 2020-05-19 RX ADMIN — DEXTROSE 300 MG: 50 INJECTION, SOLUTION INTRAVENOUS at 01:05

## 2020-05-19 RX ADMIN — HYDROMORPHONE HYDROCHLORIDE 0.5 MG: 1 INJECTION, SOLUTION INTRAMUSCULAR; INTRAVENOUS; SUBCUTANEOUS at 01:05

## 2020-05-19 RX ADMIN — SODIUM PHOSPHATE, MONOBASIC, MONOHYDRATE AND SODIUM PHOSPHATE, DIBASIC, ANHYDROUS 15 MMOL: 276; 142 INJECTION, SOLUTION INTRAVENOUS at 06:05

## 2020-05-19 RX ADMIN — PIPERACILLIN AND TAZOBACTAM 4.5 G: 4; .5 INJECTION, POWDER, FOR SOLUTION INTRAVENOUS at 12:05

## 2020-05-19 RX ADMIN — FAMOTIDINE 20 MG: 20 TABLET, FILM COATED ORAL at 08:05

## 2020-05-19 RX ADMIN — FINASTERIDE 5 MG: 5 TABLET, FILM COATED ORAL at 10:05

## 2020-05-19 RX ADMIN — ESCITALOPRAM OXALATE 20 MG: 10 TABLET ORAL at 08:05

## 2020-05-19 RX ADMIN — NYSTATIN 500000 UNITS: 500000 SUSPENSION ORAL at 06:05

## 2020-05-19 RX ADMIN — FAMOTIDINE 20 MG: 10 INJECTION, SOLUTION INTRAVENOUS at 08:05

## 2020-05-19 RX ADMIN — TAMSULOSIN HYDROCHLORIDE 0.4 MG: 0.4 CAPSULE ORAL at 08:05

## 2020-05-19 RX ADMIN — OXYBUTYNIN CHLORIDE 5 MG: 5 TABLET ORAL at 08:05

## 2020-05-19 RX ADMIN — METHYLPREDNISOLONE SODIUM SUCCINATE 100 MG: 125 INJECTION, POWDER, FOR SOLUTION INTRAMUSCULAR; INTRAVENOUS at 08:05

## 2020-05-19 RX ADMIN — QUETIAPINE FUMARATE 25 MG: 25 TABLET ORAL at 08:05

## 2020-05-19 RX ADMIN — Medication 1000 MG: at 09:05

## 2020-05-19 RX ADMIN — Medication 1000 MG: at 08:05

## 2020-05-19 RX ADMIN — PIPERACILLIN AND TAZOBACTAM 4.5 G: 4; .5 INJECTION, POWDER, FOR SOLUTION INTRAVENOUS at 08:05

## 2020-05-19 RX ADMIN — Medication 1 MG: at 06:05

## 2020-05-19 RX ADMIN — MUPIROCIN 1 G: 20 OINTMENT TOPICAL at 08:05

## 2020-05-19 RX ADMIN — SODIUM CHLORIDE 3.8 UNITS/HR: 9 INJECTION, SOLUTION INTRAVENOUS at 07:05

## 2020-05-19 RX ADMIN — HEPARIN SODIUM 5000 UNITS: 5000 INJECTION INTRAVENOUS; SUBCUTANEOUS at 03:05

## 2020-05-19 RX ADMIN — FUROSEMIDE 80 MG: 10 INJECTION, SOLUTION INTRAMUSCULAR; INTRAVENOUS at 10:05

## 2020-05-19 RX ADMIN — CEFEPIME 2 G: 2 INJECTION, POWDER, FOR SOLUTION INTRAVENOUS at 04:05

## 2020-05-19 RX ADMIN — SODIUM CHLORIDE: 0.45 INJECTION, SOLUTION INTRAVENOUS at 12:05

## 2020-05-19 RX ADMIN — NYSTATIN 500000 UNITS: 500000 SUSPENSION ORAL at 03:05

## 2020-05-19 RX ADMIN — HYDROCODONE BITARTRATE AND ACETAMINOPHEN 1 TABLET: 10; 325 TABLET ORAL at 09:05

## 2020-05-19 NOTE — ANESTHESIA POSTPROCEDURE EVALUATION
Anesthesia Post Evaluation    Patient: Colin Reilly    Procedure(s) Performed: Procedure(s) (LRB):  TRANSPLANT, LIVER (N/A)  THROMBECTOMY    Final Anesthesia Type: general    Patient location during evaluation: ICU  Patient participation: No - Unable to Participate, Intubation  Level of consciousness: sedated  Post-procedure vital signs: reviewed and stable  Pain management: adequate  Airway patency: patent    PONV status at discharge: No PONV  Anesthetic complications: no      Cardiovascular status: blood pressure returned to baseline  Respiratory status: unassisted  Hydration status: euvolemic  Follow-up not needed.          Vitals Value Taken Time   /84 5/19/2020  5:01 AM   Temp 36.6 °C (97.9 °F) 5/19/2020  3:00 AM   Pulse 85 5/19/2020  5:58 AM   Resp 11 5/19/2020  5:58 AM   SpO2 99 % 5/19/2020  5:58 AM   Vitals shown include unvalidated device data.      No case tracking events are documented in the log.      Pain/Lobito Score: Pain Rating Prior to Med Admin: 8 (5/19/2020  1:43 AM)  Pain Rating Post Med Admin: 2 (5/19/2020  2:10 AM)

## 2020-05-19 NOTE — PROGRESS NOTES
"[facility]  Endocrinology  Progress Note    Admit Date: 5/15/2020     Reason for Consult: Management of T2DM, Hyperglycemia     Surgical Procedure and Date: N/A    Diabetes diagnosis year: 2002    Home Diabetes Medications:    - Levemir 50 units HS (per chart review)  - Novolog 24 units TIDWM (per chart review)    How often checking glucose at home? MARILIN   BG readings on regimen: MARILIN  Hypoglycemia on the regimen?  MARILIN  Missed doses on regimen?  MARILIN    Diabetes Complications include:     Hyperglycemia and Diabetic peripheral neuropathy      Complicating diabetes co morbidities:   CIRRHOSIS    HPI:   Patient is a 59 y.o. 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 Surgery uncomplicated but has had recurrent abdominal pain and increased drainage from surgical incision. Patient recently admitted 3/11-3/13 with similar symptoms, attempted paracentesis at this time- without fluid to drain. He re-presented to [facility] on 5/15 with c/o fever and abdominal pain (Tmax 103) x 1 week, distention and dark colored stools, received 1g Rocephin and [place] [facility] [place]. Patient is being admitted for suspected SBP and infectious work-up.  COVID-19 rapid test negative on admission. Plan for paracentesis. Endocrinology consulted to manage hypeglycemia/DM2 during [place] [facility]    Lab Results   Component Value Date    HGBA1C 6.7 (H) 02/28/2020       Interval HPI:   Overnight events:  BG has trended downward towards goal overnight now requiring decrease in infusion rate changes.   Diet NPO Except for: Sips with Medication  2 Days Post-Op    Eating:   NPO  Nausea: No  Hypoglycemia and intervention: No  Fever: No  TPN and/or TF: No  If yes, type of TF/TPN and rate: None    BP (!) 148/72   Pulse 93   Temp 98.7 °F (37.1 °C) (Oral)   Resp 15   Ht 5' 11" (1.803 m)   Wt 99.9 kg " (220 lb 3.8 oz)   SpO2 98%   BMI 30.72 kg/m²      Labs Reviewed and Include    Recent Labs   Lab 05/19/20  0304   *   CALCIUM 6.8*   ALBUMIN 2.3*   PROT 4.1*      K 3.6   CO2 25      BUN 25*   CREATININE 0.8   ALKPHOS 85   *   *   BILITOT 1.6*     Lab Results   Component Value Date    WBC 8.73 05/19/2020    HGB 7.6 (L) 05/19/2020    HCT 24.8 (L) 05/19/2020    MCV 88 05/19/2020    PLT 41 (L) 05/19/2020     No results for input(s): TSH, FREET4 in the last 168 hours.  Lab Results   Component Value Date    HGBA1C 5.3 05/17/2020       Nutritional status:   Body mass index is 30.72 kg/m².  Lab Results   Component Value Date    ALBUMIN 2.3 (L) 05/19/2020    ALBUMIN 2.7 (L) 05/18/2020    ALBUMIN 2.2 (L) 05/18/2020    ALBUMIN 2.2 (L) 05/18/2020     No results found for: PREALBUMIN    Estimated Creatinine Clearance: 121.1 mL/min (based on SCr of 0.8 mg/dL).    Accu-Checks  Recent Labs     05/18/20  2313 05/19/20  0002 05/19/20  0113 05/19/20  0205 05/19/20  0311 05/19/20  0401 05/19/20  0454 05/19/20  0559 05/19/20  0717 05/19/20  0805   POCTGLUCOSE 236* 192* 128* 109 143* 178* 174* 195* 240* 205*       Current Medications and/or Treatments Impacting Glycemic Control  Immunotherapy:    Immunosuppressants         Stop Route Frequency     tacrolimus (PROGRAF) 1 mg/mL oral syringe      -- Oral 2 times daily     mycophenolate mofetil 200 mg/mL suspension 1,000 mg      -- Oral 2 times daily        Steroids:   Hormones (From admission, onward)    Start     Stop Route Frequency Ordered    05/24/20 0900  predniSONE tablet 20 mg  (methylprednisolone taper panel)      -- Oral Daily 05/18/20 0055 05/23/20 0900  methylPREDNISolone sodium succinate injection 20 mg  (methylprednisolone taper panel)      05/24 0859 IV Every 12 hours 05/18/20 0055 05/22/20 0900  methylPREDNISolone sodium succinate injection 40 mg  (methylprednisolone taper panel)      05/23 0859 IV Every 12 hours 05/18/20 0053     05/21/20 0900  methylPREDNISolone sodium succinate injection 60 mg  (methylprednisolone taper panel)      05/22 0859 IV Every 12 hours 05/18/20 0055    05/20/20 0900  methylPREDNISolone sodium succinate injection 80 mg  (methylprednisolone taper panel)      05/21 0859 IV Every 12 hours 05/18/20 0055    05/19/20 0900  methylPREDNISolone sodium succinate injection 100 mg  (methylprednisolone taper panel)      05/20 0859 IV Every 12 hours 05/18/20 0055        Pressors:    Autonomic Drugs (From admission, onward)    None        Hyperglycemia/Diabetes Medications:   Antihyperglycemics (From admission, onward)    Start     Stop Route Frequency Ordered    05/18/20 0245  insulin regular 100 Units in sodium chloride 0.9% 100 mL infusion     Question:  Insulin Rate Adjustment (DO NOT MODIFY ANSWER)  Answer:  [email or web address]    -- IV Continuous 05/18/20 0138          ASSESSMENT and PLAN    * Abdominal pain  Managed per primary team  Avoid hypoglycemia        Type 2 diabetes mellitus without complication, with long-term current use of insulin  BG goal 140 - 180     Continue IV insulin infusion protocol  Requires intensive BG monitoring while on protocol     ** Please call Endocrine for any BG related issues **  ** Please notify Endocrine for any change and/or advance in diet**      Discharge planning: TBD        Anemia of chronic disease  May affect accuracy of HbA1c results.             Yogesh Goldstein NP  Endocrinology  [facility]

## 2020-05-19 NOTE — ASSESSMENT & PLAN NOTE
BG goal 140 - 180     Continue IV insulin infusion protocol  Requires intensive BG monitoring while on protocol     ** Please call Endocrine for any BG related issues **  ** Please notify Endocrine for any change and/or advance in diet**      Discharge planning: RUSTAM

## 2020-05-19 NOTE — PROGRESS NOTES
[facility]  Critical Care - Surgery  Progress Note    Patient Name: Colin Reilly  MRN: [id number]  Admission Date: 5/15/2020  Hospital Length of Stay: 4 days  Code Status: Full Code  Attending Provider: Fareed Tran MD  Primary Care Provider: Preston Matthew Ii, MD   Principal Problem: Abdominal pain    Subjective:     Hospital/ICU Course:  No notes on file    Interval History/Significant Events: No acute events overnight. He was noted to have 15-25 cc/hr of UOP, given Lasix 20 mg x1 with minimal response. His pain is well-controlled this AM. 2 RUQ drains remain in place with SS output.     Follow-up For: Procedure(s) (LRB):  TRANSPLANT, LIVER (N/A)  THROMBECTOMY    Post-Operative Day: 2 Days Post-Op    Objective:     Vital Signs (Most Recent):  Temp: 97.9 °F (36.6 °C) (05/19/20 0300)  Pulse: 86 (05/19/20 0600)  Resp: 10 (05/19/20 0600)  BP: (!) 159/84 (05/19/20 0500)  SpO2: 99 % (05/19/20 0600) Vital Signs (24h Range):  Temp:  [96.6 °F (35.9 °C)-98.2 °F (36.8 °C)] 97.9 °F (36.6 °C)  Pulse:  [80-98] 86  Resp:  [7-23] 10  SpO2:  [74 %-100 %] 99 %  BP: (123-159)/(65-91) 159/84  Arterial Line BP: (124-147)/(62-73) 135/72     Weight: 99.9 kg (220 lb 3.8 oz)  Body mass index is 30.72 kg/m².      Intake/Output Summary (Last 24 hours) at 5/19/2020 0656  Last data filed at 5/19/2020 0600  Gross per 24 hour   Intake 3786 ml   Output 1950 ml   Net 1836 ml       Physical Exam   Constitutional: He is oriented to person, place, and time. He appears well-developed and well-nourished. No distress.   HENT:   Head: Normocephalic and atraumatic.   Mouth/Throat: Oropharynx is clear and moist.   RIJV Trialysis   Eyes: Conjunctivae and EOM are normal. Right eye exhibits no discharge. Left eye exhibits no discharge.   Neck: Normal range of motion. Neck supple.   Cardiovascular: Normal rate, regular rhythm and intact distal pulses.   Pulmonary/Chest: Effort normal. No stridor. No respiratory distress. He has no  wheezes. He has no rales.   Abdominal: Soft. He exhibits no distension. There is tenderness (appropriate postop).   Chevron incisions c/d/i  RUQ DG drains x2 with SS output   Musculoskeletal: Normal range of motion. He exhibits no deformity.   Neurological: He is alert and oriented to person, place, and time.   Skin: Skin is warm and dry.   Psychiatric: He has a normal mood and affect. His behavior is normal.       Vents:  Vent Mode: Spont (05/18/20 1102)  Ventilator Initiated: Yes (05/18/20 0059)  Set Rate: 20 BPM (05/18/20 0818)  Vt Set: 500 mL (05/18/20 0818)  Pressure Support: 5 cmH20 (05/18/20 1102)  PEEP/CPAP: 5 cmH20 (05/18/20 1102)  Oxygen Concentration (%): 40 (05/18/20 1345)  Peak Airway Pressure: 10 cmH2O (05/18/20 1102)  Plateau Pressure: 17 cmH20 (05/18/20 1102)  Total Ve: 0 mL (05/18/20 1102)  F/VT Ratio<105 (RSBI): (!) 41.32 (05/18/20 0818)    Lines/Drains/Airways     Central Venous Catheter Line            Trialysis (Dialysis) Catheter 05/17/20 1700 right internal jugular 1 day          Drain                 Closed/Suction Drain Right Abdomen Bulb 19 Fr. -- days         Closed/Suction Drain Right Abdomen Bulb 19 Fr. -- days         Urethral Catheter 05/17/20 1618 Non-latex;Straight-tip 16 Fr. 1 day          Peripheral Intravenous Line                 Peripheral IV - Single Lumen 05/16/20 1029 20 G Anterior;Left Forearm 2 days                Significant Labs:    CBC/Anemia Profile:  Recent Labs   Lab 05/18/20 2100 05/19/20  0114 05/19/20  0304   WBC 9.12 11.62 8.73   HGB 7.5* 7.7* 7.6*   HCT 24.2* 25.5* 24.8*   PLT 40* 55* 41*   MCV 87 88 88   RDW 19.7* 19.8* 19.7*        Chemistries:  Recent Labs   Lab 05/18/20  0127 05/18/20  0500  05/18/20  1700 05/18/20  2100 05/19/20  0118 05/19/20  0304     138 137  137  --  139  --  137 137   K 3.7  3.7 3.1*  3.1*  --  2.8*  --  3.4* 3.6     103 104  104  --  106  --  107 106   CO2 21*  21* 23  23  --  26  --  21* 25   BUN 16  16 18  18   --  18  --  22* 25*   CREATININE 0.7  0.7 0.8  0.8  --  0.7  --  0.8 0.8   CALCIUM 6.9*  6.9* 7.1*  7.1*  --  6.8*  --  6.9* 6.8*   ALBUMIN 2.2*  2.2* 2.7*  --   --   --   --  2.3*   PROT 4.4*  4.4* 4.4*  --   --   --   --  4.1*   BILITOT 3.6*  3.6* 3.9*  --   --   --   --  1.6*   ALKPHOS 105  105 83  --   --   --   --  85   *  722* 558*  --   --   --   --  377*   AST 2,018*  2,018* 1,352*  1,352*   < > 566* 455* 395* 369*   MG 1.6 1.9  --   --   --   --  1.8   PHOS 5.4* 3.0  --   --   --   --  2.6*    < > = values in this interval not displayed.       CMP:   Recent Labs   Lab 05/18/20  0127 05/18/20  0500  05/18/20  1700 05/18/20  2100 05/19/20  0118 05/19/20  0304     138 137  137  --  139  --  137 137   K 3.7  3.7 3.1*  3.1*  --  2.8*  --  3.4* 3.6     103 104  104  --  106  --  107 106   CO2 21*  21* 23  23  --  26  --  21* 25   *  223* 322*  322*  --  107  --  111* 138*   BUN 16  16 18  18  --  18  --  22* 25*   CREATININE 0.7  0.7 0.8  0.8  --  0.7  --  0.8 0.8   CALCIUM 6.9*  6.9* 7.1*  7.1*  --  6.8*  --  6.9* 6.8*   PROT 4.4*  4.4* 4.4*  --   --   --   --  4.1*   ALBUMIN 2.2*  2.2* 2.7*  --   --   --   --  2.3*   BILITOT 3.6*  3.6* 3.9*  --   --   --   --  1.6*   ALKPHOS 105  105 83  --   --   --   --  85   AST 2,018*  2,018* 1,352*  1,352*   < > 566* 455* 395* 369*   *  722* 558*  --   --   --   --  377*   ANIONGAP 14  14 10  10  --  7*  --  9 6*   EGFRNONAA >60.0  >60.0 >60.0  >60.0  --  >60.0  --  >60.0 >60.0    < > = values in this interval not displayed.     Coagulation:   Recent Labs   Lab 05/19/20  0304   INR 1.6*   APTT 32.6*     Lactic Acid:   Recent Labs   Lab 05/18/20  0127 05/18/20  0915   LACTATE 4.3* 2.2       Significant Imaging:  I have reviewed all pertinent imaging results/findings within the past 24 hours.    Assessment/Plan:     S/P liver transplant  Neuro:   -Transition to PO pain meds  -Alert and  oriented     Pulmonary:   -Sats 96-99% on RA  -AM CXR      Cardiac:  -SBP goal >100  -HDS not requiring pressors     Renal:   -Barker in place - adequate UOP; discontinue barker this AM  -Trend Bun/Cr    - UOP 15-25/hr overnight, given Lasix 20 mg x1 overnight with minimal response  - Given Lasix 80 x1 now     Fluids/Electrolytes/Nutrition/GI:   -Nutritional status -  advance to regular diet  -replace lytes PRN  -DC D5 1/2NS  -Famotidine for GI ppx  -RUQ DG Bulbs with 200 cc, 490 cc output; pull drains today  -Hepatic US completed 5/18     Hematology/Oncology:  -Trend H/H  -Trend INR  -Heparin 5000 subq q8h for dvt ppx:     Infectious Disease:   -Afebrile  -Trend WBC  -PPx Abx for opportunistic infections - valcyte, bactrim, posa  - Immunosuppression per [facility]     Endocrine:  -Glucose goal of 120-150  -Insulin ggt per protocol     Dispo:  -Stepdown to TSU           Critical care was time spent personally by me on the following activities: development of treatment plan with patient or surrogate and bedside caregivers, discussions with consultants, evaluation of patient's response to treatment, examination of patient, ordering and performing treatments and interventions, ordering and review of laboratory studies, ordering and review of radiographic studies, pulse oximetry, re-evaluation of patient's condition.  This critical care time did not overlap with that of any other provider or involve time for any procedures.     Vish Yap MD  Critical Care - Surgery  [facility]

## 2020-05-19 NOTE — ASSESSMENT & PLAN NOTE
Neuro:   -Transition to PO pain meds  -Alert and oriented     Pulmonary:   -Sats 96-99% on RA  -AM CXR      Cardiac:  -SBP goal >100  -HDS not requiring pressors     Renal:   -Barker in place - adequate UOP; discontinue barker this AM  -Trend Bun/Cr   - UOP 15-25/hr overnight, given Lasix 20 mg x1 overnight with minimal response  - Given Lasix 80 x1 now     Fluids/Electrolytes/Nutrition/GI:   -Nutritional status - advance to regular diet  -replace lytes PRN  -DC D5 1/2NS  -Famotidine for GI ppx  -RUQ DG Bulbs with 200 cc, 490 cc output; pull drains today  -Hepatic US completed 5/18     Hematology/Oncology:  -Trend H/H  -Trend INR  -Heparin 5000 subq q8h for dvt ppx:     Infectious Disease:   -Afebrile  -Trend WBC  -PPx Abx for opportunistic infections - valcyte, bactrim, posa  - Immunosuppression per [facility]     Endocrine:  -Glucose goal of 120-150  -Insulin ggt per protocol     Dispo:  -Stepdown to TSU

## 2020-05-19 NOTE — PROGRESS NOTES
Pharmacist Intervention IV to PO Note    Colin Reilly is a 59 y.o. male being treated with IV medication famotidine    Patient Data:    Vital Signs (Most Recent):  Temp: 98.7 °F (37.1 °C) (05/19/20 0700)  Pulse: 93 (05/19/20 0700)  Resp: 15 (05/19/20 0700)  BP: (!) 148/72 (05/19/20 0700)  SpO2: 98 % (05/19/20 0700)   Vital Signs (72h Range):  Temp:  [96.6 °F (35.9 °C)-99.1 °F (37.3 °C)]   Pulse:  []   Resp:  [7-23]   BP: (113-159)/(56-91)   SpO2:  [74 %-100 %]   Arterial Line BP: (103-147)/(48-73)      CBC:  Recent Labs   Lab 05/18/20 2100 05/19/20  0114 05/19/20  0304   WBC 9.12 11.62 8.73   RBC 2.79* 2.90* 2.82*   HGB 7.5* 7.7* 7.6*   HCT 24.2* 25.5* 24.8*   PLT 40* 55* 41*   MCV 87 88 88   MCH 26.9* 26.6* 27.0   MCHC 31.0* 30.2* 30.6*     CMP:     Recent Labs   Lab 05/18/20  0127 05/18/20  0500  05/18/20  1700 05/18/20 2100 05/19/20  0118 05/19/20  0304   *  223* 322*  322*  --  107  --  111* 138*   CALCIUM 6.9*  6.9* 7.1*  7.1*  --  6.8*  --  6.9* 6.8*   ALBUMIN 2.2*  2.2* 2.7*  --   --   --   --  2.3*   PROT 4.4*  4.4* 4.4*  --   --   --   --  4.1*     138 137  137  --  139  --  137 137   K 3.7  3.7 3.1*  3.1*  --  2.8*  --  3.4* 3.6   CO2 21*  21* 23  23  --  26  --  21* 25     103 104  104  --  106  --  107 106   BUN 16  16 18  18  --  18  --  22* 25*   CREATININE 0.7  0.7 0.8  0.8  --  0.7  --  0.8 0.8   ALKPHOS 105  105 83  --   --   --   --  85   *  722* 558*  --   --   --   --  377*   AST 2,018*  2,018* 1,352*  1,352*   < > 566* 455* 395* 369*   BILITOT 3.6*  3.6* 3.9*  --   --   --   --  1.6*    < > = values in this interval not displayed.       Dietary Orders:  Diet Orders            Diet NPO Except for: Sips with Medication: NPO starting at 05/18 0048            Based on the following criteria, this patient qualifies for intravenous to oral conversion:  [x] The patients gastrointestinal tract is functioning (tolerating medications via oral or  enteral route for 24 hours and tolerating food or enteral feeds for 24 hours).      IV medication famotidine will be changed to oral medication famotidine     Pharmacist's Name: Jenn Medina  Pharmacist's Extension: [phone]

## 2020-05-19 NOTE — TELEPHONE ENCOUNTER
Called patient's mother to update her on patient status.  Explained he is awake and alert but still somewhat confused and this is common.  His liver function continues to improve. Explained that his urine output low and we were giving him lasix to increase urine output.  Mrs. Malik verbalized understanding.  Allowed her time to ask questions. She appreciated the update.

## 2020-05-19 NOTE — NURSING TRANSFER
Nursing Transfer Note      5/19/2020     Transfer To: [id number]    Transfer via bed    Chart send with patient: Yes    Notified: Pt's mother    Patient reassessed at 17:00 05/19/2020    Upon arrival to floor: patient oriented to room, call bell in reach and bed in lowest position, LAN Zhu notified of pt arrival and presents to bedside to assume care.

## 2020-05-19 NOTE — PT/OT/SLP EVAL
Physical Therapy Evaluation    Patient Name:  Colin Reilly   MRN:  [id number]  Admit Date: 5/15/2020  Admitting Diagnosis:  Abdominal pain   Length of Stay: 4 days  Recent Surgery: Procedure(s) (LRB):  TRANSPLANT, LIVER (N/A)  THROMBECTOMY 2 Days Post-Op    Recommendations:     Discharge Recommendations:  home health PT   Discharge Equipment Recommendations: other (see comments)(tbd pending progress)   Barriers to discharge: None    Assessment:     Colin Reilly is a 59 y.o. male admitted with a medical diagnosis of Abdominal pain.  He presents with the following impairments/functional limitations: weakness, impaired endurance, gait instability, impaired balance, impaired self care skills, impaired functional mobilty, decreased lower extremity function, impaired cognition, impaired cardiopulmonary response to activity. Pt tolerated session well today. PT with decent functional strength but presents with increased safety concerns 2/2 confusion. Pt will continue to benefit from skilled PT services during this hospital admit to continue to improve transfer ability and efficiency as well as continue to progress pt's ambulation distance and cardiopulmonary endurance to maximize pt's functional independence and return to PLOF. After discharge pt will benefit from HHPT to further progress functional mobility and cardiopulmonary endurance as well as for home safety and energy conservation techniques.    Rehab Prognosis: Good; patient would benefit from acute skilled PT services to address these deficits and reach maximum level of function.      Plan:     During this hospitalization, patient to be seen 4 x/week to address the identified rehab impairments via gait training, therapeutic activities, therapeutic exercises, neuromuscular re-education and progress towards the established goals.    · Plan of Care Expires:  06/19/20    Subjective     RN notified prior to session. No family/friends present upon PT entrance into  "room.    Chief Complaint: "It's 1920"  Patient/Family Comments/goals: get better  Pain/Comfort:  · Pain Rating 1: 0/10  · Pain Rating Post-Intervention 1: 0/10    Living Environment:  Patient lives with mother in a single family home with 3 JOSE but will be living in [place] apartments after d/c.  Prior Level of Function: Patient reports being Mod I with mobility & with ADLs with use of SPC prn. Patient uses DME as follows: cane, straight. DME owned (not currently used): rolling walker.  Roles/Repsonsibilities: Hand Dominance: right Work: no. Drive: yes. Managing Medicines/Managing Home: yes.    Patient reports they will have assistance from mother and aunt upon discharge.    Objective:     Additional staff present: none    Patient found HOB elevated with: pulse ox (continuous), blood pressure cuff, telemetry, barker catheter, central line     General Precautions: Standard, Cardiac fall   Orthopedic Precautions:N/A   Braces: N/A   Body mass index is 30.72 kg/m².  Oxygen Device: Room Air    Exams:  · Mental Status: Patient is AxOx2 (Reoriented to place and time) and follows all multi-step verbal commands. Pt is Alert, Cooperative and Confused during session.  · Skin Integrity: Visible skin intact  · Edema: None noted   · Sensation: Intact  · Hearing: Intact  · Vision:  Intact  · Postural Assessment: slouched posture and rounded shoulders  · Range of Motion:  · RUE: WFL  · LUE: WFL  · RLE: WFL  · LLE: WFL  · Strength Exam:  · Upper/Lower Extremity Strength: WFL    Outcome Measures:  AM-PAC 6 CLICK MOBILITY  Turning over in bed (including adjusting bedclothes, sheets and blankets)?: 4  Sitting down on and standing up from a chair with arms (e.g., wheelchair, bedside commode, etc.): 3  Moving from lying on back to sitting on the side of the bed?: 3  Moving to and from a bed to a chair (including a wheelchair)?: 3  Need to walk in hospital room?: 3  Climbing 3-5 steps with a railing?: 2  Basic Mobility Total Score: " 18     Functional Mobility:    Bed Mobility:   · Supine to Sit: moderate assistance; from Rt side of bed  · Scooting anteriorly to EOB to have both feet planted on floor: contact guard assistance  · Sit to Supine: minimum assistance; to Rt side of bed    Sitting Balance at Edge of Bed:   Assistance Level Required: Contact Guard Assistance    Transfers:   · Sit <> Stand Transfer: minimum assistance with no assistive device   · Stand <> Sit Transfer: minimum assistance with no assistive device   · [id number] from EOB    Standing Balance:   Assistance Level Required: Contact Guard Assistance   Patient used: hand-held assist       Gait:   · Patient ambulated: 8 ft fwd/8 ft bwd   · Patient required: contact guard  · Patient used:  hand-held assist   · Gait Pattern observed: reciprocal gait  · Gait Deviation(s): occasional unsteady gait, decreased step length, wide base of support, decreased weight shift, shuffle gait and decreased naomie  · Impairments due to: impaired balance, decreased strength and decreased endurance  ·  all lines remained intact throughout ambulation trial    Education:   Time provided for education, counseling and discussion of health disposition in regards to patient's current status   All questions answered within PT scope of practice and to patient's satisfaction   PT role in POC to address current functional deficits   Pt educated on proper body mechanics, safety techniques, and energy conservation with PT facilitation and cueing throughout session   Whiteboard updated with pt's current mobility status documented above   Safe to perform step transfer to/from chair/bedside commode CGA and HHA w/ nursing/PCT present    Patient left HOB elevated with all lines intact, call button in reach and RN notified.    GOALS:   Multidisciplinary Problems     Physical Therapy Goals        Problem: Physical Therapy Goal    Goal Priority Disciplines Outcome Goal Variances Interventions   Physical Therapy  Goal     PT, PT/OT Ongoing, Progressing     Description:  Goals to be met by: 2020     Patient will increase functional independence with mobility by performin. Sit to stand transfer with Modified [place]  2. Bed to chair transfer with Supervision using LRAD  3. Gait  x 150 feet with Supervision using LRAD.   4. Stand for 5 minutes with Supervision using no AD while performing dynamic UE task  5. Lower extremity exercise program x20 reps per handout, with independence                      History:     Past Medical History:   Diagnosis Date    Alcohol abuse, in remission 2014    Quit 2011    Alcohol abuse, in remission     Ascites     Chronic back pain 2014    Cirrhosis, Laennec's 2014    Esophageal varices     GERD (gastroesophageal reflux disease)     Hepatic encephalopathy 2014    Hepatitis C virus infection 2014    tx with interferon 3-4 mos- stopped for unclear reasons Tattoos-first at age 17 only risk factor (HCVAB negative 2017)    HTN (hypertension) 2014    Hypertension     Insulin dependent diabetes mellitus     Renal mass, left 2014    6.3 x 5.7 x 5.6 complex mass    Splenomegaly 2014    Umbilical hernia 2014       Past Surgical History:   Procedure Laterality Date    CARPAL TUNNEL RELEASE Bilateral     CHOLECYSTECTOMY      lap    COLONOSCOPY N/A 2017    Procedure: COLONOSCOPY;  Surgeon: Savannah Llanos MD;  Location: [facility];  Service: Endoscopy;  Laterality: N/A;    COLONOSCOPY Left 3/14/2018    Procedure: COLONOSCOPY;  Surgeon: Savannah Llanos MD;  Location: [facility];  Service: Endoscopy;  Laterality: Left;    ESOPHAGOGASTRODUODENOSCOPY  2014    ESOPHAGOGASTRODUODENOSCOPY  02/15/2017    grade II varices    ESOPHAGOGASTRODUODENOSCOPY  04/10/2017    grade I varices    ESOPHAGOGASTRODUODENOSCOPY N/A 10/18/2019    Procedure: EGD (ESOPHAGOGASTRODUODENOSCOPY);  Surgeon: Flavio Escalera MD;  Location:  [facility] (2ND FLR);  Service: Endoscopy;  Laterality: N/A;    ESOPHAGOGASTRODUODENOSCOPY W/ BANDING  01/26/2017    grade II varices    HERNIA REPAIR      LIVER TRANSPLANT N/A 5/17/2020    Procedure: TRANSPLANT, LIVER;  Surgeon: Danny Thakkar MD;  Location: [address];  Service: Transplant;  Laterality: N/A;    NECK SURGERY      fusion on C5 and C6    THROMBECTOMY  5/17/2020    Procedure: THROMBECTOMY;  Surgeon: Danny Thakkar MD;  Location: [facility] [address] [place] [address];  Service: Transplant;;  portal vein thrombectomy    ULNAR NERVE TRANSPOSITION Left     UMBILICAL HERNIA REPAIR N/A 2/22/2020    Procedure: REPAIR, HERNIA, PRIMARY WIHTOUT MESH AND PLACEMENT OF DRAIN;  Surgeon: Sherri Brunner MD;  Location: [address];  Service: Transplant;  Laterality: N/A;    vericose veins removed         Time Tracking:     PT Received On: 05/19/20  PT Start Time: 1414     PT Stop Time: 1440  PT Total Time (min): 26 min     Billable Minutes: Evaluation 15 and Therapeutic Activity 10    Rachel Perez, PT, DPT  5/19/2020  Pager: [phone]

## 2020-05-19 NOTE — NURSING TRANSFER
Nursing Transfer Note      5/19/2020     Transfer To: [id number]    Transfer via bed    Transfer with n/a    Transported by SICU staff    Medicines sent: insulin gtt, cefepime gtt, zosyn gtt    Chart send with patient: Yes    Notified: n/a    Patient reassessed 5/19/20 at 17:05    Upon arrival to floor: patient oriented to room, call bell in reach and bed in lowest position.  VisiMobile applied and calibrated.  Bed alarm activated r/t confusion and unsteady on feet per ICU RN report.  Insulin gtt continued at 3 units/hr.  Pt disoriented to time and place, oriented to self and situation.  Pt denies pain or other need at this time; RN will continue to monitor, assess, and alter plan of care as needed until report given to oncoming night shift nurse.

## 2020-05-19 NOTE — SUBJECTIVE & OBJECTIVE
Interval History/Significant Events: No acute events overnight. He was noted to have 15-25 cc/hr of UOP, given Lasix 20 mg x1 with minimal response. His pain is well-controlled this AM. 2 RUQ drains remain in place with SS output.     Follow-up For: Procedure(s) (LRB):  TRANSPLANT, LIVER (N/A)  THROMBECTOMY    Post-Operative Day: 2 Days Post-Op    Objective:     Vital Signs (Most Recent):  Temp: 97.9 °F (36.6 °C) (05/19/20 0300)  Pulse: 86 (05/19/20 0600)  Resp: 10 (05/19/20 0600)  BP: (!) 159/84 (05/19/20 0500)  SpO2: 99 % (05/19/20 0600) Vital Signs (24h Range):  Temp:  [96.6 °F (35.9 °C)-98.2 °F (36.8 °C)] 97.9 °F (36.6 °C)  Pulse:  [80-98] 86  Resp:  [7-23] 10  SpO2:  [74 %-100 %] 99 %  BP: (123-159)/(65-91) 159/84  Arterial Line BP: (124-147)/(62-73) 135/72     Weight: 99.9 kg (220 lb 3.8 oz)  Body mass index is 30.72 kg/m².      Intake/Output Summary (Last 24 hours) at 5/19/2020 0656  Last data filed at 5/19/2020 0600  Gross per 24 hour   Intake 3786 ml   Output 1950 ml   Net 1836 ml       Physical Exam   Constitutional: He is oriented to person, place, and time. He appears well-developed and well-nourished. No distress.   HENT:   Head: Normocephalic and atraumatic.   Mouth/Throat: Oropharynx is clear and moist.   RIJV Trialysis   Eyes: Conjunctivae and EOM are normal. Right eye exhibits no discharge. Left eye exhibits no discharge.   Neck: Normal range of motion. Neck supple.   Cardiovascular: Normal rate, regular rhythm and intact distal pulses.   Pulmonary/Chest: Effort normal. No stridor. No respiratory distress. He has no wheezes. He has no rales.   Abdominal: Soft. He exhibits no distension. There is tenderness (appropriate postop).   Chevron incisions c/d/i  RUQ DG drains x2 with SS output   Musculoskeletal: Normal range of motion. He exhibits no deformity.   Neurological: He is alert and oriented to person, place, and time.   Skin: Skin is warm and dry.   Psychiatric: He has a normal mood and affect.  His behavior is normal.       Vents:  Vent Mode: Spont (05/18/20 1102)  Ventilator Initiated: Yes (05/18/20 0059)  Set Rate: 20 BPM (05/18/20 0818)  Vt Set: 500 mL (05/18/20 0818)  Pressure Support: 5 cmH20 (05/18/20 1102)  PEEP/CPAP: 5 cmH20 (05/18/20 1102)  Oxygen Concentration (%): 40 (05/18/20 1345)  Peak Airway Pressure: 10 cmH2O (05/18/20 1102)  Plateau Pressure: 17 cmH20 (05/18/20 1102)  Total Ve: 0 mL (05/18/20 1102)  F/VT Ratio<105 (RSBI): (!) 41.32 (05/18/20 0818)    Lines/Drains/Airways     Central Venous Catheter Line            Trialysis (Dialysis) Catheter 05/17/20 1700 right internal jugular 1 day          Drain                 Closed/Suction Drain Right Abdomen Bulb 19 Fr. -- days         Closed/Suction Drain Right Abdomen Bulb 19 Fr. -- days         Urethral Catheter 05/17/20 1618 Non-latex;Straight-tip 16 Fr. 1 day          Peripheral Intravenous Line                 Peripheral IV - Single Lumen 05/16/20 1029 20 G Anterior;Left Forearm 2 days                Significant Labs:    CBC/Anemia Profile:  Recent Labs   Lab 05/18/20  2100 05/19/20  0114 05/19/20  0304   WBC 9.12 11.62 8.73   HGB 7.5* 7.7* 7.6*   HCT 24.2* 25.5* 24.8*   PLT 40* 55* 41*   MCV 87 88 88   RDW 19.7* 19.8* 19.7*        Chemistries:  Recent Labs   Lab 05/18/20  0127 05/18/20  0500  05/18/20  1700 05/18/20  2100 05/19/20  0118 05/19/20  0304     138 137  137  --  139  --  137 137   K 3.7  3.7 3.1*  3.1*  --  2.8*  --  3.4* 3.6     103 104  104  --  106  --  107 106   CO2 21*  21* 23  23  --  26  --  21* 25   BUN 16  16 18  18  --  18  --  22* 25*   CREATININE 0.7  0.7 0.8  0.8  --  0.7  --  0.8 0.8   CALCIUM 6.9*  6.9* 7.1*  7.1*  --  6.8*  --  6.9* 6.8*   ALBUMIN 2.2*  2.2* 2.7*  --   --   --   --  2.3*   PROT 4.4*  4.4* 4.4*  --   --   --   --  4.1*   BILITOT 3.6*  3.6* 3.9*  --   --   --   --  1.6*   ALKPHOS 105  105 83  --   --   --   --  85   *  722* 558*  --   --   --   --  377*   AST  2,018*  2,018* 1,352*  1,352*   < > 566* 455* 395* 369*   MG 1.6 1.9  --   --   --   --  1.8   PHOS 5.4* 3.0  --   --   --   --  2.6*    < > = values in this interval not displayed.       CMP:   Recent Labs   Lab 05/18/20  0127 05/18/20  0500  05/18/20  1700 05/18/20  2100 05/19/20  0118 05/19/20  0304     138 137  137  --  139  --  137 137   K 3.7  3.7 3.1*  3.1*  --  2.8*  --  3.4* 3.6     103 104  104  --  106  --  107 106   CO2 21*  21* 23  23  --  26  --  21* 25   *  223* 322*  322*  --  107  --  111* 138*   BUN 16  16 18  18  --  18  --  22* 25*   CREATININE 0.7  0.7 0.8  0.8  --  0.7  --  0.8 0.8   CALCIUM 6.9*  6.9* 7.1*  7.1*  --  6.8*  --  6.9* 6.8*   PROT 4.4*  4.4* 4.4*  --   --   --   --  4.1*   ALBUMIN 2.2*  2.2* 2.7*  --   --   --   --  2.3*   BILITOT 3.6*  3.6* 3.9*  --   --   --   --  1.6*   ALKPHOS 105  105 83  --   --   --   --  85   AST 2,018*  2,018* 1,352*  1,352*   < > 566* 455* 395* 369*   *  722* 558*  --   --   --   --  377*   ANIONGAP 14  14 10  10  --  7*  --  9 6*   EGFRNONAA >60.0  >60.0 >60.0  >60.0  --  >60.0  --  >60.0 >60.0    < > = values in this interval not displayed.     Coagulation:   Recent Labs   Lab 05/19/20  0304   INR 1.6*   APTT 32.6*     Lactic Acid:   Recent Labs   Lab 05/18/20  0127 05/18/20  0915   LACTATE 4.3* 2.2       Significant Imaging:  I have reviewed all pertinent imaging results/findings within the past 24 hours.

## 2020-05-19 NOTE — PROGRESS NOTES
MD Oneal notified of pt with minimal UOP despite 20mg Lasix push, UOP currently ~ 25 cc/hr. No new orders at this time. Will continue to monitor.

## 2020-05-19 NOTE — SUBJECTIVE & OBJECTIVE
"Interval HPI:   Overnight events:  BG has trended downward towards goal overnight now requiring decrease in infusion rate changes.   Diet NPO Except for: Sips with Medication  2 Days Post-Op    Eating:   NPO  Nausea: No  Hypoglycemia and intervention: No  Fever: No  TPN and/or TF: No  If yes, type of TF/TPN and rate: None    BP (!) 148/72   Pulse 93   Temp 98.7 °F (37.1 °C) (Oral)   Resp 15   Ht 5' 11" (1.803 m)   Wt 99.9 kg (220 lb 3.8 oz)   SpO2 98%   BMI 30.72 kg/m²     Labs Reviewed and Include    Recent Labs   Lab 05/19/20  0304   *   CALCIUM 6.8*   ALBUMIN 2.3*   PROT 4.1*      K 3.6   CO2 25      BUN 25*   CREATININE 0.8   ALKPHOS 85   *   *   BILITOT 1.6*     Lab Results   Component Value Date    WBC 8.73 05/19/2020    HGB 7.6 (L) 05/19/2020    HCT 24.8 (L) 05/19/2020    MCV 88 05/19/2020    PLT 41 (L) 05/19/2020     No results for input(s): TSH, FREET4 in the last 168 hours.  Lab Results   Component Value Date    HGBA1C 5.3 05/17/2020       Nutritional status:   Body mass index is 30.72 kg/m².  Lab Results   Component Value Date    ALBUMIN 2.3 (L) 05/19/2020    ALBUMIN 2.7 (L) 05/18/2020    ALBUMIN 2.2 (L) 05/18/2020    ALBUMIN 2.2 (L) 05/18/2020     No results found for: PREALBUMIN    Estimated Creatinine Clearance: 121.1 mL/min (based on SCr of 0.8 mg/dL).    Accu-Checks  Recent Labs     05/18/20  2313 05/19/20  0002 05/19/20  0113 05/19/20  0205 05/19/20  0311 05/19/20  0401 05/19/20  0454 05/19/20  0559 05/19/20  0717 05/19/20  0805   POCTGLUCOSE 236* 192* 128* 109 143* 178* 174* 195* 240* 205*       Current Medications and/or Treatments Impacting Glycemic Control  Immunotherapy:    Immunosuppressants         Stop Route Frequency     tacrolimus (PROGRAF) 1 mg/mL oral syringe      -- Oral 2 times daily     mycophenolate mofetil 200 mg/mL suspension 1,000 mg      -- Oral 2 times daily        Steroids:   Hormones (From admission, onward)    Start     Stop Route " Frequency Ordered    05/24/20 0900  predniSONE tablet 20 mg  (methylprednisolone taper panel)      -- Oral Daily 05/18/20 0055    05/23/20 0900  methylPREDNISolone sodium succinate injection 20 mg  (methylprednisolone taper panel)      05/24 0859 IV Every 12 hours 05/18/20 0055    05/22/20 0900  methylPREDNISolone sodium succinate injection 40 mg  (methylprednisolone taper panel)      05/23 0859 IV Every 12 hours 05/18/20 0055    05/21/20 0900  methylPREDNISolone sodium succinate injection 60 mg  (methylprednisolone taper panel)      05/22 0859 IV Every 12 hours 05/18/20 0055    05/20/20 0900  methylPREDNISolone sodium succinate injection 80 mg  (methylprednisolone taper panel)      05/21 0859 IV Every 12 hours 05/18/20 0055    05/19/20 0900  methylPREDNISolone sodium succinate injection 100 mg  (methylprednisolone taper panel)      05/20 0859 IV Every 12 hours 05/18/20 0055        Pressors:    Autonomic Drugs (From admission, onward)    None        Hyperglycemia/Diabetes Medications:   Antihyperglycemics (From admission, onward)    Start     Stop Route Frequency Ordered    05/18/20 0245  insulin regular 100 Units in sodium chloride 0.9% 100 mL infusion     Question:  Insulin Rate Adjustment (DO NOT MODIFY ANSWER)  Answer:  [email or web address]    -- IV Continuous 05/18/20 0138

## 2020-05-19 NOTE — PROGRESS NOTES
2nd Note  SW unable to f/u with pt in SICU room this morning following LTS rounds due to pt's confusion.  JAMMIE contacted pt's mother/primary caregiver Suzanne Malik ([phone]) for f/u.  Suzanne reports adequate coping at this time and reports getting pt updates from SICU team as needed.  JAMMIE completed financial profile information with Suzanne via phone.  SW informed Suzanne, based on financial information provided, daily fee for the [place] apartment will be $16.66.  Suzanne verbalized understanding and denied any concerns regarding daily fee amount.  Suzanne reports not driving outside of her immediate city at home and will need to have a friend/relative drive her to [place] in order to check into apartment.  Suzanne to notify SW when she will come to [place].  SW to request apartment for check in at that time.  JAMMIE also reviewed pharmacy transplant medication cost estimation sheet with Suzanne.  SW informed Suzanne that listed costs amount to approximately $140.  Suzanne verbalized understanding regarding same and denies any concern regarding cost at this time.  Suzanne denies having any questions or psychosocial concerns at this time.  JAMMIE remains available for further psychosocial support and will f/u as needed.

## 2020-05-19 NOTE — PLAN OF CARE
Pt kept free from falls and injuries during shift. VSS on room air, sats > 90%. SR on monitor. CVP 9-12. SBP maintained <160. Pt awake and alert, oriented to person only. D50.45NS switched to 0.45% saline due to increase in blood sugars. 0.45% saline @ 100 cc/hr. Accuchekcs Q1h, insulin @ 2.8 units/hr. Maddox in place with minimal UOP, MD aware, 20 Lasix IV push given with no response.DG 1 & 2 with serous drainage, see flowsheet for numbers. Labs trended as ordered. Lytes replaced as ordered. Will continue to monitor.

## 2020-05-19 NOTE — PLAN OF CARE
Discharge Recommendation: HHPT.    Evaluation completed today. PT goals appropriate.    Progressive Mobility Protocol: Patient is safe to perform bed <> chair transfer with assist x 1 with nursing staff.    Rachel Perez, PT, DPT  2020  Pager: [phone]        Problem: Physical Therapy Goal  Goal: Physical Therapy Goal  Description  Goals to be met by: 2020     Patient will increase functional independence with mobility by performin. Sit to stand transfer with Modified [place]  2. Bed to chair transfer with Supervision using LRAD  3. Gait  x 150 feet with Supervision using LRAD.   4. Stand for 5 minutes with Supervision using no AD while performing dynamic UE task  5. Lower extremity exercise program x20 reps per handout, with independence     Outcome: Ongoing, Progressing

## 2020-05-20 PROBLEM — F05 ACUTE CONFUSIONAL STATE: Status: ACTIVE | Noted: 2020-05-20

## 2020-05-20 PROBLEM — K72.90 DECOMPENSATED HEPATIC CIRRHOSIS: Status: RESOLVED | Noted: 2019-10-17 | Resolved: 2020-05-20

## 2020-05-20 PROBLEM — R78.81 POSITIVE BLOOD CULTURE IN CADAVERIC DONOR: Status: ACTIVE | Noted: 2020-05-20

## 2020-05-20 PROBLEM — R10.9 ABDOMINAL PAIN: Status: RESOLVED | Noted: 2020-03-11 | Resolved: 2020-05-20

## 2020-05-20 PROBLEM — N17.9 AKI (ACUTE KIDNEY INJURY): Status: ACTIVE | Noted: 2020-05-20

## 2020-05-20 PROBLEM — Z00.5 POSITIVE BLOOD CULTURE IN CADAVERIC DONOR: Status: ACTIVE | Noted: 2020-05-20

## 2020-05-20 PROBLEM — D68.4 ACQUIRED COAGULATION FACTOR DEFICIENCY: Status: RESOLVED | Noted: 2020-02-22 | Resolved: 2020-05-20

## 2020-05-20 PROBLEM — K74.60 DECOMPENSATED HEPATIC CIRRHOSIS: Status: RESOLVED | Noted: 2019-10-17 | Resolved: 2020-05-20

## 2020-05-20 PROBLEM — K70.31 ASCITES DUE TO ALCOHOLIC CIRRHOSIS: Status: RESOLVED | Noted: 2019-10-17 | Resolved: 2020-05-20

## 2020-05-20 PROBLEM — K42.9 UMBILICAL HERNIA WITHOUT OBSTRUCTION AND WITHOUT GANGRENE: Status: RESOLVED | Noted: 2019-10-17 | Resolved: 2020-05-20

## 2020-05-20 PROBLEM — Z91.89 AT RISK FOR OPPORTUNISTIC INFECTIONS: Status: ACTIVE | Noted: 2020-05-20

## 2020-05-20 PROBLEM — Z79.60 LONG-TERM USE OF IMMUNOSUPPRESSANT MEDICATION: Status: ACTIVE | Noted: 2020-05-20

## 2020-05-20 PROBLEM — Z29.89 PROPHYLACTIC IMMUNOTHERAPY: Status: ACTIVE | Noted: 2020-05-20

## 2020-05-20 LAB
ALBUMIN SERPL BCP-MCNC: 2.3 G/DL (ref 3.5–5.2)
ALP SERPL-CCNC: 87 U/L (ref 55–135)
ALT SERPL W/O P-5'-P-CCNC: 298 U/L (ref 10–44)
ANION GAP SERPL CALC-SCNC: 10 MMOL/L (ref 8–16)
ANISOCYTOSIS BLD QL SMEAR: SLIGHT
APTT BLDCRRT: 26.4 SEC (ref 21–32)
AST SERPL-CCNC: 156 U/L (ref 10–40)
BACTERIA SPEC AEROBE CULT: NO GROWTH
BASOPHILS NFR BLD: 0 % (ref 0–1.9)
BILIRUB SERPL-MCNC: 1.4 MG/DL (ref 0.1–1)
BUN SERPL-MCNC: 46 MG/DL (ref 6–20)
CALCIUM SERPL-MCNC: 6.6 MG/DL (ref 8.7–10.5)
CHLORIDE SERPL-SCNC: 104 MMOL/L (ref 95–110)
CO2 SERPL-SCNC: 22 MMOL/L (ref 23–29)
CREAT SERPL-MCNC: 1.4 MG/DL (ref 0.5–1.4)
DACRYOCYTES BLD QL SMEAR: ABNORMAL
DIFFERENTIAL METHOD: ABNORMAL
EOSINOPHIL NFR BLD: 0 % (ref 0–8)
ERYTHROCYTE [DISTWIDTH] IN BLOOD BY AUTOMATED COUNT: 20.7 % (ref 11.5–14.5)
EST. GFR  (AFRICAN AMERICAN): >60 ML/MIN/1.73 M^2
EST. GFR  (NON AFRICAN AMERICAN): 55 ML/MIN/1.73 M^2
FINAL PATHOLOGIC DIAGNOSIS: NORMAL
GLUCOSE SERPL-MCNC: 157 MG/DL (ref 70–110)
GROSS: NORMAL
HBV DNA SERPL NAA+PROBE-ACNC: <10 IU/ML
HBV DNA SERPL NAA+PROBE-LOG IU: <1 LOG (10) IU/ML
HBV DNA SERPL QL NAA+PROBE: NOT DETECTED
HCT VFR BLD AUTO: 26.1 % (ref 40–54)
HGB BLD-MCNC: 8.1 G/DL (ref 14–18)
HYPOCHROMIA BLD QL SMEAR: ABNORMAL
IMM GRANULOCYTES # BLD AUTO: ABNORMAL K/UL (ref 0–0.04)
IMM GRANULOCYTES NFR BLD AUTO: ABNORMAL % (ref 0–0.5)
INR PPP: 1.4 (ref 0.8–1.2)
LYMPHOCYTES NFR BLD: 1 % (ref 18–48)
MAGNESIUM SERPL-MCNC: 2 MG/DL (ref 1.6–2.6)
MCH RBC QN AUTO: 26.9 PG (ref 27–31)
MCHC RBC AUTO-ENTMCNC: 31 G/DL (ref 32–36)
MCV RBC AUTO: 87 FL (ref 82–98)
MONOCYTES NFR BLD: 1 % (ref 4–15)
NEUTROPHILS NFR BLD: 98 % (ref 38–73)
NRBC BLD-RTO: 0 /100 WBC
OVALOCYTES BLD QL SMEAR: ABNORMAL
PHOSPHATE SERPL-MCNC: 5 MG/DL (ref 2.7–4.5)
PLATELET # BLD AUTO: 40 K/UL (ref 150–350)
PLATELET BLD QL SMEAR: ABNORMAL
PMV BLD AUTO: 10.6 FL (ref 9.2–12.9)
POCT GLUCOSE: 122 MG/DL (ref 70–110)
POCT GLUCOSE: 132 MG/DL (ref 70–110)
POCT GLUCOSE: 136 MG/DL (ref 70–110)
POCT GLUCOSE: 159 MG/DL (ref 70–110)
POCT GLUCOSE: 175 MG/DL (ref 70–110)
POCT GLUCOSE: 176 MG/DL (ref 70–110)
POCT GLUCOSE: 182 MG/DL (ref 70–110)
POCT GLUCOSE: 183 MG/DL (ref 70–110)
POCT GLUCOSE: 187 MG/DL (ref 70–110)
POCT GLUCOSE: 187 MG/DL (ref 70–110)
POCT GLUCOSE: 190 MG/DL (ref 70–110)
POCT GLUCOSE: 193 MG/DL (ref 70–110)
POCT GLUCOSE: 194 MG/DL (ref 70–110)
POIKILOCYTOSIS BLD QL SMEAR: SLIGHT
POLYCHROMASIA BLD QL SMEAR: ABNORMAL
POTASSIUM SERPL-SCNC: 3.3 MMOL/L (ref 3.5–5.1)
PROT SERPL-MCNC: 4.4 G/DL (ref 6–8.4)
PROTHROMBIN TIME: 13.4 SEC (ref 9–12.5)
RBC # BLD AUTO: 3.01 M/UL (ref 4.6–6.2)
SODIUM SERPL-SCNC: 136 MMOL/L (ref 136–145)
SUPPLEMENTAL DIAGNOSIS: NORMAL
TACROLIMUS BLD-MCNC: 5.6 NG/ML (ref 5–15)
WBC # BLD AUTO: 4.22 K/UL (ref 3.9–12.7)

## 2020-05-20 PROCEDURE — 83735 ASSAY OF MAGNESIUM: CPT

## 2020-05-20 PROCEDURE — 80053 COMPREHEN METABOLIC PANEL: CPT

## 2020-05-20 PROCEDURE — 85730 THROMBOPLASTIN TIME PARTIAL: CPT

## 2020-05-20 PROCEDURE — 80197 ASSAY OF TACROLIMUS: CPT

## 2020-05-20 PROCEDURE — 25000003 PHARM REV CODE 250: Performed by: SURGERY

## 2020-05-20 PROCEDURE — 85007 BL SMEAR W/DIFF WBC COUNT: CPT

## 2020-05-20 PROCEDURE — 63600175 PHARM REV CODE 636 W HCPCS: Performed by: NURSE PRACTITIONER

## 2020-05-20 PROCEDURE — 36415 COLL VENOUS BLD VENIPUNCTURE: CPT

## 2020-05-20 PROCEDURE — 25000003 PHARM REV CODE 250: Performed by: PHYSICIAN ASSISTANT

## 2020-05-20 PROCEDURE — 25000003 PHARM REV CODE 250: Performed by: NURSE PRACTITIONER

## 2020-05-20 PROCEDURE — 63600175 PHARM REV CODE 636 W HCPCS: Mod: JG | Performed by: NURSE PRACTITIONER

## 2020-05-20 PROCEDURE — 97116 GAIT TRAINING THERAPY: CPT | Mod: CQ

## 2020-05-20 PROCEDURE — 63600175 PHARM REV CODE 636 W HCPCS: Performed by: SURGERY

## 2020-05-20 PROCEDURE — 99233 SBSQ HOSP IP/OBS HIGH 50: CPT | Mod: ,,, | Performed by: NURSE PRACTITIONER

## 2020-05-20 PROCEDURE — 97530 THERAPEUTIC ACTIVITIES: CPT | Mod: CQ

## 2020-05-20 PROCEDURE — 85610 PROTHROMBIN TIME: CPT

## 2020-05-20 PROCEDURE — 85027 COMPLETE CBC AUTOMATED: CPT

## 2020-05-20 PROCEDURE — 99233 SBSQ HOSP IP/OBS HIGH 50: CPT | Mod: 24,,, | Performed by: NURSE PRACTITIONER

## 2020-05-20 PROCEDURE — 63600175 PHARM REV CODE 636 W HCPCS: Performed by: STUDENT IN AN ORGANIZED HEALTH CARE EDUCATION/TRAINING PROGRAM

## 2020-05-20 PROCEDURE — 63600175 PHARM REV CODE 636 W HCPCS: Performed by: TRANSPLANT SURGERY

## 2020-05-20 PROCEDURE — 84100 ASSAY OF PHOSPHORUS: CPT

## 2020-05-20 PROCEDURE — 97110 THERAPEUTIC EXERCISES: CPT | Mod: CQ

## 2020-05-20 PROCEDURE — 99233 PR SUBSEQUENT HOSPITAL CARE,LEVL III: ICD-10-PCS | Mod: 24,,, | Performed by: NURSE PRACTITIONER

## 2020-05-20 PROCEDURE — 99900035 HC TECH TIME PER 15 MIN (STAT)

## 2020-05-20 PROCEDURE — P9047 ALBUMIN (HUMAN), 25%, 50ML: HCPCS | Mod: JG | Performed by: NURSE PRACTITIONER

## 2020-05-20 PROCEDURE — 25000003 PHARM REV CODE 250: Performed by: STUDENT IN AN ORGANIZED HEALTH CARE EDUCATION/TRAINING PROGRAM

## 2020-05-20 PROCEDURE — 94761 N-INVAS EAR/PLS OXIMETRY MLT: CPT

## 2020-05-20 PROCEDURE — 99233 PR SUBSEQUENT HOSPITAL CARE,LEVL III: ICD-10-PCS | Mod: ,,, | Performed by: NURSE PRACTITIONER

## 2020-05-20 PROCEDURE — 20600001 HC STEP DOWN PRIVATE ROOM

## 2020-05-20 PROCEDURE — 25000003 PHARM REV CODE 250: Performed by: TRANSPLANT SURGERY

## 2020-05-20 RX ORDER — QUETIAPINE FUMARATE 25 MG/1
25 TABLET, FILM COATED ORAL NIGHTLY
Status: DISCONTINUED | OUTPATIENT
Start: 2020-05-20 | End: 2020-05-24 | Stop reason: HOSPADM

## 2020-05-20 RX ORDER — INSULIN ASPART 100 [IU]/ML
7-10 INJECTION, SOLUTION INTRAVENOUS; SUBCUTANEOUS
Status: DISCONTINUED | OUTPATIENT
Start: 2020-05-20 | End: 2020-05-23

## 2020-05-20 RX ORDER — IBUPROFEN 200 MG
24 TABLET ORAL
Status: DISCONTINUED | OUTPATIENT
Start: 2020-05-20 | End: 2020-05-23

## 2020-05-20 RX ORDER — DOCUSATE SODIUM 100 MG/1
100 CAPSULE, LIQUID FILLED ORAL 3 TIMES DAILY
Status: DISCONTINUED | OUTPATIENT
Start: 2020-05-20 | End: 2020-05-22

## 2020-05-20 RX ORDER — FUROSEMIDE 10 MG/ML
20 INJECTION INTRAMUSCULAR; INTRAVENOUS ONCE
Status: DISCONTINUED | OUTPATIENT
Start: 2020-05-20 | End: 2020-05-20

## 2020-05-20 RX ORDER — INSULIN ASPART 100 [IU]/ML
0-5 INJECTION, SOLUTION INTRAVENOUS; SUBCUTANEOUS
Status: DISCONTINUED | OUTPATIENT
Start: 2020-05-20 | End: 2020-05-23

## 2020-05-20 RX ORDER — ALBUMIN HUMAN 250 G/1000ML
25 SOLUTION INTRAVENOUS EVERY 8 HOURS
Status: COMPLETED | OUTPATIENT
Start: 2020-05-20 | End: 2020-05-21

## 2020-05-20 RX ORDER — TACROLIMUS 1 MG/1
1 CAPSULE ORAL 2 TIMES DAILY
Status: DISCONTINUED | OUTPATIENT
Start: 2020-05-20 | End: 2020-05-21

## 2020-05-20 RX ORDER — MYCOPHENOLATE MOFETIL 250 MG/1
1000 CAPSULE ORAL 2 TIMES DAILY
Status: DISCONTINUED | OUTPATIENT
Start: 2020-05-20 | End: 2020-05-24 | Stop reason: HOSPADM

## 2020-05-20 RX ORDER — BISACODYL 5 MG
10 TABLET, DELAYED RELEASE (ENTERIC COATED) ORAL NIGHTLY
Status: DISCONTINUED | OUTPATIENT
Start: 2020-05-20 | End: 2020-05-22

## 2020-05-20 RX ORDER — POTASSIUM CHLORIDE 20 MEQ/1
20 TABLET, EXTENDED RELEASE ORAL ONCE
Status: COMPLETED | OUTPATIENT
Start: 2020-05-20 | End: 2020-05-20

## 2020-05-20 RX ORDER — GLUCAGON 1 MG
1 KIT INJECTION
Status: DISCONTINUED | OUTPATIENT
Start: 2020-05-20 | End: 2020-05-23

## 2020-05-20 RX ORDER — IBUPROFEN 200 MG
16 TABLET ORAL
Status: DISCONTINUED | OUTPATIENT
Start: 2020-05-20 | End: 2020-05-23

## 2020-05-20 RX ADMIN — POSACONAZOLE 300 MG: 100 TABLET, COATED ORAL at 07:05

## 2020-05-20 RX ADMIN — MUPIROCIN 1 G: 20 OINTMENT TOPICAL at 08:05

## 2020-05-20 RX ADMIN — PIPERACILLIN AND TAZOBACTAM 4.5 G: 4; .5 INJECTION, POWDER, FOR SOLUTION INTRAVENOUS at 12:05

## 2020-05-20 RX ADMIN — ESCITALOPRAM OXALATE 20 MG: 10 TABLET ORAL at 08:05

## 2020-05-20 RX ADMIN — CEFEPIME 2 G: 2 INJECTION, POWDER, FOR SOLUTION INTRAVENOUS at 12:05

## 2020-05-20 RX ADMIN — PIPERACILLIN AND TAZOBACTAM 4.5 G: 4; .5 INJECTION, POWDER, FOR SOLUTION INTRAVENOUS at 08:05

## 2020-05-20 RX ADMIN — METHYLPREDNISOLONE SODIUM SUCCINATE 80 MG: 125 INJECTION, POWDER, FOR SOLUTION INTRAMUSCULAR; INTRAVENOUS at 08:05

## 2020-05-20 RX ADMIN — POTASSIUM CHLORIDE 20 MEQ: 1500 TABLET, EXTENDED RELEASE ORAL at 04:05

## 2020-05-20 RX ADMIN — INSULIN ASPART 1 UNITS: 100 INJECTION, SOLUTION INTRAVENOUS; SUBCUTANEOUS at 09:05

## 2020-05-20 RX ADMIN — Medication 1000 MG: at 09:05

## 2020-05-20 RX ADMIN — TAMSULOSIN HYDROCHLORIDE 0.4 MG: 0.4 CAPSULE ORAL at 08:05

## 2020-05-20 RX ADMIN — FINASTERIDE 5 MG: 5 TABLET, FILM COATED ORAL at 08:05

## 2020-05-20 RX ADMIN — DOCUSATE SODIUM 100 MG: 100 CAPSULE, LIQUID FILLED ORAL at 09:05

## 2020-05-20 RX ADMIN — Medication 1 MG: at 08:05

## 2020-05-20 RX ADMIN — FAMOTIDINE 20 MG: 20 TABLET, FILM COATED ORAL at 07:05

## 2020-05-20 RX ADMIN — ALBUMIN (HUMAN) 25 G: 12.5 SOLUTION INTRAVENOUS at 12:05

## 2020-05-20 RX ADMIN — HYDROCODONE BITARTRATE AND ACETAMINOPHEN 1 TABLET: 10; 325 TABLET ORAL at 09:05

## 2020-05-20 RX ADMIN — INSULIN ASPART 1 UNITS: 100 INJECTION, SOLUTION INTRAVENOUS; SUBCUTANEOUS at 12:05

## 2020-05-20 RX ADMIN — TACROLIMUS 1 MG: 1 CAPSULE ORAL at 05:05

## 2020-05-20 RX ADMIN — HEPARIN SODIUM 5000 UNITS: 5000 INJECTION INTRAVENOUS; SUBCUTANEOUS at 05:05

## 2020-05-20 RX ADMIN — NYSTATIN 500000 UNITS: 500000 SUSPENSION ORAL at 08:05

## 2020-05-20 RX ADMIN — ALBUMIN (HUMAN) 25 G: 12.5 SOLUTION INTRAVENOUS at 09:05

## 2020-05-20 RX ADMIN — CEFEPIME 2 G: 2 INJECTION, POWDER, FOR SOLUTION INTRAVENOUS at 08:05

## 2020-05-20 RX ADMIN — INSULIN ASPART 7 UNITS: 100 INJECTION, SOLUTION INTRAVENOUS; SUBCUTANEOUS at 05:05

## 2020-05-20 RX ADMIN — HEPARIN SODIUM 5000 UNITS: 5000 INJECTION INTRAVENOUS; SUBCUTANEOUS at 07:05

## 2020-05-20 RX ADMIN — OXYBUTYNIN CHLORIDE 5 MG: 5 TABLET ORAL at 08:05

## 2020-05-20 RX ADMIN — HEPARIN SODIUM 5000 UNITS: 5000 INJECTION INTRAVENOUS; SUBCUTANEOUS at 02:05

## 2020-05-20 RX ADMIN — INSULIN ASPART 7 UNITS: 100 INJECTION, SOLUTION INTRAVENOUS; SUBCUTANEOUS at 12:05

## 2020-05-20 RX ADMIN — HYDROCODONE BITARTRATE AND ACETAMINOPHEN 1 TABLET: 10; 325 TABLET ORAL at 05:05

## 2020-05-20 RX ADMIN — PIPERACILLIN SODIUM,TAZOBACTAM SODIUM 4.5 G: 4; .5 INJECTION, POWDER, FOR SOLUTION INTRAVENOUS at 05:05

## 2020-05-20 RX ADMIN — MYCOPHENOLATE MOFETIL 1000 MG: 250 CAPSULE ORAL at 07:05

## 2020-05-20 RX ADMIN — SODIUM CHLORIDE 1.5 UNITS/HR: 9 INJECTION, SOLUTION INTRAVENOUS at 10:05

## 2020-05-20 RX ADMIN — BISACODYL 10 MG: 5 TABLET, COATED ORAL at 07:05

## 2020-05-20 RX ADMIN — DOCUSATE SODIUM 100 MG: 100 CAPSULE, LIQUID FILLED ORAL at 04:05

## 2020-05-20 RX ADMIN — INSULIN ASPART 1 UNITS: 100 INJECTION, SOLUTION INTRAVENOUS; SUBCUTANEOUS at 05:05

## 2020-05-20 RX ADMIN — QUETIAPINE FUMARATE 25 MG: 25 TABLET ORAL at 07:05

## 2020-05-20 NOTE — PLAN OF CARE
Problem: Physical Therapy Goal  Goal: Physical Therapy Goal  Description  Goals to be met by: 2020     Patient will increase functional independence with mobility by performin. Sit to stand transfer with Modified [place]  2. Bed to chair transfer with Supervision using LRAD  3. Gait  x 150 feet with Supervision using LRAD.   4. Stand for 5 minutes with Supervision using no AD while performing dynamic UE task  5. Lower extremity exercise program x20 reps per handout, with independence     Outcome: Ongoing, Progressing.  Patient progressing slowly, but consistently towards his goals.

## 2020-05-20 NOTE — PLAN OF CARE
Pt AAOx4 - intermittently confused but easily reoriented, VSS on bedside monitor, sats stable on RA, afebrile. Zosyn continued - WBC stable. Cr 1.4 - plan for albumin x3 for hydration. Insulin gtt @ 1.5 mL/hr - BG monitored ACHS + 2A - mealtime and SS administered per orders. Diabetic diet in place - pt only eating 25% of meals. Pt c/o incisional pain - mild relief noted with PRN medications. Bowel regimen started this shift - bedside commode at bedside and ambulation promoted - pt passing gas but no BM so far. RLQ old DG sites leaking serous fluid - gauze dsg changed x1 this shift. Chevron incision FIDEL with staples - RN painting with betadine x2 so far this shift. LLQ old para site leaking SS fluid - gauze dsg changed x1 this shift. Peripheral IV's CDI. Pt worked with PT this shift and was up to chair x2 hours this shift - tolerated well. Pt able to ambulate with walker and 1 person assist to bedside commode - pt instructed to call for assistance to get out of bed. Pt instructed to use call light for assistance - pt demonstrated and verbalized understanding - call bell placed within pt reach. Safety precautions maintained throughout shift - non skid footwear used while out of bed. Pt in no apparent distress - will continue to monitor pt, proactively round on pt, and adjust care as needed.

## 2020-05-20 NOTE — ASSESSMENT & PLAN NOTE
- Progressing slowly post-op.   - pain controlled, passing gas and tolerating diet.   - Encourage working with PT/OT for strengthening.   - LFTs improving at this time.   - recent liver u/s reviewed.   - bowel regimen started.   - Monitor.

## 2020-05-20 NOTE — PLAN OF CARE
See progress notes from overnight. Pt disoriented time x situation overnight, following commands well. Became more anxious/agitated/angry in early AM.  Bed alarm on for safety. Rested well through night after PM Seroquel administration. Skip PARRA with staples, RN painting with betadine TID.  R DG exit sites with serous drainage, dressed w gauze and exudry. L para site oozing blood, dressed w pressure gauze. New PIV started. Insulin gtt infusing per orders, titrating gtt with accuchecks q1h. Zosyn and Cefepime continued SPO, WBC stable. PT working w pt, pt deconditioned. Sacral foam in place, turning q2h, SCDs on.  LFTs and tbili trending down. Incontinent of urine, 2 large UUOP noted. No bm since surgery, no bowel regimen started. Regular diet in place, poor appetite. Pain controlled with PRN Norco 10mg. Self med box completed, unable to educate pt d/t disorientation. VSS on continuous bedside monitoring, bed in lowest/locked position, call light/personal belongings within reach.  Proper hand hygiene performed before and after pt care.  Will continue to monitor pt.

## 2020-05-20 NOTE — PROGRESS NOTES
[facility]  Endocrinology  Progress Note    Admit Date: 5/15/2020     Reason for Consult: Management of T2DM, Hyperglycemia     Surgical Procedure and Date: N/A    Diabetes diagnosis year: 2002    Home Diabetes Medications:    - Levemir 50 units HS (per chart review)  - Novolog 24 units TIDWM (per chart review)    How often checking glucose at home? MARILIN   BG readings on regimen: MARILIN  Hypoglycemia on the regimen?  MARILIN  Missed doses on regimen?  MARILIN    Diabetes Complications include:     Hyperglycemia and Diabetic peripheral neuropathy      Complicating diabetes co morbidities:   CIRRHOSIS    HPI:   Patient is a 59 y.o. 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 Surgery uncomplicated but has had recurrent abdominal pain and increased drainage from surgical incision. Patient recently admitted 3/11-3/13 with similar symptoms, attempted paracentesis at this time- without fluid to drain. He re-presented to [facility] on 5/15 with c/o fever and abdominal pain (Tmax 103) x 1 week, distention and dark colored stools, received 1g Rocephin and transferred to [facility] for further care. Patient is being admitted for suspected SBP and infectious work-up.  COVID-19 rapid test negative on admission. Plan for paracentesis. Endocrinology consulted to manage hypeglycemia/DM2 during admission to [facility].    Lab Results   Component Value Date    HGBA1C 6.7 (H) 02/28/2020       Interval HPI:   Overnight events:   BG is at upper end of goal on intensive IV insulin infusion protocol. Diet will be advanced today as notified per primary nurse.   Diet Adult Regular (IDDSI Level 7)  3 Days Post-Op    Eating:   <25%  Nausea: No  Hypoglycemia and intervention: No  Fever: No  TPN and/or TF: No  If yes, type of TF/TPN and rate: None    /67 (BP Location: Right arm, Patient Position: Lying)   Pulse 81    "Temp 97.5 °F (36.4 °C) (Oral)   Resp 13   Ht 5' 11" (1.803 m)   Wt 99.9 kg (220 lb 3.8 oz)   SpO2 99%   BMI 30.72 kg/m²      Labs Reviewed and Include    Recent Labs   Lab 05/20/20  0542   *   CALCIUM 6.6*   ALBUMIN 2.3*   PROT 4.4*      K 3.3*   CO2 22*      BUN 46*   CREATININE 1.4   ALKPHOS 87   *   *   BILITOT 1.4*     Lab Results   Component Value Date    WBC 4.22 05/20/2020    HGB 8.1 (L) 05/20/2020    HCT 26.1 (L) 05/20/2020    MCV 87 05/20/2020    PLT 40 (L) 05/20/2020     No results for input(s): TSH, FREET4 in the last 168 hours.  Lab Results   Component Value Date    HGBA1C 5.3 05/17/2020       Nutritional status:   Body mass index is 30.72 kg/m².  Lab Results   Component Value Date    ALBUMIN 2.3 (L) 05/20/2020    ALBUMIN 2.3 (L) 05/19/2020    ALBUMIN 2.7 (L) 05/18/2020     No results found for: PREALBUMIN    Estimated Creatinine Clearance: 69.2 mL/min (based on SCr of 1.4 mg/dL).    Accu-Checks  Recent Labs     05/20/20  0005 05/20/20  0108 05/20/20  0208 05/20/20  0309 05/20/20  0411 05/20/20  0503 05/20/20  0609 05/20/20  0705 05/20/20  0800 05/20/20  0905   POCTGLUCOSE 183* 190* 136* 122* 132* 159* 187* 182* 187* 193*       Current Medications and/or Treatments Impacting Glycemic Control  Immunotherapy:    Immunosuppressants         Stop Route Frequency     tacrolimus (PROGRAF) 1 mg/mL oral syringe      -- Oral 2 times daily     mycophenolate mofetil 200 mg/mL suspension 1,000 mg      -- Oral 2 times daily        Steroids:   Hormones (From admission, onward)    Start     Stop Route Frequency Ordered    05/24/20 0900  predniSONE tablet 20 mg  (methylprednisolone taper panel)      -- Oral Daily 05/18/20 0055    05/23/20 0900  methylPREDNISolone sodium succinate injection 20 mg  (methylprednisolone taper panel)      05/24 0859 IV Every 12 hours 05/18/20 0055    05/22/20 0900  methylPREDNISolone sodium succinate injection 40 mg  (methylprednisolone taper panel)      " 05/23 0859 IV Every 12 hours 05/18/20 0055    05/21/20 0900  methylPREDNISolone sodium succinate injection 60 mg  (methylprednisolone taper panel)      05/22 0859 IV Every 12 hours 05/18/20 0055    05/20/20 0900  methylPREDNISolone sodium succinate injection 80 mg  (methylprednisolone taper panel)      05/21 0859 IV Every 12 hours 05/18/20 0055        Pressors:    Autonomic Drugs (From admission, onward)    None        Hyperglycemia/Diabetes Medications:   Antihyperglycemics (From admission, onward)    Start     Stop Route Frequency Ordered    05/18/20 0245  insulin regular 100 Units in sodium chloride 0.9% 100 mL infusion     Question:  Insulin Rate Adjustment (DO NOT MODIFY ANSWER)  Answer:  [email or web address]    -- IV Continuous 05/18/20 0138          ASSESSMENT and PLAN    * Abdominal pain  Managed per primary team  Avoid hypoglycemia        Type 2 diabetes mellitus without complication, with long-term current use of insulin  BG goal 140 - 180     - Discontinue IV insulin infusion protocol. Diet has advanced.   - Start  transition IV insulin infusion with stepdown parameters. Initial rate starting at 1.5 units/hr. 0.5 - 0.8 u/k/d dosing regimen.   - Start novolog 7-10 units TIDWM. Basal/Prandial physiologic matching.    - Low Dose SQ Insulin Correction Scale.  - BG Monitoring /HS/0200    ** Please call Endocrine for any BG related issues **  ** Please notify Endocrine for any change and/or advance in diet**      Discharge planning:   TBD        Anemia of chronic disease  May affect accuracy of HbA1c results.             Yogesh Goldstein NP  Endocrinology  [facility]

## 2020-05-20 NOTE — PROGRESS NOTES
CoVid-19 precaution - Transplant SW update conducted with mother by telephone.  SW spoke with pts mother by telephone this morning to discuss discharge planning needs with pts mother Suzanne Malik ([phone]) this morning.   Pts mother verbalizing she will be able to present to [place] around June 4th, 2020.  SW questioned pts mother about this date, provided additional transplant psychosocial discharge education to her that patient should be ready to dc in 5-10 days from transplant depending on his recovery, the team would need pts caregiver to present sooner than June 4th.   After much discussion and psychosocial re-education, pts mother will make plans to present to [place] on Monday May 25, 2020.  SW will request a [employer] Apartment for check in on this day and return call to patient's mother about apartment availability and check in times.   Pts mother reminding SW that it will take her and her sister who will drive pts mother 4 hours to arrive in [place].  SW will request an afternoon check in time.   Pts mother waiting in mail for post liver education book to arrive to her, it was mailed on yesterday.    JAMMIE reached out to [employer] Apartment managers today via email.  Per manager Namrata, the apartment complex management office is closed on Monday due to Memorial Holiday.  Patient's mother would be able to check into [address] at 11am on Tuesday May 26, 2020.   SW concerned about pt discharging to [facility] and pts mother managing appts on Tuesday and checking into apartment on Tuesday, SW will discuss concerns with team tomorrow during rounds.   SW will reach back out to pts mother again tomorrow to discuss speaking with her sister about her availability to stay and assist pt and pts mother with moving into LR apartment on Tuesday if pt ready for dc on Monday.  No further psychosocial concerns identified by pts mother today.    JAMMIE met with pt at bedside this morning after rounds.   Pt  presented alert and oriented to person, events, year, place, but not town.  Pt experiencing some confusion and fuzziness this morning.   Pt did ask SW to speak with his mother about her coming down for his discharge.  SW reassured patient that SW had spoken with pts mother already this am.   SW Remains avaialble for all transplant resources, education, psychosocial support and will assist with all dc planning needs.

## 2020-05-20 NOTE — SUBJECTIVE & OBJECTIVE
Scheduled Meds:   albumin human 25%  25 g Intravenous Q8H    escitalopram oxalate  20 mg Oral Daily    famotidine  20 mg Oral QHS    finasteride  5 mg Oral Daily    furosemide (LASIX) IV  20 mg Intravenous Once    heparin (porcine)  5,000 Units Subcutaneous Q8H    insulin aspart U-100  7-10 Units Subcutaneous TIDWM    methylPREDNISolone sodium succinate  80 mg Intravenous Q12H    Followed by    [START ON 5/21/2020] methylPREDNISolone sodium succinate  60 mg Intravenous Q12H    Followed by    [START ON 5/22/2020] methylPREDNISolone sodium succinate  40 mg Intravenous Q12H    Followed by    [START ON 5/23/2020] methylPREDNISolone sodium succinate  20 mg Intravenous Q12H    Followed by    [START ON 5/24/2020] predniSONE  20 mg Oral Daily    mupirocin  1 g Nasal BID    mycophenolate mofetil  1,000 mg Oral BID    nystatin  500,000 Units Mouth/Throat TID PC    oxybutynin  5 mg Oral Daily    piperacillin-tazobactam (ZOSYN) IVPB  4.5 g Intravenous Q8H    posaconazole  300 mg Oral QHS    QUEtiapine  25 mg Oral QHS    [START ON 5/25/2020] sulfamethoxazole-trimethoprim 400-80mg  1 tablet Oral Daily AM    tacrolimus  1 mg Oral BID    tamsulosin  0.4 mg Oral Daily    [START ON 5/28/2020] valGANciclovir  450 mg Oral Daily     Continuous Infusions:   insulin (HUMAN R) infusion (adults) 1.5 Units/hr (05/20/20 1053)     PRN Meds:sodium chloride, sodium chloride, acetaminophen, Dextrose 10% Bolus, Dextrose 10% Bolus, glucagon (human recombinant), glucose, glucose, HYDROcodone-acetaminophen, insulin aspart U-100, sodium chloride 0.9%    Review of Systems   Constitutional: Positive for activity change and appetite change. Negative for fever.   HENT: Negative.  Negative for facial swelling.    Eyes: Negative.    Respiratory: Negative.  Negative for apnea, chest tightness, shortness of breath and wheezing.    Cardiovascular: Negative.  Negative for chest pain, palpitations and leg swelling.   Gastrointestinal:  Positive for abdominal distention and abdominal pain. Negative for constipation, diarrhea, nausea and vomiting.   Genitourinary: Negative.  Negative for decreased urine volume, difficulty urinating, dysuria, hematuria and urgency.   Musculoskeletal: Negative.  Negative for back pain, gait problem, neck pain and neck stiffness.   Skin: Positive for wound. Negative for color change and pallor.   Allergic/Immunologic: Positive for immunocompromised state.   Neurological: Negative.  Negative for dizziness, seizures, weakness, light-headedness and headaches.   Psychiatric/Behavioral: Positive for confusion. Negative for behavioral problems, hallucinations, sleep disturbance and suicidal ideas. The patient is nervous/anxious.      Objective:     Vital Signs (Most Recent):  Temp: 97.7 °F (36.5 °C) (05/20/20 1204)  Pulse: 77 (05/20/20 1339)  Resp: 13 (05/20/20 1200)  BP: 139/79 (05/20/20 1200)  SpO2: 100 % (05/20/20 1339) Vital Signs (24h Range):  Temp:  [97.5 °F (36.4 °C)-98.2 °F (36.8 °C)] 97.7 °F (36.5 °C)  Pulse:  [77-93] 77  Resp:  [11-33] 13  SpO2:  [97 %-100 %] 100 %  BP: (126-167)/(67-89) 139/79     Weight: 99.9 kg (220 lb 3.8 oz)  Body mass index is 30.72 kg/m².    Intake/Output - Last 3 Shifts       05/18 0700 - 05/19 0659 05/19 0700 - 05/20 0659 05/20 0700 - 05/21 0659    P.O.  850 240    I.V. (mL/kg) 3786 (37.9) 724.9 (7.3) 43.8 (0.4)    Blood   100    NG/GT       Total Intake(mL/kg) 3786 (37.9) 1574.9 (15.8) 383.8 (3.8)    Urine (mL/kg/hr) 1210 (0.5) 822 (0.3) 300 (0.4)    Drains 740 155     Stool   0    Blood       Total Output 1950 977 300    Net +1836 +597.9 +83.8           Urine Occurrence  2 x     Stool Occurrence  0 x 0 x    Emesis Occurrence  0 x           Physical Exam   Constitutional: He is oriented to person, place, and time. He appears well-developed and well-nourished. No distress.   HENT:   Head: Normocephalic and atraumatic.   Neck: Normal range of motion. Neck supple. No JVD present.    Cardiovascular: Normal rate, regular rhythm and normal heart sounds.   No murmur heard.  Pulmonary/Chest: Effort normal. No respiratory distress. He has decreased breath sounds in the right lower field and the left lower field. He has no wheezes. He exhibits no tenderness.   Abdominal: Soft. Bowel sounds are normal. He exhibits distension. There is tenderness.   Chevron inc FIDEL with staples.   RLQ old DG sites.    Musculoskeletal: Normal range of motion. He exhibits no edema or tenderness.   Neurological: He is alert and oriented to person, place, and time. He has normal reflexes.   Skin: Skin is warm and dry. He is not diaphoretic.   Psychiatric: He has a normal mood and affect. His behavior is normal. Judgment and thought content normal.   Nursing note and vitals reviewed.      Laboratory:  Immunosuppressants         Stop Route Frequency     tacrolimus (PROGRAF) 1 mg/mL oral syringe      -- Oral 2 times daily     mycophenolate mofetil 200 mg/mL suspension 1,000 mg      -- Oral 2 times daily        CBC:   Recent Labs   Lab 05/20/20  0542   WBC 4.22   RBC 3.01*   HGB 8.1*   HCT 26.1*   PLT 40*   MCV 87   MCH 26.9*   MCHC 31.0*     CMP:   Recent Labs   Lab 05/20/20  0542   *   CALCIUM 6.6*   ALBUMIN 2.3*   PROT 4.4*      K 3.3*   CO2 22*      BUN 46*   CREATININE 1.4   ALKPHOS 87   *   *   BILITOT 1.4*     Coagulation:   Recent Labs   Lab 05/20/20  0542   INR 1.4*   APTT 26.4     Labs within the past 24 hours have been reviewed.    Diagnostic Results:  None

## 2020-05-20 NOTE — PROGRESS NOTES
[facility]  Liver Transplant  Progress Note    Patient Name: Colin Reilly  MRN: [id number]  Admission Date: 5/15/2020  Hospital Length of Stay: 5 days  Code Status: Full Code  Primary Care Provider: Preston Matthew Ii, MD  Post-Operative Day: 3    ORGAN:   LIVER  Disease Etiology: Alcoholic Cirrhosis  Donor Type:   Donation after Circulatory Death   CDC High Risk:   No  Donor CMV Status:   Donor CMV Status: Negative  Donor HBcAB:   Negative  Donor HCV Status:   Negative  Donor HBV CEZAR: Negative  Donor HCV CEZAR: Negative  Whole or Partial: Whole Liver  Biliary Anastomosis: End to End  Arterial Anatomy: Standard  Subjective:     History of Present Illness:  Colin Reilly is a 58y/o male, with 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 Surgery uncomplicated but has had recurrent abdominal pain and increased drainage from surgical incision. Patient recently admitted 3/11-3/13 with similar symptoms, attempted paracentesis at this time- without fluid to drain. Patient previously negative for peritonitis. He re-presented to [facility] on 5/15 with c/o fever and abdominal pain (Tmax 103) x 1 week, distention and dark colored stools, received 1g Rocephin and transferred to [facility] for further care. Patient is being admitted for suspected SBP and infectious work-up.  His last fever was 5/14 of 100.5. Reports last paracentesis about 1 week ago. Umbilical hernia repair incision CDI, no s/s infection, small umbilical hernia easily reducible with no tenderness. He denies N/V, SOB, chest pain, change in bladder function. COVID-19 rapid test negative on admission. Plan for paracentesis. VSS. Monitor    Hospital Course:  Pt is now s/p OLTX from 5/17/20 2/2 ETOH and Hep C. Progressing well post-op but noted with mild episodes of confusion.     Interval History: no acute events overnight. Pt reports feeling well this am  but does endorse mild episodes of confusion intermittently. Will cont to promote proper sleep cycle and monitor closely. LFTs improving at this time. Cr level noted with slight increase and will plan for albumin for hydration. Donor noted with Blood culture 1/2 with Klebsiella and Pseudomonas. ID recommending 7 day course of cefepime. Will cont with zosyn and f/u cxs to possibly deescalate. Pt is passing gas, with good pain control and tolerating diet. PT/OT for strengthening. Monitor.         Scheduled Meds:   albumin human 25%  25 g Intravenous Q8H    escitalopram oxalate  20 mg Oral Daily    famotidine  20 mg Oral QHS    finasteride  5 mg Oral Daily    furosemide (LASIX) IV  20 mg Intravenous Once    heparin (porcine)  5,000 Units Subcutaneous Q8H    insulin aspart U-100  7-10 Units Subcutaneous TIDWM    methylPREDNISolone sodium succinate  80 mg Intravenous Q12H    Followed by    [START ON 5/21/2020] methylPREDNISolone sodium succinate  60 mg Intravenous Q12H    Followed by    [START ON 5/22/2020] methylPREDNISolone sodium succinate  40 mg Intravenous Q12H    Followed by    [START ON 5/23/2020] methylPREDNISolone sodium succinate  20 mg Intravenous Q12H    Followed by    [START ON 5/24/2020] predniSONE  20 mg Oral Daily    mupirocin  1 g Nasal BID    mycophenolate mofetil  1,000 mg Oral BID    nystatin  500,000 Units Mouth/Throat TID PC    oxybutynin  5 mg Oral Daily    piperacillin-tazobactam (ZOSYN) IVPB  4.5 g Intravenous Q8H    posaconazole  300 mg Oral QHS    QUEtiapine  25 mg Oral QHS    [START ON 5/25/2020] sulfamethoxazole-trimethoprim 400-80mg  1 tablet Oral Daily AM    tacrolimus  1 mg Oral BID    tamsulosin  0.4 mg Oral Daily    [START ON 5/28/2020] valGANciclovir  450 mg Oral Daily     Continuous Infusions:   insulin (HUMAN R) infusion (adults) 1.5 Units/hr (05/20/20 1053)     PRN Meds:sodium chloride, sodium chloride, acetaminophen, Dextrose 10% Bolus, Dextrose 10% Bolus,  glucagon (human recombinant), glucose, glucose, HYDROcodone-acetaminophen, insulin aspart U-100, sodium chloride 0.9%    Review of Systems   Constitutional: Positive for activity change and appetite change. Negative for fever.   HENT: Negative.  Negative for facial swelling.    Eyes: Negative.    Respiratory: Negative.  Negative for apnea, chest tightness, shortness of breath and wheezing.    Cardiovascular: Negative.  Negative for chest pain, palpitations and leg swelling.   Gastrointestinal: Positive for abdominal distention and abdominal pain. Negative for constipation, diarrhea, nausea and vomiting.   Genitourinary: Negative.  Negative for decreased urine volume, difficulty urinating, dysuria, hematuria and urgency.   Musculoskeletal: Negative.  Negative for back pain, gait problem, neck pain and neck stiffness.   Skin: Positive for wound. Negative for color change and pallor.   Allergic/Immunologic: Positive for immunocompromised state.   Neurological: Negative.  Negative for dizziness, seizures, weakness, light-headedness and headaches.   Psychiatric/Behavioral: Positive for confusion. Negative for behavioral problems, hallucinations, sleep disturbance and suicidal ideas. The patient is nervous/anxious.      Objective:     Vital Signs (Most Recent):  Temp: 97.7 °F (36.5 °C) (05/20/20 1204)  Pulse: 77 (05/20/20 1339)  Resp: 13 (05/20/20 1200)  BP: 139/79 (05/20/20 1200)  SpO2: 100 % (05/20/20 1339) Vital Signs (24h Range):  Temp:  [97.5 °F (36.4 °C)-98.2 °F (36.8 °C)] 97.7 °F (36.5 °C)  Pulse:  [77-93] 77  Resp:  [11-33] 13  SpO2:  [97 %-100 %] 100 %  BP: (126-167)/(67-89) 139/79     Weight: 99.9 kg (220 lb 3.8 oz)  Body mass index is 30.72 kg/m².    Intake/Output - Last 3 Shifts       05/18 0700 - 05/19 0659 05/19 0700 - 05/20 0659 05/20 0700 - 05/21 0659    P.O.  850 240    I.V. (mL/kg) 3786 (37.9) 724.9 (7.3) 43.8 (0.4)    Blood   100    NG/GT       Total Intake(mL/kg) 3786 (37.9) 1574.9 (15.8) 383.8 (3.8)     Urine (mL/kg/hr) 1210 (0.5) 822 (0.3) 300 (0.4)    Drains 740 155     Stool   0    Blood       Total Output 1950 977 300    Net +1836 +597.9 +83.8           Urine Occurrence  2 x     Stool Occurrence  0 x 0 x    Emesis Occurrence  0 x           Physical Exam   Constitutional: He is oriented to person, place, and time. He appears well-developed and well-nourished. No distress.   HENT:   Head: Normocephalic and atraumatic.   Neck: Normal range of motion. Neck supple. No JVD present.   Cardiovascular: Normal rate, regular rhythm and normal heart sounds.   No murmur heard.  Pulmonary/Chest: Effort normal. No respiratory distress. He has decreased breath sounds in the right lower field and the left lower field. He has no wheezes. He exhibits no tenderness.   Abdominal: Soft. Bowel sounds are normal. He exhibits distension. There is tenderness.   Chevron inc FIDEL with staples.   RLQ old DG sites.    Musculoskeletal: Normal range of motion. He exhibits no edema or tenderness.   Neurological: He is alert and oriented to person, place, and time. He has normal reflexes.   Skin: Skin is warm and dry. He is not diaphoretic.   Psychiatric: He has a normal mood and affect. His behavior is normal. Judgment and thought content normal.   Nursing note and vitals reviewed.      Laboratory:  Immunosuppressants         Stop Route Frequency     tacrolimus (PROGRAF) 1 mg/mL oral syringe      -- Oral 2 times daily     mycophenolate mofetil 200 mg/mL suspension 1,000 mg      -- Oral 2 times daily        CBC:   Recent Labs   Lab 05/20/20  0542   WBC 4.22   RBC 3.01*   HGB 8.1*   HCT 26.1*   PLT 40*   MCV 87   MCH 26.9*   MCHC 31.0*     CMP:   Recent Labs   Lab 05/20/20  0542   *   CALCIUM 6.6*   ALBUMIN 2.3*   PROT 4.4*      K 3.3*   CO2 22*      BUN 46*   CREATININE 1.4   ALKPHOS 87   *   *   BILITOT 1.4*     Coagulation:   Recent Labs   Lab 05/20/20  0542   INR 1.4*   APTT 26.4     Labs within the past 24  hours have been reviewed.    Diagnostic Results:  None      Assessment/Plan:     Positive blood culture in cadaveric donor  - Donor noted with Blood culture 1/2 with Klebsiella and Pseudomonas.  - repeat blood cxs NGTD.   - Cont with zosyn at this time.   - Per ID will need a 7 day course for complete treatment.       FRANCISCO (acute kidney injury)  - Cr level above baseline but remains with good UOP  - Plan for albumin for hydration.       Acute confusional state  - pt noted with mild ep of confusion overnight and early this am.   - pt able to be re-oriented easily and knows self, place and time.   - Seroquel nightly to help promote sleep.       At risk for opportunistic infections  - cont with OI prophylaxis as per protocol.       Prophylactic immunotherapy  - see long term immunosuppression.       Long-term use of immunosuppressant medication  - cont with prograf. Draw prograf level daily and adjust dose as needed to maintain a therapeutic level.       S/P liver transplant  - Progressing slowly post-op.   - pain controlled, passing gas and tolerating diet.   - Encourage working with PT/OT for strengthening.   - LFTs improving at this time.   - recent liver u/s reviewed.   - bowel regimen started.   - Monitor.       Anemia of chronic disease  - H/H stable  - cont to monitor      Type 2 diabetes mellitus without complication, with long-term current use of insulin  - consult endocrine for management.         VTE Risk Mitigation (From admission, onward)         Ordered     heparin (porcine) injection 5,000 Units  Every 8 hours      05/18/20 0055     Place sequential compression device  Until discontinued      05/18/20 0055     IP VTE HIGH RISK PATIENT  Once      05/18/20 0055     Place JULIETTE hose  Until discontinued      05/15/20 0435                The patients clinical status was discussed at multidisplinary rounds, involving transplant surgery, transplant medicine, pharmacy, nursing, nutrition, and social  work    Discharge Planning:  No Patient Care Coordination Note on file.      Owen Cline NP  Liver Transplant  [facility]

## 2020-05-20 NOTE — PROGRESS NOTES
Foreign object to R side back noted on CXR by tech, disconnected lumen found to be stuck behind patient's R shoulder blade. Small hole noted to R shoulder blade from indention, no drainage/bleeding. Covered with gauze. Notified DARRELL Borja. PA to bedside to assess patient. VSS/afebrile, NAD noted, breath sounds unchanged from initial assessment, STAT CBC stable. Pt on continuous bedside VS and tele monitoring. No need for repeat CXR at this time. Will continue to monitor patient closely.

## 2020-05-20 NOTE — PROGRESS NOTES
Pt removed R IJ central line. Moderate amount of bright red blood noted on sheets. Pt asymptomatic, VSS. Lung sounds diminished. Catheter tip intact. Pressure held by this RN for 10 min, pt in trendelenburg position, pressure gauze dressing applied. Notified DARRELL Borja. STAT labs + CXR ordered. Will continue to monitor patient closely.

## 2020-05-20 NOTE — ASSESSMENT & PLAN NOTE
- Donor noted with Blood culture 1/2 with Klebsiella and Pseudomonas.  - repeat blood cxs NGTD.   - Cont with zosyn at this time.   - Per ID will need a 7 day course for complete treatment.

## 2020-05-20 NOTE — PT/OT/SLP PROGRESS
Physical Therapy Treatment    Patient Name:  Colin Reilly   MRN:  [id number]    Recommendations:     Discharge Recommendations:  home health PT   Discharge Equipment Recommendations: (TBD, pending progress)   Barriers to discharge: None    Assessment:     Colin Reilly is a 59 y.o. male admitted with a medical diagnosis of Abdominal pain.  He presents with the following impairments/functional limitations:  weakness, impaired endurance, impaired self care skills, impaired functional mobilty, gait instability, impaired balance, decreased safety awareness, impaired cognition, impaired cardiopulmonary response to activity . Patient appeared a little drowsy, but more artur at this time. Patient showed good ability to follow motor commands and walking range was only limited due to drowsiness and multiple medical lines.    Rehab Prognosis: Good; patient would benefit from acute skilled PT services to address these deficits and reach maximum level of function.    Recent Surgery: Procedure(s) (LRB):  TRANSPLANT, LIVER (N/A)  THROMBECTOMY 3 Days Post-Op    Plan:     During this hospitalization, patient to be seen 4 x/week to address the identified rehab impairments via gait training, therapeutic activities, therapeutic exercises, neuromuscular re-education and progress toward the following goals:    · Plan of Care Expires:  06/19/20    Subjective     Chief Complaint: drowsiness  Patient/Family Comments/goals: to get stronger and to go home.  Pain/Comfort:  · Pain Rating 1: 0/10  · Pain Rating Post-Intervention 1: 0/10      Objective:     Communicated with nsg prior to session.  Patient found HOB elevated with blood pressure cuff, pulse ox (continuous), telemetry, central line upon PT entry to room.     General Precautions: Standard, fall   Orthopedic Precautions:N/A   Braces: N/A     Functional Mobility:  · Bed Mobility:     · Rolling Right: stand by assistance  · Scooting: stand by assistance  · Supine to Sit: contact guard  assistance  · Transfers:     · Sit to Stand:  contact guard assistance with rolling walker  · Bed to Chair: contact guard assistance with  rolling walker  using  Stand Pivot  · Gait: 10 ft fwd/bwd x 3 trials using RW with CGA to min assistance.      AM-PAC 6 CLICK MOBILITY  Turning over in bed (including adjusting bedclothes, sheets and blankets)?: 4  Sitting down on and standing up from a chair with arms (e.g., wheelchair, bedside commode, etc.): 3  Moving from lying on back to sitting on the side of the bed?: 3  Moving to and from a bed to a chair (including a wheelchair)?: 3  Need to walk in hospital room?: 3  Climbing 3-5 steps with a railing?: 2  Basic Mobility Total Score: 18       Therapeutic Activities and Exercises:   Donned a second gown. Patient sat at EOB to prepare for treatment. Static standing balance with RW for support, to allow patient to use the urinal in standing.  There ex in sitting: LAQ, HIP FLEX AND HEEL/TOE RAISES 2X12 REPS B LE.    Patient left up in chair with all lines intact, call button in reach and NSG notified..    GOALS:   Multidisciplinary Problems     Physical Therapy Goals        Problem: Physical Therapy Goal    Goal Priority Disciplines Outcome Goal Variances Interventions   Physical Therapy Goal     PT, PT/OT Ongoing, Progressing     Description:  Goals to be met by: 2020     Patient will increase functional independence with mobility by performin. Sit to stand transfer with Modified [place]  2. Bed to chair transfer with Supervision using LRAD  3. Gait  x 150 feet with Supervision using LRAD.   4. Stand for 5 minutes with Supervision using no AD while performing dynamic UE task  5. Lower extremity exercise program x20 reps per handout, with independence                      Time Tracking:     PT Received On: 20  PT Start Time: 1011     PT Stop Time: 1050  PT Total Time (min): 39 min     Billable Minutes: Gait Training 15, Therapeutic Activity 15 and  Therapeutic Exercise 9    Treatment Type: Treatment  PT/PTA: PTA     PTA Visit Number: 1     William Moore, JOSE  05/20/2020

## 2020-05-20 NOTE — ASSESSMENT & PLAN NOTE
- pt noted with mild ep of confusion overnight and early this am.   - pt able to be re-oriented easily and knows self, place and time.   - Seroquel nightly to help promote sleep.

## 2020-05-20 NOTE — ASSESSMENT & PLAN NOTE
BG goal 140 - 180     - Discontinue IV insulin infusion protocol. Diet has advanced.   - Start  transition IV insulin infusion with stepdown parameters. Initial rate starting at 1.5 units/hr. 0.5 - 0.8 u/k/d dosing regimen.   - Start novolog 7-10 units TIDWM. Basal/Prandial physiologic matching.    - Low Dose SQ Insulin Correction Scale.  - BG Monitoring AC/HS/0200    ** Please call Endocrine for any BG related issues **  ** Please notify Endocrine for any change and/or advance in diet**      Discharge planning:   TBD

## 2020-05-20 NOTE — SUBJECTIVE & OBJECTIVE
"Interval HPI:   Overnight events:   BG is at upper end of goal on intensive IV insulin infusion protocol. Diet will be advanced today as notified per primary nurse.   Diet Adult Regular (IDDSI Level 7)  3 Days Post-Op    Eating:   <25%  Nausea: No  Hypoglycemia and intervention: No  Fever: No  TPN and/or TF: No  If yes, type of TF/TPN and rate: None    /67 (BP Location: Right arm, Patient Position: Lying)   Pulse 81   Temp 97.5 °F (36.4 °C) (Oral)   Resp 13   Ht 5' 11" (1.803 m)   Wt 99.9 kg (220 lb 3.8 oz)   SpO2 99%   BMI 30.72 kg/m²     Labs Reviewed and Include    Recent Labs   Lab 05/20/20  0542   *   CALCIUM 6.6*   ALBUMIN 2.3*   PROT 4.4*      K 3.3*   CO2 22*      BUN 46*   CREATININE 1.4   ALKPHOS 87   *   *   BILITOT 1.4*     Lab Results   Component Value Date    WBC 4.22 05/20/2020    HGB 8.1 (L) 05/20/2020    HCT 26.1 (L) 05/20/2020    MCV 87 05/20/2020    PLT 40 (L) 05/20/2020     No results for input(s): TSH, FREET4 in the last 168 hours.  Lab Results   Component Value Date    HGBA1C 5.3 05/17/2020       Nutritional status:   Body mass index is 30.72 kg/m².  Lab Results   Component Value Date    ALBUMIN 2.3 (L) 05/20/2020    ALBUMIN 2.3 (L) 05/19/2020    ALBUMIN 2.7 (L) 05/18/2020     No results found for: PREALBUMIN    Estimated Creatinine Clearance: 69.2 mL/min (based on SCr of 1.4 mg/dL).    Accu-Checks  Recent Labs     05/20/20  0005 05/20/20  0108 05/20/20  0208 05/20/20  0309 05/20/20  0411 05/20/20  0503 05/20/20  0609 05/20/20  0705 05/20/20  0800 05/20/20  0905   POCTGLUCOSE 183* 190* 136* 122* 132* 159* 187* 182* 187* 193*       Current Medications and/or Treatments Impacting Glycemic Control  Immunotherapy:    Immunosuppressants         Stop Route Frequency     tacrolimus (PROGRAF) 1 mg/mL oral syringe      -- Oral 2 times daily     mycophenolate mofetil 200 mg/mL suspension 1,000 mg      -- Oral 2 times daily        Steroids:   Hormones (From " admission, onward)    Start     Stop Route Frequency Ordered    05/24/20 0900  predniSONE tablet 20 mg  (methylprednisolone taper panel)      -- Oral Daily 05/18/20 0055    05/23/20 0900  methylPREDNISolone sodium succinate injection 20 mg  (methylprednisolone taper panel)      05/24 0859 IV Every 12 hours 05/18/20 0055    05/22/20 0900  methylPREDNISolone sodium succinate injection 40 mg  (methylprednisolone taper panel)      05/23 0859 IV Every 12 hours 05/18/20 0055    05/21/20 0900  methylPREDNISolone sodium succinate injection 60 mg  (methylprednisolone taper panel)      05/22 0859 IV Every 12 hours 05/18/20 0055    05/20/20 0900  methylPREDNISolone sodium succinate injection 80 mg  (methylprednisolone taper panel)      05/21 0859 IV Every 12 hours 05/18/20 0055        Pressors:    Autonomic Drugs (From admission, onward)    None        Hyperglycemia/Diabetes Medications:   Antihyperglycemics (From admission, onward)    Start     Stop Route Frequency Ordered    05/18/20 0245  insulin regular 100 Units in sodium chloride 0.9% 100 mL infusion     Question:  Insulin Rate Adjustment (DO NOT MODIFY ANSWER)  Answer:  [email or web address]    -- IV Continuous 05/18/20 0138

## 2020-05-21 ENCOUNTER — CONFERENCE (OUTPATIENT)
Dept: TRANSPLANT | Facility: CLINIC | Age: 60
End: 2020-05-21

## 2020-05-21 PROBLEM — D69.6 THROMBOCYTOPENIA, UNSPECIFIED: Status: ACTIVE | Noted: 2020-05-21

## 2020-05-21 PROBLEM — T38.0X5A ADVERSE EFFECT OF CORTICOSTEROIDS: Status: ACTIVE | Noted: 2020-05-21

## 2020-05-21 PROBLEM — E87.6 HYPOKALEMIA: Status: ACTIVE | Noted: 2020-05-21

## 2020-05-21 LAB
ALBUMIN SERPL BCP-MCNC: 3.1 G/DL (ref 3.5–5.2)
ALP SERPL-CCNC: 70 U/L (ref 55–135)
ALT SERPL W/O P-5'-P-CCNC: 175 U/L (ref 10–44)
ANION GAP SERPL CALC-SCNC: 10 MMOL/L (ref 8–16)
ANISOCYTOSIS BLD QL SMEAR: SLIGHT
APTT BLDCRRT: 29 SEC (ref 21–32)
AST SERPL-CCNC: 52 U/L (ref 10–40)
BASOPHILS # BLD AUTO: 0.01 K/UL (ref 0–0.2)
BASOPHILS NFR BLD: 0.4 % (ref 0–1.9)
BILIRUB SERPL-MCNC: 1.4 MG/DL (ref 0.1–1)
BLD PROD TYP BPU: NORMAL
BLOOD UNIT EXPIRATION DATE: NORMAL
BLOOD UNIT TYPE CODE: 6200
BLOOD UNIT TYPE: NORMAL
BUN SERPL-MCNC: 37 MG/DL (ref 6–20)
CALCIUM SERPL-MCNC: 6.9 MG/DL (ref 8.7–10.5)
CHLORIDE SERPL-SCNC: 106 MMOL/L (ref 95–110)
CO2 SERPL-SCNC: 23 MMOL/L (ref 23–29)
CODING SYSTEM: NORMAL
CREAT SERPL-MCNC: 1 MG/DL (ref 0.5–1.4)
DIFFERENTIAL METHOD: ABNORMAL
DISPENSE STATUS: NORMAL
EOSINOPHIL # BLD AUTO: 0 K/UL (ref 0–0.5)
EOSINOPHIL NFR BLD: 0 % (ref 0–8)
ERYTHROCYTE [DISTWIDTH] IN BLOOD BY AUTOMATED COUNT: 20.7 % (ref 11.5–14.5)
EST. GFR  (AFRICAN AMERICAN): >60 ML/MIN/1.73 M^2
EST. GFR  (NON AFRICAN AMERICAN): >60 ML/MIN/1.73 M^2
GLUCOSE SERPL-MCNC: 162 MG/DL (ref 70–110)
HCT VFR BLD AUTO: 23.5 % (ref 40–54)
HGB BLD-MCNC: 7.3 G/DL (ref 14–18)
IMM GRANULOCYTES # BLD AUTO: 0.11 K/UL (ref 0–0.04)
IMM GRANULOCYTES NFR BLD AUTO: 4.2 % (ref 0–0.5)
INR PPP: 1.5 (ref 0.8–1.2)
LYMPHOCYTES # BLD AUTO: 0.3 K/UL (ref 1–4.8)
LYMPHOCYTES NFR BLD: 11.3 % (ref 18–48)
MAGNESIUM SERPL-MCNC: 2.4 MG/DL (ref 1.6–2.6)
MCH RBC QN AUTO: 26.9 PG (ref 27–31)
MCHC RBC AUTO-ENTMCNC: 31.1 G/DL (ref 32–36)
MCV RBC AUTO: 87 FL (ref 82–98)
MONOCYTES # BLD AUTO: 0.2 K/UL (ref 0.3–1)
MONOCYTES NFR BLD: 7.5 % (ref 4–15)
NEUTROPHILS # BLD AUTO: 2 K/UL (ref 1.8–7.7)
NEUTROPHILS NFR BLD: 76.6 % (ref 38–73)
NRBC BLD-RTO: 0 /100 WBC
NUM UNITS TRANS PACKED RBC: NORMAL
OVALOCYTES BLD QL SMEAR: ABNORMAL
PHOSPHATE SERPL-MCNC: 4.4 MG/DL (ref 2.7–4.5)
PLATELET # BLD AUTO: 35 K/UL (ref 150–350)
PMV BLD AUTO: ABNORMAL FL (ref 9.2–12.9)
POCT GLUCOSE: 126 MG/DL (ref 70–110)
POCT GLUCOSE: 166 MG/DL (ref 70–110)
POCT GLUCOSE: 185 MG/DL (ref 70–110)
POCT GLUCOSE: 187 MG/DL (ref 70–110)
POCT GLUCOSE: 214 MG/DL (ref 70–110)
POCT GLUCOSE: 227 MG/DL (ref 70–110)
POIKILOCYTOSIS BLD QL SMEAR: SLIGHT
POLYCHROMASIA BLD QL SMEAR: ABNORMAL
POTASSIUM SERPL-SCNC: 3.1 MMOL/L (ref 3.5–5.1)
PROT SERPL-MCNC: 4.7 G/DL (ref 6–8.4)
PROTHROMBIN TIME: 15 SEC (ref 9–12.5)
RBC # BLD AUTO: 2.71 M/UL (ref 4.6–6.2)
SODIUM SERPL-SCNC: 139 MMOL/L (ref 136–145)
TACROLIMUS BLD-MCNC: 4.9 NG/ML (ref 5–15)
TRANS ERYTHROCYTES VOL PATIENT: NORMAL ML
WBC # BLD AUTO: 2.65 K/UL (ref 3.9–12.7)

## 2020-05-21 PROCEDURE — 25000003 PHARM REV CODE 250: Performed by: PHYSICIAN ASSISTANT

## 2020-05-21 PROCEDURE — 99233 PR SUBSEQUENT HOSPITAL CARE,LEVL III: ICD-10-PCS | Mod: 24,,, | Performed by: PHYSICIAN ASSISTANT

## 2020-05-21 PROCEDURE — 84100 ASSAY OF PHOSPHORUS: CPT

## 2020-05-21 PROCEDURE — 97803 MED NUTRITION INDIV SUBSEQ: CPT

## 2020-05-21 PROCEDURE — 63600175 PHARM REV CODE 636 W HCPCS: Performed by: PHYSICIAN ASSISTANT

## 2020-05-21 PROCEDURE — 36415 COLL VENOUS BLD VENIPUNCTURE: CPT

## 2020-05-21 PROCEDURE — 25000003 PHARM REV CODE 250: Performed by: STUDENT IN AN ORGANIZED HEALTH CARE EDUCATION/TRAINING PROGRAM

## 2020-05-21 PROCEDURE — 63600175 PHARM REV CODE 636 W HCPCS: Performed by: NURSE PRACTITIONER

## 2020-05-21 PROCEDURE — 80197 ASSAY OF TACROLIMUS: CPT

## 2020-05-21 PROCEDURE — 20600001 HC STEP DOWN PRIVATE ROOM

## 2020-05-21 PROCEDURE — 25000003 PHARM REV CODE 250: Performed by: NURSE PRACTITIONER

## 2020-05-21 PROCEDURE — 85610 PROTHROMBIN TIME: CPT

## 2020-05-21 PROCEDURE — 83735 ASSAY OF MAGNESIUM: CPT

## 2020-05-21 PROCEDURE — 85025 COMPLETE CBC W/AUTO DIFF WBC: CPT

## 2020-05-21 PROCEDURE — 85730 THROMBOPLASTIN TIME PARTIAL: CPT

## 2020-05-21 PROCEDURE — 99233 SBSQ HOSP IP/OBS HIGH 50: CPT | Mod: 24,,, | Performed by: PHYSICIAN ASSISTANT

## 2020-05-21 PROCEDURE — P9047 ALBUMIN (HUMAN), 25%, 50ML: HCPCS | Mod: JG | Performed by: NURSE PRACTITIONER

## 2020-05-21 PROCEDURE — 25000003 PHARM REV CODE 250: Performed by: SURGERY

## 2020-05-21 PROCEDURE — 80053 COMPREHEN METABOLIC PANEL: CPT

## 2020-05-21 PROCEDURE — 99232 PR SUBSEQUENT HOSPITAL CARE,LEVL II: ICD-10-PCS | Mod: ,,, | Performed by: NURSE PRACTITIONER

## 2020-05-21 PROCEDURE — 99232 SBSQ HOSP IP/OBS MODERATE 35: CPT | Mod: ,,, | Performed by: NURSE PRACTITIONER

## 2020-05-21 PROCEDURE — 25000003 PHARM REV CODE 250: Performed by: TRANSPLANT SURGERY

## 2020-05-21 PROCEDURE — 63600175 PHARM REV CODE 636 W HCPCS: Performed by: SURGERY

## 2020-05-21 RX ORDER — HYDROCODONE BITARTRATE AND ACETAMINOPHEN 500; 5 MG/1; MG/1
TABLET ORAL
Status: DISCONTINUED | OUTPATIENT
Start: 2020-05-21 | End: 2020-05-24

## 2020-05-21 RX ORDER — POSACONAZOLE 100 MG/1
300 TABLET, DELAYED RELEASE ORAL NIGHTLY
Qty: 90 TABLET | Refills: 0 | Status: SHIPPED | OUTPATIENT
Start: 2020-05-18 | End: 2020-06-20

## 2020-05-21 RX ORDER — FUROSEMIDE 10 MG/ML
40 INJECTION INTRAMUSCULAR; INTRAVENOUS ONCE
Status: COMPLETED | OUTPATIENT
Start: 2020-05-21 | End: 2020-05-21

## 2020-05-21 RX ORDER — BISACODYL 5 MG
10 TABLET, DELAYED RELEASE (ENTERIC COATED) ORAL NIGHTLY
Refills: 0 | Status: ON HOLD | COMMUNITY
Start: 2020-05-21 | End: 2020-06-01 | Stop reason: SDUPTHER

## 2020-05-21 RX ORDER — GABAPENTIN 300 MG/1
600 CAPSULE ORAL 3 TIMES DAILY
Status: DISCONTINUED | OUTPATIENT
Start: 2020-05-21 | End: 2020-05-24 | Stop reason: HOSPADM

## 2020-05-21 RX ADMIN — OXYBUTYNIN CHLORIDE 5 MG: 5 TABLET ORAL at 08:05

## 2020-05-21 RX ADMIN — INSULIN ASPART 7 UNITS: 100 INJECTION, SOLUTION INTRAVENOUS; SUBCUTANEOUS at 07:05

## 2020-05-21 RX ADMIN — DOCUSATE SODIUM 100 MG: 100 CAPSULE, LIQUID FILLED ORAL at 08:05

## 2020-05-21 RX ADMIN — HYDROCODONE BITARTRATE AND ACETAMINOPHEN 1 TABLET: 10; 325 TABLET ORAL at 09:05

## 2020-05-21 RX ADMIN — HEPARIN SODIUM 5000 UNITS: 5000 INJECTION INTRAVENOUS; SUBCUTANEOUS at 02:05

## 2020-05-21 RX ADMIN — TACROLIMUS 1.5 MG: 1 CAPSULE ORAL at 05:05

## 2020-05-21 RX ADMIN — GABAPENTIN 600 MG: 300 CAPSULE ORAL at 02:05

## 2020-05-21 RX ADMIN — FINASTERIDE 5 MG: 5 TABLET, FILM COATED ORAL at 08:05

## 2020-05-21 RX ADMIN — MYCOPHENOLATE MOFETIL 1000 MG: 250 CAPSULE ORAL at 08:05

## 2020-05-21 RX ADMIN — ESCITALOPRAM OXALATE 20 MG: 10 TABLET ORAL at 08:05

## 2020-05-21 RX ADMIN — QUETIAPINE FUMARATE 25 MG: 25 TABLET ORAL at 09:05

## 2020-05-21 RX ADMIN — INSULIN ASPART 1 UNITS: 100 INJECTION, SOLUTION INTRAVENOUS; SUBCUTANEOUS at 07:05

## 2020-05-21 RX ADMIN — METHYLPREDNISOLONE SODIUM SUCCINATE 60 MG: 125 INJECTION, POWDER, FOR SOLUTION INTRAMUSCULAR; INTRAVENOUS at 09:05

## 2020-05-21 RX ADMIN — HYDROCODONE BITARTRATE AND ACETAMINOPHEN 1 TABLET: 10; 325 TABLET ORAL at 02:05

## 2020-05-21 RX ADMIN — INSULIN ASPART 7 UNITS: 100 INJECTION, SOLUTION INTRAVENOUS; SUBCUTANEOUS at 11:05

## 2020-05-21 RX ADMIN — HYDROCODONE BITARTRATE AND ACETAMINOPHEN 1 TABLET: 10; 325 TABLET ORAL at 10:05

## 2020-05-21 RX ADMIN — MUPIROCIN 1 G: 20 OINTMENT TOPICAL at 09:05

## 2020-05-21 RX ADMIN — HEPARIN SODIUM 5000 UNITS: 5000 INJECTION INTRAVENOUS; SUBCUTANEOUS at 05:05

## 2020-05-21 RX ADMIN — HEPARIN SODIUM 5000 UNITS: 5000 INJECTION INTRAVENOUS; SUBCUTANEOUS at 10:05

## 2020-05-21 RX ADMIN — METHYLPREDNISOLONE SODIUM SUCCINATE 60 MG: 125 INJECTION, POWDER, FOR SOLUTION INTRAMUSCULAR; INTRAVENOUS at 08:05

## 2020-05-21 RX ADMIN — FUROSEMIDE 40 MG: 10 INJECTION, SOLUTION INTRAMUSCULAR; INTRAVENOUS at 12:05

## 2020-05-21 RX ADMIN — TACROLIMUS 1 MG: 1 CAPSULE ORAL at 07:05

## 2020-05-21 RX ADMIN — ALBUMIN (HUMAN) 25 G: 12.5 SOLUTION INTRAVENOUS at 05:05

## 2020-05-21 RX ADMIN — MYCOPHENOLATE MOFETIL 1000 MG: 250 CAPSULE ORAL at 09:05

## 2020-05-21 RX ADMIN — INSULIN ASPART 2 UNITS: 100 INJECTION, SOLUTION INTRAVENOUS; SUBCUTANEOUS at 11:05

## 2020-05-21 RX ADMIN — TAMSULOSIN HYDROCHLORIDE 0.4 MG: 0.4 CAPSULE ORAL at 08:05

## 2020-05-21 RX ADMIN — MUPIROCIN 1 G: 20 OINTMENT TOPICAL at 08:05

## 2020-05-21 RX ADMIN — FAMOTIDINE 20 MG: 20 TABLET, FILM COATED ORAL at 09:05

## 2020-05-21 RX ADMIN — INSULIN ASPART 2 UNITS: 100 INJECTION, SOLUTION INTRAVENOUS; SUBCUTANEOUS at 09:05

## 2020-05-21 RX ADMIN — INSULIN ASPART 1 UNITS: 100 INJECTION, SOLUTION INTRAVENOUS; SUBCUTANEOUS at 01:05

## 2020-05-21 RX ADMIN — PIPERACILLIN SODIUM,TAZOBACTAM SODIUM 4.5 G: 4; .5 INJECTION, POWDER, FOR SOLUTION INTRAVENOUS at 08:05

## 2020-05-21 RX ADMIN — GABAPENTIN 600 MG: 300 CAPSULE ORAL at 09:05

## 2020-05-21 RX ADMIN — SODIUM CHLORIDE 1 UNITS/HR: 9 INJECTION, SOLUTION INTRAVENOUS at 05:05

## 2020-05-21 RX ADMIN — PIPERACILLIN SODIUM,TAZOBACTAM SODIUM 4.5 G: 4; .5 INJECTION, POWDER, FOR SOLUTION INTRAVENOUS at 05:05

## 2020-05-21 RX ADMIN — PIPERACILLIN SODIUM,TAZOBACTAM SODIUM 4.5 G: 4; .5 INJECTION, POWDER, FOR SOLUTION INTRAVENOUS at 01:05

## 2020-05-21 NOTE — PROGRESS NOTES
"[facility]  Adult Nutrition  Progress Note    SUMMARY       Recommendations    1.) Continue diabetic diet.   2.) Add Boost Glucose Control TID to provide 750kcal and 42 grams of protein.     Goals:   1.) Pt to meet >75% of estimated energy and protein needs over the course of 7 days. 2.) Blood glucose contral POCT glucose <180.   3.) Pt to be able to recall post transplant diet education material by next RD follow up.    Nutrition Goal Status: new  Communication of RD Recs: reviewed with physician    Reason for Assessment    Reason For Assessment: RD follow-up  Diagnosis: transplant/postoperative complications(s/p OLTx 5/17)  Relevant Medical History: ESLD 2/2 alcohol abuse, DM, HCV, HTN  Interdisciplinary Rounds: attended  General Information Comments: Pt reports his appetite is poor and only consuming 25-50% of meals. Pt denies any difficulty chewing nor swallowing, despite missing teeth. Pt denies nausea, vomitting, diarrhea, nor constipation. GI- rounded, abdominal pain, active bowel sounds. LBM 5/21.  Post transplant diet education provided to patient. Emphasized food and drug interactions. LIiterature left with RD contact information. Pt declines any additional questions at this time, RD will monitor. NFPE completed pt likely has moderate PCM due to severe muscle wasting of temple and clavicle and moderate subcutaneous fat wasting of tricep. RD will continue to monitor.   Nutrition Discharge Planning: Post transplant diet education provided. Pt to follow diabetic diet at home, pt previously educated per pt. No further questions.     Nutrition Risk Screen    Nutrition Risk Screen: no indicators present    Nutrition/Diet History    Spiritual, Cultural Beliefs, Hinduism Practices, Values that Affect Care: no  Food Allergies: NKFA  Factors Affecting Nutritional Intake: none    Anthropometrics    Temp: 97.7 °F (36.5 °C)  Height Method: Stated  Height: 5' 11" (180.3 cm)  Height (inches): 71 " in  Weight Method: Standard Scale(pt wearing heavy robe)  Weight: 99.9 kg (220 lb 3.8 oz)  Weight (lb): 220.24 lb  Ideal Body Weight (IBW), Male: 172 lb  % Ideal Body Weight, Male (lb): 125.61 %  BMI (Calculated): 30.7  BMI Grade: 30 - 34.9- obesity - grade I  Weight Change Amount: (+1.9 kg gained since admission, likely d/t fluid retention.)       Lab/Procedures/Meds    Pertinent Labs Reviewed: reviewed  Pertinent Labs Comments: Hgb 7.3, Hct 23.5, POCT glucose range 166-194  Pertinent Medications Reviewed: reviewed  Pertinent Medications Comments: albumin, bisacodyl, docusate, escitalopram, famotidine, heparin, insulin, methylprednison, prednisone, mycophenolate, piperacillin, posaconazole, potassium chloride, tacrolimus    Physical Findings/Assessment     Edema 1+ generalized; 2+ BLE  Skin- germain score 15. Abdomen incision site.    Estimated/Assessed Needs    Weight Used For Calorie Calculations: 98 kg (216 lb 0.8 oz)  Energy Calorie Requirements (kcal): 2940   Energy Need Method: Kcal/kg(30 kcal/kg)  Protein Requirements: 148-168 (1.5-1.7 g/kg)  Weight Used For Protein Calculations: 98 kg (216 lb 0.8 oz)  Fluid Requirements (mL): 1 mL/kcal or per MD  Estimated Fluid Requirement Method: RDA Method  RDA Method (mL): 2940         Nutrition Prescription Ordered    Current Diet Order: 1500 diabetic    Evaluation of Received Nutrient/Fluid Intake    Other Calories (kcal): 554(propofol)  I/O: noted  Comments: LBM 5/17  % Intake of Estimated Energy Needs:37%  % Meal Intake:50%  Nutrition Risk    Level of Risk/Frequency of Follow-up: high , RD follow up 2x weekly.    Assessment and Plan        Nutrition Problem  Malnutrition (moderate PCM)    Related to (etiology):   Decline in PO intake    Signs and Symptoms (as evidenced by):   Severe muscle wasting of temple and clavicle  Moderate fat wasting of tricep    Interventions(treatment strategy):  1.) Collaboration with other providers  2.) Carbohydrate modified diet  3.)  Commercial beverage    Nutrition Diagnosis Status:   new    Monitor and Evaluation    Food and Nutrient Intake: energy intake  Food and Nutrient Adminstration: diet order  Anthropometric Measurements: weight, weight change  Biochemical Data, Medical Tests and Procedures: electrolyte and renal panel, gastrointestinal profile, inflammatory profile  Nutrition-Focused Physical Findings: overall appearance     Malnutrition Assessment  Malnutrition Type: acute illness or injury              Orbital Region (Subcutaneous Fat Loss): well nourished  Upper Arm Region (Subcutaneous Fat Loss): moderate depletion  Thoracic and Lumbar Region: well nourished   Scientology Region (Muscle Loss): severe depletion  Clavicle Bone Region (Muscle Loss): severe depletion  Clavicle and Acromion Bone Region (Muscle Loss): severe depletion  Dorsal Hand (Muscle Loss): moderate depletion   Edema (Fluid Accumulation): 1-->trace             Nutrition Follow-Up    RD Follow-up?: Yes

## 2020-05-21 NOTE — PROGRESS NOTES
Family not allowed at bedside for education due to Covid concerns.    Met with patient and for  discharge teaching.  Reviewed My New Journey: Living Smart After My Liver Transplant.  Patient's mother, Suzanne, was on the telephone and able to participate in this education.  Sections reviewed were: First Steps, Appointments and Prevention.  Medication section will be reviewed by Pharm D. Post education review questions in My New Journey were reviewed with patient and his mother.  All questions were answered correctly.  These questions cover: post op care, medication management, infection prevention and emergency contacts.   Allowed time for questions and answers.

## 2020-05-21 NOTE — ASSESSMENT & PLAN NOTE
- 2/2 ESLD, expect to improve post operatively  - see acute blood loss anemia  - cont to monitor

## 2020-05-21 NOTE — ASSESSMENT & PLAN NOTE
- Cr level above baseline but remains with good UOP  - albumin x 2 given for hydration on 5/20  - Cr now improved 5/21

## 2020-05-21 NOTE — ASSESSMENT & PLAN NOTE
- Progressing slowly post-op.   - pain controlled, passing gas, +flatus, and tolerating diet.   - Encourage working with PT/OT for strengthening.   - LFTs improving at this time.   - recent liver u/s reviewed.   - bowel regimen started.   - Monitor.

## 2020-05-21 NOTE — PLAN OF CARE
Pt in and out of confusion, but reorients quickly. Conversed with this nurse about multiple times about current situation. Calm and cooperative through night. Rested well through night after PM Seroquel administration. Skip PARRA with staples, RN painting with betadine TID.  R DG exit sites with serous drainage, dressed w gauze. L site oozing blood, dressed w pressure gauze, less drainage thru night. Transitional insulin gtt infusing per orders, monitoring BG ACHS+2a and administered SSI per orders. Zosyn continued SPO, WBC stable. Albumin administered x2 this shift for hydration, monitoring Cr with AM labs, UOP measured via urinal-see flowsheet. LFTs and tbili trending down. Pt up with x1 assist to BSCC, 1 large BM noted overnight. Pt continues to work with patient. Pain controlled with PRN Norco 10mg. Self med box completed, needs education. PICC consult in place. VSS on continuous bedside monitoring, bed in lowest/locked position, call light/personal belongings within reach.  Proper hand hygiene performed before and after pt care.  Will continue to monitor pt.

## 2020-05-21 NOTE — ASSESSMENT & PLAN NOTE
- H/H low 5/21, expect 2/2 dilution s/p receiving IV albumin x 2 on 5/20  - HDS and no overt signs of bleed  - monitor

## 2020-05-21 NOTE — ASSESSMENT & PLAN NOTE
BG goal 140 - 180     - Continue transition IV insulin infusion with stepdown parameters. Initial rate starting at 1.5 units/hr. 0.5 - 0.8 u/k/d dosing regimen.   - Novolog 7-10 units TIDWM. Basal/Prandial physiologic matching.    - Low Dose SQ Insulin Correction Scale.  - BG Monitoring /HS/0200    ** Please call Endocrine for any BG related issues **  ** Please notify Endocrine for any change and/or advance in diet**      Discharge planning:   RUSTAM

## 2020-05-21 NOTE — NURSING
Pt belongings removed from TSU narcotic box. Belongings brought to pt bedside and reviewed with pt. The following items were accounted for and verified with Patricia Valladares RN:  Hydrocodone/Ace 10mg/325 - 34 pills  Glucometer  $100 bill x2  [employer] express mastercard - green  Manicure kit  Black android cell phone    All items in pt room. Medication placed in pt closet - pt verbalized understanding not to take any of this medication - pt verbalized understanding.

## 2020-05-21 NOTE — PROGRESS NOTES
JAMMIE met with pt at bedside today to discuss dc plans and assess mental status and coping.  Pt presents alert and oriented x 4 engaged and communicative today.   Pt in good spirits, no longer confused, reports slept better last night.   Pt working with therapy again.   Per LTS rounding team, they would like to dc pt from hospital on Sunday if pts mother able to present then.  JAMMIE discussed plans with patient, who asked to call his mom about when she is coming and his aunt bringing her.    CoVid-19 precaution - Transplant JAMMIE dc planning conducted with mother and aunt Day by telephone.  JAMMIE spoke with pts mother Suzanne via phone again today ([phone]) in regards to discharge plans.   Pts mother reports that her sister Day will be assisting her with coming to [place] and would not be able to stay from Sunday till Tuesday for checking into the apartments.   JAMMIE spoke with LR manager, pts mother would be able to check into LR [address] tomorrow at 3pm.   JAMMIE spoke with pts aunt Day who will assist pts mother with transportation to [place] and checking into LR on Friday at 3pm.    Pts mother will speak to her friends who live in [facility] about assisting her and pt with transportation once patient dc from hospital.   JAMMIE will provide pt with list of local cab agency phone numbers.      Pt will require home health PT per therapy recs.  Pt choosing [facility] for services at discharge.   JAMMIE called Eliana with [facility] ([phone]) with referral today.  Will update [facility] when pt ready for dc from hospital.   Pt and pts family with no further psychosocial needs for dc at this time.  JAMMIE remains available for all transplant resources, education and psychosocial support and assist with all dc planning needs.

## 2020-05-21 NOTE — PROGRESS NOTES
[facility]  Liver Transplant  Progress Note    Patient Name: Colin Reilly  MRN: [id number]  Admission Date: 5/15/2020  Hospital Length of Stay: 6 days  Code Status: Full Code  Primary Care Provider: Preston Matthew Ii, MD  Post-Operative Day: 4    ORGAN:   LIVER  Disease Etiology: Alcoholic Cirrhosis  Donor Type:   Donation after Circulatory Death   CDC High Risk:   No  Donor CMV Status:   Donor CMV Status: Negative  Donor HBcAB:   Negative  Donor HCV Status:   Negative  Donor HBV CEZAR: Negative  Donor HCV CEZAR: Negative  Whole or Partial: Whole Liver  Biliary Anastomosis: End to End  Arterial Anatomy: Standard  Subjective:     History of Present Illness:  Colin Reilly is a 58y/o male, with 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 Surgery uncomplicated but has had recurrent abdominal pain and increased drainage from surgical incision. Patient recently admitted 3/11-3/13 with similar symptoms, attempted paracentesis at this time- without fluid to drain. Patient previously negative for peritonitis. He re-presented to [facility] on 5/15 with c/o fever and abdominal pain (Tmax 103) x 1 week, distention and dark colored stools, received 1g Rocephin and transferred [place] [facility] [place]. Patient is being admitted for suspected SBP and infectious work-up.  His last fever was 5/14 of 100.5. Reports last paracentesis about 1 week ago. Umbilical hernia repair incision CDI, no s/s infection, small umbilical hernia easily reducible with no tenderness. He denies N/V, SOB, chest pain, change in bladder function. COVID-19 rapid test negative on admission. Plan for paracentesis. VSS. Monitor    Hospital Course:  Pt is now s/p OLTX from 5/17/20 2/2 ETOH and Hep C. Intra op course unremarkable. LFTs trending down, POD 1 US satisfactory. Donor noted with Blood culture 1/2 with Klebsiella and Pseudomonas. ID  recommending 7 day course of cefepime. Pt currently on zosyn, so will cont with zosyn and f/u cxs to possibly deescalate. Pt progressing well post-op but noted with mild episodes of AMS. Pt inadvertently pulled CVC out during episode of AMS on 5/19. O2 sats remained stable at this time. Pt with slight bump in Cr on 5/20. Cr now improved s/p IV albumin x 2.     Interval History:   NAEO. Pt slept well overnight and reports confusion is improved this AM, AAO x 4. Pt with drop in H/H this AM but no signs of bleed and remains HDS. Suspect drop in H/H is dilutional 2/2 receiving IV albumin yesterday. Cr improved after receiving albumin, is now 1.0 from 1.4. +2 edema noted to BLE, will give IV lasix 40 mg x 1. K low this AM, replaced. Pt is progressing well surgically- pain controlled, passing gas,+flatus, tolerating diet. PT/OT following for strengthening, recommend  PT/OT upon discharge. Monitor.         Scheduled Meds:   bisacodyL  10 mg Oral QHS    docusate sodium  100 mg Oral TID    escitalopram oxalate  20 mg Oral Daily    famotidine  20 mg Oral QHS    finasteride  5 mg Oral Daily    heparin (porcine)  5,000 Units Subcutaneous Q8H    insulin aspart U-100  7-10 Units Subcutaneous TIDWM    methylPREDNISolone sodium succinate  60 mg Intravenous Q12H    Followed by    [START ON 5/22/2020] methylPREDNISolone sodium succinate  40 mg Intravenous Q12H    Followed by    [START ON 5/23/2020] methylPREDNISolone sodium succinate  20 mg Intravenous Q12H    Followed by    [START ON 5/24/2020] predniSONE  20 mg Oral Daily    mupirocin  1 g Nasal BID    mycophenolate  1,000 mg Oral BID    oxybutynin  5 mg Oral Daily    piperacillin-tazobactam (ZOSYN) IVPB  4.5 g Intravenous Q8H    posaconazole  300 mg Oral QHS    QUEtiapine  25 mg Oral QHS    [START ON 5/25/2020] sulfamethoxazole-trimethoprim 400-80mg  1 tablet Oral Daily AM    tacrolimus  1.5 mg Oral BID    tamsulosin  0.4 mg Oral Daily    [START ON  5/28/2020] valGANciclovir  450 mg Oral Daily     Continuous Infusions:   insulin (HUMAN R) infusion (adults) 1.5 Units/hr (05/20/20 1053)     PRN Meds:sodium chloride, acetaminophen, Dextrose 10% Bolus, Dextrose 10% Bolus, glucagon (human recombinant), glucose, glucose, HYDROcodone-acetaminophen, insulin aspart U-100, sodium chloride 0.9%    Review of Systems   Constitutional: Positive for activity change and appetite change. Negative for fever.   HENT: Negative.  Negative for facial swelling.    Eyes: Negative.    Respiratory: Negative.  Negative for apnea, chest tightness, shortness of breath and wheezing.    Cardiovascular: Negative.  Negative for chest pain, palpitations and leg swelling.   Gastrointestinal: Positive for abdominal distention and abdominal pain. Negative for constipation, diarrhea, nausea and vomiting.   Genitourinary: Negative.  Negative for decreased urine volume, difficulty urinating, dysuria, hematuria and urgency.   Musculoskeletal: Negative.  Negative for back pain, gait problem, neck pain and neck stiffness.   Skin: Positive for wound. Negative for color change and pallor.   Allergic/Immunologic: Positive for immunocompromised state.   Neurological: Negative.  Negative for dizziness, seizures, weakness, light-headedness and headaches.   Psychiatric/Behavioral: Positive for confusion. Negative for behavioral problems, hallucinations, sleep disturbance and suicidal ideas. The patient is nervous/anxious.      Objective:     Vital Signs (Most Recent):  Temp: 97.7 °F (36.5 °C) (05/21/20 1122)  Pulse: 68 (05/21/20 1122)  Resp: 18 (05/21/20 1122)  BP: 131/69 (05/21/20 1122)  SpO2: 100 % (05/21/20 1122) Vital Signs (24h Range):  Temp:  [97.7 °F (36.5 °C)-98.2 °F (36.8 °C)] 97.7 °F (36.5 °C)  Pulse:  [68-80] 68  Resp:  [14-25] 18  SpO2:  [99 %-100 %] 100 %  BP: (117-138)/(59-76) 131/69     Weight: 99.9 kg (220 lb 3.8 oz)  Body mass index is 30.72 kg/m².    Intake/Output - Last 3 Shifts       05/19  0700 - 05/20 0659 05/20 0700 - 05/21 0659 05/21 0700 - 05/22 0659    P.O. 850 1190 240    I.V. (mL/kg) 724.9 (7.3) 53.6 (0.5)     Blood  100     IV Piggyback  200     Total Intake(mL/kg) 1574.9 (15.8) 1543.6 (15.5) 240 (2.4)    Urine (mL/kg/hr) 822 (0.3) 1450 (0.6) 800 (1.2)    Drains 155      Stool  0 0    Total Output 977 1450 800    Net +597.9 +93.6 -560           Urine Occurrence 2 x 1 x     Stool Occurrence 0 x 1 x 2 x    Emesis Occurrence 0 x            Physical Exam   Constitutional: He is oriented to person, place, and time. He appears well-developed and well-nourished. No distress.   HENT:   Head: Normocephalic and atraumatic.   Neck: Normal range of motion. Neck supple. No JVD present.   Cardiovascular: Normal rate, regular rhythm and normal heart sounds.   No murmur heard.  Pulmonary/Chest: Effort normal. No respiratory distress. He has decreased breath sounds in the right lower field and the left lower field. He has no wheezes. He exhibits no tenderness.   Abdominal: Soft. Bowel sounds are normal. He exhibits distension. There is tenderness.   Chevron inc FIDEL with staples.   RLQ old DG sites.    Musculoskeletal: Normal range of motion. He exhibits no edema or tenderness.   Neurological: He is alert and oriented to person, place, and time. He has normal reflexes.   Skin: Skin is warm and dry. He is not diaphoretic.   Psychiatric: He has a normal mood and affect. His behavior is normal. Judgment and thought content normal.   Nursing note and vitals reviewed.      Laboratory:  Immunosuppressants         Stop Route Frequency     tacrolimus capsule 1.5 mg      -- Oral 2 times daily     mycophenolate capsule 1,000 mg      -- Oral 2 times daily        CBC:   Recent Labs   Lab 05/21/20  0634   WBC 2.65*   RBC 2.71*   HGB 7.3*   HCT 23.5*   PLT 35*   MCV 87   MCH 26.9*   MCHC 31.1*     CMP:   Recent Labs   Lab 05/21/20  0634   *   CALCIUM 6.9*   ALBUMIN 3.1*   PROT 4.7*      K 3.1*   CO2 23       BUN 37*   CREATININE 1.0   ALKPHOS 70   *   AST 52*   BILITOT 1.4*     Labs within the past 24 hours have been reviewed.    Diagnostic Results:  None    Debility/Functional status: Patient debilitated by evidence of Muscle wasting and atrophy, Weakness and Chronic fatigue, unspecified. Physical and occupational therapy ordered daily to evaluate and treat. Debility was: present on admission.    Assessment/Plan:     * S/P liver transplant  - Progressing slowly post-op.   - pain controlled, passing gas, +flatus, and tolerating diet.   - Encourage working with PT/OT for strengthening.   - LFTs improving at this time.   - recent liver u/s reviewed.   - bowel regimen started.   - Monitor.       Thrombocytopenia, unspecified  - 2/2 ESLD  - expect to improve post operatively      Hypokalemia  - K 3/1 on 5/21  - replaced PO      Adverse effect of corticosteroids  - endocrine following for management      At risk for long QT syndrome        Positive blood culture in cadaveric donor  - Donor noted with Blood culture 1/2 with Klebsiella and Pseudomonas.  - repeat blood cxs NGTD.   - Cont with zosyn at this time.   - Per ID will need a 7 day course for complete treatment.       FRANCISCO (acute kidney injury)  - Cr level above baseline but remains with good UOP  - albumin x 2 given for hydration on 5/20  - Cr now improved 5/21      Acute confusional state  - pt noted with mild ep of confusion overnight and early this am.   - pt able to be re-oriented easily and knows self, place and time.   - Seroquel nightly to help promote sleep.   - confusion now improving      At risk for opportunistic infections  - cont with OI prophylaxis as per protocol.       Prophylactic immunotherapy  - see long term immunosuppression.       Long-term use of immunosuppressant medication  - cont with prograf. Draw prograf level daily and adjust dose as needed to maintain a therapeutic level.       Anemia of chronic disease  - 2/2 ESLD, expect to  improve post operatively  - see acute blood loss anemia  - cont to monitor      Acute blood loss anemia  - H/H low 5/21, expect 2/2 dilution s/p receiving IV albumin x 2 on 5/20  - HDS and no overt signs of bleed  - monitor      Type 2 diabetes mellitus without complication, with long-term current use of insulin  - consult endocrine for management.         VTE Risk Mitigation (From admission, onward)         Ordered     heparin (porcine) injection 5,000 Units  Every 8 hours      05/18/20 0055     Place sequential compression device  Until discontinued      05/18/20 0055     IP VTE HIGH RISK PATIENT  Once      05/18/20 0055     Place JULIETTE hose  Until discontinued      05/15/20 0435                The patients clinical status was discussed at multidisplinary rounds, involving transplant surgery, transplant medicine, pharmacy, nursing, nutrition, and social work    Discharge Planning:  No Patient Care Coordination Note on file.      Nubia Ruiz PA-C  Liver Transplant  [facility]

## 2020-05-21 NOTE — SUBJECTIVE & OBJECTIVE
"Interval HPI:   Overnight events: NAEON. POD # 4 s/p liver transplant. BG within goal ranges on IV/SQ insulin regimen. Receiving Solu Medrol 60 mg BID.   Eating:   <25%  Nausea: No  Hypoglycemia and intervention: No  Fever: No  TPN and/or TF: No  If yes, type of TF/TPN and rate: none    /74 (BP Location: Right arm, Patient Position: Lying)   Pulse 75   Temp 97.9 °F (36.6 °C) (Oral)   Resp 18   Ht 5' 11" (1.803 m)   Wt 99.9 kg (220 lb 3.8 oz)   SpO2 100%   BMI 30.72 kg/m²     Labs Reviewed and Include    Recent Labs   Lab 05/21/20  0634   *   CALCIUM 6.9*   ALBUMIN 3.1*   PROT 4.7*      K 3.1*   CO2 23      BUN 37*   CREATININE 1.0   ALKPHOS 70   *   AST 52*   BILITOT 1.4*     Lab Results   Component Value Date    WBC 2.65 (L) 05/21/2020    HGB 7.3 (L) 05/21/2020    HCT 23.5 (L) 05/21/2020    MCV 87 05/21/2020    PLT 35 (LL) 05/21/2020     No results for input(s): TSH, FREET4 in the last 168 hours.  Lab Results   Component Value Date    HGBA1C 5.3 05/17/2020       Nutritional status:   Body mass index is 30.72 kg/m².  Lab Results   Component Value Date    ALBUMIN 3.1 (L) 05/21/2020    ALBUMIN 2.3 (L) 05/20/2020    ALBUMIN 2.3 (L) 05/19/2020     No results found for: PREALBUMIN    Estimated Creatinine Clearance: 96.9 mL/min (based on SCr of 1 mg/dL).    Accu-Checks  Recent Labs     05/20/20  0609 05/20/20  0705 05/20/20  0800 05/20/20  0905 05/20/20  1003 05/20/20  1209 05/20/20  1706 05/20/20  2149 05/21/20  0111 05/21/20  0738   POCTGLUCOSE 187* 182* 187* 193* 194* 176* 175* 166* 185* 187*       Current Medications and/or Treatments Impacting Glycemic Control  Immunotherapy:    Immunosuppressants         Stop Route Frequency     mycophenolate capsule 1,000 mg      -- Oral 2 times daily     tacrolimus capsule 1 mg      -- Oral 2 times daily        Steroids:   Hormones (From admission, onward)    Start     Stop Route Frequency Ordered    05/24/20 0900  predniSONE tablet 20 mg  " (methylprednisolone taper panel)      -- Oral Daily 05/18/20 0055    05/23/20 0900  methylPREDNISolone sodium succinate injection 20 mg  (methylprednisolone taper panel)      05/24 0859 IV Every 12 hours 05/18/20 0055    05/22/20 0900  methylPREDNISolone sodium succinate injection 40 mg  (methylprednisolone taper panel)      05/23 0859 IV Every 12 hours 05/18/20 0055    05/21/20 0900  methylPREDNISolone sodium succinate injection 60 mg  (methylprednisolone taper panel)      05/22 0859 IV Every 12 hours 05/18/20 0055        Pressors:    Autonomic Drugs (From admission, onward)    None        Hyperglycemia/Diabetes Medications:   Antihyperglycemics (From admission, onward)    Start     Stop Route Frequency Ordered    05/20/20 1130  insulin regular 100 Units in sodium chloride 0.9% 100 mL infusion      -- IV Continuous 05/20/20 1028    05/20/20 1130  insulin aspart U-100 pen 7-10 Units      -- SubQ 3 times daily with meals 05/20/20 1028    05/20/20 1127  insulin aspart U-100 pen 0-5 Units      -- SubQ As needed (PRN) 05/20/20 1028

## 2020-05-21 NOTE — PROGRESS NOTES
[facility]  Endocrinology  Progress Note    Admit Date: 5/15/2020     Reason for Consult: Management of T2DM, Hyperglycemia     Surgical Procedure and Date: Liver Transplant 5/17/20    Diabetes diagnosis year: 2002    Home Diabetes Medications:    - Levemir 50 units HS (per chart review)  - Novolog 24 units TIDWM (per chart review)    How often checking glucose at home? MARILIN   BG readings on regimen: MARILIN  Hypoglycemia on the regimen?  MARILIN  Missed doses on regimen?  MARILIN    Diabetes Complications include:     Hyperglycemia and Diabetic peripheral neuropathy      Complicating diabetes co morbidities:   CIRRHOSIS    HPI:   Patient is a 59 y.o. 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 Surgery uncomplicated but has had recurrent abdominal pain and increased drainage from surgical incision. Patient recently admitted 3/11-3/13 with similar symptoms, attempted paracentesis at this time- without fluid to drain. He re-presented to [facility] on 5/15 with c/o fever and abdominal pain (Tmax 103) x 1 week, distention and dark colored stools, received 1g Rocephin and [place] [facility] [place]. Patient is being admitted for suspected SBP and infectious work-up.  COVID-19 rapid test negative on admission. Plan for paracentesis. Endocrinology consulted to manage hypeglycemia/DM2 during [place] [facility].    Lab Results   Component Value Date    HGBA1C 6.7 (H) 02/28/2020       Interval HPI:   Overnight events: NAEON. POD # 4 s/p liver transplant. BG within goal ranges on IV/SQ insulin regimen. Receiving Solu Medrol 60 mg BID.   Eating:   <25%  Nausea: No  Hypoglycemia and intervention: No  Fever: No  TPN and/or TF: No  If yes, type of TF/TPN and rate: none    /74 (BP Location: Right arm, Patient Position: Lying)   Pulse 75   Temp 97.9 °F (36.6 °C) (Oral)   Resp 18   Ht 5'  "11" (1.803 m)   Wt 99.9 kg (220 lb 3.8 oz)   SpO2 100%   BMI 30.72 kg/m²      Labs Reviewed and Include    Recent Labs   Lab 05/21/20  0634   *   CALCIUM 6.9*   ALBUMIN 3.1*   PROT 4.7*      K 3.1*   CO2 23      BUN 37*   CREATININE 1.0   ALKPHOS 70   *   AST 52*   BILITOT 1.4*     Lab Results   Component Value Date    WBC 2.65 (L) 05/21/2020    HGB 7.3 (L) 05/21/2020    HCT 23.5 (L) 05/21/2020    MCV 87 05/21/2020    PLT 35 (LL) 05/21/2020     No results for input(s): TSH, FREET4 in the last 168 hours.  Lab Results   Component Value Date    HGBA1C 5.3 05/17/2020       Nutritional status:   Body mass index is 30.72 kg/m².  Lab Results   Component Value Date    ALBUMIN 3.1 (L) 05/21/2020    ALBUMIN 2.3 (L) 05/20/2020    ALBUMIN 2.3 (L) 05/19/2020     No results found for: PREALBUMIN    Estimated Creatinine Clearance: 96.9 mL/min (based on SCr of 1 mg/dL).    Accu-Checks  Recent Labs     05/20/20  0609 05/20/20  0705 05/20/20  0800 05/20/20  0905 05/20/20  1003 05/20/20  1209 05/20/20  1706 05/20/20  2149 05/21/20  0111 05/21/20  0738   POCTGLUCOSE 187* 182* 187* 193* 194* 176* 175* 166* 185* 187*       Current Medications and/or Treatments Impacting Glycemic Control  Immunotherapy:    Immunosuppressants         Stop Route Frequency     mycophenolate capsule 1,000 mg      -- Oral 2 times daily     tacrolimus capsule 1 mg      -- Oral 2 times daily        Steroids:   Hormones (From admission, onward)    Start     Stop Route Frequency Ordered    05/24/20 0900  predniSONE tablet 20 mg  (methylprednisolone taper panel)      -- Oral Daily 05/18/20 0055 05/23/20 0900  methylPREDNISolone sodium succinate injection 20 mg  (methylprednisolone taper panel)      05/24 0859 IV Every 12 hours 05/18/20 0055 05/22/20 0900  methylPREDNISolone sodium succinate injection 40 mg  (methylprednisolone taper panel)      05/23 0859 IV Every 12 hours 05/18/20 0055    05/21/20 0900  methylPREDNISolone sodium " succinate injection 60 mg  (methylprednisolone taper panel)      05/22 0859 IV Every 12 hours 05/18/20 0055        Pressors:    Autonomic Drugs (From admission, onward)    None        Hyperglycemia/Diabetes Medications:   Antihyperglycemics (From admission, onward)    Start     Stop Route Frequency Ordered    05/20/20 1130  insulin regular 100 Units in sodium chloride 0.9% 100 mL infusion      -- IV Continuous 05/20/20 1028    05/20/20 1130  insulin aspart U-100 pen 7-10 Units      -- SubQ 3 times daily with meals 05/20/20 1028    05/20/20 1127  insulin aspart U-100 pen 0-5 Units      -- SubQ As needed (PRN) 05/20/20 1028          ASSESSMENT and PLAN    * S/P liver transplant  Managed per primary team  Optimize BG control      Type 2 diabetes mellitus without complication, with long-term current use of insulin  BG goal 140 - 180     - Continue transition IV insulin infusion with stepdown parameters. Initial rate starting at 1.5 units/hr. 0.5 - 0.8 u/k/d dosing regimen.   - Novolog 7-10 units TIDWM. Basal/Prandial physiologic matching.    - Low Dose SQ Insulin Correction Scale.  - BG Monitoring AC/HS/0200    ** Please call Endocrine for any BG related issues **  ** Please notify Endocrine for any change and/or advance in diet**      Discharge planning:   TBD        Anemia of chronic disease  May affect accuracy of HbA1c results.         Prophylactic immunotherapy  May increase insulin resistance.         Adverse effect of corticosteroids  Glucocorticoids markedly increase glucoses. Expect steroid taper to help.          Jessica Blanco NP  Endocrinology  [facility]

## 2020-05-21 NOTE — PLAN OF CARE
Recommendations     1.) Continue diabetic diet.   2.) Add Boost Glucose Control TID to provide 750kcal and 42 grams of protein.      Goals:   1.) Pt to meet >75% of estimated energy and protein needs over the course of 7 days. 2.) Blood glucose contral POCT glucose <180.   3.) Pt to be able to recall post transplant diet education material by next RD follow up.     Nutrition Goal Status: new  Communication of RD Recs: reviewed with physician

## 2020-05-21 NOTE — ASSESSMENT & PLAN NOTE
- pt noted with mild ep of confusion overnight and early this am.   - pt able to be re-oriented easily and knows self, place and time.   - Seroquel nightly to help promote sleep.   - confusion now improving

## 2020-05-21 NOTE — PLAN OF CARE
Pt AAOx4, VSS on bedside monitor, sats stable on RA, afebrile. Zosyn continued - WBC stable. Cr 1.0 - 40mg IV lasix administered for BLE edema. H/H low this AM - no blood products administered. Insulin gtt @ 1.5 mL/hr - BG monitored ACHS + 2A - mealtime and SS administered per orders. Diabetic diet in place - pt only eating 25% of meals. Pt c/o incisional pain - mild relief noted with PRN medications. Pt with multiple loose/soft BM this shift - pt up to bedside commode at bedside. RLQ old DG sites with gauze dsg - CDI. Chevron incision FIDEL with staples - RN painting with betadine x2 so far this shift. LLQ old para site - gauze dsg CDI. Peripheral IV's CDI. Pt able to ambulate with walker and 1 person assist to bedside commode - pt instructed to call for assistance to get out of bed. Pt instructed to use call light for assistance - pt demonstrated and verbalized understanding - call bell placed within pt reach. Safety precautions maintained throughout shift - non skid footwear used while out of bed. Pt in no apparent distress - will continue to monitor pt, proactively round on pt, and adjust care as needed.

## 2020-05-21 NOTE — SUBJECTIVE & OBJECTIVE
Scheduled Meds:   bisacodyL  10 mg Oral QHS    docusate sodium  100 mg Oral TID    escitalopram oxalate  20 mg Oral Daily    famotidine  20 mg Oral QHS    finasteride  5 mg Oral Daily    heparin (porcine)  5,000 Units Subcutaneous Q8H    insulin aspart U-100  7-10 Units Subcutaneous TIDWM    methylPREDNISolone sodium succinate  60 mg Intravenous Q12H    Followed by    [START ON 5/22/2020] methylPREDNISolone sodium succinate  40 mg Intravenous Q12H    Followed by    [START ON 5/23/2020] methylPREDNISolone sodium succinate  20 mg Intravenous Q12H    Followed by    [START ON 5/24/2020] predniSONE  20 mg Oral Daily    mupirocin  1 g Nasal BID    mycophenolate  1,000 mg Oral BID    oxybutynin  5 mg Oral Daily    piperacillin-tazobactam (ZOSYN) IVPB  4.5 g Intravenous Q8H    posaconazole  300 mg Oral QHS    QUEtiapine  25 mg Oral QHS    [START ON 5/25/2020] sulfamethoxazole-trimethoprim 400-80mg  1 tablet Oral Daily AM    tacrolimus  1.5 mg Oral BID    tamsulosin  0.4 mg Oral Daily    [START ON 5/28/2020] valGANciclovir  450 mg Oral Daily     Continuous Infusions:   insulin (HUMAN R) infusion (adults) 1.5 Units/hr (05/20/20 1053)     PRN Meds:sodium chloride, acetaminophen, Dextrose 10% Bolus, Dextrose 10% Bolus, glucagon (human recombinant), glucose, glucose, HYDROcodone-acetaminophen, insulin aspart U-100, sodium chloride 0.9%    Review of Systems   Constitutional: Positive for activity change and appetite change. Negative for fever.   HENT: Negative.  Negative for facial swelling.    Eyes: Negative.    Respiratory: Negative.  Negative for apnea, chest tightness, shortness of breath and wheezing.    Cardiovascular: Negative.  Negative for chest pain, palpitations and leg swelling.   Gastrointestinal: Positive for abdominal distention and abdominal pain. Negative for constipation, diarrhea, nausea and vomiting.   Genitourinary: Negative.  Negative for decreased urine volume, difficulty urinating,  dysuria, hematuria and urgency.   Musculoskeletal: Negative.  Negative for back pain, gait problem, neck pain and neck stiffness.   Skin: Positive for wound. Negative for color change and pallor.   Allergic/Immunologic: Positive for immunocompromised state.   Neurological: Negative.  Negative for dizziness, seizures, weakness, light-headedness and headaches.   Psychiatric/Behavioral: Positive for confusion. Negative for behavioral problems, hallucinations, sleep disturbance and suicidal ideas. The patient is nervous/anxious.      Objective:     Vital Signs (Most Recent):  Temp: 97.7 °F (36.5 °C) (05/21/20 1122)  Pulse: 68 (05/21/20 1122)  Resp: 18 (05/21/20 1122)  BP: 131/69 (05/21/20 1122)  SpO2: 100 % (05/21/20 1122) Vital Signs (24h Range):  Temp:  [97.7 °F (36.5 °C)-98.2 °F (36.8 °C)] 97.7 °F (36.5 °C)  Pulse:  [68-80] 68  Resp:  [14-25] 18  SpO2:  [99 %-100 %] 100 %  BP: (117-138)/(59-76) 131/69     Weight: 99.9 kg (220 lb 3.8 oz)  Body mass index is 30.72 kg/m².    Intake/Output - Last 3 Shifts       05/19 0700 - 05/20 0659 05/20 0700 - 05/21 0659 05/21 0700 - 05/22 0659    P.O. 850 1190 240    I.V. (mL/kg) 724.9 (7.3) 53.6 (0.5)     Blood  100     IV Piggyback  200     Total Intake(mL/kg) 1574.9 (15.8) 1543.6 (15.5) 240 (2.4)    Urine (mL/kg/hr) 822 (0.3) 1450 (0.6) 800 (1.2)    Drains 155      Stool  0 0    Total Output 977 1450 800    Net +597.9 +93.6 -560           Urine Occurrence 2 x 1 x     Stool Occurrence 0 x 1 x 2 x    Emesis Occurrence 0 x            Physical Exam   Constitutional: He is oriented to person, place, and time. He appears well-developed and well-nourished. No distress.   HENT:   Head: Normocephalic and atraumatic.   Neck: Normal range of motion. Neck supple. No JVD present.   Cardiovascular: Normal rate, regular rhythm and normal heart sounds.   No murmur heard.  Pulmonary/Chest: Effort normal. No respiratory distress. He has decreased breath sounds in the right lower field and the left  lower field. He has no wheezes. He exhibits no tenderness.   Abdominal: Soft. Bowel sounds are normal. He exhibits distension. There is tenderness.   Chevron inc FIDEL with staples.   RLQ old DG sites.    Musculoskeletal: Normal range of motion. He exhibits no edema or tenderness.   Neurological: He is alert and oriented to person, place, and time. He has normal reflexes.   Skin: Skin is warm and dry. He is not diaphoretic.   Psychiatric: He has a normal mood and affect. His behavior is normal. Judgment and thought content normal.   Nursing note and vitals reviewed.      Laboratory:  Immunosuppressants         Stop Route Frequency     tacrolimus capsule 1.5 mg      -- Oral 2 times daily     mycophenolate capsule 1,000 mg      -- Oral 2 times daily        CBC:   Recent Labs   Lab 05/21/20  0634   WBC 2.65*   RBC 2.71*   HGB 7.3*   HCT 23.5*   PLT 35*   MCV 87   MCH 26.9*   MCHC 31.1*     CMP:   Recent Labs   Lab 05/21/20  0634   *   CALCIUM 6.9*   ALBUMIN 3.1*   PROT 4.7*      K 3.1*   CO2 23      BUN 37*   CREATININE 1.0   ALKPHOS 70   *   AST 52*   BILITOT 1.4*     Labs within the past 24 hours have been reviewed.    Diagnostic Results:  None    Debility/Functional status: Patient debilitated by evidence of Muscle wasting and atrophy, Weakness and Chronic fatigue, unspecified. Physical and occupational therapy ordered daily to evaluate and treat. Debility was: present on admission.

## 2020-05-22 ENCOUNTER — TELEPHONE (OUTPATIENT)
Dept: TRANSPLANT | Facility: CLINIC | Age: 60
End: 2020-05-22

## 2020-05-22 DIAGNOSIS — Z94.4 LIVER REPLACED BY TRANSPLANT: Primary | ICD-10-CM

## 2020-05-22 PROBLEM — N17.9 AKI (ACUTE KIDNEY INJURY): Status: RESOLVED | Noted: 2020-05-20 | Resolved: 2020-05-22

## 2020-05-22 LAB
ALBUMIN SERPL BCP-MCNC: 2.9 G/DL (ref 3.5–5.2)
ALP SERPL-CCNC: 77 U/L (ref 55–135)
ALT SERPL W/O P-5'-P-CCNC: 136 U/L (ref 10–44)
ANION GAP SERPL CALC-SCNC: 10 MMOL/L (ref 8–16)
APTT BLDCRRT: 25.1 SEC (ref 21–32)
AST SERPL-CCNC: 27 U/L (ref 10–40)
BACTERIA SPEC ANAEROBE CULT: NORMAL
BASOPHILS # BLD AUTO: 0.01 K/UL (ref 0–0.2)
BASOPHILS NFR BLD: 0.3 % (ref 0–1.9)
BILIRUB SERPL-MCNC: 1.4 MG/DL (ref 0.1–1)
BLD PROD TYP BPU: NORMAL
BLOOD UNIT EXPIRATION DATE: NORMAL
BLOOD UNIT TYPE CODE: 600
BLOOD UNIT TYPE: NORMAL
BUN SERPL-MCNC: 25 MG/DL (ref 6–20)
CALCIUM SERPL-MCNC: 7 MG/DL (ref 8.7–10.5)
CHLORIDE SERPL-SCNC: 106 MMOL/L (ref 95–110)
CO2 SERPL-SCNC: 23 MMOL/L (ref 23–29)
CODING SYSTEM: NORMAL
CREAT SERPL-MCNC: 0.8 MG/DL (ref 0.5–1.4)
DIFFERENTIAL METHOD: ABNORMAL
DISPENSE STATUS: NORMAL
EOSINOPHIL # BLD AUTO: 0 K/UL (ref 0–0.5)
EOSINOPHIL NFR BLD: 0 % (ref 0–8)
ERYTHROCYTE [DISTWIDTH] IN BLOOD BY AUTOMATED COUNT: 20.5 % (ref 11.5–14.5)
EST. GFR  (AFRICAN AMERICAN): >60 ML/MIN/1.73 M^2
EST. GFR  (NON AFRICAN AMERICAN): >60 ML/MIN/1.73 M^2
GLUCOSE SERPL-MCNC: 218 MG/DL (ref 70–110)
HCT VFR BLD AUTO: 29.6 % (ref 40–54)
HGB BLD-MCNC: 9 G/DL (ref 14–18)
IMM GRANULOCYTES # BLD AUTO: 0.12 K/UL (ref 0–0.04)
IMM GRANULOCYTES NFR BLD AUTO: 3.2 % (ref 0–0.5)
INR PPP: 1.5 (ref 0.8–1.2)
LYMPHOCYTES # BLD AUTO: 0.3 K/UL (ref 1–4.8)
LYMPHOCYTES NFR BLD: 7.5 % (ref 18–48)
MAGNESIUM SERPL-MCNC: 2.4 MG/DL (ref 1.6–2.6)
MCH RBC QN AUTO: 26.9 PG (ref 27–31)
MCHC RBC AUTO-ENTMCNC: 30.4 G/DL (ref 32–36)
MCV RBC AUTO: 88 FL (ref 82–98)
MONOCYTES # BLD AUTO: 0.2 K/UL (ref 0.3–1)
MONOCYTES NFR BLD: 5.1 % (ref 4–15)
NEUTROPHILS # BLD AUTO: 3.1 K/UL (ref 1.8–7.7)
NEUTROPHILS NFR BLD: 83.9 % (ref 38–73)
NRBC BLD-RTO: 0 /100 WBC
NUM UNITS TRANS PACKED RBC: NORMAL
PHOSPHATE SERPL-MCNC: 3.5 MG/DL (ref 2.7–4.5)
PLATELET # BLD AUTO: 45 K/UL (ref 150–350)
PMV BLD AUTO: ABNORMAL FL (ref 9.2–12.9)
POCT GLUCOSE: 153 MG/DL (ref 70–110)
POCT GLUCOSE: 188 MG/DL (ref 70–110)
POCT GLUCOSE: 198 MG/DL (ref 70–110)
POCT GLUCOSE: 208 MG/DL (ref 70–110)
POCT GLUCOSE: 213 MG/DL (ref 70–110)
POTASSIUM SERPL-SCNC: 3 MMOL/L (ref 3.5–5.1)
PROT SERPL-MCNC: 4.8 G/DL (ref 6–8.4)
PROTHROMBIN TIME: 14.8 SEC (ref 9–12.5)
RBC # BLD AUTO: 3.35 M/UL (ref 4.6–6.2)
SODIUM SERPL-SCNC: 139 MMOL/L (ref 136–145)
TACROLIMUS BLD-MCNC: 6.5 NG/ML (ref 5–15)
WBC # BLD AUTO: 3.74 K/UL (ref 3.9–12.7)

## 2020-05-22 PROCEDURE — 25000003 PHARM REV CODE 250: Performed by: PHYSICIAN ASSISTANT

## 2020-05-22 PROCEDURE — 25000003 PHARM REV CODE 250: Performed by: SURGERY

## 2020-05-22 PROCEDURE — 25000003 PHARM REV CODE 250: Performed by: STUDENT IN AN ORGANIZED HEALTH CARE EDUCATION/TRAINING PROGRAM

## 2020-05-22 PROCEDURE — 25000003 PHARM REV CODE 250: Performed by: NURSE PRACTITIONER

## 2020-05-22 PROCEDURE — 97116 GAIT TRAINING THERAPY: CPT

## 2020-05-22 PROCEDURE — 85730 THROMBOPLASTIN TIME PARTIAL: CPT

## 2020-05-22 PROCEDURE — 99232 SBSQ HOSP IP/OBS MODERATE 35: CPT | Mod: ,,, | Performed by: NURSE PRACTITIONER

## 2020-05-22 PROCEDURE — 25000003 PHARM REV CODE 250: Performed by: TRANSPLANT SURGERY

## 2020-05-22 PROCEDURE — 99232 PR SUBSEQUENT HOSPITAL CARE,LEVL II: ICD-10-PCS | Mod: ,,, | Performed by: NURSE PRACTITIONER

## 2020-05-22 PROCEDURE — 99233 PR SUBSEQUENT HOSPITAL CARE,LEVL III: ICD-10-PCS | Mod: 24,,, | Performed by: PHYSICIAN ASSISTANT

## 2020-05-22 PROCEDURE — 63600175 PHARM REV CODE 636 W HCPCS: Performed by: SURGERY

## 2020-05-22 PROCEDURE — 80197 ASSAY OF TACROLIMUS: CPT

## 2020-05-22 PROCEDURE — 36415 COLL VENOUS BLD VENIPUNCTURE: CPT

## 2020-05-22 PROCEDURE — 84100 ASSAY OF PHOSPHORUS: CPT

## 2020-05-22 PROCEDURE — 85610 PROTHROMBIN TIME: CPT

## 2020-05-22 PROCEDURE — 99233 SBSQ HOSP IP/OBS HIGH 50: CPT | Mod: 24,,, | Performed by: PHYSICIAN ASSISTANT

## 2020-05-22 PROCEDURE — 63600175 PHARM REV CODE 636 W HCPCS: Performed by: PHYSICIAN ASSISTANT

## 2020-05-22 PROCEDURE — 80053 COMPREHEN METABOLIC PANEL: CPT

## 2020-05-22 PROCEDURE — 97110 THERAPEUTIC EXERCISES: CPT

## 2020-05-22 PROCEDURE — 85025 COMPLETE CBC W/AUTO DIFF WBC: CPT

## 2020-05-22 PROCEDURE — 63600175 PHARM REV CODE 636 W HCPCS: Performed by: NURSE PRACTITIONER

## 2020-05-22 PROCEDURE — 83735 ASSAY OF MAGNESIUM: CPT

## 2020-05-22 PROCEDURE — 20600001 HC STEP DOWN PRIVATE ROOM

## 2020-05-22 RX ORDER — FUROSEMIDE 10 MG/ML
40 INJECTION INTRAMUSCULAR; INTRAVENOUS ONCE
Status: COMPLETED | OUTPATIENT
Start: 2020-05-22 | End: 2020-05-22

## 2020-05-22 RX ORDER — POTASSIUM CHLORIDE 750 MG/1
30 CAPSULE, EXTENDED RELEASE ORAL 2 TIMES DAILY
Status: DISCONTINUED | OUTPATIENT
Start: 2020-05-22 | End: 2020-05-24

## 2020-05-22 RX ORDER — HYDROCODONE BITARTRATE AND ACETAMINOPHEN 10; 325 MG/1; MG/1
1 TABLET ORAL EVERY 6 HOURS PRN
Status: DISCONTINUED | OUTPATIENT
Start: 2020-05-22 | End: 2020-05-24 | Stop reason: HOSPADM

## 2020-05-22 RX ORDER — TACROLIMUS 0.5 MG/1
1.5 CAPSULE ORAL EVERY 12 HOURS
Qty: 180 CAPSULE | Refills: 11 | Status: SHIPPED | OUTPATIENT
Start: 2020-05-22 | End: 2020-05-26

## 2020-05-22 RX ORDER — CIPROFLOXACIN 500 MG/1
500 TABLET ORAL EVERY 12 HOURS
Qty: 16 TABLET | Refills: 0 | Status: ON HOLD | OUTPATIENT
Start: 2020-05-22 | End: 2020-06-01 | Stop reason: HOSPADM

## 2020-05-22 RX ORDER — QUETIAPINE FUMARATE 25 MG/1
25 TABLET, FILM COATED ORAL NIGHTLY
Qty: 30 TABLET | Refills: 11 | Status: SHIPPED | OUTPATIENT
Start: 2020-05-22 | End: 2021-02-08 | Stop reason: ALTCHOICE

## 2020-05-22 RX ADMIN — INSULIN ASPART 10 UNITS: 100 INJECTION, SOLUTION INTRAVENOUS; SUBCUTANEOUS at 01:05

## 2020-05-22 RX ADMIN — FINASTERIDE 5 MG: 5 TABLET, FILM COATED ORAL at 08:05

## 2020-05-22 RX ADMIN — INSULIN ASPART 10 UNITS: 100 INJECTION, SOLUTION INTRAVENOUS; SUBCUTANEOUS at 05:05

## 2020-05-22 RX ADMIN — METHYLPREDNISOLONE SODIUM SUCCINATE 40 MG: 40 INJECTION, POWDER, FOR SOLUTION INTRAMUSCULAR; INTRAVENOUS at 09:05

## 2020-05-22 RX ADMIN — HEPARIN SODIUM 5000 UNITS: 5000 INJECTION INTRAVENOUS; SUBCUTANEOUS at 09:05

## 2020-05-22 RX ADMIN — MYCOPHENOLATE MOFETIL 1000 MG: 250 CAPSULE ORAL at 09:05

## 2020-05-22 RX ADMIN — POSACONAZOLE 300 MG: 100 TABLET, COATED ORAL at 09:05

## 2020-05-22 RX ADMIN — TACROLIMUS 1.5 MG: 1 CAPSULE ORAL at 08:05

## 2020-05-22 RX ADMIN — HEPARIN SODIUM 5000 UNITS: 5000 INJECTION INTRAVENOUS; SUBCUTANEOUS at 06:05

## 2020-05-22 RX ADMIN — HEPARIN SODIUM 5000 UNITS: 5000 INJECTION INTRAVENOUS; SUBCUTANEOUS at 01:05

## 2020-05-22 RX ADMIN — MYCOPHENOLATE MOFETIL 1000 MG: 250 CAPSULE ORAL at 08:05

## 2020-05-22 RX ADMIN — INSULIN ASPART 10 UNITS: 100 INJECTION, SOLUTION INTRAVENOUS; SUBCUTANEOUS at 08:05

## 2020-05-22 RX ADMIN — ESCITALOPRAM OXALATE 20 MG: 10 TABLET ORAL at 08:05

## 2020-05-22 RX ADMIN — METHYLPREDNISOLONE SODIUM SUCCINATE 40 MG: 40 INJECTION, POWDER, FOR SOLUTION INTRAMUSCULAR; INTRAVENOUS at 08:05

## 2020-05-22 RX ADMIN — HYDROCODONE BITARTRATE AND ACETAMINOPHEN 1 TABLET: 10; 325 TABLET ORAL at 08:05

## 2020-05-22 RX ADMIN — MUPIROCIN 1 G: 20 OINTMENT TOPICAL at 08:05

## 2020-05-22 RX ADMIN — MUPIROCIN 1 G: 20 OINTMENT TOPICAL at 09:05

## 2020-05-22 RX ADMIN — INSULIN ASPART 1 UNITS: 100 INJECTION, SOLUTION INTRAVENOUS; SUBCUTANEOUS at 01:05

## 2020-05-22 RX ADMIN — POTASSIUM CHLORIDE 30 MEQ: 750 CAPSULE, EXTENDED RELEASE ORAL at 09:05

## 2020-05-22 RX ADMIN — INSULIN ASPART 2 UNITS: 100 INJECTION, SOLUTION INTRAVENOUS; SUBCUTANEOUS at 08:05

## 2020-05-22 RX ADMIN — GABAPENTIN 600 MG: 300 CAPSULE ORAL at 09:05

## 2020-05-22 RX ADMIN — TAMSULOSIN HYDROCHLORIDE 0.4 MG: 0.4 CAPSULE ORAL at 08:05

## 2020-05-22 RX ADMIN — DOCUSATE SODIUM 100 MG: 100 CAPSULE, LIQUID FILLED ORAL at 08:05

## 2020-05-22 RX ADMIN — TACROLIMUS 1.5 MG: 1 CAPSULE ORAL at 05:05

## 2020-05-22 RX ADMIN — PIPERACILLIN SODIUM,TAZOBACTAM SODIUM 4.5 G: 4; .5 INJECTION, POWDER, FOR SOLUTION INTRAVENOUS at 05:05

## 2020-05-22 RX ADMIN — FAMOTIDINE 20 MG: 20 TABLET, FILM COATED ORAL at 09:05

## 2020-05-22 RX ADMIN — OXYBUTYNIN CHLORIDE 5 MG: 5 TABLET ORAL at 08:05

## 2020-05-22 RX ADMIN — HYDROCODONE BITARTRATE AND ACETAMINOPHEN 1 TABLET: 10; 325 TABLET ORAL at 02:05

## 2020-05-22 RX ADMIN — FUROSEMIDE 40 MG: 10 INJECTION, SOLUTION INTRAMUSCULAR; INTRAVENOUS at 11:05

## 2020-05-22 RX ADMIN — PIPERACILLIN SODIUM,TAZOBACTAM SODIUM 4.5 G: 4; .5 INJECTION, POWDER, FOR SOLUTION INTRAVENOUS at 01:05

## 2020-05-22 RX ADMIN — GABAPENTIN 600 MG: 300 CAPSULE ORAL at 08:05

## 2020-05-22 RX ADMIN — INSULIN ASPART 2 UNITS: 100 INJECTION, SOLUTION INTRAVENOUS; SUBCUTANEOUS at 05:05

## 2020-05-22 RX ADMIN — GABAPENTIN 600 MG: 300 CAPSULE ORAL at 02:05

## 2020-05-22 RX ADMIN — POTASSIUM CHLORIDE 30 MEQ: 750 CAPSULE, EXTENDED RELEASE ORAL at 01:05

## 2020-05-22 RX ADMIN — PIPERACILLIN SODIUM,TAZOBACTAM SODIUM 4.5 G: 4; .5 INJECTION, POWDER, FOR SOLUTION INTRAVENOUS at 08:05

## 2020-05-22 RX ADMIN — QUETIAPINE FUMARATE 25 MG: 25 TABLET ORAL at 09:05

## 2020-05-22 RX ADMIN — HYDROCODONE BITARTRATE AND ACETAMINOPHEN 1 TABLET: 10; 325 TABLET ORAL at 07:05

## 2020-05-22 NOTE — PLAN OF CARE
Patient AAO today, one person assist with ambulation. RA sats upper 90s.  Appetite fair. BG controlled per insulin drip and meal insulin with ss. Liver numbers trending down. Patient educated on how to pain the chevron incision with betadine.  Patient educated per pharmacy on self meds, patient also educated by RN on self med process. Patient will pull self meds tonight independently. Patient educated on how to give insulin shot, patient stated has been fluent in insulin training for many years now. 3+ BLE edema, responded well to lasix. US of liver scheduled for tomorrow. Patient complained of pain to abd, po pain medication given.

## 2020-05-22 NOTE — ASSESSMENT & PLAN NOTE
- Donor noted with Blood culture 1/2 with Klebsiella and Pseudomonas.  - repeat blood cxs NGTD.   - Cont with zosyn at this time.   - Plan to transition to PO cipro at time of discharge to complete a total 14 days of abx

## 2020-05-22 NOTE — ASSESSMENT & PLAN NOTE
- Cr level above baseline but remains with good UOP  - albumin x 2 given for hydration on 5/20  - Cr now improved 5/21     From: Madeleine Will  To: Yenny Zamudio DO  Sent: 4/1/2020 3:44 PM EDT  Subject: Non-Urgent Medical Question    Sore throat since around March 18th  Went to [address] 3/21 no strep told to gargle with salt water   Have still sore   Also eyes red gritty using drops to no available Thinking all sinus   No fever - no cough  Can something be prescribed?

## 2020-05-22 NOTE — ASSESSMENT & PLAN NOTE
BG goal 140 - 180     - Rewrite transition IV insulin infusion with stepdown parameters. Initial rate starting at 1 units/hr. 0.5 u/k/d dosing regimen.   - Novolog 7-10 units TIDWM. Basal/Prandial physiologic matching.    - Low Dose SQ Insulin Correction Scale.  - BG Monitoring //0200    ** Please call Endocrine for any BG related issues **  ** Please notify Endocrine for any change and/or advance in diet**      Discharge planning:   TBD

## 2020-05-22 NOTE — PROGRESS NOTES
"[facility]  Endocrinology  Progress Note    Admit Date: 5/15/2020     Reason for Consult: Management of T2DM, Hyperglycemia     Surgical Procedure and Date: Liver Transplant 20    Diabetes diagnosis year:     Home Diabetes Medications:    - Levemir 50 units HS (per chart review)  - Novolog 24 units TIDWM (per chart review)    How often checking glucose at home? MARILIN   BG readings on regimen: MARILIN  Hypoglycemia on the regimen?  MARILIN  Missed doses on regimen?  MARILIN    Diabetes Complications include:     Hyperglycemia and Diabetic peripheral neuropathy      Complicating diabetes co morbidities:   CIRRHOSIS    HPI:   Patient is a 59 y.o. 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 Surgery uncomplicated but has had recurrent abdominal pain and increased drainage from surgical incision. Patient recently admitted 3/11-3/13 with similar symptoms, attempted paracentesis at this time- without fluid to drain. He re-presented to [facility] on 5/15 with c/o fever and abdominal pain (Tmax 103) x 1 week, distention and dark colored stools, received 1g Rocephin and transferred to [facility] [place] for further care. Patient is being admitted for suspected SBP and infectious work-up.  COVID-19 rapid test negative on admission. Plan for paracentesis. Endocrinology consulted to manage hypeglycemia/DM2 during admission to [facility] [place].    Lab Results   Component Value Date    HGBA1C 6.7 (H) 2020       Interval HPI:   Overnight events: NAEON. POD # 5 s/p liver transplant. BG reasonably well controlled on IV/SQ insulin regimen. Receiving Solu Medrol 40 mg BID.  Eatin%  Nausea: No  Hypoglycemia and intervention: No  Fever: No  TPN and/or TF: No  If yes, type of TF/TPN and rate: NONE    /73   Pulse 66   Temp 98 °F (36.7 °C) (Oral)   Resp (!) 22   Ht 5' 11" (1.803 m)   Wt 99.9 kg (220 lb 3.8 " oz)   SpO2 100%   BMI 30.72 kg/m²      Labs Reviewed and Include    Recent Labs   Lab 05/22/20  0735   *   CALCIUM 7.0*   ALBUMIN 2.9*   PROT 4.8*      K 3.0*   CO2 23      BUN 25*   CREATININE 0.8   ALKPHOS 77   *   AST 27   BILITOT 1.4*     Lab Results   Component Value Date    WBC 3.74 (L) 05/22/2020    HGB 9.0 (L) 05/22/2020    HCT 29.6 (L) 05/22/2020    MCV 88 05/22/2020    PLT 45 (L) 05/22/2020     No results for input(s): TSH, FREET4 in the last 168 hours.  Lab Results   Component Value Date    HGBA1C 5.3 05/17/2020       Nutritional status:   Body mass index is 30.72 kg/m².  Lab Results   Component Value Date    ALBUMIN 2.9 (L) 05/22/2020    ALBUMIN 3.1 (L) 05/21/2020    ALBUMIN 2.3 (L) 05/20/2020     No results found for: PREALBUMIN    Estimated Creatinine Clearance: 121.1 mL/min (based on SCr of 0.8 mg/dL).    Accu-Checks  Recent Labs     05/20/20  1209 05/20/20  1706 05/20/20  2149 05/21/20  0111 05/21/20  0738 05/21/20  1125 05/21/20  1716 05/21/20  2134 05/22/20  0453 05/22/20  0842   POCTGLUCOSE 176* 175* 166* 185* 187* 227* 126* 214* 198* 208*       Current Medications and/or Treatments Impacting Glycemic Control  Immunotherapy:    Immunosuppressants         Stop Route Frequency     tacrolimus capsule 1.5 mg      -- Oral 2 times daily     mycophenolate capsule 1,000 mg      -- Oral 2 times daily        Steroids:   Hormones (From admission, onward)    Start     Stop Route Frequency Ordered    05/24/20 0900  predniSONE tablet 20 mg  (methylprednisolone taper panel)      -- Oral Daily 05/18/20 0055 05/23/20 0900  methylPREDNISolone sodium succinate injection 20 mg  (methylprednisolone taper panel)      05/24 0859 IV Every 12 hours 05/18/20 0055 05/22/20 0900  methylPREDNISolone sodium succinate injection 40 mg  (methylprednisolone taper panel)      05/23 0859 IV Every 12 hours 05/18/20 0055        Pressors:    Autonomic Drugs (From admission, onward)    None         Hyperglycemia/Diabetes Medications:   Antihyperglycemics (From admission, onward)    Start     Stop Route Frequency Ordered    05/20/20 1130  insulin regular 100 Units in sodium chloride 0.9% 100 mL infusion      -- IV Continuous 05/20/20 1028    05/20/20 1130  insulin aspart U-100 pen 7-10 Units      -- SubQ 3 times daily with meals 05/20/20 1028    05/20/20 1127  insulin aspart U-100 pen 0-5 Units      -- SubQ As needed (PRN) 05/20/20 1028          ASSESSMENT and PLAN    * S/P liver transplant  Managed per primary team  Optimize BG control      Type 2 diabetes mellitus without complication, with long-term current use of insulin  BG goal 140 - 180     - Rewrite transition IV insulin infusion with stepdown parameters. Initial rate starting at 1 units/hr. 0.5 u/k/d dosing regimen.   - Novolog 7-10 units TIDWM. Basal/Prandial physiologic matching.    - Low Dose SQ Insulin Correction Scale.  - BG Monitoring AC/HS/0200    ** Please call Endocrine for any BG related issues **  ** Please notify Endocrine for any change and/or advance in diet**      Discharge planning:   TBD        Anemia of chronic disease  May affect accuracy of HbA1c results.         Prophylactic immunotherapy  May increase insulin resistance.         Adverse effect of corticosteroids  Glucocorticoids markedly increase glucoses. Expect steroid taper to help.          Jessica Blanco NP  Endocrinology  [facility]

## 2020-05-22 NOTE — PROGRESS NOTES
Discharge Note:    SW met with patient at bedside today to discuss tentative discharge plans for the weekend.  Pt presents alert and oriented x4 engaged and communicative.  Pt in agreement with dc plans for this weekend, either Saturday or Sunday.   Patient denies coping issues at this time.   Patient will dc to local lodging at [facility], [address], under the care of his mother Suzanne Malik, phone [phone].  Patient's mother does not drive, so they will rely on either friends who live locally for transportation or local taxi services.        CoVid-19 precaution - Transplant SW discharge assessment conducted with mother by telephone. SW spoke with pts mother Suzanne on telephone this afternoon.  Pts mother alert and oriented x 4 engaged and communicative.   Pts mother and her sister Day report that they have just arrived in [place] at [facility].  SW spoke with LR manager to get an earlier check in time for 1:15pm.   SW discussed with pts mother that pt maybe ready for dc from hospital either on Saturday or Sunday this weekend.  Pts mother verbalized understanding.   Pt will start self medication education today with bedside nurse.   Patient will require home health PT at discharge and has chosen [facility] for services, Eliana with [facility] ([id number]) was notified of pts weekend discharge plans.  No DME recommendations identified in therapy notes today.     Patient and pts mother with no further psychosocial concerns at this time. SW reminded patient and patients mother to maintaining all necessary precautions related to COVID-19 pandemic including hand hygiene, social distancing/isolation, and use of face mask.   SW remains available for all transplant resources, education and psychosocial support.

## 2020-05-22 NOTE — PT/OT/SLP PROGRESS
"Physical Therapy Treatment    Patient Name:  Colin Reilly   MRN:  [id number]    Recommendations:     Discharge Recommendations:  home   Discharge Equipment Recommendations: none   Barriers to discharge: None    Assessment:     Colin Reilly is a 59 y.o. male admitted with a medical diagnosis of S/P liver transplant, as of 5/17/20.  He presents with the following impairments/functional limitations:  weakness, impaired endurance.    Currently pt is able to independently amb in the hallway while pushing the IV pole.  Noted limp, which the patient attributes to a previous back complication.  No episodes of LOB.  Established exercise program, to be performed in a sitting position, to address increased vascularity and edema reduction.  Primarily inhibited by abdominal pain.  Discussed with pt and nursing to comply with walking program 2xs/day.      Rehab Prognosis: Good; patient would benefit from acute skilled PT services to address these deficits and reach maximum level of function.    Recent Surgery: Procedure(s) (LRB):  TRANSPLANT, LIVER (N/A)  THROMBECTOMY 5 Days Post-Op    Plan:     During this hospitalization, patient to be seen 2 x/week to address the identified rehab impairments via gait training, therapeutic activities, therapeutic exercises, neuromuscular re-education and progress toward the following goals:    · Plan of Care Expires:  06/15/20    Subjective     Chief Complaint: Abdominal pain  Patient/Family Comments/goals: "My new life."  Pain/Comfort:  · Pain Rating 1: 7/10  · Location 1: abdomen  · Pain Addressed 1: Pre-medicate for activity, Nurse notified, Distraction      Objective:     Communicated with nursing prior to session.  Patient found up in chair with blood pressure cuff, pulse ox (continuous), telemetry, peripheral IV upon PT entry to room.     General Precautions: Standard, fall   Orthopedic Precautions:N/A   Braces: N/A     Functional Mobility:  · Transfers:     · Sit to Stand:  independence " with no AD, pt performed 4xs  · Bed to Chair: independence with  pushing IV pole  using  Stand Pivot    · Gait: Pt amb >500', independently, pushing IV pole, with limp from previous back complications    · Balance: I: dynamic standing balance while pushing IV pole      AM-PAC 6 CLICK MOBILITY  Turning over in bed (including adjusting bedclothes, sheets and blankets)?: 4  Sitting down on and standing up from a chair with arms (e.g., wheelchair, bedside commode, etc.): 4  Moving from lying on back to sitting on the side of the bed?: 3  Moving to and from a bed to a chair (including a wheelchair)?: 4  Need to walk in hospital room?: 4  Climbing 3-5 steps with a railing?: 3  Basic Mobility Total Score: 22       Therapeutic Activities and Exercises:   Whiteboard updated  Ankle pumps: 20 reps, in sit   LAQs: 20 reps each LE perf individually, in sit  Hip abd/add: 20 reps each LE perf individually, in sit  Hip flex: 20 reps each LE perf individually, in sit  Ed on edema reduction    Patient left up in chair with all lines intact, call button in reach and nursing notified.    GOALS:   Multidisciplinary Problems     Physical Therapy Goals        Problem: Physical Therapy Goal    Goal Priority Disciplines Outcome Goal Variances Interventions   Physical Therapy Goal     PT, PT/OT Ongoing, Progressing     Description:  Goals to be met by: 2020     Patient will increase functional independence with mobility by performin. Sit to stand transfer with Modified Early. - GOAL MET  2. Bed to chair transfer with Supervision using LRAD. - GOAL MET  3. Gait  x 150 feet with Supervision using LRAD.  - GOAL MET  4. Stand for 5 minutes with Supervision using no AD while performing dynamic UE task. - GOAL MET    5. Lower extremity exercise program x20 reps per handout, with independence.  6. Pt to perf 6MWT: amb 500', to demonstrate a clinically significant improvement in aerobic capacity.                           Time  Tracking:     PT Received On: 05/22/20  PT Start Time: 0922     PT Stop Time: 0953  PT Total Time (min): 31 min     Billable Minutes: Gait Training 19 and Therapeutic Exercise 10    Treatment Type: Treatment  PT/PTA: PT     PTA Visit Number: 1     Oksana Mendoza, PT  05/22/2020

## 2020-05-22 NOTE — PLAN OF CARE
Problem: Physical Therapy Goal  Goal: Physical Therapy Goal  Description  Goals to be met by: 2020     Patient will increase functional independence with mobility by performin. Sit to stand transfer with Modified [place]. - GOAL MET  2. Bed to chair transfer with Supervision using LRAD. - GOAL MET  3. Gait  x 150 feet with Supervision using LRAD.  - GOAL MET  4. Stand for 5 minutes with Supervision using no AD while performing dynamic UE task. - GOAL MET    5. Lower extremity exercise program x20 reps per handout, with independence.  6. Pt to perf 6MWT: amb 500', to demonstrate a clinically significant improvement in aerobic capacity.          Outcome: Ongoing, Progressing    Pt achieved 4 goals.

## 2020-05-22 NOTE — SUBJECTIVE & OBJECTIVE
"Interval HPI:   Overnight events: NAEON. POD # 5 s/p liver transplant. BG reasonably well controlled on IV/SQ insulin regimen. Receiving Solu Medrol 40 mg BID.  Eatin%  Nausea: No  Hypoglycemia and intervention: No  Fever: No  TPN and/or TF: No  If yes, type of TF/TPN and rate: NONE    /73   Pulse 66   Temp 98 °F (36.7 °C) (Oral)   Resp (!) 22   Ht 5' 11" (1.803 m)   Wt 99.9 kg (220 lb 3.8 oz)   SpO2 100%   BMI 30.72 kg/m²     Labs Reviewed and Include    Recent Labs   Lab 20  0735   *   CALCIUM 7.0*   ALBUMIN 2.9*   PROT 4.8*      K 3.0*   CO2 23      BUN 25*   CREATININE 0.8   ALKPHOS 77   *   AST 27   BILITOT 1.4*     Lab Results   Component Value Date    WBC 3.74 (L) 2020    HGB 9.0 (L) 2020    HCT 29.6 (L) 2020    MCV 88 2020    PLT 45 (L) 2020     No results for input(s): TSH, FREET4 in the last 168 hours.  Lab Results   Component Value Date    HGBA1C 5.3 2020       Nutritional status:   Body mass index is 30.72 kg/m².  Lab Results   Component Value Date    ALBUMIN 2.9 (L) 2020    ALBUMIN 3.1 (L) 2020    ALBUMIN 2.3 (L) 2020     No results found for: PREALBUMIN    Estimated Creatinine Clearance: 121.1 mL/min (based on SCr of 0.8 mg/dL).    Accu-Checks  Recent Labs     20  1209 20  1706 20  2149 20  0111 20  0738 20  1125 20  1716 20  2134 20  0453 20  0842   POCTGLUCOSE 176* 175* 166* 185* 187* 227* 126* 214* 198* 208*       Current Medications and/or Treatments Impacting Glycemic Control  Immunotherapy:    Immunosuppressants         Stop Route Frequency     tacrolimus capsule 1.5 mg      -- Oral 2 times daily     mycophenolate capsule 1,000 mg      -- Oral 2 times daily        Steroids:   Hormones (From admission, onward)    Start     Stop Route Frequency Ordered    20 0900  predniSONE tablet 20 mg  (methylprednisolone taper panel)      -- " Oral Daily 05/18/20 0055    05/23/20 0900  methylPREDNISolone sodium succinate injection 20 mg  (methylprednisolone taper panel)      05/24 0859 IV Every 12 hours 05/18/20 0055    05/22/20 0900  methylPREDNISolone sodium succinate injection 40 mg  (methylprednisolone taper panel)      05/23 0859 IV Every 12 hours 05/18/20 0055        Pressors:    Autonomic Drugs (From admission, onward)    None        Hyperglycemia/Diabetes Medications:   Antihyperglycemics (From admission, onward)    Start     Stop Route Frequency Ordered    05/20/20 1130  insulin regular 100 Units in sodium chloride 0.9% 100 mL infusion      -- IV Continuous 05/20/20 1028    05/20/20 1130  insulin aspart U-100 pen 7-10 Units      -- SubQ 3 times daily with meals 05/20/20 1028    05/20/20 1127  insulin aspart U-100 pen 0-5 Units      -- SubQ As needed (PRN) 05/20/20 1028

## 2020-05-22 NOTE — PROGRESS NOTES
[facility]  Liver Transplant  Progress Note    Patient Name: Colin Reilly  MRN: [id number]  Admission Date: 5/15/2020  Hospital Length of Stay: 7 days  Code Status: Full Code  Primary Care Provider: Preston Matthew Ii, MD  Post-Operative Day: 5    ORGAN:   LIVER  Disease Etiology: Alcoholic Cirrhosis  Donor Type:   Donation after Circulatory Death   CDC High Risk:   No  Donor CMV Status:   Donor CMV Status: Negative  Donor HBcAB:   Negative  Donor HCV Status:   Negative  Donor HBV CEZAR: Negative  Donor HCV CEZAR: Negative  Whole or Partial: Whole Liver  Biliary Anastomosis: End to End  Arterial Anatomy: Standard  Subjective:     History of Present Illness:  Colin Reilly is a 60y/o male, with 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 Surgery uncomplicated but has had recurrent abdominal pain and increased drainage from surgical incision. Patient recently admitted 3/11-3/13 with similar symptoms, attempted paracentesis at this time- without fluid to drain. Patient previously negative for peritonitis. He re-presented to [facility] on 5/15 with c/o fever and abdominal pain (Tmax 103) x 1 week, distention and dark colored stools, received 1g Rocephin and transferred [place] [facility] [place]. Patient is being admitted for suspected SBP and infectious work-up.  His last fever was 5/14 of 100.5. Reports last paracentesis about 1 week ago. Umbilical hernia repair incision CDI, no s/s infection, small umbilical hernia easily reducible with no tenderness. He denies N/V, SOB, chest pain, change in bladder function. COVID-19 rapid test negative on admission. Plan for paracentesis. VSS. Monitor    Hospital Course:  Pt is now s/p OLTX from 5/17/20 2/2 ETOH and Hep C. Intra op course unremarkable. LFTs trending down, POD 1 US satisfactory. Donor noted with Blood culture 1/2 with Klebsiella and Pseudomonas. ID  recommending 7 day course of cefepime. Pt currently on zosyn, so will cont with zosyn and f/u cxs to possibly deescalate. Pt progressing well post-op but noted with mild episodes of AMS. Pt inadvertently pulled CVC out during episode of AMS on 5/19. O2 sats remained stable at this time. Mentation improved after starting seroquel qhs. Pt with slight bump in Cr on 5/20. Cr now improved s/p IV albumin x 2. Pt with pitting BLE, diuresing well with lasix.     Interval History:   NAEO. POD 5 this AM. Pt denies complaints this AM. Mentation remains improved. LFTs continue to trend down. Will obtain POD 6 US in the AM. Diuresing well with IV lasix for volume overload. Cr 0.8 from 1.0. Plan to give lasix 40 mg IV again today. Pt receiving zosyn for positive donor blood cultures with pseudomonas and klebsiella- plan to transition to PO cipro upon discharge for total of 2 weeks of abx. Pt is progressing well surgically- pain controlled, passing gas,+flatus/BM, tolerating diet. PT/OT following for strengthening, recommend HH PT/OT upon discharge. Plan for possible discharge on 5/23 or 5/24. Monitor.         Scheduled Meds:   escitalopram oxalate  20 mg Oral Daily    famotidine  20 mg Oral QHS    finasteride  5 mg Oral Daily    gabapentin  600 mg Oral TID    heparin (porcine)  5,000 Units Subcutaneous Q8H    insulin aspart U-100  7-10 Units Subcutaneous TIDWM    methylPREDNISolone sodium succinate  40 mg Intravenous Q12H    Followed by    [START ON 5/23/2020] methylPREDNISolone sodium succinate  20 mg Intravenous Q12H    Followed by    [START ON 5/24/2020] predniSONE  20 mg Oral Daily    mupirocin  1 g Nasal BID    mycophenolate  1,000 mg Oral BID    oxybutynin  5 mg Oral Daily    piperacillin-tazobactam (ZOSYN) IVPB  4.5 g Intravenous Q8H    posaconazole  300 mg Oral QHS    QUEtiapine  25 mg Oral QHS    [START ON 5/25/2020] sulfamethoxazole-trimethoprim 400-80mg  1 tablet Oral Daily AM    tacrolimus  1.5 mg  Oral BID    tamsulosin  0.4 mg Oral Daily    [START ON 5/28/2020] valGANciclovir  450 mg Oral Daily     Continuous Infusions:   insulin (HUMAN R) infusion (adults) 1 Units/hr (05/21/20 1718)     PRN Meds:sodium chloride, acetaminophen, Dextrose 10% Bolus, Dextrose 10% Bolus, glucagon (human recombinant), glucose, glucose, HYDROcodone-acetaminophen, insulin aspart U-100, sodium chloride 0.9%    Review of Systems   Constitutional: Positive for activity change and appetite change. Negative for fever.   HENT: Negative.  Negative for facial swelling.    Eyes: Negative.    Respiratory: Negative.  Negative for apnea, chest tightness, shortness of breath and wheezing.    Cardiovascular: Negative.  Negative for chest pain, palpitations and leg swelling.   Gastrointestinal: Positive for abdominal distention and abdominal pain. Negative for constipation, diarrhea, nausea and vomiting.   Genitourinary: Negative.  Negative for decreased urine volume, difficulty urinating, dysuria, hematuria and urgency.   Musculoskeletal: Negative.  Negative for back pain, gait problem, neck pain and neck stiffness.   Skin: Positive for wound. Negative for color change and pallor.   Allergic/Immunologic: Positive for immunocompromised state.   Neurological: Negative.  Negative for dizziness, seizures, weakness, light-headedness and headaches.   Psychiatric/Behavioral: Positive for confusion. Negative for behavioral problems, hallucinations, sleep disturbance and suicidal ideas. The patient is nervous/anxious.      Objective:     Vital Signs (Most Recent):  Temp: 98.5 °F (36.9 °C) (05/22/20 1134)  Pulse: 75 (05/22/20 1134)  Resp: (!) 21 (05/22/20 1134)  BP: (!) 145/79 (05/22/20 1134)  SpO2: 100 % (05/22/20 1134) Vital Signs (24h Range):  Temp:  [98 °F (36.7 °C)-98.5 °F (36.9 °C)] 98.5 °F (36.9 °C)  Pulse:  [66-75] 75  Resp:  [13-35] 21  SpO2:  [99 %-100 %] 100 %  BP: (126-145)/(68-82) 145/79     Weight: 99.9 kg (220 lb 3.8 oz)  Body mass index  is 30.72 kg/m².    Intake/Output - Last 3 Shifts       05/20 0700 - 05/21 0659 05/21 0700 - 05/22 0659 05/22 0700 - 05/23 0659    P.O. 1190 1320     I.V. (mL/kg) 53.6 (0.5) 46.5 (0.5)     Blood 100      IV Piggyback 200 300 100    Total Intake(mL/kg) 1543.6 (15.5) 1666.5 (16.7) 100 (1)    Urine (mL/kg/hr) 1450 (0.6) 1300 (0.5) 800 (1.2)    Drains       Stool 0 0     Total Output 1450 1300 800    Net +93.6 +366.5 -700           Urine Occurrence 1 x 6 x     Stool Occurrence 1 x 8 x           Physical Exam   Constitutional: He is oriented to person, place, and time. He appears well-developed and well-nourished. No distress.   HENT:   Head: Normocephalic and atraumatic.   Neck: Normal range of motion. Neck supple. No JVD present.   Cardiovascular: Normal rate, regular rhythm and normal heart sounds.   No murmur heard.  Pulmonary/Chest: Effort normal. No respiratory distress. He has decreased breath sounds in the right lower field and the left lower field. He has no wheezes. He exhibits no tenderness.   Abdominal: Soft. Bowel sounds are normal. He exhibits distension. There is tenderness.   Chevron inc FIDEL with staples.   RLQ old DG sites.    Musculoskeletal: Normal range of motion. He exhibits no edema or tenderness.   Neurological: He is alert and oriented to person, place, and time. He has normal reflexes.   Skin: Skin is warm and dry. He is not diaphoretic.   Psychiatric: He has a normal mood and affect. His behavior is normal. Judgment and thought content normal.   Nursing note and vitals reviewed.      Laboratory:  Immunosuppressants         Stop Route Frequency     tacrolimus capsule 1.5 mg      -- Oral 2 times daily     mycophenolate capsule 1,000 mg      -- Oral 2 times daily        CBC:   Recent Labs   Lab 05/22/20  0735   WBC 3.74*   RBC 3.35*   HGB 9.0*   HCT 29.6*   PLT 45*   MCV 88   MCH 26.9*   MCHC 30.4*     CMP:   Recent Labs   Lab 05/22/20  0735   *   CALCIUM 7.0*   ALBUMIN 2.9*   PROT 4.8*   NA  139   K 3.0*   CO2 23      BUN 25*   CREATININE 0.8   ALKPHOS 77   *   AST 27   BILITOT 1.4*     Labs within the past 24 hours have been reviewed.    Diagnostic Results:  None    Debility/Functional status: Patient debilitated by evidence of Muscle wasting and atrophy, Weakness and Chronic fatigue, unspecified. Physical and occupational therapy ordered daily to evaluate and treat. Debility was: present on admission.    Assessment/Plan:     * S/P liver transplant  - Progressing slowly post-op.   - pain controlled, passing gas, +flatus, and tolerating diet.   - Encourage working with PT/OT for strengthening.   - LFTs improving at this time.   - recent liver u/s reviewed.   - plan for POD 6 liver US in the am  - Monitor.       Thrombocytopenia, unspecified  - 2/2 ESLD  - expect to improve post operatively      Hypokalemia  - K 3/1 on 5/21  - replaced PO      Adverse effect of corticosteroids  - endocrine following for management      At risk for long QT syndrome        Positive blood culture in cadaveric donor  - Donor noted with Blood culture 1/2 with Klebsiella and Pseudomonas.  - repeat blood cxs NGTD.   - Cont with zosyn at this time.   - Plan to transition to PO cipro at time of discharge to complete a total 14 days of abx      Acute confusional state  - pt noted with mild ep of confusion overnight and early this am.   - pt able to be re-oriented easily and knows self, place and time.   - Seroquel nightly to help promote sleep.   - confusion now improving      At risk for opportunistic infections  - cont with OI prophylaxis as per protocol.       Prophylactic immunotherapy  - see long term immunosuppression.       Long-term use of immunosuppressant medication  - cont with prograf. Draw prograf level daily and adjust dose as needed to maintain a therapeutic level.       Anemia of chronic disease  - 2/2 ESLD, expect to improve post operatively  - see acute blood loss anemia  - cont to monitor      Acute  blood loss anemia  - H/H low 5/21, expect 2/2 dilution s/p receiving IV albumin x 2 on 5/20  - HDS and no overt signs of bleed  - monitor      Type 2 diabetes mellitus without complication, with long-term current use of insulin  - consult endocrine for management.         VTE Risk Mitigation (From admission, onward)         Ordered     heparin (porcine) injection 5,000 Units  Every 8 hours      05/18/20 0055     Place sequential compression device  Until discontinued      05/18/20 0055     IP VTE HIGH RISK PATIENT  Once      05/18/20 0055     Place JULIETTE hose  Until discontinued      05/15/20 0435                The patients clinical status was discussed at multidisplinary rounds, involving transplant surgery, transplant medicine, pharmacy, nursing, nutrition, and social work    Discharge Planning:  No Patient Care Coordination Note on file.      Nubia Ruiz PA-C  Liver Transplant  [facility]

## 2020-05-22 NOTE — PROGRESS NOTES
Met with patient in preparation for discharge tomorrow. Reviewed outpatient appointments.  Outpatient coordinator assigned.  Allowed time for questions and answers.

## 2020-05-22 NOTE — TELEPHONE ENCOUNTER
Spoke to patient's mother, Suzanne.  Reviewed outpatient appointments.  Allowed time for questions and answers.

## 2020-05-22 NOTE — PROGRESS NOTES
EDUCATION NOTE:    Met with Colinhernandez Reilly and his caregivers to provide teaching re: immunosuppressant medications.  Reviewed medication section of the Liver Transplant Education book that was provided.  Emphasized the importance of compliance, role of the blue medication card, concerns for drug interactions, and process of obtaining refills.  Counseled regarding Prograf, Cellcept , prednisone, including directions for use, monitoring, how to handle missed doses, and side effects.  He verbalized understanding and had the opportunity to ask questions.

## 2020-05-22 NOTE — SUBJECTIVE & OBJECTIVE
Scheduled Meds:   escitalopram oxalate  20 mg Oral Daily    famotidine  20 mg Oral QHS    finasteride  5 mg Oral Daily    gabapentin  600 mg Oral TID    heparin (porcine)  5,000 Units Subcutaneous Q8H    insulin aspart U-100  7-10 Units Subcutaneous TIDWM    methylPREDNISolone sodium succinate  40 mg Intravenous Q12H    Followed by    [START ON 5/23/2020] methylPREDNISolone sodium succinate  20 mg Intravenous Q12H    Followed by    [START ON 5/24/2020] predniSONE  20 mg Oral Daily    mupirocin  1 g Nasal BID    mycophenolate  1,000 mg Oral BID    oxybutynin  5 mg Oral Daily    piperacillin-tazobactam (ZOSYN) IVPB  4.5 g Intravenous Q8H    posaconazole  300 mg Oral QHS    QUEtiapine  25 mg Oral QHS    [START ON 5/25/2020] sulfamethoxazole-trimethoprim 400-80mg  1 tablet Oral Daily AM    tacrolimus  1.5 mg Oral BID    tamsulosin  0.4 mg Oral Daily    [START ON 5/28/2020] valGANciclovir  450 mg Oral Daily     Continuous Infusions:   insulin (HUMAN R) infusion (adults) 1 Units/hr (05/21/20 1718)     PRN Meds:sodium chloride, acetaminophen, Dextrose 10% Bolus, Dextrose 10% Bolus, glucagon (human recombinant), glucose, glucose, HYDROcodone-acetaminophen, insulin aspart U-100, sodium chloride 0.9%    Review of Systems   Constitutional: Positive for activity change and appetite change. Negative for fever.   HENT: Negative.  Negative for facial swelling.    Eyes: Negative.    Respiratory: Negative.  Negative for apnea, chest tightness, shortness of breath and wheezing.    Cardiovascular: Negative.  Negative for chest pain, palpitations and leg swelling.   Gastrointestinal: Positive for abdominal distention and abdominal pain. Negative for constipation, diarrhea, nausea and vomiting.   Genitourinary: Negative.  Negative for decreased urine volume, difficulty urinating, dysuria, hematuria and urgency.   Musculoskeletal: Negative.  Negative for back pain, gait problem, neck pain and neck stiffness.   Skin:  Positive for wound. Negative for color change and pallor.   Allergic/Immunologic: Positive for immunocompromised state.   Neurological: Negative.  Negative for dizziness, seizures, weakness, light-headedness and headaches.   Psychiatric/Behavioral: Positive for confusion. Negative for behavioral problems, hallucinations, sleep disturbance and suicidal ideas. The patient is nervous/anxious.      Objective:     Vital Signs (Most Recent):  Temp: 98.5 °F (36.9 °C) (05/22/20 1134)  Pulse: 75 (05/22/20 1134)  Resp: (!) 21 (05/22/20 1134)  BP: (!) 145/79 (05/22/20 1134)  SpO2: 100 % (05/22/20 1134) Vital Signs (24h Range):  Temp:  [98 °F (36.7 °C)-98.5 °F (36.9 °C)] 98.5 °F (36.9 °C)  Pulse:  [66-75] 75  Resp:  [13-35] 21  SpO2:  [99 %-100 %] 100 %  BP: (126-145)/(68-82) 145/79     Weight: 99.9 kg (220 lb 3.8 oz)  Body mass index is 30.72 kg/m².    Intake/Output - Last 3 Shifts       05/20 0700 - 05/21 0659 05/21 0700 - 05/22 0659 05/22 0700 - 05/23 0659    P.O. 1190 1320     I.V. (mL/kg) 53.6 (0.5) 46.5 (0.5)     Blood 100      IV Piggyback 200 300 100    Total Intake(mL/kg) 1543.6 (15.5) 1666.5 (16.7) 100 (1)    Urine (mL/kg/hr) 1450 (0.6) 1300 (0.5) 800 (1.2)    Drains       Stool 0 0     Total Output 1450 1300 800    Net +93.6 +366.5 -700           Urine Occurrence 1 x 6 x     Stool Occurrence 1 x 8 x           Physical Exam   Constitutional: He is oriented to person, place, and time. He appears well-developed and well-nourished. No distress.   HENT:   Head: Normocephalic and atraumatic.   Neck: Normal range of motion. Neck supple. No JVD present.   Cardiovascular: Normal rate, regular rhythm and normal heart sounds.   No murmur heard.  Pulmonary/Chest: Effort normal. No respiratory distress. He has decreased breath sounds in the right lower field and the left lower field. He has no wheezes. He exhibits no tenderness.   Abdominal: Soft. Bowel sounds are normal. He exhibits distension. There is tenderness.   [employer]  FIDEL with staples.   RLQ old DG sites.    Musculoskeletal: Normal range of motion. He exhibits no edema or tenderness.   Neurological: He is alert and oriented to person, place, and time. He has normal reflexes.   Skin: Skin is warm and dry. He is not diaphoretic.   Psychiatric: He has a normal mood and affect. His behavior is normal. Judgment and thought content normal.   Nursing note and vitals reviewed.      Laboratory:  Immunosuppressants         Stop Route Frequency     tacrolimus capsule 1.5 mg      -- Oral 2 times daily     mycophenolate capsule 1,000 mg      -- Oral 2 times daily        CBC:   Recent Labs   Lab 05/22/20  0735   WBC 3.74*   RBC 3.35*   HGB 9.0*   HCT 29.6*   PLT 45*   MCV 88   MCH 26.9*   MCHC 30.4*     CMP:   Recent Labs   Lab 05/22/20  0735   *   CALCIUM 7.0*   ALBUMIN 2.9*   PROT 4.8*      K 3.0*   CO2 23      BUN 25*   CREATININE 0.8   ALKPHOS 77   *   AST 27   BILITOT 1.4*     Labs within the past 24 hours have been reviewed.    Diagnostic Results:  None    Debility/Functional status: Patient debilitated by evidence of Muscle wasting and atrophy, Weakness and Chronic fatigue, unspecified. Physical and occupational therapy ordered daily to evaluate and treat. Debility was: present on admission.

## 2020-05-22 NOTE — ASSESSMENT & PLAN NOTE
- Progressing slowly post-op.   - pain controlled, passing gas, +flatus, and tolerating diet.   - Encourage working with PT/OT for strengthening.   - LFTs improving at this time.   - recent liver u/s reviewed.   - plan for POD 6 liver US in the am  - Monitor.

## 2020-05-23 LAB
ALBUMIN SERPL BCP-MCNC: 2.9 G/DL (ref 3.5–5.2)
ALP SERPL-CCNC: 81 U/L (ref 55–135)
ALT SERPL W/O P-5'-P-CCNC: 110 U/L (ref 10–44)
ANION GAP SERPL CALC-SCNC: 8 MMOL/L (ref 8–16)
APTT BLDCRRT: 22.5 SEC (ref 21–32)
AST SERPL-CCNC: 26 U/L (ref 10–40)
BASOPHILS # BLD AUTO: 0.01 K/UL (ref 0–0.2)
BASOPHILS NFR BLD: 0.2 % (ref 0–1.9)
BILIRUB SERPL-MCNC: 1.3 MG/DL (ref 0.1–1)
BUN SERPL-MCNC: 20 MG/DL (ref 6–20)
CALCIUM SERPL-MCNC: 7.7 MG/DL (ref 8.7–10.5)
CHLORIDE SERPL-SCNC: 104 MMOL/L (ref 95–110)
CO2 SERPL-SCNC: 26 MMOL/L (ref 23–29)
CREAT SERPL-MCNC: 0.8 MG/DL (ref 0.5–1.4)
DIFFERENTIAL METHOD: ABNORMAL
EOSINOPHIL # BLD AUTO: 0 K/UL (ref 0–0.5)
EOSINOPHIL NFR BLD: 0 % (ref 0–8)
ERYTHROCYTE [DISTWIDTH] IN BLOOD BY AUTOMATED COUNT: 20.2 % (ref 11.5–14.5)
EST. GFR  (AFRICAN AMERICAN): >60 ML/MIN/1.73 M^2
EST. GFR  (NON AFRICAN AMERICAN): >60 ML/MIN/1.73 M^2
GLUCOSE SERPL-MCNC: 183 MG/DL (ref 70–110)
HCT VFR BLD AUTO: 30.4 % (ref 40–54)
HCV RNA SERPL NAA+PROBE-LOG IU: <1.08 LOG (10) IU/ML
HCV RNA SERPL QL NAA+PROBE: NOT DETECTED IU/ML
HCV RNA SPEC NAA+PROBE-ACNC: <12 IU/ML
HGB BLD-MCNC: 8.9 G/DL (ref 14–18)
IMM GRANULOCYTES # BLD AUTO: 0.14 K/UL (ref 0–0.04)
IMM GRANULOCYTES NFR BLD AUTO: 2.6 % (ref 0–0.5)
INR PPP: 1.5 (ref 0.8–1.2)
LYMPHOCYTES # BLD AUTO: 0.4 K/UL (ref 1–4.8)
LYMPHOCYTES NFR BLD: 6.6 % (ref 18–48)
MAGNESIUM SERPL-MCNC: 2.2 MG/DL (ref 1.6–2.6)
MCH RBC QN AUTO: 26.2 PG (ref 27–31)
MCHC RBC AUTO-ENTMCNC: 29.3 G/DL (ref 32–36)
MCV RBC AUTO: 89 FL (ref 82–98)
MONOCYTES # BLD AUTO: 0.3 K/UL (ref 0.3–1)
MONOCYTES NFR BLD: 5.3 % (ref 4–15)
NEUTROPHILS # BLD AUTO: 4.7 K/UL (ref 1.8–7.7)
NEUTROPHILS NFR BLD: 85.3 % (ref 38–73)
NRBC BLD-RTO: 0 /100 WBC
PHOSPHATE SERPL-MCNC: 3.4 MG/DL (ref 2.7–4.5)
PLATELET # BLD AUTO: 50 K/UL (ref 150–350)
PMV BLD AUTO: ABNORMAL FL (ref 9.2–12.9)
POCT GLUCOSE: 102 MG/DL (ref 70–110)
POCT GLUCOSE: 147 MG/DL (ref 70–110)
POCT GLUCOSE: 165 MG/DL (ref 70–110)
POCT GLUCOSE: 168 MG/DL (ref 70–110)
POCT GLUCOSE: 31 MG/DL (ref 70–110)
POCT GLUCOSE: 84 MG/DL (ref 70–110)
POTASSIUM SERPL-SCNC: 3.2 MMOL/L (ref 3.5–5.1)
PROT SERPL-MCNC: 4.9 G/DL (ref 6–8.4)
PROTHROMBIN TIME: 14.4 SEC (ref 9–12.5)
RBC # BLD AUTO: 3.4 M/UL (ref 4.6–6.2)
SODIUM SERPL-SCNC: 138 MMOL/L (ref 136–145)
TACROLIMUS BLD-MCNC: 7.1 NG/ML (ref 5–15)
WBC # BLD AUTO: 5.46 K/UL (ref 3.9–12.7)

## 2020-05-23 PROCEDURE — 20600001 HC STEP DOWN PRIVATE ROOM

## 2020-05-23 PROCEDURE — 63600175 PHARM REV CODE 636 W HCPCS: Performed by: SURGERY

## 2020-05-23 PROCEDURE — 63600175 PHARM REV CODE 636 W HCPCS: Performed by: PHYSICIAN ASSISTANT

## 2020-05-23 PROCEDURE — 36415 COLL VENOUS BLD VENIPUNCTURE: CPT

## 2020-05-23 PROCEDURE — 63600175 PHARM REV CODE 636 W HCPCS: Performed by: NURSE PRACTITIONER

## 2020-05-23 PROCEDURE — 99232 PR SUBSEQUENT HOSPITAL CARE,LEVL II: ICD-10-PCS | Mod: ,,, | Performed by: NURSE PRACTITIONER

## 2020-05-23 PROCEDURE — 25000003 PHARM REV CODE 250: Performed by: PHYSICIAN ASSISTANT

## 2020-05-23 PROCEDURE — 25000003 PHARM REV CODE 250: Performed by: NURSE PRACTITIONER

## 2020-05-23 PROCEDURE — 25000003 PHARM REV CODE 250: Performed by: TRANSPLANT SURGERY

## 2020-05-23 PROCEDURE — 99233 SBSQ HOSP IP/OBS HIGH 50: CPT | Mod: 24,,, | Performed by: PHYSICIAN ASSISTANT

## 2020-05-23 PROCEDURE — C9399 UNCLASSIFIED DRUGS OR BIOLOG: HCPCS | Performed by: NURSE PRACTITIONER

## 2020-05-23 PROCEDURE — 85025 COMPLETE CBC W/AUTO DIFF WBC: CPT

## 2020-05-23 PROCEDURE — 80197 ASSAY OF TACROLIMUS: CPT

## 2020-05-23 PROCEDURE — 85730 THROMBOPLASTIN TIME PARTIAL: CPT

## 2020-05-23 PROCEDURE — 25000003 PHARM REV CODE 250: Performed by: STUDENT IN AN ORGANIZED HEALTH CARE EDUCATION/TRAINING PROGRAM

## 2020-05-23 PROCEDURE — 84100 ASSAY OF PHOSPHORUS: CPT

## 2020-05-23 PROCEDURE — 83735 ASSAY OF MAGNESIUM: CPT

## 2020-05-23 PROCEDURE — 80053 COMPREHEN METABOLIC PANEL: CPT

## 2020-05-23 PROCEDURE — 99232 SBSQ HOSP IP/OBS MODERATE 35: CPT | Mod: ,,, | Performed by: NURSE PRACTITIONER

## 2020-05-23 PROCEDURE — 99233 PR SUBSEQUENT HOSPITAL CARE,LEVL III: ICD-10-PCS | Mod: 24,,, | Performed by: PHYSICIAN ASSISTANT

## 2020-05-23 PROCEDURE — 85610 PROTHROMBIN TIME: CPT

## 2020-05-23 RX ORDER — IBUPROFEN 200 MG
16 TABLET ORAL
Status: DISCONTINUED | OUTPATIENT
Start: 2020-05-23 | End: 2020-05-24 | Stop reason: HOSPADM

## 2020-05-23 RX ORDER — INSULIN ASPART 100 [IU]/ML
0-5 INJECTION, SOLUTION INTRAVENOUS; SUBCUTANEOUS
Status: DISCONTINUED | OUTPATIENT
Start: 2020-05-23 | End: 2020-05-24 | Stop reason: HOSPADM

## 2020-05-23 RX ORDER — FUROSEMIDE 10 MG/ML
40 INJECTION INTRAMUSCULAR; INTRAVENOUS ONCE
Status: COMPLETED | OUTPATIENT
Start: 2020-05-23 | End: 2020-05-23

## 2020-05-23 RX ORDER — INSULIN ASPART 100 [IU]/ML
7-10 INJECTION, SOLUTION INTRAVENOUS; SUBCUTANEOUS
Status: DISCONTINUED | OUTPATIENT
Start: 2020-05-23 | End: 2020-05-24

## 2020-05-23 RX ORDER — IBUPROFEN 200 MG
24 TABLET ORAL
Status: DISCONTINUED | OUTPATIENT
Start: 2020-05-23 | End: 2020-05-24 | Stop reason: HOSPADM

## 2020-05-23 RX ORDER — GLUCAGON 1 MG
1 KIT INJECTION
Status: DISCONTINUED | OUTPATIENT
Start: 2020-05-23 | End: 2020-05-24 | Stop reason: HOSPADM

## 2020-05-23 RX ADMIN — FUROSEMIDE 40 MG: 10 INJECTION, SOLUTION INTRAVENOUS at 11:05

## 2020-05-23 RX ADMIN — GABAPENTIN 600 MG: 300 CAPSULE ORAL at 08:05

## 2020-05-23 RX ADMIN — OXYBUTYNIN CHLORIDE 5 MG: 5 TABLET ORAL at 08:05

## 2020-05-23 RX ADMIN — POTASSIUM CHLORIDE 30 MEQ: 750 CAPSULE, EXTENDED RELEASE ORAL at 08:05

## 2020-05-23 RX ADMIN — INSULIN ASPART 10 UNITS: 100 INJECTION, SOLUTION INTRAVENOUS; SUBCUTANEOUS at 12:05

## 2020-05-23 RX ADMIN — INSULIN ASPART 10 UNITS: 100 INJECTION, SOLUTION INTRAVENOUS; SUBCUTANEOUS at 04:05

## 2020-05-23 RX ADMIN — INSULIN DETEMIR 20 UNITS: 100 INJECTION, SOLUTION SUBCUTANEOUS at 11:05

## 2020-05-23 RX ADMIN — POSACONAZOLE 300 MG: 100 TABLET, COATED ORAL at 09:05

## 2020-05-23 RX ADMIN — METHYLPREDNISOLONE SODIUM SUCCINATE 20 MG: 40 INJECTION, POWDER, FOR SOLUTION INTRAMUSCULAR; INTRAVENOUS at 08:05

## 2020-05-23 RX ADMIN — GABAPENTIN 600 MG: 300 CAPSULE ORAL at 04:05

## 2020-05-23 RX ADMIN — HYDROCODONE BITARTRATE AND ACETAMINOPHEN 1 TABLET: 10; 325 TABLET ORAL at 06:05

## 2020-05-23 RX ADMIN — ESCITALOPRAM OXALATE 20 MG: 10 TABLET ORAL at 08:05

## 2020-05-23 RX ADMIN — TACROLIMUS 1.5 MG: 1 CAPSULE ORAL at 05:05

## 2020-05-23 RX ADMIN — FINASTERIDE 5 MG: 5 TABLET, FILM COATED ORAL at 08:05

## 2020-05-23 RX ADMIN — PIPERACILLIN SODIUM,TAZOBACTAM SODIUM 4.5 G: 4; .5 INJECTION, POWDER, FOR SOLUTION INTRAVENOUS at 05:05

## 2020-05-23 RX ADMIN — MYCOPHENOLATE MOFETIL 1000 MG: 250 CAPSULE ORAL at 08:05

## 2020-05-23 RX ADMIN — FAMOTIDINE 20 MG: 20 TABLET, FILM COATED ORAL at 08:05

## 2020-05-23 RX ADMIN — TACROLIMUS 1.5 MG: 1 CAPSULE ORAL at 08:05

## 2020-05-23 RX ADMIN — PIPERACILLIN SODIUM,TAZOBACTAM SODIUM 4.5 G: 4; .5 INJECTION, POWDER, FOR SOLUTION INTRAVENOUS at 08:05

## 2020-05-23 RX ADMIN — QUETIAPINE FUMARATE 25 MG: 25 TABLET ORAL at 08:05

## 2020-05-23 RX ADMIN — INSULIN ASPART 7 UNITS: 100 INJECTION, SOLUTION INTRAVENOUS; SUBCUTANEOUS at 08:05

## 2020-05-23 RX ADMIN — INSULIN ASPART 1 UNITS: 100 INJECTION, SOLUTION INTRAVENOUS; SUBCUTANEOUS at 08:05

## 2020-05-23 RX ADMIN — PIPERACILLIN SODIUM,TAZOBACTAM SODIUM 4.5 G: 4; .5 INJECTION, POWDER, FOR SOLUTION INTRAVENOUS at 01:05

## 2020-05-23 RX ADMIN — TAMSULOSIN HYDROCHLORIDE 0.4 MG: 0.4 CAPSULE ORAL at 08:05

## 2020-05-23 RX ADMIN — HEPARIN SODIUM 5000 UNITS: 5000 INJECTION INTRAVENOUS; SUBCUTANEOUS at 03:05

## 2020-05-23 RX ADMIN — HEPARIN SODIUM 5000 UNITS: 5000 INJECTION INTRAVENOUS; SUBCUTANEOUS at 06:05

## 2020-05-23 RX ADMIN — HEPARIN SODIUM 5000 UNITS: 5000 INJECTION INTRAVENOUS; SUBCUTANEOUS at 08:05

## 2020-05-23 RX ADMIN — HYDROCODONE BITARTRATE AND ACETAMINOPHEN 1 TABLET: 10; 325 TABLET ORAL at 08:05

## 2020-05-23 NOTE — SUBJECTIVE & OBJECTIVE
Scheduled Meds:   escitalopram oxalate  20 mg Oral Daily    famotidine  20 mg Oral QHS    finasteride  5 mg Oral Daily    gabapentin  600 mg Oral TID    heparin (porcine)  5,000 Units Subcutaneous Q8H    insulin aspart U-100  7-10 Units Subcutaneous TIDWM    insulin detemir U-100  20 Units Subcutaneous Daily    methylPREDNISolone sodium succinate  20 mg Intravenous Q12H    Followed by    [START ON 5/24/2020] predniSONE  20 mg Oral Daily    mycophenolate  1,000 mg Oral BID    oxybutynin  5 mg Oral Daily    piperacillin-tazobactam (ZOSYN) IVPB  4.5 g Intravenous Q8H    posaconazole  300 mg Oral QHS    potassium chloride  30 mEq Oral BID    QUEtiapine  25 mg Oral QHS    [START ON 5/25/2020] sulfamethoxazole-trimethoprim 400-80mg  1 tablet Oral Daily AM    tacrolimus  1.5 mg Oral BID    tamsulosin  0.4 mg Oral Daily    [START ON 5/28/2020] valGANciclovir  450 mg Oral Daily     Continuous Infusions:  PRN Meds:sodium chloride, acetaminophen, Dextrose 10% Bolus, Dextrose 10% Bolus, glucagon (human recombinant), glucose, glucose, HYDROcodone-acetaminophen, insulin aspart U-100, sodium chloride 0.9%    Review of Systems   Constitutional: Positive for activity change and appetite change. Negative for fever.   HENT: Negative.  Negative for facial swelling.    Eyes: Negative.    Respiratory: Negative.  Negative for apnea, chest tightness, shortness of breath and wheezing.    Cardiovascular: Negative.  Negative for chest pain, palpitations and leg swelling.   Gastrointestinal: Positive for abdominal distention and abdominal pain. Negative for constipation, diarrhea, nausea and vomiting.   Genitourinary: Negative.  Negative for decreased urine volume, difficulty urinating, dysuria, hematuria and urgency.   Musculoskeletal: Negative.  Negative for back pain, gait problem, neck pain and neck stiffness.   Skin: Positive for wound. Negative for color change and pallor.   Allergic/Immunologic: Positive for  immunocompromised state.   Neurological: Negative.  Negative for dizziness, seizures, weakness, light-headedness and headaches.   Psychiatric/Behavioral: Positive for confusion. Negative for behavioral problems, hallucinations, sleep disturbance and suicidal ideas. The patient is nervous/anxious.      Objective:     Vital Signs (Most Recent):  Temp: 97.8 °F (36.6 °C) (05/23/20 0738)  Pulse: 75 (05/23/20 0738)  Resp: 19 (05/23/20 0738)  BP: 137/74 (05/23/20 0738)  SpO2: 98 % (05/23/20 0738) Vital Signs (24h Range):  Temp:  [97.8 °F (36.6 °C)-98.5 °F (36.9 °C)] 97.8 °F (36.6 °C)  Pulse:  [69-75] 75  Resp:  [18-21] 19  SpO2:  [97 %-100 %] 98 %  BP: (137-165)/(74-85) 137/74     Weight: 99.4 kg (219 lb 2.2 oz)  Body mass index is 30.56 kg/m².    Intake/Output - Last 3 Shifts       05/21 0700 - 05/22 0659 05/22 0700 - 05/23 0659 05/23 0700 - 05/24 0659    P.O. 1320 1980     I.V. (mL/kg) 46.5 (0.5)      Blood       IV Piggyback 300 100     Total Intake(mL/kg) 1666.5 (16.7) 2080 (20.9)     Urine (mL/kg/hr) 1300 (0.5) 3950 (1.7) 250 (0.6)    Stool 0 0     Total Output 1300 3950 250    Net +366.5 -1870 -250           Urine Occurrence 6 x 1 x     Stool Occurrence 8 x 2 x           Physical Exam   Constitutional: He is oriented to person, place, and time. He appears well-developed and well-nourished. No distress.   HENT:   Head: Normocephalic and atraumatic.   Neck: Normal range of motion. Neck supple. No JVD present.   Cardiovascular: Normal rate, regular rhythm and normal heart sounds.   No murmur heard.  Pulmonary/Chest: Effort normal. No respiratory distress. He has decreased breath sounds in the right lower field and the left lower field. He has no wheezes. He exhibits no tenderness.   Abdominal: Soft. Bowel sounds are normal. He exhibits distension. There is tenderness.   [employer] FIDEL with staples.   RLQ old DG sites.    Musculoskeletal: Normal range of motion. He exhibits no edema or tenderness.   Neurological: He is  alert and oriented to person, place, and time. He has normal reflexes.   Skin: Skin is warm and dry. He is not diaphoretic.   Psychiatric: He has a normal mood and affect. His behavior is normal. Judgment and thought content normal.   Nursing note and vitals reviewed.      Laboratory:  Immunosuppressants         Stop Route Frequency     tacrolimus capsule 1.5 mg      -- Oral 2 times daily     mycophenolate capsule 1,000 mg      -- Oral 2 times daily        CBC:   Recent Labs   Lab 05/23/20  0627   WBC 5.46   RBC 3.40*   HGB 8.9*   HCT 30.4*   PLT 50*   MCV 89   MCH 26.2*   MCHC 29.3*     CMP:   Recent Labs   Lab 05/23/20  0627   *   CALCIUM 7.7*   ALBUMIN 2.9*   PROT 4.9*      K 3.2*   CO2 26      BUN 20   CREATININE 0.8   ALKPHOS 81   *   AST 26   BILITOT 1.3*     Labs within the past 24 hours have been reviewed.    Diagnostic Results:  US Liver Transplant Post:    Results for orders placed during the hospital encounter of 05/15/20   US Liver Transplant Post    Narrative EXAMINATION:  US LIVER TRANSPLANT POST    CLINICAL HISTORY:  POD 6 s/p OLTX;    TECHNIQUE:  Limited abdominal ultrasound of the transplant liver with Doppler evaluation.  Color and spectral Doppler were performed.    COMPARISON:  Ultrasound from 05/18/2020    FINDINGS:  Patient is status post orthotopic liver transplant.  Liver allograft is normal in size.  The liver demonstrates homogeneous echotexture.  Simple cyst measuring 0.6 x 0.8 x 0.5 cm in the right hepatic lobe.  Multiple perihepatic fluid collections.  Left perihepatic fluid collection measures 6.0 x 7.2 x 7.3 cm.  Right perihepatic fluid collections measuring 4.5 x 2.3 x 3.7 cm, 3.2 x 3.6, 3.8 cm, and 6.9 x 9.2 x 5.3 cm.    The common duct is not dilated, measuring 3 mm.  No dilated intrahepatic radicles are seen.    Color flow and spectral waveform analysis was performed.  The main portal vein, right portal vein, left portal vein, middle hepatic vein, right  hepatic vein, left hepatic vein, and IVC are patent with proper directional flow.    Anastomosis site of the main hepatic artery demonstrates a peak systolic velocity 73 cm/sec.    Main hepatic artery demonstrates resistive index 1.0 with normal waveform.    Left hepatic artery shows resistive index 1.0 with normal waveform.    Anterior branch of the right hepatic artery demonstrates resistive index 1.0 with normal waveform.    Posterior branch of the right hepatic artery demonstrates resistive index 1.0 with normal waveform.    Pleural effusion is noted.      Impression Elevated intraparenchymal resistive indices which may be seen with edema in the postoperative setting.    Multiple perihepatic fluid collections, likely representing hematomas.  No significant mass effect.  The largest collection measures 6.9 x 9.2 x 5.3 cm adjacent to the right hepatic lobe.      Electronically signed by: Adam Macdonald MD  Date:    05/23/2020  Time:    10:34       Debility/Functional status: Patient debilitated by evidence of Muscle wasting and atrophy and Weakness. Physical and occupational therapy ordered daily to evaluate and treat. Debility was: present on admission.

## 2020-05-23 NOTE — PROGRESS NOTES
[facility]  Endocrinology  Progress Note    Admit Date: 5/15/2020     Reason for Consult: Management of T2DM, Hyperglycemia     Surgical Procedure and Date: Liver Transplant 20    Diabetes diagnosis year:     Home Diabetes Medications:    - Levemir 50 units HS (per chart review)  - Novolog 24 units TIDWM (per chart review)    How often checking glucose at home? MARILIN   BG readings on regimen: MARILIN  Hypoglycemia on the regimen?  MARILIN  Missed doses on regimen?  MARILIN    Diabetes Complications include:     Hyperglycemia and Diabetic peripheral neuropathy      Complicating diabetes co morbidities:   CIRRHOSIS    HPI:   Patient is a 59 y.o. 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 Surgery uncomplicated but has had recurrent abdominal pain and increased drainage from surgical incision. Patient recently admitted 3/11-3/13 with similar symptoms, attempted paracentesis at this time- without fluid to drain. He re-presented to [facility] on 5/15 with c/o fever and abdominal pain (Tmax 103) x 1 week, distention and dark colored stools, received 1g Rocephin and [place] [facility] [place]. Patient is being admitted for suspected SBP and infectious work-up.  COVID-19 rapid test negative on admission. Plan for paracentesis. Endocrinology consulted to manage hypeglycemia/DM2 during [place] [facility]    Lab Results   Component Value Date    HGBA1C 6.7 (H) 2020       Interval HPI:   Overnight events: NAEON. POD #6 s/p liver transplant. BG reasonably well controlled on IV/SQ insulin regimen. Receiving Solu Medrol 20 mg BID.  Eatin%  Nausea: No  Hypoglycemia and intervention: No  Fever: No  TPN and/or TF: No  If yes, type of TF/TPN and rate: none    /74 (BP Location: Right arm, Patient Position: Sitting)   Pulse 75   Temp 97.8 °F (36.6 °C) (Oral)   Resp 19   " Ht 5' 11" (1.803 m)   Wt 99.4 kg (219 lb 2.2 oz)   SpO2 98%   BMI 30.56 kg/m²      Labs Reviewed and Include    Recent Labs   Lab 05/23/20  0627   *   CALCIUM 7.7*   ALBUMIN 2.9*   PROT 4.9*      K 3.2*   CO2 26      BUN 20   CREATININE 0.8   ALKPHOS 81   *   AST 26   BILITOT 1.3*     Lab Results   Component Value Date    WBC 5.46 05/23/2020    HGB 8.9 (L) 05/23/2020    HCT 30.4 (L) 05/23/2020    MCV 89 05/23/2020    PLT 50 (L) 05/23/2020     No results for input(s): TSH, FREET4 in the last 168 hours.  Lab Results   Component Value Date    HGBA1C 5.3 05/17/2020       Nutritional status:   Body mass index is 30.56 kg/m².  Lab Results   Component Value Date    ALBUMIN 2.9 (L) 05/23/2020    ALBUMIN 2.9 (L) 05/22/2020    ALBUMIN 3.1 (L) 05/21/2020     No results found for: PREALBUMIN    Estimated Creatinine Clearance: 120.9 mL/min (based on SCr of 0.8 mg/dL).    Accu-Checks  Recent Labs     05/21/20  1125 05/21/20  1716 05/21/20  2134 05/22/20  0453 05/22/20  0842 05/22/20  1230 05/22/20  1704 05/22/20  2133 05/23/20  0148 05/23/20  0748   POCTGLUCOSE 227* 126* 214* 198* 208* 188* 213* 153* 147* 168*       Current Medications and/or Treatments Impacting Glycemic Control  Immunotherapy:    Immunosuppressants         Stop Route Frequency     tacrolimus capsule 1.5 mg      -- Oral 2 times daily     mycophenolate capsule 1,000 mg      -- Oral 2 times daily        Steroids:   Hormones (From admission, onward)    Start     Stop Route Frequency Ordered    05/24/20 0900  predniSONE tablet 20 mg  (methylprednisolone taper panel)      -- Oral Daily 05/18/20 0055    05/23/20 0900  methylPREDNISolone sodium succinate injection 20 mg  (methylprednisolone taper panel)      05/24 0859 IV Every 12 hours 05/18/20 0055        Pressors:    Autonomic Drugs (From admission, onward)    None        Hyperglycemia/Diabetes Medications:   Antihyperglycemics (From admission, onward)    Start     Stop Route Frequency " Ordered    05/20/20 1130  insulin regular 100 Units in sodium chloride 0.9% 100 mL infusion      -- IV Continuous 05/20/20 1028    05/20/20 1130  insulin aspart U-100 pen 7-10 Units      -- SubQ 3 times daily with meals 05/20/20 1028    05/20/20 1127  insulin aspart U-100 pen 0-5 Units      -- SubQ As needed (PRN) 05/20/20 1028          ASSESSMENT and PLAN    * S/P liver transplant  Managed per primary team  Optimize BG control      Type 2 diabetes mellitus without complication, with long-term current use of insulin  BG goal 140 - 180     - Continue transition IV insulin infusion with stepdown parameters. Initial rate starting at 1 units/hr. 0.5 u/k/d dosing regimen.   - Novolog 7-10 units TIDWM. Basal/Prandial physiologic matching.    - Low Dose SQ Insulin Correction Scale.  - BG Monitoring /HS/0200    ** Please call Endocrine for any BG related issues **  ** Please notify Endocrine for any change and/or advance in diet**    ADDENDUM:   Plan for patient to discharge tomorrow. Will switch to MDI regimen in preparation.  Discontinue transition IV insulin infusion  Start Levemir 20 units daily (20% reduction from current basal rate)     Discharge planning:   TBD        Anemia of chronic disease  May affect accuracy of HbA1c results.         Prophylactic immunotherapy  May increase insulin resistance.         Adverse effect of corticosteroids  Glucocorticoids markedly increase glucoses. Expect steroid taper to help.          Jessica Blanco, GUERLINE  Endocrinology  [facility]

## 2020-05-23 NOTE — SUBJECTIVE & OBJECTIVE
"Interval HPI:   Overnight events: NAEON. POD #6 s/p liver transplant. BG reasonably well controlled on IV/SQ insulin regimen. Receiving Solu Medrol 20 mg BID.  Eatin%  Nausea: No  Hypoglycemia and intervention: No  Fever: No  TPN and/or TF: No  If yes, type of TF/TPN and rate: none    /74 (BP Location: Right arm, Patient Position: Sitting)   Pulse 75   Temp 97.8 °F (36.6 °C) (Oral)   Resp 19   Ht 5' 11" (1.803 m)   Wt 99.4 kg (219 lb 2.2 oz)   SpO2 98%   BMI 30.56 kg/m²     Labs Reviewed and Include    Recent Labs   Lab 20  0627   *   CALCIUM 7.7*   ALBUMIN 2.9*   PROT 4.9*      K 3.2*   CO2 26      BUN 20   CREATININE 0.8   ALKPHOS 81   *   AST 26   BILITOT 1.3*     Lab Results   Component Value Date    WBC 5.46 2020    HGB 8.9 (L) 2020    HCT 30.4 (L) 2020    MCV 89 2020    PLT 50 (L) 2020     No results for input(s): TSH, FREET4 in the last 168 hours.  Lab Results   Component Value Date    HGBA1C 5.3 2020       Nutritional status:   Body mass index is 30.56 kg/m².  Lab Results   Component Value Date    ALBUMIN 2.9 (L) 2020    ALBUMIN 2.9 (L) 2020    ALBUMIN 3.1 (L) 2020     No results found for: PREALBUMIN    Estimated Creatinine Clearance: 120.9 mL/min (based on SCr of 0.8 mg/dL).    Accu-Checks  Recent Labs     20  1125 20  1716 20  2134 20  0453 20  0842 20  1230 20  1704 20  2133 20  0148 20  0748   POCTGLUCOSE 227* 126* 214* 198* 208* 188* 213* 153* 147* 168*       Current Medications and/or Treatments Impacting Glycemic Control  Immunotherapy:    Immunosuppressants         Stop Route Frequency     tacrolimus capsule 1.5 mg      -- Oral 2 times daily     mycophenolate capsule 1,000 mg      -- Oral 2 times daily        Steroids:   Hormones (From admission, onward)    Start     Stop Route Frequency Ordered    20 0900  predniSONE tablet 20 mg "  (methylprednisolone taper panel)      -- Oral Daily 05/18/20 0055    05/23/20 0900  methylPREDNISolone sodium succinate injection 20 mg  (methylprednisolone taper panel)      05/24 0859 IV Every 12 hours 05/18/20 0055        Pressors:    Autonomic Drugs (From admission, onward)    None        Hyperglycemia/Diabetes Medications:   Antihyperglycemics (From admission, onward)    Start     Stop Route Frequency Ordered    05/20/20 1130  insulin regular 100 Units in sodium chloride 0.9% 100 mL infusion      -- IV Continuous 05/20/20 1028    05/20/20 1130  insulin aspart U-100 pen 7-10 Units      -- SubQ 3 times daily with meals 05/20/20 1028    05/20/20 1127  insulin aspart U-100 pen 0-5 Units      -- SubQ As needed (PRN) 05/20/20 1028

## 2020-05-23 NOTE — ASSESSMENT & PLAN NOTE
BG goal 140 - 180     - Continue transition IV insulin infusion with stepdown parameters. Initial rate starting at 1 units/hr. 0.5 u/k/d dosing regimen.   - Novolog 7-10 units TIDWM. Basal/Prandial physiologic matching.    - Low Dose SQ Insulin Correction Scale.  - BG Monitoring //0200    ** Please call Endocrine for any BG related issues **  ** Please notify Endocrine for any change and/or advance in diet**      Discharge planning:   TBD

## 2020-05-23 NOTE — ASSESSMENT & PLAN NOTE
- Progressing slowly post-op.   - pain controlled, passing gas, +flatus, and tolerating diet.   - Encourage working with PT/OT for strengthening.   - LFTs improving at this time.   - POD 1 and  6 liver US both reviewed  - Monitor.

## 2020-05-23 NOTE — PLAN OF CARE
Pt AAOx4, VSS, afebrile. Pt up with standby assist, but independent to bedside commode and chair. Insulin gtt dcd today, started on levemir. Pt pulling self meds correctly according to blue card. Pt with chevron incision, open to air with staples, pt painting with Betadine. Liver US done this AM. Plan of care reviewed with patient, pt in bed, bed in lowest position, wheels locked, call light in reach, instructed to call staff for assistance.

## 2020-05-23 NOTE — PROGRESS NOTES
[facility]  Liver Transplant  Progress Note    Patient Name: Colin Reilly  MRN: [id number]  Admission Date: 5/15/2020  Hospital Length of Stay: 8 days  Code Status: Full Code  Primary Care Provider: Preston Matthew Ii, MD  Post-Operative Day: 6    ORGAN:   LIVER  Disease Etiology: Alcoholic Cirrhosis  Donor Type:   Donation after Circulatory Death   CDC High Risk:   No  Donor CMV Status:   Donor CMV Status: Negative  Donor HBcAB:   Negative  Donor HCV Status:   Negative  Donor HBV CEZAR: Negative  Donor HCV CEZAR: Negative  Whole or Partial: Whole Liver  Biliary Anastomosis: End to End  Arterial Anatomy: Standard  Subjective:     History of Present Illness:  Colin Reilly is a 60y/o male, with 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 Surgery uncomplicated but has had recurrent abdominal pain and increased drainage from surgical incision. Patient recently admitted 3/11-3/13 with similar symptoms, attempted paracentesis at this time- without fluid to drain. Patient previously negative for peritonitis. He re-presented to [facility] on 5/15 with c/o fever and abdominal pain (Tmax 103) x 1 week, distention and dark colored stools, received 1g Rocephin and transferred to [facility] for further care. Patient is being admitted for suspected SBP and infectious work-up.  His last fever was 5/14 of 100.5. Reports last paracentesis about 1 week ago. Umbilical hernia repair incision CDI, no s/s infection, small umbilical hernia easily reducible with no tenderness. He denies N/V, SOB, chest pain, change in bladder function. COVID-19 rapid test negative on admission. Plan for paracentesis. VSS. Monitor    Hospital Course:  Pt is now s/p OLTX from 5/17/20 2/2 ETOH and Hep C. Intra op course unremarkable. LFTs trending down, POD 1 US satisfactory. Donor noted with Blood culture 1/2 with Klebsiella and Pseudomonas. ID  recommending 7 day course of cefepime. Pt currently on zosyn, so will cont with zosyn and f/u cxs to possibly deescalate. Pt progressing well post-op but noted with mild episodes of AMS. Pt inadvertently pulled CVC out during episode of AMS on 5/19. O2 sats remained stable at this time. Mentation improved after starting seroquel qhs. Pt with slight bump in Cr on 5/20. Cr now improved s/p IV albumin x 2. Pt with pitting BLE, diuresing well with lasix.     Interval History:   NAEO. POD 6 this AM. Pt denies complaints this AM. Mentation remains improved. LFTs continue to trend down. POD 6 US this AM shows mildly elevated RIs, likely 2/2 edema, otherwise satisfactory. Diuresing well with IV lasix for volume overload. Cr 0.8. Plan to give lasix 40 mg IV again today. Pt receiving zosyn for positive donor blood cultures with pseudomonas and klebsiella- plan to transition to PO cipro upon discharge for total of 2 weeks of abx. Pt is still on insulin gtt- transition to long acting insulin today per endocrine. Pt is progressing well surgically- pain controlled, passing gas,+flatus/BM, tolerating diet. PT/OT following for strengthening, recommend  PT/OT upon discharge. Plan for possible discharge on 5/24 if his BG is controlled while off insulin gtt. Monitor.         Scheduled Meds:   escitalopram oxalate  20 mg Oral Daily    famotidine  20 mg Oral QHS    finasteride  5 mg Oral Daily    gabapentin  600 mg Oral TID    heparin (porcine)  5,000 Units Subcutaneous Q8H    insulin aspart U-100  7-10 Units Subcutaneous TIDWM    insulin detemir U-100  20 Units Subcutaneous Daily    methylPREDNISolone sodium succinate  20 mg Intravenous Q12H    Followed by    [START ON 5/24/2020] predniSONE  20 mg Oral Daily    mycophenolate  1,000 mg Oral BID    oxybutynin  5 mg Oral Daily    piperacillin-tazobactam (ZOSYN) IVPB  4.5 g Intravenous Q8H    posaconazole  300 mg Oral QHS    potassium chloride  30 mEq Oral BID     QUEtiapine  25 mg Oral QHS    [START ON 5/25/2020] sulfamethoxazole-trimethoprim 400-80mg  1 tablet Oral Daily AM    tacrolimus  1.5 mg Oral BID    tamsulosin  0.4 mg Oral Daily    [START ON 5/28/2020] valGANciclovir  450 mg Oral Daily     Continuous Infusions:  PRN Meds:sodium chloride, acetaminophen, Dextrose 10% Bolus, Dextrose 10% Bolus, glucagon (human recombinant), glucose, glucose, HYDROcodone-acetaminophen, insulin aspart U-100, sodium chloride 0.9%    Review of Systems   Constitutional: Positive for activity change and appetite change. Negative for fever.   HENT: Negative.  Negative for facial swelling.    Eyes: Negative.    Respiratory: Negative.  Negative for apnea, chest tightness, shortness of breath and wheezing.    Cardiovascular: Negative.  Negative for chest pain, palpitations and leg swelling.   Gastrointestinal: Positive for abdominal distention and abdominal pain. Negative for constipation, diarrhea, nausea and vomiting.   Genitourinary: Negative.  Negative for decreased urine volume, difficulty urinating, dysuria, hematuria and urgency.   Musculoskeletal: Negative.  Negative for back pain, gait problem, neck pain and neck stiffness.   Skin: Positive for wound. Negative for color change and pallor.   Allergic/Immunologic: Positive for immunocompromised state.   Neurological: Negative.  Negative for dizziness, seizures, weakness, light-headedness and headaches.   Psychiatric/Behavioral: Positive for confusion. Negative for behavioral problems, hallucinations, sleep disturbance and suicidal ideas. The patient is nervous/anxious.      Objective:     Vital Signs (Most Recent):  Temp: 97.8 °F (36.6 °C) (05/23/20 0738)  Pulse: 75 (05/23/20 0738)  Resp: 19 (05/23/20 0738)  BP: 137/74 (05/23/20 0738)  SpO2: 98 % (05/23/20 0738) Vital Signs (24h Range):  Temp:  [97.8 °F (36.6 °C)-98.5 °F (36.9 °C)] 97.8 °F (36.6 °C)  Pulse:  [69-75] 75  Resp:  [18-21] 19  SpO2:  [97 %-100 %] 98 %  BP:  (137-165)/(74-85) 137/74     Weight: 99.4 kg (219 lb 2.2 oz)  Body mass index is 30.56 kg/m².    Intake/Output - Last 3 Shifts       05/21 0700 - 05/22 0659 05/22 0700 - 05/23 0659 05/23 0700 - 05/24 0659    P.O. 1320 1980     I.V. (mL/kg) 46.5 (0.5)      Blood       IV Piggyback 300 100     Total Intake(mL/kg) 1666.5 (16.7) 2080 (20.9)     Urine (mL/kg/hr) 1300 (0.5) 3950 (1.7) 250 (0.6)    Stool 0 0     Total Output 1300 3950 250    Net +366.5 -1870 -250           Urine Occurrence 6 x 1 x     Stool Occurrence 8 x 2 x           Physical Exam   Constitutional: He is oriented to person, place, and time. He appears well-developed and well-nourished. No distress.   HENT:   Head: Normocephalic and atraumatic.   Neck: Normal range of motion. Neck supple. No JVD present.   Cardiovascular: Normal rate, regular rhythm and normal heart sounds.   No murmur heard.  Pulmonary/Chest: Effort normal. No respiratory distress. He has decreased breath sounds in the right lower field and the left lower field. He has no wheezes. He exhibits no tenderness.   Abdominal: Soft. Bowel sounds are normal. He exhibits distension. There is tenderness.   Chevron inc FIDEL with staples.   RLQ old DG sites.    Musculoskeletal: Normal range of motion. He exhibits no edema or tenderness.   Neurological: He is alert and oriented to person, place, and time. He has normal reflexes.   Skin: Skin is warm and dry. He is not diaphoretic.   Psychiatric: He has a normal mood and affect. His behavior is normal. Judgment and thought content normal.   Nursing note and vitals reviewed.      Laboratory:  Immunosuppressants         Stop Route Frequency     tacrolimus capsule 1.5 mg      -- Oral 2 times daily     mycophenolate capsule 1,000 mg      -- Oral 2 times daily        CBC:   Recent Labs   Lab 05/23/20  0627   WBC 5.46   RBC 3.40*   HGB 8.9*   HCT 30.4*   PLT 50*   MCV 89   MCH 26.2*   MCHC 29.3*     CMP:   Recent Labs   Lab 05/23/20  0627   *    CALCIUM 7.7*   ALBUMIN 2.9*   PROT 4.9*      K 3.2*   CO2 26      BUN 20   CREATININE 0.8   ALKPHOS 81   *   AST 26   BILITOT 1.3*     Labs within the past 24 hours have been reviewed.    Diagnostic Results:  US Liver Transplant Post:    Results for orders placed during the hospital encounter of 05/15/20   US Liver Transplant Post    Narrative EXAMINATION:  US LIVER TRANSPLANT POST    CLINICAL HISTORY:  POD 6 s/p OLTX;    TECHNIQUE:  Limited abdominal ultrasound of the transplant liver with Doppler evaluation.  Color and spectral Doppler were performed.    COMPARISON:  Ultrasound from 05/18/2020    FINDINGS:  Patient is status post orthotopic liver transplant.  Liver allograft is normal in size.  The liver demonstrates homogeneous echotexture.  Simple cyst measuring 0.6 x 0.8 x 0.5 cm in the right hepatic lobe.  Multiple perihepatic fluid collections.  Left perihepatic fluid collection measures 6.0 x 7.2 x 7.3 cm.  Right perihepatic fluid collections measuring 4.5 x 2.3 x 3.7 cm, 3.2 x 3.6, 3.8 cm, and 6.9 x 9.2 x 5.3 cm.    The common duct is not dilated, measuring 3 mm.  No dilated intrahepatic radicles are seen.    Color flow and spectral waveform analysis was performed.  The main portal vein, right portal vein, left portal vein, middle hepatic vein, right hepatic vein, left hepatic vein, and IVC are patent with proper directional flow.    Anastomosis site of the main hepatic artery demonstrates a peak systolic velocity 73 cm/sec.    Main hepatic artery demonstrates resistive index 1.0 with normal waveform.    Left hepatic artery shows resistive index 1.0 with normal waveform.    Anterior branch of the right hepatic artery demonstrates resistive index 1.0 with normal waveform.    Posterior branch of the right hepatic artery demonstrates resistive index 1.0 with normal waveform.    Pleural effusion is noted.      Impression Elevated intraparenchymal resistive indices which may be seen with  edema in the postoperative setting.    Multiple perihepatic fluid collections, likely representing hematomas.  No significant mass effect.  The largest collection measures 6.9 x 9.2 x 5.3 cm adjacent to the right hepatic lobe.      Electronically signed by: Adam Macdonald MD  Date:    05/23/2020  Time:    10:34       Debility/Functional status: Patient debilitated by evidence of Muscle wasting and atrophy and Weakness. Physical and occupational therapy ordered daily to evaluate and treat. Debility was: present on admission.    Assessment/Plan:     * S/P liver transplant  - Progressing slowly post-op.   - pain controlled, passing gas, +flatus, and tolerating diet.   - Encourage working with PT/OT for strengthening.   - LFTs improving at this time.   - POD 1 and  6 liver US both reviewed  - Monitor.       Thrombocytopenia, unspecified  - 2/2 ESLD  - expect to improve post operatively      Hypokalemia  - K 3/1 on 5/21  - replaced PO      Adverse effect of corticosteroids  - endocrine following for management      At risk for long QT syndrome        Positive blood culture in cadaveric donor  - Donor noted with Blood culture 1/2 with Klebsiella and Pseudomonas.  - repeat blood cxs NGTD.   - Cont with zosyn at this time.   - Plan to transition to PO cipro at time of discharge to complete a total 14 days of abx      Acute confusional state  - pt noted with mild ep of confusion overnight and early this am.   - pt able to be re-oriented easily and knows self, place and time.   - Seroquel nightly to help promote sleep.   - confusion now improving      At risk for opportunistic infections  - cont with OI prophylaxis as per protocol.       Prophylactic immunotherapy  - see long term immunosuppression.       Long-term use of immunosuppressant medication  - cont with prograf. Draw prograf level daily and adjust dose as needed to maintain a therapeutic level.       Anemia of chronic disease  - 2/2 ESLD, expect to improve post  operatively  - see acute blood loss anemia  - cont to monitor      Acute blood loss anemia  - H/H low 5/21, expect 2/2 dilution s/p receiving IV albumin x 2 on 5/20  - HDS and no overt signs of bleed  - monitor      Type 2 diabetes mellitus without complication, with long-term current use of insulin  - consult endocrine for management.   - plan to discharge pt once he is weaned off of insulin gtt        VTE Risk Mitigation (From admission, onward)         Ordered     heparin (porcine) injection 5,000 Units  Every 8 hours      05/18/20 0055     Place sequential compression device  Until discontinued      05/18/20 0055     IP VTE HIGH RISK PATIENT  Once      05/18/20 0055     Place JULIETTE hose  Until discontinued      05/15/20 0435                The patients clinical status was discussed at multidisplinary rounds, involving transplant surgery, transplant medicine, pharmacy, nursing, nutrition, and social work    Discharge Planning:  No Patient Care Coordination Note on file.      Nubia Ruiz PA-C  Liver Transplant  [facility]

## 2020-05-24 ENCOUNTER — TELEPHONE (OUTPATIENT)
Dept: ENDOCRINOLOGY | Facility: HOSPITAL | Age: 60
End: 2020-05-24

## 2020-05-24 VITALS
WEIGHT: 219.13 LBS | BODY MASS INDEX: 30.68 KG/M2 | SYSTOLIC BLOOD PRESSURE: 153 MMHG | HEIGHT: 71 IN | RESPIRATION RATE: 18 BRPM | TEMPERATURE: 98 F | HEART RATE: 73 BPM | DIASTOLIC BLOOD PRESSURE: 80 MMHG | OXYGEN SATURATION: 98 %

## 2020-05-24 PROBLEM — F05 ACUTE CONFUSIONAL STATE: Status: RESOLVED | Noted: 2020-05-20 | Resolved: 2020-05-24

## 2020-05-24 PROBLEM — D62 ACUTE BLOOD LOSS ANEMIA: Status: RESOLVED | Noted: 2019-10-17 | Resolved: 2020-05-24

## 2020-05-24 LAB
ALBUMIN SERPL BCP-MCNC: 3.3 G/DL (ref 3.5–5.2)
ALP SERPL-CCNC: 86 U/L (ref 55–135)
ALT SERPL W/O P-5'-P-CCNC: 95 U/L (ref 10–44)
ANION GAP SERPL CALC-SCNC: 11 MMOL/L (ref 8–16)
APTT BLDCRRT: 23.2 SEC (ref 21–32)
AST SERPL-CCNC: 30 U/L (ref 10–40)
BACTERIA BLD CULT: NORMAL
BACTERIA BLD CULT: NORMAL
BASOPHILS # BLD AUTO: 0.07 K/UL (ref 0–0.2)
BASOPHILS NFR BLD: 0.6 % (ref 0–1.9)
BILIRUB SERPL-MCNC: 1.4 MG/DL (ref 0.1–1)
BUN SERPL-MCNC: 18 MG/DL (ref 6–20)
CALCIUM SERPL-MCNC: 7.9 MG/DL (ref 8.7–10.5)
CHLORIDE SERPL-SCNC: 104 MMOL/L (ref 95–110)
CO2 SERPL-SCNC: 24 MMOL/L (ref 23–29)
CREAT SERPL-MCNC: 0.7 MG/DL (ref 0.5–1.4)
DIFFERENTIAL METHOD: ABNORMAL
EOSINOPHIL # BLD AUTO: 0 K/UL (ref 0–0.5)
EOSINOPHIL NFR BLD: 0 % (ref 0–8)
ERYTHROCYTE [DISTWIDTH] IN BLOOD BY AUTOMATED COUNT: 20.3 % (ref 11.5–14.5)
EST. GFR  (AFRICAN AMERICAN): >60 ML/MIN/1.73 M^2
EST. GFR  (NON AFRICAN AMERICAN): >60 ML/MIN/1.73 M^2
GLUCOSE SERPL-MCNC: 115 MG/DL (ref 70–110)
HCT VFR BLD AUTO: 37.5 % (ref 40–54)
HGB BLD-MCNC: 10.8 G/DL (ref 14–18)
IMM GRANULOCYTES # BLD AUTO: 0.27 K/UL (ref 0–0.04)
IMM GRANULOCYTES NFR BLD AUTO: 2.5 % (ref 0–0.5)
INR PPP: 1.4 (ref 0.8–1.2)
LYMPHOCYTES # BLD AUTO: 0.7 K/UL (ref 1–4.8)
LYMPHOCYTES NFR BLD: 6.6 % (ref 18–48)
MAGNESIUM SERPL-MCNC: 2.1 MG/DL (ref 1.6–2.6)
MCH RBC QN AUTO: 26.5 PG (ref 27–31)
MCHC RBC AUTO-ENTMCNC: 28.8 G/DL (ref 32–36)
MCV RBC AUTO: 92 FL (ref 82–98)
MONOCYTES # BLD AUTO: 0.5 K/UL (ref 0.3–1)
MONOCYTES NFR BLD: 4.2 % (ref 4–15)
NEUTROPHILS # BLD AUTO: 9.4 K/UL (ref 1.8–7.7)
NEUTROPHILS NFR BLD: 86.1 % (ref 38–73)
NRBC BLD-RTO: 0 /100 WBC
PHOSPHATE SERPL-MCNC: 3.3 MG/DL (ref 2.7–4.5)
PLATELET # BLD AUTO: 71 K/UL (ref 150–350)
PMV BLD AUTO: ABNORMAL FL (ref 9.2–12.9)
POCT GLUCOSE: 111 MG/DL (ref 70–110)
POCT GLUCOSE: 144 MG/DL (ref 70–110)
POTASSIUM SERPL-SCNC: 3.1 MMOL/L (ref 3.5–5.1)
PROT SERPL-MCNC: 5.7 G/DL (ref 6–8.4)
PROTHROMBIN TIME: 13.7 SEC (ref 9–12.5)
RBC # BLD AUTO: 4.07 M/UL (ref 4.6–6.2)
SODIUM SERPL-SCNC: 139 MMOL/L (ref 136–145)
TACROLIMUS BLD-MCNC: 7.6 NG/ML (ref 5–15)
WBC # BLD AUTO: 10.94 K/UL (ref 3.9–12.7)

## 2020-05-24 PROCEDURE — 99239 HOSP IP/OBS DSCHRG MGMT >30: CPT | Mod: 24,,, | Performed by: PHYSICIAN ASSISTANT

## 2020-05-24 PROCEDURE — 99232 SBSQ HOSP IP/OBS MODERATE 35: CPT | Mod: ,,, | Performed by: NURSE PRACTITIONER

## 2020-05-24 PROCEDURE — 99232 PR SUBSEQUENT HOSPITAL CARE,LEVL II: ICD-10-PCS | Mod: ,,, | Performed by: NURSE PRACTITIONER

## 2020-05-24 PROCEDURE — 63600175 PHARM REV CODE 636 W HCPCS: Performed by: SURGERY

## 2020-05-24 PROCEDURE — 25000003 PHARM REV CODE 250: Performed by: STUDENT IN AN ORGANIZED HEALTH CARE EDUCATION/TRAINING PROGRAM

## 2020-05-24 PROCEDURE — 83735 ASSAY OF MAGNESIUM: CPT

## 2020-05-24 PROCEDURE — 85730 THROMBOPLASTIN TIME PARTIAL: CPT

## 2020-05-24 PROCEDURE — 36415 COLL VENOUS BLD VENIPUNCTURE: CPT

## 2020-05-24 PROCEDURE — 84100 ASSAY OF PHOSPHORUS: CPT

## 2020-05-24 PROCEDURE — 25000003 PHARM REV CODE 250: Performed by: PHYSICIAN ASSISTANT

## 2020-05-24 PROCEDURE — 63600175 PHARM REV CODE 636 W HCPCS: Performed by: NURSE PRACTITIONER

## 2020-05-24 PROCEDURE — 85610 PROTHROMBIN TIME: CPT

## 2020-05-24 PROCEDURE — 99239 PR HOSPITAL DISCHARGE DAY,>30 MIN: ICD-10-PCS | Mod: 24,,, | Performed by: PHYSICIAN ASSISTANT

## 2020-05-24 PROCEDURE — 80197 ASSAY OF TACROLIMUS: CPT

## 2020-05-24 PROCEDURE — 25000003 PHARM REV CODE 250: Performed by: NURSE PRACTITIONER

## 2020-05-24 PROCEDURE — 85025 COMPLETE CBC W/AUTO DIFF WBC: CPT

## 2020-05-24 PROCEDURE — 80053 COMPREHEN METABOLIC PANEL: CPT

## 2020-05-24 PROCEDURE — 94761 N-INVAS EAR/PLS OXIMETRY MLT: CPT

## 2020-05-24 PROCEDURE — 63600175 PHARM REV CODE 636 W HCPCS: Performed by: PHYSICIAN ASSISTANT

## 2020-05-24 RX ORDER — POTASSIUM CHLORIDE 750 MG/1
30 CAPSULE, EXTENDED RELEASE ORAL 2 TIMES DAILY
Qty: 180 CAPSULE | Refills: 0 | Status: ON HOLD | OUTPATIENT
Start: 2020-05-24 | End: 2020-06-01 | Stop reason: HOSPADM

## 2020-05-24 RX ORDER — PEN NEEDLE, DIABETIC 30 GX3/16"
1 NEEDLE, DISPOSABLE MISCELLANEOUS 3 TIMES DAILY
Qty: 100 EACH | Refills: 0 | Status: SHIPPED | OUTPATIENT
Start: 2020-05-24 | End: 2022-08-04

## 2020-05-24 RX ORDER — INSULIN ASPART 100 [IU]/ML
5 INJECTION, SOLUTION INTRAVENOUS; SUBCUTANEOUS
Status: DISCONTINUED | OUTPATIENT
Start: 2020-05-24 | End: 2020-05-24

## 2020-05-24 RX ORDER — POTASSIUM CHLORIDE 750 MG/1
10 CAPSULE, EXTENDED RELEASE ORAL ONCE
Status: COMPLETED | OUTPATIENT
Start: 2020-05-24 | End: 2020-05-24

## 2020-05-24 RX ORDER — INSULIN ASPART 100 [IU]/ML
5 INJECTION, SOLUTION INTRAVENOUS; SUBCUTANEOUS
Qty: 4.5 ML | Refills: 11 | Status: SHIPPED | OUTPATIENT
Start: 2020-05-24 | End: 2020-05-24 | Stop reason: SDUPTHER

## 2020-05-24 RX ORDER — POTASSIUM CHLORIDE 20 MEQ/1
40 TABLET, EXTENDED RELEASE ORAL ONCE
Status: COMPLETED | OUTPATIENT
Start: 2020-05-24 | End: 2020-05-24

## 2020-05-24 RX ORDER — FUROSEMIDE 10 MG/ML
40 INJECTION INTRAMUSCULAR; INTRAVENOUS ONCE
Status: COMPLETED | OUTPATIENT
Start: 2020-05-24 | End: 2020-05-24

## 2020-05-24 RX ORDER — FUROSEMIDE 40 MG/1
40 TABLET ORAL DAILY
Qty: 30 TABLET | Refills: 0 | Status: SHIPPED | OUTPATIENT
Start: 2020-05-24 | End: 2020-06-09 | Stop reason: ALTCHOICE

## 2020-05-24 RX ORDER — INSULIN ASPART 100 [IU]/ML
5 INJECTION, SOLUTION INTRAVENOUS; SUBCUTANEOUS 3 TIMES DAILY
Qty: 15 ML | Refills: 0 | Status: SHIPPED | OUTPATIENT
Start: 2020-05-24 | End: 2020-05-24 | Stop reason: HOSPADM

## 2020-05-24 RX ORDER — INSULIN ASPART 100 [IU]/ML
4 INJECTION, SOLUTION INTRAVENOUS; SUBCUTANEOUS
Qty: 3.6 ML | Refills: 11 | Status: ON HOLD | OUTPATIENT
Start: 2020-05-24 | End: 2020-06-01 | Stop reason: SDUPTHER

## 2020-05-24 RX ADMIN — POTASSIUM CHLORIDE 40 MEQ: 1500 TABLET, EXTENDED RELEASE ORAL at 12:05

## 2020-05-24 RX ADMIN — ESCITALOPRAM OXALATE 20 MG: 10 TABLET ORAL at 08:05

## 2020-05-24 RX ADMIN — TAMSULOSIN HYDROCHLORIDE 0.4 MG: 0.4 CAPSULE ORAL at 08:05

## 2020-05-24 RX ADMIN — INSULIN ASPART 5 UNITS: 100 INJECTION, SOLUTION INTRAVENOUS; SUBCUTANEOUS at 08:05

## 2020-05-24 RX ADMIN — QUETIAPINE FUMARATE 25 MG: 25 TABLET ORAL at 09:05

## 2020-05-24 RX ADMIN — FUROSEMIDE 40 MG: 10 INJECTION, SOLUTION INTRAMUSCULAR; INTRAVENOUS at 12:05

## 2020-05-24 RX ADMIN — HYDROCODONE BITARTRATE AND ACETAMINOPHEN 1 TABLET: 10; 325 TABLET ORAL at 10:05

## 2020-05-24 RX ADMIN — PIPERACILLIN SODIUM,TAZOBACTAM SODIUM 4.5 G: 4; .5 INJECTION, POWDER, FOR SOLUTION INTRAVENOUS at 12:05

## 2020-05-24 RX ADMIN — MYCOPHENOLATE MOFETIL 1000 MG: 250 CAPSULE ORAL at 08:05

## 2020-05-24 RX ADMIN — PREDNISONE 20 MG: 20 TABLET ORAL at 09:05

## 2020-05-24 RX ADMIN — TACROLIMUS 1.5 MG: 1 CAPSULE ORAL at 08:05

## 2020-05-24 RX ADMIN — POTASSIUM CHLORIDE 10 MEQ: 750 CAPSULE, EXTENDED RELEASE ORAL at 12:05

## 2020-05-24 RX ADMIN — PIPERACILLIN SODIUM,TAZOBACTAM SODIUM 4.5 G: 4; .5 INJECTION, POWDER, FOR SOLUTION INTRAVENOUS at 09:05

## 2020-05-24 RX ADMIN — FINASTERIDE 5 MG: 5 TABLET, FILM COATED ORAL at 08:05

## 2020-05-24 RX ADMIN — OXYBUTYNIN CHLORIDE 5 MG: 5 TABLET ORAL at 08:05

## 2020-05-24 RX ADMIN — POTASSIUM CHLORIDE 30 MEQ: 750 CAPSULE, EXTENDED RELEASE ORAL at 08:05

## 2020-05-24 RX ADMIN — GABAPENTIN 600 MG: 300 CAPSULE ORAL at 08:05

## 2020-05-24 RX ADMIN — HEPARIN SODIUM 5000 UNITS: 5000 INJECTION INTRAVENOUS; SUBCUTANEOUS at 05:05

## 2020-05-24 NOTE — PROGRESS NOTES
Pt's family not available to  as they had to drive back home 3 hrs yesterday - pt was previously supposed to be discharged on Saturday .   RN ordered LYFT for pt to go to [facility]- mother has checked in and awaiting patient.  Paged on call SW to assist with transportation to  [facility] but no return call to page.

## 2020-05-24 NOTE — DISCHARGE SUMMARY
[facility]  Liver Transplant  Discharge Summary      Patient Name: Colin Reilly  MRN: [id number]  Admission Date: 5/15/2020  Hospital Length of Stay: 9 days  Discharge Date and Time:  05/24/2020 9:41 AM  Attending Physician: Fareed Tran MD   Discharging Provider: Nubia Ruiz PA-C  Primary Care Provider: Preston Matthew Ii, MD  Post-Operative Day: 7     ORGAN:   LIVER  Disease Etiology: Alcoholic Cirrhosis  Donor Type:   Donation after Circulatory Death   CDC High Risk:   No  Donor CMV Status:   Donor CMV Status: Negative  Donor HBcAB:   Negative  Donor HCV Status:   Negative  Whole or Partial: Whole Liver  Biliary Anastomosis: End to End  Arterial Anatomy: Standard    HPI:   Colin Reilly is a 60y/o male, with 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 Surgery uncomplicated but has had recurrent abdominal pain and increased drainage from surgical incision. Patient recently admitted 3/11-3/13 with similar symptoms, attempted paracentesis at this time- without fluid to drain. Patient previously negative for peritonitis. He re-presented to [facility] on 5/15 with c/o fever and abdominal pain (Tmax 103) x 1 week, distention and dark colored stools, received 1g Rocephin and transferred to [facility] for further care. Patient is being admitted for suspected SBP and infectious work-up.  His last fever was 5/14 of 100.5. Reports last paracentesis about 1 week ago. Umbilical hernia repair incision CDI, no s/s infection, small umbilical hernia easily reducible with no tenderness. He denies N/V, SOB, chest pain, change in bladder function. COVID-19 rapid test negative on admission. Plan for paracentesis. VSS. Monitor    Procedure(s) (LRB):  TRANSPLANT, LIVER (N/A)  THROMBECTOMY     Hospital Course:    Pt is now s/p OLTX from 5/17/20 2/2 ETOH and Hep C. Intra op course unremarkable. LFTs trending down, POD 1  US and POD 6 US both satisfactory. Donor noted with Blood culture 1/2 with Klebsiella and Pseudomonas. ID recommending 14 day course of abx. Pt started on zosyn which was later transitioned to cefepime started. Transitioned to PO cipro upon discharge to complete duration of 14 day course. Recipient blood cultures remain NGTD. Pt progressing well post-op but noted with mild episodes of AMS. Pt inadvertently pulled CVC out during episode of AMS on 5/19. O2 sats remained stable at this time. Mentation improved after starting seroquel qhs. Pt with slight bump in Cr on 5/20. Cr now improved s/p IV albumin x 2. Pt with pitting BLE, diuresing well with IV lasix. Plan to transition PO lasix upon discharge.     On day of discharge, patient feeling well without complaint. He is tolerating diet, passing flatus, +BM, abdominal pain improving. He is stable and ready for discharge. Pt has met with Pharm CLARK, JAMMIE, and received education. He will follow up with labs and clinic appointment as scheduled per coordinator. Patient verbalized his understanding prior to discharge.        Final Active Diagnoses:    Diagnosis Date Noted POA    PRINCIPAL PROBLEM:  S/P liver transplant [Z94.4] 05/19/2020 Not Applicable    Adverse effect of corticosteroids [T38.0X5A] 05/21/2020 No    Hypokalemia [E87.6] 05/21/2020 No    Thrombocytopenia, unspecified [D69.6] 05/21/2020 Yes    Long-term use of immunosuppressant medication [Z79.899] 05/20/2020 Not Applicable    Prophylactic immunotherapy [Z29.8] 05/20/2020 Not Applicable    At risk for opportunistic infections [Z91.89] 05/20/2020 No    Positive blood culture in cadaveric donor [Z00.5, R78.81] 05/20/2020 Not Applicable    Anemia of chronic disease [D63.8] 05/15/2020 Yes    Type 2 diabetes mellitus without complication, with long-term current use of insulin [E11.9, Z79.4] 10/17/2019 Not Applicable      Problems Resolved During this Admission:    Diagnosis Date Noted Date Resolved POA     Acute confusional state [F05] 05/20/2020 05/24/2020 No    FRANCISCO (acute kidney injury) [N17.9] 05/20/2020 05/22/2020 No    Liver transplant candidate [Z76.82]  05/20/2020 Not Applicable    Abdominal pain [R10.9] 03/11/2020 05/20/2020 Yes    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] 02/22/2020 05/20/2020 Yes    Ascites due to alcoholic cirrhosis [K70.31] 10/17/2019 05/20/2020 Yes    Umbilical hernia without obstruction and without gangrene [K42.9] 10/17/2019 05/20/2020 Yes    Decompensated hepatic cirrhosis [K72.90] 10/17/2019 05/20/2020 Yes    Acute blood loss anemia [D62] 10/17/2019 05/24/2020 Yes    Hepatic encephalopathy [K72.90] 07/08/2014 05/20/2020 Yes    GERD (gastroesophageal reflux disease) [K21.9] 07/08/2014 05/20/2020 Yes       Consults (From admission, onward)        Status Ordering Provider     Inpatient consult to Endocrinology  Once     Provider:  (Not yet assigned)    Completed LUISA CUNHA     [facility]  Once     Provider:  (Not yet assigned)    Completed PADMINI LISA     Inpatient consult to Registered Dietitian/Nutritionist  Once     Provider:  (Not yet assigned)    Completed LUISA CUNHA          Pending Diagnostic Studies:     Procedure Component Value Units Date/Time    Freeze and Hold -BB HEP [[id number]] Collected:  05/18/20 0127    Order Status:  Sent Lab Status:  No result     Specimen:  Blood         Significant Diagnostic Studies: Labs:   CMP   Recent Labs   Lab 05/23/20  0627 05/24/20  0713    139   K 3.2* 3.1*    104   CO2 26 24   * 115*   BUN 20 18   CREATININE 0.8 0.7   CALCIUM 7.7* 7.9*   PROT 4.9* 5.7*   ALBUMIN 2.9* 3.3*   BILITOT 1.3* 1.4*   ALKPHOS 81 86   AST 26 30   * 95*   ANIONGAP 8 11   ESTGFRAFRICA >60.0 >60.0   EGFRNONAA >60.0 >60.0   , CBC   Recent Labs   Lab 05/23/20  0627 05/24/20  0712   WBC 5.46 10.94   HGB 8.9* 10.8*   HCT 30.4* 37.5*   PLT 50* 71*    and All labs within the past 24  hours have been reviewed    The patients clinical status was discussed at multidisplinary rounds, involving transplant surgery, transplant medicine, pharmacy, nursing, nutrition, and social work    Discharged Condition: stable    Disposition: Home or Self Care    Follow Up:    Patient Instructions:      Diet diabetic     Lifting restrictions     Notify your health care provider if you experience any of the following:  temperature >100.4     Notify your health care provider if you experience any of the following:  persistent nausea and vomiting or diarrhea     Notify your health care provider if you experience any of the following:  severe uncontrolled pain     Notify your health care provider if you experience any of the following:  redness, tenderness, or signs of infection (pain, swelling, redness, odor or green/yellow discharge around incision site)     Notify your health care provider if you experience any of the following:  difficulty breathing or increased cough     Notify your health care provider if you experience any of the following:  severe persistent headache     Notify your health care provider if you experience any of the following:  worsening rash     Notify your health care provider if you experience any of the following:  persistent dizziness, light-headedness, or visual disturbances     Notify your health care provider if you experience any of the following:  increased confusion or weakness     Weight bearing restrictions (specify):     Medications:  Reconciled Home Medications:      Medication List      START taking these medications    bisacodyL 5 mg EC tablet  Commonly known as:  DULCOLAX  Take 2 tablets (10 mg total) by mouth every evening.     famotidine 20 MG tablet  Commonly known as:  PEPCID  Take 1 tablet (20 mg total) by mouth every evening.     insulin detemir U-100 100 unit/mL (3 mL) Inpn pen  Commonly known as:  LEVEMIR FLEXTOUCH  Inject 12 Units into the skin once daily.  Replaces:   "insulin detemir U-100 100 unit/mL injection     mycophenolate 250 mg Cap  Commonly known as:  CELLCEPT  Take 4 capsules (1,000 mg total) by mouth 2 (two) times daily.     pen needle, diabetic 32 gauge x 5/32" Ndle  use one pen needle  3 (three) times daily with insulin pen     posaconazole 100 mg Tbec tablet  Commonly known as:  NOXAFIL  Take 3 tablets (300 mg total) by mouth every evening. STOP 6/17/20     potassium chloride 10 MEQ Cpsr  Commonly known as:  MICRO-K  Take 3 capsules (30 mEq total) by mouth 2 (two) times daily.     predniSONE 5 MG tablet  Commonly known as:  DELTASONE  Take 20 mg by mouth  daily 5/24-5/30; 15 mg daily 5/31-6/6; 10 mg daily 6/7-6/13; 5 mg  daily 6/14-6/20; stop: 6/21/2020     QUEtiapine 25 MG Tab  Commonly known as:  SEROQUEL  Take 1 tablet (25 mg total) by mouth every evening.     sulfamethoxazole-trimethoprim 400-80mg 400-80 mg per tablet  Commonly known as:  BACTRIM,SEPTRA  Take 1 tablet by mouth once daily. Stop: 11/14/2020     tacrolimus 0.5 MG Cap  Commonly known as:  PROGRAF  Take 3 capsules (1.5 mg total) by mouth every 12 (twelve) hours.     valGANciclovir 450 mg Tab  Commonly known as:  VALCYTE  Take 1 tablet (450 mg total) by mouth once daily. Stop: 8/16/2020        CHANGE how you take these medications    ciprofloxacin HCl 500 MG tablet  Commonly known as:  CIPRO  Take 1 tablet (500 mg total) by mouth every 12 (twelve) hours. STOP 5/31/20 for 8 days  What changed:  when to take this     furosemide 40 MG tablet  Commonly known as:  LASIX  Take 1 tablet (40 mg total) by mouth once daily.  What changed:  how much to take     insulin aspart U-100 100 unit/mL injection  Commonly known as:  NOVOLOG  Inject 4 Units into the skin 3 (three) times daily before meals. + sliding scale (max TDD 45 units)  What changed:    · how much to take  · additional instructions        CONTINUE taking these medications    EASY TOUCH INSULIN SYRINGE 1 mL 31 gauge x 5/16 Syrg  Generic drug:  insulin " syringe-needle U-100     escitalopram oxalate 20 MG tablet  Commonly known as:  LEXAPRO  Take 20 mg by mouth once daily.     finasteride 5 mg tablet  Commonly known as:  PROSCAR  Take 1 tablet (5 mg total) by mouth once daily.     gabapentin 600 MG tablet  Commonly known as:  NEURONTIN  Take 600 mg by mouth 3 (three) times daily.     HYDROcodone-acetaminophen  mg per tablet  Commonly known as:  NORCO  Take 1 tablet by mouth every 12 (twelve) hours as needed for Pain.     oxybutynin 5 MG Tab  Commonly known as:  DITROPAN  Take 5 mg by mouth once daily.     tamsulosin 0.4 mg Cap  Commonly known as:  FLOMAX  Take 1 capsule (0.4 mg total) by mouth once daily.     TRUE METRIX GLUCOSE TEST STRIP Strp  Generic drug:  blood sugar diagnostic     TRUEPLUS LANCETS 30 gauge Misc  Generic drug:  lancets        STOP taking these medications    cyclobenzaprine 10 MG tablet  Commonly known as:  FLEXERIL     insulin detemir U-100 100 unit/mL injection  Commonly known as:  LEVEMIR  Replaced by:  insulin detemir U-100 100 unit/mL (3 mL) Inpn pen     lactulose 10 gram/15 mL solution  Commonly known as:  CHRONULAC     levocetirizine 5 MG tablet  Commonly known as:  XYZAL     pantoprazole 40 MG tablet  Commonly known as:  PROTONIX     potassium bicarbonate & potassium chloride 25 mEq Tbef  Commonly known as:  K-LYTE CL     rifAXIMin 550 mg Tab  Commonly known as:  XIFAXAN     spironolactone 100 MG tablet  Commonly known as:  ALDACTONE          Time spent caring for patient (Greater than 1/2 spent in direct face-to-face contact): > 30 minutes    Nubia Ruiz PA-C  Liver Transplant  [facility]

## 2020-05-24 NOTE — PROGRESS NOTES
[facility]  Endocrinology  Progress Note    Admit Date: 5/15/2020     Reason for Consult: Management of T2DM, Hyperglycemia     Surgical Procedure and Date: Liver Transplant 20    Diabetes diagnosis year:     Home Diabetes Medications:    - Levemir 50 units HS (per chart review)  - Novolog 24 units TIDWM (per chart review)    How often checking glucose at home? MARILIN   BG readings on regimen: MARILIN  Hypoglycemia on the regimen?  MARILIN  Missed doses on regimen?  MARILIN    Diabetes Complications include:     Hyperglycemia and Diabetic peripheral neuropathy      Complicating diabetes co morbidities:   CIRRHOSIS    HPI:   Patient is a 59 y.o. 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 Surgery uncomplicated but has had recurrent abdominal pain and increased drainage from surgical incision. Patient recently admitted 3/11-3/13 with similar symptoms, attempted paracentesis at this time- without fluid to drain. He re-presented to [facility] on 5/15 with c/o fever and abdominal pain (Tmax 103) x 1 week, distention and dark colored stools, received 1g Rocephin and transferred to [facility] [place] further care. Patient is being admitted for suspected SBP and infectious work-up.  COVID-19 rapid test negative on admission. Plan for paracentesis. Endocrinology consulted to manage hypeglycemia/DM2 during admission to [facility].    Lab Results   Component Value Date    HGBA1C 6.7 (H) 2020       Interval HPI:   Overnight events: NAEON. POD # 7 s/p liver transplant. BG below goal ranges on current SQ insulin regimen. Steroids tapered to prednisone 20 mg daily.   Eatin%  Nausea: No  Hypoglycemia and intervention: Yes, pt BG dropped to 30, RN gave juices and BG responded appropriately   Fever: No  TPN and/or TF: No  If yes, type of TF/TPN and rate: none    BP (!) 140/66   Pulse 69   Temp 98.2  "°F (36.8 °C) (Oral)   Resp 20   Ht 5' 11" (1.803 m)   Wt 99.4 kg (219 lb 2.2 oz)   SpO2 99%   BMI 30.56 kg/m²      Labs Reviewed and Include    No results for input(s): GLU, CALCIUM, ALBUMIN, PROT, NA, K, CO2, CL, BUN, CREATININE, ALKPHOS, ALT, AST, BILITOT in the last 24 hours.  Lab Results   Component Value Date    WBC 5.46 05/23/2020    HGB 8.9 (L) 05/23/2020    HCT 30.4 (L) 05/23/2020    MCV 89 05/23/2020    PLT 50 (L) 05/23/2020     No results for input(s): TSH, FREET4 in the last 168 hours.  Lab Results   Component Value Date    HGBA1C 5.3 05/17/2020       Nutritional status:   Body mass index is 30.56 kg/m².  Lab Results   Component Value Date    ALBUMIN 2.9 (L) 05/23/2020    ALBUMIN 2.9 (L) 05/22/2020    ALBUMIN 3.1 (L) 05/21/2020     No results found for: PREALBUMIN    Estimated Creatinine Clearance: 120.9 mL/min (based on SCr of 0.8 mg/dL).    Accu-Checks  Recent Labs     05/22/20  1230 05/22/20  1704 05/22/20  2133 05/23/20  0148 05/23/20  0748 05/23/20  1150 05/23/20  1644 05/23/20  2143 05/23/20  2221 05/24/20  0727   POCTGLUCOSE 188* 213* 153* 147* 168* 165* 84 31* 102 144*       Current Medications and/or Treatments Impacting Glycemic Control  Immunotherapy:    Immunosuppressants         Stop Route Frequency     tacrolimus capsule 1.5 mg      -- Oral 2 times daily     mycophenolate capsule 1,000 mg      -- Oral 2 times daily        Steroids:   Hormones (From admission, onward)    Start     Stop Route Frequency Ordered    05/24/20 0900  predniSONE tablet 20 mg  (methylprednisolone taper panel)      -- Oral Daily 05/18/20 0055        Pressors:    Autonomic Drugs (From admission, onward)    None        Hyperglycemia/Diabetes Medications:   Antihyperglycemics (From admission, onward)    Start     Stop Route Frequency Ordered    05/24/20 0900  insulin detemir U-100 pen 15 Units      -- SubQ Daily 05/24/20 0801    05/24/20 0815  insulin aspart U-100 pen 5 Units      -- SubQ 3 times daily with meals " 05/24/20 0801    05/23/20 1155  insulin aspart U-100 pen 0-5 Units      -- SubQ Before meals & nightly PRN 05/23/20 1055          ASSESSMENT and PLAN    * S/P liver transplant  Managed per primary team  Optimize BG control      Type 2 diabetes mellitus without complication, with long-term current use of insulin  BG goal 140 - 180   BG below goal ranges. Novolog given yesterday evening despite BG of 84 causing hypoglycemic event. BG responded appropriately to hypoglycemic intervention. Will adjust insulin regimen this morning.    -Decrease Levemir to 15 units daily (30% dose reduction)   -Decrease Novolog to 5 units TIDWM. Basal/Prandial physiologic matching. (50% dose reduction)   -Low Dose SQ Insulin Correction Scale.  -BG Monitoring AC/HS/0200    ** Please call Endocrine for any BG related issues **  ** Please notify Endocrine for any change and/or advance in diet**      Discharge planning:   Recommend discharge on Levemir 12 units daily, Novolog 4 units TID with meals, and Novolog Correction Scale (150-200/+1) prn ac/hs. Patient instructed to keep BG logs and document BG 4x daily. Instructed to call with BG consistently > 200 or < 80.  Will schedule patient follow up appointment in [facility] endocrine clinic in 1 week. Patient to upload BG logs to patient portal in 2-3 days for review.     Discharge Teaching:    Reviewed topics related to DM including: the need for insulin, how insulin works, what makes it a high risk medication, the importance of immediate follow up with either PCP or endocrine provider, importance of and how to check BG, how to record BG on logs, how to administer insulin, appropriate insulin administration sites, importance of rotating injection sites, hyper/hypoglycemia, how and when to treat hypoglycemia, when to hold insulin, how the correction scale works, and when to seek medical attention.  Patient verbalized understanding, answered all questions to patient's satisfaction.           Anemia of  chronic disease  May affect accuracy of HbA1c results.         Prophylactic immunotherapy  May increase insulin resistance.         Adverse effect of corticosteroids  Glucocorticoids markedly increase glucoses. Expect steroid taper to help.          Jessica Blanco NP  Endocrinology  [facility]

## 2020-05-24 NOTE — SUBJECTIVE & OBJECTIVE
"Interval HPI:   Overnight events: NAEON. POD # 7 s/p liver transplant. BG below goal ranges on current SQ insulin regimen. Steroids tapered to prednisone 20 mg daily.   Eatin%  Nausea: No  Hypoglycemia and intervention: Yes, pt BG dropped to 30, RN gave juices and BG responded appropriately   Fever: No  TPN and/or TF: No  If yes, type of TF/TPN and rate: none    BP (!) 140/66   Pulse 69   Temp 98.2 °F (36.8 °C) (Oral)   Resp 20   Ht 5' 11" (1.803 m)   Wt 99.4 kg (219 lb 2.2 oz)   SpO2 99%   BMI 30.56 kg/m²     Labs Reviewed and Include    No results for input(s): GLU, CALCIUM, ALBUMIN, PROT, NA, K, CO2, CL, BUN, CREATININE, ALKPHOS, ALT, AST, BILITOT in the last 24 hours.  Lab Results   Component Value Date    WBC 5.46 2020    HGB 8.9 (L) 2020    HCT 30.4 (L) 2020    MCV 89 2020    PLT 50 (L) 2020     No results for input(s): TSH, FREET4 in the last 168 hours.  Lab Results   Component Value Date    HGBA1C 5.3 2020       Nutritional status:   Body mass index is 30.56 kg/m².  Lab Results   Component Value Date    ALBUMIN 2.9 (L) 2020    ALBUMIN 2.9 (L) 2020    ALBUMIN 3.1 (L) 2020     No results found for: PREALBUMIN    Estimated Creatinine Clearance: 120.9 mL/min (based on SCr of 0.8 mg/dL).    Accu-Checks  Recent Labs     20  1230 20  1704 20  2133 20  0148 20  0748 20  1150 20  1644 20  2143 20  2221 20  0727   POCTGLUCOSE 188* 213* 153* 147* 168* 165* 84 31* 102 144*       Current Medications and/or Treatments Impacting Glycemic Control  Immunotherapy:    Immunosuppressants         Stop Route Frequency     tacrolimus capsule 1.5 mg      -- Oral 2 times daily     mycophenolate capsule 1,000 mg      -- Oral 2 times daily        Steroids:   Hormones (From admission, onward)    Start     Stop Route Frequency Ordered    20 0900  predniSONE tablet 20 mg  (methylprednisolone taper panel)   "    -- Oral Daily 05/18/20 0055        Pressors:    Autonomic Drugs (From admission, onward)    None        Hyperglycemia/Diabetes Medications:   Antihyperglycemics (From admission, onward)    Start     Stop Route Frequency Ordered    05/24/20 0900  insulin detemir U-100 pen 15 Units      -- SubQ Daily 05/24/20 0801    05/24/20 0815  insulin aspart U-100 pen 5 Units      -- SubQ 3 times daily with meals 05/24/20 0801    05/23/20 1155  insulin aspart U-100 pen 0-5 Units      -- SubQ Before meals & nightly PRN 05/23/20 1055

## 2020-05-24 NOTE — PROGRESS NOTES
DISCHARGE NOTE:    Colin Reilly is a 59 y.o. male s/p   LIVER   Donation after Circulatory Death  transplant on 5/17/2020 (Liver) for ESLD secondary to Alcoholic Cirrhosis.      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    Esophageal varices     GERD (gastroesophageal reflux disease)     Hepatic encephalopathy 7/8/2014    Hepatitis C virus infection 07/08/2014    tx with interferon 3-4 mos- stopped for unclear reasons Tattoos-first at age 17 only risk factor (HCVAB negative 08/2017)    HTN (hypertension) 7/8/2014    Hypertension     Insulin dependent diabetes mellitus     Renal mass, left 7/8/2014    6.3 x 5.7 x 5.6 complex mass    Splenomegaly 7/8/2014    Umbilical hernia 7/8/2014       Hospital Course: Liver transplant 5/17/20 for alcoholic cirrhosis. CMV -/+. Received zosyn post-op for donor + cultures, transitioned to ciprofloxacin at discharge to complete 14 day course on 5/31/20. Seroquel started post-op for confusion/mental status. Remains with edema- plan to discharge with lasix 40 mg PO daily, and also with low K- discharge with KCl 30 mEq BID- will need to monitor K with outpatient labs. Endocrine consulted for management of blood sugars and patient to be discharged on Levemir 12 units, and Novolog 4 units TID + sliding scale. Patient was on insulin prior and has testing supplies. S/w patient and mother (on speaker phone) at discharge to review blue card and medications.    Allergies: Review of patient's allergies indicates:  No Known Allergies    Patient Pharmacy: [facility]    Discharge Medications:   Colin Reilly   Home Medication Instructions ART:[id number]    Printed on:05/24/20 1400   Medication Information                      bisacodyL (DULCOLAX) 5 mg EC tablet  Take 2 tablets (10 mg total) by mouth every  "evening.             ciprofloxacin HCl (CIPRO) 500 MG tablet  Take 1 tablet (500 mg total) by mouth every 12 (twelve) hours. STOP 5/31/20 for 8 days             EASY TOUCH INSULIN SYRINGE 1 mL 31 gauge x 5/16 Syrg               escitalopram oxalate (LEXAPRO) 20 MG tablet  Take 20 mg by mouth once daily.              famotidine (PEPCID) 20 MG tablet  Take 1 tablet (20 mg total) by mouth every evening.             finasteride (PROSCAR) 5 mg tablet  Take 1 tablet (5 mg total) by mouth once daily.             furosemide (LASIX) 40 MG tablet  Take 1 tablet (40 mg total) by mouth once daily.             gabapentin (NEURONTIN) 600 MG tablet  Take 600 mg by mouth 3 (three) times daily.              HYDROcodone-acetaminophen (NORCO)  mg per tablet  Take 1 tablet by mouth every 12 (twelve) hours as needed for Pain.             insulin aspart U-100 (NOVOLOG) 100 unit/mL injection  Inject 4 Units into the skin 3 (three) times daily before meals. + sliding scale (max TDD 45 units)             insulin detemir U-100 (LEVEMIR FLEXTOUCH) 100 unit/mL (3 mL) SubQ InPn pen  Inject 12 Units into the skin once daily.             mycophenolate (CELLCEPT) 250 mg Cap  Take 4 capsules (1,000 mg total) by mouth 2 (two) times daily.             oxybutynin (DITROPAN) 5 MG Tab  Take 5 mg by mouth once daily.             pen needle, diabetic 32 gauge x 5/32" Ndle  use one pen needle  3 (three) times daily with insulin pen             posaconazole (NOXAFIL) 100 mg TbEC tablet  Take 3 tablets (300 mg total) by mouth every evening. STOP 6/17/20             potassium chloride (MICRO-K) 10 MEQ CpSR  Take 3 capsules (30 mEq total) by mouth 2 (two) times daily.             predniSONE (DELTASONE) 5 MG tablet  Take 20 mg by mouth  daily 5/24-5/30; 15 mg daily 5/31-6/6; 10 mg daily 6/7-6/13; 5 mg  daily 6/14-6/20; stop: 6/21/2020             QUEtiapine (SEROQUEL) 25 MG Tab  Take 1 tablet (25 mg total) by mouth every evening.           "   sulfamethoxazole-trimethoprim 400-80mg (BACTRIM,SEPTRA) 400-80 mg per tablet  Take 1 tablet by mouth once daily. Stop: 11/14/2020             tacrolimus (PROGRAF) 0.5 MG Cap  Take 3 capsules (1.5 mg total) by mouth every 12 (twelve) hours.             tamsulosin (FLOMAX) 0.4 mg Cp24  Take 1 capsule (0.4 mg total) by mouth once daily.             TRUE METRIX GLUCOSE TEST STRIP Strp               TRUEPLUS LANCETS 30 gauge Misc               valGANciclovir (VALCYTE) 450 mg Tab  Take 1 tablet (450 mg total) by mouth once daily. Stop: 8/16/2020                 Pharmacy Interventions/Recommendations:  1) Transplant Immunosuppression: Tacrolimus, Cellcept, prednisone taper (stop 6/21/20)    2) Opportunistic Infection prophylaxis: Bactrim (stop 11/14/20), Valcyte (stop 8/16/20), posaconazole (stop 6/17/20)    3) Patient Counseling/Education:Demonstrated the use of the BP cuff, thermometer.    4) Follow-Up/Discharge Needs:   -assess need to continue K supplement (on back order so only received 3 day supply from pharmacy) and lasix  -reinforce blue card with caregiver in clinic    5) Patient received prescriptions for:      E-rx's: tacrolimus, cellcept, prednisone, bactrim, valcyte, posaconazole, pepcid, cipro    6) Patient Assistance Information: N/A    7) The following medications have been placed on HOLD and should be restarted in the outpatient setting (when appropriate): N/A    Colin and his caregiver verbalized their understanding and had the opportunity to ask questions.

## 2020-05-24 NOTE — ASSESSMENT & PLAN NOTE
BG goal 140 - 180   BG below goal ranges. Novolog given yesterday evening despite BG of 84 causing hypoglycemic event. BG responded appropriately to hypoglycemic intervention. Will adjust insulin regimen this morning.    -Decrease Levemir to 15 units daily (30% dose reduction)   -Decrease Novolog to 5 units TIDWM. Basal/Prandial physiologic matching. (50% dose reduction)   -Low Dose SQ Insulin Correction Scale.  -BG Monitoring AC/HS/0200    ** Please call Endocrine for any BG related issues **  ** Please notify Endocrine for any change and/or advance in diet**      Discharge planning:   Recommend discharge on Levemir 15 units daily, Novolog 5 units TID with meals, and Novolog Correction Scale (150-200/+1) prn ac/hs. Patient instructed to keep BG logs and document BG 4x daily. Instructed to call with BG consistently > 200 or < 80.  Will schedule patient follow up appointment in [facility] endocrine clinic in 1 week. Patient to upload BG logs to patient portal in 2-3 days for review.     Discharge Teaching:    Reviewed topics related to DM including: the need for insulin, how insulin works, what makes it a high risk medication, the importance of immediate follow up with either PCP or endocrine provider, importance of and how to check BG, how to record BG on logs, how to administer insulin, appropriate insulin administration sites, importance of rotating injection sites, hyper/hypoglycemia, how and when to treat hypoglycemia, when to hold insulin, how the correction scale works, and when to seek medical attention.  Patient verbalized understanding, answered all questions to patient's satisfaction.

## 2020-05-24 NOTE — NURSING
"Patient was seen by Karla at [facility] for a weight check today:     9/28/18: 20lb 14oz     Previous weight checked:   7/12/18: 20lb 14.5oz      Karla was told by mom that patient was seen by pulmonology and weight was reported by mom as over 21lb at the visit.     Mom is having continuing concerns with spitting up and reflux medications.     Mom states there has been an increase in frequency of spitting up since his eye surgery (4-6 weeks ago).    Karla states that OT did a feeding assessment, not sure if mouth gets cleared completely when eating, she is wondering if we need to do something further I.E. Swallow study with pureed foods versus liquids. Mom is hesitant to advance feedings due to spitting up/vomiting.     GI consult scheduled 10/22/18, at last consult was told to \"just feed him\".     Patient is currently getting 3 8oz bottles plus very small amounts of pureed foods.     Karla can be reached at [phone] and is in office until 3:30, okay to leave detailed message- secure voicemail. Mom stated that she is available today as well.     Please advise.     " AVS given and explained to patient. Pt had no questions and is aware of upcoming appointments and medication changes. Pt with no signs of acute distress. PIV dcd. Pt left unit with RN. Pt sent to [facility] in [facility]. All pt belongings sent with pt. Pt's mother aware of patient's transfer.

## 2020-05-25 ENCOUNTER — CLINICAL SUPPORT (OUTPATIENT)
Dept: TRANSPLANT | Facility: CLINIC | Age: 60
End: 2020-05-25
Payer: MEDICARE

## 2020-05-25 ENCOUNTER — TELEPHONE (OUTPATIENT)
Dept: TRANSPLANT | Facility: CLINIC | Age: 60
End: 2020-05-25

## 2020-05-25 ENCOUNTER — LAB VISIT (OUTPATIENT)
Dept: LAB | Facility: HOSPITAL | Age: 60
End: 2020-05-25
Attending: SURGERY
Payer: MEDICARE

## 2020-05-25 ENCOUNTER — TELEPHONE (OUTPATIENT)
Dept: ENDOCRINOLOGY | Facility: HOSPITAL | Age: 60
End: 2020-05-25

## 2020-05-25 VITALS
TEMPERATURE: 98 F | OXYGEN SATURATION: 100 % | TEMPERATURE: 98 F | WEIGHT: 218.25 LBS | SYSTOLIC BLOOD PRESSURE: 144 MMHG | HEIGHT: 71 IN | BODY MASS INDEX: 30.56 KG/M2 | HEART RATE: 91 BPM | WEIGHT: 218.25 LBS | RESPIRATION RATE: 16 BRPM | RESPIRATION RATE: 16 BRPM | DIASTOLIC BLOOD PRESSURE: 72 MMHG | HEART RATE: 91 BPM | SYSTOLIC BLOOD PRESSURE: 144 MMHG | OXYGEN SATURATION: 100 % | BODY MASS INDEX: 30.56 KG/M2 | DIASTOLIC BLOOD PRESSURE: 72 MMHG | HEIGHT: 71 IN

## 2020-05-25 DIAGNOSIS — Z94.4 LIVER TRANSPLANTED: Primary | ICD-10-CM

## 2020-05-25 DIAGNOSIS — Z94.4 LIVER REPLACED BY TRANSPLANT: ICD-10-CM

## 2020-05-25 DIAGNOSIS — Z94.4 STATUS POST LIVER TRANSPLANT: Primary | ICD-10-CM

## 2020-05-25 LAB
ALBUMIN SERPL BCP-MCNC: 2.8 G/DL (ref 3.5–5.2)
ALP SERPL-CCNC: 78 U/L (ref 55–135)
ALT SERPL W/O P-5'-P-CCNC: 67 U/L (ref 10–44)
ANION GAP SERPL CALC-SCNC: 7 MMOL/L (ref 8–16)
ANISOCYTOSIS BLD QL SMEAR: SLIGHT
AST SERPL-CCNC: 23 U/L (ref 10–40)
BACTERIA SPEC ANAEROBE CULT: NORMAL
BASOPHILS # BLD AUTO: 0.1 K/UL (ref 0–0.2)
BASOPHILS NFR BLD: 0.4 % (ref 0–1.9)
BILIRUB DIRECT SERPL-MCNC: 0.6 MG/DL (ref 0.1–0.3)
BILIRUB SERPL-MCNC: 0.9 MG/DL (ref 0.1–1)
BUN SERPL-MCNC: 16 MG/DL (ref 6–20)
CALCIUM SERPL-MCNC: 7.8 MG/DL (ref 8.7–10.5)
CHLORIDE SERPL-SCNC: 105 MMOL/L (ref 95–110)
CO2 SERPL-SCNC: 26 MMOL/L (ref 23–29)
CREAT SERPL-MCNC: 0.7 MG/DL (ref 0.5–1.4)
DIFFERENTIAL METHOD: ABNORMAL
EOSINOPHIL # BLD AUTO: 0.2 K/UL (ref 0–0.5)
EOSINOPHIL NFR BLD: 0.7 % (ref 0–8)
ERYTHROCYTE [DISTWIDTH] IN BLOOD BY AUTOMATED COUNT: 20 % (ref 11.5–14.5)
EST. GFR  (AFRICAN AMERICAN): >60 ML/MIN/1.73 M^2
EST. GFR  (NON AFRICAN AMERICAN): >60 ML/MIN/1.73 M^2
GLUCOSE SERPL-MCNC: 117 MG/DL (ref 70–110)
HCT VFR BLD AUTO: 32 % (ref 40–54)
HGB BLD-MCNC: 9.5 G/DL (ref 14–18)
HYPOCHROMIA BLD QL SMEAR: ABNORMAL
IMM GRANULOCYTES # BLD AUTO: 0.97 K/UL (ref 0–0.04)
IMM GRANULOCYTES NFR BLD AUTO: 3.4 % (ref 0–0.5)
LYMPHOCYTES # BLD AUTO: 1.4 K/UL (ref 1–4.8)
LYMPHOCYTES NFR BLD: 5.1 % (ref 18–48)
MCH RBC QN AUTO: 26.7 PG (ref 27–31)
MCHC RBC AUTO-ENTMCNC: 29.7 G/DL (ref 32–36)
MCV RBC AUTO: 90 FL (ref 82–98)
MONOCYTES # BLD AUTO: 2.1 K/UL (ref 0.3–1)
MONOCYTES NFR BLD: 7.3 % (ref 4–15)
NEUTROPHILS # BLD AUTO: 23.5 K/UL (ref 1.8–7.7)
NEUTROPHILS NFR BLD: 83.1 % (ref 38–73)
NRBC BLD-RTO: 0 /100 WBC
OVALOCYTES BLD QL SMEAR: ABNORMAL
PLATELET # BLD AUTO: 87 K/UL (ref 150–350)
PLATELET BLD QL SMEAR: ABNORMAL
PMV BLD AUTO: 11.7 FL (ref 9.2–12.9)
POIKILOCYTOSIS BLD QL SMEAR: SLIGHT
POLYCHROMASIA BLD QL SMEAR: ABNORMAL
POTASSIUM SERPL-SCNC: 3.6 MMOL/L (ref 3.5–5.1)
PROT SERPL-MCNC: 4.8 G/DL (ref 6–8.4)
RBC # BLD AUTO: 3.56 M/UL (ref 4.6–6.2)
SODIUM SERPL-SCNC: 138 MMOL/L (ref 136–145)
TACROLIMUS BLD-MCNC: 7.3 NG/ML (ref 5–15)
WBC # BLD AUTO: 28.31 K/UL (ref 3.9–12.7)

## 2020-05-25 PROCEDURE — 99999 PR PBB SHADOW E&M-EST. PATIENT-LVL III: ICD-10-PCS | Mod: PBBFAC,,,

## 2020-05-25 PROCEDURE — 80197 ASSAY OF TACROLIMUS: CPT

## 2020-05-25 PROCEDURE — 82248 BILIRUBIN DIRECT: CPT

## 2020-05-25 PROCEDURE — 80053 COMPREHEN METABOLIC PANEL: CPT

## 2020-05-25 PROCEDURE — 99999 PR PBB SHADOW E&M-EST. PATIENT-LVL III: CPT | Mod: PBBFAC,,,

## 2020-05-25 PROCEDURE — 36415 COLL VENOUS BLD VENIPUNCTURE: CPT

## 2020-05-25 PROCEDURE — 85025 COMPLETE CBC W/AUTO DIFF WBC: CPT

## 2020-05-25 RX ORDER — NAPROXEN SODIUM 220 MG/1
81 TABLET, FILM COATED ORAL DAILY
Qty: 30 TABLET | Refills: 5
Start: 2020-05-25 | End: 2024-03-01

## 2020-05-25 NOTE — PHYSICIAN QUERY
PT Name: Colin Reilly  MR #: [id number]    Nutrition Clarification     CDS/: Denisse Thacker               Contact information:[email or web address]     This form is a permanent document in the medical record.     Query Date: May 25, 2020    By submitting this query, we are merely seeking further clarification of documentation.. Please utilize your independent clinical judgment when addressing the question(s) below.    The medical record contains the following:   Indicators  Supporting Clinical Findings Location in Medical Record   x % of Estimated Energy Intake over a time frame from p.o., TF, or TPN % Intake of Estimated Energy Needs:37%  % Meal Intake:50% RD 5-21    Weight Status over a time frame     x Subcutaneous Fat and/or Muscle Loss Severe muscle wasting of temple and clavicle  Moderate fat wasting of tricep    RD 5-21    Fluid Accumulation or Edema     x Wt/BMI/Usual Body Weight Fj=118  BMI=30.7 RD 5-21    Delayed Wound Healing/Failure to Thrive     x Acute or Chronic Illness Malnutrition (moderate PCM) RD 5-21    Medication     x Treatment Recommendations     1.) Continue diabetic diet.   2.) Add Boost Glucose Control TID to provide 750kcal and 42 grams of protein.      RD 5-21   x Other General Information Comments: Pt reports his appetite is poor and only consuming 25-50% of meals. Pt denies any difficulty chewing nor swallowing, despite missing teeth. Pt denies nausea, vomitting, diarrhea, nor constipation. GI- rounded, abdominal pain, active bowel sounds. LBM 5/21.  Post transplant diet education provided to patient. Emphasized food and drug interactions. LIiterature left with RD contact information. Pt declines any additional questions at this time, RD will monitor. NFPE completed pt likely has moderate PCM due to severe muscle wasting of temple and clavicle and moderate subcutaneous fat wasting of tricep. RD will continue to monitor RD 5-21     AND / ASPEN Clinical  Characteristics (October 2011)  A minimum of two characteristics is recommended for diagnosing either moderate or severe malnutrition   Mild Malnutrition Moderate Malnutrition Severe Malnutrition   Energy Intake from p.o., TF or TPN. < 75% intake of estimated energy needs for less than 7 days < 75% intake of estimated energy needs for greater than 7 days < 50% intake of estimated energy needs for > 5 days   Weight Loss 1-2% in 1 month  5% in 3 months  7.5% in 6 months  10% in 1 year 1-2 % in 1 week  5% in 1 month  7.5% in 3 months  10% in 6 months  20% in 1 year > 2% in 1 week  > 5% in 1 month  > 7.5% in 3 months  > 10% in 6 months  > 20% in 1 year   Physical Findings     None *Mild subcutaneous fat and/or muscle loss  *Mild fluid accumulation  *Stage II decubitus  *Surgical wound or non-healing wound *Mod/severe subcutaneous fat and/or muscle loss  *Mod/severe fluid accumulation  *Stage III or IV decubitus  *Non-healing surgical wound     Provider, please specify diagnosis or diagnoses associated with above clinical findings.    [ x ] Moderate Protein-Calorie Malnutrition   [  ] Other Nutritional Diagnosis (please specify): _______   [  ] Malnutrition ruled out   [  ] Clinically Undetermined     Present on admission (POA) status:   [   ] Yes (Y)                          [  ] Clinically Undetermined (W)              [   ] No (N)                            [   ] Documentation insufficient to determine if condition is POA (U)     Please document in your progress notes daily for the duration of treatment until resolved and include in your discharge summary.

## 2020-05-25 NOTE — PROGRESS NOTES
Clinic Note: First Return to Clinic Post-  Liver Transplant    Colin Reilly  is a 59 y.o. male  S/p   LIVER transplant on 5/17/2020 (Liver) for Alcoholic Cirrhosis.      Discharge Course (Issues/Concerns): Patient presents with complaints of hypoglycemia last night. Patient stated he did not eat much yesterday but continued his insulin. Counseled patient on correct use of insulin. Patient should follow up with endocrine within the next week per their d/c note. Patient's WBC increased to 28k today. Patient with no complaints related to infection, but made aware to let his coordinator know of any fever, night sweats, or other signs/symptoms of infection.    Objective:   Vitals:    05/25/20 0920   BP: (!) 144/72   Pulse: 91   Resp: 16   Temp: 98.4 °F (36.9 °C)       Met with patient and his caregiver in the clinic to review current medication list.     Current Outpatient Medications   Medication Sig Dispense Refill    bisacodyL (DULCOLAX) 5 mg EC tablet Take 2 tablets (10 mg total) by mouth every evening.  0    ciprofloxacin HCl (CIPRO) 500 MG tablet Take 1 tablet (500 mg total) by mouth every 12 (twelve) hours. STOP 5/31/20 for 8 days 16 tablet 0    EASY TOUCH INSULIN SYRINGE 1 mL 31 gauge x 5/16 Syrg       escitalopram oxalate (LEXAPRO) 20 MG tablet Take 20 mg by mouth once daily.   2    famotidine (PEPCID) 20 MG tablet Take 1 tablet (20 mg total) by mouth every evening. 30 tablet 0    finasteride (PROSCAR) 5 mg tablet Take 1 tablet (5 mg total) by mouth once daily. 30 tablet 11    furosemide (LASIX) 40 MG tablet Take 1 tablet (40 mg total) by mouth once daily. 30 tablet 0    gabapentin (NEURONTIN) 600 MG tablet Take 600 mg by mouth 3 (three) times daily.       HYDROcodone-acetaminophen (NORCO)  mg per tablet Take 1 tablet by mouth every 12 (twelve) hours as needed for Pain.  0    insulin aspart U-100 (NOVOLOG) 100 unit/mL injection Inject 4 Units into the skin 3 (three) times daily before meals. +  "sliding scale (max TDD 45 units) 3.6 mL 11    insulin detemir U-100 (LEVEMIR FLEXTOUCH) 100 unit/mL (3 mL) SubQ InPn pen Inject 12 Units into the skin once daily.  0    mycophenolate (CELLCEPT) 250 mg Cap Take 4 capsules (1,000 mg total) by mouth 2 (two) times daily. 240 capsule 2    oxybutynin (DITROPAN) 5 MG Tab Take 5 mg by mouth once daily.      pen needle, diabetic 32 gauge x 5/32" Ndle use one pen needle  3 (three) times daily with insulin pen 100 each 0    posaconazole (NOXAFIL) 100 mg TbEC tablet Take 3 tablets (300 mg total) by mouth every evening. STOP 6/17/20 90 tablet 0    potassium chloride (MICRO-K) 10 MEQ CpSR Take 3 capsules (30 mEq total) by mouth 2 (two) times daily. 180 capsule 0    predniSONE (DELTASONE) 5 MG tablet Take 20 mg by mouth  daily 5/24-5/30; 15 mg daily 5/31-6/6; 10 mg daily 6/7-6/13; 5 mg  daily 6/14-6/20; stop: 6/21/2020 70 tablet 0    QUEtiapine (SEROQUEL) 25 MG Tab Take 1 tablet (25 mg total) by mouth every evening. 30 tablet 11    sulfamethoxazole-trimethoprim 400-80mg (BACTRIM,SEPTRA) 400-80 mg per tablet Take 1 tablet by mouth once daily. Stop: 11/14/2020 30 tablet 5    tacrolimus (PROGRAF) 0.5 MG Cap Take 3 capsules (1.5 mg total) by mouth every 12 (twelve) hours. 180 capsule 11    tamsulosin (FLOMAX) 0.4 mg Cp24 Take 1 capsule (0.4 mg total) by mouth once daily. 30 capsule 11    TRUE METRIX GLUCOSE TEST STRIP Strp       TRUEPLUS LANCETS 30 gauge Misc       valGANciclovir (VALCYTE) 450 mg Tab Take 1 tablet (450 mg total) by mouth once daily. Stop: 8/16/2020 30 tablet 2    aspirin 81 MG Chew Take 1 tablet (81 mg total) by mouth once daily. 30 tablet 5     No current facility-administered medications for this visit.        Pharmacy Interventions/Recommendations:     1) Graft Function & Immunosuppression Issues:     2) Opportunistic Infection prophylaxis:   PCP ppx: Bactrim  CMV ppx: Valcyte  Fungal ppx: Posaconazole    3) Donor Serologies & Monitoring:     Donor " CMV Status: Negative  Donor HCV Status: Negative  Donor HBcAb: Negative  Donor HBV CEZAR: Negative  Donor HCV CEZAR: Negative      4) Pain Management & Bowel Regimen: Norco and dulcolax.    5) Blood Pressure Management: None    6) Blood Sugar Management & Follow-up: Levemir 12 units, novolog 4 units plus SSI. Patient to see endocrine next week.    7) Electrolyte Management: Potassium replacement    8) OTHER medication follow-up (patient assistance, held medications, etc): Adding aspirin 81mg today, as platelets have increased.    9) Reinforced medication education conducted in the hospital, including medication indications, dosing, administration, side effects, monitoring-- including timing of immunosuppressant levels.     Patient [facility]  Discussed the process for obtaining refills of medications, including verifying the dose and mailing address to have medications delivered.     Colin and his caregivers verbalized understanding and had the opportunity to ask questions.

## 2020-05-25 NOTE — TELEPHONE ENCOUNTER
Spoke with patient over the phone regarding hypoglycemic events at home today. Patient reports BG in the 30s today at lunchtime. Pt ate lunch and took glucose tabs and BG now in the 130s. He reported that he took Levemir 12 units last night (pt had already taken his Levemir in the hospital yesterday morning). He stated he was fasting since midnight for blood work this morning as well. Instructed patient not to take anymore insulin today. Patient to decrease Levemir to 10 units daily and start 5/26. He was instructed to discontinue Novolog 4 units TID with meals and only use Novolog SSI (150-200/+1). Patient to call with BG < 80. Patient to upload BG logs to patient portal in 2 days for review. Patient verbalized understanding of the plan of care.

## 2020-05-25 NOTE — TELEPHONE ENCOUNTER
----- Message from Jessica Blanco NP sent at 5/25/2020  2:28 PM CDT -----  Regarding: RE: urgent patinet issue  Ok I will call him now.    Thanks,  Jessica  ----- Message -----  From: Mari Gibson RN  Sent: 5/25/2020   1:57 PM CDT  To: Jackson Corral APRN, FNP, #  Subject: urgent patinet issue                             Hello,    My patient Mr Reilly was discharged from the hospital yesterday, 5/24/20 post liver transplant. Since his discharge he has had two episodes where his blood sugar has dropped in the 30s, and he became symptomatic(lethargic.) Currently he is on Levimir 12units, he is taking overnight and novolog 4units with meals+ SS. I would like to ask for assistance in managing his insulin dose. We will be scheduling him for a f/u appt with Endo next week.    Thank You,  Mari Gibson, OMARN, RN, CCTC  Post Liver Coordinator

## 2020-05-25 NOTE — TELEPHONE ENCOUNTER
Msg sent to Endo NPs:    Hello,    My patient Mr Reilly was discharged from the hospital yesterday, 5/24/20 post liver transplant. Since his discharge he has had two episodes where his blood sugar has dropped in the 30s, and he became symptomatic(lethargic.) Currently he is on Levimir 12units, he is taking overnight and novolog 4units with meals+ SS. I would like to ask for assistance in managing his insulin dose. We will be scheduling him for a f/u appt with Endo next week.    Thank You,  Mari Gibson, BSN, RN, CCTC  Post Liver Coordinator

## 2020-05-26 ENCOUNTER — LAB VISIT (OUTPATIENT)
Dept: LAB | Facility: HOSPITAL | Age: 60
End: 2020-05-26
Attending: SURGERY
Payer: MEDICARE

## 2020-05-26 ENCOUNTER — TELEPHONE (OUTPATIENT)
Dept: TRANSPLANT | Facility: CLINIC | Age: 60
End: 2020-05-26

## 2020-05-26 DIAGNOSIS — Z94.4 LIVER TRANSPLANTED: Primary | ICD-10-CM

## 2020-05-26 DIAGNOSIS — Z94.4 LIVER TRANSPLANTED: ICD-10-CM

## 2020-05-26 DIAGNOSIS — E11.9 TYPE 2 DIABETES MELLITUS NOT AT GOAL: ICD-10-CM

## 2020-05-26 LAB
ALBUMIN SERPL BCP-MCNC: 2.4 G/DL (ref 3.5–5.2)
ALP SERPL-CCNC: 72 U/L (ref 55–135)
ALT SERPL W/O P-5'-P-CCNC: 55 U/L (ref 10–44)
ANION GAP SERPL CALC-SCNC: 5 MMOL/L (ref 8–16)
AST SERPL-CCNC: 22 U/L (ref 10–40)
BASOPHILS # BLD AUTO: 0.06 K/UL (ref 0–0.2)
BASOPHILS NFR BLD: 0.4 % (ref 0–1.9)
BILIRUB DIRECT SERPL-MCNC: 0.6 MG/DL (ref 0.1–0.3)
BILIRUB SERPL-MCNC: 1.1 MG/DL (ref 0.1–1)
BUN SERPL-MCNC: 10 MG/DL (ref 6–20)
CALCIUM SERPL-MCNC: 7.7 MG/DL (ref 8.7–10.5)
CHLORIDE SERPL-SCNC: 105 MMOL/L (ref 95–110)
CO2 SERPL-SCNC: 27 MMOL/L (ref 23–29)
CREAT SERPL-MCNC: 0.7 MG/DL (ref 0.5–1.4)
DIFFERENTIAL METHOD: ABNORMAL
EOSINOPHIL # BLD AUTO: 0.4 K/UL (ref 0–0.5)
EOSINOPHIL NFR BLD: 2.7 % (ref 0–8)
ERYTHROCYTE [DISTWIDTH] IN BLOOD BY AUTOMATED COUNT: 20.1 % (ref 11.5–14.5)
EST. GFR  (AFRICAN AMERICAN): >60 ML/MIN/1.73 M^2
EST. GFR  (NON AFRICAN AMERICAN): >60 ML/MIN/1.73 M^2
GLUCOSE SERPL-MCNC: 183 MG/DL (ref 70–110)
HCT VFR BLD AUTO: 31.2 % (ref 40–54)
HGB BLD-MCNC: 9.5 G/DL (ref 14–18)
IMM GRANULOCYTES # BLD AUTO: 0.46 K/UL (ref 0–0.04)
IMM GRANULOCYTES NFR BLD AUTO: 3.1 % (ref 0–0.5)
LYMPHOCYTES # BLD AUTO: 0.9 K/UL (ref 1–4.8)
LYMPHOCYTES NFR BLD: 6.3 % (ref 18–48)
MCH RBC QN AUTO: 27.1 PG (ref 27–31)
MCHC RBC AUTO-ENTMCNC: 30.4 G/DL (ref 32–36)
MCV RBC AUTO: 89 FL (ref 82–98)
MONOCYTES # BLD AUTO: 1.4 K/UL (ref 0.3–1)
MONOCYTES NFR BLD: 9.5 % (ref 4–15)
NEUTROPHILS # BLD AUTO: 11.5 K/UL (ref 1.8–7.7)
NEUTROPHILS NFR BLD: 78 % (ref 38–73)
NRBC BLD-RTO: 0 /100 WBC
PLATELET # BLD AUTO: 63 K/UL (ref 150–350)
PMV BLD AUTO: ABNORMAL FL (ref 9.2–12.9)
POTASSIUM SERPL-SCNC: 4.5 MMOL/L (ref 3.5–5.1)
PROT SERPL-MCNC: 4.5 G/DL (ref 6–8.4)
RBC # BLD AUTO: 3.5 M/UL (ref 4.6–6.2)
SODIUM SERPL-SCNC: 137 MMOL/L (ref 136–145)
TACROLIMUS BLD-MCNC: 11.5 NG/ML (ref 5–15)
WBC # BLD AUTO: 14.77 K/UL (ref 3.9–12.7)

## 2020-05-26 PROCEDURE — 36415 COLL VENOUS BLD VENIPUNCTURE: CPT

## 2020-05-26 PROCEDURE — 82248 BILIRUBIN DIRECT: CPT

## 2020-05-26 PROCEDURE — 80053 COMPREHEN METABOLIC PANEL: CPT

## 2020-05-26 PROCEDURE — 80197 ASSAY OF TACROLIMUS: CPT

## 2020-05-26 PROCEDURE — 85025 COMPLETE CBC W/AUTO DIFF WBC: CPT

## 2020-05-26 RX ORDER — CALCIUM CITRATE/VITAMIN D3 200MG-6.25
100 TABLET ORAL
Qty: 100 EACH | Refills: 10 | Status: SHIPPED | OUTPATIENT
Start: 2020-05-26 | End: 2023-04-11

## 2020-05-26 RX ORDER — CALCIUM CITRATE/VITAMIN D3 200MG-6.25
100 TABLET ORAL
Qty: 100 EACH | Refills: 10 | Status: SHIPPED | OUTPATIENT
Start: 2020-05-26 | End: 2020-05-26 | Stop reason: SDUPTHER

## 2020-05-26 RX ORDER — TACROLIMUS 0.5 MG/1
1 CAPSULE ORAL EVERY 12 HOURS
Qty: 120 CAPSULE | Refills: 11 | Status: ON HOLD | OUTPATIENT
Start: 2020-05-26 | End: 2020-06-01 | Stop reason: SDUPTHER

## 2020-05-26 NOTE — PROGRESS NOTES
1ST NURSING VISIT POST DISCHARGE NOTE    1st RN appointment with Colin Reilly post discharge 5/24/2020 s/p liver transplant 5/17/20.  Patient's mother accompanied him.  Upon assessment, patient complains of swelling abd and ble.  Incision has drainage.  Patient that he is able to explain daily incision care and showering instructions.  Medication list and rationale were reviewed.  Patient states  did bring blue medication card and medication bottles for review.  Self-assessment reviewed with patient and mother; time allowed for questions.  Patient expressed understanding of daily care including BID VS and medications.  Patient aware that nurse will review today's labs with a transplant physician and call with any does changes indicated.  Next labs and first post-operative transplant team appointment with labs scheduled for labs today, clinic with surgeon 5/26/20..

## 2020-05-26 NOTE — TELEPHONE ENCOUNTER
Reviewed labs with Dr Peace in txp clinic. Overall labs improving but tacro is elevated. Pt to reduce dose to 1mg bid, with recheck labs thurs. Pt had ride issues so we had to change his clinic appt to Fri at 11am.  Coord called pt and his mother to review the above info. We also reviewed upcoming appts labs thurs, clinic fri. They both state they understand.

## 2020-05-26 NOTE — TELEPHONE ENCOUNTER
JAMMIE attempted to contact pt via phone ([phone]) to f/u on message received below from pt's liver transplant coordinator Mari; however, no answer received and SW unable to leave voicemail message due to voicemail box not being set up.  SW contacted pt's mother/primary caregiver Suzanne Malik ([phone]) to f/u.  Suzanne reports having made arrangements with a transportation service through [facility] for f/u appointment originally scheduled at 3 pm.  Suzanne reports being told by transportation company that transportation was not available to transport pt to appointment at newly rescheduled time for 1 pm today.  Suzanne reports calling [facility] phone number ([phone]) to reach transportation service.  SW informed Suzanne of having no knowledge of any transportation service arranged through the [facility] for outpatient transportation for f/u appointments.  Suzanne confirmed having received contact number for [employer] company from Mari.  SW also provided Suzanne with the following additional transportation resources:  [employer] - [phone]  [facility] [employer] [facility] - [phone]  [employer] - [phone]  Suzanne states plans of contacting [employer] company as soon as possible to schedule ride to make pt's 3 pm appointment today.  Suzanne denies having any concern with utilizing cab services for subsequent follow up appointments for pt.  Suzanne also confirms having friends who live in [place] as an option; however, she and pt are avoiding asking them for assistance in an effort for pt to avoid potential exposure to coronavirus post-transplant.  Suzanne denies having any questions or additional concerns at this time.  JAMMIE remains available for further psychosocial support and will f/u as needed.    ----- Message from Mari Gibson RN sent at 5/26/2020  1:52 PM CDT -----  Regarding: urgent issue  Hello,  I have an urgent issue regarding pt. It appears him and his mother who  are staying at the [facility] run have no transportation. We moved pt's clinic appt to 1pm from 3pm today. His mother told us that [employer] transport was picking them up. They attempted to call multiple times and once they answered they told her they could not pick them up. So, I provided a number to [employer] about 30 min ago, pt just called and complained asking why transport could not pick them up. Any way to assist? I am in [place] at [id number].    Thanks,  Mari Irving

## 2020-05-28 ENCOUNTER — TELEPHONE (OUTPATIENT)
Dept: TRANSPLANT | Facility: CLINIC | Age: 60
End: 2020-05-28

## 2020-05-28 DIAGNOSIS — E87.5 HYPERKALEMIA: ICD-10-CM

## 2020-05-28 DIAGNOSIS — Z94.4 LIVER TRANSPLANTED: Primary | ICD-10-CM

## 2020-05-28 NOTE — TELEPHONE ENCOUNTER
----- Message from Fareed Tran MD sent at 5/28/2020  2:06 PM CDT -----  [employer] tomorrow

## 2020-05-28 NOTE — TELEPHONE ENCOUNTER
Called Mr Reilly and his mother to review labs. All labs look really good but Prograf elevated. Per Dr Tran, pt to hold tonight and tomorrow am doses of prograf. In clinic MD will let him know what dose to restart. They both stated they understood

## 2020-05-28 NOTE — TELEPHONE ENCOUNTER
----- Message from Corine Lane sent at 5/28/2020  2:28 PM CDT -----  Contact: pt#[phone]  Pt states that he swelling from knee to ankle and leaking a little. Please call  Refill  HYDROcodone-acetaminophen (NORCO)  mg per tablet    [facility]  [address]  [place] [address]  Phone: [phone] Fax: [phone]

## 2020-05-29 ENCOUNTER — OFFICE VISIT (OUTPATIENT)
Dept: TRANSPLANT | Facility: CLINIC | Age: 60
DRG: 641 | End: 2020-05-29
Payer: MEDICARE

## 2020-05-29 VITALS
RESPIRATION RATE: 18 BRPM | HEIGHT: 71 IN | WEIGHT: 216.25 LBS | SYSTOLIC BLOOD PRESSURE: 130 MMHG | HEART RATE: 87 BPM | DIASTOLIC BLOOD PRESSURE: 75 MMHG | TEMPERATURE: 98 F | OXYGEN SATURATION: 99 % | BODY MASS INDEX: 30.27 KG/M2

## 2020-05-29 DIAGNOSIS — Z51.81 ENCOUNTER FOR THERAPEUTIC DRUG MONITORING: Primary | ICD-10-CM

## 2020-05-29 DIAGNOSIS — Z94.4 LIVER REPLACED BY TRANSPLANT: ICD-10-CM

## 2020-05-29 PROCEDURE — 3008F PR BODY MASS INDEX (BMI) DOCUMENTED: ICD-10-PCS | Mod: CPTII,S$GLB,, | Performed by: TRANSPLANT SURGERY

## 2020-05-29 PROCEDURE — 99999 PR PBB SHADOW E&M-EST. PATIENT-LVL V: CPT | Mod: PBBFAC,,,

## 2020-05-29 PROCEDURE — 3008F BODY MASS INDEX DOCD: CPT | Mod: CPTII,S$GLB,, | Performed by: TRANSPLANT SURGERY

## 2020-05-29 PROCEDURE — 99214 PR OFFICE/OUTPT VISIT, EST, LEVL IV, 30-39 MIN: ICD-10-PCS | Mod: 24,S$GLB,, | Performed by: TRANSPLANT SURGERY

## 2020-05-29 PROCEDURE — 3078F DIAST BP <80 MM HG: CPT | Mod: CPTII,S$GLB,, | Performed by: TRANSPLANT SURGERY

## 2020-05-29 PROCEDURE — 99214 OFFICE O/P EST MOD 30 MIN: CPT | Mod: 24,S$GLB,, | Performed by: TRANSPLANT SURGERY

## 2020-05-29 PROCEDURE — 99999 PR PBB SHADOW E&M-EST. PATIENT-LVL V: ICD-10-PCS | Mod: PBBFAC,,,

## 2020-05-29 PROCEDURE — 3078F PR MOST RECENT DIASTOLIC BLOOD PRESSURE < 80 MM HG: ICD-10-PCS | Mod: CPTII,S$GLB,, | Performed by: TRANSPLANT SURGERY

## 2020-05-29 PROCEDURE — 3075F PR MOST RECENT SYSTOLIC BLOOD PRESS GE 130-139MM HG: ICD-10-PCS | Mod: CPTII,S$GLB,, | Performed by: TRANSPLANT SURGERY

## 2020-05-29 PROCEDURE — 3075F SYST BP GE 130 - 139MM HG: CPT | Mod: CPTII,S$GLB,, | Performed by: TRANSPLANT SURGERY

## 2020-05-29 RX ORDER — SODIUM POLYSTYRENE SULFONATE 4.1 MEQ/G
30 POWDER, FOR SUSPENSION ORAL; RECTAL ONCE
Qty: 60 G | Refills: 3 | Status: ON HOLD | OUTPATIENT
Start: 2020-05-29 | End: 2020-06-01

## 2020-05-29 RX ORDER — HYDROCODONE BITARTRATE AND ACETAMINOPHEN 10; 325 MG/1; MG/1
1 TABLET ORAL EVERY 12 HOURS PRN
Qty: 15 TABLET | Refills: 0 | Status: SHIPPED | OUTPATIENT
Start: 2020-05-29 | End: 2022-05-12 | Stop reason: SDUPTHER

## 2020-05-29 NOTE — PROGRESS NOTES
Transplant Surgery  Liver Transplant Recipient Follow-up    Original Referring Physician:   Current Corresponding Physician:     Chief Complaint: Colin is here for follow up of his liver transplant performed 5/17/2020 for the primary diagnosis (UNOS) of Alcoholic Cirrhosis    ORGAN: LIVER  Whole or Partial: whole liver  Donor Type: donation after circulatory death   PHS Increased Risk: no  Donor CMV Status: Negative  Donor HCV Status: Negative  Donor HBcAb: Negative  Donor HBV CEZAR: Negative  Donor HCV CEZAR: Negative    Biliary Anastomosis: end to end  Arterial Anatomy: standard  IVC reconstruction: end to end ivc  Portal vein status: partial patent    Subjective:     History of Present Illness: He has had the following complications since transplant: none.  The noted complications are well controlled.    Interval History: Currently, he is doing adequately.  Current complaints include abdominal pain and edema.  Colin is here for management of his immunosuppression medication.    Review of Systems   Constitutional: Negative for activity change and appetite change.   HENT: Negative for congestion and tinnitus.    Eyes: Negative for redness and visual disturbance.   Respiratory: Negative for cough and shortness of breath.    Cardiovascular: Negative for chest pain and palpitations.   Gastrointestinal: Negative for abdominal distention and abdominal pain.   Endocrine: Negative for polydipsia and polyuria.   Genitourinary: Negative for decreased urine volume and dysuria.   Musculoskeletal: Negative for arthralgias and back pain.   Skin: Negative for pallor and rash.   Allergic/Immunologic: Positive for immunocompromised state. Negative for environmental allergies.   Neurological: Negative for tremors and headaches.   Hematological: Negative for adenopathy. Does not bruise/bleed easily.   Psychiatric/Behavioral: Negative for behavioral problems and confusion.       Objective:     Physical Exam   Constitutional: He is  oriented to person, place, and time. No distress.   HENT:   Head: Normocephalic and atraumatic.   Eyes: Pupils are equal, round, and reactive to light. Conjunctivae are normal.   Neck: Normal range of motion. Neck supple.   Cardiovascular: Normal rate and regular rhythm.   Pulmonary/Chest: Effort normal and breath sounds normal.   Abdominal: Soft. Bowel sounds are normal. He exhibits no distension. There is no tenderness.       Musculoskeletal: Normal range of motion. He exhibits no edema.   Neurological: He is alert and oriented to person, place, and time.   Skin: Skin is warm and dry. He is not diaphoretic.   Psychiatric: He has a normal mood and affect. His behavior is normal.     Lab Results   Component Value Date    BILITOT 0.9 05/29/2020    AST 17 05/29/2020    AST 32 10/18/2016    ALT 35 05/29/2020    ALT 32 10/18/2016    ALKPHOS 96 05/29/2020    CREATININE 0.7 05/29/2020    CREATININE 0.8 10/18/2016    ALBUMIN 2.5 (L) 05/29/2020    ALBUMIN 3.80 10/18/2016     Lab Results   Component Value Date    WBC 7.90 05/29/2020    WBC 4.6 10/18/2016    HGB 8.4 (L) 05/29/2020    HCT 29.3 (L) 05/29/2020    HCT 27 (L) 05/18/2020     (L) 05/29/2020     Lab Results   Component Value Date    TACROLIMUS 16.4 (H) 05/29/2020       Assessment/Plan:          · S/P liver transplant.  · Chronic immunosuppressive medications for rejection prophylaxis supratherapeutic.  Plan: hold tacrolimus and recheck tac level monday.  · Continue monitoring symptoms, labs and drug levels for drug-related toxicity and side effects.  · Incision: staples in place; wound clean, dry, and intact  · Femoral arterial line site: no complications evident    Ed Peace MD       UNOS Patient Status  Functional Status: 50% - Requires considerable assistance and frequent medical care  Physical Capacity: Limited Mobility

## 2020-05-30 ENCOUNTER — HOSPITAL ENCOUNTER (INPATIENT)
Facility: HOSPITAL | Age: 60
LOS: 2 days | Discharge: HOME | DRG: 641 | End: 2020-06-01
Attending: EMERGENCY MEDICINE | Admitting: SURGERY
Payer: MEDICARE

## 2020-05-30 DIAGNOSIS — E87.70 HYPERVOLEMIA, UNSPECIFIED HYPERVOLEMIA TYPE: ICD-10-CM

## 2020-05-30 DIAGNOSIS — N50.89 SCROTAL EDEMA: ICD-10-CM

## 2020-05-30 DIAGNOSIS — E87.70 HYPERVOLEMIA: ICD-10-CM

## 2020-05-30 DIAGNOSIS — Z94.4 S/P LIVER TRANSPLANT: ICD-10-CM

## 2020-05-30 DIAGNOSIS — Z91.89 AT RISK FOR OPPORTUNISTIC INFECTIONS: ICD-10-CM

## 2020-05-30 DIAGNOSIS — Z00.5 POSITIVE BLOOD CULTURE IN CADAVERIC DONOR: ICD-10-CM

## 2020-05-30 DIAGNOSIS — D63.8 ANEMIA OF CHRONIC DISEASE: ICD-10-CM

## 2020-05-30 DIAGNOSIS — Z29.89 PROPHYLACTIC IMMUNOTHERAPY: ICD-10-CM

## 2020-05-30 DIAGNOSIS — Z79.60 LONG-TERM USE OF IMMUNOSUPPRESSANT MEDICATION: ICD-10-CM

## 2020-05-30 DIAGNOSIS — Z94.4 LIVER TRANSPLANTED: ICD-10-CM

## 2020-05-30 DIAGNOSIS — R60.0 BILATERAL LEG EDEMA: Primary | ICD-10-CM

## 2020-05-30 DIAGNOSIS — R78.81 POSITIVE BLOOD CULTURE IN CADAVERIC DONOR: ICD-10-CM

## 2020-05-30 DIAGNOSIS — E87.5 HYPERKALEMIA: ICD-10-CM

## 2020-05-30 PROBLEM — K76.6 PORTAL HYPERTENSIVE GASTROPATHY: Status: RESOLVED | Noted: 2019-10-17 | Resolved: 2020-05-30

## 2020-05-30 PROBLEM — K72.10 END STAGE LIVER DISEASE: Status: RESOLVED | Noted: 2020-02-27 | Resolved: 2020-05-30

## 2020-05-30 PROBLEM — K92.1 MELENA: Status: RESOLVED | Noted: 2020-04-24 | Resolved: 2020-05-30

## 2020-05-30 PROBLEM — R13.10 DYSPHAGIA: Status: RESOLVED | Noted: 2018-03-14 | Resolved: 2020-05-30

## 2020-05-30 PROBLEM — K31.89 PORTAL HYPERTENSIVE GASTROPATHY: Status: RESOLVED | Noted: 2019-10-17 | Resolved: 2020-05-30

## 2020-05-30 PROBLEM — K65.2 SBP (SPONTANEOUS BACTERIAL PERITONITIS): Status: RESOLVED | Noted: 2020-04-24 | Resolved: 2020-05-30

## 2020-05-30 PROBLEM — K76.6 PORTAL HYPERTENSION: Status: RESOLVED | Noted: 2019-10-17 | Resolved: 2020-05-30

## 2020-05-30 PROBLEM — K62.5 RECTAL BLEEDING: Status: RESOLVED | Noted: 2017-09-08 | Resolved: 2020-05-30

## 2020-05-30 LAB
ALBUMIN SERPL BCP-MCNC: 3.4 G/DL (ref 3.5–5.2)
ALP SERPL-CCNC: 292 U/L (ref 55–135)
ALT SERPL W/O P-5'-P-CCNC: 58 U/L (ref 10–44)
ANION GAP SERPL CALC-SCNC: 9 MMOL/L (ref 8–16)
AST SERPL-CCNC: 36 U/L (ref 10–40)
BASOPHILS # BLD AUTO: 0.04 K/UL (ref 0–0.2)
BASOPHILS NFR BLD: 0.6 % (ref 0–1.9)
BILIRUB SERPL-MCNC: 1.3 MG/DL (ref 0.1–1)
BUN SERPL-MCNC: 4 MG/DL (ref 6–20)
CALCIUM SERPL-MCNC: 8.8 MG/DL (ref 8.7–10.5)
CHLORIDE SERPL-SCNC: 102 MMOL/L (ref 95–110)
CO2 SERPL-SCNC: 24 MMOL/L (ref 23–29)
CREAT SERPL-MCNC: 0.7 MG/DL (ref 0.5–1.4)
DIFFERENTIAL METHOD: ABNORMAL
EOSINOPHIL # BLD AUTO: 0.2 K/UL (ref 0–0.5)
EOSINOPHIL NFR BLD: 2.8 % (ref 0–8)
ERYTHROCYTE [DISTWIDTH] IN BLOOD BY AUTOMATED COUNT: 20.5 % (ref 11.5–14.5)
EST. GFR  (AFRICAN AMERICAN): >60 ML/MIN/1.73 M^2
EST. GFR  (NON AFRICAN AMERICAN): >60 ML/MIN/1.73 M^2
GLUCOSE SERPL-MCNC: 202 MG/DL (ref 70–110)
HCT VFR BLD AUTO: 34.5 % (ref 40–54)
HGB BLD-MCNC: 10.2 G/DL (ref 14–18)
IMM GRANULOCYTES # BLD AUTO: 0.22 K/UL (ref 0–0.04)
IMM GRANULOCYTES NFR BLD AUTO: 3.4 % (ref 0–0.5)
INR PPP: 1.2 (ref 0.8–1.2)
LYMPHOCYTES # BLD AUTO: 0.8 K/UL (ref 1–4.8)
LYMPHOCYTES NFR BLD: 12.2 % (ref 18–48)
MAGNESIUM SERPL-MCNC: 1.6 MG/DL (ref 1.6–2.6)
MCH RBC QN AUTO: 26.8 PG (ref 27–31)
MCHC RBC AUTO-ENTMCNC: 29.6 G/DL (ref 32–36)
MCV RBC AUTO: 91 FL (ref 82–98)
MONOCYTES # BLD AUTO: 0.8 K/UL (ref 0.3–1)
MONOCYTES NFR BLD: 11.7 % (ref 4–15)
NEUTROPHILS # BLD AUTO: 4.4 K/UL (ref 1.8–7.7)
NEUTROPHILS NFR BLD: 69.3 % (ref 38–73)
NRBC BLD-RTO: 0 /100 WBC
PHOSPHATE SERPL-MCNC: 3.5 MG/DL (ref 2.7–4.5)
PLATELET # BLD AUTO: 148 K/UL (ref 150–350)
PMV BLD AUTO: 10.6 FL (ref 9.2–12.9)
POTASSIUM SERPL-SCNC: 4.8 MMOL/L (ref 3.5–5.1)
PROT SERPL-MCNC: 6.5 G/DL (ref 6–8.4)
PROTHROMBIN TIME: 11.8 SEC (ref 9–12.5)
RBC # BLD AUTO: 3.8 M/UL (ref 4.6–6.2)
SARS-COV-2 RDRP RESP QL NAA+PROBE: NEGATIVE
SODIUM SERPL-SCNC: 135 MMOL/L (ref 136–145)
WBC # BLD AUTO: 6.41 K/UL (ref 3.9–12.7)

## 2020-05-30 PROCEDURE — 84100 ASSAY OF PHOSPHORUS: CPT

## 2020-05-30 PROCEDURE — 80053 COMPREHEN METABOLIC PANEL: CPT

## 2020-05-30 PROCEDURE — 85025 COMPLETE CBC W/AUTO DIFF WBC: CPT

## 2020-05-30 PROCEDURE — 99285 EMERGENCY DEPT VISIT HI MDM: CPT | Mod: 25

## 2020-05-30 PROCEDURE — 63600175 PHARM REV CODE 636 W HCPCS: Performed by: PHYSICIAN ASSISTANT

## 2020-05-30 PROCEDURE — U0002 COVID-19 LAB TEST NON-CDC: HCPCS

## 2020-05-30 PROCEDURE — 20600001 HC STEP DOWN PRIVATE ROOM

## 2020-05-30 PROCEDURE — 99223 1ST HOSP IP/OBS HIGH 75: CPT | Mod: 24,AI,, | Performed by: PHYSICIAN ASSISTANT

## 2020-05-30 PROCEDURE — 99285 EMERGENCY DEPT VISIT HI MDM: CPT | Mod: ,,, | Performed by: EMERGENCY MEDICINE

## 2020-05-30 PROCEDURE — 83735 ASSAY OF MAGNESIUM: CPT

## 2020-05-30 PROCEDURE — 85610 PROTHROMBIN TIME: CPT

## 2020-05-30 PROCEDURE — 25000003 PHARM REV CODE 250: Performed by: PHYSICIAN ASSISTANT

## 2020-05-30 PROCEDURE — 96374 THER/PROPH/DIAG INJ IV PUSH: CPT

## 2020-05-30 PROCEDURE — 99285 PR EMERGENCY DEPT VISIT,LEVEL V: ICD-10-PCS | Mod: ,,, | Performed by: EMERGENCY MEDICINE

## 2020-05-30 PROCEDURE — 96375 TX/PRO/DX INJ NEW DRUG ADDON: CPT

## 2020-05-30 PROCEDURE — 87040 BLOOD CULTURE FOR BACTERIA: CPT | Mod: 59

## 2020-05-30 PROCEDURE — 99223 PR INITIAL HOSPITAL CARE,LEVL III: ICD-10-PCS | Mod: 24,AI,, | Performed by: PHYSICIAN ASSISTANT

## 2020-05-30 PROCEDURE — 63600175 PHARM REV CODE 636 W HCPCS: Performed by: STUDENT IN AN ORGANIZED HEALTH CARE EDUCATION/TRAINING PROGRAM

## 2020-05-30 RX ORDER — SODIUM CHLORIDE 0.9 % (FLUSH) 0.9 %
3 SYRINGE (ML) INJECTION
Status: DISCONTINUED | OUTPATIENT
Start: 2020-05-30 | End: 2020-06-01 | Stop reason: HOSPADM

## 2020-05-30 RX ORDER — FUROSEMIDE 10 MG/ML
80 INJECTION INTRAMUSCULAR; INTRAVENOUS 2 TIMES DAILY
Status: DISCONTINUED | OUTPATIENT
Start: 2020-05-31 | End: 2020-05-31

## 2020-05-30 RX ORDER — SULFAMETHOXAZOLE AND TRIMETHOPRIM 400; 80 MG/1; MG/1
1 TABLET ORAL DAILY
Status: DISCONTINUED | OUTPATIENT
Start: 2020-05-31 | End: 2020-06-01 | Stop reason: HOSPADM

## 2020-05-30 RX ORDER — MORPHINE SULFATE 4 MG/ML
4 INJECTION, SOLUTION INTRAMUSCULAR; INTRAVENOUS
Status: COMPLETED | OUTPATIENT
Start: 2020-05-30 | End: 2020-05-30

## 2020-05-30 RX ORDER — VALGANCICLOVIR 450 MG/1
450 TABLET, FILM COATED ORAL DAILY
Status: DISCONTINUED | OUTPATIENT
Start: 2020-05-31 | End: 2020-06-01 | Stop reason: HOSPADM

## 2020-05-30 RX ORDER — ONDANSETRON 8 MG/1
8 TABLET, ORALLY DISINTEGRATING ORAL EVERY 8 HOURS PRN
Status: DISCONTINUED | OUTPATIENT
Start: 2020-05-30 | End: 2020-06-01 | Stop reason: HOSPADM

## 2020-05-30 RX ORDER — HYDROCODONE BITARTRATE AND ACETAMINOPHEN 5; 325 MG/1; MG/1
1 TABLET ORAL EVERY 6 HOURS PRN
Status: DISCONTINUED | OUTPATIENT
Start: 2020-05-30 | End: 2020-06-01 | Stop reason: HOSPADM

## 2020-05-30 RX ORDER — CIPROFLOXACIN 500 MG/1
500 TABLET ORAL EVERY 12 HOURS
Status: COMPLETED | OUTPATIENT
Start: 2020-05-30 | End: 2020-05-31

## 2020-05-30 RX ORDER — HEPARIN SODIUM 5000 [USP'U]/ML
5000 INJECTION, SOLUTION INTRAVENOUS; SUBCUTANEOUS EVERY 8 HOURS
Status: DISCONTINUED | OUTPATIENT
Start: 2020-05-30 | End: 2020-06-01 | Stop reason: HOSPADM

## 2020-05-30 RX ORDER — NAPROXEN SODIUM 220 MG/1
81 TABLET, FILM COATED ORAL DAILY
Status: DISCONTINUED | OUTPATIENT
Start: 2020-05-31 | End: 2020-06-01 | Stop reason: HOSPADM

## 2020-05-30 RX ORDER — OXYBUTYNIN CHLORIDE 5 MG/1
5 TABLET ORAL DAILY
Status: DISCONTINUED | OUTPATIENT
Start: 2020-05-31 | End: 2020-06-01 | Stop reason: HOSPADM

## 2020-05-30 RX ORDER — POSACONAZOLE 100 MG/1
300 TABLET, DELAYED RELEASE ORAL NIGHTLY
Status: DISCONTINUED | OUTPATIENT
Start: 2020-05-31 | End: 2020-06-01 | Stop reason: HOSPADM

## 2020-05-30 RX ORDER — FUROSEMIDE 10 MG/ML
80 INJECTION INTRAMUSCULAR; INTRAVENOUS
Status: COMPLETED | OUTPATIENT
Start: 2020-05-30 | End: 2020-05-30

## 2020-05-30 RX ORDER — FAMOTIDINE 20 MG/1
20 TABLET, FILM COATED ORAL NIGHTLY
Status: DISCONTINUED | OUTPATIENT
Start: 2020-05-30 | End: 2020-06-01 | Stop reason: HOSPADM

## 2020-05-30 RX ORDER — MYCOPHENOLATE MOFETIL 250 MG/1
1000 CAPSULE ORAL 2 TIMES DAILY
Status: DISCONTINUED | OUTPATIENT
Start: 2020-05-30 | End: 2020-06-01 | Stop reason: HOSPADM

## 2020-05-30 RX ORDER — FINASTERIDE 5 MG/1
5 TABLET, FILM COATED ORAL DAILY
Status: DISCONTINUED | OUTPATIENT
Start: 2020-05-31 | End: 2020-06-01 | Stop reason: HOSPADM

## 2020-05-30 RX ORDER — TAMSULOSIN HYDROCHLORIDE 0.4 MG/1
0.4 CAPSULE ORAL DAILY
Status: DISCONTINUED | OUTPATIENT
Start: 2020-05-31 | End: 2020-06-01 | Stop reason: HOSPADM

## 2020-05-30 RX ORDER — ACETAMINOPHEN 325 MG/1
650 TABLET ORAL EVERY 8 HOURS PRN
Status: DISCONTINUED | OUTPATIENT
Start: 2020-05-30 | End: 2020-06-01 | Stop reason: HOSPADM

## 2020-05-30 RX ADMIN — FAMOTIDINE 20 MG: 20 TABLET, FILM COATED ORAL at 08:05

## 2020-05-30 RX ADMIN — MYCOPHENOLATE MOFETIL 1000 MG: 250 CAPSULE ORAL at 10:05

## 2020-05-30 RX ADMIN — MORPHINE SULFATE 4 MG: 4 INJECTION INTRAVENOUS at 06:05

## 2020-05-30 RX ADMIN — FUROSEMIDE 80 MG: 10 INJECTION, SOLUTION INTRAMUSCULAR; INTRAVENOUS at 06:05

## 2020-05-30 RX ADMIN — CIPROFLOXACIN HYDROCHLORIDE 500 MG: 500 TABLET, FILM COATED ORAL at 08:05

## 2020-05-30 RX ADMIN — HEPARIN SODIUM 5000 UNITS: 5000 INJECTION INTRAVENOUS; SUBCUTANEOUS at 10:05

## 2020-05-30 NOTE — ED PROVIDER NOTES
Encounter Date: 5/30/2020       History     Chief Complaint   Patient presents with    Edema     Post liver tx on 05/17, pt with weeping edema that started yesterday per patient report.      Colin Reilly is a 59 year old M with HTN, DM 2, ESLD s/p Liver transplant 05/21/20 presenting with 4 day duration of bilateral lower extremity edema that has progressively worsened, diffuse testicular swelling which worsened today. Patient states bilateral lower extremity edema appears to be weeping and is now associated with severe pain. He attempted to submerge his feet in cool water but his mother accidentally put on hot water which aggravated his pain. He denies abdominal pain, fever, chills, chest pain, confusion, dysuria. He takes 40 mg of furosemide, last dose yesterday with adequate urinary output. He saw his Liver transplant specialist yesterday, labs taken showed hyperkalemia (K of 5.6) and supra therapeutic immunosuppression and was instructed to hold his tacrolimus until repeat labs on Monday.          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    Esophageal varices     GERD (gastroesophageal reflux disease)     Hepatic encephalopathy 7/8/2014    Hepatitis C virus infection 07/08/2014    tx with interferon 3-4 mos- stopped for unclear reasons Tattoos-first at age 17 only risk factor (HCVAB negative 08/2017)    HTN (hypertension) 7/8/2014    Hypertension     Insulin dependent diabetes mellitus     Renal mass, left 7/8/2014    6.3 x 5.7 x 5.6 complex mass    Splenomegaly 7/8/2014    Umbilical hernia 7/8/2014     Past Surgical History:   Procedure Laterality Date    CARPAL TUNNEL RELEASE Bilateral     CHOLECYSTECTOMY      lap    COLONOSCOPY N/A 9/8/2017    Procedure: COLONOSCOPY;   Surgeon: Savannah Llanos MD;  Location: [facility];  Service: Endoscopy;  Laterality: N/A;    COLONOSCOPY Left 3/14/2018    Procedure: COLONOSCOPY;  Surgeon: Savannah Llanos MD;  Location: [facility];  Service: Endoscopy;  Laterality: Left;    ESOPHAGOGASTRODUODENOSCOPY  01/2014    ESOPHAGOGASTRODUODENOSCOPY  02/15/2017    grade II varices    ESOPHAGOGASTRODUODENOSCOPY  04/10/2017    grade I varices    ESOPHAGOGASTRODUODENOSCOPY N/A 10/18/2019    Procedure: EGD (ESOPHAGOGASTRODUODENOSCOPY);  Surgeon: Flavio Escalera MD;  Location: [facility] (2ND FLR);  Service: Endoscopy;  Laterality: N/A;    ESOPHAGOGASTRODUODENOSCOPY W/ BANDING  01/26/2017    grade II varices    HERNIA REPAIR      LIVER TRANSPLANT N/A 5/17/2020    Procedure: TRANSPLANT, LIVER;  Surgeon: Danny Thakkar MD;  [facility];  Service: Transplant;  Laterality: N/A;    NECK SURGERY      fusion on C5 and C6    THROMBECTOMY  5/17/2020    Procedure: THROMBECTOMY;  Surgeon: Danny Thakkar MD;  [facility];  Service: Transplant;;  portal vein thrombectomy    ULNAR NERVE TRANSPOSITION Left     UMBILICAL HERNIA REPAIR N/A 2/22/2020    Procedure: REPAIR, HERNIA, PRIMARY WIHTOUT MESH AND PLACEMENT OF DRAIN;  Surgeon: Sherri Brunner MD;  [facility];  Service: Transplant;  Laterality: N/A;    vericose veins removed       Family History   Problem Relation Age of Onset    Hyperlipidemia Mother     No Known Problems Father 36        accident on oil rig    No Known Problems Sister     Cancer Brother         kidney    Alcohol abuse Brother     No Known Problems Sister      Social History     Tobacco Use    Smoking status: Former Smoker     Types: Cigarettes     Last attempt to quit: 1/4/2010     Years since quitting: 10.4    Smokeless tobacco: Former User     Types: Chew     Quit date: 2/24/1990    Tobacco comment: former 1 pack/week quit 1/4/2010   Substance Use Topics     Alcohol use: No     Comment: former heavy beer but quit 1/4/2010    Drug use: No     Review of Systems   Constitutional: Positive for activity change. Negative for chills and fever.   Respiratory: Negative for cough and shortness of breath.    Cardiovascular: Positive for leg swelling. Negative for chest pain.   Gastrointestinal: Negative for abdominal distention, abdominal pain, constipation, diarrhea, nausea and vomiting.   Genitourinary: Positive for scrotal swelling. Negative for decreased urine volume, difficulty urinating and dysuria.   Skin: Negative for pallor.   Neurological: Negative for dizziness and light-headedness.   Psychiatric/Behavioral: Negative for agitation, behavioral problems and confusion.       Physical Exam     Initial Vitals [05/30/20 1722]   BP Pulse Resp Temp SpO2   (!) 146/80 104 18 98.4 °F (36.9 °C) 100 %      MAP       --         Physical Exam    Constitutional: He appears well-developed and well-nourished. He is not diaphoretic. He appears distressed (In moderate distress).   HENT:   Head: Atraumatic.   Eyes: EOM are normal. No scleral icterus.   Neck: Normal range of motion. Neck supple.   Cardiovascular: Normal rate, regular rhythm, normal heart sounds and intact distal pulses.   No murmur heard.  Pulmonary/Chest: Breath sounds normal. He has no wheezes. He has no rales.   Abdominal: Soft. Bowel sounds are normal. He exhibits distension (Mild ). There is tenderness (Diffuse tenderness). There is no guarding.   Chevron incision with staples appears CDI.   No peritoneal signs     Genitourinary:   Genitourinary Comments: Scrotal edema    Musculoskeletal: Normal range of motion. He exhibits edema (4+ pitting edema up to the knees) and tenderness.   Neurological: He is alert and oriented to person, place, and time.   Skin: Skin is warm.   Psychiatric: He has a normal mood and affect. His behavior is normal. Thought content normal.         ED Course   Procedures  Labs Reviewed    COMPREHENSIVE METABOLIC PANEL - Abnormal; Notable for the following components:       Result Value    Sodium 135 (*)     Glucose 202 (*)     BUN, Bld 4 (*)     Albumin 3.4 (*)     Total Bilirubin 1.3 (*)     Alkaline Phosphatase 292 (*)     ALT 58 (*)     All other components within normal limits   CBC W/ AUTO DIFFERENTIAL - Abnormal; Notable for the following components:    RBC 3.80 (*)     Hemoglobin 10.2 (*)     Hematocrit 34.5 (*)     Mean Corpuscular Hemoglobin 26.8 (*)     Mean Corpuscular Hemoglobin Conc 29.6 (*)     RDW 20.5 (*)     Platelets 148 (*)     Immature Granulocytes 3.4 (*)     Immature Grans (Abs) 0.22 (*)     Lymph # 0.8 (*)     Lymph% 12.2 (*)     All other components within normal limits   CULTURE, BLOOD   CULTURE, BLOOD   PROTIME-INR   PROTIME-INR    Narrative:     ADD ON PT PER DR RENAE WHITEI/ORDER# [id number] @ 18:59 5/30/2020   SARS-COV-2 RNA AMPLIFICATION, QUAL   MAGNESIUM   PHOSPHORUS   MAGNESIUM    Narrative:     ADD ON PT PER DR RENAE WHITEI/ORDER# [id number] @ 18:59 5/30/2020  add on MG #[id number] & PHOS #[id number] per Nicolette Motta PA-C @   19:47  05/30/2020    PHOSPHORUS    Narrative:     ADD ON PT PER DR RENAE WHITEI/ORDER# [id number] @ 18:59 5/30/2020  add on MG #[id number] & PHOS #[id number] per Nicolette Motta PA-C @   19:47  05/30/2020           Imaging Results          X-Ray Abdomen AP 1 View (Final result)  Result time 05/30/20 20:45:26    Final result by Bal Newton MD (05/30/20 20:45:26)                 Impression:      1. Prominent air-filled small bowel loops in the mid abdomen.  In this recent postsurgical patient, this could reflect adynamic ileus related to recent surgery, developing partial small bowel obstruction however is not excluded and correlation is advised.      Electronically signed by: Bal Newton MD  Date:    05/30/2020  Time:    20:45             Narrative:    EXAMINATION:  XR ABDOMEN AP 1 VIEW    CLINICAL HISTORY:  abdominal pain/distension  post liver transplant;    TECHNIQUE:  AP View(s) of the abdomen was performed.    COMPARISON:  CT 10/17/2019    FINDINGS:  Four views abdomen supine.    Surgical change of recent hepatic transplantation noted.  There are scattered air-filled small bowel loops in the mid abdomen, measuring up to 4.4 cm.  Air and stool is seen within the large bowel and projected over the rectum.  There are no calcifications to suggest nephrolithiasis.  The lower lung zones are clear.  No acute osseous abnormality.  No findings to suggest pneumatosis.  Surgical change is noted of the pelvis.                               X-Ray Chest PA And Lateral (Final result)  Result time 05/30/20 20:43:35    Final result by Bal Newton MD (05/30/20 20:43:35)                 Impression:      1. Left basilar subsegmental atelectasis, no large focal consolidation.  Hypoventilatory exam      Electronically signed by: Bal Newton MD  Date:    05/30/2020  Time:    20:43             Narrative:    EXAMINATION:  XR CHEST PA AND LATERAL    CLINICAL HISTORY:  hypervolemia;    TECHNIQUE:  PA and lateral views of the chest were performed.    COMPARISON:  05/21/2020    FINDINGS:  The cardiomediastinal silhouette is not enlarged.  There is no pleural effusion.  The trachea is midline.  The lungs are symmetrically expanded bilaterally with mildly coarse interstitial attenuation.  There is left basilar subsegmental atelectasis..  No large focal consolidation seen.  There is no pneumothorax.  The osseous structures are remarkable for degenerative changes noting surgical change of the cervical spine..  Surgical change is noted of the abdomen.                                 Medical Decision Making:   History:   Old Medical Records: I decided to obtain old medical records.  Old Records Summarized: records from clinic visits and records from previous admission(s).       <> Summary of Records: Patient was seen in Liver transplant clinic yesterday. He was  advised to hold tacrolimus given supra therapeutic levels.   Initial Assessment:   Colin Reilly is a 59 year old M with HTN, DM 2, ESLD s/p Liver transplant 05/21/20 presenting with 4 day duration of bilateral lower extremity edema and scrotal edema  Differential Diagnosis:   Includes but not limited to:   CHF  Hepatic cirrhosis  SBP  Severe Protein deficiency       Clinical Tests:   Lab Tests: Ordered and Reviewed  The following lab test(s) were unremarkable: CMP and CBC  ED Management:  On arrival, patient was hemodynamically stable. Diffuse edema noted in both extremities as well as significant scrotal edema. Administered IV lasix 80mg and morphine for pain control. Patient is mildly diffusely tender on abdominal examination, surgical incision appears clear dry and intact. No peritoneal signs.    Will obtain CMP, CBC and INR.    No peritoneal fluid detected on bedside ultrasound. CBC revealed no leukocytosis. CMP relatively stable  Patient is responding well to lasix and has produced about 1500ml of urinary output within the hour.     Discussed with Liver Transplant team. Patient will be admitted to their service for further management.   Other:   I have discussed this case with another health care provider.              Attending Attestation:   Physician Attestation Statement for Resident:  As the supervising MD   Physician Attestation Statement: I have personally seen and examined this patient.   I agree with the above history. -: 59-year-old M status post liver transplant 05/17/2020 here with significant scrotal edema despite Lasix.  Reports good UOP, no signficant abdominal pain.  No fever, chills, cough.    As the supervising MD I agree with the above PE.   -: : significant scrotal and penile edema, no erythema or crepitus.   As the supervising MD I agree with the above treatment, course, plan, and disposition.   -: Lasix given  LTS consulted, pt admitted for further treatment and evaluation.                                   Clinical Impression:       ICD-10-CM ICD-9-CM   1. Bilateral leg edema R60.0 782.3   2. Hypervolemia E87.70 276.69   3. Scrotal edema N50.89 608.86   4. Hypervolemia, unspecified hypervolemia type E87.70 276.69   5. Anemia of chronic disease D63.8 285.29   6. Long-term use of immunosuppressant medication Z79.899 V58.69   7. Positive blood culture in cadaveric donor Z00.5 V70.8    R78.81 790.7   8. Prophylactic immunotherapy Z29.8 V07.2   9. S/P liver transplant Z94.4 V42.7         Disposition:   Disposition: Admitted  Condition: Stable     ED Disposition Condition    Admit                           Verito Banks MD  Resident  05/30/20 [id number]       Louann Real MD  05/30/20 [id number]

## 2020-05-30 NOTE — ED TRIAGE NOTES
Pt presents to the ED via EMS c/o edema. Pt with +4 pitting edema to BLE x 4 days and scrotal swelling since this AM. Liver tx 05/17. Denies fever.

## 2020-05-31 LAB
ALBUMIN SERPL BCP-MCNC: 2.3 G/DL (ref 3.5–5.2)
ALP SERPL-CCNC: 183 U/L (ref 55–135)
ALT SERPL W/O P-5'-P-CCNC: 35 U/L (ref 10–44)
ANION GAP SERPL CALC-SCNC: 8 MMOL/L (ref 8–16)
AST SERPL-CCNC: 21 U/L (ref 10–40)
BASOPHILS # BLD AUTO: 0.03 K/UL (ref 0–0.2)
BASOPHILS NFR BLD: 0.6 % (ref 0–1.9)
BILIRUB SERPL-MCNC: 1 MG/DL (ref 0.1–1)
BUN SERPL-MCNC: 3 MG/DL (ref 6–20)
CALCIUM SERPL-MCNC: 7.7 MG/DL (ref 8.7–10.5)
CHLORIDE SERPL-SCNC: 103 MMOL/L (ref 95–110)
CO2 SERPL-SCNC: 23 MMOL/L (ref 23–29)
CREAT SERPL-MCNC: 0.7 MG/DL (ref 0.5–1.4)
DIFFERENTIAL METHOD: ABNORMAL
EOSINOPHIL # BLD AUTO: 0.1 K/UL (ref 0–0.5)
EOSINOPHIL NFR BLD: 2.8 % (ref 0–8)
ERYTHROCYTE [DISTWIDTH] IN BLOOD BY AUTOMATED COUNT: 20.7 % (ref 11.5–14.5)
EST. GFR  (AFRICAN AMERICAN): >60 ML/MIN/1.73 M^2
EST. GFR  (NON AFRICAN AMERICAN): >60 ML/MIN/1.73 M^2
GLUCOSE SERPL-MCNC: 178 MG/DL (ref 70–110)
HCT VFR BLD AUTO: 28 % (ref 40–54)
HGB BLD-MCNC: 8.1 G/DL (ref 14–18)
IMM GRANULOCYTES # BLD AUTO: 0.13 K/UL (ref 0–0.04)
IMM GRANULOCYTES NFR BLD AUTO: 2.6 % (ref 0–0.5)
LYMPHOCYTES # BLD AUTO: 0.6 K/UL (ref 1–4.8)
LYMPHOCYTES NFR BLD: 12 % (ref 18–48)
MAGNESIUM SERPL-MCNC: 1.3 MG/DL (ref 1.6–2.6)
MCH RBC QN AUTO: 26.3 PG (ref 27–31)
MCHC RBC AUTO-ENTMCNC: 28.9 G/DL (ref 32–36)
MCV RBC AUTO: 91 FL (ref 82–98)
MONOCYTES # BLD AUTO: 0.8 K/UL (ref 0.3–1)
MONOCYTES NFR BLD: 16.3 % (ref 4–15)
NEUTROPHILS # BLD AUTO: 3.2 K/UL (ref 1.8–7.7)
NEUTROPHILS NFR BLD: 65.7 % (ref 38–73)
NRBC BLD-RTO: 0 /100 WBC
PHOSPHATE SERPL-MCNC: 4.1 MG/DL (ref 2.7–4.5)
PLATELET # BLD AUTO: 118 K/UL (ref 150–350)
PMV BLD AUTO: 10.3 FL (ref 9.2–12.9)
POTASSIUM SERPL-SCNC: 3.9 MMOL/L (ref 3.5–5.1)
PROT SERPL-MCNC: 4.7 G/DL (ref 6–8.4)
RBC # BLD AUTO: 3.08 M/UL (ref 4.6–6.2)
SODIUM SERPL-SCNC: 134 MMOL/L (ref 136–145)
TACROLIMUS BLD-MCNC: 8 NG/ML (ref 5–15)
WBC # BLD AUTO: 4.92 K/UL (ref 3.9–12.7)

## 2020-05-31 PROCEDURE — P9047 ALBUMIN (HUMAN), 25%, 50ML: HCPCS | Mod: JG | Performed by: NURSE PRACTITIONER

## 2020-05-31 PROCEDURE — 80197 ASSAY OF TACROLIMUS: CPT

## 2020-05-31 PROCEDURE — 25000003 PHARM REV CODE 250: Performed by: NURSE PRACTITIONER

## 2020-05-31 PROCEDURE — 63600175 PHARM REV CODE 636 W HCPCS: Performed by: PHYSICIAN ASSISTANT

## 2020-05-31 PROCEDURE — 36415 COLL VENOUS BLD VENIPUNCTURE: CPT

## 2020-05-31 PROCEDURE — 84100 ASSAY OF PHOSPHORUS: CPT

## 2020-05-31 PROCEDURE — 63600175 PHARM REV CODE 636 W HCPCS: Performed by: NURSE PRACTITIONER

## 2020-05-31 PROCEDURE — 94761 N-INVAS EAR/PLS OXIMETRY MLT: CPT

## 2020-05-31 PROCEDURE — 85025 COMPLETE CBC W/AUTO DIFF WBC: CPT

## 2020-05-31 PROCEDURE — 25000003 PHARM REV CODE 250: Performed by: PHYSICIAN ASSISTANT

## 2020-05-31 PROCEDURE — 83735 ASSAY OF MAGNESIUM: CPT

## 2020-05-31 PROCEDURE — 80053 COMPREHEN METABOLIC PANEL: CPT

## 2020-05-31 PROCEDURE — 20600001 HC STEP DOWN PRIVATE ROOM

## 2020-05-31 RX ORDER — ESCITALOPRAM OXALATE 20 MG/1
20 TABLET ORAL DAILY
Status: DISCONTINUED | OUTPATIENT
Start: 2020-05-31 | End: 2020-06-01 | Stop reason: HOSPADM

## 2020-05-31 RX ORDER — FUROSEMIDE 10 MG/ML
40 INJECTION INTRAMUSCULAR; INTRAVENOUS 2 TIMES DAILY
Status: DISCONTINUED | OUTPATIENT
Start: 2020-05-31 | End: 2020-06-01 | Stop reason: HOSPADM

## 2020-05-31 RX ORDER — TACROLIMUS 0.5 MG/1
0.5 CAPSULE ORAL 2 TIMES DAILY
Status: DISCONTINUED | OUTPATIENT
Start: 2020-05-31 | End: 2020-06-01 | Stop reason: HOSPADM

## 2020-05-31 RX ORDER — MAGNESIUM SULFATE HEPTAHYDRATE 40 MG/ML
2 INJECTION, SOLUTION INTRAVENOUS
Status: COMPLETED | OUTPATIENT
Start: 2020-05-31 | End: 2020-05-31

## 2020-05-31 RX ORDER — QUETIAPINE FUMARATE 25 MG/1
25 TABLET, FILM COATED ORAL NIGHTLY
Status: DISCONTINUED | OUTPATIENT
Start: 2020-05-31 | End: 2020-06-01 | Stop reason: HOSPADM

## 2020-05-31 RX ORDER — ALBUMIN HUMAN 250 G/1000ML
25 SOLUTION INTRAVENOUS
Status: COMPLETED | OUTPATIENT
Start: 2020-05-31 | End: 2020-06-01

## 2020-05-31 RX ORDER — GABAPENTIN 300 MG/1
600 CAPSULE ORAL 3 TIMES DAILY
Status: DISCONTINUED | OUTPATIENT
Start: 2020-05-31 | End: 2020-06-01 | Stop reason: HOSPADM

## 2020-05-31 RX ADMIN — HEPARIN SODIUM 5000 UNITS: 5000 INJECTION INTRAVENOUS; SUBCUTANEOUS at 01:05

## 2020-05-31 RX ADMIN — POSACONAZOLE 300 MG: 100 TABLET, COATED ORAL at 09:05

## 2020-05-31 RX ADMIN — FUROSEMIDE 40 MG: 10 INJECTION, SOLUTION INTRAMUSCULAR; INTRAVENOUS at 08:05

## 2020-05-31 RX ADMIN — FUROSEMIDE 40 MG: 10 INJECTION, SOLUTION INTRAMUSCULAR; INTRAVENOUS at 09:05

## 2020-05-31 RX ADMIN — FAMOTIDINE 20 MG: 20 TABLET, FILM COATED ORAL at 09:05

## 2020-05-31 RX ADMIN — MAGNESIUM SULFATE IN WATER 2 G: 40 INJECTION, SOLUTION INTRAVENOUS at 03:05

## 2020-05-31 RX ADMIN — FINASTERIDE 5 MG: 5 TABLET, FILM COATED ORAL at 08:05

## 2020-05-31 RX ADMIN — MAGNESIUM SULFATE IN WATER 2 G: 40 INJECTION, SOLUTION INTRAVENOUS at 01:05

## 2020-05-31 RX ADMIN — SULFAMETHOXAZOLE AND TRIMETHOPRIM 1 TABLET: 400; 80 TABLET ORAL at 08:05

## 2020-05-31 RX ADMIN — ESCITALOPRAM OXALATE 20 MG: 20 TABLET ORAL at 01:05

## 2020-05-31 RX ADMIN — VALGANCICLOVIR 450 MG: 450 TABLET, FILM COATED ORAL at 08:05

## 2020-05-31 RX ADMIN — TACROLIMUS 0.5 MG: 0.5 CAPSULE ORAL at 05:05

## 2020-05-31 RX ADMIN — TAMSULOSIN HYDROCHLORIDE 0.4 MG: 0.4 CAPSULE ORAL at 08:05

## 2020-05-31 RX ADMIN — HEPARIN SODIUM 5000 UNITS: 5000 INJECTION INTRAVENOUS; SUBCUTANEOUS at 05:05

## 2020-05-31 RX ADMIN — OXYBUTYNIN CHLORIDE 5 MG: 5 TABLET ORAL at 08:05

## 2020-05-31 RX ADMIN — HEPARIN SODIUM 5000 UNITS: 5000 INJECTION INTRAVENOUS; SUBCUTANEOUS at 09:05

## 2020-05-31 RX ADMIN — MYCOPHENOLATE MOFETIL 1000 MG: 250 CAPSULE ORAL at 09:05

## 2020-05-31 RX ADMIN — QUETIAPINE FUMARATE 25 MG: 25 TABLET ORAL at 09:05

## 2020-05-31 RX ADMIN — GABAPENTIN 600 MG: 300 CAPSULE ORAL at 09:05

## 2020-05-31 RX ADMIN — MYCOPHENOLATE MOFETIL 1000 MG: 250 CAPSULE ORAL at 08:05

## 2020-05-31 RX ADMIN — ASPIRIN 81 MG CHEWABLE TABLET 81 MG: 81 TABLET CHEWABLE at 08:05

## 2020-05-31 RX ADMIN — HYDROCODONE BITARTRATE AND ACETAMINOPHEN 1 TABLET: 5; 325 TABLET ORAL at 11:05

## 2020-05-31 RX ADMIN — ALBUMIN (HUMAN) 25 G: 12.5 SOLUTION INTRAVENOUS at 05:05

## 2020-05-31 RX ADMIN — ALBUMIN (HUMAN) 25 G: 12.5 SOLUTION INTRAVENOUS at 10:05

## 2020-05-31 RX ADMIN — CIPROFLOXACIN HYDROCHLORIDE 500 MG: 500 TABLET, FILM COATED ORAL at 08:05

## 2020-05-31 RX ADMIN — PREDNISONE 15 MG: 10 TABLET ORAL at 08:05

## 2020-05-31 RX ADMIN — GABAPENTIN 600 MG: 300 CAPSULE ORAL at 03:05

## 2020-05-31 NOTE — PROGRESS NOTES
[facility]  Liver Transplant  Progress Note    Patient Name: Colin Reilly  MRN: [id number]  Admission Date: 5/30/2020  Hospital Length of Stay: 1 days  Code Status: Full Code  Primary Care Provider: Preston Matthew Ii, MD  Post-Operative Day: 14    ORGAN:   LIVER  Disease Etiology: Alcoholic Cirrhosis  Donor Type:   Donation after Circulatory Death   CDC High Risk:   No  Donor CMV Status:   Donor CMV Status: Negative  Donor HBcAB:   Negative  Donor HCV Status:   Negative  Donor HBV CEZAR: Negative  Donor HCV CEZAR: Negative  Whole or Partial: Whole Liver  Biliary Anastomosis: End to End  Arterial Anatomy: Standard  Subjective:     History of Present Illness:  Colin Reilly is a 60y/o male, with 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  Intra op course unremarkable. . Donor noted with Blood culture 1/2 with Klebsiella and Pseudomonas. ID recommending 14 day course of abx. Pt started on zosyn which was later transitioned to cefepime started. Transitioned to PO cipro upon discharge to complete duration of 14 day course (EOT 5/31). Recipient blood cultures NGTD.     Mr. Shaw presents to ED today complaining of lower extremity swelling and abdominal distention worsening over the past couple of days. He has otherwise been doing well. Denies abdominal pain, n/v, constipation, diarrhea, chest pain, sob, dysuria, fever/chills. In ED, VSS. Given lasix 80 mg IV x 1 and responded well. Plan to admit to LTS for IV diuresis.  In ED, liver enzymes mildly elevated - will plan for liver US in the morning.         Hospital Course:  Interval History- pt diuresed 3.4L since admit after receiving Lasix 80 mg IV x1.  Pt continues to have 3+ BLE edema but notes overall improvement.  Plan to cont lasix 40 mg IV bid and give albumin x3.  Cr 0.7.  LFTs sl elevated on admit and pt received DCD liver txp.  Liver US completed  today.  LFTs have trended down.  H/H stable.  Type and screen in am.  Pt is on ASA.  Pt has completed Cipro for positive donor cx today.  Cont to monitor.    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    Esophageal varices     GERD (gastroesophageal reflux disease)     Hepatic encephalopathy 7/8/2014    Hepatitis C virus infection 07/08/2014    tx with interferon 3-4 mos- stopped for unclear reasons Tattoos-first at age 17 only risk factor (HCVAB negative 08/2017)    HTN (hypertension) 7/8/2014    Hypertension     Insulin dependent diabetes mellitus     Renal mass, left 7/8/2014    6.3 x 5.7 x 5.6 complex mass    Splenomegaly 7/8/2014    Umbilical hernia 7/8/2014       Past Surgical History:   Procedure Laterality Date    CARPAL TUNNEL RELEASE Bilateral     CHOLECYSTECTOMY      lap    COLONOSCOPY N/A 9/8/2017    Procedure: COLONOSCOPY;  Surgeon: Savannah Llanos MD;  Location: [facility];  Service: Endoscopy;  Laterality: N/A;    COLONOSCOPY Left 3/14/2018    Procedure: COLONOSCOPY;  Surgeon: Savannah Llanos MD;  Location: [facility];  Service: Endoscopy;  Laterality: Left;    ESOPHAGOGASTRODUODENOSCOPY  01/2014    ESOPHAGOGASTRODUODENOSCOPY  02/15/2017    grade II varices    ESOPHAGOGASTRODUODENOSCOPY  04/10/2017    grade I varices    ESOPHAGOGASTRODUODENOSCOPY N/A 10/18/2019    Procedure: EGD (ESOPHAGOGASTRODUODENOSCOPY);  Surgeon: Flavio Escalera MD;  Location: [facility] ([address] [place] [address]);  Service: Endoscopy;  Laterality: N/A;    ESOPHAGOGASTRODUODENOSCOPY W/ BANDING  01/26/2017    grade II varices    HERNIA REPAIR      LIVER TRANSPLANT N/A 5/17/2020    Procedure: TRANSPLANT, LIVER;  Surgeon: Danny Thakkar MD;  Location: [facility] OR [address] [place] [address];  Service: Transplant;  Laterality: N/A;    NECK SURGERY      fusion on C5 and  C6    THROMBECTOMY  5/17/2020    Procedure: THROMBECTOMY;  Surgeon: Danny Thakkar MD;  Location: [facility] OR 2ND FLR;  Service: Transplant;;  portal vein thrombectomy    ULNAR NERVE TRANSPOSITION Left     UMBILICAL HERNIA REPAIR N/A 2/22/2020    Procedure: REPAIR, HERNIA, PRIMARY WIHTOUT MESH AND PLACEMENT OF DRAIN;  Surgeon: Sherri Brunner MD;  Location: [facility] OR 2ND FLR;  Service: Transplant;  Laterality: N/A;    vericose veins removed         Review of patient's allergies indicates:  No Known Allergies    Family History     Problem Relation (Age of Onset)    Alcohol abuse Brother    Cancer Brother    Hyperlipidemia Mother    No Known Problems Father (36), Sister, Sister        Tobacco Use    Smoking status: Former Smoker     Types: Cigarettes     Last attempt to quit: 1/4/2010     Years since quitting: 10.4    Smokeless tobacco: Former User     Types: Chew     Quit date: 2/24/1990    Tobacco comment: former 1 pack/week quit 1/4/2010   Substance and Sexual Activity    Alcohol use: No     Comment: former heavy beer but quit 1/4/2010    Drug use: No    Sexual activity: Never     Comment:        PTA Medications   Medication Sig    aspirin 81 MG Chew Take 1 tablet (81 mg total) by mouth once daily.    bisacodyL (DULCOLAX) 5 mg EC tablet Take 2 tablets (10 mg total) by mouth every evening.    ciprofloxacin HCl (CIPRO) 500 MG tablet Take 1 tablet (500 mg total) by mouth every 12 (twelve) hours. STOP 5/31/20 for 8 days    EASY TOUCH INSULIN SYRINGE 1 mL 31 gauge x 5/16 Syrg     escitalopram oxalate (LEXAPRO) 20 MG tablet Take 20 mg by mouth once daily.     famotidine (PEPCID) 20 MG tablet Take 1 tablet (20 mg total) by mouth every evening.    finasteride (PROSCAR) 5 mg tablet Take 1 tablet (5 mg total) by mouth once daily.    furosemide (LASIX) 40 MG tablet Take 1 tablet (40 mg total) by mouth once daily.    gabapentin (NEURONTIN) 600 MG tablet Take 600 mg by mouth 3 (three) times daily.  "    HYDROcodone-acetaminophen (NORCO)  mg per tablet Take 1 tablet by mouth every 12 (twelve) hours as needed for Pain.    insulin aspart U-100 (NOVOLOG) 100 unit/mL injection Inject 4 Units into the skin 3 (three) times daily before meals. + sliding scale (max TDD 45 units) (Patient not taking: Reported on 2020)    insulin detemir U-100 (LEVEMIR FLEXTOUCH) 100 unit/mL (3 mL) SubQ InPn pen Inject 12 Units into the skin once daily. (Patient not taking: Reported on 2020)    mycophenolate (CELLCEPT) 250 mg Cap Take 4 capsules (1,000 mg total) by mouth 2 (two) times daily.    oxybutynin (DITROPAN) 5 MG Tab Take 5 mg by mouth once daily.    pen needle, diabetic 32 gauge x " Ndle use one pen needle  3 (three) times daily with insulin pen    posaconazole (NOXAFIL) 100 mg TbEC tablet Take 3 tablets (300 mg total) by mouth every evening. STOP 20    potassium chloride (MICRO-K) 10 MEQ CpSR Take 3 capsules (30 mEq total) by mouth 2 (two) times daily.    predniSONE (DELTASONE) 5 MG tablet Take 20 mg by mouth  daily -; 15 mg daily -; 10 mg daily -; 5 mg  daily -; stop: 2020    QUEtiapine (SEROQUEL) 25 MG Tab Take 1 tablet (25 mg total) by mouth every evening.    [] sodium polystyrene (KAYEXALATE) powder Take 30 g by mouth once. For elevated potassium level. for 1 dose    sulfamethoxazole-trimethoprim 400-80mg (BACTRIM,SEPTRA) 400-80 mg per tablet Take 1 tablet by mouth once daily. Stop: 2020    tacrolimus (PROGRAF) 0.5 MG Cap Take 2 capsules (1 mg total) by mouth every 12 (twelve) hours.    tamsulosin (FLOMAX) 0.4 mg Cp24 Take 1 capsule (0.4 mg total) by mouth once daily.    TRUE METRIX GLUCOSE TEST STRIP Strp use to test blood sugar 4 times daily    TRUEPLUS LANCETS 30 gauge Misc     valGANciclovir (VALCYTE) 450 mg Tab Take 1 tablet (450 mg total) by mouth once daily. Stop: 2020       Review of Systems   Constitutional: Negative for " activity change, appetite change, chills, diaphoresis and fever.   HENT: Negative for congestion, sinus pressure, sinus pain, sore throat and trouble swallowing.    Eyes: Negative for visual disturbance.   Respiratory: Negative for chest tightness, shortness of breath and stridor.    Cardiovascular: Positive for leg swelling. Negative for chest pain and palpitations.   Gastrointestinal: Positive for abdominal distention. Negative for abdominal pain, constipation, diarrhea, nausea and vomiting.   Genitourinary: Negative for decreased urine volume, difficulty urinating, dysuria and flank pain.   Musculoskeletal: Negative for neck pain and neck stiffness.   Skin: Negative for color change and rash.   Allergic/Immunologic: Positive for immunocompromised state.   Neurological: Negative for dizziness, tremors, syncope, light-headedness and headaches.   Psychiatric/Behavioral: Negative for agitation, confusion, decreased concentration and hallucinations. The patient is not nervous/anxious.      Objective:     Vital Signs (Most Recent):  Temp: 98.3 °F (36.8 °C) (05/31/20 1057)  Pulse: 87 (05/31/20 1141)  Resp: 18 (05/31/20 1059)  BP: 136/81 (05/31/20 1059)  SpO2: 98 % (05/31/20 1059) Vital Signs (24h Range):  Temp:  [98.3 °F (36.8 °C)-98.8 °F (37.1 °C)] 98.3 °F (36.8 °C)  Pulse:  [] 87  Resp:  [17-18] 18  SpO2:  [96 %-100 %] 98 %  BP: (131-154)/(68-96) 136/81     Weight: 92.3 kg (203 lb 7.8 oz)  Body mass index is 28.38 kg/m².      Intake/Output Summary (Last 24 hours) at 5/31/2020 1236  Last data filed at 5/31/2020 1053  Gross per 24 hour   Intake 240 ml   Output 4825 ml   Net -4585 ml       Physical Exam   Constitutional: He is oriented to person, place, and time. No distress.   HENT:   Head: Normocephalic and atraumatic.   Eyes: Right eye exhibits no discharge. Left eye exhibits no discharge. No scleral icterus.   Neck: Normal range of motion. Neck supple.   Cardiovascular: Normal rate, regular rhythm, normal heart  sounds and intact distal pulses.   Pulmonary/Chest: Effort normal and breath sounds normal. He has no wheezes. He has no rales.   Abdominal: Soft. Bowel sounds are normal. He exhibits distension. There is no tenderness. There is no guarding.   Musculoskeletal: He exhibits edema (3+ pitting edema in BLE; scotal edema; back edema).   Neurological: He is alert and oriented to person, place, and time. No cranial nerve deficit.   Skin: Skin is warm and dry. Capillary refill takes less than 2 seconds. He is not diaphoretic.   Psychiatric: He has a normal mood and affect. His behavior is normal. Judgment and thought content normal.   Nursing note and vitals reviewed.      Laboratory:  CBC:   Recent Labs   Lab 05/31/20  0541   WBC 4.92   RBC 3.08*   HGB 8.1*   HCT 28.0*   *   MCV 91   MCH 26.3*   MCHC 28.9*     CMP:   Recent Labs   Lab 05/31/20  0541   *   CALCIUM 7.7*   ALBUMIN 2.3*   PROT 4.7*   *   K 3.9   CO2 23      BUN 3*   CREATININE 0.7   ALKPHOS 183*   ALT 35   AST 21   BILITOT 1.0     Labs within the past 24 hours have been reviewed.    Diagnostic Results:  I have personally reviewed all pertinent imaging studies.    Assessment/Plan:     * Hypervolemia  - given lasix 80 mg IV x 1 in ED with good response (diuresed 3..4L)  - continue IV diuresis with lasix 40mg IV bid  - strict intake/output  - daily weights    Positive blood culture in cadaveric donor  - will complete course of ABX (cipro) on 5/31      At risk for opportunistic infections  - continue valcyte and bactrim per protocol      Prophylactic immunotherapy  - continue cellcept, and steroid taper per protocol  - will check daily prograf levels, monitor for toxic side effects, and adjust for therapeutic dose  - prograf on hold for supra-therapeutic level; resume when appropriate    Long-term use of immunosuppressant medication  - see prophylactic immunotherapy      S/P liver transplant  - s/p DCD OLTxp on 5/17 for Etoh/HepC  - LFTs  mildly elevated on admit  - Liver US today.  LFTs have trended down.  - Trend labwork      Anemia of chronic disease  - 2/2 residual ESLD  - H&H stable on admit  - monitor with daily labs  - keep type and screen current          VTE Risk Mitigation (From admission, onward)         Ordered     heparin (porcine) injection 5,000 Units  Every 8 hours      05/30/20 1951     IP VTE HIGH RISK PATIENT  Once      05/30/20 1951     Place sequential compression device  Until discontinued      05/30/20 1951                The patients clinical status was discussed at multidisplinary rounds, involving transplant surgery, transplant medicine, pharmacy, nursing, nutrition, and social work    Discharge Planning:  Discussed plan of care.  No plan for discharge today.      María Richard NP  Liver Transplant  [facility]

## 2020-05-31 NOTE — H&P
[facility]  Liver Transplant  History & Physical    Patient Name: Colin Reilly  MRN: [id number]  Admission Date: 5/30/2020  Code Status: Full Code  Primary Care Provider: Preston Matthew Ii, MD  Post-Operative Day: 13     ORGAN:   LIVER  Disease Etiology: Alcoholic Cirrhosis  Donor Type:   Donation after Circulatory Death   [facility] High Risk:   No  Donor CMV Status:   Donor CMV Status: Negative  Donor HBcAB:   Negative  Donor HCV Status:   Negative  Donor HBV CEZAR: Negative  Donor HCV CEZAR: Negative  Whole or Partial: Whole Liver  Biliary Anastomosis: End to End  Arterial Anatomy: Standard    Subjective:     History of Present Illness:  Colin Reilly is a 60y/o male, with 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  Intra op course unremarkable. . Donor noted with Blood culture 1/2 with Klebsiella and Pseudomonas. ID recommending 14 day course of abx. Pt started on zosyn which was later transitioned to cefepime started. Transitioned to PO cipro upon discharge to complete duration of 14 day course (EOT 5/31). Recipient blood cultures NGTD.     Mr. Shaw presents to ED today complaining of lower extremity swelling and abdominal distention worsening over the past couple of days. He has otherwise been doing well. Denies abdominal pain, n/v, constipation, diarrhea, chest pain, sob, dysuria, fever/chills. In ED, VSS. Given lasix 80 mg IV x 1 and responded well. Plan to admit to LTS for IV diuresis.  In ED, liver enzymes mildly elevated - will plan for liver US in the morning.         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    Esophageal varices     GERD (gastroesophageal reflux disease)     Hepatic encephalopathy 7/8/2014    Hepatitis C virus  infection 07/08/2014    tx with interferon 3-4 mos- stopped for unclear reasons Tattoos-first at age 17 only risk factor (HCVAB negative 08/2017)    HTN (hypertension) 7/8/2014    Hypertension     Insulin dependent diabetes mellitus     Renal mass, left 7/8/2014    6.3 x 5.7 x 5.6 complex mass    Splenomegaly 7/8/2014    Umbilical hernia 7/8/2014       Past Surgical History:   Procedure Laterality Date    CARPAL TUNNEL RELEASE Bilateral     CHOLECYSTECTOMY      lap    COLONOSCOPY N/A 9/8/2017    Procedure: COLONOSCOPY;  Surgeon: Savannah Llanos MD;  Location: [facility];  Service: Endoscopy;  Laterality: N/A;    COLONOSCOPY Left 3/14/2018    Procedure: COLONOSCOPY;  Surgeon: Savannah Llanos MD;  Location: [facility];  Service: Endoscopy;  Laterality: Left;    ESOPHAGOGASTRODUODENOSCOPY  01/2014    ESOPHAGOGASTRODUODENOSCOPY  02/15/2017    grade II varices    ESOPHAGOGASTRODUODENOSCOPY  04/10/2017    grade I varices    ESOPHAGOGASTRODUODENOSCOPY N/A 10/18/2019    Procedure: EGD (ESOPHAGOGASTRODUODENOSCOPY);  Surgeon: Flavio Escalera MD;  Location: [facility] (2ND FLR);  Service: Endoscopy;  Laterality: N/A;    ESOPHAGOGASTRODUODENOSCOPY W/ BANDING  01/26/2017    grade II varices    HERNIA REPAIR      LIVER TRANSPLANT N/A 5/17/2020    Procedure: TRANSPLANT, LIVER;  Surgeon: Danny Thakkar MD;  [facility];  Service: Transplant;  Laterality: N/A;    NECK SURGERY      fusion on C5 and C6    THROMBECTOMY  5/17/2020    Procedure: THROMBECTOMY;  Surgeon: Danny Thakkar MD;  Location: [facility] OR 2ND FLR;  Service: Transplant;;  portal vein thrombectomy    ULNAR NERVE TRANSPOSITION Left     UMBILICAL HERNIA REPAIR N/A 2/22/2020    Procedure: REPAIR, HERNIA, PRIMARY WIHTOUT MESH AND PLACEMENT OF DRAIN;  Surgeon: Sherri Brunner MD;  Location: [facility] OR 2ND FLR;  Service: Transplant;  Laterality: N/A;    vericose veins removed         Review of patient's allergies  indicates:  No Known Allergies    Family History     Problem Relation (Age of Onset)    Alcohol abuse Brother    Cancer Brother    Hyperlipidemia Mother    No Known Problems Father (36), Sister, Sister        Tobacco Use    Smoking status: Former Smoker     Types: Cigarettes     Last attempt to quit: 1/4/2010     Years since quitting: 10.4    Smokeless tobacco: Former User     Types: Chew     Quit date: 2/24/1990    Tobacco comment: former 1 pack/week quit 1/4/2010   Substance and Sexual Activity    Alcohol use: No     Comment: former heavy beer but quit 1/4/2010    Drug use: No    Sexual activity: Never     Comment:          (Not in a hospital admission)    Review of Systems   Constitutional: Negative for activity change, appetite change, chills, diaphoresis and fever.   HENT: Negative for congestion, sinus pressure, sinus pain, sore throat and trouble swallowing.    Eyes: Negative for visual disturbance.   Respiratory: Negative for chest tightness, shortness of breath and stridor.    Cardiovascular: Positive for leg swelling. Negative for chest pain and palpitations.   Gastrointestinal: Positive for abdominal distention. Negative for abdominal pain, constipation, diarrhea, nausea and vomiting.   Genitourinary: Negative for decreased urine volume, difficulty urinating, dysuria and flank pain.   Musculoskeletal: Negative for neck pain and neck stiffness.   Skin: Negative for color change and rash.   Allergic/Immunologic: Positive for immunocompromised state.   Neurological: Negative for dizziness, tremors, syncope, light-headedness and headaches.   Psychiatric/Behavioral: Negative for agitation, confusion, decreased concentration and hallucinations. The patient is not nervous/anxious.      Objective:     Vital Signs (Most Recent):  Temp: 98.6 °F (37 °C) (05/30/20 1903)  Pulse: 98(Simultaneous filing. User may not have seen previous data.) (05/30/20 1903)  Resp: 18 (05/30/20 1903)  BP: (!) 154/68  (05/30/20 1903)  SpO2: 98 % (05/30/20 1903) Vital Signs (24h Range):  Temp:  [98.4 °F (36.9 °C)-98.6 °F (37 °C)] 98.6 °F (37 °C)  Pulse:  [] 98  Resp:  [18] 18  SpO2:  [98 %-100 %] 98 %  BP: (146-154)/(68-80) 154/68     Weight: 98.1 kg (216 lb 4.3 oz)  Body mass index is 30.16 kg/m².      Intake/Output Summary (Last 24 hours) at 5/30/2020 2008  Last data filed at 5/30/2020 1920  Gross per 24 hour   Intake --   Output 1200 ml   Net -1200 ml       Physical Exam   Constitutional: He is oriented to person, place, and time. No distress.   HENT:   Head: Normocephalic and atraumatic.   Eyes: Right eye exhibits no discharge. Left eye exhibits no discharge. No scleral icterus.   Neck: Normal range of motion. Neck supple.   Cardiovascular: Normal rate, regular rhythm, normal heart sounds and intact distal pulses.   Pulmonary/Chest: Effort normal and breath sounds normal. He has no wheezes. He has no rales.   Abdominal: Soft. Bowel sounds are normal. He exhibits distension. There is no tenderness. There is no guarding.   Musculoskeletal: He exhibits edema (3+ pitting edema in BLE; scotal edema; back edema).   Neurological: He is alert and oriented to person, place, and time. No cranial nerve deficit.   Skin: Skin is warm and dry. Capillary refill takes less than 2 seconds. He is not diaphoretic.   Psychiatric: Judgment and thought content normal.   Nursing note and vitals reviewed.      Laboratory:  CBC:   Recent Labs   Lab 05/30/20  1804   WBC 6.41   RBC 3.80*   HGB 10.2*   HCT 34.5*   *   MCV 91   MCH 26.8*   MCHC 29.6*     CMP:   Recent Labs   Lab 05/30/20  1804   *   CALCIUM 8.8   ALBUMIN 3.4*   PROT 6.5   *   K 4.8   CO2 24      BUN 4*   CREATININE 0.7   ALKPHOS 292*   ALT 58*   AST 36   BILITOT 1.3*     Labs within the past 24 hours have been reviewed.    Diagnostic Results:  I have personally reviewed all pertinent imaging studies.    Assessment/Plan:     * Hypervolemia  - given lasix 80  mg IV x 1 in ED with good response  - continue IV diuresis with lasix  - strict intake/output  - daily weights    Positive blood culture in cadaveric donor  - will complete course of ABX (cipro) on 5/31      At risk for opportunistic infections  - continue valcyte and bactrim per protocol      Prophylactic immunotherapy  - continue cellcept, and steroid taper per protocol  - will check daily prograf levels, monitor for toxic side effects, and adjust for therapeutic dose  - prograf on hold for supra-therapeutic level; resume when appropriate    Long-term use of immunosuppressant medication  - see prophylactic immunotherapy      S/P liver transplant  - s/p DCD OLTxp on 5/17 for Etoh/HepC  - LFTs mildly elevated on admit  - Liver US in AM  - Trend labwork      Anemia of chronic disease  - 2/2 residual ESLD  - H&H stable on admit  - monitor with daily labs                Nicolette Motta PA-C  Liver Transplant  [facility]

## 2020-05-31 NOTE — ED NOTES
Pt aware bed has been assigned on TSU and report would be called to RN assuming care. Pt aware he will remain in ED until telemetry monitor arrives from war room. Pt verbalized understanding.

## 2020-05-31 NOTE — ASSESSMENT & PLAN NOTE
- given lasix 80 mg IV x 1 in ED with good response (diuresed 3..4L)  - continue IV diuresis with lasix 40mg IV bid  - strict intake/output  - daily weights

## 2020-05-31 NOTE — ED NOTES
Admitted to floor. Diagnosis, medications, & POC discussed with patient. Patient verbalized understanding. All questions and concerns answered. No needs expressed at the time. Pt is awake, alert and oriented x4 with no acute distress noted. Respirations even and unlabored. Per stretcher out of ED to floor with mask in place. Pt on portable telemetry for admission. All belongings sent upstairs with patient.

## 2020-05-31 NOTE — ASSESSMENT & PLAN NOTE
- s/p DCD OLTxp on 5/17 for Etoh/HepC  - LFTs mildly elevated on admit  - Liver US in AM  - Trend labwork

## 2020-05-31 NOTE — ASSESSMENT & PLAN NOTE
- s/p DCD OLTxp on 5/17 for Etoh/HepC  - LFTs mildly elevated on admit  - Liver US today.  LFTs have trended down.  - Trend labwork

## 2020-05-31 NOTE — ASSESSMENT & PLAN NOTE
- 2/2 residual ESLD  - H&H stable on admit  - monitor with daily labs  - keep type and screen current

## 2020-05-31 NOTE — HOSPITAL COURSE
Interval History- pt diuresed 3.4L since admit after receiving Lasix 80 mg IV x1.  Pt continues to have 3+ BLE edema but notes overall improvement.  Plan to cont lasix 40 mg IV bid and give albumin x3.  Cr 0.7.  LFTs sl elevated on admit and pt received DCD liver txp.  Liver US completed today.  LFTs have trended down.  H/H stable.  Type and screen in am.  Pt is on ASA.  Pt has completed Cipro for positive donor cx today.  Cont to monitor.

## 2020-05-31 NOTE — PROGRESS NOTES
Pt is afebrile. Hand washing per nursing guidelines. Patient acknowledges understanding with pain scale. Appropriate pain medications have been dispensed. Non slip footwear in place. Bed lowered. Rails up x 2. Call bell in easy reach. Patient is independent. Pt encouraged to turn frequently. PO Cipro complete from + donor cultures. Liver US complete, results pending. Adequate uop from iv lasix and albumin, clear yellow urine. See flowsheet for output. Pitting edema still noted. Adequate appetite noted. Mg=1.3 replaced. LFTs trending back down.

## 2020-05-31 NOTE — HPI
Colin Salazarucet is a 58y/o male, with ESLD secondary to ETOH who is s/p OLTX on 5/17/20 DM, HCV s/p INF, and hx of an umbilical hernia s/p umbilical hernia repair on 2/22/20.  Intra op course unremarkable. . Donor noted with Blood culture 1/2 with Klebsiella and Pseudomonas. ID recommending 14 day course of abx. Pt started on zosyn which was later transitioned to cefepime started. Transitioned to PO cipro upon discharge to complete duration of 14 day course (EOT 5/31). Recipient blood cultures NGTD.     Mr. Shaw presents to ED today complaining

## 2020-05-31 NOTE — HPI
Colin Rielly is a 60y/o male, with 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  Intra op course unremarkable. . Donor noted with Blood culture 1/2 with Klebsiella and Pseudomonas. ID recommending 14 day course of abx. Pt started on zosyn which was later transitioned to cefepime started. Transitioned to PO cipro upon discharge to complete duration of 14 day course (EOT 5/31). Recipient blood cultures NGTD.     Mr. Shaw presents to ED today complaining of lower extremity swelling and abdominal distention worsening over the past couple of days. He has otherwise been doing well. Denies abdominal pain, n/v, constipation, diarrhea, chest pain, sob, dysuria, fever/chills. In ED, VSS. Given lasix 80 mg IV x 1 and responded well. Plan to admit to LTS for IV diuresis.  In ED, liver enzymes mildly elevated - will plan for liver US in the morning.

## 2020-05-31 NOTE — SUBJECTIVE & OBJECTIVE
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    Esophageal varices     GERD (gastroesophageal reflux disease)     Hepatic encephalopathy 7/8/2014    Hepatitis C virus infection 07/08/2014    tx with interferon 3-4 mos- stopped for unclear reasons Tattoos-first at age 17 only risk factor (HCVAB negative 08/2017)    HTN (hypertension) 7/8/2014    Hypertension     Insulin dependent diabetes mellitus     Renal mass, left 7/8/2014    6.3 x 5.7 x 5.6 complex mass    Splenomegaly 7/8/2014    Umbilical hernia 7/8/2014       Past Surgical History:   Procedure Laterality Date    CARPAL TUNNEL RELEASE Bilateral     CHOLECYSTECTOMY      lap    COLONOSCOPY N/A 9/8/2017    Procedure: COLONOSCOPY;  Surgeon: Savannah Llanos MD;  Location: [facility];  Service: Endoscopy;  Laterality: N/A;    COLONOSCOPY Left 3/14/2018    Procedure: COLONOSCOPY;  Surgeon: Savannah Llanos MD;  Location: [facility];  Service: Endoscopy;  Laterality: Left;    ESOPHAGOGASTRODUODENOSCOPY  01/2014    ESOPHAGOGASTRODUODENOSCOPY  02/15/2017    grade II varices    ESOPHAGOGASTRODUODENOSCOPY  04/10/2017    grade I varices    ESOPHAGOGASTRODUODENOSCOPY N/A 10/18/2019    Procedure: EGD (ESOPHAGOGASTRODUODENOSCOPY);  Surgeon: Flavio Escalera MD;  Location: [facility] ([address] [place] [address]);  Service: Endoscopy;  Laterality: N/A;    ESOPHAGOGASTRODUODENOSCOPY W/ BANDING  01/26/2017    grade II varices    HERNIA REPAIR      LIVER TRANSPLANT N/A 5/17/2020    Procedure: TRANSPLANT, LIVER;  Surgeon: Danny Thakkar MD;  [facility];  Service: Transplant;  Laterality: N/A;    NECK SURGERY      fusion on C5 and C6    THROMBECTOMY  5/17/2020    Procedure: THROMBECTOMY;  Surgeon: Danny Thakkar MD;  [facility];  Service: Transplant;;  portal vein  thrombectomy    ULNAR NERVE TRANSPOSITION Left     UMBILICAL HERNIA REPAIR N/A 2/22/2020    Procedure: REPAIR, HERNIA, PRIMARY WIHTOUT MESH AND PLACEMENT OF DRAIN;  Surgeon: Sherri Brunner MD;  Location: [facility] OR [address] [place] [address];  Service: Transplant;  Laterality: N/A;    vericose veins removed         Review of patient's allergies indicates:  No Known Allergies    Family History     Problem Relation (Age of Onset)    Alcohol abuse Brother    Cancer Brother    Hyperlipidemia Mother    No Known Problems Father (36), Sister, Sister        Tobacco Use    Smoking status: Former Smoker     Types: Cigarettes     Last attempt to quit: 1/4/2010     Years since quitting: 10.4    Smokeless tobacco: Former User     Types: Chew     Quit date: 2/24/1990    Tobacco comment: former 1 pack/week quit 1/4/2010   Substance and Sexual Activity    Alcohol use: No     Comment: former heavy beer but quit 1/4/2010    Drug use: No    Sexual activity: Never     Comment:          (Not in a hospital admission)    Review of Systems   Constitutional: Negative for activity change, appetite change, chills, diaphoresis and fever.   HENT: Negative for congestion, sinus pressure, sinus pain, sore throat and trouble swallowing.    Eyes: Negative for visual disturbance.   Respiratory: Negative for chest tightness, shortness of breath and stridor.    Cardiovascular: Positive for leg swelling. Negative for chest pain and palpitations.   Gastrointestinal: Positive for abdominal distention. Negative for abdominal pain, constipation, diarrhea, nausea and vomiting.   Genitourinary: Negative for decreased urine volume, difficulty urinating, dysuria and flank pain.   Musculoskeletal: Negative for neck pain and neck stiffness.   Skin: Negative for color change and rash.   Allergic/Immunologic: Positive for immunocompromised state.   Neurological: Negative for dizziness, tremors, syncope, light-headedness and headaches.   Psychiatric/Behavioral:  Negative for agitation, confusion, decreased concentration and hallucinations. The patient is not nervous/anxious.      Objective:     Vital Signs (Most Recent):  Temp: 98.6 °F (37 °C) (05/30/20 1903)  Pulse: 98(Simultaneous filing. User may not have seen previous data.) (05/30/20 1903)  Resp: 18 (05/30/20 1903)  BP: (!) 154/68 (05/30/20 1903)  SpO2: 98 % (05/30/20 1903) Vital Signs (24h Range):  Temp:  [98.4 °F (36.9 °C)-98.6 °F (37 °C)] 98.6 °F (37 °C)  Pulse:  [] 98  Resp:  [18] 18  SpO2:  [98 %-100 %] 98 %  BP: (146-154)/(68-80) 154/68     Weight: 98.1 kg (216 lb 4.3 oz)  Body mass index is 30.16 kg/m².      Intake/Output Summary (Last 24 hours) at 5/30/2020 2008  Last data filed at 5/30/2020 1920  Gross per 24 hour   Intake --   Output 1200 ml   Net -1200 ml       Physical Exam   Constitutional: He is oriented to person, place, and time. No distress.   HENT:   Head: Normocephalic and atraumatic.   Eyes: Right eye exhibits no discharge. Left eye exhibits no discharge. No scleral icterus.   Neck: Normal range of motion. Neck supple.   Cardiovascular: Normal rate, regular rhythm, normal heart sounds and intact distal pulses.   Pulmonary/Chest: Effort normal and breath sounds normal. He has no wheezes. He has no rales.   Abdominal: Soft. Bowel sounds are normal. He exhibits distension. There is no tenderness. There is no guarding.   Musculoskeletal: He exhibits edema (3+ pitting edema in BLE; scotal edema; back edema).   Neurological: He is alert and oriented to person, place, and time. No cranial nerve deficit.   Skin: Skin is warm and dry. Capillary refill takes less than 2 seconds. He is not diaphoretic.   Psychiatric: Judgment and thought content normal.   Nursing note and vitals reviewed.      Laboratory:  CBC:   Recent Labs   Lab 05/30/20  1804   WBC 6.41   RBC 3.80*   HGB 10.2*   HCT 34.5*   *   MCV 91   MCH 26.8*   MCHC 29.6*     CMP:   Recent Labs   Lab 05/30/20  1804   *   CALCIUM 8.8    ALBUMIN 3.4*   PROT 6.5   *   K 4.8   CO2 24      BUN 4*   CREATININE 0.7   ALKPHOS 292*   ALT 58*   AST 36   BILITOT 1.3*     Labs within the past 24 hours have been reviewed.    Diagnostic Results:  I have personally reviewed all pertinent imaging studies.

## 2020-05-31 NOTE — ED NOTES
Telemetry Verification   Patient placed on Telemetry Box  Verified with War Room  Box # [id number]   Monitor Tech    Rate 91   Rhythm Normal Sinus

## 2020-05-31 NOTE — ASSESSMENT & PLAN NOTE
- given lasix 80 mg IV x 1 in ED with good response  - continue IV diuresis with lasix  - strict intake/output  - daily weights

## 2020-05-31 NOTE — ED NOTES
Report received from [facility]. Care assumed. Pt resting comfortably and independently repositioned in stretcher with bed locked in lowest position for safety. NAD and patient states she feels a little better. Respirations even and unlabored and visible chest rise noted. Patient offered bathroom assistance and denies need at this time. Urinal at bedside for voiding needs; pt using independently. Pt updated on POC and all questions and concerns addressed at this time. Pt instructed to call if assistance is needed. Call light in reach, bed locked and low, side rails raised x2. Pt remains on cardiac monitor, automated BP cuff, and continuous pulse ox. MD Farhan at bedside for abdominal ultrasound. Fall risk band applied to patient and pt educated on fall risk status.

## 2020-05-31 NOTE — SUBJECTIVE & OBJECTIVE
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    Esophageal varices     GERD (gastroesophageal reflux disease)     Hepatic encephalopathy 7/8/2014    Hepatitis C virus infection 07/08/2014    tx with interferon 3-4 mos- stopped for unclear reasons Tattoos-first at age 17 only risk factor (HCVAB negative 08/2017)    HTN (hypertension) 7/8/2014    Hypertension     Insulin dependent diabetes mellitus     Renal mass, left 7/8/2014    6.3 x 5.7 x 5.6 complex mass    Splenomegaly 7/8/2014    Umbilical hernia 7/8/2014       Past Surgical History:   Procedure Laterality Date    CARPAL TUNNEL RELEASE Bilateral     CHOLECYSTECTOMY      lap    COLONOSCOPY N/A 9/8/2017    Procedure: COLONOSCOPY;  Surgeon: Savannah Llanos MD;  Location: [facility];  Service: Endoscopy;  Laterality: N/A;    COLONOSCOPY Left 3/14/2018    Procedure: COLONOSCOPY;  Surgeon: Savannah Llanos MD;  Location: [facility];  Service: Endoscopy;  Laterality: Left;    ESOPHAGOGASTRODUODENOSCOPY  01/2014    ESOPHAGOGASTRODUODENOSCOPY  02/15/2017    grade II varices    ESOPHAGOGASTRODUODENOSCOPY  04/10/2017    grade I varices    ESOPHAGOGASTRODUODENOSCOPY N/A 10/18/2019    Procedure: EGD (ESOPHAGOGASTRODUODENOSCOPY);  Surgeon: Flavio Escalera MD;  Location: [facility] ([address] [place] [address]);  Service: Endoscopy;  Laterality: N/A;    ESOPHAGOGASTRODUODENOSCOPY W/ BANDING  01/26/2017    grade II varices    HERNIA REPAIR      LIVER TRANSPLANT N/A 5/17/2020    Procedure: TRANSPLANT, LIVER;  Surgeon: Danny Thakkar MD;  [facility];  Service: Transplant;  Laterality: N/A;    NECK SURGERY      fusion on C5 and C6    THROMBECTOMY  5/17/2020    Procedure: THROMBECTOMY;  Surgeon: Danny Thakkar MD;  [facility];  Service: Transplant;;  portal vein  thrombectomy    ULNAR NERVE TRANSPOSITION Left     UMBILICAL HERNIA REPAIR N/A 2/22/2020    Procedure: REPAIR, HERNIA, PRIMARY WIHTOUT MESH AND PLACEMENT OF DRAIN;  Surgeon: Sherri Brunner MD;  Location: [facility] OR [address] [place] [address];  Service: Transplant;  Laterality: N/A;    vericose veins removed         Review of patient's allergies indicates:  No Known Allergies    Family History     Problem Relation (Age of Onset)    Alcohol abuse Brother    Cancer Brother    Hyperlipidemia Mother    No Known Problems Father (36), Sister, Sister        Tobacco Use    Smoking status: Former Smoker     Types: Cigarettes     Last attempt to quit: 1/4/2010     Years since quitting: 10.4    Smokeless tobacco: Former User     Types: Chew     Quit date: 2/24/1990    Tobacco comment: former 1 pack/week quit 1/4/2010   Substance and Sexual Activity    Alcohol use: No     Comment: former heavy beer but quit 1/4/2010    Drug use: No    Sexual activity: Never     Comment:        PTA Medications   Medication Sig    aspirin 81 MG Chew Take 1 tablet (81 mg total) by mouth once daily.    bisacodyL (DULCOLAX) 5 mg EC tablet Take 2 tablets (10 mg total) by mouth every evening.    ciprofloxacin HCl (CIPRO) 500 MG tablet Take 1 tablet (500 mg total) by mouth every 12 (twelve) hours. STOP 5/31/20 for 8 days    EASY TOUCH INSULIN SYRINGE 1 mL 31 gauge x 5/16 Syrg     escitalopram oxalate (LEXAPRO) 20 MG tablet Take 20 mg by mouth once daily.     famotidine (PEPCID) 20 MG tablet Take 1 tablet (20 mg total) by mouth every evening.    finasteride (PROSCAR) 5 mg tablet Take 1 tablet (5 mg total) by mouth once daily.    furosemide (LASIX) 40 MG tablet Take 1 tablet (40 mg total) by mouth once daily.    gabapentin (NEURONTIN) 600 MG tablet Take 600 mg by mouth 3 (three) times daily.     HYDROcodone-acetaminophen (NORCO)  mg per tablet Take 1 tablet by mouth every 12 (twelve) hours as needed for Pain.    insulin aspart U-100  "(NOVOLOG) 100 unit/mL injection Inject 4 Units into the skin 3 (three) times daily before meals. + sliding scale (max TDD 45 units) (Patient not taking: Reported on 2020)    insulin detemir U-100 (LEVEMIR FLEXTOUCH) 100 unit/mL (3 mL) SubQ InPn pen Inject 12 Units into the skin once daily. (Patient not taking: Reported on 2020)    mycophenolate (CELLCEPT) 250 mg Cap Take 4 capsules (1,000 mg total) by mouth 2 (two) times daily.    oxybutynin (DITROPAN) 5 MG Tab Take 5 mg by mouth once daily.    pen needle, diabetic 32 gauge x " Ndle use one pen needle  3 (three) times daily with insulin pen    posaconazole (NOXAFIL) 100 mg TbEC tablet Take 3 tablets (300 mg total) by mouth every evening. STOP 20    potassium chloride (MICRO-K) 10 MEQ CpSR Take 3 capsules (30 mEq total) by mouth 2 (two) times daily.    predniSONE (DELTASONE) 5 MG tablet Take 20 mg by mouth  daily -; 15 mg daily -; 10 mg daily -; 5 mg  daily -; stop: 2020    QUEtiapine (SEROQUEL) 25 MG Tab Take 1 tablet (25 mg total) by mouth every evening.    [] sodium polystyrene (KAYEXALATE) powder Take 30 g by mouth once. For elevated potassium level. for 1 dose    sulfamethoxazole-trimethoprim 400-80mg (BACTRIM,SEPTRA) 400-80 mg per tablet Take 1 tablet by mouth once daily. Stop: 2020    tacrolimus (PROGRAF) 0.5 MG Cap Take 2 capsules (1 mg total) by mouth every 12 (twelve) hours.    tamsulosin (FLOMAX) 0.4 mg Cp24 Take 1 capsule (0.4 mg total) by mouth once daily.    TRUE METRIX GLUCOSE TEST STRIP Strp use to test blood sugar 4 times daily    TRUEPLUS LANCETS 30 gauge Misc     valGANciclovir (VALCYTE) 450 mg Tab Take 1 tablet (450 mg total) by mouth once daily. Stop: 2020       Review of Systems   Constitutional: Negative for activity change, appetite change, chills, diaphoresis and fever.   HENT: Negative for congestion, sinus pressure, sinus pain, sore throat and trouble " swallowing.    Eyes: Negative for visual disturbance.   Respiratory: Negative for chest tightness, shortness of breath and stridor.    Cardiovascular: Positive for leg swelling. Negative for chest pain and palpitations.   Gastrointestinal: Positive for abdominal distention. Negative for abdominal pain, constipation, diarrhea, nausea and vomiting.   Genitourinary: Negative for decreased urine volume, difficulty urinating, dysuria and flank pain.   Musculoskeletal: Negative for neck pain and neck stiffness.   Skin: Negative for color change and rash.   Allergic/Immunologic: Positive for immunocompromised state.   Neurological: Negative for dizziness, tremors, syncope, light-headedness and headaches.   Psychiatric/Behavioral: Negative for agitation, confusion, decreased concentration and hallucinations. The patient is not nervous/anxious.      Objective:     Vital Signs (Most Recent):  Temp: 98.3 °F (36.8 °C) (05/31/20 1057)  Pulse: 87 (05/31/20 1141)  Resp: 18 (05/31/20 1059)  BP: 136/81 (05/31/20 1059)  SpO2: 98 % (05/31/20 1059) Vital Signs (24h Range):  Temp:  [98.3 °F (36.8 °C)-98.8 °F (37.1 °C)] 98.3 °F (36.8 °C)  Pulse:  [] 87  Resp:  [17-18] 18  SpO2:  [96 %-100 %] 98 %  BP: (131-154)/(68-96) 136/81     Weight: 92.3 kg (203 lb 7.8 oz)  Body mass index is 28.38 kg/m².      Intake/Output Summary (Last 24 hours) at 5/31/2020 1236  Last data filed at 5/31/2020 1053  Gross per 24 hour   Intake 240 ml   Output 4825 ml   Net -4585 ml       Physical Exam   Constitutional: He is oriented to person, place, and time. No distress.   HENT:   Head: Normocephalic and atraumatic.   Eyes: Right eye exhibits no discharge. Left eye exhibits no discharge. No scleral icterus.   Neck: Normal range of motion. Neck supple.   Cardiovascular: Normal rate, regular rhythm, normal heart sounds and intact distal pulses.   Pulmonary/Chest: Effort normal and breath sounds normal. He has no wheezes. He has no rales.   Abdominal: Soft.  Bowel sounds are normal. He exhibits distension. There is no tenderness. There is no guarding.   Musculoskeletal: He exhibits edema (3+ pitting edema in BLE; scotal edema; back edema).   Neurological: He is alert and oriented to person, place, and time. No cranial nerve deficit.   Skin: Skin is warm and dry. Capillary refill takes less than 2 seconds. He is not diaphoretic.   Psychiatric: He has a normal mood and affect. His behavior is normal. Judgment and thought content normal.   Nursing note and vitals reviewed.      Laboratory:  CBC:   Recent Labs   Lab 05/31/20  0541   WBC 4.92   RBC 3.08*   HGB 8.1*   HCT 28.0*   *   MCV 91   MCH 26.3*   MCHC 28.9*     CMP:   Recent Labs   Lab 05/31/20  0541   *   CALCIUM 7.7*   ALBUMIN 2.3*   PROT 4.7*   *   K 3.9   CO2 23      BUN 3*   CREATININE 0.7   ALKPHOS 183*   ALT 35   AST 21   BILITOT 1.0     Labs within the past 24 hours have been reviewed.    Diagnostic Results:  I have personally reviewed all pertinent imaging studies.

## 2020-05-31 NOTE — ASSESSMENT & PLAN NOTE
- continue cellcept, and steroid taper per protocol  - will check daily prograf levels, monitor for toxic side effects, and adjust for therapeutic dose  - prograf on hold for supra-therapeutic level; resume when appropriate

## 2020-06-01 VITALS
RESPIRATION RATE: 14 BRPM | WEIGHT: 196.19 LBS | OXYGEN SATURATION: 97 % | SYSTOLIC BLOOD PRESSURE: 145 MMHG | HEIGHT: 71 IN | BODY MASS INDEX: 27.47 KG/M2 | TEMPERATURE: 98 F | HEART RATE: 79 BPM | DIASTOLIC BLOOD PRESSURE: 76 MMHG

## 2020-06-01 LAB
ABO + RH BLD: NORMAL
ALBUMIN SERPL BCP-MCNC: 2.9 G/DL (ref 3.5–5.2)
ALP SERPL-CCNC: 137 U/L (ref 55–135)
ALT SERPL W/O P-5'-P-CCNC: 24 U/L (ref 10–44)
ANION GAP SERPL CALC-SCNC: 8 MMOL/L (ref 8–16)
AST SERPL-CCNC: 10 U/L (ref 10–40)
BASOPHILS # BLD AUTO: 0.02 K/UL (ref 0–0.2)
BASOPHILS NFR BLD: 0.6 % (ref 0–1.9)
BILIRUB SERPL-MCNC: 0.9 MG/DL (ref 0.1–1)
BLD GP AB SCN CELLS X3 SERPL QL: NORMAL
BUN SERPL-MCNC: 5 MG/DL (ref 6–20)
CALCIUM SERPL-MCNC: 8 MG/DL (ref 8.7–10.5)
CHLORIDE SERPL-SCNC: 106 MMOL/L (ref 95–110)
CO2 SERPL-SCNC: 25 MMOL/L (ref 23–29)
CREAT SERPL-MCNC: 0.8 MG/DL (ref 0.5–1.4)
DIFFERENTIAL METHOD: ABNORMAL
EOSINOPHIL # BLD AUTO: 0.1 K/UL (ref 0–0.5)
EOSINOPHIL NFR BLD: 2.4 % (ref 0–8)
ERYTHROCYTE [DISTWIDTH] IN BLOOD BY AUTOMATED COUNT: 20.3 % (ref 11.5–14.5)
EST. GFR  (AFRICAN AMERICAN): >60 ML/MIN/1.73 M^2
EST. GFR  (NON AFRICAN AMERICAN): >60 ML/MIN/1.73 M^2
GLUCOSE SERPL-MCNC: 283 MG/DL (ref 70–110)
HCT VFR BLD AUTO: 25.1 % (ref 40–54)
HCT VFR BLD AUTO: 27.7 % (ref 40–54)
HGB BLD-MCNC: 7.4 G/DL (ref 14–18)
HGB BLD-MCNC: 8.3 G/DL (ref 14–18)
IMM GRANULOCYTES # BLD AUTO: 0.08 K/UL (ref 0–0.04)
IMM GRANULOCYTES NFR BLD AUTO: 2.4 % (ref 0–0.5)
LYMPHOCYTES # BLD AUTO: 0.6 K/UL (ref 1–4.8)
LYMPHOCYTES NFR BLD: 18.6 % (ref 18–48)
MAGNESIUM SERPL-MCNC: 1.8 MG/DL (ref 1.6–2.6)
MCH RBC QN AUTO: 26.5 PG (ref 27–31)
MCHC RBC AUTO-ENTMCNC: 29.5 G/DL (ref 32–36)
MCV RBC AUTO: 90 FL (ref 82–98)
MONOCYTES # BLD AUTO: 0.6 K/UL (ref 0.3–1)
MONOCYTES NFR BLD: 18 % (ref 4–15)
NEUTROPHILS # BLD AUTO: 1.9 K/UL (ref 1.8–7.7)
NEUTROPHILS NFR BLD: 58 % (ref 38–73)
NRBC BLD-RTO: 0 /100 WBC
PHOSPHATE SERPL-MCNC: 2.9 MG/DL (ref 2.7–4.5)
PLATELET # BLD AUTO: 100 K/UL (ref 150–350)
PMV BLD AUTO: 10.8 FL (ref 9.2–12.9)
POTASSIUM SERPL-SCNC: 3.8 MMOL/L (ref 3.5–5.1)
PROT SERPL-MCNC: 5 G/DL (ref 6–8.4)
RBC # BLD AUTO: 2.79 M/UL (ref 4.6–6.2)
SODIUM SERPL-SCNC: 139 MMOL/L (ref 136–145)
TACROLIMUS BLD-MCNC: 6.4 NG/ML (ref 5–15)
WBC # BLD AUTO: 3.28 K/UL (ref 3.9–12.7)

## 2020-06-01 PROCEDURE — 36415 COLL VENOUS BLD VENIPUNCTURE: CPT

## 2020-06-01 PROCEDURE — 84100 ASSAY OF PHOSPHORUS: CPT

## 2020-06-01 PROCEDURE — 25000003 PHARM REV CODE 250: Performed by: NURSE PRACTITIONER

## 2020-06-01 PROCEDURE — 86920 COMPATIBILITY TEST SPIN: CPT

## 2020-06-01 PROCEDURE — 85018 HEMOGLOBIN: CPT

## 2020-06-01 PROCEDURE — 80053 COMPREHEN METABOLIC PANEL: CPT

## 2020-06-01 PROCEDURE — 86901 BLOOD TYPING SEROLOGIC RH(D): CPT

## 2020-06-01 PROCEDURE — 80197 ASSAY OF TACROLIMUS: CPT

## 2020-06-01 PROCEDURE — 83735 ASSAY OF MAGNESIUM: CPT

## 2020-06-01 PROCEDURE — P9047 ALBUMIN (HUMAN), 25%, 50ML: HCPCS | Mod: JG | Performed by: NURSE PRACTITIONER

## 2020-06-01 PROCEDURE — 85014 HEMATOCRIT: CPT

## 2020-06-01 PROCEDURE — 99239 HOSP IP/OBS DSCHRG MGMT >30: CPT | Mod: 24,,, | Performed by: NURSE PRACTITIONER

## 2020-06-01 PROCEDURE — 25000003 PHARM REV CODE 250: Performed by: PHYSICIAN ASSISTANT

## 2020-06-01 PROCEDURE — 63600175 PHARM REV CODE 636 W HCPCS: Performed by: PHYSICIAN ASSISTANT

## 2020-06-01 PROCEDURE — 85025 COMPLETE CBC W/AUTO DIFF WBC: CPT

## 2020-06-01 PROCEDURE — 63600175 PHARM REV CODE 636 W HCPCS: Mod: JG | Performed by: NURSE PRACTITIONER

## 2020-06-01 PROCEDURE — 99239 PR HOSPITAL DISCHARGE DAY,>30 MIN: ICD-10-PCS | Mod: 24,,, | Performed by: NURSE PRACTITIONER

## 2020-06-01 RX ORDER — TACROLIMUS 0.5 MG/1
0.5 CAPSULE ORAL EVERY 12 HOURS
Qty: 60 CAPSULE | Refills: 11 | Status: SHIPPED | OUTPATIENT
Start: 2020-06-01 | End: 2020-06-08

## 2020-06-01 RX ORDER — HYDROCODONE BITARTRATE AND ACETAMINOPHEN 500; 5 MG/1; MG/1
TABLET ORAL
Status: DISCONTINUED | OUTPATIENT
Start: 2020-06-01 | End: 2020-06-01 | Stop reason: HOSPADM

## 2020-06-01 RX ORDER — INSULIN ASPART 100 [IU]/ML
INJECTION, SOLUTION INTRAVENOUS; SUBCUTANEOUS
Qty: 15 ML | Refills: 0 | Status: SHIPPED | OUTPATIENT
Start: 2020-06-01 | End: 2020-06-03

## 2020-06-01 RX ORDER — AMMONIUM LACTATE 12 G/100G
LOTION TOPICAL 2 TIMES DAILY PRN
Status: DISCONTINUED | OUTPATIENT
Start: 2020-06-01 | End: 2020-06-01 | Stop reason: HOSPADM

## 2020-06-01 RX ORDER — INSULIN ASPART 100 [IU]/ML
INJECTION, SOLUTION INTRAVENOUS; SUBCUTANEOUS
Qty: 3.6 ML | Refills: 11
Start: 2020-06-01 | End: 2020-06-01 | Stop reason: SDUPTHER

## 2020-06-01 RX ORDER — SODIUM POLYSTYRENE SULFONATE 4.1 MEQ/G
30 POWDER, FOR SUSPENSION ORAL; RECTAL ONCE
Qty: 60 G | Refills: 3 | Status: SHIPPED | OUTPATIENT
Start: 2020-06-01 | End: 2020-06-01

## 2020-06-01 RX ORDER — BISACODYL 5 MG
10 TABLET, DELAYED RELEASE (ENTERIC COATED) ORAL DAILY PRN
Refills: 0 | COMMUNITY
Start: 2020-06-01

## 2020-06-01 RX ADMIN — FINASTERIDE 5 MG: 5 TABLET, FILM COATED ORAL at 08:06

## 2020-06-01 RX ADMIN — ESCITALOPRAM OXALATE 20 MG: 20 TABLET ORAL at 08:06

## 2020-06-01 RX ADMIN — TACROLIMUS 0.5 MG: 0.5 CAPSULE ORAL at 07:06

## 2020-06-01 RX ADMIN — GABAPENTIN 600 MG: 300 CAPSULE ORAL at 08:06

## 2020-06-01 RX ADMIN — TAMSULOSIN HYDROCHLORIDE 0.4 MG: 0.4 CAPSULE ORAL at 08:06

## 2020-06-01 RX ADMIN — ASPIRIN 81 MG CHEWABLE TABLET 81 MG: 81 TABLET CHEWABLE at 08:06

## 2020-06-01 RX ADMIN — OXYBUTYNIN CHLORIDE 5 MG: 5 TABLET ORAL at 08:06

## 2020-06-01 RX ADMIN — HYDROCODONE BITARTRATE AND ACETAMINOPHEN 1 TABLET: 5; 325 TABLET ORAL at 07:06

## 2020-06-01 RX ADMIN — MYCOPHENOLATE MOFETIL 1000 MG: 250 CAPSULE ORAL at 08:06

## 2020-06-01 RX ADMIN — PREDNISONE 15 MG: 10 TABLET ORAL at 08:06

## 2020-06-01 RX ADMIN — GABAPENTIN 600 MG: 300 CAPSULE ORAL at 02:06

## 2020-06-01 RX ADMIN — FUROSEMIDE 40 MG: 10 INJECTION, SOLUTION INTRAMUSCULAR; INTRAVENOUS at 08:06

## 2020-06-01 RX ADMIN — SULFAMETHOXAZOLE AND TRIMETHOPRIM 1 TABLET: 400; 80 TABLET ORAL at 08:06

## 2020-06-01 RX ADMIN — ALBUMIN (HUMAN) 25 G: 12.5 SOLUTION INTRAVENOUS at 12:06

## 2020-06-01 RX ADMIN — VALGANCICLOVIR 450 MG: 450 TABLET, FILM COATED ORAL at 08:06

## 2020-06-01 NOTE — NURSING
Pt discharged. AVS reviewed with pt - verbalized understanding. Bedside med delivery done. Blue card updated. PIV removed. Pt being transported to apartments via [facility].

## 2020-06-01 NOTE — DISCHARGE SUMMARY
[facility]  Liver Transplant  Discharge Summary      Patient Name: Colin Reilly  MRN: [id number]  Admission Date: 5/30/2020  Hospital Length of Stay: 2 days  Discharge Date and Time:  06/01/2020 12:35 PM  Attending Physician: Nakul Teresa MD  Discharging Provider: Denae Patel NP  Primary Care Provider: Preston Matthew Ii, MD  Post-Operative Day: 15     ORGAN:   LIVER  Disease Etiology: Alcoholic Cirrhosis  Donor Type:   Donation after Circulatory Death   CDC High Risk:   No  Donor CMV Status:   Donor CMV Status: Negative  Donor HBcAB:   Negative  Donor HCV Status:   Negative  Whole or Partial: Whole Liver  Biliary Anastomosis: End to End  Arterial Anatomy: Standard    HPI:   Colin Reilly is a 58 y/o male, with 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  Intra op course unremarkable. Donor noted with Blood culture 1/2 with Klebsiella and Pseudomonas. ID recommending 14 day course of abx. Pt started on zosyn which was later transitioned to cefepime started. Transitioned to PO cipro upon discharge to complete duration of 14 day course (EOT 5/31). Recipient blood cultures NGTD.     Mr. Shaw presents to ED on 5/30 complaining of lower extremity swelling and abdominal distention worsening over the past couple of days. He has otherwise been doing well. Denies abdominal pain, n/v, constipation, diarrhea, chest pain, sob, dysuria, fever/chills. In ED, VSS. Given lasix 80 mg IV x 1 and responded well.     Pt continued to diurese well with Lasix 40 mg IV bid through the weekend. Weight down 20 lbs in 3 days. Edema to abdomen, back, scrotum and legs improving. Will continue to diurese with lasix 40 mg po daily on discharge.    Liver enzymes mildly elevated on admit. Liver US 5/31 showed satisfactory results. LFTs now improving.     Mr. Reilly is stable for discharge today. He has no complaints. He will f/u  with lab work and clinic visits per protocol. Pt verbalized understanding of all discharge instructions and importance of follow up.       Final Active Diagnoses:    Diagnosis Date Noted POA    PRINCIPAL PROBLEM:  Hypervolemia [E87.70] 05/30/2020 Yes    Long-term use of immunosuppressant medication [Z79.899] 05/20/2020 Not Applicable    Prophylactic immunotherapy [Z29.8] 05/20/2020 Not Applicable    At risk for opportunistic infections [Z91.89] 05/20/2020 Yes    Positive blood culture in cadaveric donor [Z00.5, R78.81] 05/20/2020 Not Applicable    Liver transplanted [Z94.4] 05/19/2020 Not Applicable    Anemia of chronic disease [D63.8] 05/15/2020 Yes      Problems Resolved During this Admission:           Pending Diagnostic Studies:     None        Significant Diagnostic Studies: Labs:   CMP   Recent Labs   Lab 05/30/20 1804 05/31/20  0541 06/01/20  0626   * 134* 139   K 4.8 3.9 3.8    103 106   CO2 24 23 25   * 178* 283*   BUN 4* 3* 5*   CREATININE 0.7 0.7 0.8   CALCIUM 8.8 7.7* 8.0*   PROT 6.5 4.7* 5.0*   ALBUMIN 3.4* 2.3* 2.9*   BILITOT 1.3* 1.0 0.9   ALKPHOS 292* 183* 137*   AST 36 21 10   ALT 58* 35 24   ANIONGAP 9 8 8   ESTGFRAFRICA >60.0 >60.0 >60.0   EGFRNONAA >60.0 >60.0 >60.0    and CBC   Recent Labs   Lab 05/30/20  1804 05/31/20  0541 06/01/20  0626 06/01/20  1014   WBC 6.41 4.92 3.28*  --    HGB 10.2* 8.1* 7.4* 8.3*   HCT 34.5* 28.0* 25.1* 27.7*   * 118* 100*  --        The patients clinical status was discussed at multidisplinary rounds, involving transplant surgery, transplant medicine, pharmacy, nursing, nutrition, and social work    Discharged Condition: good    Disposition: Home or Self Care    Follow Up: as above    Patient Instructions:      Diet Adult Regular     Call MD for:  temperature >100.4     Call MD for:  persistent nausea and vomiting or diarrhea     Call MD for:  severe uncontrolled pain     Call MD for:  redness, tenderness, or signs of infection  (pain, swelling, redness, odor or green/yellow discharge around incision site)     Call MD for:  difficulty breathing or increased cough     Call MD for:  severe persistent headache     Call MD for:  worsening rash     Call MD for:  persistent dizziness, light-headedness, or visual disturbances     Call MD for:  increased confusion or weakness     Call MD for:   Order Comments: Any unusual problems or concerns.     Activity as tolerated     Medications:  Reconciled Home Medications:      Medication List      START taking these medications    insulin aspart U-100 100 unit/mL (3 mL) Inpn pen  Commonly known as:  NovoLOG  Per sliding scale (150-200 +1 units, 201-250 +2, 251-300 +3, 301-350 +4, > 350 +5) MDD: 15 units        CHANGE how you take these medications    bisacodyL 5 mg EC tablet  Commonly known as:  DULCOLAX  Take 2 tablets (10 mg total) by mouth daily as needed for Constipation.  What changed:    · when to take this  · reasons to take this     sodium polystyrene powder  Commonly known as:  KAYEXALATE  Mix 30 g into 120mLs of water to take by mouth once. For elevated potassium level. for 1 dose. THEN TAKE AS DIRECTED BY TRANSPLANT CLINIC.  What changed:  additional instructions     tacrolimus 0.5 MG Cap  Commonly known as:  PROGRAF  Take 1 capsule (0.5 mg total) by mouth every 12 (twelve) hours.  What changed:  how much to take        CONTINUE taking these medications    aspirin 81 MG Chew  Take 1 tablet (81 mg total) by mouth once daily.     EASY TOUCH INSULIN SYRINGE 1 mL 31 gauge x 5/16 Syrg  Generic drug:  insulin syringe-needle U-100     escitalopram oxalate 20 MG tablet  Commonly known as:  LEXAPRO  Take 20 mg by mouth once daily.     famotidine 20 MG tablet  Commonly known as:  PEPCID  Take 1 tablet (20 mg total) by mouth every evening.     finasteride 5 mg tablet  Commonly known as:  PROSCAR  Take 1 tablet (5 mg total) by mouth once daily.     furosemide 40 MG tablet  Commonly known as:  LASIX  Take 1  "tablet (40 mg total) by mouth once daily.     gabapentin 600 MG tablet  Commonly known as:  NEURONTIN  Take 600 mg by mouth 3 (three) times daily.     HYDROcodone-acetaminophen  mg per tablet  Commonly known as:  NORCO  Take 1 tablet by mouth every 12 (twelve) hours as needed for Pain.     mycophenolate 250 mg Cap  Commonly known as:  CELLCEPT  Take 4 capsules (1,000 mg total) by mouth 2 (two) times daily.     oxybutynin 5 MG Tab  Commonly known as:  DITROPAN  Take 5 mg by mouth once daily.     pen needle, diabetic 32 gauge x 5/32" Ndle  use one pen needle  3 (three) times daily with insulin pen     posaconazole 100 mg Tbec tablet  Commonly known as:  NOXAFIL  Take 3 tablets (300 mg total) by mouth every evening. STOP 6/17/20     predniSONE 5 MG tablet  Commonly known as:  DELTASONE  Take 20 mg by mouth  daily 5/24-5/30; 15 mg daily 5/31-6/6; 10 mg daily 6/7-6/13; 5 mg  daily 6/14-6/20; stop: 6/21/2020     QUEtiapine 25 MG Tab  Commonly known as:  SEROQUEL  Take 1 tablet (25 mg total) by mouth every evening.     sulfamethoxazole-trimethoprim 400-80mg 400-80 mg per tablet  Commonly known as:  BACTRIM,SEPTRA  Take 1 tablet by mouth once daily. Stop: 11/14/2020     tamsulosin 0.4 mg Cap  Commonly known as:  FLOMAX  Take 1 capsule (0.4 mg total) by mouth once daily.     TRUE METRIX GLUCOSE TEST STRIP Strp  Generic drug:  blood sugar diagnostic  use to test blood sugar 4 times daily     TRUEPLUS LANCETS 30 gauge Misc  Generic drug:  lancets     valGANciclovir 450 mg Tab  Commonly known as:  VALCYTE  Take 1 tablet (450 mg total) by mouth once daily. Stop: 8/16/2020        STOP taking these medications    ciprofloxacin HCl 500 MG tablet  Commonly known as:  CIPRO     insulin detemir U-100 100 unit/mL (3 mL) Inpn pen  Commonly known as:  LEVEMIR FLEXTOUCH     potassium chloride 10 MEQ Cpsr  Commonly known as:  MICRO-K          Time spent caring for patient (Greater than 1/2 spent in direct face-to-face contact): > 30 " minutes    Denae Patel NP  Liver Transplant  [facility]

## 2020-06-01 NOTE — PROGRESS NOTES
Admit/Discharge Note     Met with patient to assess needs. Patient is a 59 y.o.  male, admitted for hypervolemia.      Patient admitted through the emergency room on 5/30/2020 .  At this time, patient presents as alert and oriented x 4, pleasant, good eye contact, recall good, concentration/judgement good, average intelligence, calm, communicative and cooperative.  At this time, patients caregiver presents as not present.    Household/Family Systems     Patient resides with patient's mother, at  [address]  [place] [address].  Local Lodging:  [address] [facility]. Support system includes pt's mother, Suzanne, and pt's aunt, Day Benton ([phone]).  Patient does not have dependents that are need of being cared for.     Patients primary caregiver is Suzanne Malik, patients mother, phone number [phone].  Confirmed patients contact information is [phone] (home);   Telephone Information:   Mobile [phone]   .    During admission, patient's caregiver plans to stay at the [address].  Confirmed patient and patients caregivers do have access to reliable transportation by utilizing [employer].    Cognitive Status/Learning     Patient reports reading ability as 12th grade and states patient does have difficulty with memory and vision (wears prescription glasses). Patient reports patient learns best by written information and demonstration.   Needed: No.   Highest education level: High School (9-12) or GED    Vocation/Disability   .  Working for Income: No  If no, reason not working: Disability  Patient is disabled due to neck and back injury since 2002.  Prior to disability, patient  was employed in the oil industry.    Adherence     Patient reports a high level of adherence to patients health care regimen.  Adherence counseling and education provided. Patient verbalizes understanding.    Substance Use    Patient reports the following  substance usage.    Tobacco: none, patient denies any use.  Alcohol: none, patient denies any use.  Illicit Drugs/Non-prescribed Medications: none, patient denies any use.  Patient states clear understanding of the potential impact of substance use.  Substance abstinence/cessation counseling, education and resources provided and reviewed.     Services Utilizing/ADLS    Infusion Service: Prior to admission, patient utilizing? no  Home Health: Prior to admission, patient utilizing? no; [facility] in the past  DME: Prior to admission, no  Pulmonary/Cardiac Rehab: Prior to admission, no  Dialysis:  Prior to admission, no  Transplant Specialty Pharmacy:  Prior to admission, yes; [facility].    Prior to admission, patient reports patient was not independent with ADLS and was not driving.  Patient reports patient is not able to care for self at this time due to compromised medical condition (as documented in medical record) and physical weakness..  Patient indicates a willingness to care for self once medically cleared to do so.    Insurance/Medications    Insured by   Payor/Plan Subscr  Sex Relation Sub. Ins. ID Effective Group Num   1. HU[employer] MANAGE* MEAGHANNEGAR KNIGSLEY IDALMIS 1960 Male  [id number] [birth year] [id number]                                   [address] [address]      Primary Insurance (for UNOS reporting): Public Insurance - Medicare FFS (Fee For Service)  Secondary Insurance (for UNOS reporting): None    Patient reports patient is able to obtain and afford medications at this time and at time of discharge.    Living Will/Healthcare Power of     Patient states patient does not have a LW and/or HCPA.   provided education regarding LW and HCPA and the completion of forms.    Coping/Mental Health    Patient is coping well with the aid of  pt's mother and ramon.  Pt reports minor depressive episodes when pt becomes tearful thinking of current health situation and not being able to  independently care for self.  However, pt denied serious depressive state and/or symptoms which alter daily functioning.  Patient denies mental health difficulties at this time.     Discharge Planning    At time of discharge, patient plans to return to [address] under the care of pt's mother.  Patients mother will present to the hospital to accompany the pt via taxi cab to the apartments.  Per rounds today, expected discharge date is today. Patient and patients caregiver  verbalize understanding and are involved in treatment planning and discharge process.    Additional Concerns    Patient is being followed for needs, education, resources, information, emotional support, supportive counseling, and for supportive and skilled discharge plan of care.  providing ongoing psychosocial support, education, resources and d/c planning as needed.  SW remains available.  remains available. Patient denies additional needs and/or concerns at this time. Patient verbalizes understanding and agreement with information reviewed, social work availability, and how to access available resources as needed.     JAMMIE received an Epic inbasket message indicating pt's aunt, Day, was requesting a call regarding concerns with the pt and pt's mother.  Upon contacting Day, she advised she had been attempting to contact the pt's mother via cell phone/[address] telephone and all calls have been unsuccessful.  JAMMIE advised she would contact the  and ask to have a well check completed and provide an update as appropriate.  JAMMIE contacted Namrata w/[facility] and advised of the situation.  Namrata was able to visit the apartment and confirmed pt's mother was well who reported the apartment telephone was not operating correctly and she was experiencing difficulty operating the smart phone. Namrata advised she would place a work order for the telephone on today. Pt's mother  stated she would contact the pt today as Namrata advised of the pt's discharge as per JAMMIE.  JAMMIE was able to contact Day to provide an update.  JAMMIE remains available.

## 2020-06-01 NOTE — PROGRESS NOTES
Wound care consult for heel wound.     PMH:  ESLD secondary to ETOH who is s/p DCD OLTX on 5/17/20 DM, HCV s/p INF, and hx of an umbilical hernia s/p umbilical hernia repair on 2/22/20    Patient states that he placed his feet in hot water and it caused blistering. The area appears to be healing well as only small area of scabbing noted. Patient with dry skin to the legs and feet.             Recommend  Lac-hydrin BID to the feet and legs    Wound care to sign off. Please reconsult if we can be of further assistance.   Mirlande Yancey RN CN CN   [phone]

## 2020-06-01 NOTE — PLAN OF CARE
Pt is AAOx3.Pt is ambulatory and independent.Pt able to perform all ADL's without assistance. Pt is in good spirits.  Pt denies pian and/or discomfort at this time.Telly monitoring on going. Pt turns independently, pt is aware of bony area and pressure reduction positions V/S stable, within normal limits.Pt remains injury and fall free, non skid footwear donned, call light within reach, personal items within reach, bed in low/locked position, pt able to voice needs all needs voiced have been met at this time.

## 2020-06-01 NOTE — PROGRESS NOTES
"Discharge Medication Note:    Hospital Course:  Mr Reilly was admitted with fluid overload.  Folloing diuresis, he is to be discahrged; patient reports hypoglycemia with Bg down to 12; Stopped scheduled insulin (levelmir and Novolog) and instructed Mr Reilly to check BG TID AC, following sliding scale.    Met with Colinhernandez Reilly at discharge to review discharge medications and to update the blue medication card.       MelbaKavon coronay IDALMIS   Home Medication Instructions ART:[id number]    Printed on:06/01/20 8091   Medication Information                      aspirin 81 MG Chew  Take 1 tablet (81 mg total) by mouth once daily.     bisacodyL (DULCOLAX) 5 mg EC tablet  Take 2 tablets (10 mg total) by mouth daily as needed for Constipation.     EASY TOUCH INSULIN SYRINGE 1 mL 31 gauge x 5/16 Syrg       escitalopram oxalate (LEXAPRO) 20 MG tablet  Take 20 mg by mouth once daily.      famotidine (PEPCID) 20 MG tablet  Take 1 tablet (20 mg total) by mouth every evening.     finasteride (PROSCAR) 5 mg tablet  Take 1 tablet (5 mg total) by mouth once daily.     furosemide (LASIX) 40 MG tablet  Take 1 tablet (40 mg total) by mouth once daily.     gabapentin (NEURONTIN) 600 MG tablet  Take 600 mg by mouth 3 (three) times daily.      HYDROcodone-acetaminophen (NORCO)  mg per tablet  Take 1 tablet by mouth every 12 (twelve) hours as needed for Pain.     insulin aspart U-100 (NOVOLOG) 100 unit/mL (3 mL) InPn pen  Per sliding scale (150-200 +1 units, 201-250 +2, 251-300 +3, 301-350 +4, > 350 +5) MDD: 15 units     mycophenolate (CELLCEPT) 250 mg Cap  Take 4 capsules (1,000 mg total) by mouth 2 (two) times daily.     oxybutynin (DITROPAN) 5 MG Tab  Take 5 mg by mouth once daily.     pen needle, diabetic 32 gauge x 5/32" Ndle  use one pen needle  3 (three) times daily with insulin pen     posaconazole (NOXAFIL) 100 mg TbEC tablet  Take 3 tablets (300 mg total) by mouth every evening. STOP 6/17/20     predniSONE (DELTASONE) 5 MG " tablet  Take 20 mg by mouth  daily 5/24-5/30; 15 mg daily 5/31-6/6; 10 mg daily 6/7-6/13; 5 mg  daily 6/14-6/20; stop: 6/21/2020     QUEtiapine (SEROQUEL) 25 MG Tab  Take 1 tablet (25 mg total) by mouth every evening.     sodium polystyrene (KAYEXALATE) powder  Mix 30 g into 120mLs of water to take by mouth once. For elevated potassium level. for 1 dose. THEN TAKE AS DIRECTED BY TRANSPLANT CLINIC.     sulfamethoxazole-trimethoprim 400-80mg (BACTRIM,SEPTRA) 400-80 mg per tablet  Take 1 tablet by mouth once daily. Stop: 11/14/2020     tacrolimus (PROGRAF) 0.5 MG Cap  Take 1 capsule (0.5 mg total) by mouth every 12 (twelve) hours.     tamsulosin (FLOMAX) 0.4 mg Cp24  Take 1 capsule (0.4 mg total) by mouth once daily.     TRUE METRIX GLUCOSE TEST STRIP Strp  use to test blood sugar 4 times daily     TRUEPLUS LANCETS 30 gauge Misc       valGANciclovir (VALCYTE) 450 mg Tab  Take 1 tablet (450 mg total) by mouth once daily. Stop: 8/16/2020         Pt was provided with prescriptions for the following meds:  Novolog insulin (with a voucher)    The following medications have been placed on HOLD and should be restarted in the outpatient setting (when appropriate): Anshu Reilly verbalized understanding and had the opportunity to ask questions.

## 2020-06-02 ENCOUNTER — TELEPHONE (OUTPATIENT)
Dept: TRANSPLANT | Facility: CLINIC | Age: 60
End: 2020-06-02

## 2020-06-02 NOTE — TELEPHONE ENCOUNTER
Pt called to check in with coordinator. He states he is feeling great.. We reviewed that he has a clinic visit tomorrow with Dash Stanton labs for us, and clinic visit with us at 1:40pm.

## 2020-06-02 NOTE — PROGRESS NOTES
Subjective:      Patient ID: Colin Reilly is a 59 y.o. male.    Chief Complaint:  Diabetes    History of Present Illness  Colin Reilly is here for follow up of T2DM.  Previously seen by inpatient endocrine team. This is their first visit with me.      Per hospital note 5/15/20 -   ESLD secondary to ETOH listed with MELD 22, DM, HCV s/p INF, and hx of an umbilical hernia s/p umbilical hernia repair on 2/22/20. Surgery uncomplicated but has had recurrent abdominal pain and increased drainage from surgical incision. Patient recently admitted 3/11-3/13 with similar symptoms, attempted paracentesis at this time- without fluid to drain. He re-presented to [facility] on 5/15 with c/o fever and abdominal pain (Tmax 103) x 1 week, distention and dark colored stools, received 1g Rocephin and transferred to [facility] for further care. Patient is being admitted for suspected SBP and infectious work-up.     S/p liver transplant 5/17/20.  Prednisone - NONE  Prior to transplant- DM was managed with basal/ bolus insulin. Never tried PO meds.    With regards to diabetes:    Diagnosed: 2002  Known complications:  DKA -  RN -  PN +  Nephropathy -  CAD -    Current regimen:  Novolog /50 TIDAC    Levemir - none as of one week ago.     Other medications tried:  None.     Glucose Monitor:   4 times a day testing  Log reviewed: yes log provided      Hypoglycemia:  Denies any since hospital admission.  Knows how to correct with 15 grams of carbs- juice, coke, or a peppermint.     Diet/Exercise:  Eats 3 meals a day.   B: eggs, Canadian toast, yogurt  L; hamburger, fried chicken, BBQ steak, salad  D: fish, potatoes, pasta, vegetables  Snacks : occasionally - yogurt.  Drinks : water, diet soft drinks, or tea.   Exercise - tries to stay active      Education - last visit: None.  Eye Exam: within the last year.   Podiatry: None.     Diabetes Management Status    Hemoglobin A1C   Date Value Ref Range Status   05/17/2020 5.3 4.0 -  5.6 % Final     Comment:     ADA Screening Guidelines:  5.7-6.4%  Consistent with prediabetes  >or=6.5%  Consistent with diabetes  High levels of fetal hemoglobin interfere with the HbA1C  assay. Heterozygous hemoglobin variants (HbS, HgC, etc)do  not significantly interfere with this assay.   However, presence of multiple variants may affect accuracy.     02/28/2020 6.7 (H) 4.0 - 5.6 % Final     Comment:     ADA Screening Guidelines:  5.7-6.4%  Consistent with prediabetes  >or=6.5%  Consistent with diabetes  High levels of fetal hemoglobin interfere with the HbA1C  assay. Heterozygous hemoglobin variants (HbS, HgC, etc)do  not significantly interfere with this assay.   However, presence of multiple variants may affect accuracy.     12/05/2019 7.6 (H) 4.0 - 5.6 % Final     Comment:     ADA Screening Guidelines:  5.7-6.4%  Consistent with prediabetes  >or=6.5%  Consistent with diabetes  High levels of fetal hemoglobin interfere with the HbA1C  assay. Heterozygous hemoglobin variants (HbS, HgC, etc)do  not significantly interfere with this assay.   However, presence of multiple variants may affect accuracy.     10/18/2016 9.3 % Final     Statin: Not taking  ACE/ARB: Not taking  Screening or Prevention Patient's value Goal Complete/Controlled?   HgA1C Testing and Control   Lab Results   Component Value Date    HGBA1C 5.3 05/17/2020      Annually/Less than 8% Yes   Lipid profile Most Recent Lipid Panel Health Maintenance Topic Completion: Not Found Annually No   LDL control Lab Results   Component Value Date    LDLCALC 116 10/18/2016    Annually/Less than 100 mg/dl  No   Nephropathy screening No results found for: LABMICR  Lab Results   Component Value Date    PROTEINUA Negative 05/15/2020    Annually Yes   Blood pressure BP Readings from Last 1 Encounters:   06/03/20 126/72    Less than 140/90 No   Dilated retinal exam Most Recent Eye Exam Date: Not Found Annually No   Foot exam   Most Recent Foot Exam Date: Not Found  "Annually No     Review of Systems   Constitutional: Negative for fatigue.   Eyes: Negative for visual disturbance.   Respiratory: Negative for shortness of breath.    Cardiovascular: Negative for chest pain.   Gastrointestinal: Negative for abdominal pain.   Musculoskeletal: Negative for arthralgias.   Skin: Positive for wound (abdominal incision - approximated).   Neurological: Negative for headaches.   Hematological: Does not bruise/bleed easily.   Psychiatric/Behavioral: Negative for sleep disturbance.     Objective:   Physical Exam   Neck: No thyromegaly present.   Cardiovascular: Normal rate.   No edema present   Pulmonary/Chest: Effort normal.   Abdominal: Soft.   Vitals reviewed.    BMD 8/2014  Normal BMD of lumbar spine and hip.    Injection sites are without edema or erythema. No lipo hypertropthy or atrophy.    Visit Vitals  /72 (BP Location: Right arm, Patient Position: Sitting, BP Method: Medium (Manual))   Resp 16   Ht 5' 11" (1.803 m)   Wt 84.8 kg (186 lb 15.2 oz)   BMI 26.07 kg/m²     Body mass index is 26.07 kg/m².    Lab Review:   Lab Results   Component Value Date    HGBA1C 5.3 05/17/2020    HGBA1C 6.7 (H) 02/28/2020    HGBA1C 7.6 (H) 12/05/2019       Lab Results   Component Value Date    CHOL 178 10/18/2016    HDL 35 10/18/2016    LDLCALC 116 10/18/2016    TRIG 134 10/18/2016    CHOLHDL 25.7 07/08/2014     Lab Results   Component Value Date     06/01/2020    K 3.8 06/01/2020     06/01/2020    CO2 25 06/01/2020     (H) 06/01/2020    BUN 5 (L) 06/01/2020    CREATININE 0.8 06/01/2020    CALCIUM 8.0 (L) 06/01/2020    PROT 5.0 (L) 06/01/2020    ALBUMIN 2.9 (L) 06/01/2020    BILITOT 0.9 06/01/2020    ALKPHOS 137 (H) 06/01/2020    AST 10 06/01/2020    ALT 24 06/01/2020    ANIONGAP 8 06/01/2020    ESTGFRAFRICA >60.0 06/01/2020    EGFRNONAA >60.0 06/01/2020    TSH 2.309 12/05/2019     Vit D, 25-Hydroxy   Date Value Ref Range Status   12/05/2019 13 (L) 30 - 96 ng/mL Final     " Comment:     Vitamin D deficiency.........<10 ng/mL                              Vitamin D insufficiency......10-29 ng/mL       Vitamin D sufficiency........> or equal to 30 ng/mL  Vitamin D toxicity............>100 ng/mL       Assessment and Plan     1. Type 2 diabetes mellitus without complication, with long-term current use of insulin  insulin aspart U-100 (NOVOLOG) 100 unit/mL (3 mL) InPn pen   2. Liver transplanted     3. Anemia of chronic disease       Type 2 diabetes mellitus without complication, with long-term current use of insulin  -- Patient likely going back home (around [place]) in one week.  Will see patient for close follow up.  Instructions given to set up patient portal.   -- A1c goal <7.5.  -- Medications discussed:  Insulin   -- Reviewed logs/CGM:  Global hyperglycemia.   Add base of prandial insulin with correction scale.   Reach out to me sooner for any glucose <70 or consistently >200.  -- Medication Changes:   CHANGE:  Novolog 2units plus /50 TIDAC - instructed to not give prandial insulin with bedtime check as previously doing.   -- Reviewed goals of therapy are to get the best control we can without hypoglycemia.  -- Reviewed patient's current insulin regimen. Clarified proper insulin dose and timing in relation to meals, etc. Insulin injection sites and proper rotation instructed.    -- Advised frequent self blood glucose monitoring.  Patient encouraged to document glucose results and bring them to every clinic visit.  -- Hypoglycemia precautions discussed. Instructed on precautions before driving.    -- Call for Bg repeatedly < 90 or > 180.   -- Close adherence to lifestyle changes recommended.   -- Periodic follow ups for eye evaluations, foot care and dental care suggested.    Liver transplanted  -- Continue following with transplant.     Anemia of chronic disease  -- Stable.    Follow up in about 1 week (around 6/10/2020).

## 2020-06-03 ENCOUNTER — OFFICE VISIT (OUTPATIENT)
Dept: ENDOCRINOLOGY | Facility: CLINIC | Age: 60
End: 2020-06-03
Payer: MEDICARE

## 2020-06-03 VITALS
RESPIRATION RATE: 16 BRPM | DIASTOLIC BLOOD PRESSURE: 72 MMHG | WEIGHT: 186.94 LBS | HEIGHT: 71 IN | BODY MASS INDEX: 26.17 KG/M2 | SYSTOLIC BLOOD PRESSURE: 126 MMHG

## 2020-06-03 DIAGNOSIS — E11.9 TYPE 2 DIABETES MELLITUS WITHOUT COMPLICATION, WITH LONG-TERM CURRENT USE OF INSULIN: Primary | ICD-10-CM

## 2020-06-03 DIAGNOSIS — Z94.4 LIVER TRANSPLANTED: ICD-10-CM

## 2020-06-03 DIAGNOSIS — Z79.4 TYPE 2 DIABETES MELLITUS WITHOUT COMPLICATION, WITH LONG-TERM CURRENT USE OF INSULIN: Primary | ICD-10-CM

## 2020-06-03 DIAGNOSIS — D63.8 ANEMIA OF CHRONIC DISEASE: ICD-10-CM

## 2020-06-03 PROCEDURE — 99999 PR PBB SHADOW E&M-EST. PATIENT-LVL V: CPT | Mod: PBBFAC,,, | Performed by: NURSE PRACTITIONER

## 2020-06-03 PROCEDURE — 3008F BODY MASS INDEX DOCD: CPT | Mod: CPTII,S$GLB,, | Performed by: NURSE PRACTITIONER

## 2020-06-03 PROCEDURE — 3078F PR MOST RECENT DIASTOLIC BLOOD PRESSURE < 80 MM HG: ICD-10-PCS | Mod: CPTII,S$GLB,, | Performed by: NURSE PRACTITIONER

## 2020-06-03 PROCEDURE — 99214 OFFICE O/P EST MOD 30 MIN: CPT | Mod: S$GLB,,, | Performed by: NURSE PRACTITIONER

## 2020-06-03 PROCEDURE — 3078F DIAST BP <80 MM HG: CPT | Mod: CPTII,S$GLB,, | Performed by: NURSE PRACTITIONER

## 2020-06-03 PROCEDURE — 3074F PR MOST RECENT SYSTOLIC BLOOD PRESSURE < 130 MM HG: ICD-10-PCS | Mod: CPTII,S$GLB,, | Performed by: NURSE PRACTITIONER

## 2020-06-03 PROCEDURE — 99214 PR OFFICE/OUTPT VISIT, EST, LEVL IV, 30-39 MIN: ICD-10-PCS | Mod: S$GLB,,, | Performed by: NURSE PRACTITIONER

## 2020-06-03 PROCEDURE — 99999 PR PBB SHADOW E&M-EST. PATIENT-LVL V: ICD-10-PCS | Mod: PBBFAC,,, | Performed by: NURSE PRACTITIONER

## 2020-06-03 PROCEDURE — 3044F HG A1C LEVEL LT 7.0%: CPT | Mod: CPTII,S$GLB,, | Performed by: NURSE PRACTITIONER

## 2020-06-03 PROCEDURE — 3008F PR BODY MASS INDEX (BMI) DOCUMENTED: ICD-10-PCS | Mod: CPTII,S$GLB,, | Performed by: NURSE PRACTITIONER

## 2020-06-03 PROCEDURE — 3044F PR MOST RECENT HEMOGLOBIN A1C LEVEL <7.0%: ICD-10-PCS | Mod: CPTII,S$GLB,, | Performed by: NURSE PRACTITIONER

## 2020-06-03 PROCEDURE — 3074F SYST BP LT 130 MM HG: CPT | Mod: CPTII,S$GLB,, | Performed by: NURSE PRACTITIONER

## 2020-06-03 RX ORDER — INSULIN ASPART 100 [IU]/ML
2 INJECTION, SOLUTION INTRAVENOUS; SUBCUTANEOUS
Qty: 15 ML | Refills: 0 | Status: SHIPPED | OUTPATIENT
Start: 2020-06-03 | End: 2020-08-05

## 2020-06-03 NOTE — ASSESSMENT & PLAN NOTE
-- Patient likely going back home (around [place]) in one week.  Will see patient for close follow up.  Instructions given to set up patient portal.   -- A1c goal <7.5.  -- Medications discussed:  Insulin   -- Reviewed logs/CGM:  Global hyperglycemia.   Add base of prandial insulin with correction scale.   Reach out to me sooner for any glucose <70 or consistently >200.  -- Medication Changes:   CHANGE:  Novolog 2units plus /50 TIDAC - instructed to not give prandial insulin with bedtime check as previously doing.   -- Reviewed goals of therapy are to get the best control we can without hypoglycemia.  -- Reviewed patient's current insulin regimen. Clarified proper insulin dose and timing in relation to meals, etc. Insulin injection sites and proper rotation instructed.    -- Advised frequent self blood glucose monitoring.  Patient encouraged to document glucose results and bring them to every clinic visit.  -- Hypoglycemia precautions discussed. Instructed on precautions before driving.    -- Call for Bg repeatedly < 90 or > 180.   -- Close adherence to lifestyle changes recommended.   -- Periodic follow ups for eye evaluations, foot care and dental care suggested.

## 2020-06-03 NOTE — TELEPHONE ENCOUNTER
----- Message from Aleksandar Smart sent at 5/11/2020  2:16 PM CDT -----  Contact: Pt  Wants to speak with coord      [phone])    ====================================    Phone call returned to patient.  He reports that he is returning a call.  He states that he was hoping that call was for a f/u appt.  He reports increased fluid retention, mostly in abdomen, which is pushing out his umbilical hernia.  Patient denies any acute pain/ distress, but reports that he is uncomfortable.  Instructed patient to report to ED for any worsening symptoms.  He voiced understanding and agrees to do so.  Will notify  that patient returned call and is requesting a sooner appt.  Informed patient that he should be receiving a call soon to discuss appts.  He verbalized understanding and denies any questions or any further need for assistance at this time.         Released to [employer]. Antoinette Duron,     Your chest xray is overall normal. How are your symptoms?      Dr. Julia Melgar

## 2020-06-04 LAB
BACTERIA BLD CULT: NORMAL
BACTERIA BLD CULT: NORMAL

## 2020-06-04 NOTE — TELEPHONE ENCOUNTER
----- Message from Daria Graham sent at 6/4/2020  3:03 PM CDT -----  Contact: PT   Pt called to speak to his coordinator Mari Gibson.     Callback: [phone]

## 2020-06-05 ENCOUNTER — TELEPHONE (OUTPATIENT)
Dept: TRANSPLANT | Facility: CLINIC | Age: 60
End: 2020-06-05

## 2020-06-05 LAB
BLD PROD TYP BPU: NORMAL
BLOOD UNIT EXPIRATION DATE: NORMAL
BLOOD UNIT TYPE CODE: 600
BLOOD UNIT TYPE: NORMAL
CODING SYSTEM: NORMAL
DISPENSE STATUS: NORMAL
TRANS ERYTHROCYTES VOL PATIENT: NORMAL ML

## 2020-06-05 RX ORDER — GABAPENTIN 600 MG/1
600 TABLET ORAL 3 TIMES DAILY
Qty: 90 TABLET | Refills: 2 | Status: SHIPPED | OUTPATIENT
Start: 2020-06-05 | End: 2022-11-15
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
## 2020-06-08 ENCOUNTER — LAB VISIT (OUTPATIENT)
Dept: LAB | Facility: HOSPITAL | Age: 60
End: 2020-06-08
Attending: SURGERY
Payer: MEDICARE

## 2020-06-08 DIAGNOSIS — Z94.4 LIVER TRANSPLANTED: ICD-10-CM

## 2020-06-08 LAB
ALBUMIN SERPL BCP-MCNC: 3.6 G/DL (ref 3.5–5.2)
ALP SERPL-CCNC: 290 U/L (ref 55–135)
ALT SERPL W/O P-5'-P-CCNC: 75 U/L (ref 10–44)
ANION GAP SERPL CALC-SCNC: 9 MMOL/L (ref 8–16)
ANISOCYTOSIS BLD QL SMEAR: SLIGHT
AST SERPL-CCNC: 36 U/L (ref 10–40)
BASOPHILS # BLD AUTO: ABNORMAL K/UL (ref 0–0.2)
BASOPHILS NFR BLD: 0 % (ref 0–1.9)
BILIRUB DIRECT SERPL-MCNC: 0.6 MG/DL (ref 0.1–0.3)
BILIRUB SERPL-MCNC: 1 MG/DL (ref 0.1–1)
BUN SERPL-MCNC: 7 MG/DL (ref 6–20)
CALCIUM SERPL-MCNC: 9.1 MG/DL (ref 8.7–10.5)
CHLORIDE SERPL-SCNC: 109 MMOL/L (ref 95–110)
CO2 SERPL-SCNC: 24 MMOL/L (ref 23–29)
CREAT SERPL-MCNC: 0.6 MG/DL (ref 0.5–1.4)
DACRYOCYTES BLD QL SMEAR: ABNORMAL
DIFFERENTIAL METHOD: ABNORMAL
EOSINOPHIL # BLD AUTO: ABNORMAL K/UL (ref 0–0.5)
EOSINOPHIL NFR BLD: 2 % (ref 0–8)
ERYTHROCYTE [DISTWIDTH] IN BLOOD BY AUTOMATED COUNT: 18.5 % (ref 11.5–14.5)
EST. GFR  (AFRICAN AMERICAN): >60 ML/MIN/1.73 M^2
EST. GFR  (NON AFRICAN AMERICAN): >60 ML/MIN/1.73 M^2
GLUCOSE SERPL-MCNC: 93 MG/DL (ref 70–110)
HCT VFR BLD AUTO: 31.7 % (ref 40–54)
HGB BLD-MCNC: 9.3 G/DL (ref 14–18)
HYPOCHROMIA BLD QL SMEAR: ABNORMAL
IMM GRANULOCYTES # BLD AUTO: ABNORMAL K/UL (ref 0–0.04)
IMM GRANULOCYTES NFR BLD AUTO: ABNORMAL % (ref 0–0.5)
LYMPHOCYTES # BLD AUTO: ABNORMAL K/UL (ref 1–4.8)
LYMPHOCYTES NFR BLD: 18 % (ref 18–48)
MCH RBC QN AUTO: 25.9 PG (ref 27–31)
MCHC RBC AUTO-ENTMCNC: 29.3 G/DL (ref 32–36)
MCV RBC AUTO: 88 FL (ref 82–98)
MONOCYTES # BLD AUTO: ABNORMAL K/UL (ref 0.3–1)
MONOCYTES NFR BLD: 4 % (ref 4–15)
NEUTROPHILS NFR BLD: 76 % (ref 38–73)
NRBC BLD-RTO: 0 /100 WBC
OVALOCYTES BLD QL SMEAR: ABNORMAL
PLATELET # BLD AUTO: 178 K/UL (ref 150–350)
PLATELET BLD QL SMEAR: ABNORMAL
PMV BLD AUTO: 9.3 FL (ref 9.2–12.9)
POIKILOCYTOSIS BLD QL SMEAR: SLIGHT
POLYCHROMASIA BLD QL SMEAR: ABNORMAL
POTASSIUM SERPL-SCNC: 3.5 MMOL/L (ref 3.5–5.1)
PROT SERPL-MCNC: 6.4 G/DL (ref 6–8.4)
RBC # BLD AUTO: 3.59 M/UL (ref 4.6–6.2)
SODIUM SERPL-SCNC: 142 MMOL/L (ref 136–145)
TACROLIMUS BLD-MCNC: 4.4 NG/ML (ref 5–15)
WBC # BLD AUTO: 6.1 K/UL (ref 3.9–12.7)

## 2020-06-08 PROCEDURE — 80197 ASSAY OF TACROLIMUS: CPT

## 2020-06-08 PROCEDURE — 85027 COMPLETE CBC AUTOMATED: CPT

## 2020-06-08 PROCEDURE — 36415 COLL VENOUS BLD VENIPUNCTURE: CPT

## 2020-06-08 PROCEDURE — 85007 BL SMEAR W/DIFF WBC COUNT: CPT

## 2020-06-08 PROCEDURE — 80053 COMPREHEN METABOLIC PANEL: CPT

## 2020-06-08 PROCEDURE — 82248 BILIRUBIN DIRECT: CPT

## 2020-06-08 NOTE — TELEPHONE ENCOUNTER
Attempted to call pt and his mother both phones went to VM and no VM set up on either. Will continue to contact pt.

## 2020-06-08 NOTE — TELEPHONE ENCOUNTER
----- Message from Pb Osorio sent at 6/8/2020  2:06 PM CDT -----  Contact: pt  Calling to speak with coordinator     Call back: [phone]

## 2020-06-08 NOTE — TELEPHONE ENCOUNTER
Returned call to pt, got VM which is not set up. Not urgent will talk with him in clinic tomorrow.

## 2020-06-08 NOTE — TELEPHONE ENCOUNTER
----- Message from Pb Osorio sent at 6/8/2020  2:06 PM CDT -----  Contact: pt  Calling to speak with coordinator     Call back: [phone]

## 2020-06-09 ENCOUNTER — OFFICE VISIT (OUTPATIENT)
Dept: TRANSPLANT | Facility: CLINIC | Age: 60
End: 2020-06-09
Payer: MEDICARE

## 2020-06-09 VITALS
DIASTOLIC BLOOD PRESSURE: 72 MMHG | HEIGHT: 71 IN | BODY MASS INDEX: 25.71 KG/M2 | SYSTOLIC BLOOD PRESSURE: 123 MMHG | HEART RATE: 100 BPM | TEMPERATURE: 98 F | WEIGHT: 183.63 LBS | RESPIRATION RATE: 17 BRPM | OXYGEN SATURATION: 100 %

## 2020-06-09 DIAGNOSIS — Z79.60 LONG-TERM USE OF IMMUNOSUPPRESSANT MEDICATION: ICD-10-CM

## 2020-06-09 DIAGNOSIS — Z94.4 S/P LIVER TRANSPLANT: ICD-10-CM

## 2020-06-09 DIAGNOSIS — Z29.89 PROPHYLACTIC IMMUNOTHERAPY: Primary | ICD-10-CM

## 2020-06-09 PROBLEM — R60.1 ANASARCA: Status: RESOLVED | Noted: 2019-10-17 | Resolved: 2020-06-09

## 2020-06-09 PROBLEM — R78.81 POSITIVE BLOOD CULTURE IN CADAVERIC DONOR: Status: RESOLVED | Noted: 2020-05-20 | Resolved: 2020-06-09

## 2020-06-09 PROBLEM — Z00.5 POSITIVE BLOOD CULTURE IN CADAVERIC DONOR: Status: RESOLVED | Noted: 2020-05-20 | Resolved: 2020-06-09

## 2020-06-09 PROBLEM — K74.60 CIRRHOSIS: Status: RESOLVED | Noted: 2017-01-26 | Resolved: 2020-06-09

## 2020-06-09 PROCEDURE — 99999 PR PBB SHADOW E&M-EST. PATIENT-LVL III: CPT | Mod: PBBFAC,,, | Performed by: TRANSPLANT SURGERY

## 2020-06-09 PROCEDURE — 3008F PR BODY MASS INDEX (BMI) DOCUMENTED: ICD-10-PCS | Mod: CPTII,S$GLB,, | Performed by: TRANSPLANT SURGERY

## 2020-06-09 PROCEDURE — 99999 PR PBB SHADOW E&M-EST. PATIENT-LVL III: ICD-10-PCS | Mod: PBBFAC,,, | Performed by: TRANSPLANT SURGERY

## 2020-06-09 PROCEDURE — 99213 OFFICE O/P EST LOW 20 MIN: CPT | Mod: 24,S$GLB,, | Performed by: TRANSPLANT SURGERY

## 2020-06-09 PROCEDURE — 3078F PR MOST RECENT DIASTOLIC BLOOD PRESSURE < 80 MM HG: ICD-10-PCS | Mod: CPTII,S$GLB,, | Performed by: TRANSPLANT SURGERY

## 2020-06-09 PROCEDURE — 3008F BODY MASS INDEX DOCD: CPT | Mod: CPTII,S$GLB,, | Performed by: TRANSPLANT SURGERY

## 2020-06-09 PROCEDURE — 99213 PR OFFICE/OUTPT VISIT, EST, LEVL III, 20-29 MIN: ICD-10-PCS | Mod: 24,S$GLB,, | Performed by: TRANSPLANT SURGERY

## 2020-06-09 PROCEDURE — 3074F SYST BP LT 130 MM HG: CPT | Mod: CPTII,S$GLB,, | Performed by: TRANSPLANT SURGERY

## 2020-06-09 PROCEDURE — 3074F PR MOST RECENT SYSTOLIC BLOOD PRESSURE < 130 MM HG: ICD-10-PCS | Mod: CPTII,S$GLB,, | Performed by: TRANSPLANT SURGERY

## 2020-06-09 PROCEDURE — 3078F DIAST BP <80 MM HG: CPT | Mod: CPTII,S$GLB,, | Performed by: TRANSPLANT SURGERY

## 2020-06-09 RX ORDER — TACROLIMUS 0.5 MG/1
1 CAPSULE ORAL EVERY 12 HOURS
Qty: 120 CAPSULE | Refills: 11 | Status: SHIPPED | OUTPATIENT
Start: 2020-06-09 | End: 2020-06-19
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
## 2020-06-09 NOTE — Clinical Note
June 9, 2020                     [facility]  [address]  [facility] [address]  Phone: [phone]   Patient: Colin Reilly   MR Number: [id number]   YOB: 1960   Date of Visit: 6/9/2020       Dear      Thank you for referring Colin Reilly to me for evaluation. Attached you will find relevant portions of my assessment and plan of care.    If you have questions, please do not hesitate to call me. I look forward to following Colin Reilly along with you.    Sincerely,    Blaine Martinez MD    Enclosure    If you would like to receive this communication electronically, please contact [email or web address] or [phone] to request [employer] access.    [employer] is a tool which provides read-only access to select patient information with whom you have a relationship. Its easy to use and provides real time access to review your patients record including encounter summaries, notes, results, and demographic information.    If you feel you have received this communication in error or would no longer like to receive these types of communications, please e-mail [email or web address]
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
## 2020-06-11 ENCOUNTER — TELEPHONE (OUTPATIENT)
Dept: TRANSPLANT | Facility: CLINIC | Age: 60
End: 2020-06-11

## 2020-06-11 ENCOUNTER — HOSPITAL ENCOUNTER (OUTPATIENT)
Dept: RADIOLOGY | Facility: HOSPITAL | Age: 60
Discharge: HOME | End: 2020-06-11
Attending: SURGERY
Payer: MEDICARE

## 2020-06-11 DIAGNOSIS — Z94.4 LIVER REPLACED BY TRANSPLANT: ICD-10-CM

## 2020-06-11 PROCEDURE — 93975 VASCULAR STUDY: CPT | Mod: TC

## 2020-06-11 PROCEDURE — 76705 US LIVER TRANSPLANT POST: ICD-10-PCS | Mod: 26,XS,, | Performed by: RADIOLOGY

## 2020-06-11 PROCEDURE — 76705 ECHO EXAM OF ABDOMEN: CPT | Mod: 26,XS,, | Performed by: RADIOLOGY

## 2020-06-11 PROCEDURE — 93975 VASCULAR STUDY: CPT | Mod: 26,,, | Performed by: RADIOLOGY

## 2020-06-11 PROCEDURE — 93975 US LIVER TRANSPLANT POST: ICD-10-PCS | Mod: 26,,, | Performed by: RADIOLOGY

## 2020-06-11 NOTE — TELEPHONE ENCOUNTER
Pt returned call stating he doesn't know how to turn on his ringer or set up VM. Coord explained to him lab results and need for additional tests Monday. Pt states he understands. Coord told him to expect a call by early afternoon for details of ERCP/BX.

## 2020-06-11 NOTE — TELEPHONE ENCOUNTER
Have made several attempts to contact pt. VM not set up. Contacted Lefty Monroy  for assistance in getting telephone number at [facility]. Attempted that number and no one answered. Contacted additional number listed on contact. It was a family member. She stated that they have just dropped him home in [place], [place] a 3 hr drive. Asked her to please contact them and have him call coord asap. Contacted Eliana again in , they still have pt's apt. They will hold keys until Monday.

## 2020-06-11 NOTE — TELEPHONE ENCOUNTER
----- Message from Daisy Grant sent at 6/11/2020  3:45 PM CDT -----  Contact: Pt  Pt would like to speak with nurse.    Pt# [phone]

## 2020-06-11 NOTE — TELEPHONE ENCOUNTER
SW received a phone call from pts post liver nurse coordinator, Mari Gibson this afternoon.   Pt's liver enzymes elevated and when nurse coord trying to reach pt this afternoon for him not to discharge tomorrow, she discovered pt had returned home.   Pt will need to return to [place] for repeat labs and liver biopsy.   Nurse coordinator was only able to reach pts aunt via telephone and continues to attempt to reach pt to discuss him needing to return to [place] with caregiver.  JAMMIE able to secure pts same apartment at [employer] for pt to return for needed appointments.   JAMMIE will just need to inform [employer] managers, Namrata and/or Cat of when pt would be returning to pick the keys up.   JAMMIE remains available for all transplant resources, education and psychosocial support.
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
## 2020-06-12 ENCOUNTER — TELEPHONE (OUTPATIENT)
Dept: TRANSPLANT | Facility: CLINIC | Age: 60
End: 2020-06-12

## 2020-06-15 ENCOUNTER — TELEPHONE (OUTPATIENT)
Dept: ENDOSCOPY | Facility: HOSPITAL | Age: 60
End: 2020-06-15

## 2020-06-15 ENCOUNTER — TELEPHONE (OUTPATIENT)
Dept: TRANSPLANT | Facility: CLINIC | Age: 60
End: 2020-06-15

## 2020-06-15 DIAGNOSIS — R79.89 ABNORMAL LFTS: Primary | ICD-10-CM

## 2020-06-16 ENCOUNTER — TELEPHONE (OUTPATIENT)
Dept: TRANSPLANT | Facility: CLINIC | Age: 60
End: 2020-06-16

## 2020-06-16 ENCOUNTER — HISTORICAL (OUTPATIENT)
Dept: ADMINISTRATIVE | Facility: HOSPITAL | Age: 60
End: 2020-06-16

## 2020-06-16 LAB
ABS NEUT (OLG): 4.07 X10(3)/MCL (ref 2.1–9.2)
ALBUMIN SERPL-MCNC: 3.5 GM/DL (ref 3.5–5)
ALBUMIN/GLOB SERPL: 1.4 RATIO (ref 1.1–2)
ALP SERPL-CCNC: 277 UNIT/L (ref 40–150)
ALT SERPL-CCNC: 23 UNIT/L (ref 0–55)
AST SERPL-CCNC: 13 UNIT/L (ref 5–34)
BASOPHILS # BLD AUTO: 0.1 X10(3)/MCL (ref 0–0.2)
BASOPHILS NFR BLD AUTO: 1 %
BILIRUB SERPL-MCNC: 0.9 MG/DL
BILIRUBIN DIRECT+TOT PNL SERPL-MCNC: 0.4 MG/DL (ref 0–0.8)
BILIRUBIN DIRECT+TOT PNL SERPL-MCNC: 0.5 MG/DL (ref 0–0.5)
BUN SERPL-MCNC: 14.1 MG/DL (ref 8.4–25.7)
CALCIUM SERPL-MCNC: 7.7 MG/DL (ref 8.4–10.2)
CHLORIDE SERPL-SCNC: 97 MMOL/L (ref 98–107)
CO2 SERPL-SCNC: 28 MMOL/L (ref 22–29)
CREAT SERPL-MCNC: 1.57 MG/DL (ref 0.73–1.18)
EOSINOPHIL # BLD AUTO: 0.3 X10(3)/MCL (ref 0–0.9)
EOSINOPHIL NFR BLD AUTO: 4 %
ERYTHROCYTE [DISTWIDTH] IN BLOOD BY AUTOMATED COUNT: 17.4 % (ref 11.5–17)
GLOBULIN SER-MCNC: 2.5 GM/DL (ref 2.4–3.5)
GLUCOSE SERPL-MCNC: 216 MG/DL (ref 74–100)
HCT VFR BLD AUTO: 33.8 % (ref 42–52)
HGB BLD-MCNC: 9.6 GM/DL (ref 14–18)
LYMPHOCYTES # BLD AUTO: 0.6 X10(3)/MCL (ref 0.6–4.6)
LYMPHOCYTES NFR BLD AUTO: 10 %
MCH RBC QN AUTO: 24.5 PG (ref 27–31)
MCHC RBC AUTO-ENTMCNC: 28.4 GM/DL (ref 33–36)
MCV RBC AUTO: 86.2 FL (ref 80–94)
MONOCYTES # BLD AUTO: 0.7 X10(3)/MCL (ref 0.1–1.3)
MONOCYTES NFR BLD AUTO: 12 %
NEUTROPHILS # BLD AUTO: 4.07 X10(3)/MCL (ref 2.1–9.2)
NEUTROPHILS NFR BLD AUTO: 71 %
PLATELET # BLD AUTO: 210 X10(3)/MCL (ref 130–400)
PMV BLD AUTO: 10.1 FL (ref 9.4–12.4)
POTASSIUM SERPL-SCNC: 4.4 MMOL/L (ref 3.5–5.1)
PROT SERPL-MCNC: 6 GM/DL (ref 6.4–8.3)
RBC # BLD AUTO: 3.92 X10(6)/MCL (ref 4.7–6.1)
SODIUM SERPL-SCNC: 138 MMOL/L (ref 136–145)
WBC # SPEC AUTO: 5.8 X10(3)/MCL (ref 4.5–11.5)

## 2020-06-16 NOTE — TELEPHONE ENCOUNTER
Received msg from JAMMIE Das about conversation she had with pt's mother,his caregiver. He has not told her he will be getting a bx on Friday. Alana called her and explained he will need to come back to [place] for further testing. She states pt appears hyper and not sleeping well at night. Alana has spoke to pt several times about staying hydrating,resting, not working outside, and sleep. Waiting for a return call from pt to discuss further.

## 2020-06-16 NOTE — TELEPHONE ENCOUNTER
SW following up with patient today in regards to his coming back at the end of this week for liver biopsy and ERCP on Friday.   Pt informs SW he does not remember that he has to come back this week or that he told his post liver nurse coord that his aunt would bring he and mom back on Wed or Thurs this week.   SW verbally reminding pt that he left last week before he had the final ok from transplant team and his liver enzymes are elevated.  Pt asking SW why this can't be done at home, SW verbally providing patient education again that he received pre transplant, that there are times that pt will need to return to transplant center in [place] for medical treatments/procedures that can only be done here, which includes an ERCP and liver biopsy.  SW asking to speak with pts mom Suzanne (Nine-) about them coming back.   Per pts mom, pt never told her that he needed to come back this week.  SW with pts permission, provided his mother with updated information about procedures for Friday, no time known right now and that [employer] management is holding the apartment they vacated last week for pt and pts mom to get the keys on the day they return.  Pts post transplant coordinator was asking for pt to return on either Wed or Thursday.  Pt and pts mom report they should be able to return on Thursday and will work on a ride.  SW reminded both pt and pts mother that pt needs to stay at [facility] apartments through the weekend and should not leave until given the clear by the transplant team Monday afternoon.   SW raised the possibility that if pt needs further treatment due to liver biopsy results, he would need to stay locally longer.   SW asking that pt and pts mother contact the liver social work team with the time they would leave home on Thursday, in order to notify the transplant apartment managers they would be arriving to  the key to his assigned apartment.   Pts mother writing information down  today.   SW discussed situation with pts post transplant nurse coord Mari Gibson RN, as this is concerning that pt with memory issues and not relaying vital information to his mother.   No further psychosocial concerns identified by pt or pts mother this afternoon.  JAMMIE will follow-up with patient and pts mother tomorrow.   JAMMIE remains available for all transplant resources, education and psychosocial support.

## 2020-06-16 NOTE — TELEPHONE ENCOUNTER
----- Message from Pb Osorio sent at 6/16/2020 10:47 AM CDT -----  Regarding: pt  Calling to speak with coordinator     Call back: [phone]

## 2020-06-17 ENCOUNTER — TELEPHONE (OUTPATIENT)
Dept: TRANSPLANT | Facility: CLINIC | Age: 60
End: 2020-06-17

## 2020-06-17 ENCOUNTER — TELEPHONE (OUTPATIENT)
Dept: ENDOSCOPY | Facility: HOSPITAL | Age: 60
End: 2020-06-17

## 2020-06-17 LAB
EXT ALBUMIN: 3.5
EXT ALKALINE PHOSPHATASE: 277
EXT ALT: 12
EXT AST: 13
EXT BASOPHIL%: 1
EXT BILIRUBIN DIRECT: 0.5 MG/DL
EXT BILIRUBIN TOTAL: 0.9
EXT BUN: 14.1
EXT CALCIUM: 7.7
EXT CHLORIDE: 97
EXT CO2: 28
EXT CREATININE: 1.57 MG/DL
EXT EOSINOPHIL%: 4
EXT GLUCOSE: 216
EXT HEMATOCRIT: 33.8
EXT HEMOGLOBIN: 9.6
EXT LYMPH%: 10
EXT MONOCYTES%: 12
EXT PLATELETS: 210
EXT POTASSIUM: 4.4
EXT PROTEIN TOTAL: 6
EXT SEGS%: 71
EXT SODIUM: 138 MMOL/L
EXT TACROLIMUS LVL: 14.1
EXT WBC: 5.8

## 2020-06-17 NOTE — TELEPHONE ENCOUNTER
----- Message from Deepa Flores sent at 6/17/2020  2:11 PM CDT -----  Regarding: Results  Contact: pt  Patient calling about lab results           Call Back : [phone]

## 2020-06-17 NOTE — TELEPHONE ENCOUNTER
Spoke with patient about instructions for UEUS/Liver Bx/ERCP scheduled 6/19/20 at 0945.  Coming from [place] and will need rapid covid-19 test at 0730.

## 2020-06-17 NOTE — TELEPHONE ENCOUNTER
SW notified by pts post liver nurse Alana Guerra, that pt lab much improved and pt no longer needing to come to [place] for ERCP/liver biopsy.   SW notified managers at [facility] that pt no longer requiring to return to the apt.  No further psychosocial needs.  SW remains available.

## 2020-06-17 NOTE — TELEPHONE ENCOUNTER
Per Dr Fareed Tran, order given to cancel the EUS ERCP and BX that was to be scheduled this Friday d/t improved labs. Nellie notified. Also spoke to pt and his caregiver.

## 2020-06-17 NOTE — TELEPHONE ENCOUNTER
----- Message from Fareed Tran MD sent at 6/17/2020  2:07 PM CDT -----  Results ok. No action needed

## 2020-06-17 NOTE — TELEPHONE ENCOUNTER
"Called Mrs Palacios, pt's mom and pt to review much improved labs. Pt does not need to come to [facility] on Friday. Will let Eliana with SW know and also cancelling ERCP for Friday. Spoke to pt about some side effects he is having from steroids. Frustration,sadness, and happiness, problems sleeping and feeling "amped" up. We discussed ways to positively address these side effects. They both state they understand. Labs on Monday.  "

## 2020-06-18 LAB — FUNGUS SPEC CULT: NORMAL

## 2020-06-19 ENCOUNTER — TELEPHONE (OUTPATIENT)
Dept: TRANSPLANT | Facility: CLINIC | Age: 60
End: 2020-06-19

## 2020-06-19 DIAGNOSIS — Z94.4 LIVER TRANSPLANTED: ICD-10-CM

## 2020-06-19 NOTE — TELEPHONE ENCOUNTER
Received pt's tacro level, it was elevated at 14.1. Called Dr Tran and reviewed this. Pt to decrease tacro dose to 0.5mg bid with labs on Monday morning.

## 2020-06-21 RX ORDER — TACROLIMUS 0.5 MG/1
0.5 CAPSULE ORAL EVERY 12 HOURS
Qty: 60 CAPSULE | Refills: 11 | Status: SHIPPED | OUTPATIENT
Start: 2020-06-21 | End: 2020-07-07

## 2020-06-21 NOTE — Clinical Note
PLAN:-  Needs to go to the ER at [facility], to evaluate, manage GI Bleed.  niels admitted because she is unable to use her walker due to her right shoulder  injury  and she is too unsteady on her feet without her walker to be at home as there is noone there to hlpe her as her  is too frail and he himself uses a walker and their son isn availabel only 3 time per week  Socila services and Care coordination to get involved for a safe d/c  ?NAVYA an option

## 2020-06-22 ENCOUNTER — HISTORICAL (OUTPATIENT)
Dept: ADMINISTRATIVE | Facility: HOSPITAL | Age: 60
End: 2020-06-22

## 2020-06-22 LAB
ABS NEUT (OLG): 2.99 X10(3)/MCL (ref 2.1–9.2)
ALBUMIN SERPL-MCNC: 3.7 GM/DL (ref 3.5–5)
ALBUMIN/GLOB SERPL: 1.3 RATIO (ref 1.1–2)
ALP SERPL-CCNC: 173 UNIT/L (ref 40–150)
ALT SERPL-CCNC: 6 UNIT/L (ref 0–55)
ANISOCYTOSIS BLD QL SMEAR: 2
AST SERPL-CCNC: 8 UNIT/L (ref 5–34)
BILIRUB SERPL-MCNC: 0.9 MG/DL
BILIRUBIN DIRECT+TOT PNL SERPL-MCNC: 0.4 MG/DL (ref 0–0.5)
BILIRUBIN DIRECT+TOT PNL SERPL-MCNC: 0.5 MG/DL (ref 0–0.8)
BUN SERPL-MCNC: 5.4 MG/DL (ref 8.4–25.7)
CALCIUM SERPL-MCNC: 8.8 MG/DL (ref 8.4–10.2)
CHLORIDE SERPL-SCNC: 108 MMOL/L (ref 98–107)
CO2 SERPL-SCNC: 23 MMOL/L (ref 22–29)
CREAT SERPL-MCNC: 0.7 MG/DL (ref 0.73–1.18)
EOSINOPHIL NFR BLD MANUAL: 4 % (ref 0–8)
ERYTHROCYTE [DISTWIDTH] IN BLOOD BY AUTOMATED COUNT: 17.4 % (ref 11.5–17)
GLOBULIN SER-MCNC: 2.8 GM/DL (ref 2.4–3.5)
GLUCOSE SERPL-MCNC: 191 MG/DL (ref 74–100)
HCT VFR BLD AUTO: 35.8 % (ref 42–52)
HGB BLD-MCNC: 10.1 GM/DL (ref 14–18)
LYMPHOCYTES NFR BLD MANUAL: 10 % (ref 13–40)
MCH RBC QN AUTO: 24 PG (ref 27–31)
MCHC RBC AUTO-ENTMCNC: 28.2 GM/DL (ref 33–36)
MCV RBC AUTO: 85.2 FL (ref 80–94)
METAMYELOCYTES NFR BLD MANUAL: 1 %
MICROCYTES BLD QL SMEAR: 2
MONOCYTES NFR BLD MANUAL: 17 % (ref 2–11)
MYELOCYTES NFR BLD MANUAL: 1 %
NEUTROPHILS NFR BLD MANUAL: 66 % (ref 47–80)
OVALOCYTES BLD QL SMEAR: 1 (ref 0–0)
PLATELET # BLD AUTO: 144 X10(3)/MCL (ref 130–400)
PLATELET # BLD EST: ABNORMAL 10*3/UL
PMV BLD AUTO: 9.3 FL (ref 7.4–10.4)
POIKILOCYTOSIS BLD QL SMEAR: 2
POLYCHROMASIA BLD QL SMEAR: 1
POTASSIUM SERPL-SCNC: 5.2 MMOL/L (ref 3.5–5.1)
PROT SERPL-MCNC: 6.5 GM/DL (ref 6.4–8.3)
RBC # BLD AUTO: 4.2 X10(6)/MCL (ref 4.7–6.1)
SODIUM SERPL-SCNC: 142 MMOL/L (ref 136–145)
WBC # SPEC AUTO: 5 X10(3)/MCL (ref 4.5–11.5)

## 2020-06-23 LAB
EXT ALBUMIN: 3.7
EXT ALKALINE PHOSPHATASE: 173
EXT ALT: 6
EXT AST: 8
EXT BILIRUBIN DIRECT: 0.4 MG/DL
EXT BILIRUBIN TOTAL: 0.9
EXT BUN: 5.4
EXT CALCIUM: 8.8
EXT CHLORIDE: 108
EXT CO2: 23
EXT CREATININE: 0.7 MG/DL
EXT EOSINOPHIL%: 4
EXT GLUCOSE: 191
EXT HEMATOCRIT: 35.8
EXT HEMOGLOBIN: 10.1
EXT LYMPH%: 10
EXT MONOCYTES%: 17
EXT PLATELETS: 144
EXT POTASSIUM: 5.2
EXT PROTEIN TOTAL: 6.5
EXT SEGS%: 66
EXT SODIUM: 142 MMOL/L
EXT TACROLIMUS LVL: 2.7
EXT WBC: 5

## 2020-06-26 ENCOUNTER — TELEPHONE (OUTPATIENT)
Dept: TRANSPLANT | Facility: CLINIC | Age: 60
End: 2020-06-26

## 2020-06-26 NOTE — TELEPHONE ENCOUNTER
----- Message from Fareed Tran MD sent at 6/24/2020  2:00 PM CDT -----  Results ok. No action needed

## 2020-06-26 NOTE — TELEPHONE ENCOUNTER
Reviewed tacro with Dr Tran, pt's level low. Order received to inc dose to 1mg bid. Called pt and left a detailed VM on his phone.

## 2020-07-01 ENCOUNTER — TELEPHONE (OUTPATIENT)
Dept: TRANSPLANT | Facility: CLINIC | Age: 60
End: 2020-07-01

## 2020-07-01 LAB
EXT ALBUMIN: 4
EXT ALKALINE PHOSPHATASE: 134
EXT ALT: 23
EXT AST: 20
EXT BASOPHIL%: 1.2
EXT BILIRUBIN DIRECT: 0.2 MG/DL
EXT BILIRUBIN TOTAL: 0.8
EXT BUN: 10
EXT CALCIUM: 8.6
EXT CHLORIDE: 103
EXT CO2: 28
EXT CREATININE: 0.6 MG/DL
EXT EOSINOPHIL%: 7.8
EXT GLUCOSE: 188
EXT HEMATOCRIT: 36.4
EXT HEMOGLOBIN: 10.9
EXT LYMPH%: 14.5
EXT MONOCYTES%: 9.7
EXT PLATELETS: 168
EXT POTASSIUM: 5
EXT PROTEIN TOTAL: 7
EXT SEGS%: 63.6
EXT SODIUM: 140 MMOL/L
EXT TACROLIMUS LVL: 2.8
EXT WBC: 6.78

## 2020-07-01 NOTE — TELEPHONE ENCOUNTER
Returned call to Mr Reilly. He stated he had an episode of HR at 135, and felt shaky, He went to Er and was checked out and stable. States it happenend once. Recommended he cut back on caffeine and root beer and drink 2 liters of water a day. He stated he understood.    ----- Message from Pb Osorio sent at 7/1/2020  8:59 AM CDT -----  Contact: pt (mother)  Calling to speak with coordinator     Call back: [phone]

## 2020-07-02 ENCOUNTER — TELEPHONE (OUTPATIENT)
Dept: TRANSPLANT | Facility: CLINIC | Age: 60
End: 2020-07-02

## 2020-07-02 NOTE — TELEPHONE ENCOUNTER
We called for pt's prograf level from [facility] and it is still not resulted. It was drawn on Monday. Called pt and he will start doing labs at [facility] on Monday. 7/6.

## 2020-07-06 ENCOUNTER — HISTORICAL (OUTPATIENT)
Dept: ADMINISTRATIVE | Facility: HOSPITAL | Age: 60
End: 2020-07-06

## 2020-07-06 LAB
ABS NEUT (OLG): 3.49 X10(3)/MCL (ref 2.1–9.2)
ALBUMIN SERPL-MCNC: 4 GM/DL (ref 3.5–5)
ALBUMIN/GLOB SERPL: 1.6 RATIO (ref 1.1–2)
ALP SERPL-CCNC: 105 UNIT/L (ref 40–150)
ALT SERPL-CCNC: 12 UNIT/L (ref 0–55)
AST SERPL-CCNC: 12 UNIT/L (ref 5–34)
BASOPHILS # BLD AUTO: 0.1 X10(3)/MCL (ref 0–0.2)
BASOPHILS NFR BLD AUTO: 2 %
BILIRUB SERPL-MCNC: 1.2 MG/DL
BILIRUBIN DIRECT+TOT PNL SERPL-MCNC: 0.5 MG/DL (ref 0–0.5)
BILIRUBIN DIRECT+TOT PNL SERPL-MCNC: 0.7 MG/DL (ref 0–0.8)
BUN SERPL-MCNC: 7.7 MG/DL (ref 8.4–25.7)
CALCIUM SERPL-MCNC: 8.7 MG/DL (ref 8.4–10.2)
CHLORIDE SERPL-SCNC: 107 MMOL/L (ref 98–107)
CO2 SERPL-SCNC: 26 MMOL/L (ref 22–29)
CREAT SERPL-MCNC: 0.73 MG/DL (ref 0.73–1.18)
EOSINOPHIL # BLD AUTO: 0.5 X10(3)/MCL (ref 0–0.9)
EOSINOPHIL NFR BLD AUTO: 9 %
ERYTHROCYTE [DISTWIDTH] IN BLOOD BY AUTOMATED COUNT: 17.3 % (ref 11.5–17)
EST. AVERAGE GLUCOSE BLD GHB EST-MCNC: 142.7 MG/DL
GLOBULIN SER-MCNC: 2.5 GM/DL (ref 2.4–3.5)
GLUCOSE SERPL-MCNC: 150 MG/DL (ref 74–100)
HBA1C MFR BLD: 6.6 %
HCT VFR BLD AUTO: 37.3 % (ref 42–52)
HGB BLD-MCNC: 10.6 GM/DL (ref 14–18)
LYMPHOCYTES # BLD AUTO: 0.9 X10(3)/MCL (ref 0.6–4.6)
LYMPHOCYTES NFR BLD AUTO: 16 %
MCH RBC QN AUTO: 23 PG (ref 27–31)
MCHC RBC AUTO-ENTMCNC: 28.4 GM/DL (ref 33–36)
MCV RBC AUTO: 81.1 FL (ref 80–94)
MONOCYTES # BLD AUTO: 0.4 X10(3)/MCL (ref 0.1–1.3)
MONOCYTES NFR BLD AUTO: 7 %
NEUTROPHILS # BLD AUTO: 3.49 X10(3)/MCL (ref 2.1–9.2)
NEUTROPHILS NFR BLD AUTO: 64 %
PLATELET # BLD AUTO: 151 X10(3)/MCL (ref 130–400)
PMV BLD AUTO: 10.5 FL (ref 9.4–12.4)
POTASSIUM SERPL-SCNC: 4.7 MMOL/L (ref 3.5–5.1)
PROT SERPL-MCNC: 6.5 GM/DL (ref 6.4–8.3)
RBC # BLD AUTO: 4.6 X10(6)/MCL (ref 4.7–6.1)
SODIUM SERPL-SCNC: 141 MMOL/L (ref 136–145)
WBC # SPEC AUTO: 5.4 X10(3)/MCL (ref 4.5–11.5)

## 2020-07-07 DIAGNOSIS — Z94.4 LIVER TRANSPLANTED: ICD-10-CM

## 2020-07-07 NOTE — TELEPHONE ENCOUNTER
Called pt to review stable labs. tacro level is low still. Per Dr Trna, pt to increase tacro dose to 2mg bid. Left a very detailed VM. Also asked for a return call to make sure pt gets the VM.----- Message from Fareed Tran MD sent at 7/7/2020  2:22 PM CDT -----  fk 2 bid

## 2020-07-07 NOTE — TELEPHONE ENCOUNTER
----- Message from Fareed Tran MD sent at 7/1/2020  1:57 PM CDT -----  Results ok. No action needed

## 2020-07-08 ENCOUNTER — TELEPHONE (OUTPATIENT)
Dept: TRANSPLANT | Facility: CLINIC | Age: 60
End: 2020-07-08

## 2020-07-08 RX ORDER — TACROLIMUS 0.5 MG/1
2 CAPSULE ORAL EVERY 12 HOURS
Qty: 240 CAPSULE | Refills: 11 | Status: SHIPPED | OUTPATIENT
Start: 2020-07-08 | End: 2020-07-10

## 2020-07-08 NOTE — TELEPHONE ENCOUNTER
Returned call to pt. Pt stated he received Coordinator's VM and has adjusted his tacro dose to 2mg bid. Next labs on Mon.  ----- Message from Deepa Flores sent at 7/8/2020 11:04 AM CDT -----  Regarding: results  Contact: pt  Patient calling about results        Call back :[phone]

## 2020-07-10 DIAGNOSIS — Z94.4 LIVER TRANSPLANTED: ICD-10-CM

## 2020-07-10 LAB
EXT ALBUMIN: 4
EXT ALKALINE PHOSPHATASE: 105
EXT ALT: 12
EXT AST: 12
EXT BASOPHIL%: 2
EXT BILIRUBIN DIRECT: 0.5 MG/DL
EXT BILIRUBIN TOTAL: 1.2
EXT BUN: 7.7
EXT CALCIUM: 8.7
EXT CHLORIDE: 107
EXT CO2: 26
EXT CREATININE: 0.73 MG/DL
EXT EOSINOPHIL%: 9
EXT GLUCOSE: 150
EXT HEMATOCRIT: 37.3
EXT HEMOGLOBIN: 10.6
EXT LYMPH%: 16
EXT MONOCYTES%: 7
EXT PLATELETS: 151
EXT POTASSIUM: 4.7
EXT PROTEIN TOTAL: 6.5
EXT SEGS%: 64
EXT SODIUM: 141 MMOL/L
EXT TACROLIMUS LVL: 1.9
EXT WBC: 5.4

## 2020-07-10 NOTE — TELEPHONE ENCOUNTER
Called pt and lefta detailed VM describing increase in prograf dose to 4mg twice daily. He is taking 0.5mg caps which means he will take 8 capsules twice daily. Will work on ordering 1mg caps. Also called his mother and left a detailed VM on her phone to make sure he updates the blue card and med organizer. Asked for a return call that they received msg.    ----- Message from Fareed Tran MD sent at 7/10/2020 11:49 AM CDT -----  fk 4 bid

## 2020-07-13 ENCOUNTER — HISTORICAL (OUTPATIENT)
Dept: ADMINISTRATIVE | Facility: HOSPITAL | Age: 60
End: 2020-07-13

## 2020-07-13 LAB
ABS NEUT (OLG): 2.97 X10(3)/MCL (ref 2.1–9.2)
ALBUMIN SERPL-MCNC: 4.3 GM/DL (ref 3.5–5)
ALBUMIN/GLOB SERPL: 1.7 RATIO (ref 1.1–2)
ALP SERPL-CCNC: 92 UNIT/L (ref 40–150)
ALT SERPL-CCNC: 9 UNIT/L (ref 0–55)
AST SERPL-CCNC: 12 UNIT/L (ref 5–34)
BASOPHILS # BLD AUTO: 0.1 X10(3)/MCL (ref 0–0.2)
BASOPHILS NFR BLD AUTO: 2 %
BILIRUB SERPL-MCNC: 0.9 MG/DL
BILIRUBIN DIRECT+TOT PNL SERPL-MCNC: 0.4 MG/DL (ref 0–0.5)
BILIRUBIN DIRECT+TOT PNL SERPL-MCNC: 0.5 MG/DL (ref 0–0.8)
BUN SERPL-MCNC: 8.2 MG/DL (ref 8.4–25.7)
CALCIUM SERPL-MCNC: 8.7 MG/DL (ref 8.4–10.2)
CHLORIDE SERPL-SCNC: 104 MMOL/L (ref 98–107)
CO2 SERPL-SCNC: 26 MMOL/L (ref 22–29)
CREAT SERPL-MCNC: 0.87 MG/DL (ref 0.73–1.18)
EOSINOPHIL # BLD AUTO: 0.4 X10(3)/MCL (ref 0–0.9)
EOSINOPHIL NFR BLD AUTO: 9 %
ERYTHROCYTE [DISTWIDTH] IN BLOOD BY AUTOMATED COUNT: 16.9 % (ref 11.5–17)
GLOBULIN SER-MCNC: 2.5 GM/DL (ref 2.4–3.5)
GLUCOSE SERPL-MCNC: 221 MG/DL (ref 74–100)
HCT VFR BLD AUTO: 37.6 % (ref 42–52)
HGB BLD-MCNC: 10.7 GM/DL (ref 14–18)
LYMPHOCYTES # BLD AUTO: 1 X10(3)/MCL (ref 0.6–4.6)
LYMPHOCYTES NFR BLD AUTO: 20 %
MCH RBC QN AUTO: 23.1 PG (ref 27–31)
MCHC RBC AUTO-ENTMCNC: 28.5 GM/DL (ref 33–36)
MCV RBC AUTO: 81 FL (ref 80–94)
MONOCYTES # BLD AUTO: 0.4 X10(3)/MCL (ref 0.1–1.3)
MONOCYTES NFR BLD AUTO: 8 %
NEUTROPHILS # BLD AUTO: 2.97 X10(3)/MCL (ref 2.1–9.2)
NEUTROPHILS NFR BLD AUTO: 60 %
PLATELET # BLD AUTO: 129 X10(3)/MCL (ref 130–400)
PMV BLD AUTO: 9.6 FL (ref 9.4–12.4)
POTASSIUM SERPL-SCNC: 4.9 MMOL/L (ref 3.5–5.1)
PROT SERPL-MCNC: 6.8 GM/DL (ref 6.4–8.3)
RBC # BLD AUTO: 4.64 X10(6)/MCL (ref 4.7–6.1)
SODIUM SERPL-SCNC: 139 MMOL/L (ref 136–145)
WBC # SPEC AUTO: 5 X10(3)/MCL (ref 4.5–11.5)

## 2020-07-13 RX ORDER — TACROLIMUS 1 MG/1
4 CAPSULE ORAL EVERY 12 HOURS
Qty: 240 CAPSULE | Refills: 11 | Status: SHIPPED | OUTPATIENT
Start: 2020-07-13 | End: 2020-07-24

## 2020-07-15 LAB
EXT ALBUMIN: 4.3
EXT ALKALINE PHOSPHATASE: 92
EXT ALT: 9
EXT AST: 12
EXT BASOPHIL%: 2
EXT BILIRUBIN DIRECT: 0.4 MG/DL
EXT BILIRUBIN TOTAL: 0.9
EXT BUN: 8.2
EXT CALCIUM: 8.7
EXT CHLORIDE: 104
EXT CO2: 26
EXT CREATININE: 0.87 MG/DL
EXT EOSINOPHIL%: 9
EXT GLUCOSE: 221
EXT HEMATOCRIT: 37.6
EXT HEMOGLOBIN: 10.7
EXT LYMPH%: 20
EXT MONOCYTES%: 8
EXT PLATELETS: 129
EXT POTASSIUM: 4.9
EXT PROTEIN TOTAL: 6.8
EXT SEGS%: 60
EXT SODIUM: 139 MMOL/L
EXT TACROLIMUS LVL: 9.8
EXT WBC: 5

## 2020-07-17 ENCOUNTER — TELEPHONE (OUTPATIENT)
Dept: TRANSPLANT | Facility: CLINIC | Age: 60
End: 2020-07-17

## 2020-07-17 NOTE — TELEPHONE ENCOUNTER
Called Mr Reilly and reviewed stable labs. No med changes. Next labs due 7/20/20, Pt states he understands.    ----- Message from Fareed Tran MD sent at 7/17/2020  1:36 PM CDT -----  Results ok. No action needed

## 2020-07-17 NOTE — TELEPHONE ENCOUNTER
----- Message from Fareed Tran MD sent at 7/15/2020 10:35 AM CDT -----  Results ok. No action needed

## 2020-07-19 LAB
ACID FAST MOD KINY STN SPEC: NORMAL
MYCOBACTERIUM SPEC QL CULT: NORMAL

## 2020-07-20 ENCOUNTER — HISTORICAL (OUTPATIENT)
Dept: ADMINISTRATIVE | Facility: HOSPITAL | Age: 60
End: 2020-07-20

## 2020-07-20 LAB
ABS NEUT (OLG): 3.14 X10(3)/MCL (ref 2.1–9.2)
ALBUMIN SERPL-MCNC: 4 GM/DL (ref 3.5–5)
ALBUMIN/GLOB SERPL: 1.7 RATIO (ref 1.1–2)
ALP SERPL-CCNC: 86 UNIT/L (ref 40–150)
ALT SERPL-CCNC: 13 UNIT/L (ref 0–55)
AST SERPL-CCNC: 18 UNIT/L (ref 5–34)
BASOPHILS # BLD AUTO: 0.1 X10(3)/MCL (ref 0–0.2)
BASOPHILS NFR BLD AUTO: 1 %
BILIRUB SERPL-MCNC: 0.8 MG/DL
BILIRUBIN DIRECT+TOT PNL SERPL-MCNC: 0.3 MG/DL (ref 0–0.5)
BILIRUBIN DIRECT+TOT PNL SERPL-MCNC: 0.5 MG/DL (ref 0–0.8)
BUN SERPL-MCNC: 7.3 MG/DL (ref 8.4–25.7)
CALCIUM SERPL-MCNC: 8.9 MG/DL (ref 8.4–10.2)
CHLORIDE SERPL-SCNC: 103 MMOL/L (ref 98–107)
CO2 SERPL-SCNC: 27 MMOL/L (ref 22–29)
CREAT SERPL-MCNC: 0.74 MG/DL (ref 0.73–1.18)
EOSINOPHIL # BLD AUTO: 0.3 X10(3)/MCL (ref 0–0.9)
EOSINOPHIL NFR BLD AUTO: 6 %
ERYTHROCYTE [DISTWIDTH] IN BLOOD BY AUTOMATED COUNT: 16.6 % (ref 11.5–17)
GLOBULIN SER-MCNC: 2.3 GM/DL (ref 2.4–3.5)
GLUCOSE SERPL-MCNC: 137 MG/DL (ref 74–100)
HCT VFR BLD AUTO: 36.8 % (ref 42–52)
HGB BLD-MCNC: 10.4 GM/DL (ref 14–18)
LYMPHOCYTES # BLD AUTO: 0.9 X10(3)/MCL (ref 0.6–4.6)
LYMPHOCYTES NFR BLD AUTO: 19 %
MCH RBC QN AUTO: 22.4 PG (ref 27–31)
MCHC RBC AUTO-ENTMCNC: 28.3 GM/DL (ref 33–36)
MCV RBC AUTO: 79.1 FL (ref 80–94)
MONOCYTES # BLD AUTO: 0.4 X10(3)/MCL (ref 0.1–1.3)
MONOCYTES NFR BLD AUTO: 8 %
NEUTROPHILS # BLD AUTO: 3.14 X10(3)/MCL (ref 2.1–9.2)
NEUTROPHILS NFR BLD AUTO: 64 %
PLATELET # BLD AUTO: 136 X10(3)/MCL (ref 130–400)
PMV BLD AUTO: 9 FL (ref 9.4–12.4)
POTASSIUM SERPL-SCNC: 5.2 MMOL/L (ref 3.5–5.1)
PROT SERPL-MCNC: 6.3 GM/DL (ref 6.4–8.3)
RBC # BLD AUTO: 4.65 X10(6)/MCL (ref 4.7–6.1)
SODIUM SERPL-SCNC: 140 MMOL/L (ref 136–145)
WBC # SPEC AUTO: 4.9 X10(3)/MCL (ref 4.5–11.5)

## 2020-07-24 DIAGNOSIS — Z94.4 LIVER TRANSPLANTED: ICD-10-CM

## 2020-07-24 LAB
EXT ALBUMIN: 4
EXT ALKALINE PHOSPHATASE: 86
EXT ALT: 13
EXT AST: 18
EXT BASOPHIL%: 1
EXT BILIRUBIN DIRECT: 0.3 MG/DL
EXT BILIRUBIN TOTAL: 0.8
EXT BUN: 7.3
EXT CALCIUM: 8.9
EXT CHLORIDE: 103
EXT CO2: 27
EXT CREATININE: 0.74 MG/DL
EXT EOSINOPHIL%: 6
EXT GLUCOSE: 137
EXT HEMATOCRIT: 36.8
EXT HEMOGLOBIN: 10.4
EXT LYMPH%: 19
EXT MONOCYTES%: 8
EXT PLATELETS: 136
EXT POTASSIUM: 5.2
EXT PROTEIN TOTAL: 6.3
EXT SEGS%: 64
EXT SODIUM: 140 MMOL/L
EXT TACROLIMUS LVL: 12.6
EXT WBC: 4.9

## 2020-07-24 NOTE — TELEPHONE ENCOUNTER
Called Mr Reilly to review labs and decrease in prograf level to 3mg bid. Left a detailed VM.----- Message from Fareed Tran MD sent at 7/24/2020  2:43 PM CDT -----  fk 3 bid

## 2020-07-24 NOTE — TELEPHONE ENCOUNTER
Returned call to pt. We reviewed labs and decrease in prograf to 3mg bid.----- Message from Corine Lane sent at 7/24/2020  4:14 PM CDT -----  Pt is calling to see if results available from lab  Pt#[phone]

## 2020-07-25 RX ORDER — TACROLIMUS 1 MG/1
3 CAPSULE ORAL EVERY 12 HOURS
Qty: 180 CAPSULE | Refills: 11 | Status: SHIPPED | OUTPATIENT
Start: 2020-07-25 | End: 2020-11-06

## 2020-07-27 ENCOUNTER — HISTORICAL (OUTPATIENT)
Dept: ADMINISTRATIVE | Facility: HOSPITAL | Age: 60
End: 2020-07-27

## 2020-07-27 LAB
ABS NEUT (OLG): 3.83 X10(3)/MCL (ref 2.1–9.2)
ALBUMIN SERPL-MCNC: 4.3 GM/DL (ref 3.5–5)
ALBUMIN/GLOB SERPL: 1.8 RATIO (ref 1.1–2)
ALP SERPL-CCNC: 90 UNIT/L (ref 40–150)
ALT SERPL-CCNC: 17 UNIT/L (ref 0–55)
AST SERPL-CCNC: 18 UNIT/L (ref 5–34)
BASOPHILS # BLD AUTO: 0 X10(3)/MCL (ref 0–0.2)
BASOPHILS NFR BLD AUTO: 1 %
BILIRUB SERPL-MCNC: 0.9 MG/DL
BILIRUBIN DIRECT+TOT PNL SERPL-MCNC: 0.4 MG/DL (ref 0–0.5)
BILIRUBIN DIRECT+TOT PNL SERPL-MCNC: 0.5 MG/DL (ref 0–0.8)
BUN SERPL-MCNC: 6.8 MG/DL (ref 8.4–25.7)
CALCIUM SERPL-MCNC: 8.9 MG/DL (ref 8.4–10.2)
CHLORIDE SERPL-SCNC: 106 MMOL/L (ref 98–107)
CO2 SERPL-SCNC: 27 MMOL/L (ref 22–29)
CREAT SERPL-MCNC: 0.76 MG/DL (ref 0.73–1.18)
EOSINOPHIL # BLD AUTO: 0.2 X10(3)/MCL (ref 0–0.9)
EOSINOPHIL NFR BLD AUTO: 4 %
ERYTHROCYTE [DISTWIDTH] IN BLOOD BY AUTOMATED COUNT: 16.9 % (ref 11.5–17)
GLOBULIN SER-MCNC: 2.4 GM/DL (ref 2.4–3.5)
GLUCOSE SERPL-MCNC: 227 MG/DL (ref 74–100)
HCT VFR BLD AUTO: 40.1 % (ref 42–52)
HGB BLD-MCNC: 11.5 GM/DL (ref 14–18)
LYMPHOCYTES # BLD AUTO: 0.8 X10(3)/MCL (ref 0.6–4.6)
LYMPHOCYTES NFR BLD AUTO: 16 %
MCH RBC QN AUTO: 22.5 PG (ref 27–31)
MCHC RBC AUTO-ENTMCNC: 28.7 GM/DL (ref 33–36)
MCV RBC AUTO: 78.3 FL (ref 80–94)
MONOCYTES # BLD AUTO: 0.4 X10(3)/MCL (ref 0.1–1.3)
MONOCYTES NFR BLD AUTO: 7 %
NEUTROPHILS # BLD AUTO: 3.83 X10(3)/MCL (ref 2.1–9.2)
NEUTROPHILS NFR BLD AUTO: 72 %
PLATELET # BLD AUTO: 141 X10(3)/MCL (ref 130–400)
PMV BLD AUTO: 9.4 FL (ref 9.4–12.4)
POTASSIUM SERPL-SCNC: 4.7 MMOL/L (ref 3.5–5.1)
PROT SERPL-MCNC: 6.7 GM/DL (ref 6.4–8.3)
RBC # BLD AUTO: 5.12 X10(6)/MCL (ref 4.7–6.1)
SODIUM SERPL-SCNC: 141 MMOL/L (ref 136–145)
WBC # SPEC AUTO: 5.3 X10(3)/MCL (ref 4.5–11.5)

## 2020-07-30 ENCOUNTER — TELEPHONE (OUTPATIENT)
Dept: TRANSPLANT | Facility: CLINIC | Age: 60
End: 2020-07-30

## 2020-07-30 LAB
EXT ALBUMIN: 4.3
EXT ALKALINE PHOSPHATASE: 90
EXT ALT: 17
EXT AST: 18
EXT BASOPHIL%: 1
EXT BILIRUBIN DIRECT: 0.4 MG/DL
EXT BILIRUBIN TOTAL: 0.9
EXT BUN: 6.8
EXT CALCIUM: 8.9
EXT CHLORIDE: 106
EXT CO2: 27
EXT CREATININE: 0.76 MG/DL
EXT EOSINOPHIL%: 4
EXT GLUCOSE: 227
EXT HEMATOCRIT: 40.1
EXT HEMOGLOBIN: 11.5
EXT LYMPH%: 16
EXT MONOCYTES%: 7
EXT PLATELETS: 141
EXT POTASSIUM: 4.7
EXT PROTEIN TOTAL: 6.7
EXT SEGS%: 72
EXT SODIUM: 141 MMOL/L
EXT TACROLIMUS LVL: 9
EXT WBC: 5.3

## 2020-07-30 NOTE — TELEPHONE ENCOUNTER
Called pt to review stable labs. No med changes. Next labs on Monday, 8/3.    ----- Message from Fareed Tran MD sent at 7/30/2020  2:53 PM CDT -----  Results ok. No action needed

## 2020-08-04 ENCOUNTER — HISTORICAL (OUTPATIENT)
Dept: ADMINISTRATIVE | Facility: HOSPITAL | Age: 60
End: 2020-08-04

## 2020-08-04 DIAGNOSIS — Z94.4 LIVER TRANSPLANTED: Primary | ICD-10-CM

## 2020-08-04 LAB
ABS NEUT (OLG): 6.69 X10(3)/MCL (ref 2.1–9.2)
ALBUMIN SERPL-MCNC: 4.3 GM/DL (ref 3.5–5)
ALBUMIN/GLOB SERPL: 2 RATIO (ref 1.1–2)
ALP SERPL-CCNC: 98 UNIT/L (ref 40–150)
ALT SERPL-CCNC: 22 UNIT/L (ref 0–55)
AST SERPL-CCNC: 23 UNIT/L (ref 5–34)
BASOPHILS # BLD AUTO: 0 X10(3)/MCL (ref 0–0.2)
BASOPHILS NFR BLD AUTO: 0 %
BILIRUB SERPL-MCNC: 0.8 MG/DL
BILIRUBIN DIRECT+TOT PNL SERPL-MCNC: 0.3 MG/DL (ref 0–0.5)
BILIRUBIN DIRECT+TOT PNL SERPL-MCNC: 0.5 MG/DL (ref 0–0.8)
BUN SERPL-MCNC: 6.8 MG/DL (ref 8.4–25.7)
CALCIUM SERPL-MCNC: 8.5 MG/DL (ref 8.4–10.2)
CHLORIDE SERPL-SCNC: 104 MMOL/L (ref 98–107)
CO2 SERPL-SCNC: 27 MMOL/L (ref 22–29)
CREAT SERPL-MCNC: 0.82 MG/DL (ref 0.73–1.18)
EOSINOPHIL # BLD AUTO: 0.2 X10(3)/MCL (ref 0–0.9)
EOSINOPHIL NFR BLD AUTO: 2 %
ERYTHROCYTE [DISTWIDTH] IN BLOOD BY AUTOMATED COUNT: 16.4 % (ref 11.5–17)
EXT ALBUMIN: 4.3
EXT ALKALINE PHOSPHATASE: 98
EXT ALT: 22
EXT AST: 23
EXT BILIRUBIN TOTAL: 0.8
EXT BUN: 6.8
EXT CALCIUM: 8.5
EXT CHLORIDE: 104
EXT CO2: 27
EXT CREATININE: 0.82 MG/DL
EXT EOSINOPHIL%: 2
EXT GLUCOSE: 251
EXT HEMATOCRIT: 36.3
EXT HEMOGLOBIN: 10.5
EXT LYMPH%: 9
EXT MONOCYTES%: 8
EXT PLATELETS: 139
EXT POTASSIUM: 4.3
EXT PROTEIN TOTAL: 6.5
EXT SEGS%: 80
EXT SODIUM: 138 MMOL/L
EXT TACROLIMUS LVL: 6.5
EXT WBC: 8.4
GLOBULIN SER-MCNC: 2.2 GM/DL (ref 2.4–3.5)
GLUCOSE SERPL-MCNC: 251 MG/DL (ref 74–100)
HCT VFR BLD AUTO: 36.3 % (ref 42–52)
HGB BLD-MCNC: 10.5 GM/DL (ref 14–18)
LYMPHOCYTES # BLD AUTO: 0.7 X10(3)/MCL (ref 0.6–4.6)
LYMPHOCYTES NFR BLD AUTO: 9 %
MCH RBC QN AUTO: 22 PG (ref 27–31)
MCHC RBC AUTO-ENTMCNC: 28.9 GM/DL (ref 33–36)
MCV RBC AUTO: 75.9 FL (ref 80–94)
MONOCYTES # BLD AUTO: 0.6 X10(3)/MCL (ref 0.1–1.3)
MONOCYTES NFR BLD AUTO: 8 %
NEUTROPHILS # BLD AUTO: 6.69 X10(3)/MCL (ref 2.1–9.2)
NEUTROPHILS NFR BLD AUTO: 80 %
PLATELET # BLD AUTO: 139 X10(3)/MCL (ref 130–400)
PMV BLD AUTO: 10.1 FL (ref 9.4–12.4)
POTASSIUM SERPL-SCNC: 4.3 MMOL/L (ref 3.5–5.1)
PROT SERPL-MCNC: 6.5 GM/DL (ref 6.4–8.3)
RBC # BLD AUTO: 4.78 X10(6)/MCL (ref 4.7–6.1)
SODIUM SERPL-SCNC: 138 MMOL/L (ref 136–145)
WBC # SPEC AUTO: 8.4 X10(3)/MCL (ref 4.5–11.5)

## 2020-08-04 RX ORDER — VALGANCICLOVIR 450 MG/1
450 TABLET, FILM COATED ORAL DAILY
Qty: 30 TABLET | Refills: 2 | Status: CANCELLED | OUTPATIENT
Start: 2020-08-04 | End: 2020-11-02

## 2020-08-05 RX ORDER — MYCOPHENOLATE MOFETIL 250 MG/1
1000 CAPSULE ORAL 2 TIMES DAILY
Qty: 240 CAPSULE | Refills: 11 | Status: SHIPPED | OUTPATIENT
Start: 2020-08-05 | End: 2021-03-24

## 2020-08-07 ENCOUNTER — TELEPHONE (OUTPATIENT)
Dept: TRANSPLANT | Facility: CLINIC | Age: 60
End: 2020-08-07

## 2020-08-07 NOTE — TELEPHONE ENCOUNTER
Left voicemail for pt labs reviewed, no med changes needed. Asked pt to have labs again next week.

## 2020-08-07 NOTE — TELEPHONE ENCOUNTER
----- Message from Fareed Tran MD sent at 8/7/2020  2:13 PM CDT -----  Results ok. No action needed

## 2020-08-10 ENCOUNTER — HISTORICAL (OUTPATIENT)
Dept: ADMINISTRATIVE | Facility: HOSPITAL | Age: 60
End: 2020-08-10

## 2020-08-10 LAB
ABS NEUT (OLG): 4.21 X10(3)/MCL (ref 2.1–9.2)
ALBUMIN SERPL-MCNC: 4.3 GM/DL (ref 3.5–5)
ALBUMIN/GLOB SERPL: 1.8 RATIO (ref 1.1–2)
ALP SERPL-CCNC: 91 UNIT/L (ref 40–150)
ALT SERPL-CCNC: 17 UNIT/L (ref 0–55)
AST SERPL-CCNC: 12 UNIT/L (ref 5–34)
BASOPHILS # BLD AUTO: 0 X10(3)/MCL (ref 0–0.2)
BASOPHILS NFR BLD AUTO: 1 %
BILIRUB SERPL-MCNC: 0.6 MG/DL
BILIRUBIN DIRECT+TOT PNL SERPL-MCNC: 0.3 MG/DL (ref 0–0.5)
BILIRUBIN DIRECT+TOT PNL SERPL-MCNC: 0.3 MG/DL (ref 0–0.8)
BUN SERPL-MCNC: 9.9 MG/DL (ref 8.4–25.7)
CALCIUM SERPL-MCNC: 8.8 MG/DL (ref 8.4–10.2)
CHLORIDE SERPL-SCNC: 104 MMOL/L (ref 98–107)
CO2 SERPL-SCNC: 27 MMOL/L (ref 22–29)
CREAT SERPL-MCNC: 0.83 MG/DL (ref 0.73–1.18)
EOSINOPHIL # BLD AUTO: 0.1 X10(3)/MCL (ref 0–0.9)
EOSINOPHIL NFR BLD AUTO: 2 %
ERYTHROCYTE [DISTWIDTH] IN BLOOD BY AUTOMATED COUNT: 16.4 % (ref 11.5–17)
GLOBULIN SER-MCNC: 2.4 GM/DL (ref 2.4–3.5)
GLUCOSE SERPL-MCNC: 251 MG/DL (ref 74–100)
HCT VFR BLD AUTO: 40.2 % (ref 42–52)
HGB BLD-MCNC: 11.4 GM/DL (ref 14–18)
LYMPHOCYTES # BLD AUTO: 1 X10(3)/MCL (ref 0.6–4.6)
LYMPHOCYTES NFR BLD AUTO: 16 %
MCH RBC QN AUTO: 21.8 PG (ref 27–31)
MCHC RBC AUTO-ENTMCNC: 28.4 GM/DL (ref 33–36)
MCV RBC AUTO: 77 FL (ref 80–94)
MONOCYTES # BLD AUTO: 0.5 X10(3)/MCL (ref 0.1–1.3)
MONOCYTES NFR BLD AUTO: 9 %
NEUTROPHILS # BLD AUTO: 4.21 X10(3)/MCL (ref 2.1–9.2)
NEUTROPHILS NFR BLD AUTO: 71 %
PLATELET # BLD AUTO: 138 X10(3)/MCL (ref 130–400)
PMV BLD AUTO: 10.9 FL (ref 9.4–12.4)
POTASSIUM SERPL-SCNC: 4.6 MMOL/L (ref 3.5–5.1)
PROT SERPL-MCNC: 6.7 GM/DL (ref 6.4–8.3)
RBC # BLD AUTO: 5.22 X10(6)/MCL (ref 4.7–6.1)
SODIUM SERPL-SCNC: 139 MMOL/L (ref 136–145)
WBC # SPEC AUTO: 5.9 X10(3)/MCL (ref 4.5–11.5)

## 2020-08-11 LAB
BUN BLD-MCNC: 10 MG/DL (ref 4–21)
CALCIUM SERPL-MCNC: 8.8 MG/DL
CREAT SERPL-MCNC: 0.8 MG/DL
EXT ALBUMIN: 4.3
EXT ALKALINE PHOSPHATASE: 91
EXT ALT: 17
EXT AST: 12
EXT BASOPHIL%: 1
EXT BILIRUBIN DIRECT: 0.3 MG/DL
EXT BILIRUBIN TOTAL: 0.6
EXT CHLORIDE: 104
EXT CO2: 27
EXT EOSINOPHIL%: 2
EXT HEMATOCRIT: 40.2
EXT HEMOGLOBIN: 11.4
EXT LYMPH%: 16
EXT MONOCYTES%: 9
EXT PLATELETS: 138
EXT POTASSIUM: 4.6
EXT PROTEIN TOTAL: 6.7
EXT SEGS%: 71
EXT SODIUM: 139 MMOL/L
EXT TACROLIMUS LVL: NORMAL
EXT WBC: 5.9
GLUCOSE: 251
TACROLIMUS: 6.9

## 2020-08-14 ENCOUNTER — TELEPHONE (OUTPATIENT)
Dept: TRANSPLANT | Facility: CLINIC | Age: 60
End: 2020-08-14

## 2020-08-14 NOTE — TELEPHONE ENCOUNTER
Spoke to Mr Reilly and reviewed stable labs. No med changes. Next labs due 8/24/20----- Message from Fareed Tran MD sent at 8/14/2020 10:41 AM CDT -----  Results ok. No action needed

## 2020-08-17 ENCOUNTER — HISTORICAL (OUTPATIENT)
Dept: ADMINISTRATIVE | Facility: HOSPITAL | Age: 60
End: 2020-08-17

## 2020-08-17 LAB
ABS NEUT (OLG): 4.3 X10(3)/MCL (ref 2.1–9.2)
ALBUMIN SERPL-MCNC: 4.4 GM/DL (ref 3.5–5)
ALBUMIN/GLOB SERPL: 1.9 RATIO (ref 1.1–2)
ALP SERPL-CCNC: 87 UNIT/L (ref 40–150)
ALT SERPL-CCNC: 18 UNIT/L (ref 0–55)
AST SERPL-CCNC: 13 UNIT/L (ref 5–34)
BASOPHILS # BLD AUTO: 0 X10(3)/MCL (ref 0–0.2)
BASOPHILS NFR BLD AUTO: 1 %
BILIRUB SERPL-MCNC: 0.7 MG/DL
BILIRUBIN DIRECT+TOT PNL SERPL-MCNC: 0.3 MG/DL (ref 0–0.5)
BILIRUBIN DIRECT+TOT PNL SERPL-MCNC: 0.4 MG/DL (ref 0–0.8)
BUN SERPL-MCNC: 10.8 MG/DL (ref 8.4–25.7)
CALCIUM SERPL-MCNC: 8.5 MG/DL (ref 8.4–10.2)
CHLORIDE SERPL-SCNC: 103 MMOL/L (ref 98–107)
CO2 SERPL-SCNC: 27 MMOL/L (ref 22–29)
CREAT SERPL-MCNC: 0.96 MG/DL (ref 0.73–1.18)
EOSINOPHIL # BLD AUTO: 0.2 X10(3)/MCL (ref 0–0.9)
EOSINOPHIL NFR BLD AUTO: 3 %
ERYTHROCYTE [DISTWIDTH] IN BLOOD BY AUTOMATED COUNT: 16.8 % (ref 11.5–17)
GLOBULIN SER-MCNC: 2.3 GM/DL (ref 2.4–3.5)
GLUCOSE SERPL-MCNC: 316 MG/DL (ref 74–100)
HCT VFR BLD AUTO: 39.9 % (ref 42–52)
HGB BLD-MCNC: 11.4 GM/DL (ref 14–18)
LYMPHOCYTES # BLD AUTO: 0.8 X10(3)/MCL (ref 0.6–4.6)
LYMPHOCYTES NFR BLD AUTO: 14 %
MCH RBC QN AUTO: 21.9 PG (ref 27–31)
MCHC RBC AUTO-ENTMCNC: 28.6 GM/DL (ref 33–36)
MCV RBC AUTO: 76.7 FL (ref 80–94)
MONOCYTES # BLD AUTO: 0.4 X10(3)/MCL (ref 0.1–1.3)
MONOCYTES NFR BLD AUTO: 7 %
NEUTROPHILS # BLD AUTO: 4.3 X10(3)/MCL (ref 2.1–9.2)
NEUTROPHILS NFR BLD AUTO: 74 %
PLATELET # BLD AUTO: 121 X10(3)/MCL (ref 130–400)
PMV BLD AUTO: 10.2 FL (ref 9.4–12.4)
POTASSIUM SERPL-SCNC: 4.4 MMOL/L (ref 3.5–5.1)
PROT SERPL-MCNC: 6.7 GM/DL (ref 6.4–8.3)
RBC # BLD AUTO: 5.2 X10(6)/MCL (ref 4.7–6.1)
SODIUM SERPL-SCNC: 138 MMOL/L (ref 136–145)
WBC # SPEC AUTO: 5.8 X10(3)/MCL (ref 4.5–11.5)

## 2020-08-18 ENCOUNTER — TELEPHONE (OUTPATIENT)
Dept: TRANSPLANT | Facility: CLINIC | Age: 60
End: 2020-08-18

## 2020-08-18 NOTE — TELEPHONE ENCOUNTER
Reviewed labs with dr baxter. Labs stable. Need to increase tacro level to 4/3 with labs on Monday. Spoke to pt..----- Message from Fareed Tran MD sent at 8/11/2020  1:17 PM CDT -----  Results ok. No action needed

## 2020-08-24 ENCOUNTER — HISTORICAL (OUTPATIENT)
Dept: ADMINISTRATIVE | Facility: HOSPITAL | Age: 60
End: 2020-08-24

## 2020-08-24 LAB
ABS NEUT (OLG): 3.46 X10(3)/MCL (ref 2.1–9.2)
ALBUMIN SERPL-MCNC: 4.3 GM/DL (ref 3.5–5)
ALBUMIN/GLOB SERPL: 2 RATIO (ref 1.1–2)
ALP SERPL-CCNC: 74 UNIT/L (ref 40–150)
ALT SERPL-CCNC: 18 UNIT/L (ref 0–55)
AST SERPL-CCNC: 14 UNIT/L (ref 5–34)
BASOPHILS # BLD AUTO: 0 X10(3)/MCL (ref 0–0.2)
BASOPHILS NFR BLD AUTO: 1 %
BILIRUB SERPL-MCNC: 0.9 MG/DL
BILIRUBIN DIRECT+TOT PNL SERPL-MCNC: 0.3 MG/DL (ref 0–0.5)
BILIRUBIN DIRECT+TOT PNL SERPL-MCNC: 0.6 MG/DL (ref 0–0.8)
BUN SERPL-MCNC: 7.5 MG/DL (ref 8.4–25.7)
CALCIUM SERPL-MCNC: 8.8 MG/DL (ref 8.4–10.2)
CHLORIDE SERPL-SCNC: 104 MMOL/L (ref 98–107)
CO2 SERPL-SCNC: 26 MMOL/L (ref 22–29)
CREAT SERPL-MCNC: 0.82 MG/DL (ref 0.73–1.18)
EOSINOPHIL # BLD AUTO: 0.2 X10(3)/MCL (ref 0–0.9)
EOSINOPHIL NFR BLD AUTO: 4 %
ERYTHROCYTE [DISTWIDTH] IN BLOOD BY AUTOMATED COUNT: 16.1 % (ref 11.5–17)
GLOBULIN SER-MCNC: 2.2 GM/DL (ref 2.4–3.5)
GLUCOSE SERPL-MCNC: 271 MG/DL (ref 74–100)
HCT VFR BLD AUTO: 40.4 % (ref 42–52)
HGB BLD-MCNC: 11.7 GM/DL (ref 14–18)
LYMPHOCYTES # BLD AUTO: 0.7 X10(3)/MCL (ref 0.6–4.6)
LYMPHOCYTES NFR BLD AUTO: 15 %
MCH RBC QN AUTO: 22 PG (ref 27–31)
MCHC RBC AUTO-ENTMCNC: 29 GM/DL (ref 33–36)
MCV RBC AUTO: 76.1 FL (ref 80–94)
MONOCYTES # BLD AUTO: 0.5 X10(3)/MCL (ref 0.1–1.3)
MONOCYTES NFR BLD AUTO: 10 %
NEUTROPHILS # BLD AUTO: 3.46 X10(3)/MCL (ref 2.1–9.2)
NEUTROPHILS NFR BLD AUTO: 70 %
PLATELET # BLD AUTO: 144 X10(3)/MCL (ref 130–400)
PMV BLD AUTO: 10.1 FL (ref 9.4–12.4)
POTASSIUM SERPL-SCNC: 4.1 MMOL/L (ref 3.5–5.1)
PROT SERPL-MCNC: 6.5 GM/DL (ref 6.4–8.3)
RBC # BLD AUTO: 5.31 X10(6)/MCL (ref 4.7–6.1)
SODIUM SERPL-SCNC: 140 MMOL/L (ref 136–145)
WBC # SPEC AUTO: 4.9 X10(3)/MCL (ref 4.5–11.5)

## 2020-08-31 ENCOUNTER — HISTORICAL (OUTPATIENT)
Dept: ADMINISTRATIVE | Facility: HOSPITAL | Age: 60
End: 2020-08-31

## 2020-08-31 LAB
ABS NEUT (OLG): 3.19 X10(3)/MCL (ref 2.1–9.2)
ALBUMIN SERPL-MCNC: 4.3 GM/DL (ref 3.5–5)
ALBUMIN/GLOB SERPL: 2 RATIO (ref 1.1–2)
ALP SERPL-CCNC: 86 UNIT/L (ref 40–150)
ALT SERPL-CCNC: 15 UNIT/L (ref 0–55)
AST SERPL-CCNC: 13 UNIT/L (ref 5–34)
BASOPHILS # BLD AUTO: 0 X10(3)/MCL (ref 0–0.2)
BASOPHILS NFR BLD AUTO: 1 %
BILIRUB SERPL-MCNC: 0.9 MG/DL
BILIRUBIN DIRECT+TOT PNL SERPL-MCNC: 0.3 MG/DL (ref 0–0.5)
BILIRUBIN DIRECT+TOT PNL SERPL-MCNC: 0.6 MG/DL (ref 0–0.8)
BUN SERPL-MCNC: 7.4 MG/DL (ref 8.4–25.7)
CALCIUM SERPL-MCNC: 8.2 MG/DL (ref 8.4–10.2)
CHLORIDE SERPL-SCNC: 104 MMOL/L (ref 98–107)
CO2 SERPL-SCNC: 25 MMOL/L (ref 22–29)
CREAT SERPL-MCNC: 0.77 MG/DL (ref 0.73–1.18)
EOSINOPHIL # BLD AUTO: 0.2 X10(3)/MCL (ref 0–0.9)
EOSINOPHIL NFR BLD AUTO: 3 %
ERYTHROCYTE [DISTWIDTH] IN BLOOD BY AUTOMATED COUNT: 15.8 % (ref 11.5–17)
EXT ALBUMIN: 4.3
EXT ALKALINE PHOSPHATASE: 74
EXT ALT: 18
EXT AST: 14
EXT BASOPHIL%: 1
EXT BILIRUBIN DIRECT: 0.3 MG/DL
EXT BILIRUBIN TOTAL: 0.9
EXT CHLORIDE: 104
EXT CO2: 26
EXT EOSINOPHIL%: 4
EXT HEMATOCRIT: 40.4
EXT HEMOGLOBIN: 11.7
EXT LYMPH%: 15
EXT MONOCYTES%: 10
EXT PLATELETS: 144
EXT POTASSIUM: 4.1
EXT PROTEIN TOTAL: 6.5
EXT SEGS%: 70
EXT SODIUM: 140 MMOL/L
EXT TACROLIMUS LVL: 8
EXT WBC: 4.9
GLOBULIN SER-MCNC: 2.2 GM/DL (ref 2.4–3.5)
GLUCOSE SERPL-MCNC: 294 MG/DL (ref 74–100)
HCT VFR BLD AUTO: 41.6 % (ref 42–52)
HGB BLD-MCNC: 11.9 GM/DL (ref 14–18)
LYMPHOCYTES # BLD AUTO: 0.9 X10(3)/MCL (ref 0.6–4.6)
LYMPHOCYTES NFR BLD AUTO: 18 %
MCH RBC QN AUTO: 21.7 PG (ref 27–31)
MCHC RBC AUTO-ENTMCNC: 28.6 GM/DL (ref 33–36)
MCV RBC AUTO: 75.9 FL (ref 80–94)
MONOCYTES # BLD AUTO: 0.6 X10(3)/MCL (ref 0.1–1.3)
MONOCYTES NFR BLD AUTO: 12 %
NEUTROPHILS # BLD AUTO: 3.19 X10(3)/MCL (ref 2.1–9.2)
NEUTROPHILS NFR BLD AUTO: 65 %
PLATELET # BLD AUTO: 129 X10(3)/MCL (ref 130–400)
PMV BLD AUTO: 10.3 FL (ref 9.4–12.4)
POTASSIUM SERPL-SCNC: 4.8 MMOL/L (ref 3.5–5.1)
PROT SERPL-MCNC: 6.5 GM/DL (ref 6.4–8.3)
RBC # BLD AUTO: 5.48 X10(6)/MCL (ref 4.7–6.1)
SODIUM SERPL-SCNC: 138 MMOL/L (ref 136–145)
WBC # SPEC AUTO: 4.9 X10(3)/MCL (ref 4.5–11.5)

## 2020-09-01 ENCOUNTER — TELEPHONE (OUTPATIENT)
Dept: TRANSPLANT | Facility: CLINIC | Age: 60
End: 2020-09-01

## 2020-09-01 NOTE — TELEPHONE ENCOUNTER
LABS REVIEWED AND STABLE. NO MED CHANGES. NEXT LABS DUE 9/8/20. SPOKE TO PT ON PHONE AND REVIEWED THE ABOVE.----- Message from Fareed Tran MD sent at 8/31/2020  2:47 PM CDT -----  Results ok. No action needed

## 2020-09-08 ENCOUNTER — HISTORICAL (OUTPATIENT)
Dept: ADMINISTRATIVE | Facility: HOSPITAL | Age: 60
End: 2020-09-08

## 2020-09-08 LAB
ABS NEUT (OLG): 4.25 X10(3)/MCL (ref 2.1–9.2)
ALBUMIN SERPL-MCNC: 4.4 GM/DL (ref 3.5–5)
ALBUMIN/GLOB SERPL: 2.1 RATIO (ref 1.1–2)
ALP SERPL-CCNC: 102 UNIT/L (ref 40–150)
ALT SERPL-CCNC: 15 UNIT/L (ref 0–55)
AST SERPL-CCNC: 14 UNIT/L (ref 5–34)
BASOPHILS # BLD AUTO: 0.1 X10(3)/MCL (ref 0–0.2)
BASOPHILS NFR BLD AUTO: 1 %
BILIRUB SERPL-MCNC: 1 MG/DL
BILIRUBIN DIRECT+TOT PNL SERPL-MCNC: 0.4 MG/DL (ref 0–0.5)
BILIRUBIN DIRECT+TOT PNL SERPL-MCNC: 0.6 MG/DL (ref 0–0.8)
BUN SERPL-MCNC: 7.8 MG/DL (ref 8.4–25.7)
CALCIUM SERPL-MCNC: 9 MG/DL (ref 8.4–10.2)
CHLORIDE SERPL-SCNC: 101 MMOL/L (ref 98–107)
CO2 SERPL-SCNC: 26 MMOL/L (ref 22–29)
CREAT SERPL-MCNC: 0.94 MG/DL (ref 0.73–1.18)
EOSINOPHIL # BLD AUTO: 0.2 X10(3)/MCL (ref 0–0.9)
EOSINOPHIL NFR BLD AUTO: 3 %
ERYTHROCYTE [DISTWIDTH] IN BLOOD BY AUTOMATED COUNT: 16.8 % (ref 11.5–17)
GLOBULIN SER-MCNC: 2.1 GM/DL (ref 2.4–3.5)
GLUCOSE SERPL-MCNC: 423 MG/DL (ref 74–100)
HCT VFR BLD AUTO: 42.6 % (ref 42–52)
HGB BLD-MCNC: 12.3 GM/DL (ref 14–18)
LYMPHOCYTES # BLD AUTO: 1 X10(3)/MCL (ref 0.6–4.6)
LYMPHOCYTES NFR BLD AUTO: 16 %
MCH RBC QN AUTO: 21.8 PG (ref 27–31)
MCHC RBC AUTO-ENTMCNC: 28.9 GM/DL (ref 33–36)
MCV RBC AUTO: 75.7 FL (ref 80–94)
MONOCYTES # BLD AUTO: 0.5 X10(3)/MCL (ref 0.1–1.3)
MONOCYTES NFR BLD AUTO: 8 %
NEUTROPHILS # BLD AUTO: 4.25 X10(3)/MCL (ref 2.1–9.2)
NEUTROPHILS NFR BLD AUTO: 71 %
PLATELET # BLD AUTO: 123 X10(3)/MCL (ref 130–400)
PMV BLD AUTO: 10.4 FL (ref 9.4–12.4)
POTASSIUM SERPL-SCNC: 5 MMOL/L (ref 3.5–5.1)
PROT SERPL-MCNC: 6.5 GM/DL (ref 6.4–8.3)
RBC # BLD AUTO: 5.63 X10(6)/MCL (ref 4.7–6.1)
SODIUM SERPL-SCNC: 136 MMOL/L (ref 136–145)
WBC # SPEC AUTO: 6 X10(3)/MCL (ref 4.5–11.5)

## 2020-09-10 ENCOUNTER — TELEPHONE (OUTPATIENT)
Dept: TRANSPLANT | Facility: CLINIC | Age: 60
End: 2020-09-10

## 2020-09-10 LAB
EXT ALBUMIN: 4.4
EXT ALKALINE PHOSPHATASE: 102
EXT ALT: 15
EXT AST: 14
EXT BASOPHIL%: 1
EXT BILIRUBIN DIRECT: 0.4 MG/DL
EXT BILIRUBIN TOTAL: 1
EXT BUN: 7.8
EXT CALCIUM: 9
EXT CHLORIDE: 101
EXT CO2: 26
EXT CREATININE: 0.94 MG/DL
EXT EOSINOPHIL%: 3
EXT GLUCOSE: 423
EXT HEMATOCRIT: 42.6
EXT HEMOGLOBIN: 12.3
EXT LYMPH%: 16
EXT MONOCYTES%: 8
EXT PLATELETS: 123
EXT POTASSIUM: 5
EXT PROTEIN TOTAL: 6.5
EXT SEGS%: 71
EXT SODIUM: 136 MMOL/L
EXT TACROLIMUS LVL: 9.2
EXT WBC: 6

## 2020-09-14 ENCOUNTER — HISTORICAL (OUTPATIENT)
Dept: ADMINISTRATIVE | Facility: HOSPITAL | Age: 60
End: 2020-09-14

## 2020-09-14 ENCOUNTER — TELEPHONE (OUTPATIENT)
Dept: TRANSPLANT | Facility: CLINIC | Age: 60
End: 2020-09-14

## 2020-09-14 LAB
ABS NEUT (OLG): 3.69 X10(3)/MCL (ref 2.1–9.2)
ALBUMIN SERPL-MCNC: 4.3 GM/DL (ref 3.5–5)
ALBUMIN/GLOB SERPL: 2 RATIO (ref 1.1–2)
ALP SERPL-CCNC: 88 UNIT/L (ref 40–150)
ALT SERPL-CCNC: 23 UNIT/L (ref 0–55)
AST SERPL-CCNC: 23 UNIT/L (ref 5–34)
BASOPHILS # BLD AUTO: 0 X10(3)/MCL (ref 0–0.2)
BASOPHILS NFR BLD AUTO: 1 %
BILIRUB SERPL-MCNC: 0.9 MG/DL
BILIRUBIN DIRECT+TOT PNL SERPL-MCNC: 0.3 MG/DL (ref 0–0.5)
BILIRUBIN DIRECT+TOT PNL SERPL-MCNC: 0.6 MG/DL (ref 0–0.8)
BUN SERPL-MCNC: 12.7 MG/DL (ref 8.4–25.7)
CALCIUM SERPL-MCNC: 8.4 MG/DL (ref 8.4–10.2)
CHLORIDE SERPL-SCNC: 104 MMOL/L (ref 98–107)
CO2 SERPL-SCNC: 22 MMOL/L (ref 22–29)
CREAT SERPL-MCNC: 0.94 MG/DL (ref 0.73–1.18)
EOSINOPHIL # BLD AUTO: 0.2 X10(3)/MCL (ref 0–0.9)
EOSINOPHIL NFR BLD AUTO: 3 %
ERYTHROCYTE [DISTWIDTH] IN BLOOD BY AUTOMATED COUNT: 16.8 % (ref 11.5–17)
GLOBULIN SER-MCNC: 2.2 GM/DL (ref 2.4–3.5)
GLUCOSE SERPL-MCNC: 248 MG/DL (ref 74–100)
HCT VFR BLD AUTO: 41 % (ref 42–52)
HGB BLD-MCNC: 12 GM/DL (ref 14–18)
IMM GRANULOCYTES # BLD AUTO: 0 10*3/UL
IMM GRANULOCYTES NFR BLD AUTO: 1 %
LYMPHOCYTES # BLD AUTO: 0.9 X10(3)/MCL (ref 0.6–4.6)
LYMPHOCYTES NFR BLD AUTO: 17 %
MCH RBC QN AUTO: 22.2 PG (ref 27–31)
MCHC RBC AUTO-ENTMCNC: 29.3 GM/DL (ref 33–36)
MCV RBC AUTO: 75.8 FL (ref 80–94)
MONOCYTES # BLD AUTO: 0.5 X10(3)/MCL (ref 0.1–1.3)
MONOCYTES NFR BLD AUTO: 9 %
NEUTROPHILS # BLD AUTO: 3.69 X10(3)/MCL (ref 2.1–9.2)
NEUTROPHILS NFR BLD AUTO: 69 %
PLATELET # BLD AUTO: 119 X10(3)/MCL (ref 130–400)
PMV BLD AUTO: 11 FL (ref 9.4–12.4)
POTASSIUM SERPL-SCNC: 4.6 MMOL/L (ref 3.5–5.1)
PROT SERPL-MCNC: 6.5 GM/DL (ref 6.4–8.3)
RBC # BLD AUTO: 5.41 X10(6)/MCL (ref 4.7–6.1)
SODIUM SERPL-SCNC: 141 MMOL/L (ref 136–145)
WBC # SPEC AUTO: 5.3 X10(3)/MCL (ref 4.5–11.5)

## 2020-09-14 NOTE — TELEPHONE ENCOUNTER
Labs reviewed and stable. No medication changes. Next labs [facility] today.----- Message from Fareed Tran MD sent at 9/14/2020  2:13 PM CDT -----  Results ok. No action needed

## 2020-09-14 NOTE — TELEPHONE ENCOUNTER
Returned call to evie velazquez----- Message from Daisy Grant sent at 9/14/2020  2:40 PM CDT -----  Regarding: Speak with office  Contact: Colin Velazquez is calling to speak with nurse.      [phone]

## 2020-09-16 ENCOUNTER — TELEPHONE (OUTPATIENT)
Dept: TRANSPLANT | Facility: CLINIC | Age: 60
End: 2020-09-16

## 2020-09-16 NOTE — TELEPHONE ENCOUNTER
Returned call to Mr Reilly,left a VM. Stable labs, no med changes, pt did labs this week so pending receipt.

## 2020-09-17 ENCOUNTER — TELEPHONE (OUTPATIENT)
Dept: TRANSPLANT | Facility: CLINIC | Age: 60
End: 2020-09-17

## 2020-09-17 LAB
EXT ALBUMIN: 4.3
EXT ALKALINE PHOSPHATASE: 88
EXT ALT: 23
EXT AST: 23
EXT BASOPHIL%: 1
EXT BILIRUBIN DIRECT: 0.3 MG/DL
EXT BILIRUBIN TOTAL: 0.9
EXT BUN: 12.7
EXT CALCIUM: 8.4
EXT CHLORIDE: 104
EXT CO2: 22
EXT CREATININE: 0.94 MG/DL
EXT EOSINOPHIL%: 3
EXT GLUCOSE: 248
EXT HEMATOCRIT: 41
EXT HEMOGLOBIN: 12
EXT LYMPH%: 17
EXT MONOCYTES%: 9
EXT PLATELETS: 119
EXT POTASSIUM: 4.6
EXT PROTEIN TOTAL: 6.5
EXT SEGS%: 69
EXT SODIUM: 141 MMOL/L
EXT TACROLIMUS LVL: 9.4
EXT WBC: 5.3

## 2020-09-17 NOTE — TELEPHONE ENCOUNTER
Spoke to Mr Reilly on the phone. Stable labs reviewed.----- Message from Fareed Tran MD sent at 9/17/2020  2:16 PM CDT -----  Results ok. No action needed

## 2020-09-22 ENCOUNTER — HISTORICAL (OUTPATIENT)
Dept: ADMINISTRATIVE | Facility: HOSPITAL | Age: 60
End: 2020-09-22

## 2020-09-22 LAB
ABS NEUT (OLG): 3.32 X10(3)/MCL (ref 2.1–9.2)
ALBUMIN SERPL-MCNC: 4.3 GM/DL (ref 3.5–5)
ALBUMIN/GLOB SERPL: 2 RATIO (ref 1.1–2)
ALP SERPL-CCNC: 93 UNIT/L (ref 40–150)
ALT SERPL-CCNC: 17 UNIT/L (ref 0–55)
AST SERPL-CCNC: 11 UNIT/L (ref 5–34)
BASOPHILS # BLD AUTO: 0 X10(3)/MCL (ref 0–0.2)
BASOPHILS NFR BLD AUTO: 1 %
BILIRUB SERPL-MCNC: 0.8 MG/DL
BILIRUBIN DIRECT+TOT PNL SERPL-MCNC: 0.3 MG/DL (ref 0–0.5)
BILIRUBIN DIRECT+TOT PNL SERPL-MCNC: 0.5 MG/DL (ref 0–0.8)
BUN SERPL-MCNC: 10.7 MG/DL (ref 8.4–25.7)
CALCIUM SERPL-MCNC: 8.5 MG/DL (ref 8.4–10.2)
CHLORIDE SERPL-SCNC: 105 MMOL/L (ref 98–107)
CO2 SERPL-SCNC: 28 MMOL/L (ref 22–29)
CREAT SERPL-MCNC: 0.81 MG/DL (ref 0.73–1.18)
EOSINOPHIL # BLD AUTO: 0.2 X10(3)/MCL (ref 0–0.9)
EOSINOPHIL NFR BLD AUTO: 3 %
ERYTHROCYTE [DISTWIDTH] IN BLOOD BY AUTOMATED COUNT: 16.3 % (ref 11.5–17)
GLOBULIN SER-MCNC: 2.1 GM/DL (ref 2.4–3.5)
GLUCOSE SERPL-MCNC: 328 MG/DL (ref 74–100)
HCT VFR BLD AUTO: 40.2 % (ref 42–52)
HGB BLD-MCNC: 11.9 GM/DL (ref 14–18)
LYMPHOCYTES # BLD AUTO: 0.7 X10(3)/MCL (ref 0.6–4.6)
LYMPHOCYTES NFR BLD AUTO: 15 %
MCH RBC QN AUTO: 22 PG (ref 27–31)
MCHC RBC AUTO-ENTMCNC: 29.6 GM/DL (ref 33–36)
MCV RBC AUTO: 74.4 FL (ref 80–94)
MONOCYTES # BLD AUTO: 0.5 X10(3)/MCL (ref 0.1–1.3)
MONOCYTES NFR BLD AUTO: 10 %
NEUTROPHILS # BLD AUTO: 3.32 X10(3)/MCL (ref 2.1–9.2)
NEUTROPHILS NFR BLD AUTO: 70 %
PLATELET # BLD AUTO: 126 X10(3)/MCL (ref 130–400)
PMV BLD AUTO: 10.5 FL (ref 9.4–12.4)
POTASSIUM SERPL-SCNC: 4.5 MMOL/L (ref 3.5–5.1)
PROT SERPL-MCNC: 6.4 GM/DL (ref 6.4–8.3)
RBC # BLD AUTO: 5.4 X10(6)/MCL (ref 4.7–6.1)
SODIUM SERPL-SCNC: 141 MMOL/L (ref 136–145)
WBC # SPEC AUTO: 4.8 X10(3)/MCL (ref 4.5–11.5)

## 2020-09-25 LAB
EXT ALBUMIN: 4.3
EXT ALKALINE PHOSPHATASE: 93
EXT ALT: 17
EXT AST: 11
EXT BASOPHIL%: 1
EXT BILIRUBIN DIRECT: 0.3 MG/DL
EXT BILIRUBIN TOTAL: 0.8
EXT BUN: 10.7
EXT CALCIUM: 8.5
EXT CHLORIDE: 105
EXT CO2: 28
EXT CREATININE: 0.81 MG/DL
EXT EOSINOPHIL%: 3
EXT GLUCOSE: 328
EXT HEMATOCRIT: 40.2
EXT HEMOGLOBIN: 11.9
EXT LYMPH%: 15
EXT MONOCYTES%: 10
EXT PLATELETS: 126
EXT POTASSIUM: 4.5
EXT PROTEIN TOTAL: 6.4
EXT SEGS%: 70
EXT SODIUM: 141 MMOL/L
EXT TACROLIMUS LVL: 9.5
EXT WBC: 4.8

## 2020-09-28 ENCOUNTER — TELEPHONE (OUTPATIENT)
Dept: TRANSPLANT | Facility: CLINIC | Age: 60
End: 2020-09-28

## 2020-09-28 NOTE — TELEPHONE ENCOUNTER
Labs reviewed and stable. No med changes. Spoke to pt.----- Message from Fareed Tran MD sent at 9/25/2020  1:31 PM CDT -----  Results ok. No action needed

## 2020-10-05 ENCOUNTER — HISTORICAL (OUTPATIENT)
Dept: ADMINISTRATIVE | Facility: HOSPITAL | Age: 60
End: 2020-10-05

## 2020-10-05 LAB
ABS NEUT (OLG): 3.65 X10(3)/MCL (ref 2.1–9.2)
ALBUMIN SERPL-MCNC: 4.4 GM/DL (ref 3.5–5)
ALBUMIN/GLOB SERPL: 2.3 RATIO (ref 1.1–2)
ALP SERPL-CCNC: 93 UNIT/L (ref 40–150)
ALT SERPL-CCNC: 16 UNIT/L (ref 0–55)
AST SERPL-CCNC: 12 UNIT/L (ref 5–34)
BASOPHILS # BLD AUTO: 0 X10(3)/MCL (ref 0–0.2)
BASOPHILS NFR BLD AUTO: 1 %
BILIRUB SERPL-MCNC: 0.8 MG/DL
BILIRUBIN DIRECT+TOT PNL SERPL-MCNC: 0.3 MG/DL (ref 0–0.5)
BILIRUBIN DIRECT+TOT PNL SERPL-MCNC: 0.5 MG/DL (ref 0–0.8)
BUN SERPL-MCNC: 16 MG/DL (ref 8.4–25.7)
CALCIUM SERPL-MCNC: 8.6 MG/DL (ref 8.4–10.2)
CHLORIDE SERPL-SCNC: 102 MMOL/L (ref 98–107)
CO2 SERPL-SCNC: 26 MMOL/L (ref 22–29)
CREAT SERPL-MCNC: 1 MG/DL (ref 0.73–1.18)
EOSINOPHIL # BLD AUTO: 0.2 X10(3)/MCL (ref 0–0.9)
EOSINOPHIL NFR BLD AUTO: 3 %
ERYTHROCYTE [DISTWIDTH] IN BLOOD BY AUTOMATED COUNT: 16.5 % (ref 11.5–17)
GLOBULIN SER-MCNC: 1.9 GM/DL (ref 2.4–3.5)
GLUCOSE SERPL-MCNC: 405 MG/DL (ref 74–100)
HCT VFR BLD AUTO: 42.3 % (ref 42–52)
HGB BLD-MCNC: 12.5 GM/DL (ref 14–18)
LYMPHOCYTES # BLD AUTO: 0.9 X10(3)/MCL (ref 0.6–4.6)
LYMPHOCYTES NFR BLD AUTO: 17 %
MCH RBC QN AUTO: 22.2 PG (ref 27–31)
MCHC RBC AUTO-ENTMCNC: 29.6 GM/DL (ref 33–36)
MCV RBC AUTO: 75.3 FL (ref 80–94)
MONOCYTES # BLD AUTO: 0.5 X10(3)/MCL (ref 0.1–1.3)
MONOCYTES NFR BLD AUTO: 9 %
NEUTROPHILS # BLD AUTO: 3.65 X10(3)/MCL (ref 2.1–9.2)
NEUTROPHILS NFR BLD AUTO: 69 %
PLATELET # BLD AUTO: 119 X10(3)/MCL (ref 130–400)
PMV BLD AUTO: 11.1 FL (ref 9.4–12.4)
POTASSIUM SERPL-SCNC: 4.3 MMOL/L (ref 3.5–5.1)
PROT SERPL-MCNC: 6.3 GM/DL (ref 6.4–8.3)
RBC # BLD AUTO: 5.62 X10(6)/MCL (ref 4.7–6.1)
SODIUM SERPL-SCNC: 137 MMOL/L (ref 136–145)
WBC # SPEC AUTO: 5.3 X10(3)/MCL (ref 4.5–11.5)

## 2020-10-06 ENCOUNTER — TELEPHONE (OUTPATIENT)
Dept: TRANSPLANT | Facility: CLINIC | Age: 60
End: 2020-10-06

## 2020-10-13 ENCOUNTER — HISTORICAL (OUTPATIENT)
Dept: ADMINISTRATIVE | Facility: HOSPITAL | Age: 60
End: 2020-10-13

## 2020-10-13 LAB
ABS NEUT (OLG): 2.57 X10(3)/MCL (ref 2.1–9.2)
ALBUMIN SERPL-MCNC: 4.6 GM/DL (ref 3.5–5)
ALBUMIN/GLOB SERPL: 2 RATIO (ref 1.1–2)
ALP SERPL-CCNC: 92 UNIT/L (ref 40–150)
ALT SERPL-CCNC: 21 UNIT/L (ref 0–55)
AST SERPL-CCNC: 15 UNIT/L (ref 5–34)
BASOPHILS # BLD AUTO: 0 X10(3)/MCL (ref 0–0.2)
BASOPHILS NFR BLD AUTO: 1 %
BILIRUB SERPL-MCNC: 5.6 MG/DL
BILIRUBIN DIRECT+TOT PNL SERPL-MCNC: 0.3 MG/DL (ref 0–0.5)
BILIRUBIN DIRECT+TOT PNL SERPL-MCNC: 5.3 MG/DL (ref 0–0.8)
BUN SERPL-MCNC: 8.8 MG/DL (ref 8.4–25.7)
CALCIUM SERPL-MCNC: 8.7 MG/DL (ref 8.4–10.2)
CHLORIDE SERPL-SCNC: 100 MMOL/L (ref 98–107)
CO2 SERPL-SCNC: 25 MMOL/L (ref 22–29)
CREAT SERPL-MCNC: 0.93 MG/DL (ref 0.73–1.18)
EOSINOPHIL # BLD AUTO: 0.1 X10(3)/MCL (ref 0–0.9)
EOSINOPHIL NFR BLD AUTO: 3 %
ERYTHROCYTE [DISTWIDTH] IN BLOOD BY AUTOMATED COUNT: 16.2 % (ref 11.5–17)
GLOBULIN SER-MCNC: 2.3 GM/DL (ref 2.4–3.5)
GLUCOSE SERPL-MCNC: 363 MG/DL (ref 74–100)
HCT VFR BLD AUTO: 41.9 % (ref 42–52)
HGB BLD-MCNC: 12.5 GM/DL (ref 14–18)
LYMPHOCYTES # BLD AUTO: 0.8 X10(3)/MCL (ref 0.6–4.6)
LYMPHOCYTES NFR BLD AUTO: 20 %
MCH RBC QN AUTO: 22.6 PG (ref 27–31)
MCHC RBC AUTO-ENTMCNC: 29.8 GM/DL (ref 33–36)
MCV RBC AUTO: 75.6 FL (ref 80–94)
MONOCYTES # BLD AUTO: 0.4 X10(3)/MCL (ref 0.1–1.3)
MONOCYTES NFR BLD AUTO: 10 %
NEUTROPHILS # BLD AUTO: 2.57 X10(3)/MCL (ref 2.1–9.2)
NEUTROPHILS NFR BLD AUTO: 65 %
PLATELET # BLD AUTO: 124 X10(3)/MCL (ref 130–400)
PMV BLD AUTO: 11.3 FL (ref 9.4–12.4)
POTASSIUM SERPL-SCNC: 4.9 MMOL/L (ref 3.5–5.1)
PROT SERPL-MCNC: 6.9 GM/DL (ref 6.4–8.3)
RBC # BLD AUTO: 5.54 X10(6)/MCL (ref 4.7–6.1)
SODIUM SERPL-SCNC: 137 MMOL/L (ref 136–145)
WBC # SPEC AUTO: 4 X10(3)/MCL (ref 4.5–11.5)

## 2020-10-15 LAB
EXT ALBUMIN: 4.6
EXT ALKALINE PHOSPHATASE: 92
EXT ALT: 21
EXT AST: 15
EXT BASOPHIL%: 1
EXT BILIRUBIN DIRECT: 0.3 MG/DL
EXT BILIRUBIN TOTAL: 5.6
EXT BUN: 8.8
EXT CALCIUM: 8.7
EXT CHLORIDE: 100
EXT CO2: 25
EXT CREATININE: 0.93 MG/DL
EXT EOSINOPHIL%: 3
EXT GLUCOSE: 363
EXT HEMATOCRIT: 41.9
EXT HEMOGLOBIN: 12.5
EXT LYMPH%: 20
EXT MONOCYTES%: 10
EXT PLATELETS: 124
EXT POTASSIUM: 4.9
EXT PROTEIN TOTAL: 6.9
EXT SEGS%: 65
EXT SODIUM: 137 MMOL/L
EXT TACROLIMUS LVL: 9.3
EXT WBC: 4

## 2020-10-20 ENCOUNTER — HISTORICAL (OUTPATIENT)
Dept: ADMINISTRATIVE | Facility: HOSPITAL | Age: 60
End: 2020-10-20

## 2020-10-20 LAB
ABS NEUT (OLG): 3.87 X10(3)/MCL (ref 2.1–9.2)
ALBUMIN SERPL-MCNC: 4.4 GM/DL (ref 3.5–5)
ALBUMIN/GLOB SERPL: 1.9 RATIO (ref 1.1–2)
ALP SERPL-CCNC: 88 UNIT/L (ref 40–150)
ALT SERPL-CCNC: 16 UNIT/L (ref 0–55)
AST SERPL-CCNC: 13 UNIT/L (ref 5–34)
BASOPHILS # BLD AUTO: 0 X10(3)/MCL (ref 0–0.2)
BASOPHILS NFR BLD AUTO: 1 %
BILIRUB SERPL-MCNC: 1 MG/DL
BILIRUBIN DIRECT+TOT PNL SERPL-MCNC: 0.5 MG/DL (ref 0–0.5)
BILIRUBIN DIRECT+TOT PNL SERPL-MCNC: 0.5 MG/DL (ref 0–0.8)
BUN SERPL-MCNC: 12.6 MG/DL (ref 8.4–25.7)
CALCIUM SERPL-MCNC: 8.7 MG/DL (ref 8.4–10.2)
CHLORIDE SERPL-SCNC: 101 MMOL/L (ref 98–107)
CO2 SERPL-SCNC: 25 MMOL/L (ref 22–29)
CREAT SERPL-MCNC: 1.17 MG/DL (ref 0.73–1.18)
EOSINOPHIL # BLD AUTO: 0.1 X10(3)/MCL (ref 0–0.9)
EOSINOPHIL NFR BLD AUTO: 2 %
ERYTHROCYTE [DISTWIDTH] IN BLOOD BY AUTOMATED COUNT: 15.9 % (ref 11.5–17)
GLOBULIN SER-MCNC: 2.3 GM/DL (ref 2.4–3.5)
GLUCOSE SERPL-MCNC: 393 MG/DL (ref 74–100)
HCT VFR BLD AUTO: 43 % (ref 42–52)
HGB BLD-MCNC: 12.9 GM/DL (ref 14–18)
LYMPHOCYTES # BLD AUTO: 0.9 X10(3)/MCL (ref 0.6–4.6)
LYMPHOCYTES NFR BLD AUTO: 16 %
MCH RBC QN AUTO: 22.6 PG (ref 27–31)
MCHC RBC AUTO-ENTMCNC: 30 GM/DL (ref 33–36)
MCV RBC AUTO: 75.2 FL (ref 80–94)
MONOCYTES # BLD AUTO: 0.4 X10(3)/MCL (ref 0.1–1.3)
MONOCYTES NFR BLD AUTO: 8 %
NEUTROPHILS # BLD AUTO: 3.87 X10(3)/MCL (ref 2.1–9.2)
NEUTROPHILS NFR BLD AUTO: 72 %
PLATELET # BLD AUTO: 144 X10(3)/MCL (ref 130–400)
PMV BLD AUTO: 10.9 FL (ref 9.4–12.4)
POTASSIUM SERPL-SCNC: 4.3 MMOL/L (ref 3.5–5.1)
PROT SERPL-MCNC: 6.7 GM/DL (ref 6.4–8.3)
RBC # BLD AUTO: 5.72 X10(6)/MCL (ref 4.7–6.1)
SODIUM SERPL-SCNC: 138 MMOL/L (ref 136–145)
WBC # SPEC AUTO: 5.4 X10(3)/MCL (ref 4.5–11.5)

## 2020-10-22 ENCOUNTER — TELEPHONE (OUTPATIENT)
Dept: TRANSPLANT | Facility: CLINIC | Age: 60
End: 2020-10-22

## 2020-10-22 NOTE — TELEPHONE ENCOUNTER
----- Message from Natalie Lane sent at 10/22/2020  4:55 PM CDT -----  Pt is calling to speak to coordinator stating that he was calling to notify her everything is ok      Pt contact [phone]

## 2020-10-27 ENCOUNTER — HISTORICAL (OUTPATIENT)
Dept: ADMINISTRATIVE | Facility: HOSPITAL | Age: 60
End: 2020-10-27

## 2020-10-27 ENCOUNTER — TELEPHONE (OUTPATIENT)
Dept: TRANSPLANT | Facility: CLINIC | Age: 60
End: 2020-10-27

## 2020-10-27 LAB
ABS NEUT (OLG): 2.46 X10(3)/MCL (ref 2.1–9.2)
ALBUMIN SERPL-MCNC: 4.5 GM/DL (ref 3.5–5)
ALBUMIN/GLOB SERPL: 2.2 RATIO (ref 1.1–2)
ALP SERPL-CCNC: 130 UNIT/L (ref 40–150)
ALT SERPL-CCNC: 44 UNIT/L (ref 0–55)
AST SERPL-CCNC: 52 UNIT/L (ref 5–34)
BASOPHILS # BLD AUTO: 0 X10(3)/MCL (ref 0–0.2)
BASOPHILS NFR BLD AUTO: 1 %
BILIRUB SERPL-MCNC: 1.2 MG/DL
BILIRUBIN DIRECT+TOT PNL SERPL-MCNC: 0.5 MG/DL (ref 0–0.5)
BILIRUBIN DIRECT+TOT PNL SERPL-MCNC: 0.7 MG/DL (ref 0–0.8)
BUN SERPL-MCNC: 7.9 MG/DL (ref 8.4–25.7)
CALCIUM SERPL-MCNC: 8.6 MG/DL (ref 8.4–10.2)
CHLORIDE SERPL-SCNC: 103 MMOL/L (ref 98–107)
CO2 SERPL-SCNC: 28 MMOL/L (ref 22–29)
CREAT SERPL-MCNC: 0.92 MG/DL (ref 0.73–1.18)
EOSINOPHIL # BLD AUTO: 0.1 X10(3)/MCL (ref 0–0.9)
EOSINOPHIL NFR BLD AUTO: 4 %
ERYTHROCYTE [DISTWIDTH] IN BLOOD BY AUTOMATED COUNT: 15.9 % (ref 11.5–17)
EXT ALBUMIN: 4.4
EXT ALKALINE PHOSPHATASE: 88
EXT ALT: 16
EXT AST: 13
EXT BASOPHIL%: 1
EXT BILIRUBIN DIRECT: 0.5 MG/DL
EXT BILIRUBIN TOTAL: 1
EXT BUN: 12.6
EXT CALCIUM: 8.7
EXT CHLORIDE: 101
EXT CO2: 25
EXT CREATININE: 1.17 MG/DL
EXT EOSINOPHIL%: 2
EXT GLUCOSE: 393
EXT HEMATOCRIT: 43
EXT HEMOGLOBIN: 12.9
EXT LYMPH%: 16
EXT MONOCYTES%: 8
EXT PLATELETS: 144
EXT POTASSIUM: 4.3
EXT PROTEIN TOTAL: 6.7
EXT SEGS%: 72
EXT SODIUM: 138 MMOL/L
EXT TACROLIMUS LVL: 8.8
EXT WBC: 5.4
GLOBULIN SER-MCNC: 2 GM/DL (ref 2.4–3.5)
GLUCOSE SERPL-MCNC: 230 MG/DL (ref 74–100)
HCT VFR BLD AUTO: 41.7 % (ref 42–52)
HGB BLD-MCNC: 12.3 GM/DL (ref 14–18)
IMM GRANULOCYTES # BLD AUTO: 0 10*3/UL
IMM GRANULOCYTES NFR BLD AUTO: 1 %
LYMPHOCYTES # BLD AUTO: 0.8 X10(3)/MCL (ref 0.6–4.6)
LYMPHOCYTES NFR BLD AUTO: 20 %
MCH RBC QN AUTO: 22.5 PG (ref 27–31)
MCHC RBC AUTO-ENTMCNC: 29.5 GM/DL (ref 33–36)
MCV RBC AUTO: 76.2 FL (ref 80–94)
MONOCYTES # BLD AUTO: 0.4 X10(3)/MCL (ref 0.1–1.3)
MONOCYTES NFR BLD AUTO: 10 %
NEUTROPHILS # BLD AUTO: 2.46 X10(3)/MCL (ref 2.1–9.2)
NEUTROPHILS NFR BLD AUTO: 64 %
PLATELET # BLD AUTO: 129 X10(3)/MCL (ref 130–400)
PMV BLD AUTO: 10.3 FL (ref 9.4–12.4)
POTASSIUM SERPL-SCNC: 4.9 MMOL/L (ref 3.5–5.1)
PROT SERPL-MCNC: 6.5 GM/DL (ref 6.4–8.3)
RBC # BLD AUTO: 5.47 X10(6)/MCL (ref 4.7–6.1)
SODIUM SERPL-SCNC: 139 MMOL/L (ref 136–145)
WBC # SPEC AUTO: 3.8 X10(3)/MCL (ref 4.5–11.5)

## 2020-10-27 NOTE — TELEPHONE ENCOUNTER
Noted. Pt to do labs this week.----- Message from Fareed Tran MD sent at 10/27/2020  2:01 PM CDT -----  Results ok. No action needed

## 2020-11-02 ENCOUNTER — HISTORICAL (OUTPATIENT)
Dept: ADMINISTRATIVE | Facility: HOSPITAL | Age: 60
End: 2020-11-02

## 2020-11-02 LAB
ABS NEUT (OLG): 3.07 X10(3)/MCL (ref 2.1–9.2)
ALBUMIN SERPL-MCNC: 4.4 GM/DL (ref 3.5–5)
ALBUMIN/GLOB SERPL: 2.1 RATIO (ref 1.1–2)
ALP SERPL-CCNC: 102 UNIT/L (ref 40–150)
ALT SERPL-CCNC: 17 UNIT/L (ref 0–55)
AST SERPL-CCNC: 14 UNIT/L (ref 5–34)
BASOPHILS # BLD AUTO: 0 X10(3)/MCL (ref 0–0.2)
BASOPHILS NFR BLD AUTO: 1 %
BILIRUB SERPL-MCNC: 0.8 MG/DL
BILIRUBIN DIRECT+TOT PNL SERPL-MCNC: 0.3 MG/DL (ref 0–0.5)
BILIRUBIN DIRECT+TOT PNL SERPL-MCNC: 0.5 MG/DL (ref 0–0.8)
BUN SERPL-MCNC: 14.3 MG/DL (ref 8.4–25.7)
CALCIUM SERPL-MCNC: 8.9 MG/DL (ref 8.4–10.2)
CHLORIDE SERPL-SCNC: 100 MMOL/L (ref 98–107)
CO2 SERPL-SCNC: 28 MMOL/L (ref 22–29)
CREAT SERPL-MCNC: 0.88 MG/DL (ref 0.73–1.18)
EOSINOPHIL # BLD AUTO: 0.1 X10(3)/MCL (ref 0–0.9)
EOSINOPHIL NFR BLD AUTO: 3 %
ERYTHROCYTE [DISTWIDTH] IN BLOOD BY AUTOMATED COUNT: 15.9 % (ref 11.5–17)
GLOBULIN SER-MCNC: 2.1 GM/DL (ref 2.4–3.5)
GLUCOSE SERPL-MCNC: 364 MG/DL (ref 74–100)
HCT VFR BLD AUTO: 40.9 % (ref 42–52)
HGB BLD-MCNC: 12.3 GM/DL (ref 14–18)
LYMPHOCYTES # BLD AUTO: 0.9 X10(3)/MCL (ref 0.6–4.6)
LYMPHOCYTES NFR BLD AUTO: 20 %
MCH RBC QN AUTO: 22.8 PG (ref 27–31)
MCHC RBC AUTO-ENTMCNC: 30.1 GM/DL (ref 33–36)
MCV RBC AUTO: 75.9 FL (ref 80–94)
MONOCYTES # BLD AUTO: 0.4 X10(3)/MCL (ref 0.1–1.3)
MONOCYTES NFR BLD AUTO: 10 %
NEUTROPHILS # BLD AUTO: 3.07 X10(3)/MCL (ref 2.1–9.2)
NEUTROPHILS NFR BLD AUTO: 66 %
PLATELET # BLD AUTO: 118 X10(3)/MCL (ref 130–400)
PMV BLD AUTO: 10.6 FL (ref 9.4–12.4)
POTASSIUM SERPL-SCNC: 4.6 MMOL/L (ref 3.5–5.1)
PROT SERPL-MCNC: 6.5 GM/DL (ref 6.4–8.3)
RBC # BLD AUTO: 5.39 X10(6)/MCL (ref 4.7–6.1)
SODIUM SERPL-SCNC: 136 MMOL/L (ref 136–145)
WBC # SPEC AUTO: 4.6 X10(3)/MCL (ref 4.5–11.5)

## 2020-11-03 ENCOUNTER — TELEPHONE (OUTPATIENT)
Dept: TRANSPLANT | Facility: CLINIC | Age: 60
End: 2020-11-03

## 2020-11-03 LAB
EXT ALBUMIN: 4.4
EXT ALKALINE PHOSPHATASE: 102
EXT ALT: 17
EXT AST: 14
EXT BASOPHIL%: 1
EXT BILIRUBIN DIRECT: 0.3 MG/DL
EXT BILIRUBIN TOTAL: 0.8
EXT BUN: 14.3
EXT CALCIUM: 8.9
EXT CHLORIDE: 100
EXT CO2: 28
EXT CREATININE: 0.88 MG/DL
EXT EOSINOPHIL%: 3
EXT GLUCOSE: 364
EXT HEMATOCRIT: 40.9
EXT HEMOGLOBIN: 12.3
EXT LYMPH%: 20
EXT MONOCYTES%: 10
EXT PLATELETS: 118
EXT POTASSIUM: 4.6
EXT PROTEIN TOTAL: 6.5
EXT SEGS%: 66
EXT SODIUM: 136 MMOL/L
EXT TACROLIMUS LVL: 10.6
EXT WBC: 4.6

## 2020-11-05 ENCOUNTER — TELEPHONE (OUTPATIENT)
Dept: TRANSPLANT | Facility: CLINIC | Age: 60
End: 2020-11-05

## 2020-11-05 NOTE — TELEPHONE ENCOUNTER
Returned call. Pt states he fell and scraped arm up bad and is hurting in lt ribs. Instructed pt to go to ER for evaluation.----- Message from John Davila sent at 11/5/2020  9:52 AM CST -----  Regarding: Speak with coordinator (PT. Fall)  Contact: Pt  Patient stated he fell yesterday and wants to get an xray to see if he may have a cracked rib.  Patient states he would like to speak with coordinator to verify if having an xray is permissible hence surgery 5mths ago?    Pt call back # [phone]

## 2020-11-06 DIAGNOSIS — Z94.4 LIVER TRANSPLANTED: ICD-10-CM

## 2020-11-09 ENCOUNTER — HISTORICAL (OUTPATIENT)
Dept: ADMINISTRATIVE | Facility: HOSPITAL | Age: 60
End: 2020-11-09

## 2020-11-09 LAB
ABS NEUT (OLG): 5.63 X10(3)/MCL (ref 2.1–9.2)
ALBUMIN SERPL-MCNC: 4.4 GM/DL (ref 3.5–5)
ALBUMIN/GLOB SERPL: 1.9 RATIO (ref 1.1–2)
ALP SERPL-CCNC: 136 UNIT/L (ref 40–150)
ALT SERPL-CCNC: 30 UNIT/L (ref 0–55)
AMPHET UR QL SCN: NEGATIVE
APPEARANCE, UA: CLEAR
AST SERPL-CCNC: 15 UNIT/L (ref 5–34)
BACTERIA SPEC CULT: ABNORMAL /HPF
BARBITURATE SCN PRESENT UR: NEGATIVE
BASOPHILS # BLD AUTO: 0 X10(3)/MCL (ref 0–0.2)
BASOPHILS NFR BLD AUTO: 1 %
BENZODIAZ UR QL SCN: NEGATIVE
BILIRUB SERPL-MCNC: 0.9 MG/DL
BILIRUB UR QL STRIP: NEGATIVE
BILIRUBIN DIRECT+TOT PNL SERPL-MCNC: 0.3 MG/DL (ref 0–0.5)
BILIRUBIN DIRECT+TOT PNL SERPL-MCNC: 0.6 MG/DL (ref 0–0.8)
BUN SERPL-MCNC: 10.5 MG/DL (ref 8.4–25.7)
CALCIUM SERPL-MCNC: 8.8 MG/DL (ref 8.4–10.2)
CANNABINOIDS UR QL SCN: NEGATIVE
CHLORIDE SERPL-SCNC: 99 MMOL/L (ref 98–107)
CHOLEST SERPL-MCNC: 144 MG/DL
CHOLEST/HDLC SERPL: 5 {RATIO} (ref 0–5)
CO2 SERPL-SCNC: 28 MMOL/L (ref 22–29)
COCAINE UR QL SCN: NEGATIVE
COLOR UR: YELLOW
CREAT SERPL-MCNC: 0.87 MG/DL (ref 0.73–1.18)
EOSINOPHIL # BLD AUTO: 0.2 X10(3)/MCL (ref 0–0.9)
EOSINOPHIL NFR BLD AUTO: 2 %
ERYTHROCYTE [DISTWIDTH] IN BLOOD BY AUTOMATED COUNT: 16.2 % (ref 11.5–17)
EST. AVERAGE GLUCOSE BLD GHB EST-MCNC: 251.8 MG/DL
GLOBULIN SER-MCNC: 2.3 GM/DL (ref 2.4–3.5)
GLUCOSE (UA): ABNORMAL
GLUCOSE SERPL-MCNC: 314 MG/DL (ref 74–100)
HBA1C MFR BLD: 10.4 %
HCT VFR BLD AUTO: 42.3 % (ref 42–52)
HDLC SERPL-MCNC: 27 MG/DL (ref 35–60)
HGB BLD-MCNC: 12.8 GM/DL (ref 14–18)
HGB UR QL STRIP: NEGATIVE
KETONES UR QL STRIP: NEGATIVE
LDLC SERPL CALC-MCNC: 86 MG/DL (ref 50–140)
LEUKOCYTE ESTERASE UR QL STRIP: NEGATIVE
LYMPHOCYTES # BLD AUTO: 0.7 X10(3)/MCL (ref 0.6–4.6)
LYMPHOCYTES NFR BLD AUTO: 9 %
MCH RBC QN AUTO: 22.6 PG (ref 27–31)
MCHC RBC AUTO-ENTMCNC: 30.3 GM/DL (ref 33–36)
MCV RBC AUTO: 74.7 FL (ref 80–94)
MONOCYTES # BLD AUTO: 0.6 X10(3)/MCL (ref 0.1–1.3)
MONOCYTES NFR BLD AUTO: 8 %
MUCOUS THREADS URNS QL MICRO: SLIGHT
NEUTROPHILS # BLD AUTO: 5.63 X10(3)/MCL (ref 2.1–9.2)
NEUTROPHILS NFR BLD AUTO: 79 %
NITRITE UR QL STRIP: NEGATIVE
OPIATES UR QL SCN: POSITIVE
PCP UR QL: NEGATIVE
PH UR STRIP.AUTO: 5.5 [PH] (ref 5–7.5)
PH UR STRIP: 5.5 [PH] (ref 5–9)
PLATELET # BLD AUTO: 146 X10(3)/MCL (ref 130–400)
PMV BLD AUTO: 10.6 FL (ref 9.4–12.4)
POTASSIUM SERPL-SCNC: 4.4 MMOL/L (ref 3.5–5.1)
PROT SERPL-MCNC: 6.7 GM/DL (ref 6.4–8.3)
PROT UR QL STRIP: NEGATIVE
RBC # BLD AUTO: 5.66 X10(6)/MCL (ref 4.7–6.1)
RBC #/AREA URNS HPF: ABNORMAL /[HPF]
SODIUM SERPL-SCNC: 135 MMOL/L (ref 136–145)
SP GR FLD REFRACTOMETRY: 1.02 (ref 1–1.03)
SP GR UR STRIP: 1.02 (ref 1–1.03)
SQUAMOUS EPITHELIAL, UA: ABNORMAL
TRIGL SERPL-MCNC: 157 MG/DL (ref 34–140)
TSH SERPL-ACNC: 1.79 UIU/ML (ref 0.35–4.94)
UROBILINOGEN UR STRIP-ACNC: 0.2
VIT B12 SERPL-MCNC: 439 PG/ML (ref 213–816)
VLDLC SERPL CALC-MCNC: 31 MG/DL
WBC # SPEC AUTO: 7.1 X10(3)/MCL (ref 4.5–11.5)
WBC #/AREA URNS HPF: ABNORMAL /HPF

## 2020-11-09 RX ORDER — TACROLIMUS 1 MG/1
2 CAPSULE ORAL EVERY 12 HOURS
Qty: 120 CAPSULE | Refills: 11 | Status: SHIPPED | OUTPATIENT
Start: 2020-11-09 | End: 2021-02-08 | Stop reason: SDUPTHER

## 2020-11-16 ENCOUNTER — HISTORICAL (OUTPATIENT)
Dept: ADMINISTRATIVE | Facility: HOSPITAL | Age: 60
End: 2020-11-16

## 2020-11-16 LAB
ABS NEUT (OLG): 3.86 X10(3)/MCL (ref 2.1–9.2)
ALBUMIN SERPL-MCNC: 4.6 GM/DL (ref 3.5–5)
ALBUMIN/GLOB SERPL: 1.8 RATIO (ref 1.1–2)
ALP SERPL-CCNC: 117 UNIT/L (ref 40–150)
ALT SERPL-CCNC: 24 UNIT/L (ref 0–55)
APPEARANCE, UA: CLEAR
AST SERPL-CCNC: 21 UNIT/L (ref 5–34)
BACTERIA SPEC CULT: ABNORMAL /HPF
BASOPHILS # BLD AUTO: 0.1 X10(3)/MCL (ref 0–0.2)
BASOPHILS NFR BLD AUTO: 1 %
BILIRUB SERPL-MCNC: 0.8 MG/DL
BILIRUB UR QL STRIP: ABNORMAL
BILIRUBIN DIRECT+TOT PNL SERPL-MCNC: 0.3 MG/DL (ref 0–0.5)
BILIRUBIN DIRECT+TOT PNL SERPL-MCNC: 0.5 MG/DL (ref 0–0.8)
BUN SERPL-MCNC: 7.1 MG/DL (ref 8.4–25.7)
CALCIUM SERPL-MCNC: 9.2 MG/DL (ref 8.4–10.2)
CHLORIDE SERPL-SCNC: 105 MMOL/L (ref 98–107)
CO2 SERPL-SCNC: 27 MMOL/L (ref 22–29)
COLOR UR: ABNORMAL
CREAT SERPL-MCNC: 0.83 MG/DL (ref 0.73–1.18)
CREAT UR-MCNC: 339.6 MG/DL (ref 58–161)
EOSINOPHIL # BLD AUTO: 0.2 X10(3)/MCL (ref 0–0.9)
EOSINOPHIL NFR BLD AUTO: 3 %
ERYTHROCYTE [DISTWIDTH] IN BLOOD BY AUTOMATED COUNT: 17.3 % (ref 11.5–17)
GLOBULIN SER-MCNC: 2.5 GM/DL (ref 2.4–3.5)
GLUCOSE (UA): ABNORMAL
GLUCOSE SERPL-MCNC: 62 MG/DL (ref 74–100)
HCT VFR BLD AUTO: 45.7 % (ref 42–52)
HGB BLD-MCNC: 13.4 GM/DL (ref 14–18)
HGB UR QL STRIP: NEGATIVE
KETONES UR QL STRIP: ABNORMAL
LEUKOCYTE ESTERASE UR QL STRIP: NEGATIVE
LYMPHOCYTES # BLD AUTO: 1.1 X10(3)/MCL (ref 0.6–4.6)
LYMPHOCYTES NFR BLD AUTO: 19 %
MCH RBC QN AUTO: 22.6 PG (ref 27–31)
MCHC RBC AUTO-ENTMCNC: 29.3 GM/DL (ref 33–36)
MCV RBC AUTO: 77.1 FL (ref 80–94)
MICROALBUMIN UR-MCNC: 67.5 UG/ML
MICROALBUMIN/CREAT RATIO PNL UR: 19.9 MG/GM CR (ref 0–30)
MONOCYTES # BLD AUTO: 0.6 X10(3)/MCL (ref 0.1–1.3)
MONOCYTES NFR BLD AUTO: 9 %
NEUTROPHILS # BLD AUTO: 3.86 X10(3)/MCL (ref 2.1–9.2)
NEUTROPHILS NFR BLD AUTO: 66 %
NITRITE UR QL STRIP: NEGATIVE
PH UR STRIP: 6 [PH] (ref 5–9)
PLATELET # BLD AUTO: 148 X10(3)/MCL (ref 130–400)
PMV BLD AUTO: 10.4 FL (ref 9.4–12.4)
POTASSIUM SERPL-SCNC: 4.3 MMOL/L (ref 3.5–5.1)
PROT SERPL-MCNC: 7.1 GM/DL (ref 6.4–8.3)
PROT UR QL STRIP: ABNORMAL
RBC # BLD AUTO: 5.93 X10(6)/MCL (ref 4.7–6.1)
RBC #/AREA URNS HPF: ABNORMAL /[HPF]
SODIUM SERPL-SCNC: 143 MMOL/L (ref 136–145)
SP GR UR STRIP: 1.03 (ref 1–1.03)
SQUAMOUS EPITHELIAL, UA: ABNORMAL
UROBILINOGEN UR STRIP-ACNC: 1
WBC # SPEC AUTO: 5.9 X10(3)/MCL (ref 4.5–11.5)
WBC #/AREA URNS HPF: ABNORMAL /HPF

## 2020-11-19 LAB
EXT ALBUMIN: 4.6
EXT ALKALINE PHOSPHATASE: 117
EXT ALT: 24
EXT AST: 21
EXT BASOPHIL%: 1
EXT BILIRUBIN DIRECT: 0.3 MG/DL
EXT BILIRUBIN TOTAL: 0.8
EXT BUN: 7.1
EXT CALCIUM: 9.2
EXT CHLORIDE: 105
EXT CO2: 27
EXT CREATININE: 0.83 MG/DL
EXT EOSINOPHIL%: 3
EXT GFR MDRD AF AMER: >60
EXT GFR MDRD NON AF AMER: >60
EXT GLUCOSE: 62
EXT HEMATOCRIT: 45.7
EXT HEMOGLOBIN: 13.4
EXT LYMPH%: 19
EXT MONOCYTES%: 9
EXT PLATELETS: 148
EXT POTASSIUM: 4.3
EXT PROTEIN TOTAL: 7.1
EXT SEGS%: 66
EXT SODIUM: 143 MMOL/L
EXT TACROLIMUS LVL: 4.9
EXT WBC: 5.9

## 2020-11-20 ENCOUNTER — TELEPHONE (OUTPATIENT)
Dept: TRANSPLANT | Facility: CLINIC | Age: 60
End: 2020-11-20

## 2020-11-20 NOTE — TELEPHONE ENCOUNTER
Labs reviewed.  Repeat labs 12/7/20. VM left  ----- Message from Fareed Tran MD sent at 11/20/2020  1:42 PM CST -----  Results ok. No action needed

## 2020-11-23 ENCOUNTER — HISTORICAL (OUTPATIENT)
Dept: ADMINISTRATIVE | Facility: HOSPITAL | Age: 60
End: 2020-11-23

## 2020-11-23 LAB
ABS NEUT (OLG): 3.32 X10(3)/MCL (ref 2.1–9.2)
ALBUMIN SERPL-MCNC: 4.3 GM/DL (ref 3.5–5)
ALBUMIN/GLOB SERPL: 1.7 RATIO (ref 1.1–2)
ALP SERPL-CCNC: 106 UNIT/L (ref 40–150)
ALT SERPL-CCNC: 19 UNIT/L (ref 0–55)
AST SERPL-CCNC: 15 UNIT/L (ref 5–34)
BASOPHILS # BLD AUTO: 0.1 X10(3)/MCL (ref 0–0.2)
BASOPHILS NFR BLD AUTO: 1 %
BILIRUB SERPL-MCNC: 0.6 MG/DL
BILIRUBIN DIRECT+TOT PNL SERPL-MCNC: 0.2 MG/DL (ref 0–0.5)
BILIRUBIN DIRECT+TOT PNL SERPL-MCNC: 0.4 MG/DL (ref 0–0.8)
BUN SERPL-MCNC: 9.9 MG/DL (ref 8.4–25.7)
CALCIUM SERPL-MCNC: 9.1 MG/DL (ref 8.4–10.2)
CHLORIDE SERPL-SCNC: 102 MMOL/L (ref 98–107)
CO2 SERPL-SCNC: 27 MMOL/L (ref 22–29)
CREAT SERPL-MCNC: 0.81 MG/DL (ref 0.73–1.18)
EOSINOPHIL # BLD AUTO: 0.1 X10(3)/MCL (ref 0–0.9)
EOSINOPHIL NFR BLD AUTO: 3 %
ERYTHROCYTE [DISTWIDTH] IN BLOOD BY AUTOMATED COUNT: 16.9 % (ref 11.5–17)
GLOBULIN SER-MCNC: 2.6 GM/DL (ref 2.4–3.5)
GLUCOSE SERPL-MCNC: 104 MG/DL (ref 74–100)
HCT VFR BLD AUTO: 44.5 % (ref 42–52)
HGB BLD-MCNC: 13.2 GM/DL (ref 14–18)
LYMPHOCYTES # BLD AUTO: 0.9 X10(3)/MCL (ref 0.6–4.6)
LYMPHOCYTES NFR BLD AUTO: 18 %
MCH RBC QN AUTO: 22.8 PG (ref 27–31)
MCHC RBC AUTO-ENTMCNC: 29.7 GM/DL (ref 33–36)
MCV RBC AUTO: 76.7 FL (ref 80–94)
MONOCYTES # BLD AUTO: 0.5 X10(3)/MCL (ref 0.1–1.3)
MONOCYTES NFR BLD AUTO: 10 %
NEUTROPHILS # BLD AUTO: 3.32 X10(3)/MCL (ref 2.1–9.2)
NEUTROPHILS NFR BLD AUTO: 67 %
PLATELET # BLD AUTO: 148 X10(3)/MCL (ref 130–400)
PMV BLD AUTO: 10.7 FL (ref 9.4–12.4)
POTASSIUM SERPL-SCNC: 4 MMOL/L (ref 3.5–5.1)
PROT SERPL-MCNC: 6.9 GM/DL (ref 6.4–8.3)
RBC # BLD AUTO: 5.8 X10(6)/MCL (ref 4.7–6.1)
SODIUM SERPL-SCNC: 139 MMOL/L (ref 136–145)
WBC # SPEC AUTO: 5 X10(3)/MCL (ref 4.5–11.5)

## 2020-11-28 PROBLEM — T38.0X5A ADVERSE EFFECT OF CORTICOSTEROIDS: Status: RESOLVED | Noted: 2020-05-21 | Resolved: 2020-11-28

## 2020-11-30 ENCOUNTER — HISTORICAL (OUTPATIENT)
Dept: ADMINISTRATIVE | Facility: HOSPITAL | Age: 60
End: 2020-11-30

## 2020-11-30 LAB
ABS NEUT (OLG): 3.01 X10(3)/MCL (ref 2.1–9.2)
ALBUMIN SERPL-MCNC: 4.7 GM/DL (ref 3.5–5)
ALBUMIN/GLOB SERPL: 1.8 RATIO (ref 1.1–2)
ALP SERPL-CCNC: 99 UNIT/L (ref 40–150)
ALT SERPL-CCNC: 20 UNIT/L (ref 0–55)
AST SERPL-CCNC: 19 UNIT/L (ref 5–34)
BASOPHILS # BLD AUTO: 0 X10(3)/MCL (ref 0–0.2)
BASOPHILS NFR BLD AUTO: 1 %
BILIRUB SERPL-MCNC: 0.8 MG/DL
BILIRUBIN DIRECT+TOT PNL SERPL-MCNC: 0.3 MG/DL (ref 0–0.5)
BILIRUBIN DIRECT+TOT PNL SERPL-MCNC: 0.5 MG/DL (ref 0–0.8)
BUN SERPL-MCNC: 10.4 MG/DL (ref 8.4–25.7)
CALCIUM SERPL-MCNC: 9.1 MG/DL (ref 8.4–10.2)
CHLORIDE SERPL-SCNC: 101 MMOL/L (ref 98–107)
CO2 SERPL-SCNC: 28 MMOL/L (ref 22–29)
CREAT SERPL-MCNC: 0.95 MG/DL (ref 0.73–1.18)
EOSINOPHIL # BLD AUTO: 0.1 X10(3)/MCL (ref 0–0.9)
EOSINOPHIL NFR BLD AUTO: 3 %
ERYTHROCYTE [DISTWIDTH] IN BLOOD BY AUTOMATED COUNT: 16.5 % (ref 11.5–17)
GLOBULIN SER-MCNC: 2.6 GM/DL (ref 2.4–3.5)
GLUCOSE SERPL-MCNC: 214 MG/DL (ref 74–100)
HCT VFR BLD AUTO: 43.8 % (ref 42–52)
HGB BLD-MCNC: 12.9 GM/DL (ref 14–18)
LYMPHOCYTES # BLD AUTO: 0.8 X10(3)/MCL (ref 0.6–4.6)
LYMPHOCYTES NFR BLD AUTO: 17 %
MCH RBC QN AUTO: 22.6 PG (ref 27–31)
MCHC RBC AUTO-ENTMCNC: 29.5 GM/DL (ref 33–36)
MCV RBC AUTO: 76.6 FL (ref 80–94)
MONOCYTES # BLD AUTO: 0.4 X10(3)/MCL (ref 0.1–1.3)
MONOCYTES NFR BLD AUTO: 10 %
NEUTROPHILS # BLD AUTO: 3.01 X10(3)/MCL (ref 2.1–9.2)
NEUTROPHILS NFR BLD AUTO: 68 %
PLATELET # BLD AUTO: 134 X10(3)/MCL (ref 130–400)
PMV BLD AUTO: 10.9 FL (ref 9.4–12.4)
POTASSIUM SERPL-SCNC: 4.4 MMOL/L (ref 3.5–5.1)
PROT SERPL-MCNC: 7.3 GM/DL (ref 6.4–8.3)
RBC # BLD AUTO: 5.72 X10(6)/MCL (ref 4.7–6.1)
SARS-COV-2 RNA RESP QL NAA+PROBE: NOT DETECTED
SODIUM SERPL-SCNC: 140 MMOL/L (ref 136–145)
WBC # SPEC AUTO: 4.4 X10(3)/MCL (ref 4.5–11.5)

## 2020-12-07 ENCOUNTER — HISTORICAL (OUTPATIENT)
Dept: ADMINISTRATIVE | Facility: HOSPITAL | Age: 60
End: 2020-12-07

## 2020-12-07 LAB
ABS NEUT (OLG): 2.72 X10(3)/MCL (ref 2.1–9.2)
ALBUMIN SERPL-MCNC: 4.2 GM/DL (ref 3.5–5)
ALBUMIN/GLOB SERPL: 2 RATIO (ref 1.1–2)
ALP SERPL-CCNC: 83 UNIT/L (ref 40–150)
ALT SERPL-CCNC: 24 UNIT/L (ref 0–55)
AST SERPL-CCNC: 21 UNIT/L (ref 5–34)
BASOPHILS # BLD AUTO: 0 X10(3)/MCL (ref 0–0.2)
BASOPHILS NFR BLD AUTO: 1 %
BILIRUB SERPL-MCNC: 0.7 MG/DL
BILIRUBIN DIRECT+TOT PNL SERPL-MCNC: 0.3 MG/DL (ref 0–0.5)
BILIRUBIN DIRECT+TOT PNL SERPL-MCNC: 0.4 MG/DL (ref 0–0.8)
BUN SERPL-MCNC: 6.3 MG/DL (ref 8.4–25.7)
CALCIUM SERPL-MCNC: 8.1 MG/DL (ref 8.4–10.2)
CHLORIDE SERPL-SCNC: 104 MMOL/L (ref 98–107)
CO2 SERPL-SCNC: 27 MMOL/L (ref 22–29)
CREAT SERPL-MCNC: 0.78 MG/DL (ref 0.73–1.18)
EOSINOPHIL # BLD AUTO: 0.1 X10(3)/MCL (ref 0–0.9)
EOSINOPHIL NFR BLD AUTO: 3 %
ERYTHROCYTE [DISTWIDTH] IN BLOOD BY AUTOMATED COUNT: 16 % (ref 11.5–17)
GLOBULIN SER-MCNC: 2.1 GM/DL (ref 2.4–3.5)
GLUCOSE SERPL-MCNC: 113 MG/DL (ref 74–100)
HCT VFR BLD AUTO: 40.1 % (ref 42–52)
HGB BLD-MCNC: 11.7 GM/DL (ref 14–18)
LYMPHOCYTES # BLD AUTO: 0.7 X10(3)/MCL (ref 0.6–4.6)
LYMPHOCYTES NFR BLD AUTO: 18 %
MCH RBC QN AUTO: 22.9 PG (ref 27–31)
MCHC RBC AUTO-ENTMCNC: 29.2 GM/DL (ref 33–36)
MCV RBC AUTO: 78.3 FL (ref 80–94)
MONOCYTES # BLD AUTO: 0.4 X10(3)/MCL (ref 0.1–1.3)
MONOCYTES NFR BLD AUTO: 10 %
NEUTROPHILS # BLD AUTO: 2.72 X10(3)/MCL (ref 2.1–9.2)
NEUTROPHILS NFR BLD AUTO: 68 %
PLATELET # BLD AUTO: 109 X10(3)/MCL (ref 130–400)
PMV BLD AUTO: 10.9 FL (ref 9.4–12.4)
POTASSIUM SERPL-SCNC: 4.3 MMOL/L (ref 3.5–5.1)
PROT SERPL-MCNC: 6.3 GM/DL (ref 6.4–8.3)
RBC # BLD AUTO: 5.12 X10(6)/MCL (ref 4.7–6.1)
SODIUM SERPL-SCNC: 140 MMOL/L (ref 136–145)
WBC # SPEC AUTO: 4 X10(3)/MCL (ref 4.5–11.5)

## 2020-12-09 ENCOUNTER — TELEPHONE (OUTPATIENT)
Dept: TRANSPLANT | Facility: CLINIC | Age: 60
End: 2020-12-09

## 2020-12-09 NOTE — TELEPHONE ENCOUNTER
Spoke with pt. I refaxed orders and pt will call in morning.----- Message from Christina Johansen sent at 12/9/2020  3:29 PM CST -----  Regarding: Ultrasound order  Contact: Pt @ [phone]  Pt stating he needs to speak with Ashleigh, he also stated that [facility] are stating they have not received the ultrasound order and he wants us to send it over to them again.    Call back: [phone]

## 2020-12-11 LAB
EXT ALBUMIN: 4.2
EXT ALKALINE PHOSPHATASE: 83
EXT ALT: 24
EXT AST: 21
EXT BASOPHIL%: 1
EXT BILIRUBIN DIRECT: 0.3 MG/DL
EXT BILIRUBIN TOTAL: 0.7
EXT BUN: 6.3
EXT CALCIUM: 8.1
EXT CHLORIDE: 104
EXT CO2: 27
EXT CREATININE: 0.78 MG/DL
EXT EOSINOPHIL%: 3
EXT GFR MDRD AF AMER: >60
EXT GFR MDRD NON AF AMER: >60
EXT GLUCOSE: 113
EXT HEMOGLOBIN: 11.7
EXT LYMPH%: 18
EXT MONOCYTES%: 10
EXT PLATELETS: 109
EXT POTASSIUM: 4.3
EXT PROTEIN TOTAL: 6.3
EXT SEGS%: 68
EXT SODIUM: 140 MMOL/L
EXT TACROLIMUS LVL: 5
EXT WBC: 4
HCT: 40.1

## 2020-12-14 ENCOUNTER — TELEPHONE (OUTPATIENT)
Dept: TRANSPLANT | Facility: CLINIC | Age: 60
End: 2020-12-14

## 2020-12-14 NOTE — LETTER
December 14, 2020    Colin Reilly  [address]  [facility] [address]          Dear Colin Salazarucet:  MRN: [id number]    This is a follow up to your recent labs, your lab results were stable.  There are no medicine changes.  Please have your labs drawn again on 1/4/21.      If you cannot have your labs drawn on the scheduled date, it is your responsibility to call the transplant department to reschedule.  Please call [phone] and ask to speak to [facility] for all scheduling requests.     Sincerely,        Your Liver Transplant Coordinator    [facility]  [address] [place] [phone]

## 2020-12-14 NOTE — TELEPHONE ENCOUNTER
No answer.----- Message from Christina Johansen sent at 12/14/2020  4:08 PM CST -----  Regarding: Coordinator  Contact: Pt @ [phone]  Pt states he needs to speak with Ashleigh also he stated he was able to get his ultrasound scheduled in [place]. He stated Ashleigh needs to know and said he scheduled it for Friday 12/18/20 at 7:30a.    Call back: [phone]

## 2020-12-14 NOTE — TELEPHONE ENCOUNTER
No answer.----- Message from Mari Foster MA sent at 12/14/2020  3:05 PM CST -----  Regarding: FW: Appt Inquiry  States he was calling to let you know he will have labs drawn on Friday, 12-18-20.      Reminder in epic.  ----- Message -----  From: Aleksandar Smart  Sent: 12/14/2020   2:46 PM CST  To: [facility] Non-Clinical  Subject: Appt Inquiry                                         Pt calling to speak with coord regarding appt info                  [phone] (M)

## 2020-12-15 ENCOUNTER — HISTORICAL (OUTPATIENT)
Dept: ADMINISTRATIVE | Facility: HOSPITAL | Age: 60
End: 2020-12-15

## 2020-12-15 LAB
ABS NEUT (OLG): 3.44 X10(3)/MCL (ref 2.1–9.2)
ALBUMIN SERPL-MCNC: 4.5 GM/DL (ref 3.5–5)
ALBUMIN/GLOB SERPL: 1.8 RATIO (ref 1.1–2)
ALP SERPL-CCNC: 95 UNIT/L (ref 40–150)
ALT SERPL-CCNC: 28 UNIT/L (ref 0–55)
AST SERPL-CCNC: 24 UNIT/L (ref 5–34)
BASOPHILS # BLD AUTO: 0 X10(3)/MCL (ref 0–0.2)
BASOPHILS NFR BLD AUTO: 1 %
BILIRUB SERPL-MCNC: 1 MG/DL
BILIRUBIN DIRECT+TOT PNL SERPL-MCNC: 0.3 MG/DL (ref 0–0.5)
BILIRUBIN DIRECT+TOT PNL SERPL-MCNC: 0.7 MG/DL (ref 0–0.8)
BUN SERPL-MCNC: 6.7 MG/DL (ref 8.4–25.7)
CALCIUM SERPL-MCNC: 8.6 MG/DL (ref 8.4–10.2)
CHLORIDE SERPL-SCNC: 103 MMOL/L (ref 98–107)
CO2 SERPL-SCNC: 27 MMOL/L (ref 22–29)
CREAT SERPL-MCNC: 0.76 MG/DL (ref 0.73–1.18)
EOSINOPHIL # BLD AUTO: 0.1 X10(3)/MCL (ref 0–0.9)
EOSINOPHIL NFR BLD AUTO: 2 %
ERYTHROCYTE [DISTWIDTH] IN BLOOD BY AUTOMATED COUNT: 15.6 % (ref 11.5–17)
GLOBULIN SER-MCNC: 2.5 GM/DL (ref 2.4–3.5)
GLUCOSE SERPL-MCNC: 184 MG/DL (ref 74–100)
HCT VFR BLD AUTO: 45.4 % (ref 42–52)
HGB BLD-MCNC: 13.1 GM/DL (ref 14–18)
IMM GRANULOCYTES # BLD AUTO: 0 10*3/UL
IMM GRANULOCYTES NFR BLD AUTO: 1 %
LYMPHOCYTES # BLD AUTO: 0.7 X10(3)/MCL (ref 0.6–4.6)
LYMPHOCYTES NFR BLD AUTO: 15 %
MCH RBC QN AUTO: 22.6 PG (ref 27–31)
MCHC RBC AUTO-ENTMCNC: 28.9 GM/DL (ref 33–36)
MCV RBC AUTO: 78.3 FL (ref 80–94)
MONOCYTES # BLD AUTO: 0.4 X10(3)/MCL (ref 0.1–1.3)
MONOCYTES NFR BLD AUTO: 9 %
NEUTROPHILS # BLD AUTO: 3.44 X10(3)/MCL (ref 2.1–9.2)
NEUTROPHILS NFR BLD AUTO: 72 %
PLATELET # BLD AUTO: 127 X10(3)/MCL (ref 130–400)
PMV BLD AUTO: 11.1 FL (ref 9.4–12.4)
POTASSIUM SERPL-SCNC: 5 MMOL/L (ref 3.5–5.1)
PROT SERPL-MCNC: 7 GM/DL (ref 6.4–8.3)
RBC # BLD AUTO: 5.8 X10(6)/MCL (ref 4.7–6.1)
SODIUM SERPL-SCNC: 141 MMOL/L (ref 136–145)
WBC # SPEC AUTO: 4.8 X10(3)/MCL (ref 4.5–11.5)

## 2020-12-18 ENCOUNTER — HISTORICAL (OUTPATIENT)
Dept: RADIOLOGY | Facility: HOSPITAL | Age: 60
End: 2020-12-18

## 2020-12-21 ENCOUNTER — HISTORICAL (OUTPATIENT)
Dept: LAB | Facility: HOSPITAL | Age: 60
End: 2020-12-21

## 2020-12-21 LAB
ABS NEUT (OLG): 3.3 X10(3)/MCL (ref 1.5–6.9)
ALBUMIN SERPL-MCNC: 4.3 GM/DL (ref 3.5–5)
ALBUMIN/GLOB SERPL: 1.7 RATIO (ref 1.1–2)
ALP SERPL-CCNC: 91 UNIT/L (ref 40–150)
ALT SERPL-CCNC: 33 UNIT/L (ref 0–55)
AST SERPL-CCNC: 24 UNIT/L (ref 5–34)
BASOPHILS # BLD AUTO: 0 X10(3)/MCL (ref 0–0.1)
BASOPHILS NFR BLD AUTO: 1 % (ref 0–1)
BILIRUB SERPL-MCNC: 1.1 MG/DL
BILIRUBIN DIRECT+TOT PNL SERPL-MCNC: 0.4 MG/DL (ref 0–0.5)
BILIRUBIN DIRECT+TOT PNL SERPL-MCNC: 0.7 MG/DL (ref 0–0.8)
BUN SERPL-MCNC: 13 MG/DL (ref 8.4–25.7)
CALCIUM SERPL-MCNC: 9 MG/DL (ref 8.4–10.2)
CHLORIDE SERPL-SCNC: 100 MMOL/L (ref 98–107)
CO2 SERPL-SCNC: 26 MMOL/L (ref 22–29)
CREAT SERPL-MCNC: 1.05 MG/DL (ref 0.73–1.18)
EOSINOPHIL # BLD AUTO: 0.1 X10(3)/MCL (ref 0–0.6)
EOSINOPHIL NFR BLD AUTO: 2 % (ref 0–5)
ERYTHROCYTE [DISTWIDTH] IN BLOOD BY AUTOMATED COUNT: 15.9 % (ref 11.5–17)
GLOBULIN SER-MCNC: 2.6 GM/DL (ref 2.4–3.5)
GLUCOSE SERPL-MCNC: 405 MG/DL (ref 74–100)
HCT VFR BLD AUTO: 44.4 % (ref 42–52)
HGB BLD-MCNC: 13.3 GM/DL (ref 14–18)
IMM GRANULOCYTES # BLD AUTO: 0.05 10*3/UL (ref 0–0.02)
IMM GRANULOCYTES NFR BLD AUTO: 1.1 % (ref 0–0.43)
LYMPHOCYTES # BLD AUTO: 0.8 X10(3)/MCL (ref 0.5–4.1)
LYMPHOCYTES NFR BLD AUTO: 17 % (ref 15–40)
MCH RBC QN AUTO: 23 PG (ref 27–34)
MCHC RBC AUTO-ENTMCNC: 30 GM/DL (ref 31–36)
MCV RBC AUTO: 77 FL (ref 80–99)
MONOCYTES # BLD AUTO: 0.4 X10(3)/MCL (ref 0–1.1)
MONOCYTES NFR BLD AUTO: 8 % (ref 4–12)
NEUTROPHILS # BLD AUTO: 3.3 X10(3)/MCL (ref 1.5–6.9)
NEUTROPHILS NFR BLD AUTO: 70 % (ref 43–75)
PLATELET # BLD AUTO: 128 X10(3)/MCL (ref 140–400)
PMV BLD AUTO: 11.1 FL (ref 6.8–10)
POTASSIUM SERPL-SCNC: 3.9 MMOL/L (ref 3.5–5.1)
PROT SERPL-MCNC: 6.9 GM/DL (ref 6.4–8.3)
RBC # BLD AUTO: 5.76 X10(6)/MCL (ref 4.7–6.1)
SODIUM SERPL-SCNC: 135 MMOL/L (ref 136–145)
WBC # SPEC AUTO: 4.7 X10(3)/MCL (ref 4.5–11.5)

## 2020-12-29 ENCOUNTER — HISTORICAL (OUTPATIENT)
Dept: ADMINISTRATIVE | Facility: HOSPITAL | Age: 60
End: 2020-12-29

## 2020-12-29 LAB
ABS NEUT (OLG): 3.37 X10(3)/MCL (ref 2.1–9.2)
ALBUMIN SERPL-MCNC: 4.6 GM/DL (ref 3.5–5)
ALBUMIN/GLOB SERPL: 1.9 RATIO (ref 1.1–2)
ALP SERPL-CCNC: 107 UNIT/L (ref 40–150)
ALT SERPL-CCNC: 32 UNIT/L (ref 0–55)
AST SERPL-CCNC: 20 UNIT/L (ref 5–34)
BASOPHILS # BLD AUTO: 0 X10(3)/MCL (ref 0–0.2)
BASOPHILS NFR BLD AUTO: 1 %
BILIRUB SERPL-MCNC: 1 MG/DL
BILIRUBIN DIRECT+TOT PNL SERPL-MCNC: 0.4 MG/DL (ref 0–0.5)
BILIRUBIN DIRECT+TOT PNL SERPL-MCNC: 0.6 MG/DL (ref 0–0.8)
BUN SERPL-MCNC: 10.8 MG/DL (ref 8.4–25.7)
CALCIUM SERPL-MCNC: 8.6 MG/DL (ref 8.4–10.2)
CHLORIDE SERPL-SCNC: 102 MMOL/L (ref 98–107)
CO2 SERPL-SCNC: 27 MMOL/L (ref 22–29)
CREAT SERPL-MCNC: 0.83 MG/DL (ref 0.73–1.18)
EOSINOPHIL # BLD AUTO: 0.1 X10(3)/MCL (ref 0–0.9)
EOSINOPHIL NFR BLD AUTO: 2 %
ERYTHROCYTE [DISTWIDTH] IN BLOOD BY AUTOMATED COUNT: 16.3 % (ref 11.5–17)
GLOBULIN SER-MCNC: 2.4 GM/DL (ref 2.4–3.5)
GLUCOSE SERPL-MCNC: 349 MG/DL (ref 74–100)
HCT VFR BLD AUTO: 46.5 % (ref 42–52)
HGB BLD-MCNC: 13.7 GM/DL (ref 14–18)
LYMPHOCYTES # BLD AUTO: 0.9 X10(3)/MCL (ref 0.6–4.6)
LYMPHOCYTES NFR BLD AUTO: 19 %
MCH RBC QN AUTO: 22.9 PG (ref 27–31)
MCHC RBC AUTO-ENTMCNC: 29.5 GM/DL (ref 33–36)
MCV RBC AUTO: 77.6 FL (ref 80–94)
MONOCYTES # BLD AUTO: 0.5 X10(3)/MCL (ref 0.1–1.3)
MONOCYTES NFR BLD AUTO: 9 %
NEUTROPHILS # BLD AUTO: 3.37 X10(3)/MCL (ref 2.1–9.2)
NEUTROPHILS NFR BLD AUTO: 68 %
PLATELET # BLD AUTO: 142 X10(3)/MCL (ref 130–400)
PMV BLD AUTO: 11.1 FL (ref 9.4–12.4)
POTASSIUM SERPL-SCNC: 4.6 MMOL/L (ref 3.5–5.1)
PROT SERPL-MCNC: 7 GM/DL (ref 6.4–8.3)
RBC # BLD AUTO: 5.99 X10(6)/MCL (ref 4.7–6.1)
SODIUM SERPL-SCNC: 138 MMOL/L (ref 136–145)
WBC # SPEC AUTO: 4.9 X10(3)/MCL (ref 4.5–11.5)

## 2021-01-05 ENCOUNTER — TELEPHONE (OUTPATIENT)
Dept: TRANSPLANT | Facility: CLINIC | Age: 61
End: 2021-01-05

## 2021-01-05 LAB
EXT ALBUMIN: 4.6
EXT ALKALINE PHOSPHATASE: 107
EXT ALT: 32
EXT AST: 20
EXT BASOPHIL%: 0
EXT BILIRUBIN DIRECT: 0.4 MG/DL
EXT BILIRUBIN TOTAL: 1
EXT BUN: 10.8
EXT CALCIUM: 8.6
EXT CHLORIDE: 102
EXT CO2: 27
EXT CREATININE: 0.83 MG/DL
EXT EOSINOPHIL%: 2
EXT GFR MDRD AF AMER: >60
EXT GLUCOSE: 349
EXT HEMATOCRIT: 46.5
EXT HEMOGLOBIN: 13.7
EXT LYMPH%: 19
EXT MONOCYTES%: 9
EXT PLATELETS: 142
EXT POTASSIUM: 4.6
EXT PROTEIN TOTAL: 7
EXT SEGS%: 68
EXT SODIUM: 138 MMOL/L
EXT TACROLIMUS LVL: 5.8
EXT WBC: 4.9

## 2021-01-11 ENCOUNTER — TELEPHONE (OUTPATIENT)
Dept: TRANSPLANT | Facility: CLINIC | Age: 61
End: 2021-01-11

## 2021-01-12 ENCOUNTER — HISTORICAL (OUTPATIENT)
Dept: ADMINISTRATIVE | Facility: HOSPITAL | Age: 61
End: 2021-01-12

## 2021-01-12 LAB
ABS NEUT (OLG): 3.57 X10(3)/MCL (ref 2.1–9.2)
ALBUMIN SERPL-MCNC: 4.7 GM/DL (ref 3.5–5)
ALBUMIN/GLOB SERPL: 1.9 RATIO (ref 1.1–2)
ALP SERPL-CCNC: 96 UNIT/L (ref 40–150)
ALT SERPL-CCNC: 31 UNIT/L (ref 0–55)
AST SERPL-CCNC: 23 UNIT/L (ref 5–34)
BASOPHILS # BLD AUTO: 0 X10(3)/MCL (ref 0–0.2)
BASOPHILS NFR BLD AUTO: 1 %
BILIRUB SERPL-MCNC: 1 MG/DL
BILIRUBIN DIRECT+TOT PNL SERPL-MCNC: 0.4 MG/DL (ref 0–0.5)
BILIRUBIN DIRECT+TOT PNL SERPL-MCNC: 0.6 MG/DL (ref 0–0.8)
BUN SERPL-MCNC: 10.5 MG/DL (ref 8.4–25.7)
CALCIUM SERPL-MCNC: 8.7 MG/DL (ref 8.4–10.2)
CHLORIDE SERPL-SCNC: 102 MMOL/L (ref 98–107)
CO2 SERPL-SCNC: 29 MMOL/L (ref 22–29)
CREAT SERPL-MCNC: 0.82 MG/DL (ref 0.73–1.18)
EOSINOPHIL # BLD AUTO: 0.1 X10(3)/MCL (ref 0–0.9)
EOSINOPHIL NFR BLD AUTO: 2 %
ERYTHROCYTE [DISTWIDTH] IN BLOOD BY AUTOMATED COUNT: 16.4 % (ref 11.5–17)
GLOBULIN SER-MCNC: 2.5 GM/DL (ref 2.4–3.5)
GLUCOSE SERPL-MCNC: 233 MG/DL (ref 74–100)
HCT VFR BLD AUTO: 47.6 % (ref 42–52)
HGB BLD-MCNC: 13.9 GM/DL (ref 14–18)
LYMPHOCYTES # BLD AUTO: 0.9 X10(3)/MCL (ref 0.6–4.6)
LYMPHOCYTES NFR BLD AUTO: 17 %
MCH RBC QN AUTO: 22.7 PG (ref 27–31)
MCHC RBC AUTO-ENTMCNC: 29.2 GM/DL (ref 33–36)
MCV RBC AUTO: 77.7 FL (ref 80–94)
MONOCYTES # BLD AUTO: 0.5 X10(3)/MCL (ref 0.1–1.3)
MONOCYTES NFR BLD AUTO: 9 %
NEUTROPHILS # BLD AUTO: 3.57 X10(3)/MCL (ref 2.1–9.2)
NEUTROPHILS NFR BLD AUTO: 70 %
PLATELET # BLD AUTO: 145 X10(3)/MCL (ref 130–400)
PMV BLD AUTO: 10.8 FL (ref 9.4–12.4)
POTASSIUM SERPL-SCNC: 4.8 MMOL/L (ref 3.5–5.1)
PROT SERPL-MCNC: 7.2 GM/DL (ref 6.4–8.3)
RBC # BLD AUTO: 6.13 X10(6)/MCL (ref 4.7–6.1)
SODIUM SERPL-SCNC: 138 MMOL/L (ref 136–145)
WBC # SPEC AUTO: 5.1 X10(3)/MCL (ref 4.5–11.5)

## 2021-01-22 ENCOUNTER — TELEPHONE (OUTPATIENT)
Dept: TRANSPLANT | Facility: CLINIC | Age: 61
End: 2021-01-22

## 2021-01-26 ENCOUNTER — HISTORICAL (OUTPATIENT)
Dept: ADMINISTRATIVE | Facility: HOSPITAL | Age: 61
End: 2021-01-26

## 2021-01-26 LAB
ABS NEUT (OLG): 3.03 X10(3)/MCL (ref 2.1–9.2)
ALBUMIN SERPL-MCNC: 4.7 GM/DL (ref 3.5–5)
ALBUMIN/GLOB SERPL: 1.9 RATIO (ref 1.1–2)
ALP SERPL-CCNC: 109 UNIT/L (ref 40–150)
ALT SERPL-CCNC: 41 UNIT/L (ref 0–55)
AST SERPL-CCNC: 18 UNIT/L (ref 5–34)
BASOPHILS # BLD AUTO: 0 X10(3)/MCL (ref 0–0.2)
BASOPHILS NFR BLD AUTO: 1 %
BILIRUB SERPL-MCNC: 1 MG/DL
BILIRUBIN DIRECT+TOT PNL SERPL-MCNC: 0.4 MG/DL (ref 0–0.5)
BILIRUBIN DIRECT+TOT PNL SERPL-MCNC: 0.6 MG/DL (ref 0–0.8)
BUN SERPL-MCNC: 10.2 MG/DL (ref 8.4–25.7)
CALCIUM SERPL-MCNC: 8.9 MG/DL (ref 8.4–10.2)
CHLORIDE SERPL-SCNC: 102 MMOL/L (ref 98–107)
CO2 SERPL-SCNC: 28 MMOL/L (ref 22–29)
CREAT SERPL-MCNC: 0.76 MG/DL (ref 0.73–1.18)
EOSINOPHIL # BLD AUTO: 0.1 X10(3)/MCL (ref 0–0.9)
EOSINOPHIL NFR BLD AUTO: 3 %
ERYTHROCYTE [DISTWIDTH] IN BLOOD BY AUTOMATED COUNT: 15.6 % (ref 11.5–17)
GLOBULIN SER-MCNC: 2.5 GM/DL (ref 2.4–3.5)
GLUCOSE SERPL-MCNC: 186 MG/DL (ref 74–100)
HCT VFR BLD AUTO: 44.6 % (ref 42–52)
HGB BLD-MCNC: 13.4 GM/DL (ref 14–18)
LYMPHOCYTES # BLD AUTO: 0.8 X10(3)/MCL (ref 0.6–4.6)
LYMPHOCYTES NFR BLD AUTO: 19 %
MCH RBC QN AUTO: 22.9 PG (ref 27–31)
MCHC RBC AUTO-ENTMCNC: 30 GM/DL (ref 33–36)
MCV RBC AUTO: 76.2 FL (ref 80–94)
MONOCYTES # BLD AUTO: 0.4 X10(3)/MCL (ref 0.1–1.3)
MONOCYTES NFR BLD AUTO: 9 %
NEUTROPHILS # BLD AUTO: 3.03 X10(3)/MCL (ref 2.1–9.2)
NEUTROPHILS NFR BLD AUTO: 68 %
PLATELET # BLD AUTO: 137 X10(3)/MCL (ref 130–400)
PMV BLD AUTO: 10.8 FL (ref 9.4–12.4)
POTASSIUM SERPL-SCNC: 4 MMOL/L (ref 3.5–5.1)
PROT SERPL-MCNC: 7.2 GM/DL (ref 6.4–8.3)
RBC # BLD AUTO: 5.85 X10(6)/MCL (ref 4.7–6.1)
SODIUM SERPL-SCNC: 139 MMOL/L (ref 136–145)
WBC # SPEC AUTO: 4.5 X10(3)/MCL (ref 4.5–11.5)

## 2021-01-27 ENCOUNTER — TELEPHONE (OUTPATIENT)
Dept: TRANSPLANT | Facility: CLINIC | Age: 61
End: 2021-01-27

## 2021-01-27 NOTE — TELEPHONE ENCOUNTER
Received a call, via transfer center, from ER MD in [facility] in [place], [place].  Patient with Decomp cirrhosis, listed for liver transplant, h/o recent umbilical hernia repair with recurrence, arrived there for diffuse abd pain, tenderness, no fever, has been to the same ER or [facility] ER for similar complaints, and a couple of times with melena as well as abd pain (Hgb stable), today with WBC 10 (was 6 a week ago), Hgb again stable, creatinine and BUN normal. Hemodynamically stable, I recommended sending him to our ER because he will need to be tested for COVID.  Then admit to LTS.  He is getting rocephin in the [facility] ER, will need to get cultures, para tomorrow with fluid cell count, cultures. Evaluate his hernia while he is here, get CT if none recently. I have informed the PA on call for LTS, to expect arrival of this patient.      spontaneous

## 2021-01-28 ENCOUNTER — HISTORICAL (OUTPATIENT)
Dept: RADIOLOGY | Facility: HOSPITAL | Age: 61
End: 2021-01-28

## 2021-02-01 ENCOUNTER — HISTORICAL (OUTPATIENT)
Dept: ADMINISTRATIVE | Facility: HOSPITAL | Age: 61
End: 2021-02-01

## 2021-02-02 ENCOUNTER — TELEPHONE (OUTPATIENT)
Dept: TRANSPLANT | Facility: CLINIC | Age: 61
End: 2021-02-02

## 2021-02-03 ENCOUNTER — TELEPHONE (OUTPATIENT)
Dept: TRANSPLANT | Facility: CLINIC | Age: 61
End: 2021-02-03

## 2021-02-03 LAB
EXT ALBUMIN: 4.7
EXT ALKALINE PHOSPHATASE: 109
EXT ALT: 41
EXT AST: 18
EXT BASOPHIL%: 3
EXT BILIRUBIN DIRECT: 0.4 MG/DL
EXT BILIRUBIN TOTAL: 1
EXT BUN: 10.2
EXT CALCIUM: 8.9
EXT CHLORIDE: 102
EXT CO2: 28
EXT CREATININE: 0.76 MG/DL
EXT EOSINOPHIL%: 3
EXT GFR MDRD NON AF AMER: >60
EXT GLUCOSE: 186
EXT HEMATOCRIT: 44.6
EXT HEMOGLOBIN: 13.4
EXT LYMPH%: 19
EXT MONOCYTES%: 9
EXT PLATELETS: 137
EXT POTASSIUM: 4
EXT PROTEIN TOTAL: 7.2
EXT SEGS%: 68
EXT SODIUM: 139 MMOL/L
EXT TACROLIMUS LVL: 6.3
EXT WBC: 4.5

## 2021-02-04 ENCOUNTER — TELEPHONE (OUTPATIENT)
Dept: TRANSPLANT | Facility: CLINIC | Age: 61
End: 2021-02-04

## 2021-02-08 ENCOUNTER — OFFICE VISIT (OUTPATIENT)
Dept: TRANSPLANT | Facility: CLINIC | Age: 61
End: 2021-02-08
Payer: MEDICARE

## 2021-02-08 ENCOUNTER — TELEPHONE (OUTPATIENT)
Dept: TRANSPLANT | Facility: CLINIC | Age: 61
End: 2021-02-08

## 2021-02-08 ENCOUNTER — LAB VISIT (OUTPATIENT)
Dept: LAB | Facility: HOSPITAL | Age: 61
End: 2021-02-08
Attending: INTERNAL MEDICINE
Payer: MEDICARE

## 2021-02-08 VITALS
SYSTOLIC BLOOD PRESSURE: 127 MMHG | HEART RATE: 92 BPM | WEIGHT: 202.06 LBS | HEIGHT: 71 IN | TEMPERATURE: 98 F | OXYGEN SATURATION: 99 % | DIASTOLIC BLOOD PRESSURE: 72 MMHG | BODY MASS INDEX: 28.29 KG/M2 | RESPIRATION RATE: 17 BRPM

## 2021-02-08 DIAGNOSIS — Z94.4 LIVER TRANSPLANTED: ICD-10-CM

## 2021-02-08 DIAGNOSIS — Z94.4 LIVER TRANSPLANTED: Primary | ICD-10-CM

## 2021-02-08 DIAGNOSIS — F10.11 ALCOHOL ABUSE, IN REMISSION: ICD-10-CM

## 2021-02-08 DIAGNOSIS — Z79.60 LONG-TERM USE OF IMMUNOSUPPRESSANT MEDICATION: Primary | ICD-10-CM

## 2021-02-08 DIAGNOSIS — Z79.4 TYPE 2 DIABETES MELLITUS WITHOUT COMPLICATION, WITH LONG-TERM CURRENT USE OF INSULIN: ICD-10-CM

## 2021-02-08 DIAGNOSIS — E11.9 TYPE 2 DIABETES MELLITUS WITHOUT COMPLICATION, WITH LONG-TERM CURRENT USE OF INSULIN: ICD-10-CM

## 2021-02-08 DIAGNOSIS — R63.5 WEIGHT GAIN: ICD-10-CM

## 2021-02-08 PROBLEM — E87.70 HYPERVOLEMIA: Status: RESOLVED | Noted: 2020-05-30 | Resolved: 2021-02-08

## 2021-02-08 PROBLEM — E87.6 HYPOKALEMIA: Status: RESOLVED | Noted: 2020-05-21 | Resolved: 2021-02-08

## 2021-02-08 LAB
ALBUMIN SERPL BCP-MCNC: 4.2 G/DL (ref 3.5–5.2)
ALP SERPL-CCNC: 102 U/L (ref 55–135)
ALT SERPL W/O P-5'-P-CCNC: 43 U/L (ref 10–44)
AST SERPL-CCNC: 124 U/L (ref 10–40)
BILIRUB DIRECT SERPL-MCNC: 0.2 MG/DL (ref 0.1–0.3)
BILIRUB SERPL-MCNC: 0.7 MG/DL (ref 0.1–1)
PROT SERPL-MCNC: 6.8 G/DL (ref 6–8.4)

## 2021-02-08 PROCEDURE — 3074F PR MOST RECENT SYSTOLIC BLOOD PRESSURE < 130 MM HG: ICD-10-PCS | Mod: CPTII,S$GLB,, | Performed by: INTERNAL MEDICINE

## 2021-02-08 PROCEDURE — 3044F HG A1C LEVEL LT 7.0%: CPT | Mod: CPTII,S$GLB,, | Performed by: INTERNAL MEDICINE

## 2021-02-08 PROCEDURE — 3078F DIAST BP <80 MM HG: CPT | Mod: CPTII,S$GLB,, | Performed by: INTERNAL MEDICINE

## 2021-02-08 PROCEDURE — 99999 PR PBB SHADOW E&M-EST. PATIENT-LVL V: CPT | Mod: PBBFAC,,, | Performed by: INTERNAL MEDICINE

## 2021-02-08 PROCEDURE — 99215 OFFICE O/P EST HI 40 MIN: CPT | Mod: S$GLB,,, | Performed by: INTERNAL MEDICINE

## 2021-02-08 PROCEDURE — 99999 PR PBB SHADOW E&M-EST. PATIENT-LVL V: ICD-10-PCS | Mod: PBBFAC,,, | Performed by: INTERNAL MEDICINE

## 2021-02-08 PROCEDURE — 3008F PR BODY MASS INDEX (BMI) DOCUMENTED: ICD-10-PCS | Mod: CPTII,S$GLB,, | Performed by: INTERNAL MEDICINE

## 2021-02-08 PROCEDURE — 36415 COLL VENOUS BLD VENIPUNCTURE: CPT | Mod: PN

## 2021-02-08 PROCEDURE — 80076 HEPATIC FUNCTION PANEL: CPT

## 2021-02-08 PROCEDURE — 3044F PR MOST RECENT HEMOGLOBIN A1C LEVEL <7.0%: ICD-10-PCS | Mod: CPTII,S$GLB,, | Performed by: INTERNAL MEDICINE

## 2021-02-08 PROCEDURE — 99215 PR OFFICE/OUTPT VISIT, EST, LEVL V, 40-54 MIN: ICD-10-PCS | Mod: S$GLB,,, | Performed by: INTERNAL MEDICINE

## 2021-02-08 PROCEDURE — 3074F SYST BP LT 130 MM HG: CPT | Mod: CPTII,S$GLB,, | Performed by: INTERNAL MEDICINE

## 2021-02-08 PROCEDURE — 3008F BODY MASS INDEX DOCD: CPT | Mod: CPTII,S$GLB,, | Performed by: INTERNAL MEDICINE

## 2021-02-08 PROCEDURE — 3078F PR MOST RECENT DIASTOLIC BLOOD PRESSURE < 80 MM HG: ICD-10-PCS | Mod: CPTII,S$GLB,, | Performed by: INTERNAL MEDICINE

## 2021-02-08 RX ORDER — TACROLIMUS 1 MG/1
CAPSULE ORAL
Qty: 150 CAPSULE | Refills: 11 | Status: SHIPPED | OUTPATIENT
Start: 2021-02-08 | End: 2021-07-09

## 2021-02-10 ENCOUNTER — HISTORICAL (OUTPATIENT)
Dept: ADMINISTRATIVE | Facility: HOSPITAL | Age: 61
End: 2021-02-10

## 2021-02-10 LAB
ABS NEUT (OLG): 3.27 X10(3)/MCL (ref 2.1–9.2)
ALBUMIN SERPL-MCNC: 4.3 GM/DL (ref 3.5–5)
ALBUMIN/GLOB SERPL: 1.7 RATIO (ref 1.1–2)
ALP SERPL-CCNC: 99 UNIT/L (ref 40–150)
ALT SERPL-CCNC: 49 UNIT/L (ref 0–55)
AMPHET UR QL SCN: NEGATIVE
AST SERPL-CCNC: 131 UNIT/L (ref 5–34)
BARBITURATE SCN PRESENT UR: NEGATIVE
BASOPHILS # BLD AUTO: 0 X10(3)/MCL (ref 0–0.2)
BASOPHILS NFR BLD AUTO: 1 %
BENZODIAZ UR QL SCN: NEGATIVE
BILIRUB SERPL-MCNC: 0.7 MG/DL
BILIRUBIN DIRECT+TOT PNL SERPL-MCNC: 0.3 MG/DL (ref 0–0.5)
BILIRUBIN DIRECT+TOT PNL SERPL-MCNC: 0.4 MG/DL (ref 0–0.8)
BUN SERPL-MCNC: 13.5 MG/DL (ref 8.4–25.7)
CALCIUM SERPL-MCNC: 8.9 MG/DL (ref 8.4–10.2)
CANNABINOIDS UR QL SCN: NEGATIVE
CHLORIDE SERPL-SCNC: 102 MMOL/L (ref 98–107)
CHOLEST SERPL-MCNC: 177 MG/DL
CHOLEST/HDLC SERPL: 6 {RATIO} (ref 0–5)
CO2 SERPL-SCNC: 27 MMOL/L (ref 22–29)
COCAINE UR QL SCN: NEGATIVE
CREAT SERPL-MCNC: 0.84 MG/DL (ref 0.73–1.18)
EOSINOPHIL # BLD AUTO: 0.1 X10(3)/MCL (ref 0–0.9)
EOSINOPHIL NFR BLD AUTO: 2 %
ERYTHROCYTE [DISTWIDTH] IN BLOOD BY AUTOMATED COUNT: 16.8 % (ref 11.5–17)
EST. AVERAGE GLUCOSE BLD GHB EST-MCNC: 231.7 MG/DL
GLOBULIN SER-MCNC: 2.5 GM/DL (ref 2.4–3.5)
GLUCOSE SERPL-MCNC: 125 MG/DL (ref 74–100)
HBA1C MFR BLD: 9.7 %
HCT VFR BLD AUTO: 45.8 % (ref 42–52)
HDLC SERPL-MCNC: 31 MG/DL (ref 35–60)
HGB BLD-MCNC: 13.5 GM/DL (ref 14–18)
LDLC SERPL CALC-MCNC: 112 MG/DL (ref 50–140)
LYMPHOCYTES # BLD AUTO: 0.8 X10(3)/MCL (ref 0.6–4.6)
LYMPHOCYTES NFR BLD AUTO: 18 %
MCH RBC QN AUTO: 22.8 PG (ref 27–31)
MCHC RBC AUTO-ENTMCNC: 29.5 GM/DL (ref 33–36)
MCV RBC AUTO: 77.2 FL (ref 80–94)
MONOCYTES # BLD AUTO: 0.4 X10(3)/MCL (ref 0.1–1.3)
MONOCYTES NFR BLD AUTO: 9 %
NEUTROPHILS # BLD AUTO: 3.27 X10(3)/MCL (ref 2.1–9.2)
NEUTROPHILS NFR BLD AUTO: 70 %
OPIATES UR QL SCN: POSITIVE
PCP UR QL: NEGATIVE
PH UR STRIP.AUTO: 6 [PH] (ref 5–7.5)
PLATELET # BLD AUTO: 138 X10(3)/MCL (ref 130–400)
PLATELET # BLD EST: NORMAL 10*3/UL
PMV BLD AUTO: 10.9 FL (ref 7.4–10.4)
POIKILOCYTOSIS BLD QL SMEAR: 1
POTASSIUM SERPL-SCNC: 4 MMOL/L (ref 3.5–5.1)
PROT SERPL-MCNC: 6.8 GM/DL (ref 6.4–8.3)
RBC # BLD AUTO: 5.93 X10(6)/MCL (ref 4.7–6.1)
RBC MORPH BLD: ABNORMAL
SODIUM SERPL-SCNC: 142 MMOL/L (ref 136–145)
SP GR FLD REFRACTOMETRY: 1.02 (ref 1–1.03)
TRIGL SERPL-MCNC: 170 MG/DL (ref 34–140)
TSH SERPL-ACNC: 1.75 UIU/ML (ref 0.35–4.94)
VIT B12 SERPL-MCNC: 329 PG/ML (ref 213–816)
VLDLC SERPL CALC-MCNC: 34 MG/DL
WBC # SPEC AUTO: 4.7 X10(3)/MCL (ref 4.5–11.5)

## 2021-02-11 ENCOUNTER — TELEPHONE (OUTPATIENT)
Dept: TRANSPLANT | Facility: CLINIC | Age: 61
End: 2021-02-11

## 2021-02-15 ENCOUNTER — TELEPHONE (OUTPATIENT)
Dept: TRANSPLANT | Facility: CLINIC | Age: 61
End: 2021-02-15

## 2021-02-18 ENCOUNTER — TELEPHONE (OUTPATIENT)
Dept: TRANSPLANT | Facility: CLINIC | Age: 61
End: 2021-02-18

## 2021-02-22 ENCOUNTER — HISTORICAL (OUTPATIENT)
Dept: RADIOLOGY | Facility: HOSPITAL | Age: 61
End: 2021-02-22

## 2021-02-22 LAB
ALBUMIN SERPL-MCNC: 4.1 GM/DL (ref 3.5–5)
ALBUMIN/GLOB SERPL: 1.8 RATIO (ref 1.1–2)
ALP SERPL-CCNC: 91 UNIT/L (ref 40–150)
ALT SERPL-CCNC: 24 UNIT/L (ref 0–55)
AST SERPL-CCNC: 21 UNIT/L (ref 5–34)
BILIRUB SERPL-MCNC: 0.9 MG/DL
BILIRUBIN DIRECT+TOT PNL SERPL-MCNC: 0.3 MG/DL (ref 0–0.5)
BILIRUBIN DIRECT+TOT PNL SERPL-MCNC: 0.6 MG/DL (ref 0–0.8)
BUN SERPL-MCNC: 9.6 MG/DL (ref 8.4–25.7)
CALCIUM SERPL-MCNC: 8.2 MG/DL (ref 8.4–10.2)
CHLORIDE SERPL-SCNC: 100 MMOL/L (ref 98–107)
CO2 SERPL-SCNC: 28 MMOL/L (ref 22–29)
CREAT SERPL-MCNC: 0.73 MG/DL (ref 0.73–1.18)
ERYTHROCYTE [DISTWIDTH] IN BLOOD BY AUTOMATED COUNT: 15.9 % (ref 11.5–17)
GLOBULIN SER-MCNC: 2.3 GM/DL (ref 2.4–3.5)
GLUCOSE SERPL-MCNC: 230 MG/DL (ref 74–100)
HCT VFR BLD AUTO: 43.9 % (ref 42–52)
HGB BLD-MCNC: 13 GM/DL (ref 14–18)
MCH RBC QN AUTO: 23 PG (ref 27–31)
MCHC RBC AUTO-ENTMCNC: 29.6 GM/DL (ref 33–36)
MCV RBC AUTO: 77.8 FL (ref 80–94)
PLATELET # BLD AUTO: 135 X10(3)/MCL (ref 130–400)
PMV BLD AUTO: 10.8 FL (ref 9.4–12.4)
POTASSIUM SERPL-SCNC: 4.2 MMOL/L (ref 3.5–5.1)
PROT SERPL-MCNC: 6.4 GM/DL (ref 6.4–8.3)
RBC # BLD AUTO: 5.64 X10(6)/MCL (ref 4.7–6.1)
SODIUM SERPL-SCNC: 136 MMOL/L (ref 136–145)
WBC # SPEC AUTO: 4.3 X10(3)/MCL (ref 4.5–11.5)

## 2021-02-25 LAB
EXT ALBUMIN: 4.1
EXT ALKALINE PHOSPHATASE: 91
EXT ALT: 24
EXT AST: 21
EXT BILIRUBIN DIRECT: 0.3 MG/DL
EXT BILIRUBIN TOTAL: 0.9
EXT BUN: 9.6
EXT CALCIUM: 8.2
EXT CHLORIDE: 100
EXT CO2: 28
EXT CREATININE: 0.73 MG/DL
EXT GFR MDRD NON AF AMER: >60
EXT GLUCOSE: 230
EXT HEMATOCRIT: 43.9
EXT HEMOGLOBIN: 13
EXT PLATELETS: 135
EXT POTASSIUM: 4.2
EXT PROTEIN TOTAL: 6.4
EXT SODIUM: 136 MMOL/L
EXT TACROLIMUS LVL: 6.2
EXT WBC: 4.3

## 2021-02-26 ENCOUNTER — TELEPHONE (OUTPATIENT)
Dept: TRANSPLANT | Facility: CLINIC | Age: 61
End: 2021-02-26

## 2021-03-01 ENCOUNTER — HISTORICAL (OUTPATIENT)
Dept: ADMINISTRATIVE | Facility: HOSPITAL | Age: 61
End: 2021-03-01

## 2021-03-01 LAB
ABS NEUT (OLG): 4.19 X10(3)/MCL (ref 2.1–9.2)
ALBUMIN SERPL-MCNC: 4.5 GM/DL (ref 3.5–5)
ALBUMIN/GLOB SERPL: 1.9 RATIO (ref 1.1–2)
ALP SERPL-CCNC: 99 UNIT/L (ref 40–150)
ALT SERPL-CCNC: 17 UNIT/L (ref 0–55)
AST SERPL-CCNC: 17 UNIT/L (ref 5–34)
BASOPHILS # BLD AUTO: 0.1 X10(3)/MCL (ref 0–0.2)
BASOPHILS NFR BLD AUTO: 1 %
BILIRUB SERPL-MCNC: 1.2 MG/DL
BILIRUBIN DIRECT+TOT PNL SERPL-MCNC: 0.4 MG/DL (ref 0–0.5)
BILIRUBIN DIRECT+TOT PNL SERPL-MCNC: 0.8 MG/DL (ref 0–0.8)
BUN SERPL-MCNC: 13.1 MG/DL (ref 8.4–25.7)
CALCIUM SERPL-MCNC: 8.8 MG/DL (ref 8.4–10.2)
CHLORIDE SERPL-SCNC: 104 MMOL/L (ref 98–107)
CO2 SERPL-SCNC: 25 MMOL/L (ref 22–29)
CREAT SERPL-MCNC: 0.82 MG/DL (ref 0.73–1.18)
EOSINOPHIL # BLD AUTO: 0.2 X10(3)/MCL (ref 0–0.9)
EOSINOPHIL NFR BLD AUTO: 2 %
ERYTHROCYTE [DISTWIDTH] IN BLOOD BY AUTOMATED COUNT: 17.3 % (ref 11.5–17)
GLOBULIN SER-MCNC: 2.4 GM/DL (ref 2.4–3.5)
GLUCOSE SERPL-MCNC: 128 MG/DL (ref 74–100)
HCT VFR BLD AUTO: 47.3 % (ref 42–52)
HGB BLD-MCNC: 13.9 GM/DL (ref 14–18)
LYMPHOCYTES # BLD AUTO: 1.2 X10(3)/MCL (ref 0.6–4.6)
LYMPHOCYTES NFR BLD AUTO: 19 %
MCH RBC QN AUTO: 23.3 PG (ref 27–31)
MCHC RBC AUTO-ENTMCNC: 29.4 GM/DL (ref 33–36)
MCV RBC AUTO: 79.2 FL (ref 80–94)
MONOCYTES # BLD AUTO: 0.6 X10(3)/MCL (ref 0.1–1.3)
MONOCYTES NFR BLD AUTO: 9 %
NEUTROPHILS # BLD AUTO: 4.19 X10(3)/MCL (ref 2.1–9.2)
NEUTROPHILS NFR BLD AUTO: 68 %
PLATELET # BLD AUTO: 163 X10(3)/MCL (ref 130–400)
PMV BLD AUTO: 11.2 FL (ref 9.4–12.4)
POTASSIUM SERPL-SCNC: 4.4 MMOL/L (ref 3.5–5.1)
PROT SERPL-MCNC: 6.9 GM/DL (ref 6.4–8.3)
RBC # BLD AUTO: 5.97 X10(6)/MCL (ref 4.7–6.1)
SODIUM SERPL-SCNC: 139 MMOL/L (ref 136–145)
WBC # SPEC AUTO: 6.2 X10(3)/MCL (ref 4.5–11.5)

## 2021-03-04 RX ORDER — QUETIAPINE FUMARATE 25 MG/1
25 TABLET, FILM COATED ORAL NIGHTLY
Qty: 30 TABLET | Refills: 11 | OUTPATIENT
Start: 2021-03-04 | End: 2022-03-04

## 2021-03-08 ENCOUNTER — HISTORICAL (OUTPATIENT)
Dept: ADMINISTRATIVE | Facility: HOSPITAL | Age: 61
End: 2021-03-08

## 2021-03-08 LAB
ABS NEUT (OLG): 3.26 X10(3)/MCL (ref 2.1–9.2)
ALBUMIN SERPL-MCNC: 4.1 GM/DL (ref 3.5–5)
ALBUMIN/GLOB SERPL: 1.7 RATIO (ref 1.1–2)
ALP SERPL-CCNC: 101 UNIT/L (ref 40–150)
ALT SERPL-CCNC: 24 UNIT/L (ref 0–55)
AST SERPL-CCNC: 20 UNIT/L (ref 5–34)
BASOPHILS # BLD AUTO: 0 X10(3)/MCL (ref 0–0.2)
BASOPHILS NFR BLD AUTO: 1 %
BILIRUB SERPL-MCNC: 0.6 MG/DL
BILIRUBIN DIRECT+TOT PNL SERPL-MCNC: 0.3 MG/DL (ref 0–0.5)
BILIRUBIN DIRECT+TOT PNL SERPL-MCNC: 0.3 MG/DL (ref 0–0.8)
BUN SERPL-MCNC: 7.9 MG/DL (ref 8.4–25.7)
CALCIUM SERPL-MCNC: 8.6 MG/DL (ref 8.4–10.2)
CHLORIDE SERPL-SCNC: 101 MMOL/L (ref 98–107)
CO2 SERPL-SCNC: 30 MMOL/L (ref 22–29)
CREAT SERPL-MCNC: 0.81 MG/DL (ref 0.73–1.18)
EOSINOPHIL # BLD AUTO: 0.1 X10(3)/MCL (ref 0–0.9)
EOSINOPHIL NFR BLD AUTO: 2 %
ERYTHROCYTE [DISTWIDTH] IN BLOOD BY AUTOMATED COUNT: 16 % (ref 11.5–17)
GLOBULIN SER-MCNC: 2.4 GM/DL (ref 2.4–3.5)
GLUCOSE SERPL-MCNC: 243 MG/DL (ref 74–100)
HCT VFR BLD AUTO: 45.5 % (ref 42–52)
HGB BLD-MCNC: 13.4 GM/DL (ref 14–18)
LYMPHOCYTES # BLD AUTO: 0.7 X10(3)/MCL (ref 0.6–4.6)
LYMPHOCYTES NFR BLD AUTO: 16 %
MCH RBC QN AUTO: 23.5 PG (ref 27–31)
MCHC RBC AUTO-ENTMCNC: 29.5 GM/DL (ref 33–36)
MCV RBC AUTO: 79.8 FL (ref 80–94)
MONOCYTES # BLD AUTO: 0.4 X10(3)/MCL (ref 0.1–1.3)
MONOCYTES NFR BLD AUTO: 8 %
NEUTROPHILS # BLD AUTO: 3.26 X10(3)/MCL (ref 2.1–9.2)
NEUTROPHILS NFR BLD AUTO: 72 %
PLATELET # BLD AUTO: 120 X10(3)/MCL (ref 130–400)
PMV BLD AUTO: 11.4 FL (ref 9.4–12.4)
POTASSIUM SERPL-SCNC: 4.8 MMOL/L (ref 3.5–5.1)
PROT SERPL-MCNC: 6.5 GM/DL (ref 6.4–8.3)
RBC # BLD AUTO: 5.7 X10(6)/MCL (ref 4.7–6.1)
SODIUM SERPL-SCNC: 137 MMOL/L (ref 136–145)
WBC # SPEC AUTO: 4.5 X10(3)/MCL (ref 4.5–11.5)

## 2021-03-15 ENCOUNTER — HISTORICAL (OUTPATIENT)
Dept: ADMINISTRATIVE | Facility: HOSPITAL | Age: 61
End: 2021-03-15

## 2021-03-15 LAB
ABS NEUT (OLG): 3.56 X10(3)/MCL (ref 2.1–9.2)
ALBUMIN SERPL-MCNC: 4.3 GM/DL (ref 3.5–5)
ALBUMIN/GLOB SERPL: 1.7 RATIO (ref 1.1–2)
ALP SERPL-CCNC: 102 UNIT/L (ref 40–150)
ALT SERPL-CCNC: 33 UNIT/L (ref 0–55)
AST SERPL-CCNC: 21 UNIT/L (ref 5–34)
BASOPHILS # BLD AUTO: 0 X10(3)/MCL (ref 0–0.2)
BASOPHILS NFR BLD AUTO: 1 %
BILIRUB SERPL-MCNC: 0.9 MG/DL
BILIRUBIN DIRECT+TOT PNL SERPL-MCNC: 0.3 MG/DL (ref 0–0.5)
BILIRUBIN DIRECT+TOT PNL SERPL-MCNC: 0.6 MG/DL (ref 0–0.8)
BUN SERPL-MCNC: 9.2 MG/DL (ref 8.4–25.7)
CALCIUM SERPL-MCNC: 8.5 MG/DL (ref 8.4–10.2)
CHLORIDE SERPL-SCNC: 101 MMOL/L (ref 98–107)
CO2 SERPL-SCNC: 30 MMOL/L (ref 22–29)
CREAT SERPL-MCNC: 0.86 MG/DL (ref 0.73–1.18)
EOSINOPHIL # BLD AUTO: 0.1 X10(3)/MCL (ref 0–0.9)
EOSINOPHIL NFR BLD AUTO: 2 %
ERYTHROCYTE [DISTWIDTH] IN BLOOD BY AUTOMATED COUNT: 16.4 % (ref 11.5–17)
GLOBULIN SER-MCNC: 2.5 GM/DL (ref 2.4–3.5)
GLUCOSE SERPL-MCNC: 243 MG/DL (ref 74–100)
HCT VFR BLD AUTO: 44.6 % (ref 42–52)
HGB BLD-MCNC: 13.1 GM/DL (ref 14–18)
LYMPHOCYTES # BLD AUTO: 0.6 X10(3)/MCL (ref 0.6–4.6)
LYMPHOCYTES NFR BLD AUTO: 13 %
MCH RBC QN AUTO: 23.4 PG (ref 27–31)
MCHC RBC AUTO-ENTMCNC: 29.4 GM/DL (ref 33–36)
MCV RBC AUTO: 79.5 FL (ref 80–94)
MONOCYTES # BLD AUTO: 0.4 X10(3)/MCL (ref 0.1–1.3)
MONOCYTES NFR BLD AUTO: 8 %
NEUTROPHILS # BLD AUTO: 3.56 X10(3)/MCL (ref 2.1–9.2)
NEUTROPHILS NFR BLD AUTO: 75 %
PLATELET # BLD AUTO: 123 X10(3)/MCL (ref 130–400)
PMV BLD AUTO: 11.1 FL (ref 9.4–12.4)
POTASSIUM SERPL-SCNC: 4.2 MMOL/L (ref 3.5–5.1)
PROT SERPL-MCNC: 6.8 GM/DL (ref 6.4–8.3)
RBC # BLD AUTO: 5.61 X10(6)/MCL (ref 4.7–6.1)
SODIUM SERPL-SCNC: 140 MMOL/L (ref 136–145)
WBC # SPEC AUTO: 4.7 X10(3)/MCL (ref 4.5–11.5)

## 2021-03-17 ENCOUNTER — TELEPHONE (OUTPATIENT)
Dept: TRANSPLANT | Facility: CLINIC | Age: 61
End: 2021-03-17

## 2021-03-17 LAB
EXT ALBUMIN: 4.3
EXT ALKALINE PHOSPHATASE: 102
EXT ALT: 33
EXT AST: 21
EXT BASOPHIL%: 1
EXT BILIRUBIN DIRECT: 0.3 MG/DL
EXT BILIRUBIN TOTAL: 0.9
EXT BUN: 9.2
EXT CALCIUM: 8.5
EXT CHLORIDE: 101
EXT CO2: 30
EXT CREATININE: 0.86 MG/DL
EXT EOSINOPHIL%: 2
EXT GFR MDRD NON AF AMER: >60
EXT GLUCOSE: 243
EXT HEMATOCRIT: 44.6
EXT HEMOGLOBIN: 13.1
EXT LYMPH%: 13
EXT MONOCYTES%: 8
EXT PLATELETS: 123
EXT POTASSIUM: 4.2
EXT PROTEIN TOTAL: 6.8
EXT SEGS%: 75
EXT SODIUM: 140 MMOL/L
EXT TACROLIMUS LVL: 7.4
EXT WBC: 4.7

## 2021-03-22 ENCOUNTER — HISTORICAL (OUTPATIENT)
Dept: RADIOLOGY | Facility: HOSPITAL | Age: 61
End: 2021-03-22

## 2021-03-22 LAB
ABS NEUT (OLG): 2.96 X10(3)/MCL (ref 2.1–9.2)
ALBUMIN SERPL-MCNC: 4.3 GM/DL (ref 3.5–5)
ALBUMIN/GLOB SERPL: 1.9 RATIO (ref 1.1–2)
ALP SERPL-CCNC: 98 UNIT/L (ref 40–150)
ALT SERPL-CCNC: 44 UNIT/L (ref 0–55)
AST SERPL-CCNC: 36 UNIT/L (ref 5–34)
BASOPHILS # BLD AUTO: 0 X10(3)/MCL (ref 0–0.2)
BASOPHILS NFR BLD AUTO: 1 %
BILIRUB SERPL-MCNC: 1 MG/DL
BILIRUBIN DIRECT+TOT PNL SERPL-MCNC: 0.3 MG/DL (ref 0–0.5)
BILIRUBIN DIRECT+TOT PNL SERPL-MCNC: 0.7 MG/DL (ref 0–0.8)
BUN SERPL-MCNC: 6.6 MG/DL (ref 8.4–25.7)
CALCIUM SERPL-MCNC: 8.6 MG/DL (ref 8.4–10.2)
CHLORIDE SERPL-SCNC: 106 MMOL/L (ref 98–107)
CO2 SERPL-SCNC: 28 MMOL/L (ref 22–29)
CREAT SERPL-MCNC: 0.74 MG/DL (ref 0.73–1.18)
EOSINOPHIL # BLD AUTO: 0.1 X10(3)/MCL (ref 0–0.9)
EOSINOPHIL NFR BLD AUTO: 3 %
ERYTHROCYTE [DISTWIDTH] IN BLOOD BY AUTOMATED COUNT: 16.3 % (ref 11.5–17)
GLOBULIN SER-MCNC: 2.3 GM/DL (ref 2.4–3.5)
GLUCOSE SERPL-MCNC: 72 MG/DL (ref 74–100)
HCT VFR BLD AUTO: 44.3 % (ref 42–52)
HGB BLD-MCNC: 13 GM/DL (ref 14–18)
LYMPHOCYTES # BLD AUTO: 0.9 X10(3)/MCL (ref 0.6–4.6)
LYMPHOCYTES NFR BLD AUTO: 20 %
MCH RBC QN AUTO: 23.5 PG (ref 27–31)
MCHC RBC AUTO-ENTMCNC: 29.3 GM/DL (ref 33–36)
MCV RBC AUTO: 80 FL (ref 80–94)
MONOCYTES # BLD AUTO: 0.4 X10(3)/MCL (ref 0.1–1.3)
MONOCYTES NFR BLD AUTO: 10 %
NEUTROPHILS # BLD AUTO: 2.96 X10(3)/MCL (ref 2.1–9.2)
NEUTROPHILS NFR BLD AUTO: 66 %
PLATELET # BLD AUTO: 133 X10(3)/MCL (ref 130–400)
PMV BLD AUTO: 10.9 FL (ref 9.4–12.4)
POTASSIUM SERPL-SCNC: 4.4 MMOL/L (ref 3.5–5.1)
PROT SERPL-MCNC: 6.6 GM/DL (ref 6.4–8.3)
RBC # BLD AUTO: 5.54 X10(6)/MCL (ref 4.7–6.1)
SODIUM SERPL-SCNC: 141 MMOL/L (ref 136–145)
WBC # SPEC AUTO: 4.5 X10(3)/MCL (ref 4.5–11.5)

## 2021-03-24 DIAGNOSIS — Z94.4 LIVER TRANSPLANTED: Primary | ICD-10-CM

## 2021-03-24 RX ORDER — MYCOPHENOLATE MOFETIL 250 MG/1
750 CAPSULE ORAL 2 TIMES DAILY
Qty: 180 CAPSULE | Refills: 11 | Status: SHIPPED | OUTPATIENT
Start: 2021-03-24 | End: 2021-05-31

## 2021-04-05 ENCOUNTER — HISTORICAL (OUTPATIENT)
Dept: ADMINISTRATIVE | Facility: HOSPITAL | Age: 61
End: 2021-04-05

## 2021-04-05 LAB
ABS NEUT (OLG): 3.56 X10(3)/MCL (ref 2.1–9.2)
ALBUMIN SERPL-MCNC: 4.4 GM/DL (ref 3.5–5)
ALBUMIN/GLOB SERPL: 1.8 RATIO (ref 1.1–2)
ALP SERPL-CCNC: 108 UNIT/L (ref 40–150)
ALT SERPL-CCNC: 33 UNIT/L (ref 0–55)
AST SERPL-CCNC: 22 UNIT/L (ref 5–34)
BASOPHILS # BLD AUTO: 0 X10(3)/MCL (ref 0–0.2)
BASOPHILS NFR BLD AUTO: 1 %
BILIRUB SERPL-MCNC: 0.9 MG/DL
BILIRUBIN DIRECT+TOT PNL SERPL-MCNC: 0.4 MG/DL (ref 0–0.5)
BILIRUBIN DIRECT+TOT PNL SERPL-MCNC: 0.5 MG/DL (ref 0–0.8)
BUN SERPL-MCNC: 12.4 MG/DL (ref 8.4–25.7)
CALCIUM SERPL-MCNC: 9.3 MG/DL (ref 8.4–10.2)
CHLORIDE SERPL-SCNC: 103 MMOL/L (ref 98–107)
CO2 SERPL-SCNC: 26 MMOL/L (ref 22–29)
CREAT SERPL-MCNC: 0.87 MG/DL (ref 0.73–1.18)
EOSINOPHIL # BLD AUTO: 0.1 X10(3)/MCL (ref 0–0.9)
EOSINOPHIL NFR BLD AUTO: 3 %
ERYTHROCYTE [DISTWIDTH] IN BLOOD BY AUTOMATED COUNT: 15.9 % (ref 11.5–17)
GLOBULIN SER-MCNC: 2.4 GM/DL (ref 2.4–3.5)
GLUCOSE SERPL-MCNC: 235 MG/DL (ref 74–100)
HCT VFR BLD AUTO: 44.6 % (ref 42–52)
HGB BLD-MCNC: 13.4 GM/DL (ref 14–18)
LYMPHOCYTES # BLD AUTO: 0.8 X10(3)/MCL (ref 0.6–4.6)
LYMPHOCYTES NFR BLD AUTO: 16 %
MCH RBC QN AUTO: 23.5 PG (ref 27–31)
MCHC RBC AUTO-ENTMCNC: 30 GM/DL (ref 33–36)
MCV RBC AUTO: 78.2 FL (ref 80–94)
MONOCYTES # BLD AUTO: 0.4 X10(3)/MCL (ref 0.1–1.3)
MONOCYTES NFR BLD AUTO: 8 %
NEUTROPHILS # BLD AUTO: 3.56 X10(3)/MCL (ref 2.1–9.2)
NEUTROPHILS NFR BLD AUTO: 72 %
PLATELET # BLD AUTO: 130 X10(3)/MCL (ref 130–400)
PMV BLD AUTO: 11.8 FL (ref 9.4–12.4)
POTASSIUM SERPL-SCNC: 4.8 MMOL/L (ref 3.5–5.1)
PROT SERPL-MCNC: 6.8 GM/DL (ref 6.4–8.3)
RBC # BLD AUTO: 5.7 X10(6)/MCL (ref 4.7–6.1)
SODIUM SERPL-SCNC: 139 MMOL/L (ref 136–145)
WBC # SPEC AUTO: 5 X10(3)/MCL (ref 4.5–11.5)

## 2021-04-08 LAB
EXT ALBUMIN: 4.4
EXT ALKALINE PHOSPHATASE: 108
EXT ALT: 33
EXT AST: 22
EXT BASOPHIL%: 1
EXT BILIRUBIN DIRECT: 0.4 MG/DL
EXT BILIRUBIN TOTAL: 0.9
EXT BUN: 12.4
EXT CALCIUM: 9.3
EXT CHLORIDE: 103
EXT CO2: 26
EXT CREATININE: 0.87 MG/DL
EXT EOSINOPHIL%: 3
EXT GFR MDRD NON AF AMER: >60
EXT GLUCOSE: 235
EXT HEMATOCRIT: 44.6
EXT HEMOGLOBIN: 13.4
EXT LYMPH%: 16
EXT MONOCYTES%: 8
EXT PLATELETS: 130
EXT POTASSIUM: 4.8
EXT PROTEIN TOTAL: 6.8
EXT SEGS%: 72
EXT SODIUM: 139 MMOL/L
EXT TACROLIMUS LVL: 6.4
EXT WBC: 5

## 2021-04-09 ENCOUNTER — TELEPHONE (OUTPATIENT)
Dept: TRANSPLANT | Facility: CLINIC | Age: 61
End: 2021-04-09

## 2021-04-09 DIAGNOSIS — Z94.4 LIVER TRANSPLANTED: Primary | ICD-10-CM

## 2021-04-12 ENCOUNTER — HISTORICAL (OUTPATIENT)
Dept: ADMINISTRATIVE | Facility: HOSPITAL | Age: 61
End: 2021-04-12

## 2021-04-12 LAB
ABS NEUT (OLG): 3.76 X10(3)/MCL (ref 2.1–9.2)
ALBUMIN SERPL-MCNC: 4 GM/DL (ref 3.5–5)
ALBUMIN/GLOB SERPL: 1.9 RATIO (ref 1.1–2)
ALP SERPL-CCNC: 102 UNIT/L (ref 40–150)
ALT SERPL-CCNC: 25 UNIT/L (ref 0–55)
AST SERPL-CCNC: 21 UNIT/L (ref 5–34)
BASOPHILS # BLD AUTO: 0 X10(3)/MCL (ref 0–0.2)
BASOPHILS NFR BLD AUTO: 1 %
BILIRUB SERPL-MCNC: 0.9 MG/DL
BILIRUBIN DIRECT+TOT PNL SERPL-MCNC: 0.4 MG/DL (ref 0–0.5)
BILIRUBIN DIRECT+TOT PNL SERPL-MCNC: 0.5 MG/DL (ref 0–0.8)
BUN SERPL-MCNC: 10.9 MG/DL (ref 8.4–25.7)
CALCIUM SERPL-MCNC: 8.9 MG/DL (ref 8.4–10.2)
CHLORIDE SERPL-SCNC: 104 MMOL/L (ref 98–107)
CO2 SERPL-SCNC: 26 MMOL/L (ref 22–29)
CREAT SERPL-MCNC: 0.81 MG/DL (ref 0.73–1.18)
EOSINOPHIL # BLD AUTO: 0.2 X10(3)/MCL (ref 0–0.9)
EOSINOPHIL NFR BLD AUTO: 3 %
ERYTHROCYTE [DISTWIDTH] IN BLOOD BY AUTOMATED COUNT: 15.8 % (ref 11.5–17)
GLOBULIN SER-MCNC: 2.1 GM/DL (ref 2.4–3.5)
GLUCOSE SERPL-MCNC: 197 MG/DL (ref 74–100)
HCT VFR BLD AUTO: 44.1 % (ref 42–52)
HGB BLD-MCNC: 13.3 GM/DL (ref 14–18)
LYMPHOCYTES # BLD AUTO: 0.8 X10(3)/MCL (ref 0.6–4.6)
LYMPHOCYTES NFR BLD AUTO: 16 %
MCH RBC QN AUTO: 23.7 PG (ref 27–31)
MCHC RBC AUTO-ENTMCNC: 30.2 GM/DL (ref 33–36)
MCV RBC AUTO: 78.6 FL (ref 80–94)
MONOCYTES # BLD AUTO: 0.4 X10(3)/MCL (ref 0.1–1.3)
MONOCYTES NFR BLD AUTO: 8 %
NEUTROPHILS # BLD AUTO: 3.76 X10(3)/MCL (ref 2.1–9.2)
NEUTROPHILS NFR BLD AUTO: 71 %
PLATELET # BLD AUTO: 134 X10(3)/MCL (ref 130–400)
PMV BLD AUTO: 11.7 FL (ref 9.4–12.4)
POTASSIUM SERPL-SCNC: 4.3 MMOL/L (ref 3.5–5.1)
PROT SERPL-MCNC: 6.1 GM/DL (ref 6.4–8.3)
RBC # BLD AUTO: 5.61 X10(6)/MCL (ref 4.7–6.1)
SODIUM SERPL-SCNC: 139 MMOL/L (ref 136–145)
WBC # SPEC AUTO: 5.3 X10(3)/MCL (ref 4.5–11.5)

## 2021-04-19 ENCOUNTER — HISTORICAL (OUTPATIENT)
Dept: ADMINISTRATIVE | Facility: HOSPITAL | Age: 61
End: 2021-04-19

## 2021-04-19 LAB
ABS NEUT (OLG): 3.78 X10(3)/MCL (ref 2.1–9.2)
ALBUMIN SERPL-MCNC: 4.2 GM/DL (ref 3.5–5)
ALBUMIN/GLOB SERPL: 1.8 RATIO (ref 1.1–2)
ALP SERPL-CCNC: 107 UNIT/L (ref 40–150)
ALT SERPL-CCNC: 33 UNIT/L (ref 0–55)
AST SERPL-CCNC: 23 UNIT/L (ref 5–34)
BASOPHILS # BLD AUTO: 0 X10(3)/MCL (ref 0–0.2)
BASOPHILS NFR BLD AUTO: 1 %
BILIRUB SERPL-MCNC: 1 MG/DL
BILIRUBIN DIRECT+TOT PNL SERPL-MCNC: 0.3 MG/DL (ref 0–0.5)
BILIRUBIN DIRECT+TOT PNL SERPL-MCNC: 0.7 MG/DL (ref 0–0.8)
BUN SERPL-MCNC: 9.5 MG/DL (ref 8.4–25.7)
CALCIUM SERPL-MCNC: 9.5 MG/DL (ref 8.4–10.2)
CHLORIDE SERPL-SCNC: 101 MMOL/L (ref 98–107)
CO2 SERPL-SCNC: 25 MMOL/L (ref 22–29)
CREAT SERPL-MCNC: 0.76 MG/DL (ref 0.73–1.18)
EOSINOPHIL # BLD AUTO: 0.1 X10(3)/MCL (ref 0–0.9)
EOSINOPHIL NFR BLD AUTO: 2 %
ERYTHROCYTE [DISTWIDTH] IN BLOOD BY AUTOMATED COUNT: 15.5 % (ref 11.5–17)
GLOBULIN SER-MCNC: 2.3 GM/DL (ref 2.4–3.5)
GLUCOSE SERPL-MCNC: 211 MG/DL (ref 74–100)
HCT VFR BLD AUTO: 45.4 % (ref 42–52)
HGB BLD-MCNC: 13.5 GM/DL (ref 14–18)
LYMPHOCYTES # BLD AUTO: 0.9 X10(3)/MCL (ref 0.6–4.6)
LYMPHOCYTES NFR BLD AUTO: 16 %
MCH RBC QN AUTO: 23.6 PG (ref 27–31)
MCHC RBC AUTO-ENTMCNC: 29.7 GM/DL (ref 33–36)
MCV RBC AUTO: 79.2 FL (ref 80–94)
MONOCYTES # BLD AUTO: 0.4 X10(3)/MCL (ref 0.1–1.3)
MONOCYTES NFR BLD AUTO: 8 %
NEUTROPHILS # BLD AUTO: 3.78 X10(3)/MCL (ref 2.1–9.2)
NEUTROPHILS NFR BLD AUTO: 72 %
PLATELET # BLD AUTO: 139 X10(3)/MCL (ref 130–400)
PMV BLD AUTO: 11 FL (ref 9.4–12.4)
POTASSIUM SERPL-SCNC: 4.6 MMOL/L (ref 3.5–5.1)
PROT SERPL-MCNC: 6.5 GM/DL (ref 6.4–8.3)
RBC # BLD AUTO: 5.73 X10(6)/MCL (ref 4.7–6.1)
SODIUM SERPL-SCNC: 135 MMOL/L (ref 136–145)
WBC # SPEC AUTO: 5.3 X10(3)/MCL (ref 4.5–11.5)

## 2021-04-20 ENCOUNTER — TELEPHONE (OUTPATIENT)
Dept: TRANSPLANT | Facility: CLINIC | Age: 61
End: 2021-04-20

## 2021-04-20 NOTE — TRANSFER OF CARE
"Anesthesia Transfer of Care Note    Patient: Colin Reilly    Procedure(s) Performed: Procedure(s) (LRB):  ESOPHAGOGASTRODUODENOSCOPY (EGD) (N/A)    Patient location: PACU    Anesthesia Type: MAC    Transport from OR: Transported from OR on room air with adequate spontaneous ventilation    Post pain: adequate analgesia    Post assessment: no apparent anesthetic complications    Post vital signs: stable    Level of consciousness: sedated    Nausea/Vomiting: no nausea/vomiting    Complications: none          Last vitals:   Visit Vitals    BP (!) 103/53 (BP Location: Right leg, Patient Position: Lying, BP Method: Automatic)    Pulse 76    Temp 36.8 °C (98.2 °F) (Oral)    Resp 16    Ht 5' 11" (1.803 m)    SpO2 95%     " show

## 2021-04-21 LAB
EXT ALBUMIN: 4.2
EXT ALKALINE PHOSPHATASE: 107
EXT ALT: 33
EXT AST: 23
EXT BASOPHIL%: 1
EXT BILIRUBIN DIRECT: 0.3 MG/DL
EXT BILIRUBIN TOTAL: 1
EXT BUN: 9.5
EXT CALCIUM: 9.5
EXT CHLORIDE: 101
EXT CO2: 25
EXT CREATININE: 0.76 MG/DL
EXT EOSINOPHIL%: 2
EXT GFR MDRD NON AF AMER: >60
EXT GLUCOSE: 211
EXT HEMATOCRIT: 45.4
EXT HEMOGLOBIN: 13.5
EXT LYMPH%: 16
EXT MONOCYTES%: 8
EXT PLATELETS: 139
EXT POTASSIUM: 4.6
EXT PROTEIN TOTAL: 6.5
EXT SEGS%: 72
EXT SODIUM: 135 MMOL/L
EXT TACROLIMUS LVL: 14.2
EXT WBC: 5.3

## 2021-04-23 ENCOUNTER — TELEPHONE (OUTPATIENT)
Dept: TRANSPLANT | Facility: CLINIC | Age: 61
End: 2021-04-23

## 2021-04-26 ENCOUNTER — HISTORICAL (OUTPATIENT)
Dept: ADMINISTRATIVE | Facility: HOSPITAL | Age: 61
End: 2021-04-26

## 2021-04-26 LAB
ABS NEUT (OLG): 3.64 X10(3)/MCL (ref 2.1–9.2)
ALBUMIN SERPL-MCNC: 4.2 GM/DL (ref 3.5–5)
ALBUMIN/GLOB SERPL: 1.9 RATIO (ref 1.1–2)
ALP SERPL-CCNC: 99 UNIT/L (ref 40–150)
ALT SERPL-CCNC: 31 UNIT/L (ref 0–55)
AST SERPL-CCNC: 28 UNIT/L (ref 5–34)
BASOPHILS # BLD AUTO: 0.1 X10(3)/MCL (ref 0–0.2)
BASOPHILS NFR BLD AUTO: 1 %
BILIRUB SERPL-MCNC: 0.9 MG/DL
BILIRUBIN DIRECT+TOT PNL SERPL-MCNC: 0.4 MG/DL (ref 0–0.5)
BILIRUBIN DIRECT+TOT PNL SERPL-MCNC: 0.5 MG/DL (ref 0–0.8)
BUN SERPL-MCNC: 7.4 MG/DL (ref 8.4–25.7)
CALCIUM SERPL-MCNC: 9.3 MG/DL (ref 8.4–10.2)
CHLORIDE SERPL-SCNC: 105 MMOL/L (ref 98–107)
CO2 SERPL-SCNC: 28 MMOL/L (ref 22–29)
CREAT SERPL-MCNC: 0.83 MG/DL (ref 0.73–1.18)
EOSINOPHIL # BLD AUTO: 0.1 X10(3)/MCL (ref 0–0.9)
EOSINOPHIL NFR BLD AUTO: 3 %
ERYTHROCYTE [DISTWIDTH] IN BLOOD BY AUTOMATED COUNT: 15.7 % (ref 11.5–17)
GLOBULIN SER-MCNC: 2.2 GM/DL (ref 2.4–3.5)
GLUCOSE SERPL-MCNC: 142 MG/DL (ref 74–100)
HCT VFR BLD AUTO: 44.8 % (ref 42–52)
HGB BLD-MCNC: 13.4 GM/DL (ref 14–18)
LYMPHOCYTES # BLD AUTO: 0.9 X10(3)/MCL (ref 0.6–4.6)
LYMPHOCYTES NFR BLD AUTO: 17 %
MCH RBC QN AUTO: 23.9 PG (ref 27–31)
MCHC RBC AUTO-ENTMCNC: 29.9 GM/DL (ref 33–36)
MCV RBC AUTO: 80 FL (ref 80–94)
MONOCYTES # BLD AUTO: 0.5 X10(3)/MCL (ref 0.1–1.3)
MONOCYTES NFR BLD AUTO: 10 %
NEUTROPHILS # BLD AUTO: 3.64 X10(3)/MCL (ref 2.1–9.2)
NEUTROPHILS NFR BLD AUTO: 69 %
PLATELET # BLD AUTO: 133 X10(3)/MCL (ref 130–400)
PMV BLD AUTO: 11.5 FL (ref 9.4–12.4)
POTASSIUM SERPL-SCNC: 4.6 MMOL/L (ref 3.5–5.1)
PROT SERPL-MCNC: 6.4 GM/DL (ref 6.4–8.3)
RBC # BLD AUTO: 5.6 X10(6)/MCL (ref 4.7–6.1)
SODIUM SERPL-SCNC: 143 MMOL/L (ref 136–145)
WBC # SPEC AUTO: 5.3 X10(3)/MCL (ref 4.5–11.5)

## 2021-04-29 LAB
EXT ALBUMIN: 4.2
EXT ALKALINE PHOSPHATASE: 99
EXT ALT: 31
EXT AST: 28
EXT BASOPHIL%: 1
EXT BILIRUBIN DIRECT: 0.4 MG/DL
EXT BILIRUBIN TOTAL: 0.9
EXT BUN: 7.4
EXT CALCIUM: 9.3
EXT CHLORIDE: 105
EXT CO2: 28
EXT CREATININE: 0.83 MG/DL
EXT EOSINOPHIL%: 3
EXT GFR MDRD NON AF AMER: >60
EXT GLUCOSE: 142
EXT HEMATOCRIT: 44.8
EXT HEMOGLOBIN: 13.4
EXT LYMPH%: 17
EXT MONOCYTES%: 10
EXT PLATELETS: 133
EXT POTASSIUM: 4.6
EXT PROTEIN TOTAL: 6.4
EXT SEGS%: 69
EXT SODIUM: 143 MMOL/L
EXT TACROLIMUS LVL: 5
EXT WBC: 5.3

## 2021-04-30 ENCOUNTER — TELEPHONE (OUTPATIENT)
Dept: TRANSPLANT | Facility: CLINIC | Age: 61
End: 2021-04-30

## 2021-05-04 ENCOUNTER — TELEPHONE (OUTPATIENT)
Dept: TRANSPLANT | Facility: CLINIC | Age: 61
End: 2021-05-04

## 2021-05-04 RX ORDER — FUROSEMIDE 40 MG/1
TABLET ORAL
Qty: 360 TABLET | Refills: 11 | Status: SHIPPED | OUTPATIENT
Start: 2021-05-04 | End: 2021-05-04

## 2021-05-05 ENCOUNTER — TELEPHONE (OUTPATIENT)
Dept: TRANSPLANT | Facility: CLINIC | Age: 61
End: 2021-05-05

## 2021-05-19 ENCOUNTER — TELEPHONE (OUTPATIENT)
Dept: TRANSPLANT | Facility: CLINIC | Age: 61
End: 2021-05-19

## 2021-05-21 ENCOUNTER — TELEPHONE (OUTPATIENT)
Dept: TRANSPLANT | Facility: CLINIC | Age: 61
End: 2021-05-21

## 2021-05-24 ENCOUNTER — HISTORICAL (OUTPATIENT)
Dept: ADMINISTRATIVE | Facility: HOSPITAL | Age: 61
End: 2021-05-24

## 2021-05-24 LAB
ABS NEUT (OLG): 3.66 X10(3)/MCL (ref 2.1–9.2)
ALBUMIN SERPL-MCNC: 4.2 GM/DL (ref 3.5–5)
ALBUMIN/GLOB SERPL: 1.8 RATIO (ref 1.1–2)
ALP SERPL-CCNC: 128 UNIT/L (ref 40–150)
ALT SERPL-CCNC: 28 UNIT/L (ref 0–55)
AST SERPL-CCNC: 26 UNIT/L (ref 5–34)
BASOPHILS # BLD AUTO: 0 X10(3)/MCL (ref 0–0.2)
BASOPHILS NFR BLD AUTO: 1 %
BILIRUB SERPL-MCNC: 1.1 MG/DL
BILIRUBIN DIRECT+TOT PNL SERPL-MCNC: 0.4 MG/DL (ref 0–0.5)
BILIRUBIN DIRECT+TOT PNL SERPL-MCNC: 0.7 MG/DL (ref 0–0.8)
BUN SERPL-MCNC: 9.2 MG/DL (ref 8.4–25.7)
CALCIUM SERPL-MCNC: 9.2 MG/DL (ref 8.4–10.2)
CHLORIDE SERPL-SCNC: 100 MMOL/L (ref 98–107)
CO2 SERPL-SCNC: 27 MMOL/L (ref 22–29)
CREAT SERPL-MCNC: 0.75 MG/DL (ref 0.73–1.18)
EOSINOPHIL # BLD AUTO: 0.1 X10(3)/MCL (ref 0–0.9)
EOSINOPHIL NFR BLD AUTO: 2 %
ERYTHROCYTE [DISTWIDTH] IN BLOOD BY AUTOMATED COUNT: 15 % (ref 11.5–17)
GLOBULIN SER-MCNC: 2.4 GM/DL (ref 2.4–3.5)
GLUCOSE SERPL-MCNC: 222 MG/DL (ref 74–100)
HCT VFR BLD AUTO: 45.5 % (ref 42–52)
HGB BLD-MCNC: 13.7 GM/DL (ref 14–18)
LYMPHOCYTES # BLD AUTO: 0.9 X10(3)/MCL (ref 0.6–4.6)
LYMPHOCYTES NFR BLD AUTO: 17 %
MCH RBC QN AUTO: 23.5 PG (ref 27–31)
MCHC RBC AUTO-ENTMCNC: 30.1 GM/DL (ref 33–36)
MCV RBC AUTO: 77.9 FL (ref 80–94)
MONOCYTES # BLD AUTO: 0.4 X10(3)/MCL (ref 0.1–1.3)
MONOCYTES NFR BLD AUTO: 8 %
NEUTROPHILS # BLD AUTO: 3.66 X10(3)/MCL (ref 2.1–9.2)
NEUTROPHILS NFR BLD AUTO: 71 %
PLATELET # BLD AUTO: 137 X10(3)/MCL (ref 130–400)
PMV BLD AUTO: 11 FL (ref 9.4–12.4)
POTASSIUM SERPL-SCNC: 4.8 MMOL/L (ref 3.5–5.1)
PROT SERPL-MCNC: 6.6 GM/DL (ref 6.4–8.3)
RBC # BLD AUTO: 5.84 X10(6)/MCL (ref 4.7–6.1)
SODIUM SERPL-SCNC: 137 MMOL/L (ref 136–145)
WBC # SPEC AUTO: 5.2 X10(3)/MCL (ref 4.5–11.5)

## 2021-05-27 ENCOUNTER — TELEPHONE (OUTPATIENT)
Dept: TRANSPLANT | Facility: CLINIC | Age: 61
End: 2021-05-27

## 2021-05-31 ENCOUNTER — HOSPITAL ENCOUNTER (OUTPATIENT)
Dept: RADIOLOGY | Facility: HOSPITAL | Age: 61
Discharge: HOME | End: 2021-05-31
Attending: INTERNAL MEDICINE
Payer: MEDICARE

## 2021-05-31 ENCOUNTER — OFFICE VISIT (OUTPATIENT)
Dept: TRANSPLANT | Facility: CLINIC | Age: 61
End: 2021-05-31
Payer: MEDICARE

## 2021-05-31 ENCOUNTER — TELEPHONE (OUTPATIENT)
Dept: TRANSPLANT | Facility: CLINIC | Age: 61
End: 2021-05-31

## 2021-05-31 VITALS
DIASTOLIC BLOOD PRESSURE: 80 MMHG | HEIGHT: 71 IN | OXYGEN SATURATION: 99 % | HEART RATE: 89 BPM | WEIGHT: 199.44 LBS | BODY MASS INDEX: 27.92 KG/M2 | SYSTOLIC BLOOD PRESSURE: 127 MMHG | TEMPERATURE: 98 F | RESPIRATION RATE: 17 BRPM

## 2021-05-31 DIAGNOSIS — Z94.4 LIVER TRANSPLANTED: ICD-10-CM

## 2021-05-31 DIAGNOSIS — F10.11 ALCOHOL ABUSE, IN REMISSION: ICD-10-CM

## 2021-05-31 DIAGNOSIS — Z79.60 LONG-TERM USE OF IMMUNOSUPPRESSANT MEDICATION: ICD-10-CM

## 2021-05-31 DIAGNOSIS — Z94.4 LIVER TRANSPLANTED: Primary | ICD-10-CM

## 2021-05-31 DIAGNOSIS — Z79.818 LONG TERM (CURRENT) USE OF OTHER AGENTS AFFECTING ESTROGEN RECEPTORS AND ESTROGEN LEVELS: ICD-10-CM

## 2021-05-31 LAB
EXT ALBUMIN: 4.2
EXT ALKALINE PHOSPHATASE: 128
EXT ALT: 28
EXT AST: 26
EXT BASOPHIL%: 1
EXT BILIRUBIN DIRECT: 0.4 MG/DL
EXT BILIRUBIN TOTAL: 1.1
EXT BUN: 9.2
EXT CALCIUM: 9.2
EXT CHLORIDE: 100
EXT CO2: 27
EXT CREATININE: 0.75 MG/DL
EXT EOSINOPHIL%: 2
EXT GFR MDRD AF AMER: >60
EXT GFR MDRD NON AF AMER: >60
EXT GLUCOSE: 222
EXT HEMATOCRIT: 45.5
EXT HEMOGLOBIN: 13.7
EXT LYMPH%: 17
EXT MONOCYTES%: 8
EXT PLATELETS: 137
EXT POTASSIUM: 4.8
EXT PROTEIN TOTAL: 6.6
EXT SEGS%: 71
EXT SODIUM: 137 MMOL/L
EXT TACROLIMUS LVL: 9.2
EXT WBC: 5.2

## 2021-05-31 PROCEDURE — 99215 PR OFFICE/OUTPT VISIT, EST, LEVL V, 40-54 MIN: ICD-10-PCS | Mod: S$GLB,,, | Performed by: INTERNAL MEDICINE

## 2021-05-31 PROCEDURE — 1126F AMNT PAIN NOTED NONE PRSNT: CPT | Mod: S$GLB,,, | Performed by: INTERNAL MEDICINE

## 2021-05-31 PROCEDURE — 99999 PR PBB SHADOW E&M-EST. PATIENT-LVL V: ICD-10-PCS | Mod: PBBFAC,,, | Performed by: INTERNAL MEDICINE

## 2021-05-31 PROCEDURE — 3008F PR BODY MASS INDEX (BMI) DOCUMENTED: ICD-10-PCS | Mod: CPTII,S$GLB,, | Performed by: INTERNAL MEDICINE

## 2021-05-31 PROCEDURE — 93975 VASCULAR STUDY: CPT | Mod: TC

## 2021-05-31 PROCEDURE — 76705 US LIVER TRANSPLANT POST: ICD-10-PCS | Mod: 26,59,, | Performed by: RADIOLOGY

## 2021-05-31 PROCEDURE — 1126F PR PAIN SEVERITY QUANTIFIED, NO PAIN PRESENT: ICD-10-PCS | Mod: S$GLB,,, | Performed by: INTERNAL MEDICINE

## 2021-05-31 PROCEDURE — 3008F BODY MASS INDEX DOCD: CPT | Mod: CPTII,S$GLB,, | Performed by: INTERNAL MEDICINE

## 2021-05-31 PROCEDURE — 76705 ECHO EXAM OF ABDOMEN: CPT | Mod: 26,59,, | Performed by: RADIOLOGY

## 2021-05-31 PROCEDURE — 93975 VASCULAR STUDY: CPT | Mod: 26,,, | Performed by: RADIOLOGY

## 2021-05-31 PROCEDURE — 99999 PR PBB SHADOW E&M-EST. PATIENT-LVL V: CPT | Mod: PBBFAC,,, | Performed by: INTERNAL MEDICINE

## 2021-05-31 PROCEDURE — 99215 OFFICE O/P EST HI 40 MIN: CPT | Mod: S$GLB,,, | Performed by: INTERNAL MEDICINE

## 2021-05-31 PROCEDURE — 93975 US LIVER TRANSPLANT POST: ICD-10-PCS | Mod: 26,,, | Performed by: RADIOLOGY

## 2021-05-31 RX ORDER — MYCOPHENOLATE MOFETIL 250 MG/1
500 CAPSULE ORAL 2 TIMES DAILY
Qty: 180 CAPSULE | Refills: 11 | Status: SHIPPED | OUTPATIENT
Start: 2021-05-31 | End: 2021-06-14 | Stop reason: ALTCHOICE

## 2021-05-31 RX ORDER — INSULIN DETEMIR 100 [IU]/ML
20 INJECTION, SOLUTION SUBCUTANEOUS NIGHTLY
COMMUNITY
Start: 2021-05-30 | End: 2023-12-12

## 2021-06-02 ENCOUNTER — TELEPHONE (OUTPATIENT)
Dept: TRANSPLANT | Facility: CLINIC | Age: 61
End: 2021-06-02

## 2021-06-10 ENCOUNTER — HISTORICAL (OUTPATIENT)
Dept: RADIOLOGY | Facility: HOSPITAL | Age: 61
End: 2021-06-10

## 2021-06-17 ENCOUNTER — HISTORICAL (OUTPATIENT)
Dept: ADMINISTRATIVE | Facility: HOSPITAL | Age: 61
End: 2021-06-17

## 2021-06-17 LAB
ABS NEUT (OLG): 2.48 X10(3)/MCL (ref 2.1–9.2)
ALBUMIN SERPL-MCNC: 3.8 GM/DL (ref 3.5–5)
ALBUMIN/GLOB SERPL: 1.4 RATIO (ref 1.1–2)
ALP SERPL-CCNC: 109 UNIT/L (ref 40–150)
ALT SERPL-CCNC: 27 UNIT/L (ref 0–55)
AMPHET UR QL SCN: NEGATIVE
APPEARANCE, UA: CLEAR
AST SERPL-CCNC: 27 UNIT/L (ref 5–34)
BACTERIA SPEC CULT: NORMAL /HPF
BARBITURATE SCN PRESENT UR: NEGATIVE
BASOPHILS # BLD AUTO: 0 X10(3)/MCL (ref 0–0.2)
BASOPHILS NFR BLD AUTO: 1 %
BENZODIAZ UR QL SCN: NEGATIVE
BILIRUB SERPL-MCNC: 1.1 MG/DL
BILIRUB UR QL STRIP: NEGATIVE
BILIRUBIN DIRECT+TOT PNL SERPL-MCNC: 0.4 MG/DL (ref 0–0.5)
BILIRUBIN DIRECT+TOT PNL SERPL-MCNC: 0.7 MG/DL (ref 0–0.8)
BUN SERPL-MCNC: 8.6 MG/DL (ref 8.4–25.7)
CALCIUM SERPL-MCNC: 8.6 MG/DL (ref 8.4–10.2)
CANNABINOIDS UR QL SCN: NEGATIVE
CHLORIDE SERPL-SCNC: 100 MMOL/L (ref 98–107)
CHOLEST SERPL-MCNC: 157 MG/DL
CHOLEST/HDLC SERPL: 6 {RATIO} (ref 0–5)
CO2 SERPL-SCNC: 29 MMOL/L (ref 22–29)
COCAINE UR QL SCN: NEGATIVE
COLOR UR: NORMAL
CREAT SERPL-MCNC: 0.88 MG/DL (ref 0.73–1.18)
CREAT UR-MCNC: 173.9 MG/DL (ref 58–161)
EOSINOPHIL # BLD AUTO: 0.2 X10(3)/MCL (ref 0–0.9)
EOSINOPHIL NFR BLD AUTO: 4 %
ERYTHROCYTE [DISTWIDTH] IN BLOOD BY AUTOMATED COUNT: 16 % (ref 11.5–17)
EST. AVERAGE GLUCOSE BLD GHB EST-MCNC: 200.1 MG/DL
GLOBULIN SER-MCNC: 2.8 GM/DL (ref 2.4–3.5)
GLUCOSE (UA): NEGATIVE
GLUCOSE SERPL-MCNC: 136 MG/DL (ref 74–100)
HBA1C MFR BLD: 8.6 %
HCT VFR BLD AUTO: 41.5 % (ref 42–52)
HDLC SERPL-MCNC: 26 MG/DL (ref 35–60)
HGB BLD-MCNC: 13 GM/DL (ref 14–18)
HGB UR QL STRIP: NEGATIVE
KETONES UR QL STRIP: NEGATIVE
LDLC SERPL CALC-MCNC: 110 MG/DL (ref 50–140)
LEUKOCYTE ESTERASE UR QL STRIP: NEGATIVE
LYMPHOCYTES # BLD AUTO: 0.9 X10(3)/MCL (ref 0.6–4.6)
LYMPHOCYTES NFR BLD AUTO: 23 %
MCH RBC QN AUTO: 24.3 PG (ref 27–31)
MCHC RBC AUTO-ENTMCNC: 31.3 GM/DL (ref 33–36)
MCV RBC AUTO: 77.7 FL (ref 80–94)
MICROALBUMIN UR-MCNC: 22.9 UG/ML
MICROALBUMIN/CREAT RATIO PNL UR: 13.2 MG/GM CR (ref 0–30)
MONOCYTES # BLD AUTO: 0.3 X10(3)/MCL (ref 0.1–1.3)
MONOCYTES NFR BLD AUTO: 8 %
NEUTROPHILS # BLD AUTO: 2.48 X10(3)/MCL (ref 2.1–9.2)
NEUTROPHILS NFR BLD AUTO: 63 %
NITRITE UR QL STRIP: NEGATIVE
OPIATES UR QL SCN: POSITIVE
PCP UR QL: NEGATIVE
PH UR STRIP.AUTO: 6 [PH] (ref 5–7.5)
PH UR STRIP: 6 [PH] (ref 5–9)
PLATELET # BLD AUTO: 130 X10(3)/MCL (ref 130–400)
PMV BLD AUTO: 11.9 FL (ref 9.4–12.4)
POTASSIUM SERPL-SCNC: 3.6 MMOL/L (ref 3.5–5.1)
PROT SERPL-MCNC: 6.6 GM/DL (ref 6.4–8.3)
PROT UR QL STRIP: NEGATIVE
PSA SERPL-MCNC: 0.74 NG/ML
RBC # BLD AUTO: 5.34 X10(6)/MCL (ref 4.7–6.1)
RBC #/AREA URNS HPF: NORMAL /[HPF]
SODIUM SERPL-SCNC: 138 MMOL/L (ref 136–145)
SP GR FLD REFRACTOMETRY: 1.02 (ref 1–1.03)
SP GR UR STRIP: 1.02 (ref 1–1.03)
SQUAMOUS EPITHELIAL, UA: NORMAL /HPF (ref 0–4)
TRIGL SERPL-MCNC: 105 MG/DL (ref 34–140)
TSH SERPL-ACNC: 1.41 UIU/ML (ref 0.35–4.94)
UROBILINOGEN UR STRIP-ACNC: 1
VLDLC SERPL CALC-MCNC: 21 MG/DL
WBC # SPEC AUTO: 4 X10(3)/MCL (ref 4.5–11.5)
WBC #/AREA URNS HPF: NORMAL /HPF

## 2021-06-28 ENCOUNTER — TELEPHONE (OUTPATIENT)
Dept: TRANSPLANT | Facility: CLINIC | Age: 61
End: 2021-06-28

## 2021-06-28 ENCOUNTER — HISTORICAL (OUTPATIENT)
Dept: ADMINISTRATIVE | Facility: HOSPITAL | Age: 61
End: 2021-06-28

## 2021-06-28 LAB
ABS NEUT (OLG): 3.31 X10(3)/MCL (ref 2.1–9.2)
ALBUMIN SERPL-MCNC: 4.1 GM/DL (ref 3.5–5)
ALBUMIN/GLOB SERPL: 1.5 RATIO (ref 1.1–2)
ALP SERPL-CCNC: 138 UNIT/L (ref 40–150)
ALT SERPL-CCNC: 56 UNIT/L (ref 0–55)
AST SERPL-CCNC: 43 UNIT/L (ref 5–34)
BASOPHILS # BLD AUTO: 0 X10(3)/MCL (ref 0–0.2)
BASOPHILS NFR BLD AUTO: 1 %
BILIRUB SERPL-MCNC: 1 MG/DL
BILIRUBIN DIRECT+TOT PNL SERPL-MCNC: 0.4 MG/DL (ref 0–0.5)
BILIRUBIN DIRECT+TOT PNL SERPL-MCNC: 0.6 MG/DL (ref 0–0.8)
BUN SERPL-MCNC: 9.4 MG/DL (ref 8.4–25.7)
CALCIUM SERPL-MCNC: 9.3 MG/DL (ref 8.4–10.2)
CHLORIDE SERPL-SCNC: 99 MMOL/L (ref 98–107)
CO2 SERPL-SCNC: 28 MMOL/L (ref 22–29)
CREAT SERPL-MCNC: 0.85 MG/DL (ref 0.73–1.18)
EOSINOPHIL # BLD AUTO: 0.2 X10(3)/MCL (ref 0–0.9)
EOSINOPHIL NFR BLD AUTO: 4 %
ERYTHROCYTE [DISTWIDTH] IN BLOOD BY AUTOMATED COUNT: 14.9 % (ref 11.5–17)
GLOBULIN SER-MCNC: 2.8 GM/DL (ref 2.4–3.5)
GLUCOSE SERPL-MCNC: 168 MG/DL (ref 74–100)
HCT VFR BLD AUTO: 47.8 % (ref 42–52)
HGB BLD-MCNC: 14.6 GM/DL (ref 14–18)
LYMPHOCYTES # BLD AUTO: 0.8 X10(3)/MCL (ref 0.6–4.6)
LYMPHOCYTES NFR BLD AUTO: 16 %
MCH RBC QN AUTO: 24.7 PG (ref 27–31)
MCHC RBC AUTO-ENTMCNC: 30.5 GM/DL (ref 33–36)
MCV RBC AUTO: 80.9 FL (ref 80–94)
MONOCYTES # BLD AUTO: 0.5 X10(3)/MCL (ref 0.1–1.3)
MONOCYTES NFR BLD AUTO: 10 %
NEUTROPHILS # BLD AUTO: 3.31 X10(3)/MCL (ref 2.1–9.2)
NEUTROPHILS NFR BLD AUTO: 68 %
PLATELET # BLD AUTO: 142 X10(3)/MCL (ref 130–400)
PMV BLD AUTO: 11.3 FL (ref 9.4–12.4)
POTASSIUM SERPL-SCNC: 4.9 MMOL/L (ref 3.5–5.1)
PROT SERPL-MCNC: 6.9 GM/DL (ref 6.4–8.3)
RBC # BLD AUTO: 5.91 X10(6)/MCL (ref 4.7–6.1)
SODIUM SERPL-SCNC: 137 MMOL/L (ref 136–145)
WBC # SPEC AUTO: 4.8 X10(3)/MCL (ref 4.5–11.5)

## 2021-07-06 ENCOUNTER — TELEPHONE (OUTPATIENT)
Dept: TRANSPLANT | Facility: CLINIC | Age: 61
End: 2021-07-06

## 2021-07-07 ENCOUNTER — TELEPHONE (OUTPATIENT)
Dept: TRANSPLANT | Facility: CLINIC | Age: 61
End: 2021-07-07

## 2021-07-08 LAB
EXT ALBUMIN: 4.1
EXT ALKALINE PHOSPHATASE: 138
EXT ALT: 56
EXT AST: 43
EXT BASOPHIL%: 1
EXT BILIRUBIN DIRECT: 0.4 MG/DL
EXT BILIRUBIN TOTAL: 1
EXT BUN: 9.4
EXT CALCIUM: 9.3
EXT CHLORIDE: 99
EXT CO2: 28
EXT CREATININE: 0.85 MG/DL
EXT EOSINOPHIL%: 4
EXT GLUCOSE: 168
EXT HEMATOCRIT: 47.8
EXT HEMOGLOBIN: 14.6
EXT LYMPH%: 16
EXT MONOCYTES%: 10
EXT PLATELETS: 142
EXT POTASSIUM: 4.9
EXT PROTEIN TOTAL: 6.9
EXT SEGS%: 68
EXT SODIUM: 137 MMOL/L
EXT TACROLIMUS LVL: <1
EXT WBC: 4.8

## 2021-07-09 DIAGNOSIS — Z94.4 LIVER TRANSPLANTED: ICD-10-CM

## 2021-07-10 RX ORDER — TACROLIMUS 1 MG/1
CAPSULE ORAL
Qty: 210 CAPSULE | Refills: 11 | Status: SHIPPED | OUTPATIENT
Start: 2021-07-10 | End: 2021-07-22

## 2021-07-13 ENCOUNTER — TELEPHONE (OUTPATIENT)
Dept: TRANSPLANT | Facility: CLINIC | Age: 61
End: 2021-07-13

## 2021-07-15 ENCOUNTER — TELEPHONE (OUTPATIENT)
Dept: TRANSPLANT | Facility: CLINIC | Age: 61
End: 2021-07-15

## 2021-07-19 ENCOUNTER — HISTORICAL (OUTPATIENT)
Dept: ADMINISTRATIVE | Facility: HOSPITAL | Age: 61
End: 2021-07-19

## 2021-07-19 LAB
ABS NEUT (OLG): 4.13 X10(3)/MCL (ref 2.1–9.2)
ALBUMIN SERPL-MCNC: 3.8 GM/DL (ref 3.5–5)
ALBUMIN/GLOB SERPL: 1.3 RATIO (ref 1.1–2)
ALP SERPL-CCNC: 118 UNIT/L (ref 40–150)
ALT SERPL-CCNC: 45 UNIT/L (ref 0–55)
AST SERPL-CCNC: 30 UNIT/L (ref 5–34)
BASOPHILS # BLD AUTO: 0 X10(3)/MCL (ref 0–0.2)
BASOPHILS NFR BLD AUTO: 1 %
BILIRUB SERPL-MCNC: 0.8 MG/DL
BILIRUBIN DIRECT+TOT PNL SERPL-MCNC: 0.3 MG/DL (ref 0–0.5)
BILIRUBIN DIRECT+TOT PNL SERPL-MCNC: 0.5 MG/DL (ref 0–0.8)
BUN SERPL-MCNC: 11.7 MG/DL (ref 8.4–25.7)
CALCIUM SERPL-MCNC: 8.9 MG/DL (ref 8.4–10.2)
CHLORIDE SERPL-SCNC: 101 MMOL/L (ref 98–107)
CO2 SERPL-SCNC: 25 MMOL/L (ref 22–29)
CREAT SERPL-MCNC: 0.83 MG/DL (ref 0.73–1.18)
EOSINOPHIL # BLD AUTO: 0.2 X10(3)/MCL (ref 0–0.9)
EOSINOPHIL NFR BLD AUTO: 3 %
ERYTHROCYTE [DISTWIDTH] IN BLOOD BY AUTOMATED COUNT: 15.5 % (ref 11.5–17)
GLOBULIN SER-MCNC: 3 GM/DL (ref 2.4–3.5)
GLUCOSE SERPL-MCNC: 217 MG/DL (ref 74–100)
HCT VFR BLD AUTO: 46.5 % (ref 42–52)
HGB BLD-MCNC: 14.1 GM/DL (ref 14–18)
LYMPHOCYTES # BLD AUTO: 0.9 X10(3)/MCL (ref 0.6–4.6)
LYMPHOCYTES NFR BLD AUTO: 16 %
MCH RBC QN AUTO: 24.4 PG (ref 27–31)
MCHC RBC AUTO-ENTMCNC: 30.3 GM/DL (ref 33–36)
MCV RBC AUTO: 80.6 FL (ref 80–94)
MONOCYTES # BLD AUTO: 0.5 X10(3)/MCL (ref 0.1–1.3)
MONOCYTES NFR BLD AUTO: 9 %
NEUTROPHILS # BLD AUTO: 4.13 X10(3)/MCL (ref 2.1–9.2)
NEUTROPHILS NFR BLD AUTO: 71 %
PLATELET # BLD AUTO: 156 X10(3)/MCL (ref 130–400)
PMV BLD AUTO: 11.9 FL (ref 9.4–12.4)
POTASSIUM SERPL-SCNC: 4.4 MMOL/L (ref 3.5–5.1)
PROT SERPL-MCNC: 6.8 GM/DL (ref 6.4–8.3)
RBC # BLD AUTO: 5.77 X10(6)/MCL (ref 4.7–6.1)
SODIUM SERPL-SCNC: 138 MMOL/L (ref 136–145)
WBC # SPEC AUTO: 5.8 X10(3)/MCL (ref 4.5–11.5)

## 2021-07-21 ENCOUNTER — HISTORICAL (OUTPATIENT)
Dept: RADIOLOGY | Facility: HOSPITAL | Age: 61
End: 2021-07-21

## 2021-07-22 RX ORDER — TACROLIMUS 1 MG/1
CAPSULE ORAL
Qty: 210 CAPSULE | Refills: 11 | Status: SHIPPED | OUTPATIENT
Start: 2021-07-22 | End: 2021-12-23

## 2021-07-23 ENCOUNTER — TELEPHONE (OUTPATIENT)
Dept: TRANSPLANT | Facility: CLINIC | Age: 61
End: 2021-07-23

## 2021-07-23 LAB
EXT ALBUMIN: 3.8
EXT ALKALINE PHOSPHATASE: 118
EXT ALT: 45
EXT AST: 30
EXT BASOPHIL%: 1
EXT BILIRUBIN DIRECT: 0.3 MG/DL
EXT BILIRUBIN TOTAL: 0.8
EXT BUN: 11.7
EXT CALCIUM: 8.9
EXT CHLORIDE: 101
EXT CO2: 25
EXT CREATININE: 0.83 MG/DL
EXT EOSINOPHIL%: 3
EXT GFR MDRD AF AMER: >60
EXT GLUCOSE: 217
EXT HEMATOCRIT: 46.5
EXT HEMOGLOBIN: 14.1
EXT LYMPH%: 16
EXT MONOCYTES%: 9
EXT PLATELETS: 156
EXT POTASSIUM: 4.4
EXT PROTEIN TOTAL: 6.8
EXT SEGS%: 71
EXT SODIUM: 138 MMOL/L
EXT TACROLIMUS LVL: 6.8
EXT WBC: 5.8

## 2021-08-02 ENCOUNTER — HISTORICAL (OUTPATIENT)
Dept: ADMINISTRATIVE | Facility: HOSPITAL | Age: 61
End: 2021-08-02

## 2021-08-02 LAB
ABS NEUT (OLG): 3.55 X10(3)/MCL (ref 2.1–9.2)
ALBUMIN SERPL-MCNC: 4.3 GM/DL (ref 3.5–5)
ALBUMIN/GLOB SERPL: 1.4 RATIO (ref 1.1–2)
ALP SERPL-CCNC: 111 UNIT/L (ref 40–150)
ALT SERPL-CCNC: 29 UNIT/L (ref 0–55)
AST SERPL-CCNC: 26 UNIT/L (ref 5–34)
BASOPHILS # BLD AUTO: 0 X10(3)/MCL (ref 0–0.2)
BASOPHILS NFR BLD AUTO: 1 %
BILIRUB SERPL-MCNC: 0.9 MG/DL
BILIRUBIN DIRECT+TOT PNL SERPL-MCNC: 0.3 MG/DL (ref 0–0.5)
BILIRUBIN DIRECT+TOT PNL SERPL-MCNC: 0.6 MG/DL (ref 0–0.8)
BUN SERPL-MCNC: 8.8 MG/DL (ref 8.4–25.7)
CALCIUM SERPL-MCNC: 9 MG/DL (ref 8.4–10.2)
CHLORIDE SERPL-SCNC: 103 MMOL/L (ref 98–107)
CO2 SERPL-SCNC: 25 MMOL/L (ref 22–29)
CREAT SERPL-MCNC: 0.79 MG/DL (ref 0.73–1.18)
EOSINOPHIL # BLD AUTO: 0.1 X10(3)/MCL (ref 0–0.9)
EOSINOPHIL NFR BLD AUTO: 3 %
ERYTHROCYTE [DISTWIDTH] IN BLOOD BY AUTOMATED COUNT: 15 % (ref 11.5–17)
GLOBULIN SER-MCNC: 3.1 GM/DL (ref 2.4–3.5)
GLUCOSE SERPL-MCNC: 85 MG/DL (ref 74–100)
HCT VFR BLD AUTO: 46.5 % (ref 42–52)
HGB BLD-MCNC: 14.3 GM/DL (ref 14–18)
LYMPHOCYTES # BLD AUTO: 1.1 X10(3)/MCL (ref 0.6–4.6)
LYMPHOCYTES NFR BLD AUTO: 21 %
MCH RBC QN AUTO: 24.4 PG (ref 27–31)
MCHC RBC AUTO-ENTMCNC: 30.8 GM/DL (ref 33–36)
MCV RBC AUTO: 79.2 FL (ref 80–94)
MONOCYTES # BLD AUTO: 0.4 X10(3)/MCL (ref 0.1–1.3)
MONOCYTES NFR BLD AUTO: 8 %
NEUTROPHILS # BLD AUTO: 3.55 X10(3)/MCL (ref 2.1–9.2)
NEUTROPHILS NFR BLD AUTO: 66 %
PLATELET # BLD AUTO: 154 X10(3)/MCL (ref 130–400)
PMV BLD AUTO: 11.8 FL (ref 9.4–12.4)
POTASSIUM SERPL-SCNC: 4.4 MMOL/L (ref 3.5–5.1)
PROT SERPL-MCNC: 7.4 GM/DL (ref 6.4–8.3)
RBC # BLD AUTO: 5.87 X10(6)/MCL (ref 4.7–6.1)
SODIUM SERPL-SCNC: 140 MMOL/L (ref 136–145)
WBC # SPEC AUTO: 5.4 X10(3)/MCL (ref 4.5–11.5)

## 2021-08-05 LAB
EXT ALBUMIN: 4.3
EXT ALKALINE PHOSPHATASE: 111
EXT ALT: 29
EXT AST: 26
EXT BASOPHIL%: 1
EXT BILIRUBIN DIRECT: 0.3 MG/DL
EXT BILIRUBIN TOTAL: 0.9
EXT BUN: 8.8
EXT CALCIUM: 9
EXT CHLORIDE: 103
EXT CO2: 25
EXT CREATININE: 0.79 MG/DL
EXT EOSINOPHIL%: 3
EXT GFR MDRD NON AF AMER: >60
EXT GLUCOSE: 85
EXT HEMATOCRIT: 46.5
EXT HEMOGLOBIN: 14.3
EXT LYMPH%: 21
EXT MONOCYTES%: 8
EXT PLATELETS: 154
EXT POTASSIUM: 4.4
EXT PROTEIN TOTAL: 7.4
EXT SEGS%: 66
EXT SODIUM: 140 MMOL/L
EXT TACROLIMUS LVL: 10.1
EXT WBC: 5.4

## 2021-08-06 ENCOUNTER — OFFICE VISIT (OUTPATIENT)
Dept: TRANSPLANT | Facility: CLINIC | Age: 61
End: 2021-08-06
Payer: MEDICARE

## 2021-08-06 ENCOUNTER — TELEPHONE (OUTPATIENT)
Dept: TRANSPLANT | Facility: CLINIC | Age: 61
End: 2021-08-06

## 2021-08-06 VITALS
SYSTOLIC BLOOD PRESSURE: 140 MMHG | BODY MASS INDEX: 28.37 KG/M2 | DIASTOLIC BLOOD PRESSURE: 75 MMHG | HEART RATE: 74 BPM | HEIGHT: 71 IN | TEMPERATURE: 98 F | WEIGHT: 202.63 LBS | RESPIRATION RATE: 19 BRPM | OXYGEN SATURATION: 99 %

## 2021-08-06 DIAGNOSIS — F10.11 ALCOHOL ABUSE, IN REMISSION: ICD-10-CM

## 2021-08-06 DIAGNOSIS — Z94.4 S/P LIVER TRANSPLANT: Primary | ICD-10-CM

## 2021-08-06 DIAGNOSIS — Z79.60 LONG-TERM USE OF IMMUNOSUPPRESSANT MEDICATION: ICD-10-CM

## 2021-08-06 DIAGNOSIS — Z79.4 TYPE 2 DIABETES MELLITUS WITHOUT COMPLICATION, WITH LONG-TERM CURRENT USE OF INSULIN: ICD-10-CM

## 2021-08-06 DIAGNOSIS — Z79.818 LONG TERM (CURRENT) USE OF OTHER AGENTS AFFECTING ESTROGEN RECEPTORS AND ESTROGEN LEVELS: ICD-10-CM

## 2021-08-06 DIAGNOSIS — E11.9 TYPE 2 DIABETES MELLITUS WITHOUT COMPLICATION, WITH LONG-TERM CURRENT USE OF INSULIN: ICD-10-CM

## 2021-08-06 DIAGNOSIS — R63.5 WEIGHT GAIN: ICD-10-CM

## 2021-08-06 DIAGNOSIS — Z94.4 LIVER TRANSPLANTED: ICD-10-CM

## 2021-08-06 PROCEDURE — 1159F PR MEDICATION LIST DOCUMENTED IN MEDICAL RECORD: ICD-10-PCS | Mod: CPTII,S$GLB,, | Performed by: INTERNAL MEDICINE

## 2021-08-06 PROCEDURE — 1159F MED LIST DOCD IN RCRD: CPT | Mod: CPTII,S$GLB,, | Performed by: INTERNAL MEDICINE

## 2021-08-06 PROCEDURE — 99999 PR PBB SHADOW E&M-EST. PATIENT-LVL V: CPT | Mod: PBBFAC,,, | Performed by: INTERNAL MEDICINE

## 2021-08-06 PROCEDURE — 1160F RVW MEDS BY RX/DR IN RCRD: CPT | Mod: CPTII,S$GLB,, | Performed by: INTERNAL MEDICINE

## 2021-08-06 PROCEDURE — 3077F SYST BP >= 140 MM HG: CPT | Mod: CPTII,S$GLB,, | Performed by: INTERNAL MEDICINE

## 2021-08-06 PROCEDURE — 1160F PR REVIEW ALL MEDS BY PRESCRIBER/CLIN PHARMACIST DOCUMENTED: ICD-10-PCS | Mod: CPTII,S$GLB,, | Performed by: INTERNAL MEDICINE

## 2021-08-06 PROCEDURE — 3008F BODY MASS INDEX DOCD: CPT | Mod: CPTII,S$GLB,, | Performed by: INTERNAL MEDICINE

## 2021-08-06 PROCEDURE — 3077F PR MOST RECENT SYSTOLIC BLOOD PRESSURE >= 140 MM HG: ICD-10-PCS | Mod: CPTII,S$GLB,, | Performed by: INTERNAL MEDICINE

## 2021-08-06 PROCEDURE — 99214 OFFICE O/P EST MOD 30 MIN: CPT | Mod: S$GLB,,, | Performed by: INTERNAL MEDICINE

## 2021-08-06 PROCEDURE — 3008F PR BODY MASS INDEX (BMI) DOCUMENTED: ICD-10-PCS | Mod: CPTII,S$GLB,, | Performed by: INTERNAL MEDICINE

## 2021-08-06 PROCEDURE — 1126F AMNT PAIN NOTED NONE PRSNT: CPT | Mod: CPTII,S$GLB,, | Performed by: INTERNAL MEDICINE

## 2021-08-06 PROCEDURE — 99999 PR PBB SHADOW E&M-EST. PATIENT-LVL V: ICD-10-PCS | Mod: PBBFAC,,, | Performed by: INTERNAL MEDICINE

## 2021-08-06 PROCEDURE — 3078F PR MOST RECENT DIASTOLIC BLOOD PRESSURE < 80 MM HG: ICD-10-PCS | Mod: CPTII,S$GLB,, | Performed by: INTERNAL MEDICINE

## 2021-08-06 PROCEDURE — 99214 PR OFFICE/OUTPT VISIT, EST, LEVL IV, 30-39 MIN: ICD-10-PCS | Mod: S$GLB,,, | Performed by: INTERNAL MEDICINE

## 2021-08-06 PROCEDURE — 1126F PR PAIN SEVERITY QUANTIFIED, NO PAIN PRESENT: ICD-10-PCS | Mod: CPTII,S$GLB,, | Performed by: INTERNAL MEDICINE

## 2021-08-06 PROCEDURE — 3078F DIAST BP <80 MM HG: CPT | Mod: CPTII,S$GLB,, | Performed by: INTERNAL MEDICINE

## 2021-08-09 ENCOUNTER — TELEPHONE (OUTPATIENT)
Dept: TRANSPLANT | Facility: CLINIC | Age: 61
End: 2021-08-09

## 2021-08-10 ENCOUNTER — HISTORICAL (OUTPATIENT)
Dept: ADMINISTRATIVE | Facility: HOSPITAL | Age: 61
End: 2021-08-10

## 2021-08-10 LAB
ABS NEUT (OLG): 3.57 X10(3)/MCL (ref 2.1–9.2)
ALBUMIN SERPL-MCNC: 4.5 GM/DL (ref 3.5–5)
ALBUMIN/GLOB SERPL: 1.6 RATIO (ref 1.1–2)
ALP SERPL-CCNC: 136 UNIT/L (ref 40–150)
ALT SERPL-CCNC: 45 UNIT/L (ref 0–55)
AST SERPL-CCNC: 27 UNIT/L (ref 5–34)
BASOPHILS # BLD AUTO: 0 X10(3)/MCL (ref 0–0.2)
BASOPHILS NFR BLD AUTO: 1 %
BILIRUB SERPL-MCNC: 1 MG/DL
BILIRUBIN DIRECT+TOT PNL SERPL-MCNC: 0.4 MG/DL (ref 0–0.5)
BILIRUBIN DIRECT+TOT PNL SERPL-MCNC: 0.6 MG/DL (ref 0–0.8)
BUN SERPL-MCNC: 9.1 MG/DL (ref 8.4–25.7)
CALCIUM SERPL-MCNC: 9.4 MG/DL (ref 8.4–10.2)
CHLORIDE SERPL-SCNC: 99 MMOL/L (ref 98–107)
CO2 SERPL-SCNC: 24 MMOL/L (ref 22–29)
CREAT SERPL-MCNC: 0.94 MG/DL (ref 0.73–1.18)
EOSINOPHIL # BLD AUTO: 0.1 X10(3)/MCL (ref 0–0.9)
EOSINOPHIL NFR BLD AUTO: 2 %
ERYTHROCYTE [DISTWIDTH] IN BLOOD BY AUTOMATED COUNT: 15.4 % (ref 11.5–17)
GLOBULIN SER-MCNC: 2.8 GM/DL (ref 2.4–3.5)
GLUCOSE SERPL-MCNC: 327 MG/DL (ref 74–100)
HCT VFR BLD AUTO: 46.5 % (ref 42–52)
HGB BLD-MCNC: 14.4 GM/DL (ref 14–18)
LYMPHOCYTES # BLD AUTO: 0.8 X10(3)/MCL (ref 0.6–4.6)
LYMPHOCYTES NFR BLD AUTO: 17 %
MCH RBC QN AUTO: 24.5 PG (ref 27–31)
MCHC RBC AUTO-ENTMCNC: 31 GM/DL (ref 33–36)
MCV RBC AUTO: 79.1 FL (ref 80–94)
MONOCYTES # BLD AUTO: 0.3 X10(3)/MCL (ref 0.1–1.3)
MONOCYTES NFR BLD AUTO: 7 %
NEUTROPHILS # BLD AUTO: 3.57 X10(3)/MCL (ref 2.1–9.2)
NEUTROPHILS NFR BLD AUTO: 73 %
PLATELET # BLD AUTO: 123 X10(3)/MCL (ref 130–400)
PMV BLD AUTO: 12.3 FL (ref 9.4–12.4)
POTASSIUM SERPL-SCNC: 4.9 MMOL/L (ref 3.5–5.1)
PROT SERPL-MCNC: 7.3 GM/DL (ref 6.4–8.3)
RBC # BLD AUTO: 5.88 X10(6)/MCL (ref 4.7–6.1)
SODIUM SERPL-SCNC: 136 MMOL/L (ref 136–145)
WBC # SPEC AUTO: 4.9 X10(3)/MCL (ref 4.5–11.5)

## 2021-08-16 ENCOUNTER — TELEPHONE (OUTPATIENT)
Dept: TRANSPLANT | Facility: CLINIC | Age: 61
End: 2021-08-16

## 2021-08-16 LAB
EXT ALBUMIN: 4.5
EXT ALKALINE PHOSPHATASE: 136
EXT ALT: 45
EXT AST: 27
EXT BASOPHIL%: 1
EXT BILIRUBIN DIRECT: 0.4 MG/DL
EXT BILIRUBIN TOTAL: 1
EXT BUN: 9.1
EXT CALCIUM: 9.4
EXT CHLORIDE: 99
EXT CO2: 24
EXT CREATININE: 0.94 MG/DL
EXT EOSINOPHIL%: 2
EXT GFR MDRD NON AF AMER: >60
EXT GLUCOSE: 327
EXT HEMATOCRIT: 46.5
EXT HEMOGLOBIN: 14.4
EXT LYMPH%: 17
EXT MONOCYTES%: 7
EXT PLATELETS: 123
EXT POTASSIUM: 4.9
EXT PROTEIN TOTAL: 7.3
EXT SEGS%: 73
EXT SODIUM: 136 MMOL/L
EXT TACROLIMUS LVL: 8.4
EXT WBC: 4.9

## 2021-08-18 ENCOUNTER — HISTORICAL (OUTPATIENT)
Dept: RADIOLOGY | Facility: HOSPITAL | Age: 61
End: 2021-08-18

## 2021-08-20 ENCOUNTER — TELEPHONE (OUTPATIENT)
Dept: TRANSPLANT | Facility: CLINIC | Age: 61
End: 2021-08-20

## 2021-08-20 LAB
LEFT EYE DM RETINOPATHY: NEGATIVE
RIGHT EYE DM RETINOPATHY: NEGATIVE

## 2021-09-07 ENCOUNTER — TELEPHONE (OUTPATIENT)
Dept: TRANSPLANT | Facility: CLINIC | Age: 61
End: 2021-09-07

## 2021-09-07 ENCOUNTER — HISTORICAL (OUTPATIENT)
Dept: ADMINISTRATIVE | Facility: HOSPITAL | Age: 61
End: 2021-09-07

## 2021-09-07 LAB
ALBUMIN SERPL-MCNC: 4.3 GM/DL (ref 3.4–4.8)
ALBUMIN/GLOB SERPL: 1.8 RATIO (ref 1.1–2)
ALP SERPL-CCNC: 94 UNIT/L (ref 40–150)
ALT SERPL-CCNC: 24 UNIT/L (ref 0–55)
AST SERPL-CCNC: 22 UNIT/L (ref 5–34)
BILIRUB SERPL-MCNC: 0.9 MG/DL
BILIRUBIN DIRECT+TOT PNL SERPL-MCNC: 0.4 MG/DL (ref 0–0.5)
BILIRUBIN DIRECT+TOT PNL SERPL-MCNC: 0.5 MG/DL (ref 0–0.8)
BUN SERPL-MCNC: 10.5 MG/DL (ref 8.4–25.7)
CALCIUM SERPL-MCNC: 9 MG/DL (ref 8.8–10)
CHLORIDE SERPL-SCNC: 104 MMOL/L (ref 98–107)
CO2 SERPL-SCNC: 26 MMOL/L (ref 23–31)
CREAT SERPL-MCNC: 0.82 MG/DL (ref 0.73–1.18)
ERYTHROCYTE [DISTWIDTH] IN BLOOD BY AUTOMATED COUNT: 15.4 % (ref 11.5–17)
GLOBULIN SER-MCNC: 2.4 GM/DL (ref 2.4–3.5)
GLUCOSE SERPL-MCNC: 110 MG/DL (ref 82–115)
HCT VFR BLD AUTO: 45.1 % (ref 42–52)
HGB BLD-MCNC: 13.9 GM/DL (ref 14–18)
MCH RBC QN AUTO: 24.9 PG (ref 27–31)
MCHC RBC AUTO-ENTMCNC: 30.8 GM/DL (ref 33–36)
MCV RBC AUTO: 80.8 FL (ref 80–94)
PLATELET # BLD AUTO: 150 X10(3)/MCL (ref 130–400)
PMV BLD AUTO: 11.5 FL (ref 9.4–12.4)
POTASSIUM SERPL-SCNC: 4.4 MMOL/L (ref 3.5–5.1)
PROT SERPL-MCNC: 6.7 GM/DL (ref 5.8–7.6)
RBC # BLD AUTO: 5.58 X10(6)/MCL (ref 4.7–6.1)
SODIUM SERPL-SCNC: 141 MMOL/L (ref 136–145)
WBC # SPEC AUTO: 5 X10(3)/MCL (ref 4.5–11.5)

## 2021-09-16 LAB
EXT ALBUMIN: 4.3
EXT ALKALINE PHOSPHATASE: 94
EXT ALT: 24
EXT AST: 22
EXT BILIRUBIN DIRECT: 0.4 MG/DL
EXT BILIRUBIN TOTAL: 0.9
EXT BUN: 10.5
EXT CALCIUM: 9
EXT CHLORIDE: 104
EXT CO2: 26
EXT CREATININE: 0.82 MG/DL
EXT GFR MDRD AF AMER: >60
EXT GLUCOSE: 110
EXT HEMATOCRIT: 45.1
EXT HEMOGLOBIN: 13.9
EXT PLATELETS: 150
EXT POTASSIUM: 4.4
EXT PROTEIN TOTAL: 6.7
EXT SODIUM: 141 MMOL/L
EXT TACROLIMUS LVL: 6.6
EXT WBC: 5

## 2021-09-17 ENCOUNTER — TELEPHONE (OUTPATIENT)
Dept: TRANSPLANT | Facility: CLINIC | Age: 61
End: 2021-09-17

## 2021-10-11 ENCOUNTER — HISTORICAL (OUTPATIENT)
Dept: ADMINISTRATIVE | Facility: HOSPITAL | Age: 61
End: 2021-10-11

## 2021-10-11 LAB
ABS NEUT (OLG): 3.94 X10(3)/MCL (ref 2.1–9.2)
ABS NEUT (OLG): NORMAL X10(3)/MCL (ref 2.1–9.2)
ALBUMIN SERPL-MCNC: 4.6 GM/DL (ref 3.4–4.8)
ALBUMIN/GLOB SERPL: 1.9 RATIO (ref 1.1–2)
ALP SERPL-CCNC: 173 UNIT/L (ref 40–150)
ALT SERPL-CCNC: 100 UNIT/L (ref 0–55)
APPEARANCE, UA: CLEAR
AST SERPL-CCNC: 220 UNIT/L (ref 5–34)
BACTERIA SPEC CULT: ABNORMAL /HPF
BASOPHILS # BLD AUTO: 0 X10(3)/MCL (ref 0–0.2)
BASOPHILS NFR BLD AUTO: 1 %
BILIRUB SERPL-MCNC: 1.7 MG/DL
BILIRUB UR QL STRIP: ABNORMAL
BILIRUBIN DIRECT+TOT PNL SERPL-MCNC: 0.6 MG/DL (ref 0–0.5)
BILIRUBIN DIRECT+TOT PNL SERPL-MCNC: 1.1 MG/DL (ref 0–0.8)
BUN SERPL-MCNC: 13.4 MG/DL (ref 8.4–25.7)
CALCIUM SERPL-MCNC: 9.2 MG/DL (ref 8.8–10)
CHLORIDE SERPL-SCNC: 102 MMOL/L (ref 98–107)
CHOLEST SERPL-MCNC: 176 MG/DL
CHOLEST/HDLC SERPL: 5 {RATIO} (ref 0–5)
CO2 SERPL-SCNC: 26 MMOL/L (ref 23–31)
COLOR UR: ABNORMAL
CREAT SERPL-MCNC: 0.95 MG/DL (ref 0.73–1.18)
CREAT UR-MCNC: 391.1 MG/DL (ref 58–161)
EOSINOPHIL # BLD AUTO: 0.1 X10(3)/MCL (ref 0–0.9)
EOSINOPHIL NFR BLD AUTO: 2 %
ERYTHROCYTE [DISTWIDTH] IN BLOOD BY AUTOMATED COUNT: 15.5 % (ref 11.5–17)
ERYTHROCYTE [DISTWIDTH] IN BLOOD BY AUTOMATED COUNT: NORMAL % (ref 11.5–17)
EST. AVERAGE GLUCOSE BLD GHB EST-MCNC: 208.7 MG/DL
GLOBULIN SER-MCNC: 2.4 GM/DL (ref 2.4–3.5)
GLUCOSE (UA): NEGATIVE
GLUCOSE SERPL-MCNC: 126 MG/DL (ref 82–115)
HBA1C MFR BLD: 8.9 %
HCT VFR BLD AUTO: 46.1 % (ref 42–52)
HCT VFR BLD AUTO: NORMAL % (ref 42–52)
HDLC SERPL-MCNC: 33 MG/DL (ref 35–60)
HGB BLD-MCNC: 14.5 GM/DL (ref 14–18)
HGB BLD-MCNC: NORMAL GM/DL (ref 14–18)
HGB UR QL STRIP: NEGATIVE
IMM GRANULOCYTES # BLD AUTO: 0.04 10*3/UL
IMM GRANULOCYTES NFR BLD AUTO: 1 %
KETONES UR QL STRIP: NEGATIVE
LDLC SERPL CALC-MCNC: 106 MG/DL (ref 50–140)
LEUKOCYTE ESTERASE UR QL STRIP: ABNORMAL
LYMPHOCYTES # BLD AUTO: 0.8 X10(3)/MCL (ref 0.6–4.6)
LYMPHOCYTES NFR BLD AUTO: 15 %
LYMPHOCYTES NFR BLD MANUAL: NORMAL % (ref 13–40)
MCH RBC QN AUTO: 25.3 PG (ref 27–31)
MCH RBC QN AUTO: NORMAL PG (ref 27–31)
MCHC RBC AUTO-ENTMCNC: 31.5 GM/DL (ref 33–36)
MCHC RBC AUTO-ENTMCNC: NORMAL GM/DL (ref 33–36)
MCV RBC AUTO: 80.5 FL (ref 80–94)
MCV RBC AUTO: NORMAL FL (ref 80–94)
MICROALBUMIN UR-MCNC: 57.7 UG/ML
MICROALBUMIN/CREAT RATIO PNL UR: 14.8 MG/GM CR (ref 0–30)
MONOCYTES # BLD AUTO: 0.5 X10(3)/MCL (ref 0.1–1.3)
MONOCYTES NFR BLD AUTO: 9 %
NEUTROPHILS # BLD AUTO: 3.94 X10(3)/MCL (ref 2.1–9.2)
NEUTROPHILS # BLD AUTO: 3.94 X10(3)/MCL (ref 2.1–9.2)
NEUTROPHILS NFR BLD AUTO: 73 %
NITRITE UR QL STRIP: POSITIVE
PH UR STRIP: 5.5 [PH] (ref 5–9)
PLATELET # BLD AUTO: 129 X10(3)/MCL (ref 130–400)
PLATELET # BLD AUTO: NORMAL X10(3)/MCL (ref 130–400)
PMV BLD AUTO: 12 FL (ref 9.4–12.4)
PMV BLD AUTO: NORMAL FL (ref 7.4–10.4)
POTASSIUM SERPL-SCNC: 4.2 MMOL/L (ref 3.5–5.1)
PROT SERPL-MCNC: 7 GM/DL (ref 5.8–7.6)
PROT UR QL STRIP: ABNORMAL
RBC # BLD AUTO: 5.73 X10(6)/MCL (ref 4.7–6.1)
RBC # BLD AUTO: NORMAL X10(6)/MCL (ref 4.7–6.1)
RBC #/AREA URNS HPF: ABNORMAL /[HPF]
SODIUM SERPL-SCNC: 138 MMOL/L (ref 136–145)
SP GR UR STRIP: 1.03 (ref 1–1.03)
SQUAMOUS EPITHELIAL, UA: ABNORMAL /HPF (ref 0–4)
TRIGL SERPL-MCNC: 186 MG/DL (ref 34–140)
TSH SERPL-ACNC: 1.95 UIU/ML (ref 0.35–4.94)
UROBILINOGEN UR STRIP-ACNC: 1
VLDLC SERPL CALC-MCNC: 37 MG/DL
WBC # SPEC AUTO: 5.4 X10(3)/MCL (ref 4.5–11.5)
WBC # SPEC AUTO: NORMAL X10(3)/MCL (ref 4.5–11.5)
WBC #/AREA URNS HPF: 6 /HPF (ref 0–3)

## 2021-10-14 LAB
EXT ALBUMIN: 4.6
EXT ALKALINE PHOSPHATASE: 173
EXT ALT: 100
EXT AST: 220
EXT BASOPHIL%: 1
EXT BILIRUBIN DIRECT: 0.6 MG/DL
EXT BILIRUBIN TOTAL: 1.7
EXT BUN: 13.4
EXT CALCIUM: 9.2
EXT CHLORIDE: 102
EXT CO2: 26
EXT CREATININE: 0.95 MG/DL
EXT EOSINOPHIL%: 2
EXT GFR MDRD NON AF AMER: >60
EXT GLUCOSE: 126
EXT HEMATOCRIT: 46.1
EXT HEMOGLOBIN: 14.5
EXT LYMPH%: 15
EXT MONOCYTES%: 9
EXT PLATELETS: 129
EXT POTASSIUM: 4.2
EXT PROTEIN TOTAL: 7
EXT SEGS%: 73
EXT SODIUM: 138 MMOL/L
EXT TACROLIMUS LVL: 7.6
EXT WBC: 5.4

## 2021-10-15 ENCOUNTER — TELEPHONE (OUTPATIENT)
Dept: TRANSPLANT | Facility: CLINIC | Age: 61
End: 2021-10-15

## 2021-10-15 DIAGNOSIS — Z94.4 LIVER REPLACED BY TRANSPLANT: Primary | ICD-10-CM

## 2021-10-18 ENCOUNTER — TELEPHONE (OUTPATIENT)
Dept: TRANSPLANT | Facility: CLINIC | Age: 61
End: 2021-10-18

## 2021-10-18 ENCOUNTER — HISTORICAL (OUTPATIENT)
Dept: ADMINISTRATIVE | Facility: HOSPITAL | Age: 61
End: 2021-10-18

## 2021-10-18 DIAGNOSIS — Z94.4 LIVER REPLACED BY TRANSPLANT: Primary | ICD-10-CM

## 2021-10-18 LAB
ABS NEUT (OLG): 4.68 X10(3)/MCL (ref 2.1–9.2)
ALBUMIN SERPL-MCNC: 4.7 GM/DL (ref 3.4–4.8)
ALBUMIN/GLOB SERPL: 1.9 RATIO (ref 1.1–2)
ALP SERPL-CCNC: 123 UNIT/L (ref 40–150)
ALT SERPL-CCNC: 30 UNIT/L (ref 0–55)
AST SERPL-CCNC: 24 UNIT/L (ref 5–34)
BASOPHILS # BLD AUTO: 0 X10(3)/MCL (ref 0–0.2)
BASOPHILS NFR BLD AUTO: 0 %
BILIRUB SERPL-MCNC: 1.2 MG/DL
BILIRUBIN DIRECT+TOT PNL SERPL-MCNC: 0.4 MG/DL (ref 0–0.5)
BILIRUBIN DIRECT+TOT PNL SERPL-MCNC: 0.8 MG/DL (ref 0–0.8)
BUN SERPL-MCNC: 7.5 MG/DL (ref 8.4–25.7)
CALCIUM SERPL-MCNC: 9.8 MG/DL (ref 8.7–10.5)
CHLORIDE SERPL-SCNC: 101 MMOL/L (ref 98–107)
CO2 SERPL-SCNC: 30 MMOL/L (ref 23–31)
CREAT SERPL-MCNC: 0.88 MG/DL (ref 0.73–1.18)
EOSINOPHIL # BLD AUTO: 0.1 X10(3)/MCL (ref 0–0.9)
EOSINOPHIL NFR BLD AUTO: 1 %
ERYTHROCYTE [DISTWIDTH] IN BLOOD BY AUTOMATED COUNT: 15.3 % (ref 11.5–17)
GLOBULIN SER-MCNC: 2.5 GM/DL (ref 2.4–3.5)
GLUCOSE SERPL-MCNC: 205 MG/DL (ref 82–115)
HCT VFR BLD AUTO: 47.6 % (ref 42–52)
HGB BLD-MCNC: 15.3 GM/DL (ref 14–18)
LYMPHOCYTES # BLD AUTO: 1 X10(3)/MCL (ref 0.6–4.6)
LYMPHOCYTES NFR BLD AUTO: 16 %
MCH RBC QN AUTO: 25.7 PG (ref 27–31)
MCHC RBC AUTO-ENTMCNC: 32.1 GM/DL (ref 33–36)
MCV RBC AUTO: 80 FL (ref 80–94)
MONOCYTES # BLD AUTO: 0.4 X10(3)/MCL (ref 0.1–1.3)
MONOCYTES NFR BLD AUTO: 6 %
NEUTROPHILS # BLD AUTO: 4.68 X10(3)/MCL (ref 2.1–9.2)
NEUTROPHILS NFR BLD AUTO: 76 %
PLATELET # BLD AUTO: 134 X10(3)/MCL (ref 130–400)
PMV BLD AUTO: 11.9 FL (ref 9.4–12.4)
POTASSIUM SERPL-SCNC: 4.7 MMOL/L (ref 3.5–5.1)
PROT SERPL-MCNC: 7.2 GM/DL (ref 5.8–7.6)
RBC # BLD AUTO: 5.95 X10(6)/MCL (ref 4.7–6.1)
SODIUM SERPL-SCNC: 141 MMOL/L (ref 136–145)
WBC # SPEC AUTO: 6.2 X10(3)/MCL (ref 4.5–11.5)

## 2021-10-19 ENCOUNTER — TELEPHONE (OUTPATIENT)
Dept: TRANSPLANT | Facility: CLINIC | Age: 61
End: 2021-10-19

## 2021-10-29 LAB
EXT ALBUMIN: 4.7
EXT ALKALINE PHOSPHATASE: 123
EXT ALT: 30
EXT AST: 24
EXT BASOPHIL%: 0
EXT BILIRUBIN DIRECT: 0.4 MG/DL
EXT BILIRUBIN TOTAL: 1.2
EXT BUN: 7.5
EXT CALCIUM: 9.8
EXT CHLORIDE: 101
EXT CO2: 30
EXT CREATININE: 0.88 MG/DL
EXT EOSINOPHIL%: 1
EXT GFR MDRD NON AF AMER: >60
EXT GLUCOSE: 205
EXT HEMATOCRIT: 47.6
EXT HEMOGLOBIN: 15.3
EXT LYMPH%: 16
EXT MONOCYTES%: 6
EXT PLATELETS: 134
EXT POTASSIUM: 4.7
EXT PROTEIN TOTAL: 7.2
EXT SEGS%: 76
EXT SODIUM: 141 MMOL/L
EXT TACROLIMUS LVL: 8.9
EXT WBC: 6.2

## 2021-11-01 ENCOUNTER — TELEPHONE (OUTPATIENT)
Dept: TRANSPLANT | Facility: CLINIC | Age: 61
End: 2021-11-01
Payer: MEDICARE

## 2021-11-04 ENCOUNTER — TELEPHONE (OUTPATIENT)
Dept: TRANSPLANT | Facility: CLINIC | Age: 61
End: 2021-11-04
Payer: MEDICARE

## 2021-11-04 DIAGNOSIS — Z94.4 LIVER REPLACED BY TRANSPLANT: Primary | ICD-10-CM

## 2021-11-16 ENCOUNTER — TELEPHONE (OUTPATIENT)
Dept: INTERVENTIONAL RADIOLOGY/VASCULAR | Facility: HOSPITAL | Age: 61
End: 2021-11-16
Payer: MEDICARE

## 2021-11-16 ENCOUNTER — TELEPHONE (OUTPATIENT)
Dept: TRANSPLANT | Facility: CLINIC | Age: 61
End: 2021-11-16
Payer: MEDICARE

## 2021-11-30 ENCOUNTER — HISTORICAL (OUTPATIENT)
Dept: ADMINISTRATIVE | Facility: HOSPITAL | Age: 61
End: 2021-11-30

## 2021-11-30 LAB
ABS NEUT (OLG): 3.48 X10(3)/MCL (ref 2.1–9.2)
ALBUMIN SERPL-MCNC: 4.3 GM/DL (ref 3.4–4.8)
ALBUMIN/GLOB SERPL: 1.7 RATIO (ref 1.1–2)
ALP SERPL-CCNC: 103 UNIT/L (ref 40–150)
ALT SERPL-CCNC: 36 UNIT/L (ref 0–55)
AST SERPL-CCNC: 26 UNIT/L (ref 5–34)
BASOPHILS # BLD AUTO: 0 X10(3)/MCL (ref 0–0.2)
BASOPHILS NFR BLD AUTO: 1 %
BILIRUB SERPL-MCNC: 0.9 MG/DL
BILIRUBIN DIRECT+TOT PNL SERPL-MCNC: 0.3 MG/DL (ref 0–0.5)
BILIRUBIN DIRECT+TOT PNL SERPL-MCNC: 0.6 MG/DL (ref 0–0.8)
BUN SERPL-MCNC: 11.5 MG/DL (ref 8.4–25.7)
CALCIUM SERPL-MCNC: 8.9 MG/DL (ref 8.7–10.5)
CHLORIDE SERPL-SCNC: 104 MMOL/L (ref 98–107)
CO2 SERPL-SCNC: 25 MMOL/L (ref 23–31)
CREAT SERPL-MCNC: 0.87 MG/DL (ref 0.73–1.18)
EOSINOPHIL # BLD AUTO: 0.1 X10(3)/MCL (ref 0–0.9)
EOSINOPHIL NFR BLD AUTO: 2 %
ERYTHROCYTE [DISTWIDTH] IN BLOOD BY AUTOMATED COUNT: 14.6 % (ref 11.5–17)
GLOBULIN SER-MCNC: 2.5 GM/DL (ref 2.4–3.5)
GLUCOSE SERPL-MCNC: 161 MG/DL (ref 82–115)
HCT VFR BLD AUTO: 46.3 % (ref 42–52)
HGB BLD-MCNC: 14.6 GM/DL (ref 14–18)
LYMPHOCYTES # BLD AUTO: 1.1 X10(3)/MCL (ref 0.6–4.6)
LYMPHOCYTES NFR BLD AUTO: 21 %
MCH RBC QN AUTO: 26.1 PG (ref 27–31)
MCHC RBC AUTO-ENTMCNC: 31.5 GM/DL (ref 33–36)
MCV RBC AUTO: 82.7 FL (ref 80–94)
MONOCYTES # BLD AUTO: 0.4 X10(3)/MCL (ref 0.1–1.3)
MONOCYTES NFR BLD AUTO: 8 %
NEUTROPHILS # BLD AUTO: 3.48 X10(3)/MCL (ref 2.1–9.2)
NEUTROPHILS NFR BLD AUTO: 67 %
PLATELET # BLD AUTO: 128 X10(3)/MCL (ref 130–400)
PMV BLD AUTO: 12.2 FL (ref 9.4–12.4)
POTASSIUM SERPL-SCNC: 4.4 MMOL/L (ref 3.5–5.1)
PROT SERPL-MCNC: 6.8 GM/DL (ref 5.8–7.6)
RBC # BLD AUTO: 5.6 X10(6)/MCL (ref 4.7–6.1)
SODIUM SERPL-SCNC: 140 MMOL/L (ref 136–145)
WBC # SPEC AUTO: 5.2 X10(3)/MCL (ref 4.5–11.5)

## 2021-12-03 LAB
EXT ALBUMIN: 4.3
EXT ALKALINE PHOSPHATASE: 103
EXT ALT: 36
EXT AST: 26
EXT BASOPHIL%: 1
EXT BILIRUBIN DIRECT: 0.3 MG/DL
EXT BILIRUBIN TOTAL: 0.9
EXT BUN: 11.5
EXT CALCIUM: 8.9
EXT CHLORIDE: 104
EXT CO2: 25
EXT CREATININE: 0.87 MG/DL
EXT EOSINOPHIL%: 2
EXT GFR MDRD NON AF AMER: >60
EXT GLUCOSE: 161
EXT HEMATOCRIT: 46.3
EXT HEMOGLOBIN: 14.6
EXT LYMPH%: 21
EXT MONOCYTES%: 8
EXT PLATELETS: 128
EXT POTASSIUM: 4.4
EXT PROTEIN TOTAL: 6.8
EXT SEGS%: 67
EXT SODIUM: 140 MMOL/L
EXT TACROLIMUS LVL: 6.6
EXT WBC: 5.2

## 2021-12-10 ENCOUNTER — TELEPHONE (OUTPATIENT)
Dept: TRANSPLANT | Facility: CLINIC | Age: 61
End: 2021-12-10
Payer: MEDICARE

## 2021-12-13 ENCOUNTER — HISTORICAL (OUTPATIENT)
Dept: ADMINISTRATIVE | Facility: HOSPITAL | Age: 61
End: 2021-12-13

## 2021-12-13 LAB
ABS NEUT (OLG): 3.58 X10(3)/MCL (ref 2.1–9.2)
ALBUMIN SERPL-MCNC: 4.4 GM/DL (ref 3.4–4.8)
ALBUMIN/GLOB SERPL: 1.6 RATIO (ref 1.1–2)
ALP SERPL-CCNC: 104 UNIT/L (ref 40–150)
ALT SERPL-CCNC: 33 UNIT/L (ref 0–55)
AST SERPL-CCNC: 26 UNIT/L (ref 5–34)
BASOPHILS # BLD AUTO: 0 X10(3)/MCL (ref 0–0.2)
BASOPHILS NFR BLD AUTO: 1 %
BILIRUB SERPL-MCNC: 1.1 MG/DL
BILIRUBIN DIRECT+TOT PNL SERPL-MCNC: 0.3 MG/DL (ref 0–0.5)
BILIRUBIN DIRECT+TOT PNL SERPL-MCNC: 0.8 MG/DL (ref 0–0.8)
BUN SERPL-MCNC: 8.4 MG/DL (ref 8.4–25.7)
CALCIUM SERPL-MCNC: 9.2 MG/DL (ref 8.7–10.5)
CHLORIDE SERPL-SCNC: 100 MMOL/L (ref 98–107)
CO2 SERPL-SCNC: 29 MMOL/L (ref 23–31)
CREAT SERPL-MCNC: 1 MG/DL (ref 0.73–1.18)
EOSINOPHIL # BLD AUTO: 0.1 X10(3)/MCL (ref 0–0.9)
EOSINOPHIL NFR BLD AUTO: 2 %
ERYTHROCYTE [DISTWIDTH] IN BLOOD BY AUTOMATED COUNT: 14.7 % (ref 11.5–17)
GLOBULIN SER-MCNC: 2.7 GM/DL (ref 2.4–3.5)
GLUCOSE SERPL-MCNC: 316 MG/DL (ref 82–115)
HCT VFR BLD AUTO: 47.1 % (ref 42–52)
HGB BLD-MCNC: 14.8 GM/DL (ref 14–18)
LYMPHOCYTES # BLD AUTO: 1 X10(3)/MCL (ref 0.6–4.6)
LYMPHOCYTES NFR BLD AUTO: 19 %
MCH RBC QN AUTO: 26.2 PG (ref 27–31)
MCHC RBC AUTO-ENTMCNC: 31.4 GM/DL (ref 33–36)
MCV RBC AUTO: 83.4 FL (ref 80–94)
MONOCYTES # BLD AUTO: 0.3 X10(3)/MCL (ref 0.1–1.3)
MONOCYTES NFR BLD AUTO: 6 %
NEUTROPHILS # BLD AUTO: 3.58 X10(3)/MCL (ref 2.1–9.2)
NEUTROPHILS NFR BLD AUTO: 71 %
PLATELET # BLD AUTO: 146 X10(3)/MCL (ref 130–400)
PMV BLD AUTO: 12.4 FL (ref 9.4–12.4)
POTASSIUM SERPL-SCNC: 4.5 MMOL/L (ref 3.5–5.1)
PROT SERPL-MCNC: 7.1 GM/DL (ref 5.8–7.6)
RBC # BLD AUTO: 5.65 X10(6)/MCL (ref 4.7–6.1)
SODIUM SERPL-SCNC: 138 MMOL/L (ref 136–145)
WBC # SPEC AUTO: 5 X10(3)/MCL (ref 4.5–11.5)

## 2021-12-21 ENCOUNTER — HISTORICAL (OUTPATIENT)
Dept: ADMINISTRATIVE | Facility: HOSPITAL | Age: 61
End: 2021-12-21

## 2021-12-21 LAB
ABS NEUT (OLG): 2.95 X10(3)/MCL (ref 2.1–9.2)
ALBUMIN SERPL-MCNC: 4.4 GM/DL (ref 3.4–4.8)
ALBUMIN/GLOB SERPL: 1.5 RATIO (ref 1.1–2)
ALP SERPL-CCNC: 104 UNIT/L (ref 40–150)
ALT SERPL-CCNC: 34 UNIT/L (ref 0–55)
AST SERPL-CCNC: 28 UNIT/L (ref 5–34)
BASOPHILS # BLD AUTO: 0 X10(3)/MCL (ref 0–0.2)
BASOPHILS NFR BLD AUTO: 1 %
BILIRUB SERPL-MCNC: 1.2 MG/DL
BILIRUBIN DIRECT+TOT PNL SERPL-MCNC: 0.4 MG/DL (ref 0–0.5)
BILIRUBIN DIRECT+TOT PNL SERPL-MCNC: 0.8 MG/DL (ref 0–0.8)
BUN SERPL-MCNC: 8.6 MG/DL (ref 8.4–25.7)
CALCIUM SERPL-MCNC: 9.3 MG/DL (ref 8.7–10.5)
CHLORIDE SERPL-SCNC: 103 MMOL/L (ref 98–107)
CO2 SERPL-SCNC: 27 MMOL/L (ref 23–31)
CREAT SERPL-MCNC: 0.95 MG/DL (ref 0.73–1.18)
EOSINOPHIL # BLD AUTO: 0.1 X10(3)/MCL (ref 0–0.9)
EOSINOPHIL NFR BLD AUTO: 2 %
ERYTHROCYTE [DISTWIDTH] IN BLOOD BY AUTOMATED COUNT: 14.6 % (ref 11.5–17)
GLOBULIN SER-MCNC: 2.9 GM/DL (ref 2.4–3.5)
GLUCOSE SERPL-MCNC: 214 MG/DL (ref 82–115)
HCT VFR BLD AUTO: 48.4 % (ref 42–52)
HGB BLD-MCNC: 15.2 GM/DL (ref 14–18)
LYMPHOCYTES # BLD AUTO: 0.9 X10(3)/MCL (ref 0.6–4.6)
LYMPHOCYTES NFR BLD AUTO: 20 %
MCH RBC QN AUTO: 26.1 PG (ref 27–31)
MCHC RBC AUTO-ENTMCNC: 31.4 GM/DL (ref 33–36)
MCV RBC AUTO: 83 FL (ref 80–94)
MONOCYTES # BLD AUTO: 0.3 X10(3)/MCL (ref 0.1–1.3)
MONOCYTES NFR BLD AUTO: 8 %
NEUTROPHILS # BLD AUTO: 2.95 X10(3)/MCL (ref 2.1–9.2)
NEUTROPHILS NFR BLD AUTO: 68 %
PLATELET # BLD AUTO: 131 X10(3)/MCL (ref 130–400)
PMV BLD AUTO: 11.7 FL (ref 9.4–12.4)
POTASSIUM SERPL-SCNC: 4.5 MMOL/L (ref 3.5–5.1)
PROT SERPL-MCNC: 7.3 GM/DL (ref 5.8–7.6)
RBC # BLD AUTO: 5.83 X10(6)/MCL (ref 4.7–6.1)
SODIUM SERPL-SCNC: 140 MMOL/L (ref 136–145)
WBC # SPEC AUTO: 4.3 X10(3)/MCL (ref 4.5–11.5)

## 2021-12-23 ENCOUNTER — TELEPHONE (OUTPATIENT)
Dept: TRANSPLANT | Facility: CLINIC | Age: 61
End: 2021-12-23
Payer: MEDICARE

## 2021-12-23 LAB
EXT ALBUMIN: 4.4
EXT ALKALINE PHOSPHATASE: 104
EXT ALT: 34
EXT AST: 28
EXT BASOPHIL%: 1
EXT BILIRUBIN DIRECT: 0.4 MG/DL
EXT BILIRUBIN TOTAL: 1.2
EXT BUN: 8.6
EXT CALCIUM: 9.3
EXT CHLORIDE: 103
EXT CO2: 27
EXT CREATININE: 0.95 MG/DL
EXT EOSINOPHIL%: 2
EXT GFR MDRD NON AF AMER: >60
EXT GLUCOSE: 214
EXT HEMATOCRIT: 48.4
EXT HEMOGLOBIN: 15.2
EXT LYMPH%: 20
EXT MONOCYTES%: 8
EXT PLATELETS: 131
EXT POTASSIUM: 4.5
EXT PROTEIN TOTAL: 7.3
EXT SEGS%: 68
EXT SODIUM: 140 MMOL/L
EXT TACROLIMUS LVL: 10.9
EXT WBC: 4.3

## 2021-12-23 NOTE — TELEPHONE ENCOUNTER
Instructed pt to decrease fk to 3 mg bid ands repeat labs 1/24/2022----- Message from Elizabeth Meneses MD sent at 12/23/2021  1:42 PM CST -----  The Labs are stable - decrease prograf to 3/3 from 4/3 and repeat labs in 1 month

## 2021-12-27 ENCOUNTER — HISTORICAL (OUTPATIENT)
Dept: ADMINISTRATIVE | Facility: HOSPITAL | Age: 61
End: 2021-12-27

## 2021-12-27 LAB
ABS NEUT (OLG): 3.36 X10(3)/MCL (ref 2.1–9.2)
ALBUMIN SERPL-MCNC: 4.4 GM/DL (ref 3.4–4.8)
ALBUMIN/GLOB SERPL: 1.6 RATIO (ref 1.1–2)
ALP SERPL-CCNC: 88 UNIT/L (ref 40–150)
ALT SERPL-CCNC: 27 UNIT/L (ref 0–55)
AST SERPL-CCNC: 23 UNIT/L (ref 5–34)
BASOPHILS # BLD AUTO: 0 X10(3)/MCL (ref 0–0.2)
BASOPHILS NFR BLD AUTO: 1 %
BILIRUB SERPL-MCNC: 1.4 MG/DL
BILIRUBIN DIRECT+TOT PNL SERPL-MCNC: 0.5 MG/DL (ref 0–0.5)
BILIRUBIN DIRECT+TOT PNL SERPL-MCNC: 0.9 MG/DL (ref 0–0.8)
BUN SERPL-MCNC: 8.2 MG/DL (ref 8.4–25.7)
CALCIUM SERPL-MCNC: 9.2 MG/DL (ref 8.7–10.5)
CHLORIDE SERPL-SCNC: 104 MMOL/L (ref 98–107)
CO2 SERPL-SCNC: 25 MMOL/L (ref 23–31)
CREAT SERPL-MCNC: 0.85 MG/DL (ref 0.73–1.18)
EOSINOPHIL # BLD AUTO: 0.1 X10(3)/MCL (ref 0–0.9)
EOSINOPHIL NFR BLD AUTO: 2 %
ERYTHROCYTE [DISTWIDTH] IN BLOOD BY AUTOMATED COUNT: 14.8 % (ref 11.5–17)
GLOBULIN SER-MCNC: 2.7 GM/DL (ref 2.4–3.5)
GLUCOSE SERPL-MCNC: 86 MG/DL (ref 82–115)
HCT VFR BLD AUTO: 46.7 % (ref 42–52)
HGB BLD-MCNC: 14.9 GM/DL (ref 14–18)
LYMPHOCYTES # BLD AUTO: 1 X10(3)/MCL (ref 0.6–4.6)
LYMPHOCYTES NFR BLD AUTO: 20 %
MCH RBC QN AUTO: 26.6 PG (ref 27–31)
MCHC RBC AUTO-ENTMCNC: 31.9 GM/DL (ref 33–36)
MCV RBC AUTO: 83.4 FL (ref 80–94)
MONOCYTES # BLD AUTO: 0.4 X10(3)/MCL (ref 0.1–1.3)
MONOCYTES NFR BLD AUTO: 8 %
NEUTROPHILS # BLD AUTO: 3.36 X10(3)/MCL (ref 2.1–9.2)
NEUTROPHILS NFR BLD AUTO: 69 %
PLATELET # BLD AUTO: 140 X10(3)/MCL (ref 130–400)
PMV BLD AUTO: 12.2 FL (ref 9.4–12.4)
POTASSIUM SERPL-SCNC: 3.7 MMOL/L (ref 3.5–5.1)
PROT SERPL-MCNC: 7.1 GM/DL (ref 5.8–7.6)
RBC # BLD AUTO: 5.6 X10(6)/MCL (ref 4.7–6.1)
SODIUM SERPL-SCNC: 140 MMOL/L (ref 136–145)
WBC # SPEC AUTO: 4.9 X10(3)/MCL (ref 4.5–11.5)

## 2021-12-28 RX ORDER — TACROLIMUS 1 MG/1
3 CAPSULE ORAL EVERY 12 HOURS
Qty: 180 CAPSULE | Refills: 11 | Status: SHIPPED | OUTPATIENT
Start: 2021-12-28 | End: 2022-04-11

## 2022-01-03 ENCOUNTER — TELEPHONE (OUTPATIENT)
Dept: TRANSPLANT | Facility: CLINIC | Age: 62
End: 2022-01-03
Payer: MEDICARE

## 2022-01-03 NOTE — TELEPHONE ENCOUNTER
Will fax new order. Left ----- Message from Ti Tellez sent at 1/3/2022  5:00 PM CST -----  Regarding: call back  Alicia with [facility] call in regards to labs order that  on 21 needing new standing order      Call     Fax [phone]       12-Sep-2021 16:04

## 2022-01-12 NOTE — TELEPHONE ENCOUNTER
----- Message from Mare Wolff MA sent at 9/13/2019 11:30 AM CDT -----  Contact: Pt  Pt would like to reschedule 9/20 appointment    Pt# [phone]   Left message for patient to call the office regarding test results. My office hours were provided.

## 2022-01-16 LAB
EXT ALBUMIN: 4.4
EXT ALKALINE PHOSPHATASE: 88
EXT ALT: 27
EXT AST: 23
EXT BASOPHIL%: 1
EXT BILIRUBIN DIRECT: 0.5 MG/DL
EXT BILIRUBIN TOTAL: 1.4
EXT BUN: 8.2
EXT CALCIUM: 9.2
EXT CHLORIDE: 104
EXT CO2: 25
EXT CREATININE: 0.85 MG/DL
EXT EOSINOPHIL%: 2
EXT GFR MDRD NON AF AMER: >60
EXT GLUCOSE: 86
EXT HEMATOCRIT: 46.7
EXT HEMOGLOBIN: 14.9
EXT LYMPH%: 20
EXT MONOCYTES%: 8
EXT PLATELETS: 140
EXT POTASSIUM: 3.7
EXT PROTEIN TOTAL: 7.1
EXT SEGS%: 69
EXT SODIUM: 140 MMOL/L
EXT TACROLIMUS LVL: 6.6
EXT WBC: 4.9

## 2022-01-19 ENCOUNTER — TELEPHONE (OUTPATIENT)
Dept: TRANSPLANT | Facility: CLINIC | Age: 62
End: 2022-01-19
Payer: MEDICARE

## 2022-01-19 NOTE — TELEPHONE ENCOUNTER
Left VM. Labs are stable. Repeat labs 2/14/22----- Message from Ti Tellez sent at 1/19/2022 11:44 AM CST -----  Regarding: call back  Pt call to speak with Ashleigh about his labs    Call

## 2022-01-24 ENCOUNTER — HISTORICAL (OUTPATIENT)
Dept: ADMINISTRATIVE | Facility: HOSPITAL | Age: 62
End: 2022-01-24

## 2022-01-24 LAB
ABS NEUT (OLG): 2.61 X10(3)/MCL (ref 2.1–9.2)
ALBUMIN SERPL-MCNC: 4 GM/DL (ref 3.4–4.8)
ALBUMIN/GLOB SERPL: 1.5 RATIO (ref 1.1–2)
ALP SERPL-CCNC: 107 UNIT/L (ref 40–150)
ALT SERPL-CCNC: 41 UNIT/L (ref 0–55)
AST SERPL-CCNC: 34 UNIT/L (ref 5–34)
BASOPHILS # BLD AUTO: 0 X10(3)/MCL (ref 0–0.2)
BASOPHILS NFR BLD AUTO: 1 %
BILIRUB SERPL-MCNC: 0.9 MG/DL
BILIRUBIN DIRECT+TOT PNL SERPL-MCNC: 0.3 MG/DL (ref 0–0.5)
BILIRUBIN DIRECT+TOT PNL SERPL-MCNC: 0.6 MG/DL (ref 0–0.8)
BUN SERPL-MCNC: 8.9 MG/DL (ref 8.4–25.7)
CALCIUM SERPL-MCNC: 8.8 MG/DL (ref 8.7–10.5)
CHLORIDE SERPL-SCNC: 101 MMOL/L (ref 98–107)
CO2 SERPL-SCNC: 22 MMOL/L (ref 23–31)
CREAT SERPL-MCNC: 0.85 MG/DL (ref 0.73–1.18)
EOSINOPHIL # BLD AUTO: 0.1 X10(3)/MCL (ref 0–0.9)
EOSINOPHIL NFR BLD AUTO: 2 %
ERYTHROCYTE [DISTWIDTH] IN BLOOD BY AUTOMATED COUNT: 14.4 % (ref 11.5–17)
GLOBULIN SER-MCNC: 2.6 GM/DL (ref 2.4–3.5)
GLUCOSE SERPL-MCNC: 226 MG/DL (ref 82–115)
HCT VFR BLD AUTO: 43.5 % (ref 42–52)
HGB BLD-MCNC: 13.4 GM/DL (ref 14–18)
LYMPHOCYTES # BLD AUTO: 0.8 X10(3)/MCL (ref 0.6–4.6)
LYMPHOCYTES NFR BLD AUTO: 20 %
MCH RBC QN AUTO: 26 PG (ref 27–31)
MCHC RBC AUTO-ENTMCNC: 30.8 GM/DL (ref 33–36)
MCV RBC AUTO: 84.5 FL (ref 80–94)
MONOCYTES # BLD AUTO: 0.4 X10(3)/MCL (ref 0.1–1.3)
MONOCYTES NFR BLD AUTO: 9 %
NEUTROPHILS # BLD AUTO: 2.61 X10(3)/MCL (ref 2.1–9.2)
NEUTROPHILS NFR BLD AUTO: 66 %
PLATELET # BLD AUTO: 123 X10(3)/MCL (ref 130–400)
PMV BLD AUTO: 12.7 FL (ref 9.4–12.4)
POTASSIUM SERPL-SCNC: 4.4 MMOL/L (ref 3.5–5.1)
PROT SERPL-MCNC: 6.6 GM/DL (ref 5.8–7.6)
RBC # BLD AUTO: 5.15 X10(6)/MCL (ref 4.7–6.1)
SODIUM SERPL-SCNC: 135 MMOL/L (ref 136–145)
WBC # SPEC AUTO: 4 X10(3)/MCL (ref 4.5–11.5)

## 2022-01-25 ENCOUNTER — TELEPHONE (OUTPATIENT)
Dept: TRANSPLANT | Facility: CLINIC | Age: 62
End: 2022-01-25
Payer: MEDICARE

## 2022-01-25 NOTE — TELEPHONE ENCOUNTER
Verbalized to pt labs are stable. ----- Message from Elizabeth Meneses MD sent at 1/18/2022 10:25 AM CST -----  The Labs are stable - please let patient know.

## 2022-02-07 ENCOUNTER — HISTORICAL (OUTPATIENT)
Dept: ADMINISTRATIVE | Facility: HOSPITAL | Age: 62
End: 2022-02-07

## 2022-02-07 LAB
ABS NEUT (OLG): 3.93 (ref 2.1–9.2)
ALBUMIN SERPL-MCNC: 4.4 G/DL (ref 3.4–4.8)
ALBUMIN/GLOB SERPL: 1.6 {RATIO} (ref 1.1–2)
ALP SERPL-CCNC: 108 U/L (ref 40–150)
ALT SERPL-CCNC: 22 U/L (ref 0–55)
AST SERPL-CCNC: 16 U/L (ref 5–34)
BASOPHILS # BLD AUTO: 0 10*3/UL (ref 0–0.2)
BASOPHILS NFR BLD AUTO: 0 %
BILIRUB SERPL-MCNC: 1.1 MG/DL
BILIRUBIN DIRECT+TOT PNL SERPL-MCNC: 0.4 (ref 0–0.5)
BILIRUBIN DIRECT+TOT PNL SERPL-MCNC: 0.7 (ref 0–0.8)
BUN SERPL-MCNC: 9.1 MG/DL (ref 8.4–25.7)
CALCIUM SERPL-MCNC: 10.1 MG/DL (ref 8.7–10.5)
CHLORIDE SERPL-SCNC: 101 MMOL/L (ref 98–107)
CO2 SERPL-SCNC: 27 MMOL/L (ref 23–31)
CREAT SERPL-MCNC: 1 MG/DL (ref 0.73–1.18)
EOSINOPHIL # BLD AUTO: 0.1 10*3/UL (ref 0–0.9)
EOSINOPHIL NFR BLD AUTO: 2 %
ERYTHROCYTE [DISTWIDTH] IN BLOOD BY AUTOMATED COUNT: 14.4 % (ref 11.5–17)
GLOBULIN SER-MCNC: 2.8 G/DL (ref 2.4–3.5)
GLUCOSE SERPL-MCNC: 189 MG/DL (ref 82–115)
HCT VFR BLD AUTO: 48.3 % (ref 42–52)
HEMOLYSIS INTERF INDEX SERPL-ACNC: 4
HGB BLD-MCNC: 15 G/DL (ref 14–18)
ICTERIC INTERF INDEX SERPL-ACNC: 1
LIPEMIC INTERF INDEX SERPL-ACNC: 3
LYMPHOCYTES # BLD AUTO: 1 10*3/UL (ref 0.6–4.6)
LYMPHOCYTES NFR BLD AUTO: 18 %
MANUAL DIFF? (OHS): NO
MCH RBC QN AUTO: 25.7 PG (ref 27–31)
MCHC RBC AUTO-ENTMCNC: 31.1 G/DL (ref 33–36)
MCV RBC AUTO: 82.8 FL (ref 80–94)
MONOCYTES # BLD AUTO: 0.4 10*3/UL (ref 0.1–1.3)
MONOCYTES NFR BLD AUTO: 8 %
NEUTROPHILS # BLD AUTO: 3.93 10*3/UL (ref 2.1–9.2)
NEUTROPHILS NFR BLD AUTO: 71 %
PLATELET # BLD AUTO: 147 10*3/UL (ref 130–400)
PMV BLD AUTO: 12.2 FL (ref 9.4–12.4)
POTASSIUM SERPL-SCNC: 4.9 MMOL/L (ref 3.5–5.1)
PROT SERPL-MCNC: 7.2 G/DL (ref 5.8–7.6)
RBC # BLD AUTO: 5.83 10*6/UL (ref 4.7–6.1)
SODIUM SERPL-SCNC: 139 MMOL/L (ref 136–145)
WBC # SPEC AUTO: 5.6 10*3/UL (ref 4.5–11.5)

## 2022-02-09 ENCOUNTER — TELEPHONE (OUTPATIENT)
Dept: TRANSPLANT | Facility: CLINIC | Age: 62
End: 2022-02-09
Payer: MEDICARE

## 2022-02-09 LAB
EXT ALBUMIN: 4.4
EXT ALKALINE PHOSPHATASE: 108
EXT ALT: 22
EXT AST: 16
EXT BASOPHIL%: 0
EXT BILIRUBIN DIRECT: 0.4 MG/DL
EXT BILIRUBIN TOTAL: 1.1
EXT BUN: 9.1
EXT CALCIUM: 10.1
EXT CHLORIDE: 101
EXT CO2: 27
EXT CREATININE: 1 MG/DL
EXT EOSINOPHIL%: 2
EXT GFR MDRD NON AF AMER: >60
EXT GLUCOSE: 189
EXT HEMATOCRIT: 48.3
EXT HEMOGLOBIN: 15
EXT LYMPH%: 18
EXT MONOCYTES%: 8
EXT PLATELETS: 147
EXT POTASSIUM: 4.9
EXT PROTEIN TOTAL: 7.2
EXT SEGS%: 71
EXT SODIUM: 139 MMOL/L
EXT TACROLIMUS LVL: 18.9
EXT WBC: 5.6

## 2022-02-09 NOTE — TELEPHONE ENCOUNTER
Spoke to patient and he states his medicine was taking prior to lab. He will go back to lab on Monday.

## 2022-02-14 ENCOUNTER — TELEPHONE (OUTPATIENT)
Dept: TRANSPLANT | Facility: CLINIC | Age: 62
End: 2022-02-14
Payer: MEDICARE

## 2022-02-21 ENCOUNTER — HISTORICAL (OUTPATIENT)
Dept: ADMINISTRATIVE | Facility: HOSPITAL | Age: 62
End: 2022-02-21

## 2022-02-21 LAB
ABS NEUT (OLG): 3.23 (ref 2.1–9.2)
ALBUMIN SERPL-MCNC: 4 G/DL (ref 3.4–4.8)
ALBUMIN/GLOB SERPL: 1.7 {RATIO} (ref 1.1–2)
ALP SERPL-CCNC: 103 U/L (ref 40–150)
ALT SERPL-CCNC: 24 U/L (ref 0–55)
AST SERPL-CCNC: 18 U/L (ref 5–34)
BASOPHILS # BLD AUTO: 0 10*3/UL (ref 0–0.2)
BASOPHILS NFR BLD AUTO: 1 %
BILIRUB SERPL-MCNC: 1 MG/DL
BILIRUBIN DIRECT+TOT PNL SERPL-MCNC: 0.3 (ref 0–0.5)
BILIRUBIN DIRECT+TOT PNL SERPL-MCNC: 0.7 (ref 0–0.8)
BUN SERPL-MCNC: 11.8 MG/DL (ref 8.4–25.7)
CALCIUM SERPL-MCNC: 9.2 MG/DL (ref 8.7–10.5)
CHLORIDE SERPL-SCNC: 105 MMOL/L (ref 98–107)
CO2 SERPL-SCNC: 28 MMOL/L (ref 23–31)
CREAT SERPL-MCNC: 0.89 MG/DL (ref 0.73–1.18)
EOSINOPHIL # BLD AUTO: 0.1 10*3/UL (ref 0–0.9)
EOSINOPHIL NFR BLD AUTO: 2 %
ERYTHROCYTE [DISTWIDTH] IN BLOOD BY AUTOMATED COUNT: 14.6 % (ref 11.5–17)
GLOBULIN SER-MCNC: 2.4 G/DL (ref 2.4–3.5)
GLUCOSE SERPL-MCNC: 114 MG/DL (ref 82–115)
HCT VFR BLD AUTO: 44.1 % (ref 42–52)
HEMOLYSIS INTERF INDEX SERPL-ACNC: 10
HGB BLD-MCNC: 13.8 G/DL (ref 14–18)
ICTERIC INTERF INDEX SERPL-ACNC: 1
LIPEMIC INTERF INDEX SERPL-ACNC: 3
LYMPHOCYTES # BLD AUTO: 1 10*3/UL (ref 0.6–4.6)
LYMPHOCYTES NFR BLD AUTO: 20 %
MANUAL DIFF? (OHS): NO
MCH RBC QN AUTO: 25.8 PG (ref 27–31)
MCHC RBC AUTO-ENTMCNC: 31.3 G/DL (ref 33–36)
MCV RBC AUTO: 82.4 FL (ref 80–94)
MONOCYTES # BLD AUTO: 0.5 10*3/UL (ref 0.1–1.3)
MONOCYTES NFR BLD AUTO: 9 %
NEUTROPHILS # BLD AUTO: 3.23 10*3/UL (ref 2.1–9.2)
NEUTROPHILS NFR BLD AUTO: 66 %
PLATELET # BLD AUTO: 133 10*3/UL (ref 130–400)
PMV BLD AUTO: 11.4 FL (ref 9.4–12.4)
POTASSIUM SERPL-SCNC: 4 MMOL/L (ref 3.5–5.1)
PROT SERPL-MCNC: 6.4 G/DL (ref 5.8–7.6)
RBC # BLD AUTO: 5.35 10*6/UL (ref 4.7–6.1)
SODIUM SERPL-SCNC: 142 MMOL/L (ref 136–145)
WBC # SPEC AUTO: 4.9 10*3/UL (ref 4.5–11.5)

## 2022-03-02 ENCOUNTER — TELEPHONE (OUTPATIENT)
Dept: TRANSPLANT | Facility: CLINIC | Age: 62
End: 2022-03-02
Payer: MEDICARE

## 2022-03-02 LAB
EXT ALBUMIN: 4
EXT ALKALINE PHOSPHATASE: 103
EXT ALT: 24
EXT AST: 18
EXT BASOPHIL%: 1
EXT BILIRUBIN DIRECT: 0.3 MG/DL
EXT BILIRUBIN TOTAL: 1
EXT BUN: 11.8
EXT CALCIUM: 9.2
EXT CHLORIDE: 105
EXT CO2: 28
EXT CREATININE: 0.89 MG/DL
EXT EOSINOPHIL%: 2
EXT GFR MDRD NON AF AMER: >60
EXT GLUCOSE: 114
EXT HEMATOCRIT: 44.1
EXT HEMOGLOBIN: 13.8
EXT LYMPH%: 20
EXT MONOCYTES%: 9
EXT PLATELETS: 133
EXT POTASSIUM: 4
EXT PROTEIN TOTAL: 6.4
EXT SEGS%: 66
EXT SODIUM: 142 MMOL/L
EXT TACROLIMUS LVL: 8.8
EXT WBC: 4.9

## 2022-03-02 NOTE — TELEPHONE ENCOUNTER
Left voicemail----- Message from Ti Tellez sent at 3/2/2022  1:36 PM CST -----  Regarding: lab results  Pt call to speak with Ashleigh in regards to lab results    Call

## 2022-03-04 ENCOUNTER — TELEPHONE (OUTPATIENT)
Dept: TRANSPLANT | Facility: CLINIC | Age: 62
End: 2022-03-04
Payer: MEDICARE

## 2022-03-04 NOTE — TELEPHONE ENCOUNTER
Labs are stable, No changes.  Verbalized to pt to repeat labs 4/16/22---- Message from Elizabeth Meneses MD sent at 3/2/2022  7:43 PM CST -----  The Labs are stable - please let patient know.

## 2022-03-07 ENCOUNTER — HISTORICAL (OUTPATIENT)
Dept: ADMINISTRATIVE | Facility: HOSPITAL | Age: 62
End: 2022-03-07

## 2022-03-07 LAB
ABS NEUT (OLG): 3.45 (ref 2.1–9.2)
ALBUMIN SERPL-MCNC: 4.5 G/DL (ref 3.4–4.8)
ALBUMIN/GLOB SERPL: 1.6 {RATIO} (ref 1.1–2)
ALP SERPL-CCNC: 104 U/L (ref 40–150)
ALT SERPL-CCNC: 25 U/L (ref 0–55)
AST SERPL-CCNC: 22 U/L (ref 5–34)
BASOPHILS # BLD AUTO: 0 10*3/UL (ref 0–0.2)
BASOPHILS NFR BLD AUTO: 1 %
BILIRUB SERPL-MCNC: 1.1 MG/DL
BILIRUBIN DIRECT+TOT PNL SERPL-MCNC: 0.3 (ref 0–0.5)
BILIRUBIN DIRECT+TOT PNL SERPL-MCNC: 0.8 (ref 0–0.8)
BUN SERPL-MCNC: 11.7 MG/DL (ref 8.4–25.7)
CALCIUM SERPL-MCNC: 9.4 MG/DL (ref 8.7–10.5)
CHLORIDE SERPL-SCNC: 101 MMOL/L (ref 98–107)
CO2 SERPL-SCNC: 26 MMOL/L (ref 23–31)
CREAT SERPL-MCNC: 0.92 MG/DL (ref 0.73–1.18)
EOSINOPHIL # BLD AUTO: 0.1 10*3/UL (ref 0–0.9)
EOSINOPHIL NFR BLD AUTO: 2 %
ERYTHROCYTE [DISTWIDTH] IN BLOOD BY AUTOMATED COUNT: 14.4 % (ref 11.5–17)
GLOBULIN SER-MCNC: 2.9 G/DL (ref 2.4–3.5)
GLUCOSE SERPL-MCNC: 381 MG/DL (ref 82–115)
HCT VFR BLD AUTO: 47.6 % (ref 42–52)
HEMOLYSIS INTERF INDEX SERPL-ACNC: 38
HGB BLD-MCNC: 15.2 G/DL (ref 14–18)
ICTERIC INTERF INDEX SERPL-ACNC: 1
LIPEMIC INTERF INDEX SERPL-ACNC: 8
LYMPHOCYTES # BLD AUTO: 0.8 10*3/UL (ref 0.6–4.6)
LYMPHOCYTES NFR BLD AUTO: 17 %
MANUAL DIFF? (OHS): NO
MCH RBC QN AUTO: 25.5 PG (ref 27–31)
MCHC RBC AUTO-ENTMCNC: 31.9 G/DL (ref 33–36)
MCV RBC AUTO: 80 FL (ref 80–94)
MONOCYTES # BLD AUTO: 0.4 10*3/UL (ref 0.1–1.3)
MONOCYTES NFR BLD AUTO: 8 %
NEUTROPHILS # BLD AUTO: 3.45 10*3/UL (ref 2.1–9.2)
NEUTROPHILS NFR BLD AUTO: 71 %
PLATELET # BLD AUTO: 137 10*3/UL (ref 130–400)
PMV BLD AUTO: 11.7 FL (ref 9.4–12.4)
POTASSIUM SERPL-SCNC: 4.5 MMOL/L (ref 3.5–5.1)
PROT SERPL-MCNC: 7.4 G/DL (ref 5.8–7.6)
RBC # BLD AUTO: 5.95 10*6/UL (ref 4.7–6.1)
SODIUM SERPL-SCNC: 137 MMOL/L (ref 136–145)
WBC # SPEC AUTO: 4.8 10*3/UL (ref 4.5–11.5)

## 2022-03-21 ENCOUNTER — HISTORICAL (OUTPATIENT)
Dept: ADMINISTRATIVE | Facility: HOSPITAL | Age: 62
End: 2022-03-21

## 2022-03-21 LAB
ABS NEUT (OLG): 2.81 (ref 2.1–9.2)
ALBUMIN SERPL-MCNC: 4 G/DL (ref 3.4–4.8)
ALBUMIN/GLOB SERPL: 1.2 {RATIO} (ref 1.1–2)
ALP SERPL-CCNC: 87 U/L (ref 40–150)
ALT SERPL-CCNC: 25 U/L (ref 0–55)
AST SERPL-CCNC: 29 U/L (ref 5–34)
BASOPHILS # BLD AUTO: 0 10*3/UL (ref 0–0.2)
BASOPHILS NFR BLD AUTO: 1 %
BILIRUB SERPL-MCNC: 1 MG/DL
BILIRUBIN DIRECT+TOT PNL SERPL-MCNC: 0.3 (ref 0–0.5)
BILIRUBIN DIRECT+TOT PNL SERPL-MCNC: 0.7 (ref 0–0.8)
BUN SERPL-MCNC: 6.8 MG/DL (ref 8.4–25.7)
CALCIUM SERPL-MCNC: 8.9 MG/DL (ref 8.7–10.5)
CHLORIDE SERPL-SCNC: 102 MMOL/L (ref 98–107)
CO2 SERPL-SCNC: 18 MMOL/L (ref 23–31)
CREAT SERPL-MCNC: 0.86 MG/DL (ref 0.73–1.18)
EOSINOPHIL # BLD AUTO: 0.1 10*3/UL (ref 0–0.9)
EOSINOPHIL NFR BLD AUTO: 2 %
ERYTHROCYTE [DISTWIDTH] IN BLOOD BY AUTOMATED COUNT: 14.8 % (ref 11.5–17)
GLOBULIN SER-MCNC: 3.2 G/DL (ref 2.4–3.5)
GLUCOSE SERPL-MCNC: 237 MG/DL (ref 82–115)
HCT VFR BLD AUTO: 44.3 % (ref 42–52)
HEMOLYSIS INTERF INDEX SERPL-ACNC: 85
HGB BLD-MCNC: 14.1 G/DL (ref 14–18)
ICTERIC INTERF INDEX SERPL-ACNC: 1
LIPEMIC INTERF INDEX SERPL-ACNC: 5
LYMPHOCYTES # BLD AUTO: 0.8 10*3/UL (ref 0.6–4.6)
LYMPHOCYTES NFR BLD AUTO: 19 %
MANUAL DIFF? (OHS): NO
MCH RBC QN AUTO: 26.2 PG (ref 27–31)
MCHC RBC AUTO-ENTMCNC: 31.8 G/DL (ref 33–36)
MCV RBC AUTO: 82.2 FL (ref 80–94)
MONOCYTES # BLD AUTO: 0.4 10*3/UL (ref 0.1–1.3)
MONOCYTES NFR BLD AUTO: 8 %
NEUTROPHILS # BLD AUTO: 2.81 10*3/UL (ref 2.1–9.2)
NEUTROPHILS NFR BLD AUTO: 68 %
PLATELET # BLD AUTO: 118 10*3/UL (ref 130–400)
PMV BLD AUTO: 13 FL (ref 9.4–12.4)
POTASSIUM SERPL-SCNC: 4.4 MMOL/L (ref 3.5–5.1)
PROT SERPL-MCNC: 7.2 G/DL (ref 5.8–7.6)
RBC # BLD AUTO: 5.39 10*6/UL (ref 4.7–6.1)
SODIUM SERPL-SCNC: 134 MMOL/L (ref 136–145)
WBC # SPEC AUTO: 4.1 10*3/UL (ref 4.5–11.5)

## 2022-03-31 ENCOUNTER — TELEPHONE (OUTPATIENT)
Dept: TRANSPLANT | Facility: CLINIC | Age: 62
End: 2022-03-31
Payer: MEDICARE

## 2022-03-31 LAB
EXT ALBUMIN: 4
EXT ALKALINE PHOSPHATASE: 87
EXT ALT: 25
EXT AST: 29
EXT BASOPHIL%: 1
EXT BILIRUBIN DIRECT: 0.3 MG/DL
EXT BILIRUBIN TOTAL: 1
EXT BUN: 6.8
EXT CALCIUM: 8.9
EXT CHLORIDE: 102
EXT CO2: 18
EXT CREATININE: 0.86 MG/DL
EXT EOSINOPHIL%: 2
EXT GFR MDRD NON AF AMER: >60
EXT HEMATOCRIT: 44.3
EXT HEMOGLOBIN: 14.1
EXT LYMPH%: 19
EXT MONOCYTES%: 8
EXT PLATELETS: 118
EXT POTASSIUM: 4.4
EXT PROTEIN TOTAL: 7.2
EXT SEGS%: 68
EXT SODIUM: 134 MMOL/L
EXT TACROLIMUS LVL: 6.8
EXT WBC: 4.1

## 2022-03-31 NOTE — TELEPHONE ENCOUNTER
Called pt no answer. ----- Message from Ti Tellez sent at 3/31/2022 10:54 AM CDT -----  Regarding: lab results  Pt call to speak with Ashleigh in regards to lab results requesting call back    Call

## 2022-04-04 ENCOUNTER — HISTORICAL (OUTPATIENT)
Dept: ADMINISTRATIVE | Facility: HOSPITAL | Age: 62
End: 2022-04-04

## 2022-04-04 LAB
ABS NEUT (OLG): 2.69 (ref 2.1–9.2)
ALBUMIN SERPL-MCNC: 4.2 G/DL (ref 3.4–4.8)
ALBUMIN/GLOB SERPL: 1.6 {RATIO} (ref 1.1–2)
ALP SERPL-CCNC: 94 U/L (ref 40–150)
ALT SERPL-CCNC: 20 U/L (ref 0–55)
AST SERPL-CCNC: 17 U/L (ref 5–34)
BASOPHILS # BLD AUTO: 0 10*3/UL (ref 0–0.2)
BASOPHILS NFR BLD AUTO: 1 %
BILIRUB SERPL-MCNC: 1 MG/DL
BILIRUBIN DIRECT+TOT PNL SERPL-MCNC: 0.3 (ref 0–0.5)
BILIRUBIN DIRECT+TOT PNL SERPL-MCNC: 0.7 (ref 0–0.8)
BUN SERPL-MCNC: 6.6 MG/DL (ref 8.4–25.7)
CALCIUM SERPL-MCNC: 9.3 MG/DL (ref 8.7–10.5)
CHLORIDE SERPL-SCNC: 100 MMOL/L (ref 98–107)
CO2 SERPL-SCNC: 28 MMOL/L (ref 23–31)
CREAT SERPL-MCNC: 0.87 MG/DL (ref 0.73–1.18)
EOSINOPHIL # BLD AUTO: 0.1 10*3/UL (ref 0–0.9)
EOSINOPHIL NFR BLD AUTO: 2 %
ERYTHROCYTE [DISTWIDTH] IN BLOOD BY AUTOMATED COUNT: 14.7 % (ref 11.5–17)
GLOBULIN SER-MCNC: 2.7 G/DL (ref 2.4–3.5)
GLUCOSE SERPL-MCNC: 229 MG/DL (ref 82–115)
HCT VFR BLD AUTO: 46.8 % (ref 42–52)
HEMOLYSIS INTERF INDEX SERPL-ACNC: 6
HGB BLD-MCNC: 14.9 G/DL (ref 14–18)
ICTERIC INTERF INDEX SERPL-ACNC: 1
LIPEMIC INTERF INDEX SERPL-ACNC: 3
LYMPHOCYTES # BLD AUTO: 0.8 10*3/UL (ref 0.6–4.6)
LYMPHOCYTES NFR BLD AUTO: 19 %
MANUAL DIFF? (OHS): NO
MCH RBC QN AUTO: 26 PG (ref 27–31)
MCHC RBC AUTO-ENTMCNC: 31.8 G/DL (ref 33–36)
MCV RBC AUTO: 81.8 FL (ref 80–94)
MONOCYTES # BLD AUTO: 0.3 10*3/UL (ref 0.1–1.3)
MONOCYTES NFR BLD AUTO: 8 %
NEUTROPHILS # BLD AUTO: 2.69 10*3/UL (ref 2.1–9.2)
NEUTROPHILS NFR BLD AUTO: 69 %
PLATELET # BLD AUTO: 131 10*3/UL (ref 130–400)
PMV BLD AUTO: 12.1 FL (ref 9.4–12.4)
POS ERR2 (OHS): NORMAL
POTASSIUM SERPL-SCNC: 4.4 MMOL/L (ref 3.5–5.1)
PROT SERPL-MCNC: 6.9 G/DL (ref 5.8–7.6)
RBC # BLD AUTO: 5.72 10*6/UL (ref 4.7–6.1)
SODIUM SERPL-SCNC: 139 MMOL/L (ref 136–145)
WBC # SPEC AUTO: 3.9 10*3/UL (ref 4.5–11.5)

## 2022-04-05 ENCOUNTER — TELEPHONE (OUTPATIENT)
Dept: TRANSPLANT | Facility: CLINIC | Age: 62
End: 2022-04-05
Payer: MEDICARE

## 2022-04-05 NOTE — LETTER
April 5, 2022    Colin Reilly  [address]  [facility] [address]          Dear Colin Salazarucet:  MRN: [id number]    This is a follow up to your recent labs, your lab results were stable.  There are no medicine changes.  Please have your labs drawn again on 6/20/22.      If you cannot have your labs drawn on the scheduled date, it is your responsibility to call the transplant department to reschedule.  Please call [phone] and ask to speak to Charly Murray MA for all scheduling requests.     Sincerely,        Your Liver Transplant Coordinator    [facility]  [address] [place] [id number]  [phone]

## 2022-04-05 NOTE — TELEPHONE ENCOUNTER
----- Message from Elizabeth Meneses MD sent at 4/1/2022 10:29 AM CDT -----  The Labs are stable - please let patient know.   Please schedule OV with me next week to further discuss.   Thanks.

## 2022-04-06 ENCOUNTER — HISTORICAL (OUTPATIENT)
Dept: ADMINISTRATIVE | Facility: HOSPITAL | Age: 62
End: 2022-04-06

## 2022-04-07 ENCOUNTER — TELEPHONE (OUTPATIENT)
Dept: TRANSPLANT | Facility: CLINIC | Age: 62
End: 2022-04-07
Payer: MEDICARE

## 2022-04-07 NOTE — TELEPHONE ENCOUNTER
Awaiting labs results ----- Message from John Davila sent at 4/7/2022 10:14 AM CDT -----  Regarding: Lab results  Pt calling to speak to coordinator regarding results    Call # [phone]

## 2022-04-08 ENCOUNTER — TELEPHONE (OUTPATIENT)
Dept: TRANSPLANT | Facility: CLINIC | Age: 62
End: 2022-04-08
Payer: MEDICARE

## 2022-04-08 LAB
EXT ALBUMIN: 4.2
EXT ALKALINE PHOSPHATASE: 94
EXT ALT: 20
EXT AST: 17
EXT BASOPHIL%: 1
EXT BILIRUBIN DIRECT: 0.3 MG/DL
EXT BILIRUBIN TOTAL: 1
EXT BUN: 6.6
EXT CALCIUM: 9.3
EXT CHLORIDE: 100
EXT CO2: 28
EXT CREATININE: 0.87 MG/DL
EXT EOSINOPHIL%: 2
EXT GFR MDRD NON AF AMER: >60
EXT GLUCOSE: 229
EXT HEMATOCRIT: 46.8
EXT HEMOGLOBIN: 14.9
EXT LYMPH%: 19
EXT MONOCYTES%: 8
EXT PLATELETS: 131
EXT POTASSIUM: 4.4
EXT PROTEIN TOTAL: 6.9
EXT SEGS%: 69
EXT SODIUM: 139 MMOL/L
EXT TACROLIMUS LVL: 11.1
EXT WBC: 3.9
PHOSPHATIDYLETHANOL (PETH): NORMAL NG/ML

## 2022-04-10 ENCOUNTER — HISTORICAL (OUTPATIENT)
Dept: ADMINISTRATIVE | Facility: HOSPITAL | Age: 62
End: 2022-04-10
Payer: MEDICARE

## 2022-04-11 ENCOUNTER — HISTORICAL (OUTPATIENT)
Dept: ADMINISTRATIVE | Facility: HOSPITAL | Age: 62
End: 2022-04-11
Payer: MEDICARE

## 2022-04-11 RX ORDER — TACROLIMUS 1 MG/1
2 CAPSULE ORAL EVERY 12 HOURS
Qty: 120 CAPSULE | Refills: 11 | Status: SHIPPED | OUTPATIENT
Start: 2022-04-11 | End: 2022-06-01

## 2022-04-11 NOTE — TELEPHONE ENCOUNTER
/True Trough per patient. Instructed pt to decrease fk to 2 mg twice a day and repeat labs 5/9/22 Message from Elizabeth Meneses MD sent at 4/8/2022  2:42 PM CDT -----  If a true trough, then decrease to 2/2 from 3/3 and repeat labs in 1 month please let patient know.

## 2022-04-28 ENCOUNTER — TELEPHONE (OUTPATIENT)
Dept: TRANSPLANT | Facility: CLINIC | Age: 62
End: 2022-04-28
Payer: MEDICARE

## 2022-04-28 VITALS
BODY MASS INDEX: 29.12 KG/M2 | OXYGEN SATURATION: 99 % | HEIGHT: 71 IN | DIASTOLIC BLOOD PRESSURE: 76 MMHG | WEIGHT: 208 LBS | SYSTOLIC BLOOD PRESSURE: 132 MMHG

## 2022-04-28 VITALS
SYSTOLIC BLOOD PRESSURE: 132 MMHG | WEIGHT: 208 LBS | BODY MASS INDEX: 29.12 KG/M2 | HEIGHT: 71 IN | OXYGEN SATURATION: 99 % | DIASTOLIC BLOOD PRESSURE: 76 MMHG

## 2022-04-28 NOTE — TELEPHONE ENCOUNTER
No Med change. Repeat 5/9----- Message from Elizabeth Meneses MD sent at 4/28/2022  4:01 PM CDT -----  Ok just repeat labs  ----- Message -----  From: Ashleigh Shen RN  Sent: 4/28/2022   1:19 PM CDT  To: Elizabeth Meneses MD    Decreased, sorry due to high fk. Just making sure you wanted another med change.   ----- Message -----  From: Elizabeth Meneses MD  Sent: 4/27/2022   7:20 PM CDT  To: Ashleigh Shne
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
## 2022-04-30 NOTE — ED PROVIDER NOTES
"   Patient:   Colin Reilly            MRN: [id number]            FIN: [id number]               Age:   57 years     Sex:  Male     :  1960   Associated Diagnoses:   Esophageal varices in cirrhosis; Diabetes mellitus; Pneumonia; Hematemesis/vomiting blood; Febrile illness, acute; Abdominal pain   Author:   Gaye CHAN, Blaine LAND      Basic Information   Time seen: Date & time 2018 19:46:00.   History source: Patient, mother.   Arrival mode: Private vehicle.   History limitation: None.   Additional information: Patient's physician(s): Vipul HSU MD, Preston BYRNES, Ramesh CHAN, Seth Dumont MD, Salty WINCHESTER, Chief Complaint from Nursing Triage Note : Chief Complaint   2018 18:49 CST      Chief Complaint           fever since 5 am, vomited bright red blood this am. RLQ abdominal pain and nausea. hx esophageal varices  .      History of Present Illness   The patient presents with abdominal pain and   56 y/o white male with a history of DM, HTN, cirrhosis of the liver and esophageal varices, presents to the ED with complaints of right lower abdominal pain, hyperglycemia, nausea, and occasional SOB, onset of yesterday at 1700, and a 102 degree F fever and hematemesis, onset of today at 0500. Pt states that his blood sugar was in the 500s yesterday, remained in the 500s today. He denies any cough or black or tarry stool, and says he is having normal urine output. Pt denies taking any Tylenol or Motrin. .  The onset was 2018 17:00:00 .  The course/duration of symptoms is constant.  The character of symptoms is "Pain".  The degree at onset was minimal.  The Location of pain at onset was right, lower and abdominal.  The degree at present is minimal.  The Location of pain at present is right, lower and abdominal.  Radiating pain: none. The exacerbating factor is none.  The relieving factor is none.  Therapy today: none.  Risk factors consist of diabetes mellitus, hypertension and age.  Associated symptoms: " nausea, vomiting, shortness of breath and fever.  Additional history: none.        Review of Systems   Constitutional symptoms:  Fever.   Skin symptoms:  Negative except as documented in HPI.   Eye symptoms:  Negative except as documented in HPI.   ENMT symptoms:  Negative except as documented in HPI.   Respiratory symptoms:  Shortness of breath, No cough,    Cardiovascular symptoms:  Negative except as documented in HPI.   Gastrointestinal symptoms:  Abdominal pain, mild, right lower quadrant, nausea, Hematemesis. Denies black or tarrry stool.    Genitourinary symptoms:  Negative except as documented in HPI.   Musculoskeletal symptoms:  Negative except as documented in HPI.   Neurologic symptoms:  Negative except as documented in HPI.   Psychiatric symptoms:  Negative except as documented in HPI.   Endocrine symptoms:  Hyperglycemia.   Hematologic/Lymphatic symptoms:  Negative except as documented in HPI.   Allergy/immunologic symptoms:  Negative except as documented in HPI.             Additional review of systems information: All other systems reviewed and otherwise negative.      Health Status   Allergies:    Allergic Reactions (Selected)  No Known Allergies.   Medications:  (Selected)   Inpatient Medications  Pending Complete  midazolam: 2 mg, form: Injection, IV Push, q5min, Order duration: 2 dose(s), first dose 08/20/14 7:48:00 CDT, stop date 08/20/14 7:57:00 CDT, (up to 5 mg for moderate anxiety)  Prescriptions  Prescribed  Acidophilus Probiotic Blend oral capsule: 1 cap(s), Oral, Daily, # 30 cap(s), 0 Refill(s)  Bystolic 10 mg oral tablet: 10 mg = 1 tab(s), Oral, Daily, # 30 tab(s), 11 Refill(s), Pharmacy: [facility] [place] [id number]  Diabetic shoes with three inserts: Diabetic shoes with three inserts, See Instructions, DX: E13.9 & G62.9, # 1 EA, 0 Refill(s)  Flomax 0.4 mg oral capsule: 0.4 mg = 1 cap(s), Oral, Daily, X 90 day(s), # 90 cap(s), 3 Refill(s), Pharmacy: [facility] [id number]  Lasix 20 mg  oral tablet: 80 mg = 4 tab(s), Oral, Daily, # 360 tab(s), 4 Refill(s), Pharmacy: [facility]  NovoLog 100 units/mL subcutaneous solution: 28 units, Subcutaneous, TIDAC, # 1 vial(s), 0 Refill(s), Pharmacy: [facility]  Protonix 40 mg oral granule, enteric coated: 40 mg, Oral, Daily, # 90 tab(s), 3 Refill(s), Pharmacy: [facility] [id number]  acetaminophen-hydrocodone 325 mg-10 mg oral tablet: 1 tab(s), Oral, TID, PRN PRN for pain, NO EARLY REFILLS, # 90 tab(s), 0 Refill(s)  bd fine ultra needles: bd fine ultra needles, See Instructions, DX: e10.65 to inject insulin qid, # 150 EA, 11 Refill(s), Pharmacy: [facility]  cyclobenzaprine 10 mg oral tablet: See Instructions, TAKE 1 TABLET BY MOUTH TWICE DAILY, # 60 tab(s), 5 Refill(s), eRx: [facility] [id number]  escitalopram 20 mg oral tablet: 20 mg = 1 tab(s), Oral, Daily, # 90 tab(s), 3 Refill(s), Pharmacy: [facility]  gabapentin 300 mg oral capsule: 300 mg = 1 cap(s), Oral, TID, # 270 cap(s), 3 Refill(s), Pharmacy: [facility]  insulin glargine 100 units/mL subcutaneous solution: 74 units, Subcutaneous, Daily, # 10 mL, 2 Refill(s), Pharmacy: [facility]  lactulose 10 g/15 mL oral and rectal liquid: 20 gm = 30 mL, Oral, QID, # 3,600 mL, 11 Refill(s), Pharmacy: [facility] [id number]  one touch test strips: one touch test strips, See Instructions, dx: e10.65 to test sugar five times a day., # 200 EA, 11 Refill(s), Pharmacy: [facility] [id number]  Documented Medications  Documented  OXYBUTYNIN 5MG TABLETS:   QUETIAPINE 50MG ER TABLETS: 50 mg = 1 tab(s), Oral, qPM  SPIRONOLACTONE 100MG TABLETS: 100 mg = 1 tab(s), Oral, Daily  lisinopril 10 mg oral tablet: 10 mg = 1 tab(s), Oral, Daily, # 30 tab(s), 0 Refill(s)  rifaximin 550 mg oral tablet: 550 mg = 1 tab(s), Oral, BID, # 60 tab(s), 0 Refill(s).   Immunizations: Flu and Pneumonia immunizations up to date.      Past  Medical/ Family/ Social History   Medical history:    Active  Cirrhosis ([id number])  DM - Diabetes mellitus ([id number])  Anxiety ([id number])  Resolved  GI bleed ([id number]):  Resolved on 8/3/2015 at 54 years.  Comments:  6/21/2015 CDT 16:31 CDT - SYSTEM  Problem automatically updated based on documentation on Capillary Refills, Brachial Pulses, Radial Pulses, Femoral Pulses, Dorsalis Pulses, Ulnar pulses, Popliteal Pulses, Postibial Pulses, Edemas, Affect/Behavior, Orientation Assessment, Arterial Line Site.  Alcohol abuse ([id number]):  Resolved on 8/3/2015 at 54 years.  Comments:  6/21/2015 CDT 16:31 CDT - SYSTEM  Problem automatically updated based on documentation on Capillary Refills, Brachial Pulses, Radial Pulses, Femoral Pulses, Dorsalis Pulses, Ulnar pulses, Popliteal Pulses, Postibial Pulses, Edemas, Affect/Behavior, Orientation Assessment, Arterial Line Site.  Esophageal varices ([id number]):  Resolved on 8/3/2015 at 54 years.  Comments:  6/21/2015 CDT 16:31 CDT - SYSTEM  Problem automatically updated based on documentation on Capillary Refills, Brachial Pulses, Radial Pulses, Femoral Pulses, Dorsalis Pulses, Ulnar pulses, Popliteal Pulses, Postibial Pulses, Edemas, Affect/Behavior, Orientation Assessment, Arterial Line Site.  right kidney cyst ([id number]):  Resolved on 8/3/2015 at 54 years.  Comments:  6/21/2015 CDT 16:31 CDT - SYSTEM  Problem automatically updated based on documentation on Capillary Refills, Brachial Pulses, Radial Pulses, Femoral Pulses, Dorsalis Pulses, Ulnar pulses, Popliteal Pulses, Postibial Pulses, Edemas, Affect/Behavior, Orientation Assessment, Arterial Line Site.  Enlarged prostate ([id number]):  Resolved on 8/3/2015 at 54 years.  Comments:  6/21/2015 CDT 16:31 CDT - SYSTEM  Problem automatically updated based on documentation on Capillary Refills, Brachial Pulses, Radial  Pulses, Femoral Pulses, Dorsalis Pulses, Ulnar pulses, Popliteal Pulses, Postibial Pulses, Edemas, Affect/Behavior, Orientation Assessment, Arterial Line Site.  Depression ([id number]):  Resolved on 8/3/2015 at 54 years.  Comments:  6/21/2015 CDT 16:31 CDT - SYSTEM  Problem automatically updated based on documentation on Capillary Refills, Brachial Pulses, Radial Pulses, Femoral Pulses, Dorsalis Pulses, Ulnar pulses, Popliteal Pulses, Postibial Pulses, Edemas, Affect/Behavior, Orientation Assessment, Arterial Line Site.  Dizziness ([id number]):  Resolved on 8/3/2015 at 54 years.  Comments:  6/21/2015 CDT 16:31 CDT - SYSTEM  Problem automatically updated based on documentation on Capillary Refills, Brachial Pulses, Radial Pulses, Femoral Pulses, Dorsalis Pulses, Ulnar pulses, Popliteal Pulses, Postibial Pulses, Edemas, Affect/Behavior, Orientation Assessment, Arterial Line Site.  Migraines ([id number]):  Resolved on 8/3/2015 at 54 years.  Comments:  6/21/2015 CDT 16:31 CDT - SYSTEM  Problem automatically updated based on documentation on Capillary Refills, Brachial Pulses, Radial Pulses, Femoral Pulses, Dorsalis Pulses, Ulnar pulses, Popliteal Pulses, Postibial Pulses, Edemas, Affect/Behavior, Orientation Assessment, Arterial Line Site.  Loss of memory ([id number]):  Resolved on 8/3/2015 at 54 years.  Comments:  6/21/2015 CDT 16:31 CDT - SYSTEM  Problem automatically updated based on documentation on Capillary Refills, Brachial Pulses, Radial Pulses, Femoral Pulses, Dorsalis Pulses, Ulnar pulses, Popliteal Pulses, Postibial Pulses, Edemas, Affect/Behavior, Orientation Assessment, Arterial Line Site.  Hiatal hernia ([id number]):  Resolved on 8/3/2015 at 54 years.  Comments:  6/21/2015 CDT 16:31 CDT - SYSTEM  Problem automatically updated based on documentation on Capillary Refills, Brachial Pulses, Radial Pulses, Femoral Pulses, Dorsalis  Pulses, Ulnar pulses, Popliteal Pulses, Postibial Pulses, Edemas, Affect/Behavior, Orientation Assessment, Arterial Line Site.  Back injury ([id number]):  Resolved on 8/3/2015 at 54 years.  Comments:  6/21/2015 CDT 16:31 CDT - SYSTEM  Problem automatically updated based on documentation on Capillary Refills, Brachial Pulses, Radial Pulses, Femoral Pulses, Dorsalis Pulses, Ulnar pulses, Popliteal Pulses, Postibial Pulses, Edemas, Affect/Behavior, Orientation Assessment, Arterial Line Site.  Cholecystectomy ([id number]):  Resolved on 8/3/2015 at 54 years.  Comments:  6/21/2015 CDT 16:31 CDT - SYSTEM  Problem automatically updated based on documentation on Capillary Refills, Brachial Pulses, Radial Pulses, Femoral Pulses, Dorsalis Pulses, Ulnar pulses, Popliteal Pulses, Postibial Pulses, Edemas, Affect/Behavior, Orientation Assessment, Arterial Line Site.  Repair of diaphragmatic hiatal hernia ([id number]):  Resolved on 8/3/2015 at 54 years.  Comments:  6/21/2015 CDT 16:31 CDT - SYSTEM  Problem automatically updated based on documentation on Capillary Refills, Brachial Pulses, Radial Pulses, Femoral Pulses, Dorsalis Pulses, Ulnar pulses, Popliteal Pulses, Postibial Pulses, Edemas, Affect/Behavior, Orientation Assessment, Arterial Line Site.  MRSA (methicillin resistant staph aureus) culture positive ([id number]):  Resolved.  Urinary retention ([id number]):  Resolved.  Hepatic encephalopathy ([id number]):  Resolved..   Surgical history:    Esophagogastroduodenoscopy on 8/20/2014 at 53 Years.  Comments:  8/20/2014 17:46 - Trevor Meraz RN  auto-populated from documented surgical case  Esophageal Banding on 8/20/2014 at 53 Years.  Comments:  8/20/2014 17:46 - Trevor Meraz RN  auto-populated from documented surgical case  Esophageal Banding on 7/14/2014 at 53 Years.  Comments:  7/14/2014 18:16 - Belinda MONTENEGRO, Iban BARBER  auto-populated from documented surgical  case  Esophagogastroduodenoscopy on 7/14/2014 at 53 Years.  Comments:  7/14/2014 18:16 - Iban Trevino RN  auto-populated from documented surgical case  Polypectomy on 6/19/2014 at 53 Years.  Comments:  6/19/2014 10:04 - Iban Trevino RN  auto-populated from documented surgical case  Colonoscopy on 6/19/2014 at 53 Years.  Comments:  6/19/2014 10:04 - Iban Trevino RN  auto-populated from documented surgical case  Esophageal Banding on 5/14/2014 at 53 Years.  Comments:  5/14/2014 13:31 - Katelin Cline RN  auto-populated from documented surgical case  Esophagogastroduodenoscopy on 5/14/2014 at 53 Years.  Comments:  5/14/2014 13:31 - Katelin Cline RN  auto-populated from documented surgical case  vericose veins removal in 2012 at 52 Years.  Cholecystectomy ([id number]) in 2012 at 52 Years.  elbow sx in 2002 at 42 Years.  neck sx- fusion on C5 and C6 in 2002 at 42 Years.  Ulnar nerve (38180182) in 2002 at 42 Years.  Carpal tunnel (354653562) in 2002 at 42 Years.  Hernia (224809563) in 1992 at 32 Years..   Family history:    Hyperlipidemia  Mother  Metastatic cancer  Brother  .   Social history: Alcohol use: Denies, Quit 16 years ago, Tobacco use: Denies, Drug use: Denies, Occupation: On disability, Family/social situation: Unmarried, Lives with mother.      Physical Examination               Vital Signs   Vital Signs   2/22/2018 18:49 CST      Temperature Temporal Artery               38.6 DegC  HI                             Peripheral Pulse Rate     88 bpm                             Respiratory Rate          18 br/min                             SpO2                      96 %                             Oxygen Therapy            Room air                             Systolic Blood Pressure   117 mmHg                             Diastolic Blood Pressure  58 mmHg  LOW  .   Measurements   2/22/2018 18:49 CST      Weight Dosing             93 kg                             Weight Measured and  Calculated in Lbs     205.03 lb                             Weight Estimated          93 kg                             Height/Length Dosing      180 cm                             Height/Length Estimated   180 cm                             Body Mass Index Estimated 28.7 kg/m2  .   Basic Oxygen Information   2/22/2018 18:49 CST      SpO2                      96 %                             Oxygen Therapy            Room air  .   General:  Alert, no acute distress, Not toxic appearing. White male, Not ill-appearing,    Skin:  Warm, dry, no rash, normal for ethnicity.    Head:  Normocephalic, atraumatic.    Neck:  Supple, trachea midline, no tenderness, no JVD, no carotid bruit.    Eye:  Pupils are equal, round and reactive to light, extraocular movements are intact.    Ears, nose, mouth and throat:  Tympanic membranes clear, oral mucosa moist, no pharyngeal erythema or exudate.    Cardiovascular:  Regular rate and rhythm, No murmur, Normal peripheral perfusion, No edema.    Respiratory:  Lungs are clear to auscultation, respirations are non-labored, breath sounds are equal.    Chest wall:  No tenderness.   Back:  Nontender, Normal range of motion, Normal alignment, no step-offs.    Musculoskeletal:  Normal ROM, normal strength, no tenderness, no swelling.    Gastrointestinal:  Soft, Nontender, Non distended, Normal bowel sounds, No organomegaly, stool is guaiac negative and  is OK, Controls are ok.    Genitourinary:  no CVA tenderness.   Neurological:  Alert and oriented to person, place, time, and situation, No focal neurological deficit observed, CN II-XII intact, normal sensory observed, normal motor observed, normal speech observed, normal coordination observed.    Lymphatics:  No lymphadenopathy.   Psychiatric:  Cooperative, appropriate mood & affect, normal judgment.       Medical Decision Making   Differential Diagnosis:  Abdominal pain, gastroesophageal reflux disease, gastritis, Febrile  illness pneumonia esophageal varices.    Documents reviewed:  Emergency department nurses' notes.   Orders  Launch Orders   Laboratory:  UA Total a reflex to culture (Order): Stat collect, Urine, 2/22/2018 19:25 CST, Once, Stop date 2/22/2018 19:25 CST, Nurse collect, Print Label By Order Location  TSH (Order): Stat collect, 2/22/2018 19:25 CST, Blood, Once, Stop date 2/22/2018 19:25 CST, Lab Collect, Print Label By Order Location, 2/22/2018 19:25 CST  Troponin-I (Order): Stat collect, 2/22/2018 19:25 CST, Blood, Lab Collect, Print Label By Order Location, 2/22/2018 19:25 CST  PTT (Order): Stat collect, 2/22/2018 19:24 CST, Blood, Once, Stop date 2/22/2018 19:24 CST, Lab Collect, Print Label By Order Location, 2/22/2018 19:24 CST  INR - Protime (Order): Stat collect, 2/22/2018 19:24 CST, Blood, Once, Stop date 2/22/2018 19:24 CST, Lab Collect, Print Label By Order Location, 2/22/2018 19:24 CST  Lipase Level (Order): Stat collect, 2/22/2018 19:24 CST, Blood, Once, Stop date 2/22/2018 19:24 CST, Lab Collect, Print Label By Order Location, 2/22/2018 19:24 CST  Lactic Acid (Order): Stat collect, 2/22/2018 19:24 CST, Blood, Lab Collect, Print Label By Order Location, 2/22/2018 19:24 CST  Mg Level (Order): Stat collect, 2/22/2018 19:24 CST, Blood, Once, Stop date 2/22/2018 19:24 CST, Lab Collect, Print Label By Order Location, 2/22/2018 19:24 CST  CMP (Order): Stat collect, 2/22/2018 19:24 CST, Blood, Lab Collect, Print Label By Order Location, 2/22/2018 19:24 CST  CK MB (Order): Stat collect, 2/22/2018 19:24 CST, Blood, Lab Collect, Print Label By Order Location, 2/22/2018 19:24 CST  CK (Order): Stat collect, 2/22/2018 19:24 CST, Blood, Once, Stop date 2/22/2018 19:24 CST, Lab Collect, Print Label By Order Location, 2/22/2018 19:24 CST  CBC w/ Auto Diff (Order): Now collect, 2/22/2018 19:24 CST, Blood, Lab Collect, Print Label By Order Location, 2/22/2018 19:24 CST  CBC w/ Auto Diff (Order): Now collect, 2/22/2018 19:24  CST, Blood, Lab Collect, Print Label By Order Location, 2/22/2018 19:24 CST  Amylase Level (Order): Stat collect, 2/22/2018 19:24 CST, Blood, Once, Stop date 2/22/2018 19:24 CST, Lab Collect, Print Label By Order Location, 2/22/2018 19:24 CST  Ammonia Level (Order): Stat collect, 2/22/2018 19:24 CST, Blood, Once, Stop date 2/22/2018 19:24 CST, Lab Collect, Print Label By Order Location, 2/22/2018 19:24 CST  Patient Care:  Cardiac Monitoring (Order): 2/22/2018 19:24 CST, Constant Order  Pharmacy:  Sodium Chloride 0.9% 1000mL 1000 mL (Order): 1,000 mL, 1,000 mL, IV, 250 mL/hr, start date 2/22/2018 19:25 CST  Protonix 40 mg Vial (IV Push) (Order): 80 mg, IV Slow, Once, Infuse over: 2 minute(s), first dose 2/22/2018 19:24 CST, stop date 2/22/2018 19:24 CST  Radiology:  XR Chest 1 View (Order): Stat, 2/22/2018 19:25 CST, Cough, None, Stretcher, Patient Has IV?, Rad Type  Cardiology:  EKG (Order): 2/22/2018 19:24 CST, Stat, Stretcher, Patient Has IV, Contact Precautions, -1, -1, 2/22/2018 19:24 CST  Respiratory Therapy:  Oxygen Therapy (Order): 2/22/2018 19:24 CST, 2, Nasal Cannula, CM Oxygen.   Electrocardiogram:  Time 2/22/2018 19:42:00, rate 86, normal sinus rhythm, No ST-T changes, no ectopy, EP Interp, Prolonged QT interval.  Corrected otherwise no acute changes.    Results review:  Lab results : Lab View   2/22/2018 20:17 CST      UA Appear                 CLEAR                             UA Color                  YELLOW                             UA Spec Grav              1.017                             UA Bili                   Negative                             UA pH                     8.0                             UA Urobilinogen           1.0                             UA Blood                  Negative                             UA Glucose                1+                             UA Ketones                Negative                             UA Protein                Negative                              UA Nitrite                Negative                             UA Leuk Est               1+                             UA WBC Man                3-4 /HPF                             UA RBC                    None Seen                             UA Bacteria               None Seen /HPF                             UA Squam Epithelial       None Seen    2/22/2018 19:47 CST      Sodium Lvl                135 mmol/L  LOW                             Potassium Lvl             3.9 mmol/L                             Chloride                  104 mmol/L                             CO2                       24.0 mmol/L                             Calcium Lvl               8.4 mg/dL  LOW                             Magnesium Lvl             1.8 mg/dL                             Glucose Lvl               138 mg/dL  HI                             BUN                       7.0 mg/dL                             Creatinine                0.61 mg/dL  LOW                             eGFR-AA                   >60 mL/min/1.73 m2  NA                             eGFR-VIDA                  >60 mL/min/1.73 m2  NA                             Amylase Lvl               46 unit/L                             Bili Total                2.1 mg/dL  HI                             Bili Direct               1.00 mg/dL  HI                             Bili Indirect             1.10 mg/dL  HI                             AST                       37 unit/L                             ALT                       31 unit/L                             Alk Phos                  115 unit/L                             Total Protein             7.0 gm/dL                             Albumin Lvl               3.40 gm/dL                             Globulin                  3.60 gm/dL  HI                             A/G Ratio                 0.9 ratio  LOW                             Ammonia Lvl               34.0 mcmol/L  HI                              Lactic Acid Lvl           1.8 mmol/L                             Lipase Lvl                60 unit/L  LOW                             Total CK                  88 unit/L                             CK MB                     0.7 ng/mL                             Troponin-I                0.02 ng/mL                             TSH                       1.420 mIU/mL                             PT                        16.8 second(s)  HI                             INR                       1.32  HI                             PTT                       32.4 second(s)                             WBC                       7.9 x10(3)/mcL                             RBC                       4.58 x10(6)/mcL  LOW                             Hgb                       13.1 gm/dL  LOW                             Hct                       39.8 %  LOW                             Platelet                  45 x10(3)/mcL  LOW                             MCV                       86.9 fL                             MCH                       28.6 pg                             MCHC                      32.9 gm/dL  LOW                             RDW                       20.3 %  HI                             MPV                       np fL                             Abs Neut                  6.46 x10(3)/mcL                             Neutro Auto               82 %  NA                             Lymph Auto                9 %  LOW                             Mono Auto                 8 %  NA                             Eos Auto                  0 %  NA                             Abs Eos                   0.0 x10(3)/mcL                             Basophil Auto             0 %  NA                             Abs Neutro                6.46 x10(3)/mcL                             Abs Lymph                 0.7 x10(3)/mcL                             Abs Mono                  0.6 x10(3)/mcL                             Abs Baso                   0.0 x10(3)/mcL                             Platelet Est              Decreased                             Anisocyte                 1  NA                             Poik                      1  NA                             Macrocyte                 1  NA                             Ovalocytes                1  .   Chest X-Ray:  Possible right perihilar pneumonia.      Reexamination/ Reevaluation   Time: 2/22/2018 21:33:00 .   Vital signs   results included from flowsheet : Vital Signs   2/22/2018 21:00 CST      Peripheral Pulse Rate     85 bpm                             Heart Rate Monitored      86 bpm                             Respiratory Rate          20 br/min                             SpO2                      94 %                             Oxygen Therapy            Room air                             Systolic Blood Pressure   98 mmHg                             Diastolic Blood Pressure  57 mmHg  LOW                             Mean Arterial Pressure, Cuff              71 mmHg     Interventions: Explained to patient and mother at the bedside will be admitted and treated empirically did give one dose of Motrin here we'll switch to very low dose acetaminophen is a situation between the acetaminophen / liver cirrhosis and the bleeding with the ibuprofen .      Impression and Plan   Diagnosis   Esophageal varices in cirrhosis (GRN16-PP K74.60)   Diabetes mellitus (HSS46-FJ E11.9)   Pneumonia (WVE37-OL J18.9)   Hematemesis/vomiting blood (NIL87-AX K92.0)   Febrile illness, acute (MLK06-XD R50.9)   Abdominal pain (PNED [id number])      Calls-Consults   -  2/22/2018 21:34:00 , Vipul HSU MD, Preston WALLACE    Plan   Condition: Unchanged.    Disposition: Admit time  2/22/2018 21:34:00, Place in Observation Unit.    Counseled: Patient, Family, Regarding diagnosis, Regarding diagnostic results, Regarding treatment plan, Patient indicated understanding of instructions, as did mother .     Notes: I, John Giles, acted solely as a scribe for and in the presence of Dr. Huizar who performed the service. , I, Blaine Huizar MD, a physician licensed to practice in this state, have  performed the physical evaluation,  history gathering,  and medical decision making that is reflected in this record..   ALEXY Huizar MD.

## 2022-04-30 NOTE — H&P
Patient:   Colin Reilly            MRN: [id number]            FIN: [id number]               Age:   57 years     Sex:  Male     :  1960   Associated Diagnoses:   None   Author:   Vipul HSU MD, Preston BYRNES      Basic Information   Source of history:  Self.    Present at bedside:  Family member.       Chief Complaint   2018 18:49 CST      fever since 5 am, vomited bright red blood this am. RLQ abdominal pain and nausea. hx esophageal varices        History of Present Illness   57-year-old white male with chronic liver disease with cirrhosis and esophageal varices presented to the emergency room last night with lower abdominal pain, cough, congestion, and 102° fever over the past day. He says that he did not vomit, but coughed up bright red blood shortly before coming to the emergency room. He has a history of chronic rhinosinusitis and has had a cough with clear mucus over the past 3 days. He denies melena or hematochezia. He has had multiple EGDs at [facility] in 2017, the last one within 3 months. Chest x-ray was normal in the emergency room, and he was started on Levaquin because of fever and congestion. This morning on exam, he is feeling somewhat better and did not have fever overnight. He denies abdominal pain this morning. Review of systems otherwise negative.    He has a past medical history of chronic liver disease with cirrhosis and is on the transplant list at [facility]. He has a history of esophageal varices, uncontrolled insulin-dependent diabetes, pancytopenia, chronic back pain, neuropathy, insomnia, hypertension, depression, and BPH. See below for more details of past medical history, ALLERGIES, and family history etc.    He lives with his mother in [place].      Review of Systems   Constitutional:  Negative except as documented in history of present illness.    Eye:  Recent visual problem.    Ear/Nose/Mouth/Throat:  Negative except as documented in history of present illness.     Respiratory:  Negative except as documented in history of present illness.    Cardiovascular:  No chest pain, No palpitations.    Gastrointestinal:  Negative except as documented in history of present illness.    Musculoskeletal:  Negative except as documented in history of present illness.    Integumentary:  Negative except as documented in history of present illness.    Neurologic:  Negative except as documented in history of present illness.    Psychiatric:  Negative except as documented in history of present illness.       Health Status   Allergies:    Allergic Reactions (Selected)  No Known Allergies,    Allergies (1) Active Reaction  No Known Allergies None Documented     Current medications:  (Selected)   Inpatient Medications  Ordered  Bystolic  10m dose(s), form: Dose, Oral, Daily, first dose 18 9:00:00 CST  Dextrose 50% and Water  (50 mL vial/syringe): 25 mL, 12.5 gm =, form: Injection, IV Push, As Directed PRN for blood glucose, Infuse over: 5 minute(s), first dose 18 23:42:00 CST, Unconscious patient: Repeat as ordered per protocol.  Dextrose 50% and Water  (50 mL vial/syringe): 25 mL, 12.5 gm =, form: Injection, IV Push, Once PRN for blood glucose, Infuse over: 5 minute(s), first dose 18 23:42:00 CST, Unconscious patient: Look for other source of altered mental status.  Dextrose 50% and Water  (50 mL vial/syringe): 50 mL, 25 gm =, form: Injection, IV Push, As Directed PRN for blood glucose, Infuse over: 5 minute(s), first dose 18 23:42:00 CST, Unconscious patient: Repeat as ordered per protocol.  Dextrose 50% in Water: 25 mL, 12.5 gm =, form: Injection, IV Push, As Directed PRN for blood glucose, Infuse over: 5 minute(s), first dose 18 23:42:00 CST, Conscious patient.  Escitalopram  20m dose(s), form: Dose, Oral, Daily, first dose 18 9:00:00 CST  Flomax 0.4 mg oral capsule: 0.4 mg, form: Cap, Oral, Daily, first dose 18 9:00:00 CST  Lasix 20 mg oral  tablet: 80 mg, form: Tab, Oral, Daily, first dose 18 9:00:00 CST  NovoLog 100 units/mL subcutaneous solution: 28 units, form: Soln, Subcutaneous, TIDAC, first dose 18 11:30:00 CST  Protonix: 40 mg, form: Injection, IV Slow, BID, Infuse over: 30 minute(s), first dose 18 23:42:00 CST  SPIRONOLACTONE  100M dose(s), form: Dose, Oral, Daily, first dose 18 9:00:00 CST  acetaminophen-hydrocodone 325 mg-10 mg oral tablet: 1 tab(s), form: Tab, Oral, TID PRN for pain, first dose 18 8:40:00 CST  acetaminophen: 500 mg, form: Tab, Oral, q6hr PRN for fever, first dose 18 23:42:00 CST, greater than 38 degrees Celsius  albuterol: 3 mL, 2.5 mg =, form: Soln-Inh, NEB, q4hr Resp, first dose 18 0:00:00 CST  cyclobenzaprine 10 mg oral tablet: 5 mg, form: Tab, Oral, BID PRN for as needed for muscle spasm, first dose 18 8:40:00 CST  gabapentin 300 mg oral capsule: 300 mg, form: Cap, Oral, TID, first dose 18 14:00:00 CST  glucagon: 1 mg, form: Injection, IM, q10min PRN for blood glucose, first dose 18 23:42:00 CST, Conscious Patient with NO IV access available and BG < 45 mg/dl.  glucagon: 1 mg, form: Injection, IM, q10min PRN for blood glucose, first dose 18 23:42:00 CST, Unconscious patient: Patient with NO IV access available and BG < 70 mg/dl.  insulin glargine 100 units/mL subcutaneous solution: 60 units, form: Injection, Subcutaneous, Once a day (at bedtime), first dose 18 21:00:00 CST  insulin lispro: 2-14 units, form: Injection, Subcutaneous, As Directed PRN for blood glucose, first dose 18 23:42:00 CST  lactulose 10 g/15 mL oral syrup: 20 gm, form: Syrup, Oral, QID, first dose 18 9:00:00 CST  levofloxacin IVPB ( Levaquin ): 750 mg, form: Infusion, IV Piggyback, q24hr, Infuse over: 1.5 hr, first dose 18 21:18:00 CST  lisinopril 10 mg oral tablet: 10 mg, form: Tab, Oral, Daily, first dose 18 9:00:00 CST  rifAXIMin: 550 mg, form: Tab,  Oral, BID, first dose 02/23/18 9:00:00 CST  Pending Complete  midazolam: 2 mg, form: Injection, IV Push, q5min, Order duration: 2 dose(s), first dose 08/20/14 7:48:00 CDT, stop date 08/20/14 7:57:00 CDT, (up to 5 mg for moderate anxiety)  Prescriptions  Prescribed  Bystolic 10 mg oral tablet: 10 mg = 1 tab(s), Oral, Daily, # 30 tab(s), 11 Refill(s), Pharmacy: [id number]  Flomax 0.4 mg oral capsule: 0.4 mg = 1 cap(s), Oral, Daily, X 90 day(s), # 90 cap(s), 3 Refill(s), Pharmacy: [facility] [id number]  Lasix 20 mg oral tablet: 80 mg = 4 tab(s), Oral, Daily, # 360 tab(s), 4 Refill(s), Pharmacy: [facility]  NovoLog 100 units/mL subcutaneous solution: 28 units, Subcutaneous, TIDAC, # 1 vial(s), 0 Refill(s), Pharmacy: [facility]  Protonix 40 mg oral granule, enteric coated: 40 mg, Oral, Daily, # 90 tab(s), 3 Refill(s), Pharmacy: [facility] [id number]  acetaminophen-hydrocodone 325 mg-10 mg oral tablet: 1 tab(s), Oral, TID, PRN PRN for pain, NO EARLY REFILLS, # 90 tab(s), 0 Refill(s)  cyclobenzaprine 10 mg oral tablet: See Instructions, TAKE 1 TABLET BY MOUTH TWICE DAILY, # 60 tab(s), 5 Refill(s), eRx: [id number]  escitalopram 20 mg oral tablet: 20 mg = 1 tab(s), Oral, Daily, # 90 tab(s), 3 Refill(s), Pharmacy: [facility] [id number]  gabapentin 300 mg oral capsule: 300 mg = 1 cap(s), Oral, TID, # 270 cap(s), 3 Refill(s), Pharmacy: [facility] [id number]  insulin glargine 100 units/mL subcutaneous solution: 74 units, Subcutaneous, Daily, # 10 mL, 2 Refill(s), Pharmacy: [facility]  lactulose 10 g/15 mL oral and rectal liquid: 20 gm = 30 mL, Oral, QID, # 3,600 mL, 11 Refill(s), Pharmacy: [facility] [id number]  Documented Medications  Documented  SPIRONOLACTONE 100MG TABLETS: 100 mg = 1 tab(s), Oral, Daily  levocetirizine 5 mg oral tablet: 5 mg = 1 tab(s), Oral, qPM, # 30 tab(s), 0 Refill(s)  lisinopril 10 mg oral tablet: 10 mg  = 1 tab(s), Oral, Daily, # 30 tab(s), 0 Refill(s)  rifaximin 550 mg oral tablet: 550 mg = 1 tab(s), Oral, BID, # 60 tab(s), 0 Refill(s)   Problem list:    All Problems (Selected)  Anxiety / SNOMED CT 59287784 / Confirmed  Cardiomyopathy / SNOMED CT 330136870 / Confirmed  Cirrhosis / SNOMED CT [id number] / Confirmed  Diabetes mellitus type 1, uncontrolled, insulin dependent / SNOMED CT [id number] / Confirmed  DM - Diabetes mellitus / SNOMED CT 588763392 / Confirmed  Syncope / SNOMED CT 6405382618 / Confirmed  Insomnia / SNOMED CT 619732036 / Confirmed  Needs flu shot / SNOMED CT 789076596 / Confirmed  Neuropathy / SNOMED CT [id number] / Confirmed  Numbness of foot / SNOMED CT 621507691 / Confirmed  Obesity(  Probable Diagnosis  ) / SNOMED CT 9791202980 / Confirmed  Prostate cancer screening / SNOMED CT 127408746 / Confirmed      Histories   Past Medical History:    Active  Cirrhosis ([id number])  DM - Diabetes mellitus (976163360)  Anxiety (70144986)  Resolved  GI bleed ([id number]):  Resolved on 8/3/2015 at 54 years.  Comments:  6/21/2015 CDT 16:31 CDT - SYSTEM  Problem automatically updated based on documentation on Capillary Refills, Brachial Pulses, Radial Pulses, Femoral Pulses, Dorsalis Pulses, Ulnar pulses, Popliteal Pulses, Postibial Pulses, Edemas, Affect/Behavior, Orientation Assessment, Arterial Line Site.  Alcohol abuse ([id number]):  Resolved on 8/3/2015 at 54 years.  Comments:  6/21/2015 CDT 16:31 CDT - SYSTEM  Problem automatically updated based on documentation on Capillary Refills, Brachial Pulses, Radial Pulses, Femoral Pulses, Dorsalis Pulses, Ulnar pulses, Popliteal Pulses, Postibial Pulses, Edemas, Affect/Behavior, Orientation Assessment, Arterial Line Site.  Esophageal varices (91891286):  Resolved on 8/3/2015 at 54  years.  Comments:  6/21/2015 CDT 16:31 CDT - SYSTEM  Problem automatically updated based on documentation on Capillary Refills, Brachial Pulses, Radial Pulses, Femoral Pulses, Dorsalis Pulses, Ulnar pulses, Popliteal Pulses, Postibial Pulses, Edemas, Affect/Behavior, Orientation Assessment, Arterial Line Site.  right kidney cyst (494999509):  Resolved on 8/3/2015 at 54 years.  Comments:  6/21/2015 CDT 16:31 CDT - SYSTEM  Problem automatically updated based on documentation on Capillary Refills, Brachial Pulses, Radial Pulses, Femoral Pulses, Dorsalis Pulses, Ulnar pulses, Popliteal Pulses, Postibial Pulses, Edemas, Affect/Behavior, Orientation Assessment, Arterial Line Site.  Enlarged prostate ([id number]):  Resolved on 8/3/2015 at 54 years.  Comments:  6/21/2015 CDT 16:31 CDT - SYSTEM  Problem automatically updated based on documentation on Capillary Refills, Brachial Pulses, Radial Pulses, Femoral Pulses, Dorsalis Pulses, Ulnar pulses, Popliteal Pulses, Postibial Pulses, Edemas, Affect/Behavior, Orientation Assessment, Arterial Line Site.  Depression ([id number]):  Resolved on 8/3/2015 at 54 years.  Comments:  6/21/2015 CDT 16:31 CDT - SYSTEM  Problem automatically updated based on documentation on Capillary Refills, Brachial Pulses, Radial Pulses, Femoral Pulses, Dorsalis Pulses, Ulnar pulses, Popliteal Pulses, Postibial Pulses, Edemas, Affect/Behavior, Orientation Assessment, Arterial Line Site.  Dizziness ([id number]):  Resolved on 8/3/2015 at 54 years.  Comments:  6/21/2015 CDT 16:31 CDT - SYSTEM  Problem automatically updated based on documentation on Capillary Refills, Brachial Pulses, Radial Pulses, Femoral Pulses, Dorsalis Pulses, Ulnar pulses, Popliteal Pulses, Postibial Pulses, Edemas, Affect/Behavior, Orientation Assessment, Arterial Line Site.  Migraines ([id number]):  Resolved on 8/3/2015 at 54 years.  Comments:  6/21/2015  CDT 16:31 CDT - SYSTEM  Problem automatically updated based on documentation on Capillary Refills, Brachial Pulses, Radial Pulses, Femoral Pulses, Dorsalis Pulses, Ulnar pulses, Popliteal Pulses, Postibial Pulses, Edemas, Affect/Behavior, Orientation Assessment, Arterial Line Site.  Loss of memory ([id number]):  Resolved on 8/3/2015 at 54 years.  Comments:  6/21/2015 CDT 16:31 CDT - SYSTEM  Problem automatically updated based on documentation on Capillary Refills, Brachial Pulses, Radial Pulses, Femoral Pulses, Dorsalis Pulses, Ulnar pulses, Popliteal Pulses, Postibial Pulses, Edemas, Affect/Behavior, Orientation Assessment, Arterial Line Site.  Hiatal hernia ([id number]):  Resolved on 8/3/2015 at 54 years.  Comments:  6/21/2015 CDT 16:31 CDT - SYSTEM  Problem automatically updated based on documentation on Capillary Refills, Brachial Pulses, Radial Pulses, Femoral Pulses, Dorsalis Pulses, Ulnar pulses, Popliteal Pulses, Postibial Pulses, Edemas, Affect/Behavior, Orientation Assessment, Arterial Line Site.  Back injury ([id number]):  Resolved on 8/3/2015 at 54 years.  Comments:  6/21/2015 CDT 16:31 CDT - SYSTEM  Problem automatically updated based on documentation on Capillary Refills, Brachial Pulses, Radial Pulses, Femoral Pulses, Dorsalis Pulses, Ulnar pulses, Popliteal Pulses, Postibial Pulses, Edemas, Affect/Behavior, Orientation Assessment, Arterial Line Site.  Cholecystectomy (07042313):  Resolved on 8/3/2015 at 54 years.  Comments:  6/21/2015 CDT 16:31 CDT - SYSTEM  Problem automatically updated based on documentation on Capillary Refills, Brachial Pulses, Radial Pulses, Femoral Pulses, Dorsalis Pulses, Ulnar pulses, Popliteal Pulses, Postibial Pulses, Edemas, Affect/Behavior, Orientation Assessment, Arterial Line Site.  Repair of diaphragmatic hiatal hernia (240818800):  Resolved on 8/3/2015 at 54 years.  Comments:  6/21/2015 CDT 16:31 CDT - SYSTEM  Problem automatically updated  based on documentation on Capillary Refills, Brachial Pulses, Radial Pulses, Femoral Pulses, Dorsalis Pulses, Ulnar pulses, Popliteal Pulses, Postibial Pulses, Edemas, Affect/Behavior, Orientation Assessment, Arterial Line Site.  MRSA (methicillin resistant staph aureus) culture positive ([id number]):  Resolved.  Urinary retention (826978545):  Resolved.  Hepatic encephalopathy (04853506):  Resolved.   Family History:    Hyperlipidemia  Mother  Metastatic cancer  Brother     Procedure history:    Esophagogastroduodenoscopy on 8/20/2014 at 53 Years.  Comments:  8/20/2014 17:Trevor Mathis RN  auto-populated from documented surgical case  Esophageal Banding on 8/20/2014 at 53 Years.  Comments:  8/20/2014 17:Trevor Mathis RN  auto-populated from documented surgical case  Esophageal Banding on 7/14/2014 at 53 Years.  Comments:  7/14/2014 18:Iban Corrales RN  auto-populated from documented surgical case  Esophagogastroduodenoscopy on 7/14/2014 at 53 Years.  Comments:  7/14/2014 18:Iban Corrales RN  auto-populated from documented surgical case  Polypectomy on 6/19/2014 at 53 Years.  Comments:  6/19/2014 10:Iban Quiñonez RN  auto-populated from documented surgical case  Colonoscopy on 6/19/2014 at 53 Years.  Comments:  6/19/2014 10:Iban Quiñonez RN  auto-populated from documented surgical case  Esophageal Banding on 5/14/2014 at 53 Years.  Comments:  5/14/2014 13:Katelin Fry RN  auto-populated from documented surgical case  Esophagogastroduodenoscopy on 5/14/2014 at 53 Years.  Comments:  5/14/2014 13:Katelin Fry RN  auto-populated from documented surgical case  vericose veins removal in 2012 at 52 Years.  Cholecystectomy (46199221) in 2012 at 52 Years.  elbow sx in 2002 at 42 Years.  neck sx- fusion on C5 and C6 in 2002 at 42 Years.  Ulnar nerve (27744557) in 2002 at 42 Years.  Carpal tunnel (335528903) in 2002 at  42 Years.  Hernia (341159308) in 1992 at 32 Years.   Social History        Social & Psychosocial Habits    Alcohol  01/20/2013 Risk Assessment: Denies Alcohol Use    11/09/2014  Use: Past    Type: Beer, Liquor    Stopped at age: 42 Years    Employment/School  04/08/2015  Status: Disabled, Unemployed    Exercise    Comment: never - 03/25/2015 14:56 - Caprice Weldon LPN    Home/Environment  02/07/2017  Lives with: Mother    Nutrition/Health  03/25/2015  Type of diet: Diabetic    Sexual  04/08/2015  Sexually active: No    Substance Abuse  06/06/2014 Risk Assessment: Denies Substance Abuse      Comment: never - 03/25/2015 14:56 - Caprice Weldon LPN    Tobacco  01/20/2013 Risk Assessment: Denies Tobacco Use    01/30/2018  Use: Never smoker  .        Physical Examination      Vital Signs (last 24 hrs)_____  Last Charted___________  Temp Oral     37.1 DegC  (FEB 23 11:30)  Heart Rate Peripheral   87 bpm  (FEB 23 11:30)  Resp Rate         20 br/min  (FEB 23 11:56)  SBP      122 mmHg  (FEB 23 11:30)  DBP      72 mmHg  (FEB 23 11:30)  SpO2      98 %  (FEB 23 11:56)  Weight      94 kg  (FEB 23 10:15)  Height      180 cm  (FEB 23 10:15)  BMI      29.01  (FEB 23 10:15)     General:  Alert and oriented, No acute distress.    Eye:  Extraocular movements are intact.    HENT:  Normocephalic.    Neck:  Supple.    Respiratory:  Lungs are clear to auscultation, Respirations are non-labored.    Cardiovascular:  Normal rate, Regular rhythm, Non-pitting edema BLE.    Gastrointestinal:  Non-distended.    Integumentary:  Warm.    Neurologic:  Alert.    Psychiatric:  Cooperative.       Review / Management   Results review:     Labs (Last four charted values)  Glucose              H 138 (FEB 22)   PT                   H 16.8 (FEB 22)   INR                  H 1.32 (FEB 22)   PTT                  32.4 (FEB 22) .       Impression and Plan     1. Purulent bronchitis: He presented to the ER with fever 102, cough, and congestion. He is  normally pancytopenic, and his white blood cell count was 7.9, more than usual. Chest x-ray is negative. Continue Levaquin and plan for discharge tomorrow. He had hemoptysis yesterday, and it doesn't sound like hematemesis, which was a concern on admission.    2. Chronic liver disease with cirrhosis: He is on the transplant list at [facility]. Continue lactulose and Xifaxan, normally followed by Dr. Chandler. He underwent esophageal banding in [place] in 2017 with his last follow-up EGD 2-3 months ago.    3. Pancytopenia: Platelet count is 40 today, and he had hemoptysis yesterday from vigorous coughing. Stool was negative for blood in the ER.    4. Neuropathy: Continue gabapentin    5. Insulin-dependent diabetes: He is an uncontrolled diabetic with his last hemoglobin A1c of 11. Resume Lantus and NovoLog. His diet is out of control.    6. Depression: Resume Lexapro    7. Chronic back pain: His pain management physician, Dr. Enriquez,  suddenly last year, so we have been prescribing him Norco since then.    8. BPH: He is followed by Dr. nAn and by urology in [place]. He uses self-catheterization 3-4 times per day.      Once again, plan for discharge tomorrow

## 2022-04-30 NOTE — DISCHARGE SUMMARY
DISCHARGE DATE:      The patient is a 57-year-old man with hepatic cirrhosis and a history of esophageal varices who presented to the emergency room with abdominal pain, cough, fever to 102 degrees, and hemoptysis.       Physical exam revealed a middle-aged man of average build and in no distress.  His general exam was unremarkable.  I refer the reader to Dr. Preston Matthew's admit note for details regarding the presenting signs and symptoms.    LABORATORY DATA:  Laboratory studies included an abnormal chemistry profile with a blood sugar of 138, total bilirubin 2.1, albumin low at 3.4, ammonia mildly elevated at 34.  Lactic acid normal at 1.8.  Cardiac enzymes normal.  TSH normal.  ProTime 16.8 with an INR of 1.32.  H and H were 13 and 39.8 with a white count of 7900, platelet count was 40,000.  This was not much lower than his baseline.  Urinalysis was unremarkable.  Additional blood studies included another CBC on the day prior to discharge.  At that time, his platelet count had dropped to 27,000.  H and H were relatively stable at 11 and 34.  White count remained low at 2000.  On the day of discharge, his platelet count had come up to 34,000.  H and H remained unchanged, and white count remained 2000.     The patient was treated with IV Levaquin for the apparent bronchitis.  He had no further hemoptysis.  His cough had improved.  Overall, he is feeling much better.     He will be discharged to home in good condition.    DISCHARGE DIAGNOSES:    1. Acute bronchitis associated with small volume hemoptysis.  He is responding to antibiotic therapy.  2. 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  Platelet count was very low during this hospital stay.  In fact, it appeared to be dropping but as of today is going back up.  3. History of peripheral neuropathy.  4. Brittle diabetes mellitus.  5. Benign prostatic hyperplasia.   6. Chronic back pain.    DISCHARGE MEDICATIONS:  The same as admission except for the  use of Levaquin 500 daily x7 days.       Follow up with Dr. Matthew within 1-2 weeks.     It appears his liver disease is quite advanced, and he may need to be moved up on the transplant list, especially given the very low platelet count and propensity to bleed.        ______________________________  Pb Reynolds MD MSA/UD  DD:  02/25/2018  Time:  12:41PM  DT:  02/25/2018  Time:  01:06PM  Job #:  [id number]

## 2022-04-30 NOTE — H&P
Patient:   Colin Reilly            MRN: [id number]            FIN: [id number]               Age:   58 years     Sex:  Male     :  1960   Associated Diagnoses:   None   Author:   Jeancarlos Chandler MD      Basic Information   Source of history:  Self, Medical record.       Chief Complaint   58-year-old male with known history of cirrhosis thought to be due to EtOH fatty liver disease currently being followed [facility] for liver transplant evaluation.  This recent labs show an INR 1.4 platelets of 48,000.  Size had issues with mild hematemesis thought to be due to reflux esophagitis.  Last EGD was in 2018 for which she had to varices that were banded and avm that was ablated.  He presents today for variceal screening and intermittent dysphagia for which he had 2 episodes associated with Egan does have mild reflux controlled with medications.  Plan for upper endoscopy today with recommendations to follow.  He did admit that he had a EGD and sure sometime this past year      Review of Systems   Constitutional:  Negative.    Gastrointestinal:  Heartburn.       Health Status   Allergies:    Allergic Reactions (Selected)  No Known Allergies,    Allergies (1) Active Reaction  No Known Allergies None Documented   Current medications:  (Selected)   Inpatient Medications  Pending Complete  midazolam: 2 mg, form: Injection, IV Push, q5min, Order duration: 2 dose(s), first dose 14 7:48:00 CDT, stop date 14 7:57:00 CDT, (up to 5 mg for moderate anxiety)  Prescriptions  Prescribed  B-D PEN NDL RUDY 20CO4ZV() GRN: See Instructions, INJECT FOUR TIMES DAILY, # 100 unknown unit, 12 Refill(s), eRx: [facility] [id number]  Bystolic 10 mg oral tablet: 10 mg = 1 tab(s), Oral, Daily, # 30 tab(s), 1 Refill(s)  CYCLOBENZAPRINE 10MG TABLETS: See Instructions, TAKE 1 TABLET BY MOUTH TWICE DAILY, # 60 tab(s), 5 Refill(s), eRx: [facility] [id number]  Generlac 10 g/15 mL oral and rectal liquid: See  Instructions, TAKE 30 ML BY MOUTH FOUR TIMES DAILY, # 10,800 mL, 3 Refill(s), eRx: [facility] [id number]  Lasix 20 mg oral tablet: 80 mg = 4 tab(s), Oral, Daily, # 120 tab(s), 5 Refill(s), Pharmacy: [id number]  Levemir 100 units/mL subcutaneous solution: 74 units, Subcutaneous, Once a day (at bedtime), # 30 mL, 5 Refill(s)  Levemir FlexTouch 100 units/mL subcutaneous solution: 76 units, Subcutaneous, Daily, Take at bedtime, # 10 mL, 11 Refill(s), CARLOS, Pharmacy: [facility] [id number]  Nexium 40 mg oral delayed release cap ([facility] Substitution): See Instructions, TAKE 1 TABLET BY MOUTH EVERY DAY, # 90 tab(s), 3 Refill(s), eRx: Get[facility] [id number]  NovoLOG 100 units/mL injectable solution: 28 units, Subcutaneous, TIDAC, # 30 mL, 5 Refill(s)  acetaminophen-hydrocodone 325 mg-10 mg oral tablet: 1 tab(s), Oral, TID, PRN PRN for pain, NO EARLY REFILLS, # 90 tab(s), 0 Refill(s)  gabapentin 600 mg oral tablet: See Instructions, TAKE 1 TABLET BY MOUTH THREE TIMES DAILY, # 90 tab(s), 5 Refill(s), eRx: [facility] [id number]  Documented Medications  Documented  CYCLOBENZAPRINE 10MG TABLETS: 10 mg = 1 tab(s), Oral, BID  ESCITALOPRAM 20MG TABLETS: 20 mg = 1 tab(s), Oral, Daily  FINASTERIDE  TAB 5M mg = 1 tab(s), Oral, Daily  LEVOCETIRIZI TAB 5M mg = 1 tab(s), Oral, qPM  OXYBUTYNIN   TAB 5M mg = 1 tab(s), Oral, Daily  Remeron 15 mg oral tablet: 15 mg = 1 tab(s), Oral, Once a day (at bedtime), # 30 tab(s), 0 Refill(s)  SPIRONOLACT  TAB 25M mg = 4 tab(s), Oral, Daily  Suprep Bowel Prep Kit oral liquid: Oral, As Directed  TAMSULOSIN   CAP 0.4M.4 mg = 1 cap(s), Oral, Daily  Xifaxan 550 mg oral tablet: 550 mg = 1 tab(s), Oral, BID, # 60 tab(s), 0 Refill(s)  gabapentin 300 mg oral capsule: 300 mg = 1 cap(s), Oral, TID  lisinopril 10 mg oral tablet: 10 mg = 1 tab(s), Oral, Daily, # 30 tab(s), 0 Refill(s)  spironolactone 100 mg oral tablet: 100 mg = 1 tab(s), Oral, Daily   Problem  list:    All Problems (Selected)  Anxiety / SNOMED CT 46510319 / Confirmed  Cardiomyopathy / SNOMED CT 169430240 / Confirmed  Cirrhosis / SNOMED CT [id number] / Confirmed  Deficient knowledge / SNOMED CT 5662451192 / Confirmed  Diabetes mellitus type 1, uncontrolled, insulin dependent / SNOMED CT [id number] / Confirmed  DM - Diabetes mellitus / SNOMED CT 094585379 / Confirmed  Syncope / SNOMED CT 3172421112 / Confirmed  HTN - Hypertension / SNOMED CT 2609188980 / Confirmed  Insomnia / SNOMED CT 313045686 / Confirmed  Needs flu shot / SNOMED CT 791190289 / Confirmed  Neuropathy / SNOMED CT [id number] / Confirmed  Numbness of foot / SNOMED CT 749294684 / Confirmed  Obesity(  Probable Diagnosis  ) / SNOMED CT 5734190624 / Confirmed  Urinary retention / SNOMED CT 869701626 / Confirmed  Prostate cancer screening / SNOMED CT 066550831 / Confirmed,    Active Problems (15)  Anxiety   Cardiomyopathy   Cirrhosis   Deficient knowledge   Diabetes mellitus type 1, uncontrolled, insulin dependent   DM - Diabetes mellitus   HTN - Hypertension   Insomnia   Needs flu shot   Neuropathy   Numbness of foot   Obesity(  Probable Diagnosis  )   Prostate cancer screening   Syncope   Urinary retention       Histories   Past Medical History:    Active  Urinary retention (710237208)  Cirrhosis ([id number])  DM - Diabetes mellitus (774961594)  Anxiety (12942993)  HTN - Hypertension (0187964201)  Resolved  GI bleed ([id number]):  Resolved on 8/3/2015 at 54 years.  Comments:  6/21/2015 CDT 16:31 CDT - SYSTEM  Problem automatically updated based on documentation on Capillary Refills, Brachial Pulses, Radial Pulses, Femoral Pulses, Dorsalis Pulses, Ulnar pulses, Popliteal Pulses, Postibial Pulses, Edemas, Affect/Behavior, Orientation Assessment, Arterial Line Site.  Alcohol abuse  ([id number]):  Resolved on 8/3/2015 at 54 years.  Comments:  6/21/2015 CDT 16:31 CDT - SYSTEM  Problem automatically updated based on documentation on Capillary Refills, Brachial Pulses, Radial Pulses, Femoral Pulses, Dorsalis Pulses, Ulnar pulses, Popliteal Pulses, Postibial Pulses, Edemas, Affect/Behavior, Orientation Assessment, Arterial Line Site.  Esophageal varices (95353180):  Resolved on 8/3/2015 at 54 years.  Comments:  6/21/2015 CDT 16:31 CDT - SYSTEM  Problem automatically updated based on documentation on Capillary Refills, Brachial Pulses, Radial Pulses, Femoral Pulses, Dorsalis Pulses, Ulnar pulses, Popliteal Pulses, Postibial Pulses, Edemas, Affect/Behavior, Orientation Assessment, Arterial Line Site.  right kidney cyst (103614006):  Resolved on 8/3/2015 at 54 years.  Comments:  6/21/2015 CDT 16:31 CDT - SYSTEM  Problem automatically updated based on documentation on Capillary Refills, Brachial Pulses, Radial Pulses, Femoral Pulses, Dorsalis Pulses, Ulnar pulses, Popliteal Pulses, Postibial Pulses, Edemas, Affect/Behavior, Orientation Assessment, Arterial Line Site.  Enlarged prostate ([id number]):  Resolved on 8/3/2015 at 54 years.  Comments:  6/21/2015 CDT 16:31 CDT - SYSTEM  Problem automatically updated based on documentation on Capillary Refills, Brachial Pulses, Radial Pulses, Femoral Pulses, Dorsalis Pulses, Ulnar pulses, Popliteal Pulses, Postibial Pulses, Edemas, Affect/Behavior, Orientation Assessment, Arterial Line Site.  Depression ([id number]):  Resolved on 8/3/2015 at 54 years.  Comments:  6/21/2015 CDT 16:31 CDT - SYSTEM  Problem automatically updated based on documentation on Capillary Refills, Brachial Pulses, Radial Pulses, Femoral Pulses, Dorsalis Pulses, Ulnar pulses, Popliteal Pulses, Postibial Pulses, Edemas, Affect/Behavior, Orientation Assessment, Arterial Line Site.  Dizziness ([id number]):  Resolved  on 8/3/2015 at 54 years.  Comments:  6/21/2015 CDT 16:31 CDT - SYSTEM  Problem automatically updated based on documentation on Capillary Refills, Brachial Pulses, Radial Pulses, Femoral Pulses, Dorsalis Pulses, Ulnar pulses, Popliteal Pulses, Postibial Pulses, Edemas, Affect/Behavior, Orientation Assessment, Arterial Line Site.  Migraines ([id number]):  Resolved on 8/3/2015 at 54 years.  Comments:  6/21/2015 CDT 16:31 CDT - SYSTEM  Problem automatically updated based on documentation on Capillary Refills, Brachial Pulses, Radial Pulses, Femoral Pulses, Dorsalis Pulses, Ulnar pulses, Popliteal Pulses, Postibial Pulses, Edemas, Affect/Behavior, Orientation Assessment, Arterial Line Site.  Loss of memory ([id number]):  Resolved on 8/3/2015 at 54 years.  Comments:  6/21/2015 CDT 16:31 CDT - SYSTEM  Problem automatically updated based on documentation on Capillary Refills, Brachial Pulses, Radial Pulses, Femoral Pulses, Dorsalis Pulses, Ulnar pulses, Popliteal Pulses, Postibial Pulses, Edemas, Affect/Behavior, Orientation Assessment, Arterial Line Site.  Hiatal hernia ([id number]):  Resolved on 8/3/2015 at 54 years.  Comments:  6/21/2015 CDT 16:31 CDT - SYSTEM  Problem automatically updated based on documentation on Capillary Refills, Brachial Pulses, Radial Pulses, Femoral Pulses, Dorsalis Pulses, Ulnar pulses, Popliteal Pulses, Postibial Pulses, Edemas, Affect/Behavior, Orientation Assessment, Arterial Line Site.  Back injury (7504283854):  Resolved on 8/3/2015 at 54 years.  Comments:  6/21/2015 CDT 16:31 CDT - SYSTEM  Problem automatically updated based on documentation on Capillary Refills, Brachial Pulses, Radial Pulses, Femoral Pulses, Dorsalis Pulses, Ulnar pulses, Popliteal Pulses, Postibial Pulses, Edemas, Affect/Behavior, Orientation Assessment, Arterial Line Site.  Cholecystectomy (31223575):  Resolved on 8/3/2015 at 54 years.  Comments:  6/21/2015 CDT 16:31  CDT - SYSTEM  Problem automatically updated based on documentation on Capillary Refills, Brachial Pulses, Radial Pulses, Femoral Pulses, Dorsalis Pulses, Ulnar pulses, Popliteal Pulses, Postibial Pulses, Edemas, Affect/Behavior, Orientation Assessment, Arterial Line Site.  Repair of diaphragmatic hiatal hernia ([id number]):  Resolved on 8/3/2015 at 54 years.  Comments:  6/21/2015 CDT 16:31 CDT - SYSTEM  Problem automatically updated based on documentation on Capillary Refills, Brachial Pulses, Radial Pulses, Femoral Pulses, Dorsalis Pulses, Ulnar pulses, Popliteal Pulses, Postibial Pulses, Edemas, Affect/Behavior, Orientation Assessment, Arterial Line Site.  MRSA (methicillin resistant staph aureus) culture positive ([id number]):  Resolved.  Hepatic encephalopathy (17563917):  Resolved.   Family History:    Hyperlipidemia  Mother  Metastatic cancer  Brother     Procedure history:    Colonoscopy (None) on 2/21/2019 at 58 Years.  Comments:  2/21/2019 08:31 - Kelly Son RN  auto-populated from documented surgical case  Bleeder Control Gastrointestinal on 7/31/2018 at 57 Years.  Comments:  7/31/2018 15:12 - Trevor Meraz RN  auto-populated from documented surgical case  Esophageal Banding on 7/31/2018 at 57 Years.  Comments:  7/31/2018 15:12 - Trevor Meraz RN  auto-populated from documented surgical case  Esophagogastroduodenoscopy on 7/31/2018 at 57 Years.  Comments:  7/31/2018 15:Trevor Schroeder RN  auto-populated from documented surgical case  Esophagogastroduodenoscopy on 8/20/2014 at 53 Years.  Comments:  8/20/2014 17:46 - Trevor Meraz RN  auto-populated from documented surgical case  Esophageal Banding on 8/20/2014 at 53 Years.  Comments:  8/20/2014 17:46 - Trevor Meraz RN  auto-populated from documented surgical case  Esophageal Banding on 7/14/2014 at 53 Years.  Comments:  7/14/2014 18:16 - Belinda MONTENEGRO, Iban BARBER  auto-populated  from documented surgical case  Esophagogastroduodenoscopy on 7/14/2014 at 53 Years.  Comments:  7/14/2014 18:16 - Iban Trevino RN  auto-populated from documented surgical case  Polypectomy on 6/19/2014 at 53 Years.  Comments:  6/19/2014 10:04 - Iban Trevino RN  auto-populated from documented surgical case  Colonoscopy on 6/19/2014 at 53 Years.  Comments:  6/19/2014 10:Shona - Iban Trevino RN  auto-populated from documented surgical case  Esophageal Banding on 5/14/2014 at 53 Years.  Comments:  5/14/2014 13:31 - Katelin Cline RN  auto-populated from documented surgical case  Esophagogastroduodenoscopy on 5/14/2014 at 53 Years.  Comments:  5/14/2014 13:31 - Katelin Cline RN  auto-populated from documented surgical case  vericose veins removal in 2012 at 52 Years.  Cholecystectomy (18467597) in 2012 at 52 Years.  elbow sx in 2002 at 42 Years.  neck sx- fusion on C5 and C6 in 2002 at 42 Years.  Ulnar nerve (37915736) in 2002 at 42 Years.  Carpal tunnel (941825498) in 2002 at 42 Years.  Hernia (796147679) in 1992 at 32 Years.   Social History        Social & Psychosocial Habits    Alcohol  01/20/2013 Risk Assessment: Denies Alcohol Use    11/09/2014  Use: Past    Type: Beer, Liquor    Stopped at age: 42 Years    02/21/2019  Use: Past    Comment: STOPPED IN 2002 - 02/21/2019 07:37 - Jessica Paul LPN    Employment/School  04/08/2015  Status: Disabled, Unemployed    Exercise    Comment: never - 03/25/2015 14:56 - Caprice Weldon LPN    02/14/2019  Duration (average number of minutes): 30    Times per week: Daily    Self assessment: Good condition    Exercise type: Walking    03/11/2019  Duration (average number of minutes): 30    Times per week: Daily    Self assessment: Good condition    Exercise type: Walking    Comment: Walks a  mile a day - 03/11/2019 08:39 - Forest Santos    Home/Environment  02/07/2017  Lives with: Mother    Nutrition/Health  03/25/2015  Type of diet:  Diabetic    Sexual  04/08/2015  Sexually active: No    Substance Abuse  06/06/2014 Risk Assessment: Denies Substance Abuse    03/05/2018  Use: Never    Tobacco  01/20/2013 Risk Assessment: Denies Tobacco Use    12/19/2018  Use: Never smoker    Patient Wants Consult For Cessation Counseling No    02/14/2019  Use: Never (less than 100 in l    Patient Wants Consult For Cessation Counseling N/A    02/21/2019  Use: Never (less than 100 in l    Patient Wants Consult For Cessation Counseling N/A    03/11/2019  Use: Never (less than 100 in l    Type: Cigarettes    Patient Wants Consult For Cessation Counseling N/A.        Physical Examination   General:  Alert and oriented, No acute distress.    Eye:  Pupils are equal, round and reactive to light, Extraocular movements are intact, Normal conjunctiva.    HENT:  Normocephalic, Normal hearing, No pharyngeal erythema.    Neck:  Supple, Non-tender, No lymphadenopathy.    Respiratory:  Lungs are clear to auscultation, Respirations are non-labored, Breath sounds are equal.    Cardiovascular:  Normal rate, Regular rhythm, No murmur.    Gastrointestinal:  Soft, Non-tender, Non-distended, Normal bowel sounds, No organomegaly.       No qualifying data available   Lymphatics:  No lymphadenopathy neck, axilla, groin.    Musculoskeletal:  Normal range of motion, Normal strength, No tenderness, Normal gait.    Integumentary:  Warm, Moist, No rash.    Neurologic:  Alert, Oriented, Normal sensory, Normal motor function, No focal deficits.    Psychiatric:  Cooperative, Appropriate mood & affect.       Health Maintenance      Health Maintenance     Pending (in the next year)        Due            Aspirin Therapy for CVD Prevention due  03/05/19  and every 1  year(s)        Due In Future            Hypertension Management-Education not due until  06/04/19  and every 1  year(s)           Smoking Cessation not due until  09/24/19  and every 1  year(s)           Diabetes Maintenance-Fasting Lipid  Profile not due until  12/19/19  and every 1  year(s)           Diabetes Maintenance-Foot Exam not due until  12/19/19  and every 1  year(s)           Diabetes Maintenance-Microalbumin not due until  12/19/19  and every 1  year(s)           Hypertension Management-BMP not due until  01/25/20  and every 1  year(s)           Diabetes Maintenance-Serum Creatinine not due until  01/25/20  and every 1  year(s)           Diabetes Maintenance-Urine Dipstick not due until  01/31/20  and every 1  year(s)           ADL Screening not due until  02/14/20  and every 1  year(s)           Blood Pressure Screening not due until  03/10/20  and every 1  year(s)           Body Mass Index Check not due until  03/10/20  and every 1  year(s)           Diabetes Maintenance-HgbA1c not due until  03/10/20  and every 1  year(s)           Hypertension Management-Blood Pressure not due until  03/10/20  and every 1  year(s)           Obesity Screening not due until  03/11/20  and every 1  year(s)           Diabetes Maintenance-Eye Exam not due until  03/12/20  and every 1  year(s)     Satisfied (in the past 1 year)        Satisfied            ADL Screening on  02/14/19.  Satisfied by Forest Santos.           Blood Pressure Screening on  03/11/19.  Satisfied by Forest Santos.           Body Mass Index Check on  03/11/19.  Satisfied by Forest Santos.           Depression Screening on  12/19/18.  Satisfied by Caprice Weldon LPN           Diabetes Maintenance-Eye Exam on  03/13/19.  Satisfied by Forest Santos           Diabetes Maintenance-Urine Dipstick on  01/31/19.  Satisfied by Leora Ruelas MT           Diabetes Maintenance-Serum Creatinine on  01/25/19.  Satisfied by Mari Hernandez           Diabetes Maintenance-Foot Exam on  12/19/18.  Satisfied by Preston Matthew II, MD           Diabetes Maintenance-Microalbumin on  12/19/18.  Satisfied by Destiney Estrada MT           Diabetes  Maintenance-Fasting Lipid Profile on  12/19/18.  Satisfied by Destiney Estrada MT           Diabetes Maintenance-HgbA1c on  12/19/18.  Satisfied by Destiney Estrada MT           Diabetes Screening on  01/25/19.  Satisfied by Mari Hernandez           Hypertension Management-Blood Pressure on  03/11/19.  Satisfied by Forest Santos           Hypertension Management-BMP on  01/25/19.  Satisfied by Mari Hernandez           Hypertension Management-Education on  06/04/18.  Satisfied by Cristy Ramos  Reason: Expectation Satisfied Elsewhere           Influenza Vaccine on  09/17/18.  Satisfied by Caprice Weldon LPN           Lipid Screening on  12/19/18.  Satisfied by Destiney Estrada MT           Obesity Screening on  03/11/19.  Satisfied by Forest Santos           Smoking Cessation on  09/24/18.  Satisfied by Belkys Gonzales RN           Smoking Cessation (Coronary Artery Disease) on  09/24/18.  Satisfied by Belkys Gonzales RN           Smoking Cessation (Diabetes) on  09/24/18.  Satisfied by Belkys Gonzales RN        Postponed            Diabetes Maintenance-Eye Exam on  06/04/18.  Recorded by Cristy Ramos        Review / Management   Results review:     No qualifying data available.       Impression and Plan   plan for egd with recs to follow

## 2022-05-09 ENCOUNTER — LAB VISIT (OUTPATIENT)
Dept: LAB | Facility: HOSPITAL | Age: 62
End: 2022-05-09
Attending: INTERNAL MEDICINE
Payer: MEDICARE

## 2022-05-09 DIAGNOSIS — K70.30 CIRRHOSIS, LAENNEC'S: ICD-10-CM

## 2022-05-09 LAB
ALBUMIN SERPL-MCNC: 4.3 GM/DL (ref 3.4–4.8)
ALBUMIN/GLOB SERPL: 1.6 RATIO (ref 1.1–2)
ALP SERPL-CCNC: 110 UNIT/L (ref 40–150)
ALT SERPL-CCNC: 25 UNIT/L (ref 0–55)
AST SERPL-CCNC: 24 UNIT/L (ref 5–34)
BASOPHILS # BLD AUTO: 0.04 X10(3)/MCL (ref 0–0.2)
BASOPHILS NFR BLD AUTO: 0.8 %
BILIRUBIN DIRECT+TOT PNL SERPL-MCNC: 0.4 MG/DL (ref 0–0.5)
BILIRUBIN DIRECT+TOT PNL SERPL-MCNC: 0.9 MG/DL (ref 0–0.8)
BILIRUBIN DIRECT+TOT PNL SERPL-MCNC: 1.3 MG/DL
BUN SERPL-MCNC: 10.9 MG/DL (ref 8.4–25.7)
CALCIUM SERPL-MCNC: 9.3 MG/DL (ref 8.8–10)
CHLORIDE SERPL-SCNC: 99 MMOL/L (ref 98–107)
CO2 SERPL-SCNC: 28 MMOL/L (ref 23–31)
CREAT SERPL-MCNC: 0.92 MG/DL (ref 0.73–1.18)
EOSINOPHIL # BLD AUTO: 0.13 X10(3)/MCL (ref 0–0.9)
EOSINOPHIL NFR BLD AUTO: 2.4 %
ERYTHROCYTE [DISTWIDTH] IN BLOOD BY AUTOMATED COUNT: 14.7 % (ref 11.5–17)
GLOBULIN SER-MCNC: 2.7 GM/DL (ref 2.4–3.5)
GLUCOSE SERPL-MCNC: 217 MG/DL (ref 82–115)
HCT VFR BLD AUTO: 47.7 % (ref 42–52)
HGB BLD-MCNC: 15.2 GM/DL (ref 14–18)
IMM GRANULOCYTES # BLD AUTO: 0.05 X10(3)/MCL (ref 0–0.02)
IMM GRANULOCYTES NFR BLD AUTO: 0.9 % (ref 0–0.43)
INR BLD: 1.11 (ref 0–1.3)
LYMPHOCYTES # BLD AUTO: 1.02 X10(3)/MCL (ref 0.6–4.6)
LYMPHOCYTES NFR BLD AUTO: 19.1 %
MCH RBC QN AUTO: 25.5 PG (ref 27–31)
MCHC RBC AUTO-ENTMCNC: 31.9 MG/DL (ref 33–36)
MCV RBC AUTO: 80 FL (ref 80–94)
MONOCYTES # BLD AUTO: 0.43 X10(3)/MCL (ref 0.1–1.3)
MONOCYTES NFR BLD AUTO: 8.1 %
NEUTROPHILS # BLD AUTO: 3.7 X10(3)/MCL (ref 2.1–9.2)
NEUTROPHILS NFR BLD AUTO: 68.7 %
NRBC BLD AUTO-RTO: 0 %
PLATELET # BLD AUTO: 160 X10(3)/MCL (ref 130–400)
PMV BLD AUTO: 11.8 FL (ref 9.4–12.4)
POTASSIUM SERPL-SCNC: 4.5 MMOL/L (ref 3.5–5.1)
PROT SERPL-MCNC: 7 GM/DL (ref 5.8–7.6)
PROTHROMBIN TIME: 14.1 SECONDS (ref 12.5–14.5)
RBC # BLD AUTO: 5.96 X10(6)/MCL (ref 4.7–6.1)
SODIUM SERPL-SCNC: 137 MMOL/L (ref 136–145)
WBC # SPEC AUTO: 5.3 X10(3)/MCL (ref 4.5–11.5)

## 2022-05-09 PROCEDURE — 36415 COLL VENOUS BLD VENIPUNCTURE: CPT

## 2022-05-09 PROCEDURE — 85610 PROTHROMBIN TIME: CPT

## 2022-05-09 PROCEDURE — 85025 COMPLETE CBC W/AUTO DIFF WBC: CPT

## 2022-05-11 ENCOUNTER — TELEPHONE (OUTPATIENT)
Dept: TRANSPLANT | Facility: CLINIC | Age: 62
End: 2022-05-11
Payer: MEDICARE

## 2022-05-11 DIAGNOSIS — Z94.4 LIVER REPLACED BY TRANSPLANT: Primary | ICD-10-CM

## 2022-05-11 NOTE — TELEPHONE ENCOUNTER
Labs reviewed. No changes. Pt will repeat labs 5/16/22.----- Message from Elizabeth Meneses MD sent at 5/9/2022  9:26 PM CDT -----  The Labs are stable; need prograf level- please let patient know.

## 2022-05-12 DIAGNOSIS — Z94.4 LIVER TRANSPLANTED: ICD-10-CM

## 2022-05-12 RX ORDER — HYDROCODONE BITARTRATE AND ACETAMINOPHEN 10; 325 MG/1; MG/1
1 TABLET ORAL EVERY 12 HOURS PRN
Qty: 60 TABLET | Refills: 0 | Status: SHIPPED | OUTPATIENT
Start: 2022-05-12 | End: 2022-06-09 | Stop reason: SDUPTHER

## 2022-05-12 NOTE — TELEPHONE ENCOUNTER
----- Message from Cookie Pinzon sent at 5/12/2022  9:11 AM CDT -----  Regarding: med  Pt is req refill of pain med  [facility] - [place]

## 2022-05-17 ENCOUNTER — LAB VISIT (OUTPATIENT)
Dept: LAB | Facility: HOSPITAL | Age: 62
End: 2022-05-17
Attending: INTERNAL MEDICINE
Payer: MEDICARE

## 2022-05-17 DIAGNOSIS — Z94.4 LIVER REPLACED BY TRANSPLANT: ICD-10-CM

## 2022-05-17 LAB
ALBUMIN SERPL-MCNC: 4.3 GM/DL (ref 3.4–4.8)
ALBUMIN/GLOB SERPL: 1.5 RATIO (ref 1.1–2)
ALP SERPL-CCNC: 90 UNIT/L (ref 40–150)
ALT SERPL-CCNC: 21 UNIT/L (ref 0–55)
AST SERPL-CCNC: 20 UNIT/L (ref 5–34)
BASOPHILS # BLD AUTO: 0.04 X10(3)/MCL (ref 0–0.2)
BASOPHILS NFR BLD AUTO: 0.8 %
BILIRUBIN DIRECT+TOT PNL SERPL-MCNC: 1.2 MG/DL
BUN SERPL-MCNC: 7 MG/DL (ref 8.4–25.7)
CALCIUM SERPL-MCNC: 9.2 MG/DL (ref 8.8–10)
CHLORIDE SERPL-SCNC: 104 MMOL/L (ref 98–107)
CO2 SERPL-SCNC: 27 MMOL/L (ref 23–31)
CREAT SERPL-MCNC: 1.14 MG/DL (ref 0.73–1.18)
EOSINOPHIL # BLD AUTO: 0.08 X10(3)/MCL (ref 0–0.9)
EOSINOPHIL NFR BLD AUTO: 1.5 %
ERYTHROCYTE [DISTWIDTH] IN BLOOD BY AUTOMATED COUNT: 14.8 % (ref 11.5–17)
GLOBULIN SER-MCNC: 2.8 GM/DL (ref 2.4–3.5)
GLUCOSE SERPL-MCNC: 236 MG/DL (ref 82–115)
HCT VFR BLD AUTO: 46.6 % (ref 42–52)
HGB BLD-MCNC: 14.9 GM/DL (ref 14–18)
IMM GRANULOCYTES # BLD AUTO: 0.04 X10(3)/MCL (ref 0–0.02)
IMM GRANULOCYTES NFR BLD AUTO: 0.8 % (ref 0–0.43)
LYMPHOCYTES # BLD AUTO: 0.99 X10(3)/MCL (ref 0.6–4.6)
LYMPHOCYTES NFR BLD AUTO: 18.8 %
MCH RBC QN AUTO: 26.2 PG (ref 27–31)
MCHC RBC AUTO-ENTMCNC: 32 MG/DL (ref 33–36)
MCV RBC AUTO: 82 FL (ref 80–94)
MONOCYTES # BLD AUTO: 0.44 X10(3)/MCL (ref 0.1–1.3)
MONOCYTES NFR BLD AUTO: 8.3 %
NEUTROPHILS # BLD AUTO: 3.7 X10(3)/MCL (ref 2.1–9.2)
NEUTROPHILS NFR BLD AUTO: 69.8 %
PLATELET # BLD AUTO: 138 X10(3)/MCL (ref 130–400)
PMV BLD AUTO: 12.1 FL (ref 9.4–12.4)
POTASSIUM SERPL-SCNC: 4.8 MMOL/L (ref 3.5–5.1)
PROT SERPL-MCNC: 7.1 GM/DL (ref 5.8–7.6)
RBC # BLD AUTO: 5.68 X10(6)/MCL (ref 4.7–6.1)
SODIUM SERPL-SCNC: 138 MMOL/L (ref 136–145)
WBC # SPEC AUTO: 5.3 X10(3)/MCL (ref 4.5–11.5)

## 2022-05-17 PROCEDURE — 80053 COMPREHEN METABOLIC PANEL: CPT

## 2022-05-17 PROCEDURE — 80197 ASSAY OF TACROLIMUS: CPT

## 2022-05-17 PROCEDURE — 36415 COLL VENOUS BLD VENIPUNCTURE: CPT

## 2022-05-17 PROCEDURE — 85025 COMPLETE CBC W/AUTO DIFF WBC: CPT

## 2022-05-18 LAB — TACROLIMUS TROUGH BLD-MCNC: 3.1 NG/ML

## 2022-05-26 ENCOUNTER — OFFICE VISIT (OUTPATIENT)
Dept: INTERNAL MEDICINE | Facility: CLINIC | Age: 62
End: 2022-05-26
Payer: MEDICARE

## 2022-05-26 ENCOUNTER — LAB VISIT (OUTPATIENT)
Dept: LAB | Facility: HOSPITAL | Age: 62
End: 2022-05-26
Attending: INTERNAL MEDICINE
Payer: MEDICARE

## 2022-05-26 VITALS
HEIGHT: 70 IN | OXYGEN SATURATION: 98 % | TEMPERATURE: 99 F | HEART RATE: 70 BPM | WEIGHT: 205 LBS | DIASTOLIC BLOOD PRESSURE: 80 MMHG | SYSTOLIC BLOOD PRESSURE: 132 MMHG | RESPIRATION RATE: 16 BRPM | BODY MASS INDEX: 29.35 KG/M2

## 2022-05-26 DIAGNOSIS — E11.42 DIABETIC PERIPHERAL NEUROPATHY: ICD-10-CM

## 2022-05-26 DIAGNOSIS — G62.9 NEUROPATHY: ICD-10-CM

## 2022-05-26 DIAGNOSIS — Z94.4 LIVER REPLACED BY TRANSPLANT: ICD-10-CM

## 2022-05-26 DIAGNOSIS — Z79.891 LONG TERM PRESCRIPTION OPIATE USE: ICD-10-CM

## 2022-05-26 DIAGNOSIS — D69.6 THROMBOCYTOPENIA, UNSPECIFIED: ICD-10-CM

## 2022-05-26 DIAGNOSIS — K74.60 HEPATIC CIRRHOSIS, UNSPECIFIED HEPATIC CIRRHOSIS TYPE, UNSPECIFIED WHETHER ASCITES PRESENT: ICD-10-CM

## 2022-05-26 DIAGNOSIS — K42.9 UMBILICAL HERNIA WITHOUT OBSTRUCTION AND WITHOUT GANGRENE: ICD-10-CM

## 2022-05-26 DIAGNOSIS — G89.29 CHRONIC LOW BACK PAIN WITH SCIATICA, SCIATICA LATERALITY UNSPECIFIED, UNSPECIFIED BACK PAIN LATERALITY: ICD-10-CM

## 2022-05-26 DIAGNOSIS — E11.9 TYPE 2 DIABETES MELLITUS WITHOUT COMPLICATION, WITH LONG-TERM CURRENT USE OF INSULIN: ICD-10-CM

## 2022-05-26 DIAGNOSIS — Z94.4 HISTORY OF LIVER TRANSPLANT: ICD-10-CM

## 2022-05-26 DIAGNOSIS — E11.9 DIABETES MELLITUS WITHOUT COMPLICATION: ICD-10-CM

## 2022-05-26 DIAGNOSIS — F10.11 ALCOHOL ABUSE, IN REMISSION: Primary | ICD-10-CM

## 2022-05-26 DIAGNOSIS — Z79.60 LONG-TERM USE OF IMMUNOSUPPRESSANT MEDICATION: ICD-10-CM

## 2022-05-26 DIAGNOSIS — Z79.4 TYPE 2 DIABETES MELLITUS WITHOUT COMPLICATION, WITH LONG-TERM CURRENT USE OF INSULIN: ICD-10-CM

## 2022-05-26 DIAGNOSIS — Z94.4 LIVER TRANSPLANTED: ICD-10-CM

## 2022-05-26 DIAGNOSIS — K72.10 END STAGE LIVER DISEASE: ICD-10-CM

## 2022-05-26 DIAGNOSIS — M54.40 CHRONIC LOW BACK PAIN WITH SCIATICA, SCIATICA LATERALITY UNSPECIFIED, UNSPECIFIED BACK PAIN LATERALITY: ICD-10-CM

## 2022-05-26 DIAGNOSIS — Z12.5 SCREENING FOR PROSTATE CANCER: ICD-10-CM

## 2022-05-26 DIAGNOSIS — Z79.899 ENCOUNTER FOR LONG-TERM (CURRENT) USE OF OTHER MEDICATIONS: Primary | ICD-10-CM

## 2022-05-26 DIAGNOSIS — D63.8 ANEMIA OF CHRONIC DISEASE: ICD-10-CM

## 2022-05-26 PROBLEM — I10 HYPERTENSION: Status: ACTIVE | Noted: 2022-05-26

## 2022-05-26 PROBLEM — F32.5 MAJOR DEPRESSION IN REMISSION: Status: ACTIVE | Noted: 2022-05-26

## 2022-05-26 LAB
ALBUMIN SERPL-MCNC: 4.2 GM/DL (ref 3.4–4.8)
ALBUMIN/GLOB SERPL: 1.7 RATIO (ref 1.1–2)
ALP SERPL-CCNC: 97 UNIT/L (ref 40–150)
ALT SERPL-CCNC: 19 UNIT/L (ref 0–55)
APPEARANCE UR: CLEAR
AST SERPL-CCNC: 19 UNIT/L (ref 5–34)
BACTERIA #/AREA URNS AUTO: ABNORMAL /HPF
BASOPHILS # BLD AUTO: 0.03 X10(3)/MCL (ref 0–0.2)
BASOPHILS NFR BLD AUTO: 0.6 %
BILIRUB UR QL STRIP.AUTO: NEGATIVE MG/DL
BILIRUBIN DIRECT+TOT PNL SERPL-MCNC: 1 MG/DL
BUN SERPL-MCNC: 11.4 MG/DL (ref 8.4–25.7)
CALCIUM SERPL-MCNC: 8.8 MG/DL (ref 8.8–10)
CHLORIDE SERPL-SCNC: 106 MMOL/L (ref 98–107)
CHOLEST SERPL-MCNC: 175 MG/DL
CHOLEST/HDLC SERPL: 6 {RATIO} (ref 0–5)
CO2 SERPL-SCNC: 27 MMOL/L (ref 23–31)
COLOR UR AUTO: ABNORMAL
CREAT SERPL-MCNC: 0.82 MG/DL (ref 0.73–1.18)
CREAT UR-MCNC: 266.7 MG/DL (ref 63–166)
EOSINOPHIL # BLD AUTO: 0.1 X10(3)/MCL (ref 0–0.9)
EOSINOPHIL NFR BLD AUTO: 2.1 %
ERYTHROCYTE [DISTWIDTH] IN BLOOD BY AUTOMATED COUNT: 15.1 % (ref 11.5–17)
EST. AVERAGE GLUCOSE BLD GHB EST-MCNC: 185.8 MG/DL
GLOBULIN SER-MCNC: 2.5 GM/DL (ref 2.4–3.5)
GLUCOSE SERPL-MCNC: 61 MG/DL (ref 82–115)
GLUCOSE UR QL STRIP.AUTO: ABNORMAL MG/DL
HBA1C MFR BLD: 8.1 %
HCT VFR BLD AUTO: 45.8 % (ref 42–52)
HDLC SERPL-MCNC: 27 MG/DL (ref 35–60)
HGB BLD-MCNC: 14.3 GM/DL (ref 14–18)
IMM GRANULOCYTES # BLD AUTO: 0.04 X10(3)/MCL (ref 0–0.02)
IMM GRANULOCYTES NFR BLD AUTO: 0.8 % (ref 0–0.43)
KETONES UR QL STRIP.AUTO: ABNORMAL MG/DL
LDLC SERPL CALC-MCNC: 105 MG/DL (ref 50–140)
LEUKOCYTE ESTERASE UR QL STRIP.AUTO: ABNORMAL UNIT/L
LYMPHOCYTES # BLD AUTO: 0.91 X10(3)/MCL (ref 0.6–4.6)
LYMPHOCYTES NFR BLD AUTO: 18.8 %
MCH RBC QN AUTO: 25.8 PG (ref 27–31)
MCHC RBC AUTO-ENTMCNC: 31.2 MG/DL (ref 33–36)
MCV RBC AUTO: 82.5 FL (ref 80–94)
MICROALBUMIN UR-MCNC: 85.3 UG/ML
MICROALBUMIN/CREAT RATIO PNL UR: 32 MG/GM CR (ref 0–30)
MONOCYTES # BLD AUTO: 0.44 X10(3)/MCL (ref 0.1–1.3)
MONOCYTES NFR BLD AUTO: 9.1 %
NEUTROPHILS # BLD AUTO: 3.3 X10(3)/MCL (ref 2.1–9.2)
NEUTROPHILS NFR BLD AUTO: 68.6 %
NITRITE UR QL STRIP.AUTO: NEGATIVE
NRBC BLD AUTO-RTO: 0 %
PH UR STRIP.AUTO: 6 [PH]
PLATELET # BLD AUTO: 127 X10(3)/MCL (ref 130–400)
PMV BLD AUTO: 12 FL (ref 9.4–12.4)
POTASSIUM SERPL-SCNC: 3.9 MMOL/L (ref 3.5–5.1)
PROT SERPL-MCNC: 6.7 GM/DL (ref 5.8–7.6)
PROT UR QL STRIP.AUTO: ABNORMAL MG/DL
RBC # BLD AUTO: 5.55 X10(6)/MCL (ref 4.7–6.1)
RBC #/AREA URNS AUTO: <5 /HPF
RBC UR QL AUTO: NEGATIVE UNIT/L
SODIUM SERPL-SCNC: 144 MMOL/L (ref 136–145)
SP GR UR STRIP.AUTO: 1.03 (ref 1–1.03)
SQUAMOUS #/AREA URNS AUTO: <4 /LPF
TRIGL SERPL-MCNC: 213 MG/DL (ref 34–140)
TSH SERPL-ACNC: 1.98 UIU/ML (ref 0.35–4.94)
UROBILINOGEN UR STRIP-ACNC: 1 MG/DL
VLDLC SERPL CALC-MCNC: 43 MG/DL
WBC # SPEC AUTO: 4.9 X10(3)/MCL (ref 4.5–11.5)
WBC #/AREA URNS AUTO: 55 /HPF

## 2022-05-26 PROCEDURE — 3079F PR MOST RECENT DIASTOLIC BLOOD PRESSURE 80-89 MM HG: ICD-10-PCS | Mod: CPTII,,, | Performed by: INTERNAL MEDICINE

## 2022-05-26 PROCEDURE — 3008F PR BODY MASS INDEX (BMI) DOCUMENTED: ICD-10-PCS | Mod: CPTII,,, | Performed by: INTERNAL MEDICINE

## 2022-05-26 PROCEDURE — 99214 PR OFFICE/OUTPT VISIT, EST, LEVL IV, 30-39 MIN: ICD-10-PCS | Mod: ,,, | Performed by: INTERNAL MEDICINE

## 2022-05-26 PROCEDURE — 3075F SYST BP GE 130 - 139MM HG: CPT | Mod: CPTII,,, | Performed by: INTERNAL MEDICINE

## 2022-05-26 PROCEDURE — 3079F DIAST BP 80-89 MM HG: CPT | Mod: CPTII,,, | Performed by: INTERNAL MEDICINE

## 2022-05-26 PROCEDURE — 3052F HG A1C>EQUAL 8.0%<EQUAL 9.0%: CPT | Mod: CPTII,,, | Performed by: INTERNAL MEDICINE

## 2022-05-26 PROCEDURE — 81001 URINALYSIS AUTO W/SCOPE: CPT

## 2022-05-26 PROCEDURE — 3075F PR MOST RECENT SYSTOLIC BLOOD PRESS GE 130-139MM HG: ICD-10-PCS | Mod: CPTII,,, | Performed by: INTERNAL MEDICINE

## 2022-05-26 PROCEDURE — 80053 COMPREHEN METABOLIC PANEL: CPT

## 2022-05-26 PROCEDURE — 82043 UR ALBUMIN QUANTITATIVE: CPT

## 2022-05-26 PROCEDURE — 80061 LIPID PANEL: CPT

## 2022-05-26 PROCEDURE — 3066F PR DOCUMENTATION OF TREATMENT FOR NEPHROPATHY: ICD-10-PCS | Mod: CPTII,,, | Performed by: INTERNAL MEDICINE

## 2022-05-26 PROCEDURE — 3052F PR MOST RECENT HEMOGLOBIN A1C LEVEL 8.0 - < 9.0%: ICD-10-PCS | Mod: CPTII,,, | Performed by: INTERNAL MEDICINE

## 2022-05-26 PROCEDURE — 3060F PR POS MICROALBUMINURIA RESULT DOCUMENTED/REVIEW: ICD-10-PCS | Mod: CPTII,,, | Performed by: INTERNAL MEDICINE

## 2022-05-26 PROCEDURE — 36415 COLL VENOUS BLD VENIPUNCTURE: CPT

## 2022-05-26 PROCEDURE — 1159F MED LIST DOCD IN RCRD: CPT | Mod: CPTII,,, | Performed by: INTERNAL MEDICINE

## 2022-05-26 PROCEDURE — 84443 ASSAY THYROID STIM HORMONE: CPT

## 2022-05-26 PROCEDURE — 99214 OFFICE O/P EST MOD 30 MIN: CPT | Mod: ,,, | Performed by: INTERNAL MEDICINE

## 2022-05-26 PROCEDURE — 3008F BODY MASS INDEX DOCD: CPT | Mod: CPTII,,, | Performed by: INTERNAL MEDICINE

## 2022-05-26 PROCEDURE — 3060F POS MICROALBUMINURIA REV: CPT | Mod: CPTII,,, | Performed by: INTERNAL MEDICINE

## 2022-05-26 PROCEDURE — 1160F PR REVIEW ALL MEDS BY PRESCRIBER/CLIN PHARMACIST DOCUMENTED: ICD-10-PCS | Mod: CPTII,,, | Performed by: INTERNAL MEDICINE

## 2022-05-26 PROCEDURE — 1160F RVW MEDS BY RX/DR IN RCRD: CPT | Mod: CPTII,,, | Performed by: INTERNAL MEDICINE

## 2022-05-26 PROCEDURE — 85025 COMPLETE CBC W/AUTO DIFF WBC: CPT

## 2022-05-26 PROCEDURE — 83036 HEMOGLOBIN GLYCOSYLATED A1C: CPT

## 2022-05-26 PROCEDURE — 1159F PR MEDICATION LIST DOCUMENTED IN MEDICAL RECORD: ICD-10-PCS | Mod: CPTII,,, | Performed by: INTERNAL MEDICINE

## 2022-05-26 PROCEDURE — 3066F NEPHROPATHY DOC TX: CPT | Mod: CPTII,,, | Performed by: INTERNAL MEDICINE

## 2022-05-26 RX ORDER — OMEPRAZOLE 40 MG/1
40 CAPSULE, DELAYED RELEASE ORAL DAILY
COMMUNITY
Start: 2022-05-02

## 2022-05-26 RX ORDER — METFORMIN HYDROCHLORIDE 500 MG/1
500 TABLET ORAL
Qty: 30 TABLET | Refills: 11 | Status: SHIPPED | OUTPATIENT
Start: 2022-05-26 | End: 2022-11-21

## 2022-05-26 RX ORDER — TAMSULOSIN HYDROCHLORIDE 0.4 MG/1
0.4 CAPSULE ORAL DAILY
Status: ON HOLD | COMMUNITY
Start: 2022-04-14 | End: 2022-07-15

## 2022-05-26 RX ORDER — SILDENAFIL 100 MG/1
100 TABLET, FILM COATED ORAL DAILY
COMMUNITY
Start: 2022-01-20

## 2022-05-26 NOTE — PROGRESS NOTES
"Subjective:       Patient ID: Colin Reilly is a 61 y.o. male.    Chief Complaint: Diabetes, Gastroesophageal Reflux, Back Pain, and Benign Prostatic Hypertrophy    61-year-old male was evaluated today for follow-up of chronic liver disease with cirrhosis, diabetes, and neuropathy among other conditions.      Review of Systems   Constitutional: Negative for fever.   HENT: Negative for nosebleeds.    Eyes: Negative for visual disturbance.   Respiratory: Negative for shortness of breath.    Cardiovascular: Negative for chest pain.   Gastrointestinal: Negative for abdominal pain.   Genitourinary: Negative for dysuria.   Musculoskeletal: Negative for gait problem.   Neurological: Negative for headaches.         Objective:      Physical Exam  HENT:      Head: Normocephalic.      Mouth/Throat:      Pharynx: Oropharynx is clear.   Eyes:      Extraocular Movements: Extraocular movements intact.   Cardiovascular:      Rate and Rhythm: Normal rate and regular rhythm.   Pulmonary:      Breath sounds: Normal breath sounds.   Abdominal:      Palpations: Abdomen is soft.   Musculoskeletal:         General: No swelling.   Skin:     General: Skin is warm.   Neurological:      General: No focal deficit present.      Mental Status: He is alert and oriented to person, place, and time.   Psychiatric:         Mood and Affect: Mood normal.         Vitals:    05/26/22 1312   BP: 132/80   Pulse: 70   Resp: 16   Temp: 98.6 °F (37 °C)   SpO2: 98%   Weight: 93 kg (205 lb)   Height: 5' 10" (1.778 m)      Assessment:       Problem List Items Addressed This Visit        Neuro    Diabetic peripheral neuropathy    Relevant Medications    metFORMIN (GLUCOPHAGE) 500 MG tablet    Other Relevant Orders    CBC Auto Differential    Comprehensive Metabolic Panel    Lipid Panel    Urinalysis, Reflex to Urine Culture Urine, Clean Catch    Microalbumin/Creatinine Ratio, Urine    Hemoglobin A1C       Psychiatric    Alcohol abuse, in remission - Primary    " Relevant Orders    CBC Auto Differential    Comprehensive Metabolic Panel    Lipid Panel    Urinalysis, Reflex to Urine Culture Urine, Clean Catch    Microalbumin/Creatinine Ratio, Urine    Hemoglobin A1C    Long term prescription opiate use    Relevant Orders    CBC Auto Differential    Comprehensive Metabolic Panel    Lipid Panel    Urinalysis, Reflex to Urine Culture Urine, Clean Catch    Microalbumin/Creatinine Ratio, Urine    Hemoglobin A1C       Hematology    Thrombocytopenia, unspecified    Relevant Orders    CBC Auto Differential    Comprehensive Metabolic Panel    Lipid Panel    Urinalysis, Reflex to Urine Culture Urine, Clean Catch    Microalbumin/Creatinine Ratio, Urine    Hemoglobin A1C       Oncology    Anemia of chronic disease    Relevant Orders    CBC Auto Differential    Comprehensive Metabolic Panel    Lipid Panel    Urinalysis, Reflex to Urine Culture Urine, Clean Catch    Microalbumin/Creatinine Ratio, Urine    Hemoglobin A1C       Endocrine    Type 2 diabetes mellitus without complication, with long-term current use of insulin    Relevant Medications    metFORMIN (GLUCOPHAGE) 500 MG tablet    Other Relevant Orders    CBC Auto Differential    Comprehensive Metabolic Panel    Lipid Panel    Urinalysis, Reflex to Urine Culture Urine, Clean Catch    Microalbumin/Creatinine Ratio, Urine    Hemoglobin A1C       GI    Liver transplanted    Relevant Orders    CBC Auto Differential    Comprehensive Metabolic Panel    Lipid Panel    Urinalysis, Reflex to Urine Culture Urine, Clean Catch    Microalbumin/Creatinine Ratio, Urine    Hemoglobin A1C    End stage liver disease    Relevant Orders    CBC Auto Differential    Comprehensive Metabolic Panel    Lipid Panel    Urinalysis, Reflex to Urine Culture Urine, Clean Catch    Microalbumin/Creatinine Ratio, Urine    Hemoglobin A1C    History of liver transplant    Relevant Orders    CBC Auto Differential    Comprehensive Metabolic Panel    Lipid Panel     Urinalysis, Reflex to Urine Culture Urine, Clean Catch    Microalbumin/Creatinine Ratio, Urine    Hemoglobin A1C    Umbilical hernia    Relevant Orders    CBC Auto Differential    Comprehensive Metabolic Panel    Lipid Panel    Urinalysis, Reflex to Urine Culture Urine, Clean Catch    Microalbumin/Creatinine Ratio, Urine    Hemoglobin A1C       Orthopedic    Chronic back pain    Relevant Orders    CBC Auto Differential    Comprehensive Metabolic Panel    Lipid Panel    Urinalysis, Reflex to Urine Culture Urine, Clean Catch    Microalbumin/Creatinine Ratio, Urine    Hemoglobin A1C       Palliative Care    Long-term use of immunosuppressant medication    Relevant Orders    CBC Auto Differential    Comprehensive Metabolic Panel    Lipid Panel    Urinalysis, Reflex to Urine Culture Urine, Clean Catch    Microalbumin/Creatinine Ratio, Urine    Hemoglobin A1C          Medication List with Changes/Refills   New Medications    METFORMIN (GLUCOPHAGE) 500 MG TABLET    Take 1 tablet (500 mg total) by mouth daily with breakfast.   Current Medications    ASPIRIN 81 MG CHEW    Take 1 tablet (81 mg total) by mouth once daily.    BISACODYL (DULCOLAX) 5 MG EC TABLET    Take 2 tablets (10 mg total) by mouth daily as needed for Constipation.    BLOOD SUGAR DIAGNOSTIC (TRUE METRIX GLUCOSE TEST STRIP MISC)      TRUE METRIX SELF MONITORING BLOOD GLUCOSE STRIPS   Strip, See Instructions, TEST BLOOD SUGAR FOUR TIMES DAILY, # 400 unknown unit, 3 Refill(s), Pharmacy: [facility], 180, cm, Height/Length Dosing, 02/10/21 9:33:00 CST, 90.718, kg, W...    EASY TOUCH INSULIN SYRINGE 1 ML 31 GAUGE X 5/16 SYRG        ESCITALOPRAM OXALATE (LEXAPRO) 20 MG TABLET    Take 20 mg by mouth once daily.     FAMOTIDINE (PEPCID) 20 MG TABLET    Take 1 tablet (20 mg total) by mouth every evening.    FINASTERIDE (PROSCAR) 5 MG TABLET    Take 1 tablet (5 mg total) by mouth once daily.    GABAPENTIN (NEURONTIN) 600 MG TABLET    Take 1 tablet  "(600 mg total) by mouth 3 (three) times daily.    HYDROCODONE-ACETAMINOPHEN (NORCO)  MG PER TABLET    Take 1 tablet by mouth every 12 (twelve) hours as needed for Pain.    INSULIN ASPART U-100 (NOVOLOG FLEXPEN U-100 INSULIN) 100 UNIT/ML (3 ML) INPN PEN    Inject 10 Units into the skin 3 (three) times daily before meals.    LEVEMIR FLEXTOUCH U-100 INSULN 100 UNIT/ML (3 ML) INPN PEN    Inject 25 Units into the skin every evening.    OMEPRAZOLE (PRILOSEC) 40 MG CAPSULE    Take 40 mg by mouth once daily.    OXYBUTYNIN (DITROPAN) 5 MG TAB    Take 5 mg by mouth once daily.    PEN NEEDLE, DIABETIC 32 GAUGE X 5/32" NDLE    use one pen needle  3 (three) times daily with insulin pen    SILDENAFIL (VIAGRA) 100 MG TABLET    Take 100 mg by mouth once daily.    TACROLIMUS (PROGRAF) 1 MG CAP    Take 2 capsules (2 mg total) by mouth every 12 (twelve) hours.    TAMSULOSIN (FLOMAX) 0.4 MG CAP    Take 0.4 mg by mouth once daily.    TRUE METRIX GLUCOSE TEST STRIP STRP    use to test blood sugar 4 times daily    TRUEPLUS LANCETS 30 GAUGE MISC       Discontinued Medications    GABAPENTIN (NEURONTIN) 600 MG TABLET    TAKE 1 TABLET BY MOUTH THREE TIMES DAILY        Plan:       1. Chronic liver disease with cirrhosis: Followed by Dr. Chandler. He had liver transplant in  and is doing very well. No longer on diuretics. Continue immunosuppressants    2. Uncontrolled diabetes: Hemoglobin A1c was 8.9 and is now 8.1. He takes NovoLog sliding scale and Levemir.  We discussed dietary changes.  He is still sprinkling sugar on his food etc. Increased Levemir to 25 units at last visit, start metformin 500 mg daily    3.  Thrombocytopenia: Stable and improved since liver transplant    4. Chronic back pain: Continue Norco and gabapentin. His pain management physician, Dr. Narayan Enriquez,  suddenly, so we will prescribe Norco for him. Decreased to BID in 2019.     5. Chronic rhinosinusitis: Continue fluticasone    6. Diabetic peripheral " Neuropathy: Decreased gabapentin at last visit because of edema.  He is considering stopping it altogether    7. Insomnia: Continue trazodone    8. Hypertension: Stable    9. Benign prostatic hypertrophy: Continue Flomax. Followed by Dr. Ann and urology at [facility]. Self-cath 4 times per day    10. Major Depression in remission: Continue Lexapro. He went to an inpatient facility in 2017 in [facility]) and is doing better.    11. Wellness: Pneumovax 2021    Patient needs diabetic shoes and inserts    He lives in a trailer with his mother in [place]. They go to a [employer].

## 2022-05-27 ENCOUNTER — TELEPHONE (OUTPATIENT)
Dept: TRANSPLANT | Facility: CLINIC | Age: 62
End: 2022-05-27
Payer: MEDICARE

## 2022-05-27 LAB — TACROLIMUS TROUGH BLD-MCNC: 4.3 NG/ML

## 2022-05-27 NOTE — TELEPHONE ENCOUNTER
Returned call reviewed labs that were back with patient, did let him know the doctor had not reviewed them yet.  He will get a letter to let him know when next labs are due.    ----- Message from Lukas Swan sent at 5/27/2022 11:52 AM CDT -----  Regarding: speak to nurse  Contact: Colin Stephens is calling to speak to nurse. Reason unspecified.    Contact: [phone]

## 2022-05-28 LAB — BACTERIA UR CULT: NO GROWTH

## 2022-06-01 DIAGNOSIS — Z94.4 LIVER REPLACED BY TRANSPLANT: Primary | ICD-10-CM

## 2022-06-01 RX ORDER — TACROLIMUS 1 MG/1
CAPSULE ORAL
Qty: 150 CAPSULE | Refills: 11 | Status: ON HOLD | OUTPATIENT
Start: 2022-06-01 | End: 2022-07-15 | Stop reason: SDUPTHER

## 2022-06-01 NOTE — TELEPHONE ENCOUNTER
Called patient with medication change and next labs on 7/06/22    ----- Message from Elizabeth Meneses MD sent at 6/1/2022 12:46 PM CDT -----  The Labs are stable; increase prograf to 3/2 from 2/2 and repeat labs in one month- please let patient know.

## 2022-06-08 ENCOUNTER — TELEPHONE (OUTPATIENT)
Dept: TRANSPLANT | Facility: CLINIC | Age: 62
End: 2022-06-08
Payer: MEDICARE

## 2022-06-09 ENCOUNTER — TELEPHONE (OUTPATIENT)
Dept: TRANSPLANT | Facility: CLINIC | Age: 62
End: 2022-06-09
Payer: MEDICARE

## 2022-06-09 DIAGNOSIS — Z94.4 LIVER TRANSPLANTED: ICD-10-CM

## 2022-06-09 RX ORDER — HYDROCODONE BITARTRATE AND ACETAMINOPHEN 10; 325 MG/1; MG/1
1 TABLET ORAL EVERY 12 HOURS PRN
Qty: 60 TABLET | Refills: 0 | Status: SHIPPED | OUTPATIENT
Start: 2022-06-09 | End: 2022-07-07 | Stop reason: SDUPTHER

## 2022-06-09 NOTE — TELEPHONE ENCOUNTER
----- Message from Cookie Pinzon sent at 6/9/2022 11:29 AM CDT -----  Regarding: med  Pt is req refill of pain med  [facility] - [address]

## 2022-06-13 ENCOUNTER — TELEPHONE (OUTPATIENT)
Dept: TRANSPLANT | Facility: CLINIC | Age: 62
End: 2022-06-13
Payer: MEDICARE

## 2022-06-14 NOTE — ASSESSMENT & PLAN NOTE
- 2/2 ESLD  - monitor PT/INR   For information on Fall & Injury Prevention, visit: [email or web address] OR  [email or web address] OR  https://www.cdc.gov/steadi/patient.html

## 2022-06-15 ENCOUNTER — TELEPHONE (OUTPATIENT)
Dept: INTERNAL MEDICINE | Facility: CLINIC | Age: 62
End: 2022-06-15
Payer: MEDICARE

## 2022-06-15 NOTE — TELEPHONE ENCOUNTER
----- Message from Preston Matthew II, MD sent at 6/15/2022 12:22 PM CDT -----  no  ----- Message -----  From: Caprice Weldon LPN  Sent: 6/15/2022  11:21 AM CDT  To: Preston Matthew II, MD      ----- Message -----  From: Cristy Ramos MA  Sent: 6/15/2022  11:06 AM CDT  To: Caprice Weldon LPN    Patient wants to know if he can start taking some testosterone     [phone]

## 2022-06-27 ENCOUNTER — LAB VISIT (OUTPATIENT)
Dept: LAB | Facility: HOSPITAL | Age: 62
End: 2022-06-27
Attending: INTERNAL MEDICINE
Payer: MEDICARE

## 2022-06-27 DIAGNOSIS — Z94.4 LIVER REPLACED BY TRANSPLANT: ICD-10-CM

## 2022-06-27 LAB
ALBUMIN SERPL-MCNC: 4.4 GM/DL (ref 3.4–4.8)
ALBUMIN/GLOB SERPL: 1.6 RATIO (ref 1.1–2)
ALP SERPL-CCNC: 109 UNIT/L (ref 40–150)
ALT SERPL-CCNC: 23 UNIT/L (ref 0–55)
AST SERPL-CCNC: 26 UNIT/L (ref 5–34)
BASOPHILS # BLD AUTO: 0.05 X10(3)/MCL (ref 0–0.2)
BASOPHILS NFR BLD AUTO: 1 %
BILIRUBIN DIRECT+TOT PNL SERPL-MCNC: 0.9 MG/DL
BUN SERPL-MCNC: 7 MG/DL (ref 8.4–25.7)
CALCIUM SERPL-MCNC: 9.6 MG/DL (ref 8.8–10)
CHLORIDE SERPL-SCNC: 103 MMOL/L (ref 98–107)
CO2 SERPL-SCNC: 27 MMOL/L (ref 23–31)
CREAT SERPL-MCNC: 0.78 MG/DL (ref 0.73–1.18)
EOSINOPHIL # BLD AUTO: 0.11 X10(3)/MCL (ref 0–0.9)
EOSINOPHIL NFR BLD AUTO: 2.3 %
ERYTHROCYTE [DISTWIDTH] IN BLOOD BY AUTOMATED COUNT: 14.7 % (ref 11.5–17)
GLOBULIN SER-MCNC: 2.8 GM/DL (ref 2.4–3.5)
GLUCOSE SERPL-MCNC: 202 MG/DL (ref 82–115)
HCT VFR BLD AUTO: 48.1 % (ref 42–52)
HGB BLD-MCNC: 15.4 GM/DL (ref 14–18)
IMM GRANULOCYTES # BLD AUTO: 0.05 X10(3)/MCL (ref 0–0.02)
IMM GRANULOCYTES NFR BLD AUTO: 1 % (ref 0–0.43)
LYMPHOCYTES # BLD AUTO: 0.94 X10(3)/MCL (ref 0.6–4.6)
LYMPHOCYTES NFR BLD AUTO: 19.3 %
MCH RBC QN AUTO: 26.3 PG (ref 27–31)
MCHC RBC AUTO-ENTMCNC: 32 MG/DL (ref 33–36)
MCV RBC AUTO: 82.1 FL (ref 80–94)
MONOCYTES # BLD AUTO: 0.42 X10(3)/MCL (ref 0.1–1.3)
MONOCYTES NFR BLD AUTO: 8.6 %
NEUTROPHILS # BLD AUTO: 3.3 X10(3)/MCL (ref 2.1–9.2)
NEUTROPHILS NFR BLD AUTO: 67.8 %
NRBC BLD AUTO-RTO: 0 %
PLATELET # BLD AUTO: 155 X10(3)/MCL (ref 130–400)
PMV BLD AUTO: 11.6 FL (ref 9.4–12.4)
POTASSIUM SERPL-SCNC: 5.2 MMOL/L (ref 3.5–5.1)
PROT SERPL-MCNC: 7.2 GM/DL (ref 5.8–7.6)
RBC # BLD AUTO: 5.86 X10(6)/MCL (ref 4.7–6.1)
SODIUM SERPL-SCNC: 139 MMOL/L (ref 136–145)
WBC # SPEC AUTO: 4.9 X10(3)/MCL (ref 4.5–11.5)

## 2022-06-27 PROCEDURE — 36415 COLL VENOUS BLD VENIPUNCTURE: CPT

## 2022-06-27 PROCEDURE — 85025 COMPLETE CBC W/AUTO DIFF WBC: CPT

## 2022-06-27 PROCEDURE — 80053 COMPREHEN METABOLIC PANEL: CPT

## 2022-06-29 LAB — TACROLIMUS TROUGH BLD-MCNC: 5.5 NG/ML

## 2022-06-30 ENCOUNTER — TELEPHONE (OUTPATIENT)
Dept: TRANSPLANT | Facility: CLINIC | Age: 62
End: 2022-06-30
Payer: MEDICARE

## 2022-06-30 NOTE — TELEPHONE ENCOUNTER
Returned call patient mailbox is full    ----- Message from Lukas Swan sent at 6/30/2022 11:41 AM CDT -----  Regarding: speak to nurse  Patient is calling to speak to nurse in regards to lab results.    Contact: [phone]

## 2022-06-30 NOTE — TELEPHONE ENCOUNTER
Continue routine labs no changes needed.  Letter sent for next lab appointment 9/19/22      ----- Message from Elizabeth Meneses MD sent at 6/29/2022  9:37 PM CDT -----  The Labs are stable - please let patient know.

## 2022-07-01 ENCOUNTER — DOCUMENTATION ONLY (OUTPATIENT)
Dept: ADMINISTRATIVE | Facility: HOSPITAL | Age: 62
End: 2022-07-01
Payer: MEDICARE

## 2022-07-01 ENCOUNTER — TELEPHONE (OUTPATIENT)
Dept: TRANSPLANT | Facility: CLINIC | Age: 62
End: 2022-07-01
Payer: MEDICARE

## 2022-07-01 NOTE — TELEPHONE ENCOUNTER
Returned call mailbox full    ----- Message from Wilfrido Morrison sent at 7/1/2022  2:15 PM CDT -----  Regarding: lab results  Patient calling to get lab results. Requesting a call back.      Call: [phone] (Mobile

## 2022-07-07 DIAGNOSIS — Z94.4 LIVER TRANSPLANTED: ICD-10-CM

## 2022-07-07 RX ORDER — HYDROCODONE BITARTRATE AND ACETAMINOPHEN 10; 325 MG/1; MG/1
1 TABLET ORAL EVERY 12 HOURS PRN
Qty: 60 TABLET | Refills: 0 | Status: SHIPPED | OUTPATIENT
Start: 2022-07-07 | End: 2022-08-05 | Stop reason: SDUPTHER

## 2022-07-07 NOTE — TELEPHONE ENCOUNTER
----- Message from Aletha Montero Patient Care Assistant sent at 7/7/2022  2:22 PM CDT -----  Regarding: med refill  Type:  RX Refill Request    Who Called: pt  Refill or New Rx: refill  RX Name and Strength: HYDROcodone-acetaminophen (NORCO)  mg per tablet  How is the patient currently taking it? (ex. 1XDay): n/a  Is this a 30 day or 90 day RX: n/a  Preferred Pharmacy with phone number:[facility]  Local or Mail Order: Local  Ordering Provider: Dr. Matthew  Would the patient rather a call back or a response via [facility]? N/a  Best Call Back Number: n/a  Additional Information: pt is req refill on med please

## 2022-07-10 ENCOUNTER — HOSPITAL ENCOUNTER (EMERGENCY)
Facility: HOSPITAL | Age: 62
Discharge: HOME | End: 2022-07-10
Attending: STUDENT IN AN ORGANIZED HEALTH CARE EDUCATION/TRAINING PROGRAM
Payer: MEDICARE

## 2022-07-10 VITALS
OXYGEN SATURATION: 98 % | HEIGHT: 71 IN | TEMPERATURE: 98 F | HEART RATE: 94 BPM | SYSTOLIC BLOOD PRESSURE: 104 MMHG | WEIGHT: 205 LBS | RESPIRATION RATE: 16 BRPM | DIASTOLIC BLOOD PRESSURE: 87 MMHG | BODY MASS INDEX: 28.7 KG/M2

## 2022-07-10 DIAGNOSIS — R42 DIZZY: Primary | ICD-10-CM

## 2022-07-10 DIAGNOSIS — R42 VERTIGO: ICD-10-CM

## 2022-07-10 DIAGNOSIS — R42 DIZZINESS: ICD-10-CM

## 2022-07-10 DIAGNOSIS — Z94.4 LIVER TRANSPLANT STATUS: ICD-10-CM

## 2022-07-10 LAB
ABS NEUT CALC (OHS): 1.12 X10(3)/MCL (ref 2.1–9.2)
ALBUMIN SERPL-MCNC: 3.9 GM/DL (ref 3.4–4.8)
ALBUMIN/GLOB SERPL: 1.6 RATIO (ref 1.1–2)
ALP SERPL-CCNC: 197 UNIT/L (ref 40–150)
ALT SERPL-CCNC: 195 UNIT/L (ref 0–55)
AMPHET UR QL SCN: NEGATIVE
APPEARANCE UR: CLEAR
APTT PPP: 33.6 SECONDS (ref 23.2–33.7)
AST SERPL-CCNC: 156 UNIT/L (ref 5–34)
BARBITURATE SCN PRESENT UR: NEGATIVE
BENZODIAZ UR QL SCN: NEGATIVE
BILIRUB UR QL STRIP.AUTO: NEGATIVE MG/DL
BILIRUBIN DIRECT+TOT PNL SERPL-MCNC: 1.1 MG/DL
BUN SERPL-MCNC: 7 MG/DL (ref 8.4–25.7)
CALCIUM SERPL-MCNC: 8.7 MG/DL (ref 8.8–10)
CANNABINOIDS UR QL SCN: NEGATIVE
CHLORIDE SERPL-SCNC: 103 MMOL/L (ref 98–107)
CO2 SERPL-SCNC: 25 MMOL/L (ref 23–31)
COCAINE UR QL SCN: NEGATIVE
COLOR UR AUTO: YELLOW
CREAT SERPL-MCNC: 0.93 MG/DL (ref 0.73–1.18)
ERYTHROCYTE [DISTWIDTH] IN BLOOD BY AUTOMATED COUNT: 14.8 % (ref 11.5–17)
ETHANOL SERPL-MCNC: <10 MG/DL
FENTANYL UR QL SCN: NEGATIVE
GLOBULIN SER-MCNC: 2.5 GM/DL (ref 2.4–3.5)
GLUCOSE SERPL-MCNC: 316 MG/DL (ref 82–115)
GLUCOSE UR QL STRIP.AUTO: >=1000 MG/DL
HCT VFR BLD AUTO: 41.4 % (ref 42–52)
HGB BLD-MCNC: 12.8 GM/DL (ref 14–18)
IMM GRANULOCYTES # BLD AUTO: 0.01 X10(3)/MCL (ref 0–0.04)
IMM GRANULOCYTES NFR BLD AUTO: 0.6 %
INR BLD: 1.19 (ref 0–1.3)
KETONES UR QL STRIP.AUTO: NEGATIVE MG/DL
LEUKOCYTE ESTERASE UR QL STRIP.AUTO: NEGATIVE UNIT/L
LYMPHOCYTES NFR BLD MANUAL: 0.21 X10(3)/MCL
LYMPHOCYTES NFR BLD MANUAL: 13 % (ref 13–40)
MAGNESIUM SERPL-MCNC: 1.5 MG/DL (ref 1.6–2.6)
MCH RBC QN AUTO: 26 PG (ref 27–31)
MCHC RBC AUTO-ENTMCNC: 30.9 MG/DL (ref 33–36)
MCV RBC AUTO: 84 FL (ref 80–94)
MDMA UR QL SCN: NEGATIVE
MONO NEG CONTROL (OHS): NEGATIVE
MONO POS CONTROL (OHS): POSITIVE
MONO SCR (OHS): NEGATIVE
MONOCYTES NFR BLD MANUAL: 0.27 X10(3)/MCL (ref 0.1–1.3)
MONOCYTES NFR BLD MANUAL: 17 % (ref 2–11)
NEUTROPHILS NFR BLD MANUAL: 70 % (ref 47–80)
NITRITE UR QL STRIP.AUTO: NEGATIVE
NRBC BLD MANUAL-RTO: 1 %
OPIATES UR QL SCN: POSITIVE
PCP UR QL: NEGATIVE
PH UR STRIP.AUTO: 5 [PH]
PH UR: 5 [PH] (ref 3–11)
PLATELET # BLD AUTO: 78 X10(3)/MCL (ref 130–400)
PLATELET # BLD EST: ABNORMAL 10*3/UL
PMV BLD AUTO: 11.4 FL (ref 7.4–10.4)
POIKILOCYTOSIS BLD QL SMEAR: SLIGHT
POTASSIUM SERPL-SCNC: 4.6 MMOL/L (ref 3.5–5.1)
PROT SERPL-MCNC: 6.4 GM/DL (ref 5.8–7.6)
PROT UR QL STRIP.AUTO: NEGATIVE MG/DL
PROTHROMBIN TIME: 15 SECONDS (ref 12.5–14.5)
RBC # BLD AUTO: 4.93 X10(6)/MCL (ref 4.7–6.1)
RBC MORPH BLD: ABNORMAL
RBC UR QL AUTO: NEGATIVE UNIT/L
SODIUM SERPL-SCNC: 137 MMOL/L (ref 136–145)
SP GR UR STRIP.AUTO: 1.02
SPECIFIC GRAVITY, URINE AUTO (.000) (OHS): 1.02 (ref 1–1.03)
TROPONIN I SERPL-MCNC: <0.01 NG/ML (ref 0–0.04)
UROBILINOGEN UR STRIP-ACNC: 0.2 MG/DL
WBC # SPEC AUTO: 1.6 X10(3)/MCL (ref 4.5–11.5)

## 2022-07-10 PROCEDURE — 81003 URINALYSIS AUTO W/O SCOPE: CPT | Mod: 59 | Performed by: STUDENT IN AN ORGANIZED HEALTH CARE EDUCATION/TRAINING PROGRAM

## 2022-07-10 PROCEDURE — 93005 ELECTROCARDIOGRAM TRACING: CPT

## 2022-07-10 PROCEDURE — 25000003 PHARM REV CODE 250: Performed by: INTERNAL MEDICINE

## 2022-07-10 PROCEDURE — 99285 EMERGENCY DEPT VISIT HI MDM: CPT | Mod: 25

## 2022-07-10 PROCEDURE — 63600175 PHARM REV CODE 636 W HCPCS: Performed by: STUDENT IN AN ORGANIZED HEALTH CARE EDUCATION/TRAINING PROGRAM

## 2022-07-10 PROCEDURE — 84484 ASSAY OF TROPONIN QUANT: CPT | Performed by: STUDENT IN AN ORGANIZED HEALTH CARE EDUCATION/TRAINING PROGRAM

## 2022-07-10 PROCEDURE — 80307 DRUG TEST PRSMV CHEM ANLYZR: CPT | Performed by: STUDENT IN AN ORGANIZED HEALTH CARE EDUCATION/TRAINING PROGRAM

## 2022-07-10 PROCEDURE — 85730 THROMBOPLASTIN TIME PARTIAL: CPT | Performed by: STUDENT IN AN ORGANIZED HEALTH CARE EDUCATION/TRAINING PROGRAM

## 2022-07-10 PROCEDURE — 80053 COMPREHEN METABOLIC PANEL: CPT | Performed by: STUDENT IN AN ORGANIZED HEALTH CARE EDUCATION/TRAINING PROGRAM

## 2022-07-10 PROCEDURE — 96366 THER/PROPH/DIAG IV INF ADDON: CPT

## 2022-07-10 PROCEDURE — 87040 BLOOD CULTURE FOR BACTERIA: CPT | Performed by: STUDENT IN AN ORGANIZED HEALTH CARE EDUCATION/TRAINING PROGRAM

## 2022-07-10 PROCEDURE — 83735 ASSAY OF MAGNESIUM: CPT | Performed by: STUDENT IN AN ORGANIZED HEALTH CARE EDUCATION/TRAINING PROGRAM

## 2022-07-10 PROCEDURE — 96365 THER/PROPH/DIAG IV INF INIT: CPT

## 2022-07-10 PROCEDURE — 86308 HETEROPHILE ANTIBODY SCREEN: CPT | Performed by: INTERNAL MEDICINE

## 2022-07-10 PROCEDURE — 85025 COMPLETE CBC W/AUTO DIFF WBC: CPT | Performed by: STUDENT IN AN ORGANIZED HEALTH CARE EDUCATION/TRAINING PROGRAM

## 2022-07-10 PROCEDURE — 36415 COLL VENOUS BLD VENIPUNCTURE: CPT | Performed by: STUDENT IN AN ORGANIZED HEALTH CARE EDUCATION/TRAINING PROGRAM

## 2022-07-10 PROCEDURE — 85610 PROTHROMBIN TIME: CPT | Performed by: STUDENT IN AN ORGANIZED HEALTH CARE EDUCATION/TRAINING PROGRAM

## 2022-07-10 PROCEDURE — 82077 ASSAY SPEC XCP UR&BREATH IA: CPT | Performed by: STUDENT IN AN ORGANIZED HEALTH CARE EDUCATION/TRAINING PROGRAM

## 2022-07-10 RX ORDER — MAGNESIUM SULFATE HEPTAHYDRATE 40 MG/ML
INJECTION, SOLUTION INTRAVENOUS
Status: DISCONTINUED
Start: 2022-07-10 | End: 2022-07-10 | Stop reason: HOSPADM

## 2022-07-10 RX ORDER — MECLIZINE HYDROCHLORIDE 25 MG/1
25 TABLET ORAL
Status: COMPLETED | OUTPATIENT
Start: 2022-07-10 | End: 2022-07-10

## 2022-07-10 RX ORDER — MECLIZINE HYDROCHLORIDE 25 MG/1
25 TABLET ORAL 3 TIMES DAILY PRN
Qty: 20 TABLET | Refills: 0 | Status: SHIPPED | OUTPATIENT
Start: 2022-07-10 | End: 2022-07-15

## 2022-07-10 RX ORDER — MAGNESIUM SULFATE HEPTAHYDRATE 40 MG/ML
2 INJECTION, SOLUTION INTRAVENOUS
Status: COMPLETED | OUTPATIENT
Start: 2022-07-10 | End: 2022-07-10

## 2022-07-10 RX ADMIN — MAGNESIUM SULFATE HEPTAHYDRATE 2 G: 2 INJECTION, SOLUTION INTRAVENOUS at 05:07

## 2022-07-10 RX ADMIN — MECLIZINE HYDROCHLORIDE 25 MG: 25 TABLET ORAL at 07:07

## 2022-07-10 NOTE — ED PROVIDER NOTES
Encounter Date: 7/10/2022       History     Chief Complaint   Patient presents with    Dizziness     Pt states feels like he is sea- sick, ears  ringing.      The history is provided by the patient and the spouse.   Dizziness  This is a recurrent problem. The current episode started more than 1 week ago. The problem occurs every several days. The problem has been gradually worsening. Associated symptoms include headaches. Pertinent negatives include no chest pain, no abdominal pain and no shortness of breath. The symptoms are aggravated by bending and standing. The symptoms are relieved by position. He has tried nothing for the symptoms.     61-year-old male with a past medical history of liver transplant status post cirrhosis from alcohol, excise/depression, insulin-dependent type 2 diabetes, hypertension presents emergency department for dizziness.  Patient states that he has been having ringing in his ears for years and nobody can figure out what is the cause of it.  He states that he feels like he is currently seasick.  States that it is worse if he stands up or moves quickly.  Patient states that this happened in the past although he is currently having this sensation of his heartbeat in his ears.  Patient is extremely concerned that he is having a she with any aneurysm.  Patient denies any chest pain or shortness of breath.  No weakness in extremities or decrease in sensation.  Denies any vision changes.  Patient states that any issues with walking.  if he is lying down in bed not moving he does not have any dizziness.  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    Esophageal varices     GERD (gastroesophageal reflux disease)     Hepatic encephalopathy 7/8/2014     Hepatitis C virus infection 07/08/2014    tx with interferon 3-4 mos- stopped for unclear reasons Tattoos-first at age 17 only risk factor (HCVAB negative 08/2017)    HTN (hypertension) 7/8/2014    Hypertension     Insulin dependent diabetes mellitus     Personal history of colonic polyps     Renal mass, left 7/8/2014    6.3 x 5.7 x 5.6 complex mass    Splenomegaly 7/8/2014    Umbilical hernia 7/8/2014    Weight gain 2/8/2021    30 lbs since transplant     Past Surgical History:   Procedure Laterality Date    CARPAL TUNNEL RELEASE Bilateral     CHOLECYSTECTOMY      lap    COLONOSCOPY N/A 9/8/2017    Procedure: COLONOSCOPY;  Surgeon: Savannah Llanos MD;  Location: [facility];  Service: Endoscopy;  Laterality: N/A;    COLONOSCOPY Left 3/14/2018    Procedure: COLONOSCOPY;  Surgeon: Savannah Llanos MD;  Location: [facility];  Service: Endoscopy;  Laterality: Left;    COLONOSCOPY W/ POLYPECTOMY  06/19/2014    ESOPHAGOGASTRODUODENOSCOPY  01/2014    ESOPHAGOGASTRODUODENOSCOPY  02/15/2017    grade II varices    ESOPHAGOGASTRODUODENOSCOPY  04/10/2017    grade I varices    ESOPHAGOGASTRODUODENOSCOPY N/A 10/18/2019    Procedure: EGD (ESOPHAGOGASTRODUODENOSCOPY);  Surgeon: Flavio Escalera MD;  Location: [facility] ([address] [place] [address]);  Service: Endoscopy;  Laterality: N/A;    ESOPHAGOGASTRODUODENOSCOPY W/ BANDING  01/26/2017    grade II varices    HERNIA REPAIR      LIVER TRANSPLANT N/A 5/17/2020    Procedure: TRANSPLANT, LIVER;  Surgeon: Danny Thakkar MD;  Location: [address];  Service: Transplant;  Laterality: N/A;    NECK SURGERY      fusion on C5 and C6    THROMBECTOMY  5/17/2020    Procedure: THROMBECTOMY;  Surgeon: Danny Thakkar MD;  Location: [facility] OR [address] [place] [address];  Service: Transplant;;  portal vein thrombectomy    ULNAR NERVE TRANSPOSITION Left     UMBILICAL HERNIA REPAIR N/A 2/22/2020    Procedure: REPAIR, HERNIA, PRIMARY WIHTOUT MESH AND PLACEMENT OF DRAIN;  Surgeon: Sherri  SANGEETHA Brunner MD;  Location: [address];  Service: Transplant;  Laterality: N/A;    vericose veins removed       Family History   Problem Relation Age of Onset    Hyperlipidemia Mother     No Known Problems Father 36        accident on oil rig    No Known Problems Sister     Cancer Brother         kidney    Alcohol abuse Brother     No Known Problems Sister      Social History     Tobacco Use    Smoking status: Former Smoker     Types: Cigarettes     Quit date: 2010     Years since quittin.5    Smokeless tobacco: Former User     Types: Chew     Quit date: 1990    Tobacco comment: former 1 pack/week quit 2010   Substance Use Topics    Alcohol use: No     Comment: former heavy beer but quit 2010    Drug use: No     Review of Systems   Constitutional: Negative for fever.   Eyes: Negative for visual disturbance.   Respiratory: Negative for shortness of breath.    Cardiovascular: Negative for chest pain.   Gastrointestinal: Negative for abdominal pain.   Neurological: Positive for dizziness and headaches.   All other systems reviewed and are negative.      Physical Exam     Initial Vitals [07/10/22 1646]   BP Pulse Resp Temp SpO2   133/78 107 16 98 °F (36.7 °C) 95 %      MAP       --         Physical Exam    Nursing note and vitals reviewed.  Constitutional: He appears well-developed and well-nourished. No distress.   HENT:   Head: Normocephalic and atraumatic.   Left Ear: External ear normal.   Eyes: Conjunctivae and EOM are normal. Pupils are equal, round, and reactive to light.   Neck: Neck supple.   Normal range of motion.  Cardiovascular: Normal rate and regular rhythm.   Pulmonary/Chest: Breath sounds normal. No respiratory distress.   Abdominal: Abdomen is soft. Bowel sounds are normal. There is no abdominal tenderness.   Musculoskeletal:         General: No tenderness. Normal range of motion.      Cervical back: Normal range of motion and neck supple.     Neurological: He is  alert and oriented to person, place, and time. GCS score is 15. GCS eye subscore is 4. GCS verbal subscore is 5. GCS motor subscore is 6.   Skin: Capillary refill takes less than 2 seconds.   Psychiatric: His speech is normal and behavior is normal. Thought content normal. His mood appears anxious.         ED Course   Procedures  Labs Reviewed   URINALYSIS, REFLEX TO URINE CULTURE - Abnormal; Notable for the following components:       Result Value    Glucose, UA >=1000 (*)     All other components within normal limits   MAGNESIUM - Abnormal; Notable for the following components:    Magnesium Level 1.50 (*)     All other components within normal limits   DRUG SCREEN, URINE (BEAKER) - Abnormal; Notable for the following components:    Opiates, Urine Positive (*)     All other components within normal limits    Narrative:     Cut off concentrations:    Amphetamines - 1000 ng/ml  Barbiturates - 200 ng/ml  Benzodiazepine - 200 ng/ml  Cannabinoids (THC) - 50 ng/ml  Cocaine - 300 ng/ml  Fentanyl - 1.0 ng/ml  MDMA - 500 ng/ml  Opiates - 300 ng/ml   Phencyclidine (PCP) - 25 ng/ml    Specimen submitted for drug analysis and tested for pH and specific gravity in order to evaluate sample integrity. Suspect tampering if specific gravity is <1.003 and/or pH is not within the range of 4.5 - 8.0  False negatives may result form substances such as bleach added to urine.  False positives may result for the presence of a substance with similar chemical structure to the drug or its metabolite.    This test provides only a PRELIMINARY analytical test result. A more specific alternate chemical method must be used in order to obtain a confirmed analytical result. Gas chromatography/mass spectrometry (GC/MS) is the preferred confirmatory method. Other chemical confirmation methods are available. Clinical consideration and professional judgement should be applied to any drug of abuse test result, particularly when preliminary positive  results are used.    Positive results will be confirmed only at the physicians request. Unconfirmed screening results are to be used only for medical purposes (treatment).        COMPREHENSIVE METABOLIC PANEL - Abnormal; Notable for the following components:    Glucose Level 316 (*)     Blood Urea Nitrogen 7.0 (*)     Calcium Level Total 8.7 (*)     Alkaline Phosphatase 197 (*)     Alanine Aminotransferase 195 (*)     Aspartate Aminotransferase 156 (*)     All other components within normal limits   CBC WITH DIFFERENTIAL - Abnormal; Notable for the following components:    WBC 1.6 (*)     Hgb 12.8 (*)     Hct 41.4 (*)     MCH 26.0 (*)     MCHC 30.9 (*)     Platelet 78 (*)     MPV 11.4 (*)     All other components within normal limits   MANUAL DIFFERENTIAL - Abnormal; Notable for the following components:    Monocyte Man 17 (*)     Abs Neut calc 1.12 (*)     Abs Lymp 0.208 (*)     RBC Morph Abnormal (*)     Poik Slight (*)     Platelet Est Decreased (*)     All other components within normal limits   PROTIME-INR - Abnormal; Notable for the following components:    PT 15.0 (*)     All other components within normal limits   TROPONIN I - Normal   ALCOHOL,MEDICAL (ETHANOL) - Normal   APTT - Normal   MONONUCLEOSIS SCREEN - Normal   BLOOD CULTURE OLG   BLOOD CULTURE [facility]   CBC W/ AUTO DIFFERENTIAL    Narrative:     The following orders were created for panel order CBC auto differential.  Procedure                               Abnormality         Status                     ---------                               -----------         ------                     CBC with Differential[[id number]]        Abnormal            Final result               Manual Differential[[id number]]          Abnormal            Final result                 Please view results for these tests on the individual orders.   TACROLIMUS ()     EKG Readings: (Independently Interpreted)   Initial Reading: No STEMI. Rhythm: Normal Sinus Rhythm. Heart  Rate: 88. Ectopy: No Ectopy. Conduction: Normal. ST Segments: Normal ST Segments. T Waves: Normal. T Waves Flipped: V3 and V4. Axis: Left Axis Deviation. Clinical Impression: Normal Sinus Rhythm       Imaging Results          CT Head Without Contrast (Preliminary result)  Result time 07/10/22 17:15:10    Preliminary result by León Wan MD (07/10/22 17:15:10)                 Narrative:    START OF REPORT:  Technique: CT of the head was performed without intravenous contrast with axial as well as coronal and sagittal images.    Comparison: None.    Dosage Information: Automated exposure control was utilized.    Clinical history: Sea-sick.    Findings:  Hemorrhage: No acute intracranial hemorrhage is seen.  CSF spaces: The ventricles sulci and basal cisterns are within normal limits.  Brain parenchyma: Unremarkable with preservation of the grey white junction throughout.  Cerebellum: Unremarkable.  Sella and skull base: The sella appears to be within normal limits for age.  Intracranial calcifications: Incidental note is made of bilateral choroid plexus calcification. Incidental note is made of some pineal region calcification.  Calvarium: No acute linear or depressed skull fracture is seen.    Maxillofacial Structures:  Paranasal sinuses: The visualized paranasal sinuses appear clear with no mucoperiosteal thickening or air fluid levels identified.  Orbits: The orbits appear unremarkable.  Zygomatic arches: The zygomatic arches are intact and unremarkable.  Temporal bones and mastoids: The temporal bones and mastoids appear unremarkable.  TMJ: The mandibular condyles appear normally placed with respect to the mandibular fossa.      Impression:  1. No acute intracranial process identified. Details as above.                                   Medications   magnesium sulfate 2g in water 50mL IVPB (premix) (2 g Intravenous New Bag 7/10/22 1724)   magnesium sulfate in water (MAGNESIUM SULFATE 2 GRAM/50 ML) 2 gram/50  mL IVPB (has no administration in time range)   meclizine tablet 25 mg (has no administration in time range)     Medical Decision Making:   Differential Diagnosis:   BPPV, Meniere's disease, electrolyte abnormality, CVA  ED Management:  Patient with extreme low risk of any intracerebral issues although with his extreme anxiety patient is relatively demanding a CT.  Will get a CT for patient's anxiety relief.  He does have a long history of tinnitus so a CT is not completely unnecessary.             ED Course as of 07/10/22 1943   Sun Jul 10, 2022   1800 ADITYA RUIZ MD, assumed care at 1800.  Pt seen and examined.  Agree with above care plan and documentation.   His wbc is down to 1.6 and his monocyte percentage is elevated.  I have added a monospot to be done and a tacrolimus level.   Because of his low WBC and elevated liver enzymes,  I have called to speak to the transplant team for guidance.  Awaiting a call back. [PL]   1902 Tacrolimus () [PL]   1923 Unfortunately,  I have still not been called back.  Somehow, the [facility] is now involved and they had him down as a transfer request.  This is not what I called and asked for.   Pt is still dizzy but better.  Magnesium is being infused due to low mag level.  Still awaiting a call from transplant team/hepatology [PL]   1939 Spoke with the [facility] transfer center on-call hepatologist who requests a secure chat that I  Iban will send with patient's phone number patient is hemodynamically stable so will be discharged home with meclizine for dizziness and ice your transplant center will contact the patient in the morning to coordinate further care [PL]      ED Course User Index  [PL] Jeancarlos Hernandez MD             Clinical Impression:   Final diagnoses:  [R42] Dizziness  [R42] Dizzy (Primary)  [R42] Vertigo  [Z94.4] Liver transplant status          ED Disposition Condition    Discharge Stable        ED Prescriptions      Medication Sig Dispense Start Date End Date Auth. Provider    meclizine (ANTIVERT) 25 mg tablet Take 1 tablet (25 mg total) by mouth 3 (three) times daily as needed for Dizziness. 20 tablet 7/10/2022 7/15/2022 Jeancarlos Hernandez MD        Follow-up Information     Follow up With Specialties Details Why Contact Info    Preston Matthew II, MD Internal Medicine   [address] [place] [address]  [phone]          Ruth Lobo is a certified MA and was present during the entire interaction with this patient     Jeancarlos Hernandez MD  07/10/22 1943

## 2022-07-11 ENCOUNTER — TELEPHONE (OUTPATIENT)
Dept: TRANSPLANT | Facility: CLINIC | Age: 62
End: 2022-07-11
Payer: MEDICARE

## 2022-07-11 DIAGNOSIS — Z94.4 LIVER REPLACED BY TRANSPLANT: Primary | ICD-10-CM

## 2022-07-11 NOTE — TELEPHONE ENCOUNTER
Returned left voicemail to let him know to get labs tomorrow to follow up on visit to ER yesterday    ----- Message from Lukas Swan sent at 7/11/2022 10:17 AM CDT -----  Regarding: speak to nurse  Patient is calling to speak to nurse regarding his recent ER visit. Patient wants to discuss with nurse.    Contact: [phone]

## 2022-07-12 ENCOUNTER — LAB VISIT (OUTPATIENT)
Dept: LAB | Facility: HOSPITAL | Age: 62
End: 2022-07-12
Attending: INTERNAL MEDICINE
Payer: MEDICARE

## 2022-07-12 DIAGNOSIS — Z94.4 LIVER REPLACED BY TRANSPLANT: ICD-10-CM

## 2022-07-12 LAB
ABS NEUT (OLG): 1.47 X10(3)/MCL (ref 2.1–9.2)
ALBUMIN SERPL-MCNC: 4.3 GM/DL (ref 3.4–4.8)
ALBUMIN/GLOB SERPL: 1.5 RATIO (ref 1.1–2)
ALP SERPL-CCNC: 385 UNIT/L (ref 40–150)
ALT SERPL-CCNC: 235 UNIT/L (ref 0–55)
ANISOCYTOSIS BLD QL SMEAR: ABNORMAL
AST SERPL-CCNC: 342 UNIT/L (ref 5–34)
BASOPHILS NFR BLD MANUAL: 0.02 X10(3)/MCL (ref 0–0.2)
BASOPHILS NFR BLD MANUAL: 1 %
BILIRUBIN DIRECT+TOT PNL SERPL-MCNC: 1.6 MG/DL
BUN SERPL-MCNC: 10.4 MG/DL (ref 8.4–25.7)
CALCIUM SERPL-MCNC: 8.8 MG/DL (ref 8.8–10)
CHLORIDE SERPL-SCNC: 103 MMOL/L (ref 98–107)
CO2 SERPL-SCNC: 23 MMOL/L (ref 23–31)
CREAT SERPL-MCNC: 1.18 MG/DL (ref 0.73–1.18)
ERYTHROCYTE [DISTWIDTH] IN BLOOD BY AUTOMATED COUNT: 15.3 % (ref 11.5–17)
GLOBULIN SER-MCNC: 2.8 GM/DL (ref 2.4–3.5)
GLUCOSE SERPL-MCNC: 164 MG/DL (ref 82–115)
HCT VFR BLD AUTO: 47.5 % (ref 42–52)
HGB BLD-MCNC: 14.4 GM/DL (ref 14–18)
IMM GRANULOCYTES # BLD AUTO: 0.02 X10(3)/MCL (ref 0–0.04)
IMM GRANULOCYTES NFR BLD AUTO: 0.9 %
INSTRUMENT WBC (OLG): 2.3 X10(3)/MCL
LYMPHOCYTES NFR BLD MANUAL: 0.39 X10(3)/MCL
LYMPHOCYTES NFR BLD MANUAL: 17 %
MCH RBC QN AUTO: 26 PG (ref 27–31)
MCHC RBC AUTO-ENTMCNC: 30.3 MG/DL (ref 33–36)
MCV RBC AUTO: 85.7 FL (ref 80–94)
MICROCYTES BLD QL SMEAR: ABNORMAL
MONOCYTES NFR BLD MANUAL: 0.41 X10(3)/MCL (ref 0.1–1.3)
MONOCYTES NFR BLD MANUAL: 18 %
NEUTROPHILS NFR BLD MANUAL: 64 %
NRBC BLD AUTO-RTO: 0 %
PLATELET # BLD AUTO: 94 X10(3)/MCL (ref 130–400)
PLATELET # BLD EST: ABNORMAL 10*3/UL
PMV BLD AUTO: 11.8 FL (ref 7.4–10.4)
POTASSIUM SERPL-SCNC: 4.3 MMOL/L (ref 3.5–5.1)
PROT SERPL-MCNC: 7.1 GM/DL (ref 5.8–7.6)
RBC # BLD AUTO: 5.54 X10(6)/MCL (ref 4.7–6.1)
RBC MORPH BLD: ABNORMAL
SODIUM SERPL-SCNC: 136 MMOL/L (ref 136–145)
TACROLIMUS TROUGH BLD-MCNC: 5.2 NG/ML
WBC # SPEC AUTO: 2.3 X10(3)/MCL (ref 4.5–11.5)

## 2022-07-12 PROCEDURE — 85007 BL SMEAR W/DIFF WBC COUNT: CPT

## 2022-07-12 PROCEDURE — 80053 COMPREHEN METABOLIC PANEL: CPT

## 2022-07-12 PROCEDURE — 85027 COMPLETE CBC AUTOMATED: CPT

## 2022-07-12 PROCEDURE — 36415 COLL VENOUS BLD VENIPUNCTURE: CPT

## 2022-07-13 ENCOUNTER — HOSPITAL ENCOUNTER (OUTPATIENT)
Facility: HOSPITAL | Age: 62
Discharge: HOME | End: 2022-07-15
Attending: EMERGENCY MEDICINE | Admitting: STUDENT IN AN ORGANIZED HEALTH CARE EDUCATION/TRAINING PROGRAM
Payer: MEDICARE

## 2022-07-13 ENCOUNTER — TELEPHONE (OUTPATIENT)
Dept: TRANSPLANT | Facility: CLINIC | Age: 62
End: 2022-07-13
Payer: MEDICARE

## 2022-07-13 DIAGNOSIS — Z94.4 LIVER REPLACED BY TRANSPLANT: ICD-10-CM

## 2022-07-13 DIAGNOSIS — U07.1 COVID-19: Primary | ICD-10-CM

## 2022-07-13 DIAGNOSIS — Z94.9 TRANSPLANT: ICD-10-CM

## 2022-07-13 DIAGNOSIS — Z94.4 LIVER REPLACED BY TRANSPLANT: Primary | ICD-10-CM

## 2022-07-13 PROBLEM — R79.89 ELEVATED LFTS: Status: ACTIVE | Noted: 2022-07-13

## 2022-07-13 LAB
ALBUMIN SERPL BCP-MCNC: 3.9 G/DL (ref 3.5–5.2)
ALP SERPL-CCNC: 324 U/L (ref 55–135)
ALT SERPL W/O P-5'-P-CCNC: 157 U/L (ref 10–44)
ANION GAP SERPL CALC-SCNC: 10 MMOL/L (ref 8–16)
AST SERPL-CCNC: 125 U/L (ref 10–40)
BASOPHILS # BLD AUTO: 0.01 K/UL (ref 0–0.2)
BASOPHILS NFR BLD: 0.5 % (ref 0–1.9)
BILIRUB SERPL-MCNC: 0.9 MG/DL (ref 0.1–1)
BUN SERPL-MCNC: 11 MG/DL (ref 6–20)
CALCIUM SERPL-MCNC: 8.2 MG/DL (ref 8.7–10.5)
CHLORIDE SERPL-SCNC: 100 MMOL/L (ref 95–110)
CO2 SERPL-SCNC: 22 MMOL/L (ref 23–29)
CREAT SERPL-MCNC: 1.3 MG/DL (ref 0.5–1.4)
CRP SERPL-MCNC: 30.4 MG/L (ref 0–8.2)
CTP QC/QA: YES
DIFFERENTIAL METHOD: ABNORMAL
EOSINOPHIL # BLD AUTO: 0 K/UL (ref 0–0.5)
EOSINOPHIL NFR BLD: 0.5 % (ref 0–8)
ERYTHROCYTE [DISTWIDTH] IN BLOOD BY AUTOMATED COUNT: 14.9 % (ref 11.5–14.5)
EST. GFR  (AFRICAN AMERICAN): >60 ML/MIN/1.73 M^2
EST. GFR  (NON AFRICAN AMERICAN): 59.3 ML/MIN/1.73 M^2
FERRITIN SERPL-MCNC: 68 NG/ML (ref 20–300)
GLUCOSE SERPL-MCNC: 324 MG/DL (ref 70–110)
HCT VFR BLD AUTO: 41.7 % (ref 40–54)
HGB BLD-MCNC: 13.1 G/DL (ref 14–18)
IMM GRANULOCYTES # BLD AUTO: 0.01 K/UL (ref 0–0.04)
IMM GRANULOCYTES NFR BLD AUTO: 0.5 % (ref 0–0.5)
INR PPP: 1.1 (ref 0.8–1.2)
LYMPHOCYTES # BLD AUTO: 0.6 K/UL (ref 1–4.8)
LYMPHOCYTES NFR BLD: 29.3 % (ref 18–48)
MCH RBC QN AUTO: 26.1 PG (ref 27–31)
MCHC RBC AUTO-ENTMCNC: 31.4 G/DL (ref 32–36)
MCV RBC AUTO: 83 FL (ref 82–98)
MONOCYTES # BLD AUTO: 0.3 K/UL (ref 0.3–1)
MONOCYTES NFR BLD: 12.6 % (ref 4–15)
NEUTROPHILS # BLD AUTO: 1.2 K/UL (ref 1.8–7.7)
NEUTROPHILS NFR BLD: 56.6 % (ref 38–73)
NRBC BLD-RTO: 0 /100 WBC
PLATELET # BLD AUTO: 94 K/UL (ref 150–450)
PMV BLD AUTO: 12.5 FL (ref 9.2–12.9)
POTASSIUM SERPL-SCNC: 4.3 MMOL/L (ref 3.5–5.1)
PROT SERPL-MCNC: 6.9 G/DL (ref 6–8.4)
PROTHROMBIN TIME: 11.3 SEC (ref 9–12.5)
RBC # BLD AUTO: 5.02 M/UL (ref 4.6–6.2)
SARS-COV-2 RDRP RESP QL NAA+PROBE: POSITIVE
SODIUM SERPL-SCNC: 132 MMOL/L (ref 136–145)
TACROLIMUS TROUGH BLD-MCNC: 2.2 NG/ML
WBC # BLD AUTO: 2.15 K/UL (ref 3.9–12.7)

## 2022-07-13 PROCEDURE — 99220 PR INITIAL OBSERVATION CARE,LEVL III: CPT | Mod: ,,, | Performed by: HOSPITALIST

## 2022-07-13 PROCEDURE — C9399 UNCLASSIFIED DRUGS OR BIOLOG: HCPCS | Performed by: HOSPITALIST

## 2022-07-13 PROCEDURE — 82728 ASSAY OF FERRITIN: CPT | Performed by: EMERGENCY MEDICINE

## 2022-07-13 PROCEDURE — 99285 EMERGENCY DEPT VISIT HI MDM: CPT | Mod: 25,CS

## 2022-07-13 PROCEDURE — 99284 EMERGENCY DEPT VISIT MOD MDM: CPT | Mod: GC,CS,, | Performed by: EMERGENCY MEDICINE

## 2022-07-13 PROCEDURE — 96372 THER/PROPH/DIAG INJ SC/IM: CPT | Performed by: HOSPITALIST

## 2022-07-13 PROCEDURE — 99220 PR INITIAL OBSERVATION CARE,LEVL III: ICD-10-PCS | Mod: ,,, | Performed by: HOSPITALIST

## 2022-07-13 PROCEDURE — 87389 HIV-1 AG W/HIV-1&-2 AB AG IA: CPT | Performed by: PHYSICIAN ASSISTANT

## 2022-07-13 PROCEDURE — G0378 HOSPITAL OBSERVATION PER HR: HCPCS

## 2022-07-13 PROCEDURE — U0002 COVID-19 LAB TEST NON-CDC: HCPCS | Performed by: EMERGENCY MEDICINE

## 2022-07-13 PROCEDURE — 86140 C-REACTIVE PROTEIN: CPT | Performed by: EMERGENCY MEDICINE

## 2022-07-13 PROCEDURE — 80053 COMPREHEN METABOLIC PANEL: CPT | Performed by: EMERGENCY MEDICINE

## 2022-07-13 PROCEDURE — 85025 COMPLETE CBC W/AUTO DIFF WBC: CPT | Performed by: EMERGENCY MEDICINE

## 2022-07-13 PROCEDURE — 99284 PR EMERGENCY DEPT VISIT,LEVEL IV: ICD-10-PCS | Mod: GC,CS,, | Performed by: EMERGENCY MEDICINE

## 2022-07-13 PROCEDURE — 85610 PROTHROMBIN TIME: CPT | Performed by: EMERGENCY MEDICINE

## 2022-07-13 PROCEDURE — 25000003 PHARM REV CODE 250: Performed by: HOSPITALIST

## 2022-07-13 RX ORDER — FINASTERIDE 5 MG/1
5 TABLET, FILM COATED ORAL DAILY
Status: DISCONTINUED | OUTPATIENT
Start: 2022-07-14 | End: 2022-07-15 | Stop reason: HOSPADM

## 2022-07-13 RX ORDER — HYDROCODONE BITARTRATE AND ACETAMINOPHEN 10; 325 MG/1; MG/1
1 TABLET ORAL EVERY 8 HOURS PRN
Status: DISCONTINUED | OUTPATIENT
Start: 2022-07-13 | End: 2022-07-15 | Stop reason: HOSPADM

## 2022-07-13 RX ORDER — ACETAMINOPHEN 325 MG/1
325 TABLET ORAL EVERY 4 HOURS PRN
Status: DISCONTINUED | OUTPATIENT
Start: 2022-07-13 | End: 2022-07-15 | Stop reason: HOSPADM

## 2022-07-13 RX ORDER — IBUPROFEN 200 MG
16 TABLET ORAL
Status: DISCONTINUED | OUTPATIENT
Start: 2022-07-13 | End: 2022-07-14

## 2022-07-13 RX ORDER — TACROLIMUS 1 MG/1
3 CAPSULE ORAL EVERY MORNING
Status: DISCONTINUED | OUTPATIENT
Start: 2022-07-14 | End: 2022-07-14

## 2022-07-13 RX ORDER — NALOXONE HCL 0.4 MG/ML
0.02 VIAL (ML) INJECTION
Status: DISCONTINUED | OUTPATIENT
Start: 2022-07-13 | End: 2022-07-15 | Stop reason: HOSPADM

## 2022-07-13 RX ORDER — OXYBUTYNIN CHLORIDE 5 MG/1
5 TABLET ORAL DAILY
Status: DISCONTINUED | OUTPATIENT
Start: 2022-07-14 | End: 2022-07-15 | Stop reason: HOSPADM

## 2022-07-13 RX ORDER — GABAPENTIN 300 MG/1
600 CAPSULE ORAL 3 TIMES DAILY
Refills: 2 | Status: DISCONTINUED | OUTPATIENT
Start: 2022-07-14 | End: 2022-07-15 | Stop reason: HOSPADM

## 2022-07-13 RX ORDER — TALC
6 POWDER (GRAM) TOPICAL NIGHTLY PRN
Status: DISCONTINUED | OUTPATIENT
Start: 2022-07-13 | End: 2022-07-15 | Stop reason: HOSPADM

## 2022-07-13 RX ORDER — TACROLIMUS 1 MG/1
2 CAPSULE ORAL EVERY EVENING
Status: DISCONTINUED | OUTPATIENT
Start: 2022-07-14 | End: 2022-07-14

## 2022-07-13 RX ORDER — PANTOPRAZOLE SODIUM 40 MG/1
40 TABLET, DELAYED RELEASE ORAL DAILY
Status: DISCONTINUED | OUTPATIENT
Start: 2022-07-14 | End: 2022-07-15 | Stop reason: HOSPADM

## 2022-07-13 RX ORDER — MAG HYDROX/ALUMINUM HYD/SIMETH 200-200-20
30 SUSPENSION, ORAL (FINAL DOSE FORM) ORAL
Status: DISCONTINUED | OUTPATIENT
Start: 2022-07-14 | End: 2022-07-13

## 2022-07-13 RX ORDER — ESCITALOPRAM OXALATE 20 MG/1
20 TABLET ORAL DAILY
Status: DISCONTINUED | OUTPATIENT
Start: 2022-07-14 | End: 2022-07-15 | Stop reason: HOSPADM

## 2022-07-13 RX ORDER — SODIUM CHLORIDE 0.9 % (FLUSH) 0.9 %
10 SYRINGE (ML) INJECTION
Status: DISCONTINUED | OUTPATIENT
Start: 2022-07-13 | End: 2022-07-15 | Stop reason: HOSPADM

## 2022-07-13 RX ORDER — IBUPROFEN 200 MG
24 TABLET ORAL
Status: DISCONTINUED | OUTPATIENT
Start: 2022-07-13 | End: 2022-07-14

## 2022-07-13 RX ORDER — SUCRALFATE 1 G/10ML
1 SUSPENSION ORAL EVERY 6 HOURS
Status: DISCONTINUED | OUTPATIENT
Start: 2022-07-14 | End: 2022-07-13

## 2022-07-13 RX ORDER — NAPROXEN SODIUM 220 MG/1
81 TABLET, FILM COATED ORAL DAILY
Status: DISCONTINUED | OUTPATIENT
Start: 2022-07-14 | End: 2022-07-15 | Stop reason: HOSPADM

## 2022-07-13 RX ORDER — TAMSULOSIN HYDROCHLORIDE 0.4 MG/1
0.4 CAPSULE ORAL DAILY
Status: DISCONTINUED | OUTPATIENT
Start: 2022-07-14 | End: 2022-07-15 | Stop reason: HOSPADM

## 2022-07-13 RX ADMIN — HYDROCODONE BITARTRATE AND ACETAMINOPHEN 1 TABLET: 10; 325 TABLET ORAL at 11:07

## 2022-07-13 RX ADMIN — INSULIN DETEMIR 20 UNITS: 100 INJECTION, SOLUTION SUBCUTANEOUS at 11:07

## 2022-07-13 NOTE — TELEPHONE ENCOUNTER
----- Message from Yamlie Saucedo MD sent at 7/13/2022  6:02 AM CDT -----  Enzymes have increased, need to get CMV PCR, ultrasound with doppler, as soon as possible  If bile ducts are dilated, he will need EUS/ERCP.  If no biliary dilation, he will need a liver biopsy.

## 2022-07-13 NOTE — TELEPHONE ENCOUNTER
Called patient he is not feeling well, I told him to come to ER here to be evaluated that his numbers keep going up and may need an biopsy or ERCP.  He was agreeable with the plan.    ----- Message from Yamile Saucedo MD sent at 7/13/2022  6:02 AM CDT -----  Enzymes have increased, need to get CMV PCR, ultrasound with doppler, as soon as possible  If bile ducts are dilated, he will need EUS/ERCP.  If no biliary dilation, he will need a liver biopsy.

## 2022-07-13 NOTE — FIRST PROVIDER EVALUATION
"Medical screening exam completed.  I have conducted a focused provider triage encounter, findings are as follows:    Brief history of present illness:  He 61-year-old male with history of liver transplant in the year 2000 presents because his liver tests are off.  He states he has been feeling bad.  He has had a productive cough.    Vitals:    07/13/22 1718   BP: 135/69   Pulse: 88   Resp: 18   Temp: 99.3 °F (37.4 °C)   TempSrc: Oral   SpO2: 96%   Weight: 93 kg (205 lb)   Height: 5' 11" (1.803 m)       Pertinent physical exam:  No scleral icterus    Brief workup plan:  Will order labs and chest x-ray.    Preliminary workup initiated; this workup will be continued and followed by the physician or advanced practice provider that is assigned to the patient when roomed.  "

## 2022-07-14 DIAGNOSIS — U07.1 COVID-19: Primary | ICD-10-CM

## 2022-07-14 DIAGNOSIS — N40.0 BENIGN PROSTATIC HYPERPLASIA, UNSPECIFIED WHETHER LOWER URINARY TRACT SYMPTOMS PRESENT: Primary | ICD-10-CM

## 2022-07-14 LAB
ALBUMIN SERPL BCP-MCNC: 3.6 G/DL (ref 3.5–5.2)
ALP SERPL-CCNC: 273 U/L (ref 55–135)
ALT SERPL W/O P-5'-P-CCNC: 123 U/L (ref 10–44)
ANION GAP SERPL CALC-SCNC: 8 MMOL/L (ref 8–16)
ANISOCYTOSIS BLD QL SMEAR: SLIGHT
AST SERPL-CCNC: 76 U/L (ref 10–40)
BASOPHILS NFR BLD: 0 % (ref 0–1.9)
BILIRUB SERPL-MCNC: 1 MG/DL (ref 0.1–1)
BUN SERPL-MCNC: 10 MG/DL (ref 6–20)
CALCIUM SERPL-MCNC: 8.3 MG/DL (ref 8.7–10.5)
CHLORIDE SERPL-SCNC: 102 MMOL/L (ref 95–110)
CMV DNA SERPL NAA+PROBE-ACNC: NORMAL IU/ML
CO2 SERPL-SCNC: 26 MMOL/L (ref 23–29)
CREAT SERPL-MCNC: 0.9 MG/DL (ref 0.5–1.4)
DIFFERENTIAL METHOD: ABNORMAL
EOSINOPHIL NFR BLD: 1 % (ref 0–8)
ERYTHROCYTE [DISTWIDTH] IN BLOOD BY AUTOMATED COUNT: 15 % (ref 11.5–14.5)
EST. GFR  (AFRICAN AMERICAN): >60 ML/MIN/1.73 M^2
EST. GFR  (NON AFRICAN AMERICAN): >60 ML/MIN/1.73 M^2
GLUCOSE SERPL-MCNC: 178 MG/DL (ref 70–110)
HCT VFR BLD AUTO: 41.4 % (ref 40–54)
HGB BLD-MCNC: 13.2 G/DL (ref 14–18)
HIV 1+2 AB+HIV1 P24 AG SERPL QL IA: NEGATIVE
HYPOCHROMIA BLD QL SMEAR: ABNORMAL
IMM GRANULOCYTES # BLD AUTO: ABNORMAL K/UL (ref 0–0.04)
IMM GRANULOCYTES NFR BLD AUTO: ABNORMAL % (ref 0–0.5)
INR PPP: 1 (ref 0.8–1.2)
LYMPHOCYTES NFR BLD: 20 % (ref 18–48)
MCH RBC QN AUTO: 26 PG (ref 27–31)
MCHC RBC AUTO-ENTMCNC: 31.9 G/DL (ref 32–36)
MCV RBC AUTO: 82 FL (ref 82–98)
MONOCYTES NFR BLD: 16 % (ref 4–15)
NEUTROPHILS NFR BLD: 62 % (ref 38–73)
NEUTS BAND NFR BLD MANUAL: 1 %
NRBC BLD-RTO: 0 /100 WBC
OVALOCYTES BLD QL SMEAR: ABNORMAL
PLATELET # BLD AUTO: 76 K/UL (ref 150–450)
PLATELET BLD QL SMEAR: ABNORMAL
PMV BLD AUTO: 12.4 FL (ref 9.2–12.9)
POCT GLUCOSE: 200 MG/DL (ref 70–110)
POCT GLUCOSE: 269 MG/DL (ref 70–110)
POCT GLUCOSE: 276 MG/DL (ref 70–110)
POIKILOCYTOSIS BLD QL SMEAR: SLIGHT
POLYCHROMASIA BLD QL SMEAR: ABNORMAL
POTASSIUM SERPL-SCNC: 3.6 MMOL/L (ref 3.5–5.1)
PROT SERPL-MCNC: 6.4 G/DL (ref 6–8.4)
PROTHROMBIN TIME: 10.9 SEC (ref 9–12.5)
RBC # BLD AUTO: 5.07 M/UL (ref 4.6–6.2)
SODIUM SERPL-SCNC: 136 MMOL/L (ref 136–145)
TACROLIMUS BLD-MCNC: 14.4 NG/ML (ref 5–15)
WBC # BLD AUTO: 1.58 K/UL (ref 3.9–12.7)

## 2022-07-14 PROCEDURE — 80053 COMPREHEN METABOLIC PANEL: CPT | Performed by: HOSPITALIST

## 2022-07-14 PROCEDURE — 85610 PROTHROMBIN TIME: CPT | Performed by: HOSPITALIST

## 2022-07-14 PROCEDURE — 85007 BL SMEAR W/DIFF WBC COUNT: CPT | Mod: NCS | Performed by: HOSPITALIST

## 2022-07-14 PROCEDURE — 25000003 PHARM REV CODE 250: Performed by: HOSPITALIST

## 2022-07-14 PROCEDURE — 99223 PR INITIAL HOSPITAL CARE,LEVL III: ICD-10-PCS | Mod: ,,, | Performed by: INTERNAL MEDICINE

## 2022-07-14 PROCEDURE — 36415 COLL VENOUS BLD VENIPUNCTURE: CPT | Performed by: STUDENT IN AN ORGANIZED HEALTH CARE EDUCATION/TRAINING PROGRAM

## 2022-07-14 PROCEDURE — 80197 ASSAY OF TACROLIMUS: CPT | Performed by: HOSPITALIST

## 2022-07-14 PROCEDURE — 99225 PR SUBSEQUENT OBSERVATION CARE,LEVEL II: CPT | Mod: ,,, | Performed by: STUDENT IN AN ORGANIZED HEALTH CARE EDUCATION/TRAINING PROGRAM

## 2022-07-14 PROCEDURE — G0378 HOSPITAL OBSERVATION PER HR: HCPCS

## 2022-07-14 PROCEDURE — 85027 COMPLETE CBC AUTOMATED: CPT | Performed by: HOSPITALIST

## 2022-07-14 PROCEDURE — 99225 PR SUBSEQUENT OBSERVATION CARE,LEVEL II: ICD-10-PCS | Mod: ,,, | Performed by: STUDENT IN AN ORGANIZED HEALTH CARE EDUCATION/TRAINING PROGRAM

## 2022-07-14 PROCEDURE — 80321 ALCOHOLS BIOMARKERS 1OR 2: CPT | Performed by: STUDENT IN AN ORGANIZED HEALTH CARE EDUCATION/TRAINING PROGRAM

## 2022-07-14 PROCEDURE — 25000003 PHARM REV CODE 250: Performed by: STUDENT IN AN ORGANIZED HEALTH CARE EDUCATION/TRAINING PROGRAM

## 2022-07-14 PROCEDURE — 63600175 PHARM REV CODE 636 W HCPCS: Performed by: HOSPITALIST

## 2022-07-14 PROCEDURE — 99223 1ST HOSP IP/OBS HIGH 75: CPT | Mod: ,,, | Performed by: INTERNAL MEDICINE

## 2022-07-14 PROCEDURE — 36415 COLL VENOUS BLD VENIPUNCTURE: CPT | Performed by: HOSPITALIST

## 2022-07-14 RX ORDER — TACROLIMUS 1 MG/1
2 CAPSULE ORAL EVERY MORNING
Status: DISCONTINUED | OUTPATIENT
Start: 2022-07-15 | End: 2022-07-15 | Stop reason: HOSPADM

## 2022-07-14 RX ORDER — POTASSIUM CHLORIDE 20 MEQ/1
40 TABLET, EXTENDED RELEASE ORAL ONCE
Status: COMPLETED | OUTPATIENT
Start: 2022-07-14 | End: 2022-07-14

## 2022-07-14 RX ORDER — TACROLIMUS 1 MG/1
1 CAPSULE ORAL EVERY EVENING
Status: DISCONTINUED | OUTPATIENT
Start: 2022-07-15 | End: 2022-07-15 | Stop reason: HOSPADM

## 2022-07-14 RX ORDER — GLUCAGON 1 MG
1 KIT INJECTION
Status: DISCONTINUED | OUTPATIENT
Start: 2022-07-14 | End: 2022-07-15 | Stop reason: HOSPADM

## 2022-07-14 RX ORDER — IBUPROFEN 200 MG
24 TABLET ORAL
Status: DISCONTINUED | OUTPATIENT
Start: 2022-07-14 | End: 2022-07-15 | Stop reason: HOSPADM

## 2022-07-14 RX ORDER — INSULIN ASPART 100 [IU]/ML
0-5 INJECTION, SOLUTION INTRAVENOUS; SUBCUTANEOUS
Status: DISCONTINUED | OUTPATIENT
Start: 2022-07-14 | End: 2022-07-15 | Stop reason: HOSPADM

## 2022-07-14 RX ORDER — IBUPROFEN 200 MG
16 TABLET ORAL
Status: DISCONTINUED | OUTPATIENT
Start: 2022-07-14 | End: 2022-07-15 | Stop reason: HOSPADM

## 2022-07-14 RX ADMIN — GABAPENTIN 600 MG: 300 CAPSULE ORAL at 08:07

## 2022-07-14 RX ADMIN — ESCITALOPRAM OXALATE 20 MG: 20 TABLET ORAL at 08:07

## 2022-07-14 RX ADMIN — TACROLIMUS 3 MG: 1 CAPSULE ORAL at 08:07

## 2022-07-14 RX ADMIN — INSULIN DETEMIR 20 UNITS: 100 INJECTION, SOLUTION SUBCUTANEOUS at 08:07

## 2022-07-14 RX ADMIN — GABAPENTIN 600 MG: 300 CAPSULE ORAL at 03:07

## 2022-07-14 RX ADMIN — FINASTERIDE 5 MG: 5 TABLET, FILM COATED ORAL at 08:07

## 2022-07-14 RX ADMIN — OXYBUTYNIN CHLORIDE 5 MG: 5 TABLET ORAL at 08:07

## 2022-07-14 RX ADMIN — TAMSULOSIN HYDROCHLORIDE 0.4 MG: 0.4 CAPSULE ORAL at 08:07

## 2022-07-14 RX ADMIN — HYDROCODONE BITARTRATE AND ACETAMINOPHEN 1 TABLET: 10; 325 TABLET ORAL at 08:07

## 2022-07-14 RX ADMIN — ASPIRIN 81 MG CHEWABLE TABLET 81 MG: 81 TABLET CHEWABLE at 08:07

## 2022-07-14 RX ADMIN — POTASSIUM CHLORIDE 40 MEQ: 1500 TABLET, EXTENDED RELEASE ORAL at 09:07

## 2022-07-14 RX ADMIN — PANTOPRAZOLE SODIUM 40 MG: 40 TABLET, DELAYED RELEASE ORAL at 08:07

## 2022-07-14 NOTE — H&P
[facility]ade[facility] Medicine  History & Physical    Patient Name: Colin Reilly  MRN: [id number]  Patient Class: OP- Observation  Admission Date: 7/13/2022  Attending Physician: Gustavo Vargas MD   Primary Care Provider: Preston Matthew Ii, MD         Patient information was obtained from patient, past medical records and ER records.     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 Pt. Reports that he developed cough, chills, and nasal congestion last Friday. Since then he has bben having geenralized fatigeu with some COPE along with lightheadedness and intermittent subjective fevers. Pt. Was seen in ED 7/10 for these syptoms and was noted to have neurtopenia with WBC 1.6 as well as elevated liver enzymes with AST//193. Pt. Was not tested for Covid-19 in ED at that time, but he states that he was negative on rapid home tests x2. Pt. Was otherwise stable and discharged from ED with plans to contact transplant coordinator and trend labs. Yesterday, pt. Had repeat labs performed which showed worsening LFTs, so he was referred to the ED here for further work-up. Pt. Noted to be Covid-19 positive on presentation here. Pt. Reports persistent cough, but he states that his symptoms have improved since Friday, and he is feeling a bit better. He denies any abdominal pain, nausea, vomiting, or changes in bowel habits.      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    Esophageal varices     GERD (gastroesophageal reflux  disease)     Hepatic encephalopathy 7/8/2014    Hepatitis C virus infection 07/08/2014    tx with interferon 3-4 mos- stopped for unclear reasons Tattoos-first at age 17 only risk factor (HCVAB negative 08/2017)    HTN (hypertension) 7/8/2014    Hypertension     Insulin dependent diabetes mellitus     Personal history of colonic polyps     Renal mass, left 7/8/2014    6.3 x 5.7 x 5.6 complex mass    Splenomegaly 7/8/2014    Umbilical hernia 7/8/2014    Weight gain 2/8/2021    30 lbs since transplant       Past Surgical History:   Procedure Laterality Date    CARPAL TUNNEL RELEASE Bilateral     CHOLECYSTECTOMY      lap    COLONOSCOPY N/A 9/8/2017    Procedure: COLONOSCOPY;  Surgeon: Savannah Llanos MD;  Location: [facility];  Service: Endoscopy;  Laterality: N/A;    COLONOSCOPY Left 3/14/2018    Procedure: COLONOSCOPY;  Surgeon: Savannah Llanos MD;  Location: [facility];  Service: Endoscopy;  Laterality: Left;    COLONOSCOPY W/ POLYPECTOMY  06/19/2014    ESOPHAGOGASTRODUODENOSCOPY  01/2014    ESOPHAGOGASTRODUODENOSCOPY  02/15/2017    grade II varices    ESOPHAGOGASTRODUODENOSCOPY  04/10/2017    grade I varices    ESOPHAGOGASTRODUODENOSCOPY N/A 10/18/2019    Procedure: EGD (ESOPHAGOGASTRODUODENOSCOPY);  Surgeon: Flavio Escalera MD;  Location: [facility] ([address] [place] [address]);  Service: Endoscopy;  Laterality: N/A;    ESOPHAGOGASTRODUODENOSCOPY W/ BANDING  01/26/2017    grade II varices    HERNIA REPAIR      LIVER TRANSPLANT N/A 5/17/2020    Procedure: TRANSPLANT, LIVER;  Surgeon: Danny Thakkar MD;  Location: [address];  Service: Transplant;  Laterality: N/A;    NECK SURGERY      fusion on C5 and C6    THROMBECTOMY  5/17/2020    Procedure: THROMBECTOMY;  Surgeon: Danny Thakkar MD;  Location: [facility] OR [address] [place] [address];  Service: Transplant;;  portal vein thrombectomy    ULNAR NERVE TRANSPOSITION Left     UMBILICAL HERNIA REPAIR N/A 2/22/2020    Procedure: REPAIR, HERNIA, PRIMARY  WIHTOUT MESH AND PLACEMENT OF DRAIN;  Surgeon: Sherri Brunner MD;  Location: [facility] OR [address] [place] [address];  Service: Transplant;  Laterality: N/A;    vericose veins removed         Review of patient's allergies indicates:  No Known Allergies    No current facility-administered medications on file prior to encounter.     Current Outpatient Medications on File Prior to Encounter   Medication Sig    aspirin 81 MG Chew Take 1 tablet (81 mg total) by mouth once daily.    bisacodyL (DULCOLAX) 5 mg EC tablet Take 2 tablets (10 mg total) by mouth daily as needed for Constipation.    blood sugar diagnostic (TRUE METRIX GLUCOSE TEST STRIP MISC)   TRUE METRIX SELF MONITORING BLOOD GLUCOSE STRIPS   Strip, See Instructions, TEST BLOOD SUGAR FOUR TIMES DAILY, # 400 unknown unit, 3 Refill(s), Pharmacy: [facility], 180, cm, Height/Length Dosing, 02/10/21 9:33:00 CST, 90.718, kg, W...    EASY TOUCH INSULIN SYRINGE 1 mL 31 gauge x 5/16 Syrg     escitalopram oxalate (LEXAPRO) 20 MG tablet Take 20 mg by mouth once daily.     famotidine (PEPCID) 20 MG tablet Take 1 tablet (20 mg total) by mouth every evening.    finasteride (PROSCAR) 5 mg tablet Take 1 tablet (5 mg total) by mouth once daily.    gabapentin (NEURONTIN) 600 MG tablet Take 1 tablet (600 mg total) by mouth 3 (three) times daily.    HYDROcodone-acetaminophen (NORCO)  mg per tablet Take 1 tablet by mouth every 12 (twelve) hours as needed for Pain.    insulin aspart U-100 (NOVOLOG FLEXPEN U-100 INSULIN) 100 unit/mL (3 mL) InPn pen Inject 10 Units into the skin 3 (three) times daily before meals.    LEVEMIR FLEXTOUCH U-100 INSULN 100 unit/mL (3 mL) InPn pen Inject 20 Units into the skin every evening.    meclizine (ANTIVERT) 25 mg tablet Take 1 tablet (25 mg total) by mouth 3 (three) times daily as needed for Dizziness.    metFORMIN (GLUCOPHAGE) 500 MG tablet Take 1 tablet (500 mg total) by mouth daily with breakfast.    omeprazole (PRILOSEC) 40 MG  "capsule Take 40 mg by mouth once daily.    oxybutynin (DITROPAN) 5 MG Tab Take 5 mg by mouth once daily.    pen needle, diabetic 32 gauge x " Ndle use one pen needle  3 (three) times daily with insulin pen    sildenafiL (VIAGRA) 100 MG tablet Take 100 mg by mouth once daily.    tacrolimus (PROGRAF) 1 MG Cap Take 3 capsules (3 mg total) by mouth every morning AND 2 capsules (2 mg total) every evening.    tamsulosin (FLOMAX) 0.4 mg Cap Take 0.4 mg by mouth once daily.    TRUE METRIX GLUCOSE TEST STRIP Strp use to test blood sugar 4 times daily    TRUEPLUS LANCETS 30 gauge Misc     [DISCONTINUED] furosemide (LASIX) 40 MG tablet TAKE 4 TABLETS EVERY DAY    [DISCONTINUED] mycophenolate (CELLCEPT) 250 mg Cap Take 2 capsules (500 mg total) by mouth 2 (two) times daily. Do this for 2 weeks then stop     Family History       Problem Relation (Age of Onset)    Alcohol abuse Brother    Cancer Brother    Hyperlipidemia Mother    No Known Problems Father (36), Sister, Sister          Tobacco Use    Smoking status: Former Smoker     Types: Cigarettes     Quit date: 2010     Years since quittin.5    Smokeless tobacco: Former User     Types: Chew     Quit date: 1990    Tobacco comment: former 1 pack/week quit 2010   Substance and Sexual Activity    Alcohol use: No     Comment: former heavy beer but quit 2010    Drug use: No    Sexual activity: Never     Comment:      Review of Systems   Constitutional:  Positive for chills, fatigue and fever. Negative for activity change and unexpected weight change.   HENT:  Negative for congestion and sore throat.    Respiratory:  Positive for cough and shortness of breath. Negative for wheezing.    Cardiovascular:  Negative for chest pain, palpitations and leg swelling.   Gastrointestinal:  Negative for abdominal pain, blood in stool, nausea and vomiting.   Genitourinary:  Negative for dysuria and hematuria.   Musculoskeletal:  Negative for " arthralgias and neck pain.   Skin:  Negative for color change and rash.   Neurological:  Negative for dizziness, seizures and numbness.   Psychiatric/Behavioral:  Negative for hallucinations and suicidal ideas.    Objective:     Vital Signs (Most Recent):  Temp: 98.9 °F (37.2 °C) (07/13/22 2214)  Pulse: 74 (07/13/22 2214)  Resp: 20 (07/13/22 2214)  BP: 122/66 (07/13/22 2214)  SpO2: (!) 92 % (07/13/22 2214)   Vital Signs (24h Range):  Temp:  [98.9 °F (37.2 °C)-99.3 °F (37.4 °C)] 98.9 °F (37.2 °C)  Pulse:  [74-88] 74  Resp:  [18-20] 20  SpO2:  [92 %-96 %] 92 %  BP: (122-135)/(66-69) 122/66     Weight: 93 kg (205 lb)  Body mass index is 28.59 kg/m².    Physical Exam  Vitals reviewed.   Constitutional:       General: He is not in acute distress.     Appearance: He is well-developed.   HENT:      Head: Normocephalic and atraumatic.   Eyes:      Extraocular Movements: Extraocular movements intact.      Pupils: Pupils are equal, round, and reactive to light.   Neck:      Vascular: No JVD.      Trachea: No tracheal deviation.   Cardiovascular:      Rate and Rhythm: Normal rate and regular rhythm.      Heart sounds: No murmur heard.    No friction rub. No gallop.   Pulmonary:      Effort: No respiratory distress.      Breath sounds: Normal breath sounds. No wheezing or rales.   Abdominal:      General: Bowel sounds are normal. There is no distension.      Palpations: Abdomen is soft. There is no mass.      Tenderness: There is no abdominal tenderness.   Musculoskeletal:         General: No deformity.      Cervical back: Neck supple.   Lymphadenopathy:      Cervical: No cervical adenopathy.   Skin:     General: Skin is warm and dry.      Findings: No rash.   Neurological:      Mental Status: He is alert and oriented to person, place, and time.         CRANIAL NERVES     CN III, IV, VI   Pupils are equal, round, and reactive to light.     Significant Labs: All pertinent labs within the past 24 hours have been  reviewed.    Significant Imaging: I have reviewed all pertinent imaging results/findings within the past 24 hours.    Assessment/Plan:     * COVID-19  -Pt. With symptoms of generalized fatigue, fevers, and cough. Noted to have elevated LFTs and low WBC in setting of Covid. LFTs improving from yesterday and pt. Reports improving symptoms. Pt. High risk for severe complication of Covid, but not currently requiring supplemental O2. Will hold remdesivir in setting of elevated LFTs, but consult ID for further asssitance. Further care outlined below      Patient is identified as High risk for severe complications of COVID 19 based on COVID risk score of 5   Initiate standard COVID protocols; COVID-19 testing ,Infection Control notification and isolation- respiratory, contact and droplet per protocol    Diagnostics: (leukopenia, hyponatremia, hyperferritinemia, elevated troponin, elevated d-dimer, age, and comorbidities are significant predictors of poor clinical outcome)  CBC, CMP, Procalcitonin, Ferritin, CRP, ECG and Portable CXR    Management: Maintain oxygen saturations 92-96% and monitor with continuous pulse oximetry.  and Inhaled bronchodilators as needed for shortness of breath.    Oxygen Device:     Micronutrients   Multivitamin PO daily   Vitamin D 1000IU PO daily (if deficient)   Magnesium IV/PO (if deficient)   Ascorbic acid 500mg po BID     Trend inflammatory markers, LDH, D-Dimer, ferritin, and CRP Q48hrs    Advance Care Planning  Current advance care plan has been discussed with patient/family/POA and patient currently wishes Full Code.             Elevated LFTs  -Noted in setting of Covid, trending down. Liver transplant U/S ordered for further evaluation  -Hepatology consulted for assistance, f/u recommendations and continue to trend CMP      History of liver transplant  MELD-Na score: 10 at 7/13/2022  5:26 PM  MELD score: 10 at 7/13/2022  5:26 PM  Calculated from:  Serum Creatinine: 1.3 mg/dL at  7/13/2022  5:26 PM  Serum Sodium: 132 mmol/L at 7/13/2022  5:26 PM  Total Bilirubin: 0.9 mg/dL (Using min of 1 mg/dL) at 7/13/2022  5:26 PM  INR(ratio): 1.1 at 7/13/2022  5:26 PM  Age: 61 years  -Hepatology consulted for assistance. Continue home tacrolimus, See elevated LFTs      Long-term use of immunosuppressant medication  -Continue home tacrolimus 3mg Qam and 2mg QHS. Tacro level pending      Type 2 diabetes mellitus without complication, with long-term current use of insulin  -Last A1c 8.1 5/2022  -Hold home metofrmin while admitted, continue home levemir 20 units QHS with SSI and diabetic diet  -Monitor BG and titrate insulin as needed        VTE Risk Mitigation (From admission, onward)         Ordered     IP VTE LOW RISK PATIENT  Once         07/13/22 1955     Place sequential compression device  Until discontinued         07/13/22 1955                   Richy Osorio MD  Department of Hospital Medicine   [facility] - Telemetry Stepdown

## 2022-07-14 NOTE — CONSULTS
[facility] - Telemetry Stepdown  Infectious Disease  Consult Note    Patient Name: Colin Reilly  MRN: [id number]  Admission Date: 7/13/2022  Hospital Length of Stay: 0 days  Attending Physician: Rodolfo Newman*  Primary Care Provider: Preston Matthew Ii, MD     Isolation Status: Airborne and Contact and Droplet    Patient information was obtained from patient and ER records.      Inpatient consult to Infectious Diseases  Consult performed by: Casimiro Katz DO  Consult ordered by: Richy Osorio MD        Assessment/Plan:     * COVID-19  Positive COVID in setting of liver transplant      62 yo male with history of alcoholic cirrhosis leading to end stage liver disease s/p liver transplant in 2020 who presents with flu-like illness and positive COVID test on 7/13. Was mildly short of breath at home but has maintained optimal saturations on room air since ED arrival. Patient noted to have bump in LFT during ED visit on 7/10 and repeat labs 7/12. Have been downtrending since. Primary avoiding remdesivir due to LFT elevation. ID being consulted for assistance with COVID management.     Recommendations:  --As patient is saturating well on room air, there is no urgent indication to begin remdesivir or dexamethasone   --Would monitor for now to ensure patient does not become hypoxic   --No indication for antibiotics as CXR shows no evidence of pneumonia and patient clinically stable   --Safe for discharge home from Infectious Diseases standpoint; will defer remainder of management to primary and hepatology    Thank you for your consult. I will sign off. Please contact us if you have any additional questions.    Casimiro Katz DO  Infectious Disease  [facility] - Telemetry Stepdown    Subjective:     Principal Problem: COVID-19    HPI: This is a 62 yo male with history of ESLD 2/2 alcoholic cirrhosis s/p DCD liver transplant CMV D-/R+ on 5/17/2020, hep C treated with interferon in 2014, and type 2 diabetes who  presented to ED on 7/13 with five days of cough and chills and found to have increasing LFT on blood work collected outpatient on 7/12. Patient has had rhinorrhea since last week and mild shortness of breath and fatigue. Seen in ED 7/10 and found to have low WBC and elevated LFT but not tested for COVID. Was negative on two COVID tests at home. Was advised to notify transplant coordinator about that ED visit and was referred to ED again for further workup. Now found to be COVID positive. On admission patient afebrile, HDS, and maintaining saturations around 96% on room air. Electrolytes within normal limits as is creatinine. AST/ALT improving to 76/123. Total bilirubin normal. ANC on admission 1200. Platelets down to 76. INR at 1. Negative toxicology screen. US doppler of transplanted liver unremarkable. CXR reports left basilar scattered linear opacities with no consolidation or other evidence of pneumonia. Blood cultures from 7/10 NGTD. Infectious Diseases being consulted for COVID in liver transplant patient.       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    Esophageal varices     GERD (gastroesophageal reflux disease)     Hepatic encephalopathy 7/8/2014    Hepatitis C virus infection 07/08/2014    tx with interferon 3-4 mos- stopped for unclear reasons Tattoos-first at age 17 only risk factor (HCVAB negative 08/2017)    HTN (hypertension) 7/8/2014    Hypertension     Insulin dependent diabetes mellitus     Personal history of colonic polyps     Renal mass, left 7/8/2014    6.3 x 5.7 x 5.6 complex mass    Splenomegaly 7/8/2014    Umbilical hernia 7/8/2014    Weight gain 2/8/2021    30 lbs since transplant       Past Surgical History:   Procedure Laterality Date    CARPAL TUNNEL RELEASE Bilateral     CHOLECYSTECTOMY      lap     COLONOSCOPY N/A 9/8/2017    Procedure: COLONOSCOPY;  Surgeon: Savannah Llanos MD;  Location: [facility];  Service: Endoscopy;  Laterality: N/A;    COLONOSCOPY Left 3/14/2018    Procedure: COLONOSCOPY;  Surgeon: Savannah Llanos MD;  [facility] ENDO;  Service: Endoscopy;  Laterality: Left;    COLONOSCOPY W/ POLYPECTOMY  06/19/2014    ESOPHAGOGASTRODUODENOSCOPY  01/2014    ESOPHAGOGASTRODUODENOSCOPY  02/15/2017    grade II varices    ESOPHAGOGASTRODUODENOSCOPY  04/10/2017    grade I varices    ESOPHAGOGASTRODUODENOSCOPY N/A 10/18/2019    Procedure: EGD (ESOPHAGOGASTRODUODENOSCOPY);  Surgeon: Flavio Escalera MD;  [facility] ([facility]);  Service: Endoscopy;  Laterality: N/A;    ESOPHAGOGASTRODUODENOSCOPY W/ BANDING  01/26/2017    grade II varices    HERNIA REPAIR      LIVER TRANSPLANT N/A 5/17/2020    Procedure: TRANSPLANT, LIVER;  Surgeon: Danny Thakkar MD;  [facility];  Service: Transplant;  Laterality: N/A;    NECK SURGERY      fusion on C5 and C6    THROMBECTOMY  5/17/2020    Procedure: THROMBECTOMY;  Surgeon: Danny Thakkar MD;  [facility];  Service: Transplant;;  portal vein thrombectomy    ULNAR NERVE TRANSPOSITION Left     UMBILICAL HERNIA REPAIR N/A 2/22/2020    Procedure: REPAIR, HERNIA, PRIMARY WIHTOUT MESH AND PLACEMENT OF DRAIN;  Surgeon: Sherri Brunner MD;  [facility];  Service: Transplant;  Laterality: N/A;    vericose veins removed         Review of patient's allergies indicates:  No Known Allergies    Medications:  Medications Prior to Admission   Medication Sig    aspirin 81 MG Chew Take 1 tablet (81 mg total) by mouth once daily.    bisacodyL (DULCOLAX) 5 mg EC tablet Take 2 tablets (10 mg total) by mouth daily as needed for Constipation.    blood sugar diagnostic (TRUE METRIX GLUCOSE TEST STRIP MISC)   TRUE METRIX SELF MONITORING BLOOD GLUCOSE STRIPS   Strip, See Instructions, TEST BLOOD SUGAR FOUR  "TIMES DAILY, # 400 unknown unit, 3 Refill(s), Pharmacy: [facility], 180, cm, Height/Length Dosing, 02/10/21 9:33:00 CST, 90.718, kg, W...    EASY TOUCH INSULIN SYRINGE 1 mL 31 gauge x 5/16 Syrg     escitalopram oxalate (LEXAPRO) 20 MG tablet Take 20 mg by mouth once daily.     famotidine (PEPCID) 20 MG tablet Take 1 tablet (20 mg total) by mouth every evening.    finasteride (PROSCAR) 5 mg tablet Take 1 tablet (5 mg total) by mouth once daily.    gabapentin (NEURONTIN) 600 MG tablet Take 1 tablet (600 mg total) by mouth 3 (three) times daily.    HYDROcodone-acetaminophen (NORCO)  mg per tablet Take 1 tablet by mouth every 12 (twelve) hours as needed for Pain.    insulin aspart U-100 (NOVOLOG FLEXPEN U-100 INSULIN) 100 unit/mL (3 mL) InPn pen Inject 10 Units into the skin 3 (three) times daily before meals.    LEVEMIR FLEXTOUCH U-100 INSULN 100 unit/mL (3 mL) InPn pen Inject 20 Units into the skin every evening.    meclizine (ANTIVERT) 25 mg tablet Take 1 tablet (25 mg total) by mouth 3 (three) times daily as needed for Dizziness.    metFORMIN (GLUCOPHAGE) 500 MG tablet Take 1 tablet (500 mg total) by mouth daily with breakfast.    omeprazole (PRILOSEC) 40 MG capsule Take 40 mg by mouth once daily.    oxybutynin (DITROPAN) 5 MG Tab Take 5 mg by mouth once daily.    pen needle, diabetic 32 gauge x 5/32" Ndle use one pen needle  3 (three) times daily with insulin pen    sildenafiL (VIAGRA) 100 MG tablet Take 100 mg by mouth once daily.    tacrolimus (PROGRAF) 1 MG Cap Take 3 capsules (3 mg total) by mouth every morning AND 2 capsules (2 mg total) every evening.    tamsulosin (FLOMAX) 0.4 mg Cap Take 0.4 mg by mouth once daily.    TRUE METRIX GLUCOSE TEST STRIP Strp use to test blood sugar 4 times daily    TRUEPLUS LANCETS 30 gauge Misc      Antibiotics (From admission, onward)                None          Antifungals (From admission, onward)                None      "     Antivirals (From admission, onward)      None             Immunization History   Administered Date(s) Administered    COVID-19, vector-nr, rS-Ad26, PF (Tyrone) 10/28/2021    DTP 1962, 1962, 1962, 06/10/1964, 1967    Hepatitis A / Hepatitis B 08/15/2014    Hepatitis A, Adult 08/15/2014, 2019    Hepatitis B (recombinant) Adjuvanted, 2 dose 2019    Hepatitis B, Adult 2019    Influenza - Quadrivalent - PF *Preferred* (6 months and older) 2016, 09/15/2017, 2018    Influenza - Trivalent - PF (ADULT) 2010, 2014, 10/20/2015, 2016, 09/15/2017, 2018, 2018, 2020, 10/18/2021    Influenza A (H1N1) 2009 Monovalent - IM 2010    OPV 1962, 1962, 1967    Pneumococcal Polysaccharide - 23 Valent 2001, 2014, 11/10/2014, 10/18/2021    Td (ADULT) 08/15/2014    Tdap 08/15/2014, 2019    Zoster 2018    Zoster Recombinant 2018       Family History       Problem Relation (Age of Onset)    Alcohol abuse Brother    Cancer Brother    Hyperlipidemia Mother    No Known Problems Father (36), Sister, Sister          Social History     Socioeconomic History    Marital status:    Occupational History     Employer: Disabled   Tobacco Use    Smoking status: Former Smoker     Types: Cigarettes     Quit date: 2010     Years since quittin.5    Smokeless tobacco: Former User     Types: Chew     Quit date: 1990    Tobacco comment: former 1 pack/week quit 2010   Substance and Sexual Activity    Alcohol use: No     Comment: former heavy beer but quit 2010    Drug use: No    Sexual activity: Never     Comment:    Social History Narrative         Review of Systems   Constitutional:  Negative for chills, fatigue, fever and unexpected weight change.   HENT:  Negative for congestion, postnasal drip and trouble swallowing.    Respiratory:  Negative for  cough, chest tightness and shortness of breath (Mild at home but was able to ambulate).    Cardiovascular:  Negative for chest pain, palpitations and leg swelling.   Gastrointestinal:  Negative for abdominal pain, blood in stool, constipation, diarrhea, nausea and vomiting.   Genitourinary:  Negative for difficulty urinating, dysuria and hematuria.   Musculoskeletal:  Negative for arthralgias and back pain.   Neurological:  Negative for dizziness and light-headedness.   Psychiatric/Behavioral:  Negative for confusion.    Objective:     Vital Signs (Most Recent):  Temp: 97.7 °F (36.5 °C) (07/14/22 1130)  Pulse: 71 (07/14/22 1130)  Resp: 18 (07/14/22 1130)  BP: 124/72 (07/14/22 1130)  SpO2: 96 % (07/14/22 1130) Vital Signs (24h Range):  Temp:  [97.7 °F (36.5 °C)-99.3 °F (37.4 °C)] 97.7 °F (36.5 °C)  Pulse:  [71-96] 71  Resp:  [18-20] 18  SpO2:  [92 %-96 %] 96 %  BP: (122-135)/(66-77) 124/72     Weight: 93 kg (205 lb)  Body mass index is 28.59 kg/m².    Estimated Creatinine Clearance: 101.7 mL/min (based on SCr of 0.9 mg/dL).    Physical Exam  Constitutional:       General: He is not in acute distress.     Appearance: Normal appearance. He is not ill-appearing.   Cardiovascular:      Rate and Rhythm: Normal rate and regular rhythm.      Pulses: Normal pulses.      Heart sounds: Normal heart sounds. No murmur heard.    No friction rub. No gallop.   Pulmonary:      Effort: Pulmonary effort is normal. No respiratory distress.      Breath sounds: Normal breath sounds.   Abdominal:      General: Abdomen is flat. Bowel sounds are normal. There is no distension.      Palpations: Abdomen is soft.      Tenderness: There is no abdominal tenderness.      Comments: Post op scar well healed   Skin:     General: Skin is warm and dry.   Neurological:      Mental Status: He is alert.       Significant Labs: All pertinent labs within the past 24 hours have been reviewed.    Significant Imaging: I have reviewed all pertinent imaging  results/findings within the past 24 hours.

## 2022-07-14 NOTE — ASSESSMENT & PLAN NOTE
-Pt. With symptoms of generalized fatigue, fevers, and cough. Noted to have elevated LFTs and low WBC in setting of Covid. LFTs improving from yesterday and pt. Reports improving symptoms. Pt. High risk for severe complication of Covid, but not currently requiring supplemental O2. Will hold remdesivir in setting of elevated LFTs, but consult ID for further asssitance. Further care outlined below      Patient is identified as High risk for severe complications of COVID 19 based on COVID risk score of 5   Initiate standard COVID protocols; COVID-19 testing ,Infection Control notification and isolation- respiratory, contact and droplet per protocol    Diagnostics: (leukopenia, hyponatremia, hyperferritinemia, elevated troponin, elevated d-dimer, age, and comorbidities are significant predictors of poor clinical outcome)  CBC, CMP, Procalcitonin, Ferritin, CRP, ECG and Portable CXR    Management: Maintain oxygen saturations 92-96% and monitor with continuous pulse oximetry.  and Inhaled bronchodilators as needed for shortness of breath.    Oxygen Device:     Micronutrients   Multivitamin PO daily   Vitamin D 1000IU PO daily (if deficient)   Magnesium IV/PO (if deficient)   Ascorbic acid 500mg po BID     Trend inflammatory markers, LDH, D-Dimer, ferritin, and CRP Q48hrs    Advance Care Planning  Current advance care plan has been discussed with patient/family/POA and patient currently wishes Full Code.

## 2022-07-14 NOTE — ASSESSMENT & PLAN NOTE
-Last A1c 8.1 5/2022  -Hold home metofrmin while admitted, continue home levemir 20 units QHS with SSI and diabetic diet  -Monitor BG and titrate insulin as needed

## 2022-07-14 NOTE — HOSPITAL COURSE
Patient admitted to hospital medicine. Hepatology and infectious disease consulted. Patient did not qualify for remdesivir or decadron. He was saturating well on room air. Tacro dosing changed per hepatology. Patient deemed ready for discharge. Plan discussed with pt, who was agreeable and amenable; medications were discussed and reviewed, outpatient follow-up arranged, ER precautions were given, all questions were answered to the pt's satisfaction, and Colin Reilly  was subsequently discharged.

## 2022-07-14 NOTE — ASSESSMENT & PLAN NOTE
-Pt. With symptoms of generalized fatigue, fevers, and cough. Noted to have elevated LFTs and low WBC in setting of Covid. LFTs improving from yesterday and pt. Reports improving symptoms. Pt. High risk for severe complication of Covid, but not currently requiring supplemental O2. Will hold remdesivir in setting of elevated LFTs, but consult ID for further asssitance.     Patient is identified as High risk for severe complications of COVID 19 based on COVID risk score of 5   Initiate standard COVID protocols; COVID-19 testing ,Infection Control notification and isolation- respiratory, contact and droplet per protocol    Diagnostics: (leukopenia, hyponatremia, hyperferritinemia, elevated troponin, elevated d-dimer, age, and comorbidities are significant predictors of poor clinical outcome)  CBC, CMP, Procalcitonin, Ferritin, CRP, ECG and Portable CXR    Management: Maintain oxygen saturations 92-96% and monitor with continuous pulse oximetry.  and Inhaled bronchodilators as needed for shortness of breath.    Oxygen Device: room air  Advance Care Planning  Current advance care plan has been discussed with patient/family/POA and patient currently wishes Full Code.

## 2022-07-14 NOTE — CONSULTS
[facility] - Telemetry Stepdown  Liver Transplant  Consult Note    Patient Name: Colin Reilly  MRN: [id number]  Admission Date: 7/13/2022  Hospital Length of Stay: 0 days  Code Status: Full Code  Attending Provider: Rodolfo Newman*  Primary Care Provider: Preston Matthew Ii, MD    Inpatient consult to Hepatology  Consult performed by: Stephani Lau  Consult ordered by: Richy Osorio MD        Subjective:     History of Present Illness:  61 y gentleman with PMHx of alcoholic cirrhosis who is s/p DCD OLTX on 5/17/20,  HCV s/p INF, and DM2 who presented to the ED for worsening liver labs on routine lab work done by transplant coordinator, he has also been having fever (102), cough/chills x5 days, mild abdominal discomfort and 1-2 episodes of nausea and vomiting since 7/8. This is also associated with malaise and decreased appetite. He checked Covid twice at home and once at an urgent care facility near his home but has been negative each time [place] [facility]  He has been compliant with his Tacrolimus. 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 Only took Aspirin 81 mg at home for his Sx.    His Tbil was up to 1.6 with AST/ALT and Alk phos being 342/235 and 385  on 7/12 but have come down since then   Liver doppler was unremarkable       ORGAN: LIVER  Whole or Partial: whole liver  Donor Type: donation after circulatory death   CDC High Risk: no  Donor CMV Status: Negative  Donor HCV Status: Negative  Donor HBcAb: Negative  Donor HBV CEZAR: Negative  Donor HCV CEZAR: Negative  Biliary Anastomosis: end to end  Arterial Anatomy: standard  IVC reconstruction: end to end ivc  Portal vein status: partial patent        Past Medical History:   Diagnosis Date    Alcohol abuse, in  remission 7/8/2014    Quit 01/04, 2011    Alcohol abuse, in remission     Ascites     Chronic back pain 7/8/2014    Cirrhosis, Laennec's 7/8/2014    Esophageal varices     GERD (gastroesophageal reflux disease)     Hepatic encephalopathy 7/8/2014    Hepatitis C virus infection 07/08/2014    tx with interferon 3-4 mos- stopped for unclear reasons Tattoos-first at age 17 only risk factor (HCVAB negative 08/2017)    HTN (hypertension) 7/8/2014    Hypertension     Insulin dependent diabetes mellitus     Personal history of colonic polyps     Renal mass, left 7/8/2014    6.3 x 5.7 x 5.6 complex mass    Splenomegaly 7/8/2014    Umbilical hernia 7/8/2014    Weight gain 2/8/2021    30 lbs since transplant     Past Surgical History:   Procedure Laterality Date    CARPAL TUNNEL RELEASE Bilateral     CHOLECYSTECTOMY      lap    COLONOSCOPY N/A 9/8/2017    Procedure: COLONOSCOPY;  Surgeon: Savannah Llanos MD;  Location: [facility];  Service: Endoscopy;  Laterality: N/A;    COLONOSCOPY Left 3/14/2018    Procedure: COLONOSCOPY;  Surgeon: Savannah Llanos MD;  Location: [facility];  Service: Endoscopy;  Laterality: Left;    COLONOSCOPY W/ POLYPECTOMY  06/19/2014    ESOPHAGOGASTRODUODENOSCOPY  01/2014    ESOPHAGOGASTRODUODENOSCOPY  02/15/2017    grade II varices    ESOPHAGOGASTRODUODENOSCOPY  04/10/2017    grade I varices    ESOPHAGOGASTRODUODENOSCOPY N/A 10/18/2019    Procedure: EGD (ESOPHAGOGASTRODUODENOSCOPY);  Surgeon: Flavio Escalera MD;  Location: [facility] ([address] [place] [address]);  Service: Endoscopy;  Laterality: N/A;    ESOPHAGOGASTRODUODENOSCOPY W/ BANDING  01/26/2017    grade II varices    HERNIA REPAIR      LIVER TRANSPLANT N/A 5/17/2020    Procedure: TRANSPLANT, LIVER;  Surgeon: Danny Thakkar MD;  [facility];  Service: Transplant;  Laterality: N/A;    NECK SURGERY      fusion on C5 and C6    THROMBECTOMY  5/17/2020    Procedure: THROMBECTOMY;  Surgeon: Danny MORALES  MD Ariane;  Location: [facility] OR [address] [place] [address];  Service: Transplant;;  portal vein thrombectomy    ULNAR NERVE TRANSPOSITION Left     UMBILICAL HERNIA REPAIR N/A 2020    Procedure: REPAIR, HERNIA, PRIMARY WIHTOUT MESH AND PLACEMENT OF DRAIN;  Surgeon: Sherri Brunner MD;  [facility];  Service: Transplant;  Laterality: N/A;    vericose veins removed         Review of patient's allergies indicates:  No Known Allergies    Family History       Problem Relation (Age of Onset)    Alcohol abuse Brother    Cancer Brother    Hyperlipidemia Mother    No Known Problems Father (36), Sister, Sister          Tobacco Use    Smoking status: Former Smoker     Types: Cigarettes     Quit date: 2010     Years since quittin.5    Smokeless tobacco: Former User     Types: Chew     Quit date: 1990    Tobacco comment: former 1 pack/week quit 2010   Substance and Sexual Activity    Alcohol use: No     Comment: former heavy beer but quit 2010    Drug use: No    Sexual activity: Never     Comment:        Scheduled Meds:   aspirin  81 mg Oral Daily    EScitalopram oxalate  20 mg Oral Daily    finasteride  5 mg Oral Daily    gabapentin  600 mg Oral TID    insulin detemir U-100  20 Units Subcutaneous QHS    oxybutynin  5 mg Oral Daily    pantoprazole  40 mg Oral Daily    tacrolimus  2 mg Oral Daily PM    tacrolimus  3 mg Oral Daily AM    tamsulosin  0.4 mg Oral Daily     Continuous Infusions:  PRN Meds:acetaminophen, glucose, glucose, HYDROcodone-acetaminophen, melatonin, naloxone, sodium chloride 0.9%    Review of Systems   Constitutional:  Positive for appetite change, fatigue and fever. Negative for activity change.   Respiratory:  Negative for shortness of breath.    Cardiovascular:  Negative for chest pain, palpitations and leg swelling.   Gastrointestinal:  Negative for abdominal distention, abdominal pain, anal bleeding, blood in stool, constipation, diarrhea, nausea,  rectal pain and vomiting.    Objective:     Vital Signs (Most Recent):  Temp: 97.8 °F (36.6 °C) (07/14/22 0745)  Pulse: 96 (07/14/22 0745)  Resp: 18 (07/14/22 0827)  BP: 123/77 (07/14/22 0745)  SpO2: 96 % (07/14/22 0745) Vital Signs (24h Range):  Temp:  [97.8 °F (36.6 °C)-99.3 °F (37.4 °C)] 97.8 °F (36.6 °C)  Pulse:  [74-96] 96  Resp:  [18-20] 18  SpO2:  [92 %-96 %] 96 %  BP: (122-135)/(66-77) 123/77     Weight: 93 kg (205 lb)  Body mass index is 28.59 kg/m².    No intake or output data in the 24 hours ending 07/14/22 0839    Physical Exam  Constitutional:       Appearance: Normal appearance. He is not ill-appearing.   Cardiovascular:      Rate and Rhythm: Normal rate.      Pulses: Normal pulses.   Pulmonary:      Effort: Pulmonary effort is normal. No respiratory distress.      Breath sounds: Normal breath sounds.   Abdominal:      General: Abdomen is flat. Bowel sounds are normal. There is no distension.      Palpations: Abdomen is soft. There is no mass.      Tenderness: There is no abdominal tenderness. There is no guarding.   Musculoskeletal:      Right lower leg: No edema.      Left lower leg: No edema.   Skin:     Capillary Refill: Capillary refill takes less than 2 seconds.      Coloration: Skin is not jaundiced or pale.   Neurological:      General: No focal deficit present.      Mental Status: He is alert and oriented to person, place, and time.       Laboratory:  Labs within the past month have been reviewed.    Diagnostic Results:  I have personally reviewed all pertinent imaging studies.    Assessment/Plan:     History of liver transplant  61 y gentleman with PMHx of alcoholic cirrhosis who is s/p DCD OLTX on 5/17/20,  HCV s/p INF, and DM2 who presented to the ED for worsening liver labs on routine lab work done by transplant coordinator, he has also been having fevercough/chills x5 days, and tested positive yesterday for Covid 19     His Tbil was up to 1.6 with AST/ALT and Alk phos being 342/235 and  385  on 7/12 but have come down since then   Liver doppler was unremarkable   CMV PCR and PETH in process    The liver enzyme elevation is most likely secondary to covid 19 infection initially mixed now only hepatocellular    Recommendations   - Will follow up PETH and CMV PCR  - Recommend holding evening dose of Tacrolimus today and reduce the dose tomorrow to 2 mg in am and 1 mg in evening given high Tacrolimus level of 14  - Check CBC, CMP, INR and Tacrolimus trough level daily   - Avoid all hepatotoxic medication  - Continue supportive management for Covid19 infection             Stephani Lau  Liver Transplant  [facility] - Telemetry Stepdown

## 2022-07-14 NOTE — HPI
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 Pt. Reports that he developed cough, chills, and nasal congestion last Friday. Since then he has been having generalized fatigue with some COPE along with lightheadedness and intermittent subjective fevers. Pt. Was seen in ED 7/10 for these syptoms and was noted to have neurtopenia with WBC 1.6 as well as elevated liver enzymes with AST//193. Pt. Was not tested for Covid-19 in ED at that time, but he states that he was negative on rapid home tests x2. Pt. Was otherwise stable and discharged from ED with plans to contact transplant coordinator and trend labs. Yesterday, pt. Had repeat labs performed which showed worsening LFTs, so he was referred to the ED here for further work-up. Pt. Noted to be Covid-19 positive on presentation here. Pt. Reports persistent cough, but he states that his symptoms have improved since Friday, and he is feeling a bit better. He denies any abdominal pain, nausea, vomiting, or changes in bowel habits.

## 2022-07-14 NOTE — SUBJECTIVE & OBJECTIVE
Interval History: Patient feels improved this am. Denies shortness of breath. LFT down-trending.   Adjust tacro per hepatology dt elevated level    Review of Systems  Objective:     Vital Signs (Most Recent):  Temp: 97.7 °F (36.5 °C) (07/14/22 1130)  Pulse: 71 (07/14/22 1130)  Resp: 18 (07/14/22 1130)  BP: 124/72 (07/14/22 1130)  SpO2: 96 % (07/14/22 1130) Vital Signs (24h Range):  Temp:  [97.7 °F (36.5 °C)-99.3 °F (37.4 °C)] 97.7 °F (36.5 °C)  Pulse:  [71-96] 71  Resp:  [18-20] 18  SpO2:  [92 %-96 %] 96 %  BP: (122-135)/(66-77) 124/72     Weight: 93 kg (205 lb)  Body mass index is 28.59 kg/m².  No intake or output data in the 24 hours ending 07/14/22 1524   Physical Exam  Constitutional:       General: He is not in acute distress.     Appearance: Normal appearance. He is not toxic-appearing or diaphoretic.   Cardiovascular:      Rate and Rhythm: Normal rate and regular rhythm.      Heart sounds: No murmur heard.    No friction rub. No gallop.   Pulmonary:      Effort: Pulmonary effort is normal. No respiratory distress.      Breath sounds: No wheezing or rales.   Abdominal:      General: Abdomen is flat. There is no distension.      Palpations: Abdomen is soft.      Tenderness: There is no abdominal tenderness. There is no guarding or rebound.   Musculoskeletal:      Cervical back: Normal range of motion and neck supple.      Right lower leg: No edema.      Left lower leg: No edema.   Skin:     General: Skin is warm and dry.   Neurological:      Mental Status: He is alert.       MELD-Na score: 6 at 7/14/2022  6:00 AM  MELD score: 6 at 7/14/2022  6:00 AM  Calculated from:  Serum Creatinine: 0.9 mg/dL (Using min of 1 mg/dL) at 7/14/2022  6:00 AM  Serum Sodium: 136 mmol/L at 7/14/2022  6:00 AM  Total Bilirubin: 1.0 mg/dL at 7/14/2022  6:00 AM  INR(ratio): 1.0 at 7/14/2022  6:00 AM  Age: 61 years    Significant Labs:  CBC:  Recent Labs   Lab 07/13/22  1726 07/14/22  0600   WBC 2.15* 1.58*   HGB 13.1* 13.2*   HCT 41.7  41.4   PLT 94* 76*     CMP:  Recent Labs   Lab 07/13/22  1726 07/14/22  0600   * 136   K 4.3 3.6    102   CO2 22* 26   * 178*   BUN 11 10   CREATININE 1.3 0.9   CALCIUM 8.2* 8.3*   PROT 6.9 6.4   ALBUMIN 3.9 3.6   BILITOT 0.9 1.0   ALKPHOS 324* 273*   * 76*   * 123*   ANIONGAP 10 8   EGFRNONAA 59.3* >60.0     PTINR:  Recent Labs   Lab 07/13/22  1726 07/14/22  0600   INR 1.1 1.0       Significant Procedures:   Dobutamine Stress Test with Color Flow: No results found. However, due to the size of the patient record, not all encounters were searched. Please check Results Review for a complete set of results.

## 2022-07-14 NOTE — HPI
61 y gentleman with PMHx of alcoholic cirrhosis who is s/p DCD OLTX on 5/17/20,  HCV s/p INF, and DM2 who presented to the ED for worsening liver labs on routine lab work done by transplant coordinator, he has also been having fever (102), cough/chills x5 days, mild abdominal discomfort and 1-2 episodes of nausea and vomiting since 7/8. This is also associated with malaise and decreased appetite. He checked Covid twice at home and once at an urgent care facility near his home but has been negative each time until checked at [facility].  He has been compliant with his Tacrolimus. 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 Only took Aspirin 81 mg at home for his Sx.    His Tbil was up to 1.6 with AST/ALT and Alk phos being 342/235 and 385  on 7/12 but have come down since then   Liver doppler was unremarkable       ORGAN: LIVER  Whole or Partial: whole liver  Donor Type: donation after circulatory death   CDC High Risk: no  Donor CMV Status: Negative  Donor HCV Status: Negative  Donor HBcAb: Negative  Donor HBV CEZAR: Negative  Donor HCV CEZAR: Negative  Biliary Anastomosis: end to end  Arterial Anatomy: standard  IVC reconstruction: end to end ivc  Portal vein status: partial patent

## 2022-07-14 NOTE — ASSESSMENT & PLAN NOTE
MELD-Na score: 10 at 7/13/2022  5:26 PM  MELD score: 10 at 7/13/2022  5:26 PM  Calculated from:  Serum Creatinine: 1.3 mg/dL at 7/13/2022  5:26 PM  Serum Sodium: 132 mmol/L at 7/13/2022  5:26 PM  Total Bilirubin: 0.9 mg/dL (Using min of 1 mg/dL) at 7/13/2022  5:26 PM  INR(ratio): 1.1 at 7/13/2022  5:26 PM  Age: 61 years  -Hepatology consulted for assistance. Continue home tacrolimus, See elevated LFTs

## 2022-07-14 NOTE — ASSESSMENT & PLAN NOTE
-Decrease tacrolimus 2 mg qam, 1 mg qhs, further adjustment per hepatology recs  - tacro levels.

## 2022-07-14 NOTE — ASSESSMENT & PLAN NOTE
-Noted in setting of Covid, trending down. Liver transplant U/S  -Hepatology consulted for assistance

## 2022-07-14 NOTE — ED NOTES
Colin Reilly, a 61 y.o. 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

## 2022-07-14 NOTE — ASSESSMENT & PLAN NOTE
61 y gentleman with PMHx of alcoholic cirrhosis who is s/p DCD OLTX on 5/17/20,  HCV s/p INF, and DM2 who presented to the ED for worsening liver labs on routine lab work done by transplant coordinator, he has also been having fevercough/chills x5 days, and tested positive yesterday for Covid 19     His Tbil was up to 1.6 with AST/ALT and Alk phos being 342/235 and 385  on 7/12 but have come down since then   Liver doppler was unremarkable   CMV PCR and PETH in process    The liver enzyme elevation is most likely secondary to covid 19 infection initially mixed now only hepatocellular    Recommendations   - Will follow up PETH and CMV PCR  - Recommend continuing home dose of Tacrolimus 3mg in am and 2 mg in evening   - Check CBC, CMP, INR and Tacrolimus trough level daily   - Avoid all hepatotoxic medication  - Continue supportive management for Covid19 infection

## 2022-07-14 NOTE — SUBJECTIVE & OBJECTIVE
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    Esophageal varices     GERD (gastroesophageal reflux disease)     Hepatic encephalopathy 7/8/2014    Hepatitis C virus infection 07/08/2014    tx with interferon 3-4 mos- stopped for unclear reasons Tattoos-first at age 17 only risk factor (HCVAB negative 08/2017)    HTN (hypertension) 7/8/2014    Hypertension     Insulin dependent diabetes mellitus     Personal history of colonic polyps     Renal mass, left 7/8/2014    6.3 x 5.7 x 5.6 complex mass    Splenomegaly 7/8/2014    Umbilical hernia 7/8/2014    Weight gain 2/8/2021    30 lbs since transplant       Past Surgical History:   Procedure Laterality Date    CARPAL TUNNEL RELEASE Bilateral     CHOLECYSTECTOMY      lap    COLONOSCOPY N/A 9/8/2017    Procedure: COLONOSCOPY;  Surgeon: Savannah Llanos MD;  Location: [facility];  Service: Endoscopy;  Laterality: N/A;    COLONOSCOPY Left 3/14/2018    Procedure: COLONOSCOPY;  Surgeon: Savannah Llanos MD;  Location: [facility];  Service: Endoscopy;  Laterality: Left;    COLONOSCOPY W/ POLYPECTOMY  06/19/2014    ESOPHAGOGASTRODUODENOSCOPY  01/2014    ESOPHAGOGASTRODUODENOSCOPY  02/15/2017    grade II varices    ESOPHAGOGASTRODUODENOSCOPY  04/10/2017    grade I varices    ESOPHAGOGASTRODUODENOSCOPY N/A 10/18/2019    Procedure: EGD (ESOPHAGOGASTRODUODENOSCOPY);  Surgeon: Flavio Escalera MD;  Location: [facility] ([address] [place] [address]);  Service: Endoscopy;  Laterality: N/A;    ESOPHAGOGASTRODUODENOSCOPY W/ BANDING  01/26/2017    grade II varices    HERNIA REPAIR      LIVER TRANSPLANT N/A 5/17/2020    Procedure: TRANSPLANT, LIVER;  Surgeon: Danny Thakkar MD;  [facility];  Service: Transplant;  Laterality: N/A;    NECK SURGERY      fusion on C5 and C6    THROMBECTOMY  5/17/2020    Procedure:  THROMBECTOMY;  Surgeon: Danny Thakkar MD;  [facility];  Service: Transplant;;  portal vein thrombectomy    ULNAR NERVE TRANSPOSITION Left     UMBILICAL HERNIA REPAIR N/A 2/22/2020    Procedure: REPAIR, HERNIA, PRIMARY WIHTOUT MESH AND PLACEMENT OF DRAIN;  Surgeon: Sherri Brunner MD;  [facility];  Service: Transplant;  Laterality: N/A;    vericose veins removed         Review of patient's allergies indicates:  No Known Allergies    Medications:  Medications Prior to Admission   Medication Sig    aspirin 81 MG Chew Take 1 tablet (81 mg total) by mouth once daily.    bisacodyL (DULCOLAX) 5 mg EC tablet Take 2 tablets (10 mg total) by mouth daily as needed for Constipation.    blood sugar diagnostic (TRUE METRIX GLUCOSE TEST STRIP MISC)   TRUE METRIX SELF MONITORING BLOOD GLUCOSE STRIPS   Strip, See Instructions, TEST BLOOD SUGAR FOUR TIMES DAILY, # 400 unknown unit, 3 Refill(s), Pharmacy: [facility], 180, cm, Height/Length Dosing, 02/10/21 9:33:00 CST, 90.718, kg, W...    EASY TOUCH INSULIN SYRINGE 1 mL 31 gauge x 5/16 Syrg     escitalopram oxalate (LEXAPRO) 20 MG tablet Take 20 mg by mouth once daily.     famotidine (PEPCID) 20 MG tablet Take 1 tablet (20 mg total) by mouth every evening.    finasteride (PROSCAR) 5 mg tablet Take 1 tablet (5 mg total) by mouth once daily.    gabapentin (NEURONTIN) 600 MG tablet Take 1 tablet (600 mg total) by mouth 3 (three) times daily.    HYDROcodone-acetaminophen (NORCO)  mg per tablet Take 1 tablet by mouth every 12 (twelve) hours as needed for Pain.    insulin aspart U-100 (NOVOLOG FLEXPEN U-100 INSULIN) 100 unit/mL (3 mL) InPn pen Inject 10 Units into the skin 3 (three) times daily before meals.    LEVEMIR FLEXTOUCH U-100 INSULN 100 unit/mL (3 mL) InPn pen Inject 20 Units into the skin every evening.    meclizine (ANTIVERT) 25 mg tablet Take 1 tablet (25 mg total) by mouth 3 (three) times daily as needed for  "Dizziness.    metFORMIN (GLUCOPHAGE) 500 MG tablet Take 1 tablet (500 mg total) by mouth daily with breakfast.    omeprazole (PRILOSEC) 40 MG capsule Take 40 mg by mouth once daily.    oxybutynin (DITROPAN) 5 MG Tab Take 5 mg by mouth once daily.    pen needle, diabetic 32 gauge x 5/32" Ndle use one pen needle  3 (three) times daily with insulin pen    sildenafiL (VIAGRA) 100 MG tablet Take 100 mg by mouth once daily.    tacrolimus (PROGRAF) 1 MG Cap Take 3 capsules (3 mg total) by mouth every morning AND 2 capsules (2 mg total) every evening.    tamsulosin (FLOMAX) 0.4 mg Cap Take 0.4 mg by mouth once daily.    TRUE METRIX GLUCOSE TEST STRIP Strp use to test blood sugar 4 times daily    TRUEPLUS LANCETS 30 gauge Misc      Antibiotics (From admission, onward)                None          Antifungals (From admission, onward)                None          Antivirals (From admission, onward)      None             Immunization History   Administered Date(s) Administered    COVID-19, vector-nr, [id number], PF (T[place]one) 10/28/2021    DTP 04/04/1962, 05/02/1962, 06/06/1962, 06/10/1964, 04/13/1967    Hepatitis A / Hepatitis B 08/15/2014    Hepatitis A, Adult 08/15/2014, 12/06/2019    Hepatitis B (recombinant) Adjuvanted, 2 dose 12/06/2019    Hepatitis B, Adult 12/06/2019    Influenza - Quadrivalent - PF *Preferred* (6 months and older) 12/20/2016, 09/15/2017, 08/21/2018    Influenza - Trivalent - PF (ADULT) 03/01/2010, 11/01/2014, 10/20/2015, 12/20/2016, 09/15/2017, 08/21/2018, 08/21/2018, 11/09/2020, 10/18/2021    Influenza A (H1N1) 2009 Monovalent - IM 03/01/2010    OPV 04/04/1962, 05/02/1962, 04/16/1967    Pneumococcal Polysaccharide - 23 Valent 11/20/2001, 08/26/2014, 11/10/2014, 10/18/2021    Td (ADULT) 08/15/2014    Tdap 08/15/2014, 08/05/2019    Zoster 04/03/2018    Zoster Recombinant 04/03/2018       Family History       Problem Relation (Age of Onset)    Alcohol abuse Brother    Cancer Brother    Hyperlipidemia Mother "    No Known Problems Father (36), Sister, Sister          Social History     Socioeconomic History    Marital status:    Occupational History     Employer: Disabled   Tobacco Use    Smoking status: Former Smoker     Types: Cigarettes     Quit date: 2010     Years since quittin.5    Smokeless tobacco: Former User     Types: Chew     Quit date: 1990    Tobacco comment: former 1 pack/week quit 2010   Substance and Sexual Activity    Alcohol use: No     Comment: former heavy beer but quit 2010    Drug use: No    Sexual activity: Never     Comment:    Social History Narrative         Review of Systems   Constitutional:  Negative for chills, fatigue, fever and unexpected weight change.   HENT:  Negative for congestion, postnasal drip and trouble swallowing.    Respiratory:  Negative for cough, chest tightness and shortness of breath (Mild at home but was able to ambulate).    Cardiovascular:  Negative for chest pain, palpitations and leg swelling.   Gastrointestinal:  Negative for abdominal pain, blood in stool, constipation, diarrhea, nausea and vomiting.   Genitourinary:  Negative for difficulty urinating, dysuria and hematuria.   Musculoskeletal:  Negative for arthralgias and back pain.   Neurological:  Negative for dizziness and light-headedness.   Psychiatric/Behavioral:  Negative for confusion.    Objective:     Vital Signs (Most Recent):  Temp: 97.7 °F (36.5 °C) (22 1130)  Pulse: 71 (22 1130)  Resp: 18 (22 1130)  BP: 124/72 (22 1130)  SpO2: 96 % (22 1130) Vital Signs (24h Range):  Temp:  [97.7 °F (36.5 °C)-99.3 °F (37.4 °C)] 97.7 °F (36.5 °C)  Pulse:  [71-96] 71  Resp:  [18-20] 18  SpO2:  [92 %-96 %] 96 %  BP: (122-135)/(66-77) 124/72     Weight: 93 kg (205 lb)  Body mass index is 28.59 kg/m².    Estimated Creatinine Clearance: 101.7 mL/min (based on SCr of 0.9 mg/dL).    Physical Exam  Constitutional:       General: He is not in acute  distress.     Appearance: Normal appearance. He is not ill-appearing.   Cardiovascular:      Rate and Rhythm: Normal rate and regular rhythm.      Pulses: Normal pulses.      Heart sounds: Normal heart sounds. No murmur heard.    No friction rub. No gallop.   Pulmonary:      Effort: Pulmonary effort is normal. No respiratory distress.      Breath sounds: Normal breath sounds.   Abdominal:      General: Abdomen is flat. Bowel sounds are normal. There is no distension.      Palpations: Abdomen is soft.      Tenderness: There is no abdominal tenderness.      Comments: Post op scar well healed   Skin:     General: Skin is warm and dry.   Neurological:      Mental Status: He is alert.       Significant Labs: All pertinent labs within the past 24 hours have been reviewed.    Significant Imaging: I have reviewed all pertinent imaging results/findings within the past 24 hours.

## 2022-07-14 NOTE — ASSESSMENT & PLAN NOTE
-Noted in setting of Covid, trending down. Liver transplant U/S ordered for further evaluation  -Hepatology consulted for assistance, f/u recommendations and continue to trend CMP

## 2022-07-14 NOTE — ED PROVIDER NOTES
Encounter Date: 7/13/2022       History     Chief Complaint   Patient presents with    Liver/Kidney Transplant     2000 transplant, abnormal liver enzymes     Mr Colin Reilly is a 61 y.o. M s/p liver transplant 2 years ago who presents for elevated liver enzyme evaluation as well as URI symptoms including cough productive of yellow sputum, SOB, CP, fever at home of 102.4, chills, and sweats. He was recently evaluated in the ED 7/10 for dizziness with a negative workup. He received outpatient labs showing elevated LFTs and was recommended to come in for evaluation. He reports having fever, chills, cough, CP, and SOB since Friday. He also endorses some nausea & vomiting as well as increased abdominal protuberance. 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    Esophageal varices     GERD (gastroesophageal reflux disease)     Hepatic encephalopathy 7/8/2014    Hepatitis C virus infection 07/08/2014    tx with interferon 3-4 mos- stopped for unclear reasons Tattoos-first at age 17 only risk factor (HCVAB negative 08/2017)    HTN (hypertension) 7/8/2014    Hypertension     Insulin dependent diabetes mellitus     Personal history of colonic polyps     Renal mass, left 7/8/2014    6.3 x 5.7 x 5.6 complex mass    Splenomegaly 7/8/2014    Umbilical hernia 7/8/2014    Weight gain 2/8/2021    30 lbs since transplant     Past Surgical History:   Procedure Laterality Date    CARPAL TUNNEL RELEASE Bilateral     CHOLECYSTECTOMY      lap    COLONOSCOPY N/A 9/8/2017    Procedure: COLONOSCOPY;  Surgeon: Savannah Llanos MD;  Location:  [facility] ENDO;  Service: Endoscopy;  Laterality: N/A;    COLONOSCOPY Left 3/14/2018    Procedure: COLONOSCOPY;  Surgeon: Savannah Llanos MD;  Location: [facility];  Service: Endoscopy;  Laterality: Left;    COLONOSCOPY W/ POLYPECTOMY  2014    ESOPHAGOGASTRODUODENOSCOPY  2014    ESOPHAGOGASTRODUODENOSCOPY  02/15/2017    grade II varices    ESOPHAGOGASTRODUODENOSCOPY  04/10/2017    grade I varices    ESOPHAGOGASTRODUODENOSCOPY N/A 10/18/2019    Procedure: EGD (ESOPHAGOGASTRODUODENOSCOPY);  Surgeon: Flavio Escalera MD;  Location: [facility] (2ND FLR);  Service: Endoscopy;  Laterality: N/A;    ESOPHAGOGASTRODUODENOSCOPY W/ BANDING  2017    grade II varices    HERNIA REPAIR      LIVER TRANSPLANT N/A 2020    Procedure: TRANSPLANT, LIVER;  Surgeon: Danny Thakkar MD;  [facility];  Service: Transplant;  Laterality: N/A;    NECK SURGERY      fusion on C5 and C6    THROMBECTOMY  2020    Procedure: THROMBECTOMY;  Surgeon: Danny Thakkar MD;  [facility];  Service: Transplant;;  portal vein thrombectomy    ULNAR NERVE TRANSPOSITION Left     UMBILICAL HERNIA REPAIR N/A 2020    Procedure: REPAIR, HERNIA, PRIMARY WIHTOUT MESH AND PLACEMENT OF DRAIN;  Surgeon: Sherri Brunner MD;  [facility];  Service: Transplant;  Laterality: N/A;    vericose veins removed       Family History   Problem Relation Age of Onset    Hyperlipidemia Mother     No Known Problems Father 36        accident on oil rig    No Known Problems Sister     Cancer Brother         kidney    Alcohol abuse Brother     No Known Problems Sister      Social History     Tobacco Use    Smoking status: Former Smoker     Types: Cigarettes     Quit date: 2010     Years since quittin.5    Smokeless tobacco: Former User     Types: Chew     Quit date: 1990    Tobacco comment: former 1 pack/week quit 2010   Substance Use Topics    Alcohol use: No      Comment: former heavy beer but quit 1/4/2010    Drug use: No     Review of Systems   Constitutional: Positive for activity change, appetite change, chills, diaphoresis, fatigue and fever.   HENT: Positive for sore throat and tinnitus. Negative for congestion and ear pain.    Eyes: Negative for visual disturbance.   Respiratory: Positive for cough and shortness of breath. Negative for wheezing.    Cardiovascular: Positive for chest pain. Negative for leg swelling.   Gastrointestinal: Positive for abdominal distention, abdominal pain, nausea and vomiting. Negative for diarrhea.   Musculoskeletal: Negative for neck pain and neck stiffness.   Allergic/Immunologic: Positive for immunocompromised state.   Neurological: Positive for dizziness and headaches. Negative for facial asymmetry.       Physical Exam     Initial Vitals [07/13/22 1718]   BP Pulse Resp Temp SpO2   135/69 88 18 99.3 °F (37.4 °C) 96 %      MAP       --         Physical Exam    Nursing note and vitals reviewed.  Constitutional: He appears well-developed and well-nourished.   HENT:   Head: Normocephalic and atraumatic.   Mouth/Throat: Oropharynx is clear and moist. No oropharyngeal exudate.   Eyes: Conjunctivae and EOM are normal. Pupils are equal, round, and reactive to light. No scleral icterus.   Cardiovascular: Normal rate, regular rhythm, normal heart sounds and intact distal pulses. Exam reveals no gallop and no friction rub.    No murmur heard.  Pulmonary/Chest: No stridor. No respiratory distress. He has no wheezes. He has rhonchi.   Abdominal: Abdomen is soft. Bowel sounds are normal. He exhibits distension. He exhibits no mass. There is abdominal tenderness.   Reports tenderness but not appears minimal on exam There is no rebound and no guarding.   Musculoskeletal:         General: Tenderness present. No edema.      Comments: Reports tenderness to bl legs but appears minimal on exam. No swelling, redness, or cords noted.      Neurological:  He is alert and oriented to person, place, and time. He has normal strength. No cranial nerve deficit or sensory deficit.   Skin: Skin is warm and dry. Capillary refill takes less than 2 seconds. No erythema.   Psychiatric: He has a normal mood and affect. His behavior is normal. Judgment and thought content normal.         ED Course   Procedures  Labs Reviewed   CBC W/ AUTO DIFFERENTIAL - Abnormal; Notable for the following components:       Result Value    WBC 2.15 (*)     Hemoglobin 13.1 (*)     MCH 26.1 (*)     MCHC 31.4 (*)     RDW 14.9 (*)     Platelets 94 (*)     Gran # (ANC) 1.2 (*)     Lymph # 0.6 (*)     All other components within normal limits    Narrative:     Release to patient->Immediate   COMPREHENSIVE METABOLIC PANEL - Abnormal; Notable for the following components:    Sodium 132 (*)     CO2 22 (*)     Glucose 324 (*)     Calcium 8.2 (*)     Alkaline Phosphatase 324 (*)      (*)      (*)     eGFR if non  59.3 (*)     All other components within normal limits    Narrative:     Release to patient->Immediate   C-REACTIVE PROTEIN - Abnormal; Notable for the following components:    CRP 30.4 (*)     All other components within normal limits    Narrative:     Release to patient->Immediate  ADD ON CRP TIESHA ORD#[id number] PER MD SAMSON 07/13/22 @1957   SARS-COV-2 RDRP GENE - Abnormal; Notable for the following components:    POC Rapid COVID Positive (*)     All other components within normal limits    Narrative:     This test utilizes isothermal nucleic acid amplification   technology to detect the SARS-CoV-2 RdRp nucleic acid segment.   The analytical sensitivity (limit of detection) is 125 genome   equivalents/mL.   A POSITIVE result implies infection with the SARS-CoV-2 virus;   the patient is presumed to be contagious.     A NEGATIVE result means that SARS-CoV-2 nucleic acids are not   present above the limit of detection. A NEGATIVE result should be   treated as presumptive.  It does not rule out the possibility of   COVID-19 and should not be the sole basis for treatment decisions.   If COVID-19 is strongly suspected based on clinical and exposure   history, re-testing using an alternate molecular assay should be   considered.   This test is only for use under the Food and Drug   Administration s Emergency Use Authorization (EUA).   Commercial kits are provided by [employer].   Performance characteristics of the EUA have been independently   verified by [facility] Department of   Pathology and Laboratory Medicine.   _________________________________________________________________   The authorized Fact Sheet for Healthcare Providers and the authorized Fact   Sheet for Patients of the ID NOW COVID-19 are available on the FDA   website:     https://www.fda.gov/media/915743/download  https://www.fda.gov/media/615408/download          PROTIME-INR    Narrative:     Release to patient->Immediate   C-REACTIVE PROTEIN   FERRITIN   HIV 1 / 2 ANTIBODY   CMV DNA, QUANTITATIVE, PCR   FERRITIN          Imaging Results          X-Ray Chest AP Portable (Final result)  Result time 07/13/22 18:51:24    Final result by Orlando Finley MD (07/13/22 18:51:24)                 Impression:      No radiographic evidence of pneumonia or other source of cough, noting that early/mild viral pneumonia may be radiographically occult.    Lateral left lung base 5 mm indeterminate nodule.  Further evaluation with elective/nonemergent CT thorax can be obtained, if 2 year stability cannot be confirmed.      Electronically signed by: Orlando Finley MD  Date:    07/13/2022  Time:    18:51             Narrative:    EXAMINATION:  XR CHEST AP PORTABLE    CLINICAL HISTORY:  cough;    TECHNIQUE:  Single frontal view of the chest was performed.    COMPARISON:  Chest radiograph 11/05/2020    FINDINGS:  Patient is slightly rotated.  No detrimental change.Left basilar few scattered linear opacities suggesting  subsegmental scarring versus atelectasis.  New 5 mm subtle nodular density projected over the lateral left lung base/costophrenic angle could represent a nipple shadow but remains indeterminate.  The lungs are otherwise well expanded without large consolidation, pleural effusion or pneumothorax.    The cardiac silhouette is normal in size. Pulmonary vasculature and hilar and mediastinal contours are within normal limits.    Partially imaged lower cervical ACDF hardware again noted.  No acute osseous process seen.  PA and lateral views can be obtained.                                 Medications   sodium chloride 0.9% flush 10 mL (has no administration in time range)   melatonin tablet 6 mg (has no administration in time range)   acetaminophen tablet 325 mg (has no administration in time range)   naloxone 0.4 mg/mL injection 0.02 mg (has no administration in time range)   glucose chewable tablet 16 g (has no administration in time range)   glucose chewable tablet 24 g (has no administration in time range)     Medical Decision Making:   Initial Assessment:   61 y.o. liver transplant patient who presents for evaluation of liver enzymes & URI symptoms.  Differential Diagnosis:   Covid  Pneumonia  Transplant rejection  Thromboembolic event  Clinical Tests:   Lab Tests: Ordered  Radiological Study: Ordered and Reviewed  ED Management:  Labs requested by transplant team in prior notes were ordered including CMV PCR & US to evaluate potential clotting event. LFTs remain elevated therefore pt will be admitted for management & further evaluation.     CXR not demonstrating pneumonia but early viral pneumonia may be hidden. Pt found to be covid positive. Pt admitted.                      Clinical Impression:   Final diagnoses:  [Z94.9] Transplant          ED Disposition Condition    Observation               Rod White MD  Resident  07/13/22 6143

## 2022-07-14 NOTE — HPI
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 Patient has had rhinorrhea since last week and mild shortness of breath and fatigue. Seen in ED 7/10 and found to have low WBC and elevated LFT but not tested for COVID. Was negative on two COVID tests at home. Was advised to notify transplant coordinator about that ED visit and was referred to ED again for further workup. Now found to be COVID positive. On admission patient afebrile, HDS, and maintaining saturations around 96% on room air. Electrolytes within normal limits as is creatinine. AST/ALT improving to 76/123. Total bilirubin normal. ANC on admission 1200. Platelets down to 76. INR at 1. Negative toxicology screen. US doppler of transplanted liver unremarkable. CXR reports left basilar scattered linear opacities with no consolidation or other evidence of pneumonia. Blood cultures from 7/10 NGTD. Infectious Diseases being consulted for COVID in liver transplant patient.

## 2022-07-14 NOTE — ASSESSMENT & PLAN NOTE
Positive COVID in setting of liver transplant      60 yo male with history of alcoholic cirrhosis leading to end stage liver disease s/p liver transplant in 2020 who presents with flu-like illness and positive COVID test on 7/13. Was mildly short of breath at home but has maintained optimal saturations on room air since ED arrival. Patient noted to have bump in LFT during ED visit on 7/10 and repeat labs 7/12. Have been downtrending since. Primary avoiding remdesivir due to LFT elevation. ID being consulted for assistance with COVID management.     Recommendations:  --As patient is saturating well on room air, there is no urgent indication to begin remdesivir or dexamethasone   --Would monitor for now to ensure patient does not become hypoxic   --No indication for antibiotics as CXR shows no evidence of pneumonia and patient clinically stable

## 2022-07-14 NOTE — SUBJECTIVE & OBJECTIVE
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    Esophageal varices     GERD (gastroesophageal reflux disease)     Hepatic encephalopathy 7/8/2014    Hepatitis C virus infection 07/08/2014    tx with interferon 3-4 mos- stopped for unclear reasons Tattoos-first at age 17 only risk factor (HCVAB negative 08/2017)    HTN (hypertension) 7/8/2014    Hypertension     Insulin dependent diabetes mellitus     Personal history of colonic polyps     Renal mass, left 7/8/2014    6.3 x 5.7 x 5.6 complex mass    Splenomegaly 7/8/2014    Umbilical hernia 7/8/2014    Weight gain 2/8/2021    30 lbs since transplant     Past Surgical History:   Procedure Laterality Date    CARPAL TUNNEL RELEASE Bilateral     CHOLECYSTECTOMY      lap    COLONOSCOPY N/A 9/8/2017    Procedure: COLONOSCOPY;  Surgeon: Savannah Llanos MD;  Location: [facility];  Service: Endoscopy;  Laterality: N/A;    COLONOSCOPY Left 3/14/2018    Procedure: COLONOSCOPY;  Surgeon: Savannah Llanos MD;  Location: [facility];  Service: Endoscopy;  Laterality: Left;    COLONOSCOPY W/ POLYPECTOMY  06/19/2014    ESOPHAGOGASTRODUODENOSCOPY  01/2014    ESOPHAGOGASTRODUODENOSCOPY  02/15/2017    grade II varices    ESOPHAGOGASTRODUODENOSCOPY  04/10/2017    grade I varices    ESOPHAGOGASTRODUODENOSCOPY N/A 10/18/2019    Procedure: EGD (ESOPHAGOGASTRODUODENOSCOPY);  Surgeon: Flavio Escalera MD;  Location: [facility] ([address] [place] [address]);  Service: Endoscopy;  Laterality: N/A;    ESOPHAGOGASTRODUODENOSCOPY W/ BANDING  01/26/2017    grade II varices    HERNIA REPAIR      LIVER TRANSPLANT N/A 5/17/2020    Procedure: TRANSPLANT, LIVER;  Surgeon: Danny Thakkar MD;  [facility];  Service: Transplant;  Laterality: N/A;    NECK SURGERY      fusion on C5 and C6    THROMBECTOMY  5/17/2020    Procedure:  THROMBECTOMY;  Surgeon: Danny Thakkar MD;  [facility];  Service: Transplant;;  portal vein thrombectomy    ULNAR NERVE TRANSPOSITION Left     UMBILICAL HERNIA REPAIR N/A 2020    Procedure: REPAIR, HERNIA, PRIMARY WIHTOUT MESH AND PLACEMENT OF DRAIN;  Surgeon: Sherri Brunner MD;  [facility];  Service: Transplant;  Laterality: N/A;    vericose veins removed         Review of patient's allergies indicates:  No Known Allergies    Family History       Problem Relation (Age of Onset)    Alcohol abuse Brother    Cancer Brother    Hyperlipidemia Mother    No Known Problems Father (36), Sister, Sister          Tobacco Use    Smoking status: Former Smoker     Types: Cigarettes     Quit date: 2010     Years since quittin.5    Smokeless tobacco: Former User     Types: Chew     Quit date: 1990    Tobacco comment: former 1 pack/week quit 2010   Substance and Sexual Activity    Alcohol use: No     Comment: former heavy beer but quit 2010    Drug use: No    Sexual activity: Never     Comment:        Scheduled Meds:   aspirin  81 mg Oral Daily    EScitalopram oxalate  20 mg Oral Daily    finasteride  5 mg Oral Daily    gabapentin  600 mg Oral TID    insulin detemir U-100  20 Units Subcutaneous QHS    oxybutynin  5 mg Oral Daily    pantoprazole  40 mg Oral Daily    tacrolimus  2 mg Oral Daily PM    tacrolimus  3 mg Oral Daily AM    tamsulosin  0.4 mg Oral Daily     Continuous Infusions:  PRN Meds:acetaminophen, glucose, glucose, HYDROcodone-acetaminophen, melatonin, naloxone, sodium chloride 0.9%    Review of Systems   Constitutional:  Positive for appetite change, fatigue and fever. Negative for activity change.   Respiratory:  Negative for shortness of breath.    Cardiovascular:  Negative for chest pain, palpitations and leg swelling.   Gastrointestinal:  Negative for abdominal distention, abdominal pain, anal bleeding, blood in stool, constipation,  diarrhea, nausea, rectal pain and vomiting.    Objective:     Vital Signs (Most Recent):  Temp: 97.8 °F (36.6 °C) (07/14/22 0745)  Pulse: 96 (07/14/22 0745)  Resp: 18 (07/14/22 0827)  BP: 123/77 (07/14/22 0745)  SpO2: 96 % (07/14/22 0745) Vital Signs (24h Range):  Temp:  [97.8 °F (36.6 °C)-99.3 °F (37.4 °C)] 97.8 °F (36.6 °C)  Pulse:  [74-96] 96  Resp:  [18-20] 18  SpO2:  [92 %-96 %] 96 %  BP: (122-135)/(66-77) 123/77     Weight: 93 kg (205 lb)  Body mass index is 28.59 kg/m².    No intake or output data in the 24 hours ending 07/14/22 0839    Physical Exam  Constitutional:       Appearance: Normal appearance. He is not ill-appearing.   Cardiovascular:      Rate and Rhythm: Normal rate.      Pulses: Normal pulses.   Pulmonary:      Effort: Pulmonary effort is normal. No respiratory distress.      Breath sounds: Normal breath sounds.   Abdominal:      General: Abdomen is flat. Bowel sounds are normal. There is no distension.      Palpations: Abdomen is soft. There is no mass.      Tenderness: There is no abdominal tenderness. There is no guarding.   Musculoskeletal:      Right lower leg: No edema.      Left lower leg: No edema.   Skin:     Capillary Refill: Capillary refill takes less than 2 seconds.      Coloration: Skin is not jaundiced or pale.   Neurological:      General: No focal deficit present.      Mental Status: He is alert and oriented to person, place, and time.       Laboratory:  Labs within the past month have been reviewed.    Diagnostic Results:  I have personally reviewed all pertinent imaging studies.

## 2022-07-14 NOTE — PLAN OF CARE
Problem: Adult Inpatient Plan of Care  Goal: Plan of Care Review  Outcome: Ongoing, Progressing  Goal: Patient-Specific Goal (Individualized)  Outcome: Ongoing, Progressing  Goal: Absence of Hospital-Acquired Illness or Injury  Outcome: Ongoing, Progressing  Goal: Optimal Comfort and Wellbeing  Outcome: Ongoing, Progressing  Goal: Readiness for Transition of Care  Outcome: Ongoing, Progressing     Problem: Diabetes Comorbidity  Goal: Blood Glucose Level Within Targeted Range  Outcome: Ongoing, Progressing     Problem: Impaired Wound Healing  Goal: Optimal Wound Healing  Outcome: Ongoing, Progressing     Pt arrived to 8096 at apx 2200. Piv intact and pt a/o x4. Pt and wife oriented to room and unit and educated on covid precautions. All assessment questions answered. Pt resting comfortably in bed. Will continue to monitor.

## 2022-07-14 NOTE — SUBJECTIVE & OBJECTIVE
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    Esophageal varices     GERD (gastroesophageal reflux disease)     Hepatic encephalopathy 7/8/2014    Hepatitis C virus infection 07/08/2014    tx with interferon 3-4 mos- stopped for unclear reasons Tattoos-first at age 17 only risk factor (HCVAB negative 08/2017)    HTN (hypertension) 7/8/2014    Hypertension     Insulin dependent diabetes mellitus     Personal history of colonic polyps     Renal mass, left 7/8/2014    6.3 x 5.7 x 5.6 complex mass    Splenomegaly 7/8/2014    Umbilical hernia 7/8/2014    Weight gain 2/8/2021    30 lbs since transplant       Past Surgical History:   Procedure Laterality Date    CARPAL TUNNEL RELEASE Bilateral     CHOLECYSTECTOMY      lap    COLONOSCOPY N/A 9/8/2017    Procedure: COLONOSCOPY;  Surgeon: Savannah Llanos MD;  Location: [facility];  Service: Endoscopy;  Laterality: N/A;    COLONOSCOPY Left 3/14/2018    Procedure: COLONOSCOPY;  Surgeon: Savannah Llanos MD;  Location: [facility];  Service: Endoscopy;  Laterality: Left;    COLONOSCOPY W/ POLYPECTOMY  06/19/2014    ESOPHAGOGASTRODUODENOSCOPY  01/2014    ESOPHAGOGASTRODUODENOSCOPY  02/15/2017    grade II varices    ESOPHAGOGASTRODUODENOSCOPY  04/10/2017    grade I varices    ESOPHAGOGASTRODUODENOSCOPY N/A 10/18/2019    Procedure: EGD (ESOPHAGOGASTRODUODENOSCOPY);  Surgeon: Flavio Escalera MD;  Location: [facility] ([address] [place] [address]);  Service: Endoscopy;  Laterality: N/A;    ESOPHAGOGASTRODUODENOSCOPY W/ BANDING  01/26/2017    grade II varices    HERNIA REPAIR      LIVER TRANSPLANT N/A 5/17/2020    Procedure: TRANSPLANT, LIVER;  Surgeon: Danny Thakkar MD;  [facility];  Service: Transplant;  Laterality: N/A;    NECK SURGERY      fusion on C5 and C6    THROMBECTOMY  5/17/2020    Procedure:  THROMBECTOMY;  Surgeon: Danny Thakkar MD;  Location: [facility] OR [address] [place] [address];  Service: Transplant;;  portal vein thrombectomy    ULNAR NERVE TRANSPOSITION Left     UMBILICAL HERNIA REPAIR N/A 2/22/2020    Procedure: REPAIR, HERNIA, PRIMARY WIHTOUT MESH AND PLACEMENT OF DRAIN;  Surgeon: Sherri Brunner MD;  Location: [facility] OR [address] [place] [address];  Service: Transplant;  Laterality: N/A;    vericose veins removed         Review of patient's allergies indicates:  No Known Allergies    No current facility-administered medications on file prior to encounter.     Current Outpatient Medications on File Prior to Encounter   Medication Sig    aspirin 81 MG Chew Take 1 tablet (81 mg total) by mouth once daily.    bisacodyL (DULCOLAX) 5 mg EC tablet Take 2 tablets (10 mg total) by mouth daily as needed for Constipation.    blood sugar diagnostic (TRUE METRIX GLUCOSE TEST STRIP MISC)   TRUE METRIX SELF MONITORING BLOOD GLUCOSE STRIPS   Strip, See Instructions, TEST BLOOD SUGAR FOUR TIMES DAILY, # 400 unknown unit, 3 Refill(s), Pharmacy: [facility], 180, cm, Height/Length Dosing, 02/10/21 9:33:00 CST, 90.718, kg, W...    EASY TOUCH INSULIN SYRINGE 1 mL 31 gauge x 5/16 Syrg     escitalopram oxalate (LEXAPRO) 20 MG tablet Take 20 mg by mouth once daily.     famotidine (PEPCID) 20 MG tablet Take 1 tablet (20 mg total) by mouth every evening.    finasteride (PROSCAR) 5 mg tablet Take 1 tablet (5 mg total) by mouth once daily.    gabapentin (NEURONTIN) 600 MG tablet Take 1 tablet (600 mg total) by mouth 3 (three) times daily.    HYDROcodone-acetaminophen (NORCO)  mg per tablet Take 1 tablet by mouth every 12 (twelve) hours as needed for Pain.    insulin aspart U-100 (NOVOLOG FLEXPEN U-100 INSULIN) 100 unit/mL (3 mL) InPn pen Inject 10 Units into the skin 3 (three) times daily before meals.    LEVEMIR FLEXTOUCH U-100 INSULN 100 unit/mL (3 mL) InPn pen Inject 20 Units into the skin every evening.    meclizine (ANTIVERT)  "25 mg tablet Take 1 tablet (25 mg total) by mouth 3 (three) times daily as needed for Dizziness.    metFORMIN (GLUCOPHAGE) 500 MG tablet Take 1 tablet (500 mg total) by mouth daily with breakfast.    omeprazole (PRILOSEC) 40 MG capsule Take 40 mg by mouth once daily.    oxybutynin (DITROPAN) 5 MG Tab Take 5 mg by mouth once daily.    pen needle, diabetic 32 gauge x 5/32" Ndle use one pen needle  3 (three) times daily with insulin pen    sildenafiL (VIAGRA) 100 MG tablet Take 100 mg by mouth once daily.    tacrolimus (PROGRAF) 1 MG Cap Take 3 capsules (3 mg total) by mouth every morning AND 2 capsules (2 mg total) every evening.    tamsulosin (FLOMAX) 0.4 mg Cap Take 0.4 mg by mouth once daily.    TRUE METRIX GLUCOSE TEST STRIP Strp use to test blood sugar 4 times daily    TRUEPLUS LANCETS 30 gauge Misc     [DISCONTINUED] furosemide (LASIX) 40 MG tablet TAKE 4 TABLETS EVERY DAY    [DISCONTINUED] mycophenolate (CELLCEPT) 250 mg Cap Take 2 capsules (500 mg total) by mouth 2 (two) times daily. Do this for 2 weeks then stop     Family History       Problem Relation (Age of Onset)    Alcohol abuse Brother    Cancer Brother    Hyperlipidemia Mother    No Known Problems Father (36), Sister, Sister          Tobacco Use    Smoking status: Former Smoker     Types: Cigarettes     Quit date: 2010     Years since quittin.5    Smokeless tobacco: Former User     Types: Chew     Quit date: 1990    Tobacco comment: former 1 pack/week quit 2010   Substance and Sexual Activity    Alcohol use: No     Comment: former heavy beer but quit 2010    Drug use: No    Sexual activity: Never     Comment:      Review of Systems   Constitutional:  Positive for chills, fatigue and fever. Negative for activity change and unexpected weight change.   HENT:  Negative for congestion and sore throat.    Respiratory:  Positive for cough and shortness of breath. Negative for wheezing.    Cardiovascular:  Negative for chest " pain, palpitations and leg swelling.   Gastrointestinal:  Negative for abdominal pain, blood in stool, nausea and vomiting.   Genitourinary:  Negative for dysuria and hematuria.   Musculoskeletal:  Negative for arthralgias and neck pain.   Skin:  Negative for color change and rash.   Neurological:  Negative for dizziness, seizures and numbness.   Psychiatric/Behavioral:  Negative for hallucinations and suicidal ideas.    Objective:     Vital Signs (Most Recent):  Temp: 98.9 °F (37.2 °C) (07/13/22 2214)  Pulse: 74 (07/13/22 2214)  Resp: 20 (07/13/22 2214)  BP: 122/66 (07/13/22 2214)  SpO2: (!) 92 % (07/13/22 2214)   Vital Signs (24h Range):  Temp:  [98.9 °F (37.2 °C)-99.3 °F (37.4 °C)] 98.9 °F (37.2 °C)  Pulse:  [74-88] 74  Resp:  [18-20] 20  SpO2:  [92 %-96 %] 92 %  BP: (122-135)/(66-69) 122/66     Weight: 93 kg (205 lb)  Body mass index is 28.59 kg/m².    Physical Exam  Vitals reviewed.   Constitutional:       General: He is not in acute distress.     Appearance: He is well-developed.   HENT:      Head: Normocephalic and atraumatic.   Eyes:      Extraocular Movements: Extraocular movements intact.      Pupils: Pupils are equal, round, and reactive to light.   Neck:      Vascular: No JVD.      Trachea: No tracheal deviation.   Cardiovascular:      Rate and Rhythm: Normal rate and regular rhythm.      Heart sounds: No murmur heard.    No friction rub. No gallop.   Pulmonary:      Effort: No respiratory distress.      Breath sounds: Normal breath sounds. No wheezing or rales.   Abdominal:      General: Bowel sounds are normal. There is no distension.      Palpations: Abdomen is soft. There is no mass.      Tenderness: There is no abdominal tenderness.   Musculoskeletal:         General: No deformity.      Cervical back: Neck supple.   Lymphadenopathy:      Cervical: No cervical adenopathy.   Skin:     General: Skin is warm and dry.      Findings: No rash.   Neurological:      Mental Status: He is alert and oriented to  person, place, and time.         CRANIAL NERVES     CN III, IV, VI   Pupils are equal, round, and reactive to light.     Significant Labs: All pertinent labs within the past 24 hours have been reviewed.    Significant Imaging: I have reviewed all pertinent imaging results/findings within the past 24 hours.

## 2022-07-14 NOTE — PROGRESS NOTES
[facility] - Telemetry 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 Pt. Reports that he developed cough, chills, and nasal congestion last Friday. Since then he has bben having geenralized fatigeu with some COPE along with lightheadedness and intermittent subjective fevers. Pt. Was seen in ED 7/10 for these syptoms and was noted to have neurtopenia with WBC 1.6 as well as elevated liver enzymes with AST//193. Pt. Was not tested for Covid-19 in ED at that time, but he states that he was negative on rapid home tests x2. Pt. Was otherwise stable and discharged from ED with plans to contact transplant coordinator and trend labs. Yesterday, pt. Had repeat labs performed which showed worsening LFTs, so he was referred to the ED here for further work-up. Pt. Noted to be Covid-19 positive on presentation here. Pt. Reports persistent cough, but he states that his symptoms have improved since Friday, and he is feeling a bit better. He denies any abdominal pain, nausea, vomiting, or changes in bowel habits.      Overview/Hospital Course:  Patient admitted to hospital medicine. Hepatology and infectious disease consulted.      Interval History: Patient feels improved this am. Denies shortness of breath. LFT down-trending.   Adjust tacro per hepatology dt elevated level    Review of Systems  Objective:     Vital Signs (Most Recent):  Temp: 97.7 °F (36.5 °C) (07/14/22 1130)  Pulse: 71 (07/14/22 1130)  Resp: 18 (07/14/22 1130)  BP: 124/72 (07/14/22 1130)  SpO2: 96 % (07/14/22  1130) Vital Signs (24h Range):  Temp:  [97.7 °F (36.5 °C)-99.3 °F (37.4 °C)] 97.7 °F (36.5 °C)  Pulse:  [71-96] 71  Resp:  [18-20] 18  SpO2:  [92 %-96 %] 96 %  BP: (122-135)/(66-77) 124/72     Weight: 93 kg (205 lb)  Body mass index is 28.59 kg/m².  No intake or output data in the 24 hours ending 07/14/22 1524   Physical Exam  Constitutional:       General: He is not in acute distress.     Appearance: Normal appearance. He is not toxic-appearing or diaphoretic.   Cardiovascular:      Rate and Rhythm: Normal rate and regular rhythm.      Heart sounds: No murmur heard.    No friction rub. No gallop.   Pulmonary:      Effort: Pulmonary effort is normal. No respiratory distress.      Breath sounds: No wheezing or rales.   Abdominal:      General: Abdomen is flat. There is no distension.      Palpations: Abdomen is soft.      Tenderness: There is no abdominal tenderness. There is no guarding or rebound.   Musculoskeletal:      Cervical back: Normal range of motion and neck supple.      Right lower leg: No edema.      Left lower leg: No edema.   Skin:     General: Skin is warm and dry.   Neurological:      Mental Status: He is alert.       MELD-Na score: 6 at 7/14/2022  6:00 AM  MELD score: 6 at 7/14/2022  6:00 AM  Calculated from:  Serum Creatinine: 0.9 mg/dL (Using min of 1 mg/dL) at 7/14/2022  6:00 AM  Serum Sodium: 136 mmol/L at 7/14/2022  6:00 AM  Total Bilirubin: 1.0 mg/dL at 7/14/2022  6:00 AM  INR(ratio): 1.0 at 7/14/2022  6:00 AM  Age: 61 years    Significant Labs:  CBC:  Recent Labs   Lab 07/13/22 1726 07/14/22  0600   WBC 2.15* 1.58*   HGB 13.1* 13.2*   HCT 41.7 41.4   PLT 94* 76*     CMP:  Recent Labs   Lab 07/13/22  1726 07/14/22  0600   * 136   K 4.3 3.6    102   CO2 22* 26   * 178*   BUN 11 10   CREATININE 1.3 0.9   CALCIUM 8.2* 8.3*   PROT 6.9 6.4   ALBUMIN 3.9 3.6   BILITOT 0.9 1.0   ALKPHOS 324* 273*   * 76*   * 123*   ANIONGAP 10 8   EGFRNONAA 59.3* >60.0      PTINR:  Recent Labs   Lab 07/13/22  1726 07/14/22  0600   INR 1.1 1.0       Significant Procedures:   Dobutamine Stress Test with Color Flow: No results found. However, due to the size of the patient record, not all encounters were searched. Please check Results Review for a complete set of results.      Assessment/Plan:      * COVID-19  -Pt. With symptoms of generalized fatigue, fevers, and cough. Noted to have elevated LFTs and low WBC in setting of Covid. LFTs improving from yesterday and pt. Reports improving symptoms. Pt. High risk for severe complication of Covid, but not currently requiring supplemental O2. Will hold remdesivir in setting of elevated LFTs, but consult ID for further asssitance.     Patient is identified as High risk for severe complications of COVID 19 based on COVID risk score of 5   Initiate standard COVID protocols; COVID-19 testing ,Infection Control notification and isolation- respiratory, contact and droplet per protocol    Diagnostics: (leukopenia, hyponatremia, hyperferritinemia, elevated troponin, elevated d-dimer, age, and comorbidities are significant predictors of poor clinical outcome)  CBC, CMP, Procalcitonin, Ferritin, CRP, ECG and Portable CXR    Management: Maintain oxygen saturations 92-96% and monitor with continuous pulse oximetry.  and Inhaled bronchodilators as needed for shortness of breath.    Oxygen Device: room air  Advance Care Planning  Current advance care plan has been discussed with patient/family/POA and patient currently wishes Full Code.     Elevated LFTs  -Noted in setting of Covid, trending down. Liver transplant U/S  -Hepatology consulted for assistance    History of liver transplant  MELD-Na score: 10 at 7/13/2022  5:26 PM  MELD score: 10 at 7/13/2022  5:26 PM  Calculated from:  Serum Creatinine: 1.3 mg/dL at 7/13/2022  5:26 PM  Serum Sodium: 132 mmol/L at 7/13/2022  5:26 PM  Total Bilirubin: 0.9 mg/dL (Using min of 1 mg/dL) at 7/13/2022  5:26  PM  INR(ratio): 1.1 at 7/13/2022  5:26 PM  Age: 61 years  -Hepatology consulted for assistance. Continue home tacrolimus, See elevated LFTs      Long-term use of immunosuppressant medication  -Decrease tacrolimus 2 mg qam, 1 mg qhs, further adjustment per hepatology recs  - tacro levels.      Type 2 diabetes mellitus without complication, with long-term current use of insulin  -Last A1c 8.1 5/2022  -Hold home metofrmin while admitted, continue home levemir 20 units QHS with SSI and diabetic diet  -Monitor BG and titrate insulin as needed      VTE Risk Mitigation (From admission, onward)         Ordered     IP VTE LOW RISK PATIENT  Once         07/13/22 1955     Place sequential compression device  Until discontinued         07/13/22 1955                Discharge Planning   TANI:      Code Status: Full Code   Is the patient medically ready for discharge?:     Reason for patient still in hospital (select all that apply): Patient trending condition, Treatment and Consult recommendations           Rodolfo Roland MD  Department of Hospital Medicine   [facility] - Telemetry Stepdown

## 2022-07-15 VITALS
RESPIRATION RATE: 18 BRPM | SYSTOLIC BLOOD PRESSURE: 118 MMHG | HEIGHT: 71 IN | DIASTOLIC BLOOD PRESSURE: 66 MMHG | HEART RATE: 72 BPM | WEIGHT: 205 LBS | OXYGEN SATURATION: 94 % | TEMPERATURE: 98 F | BODY MASS INDEX: 28.7 KG/M2

## 2022-07-15 LAB
ALBUMIN SERPL BCP-MCNC: 3.5 G/DL (ref 3.5–5.2)
ALP SERPL-CCNC: 311 U/L (ref 55–135)
ALT SERPL W/O P-5'-P-CCNC: 92 U/L (ref 10–44)
ANION GAP SERPL CALC-SCNC: 7 MMOL/L (ref 8–16)
AST SERPL-CCNC: 64 U/L (ref 10–40)
BILIRUB SERPL-MCNC: 0.8 MG/DL (ref 0.1–1)
BUN SERPL-MCNC: 7 MG/DL (ref 6–20)
CALCIUM SERPL-MCNC: 8 MG/DL (ref 8.7–10.5)
CHLORIDE SERPL-SCNC: 101 MMOL/L (ref 95–110)
CO2 SERPL-SCNC: 26 MMOL/L (ref 23–29)
CREAT SERPL-MCNC: 0.8 MG/DL (ref 0.5–1.4)
EST. GFR  (AFRICAN AMERICAN): >60 ML/MIN/1.73 M^2
EST. GFR  (NON AFRICAN AMERICAN): >60 ML/MIN/1.73 M^2
GLUCOSE SERPL-MCNC: 199 MG/DL (ref 70–110)
POCT GLUCOSE: 177 MG/DL (ref 70–110)
POCT GLUCOSE: 238 MG/DL (ref 70–110)
POTASSIUM SERPL-SCNC: 4.3 MMOL/L (ref 3.5–5.1)
PROT SERPL-MCNC: 6.3 G/DL (ref 6–8.4)
SODIUM SERPL-SCNC: 134 MMOL/L (ref 136–145)
TACROLIMUS BLD-MCNC: 4.8 NG/ML (ref 5–15)

## 2022-07-15 PROCEDURE — 36415 COLL VENOUS BLD VENIPUNCTURE: CPT | Performed by: STUDENT IN AN ORGANIZED HEALTH CARE EDUCATION/TRAINING PROGRAM

## 2022-07-15 PROCEDURE — 80053 COMPREHEN METABOLIC PANEL: CPT | Performed by: STUDENT IN AN ORGANIZED HEALTH CARE EDUCATION/TRAINING PROGRAM

## 2022-07-15 PROCEDURE — 25000003 PHARM REV CODE 250: Performed by: HOSPITALIST

## 2022-07-15 PROCEDURE — 80197 ASSAY OF TACROLIMUS: CPT | Performed by: STUDENT IN AN ORGANIZED HEALTH CARE EDUCATION/TRAINING PROGRAM

## 2022-07-15 PROCEDURE — G0378 HOSPITAL OBSERVATION PER HR: HCPCS

## 2022-07-15 PROCEDURE — 99217 PR OBSERVATION CARE DISCHARGE: ICD-10-PCS | Mod: ,,, | Performed by: STUDENT IN AN ORGANIZED HEALTH CARE EDUCATION/TRAINING PROGRAM

## 2022-07-15 PROCEDURE — 99217 PR OBSERVATION CARE DISCHARGE: CPT | Mod: ,,, | Performed by: STUDENT IN AN ORGANIZED HEALTH CARE EDUCATION/TRAINING PROGRAM

## 2022-07-15 PROCEDURE — 63600175 PHARM REV CODE 636 W HCPCS: Performed by: STUDENT IN AN ORGANIZED HEALTH CARE EDUCATION/TRAINING PROGRAM

## 2022-07-15 PROCEDURE — 96372 THER/PROPH/DIAG INJ SC/IM: CPT | Performed by: STUDENT IN AN ORGANIZED HEALTH CARE EDUCATION/TRAINING PROGRAM

## 2022-07-15 RX ORDER — TACROLIMUS 1 MG/1
CAPSULE ORAL
Qty: 150 CAPSULE | Refills: 11 | Status: SHIPPED | OUTPATIENT
Start: 2022-07-15 | End: 2022-08-01

## 2022-07-15 RX ORDER — TAMSULOSIN HYDROCHLORIDE 0.4 MG/1
CAPSULE ORAL
Qty: 90 CAPSULE | Refills: 3 | Status: SHIPPED | OUTPATIENT
Start: 2022-07-15 | End: 2023-09-26 | Stop reason: SDUPTHER

## 2022-07-15 RX ADMIN — TAMSULOSIN HYDROCHLORIDE 0.4 MG: 0.4 CAPSULE ORAL at 09:07

## 2022-07-15 RX ADMIN — TACROLIMUS 2 MG: 1 CAPSULE ORAL at 09:07

## 2022-07-15 RX ADMIN — INSULIN ASPART 2 UNITS: 100 INJECTION, SOLUTION INTRAVENOUS; SUBCUTANEOUS at 11:07

## 2022-07-15 RX ADMIN — ESCITALOPRAM OXALATE 20 MG: 20 TABLET ORAL at 09:07

## 2022-07-15 RX ADMIN — OXYBUTYNIN CHLORIDE 5 MG: 5 TABLET ORAL at 09:07

## 2022-07-15 RX ADMIN — PANTOPRAZOLE SODIUM 40 MG: 40 TABLET, DELAYED RELEASE ORAL at 09:07

## 2022-07-15 RX ADMIN — GABAPENTIN 600 MG: 300 CAPSULE ORAL at 09:07

## 2022-07-15 RX ADMIN — FINASTERIDE 5 MG: 5 TABLET, FILM COATED ORAL at 09:07

## 2022-07-15 RX ADMIN — GABAPENTIN 600 MG: 300 CAPSULE ORAL at 03:07

## 2022-07-15 RX ADMIN — ASPIRIN 81 MG CHEWABLE TABLET 81 MG: 81 TABLET CHEWABLE at 09:07

## 2022-07-15 NOTE — PLAN OF CARE
[facility] - Telemetry Stepdown  Initial Discharge Assessment       Primary Care Provider: Preston Matthew Ii, MD    Admission Diagnosis: Transplant [Z94.9]    Admission Date: 7/13/2022  Expected Discharge Date: 7/16/2022    Discharge Barriers Identified: None    Payor: HU[employer] MANAGED MEDICARE / Plan: HU[employer] MEDICARE HMO / Product Type: Capitation /     Extended Emergency Contact Information  Primary Emergency Contact: Suzanne Sherwood  Address:  [id number]           [place] [address] [facility]  Home Phone: [phone]  Mobile Phone: [phone]  Relation: Mother    Discharge Plan A: Home with family  Discharge Plan B: Home with family      [facility]  [address] [facility] [address]  Phone: [phone] Fax: [phone]    [id number] [place] - [address] AT SEC OF [facility] [address]  [place] [address]  Phone: [phone] Fax: [phone]    [facility] ([facility] - [place], [place] - [address] [facility]  [address]  [facility] [address]  Phone: [phone] Fax: [phone]      Initial Assessment (most recent)     Adult Discharge Assessment - 07/15/22 1116        Discharge Assessment    Assessment Type Discharge Planning Assessment     Confirmed/corrected address, phone number and insurance Yes     Confirmed Demographics Correct on Facesheet     Source of Information patient;family     Communicated TANI with patient/caregiver Yes     Reason For Admission Transplant     Lives With parent(s)   Patient lives with his mother.    Do you expect to return to your current living situation? Yes     Do you have help at home or someone to help you manage your care at home? Yes     Who are your caregiver(s) and their phone number(s)? Suzanne Sherwood (mother) [phone]     Prior to hospitilization cognitive status: Alert/Oriented     Current cognitive status: Alert/Oriented      Walking or Climbing Stairs Difficulty none     Dressing/Bathing Difficulty none     Equipment Currently Used at Home none     Readmission within 30 days? No     Patient currently being followed by outpatient case management? No     Do you currently have service(s) that help you manage your care at home? No     Do you take prescription medications? Yes     Do you have prescription coverage? Yes     Do you have any problems affording any of your prescribed medications? No     Is the patient taking medications as prescribed? yes     Who is going to help you get home at discharge? Patient's family will provide transportation home.     How do you get to doctors appointments? car, drives self;family or friend will provide     Are you on dialysis? No     Do you take coumadin? No     Discharge Plan A Home with family     Discharge Plan B Home with family     DME Needed Upon Discharge  none     Discharge Plan discussed with: Patient     Discharge Barriers Identified None                  Triny Busch RN  Ext [phone]

## 2022-07-15 NOTE — PLAN OF CARE
[facility] - Telemetry Stepdown  Discharge Final Note    Primary Care Provider: Preston Matthew Ii, MD    Expected Discharge Date: 7/15/2022       Future Appointments   Date Time Provider Department Center   7/20/2022  8:30 AM LAB, [facility] DRAW STATION [facility] LABDR [facility]   7/29/2022  9:30 AM Elizabeth Meneses MD [facility] L[facility] [place]   10/31/2022  1:15 PM Preston Matthew II, MD [facility] [id number]         Final Discharge Note (most recent)     Final Note - 07/15/22 1543        Final Note    Assessment Type Final Discharge Note     Anticipated Discharge Disposition Home or Self Care     What phone number can be called within the next 1-3 days to see how you are doing after discharge? [phone]     Hospital Resources/Appts/Education Provided Appointments scheduled and added to AVS        Post-Acute Status    Post-Acute Authorization Other     Other Status No Post-Acute Service Needs     Discharge Delays None known at this time                 Important Message from Medicare      Triny Busch RN  Ext [phone]

## 2022-07-15 NOTE — TREATMENT PLAN
Hepatology Treatment Plan    Colin Reilly is a 61 y.o. male admitted to hospital 7/13/2022 (Hospital Day: 3) due to COVID-19.     Interval History  - repeat tacrolimus level 14.4.  Dr. Guerrero mcgill dosing was reduced to 2 mg q.a.m. and 1 mg q.p.m..  - noted declining transaminases    Objective  Temp:  [97.4 °F (36.3 °C)-99.2 °F (37.3 °C)] 98 °F (36.7 °C) (07/15 1137)  Pulse:  [71-87] 72 (07/15 1137)  BP: (118-175)/(63-84) 118/66 (07/15 1137)  Resp:  [18] 18 (07/15 1137)  SpO2:  [91 %-97 %] 94 % (07/15 1137)        Laboratory    Lab Results   Component Value Date    WBC 1.58 (LL) 07/14/2022    HGB 13.2 (L) 07/14/2022    HCT 41.4 07/14/2022    MCV 82 07/14/2022    PLT 76 (L) 07/14/2022       Lab Results   Component Value Date     (L) 07/15/2022    K 4.3 07/15/2022     07/15/2022    CO2 26 07/15/2022    BUN 7 07/15/2022    CREATININE 0.8 07/15/2022    CALCIUM 8.0 (L) 07/15/2022       Lab Results   Component Value Date    ALBUMIN 3.5 07/15/2022    ALT 92 (H) 07/15/2022    AST 64 (H) 07/15/2022    GGT 52 12/05/2019    ALKPHOS 311 (H) 07/15/2022    BILITOT 0.8 07/15/2022       Lab Results   Component Value Date    INR 1.0 07/14/2022    INR 1.1 07/13/2022    INR 1.19 07/10/2022       MELD-Na score: 6 at 7/15/2022  9:11 AM  MELD score: 6 at 7/15/2022  9:11 AM  Calculated from:  Serum Creatinine: 0.8 mg/dL (Using min of 1 mg/dL) at 7/15/2022  9:11 AM  Serum Sodium: 134 mmol/L at 7/15/2022  9:11 AM  Total Bilirubin: 0.8 mg/dL (Using min of 1 mg/dL) at 7/15/2022  9:11 AM  INR(ratio): 1.0 at 7/14/2022  6:00 AM  Age: 61 years    Assessment  61 y gentleman with PMHx of alcoholic cirrhosis who is s/p DCD OLTX on 5/17/20,  HCV s/p INF, and DM2 who presented to the ED for worsening liver labs on routine lab work done by transplant coordinator, he has also been having fevercough/chills x5 days, and tested positive yesterday for Covid 19      His Tbil was up to 1.6 with AST/ALT and Alk phos being 342/235 and 385  on 7/12  but have come down since then   Liver doppler was unremarkable   CMV PCR and PETH in process     The liver enzyme elevation is most likely secondary to covid 19 infection initially mixed now only hepatocellular  Plan  - Tacrolimus reduced to 2 mg QAM and 1 mg QAM.  The primary team plans to discharge the patient.  He will need repeat LFTs and tacrolimus level early next week.    - close follow-up with Transplant hepatology. Transplant hepatologist to follow up PETH and CMV PCR.    - Plan of care was discussed with primary team    Thank you for involving us in the care of Colin Reilly. Please call with any additional concerns or questions.    Mehran Glasgow MD, PGY-IV  Gastroenterology Fellow  [facility]

## 2022-07-16 LAB
BACTERIA BLD CULT: NORMAL
BACTERIA BLD CULT: NORMAL

## 2022-07-17 LAB
PETH 16:0/18.1 (POPETH): <10 NG/ML
PETH 16:0/18.2 (PLPETH): <10 NG/ML

## 2022-07-18 ENCOUNTER — TELEPHONE (OUTPATIENT)
Dept: INFECTIOUS DISEASES | Facility: HOSPITAL | Age: 62
End: 2022-07-18
Payer: MEDICARE

## 2022-07-19 ENCOUNTER — LAB VISIT (OUTPATIENT)
Dept: LAB | Facility: HOSPITAL | Age: 62
End: 2022-07-19
Attending: INTERNAL MEDICINE
Payer: MEDICARE

## 2022-07-19 ENCOUNTER — TELEPHONE (OUTPATIENT)
Dept: INFECTIOUS DISEASES | Facility: HOSPITAL | Age: 62
End: 2022-07-19
Payer: MEDICARE

## 2022-07-19 DIAGNOSIS — Z94.4 LIVER REPLACED BY TRANSPLANT: ICD-10-CM

## 2022-07-19 LAB
INR BLD: 1.06 (ref 0–1.3)
PROTHROMBIN TIME: 13.7 SECONDS (ref 12.5–14.5)

## 2022-07-19 PROCEDURE — 36415 COLL VENOUS BLD VENIPUNCTURE: CPT

## 2022-07-19 PROCEDURE — 80321 ALCOHOLS BIOMARKERS 1OR 2: CPT

## 2022-07-19 PROCEDURE — 85610 PROTHROMBIN TIME: CPT

## 2022-07-20 ENCOUNTER — LAB VISIT (OUTPATIENT)
Dept: LAB | Facility: HOSPITAL | Age: 62
End: 2022-07-20
Attending: INTERNAL MEDICINE
Payer: MEDICARE

## 2022-07-20 DIAGNOSIS — Z94.4 LIVER REPLACED BY TRANSPLANT: ICD-10-CM

## 2022-07-20 LAB
ALBUMIN SERPL-MCNC: 3.9 GM/DL (ref 3.4–4.8)
ALBUMIN/GLOB SERPL: 1.2 RATIO (ref 1.1–2)
ALP SERPL-CCNC: 186 UNIT/L (ref 40–150)
ALT SERPL-CCNC: 26 UNIT/L (ref 0–55)
AST SERPL-CCNC: 20 UNIT/L (ref 5–34)
BASOPHILS # BLD AUTO: 0.02 X10(3)/MCL (ref 0–0.2)
BASOPHILS NFR BLD AUTO: 0.6 %
BILIRUBIN DIRECT+TOT PNL SERPL-MCNC: 1.2 MG/DL
BUN SERPL-MCNC: 8.4 MG/DL (ref 8.4–25.7)
CALCIUM SERPL-MCNC: 8.7 MG/DL (ref 8.8–10)
CHLORIDE SERPL-SCNC: 105 MMOL/L (ref 98–107)
CMV DNA SERPL NAA+PROBE-ACNC: NORMAL IU/ML
CO2 SERPL-SCNC: 25 MMOL/L (ref 23–31)
CREAT SERPL-MCNC: 0.89 MG/DL (ref 0.73–1.18)
EOSINOPHIL # BLD AUTO: 0.03 X10(3)/MCL (ref 0–0.9)
EOSINOPHIL NFR BLD AUTO: 1 %
ERYTHROCYTE [DISTWIDTH] IN BLOOD BY AUTOMATED COUNT: 15.3 % (ref 11.5–17)
GLOBULIN SER-MCNC: 3.2 GM/DL (ref 2.4–3.5)
GLUCOSE SERPL-MCNC: 230 MG/DL (ref 82–115)
HCT VFR BLD AUTO: 47.1 % (ref 42–52)
HGB BLD-MCNC: 14.7 GM/DL (ref 14–18)
IMM GRANULOCYTES # BLD AUTO: 0.05 X10(3)/MCL (ref 0–0.04)
IMM GRANULOCYTES NFR BLD AUTO: 1.6 %
LYMPHOCYTES # BLD AUTO: 0.67 X10(3)/MCL (ref 0.6–4.6)
LYMPHOCYTES NFR BLD AUTO: 21.5 %
MCH RBC QN AUTO: 26.3 PG (ref 27–31)
MCHC RBC AUTO-ENTMCNC: 31.2 MG/DL (ref 33–36)
MCV RBC AUTO: 84.3 FL (ref 80–94)
MONOCYTES # BLD AUTO: 0.32 X10(3)/MCL (ref 0.1–1.3)
MONOCYTES NFR BLD AUTO: 10.3 %
NEUTROPHILS # BLD AUTO: 2 X10(3)/MCL (ref 2.1–9.2)
NEUTROPHILS NFR BLD AUTO: 65 %
NRBC BLD AUTO-RTO: 0 %
PLATELET # BLD AUTO: 117 X10(3)/MCL (ref 130–400)
PMV BLD AUTO: 11.9 FL (ref 7.4–10.4)
POTASSIUM SERPL-SCNC: 4.4 MMOL/L (ref 3.5–5.1)
PROT SERPL-MCNC: 7.1 GM/DL (ref 5.8–7.6)
RBC # BLD AUTO: 5.59 X10(6)/MCL (ref 4.7–6.1)
SODIUM SERPL-SCNC: 140 MMOL/L (ref 136–145)
WBC # SPEC AUTO: 3.1 X10(3)/MCL (ref 4.5–11.5)

## 2022-07-20 PROCEDURE — 36415 COLL VENOUS BLD VENIPUNCTURE: CPT

## 2022-07-20 PROCEDURE — 80053 COMPREHEN METABOLIC PANEL: CPT

## 2022-07-20 PROCEDURE — 85025 COMPLETE CBC W/AUTO DIFF WBC: CPT

## 2022-07-21 DIAGNOSIS — U07.1 COVID: Primary | ICD-10-CM

## 2022-07-21 LAB — TACROLIMUS TROUGH BLD-MCNC: 6.2 NG/ML

## 2022-07-21 RX ORDER — PREDNISONE 10 MG/1
10 TABLET ORAL 2 TIMES DAILY
Qty: 14 TABLET | Refills: 0 | Status: SHIPPED | OUTPATIENT
Start: 2022-07-21 | End: 2022-07-28

## 2022-07-21 RX ORDER — CODEINE PHOSPHATE AND GUAIFENESIN 10; 100 MG/5ML; MG/5ML
5 SOLUTION ORAL 3 TIMES DAILY PRN
Qty: 240 ML | Refills: 0 | Status: SHIPPED | OUTPATIENT
Start: 2022-07-21 | End: 2022-07-31

## 2022-07-21 RX ORDER — AZITHROMYCIN 250 MG/1
TABLET, FILM COATED ORAL
Qty: 6 TABLET | Refills: 0 | Status: SHIPPED | OUTPATIENT
Start: 2022-07-21 | End: 2022-07-26

## 2022-07-21 NOTE — TELEPHONE ENCOUNTER
Per Dr. Mccartney we can send out zpack, prednisone 10mg bid for 7 days and virtussin. Patient advised    ----- Message from Cookie Pinzon sent at 7/21/2022  9:31 AM CDT -----  Regarding: med  Pt is req med, covid positive, coughing, congestion  [facility]

## 2022-07-22 ENCOUNTER — TELEPHONE (OUTPATIENT)
Dept: TRANSPLANT | Facility: CLINIC | Age: 62
End: 2022-07-22
Payer: MEDICARE

## 2022-07-22 NOTE — TELEPHONE ENCOUNTER
Called patient left voicemail to get labs on Tuesday.    ----- Message from Elizabeth Meneses MD sent at 7/22/2022  4:29 PM CDT -----  The Labs are improving; recommend weekly labs for now - please let patient know.

## 2022-07-24 LAB — MAYO GENERIC ORDERABLE RESULT: NORMAL

## 2022-07-26 ENCOUNTER — TELEPHONE (OUTPATIENT)
Dept: TRANSPLANT | Facility: CLINIC | Age: 62
End: 2022-07-26
Payer: MEDICARE

## 2022-07-26 NOTE — TELEPHONE ENCOUNTER
I spoke to patient he forgot and ate this morning so he will go tomorrow  ----- Message from Ilene Davies sent at 7/26/2022 12:15 PM CDT -----  Regarding: labs  Sister, Suzanne,  is calling to let Kamla know that she was not the reason that the pt did not get his labs done today.

## 2022-07-27 ENCOUNTER — LAB VISIT (OUTPATIENT)
Dept: LAB | Facility: HOSPITAL | Age: 62
End: 2022-07-27
Attending: INTERNAL MEDICINE
Payer: MEDICARE

## 2022-07-27 DIAGNOSIS — Z94.4 LIVER REPLACED BY TRANSPLANT: ICD-10-CM

## 2022-07-27 LAB
ALBUMIN SERPL-MCNC: 3.8 GM/DL (ref 3.4–4.8)
ALBUMIN/GLOB SERPL: 1.2 RATIO (ref 1.1–2)
ALP SERPL-CCNC: 119 UNIT/L (ref 40–150)
ALT SERPL-CCNC: 13 UNIT/L (ref 0–55)
AST SERPL-CCNC: 16 UNIT/L (ref 5–34)
BASOPHILS # BLD AUTO: 0.05 X10(3)/MCL (ref 0–0.2)
BASOPHILS NFR BLD AUTO: 0.9 %
BILIRUBIN DIRECT+TOT PNL SERPL-MCNC: 0.8 MG/DL
BUN SERPL-MCNC: 11.8 MG/DL (ref 8.4–25.7)
CALCIUM SERPL-MCNC: 8.6 MG/DL (ref 8.8–10)
CHLORIDE SERPL-SCNC: 104 MMOL/L (ref 98–107)
CO2 SERPL-SCNC: 21 MMOL/L (ref 23–31)
CREAT SERPL-MCNC: 0.85 MG/DL (ref 0.73–1.18)
EOSINOPHIL # BLD AUTO: 0.1 X10(3)/MCL (ref 0–0.9)
EOSINOPHIL NFR BLD AUTO: 1.8 %
ERYTHROCYTE [DISTWIDTH] IN BLOOD BY AUTOMATED COUNT: 14.9 % (ref 11.5–17)
GLOBULIN SER-MCNC: 3.1 GM/DL (ref 2.4–3.5)
GLUCOSE SERPL-MCNC: 244 MG/DL (ref 82–115)
HCT VFR BLD AUTO: 46.4 % (ref 42–52)
HGB BLD-MCNC: 14.2 GM/DL (ref 14–18)
IMM GRANULOCYTES # BLD AUTO: 0.12 X10(3)/MCL (ref 0–0.04)
IMM GRANULOCYTES NFR BLD AUTO: 2.1 %
LYMPHOCYTES # BLD AUTO: 0.95 X10(3)/MCL (ref 0.6–4.6)
LYMPHOCYTES NFR BLD AUTO: 16.7 %
MCH RBC QN AUTO: 25.8 PG (ref 27–31)
MCHC RBC AUTO-ENTMCNC: 30.6 MG/DL (ref 33–36)
MCV RBC AUTO: 84.2 FL (ref 80–94)
MONOCYTES # BLD AUTO: 0.46 X10(3)/MCL (ref 0.1–1.3)
MONOCYTES NFR BLD AUTO: 8.1 %
NEUTROPHILS # BLD AUTO: 4 X10(3)/MCL (ref 2.1–9.2)
NEUTROPHILS NFR BLD AUTO: 70.4 %
NRBC BLD AUTO-RTO: 0 %
PLATELET # BLD AUTO: 181 X10(3)/MCL (ref 130–400)
PMV BLD AUTO: 11.8 FL (ref 7.4–10.4)
POTASSIUM SERPL-SCNC: 4.5 MMOL/L (ref 3.5–5.1)
PROT SERPL-MCNC: 6.9 GM/DL (ref 5.8–7.6)
RBC # BLD AUTO: 5.51 X10(6)/MCL (ref 4.7–6.1)
SODIUM SERPL-SCNC: 137 MMOL/L (ref 136–145)
WBC # SPEC AUTO: 5.7 X10(3)/MCL (ref 4.5–11.5)

## 2022-07-27 PROCEDURE — 85025 COMPLETE CBC W/AUTO DIFF WBC: CPT

## 2022-07-27 PROCEDURE — 36415 COLL VENOUS BLD VENIPUNCTURE: CPT

## 2022-07-27 PROCEDURE — 80053 COMPREHEN METABOLIC PANEL: CPT

## 2022-07-28 LAB — TACROLIMUS TROUGH BLD-MCNC: 4.4 NG/ML

## 2022-07-30 NOTE — DISCHARGE SUMMARY
Ryan Hernadez - Telemetry [facility] Medicine  Discharge Summary      Patient Name: Colin Reilly  MRN: [id number]  Patient Class: OP- Observation  Admission Date: 7/13/2022  Hospital Length of Stay: 0 days  Discharge Date and Time: 7/15/22  Attending Physician: No att. providers found   Discharging Provider: Rodolfo Roland MD  Primary Care Provider: Preston Matthew Ii, MD  [facility] Medicine Team: [facility]OS[facility] MED A Rodolfo Roland MD    HPI:   62 yo M with PMHx ESLD secondary to ETOH who is s/p DCD OLTX on 5/17/20,  HCV s/p INF, and DM2 who presents to the ED for worsening liver labs and cough/chills x5 days. Pt. Reports that he developed cough, chills, and nasal congestion last Friday. Since then he has been having generalized fatigue with some COPE along with lightheadedness and intermittent subjective fevers. Pt. Was seen in ED 7/10 for these syptoms and was noted to have neurtopenia with WBC 1.6 as well as elevated liver enzymes with AST//193. Pt. Was not tested for Covid-19 in ED at that time, but he states that he was negative on rapid home tests x2. Pt. Was otherwise stable and discharged from ED with plans to contact transplant coordinator and trend labs. Yesterday, pt. Had repeat labs performed which showed worsening LFTs, so he was referred to the ED here for further work-up. Pt. Noted to be Covid-19 positive on presentation here. Pt. Reports persistent cough, but he states that his symptoms have improved since Friday, and he is feeling a bit better. He denies any abdominal pain, nausea, vomiting, or changes in bowel habits.            Hospital Course:   Patient admitted to hospital medicine. Hepatology and infectious disease consulted. Patient did not qualify for remdesivir or decadron. He was saturating well on room air. Tacro dosing changed per hepatology. Patient deemed ready for discharge. Plan discussed with pt, who was agreeable and amenable; medications were discussed and  reviewed, outpatient follow-up arranged, ER precautions were given, all questions were answered to the pt's satisfaction, and Colin Reilly  was subsequently discharged.         Goals of Care Treatment Preferences:  Code Status: Full Code      Consults:   Consults (From admission, onward)        Status Ordering Provider     Inpatient consult to Hepatology  Once        Provider:  (Not yet assigned)    Completed CHAZ SAMSON     Inpatient consult to Infectious Diseases  Once        Provider:  (Not yet assigned)    Completed CHAZ SAMSON            Final Active Diagnoses:    Diagnosis Date Noted POA    PRINCIPAL PROBLEM:  COVID-19 [U07.1] 07/13/2022 Yes    Elevated LFTs [R79.89] 07/13/2022 Yes    Long-term use of immunosuppressant medication [Z79.899] 05/20/2020 Not Applicable    History of liver transplant [Z94.4] 05/19/2020 Not Applicable    Type 2 diabetes mellitus without complication, with long-term current use of insulin [E11.9, Z79.4] 10/17/2019 Not Applicable      Problems Resolved During this Admission:       Discharged Condition: good    Disposition: Home or Self Care    Follow Up:    Patient Instructions:      COMPREHENSIVE METABOLIC PANEL   Standing Status: Future Standing Exp. Date: 09/13/23     TACROLIMUS LEVEL   Standing Status: Future Standing Exp. Date: 09/13/23     Notify your health care provider if you experience any of the following:  temperature >100.4     Notify your health care provider if you experience any of the following:  persistent nausea and vomiting or diarrhea     Notify your health care provider if you experience any of the following:  severe uncontrolled pain     Notify your health care provider if you experience any of the following:  difficulty breathing or increased cough     Notify your health care provider if you experience any of the following:  severe persistent headache     Notify your health care provider if you experience any of the following:  worsening rash  "    Notify your health care provider if you experience any of the following:  persistent dizziness, light-headedness, or visual disturbances     Notify your health care provider if you experience any of the following:  increased confusion or weakness     Activity as tolerated         Pending Diagnostic Studies:     None         Medications:  Reconciled Home Medications:      Medication List      CHANGE how you take these medications    tacrolimus 1 MG Cap  Commonly known as: PROGRAF  Take 2 capsules (2 mg total) by mouth every morning AND 1 capsule (1 mg total) every evening.  What changed: See the new instructions.        CONTINUE taking these medications    aspirin 81 MG Chew  Take 1 tablet (81 mg total) by mouth once daily.     BD ULTRA-FINE RUDY PEN NEEDLE 32 gauge x 5/32" Ndle  Generic drug: pen needle, diabetic  use one pen needle  3 (three) times daily with insulin pen     bisacodyL 5 mg EC tablet  Commonly known as: DULCOLAX  Take 2 tablets (10 mg total) by mouth daily as needed for Constipation.     EASY TOUCH INSULIN SYRINGE 1 mL 31 gauge x 5/16 Syrg  Generic drug: insulin syringe-needle U-100     EScitalopram oxalate 20 MG tablet  Commonly known as: LEXAPRO  Take 20 mg by mouth once daily.     famotidine 20 MG tablet  Commonly known as: PEPCID  Take 1 tablet (20 mg total) by mouth every evening.     finasteride 5 mg tablet  Commonly known as: PROSCAR  Take 1 tablet (5 mg total) by mouth once daily.     gabapentin 600 MG tablet  Commonly known as: NEURONTIN  Take 1 tablet (600 mg total) by mouth 3 (three) times daily.     HYDROcodone-acetaminophen  mg per tablet  Commonly known as: NORCO  Take 1 tablet by mouth every 12 (twelve) hours as needed for Pain.     insulin aspart U-100 100 unit/mL (3 mL) Inpn pen  Commonly known as: NovoLOG Flexpen U-100 Insulin  Inject 10 Units into the skin 3 (three) times daily before meals.     LEVEMIR FLEXTOUCH U-100 INSULN 100 unit/mL (3 mL) Inpn pen  Generic drug: " insulin detemir U-100  Inject 20 Units into the skin every evening.     meclizine 25 mg tablet  Commonly known as: ANTIVERT  Take 1 tablet (25 mg total) by mouth 3 (three) times daily as needed for Dizziness.     metFORMIN 500 MG tablet  Commonly known as: GLUCOPHAGE  Take 1 tablet (500 mg total) by mouth daily with breakfast.     omeprazole 40 MG capsule  Commonly known as: PRILOSEC  Take 40 mg by mouth once daily.     oxybutynin 5 MG Tab  Commonly known as: DITROPAN  Take 5 mg by mouth once daily.     sildenafiL 100 MG tablet  Commonly known as: VIAGRA  Take 100 mg by mouth once daily.     tamsulosin 0.4 mg Cap  Commonly known as: FLOMAX  TAKE 1 CAPSULE EVERY DAY     * TRUE METRIX GLUCOSE TEST STRIP Strp  Generic drug: blood sugar diagnostic  use to test blood sugar 4 times daily     * TRUE METRIX GLUCOSE TEST STRIP MISC  TRUE METRIX SELF MONITORING BLOOD GLUCOSE STRIPS   Strip, See Instructions, TEST BLOOD SUGAR FOUR TIMES DAILY, # 400 unknown unit, 3 Refill(s), Pharmacy: [facility], 180, cm, Height/Length Dosing, 02/10/21 9:33:00 CST, 90.718, kg, W...     TRUEPLUS LANCETS 30 gauge Misc  Generic drug: lancets         * This list has 2 medication(s) that are the same as other medications prescribed for you. Read the directions carefully, and ask your doctor or other care provider to review them with you.            STOP taking these medications    furosemide 40 MG tablet  Commonly known as: LASIX            Indwelling Lines/Drains at time of discharge:   Lines/Drains/Airways     None                 Time spent on the discharge of patient: 35 minutes         Rodolfo Roland MD  Department of Hospital Medicine  [facility]adez - Telemetry Stepdown

## 2022-08-01 DIAGNOSIS — Z94.4 LIVER REPLACED BY TRANSPLANT: ICD-10-CM

## 2022-08-01 RX ORDER — TACROLIMUS 1 MG/1
2 CAPSULE ORAL EVERY 12 HOURS
Qty: 120 CAPSULE | Refills: 11 | Status: SHIPPED | OUTPATIENT
Start: 2022-08-01 | End: 2022-09-30

## 2022-08-03 ENCOUNTER — LAB VISIT (OUTPATIENT)
Dept: LAB | Facility: HOSPITAL | Age: 62
End: 2022-08-03
Attending: INTERNAL MEDICINE
Payer: MEDICARE

## 2022-08-03 DIAGNOSIS — Z94.4 LIVER REPLACED BY TRANSPLANT: ICD-10-CM

## 2022-08-03 LAB
ALBUMIN SERPL-MCNC: 4.1 GM/DL (ref 3.4–4.8)
ALBUMIN/GLOB SERPL: 1.5 RATIO (ref 1.1–2)
ALP SERPL-CCNC: 105 UNIT/L (ref 40–150)
ALT SERPL-CCNC: 17 UNIT/L (ref 0–55)
AST SERPL-CCNC: 17 UNIT/L (ref 5–34)
BASOPHILS # BLD AUTO: 0.04 X10(3)/MCL (ref 0–0.2)
BASOPHILS NFR BLD AUTO: 0.8 %
BILIRUBIN DIRECT+TOT PNL SERPL-MCNC: 0.8 MG/DL
BUN SERPL-MCNC: 10.1 MG/DL (ref 8.4–25.7)
CALCIUM SERPL-MCNC: 9.5 MG/DL (ref 8.8–10)
CHLORIDE SERPL-SCNC: 102 MMOL/L (ref 98–107)
CO2 SERPL-SCNC: 30 MMOL/L (ref 23–31)
CREAT SERPL-MCNC: 0.87 MG/DL (ref 0.73–1.18)
EOSINOPHIL # BLD AUTO: 0.09 X10(3)/MCL (ref 0–0.9)
EOSINOPHIL NFR BLD AUTO: 1.8 %
ERYTHROCYTE [DISTWIDTH] IN BLOOD BY AUTOMATED COUNT: 15.5 % (ref 11.5–17)
GLOBULIN SER-MCNC: 2.7 GM/DL (ref 2.4–3.5)
GLUCOSE SERPL-MCNC: 195 MG/DL (ref 82–115)
HCT VFR BLD AUTO: 45.3 % (ref 42–52)
HGB BLD-MCNC: 14.2 GM/DL (ref 14–18)
IMM GRANULOCYTES # BLD AUTO: 0.04 X10(3)/MCL (ref 0–0.04)
IMM GRANULOCYTES NFR BLD AUTO: 0.8 %
LYMPHOCYTES # BLD AUTO: 0.97 X10(3)/MCL (ref 0.6–4.6)
LYMPHOCYTES NFR BLD AUTO: 19 %
MCH RBC QN AUTO: 26.7 PG (ref 27–31)
MCHC RBC AUTO-ENTMCNC: 31.3 MG/DL (ref 33–36)
MCV RBC AUTO: 85.2 FL (ref 80–94)
MONOCYTES # BLD AUTO: 0.42 X10(3)/MCL (ref 0.1–1.3)
MONOCYTES NFR BLD AUTO: 8.2 %
NEUTROPHILS # BLD AUTO: 3.6 X10(3)/MCL (ref 2.1–9.2)
NEUTROPHILS NFR BLD AUTO: 69.4 %
NRBC BLD AUTO-RTO: 0 %
PLATELET # BLD AUTO: 176 X10(3)/MCL (ref 130–400)
PMV BLD AUTO: 12.2 FL (ref 7.4–10.4)
POTASSIUM SERPL-SCNC: 4.7 MMOL/L (ref 3.5–5.1)
PROT SERPL-MCNC: 6.8 GM/DL (ref 5.8–7.6)
RBC # BLD AUTO: 5.32 X10(6)/MCL (ref 4.7–6.1)
SODIUM SERPL-SCNC: 138 MMOL/L (ref 136–145)
WBC # SPEC AUTO: 5.1 X10(3)/MCL (ref 4.5–11.5)

## 2022-08-03 PROCEDURE — 85025 COMPLETE CBC W/AUTO DIFF WBC: CPT

## 2022-08-03 PROCEDURE — 80053 COMPREHEN METABOLIC PANEL: CPT

## 2022-08-03 PROCEDURE — 36415 COLL VENOUS BLD VENIPUNCTURE: CPT

## 2022-08-04 LAB — TACROLIMUS TROUGH BLD-MCNC: 7.1 NG/ML

## 2022-08-05 DIAGNOSIS — Z94.4 LIVER TRANSPLANTED: ICD-10-CM

## 2022-08-05 RX ORDER — HYDROCODONE BITARTRATE AND ACETAMINOPHEN 10; 325 MG/1; MG/1
1 TABLET ORAL EVERY 12 HOURS PRN
Qty: 60 TABLET | Refills: 0 | Status: SHIPPED | OUTPATIENT
Start: 2022-08-05 | End: 2022-09-01 | Stop reason: SDUPTHER

## 2022-08-05 NOTE — TELEPHONE ENCOUNTER
----- Message from Peace Phillips sent at 8/4/2022  2:06 PM CDT -----  Need refills on Norco, its due Friday  [facility] [place]    Aware both of you are out today

## 2022-08-09 ENCOUNTER — TELEPHONE (OUTPATIENT)
Dept: INTERNAL MEDICINE | Facility: CLINIC | Age: 62
End: 2022-08-09

## 2022-08-09 ENCOUNTER — TELEPHONE (OUTPATIENT)
Dept: TRANSPLANT | Facility: CLINIC | Age: 62
End: 2022-08-09
Payer: MEDICARE

## 2022-08-09 DIAGNOSIS — E11.42 DIABETIC PERIPHERAL NEUROPATHY: Primary | ICD-10-CM

## 2022-08-09 NOTE — TELEPHONE ENCOUNTER
----- Message from Aletha Montero, Patient Care Assistant sent at 8/9/2022 10:02 AM CDT -----  Regarding: diabetic shoes  Pt already ordered the shoes but pt is req you send a script to [facility] in [place] #: [phone] please and he can pick them up!

## 2022-08-09 NOTE — TELEPHONE ENCOUNTER
Called patient to let him know labs stable next labs due on 09/26/22    ----- Message from Elizabeth Meneses MD sent at 8/7/2022  7:01 PM CDT -----  The Labs are stable - please let patient know.

## 2022-08-09 NOTE — LETTER
August 9, 2022    Colin Melba  [address]  [facility] [address]          Dear Colin Reilly:  MRN: [id number]    This is a follow up to your recent labs, your lab results were stable.  There are no medicine changes.  Please have your labs drawn again on 09/26/22.      If you cannot have your labs drawn on the scheduled date, it is your responsibility to call the transplant department to reschedule.  Please call [phone] and ask to speak to Maribeth Suarez for all scheduling requests.     Sincerely,    Kamla Hardwick, RN        Your Liver Transplant Coordinator    [facility]  [address] [place] [address]  [phone]

## 2022-08-10 ENCOUNTER — TELEPHONE (OUTPATIENT)
Dept: TRANSPLANT | Facility: CLINIC | Age: 62
End: 2022-08-10
Payer: MEDICARE

## 2022-08-11 ENCOUNTER — TELEPHONE (OUTPATIENT)
Dept: INTERNAL MEDICINE | Facility: CLINIC | Age: 62
End: 2022-08-11

## 2022-08-11 NOTE — TELEPHONE ENCOUNTER
"Please add to note "patient needs diabetic shoes and inserts." to prove you did a foot exam and that he does need them/have DM, etc.  "

## 2022-08-22 ENCOUNTER — TELEPHONE (OUTPATIENT)
Dept: TRANSPLANT | Facility: CLINIC | Age: 62
End: 2022-08-22
Payer: MEDICARE

## 2022-08-22 NOTE — TELEPHONE ENCOUNTER
Called patient to reschedule appointment as requested no answer voicemail left with return call information.

## 2022-08-23 ENCOUNTER — TELEPHONE (OUTPATIENT)
Dept: TRANSPLANT | Facility: CLINIC | Age: 62
End: 2022-08-23
Payer: MEDICARE

## 2022-08-23 NOTE — TELEPHONE ENCOUNTER
----- Message from Wilfrido Morrison sent at 8/23/2022  8:18 AM CDT -----  Regarding: reschedule appt  Patient calling to reschedule 8/24/2022 appt to the beginning of the month.      Call: [phone]

## 2022-09-01 DIAGNOSIS — Z94.4 LIVER TRANSPLANTED: ICD-10-CM

## 2022-09-01 RX ORDER — HYDROCODONE BITARTRATE AND ACETAMINOPHEN 10; 325 MG/1; MG/1
1 TABLET ORAL EVERY 12 HOURS PRN
Qty: 60 TABLET | Refills: 0 | Status: SHIPPED | OUTPATIENT
Start: 2022-09-01 | End: 2022-09-29 | Stop reason: SDUPTHER

## 2022-09-01 NOTE — TELEPHONE ENCOUNTER
----- Message from lAetha Montero Patient Care Assistant sent at 9/1/2022  2:08 PM CDT -----  Regarding: med refill  Type:  RX Refill Request    RX Name and Strength: HYDROcodone-acetaminophen (NORCO)  mg per tablet    How is the patient currently taking it? (ex. 1XDay): Take 1 tablet by mouth every 12 (twelve) hours as needed for Pain.     Is this a 30 day or 90 day RX: 60 Tablets    Preferred Pharmacy with phone number: [id number] - [place], [place] - [address] AT [facility] [address]    Additional Information: pt req refill on med please

## 2022-09-14 ENCOUNTER — TELEPHONE (OUTPATIENT)
Dept: TRANSPLANT | Facility: CLINIC | Age: 62
End: 2022-09-14
Payer: MEDICARE

## 2022-09-14 NOTE — TELEPHONE ENCOUNTER
----- Message from Wilfrido Morrison sent at 9/14/2022  2:34 PM CDT -----  Regarding: reschedule appt  Patient calling to reschedule appt on 9/19/2022. Requesting a call back.      Call: [phone]

## 2022-09-26 ENCOUNTER — TELEPHONE (OUTPATIENT)
Dept: TRANSPLANT | Facility: CLINIC | Age: 62
End: 2022-09-26
Payer: MEDICARE

## 2022-09-26 NOTE — TELEPHONE ENCOUNTER
Returned call patient will have labs on 9/27/22    ----- Message from Daisy Garnt sent at 9/26/2022  1:38 PM CDT -----  Regarding: Speak with office  Contact: Colin Cooper is calling to speak with Kamla.    [phone]

## 2022-09-26 NOTE — TELEPHONE ENCOUNTER
----- Message from Veronica Epstein MA sent at 9/26/2022 11:17 AM CDT -----  Regarding: FW: return call    ----- Message -----  From: Wilfrido Morrison  Sent: 9/26/2022  11:13 AM CDT  To: Gideon Johnson Staff  Subject: return call                                      Patient returning call to staff regarding an appt. Requesting a call back.      Call: [phone] (Mobile

## 2022-09-27 ENCOUNTER — LAB VISIT (OUTPATIENT)
Dept: LAB | Facility: HOSPITAL | Age: 62
End: 2022-09-27
Attending: INTERNAL MEDICINE
Payer: MEDICARE

## 2022-09-27 DIAGNOSIS — Z94.4 LIVER REPLACED BY TRANSPLANT: ICD-10-CM

## 2022-09-27 LAB
ALBUMIN SERPL-MCNC: 4.4 GM/DL (ref 3.4–4.8)
ALBUMIN/GLOB SERPL: 1.8 RATIO (ref 1.1–2)
ALP SERPL-CCNC: 94 UNIT/L (ref 40–150)
ALT SERPL-CCNC: 22 UNIT/L (ref 0–55)
AST SERPL-CCNC: 20 UNIT/L (ref 5–34)
BASOPHILS # BLD AUTO: 0.03 X10(3)/MCL (ref 0–0.2)
BASOPHILS NFR BLD AUTO: 0.7 %
BILIRUBIN DIRECT+TOT PNL SERPL-MCNC: 1.4 MG/DL
BUN SERPL-MCNC: 6.9 MG/DL (ref 8.4–25.7)
CALCIUM SERPL-MCNC: 9 MG/DL (ref 8.8–10)
CHLORIDE SERPL-SCNC: 102 MMOL/L (ref 98–107)
CO2 SERPL-SCNC: 26 MMOL/L (ref 23–31)
CREAT SERPL-MCNC: 0.8 MG/DL (ref 0.73–1.18)
EOSINOPHIL # BLD AUTO: 0.09 X10(3)/MCL (ref 0–0.9)
EOSINOPHIL NFR BLD AUTO: 2.1 %
ERYTHROCYTE [DISTWIDTH] IN BLOOD BY AUTOMATED COUNT: 15 % (ref 11.5–17)
GFR SERPLBLD CREATININE-BSD FMLA CKD-EPI: >60 MLS/MIN/1.73/M2
GLOBULIN SER-MCNC: 2.5 GM/DL (ref 2.4–3.5)
GLUCOSE SERPL-MCNC: 214 MG/DL (ref 82–115)
HCT VFR BLD AUTO: 45.7 % (ref 42–52)
HGB BLD-MCNC: 14.6 GM/DL (ref 14–18)
IMM GRANULOCYTES # BLD AUTO: 0.03 X10(3)/MCL (ref 0–0.04)
IMM GRANULOCYTES NFR BLD AUTO: 0.7 %
LYMPHOCYTES # BLD AUTO: 0.82 X10(3)/MCL (ref 0.6–4.6)
LYMPHOCYTES NFR BLD AUTO: 19 %
MCH RBC QN AUTO: 27 PG (ref 27–31)
MCHC RBC AUTO-ENTMCNC: 31.9 MG/DL (ref 33–36)
MCV RBC AUTO: 84.6 FL (ref 80–94)
MONOCYTES # BLD AUTO: 0.29 X10(3)/MCL (ref 0.1–1.3)
MONOCYTES NFR BLD AUTO: 6.7 %
NEUTROPHILS # BLD AUTO: 3.1 X10(3)/MCL (ref 2.1–9.2)
NEUTROPHILS NFR BLD AUTO: 70.8 %
NRBC BLD AUTO-RTO: 0 %
PLATELET # BLD AUTO: 126 X10(3)/MCL (ref 130–400)
PMV BLD AUTO: 11.8 FL (ref 7.4–10.4)
POTASSIUM SERPL-SCNC: 3.9 MMOL/L (ref 3.5–5.1)
PROT SERPL-MCNC: 6.9 GM/DL (ref 5.8–7.6)
RBC # BLD AUTO: 5.4 X10(6)/MCL (ref 4.7–6.1)
SODIUM SERPL-SCNC: 137 MMOL/L (ref 136–145)
WBC # SPEC AUTO: 4.3 X10(3)/MCL (ref 4.5–11.5)

## 2022-09-27 PROCEDURE — 80197 ASSAY OF TACROLIMUS: CPT

## 2022-09-27 PROCEDURE — 36415 COLL VENOUS BLD VENIPUNCTURE: CPT

## 2022-09-27 PROCEDURE — 80053 COMPREHEN METABOLIC PANEL: CPT

## 2022-09-27 PROCEDURE — 85025 COMPLETE CBC W/AUTO DIFF WBC: CPT

## 2022-09-28 LAB — TACROLIMUS TROUGH BLD-MCNC: 3.9 NG/ML

## 2022-09-29 DIAGNOSIS — Z94.4 LIVER TRANSPLANTED: ICD-10-CM

## 2022-09-29 RX ORDER — HYDROCODONE BITARTRATE AND ACETAMINOPHEN 10; 325 MG/1; MG/1
1 TABLET ORAL EVERY 12 HOURS PRN
Qty: 60 TABLET | Refills: 0 | Status: SHIPPED | OUTPATIENT
Start: 2022-09-29 | End: 2022-10-31 | Stop reason: SDUPTHER

## 2022-09-29 NOTE — TELEPHONE ENCOUNTER
----- Message from Cookie Pinzon sent at 9/29/2022  2:36 PM CDT -----  Regarding: med  Pt is req ref of hydrocodone  [facility] [place]  [phone]

## 2022-09-30 DIAGNOSIS — Z94.4 LIVER REPLACED BY TRANSPLANT: ICD-10-CM

## 2022-09-30 RX ORDER — TACROLIMUS 1 MG/1
CAPSULE ORAL
Qty: 150 CAPSULE | Refills: 11 | Status: SHIPPED | OUTPATIENT
Start: 2022-09-30 | End: 2022-11-08

## 2022-09-30 NOTE — TELEPHONE ENCOUNTER
Called patient with medication change, repeat labs 10/10/22----- Message from Elizabeth Meneses MD sent at 9/29/2022 11:55 PM CDT -----  The Labs are stable; increase prograf to 3/2 from 2/2 and repeat labs in 1 week - please let patient know.

## 2022-10-10 ENCOUNTER — LAB VISIT (OUTPATIENT)
Dept: LAB | Facility: HOSPITAL | Age: 62
End: 2022-10-10
Attending: INTERNAL MEDICINE
Payer: MEDICARE

## 2022-10-10 DIAGNOSIS — Z94.4 LIVER REPLACED BY TRANSPLANT: ICD-10-CM

## 2022-10-10 LAB
ALBUMIN SERPL-MCNC: 4.5 GM/DL (ref 3.4–4.8)
ALBUMIN/GLOB SERPL: 1.6 RATIO (ref 1.1–2)
ALP SERPL-CCNC: 108 UNIT/L (ref 40–150)
ALT SERPL-CCNC: 24 UNIT/L (ref 0–55)
AST SERPL-CCNC: 20 UNIT/L (ref 5–34)
BASOPHILS # BLD AUTO: 0.04 X10(3)/MCL (ref 0–0.2)
BASOPHILS NFR BLD AUTO: 0.8 %
BILIRUBIN DIRECT+TOT PNL SERPL-MCNC: 1.1 MG/DL
BUN SERPL-MCNC: 8.5 MG/DL (ref 8.4–25.7)
CALCIUM SERPL-MCNC: 9.2 MG/DL (ref 8.8–10)
CHLORIDE SERPL-SCNC: 103 MMOL/L (ref 98–107)
CO2 SERPL-SCNC: 29 MMOL/L (ref 23–31)
CREAT SERPL-MCNC: 0.82 MG/DL (ref 0.73–1.18)
EOSINOPHIL # BLD AUTO: 0.12 X10(3)/MCL (ref 0–0.9)
EOSINOPHIL NFR BLD AUTO: 2.5 %
ERYTHROCYTE [DISTWIDTH] IN BLOOD BY AUTOMATED COUNT: 15 % (ref 11.5–17)
GFR SERPLBLD CREATININE-BSD FMLA CKD-EPI: >60 MLS/MIN/1.73/M2
GLOBULIN SER-MCNC: 2.9 GM/DL (ref 2.4–3.5)
GLUCOSE SERPL-MCNC: 347 MG/DL (ref 82–115)
HCT VFR BLD AUTO: 46.6 % (ref 42–52)
HGB BLD-MCNC: 15.3 GM/DL (ref 14–18)
IMM GRANULOCYTES # BLD AUTO: 0.05 X10(3)/MCL (ref 0–0.04)
IMM GRANULOCYTES NFR BLD AUTO: 1.1 %
LYMPHOCYTES # BLD AUTO: 0.84 X10(3)/MCL (ref 0.6–4.6)
LYMPHOCYTES NFR BLD AUTO: 17.8 %
MCH RBC QN AUTO: 27 PG (ref 27–31)
MCHC RBC AUTO-ENTMCNC: 32.8 MG/DL (ref 33–36)
MCV RBC AUTO: 82.3 FL (ref 80–94)
MONOCYTES # BLD AUTO: 0.33 X10(3)/MCL (ref 0.1–1.3)
MONOCYTES NFR BLD AUTO: 7 %
NEUTROPHILS # BLD AUTO: 3.3 X10(3)/MCL (ref 2.1–9.2)
NEUTROPHILS NFR BLD AUTO: 70.8 %
NRBC BLD AUTO-RTO: 0 %
PLATELET # BLD AUTO: 140 X10(3)/MCL (ref 130–400)
PMV BLD AUTO: 11.8 FL (ref 7.4–10.4)
POTASSIUM SERPL-SCNC: 4.4 MMOL/L (ref 3.5–5.1)
PROT SERPL-MCNC: 7.4 GM/DL (ref 5.8–7.6)
RBC # BLD AUTO: 5.66 X10(6)/MCL (ref 4.7–6.1)
SODIUM SERPL-SCNC: 138 MMOL/L (ref 136–145)
WBC # SPEC AUTO: 4.7 X10(3)/MCL (ref 4.5–11.5)

## 2022-10-10 PROCEDURE — 36415 COLL VENOUS BLD VENIPUNCTURE: CPT

## 2022-10-10 PROCEDURE — 85025 COMPLETE CBC W/AUTO DIFF WBC: CPT

## 2022-10-10 PROCEDURE — 80053 COMPREHEN METABOLIC PANEL: CPT

## 2022-10-10 PROCEDURE — 80197 ASSAY OF TACROLIMUS: CPT

## 2022-10-11 ENCOUNTER — DOCUMENTATION ONLY (OUTPATIENT)
Dept: ADMINISTRATIVE | Facility: HOSPITAL | Age: 62
End: 2022-10-11
Payer: MEDICARE

## 2022-10-11 LAB — TACROLIMUS TROUGH BLD-MCNC: 5.8 NG/ML

## 2022-10-12 ENCOUNTER — TELEPHONE (OUTPATIENT)
Dept: TRANSPLANT | Facility: CLINIC | Age: 62
End: 2022-10-12
Payer: MEDICARE

## 2022-10-12 DIAGNOSIS — Z94.4 LIVER REPLACED BY TRANSPLANT: Primary | ICD-10-CM

## 2022-10-12 NOTE — LETTER
October 12, 2022    Colin Reilly  [address]  [facility] [address]          Dear Colin Salazarucet:  MRN: [id number]    This is a follow up to your recent labs, your lab results were stable.  There are no medicine changes.  Please have your labs drawn again on 01/09/23.      If you cannot have your labs drawn on the scheduled date, it is your responsibility to call the transplant department to reschedule.  Please call [phone] and ask to speak to Maribeth Suarez, Medical  for all scheduling requests.     Sincerely,    Kamla Hardwick, RN      Your Liver Transplant Coordinator    [facility]  [address] [place] [address]  [phone]

## 2022-10-12 NOTE — TELEPHONE ENCOUNTER
Continue routine labs no changes needed.  Letter sent for next lab appointment 01/09/23        ----- Message from Elizabeth Meneses MD sent at 10/12/2022  1:16 PM CDT -----  The Labs are stable - please let patient know.

## 2022-10-24 ENCOUNTER — TELEPHONE (OUTPATIENT)
Dept: TRANSPLANT | Facility: CLINIC | Age: 62
End: 2022-10-24
Payer: MEDICARE

## 2022-10-24 NOTE — TELEPHONE ENCOUNTER
Returned call patient cannot afford to come to visit. It would be better for visits to be done the first week of the month.    ----- Message from Lukas Swan sent at 10/24/2022 11:41 AM CDT -----  Regarding: Speak to Nurse  Patient is calling to touch basis with nurse., no other concerns at this time.      Contact: [phone]

## 2022-10-24 NOTE — TELEPHONE ENCOUNTER
Returned call left voicemail for call back    ----- Message from Ti Tellez sent at 10/24/2022 10:09 AM CDT -----  Regarding: call back  Pt call to speak with Kamla in to some problem he has going on    Call

## 2022-10-25 ENCOUNTER — TELEPHONE (OUTPATIENT)
Dept: TRANSPLANT | Facility: CLINIC | Age: 62
End: 2022-10-25
Payer: MEDICARE

## 2022-10-25 NOTE — TELEPHONE ENCOUNTER
Called patient to inform him of the appointment scheduled for him on 11/2/22 at 4:30pm with Dr. Meneses. Patient did not answer voicemail left with return call information.

## 2022-10-27 ENCOUNTER — LAB VISIT (OUTPATIENT)
Dept: LAB | Facility: HOSPITAL | Age: 62
End: 2022-10-27
Attending: INTERNAL MEDICINE
Payer: MEDICARE

## 2022-10-27 DIAGNOSIS — Z79.891 LONG TERM PRESCRIPTION OPIATE USE: ICD-10-CM

## 2022-10-27 DIAGNOSIS — D69.6 THROMBOCYTOPENIA, UNSPECIFIED: ICD-10-CM

## 2022-10-27 DIAGNOSIS — Z79.4 TYPE 2 DIABETES MELLITUS WITHOUT COMPLICATION, WITH LONG-TERM CURRENT USE OF INSULIN: ICD-10-CM

## 2022-10-27 DIAGNOSIS — Z94.4 LIVER TRANSPLANTED: ICD-10-CM

## 2022-10-27 DIAGNOSIS — Z94.4 HISTORY OF LIVER TRANSPLANT: ICD-10-CM

## 2022-10-27 DIAGNOSIS — K72.10 END STAGE LIVER DISEASE: ICD-10-CM

## 2022-10-27 DIAGNOSIS — M54.40 CHRONIC LOW BACK PAIN WITH SCIATICA, SCIATICA LATERALITY UNSPECIFIED, UNSPECIFIED BACK PAIN LATERALITY: ICD-10-CM

## 2022-10-27 DIAGNOSIS — F10.11 ALCOHOL ABUSE, IN REMISSION: ICD-10-CM

## 2022-10-27 DIAGNOSIS — E11.42 DIABETIC PERIPHERAL NEUROPATHY: ICD-10-CM

## 2022-10-27 DIAGNOSIS — Z79.60 LONG-TERM USE OF IMMUNOSUPPRESSANT MEDICATION: ICD-10-CM

## 2022-10-27 DIAGNOSIS — E11.9 TYPE 2 DIABETES MELLITUS WITHOUT COMPLICATION, WITH LONG-TERM CURRENT USE OF INSULIN: ICD-10-CM

## 2022-10-27 DIAGNOSIS — K42.9 UMBILICAL HERNIA WITHOUT OBSTRUCTION AND WITHOUT GANGRENE: ICD-10-CM

## 2022-10-27 DIAGNOSIS — D63.8 ANEMIA OF CHRONIC DISEASE: ICD-10-CM

## 2022-10-27 DIAGNOSIS — G89.29 CHRONIC LOW BACK PAIN WITH SCIATICA, SCIATICA LATERALITY UNSPECIFIED, UNSPECIFIED BACK PAIN LATERALITY: ICD-10-CM

## 2022-10-27 LAB
ALBUMIN SERPL-MCNC: 4.5 GM/DL (ref 3.4–4.8)
ALBUMIN/GLOB SERPL: 1.6 RATIO (ref 1.1–2)
ALP SERPL-CCNC: 114 UNIT/L (ref 40–150)
ALT SERPL-CCNC: 24 UNIT/L (ref 0–55)
APPEARANCE UR: CLEAR
AST SERPL-CCNC: 15 UNIT/L (ref 5–34)
BACTERIA #/AREA URNS AUTO: NORMAL /HPF
BASOPHILS # BLD AUTO: 0.04 X10(3)/MCL (ref 0–0.2)
BASOPHILS NFR BLD AUTO: 1 %
BILIRUB UR QL STRIP.AUTO: NEGATIVE MG/DL
BILIRUBIN DIRECT+TOT PNL SERPL-MCNC: 0.8 MG/DL
BUN SERPL-MCNC: 8.3 MG/DL (ref 8.4–25.7)
CALCIUM SERPL-MCNC: 10 MG/DL (ref 8.8–10)
CHLORIDE SERPL-SCNC: 102 MMOL/L (ref 98–107)
CHOLEST SERPL-MCNC: 187 MG/DL
CHOLEST/HDLC SERPL: 6 {RATIO} (ref 0–5)
CO2 SERPL-SCNC: 26 MMOL/L (ref 23–31)
COLOR UR AUTO: YELLOW
CREAT SERPL-MCNC: 1.01 MG/DL (ref 0.73–1.18)
CREAT UR-MCNC: 111.7 MG/DL (ref 63–166)
EOSINOPHIL # BLD AUTO: 0.07 X10(3)/MCL (ref 0–0.9)
EOSINOPHIL NFR BLD AUTO: 1.8 %
ERYTHROCYTE [DISTWIDTH] IN BLOOD BY AUTOMATED COUNT: 14.4 % (ref 11.5–17)
EST. AVERAGE GLUCOSE BLD GHB EST-MCNC: 237.4 MG/DL
GFR SERPLBLD CREATININE-BSD FMLA CKD-EPI: >60 MLS/MIN/1.73/M2
GLOBULIN SER-MCNC: 2.8 GM/DL (ref 2.4–3.5)
GLUCOSE SERPL-MCNC: 369 MG/DL (ref 82–115)
GLUCOSE UR QL STRIP.AUTO: ABNORMAL MG/DL
HBA1C MFR BLD: 9.9 %
HCT VFR BLD AUTO: 50.4 % (ref 42–52)
HDLC SERPL-MCNC: 32 MG/DL (ref 35–60)
HGB BLD-MCNC: 16 GM/DL (ref 14–18)
IMM GRANULOCYTES # BLD AUTO: 0.04 X10(3)/MCL (ref 0–0.04)
IMM GRANULOCYTES NFR BLD AUTO: 1 %
KETONES UR QL STRIP.AUTO: ABNORMAL MG/DL
LDLC SERPL CALC-MCNC: 85 MG/DL (ref 50–140)
LEUKOCYTE ESTERASE UR QL STRIP.AUTO: NEGATIVE UNIT/L
LYMPHOCYTES # BLD AUTO: 0.9 X10(3)/MCL (ref 0.6–4.6)
LYMPHOCYTES NFR BLD AUTO: 23 %
MCH RBC QN AUTO: 27 PG (ref 27–31)
MCHC RBC AUTO-ENTMCNC: 31.7 MG/DL (ref 33–36)
MCV RBC AUTO: 85 FL (ref 80–94)
MICROALBUMIN UR-MCNC: 20.5 UG/ML
MICROALBUMIN/CREAT RATIO PNL UR: 18.4 MG/GM CR (ref 0–30)
MONOCYTES # BLD AUTO: 0.3 X10(3)/MCL (ref 0.1–1.3)
MONOCYTES NFR BLD AUTO: 7.7 %
NEUTROPHILS # BLD AUTO: 2.6 X10(3)/MCL (ref 2.1–9.2)
NEUTROPHILS NFR BLD AUTO: 65.5 %
NITRITE UR QL STRIP.AUTO: NEGATIVE
NRBC BLD AUTO-RTO: 0 %
PH UR STRIP.AUTO: 6 [PH]
PLATELET # BLD AUTO: 136 X10(3)/MCL (ref 130–400)
PMV BLD AUTO: 11.9 FL (ref 7.4–10.4)
POTASSIUM SERPL-SCNC: 5 MMOL/L (ref 3.5–5.1)
PROT SERPL-MCNC: 7.3 GM/DL (ref 5.8–7.6)
PROT UR QL STRIP.AUTO: NEGATIVE MG/DL
RBC # BLD AUTO: 5.93 X10(6)/MCL (ref 4.7–6.1)
RBC #/AREA URNS AUTO: <5 /HPF
RBC UR QL AUTO: NEGATIVE UNIT/L
SODIUM SERPL-SCNC: 139 MMOL/L (ref 136–145)
SP GR UR STRIP.AUTO: >=1.04 (ref 1–1.03)
SQUAMOUS #/AREA URNS AUTO: <5 /HPF
TRIGL SERPL-MCNC: 351 MG/DL (ref 34–140)
UROBILINOGEN UR STRIP-ACNC: 0.2 MG/DL
VLDLC SERPL CALC-MCNC: 70 MG/DL
WBC # SPEC AUTO: 3.9 X10(3)/MCL (ref 4.5–11.5)
WBC #/AREA URNS AUTO: <5 /HPF

## 2022-10-27 PROCEDURE — 80053 COMPREHEN METABOLIC PANEL: CPT

## 2022-10-27 PROCEDURE — 82043 UR ALBUMIN QUANTITATIVE: CPT

## 2022-10-27 PROCEDURE — 81001 URINALYSIS AUTO W/SCOPE: CPT

## 2022-10-27 PROCEDURE — 83036 HEMOGLOBIN GLYCOSYLATED A1C: CPT

## 2022-10-27 PROCEDURE — 36415 COLL VENOUS BLD VENIPUNCTURE: CPT

## 2022-10-27 PROCEDURE — 80061 LIPID PANEL: CPT

## 2022-10-27 PROCEDURE — 85025 COMPLETE CBC W/AUTO DIFF WBC: CPT

## 2022-10-31 DIAGNOSIS — Z94.4 LIVER TRANSPLANTED: ICD-10-CM

## 2022-10-31 RX ORDER — HYDROCODONE BITARTRATE AND ACETAMINOPHEN 10; 325 MG/1; MG/1
1 TABLET ORAL EVERY 12 HOURS PRN
Qty: 60 TABLET | Refills: 0 | Status: SHIPPED | OUTPATIENT
Start: 2022-10-31 | End: 2022-12-01 | Stop reason: SDUPTHER

## 2022-10-31 NOTE — TELEPHONE ENCOUNTER
----- Message from Peace Phillips sent at 10/31/2022  9:30 AM CDT -----  Need refills on Shongaloo  [facility] [place]

## 2022-11-02 ENCOUNTER — TELEPHONE (OUTPATIENT)
Dept: TRANSPLANT | Facility: CLINIC | Age: 62
End: 2022-11-02
Payer: MEDICARE

## 2022-11-02 NOTE — TELEPHONE ENCOUNTER
----- Message from Wilfrido Morrison sent at 11/2/2022 10:29 AM CDT -----  Regarding: schedule appt  Patient calling to speak to MA about scheduling appt.    Call: [phone] (Mobile

## 2022-11-08 ENCOUNTER — LAB VISIT (OUTPATIENT)
Dept: LAB | Facility: HOSPITAL | Age: 62
End: 2022-11-08
Attending: INTERNAL MEDICINE
Payer: MEDICARE

## 2022-11-08 DIAGNOSIS — Z94.4 LIVER REPLACED BY TRANSPLANT: ICD-10-CM

## 2022-11-08 LAB
ALBUMIN SERPL BCP-MCNC: 4.6 G/DL (ref 3.5–5.2)
ALP SERPL-CCNC: 112 U/L (ref 55–135)
ALT SERPL W/O P-5'-P-CCNC: 17 U/L (ref 10–44)
ANION GAP SERPL CALC-SCNC: 10 MMOL/L (ref 8–16)
AST SERPL-CCNC: 18 U/L (ref 10–40)
BASOPHILS # BLD AUTO: 0.02 K/UL (ref 0–0.2)
BASOPHILS NFR BLD: 0.5 % (ref 0–1.9)
BILIRUB SERPL-MCNC: 1.2 MG/DL (ref 0.1–1)
BUN SERPL-MCNC: 12 MG/DL (ref 8–23)
CALCIUM SERPL-MCNC: 9.5 MG/DL (ref 8.7–10.5)
CHLORIDE SERPL-SCNC: 102 MMOL/L (ref 95–110)
CO2 SERPL-SCNC: 25 MMOL/L (ref 23–29)
CREAT SERPL-MCNC: 1.1 MG/DL (ref 0.5–1.4)
DIFFERENTIAL METHOD: ABNORMAL
EOSINOPHIL # BLD AUTO: 0.1 K/UL (ref 0–0.5)
EOSINOPHIL NFR BLD: 2.1 % (ref 0–8)
ERYTHROCYTE [DISTWIDTH] IN BLOOD BY AUTOMATED COUNT: 14.5 % (ref 11.5–14.5)
EST. GFR  (NO RACE VARIABLE): >60 ML/MIN/1.73 M^2
GLUCOSE SERPL-MCNC: 262 MG/DL (ref 70–110)
HCT VFR BLD AUTO: 45.8 % (ref 40–54)
HGB BLD-MCNC: 14.9 G/DL (ref 14–18)
IMM GRANULOCYTES # BLD AUTO: 0.02 K/UL (ref 0–0.04)
IMM GRANULOCYTES NFR BLD AUTO: 0.5 % (ref 0–0.5)
LYMPHOCYTES # BLD AUTO: 1 K/UL (ref 1–4.8)
LYMPHOCYTES NFR BLD: 26.7 % (ref 18–48)
MCH RBC QN AUTO: 27.6 PG (ref 27–31)
MCHC RBC AUTO-ENTMCNC: 32.5 G/DL (ref 32–36)
MCV RBC AUTO: 85 FL (ref 82–98)
MONOCYTES # BLD AUTO: 0.3 K/UL (ref 0.3–1)
MONOCYTES NFR BLD: 8.3 % (ref 4–15)
NEUTROPHILS # BLD AUTO: 2.3 K/UL (ref 1.8–7.7)
NEUTROPHILS NFR BLD: 61.9 % (ref 38–73)
NRBC BLD-RTO: 0 /100 WBC
PLATELET # BLD AUTO: 113 K/UL (ref 150–450)
PMV BLD AUTO: 12.2 FL (ref 9.2–12.9)
POTASSIUM SERPL-SCNC: 4 MMOL/L (ref 3.5–5.1)
PROT SERPL-MCNC: 7.6 G/DL (ref 6–8.4)
RBC # BLD AUTO: 5.4 M/UL (ref 4.6–6.2)
SODIUM SERPL-SCNC: 137 MMOL/L (ref 136–145)
TACROLIMUS BLD-MCNC: 9.7 NG/ML (ref 5–15)
WBC # BLD AUTO: 3.75 K/UL (ref 3.9–12.7)

## 2022-11-08 PROCEDURE — 36415 COLL VENOUS BLD VENIPUNCTURE: CPT | Performed by: INTERNAL MEDICINE

## 2022-11-08 PROCEDURE — 85025 COMPLETE CBC W/AUTO DIFF WBC: CPT | Performed by: INTERNAL MEDICINE

## 2022-11-08 PROCEDURE — 80197 ASSAY OF TACROLIMUS: CPT | Performed by: INTERNAL MEDICINE

## 2022-11-08 PROCEDURE — 80053 COMPREHEN METABOLIC PANEL: CPT | Performed by: INTERNAL MEDICINE

## 2022-11-08 RX ORDER — TACROLIMUS 1 MG/1
2 CAPSULE ORAL EVERY 12 HOURS
Qty: 120 CAPSULE | Refills: 11 | Status: SHIPPED | OUTPATIENT
Start: 2022-11-08 | End: 2022-12-12

## 2022-11-08 NOTE — TELEPHONE ENCOUNTER
Called patient to let him know labs are stable.  Need to decrease his Tacrolimus to 2 mg  two times a day, left voicemail    ----- Message from Elizabeth Meneses MD sent at 11/8/2022  4:27 PM CST -----  The Labs are stable; decrease prograf to 2/2 from 3/2 and repeat labs in one month - please let patient know.

## 2022-11-21 ENCOUNTER — OFFICE VISIT (OUTPATIENT)
Dept: INTERNAL MEDICINE | Facility: CLINIC | Age: 62
End: 2022-11-21
Payer: MEDICARE

## 2022-11-21 VITALS
SYSTOLIC BLOOD PRESSURE: 130 MMHG | DIASTOLIC BLOOD PRESSURE: 74 MMHG | BODY MASS INDEX: 27.16 KG/M2 | HEIGHT: 71 IN | HEART RATE: 66 BPM | TEMPERATURE: 98 F | OXYGEN SATURATION: 98 % | RESPIRATION RATE: 16 BRPM | WEIGHT: 194 LBS

## 2022-11-21 DIAGNOSIS — Z94.4 HISTORY OF LIVER TRANSPLANT: ICD-10-CM

## 2022-11-21 DIAGNOSIS — E78.5 DYSLIPIDEMIA: ICD-10-CM

## 2022-11-21 DIAGNOSIS — Z79.891 LONG TERM PRESCRIPTION OPIATE USE: ICD-10-CM

## 2022-11-21 DIAGNOSIS — Z79.4 TYPE 2 DIABETES MELLITUS WITH DIABETIC POLYNEUROPATHY, WITH LONG-TERM CURRENT USE OF INSULIN: ICD-10-CM

## 2022-11-21 DIAGNOSIS — E11.42 TYPE 2 DIABETES MELLITUS WITH DIABETIC POLYNEUROPATHY, WITH LONG-TERM CURRENT USE OF INSULIN: ICD-10-CM

## 2022-11-21 DIAGNOSIS — D69.6 THROMBOCYTOPENIA, UNSPECIFIED: ICD-10-CM

## 2022-11-21 DIAGNOSIS — I10 PRIMARY HYPERTENSION: ICD-10-CM

## 2022-11-21 DIAGNOSIS — F10.11 ALCOHOL ABUSE, IN REMISSION: ICD-10-CM

## 2022-11-21 DIAGNOSIS — K72.10 END STAGE LIVER DISEASE: ICD-10-CM

## 2022-11-21 DIAGNOSIS — E11.42 DIABETIC PERIPHERAL NEUROPATHY: Primary | ICD-10-CM

## 2022-11-21 DIAGNOSIS — Z79.60 LONG-TERM USE OF IMMUNOSUPPRESSANT MEDICATION: ICD-10-CM

## 2022-11-21 PROCEDURE — G0439 PPPS, SUBSEQ VISIT: HCPCS | Mod: ,,, | Performed by: INTERNAL MEDICINE

## 2022-11-21 PROCEDURE — 3008F PR BODY MASS INDEX (BMI) DOCUMENTED: ICD-10-PCS | Mod: CPTII,,, | Performed by: INTERNAL MEDICINE

## 2022-11-21 PROCEDURE — 1160F RVW MEDS BY RX/DR IN RCRD: CPT | Mod: CPTII,,, | Performed by: INTERNAL MEDICINE

## 2022-11-21 PROCEDURE — 1159F PR MEDICATION LIST DOCUMENTED IN MEDICAL RECORD: ICD-10-PCS | Mod: CPTII,,, | Performed by: INTERNAL MEDICINE

## 2022-11-21 PROCEDURE — 3075F SYST BP GE 130 - 139MM HG: CPT | Mod: CPTII,,, | Performed by: INTERNAL MEDICINE

## 2022-11-21 PROCEDURE — 3008F BODY MASS INDEX DOCD: CPT | Mod: CPTII,,, | Performed by: INTERNAL MEDICINE

## 2022-11-21 PROCEDURE — 3061F PR NEG MICROALBUMINURIA RESULT DOCUMENTED/REVIEW: ICD-10-PCS | Mod: CPTII,,, | Performed by: INTERNAL MEDICINE

## 2022-11-21 PROCEDURE — G0439 PR MEDICARE ANNUAL WELLNESS SUBSEQUENT VISIT: ICD-10-PCS | Mod: ,,, | Performed by: INTERNAL MEDICINE

## 2022-11-21 PROCEDURE — 3078F PR MOST RECENT DIASTOLIC BLOOD PRESSURE < 80 MM HG: ICD-10-PCS | Mod: CPTII,,, | Performed by: INTERNAL MEDICINE

## 2022-11-21 PROCEDURE — 3066F NEPHROPATHY DOC TX: CPT | Mod: CPTII,,, | Performed by: INTERNAL MEDICINE

## 2022-11-21 PROCEDURE — 3061F NEG MICROALBUMINURIA REV: CPT | Mod: CPTII,,, | Performed by: INTERNAL MEDICINE

## 2022-11-21 PROCEDURE — 3075F PR MOST RECENT SYSTOLIC BLOOD PRESS GE 130-139MM HG: ICD-10-PCS | Mod: CPTII,,, | Performed by: INTERNAL MEDICINE

## 2022-11-21 PROCEDURE — 1159F MED LIST DOCD IN RCRD: CPT | Mod: CPTII,,, | Performed by: INTERNAL MEDICINE

## 2022-11-21 PROCEDURE — 3078F DIAST BP <80 MM HG: CPT | Mod: CPTII,,, | Performed by: INTERNAL MEDICINE

## 2022-11-21 PROCEDURE — 3066F PR DOCUMENTATION OF TREATMENT FOR NEPHROPATHY: ICD-10-PCS | Mod: CPTII,,, | Performed by: INTERNAL MEDICINE

## 2022-11-21 PROCEDURE — 1160F PR REVIEW ALL MEDS BY PRESCRIBER/CLIN PHARMACIST DOCUMENTED: ICD-10-PCS | Mod: CPTII,,, | Performed by: INTERNAL MEDICINE

## 2022-11-21 RX ORDER — ROSUVASTATIN CALCIUM 10 MG/1
10 TABLET, COATED ORAL DAILY
Qty: 90 TABLET | Refills: 3 | Status: SHIPPED | OUTPATIENT
Start: 2022-11-21 | End: 2024-02-28

## 2022-11-21 RX ORDER — METFORMIN HYDROCHLORIDE 500 MG/1
500 TABLET ORAL 2 TIMES DAILY WITH MEALS
Qty: 60 TABLET | Refills: 11 | Status: SHIPPED | OUTPATIENT
Start: 2022-11-21 | End: 2023-11-28

## 2022-11-21 NOTE — PROGRESS NOTES
Subjective:       Patient ID: Colin Reilly is a 62 y.o. male.      Patient Care Team:  Preston Matthew II, MD as PCP - General (Internal Medicine)    Chief Complaint: Medicare AWV Follow Up, Diabetes, Hypertension, Peripheral Neuropathy, Anemia, Hyperlipidemia, and Back Pain    62-year-old male was evaluated today for follow-up of chronic liver disease with cirrhosis, diabetes, and neuropathy among other conditions.      Review of Systems   Constitutional:  Negative for fever.   HENT:  Negative for nosebleeds.    Eyes:  Negative for visual disturbance.   Respiratory:  Negative for shortness of breath.    Cardiovascular:  Negative for chest pain.   Gastrointestinal:  Negative for abdominal pain.   Genitourinary:  Negative for dysuria.   Musculoskeletal:  Negative for gait problem.   Neurological:  Negative for headaches.         Patient Reported Health Risk Assessment  Are you male or female?: Male  During the past four weeks, how much have you been bothered by emotional problems such as feeling anxious, depressed, irritable, sad, or downhearted and blue?: Not at all  During the past five weeks, has your physical and/or emotional health limited your social activities with family, friends, neighbors, or groups?: Not at all  During the past four weeks, how much bodily pain have you generally had?: Moderate pain  During the past four weeks, was someone available to help if you needed and wanted help?: Yes, a little  During the past four weeks, what was the hardest physical activity you could do for at least two minutes?: Moderate  Can you get to places out of walking distance without help?  (For example, can you travel alone on buses or taxis, or drive your own car?): Yes  Can you go shopping for groceries or clothes without someone's help?: Yes  Can you prepare your own meals?: Yes  Can you do your own housework without help?: Yes  Because of any health problems, do you need the help of another person with your  personal care needs such as eating, bathing, dressing, or getting around the house?: No  Can you handle your own money without help?: Yes  During the past four weeks, how would you rate your health in general?: Good  How have things been going for you during the past four weeks?: Pretty well  Are you having difficulties driving your car?: No  Do you always fasten your seat belt when you are in a car?: Yes, usually  How often in the past four weeks have you been bothered by falling or dizzy when standing up?: Never  How often in the past four weeks have you been bothered by sexual problems?: Never  How often in the past four weeks have you been bothered by trouble eating well?: Never  How often in the past four weeks have you been bothered by teeth or denture problems?: Never  How often in the past four weeks have you been bothered with problems using the telephone?: Never  How often in the past four weeks have you been bothered by tiredness or fatigue?: Never  Have you fallen two or more times in the past year?: No  Are you afraid of falling?: No  Are you a smoker?: No  During the past four weeks, how many drinks of wine, beer, or other alcoholic beverages did you have?: No alcohol at all  Do you exercise for about 20 minutes three or more days a week?: No, I usually do not exercise this much  Have you been given any information to help you with hazards in your house that might hurt you?: No  Have you been given any information to help you with keeping track of your medications?: No  How often do you have trouble taking medicines the way you've been told to take them?: I always take them as prescribed  How confident are you that you can control and manage most of your health problems?: Somewhat confident  What is your race? (Check all that apply.):       Objective:      Physical Exam  HENT:      Head: Normocephalic.      Mouth/Throat:      Pharynx: Oropharynx is clear.   Eyes:      Extraocular Movements:  "Extraocular movements intact.   Cardiovascular:      Rate and Rhythm: Normal rate and regular rhythm.   Pulmonary:      Breath sounds: Normal breath sounds.   Abdominal:      Palpations: Abdomen is soft.   Musculoskeletal:         General: No swelling.   Skin:     General: Skin is warm.   Neurological:      General: No focal deficit present.      Mental Status: He is alert and oriented to person, place, and time.   Psychiatric:         Mood and Affect: Mood normal.       Vitals:    11/21/22 1328   BP: 130/74   Pulse: 66   Resp: 16   Temp: 98 °F (36.7 °C)   SpO2: 98%   Weight: 88 kg (194 lb)   Height: 5' 11" (1.803 m)            No flowsheet data found.  Fall Risk Assessment - Outpatient 11/21/2022 7/29/2022 5/26/2022 8/6/2021 5/31/2021 2/8/2021 6/9/2020   Mobility Status Ambulatory Ambulatory Ambulatory Ambulatory Ambulatory Ambulatory Ambulatory   Number of falls 0 0 0 0 0 0 0   Identified as fall risk 0 0 0 0 0 0 0           Depression Screening  Over the past two weeks, has the patient felt down, depressed, or hopeless?: No  Over the past two weeks, has the patient felt little interest or pleasure in doing things?: No  Functional Ability/Safety Screening  Was the patient's timed Up & Go test unsteady or longer than 30 seconds?: No  Does the patient need help with phone, transportation, shopping, preparing meals, housework, laundry, meds, or managing money?: No  Does the patient's home have rugs in the hallway, lack grab bars in the bathroom, lack handrails on the stairs or have poor lighting?: No  Have you noticed any hearing difficulties?: No  Cognitive Function (Assessed through direct observation with due consideration of information obtained by way of patient reports and/or concerns raised by family, friends, caretakers, or others)    Does the patient repeat questions/statements in the same day?: No  Does the patient have trouble remembering the date, year, and time?: No  Does the patient have difficulty " managing finances?: No  Does the patient have a decreased sense of direction?: No      Assessment:       Problem List Items Addressed This Visit          Neuro    Diabetic peripheral neuropathy - Primary    Relevant Medications    metFORMIN (GLUCOPHAGE) 500 MG tablet    Other Relevant Orders    CBC Auto Differential    Comprehensive Metabolic Panel    Lipid Panel    Urinalysis, Reflex to Urine Culture Urine, Clean Catch    Microalbumin/Creatinine Ratio, Urine    Hemoglobin A1C       Psychiatric    Alcohol abuse, in remission    Relevant Orders    CBC Auto Differential    Comprehensive Metabolic Panel    Lipid Panel    Urinalysis, Reflex to Urine Culture Urine, Clean Catch    Microalbumin/Creatinine Ratio, Urine    Hemoglobin A1C    Long term prescription opiate use    Relevant Orders    CBC Auto Differential    Comprehensive Metabolic Panel    Lipid Panel    Urinalysis, Reflex to Urine Culture Urine, Clean Catch    Microalbumin/Creatinine Ratio, Urine    Hemoglobin A1C       Cardiac/Vascular    Hypertension    Relevant Orders    CBC Auto Differential    Comprehensive Metabolic Panel    Lipid Panel    Urinalysis, Reflex to Urine Culture Urine, Clean Catch    Microalbumin/Creatinine Ratio, Urine    Hemoglobin A1C    Dyslipidemia    Relevant Orders    CBC Auto Differential    Comprehensive Metabolic Panel    Lipid Panel    Urinalysis, Reflex to Urine Culture Urine, Clean Catch    Microalbumin/Creatinine Ratio, Urine    Hemoglobin A1C       Hematology    Thrombocytopenia, unspecified    Relevant Orders    CBC Auto Differential    Comprehensive Metabolic Panel    Lipid Panel    Urinalysis, Reflex to Urine Culture Urine, Clean Catch    Microalbumin/Creatinine Ratio, Urine    Hemoglobin A1C       Endocrine    Type 2 diabetes mellitus with diabetic polyneuropathy, with long-term current use of insulin    Relevant Medications    metFORMIN (GLUCOPHAGE) 500 MG tablet    Other Relevant Orders    CBC Auto Differential     "Comprehensive Metabolic Panel    Lipid Panel    Urinalysis, Reflex to Urine Culture Urine, Clean Catch    Microalbumin/Creatinine Ratio, Urine    Hemoglobin A1C       GI    End stage liver disease    Relevant Orders    CBC Auto Differential    Comprehensive Metabolic Panel    Lipid Panel    Urinalysis, Reflex to Urine Culture Urine, Clean Catch    Microalbumin/Creatinine Ratio, Urine    Hemoglobin A1C    History of liver transplant    Relevant Orders    CBC Auto Differential    Comprehensive Metabolic Panel    Lipid Panel    Urinalysis, Reflex to Urine Culture Urine, Clean Catch    Microalbumin/Creatinine Ratio, Urine    Hemoglobin A1C       Palliative Care    Long-term use of immunosuppressant medication    Relevant Orders    CBC Auto Differential    Comprehensive Metabolic Panel    Lipid Panel    Urinalysis, Reflex to Urine Culture Urine, Clean Catch    Microalbumin/Creatinine Ratio, Urine    Hemoglobin A1C         Medication List with Changes/Refills   New Medications    ROSUVASTATIN (CRESTOR) 10 MG TABLET    Take 1 tablet (10 mg total) by mouth once daily.   Current Medications    ASPIRIN 81 MG CHEW    Take 1 tablet (81 mg total) by mouth once daily.    BISACODYL (DULCOLAX) 5 MG EC TABLET    Take 2 tablets (10 mg total) by mouth daily as needed for Constipation.    BLOOD SUGAR DIAGNOSTIC (TRUE METRIX GLUCOSE TEST STRIP MISC)      TRUE METRIX SELF MONITORING BLOOD GLUCOSE STRIPS   Strip, See Instructions, TEST BLOOD SUGAR FOUR TIMES DAILY, # 400 unknown unit, 3 Refill(s), Pharmacy: [facility], 180, cm, Height/Length Dosing, 02/10/21 9:33:00 CST, 90.718, kg, W...    DROPLET PEN NEEDLE 32 GAUGE X 5/32" NDLE    USE ONE TIME DAILY AT BEDTIME    EASY TOUCH INSULIN SYRINGE 1 ML 31 GAUGE X 5/16 SYRG        ESCITALOPRAM OXALATE (LEXAPRO) 20 MG TABLET    TAKE 1 TABLET EVERY DAY    FAMOTIDINE (PEPCID) 20 MG TABLET    Take 1 tablet (20 mg total) by mouth every evening.    FINASTERIDE (PROSCAR) 5 MG TABLET "    TAKE 1 TABLET EVERY DAY    GABAPENTIN (NEURONTIN) 600 MG TABLET    TAKE 1 TABLET THREE TIMES DAILY    HYDROCODONE-ACETAMINOPHEN (NORCO)  MG PER TABLET    Take 1 tablet by mouth every 12 (twelve) hours as needed for Pain.    INSULIN ASPART U-100 (NOVOLOG FLEXPEN U-100 INSULIN) 100 UNIT/ML (3 ML) INPN PEN    Inject 10 Units into the skin 3 (three) times daily before meals.    LEVEMIR FLEXTOUCH U-100 INSULN 100 UNIT/ML (3 ML) INPN PEN    Inject 20 Units into the skin every evening.    MECLIZINE (ANTIVERT) 25 MG TABLET    Take 1 tablet (25 mg total) by mouth 3 (three) times daily as needed for Dizziness.    OMEPRAZOLE (PRILOSEC) 40 MG CAPSULE    Take 40 mg by mouth once daily.    OXYBUTYNIN (DITROPAN) 5 MG TAB    TAKE 1 TABLET EVERY DAY    QUETIAPINE (SEROQUEL) 25 MG TAB    Take 75 mg by mouth every evening.    SILDENAFIL (VIAGRA) 100 MG TABLET    Take 100 mg by mouth once daily.    TACROLIMUS (PROGRAF) 1 MG CAP    Take 2 capsules (2 mg total) by mouth every 12 (twelve) hours.    TAMSULOSIN (FLOMAX) 0.4 MG CAP    TAKE 1 CAPSULE EVERY DAY    TRUE METRIX GLUCOSE TEST STRIP STRP    use to test blood sugar 4 times daily    TRUEPLUS LANCETS 30 GAUGE MISC       Changed and/or Refilled Medications    Modified Medication Previous Medication    METFORMIN (GLUCOPHAGE) 500 MG TABLET metFORMIN (GLUCOPHAGE) 500 MG tablet       Take 1 tablet (500 mg total) by mouth 2 (two) times daily with meals.    Take 1 tablet (500 mg total) by mouth daily with breakfast.        Plan:       1. Chronic liver disease with cirrhosis: Followed by Dr. Chandler. He had liver transplant in 2020 and is doing very well. No longer on diuretics. Continue immunosuppressants     2. Insulin dependent diabetes: Hemoglobin A1c was 8.1 and is now 9.9. He takes NovoLog sliding scale and Levemir.  We discussed dietary changes.  He is still sprinkling sugar on his food etc. Increased Levemir to 25 units at last visit, started metformin 500 mg daily at last  visit. He has been missing most doses of Levemir. Restart Levemir and increase metformin to BID     3.  Thrombocytopenia: Stable, 113,000, monitor     4. Chronic back pain: Continue Norco and gabapentin. His pain management physician, Dr. Narayan Enriquez,  suddenly, so we will prescribe Norco for him. Decreased to BID in 2019.      5. Chronic rhinosinusitis: Continue fluticasone     6. Diabetic peripheral Neuropathy: Decreased gabapentin because of edema.  He is back on TID     7. Insomnia: Continue trazodone     8. Hypertension: Stable     9. Benign prostatic hypertrophy: Continue Flomax. Followed by Dr. Ann and urology at [facility]. Self-cath 4 times per day     10. Major Depression in remission: Continue Lexapro. He went to an inpatient facility in 2017 in [place] ([facility] [place] and is doing better.     11. Wellness: Pneumovax      Patient needs diabetic shoes and inserts     He lives in a trailer with his mother in [place]. They go to a [employer].        Medicare Annual Wellness and Personalized Prevention Plan:   Fall Risk + Home Safety + Hearing Impairment + Depression Screen + Cognitive Impairment Screen + Health Risk Assessment all reviewed    Health Maintenance Topics with due status: Not Due       Topic Last Completion Date    TETANUS VACCINE 2019    Diabetes Urine Screening 10/27/2022    Lipid Panel 10/27/2022    Hemoglobin A1c 10/27/2022      The patient's Health Maintenance was reviewed and the following appears to be due at this time:   Health Maintenance Due   Topic Date Due    Foot Exam  Never done    Sign Pain Contract  Never done    Complete Opioid Risk Tool  Never done    Naloxone Prescription  Never done    Shingles Vaccine (2 of 2) 2018    Urine Drug Screen  2020    COVID-19 Vaccine (2 - Booster for [place] series) 2021    Colorectal Cancer Screening  2022    Eye Exam  2022    Pneumococcal Vaccines (Age 0-64) (3 - PCV) 10/18/2022       Advance Care  Planning   I attest to discussing Advance Care Planning with patient and/or family member.  Education was provided including the importance of the Health Care Power of , Advance Directives, and/or [place] documentation.  The patient expressed understanding to the importance of this information and discussion.  Length of ACP conversation in minutes: 2       Opioid Screening: Patient medication list reviewed, patient is taking prescription opioids. Patient is not using additional opioids than prescribed. Patient is at low risk of substance abuse based on this opioid use history.     No follow-ups on file. In addition to their scheduled follow up, the patient has also been instructed to follow up on as needed basis.

## 2022-12-01 DIAGNOSIS — Z94.4 LIVER TRANSPLANTED: ICD-10-CM

## 2022-12-01 RX ORDER — HYDROCODONE BITARTRATE AND ACETAMINOPHEN 10; 325 MG/1; MG/1
1 TABLET ORAL EVERY 12 HOURS PRN
Qty: 60 TABLET | Refills: 0 | Status: SHIPPED | OUTPATIENT
Start: 2022-12-01 | End: 2022-12-27 | Stop reason: SDUPTHER

## 2022-12-05 ENCOUNTER — LAB VISIT (OUTPATIENT)
Dept: LAB | Facility: HOSPITAL | Age: 62
End: 2022-12-05
Attending: INTERNAL MEDICINE
Payer: MEDICARE

## 2022-12-05 DIAGNOSIS — Z94.4 LIVER REPLACED BY TRANSPLANT: ICD-10-CM

## 2022-12-05 LAB
ALBUMIN SERPL-MCNC: 4.4 GM/DL (ref 3.4–4.8)
ALBUMIN/GLOB SERPL: 1.6 RATIO (ref 1.1–2)
ALP SERPL-CCNC: 114 UNIT/L (ref 40–150)
ALT SERPL-CCNC: 17 UNIT/L (ref 0–55)
AST SERPL-CCNC: 17 UNIT/L (ref 5–34)
BASOPHILS # BLD AUTO: 0.03 X10(3)/MCL (ref 0–0.2)
BASOPHILS NFR BLD AUTO: 0.6 %
BILIRUBIN DIRECT+TOT PNL SERPL-MCNC: 1.5 MG/DL
BUN SERPL-MCNC: 10.5 MG/DL (ref 8.4–25.7)
CALCIUM SERPL-MCNC: 9.4 MG/DL (ref 8.8–10)
CHLORIDE SERPL-SCNC: 102 MMOL/L (ref 98–107)
CO2 SERPL-SCNC: 27 MMOL/L (ref 23–31)
CREAT SERPL-MCNC: 1.02 MG/DL (ref 0.73–1.18)
EOSINOPHIL # BLD AUTO: 0.08 X10(3)/MCL (ref 0–0.9)
EOSINOPHIL NFR BLD AUTO: 1.7 %
ERYTHROCYTE [DISTWIDTH] IN BLOOD BY AUTOMATED COUNT: 14.8 % (ref 11.5–17)
GFR SERPLBLD CREATININE-BSD FMLA CKD-EPI: >60 MLS/MIN/1.73/M2
GLOBULIN SER-MCNC: 2.7 GM/DL (ref 2.4–3.5)
GLUCOSE SERPL-MCNC: 288 MG/DL (ref 82–115)
HCT VFR BLD AUTO: 49.8 % (ref 42–52)
HGB BLD-MCNC: 15.8 GM/DL (ref 14–18)
IMM GRANULOCYTES # BLD AUTO: 0.04 X10(3)/MCL (ref 0–0.04)
IMM GRANULOCYTES NFR BLD AUTO: 0.8 %
LYMPHOCYTES # BLD AUTO: 0.79 X10(3)/MCL (ref 0.6–4.6)
LYMPHOCYTES NFR BLD AUTO: 16.6 %
MCH RBC QN AUTO: 27.5 PG (ref 27–31)
MCHC RBC AUTO-ENTMCNC: 31.7 MG/DL (ref 33–36)
MCV RBC AUTO: 86.8 FL (ref 80–94)
MONOCYTES # BLD AUTO: 0.33 X10(3)/MCL (ref 0.1–1.3)
MONOCYTES NFR BLD AUTO: 6.9 %
NEUTROPHILS # BLD AUTO: 3.5 X10(3)/MCL (ref 2.1–9.2)
NEUTROPHILS NFR BLD AUTO: 73.4 %
NRBC BLD AUTO-RTO: 0 %
PLATELET # BLD AUTO: 139 X10(3)/MCL (ref 130–400)
PMV BLD AUTO: 12.7 FL (ref 7.4–10.4)
POTASSIUM SERPL-SCNC: 5 MMOL/L (ref 3.5–5.1)
PROT SERPL-MCNC: 7.1 GM/DL (ref 5.8–7.6)
RBC # BLD AUTO: 5.74 X10(6)/MCL (ref 4.7–6.1)
SODIUM SERPL-SCNC: 137 MMOL/L (ref 136–145)
WBC # SPEC AUTO: 4.8 X10(3)/MCL (ref 4.5–11.5)

## 2022-12-05 PROCEDURE — 80197 ASSAY OF TACROLIMUS: CPT

## 2022-12-05 PROCEDURE — 36415 COLL VENOUS BLD VENIPUNCTURE: CPT

## 2022-12-05 PROCEDURE — 85025 COMPLETE CBC W/AUTO DIFF WBC: CPT

## 2022-12-05 PROCEDURE — 80053 COMPREHEN METABOLIC PANEL: CPT

## 2022-12-06 LAB — TACROLIMUS TROUGH BLD-MCNC: 4.5 NG/ML

## 2022-12-07 ENCOUNTER — TELEPHONE (OUTPATIENT)
Dept: TRANSPLANT | Facility: CLINIC | Age: 62
End: 2022-12-07
Payer: MEDICARE

## 2022-12-07 ENCOUNTER — HOSPITAL ENCOUNTER (EMERGENCY)
Facility: HOSPITAL | Age: 62
Discharge: HOME | End: 2022-12-07
Attending: STUDENT IN AN ORGANIZED HEALTH CARE EDUCATION/TRAINING PROGRAM
Payer: MEDICARE

## 2022-12-07 VITALS
DIASTOLIC BLOOD PRESSURE: 88 MMHG | HEIGHT: 71 IN | WEIGHT: 200 LBS | OXYGEN SATURATION: 97 % | SYSTOLIC BLOOD PRESSURE: 136 MMHG | TEMPERATURE: 99 F | HEART RATE: 96 BPM | BODY MASS INDEX: 28 KG/M2 | RESPIRATION RATE: 18 BRPM

## 2022-12-07 DIAGNOSIS — R42 DIZZINESS: ICD-10-CM

## 2022-12-07 DIAGNOSIS — H81.10 BENIGN PAROXYSMAL POSITIONAL VERTIGO, UNSPECIFIED LATERALITY: ICD-10-CM

## 2022-12-07 DIAGNOSIS — R42 VERTIGO: Primary | ICD-10-CM

## 2022-12-07 DIAGNOSIS — J32.9 SINUSITIS, UNSPECIFIED CHRONICITY, UNSPECIFIED LOCATION: ICD-10-CM

## 2022-12-07 LAB
ALBUMIN SERPL-MCNC: 4.5 GM/DL (ref 3.4–4.8)
ALBUMIN/GLOB SERPL: 1.8 RATIO (ref 1.1–2)
ALP SERPL-CCNC: 120 UNIT/L (ref 40–150)
ALT SERPL-CCNC: 15 UNIT/L (ref 0–55)
AST SERPL-CCNC: 16 UNIT/L (ref 5–34)
BASOPHILS # BLD AUTO: 0.03 X10(3)/MCL (ref 0–0.2)
BASOPHILS # BLD AUTO: 0.04 X10(3)/MCL (ref 0–0.2)
BASOPHILS NFR BLD AUTO: 0.6 %
BASOPHILS NFR BLD AUTO: 0.7 %
BILIRUBIN DIRECT+TOT PNL SERPL-MCNC: 1.9 MG/DL
BUN SERPL-MCNC: 9.6 MG/DL (ref 8.4–25.7)
CALCIUM SERPL-MCNC: 9.5 MG/DL (ref 8.8–10)
CHLORIDE SERPL-SCNC: 101 MMOL/L (ref 98–107)
CO2 SERPL-SCNC: 23 MMOL/L (ref 23–31)
CREAT SERPL-MCNC: 1.13 MG/DL (ref 0.73–1.18)
EOSINOPHIL # BLD AUTO: 0.07 X10(3)/MCL (ref 0–0.9)
EOSINOPHIL # BLD AUTO: 0.07 X10(3)/MCL (ref 0–0.9)
EOSINOPHIL NFR BLD AUTO: 1.2 %
EOSINOPHIL NFR BLD AUTO: 1.4 %
ERYTHROCYTE [DISTWIDTH] IN BLOOD BY AUTOMATED COUNT: 14.7 % (ref 11.5–17)
ERYTHROCYTE [DISTWIDTH] IN BLOOD BY AUTOMATED COUNT: 15 % (ref 11.5–17)
FLUAV AG UPPER RESP QL IA.RAPID: NOT DETECTED
FLUBV AG UPPER RESP QL IA.RAPID: NOT DETECTED
GFR SERPLBLD CREATININE-BSD FMLA CKD-EPI: >60 MLS/MIN/1.73/M2
GLOBULIN SER-MCNC: 2.5 GM/DL (ref 2.4–3.5)
GLUCOSE SERPL-MCNC: 315 MG/DL (ref 82–115)
HCT VFR BLD AUTO: 42.3 % (ref 42–52)
HCT VFR BLD AUTO: 45.4 % (ref 42–52)
HGB BLD-MCNC: 14 GM/DL (ref 14–18)
HGB BLD-MCNC: 15 GM/DL (ref 14–18)
IMM GRANULOCYTES # BLD AUTO: 0.04 X10(3)/MCL (ref 0–0.04)
IMM GRANULOCYTES # BLD AUTO: 0.04 X10(3)/MCL (ref 0–0.04)
IMM GRANULOCYTES NFR BLD AUTO: 0.7 %
IMM GRANULOCYTES NFR BLD AUTO: 0.8 %
LYMPHOCYTES # BLD AUTO: 0.97 X10(3)/MCL (ref 0.6–4.6)
LYMPHOCYTES # BLD AUTO: 1.04 X10(3)/MCL (ref 0.6–4.6)
LYMPHOCYTES NFR BLD AUTO: 16.1 %
LYMPHOCYTES NFR BLD AUTO: 20.7 %
MAGNESIUM SERPL-MCNC: 1.6 MG/DL (ref 1.6–2.6)
MCH RBC QN AUTO: 27.5 PG (ref 27–31)
MCH RBC QN AUTO: 27.6 PG (ref 27–31)
MCHC RBC AUTO-ENTMCNC: 33 MG/DL (ref 33–36)
MCHC RBC AUTO-ENTMCNC: 33.1 MG/DL (ref 33–36)
MCV RBC AUTO: 82.9 FL (ref 80–94)
MCV RBC AUTO: 83.6 FL (ref 80–94)
MONOCYTES # BLD AUTO: 0.47 X10(3)/MCL (ref 0.1–1.3)
MONOCYTES # BLD AUTO: 0.54 X10(3)/MCL (ref 0.1–1.3)
MONOCYTES NFR BLD AUTO: 8.9 %
MONOCYTES NFR BLD AUTO: 9.4 %
NEUTROPHILS # BLD AUTO: 3.4 X10(3)/MCL (ref 2.1–9.2)
NEUTROPHILS # BLD AUTO: 4.4 X10(3)/MCL (ref 2.1–9.2)
NEUTROPHILS NFR BLD AUTO: 67.1 %
NEUTROPHILS NFR BLD AUTO: 72.4 %
NRBC BLD AUTO-RTO: 0 %
NRBC BLD AUTO-RTO: 0 %
PLATELET # BLD AUTO: 136 X10(3)/MCL (ref 130–400)
PLATELET # BLD AUTO: 156 X10(3)/MCL (ref 130–400)
PMV BLD AUTO: 12.1 FL (ref 7.4–10.4)
PMV BLD AUTO: 12.8 FL (ref 7.4–10.4)
POCT GLUCOSE: 302 MG/DL (ref 70–110)
POTASSIUM SERPL-SCNC: 4.1 MMOL/L (ref 3.5–5.1)
PROT SERPL-MCNC: 7 GM/DL (ref 5.8–7.6)
RBC # BLD AUTO: 5.1 X10(6)/MCL (ref 4.7–6.1)
RBC # BLD AUTO: 5.43 X10(6)/MCL (ref 4.7–6.1)
SARS-COV-2 RNA RESP QL NAA+PROBE: NOT DETECTED
SODIUM SERPL-SCNC: 136 MMOL/L (ref 136–145)
T4 FREE SERPL-MCNC: 0.92 NG/DL (ref 0.7–1.48)
TROPONIN I SERPL-MCNC: <0.01 NG/ML (ref 0–0.04)
TSH SERPL-ACNC: 0.69 UIU/ML (ref 0.35–4.94)
WBC # SPEC AUTO: 5 X10(3)/MCL (ref 4.5–11.5)
WBC # SPEC AUTO: 6 X10(3)/MCL (ref 4.5–11.5)

## 2022-12-07 PROCEDURE — 84439 ASSAY OF FREE THYROXINE: CPT | Performed by: PHYSICIAN ASSISTANT

## 2022-12-07 PROCEDURE — 25000003 PHARM REV CODE 250: Performed by: STUDENT IN AN ORGANIZED HEALTH CARE EDUCATION/TRAINING PROGRAM

## 2022-12-07 PROCEDURE — 96360 HYDRATION IV INFUSION INIT: CPT

## 2022-12-07 PROCEDURE — 85025 COMPLETE CBC W/AUTO DIFF WBC: CPT | Performed by: PHYSICIAN ASSISTANT

## 2022-12-07 PROCEDURE — 84484 ASSAY OF TROPONIN QUANT: CPT | Performed by: PHYSICIAN ASSISTANT

## 2022-12-07 PROCEDURE — 84443 ASSAY THYROID STIM HORMONE: CPT | Performed by: PHYSICIAN ASSISTANT

## 2022-12-07 PROCEDURE — 93005 ELECTROCARDIOGRAM TRACING: CPT

## 2022-12-07 PROCEDURE — 0240U COVID/FLU A&B PCR: CPT | Performed by: STUDENT IN AN ORGANIZED HEALTH CARE EDUCATION/TRAINING PROGRAM

## 2022-12-07 PROCEDURE — 99285 EMERGENCY DEPT VISIT HI MDM: CPT | Mod: 25,CS

## 2022-12-07 PROCEDURE — 93010 EKG 12-LEAD: ICD-10-PCS | Mod: ,,, | Performed by: INTERNAL MEDICINE

## 2022-12-07 PROCEDURE — 85025 COMPLETE CBC W/AUTO DIFF WBC: CPT | Performed by: STUDENT IN AN ORGANIZED HEALTH CARE EDUCATION/TRAINING PROGRAM

## 2022-12-07 PROCEDURE — 83735 ASSAY OF MAGNESIUM: CPT | Performed by: PHYSICIAN ASSISTANT

## 2022-12-07 PROCEDURE — 93010 ELECTROCARDIOGRAM REPORT: CPT | Mod: ,,, | Performed by: INTERNAL MEDICINE

## 2022-12-07 PROCEDURE — 80053 COMPREHEN METABOLIC PANEL: CPT | Performed by: PHYSICIAN ASSISTANT

## 2022-12-07 PROCEDURE — 25500020 PHARM REV CODE 255: Performed by: STUDENT IN AN ORGANIZED HEALTH CARE EDUCATION/TRAINING PROGRAM

## 2022-12-07 PROCEDURE — 63600175 PHARM REV CODE 636 W HCPCS: Performed by: STUDENT IN AN ORGANIZED HEALTH CARE EDUCATION/TRAINING PROGRAM

## 2022-12-07 PROCEDURE — 82962 GLUCOSE BLOOD TEST: CPT

## 2022-12-07 RX ORDER — AMOXICILLIN AND CLAVULANATE POTASSIUM 875; 125 MG/1; MG/1
1 TABLET, FILM COATED ORAL 2 TIMES DAILY
Qty: 14 TABLET | Refills: 0 | Status: SHIPPED | OUTPATIENT
Start: 2022-12-07 | End: 2023-02-27

## 2022-12-07 RX ORDER — MECLIZINE HYDROCHLORIDE 25 MG/1
25 TABLET ORAL 3 TIMES DAILY PRN
Qty: 21 TABLET | Refills: 0 | Status: SHIPPED | OUTPATIENT
Start: 2022-12-07 | End: 2024-03-01

## 2022-12-07 RX ORDER — MECLIZINE HYDROCHLORIDE 25 MG/1
50 TABLET ORAL
Status: COMPLETED | OUTPATIENT
Start: 2022-12-07 | End: 2022-12-07

## 2022-12-07 RX ADMIN — MECLIZINE HYDROCHLORIDE 50 MG: 25 TABLET ORAL at 12:12

## 2022-12-07 RX ADMIN — IOPAMIDOL 100 ML: 755 INJECTION, SOLUTION INTRAVENOUS at 03:12

## 2022-12-07 RX ADMIN — SODIUM CHLORIDE, POTASSIUM CHLORIDE, SODIUM LACTATE AND CALCIUM CHLORIDE 1000 ML: 600; 310; 30; 20 INJECTION, SOLUTION INTRAVENOUS at 04:12

## 2022-12-07 NOTE — FIRST PROVIDER EVALUATION
"Medical screening examination initiated.  I have conducted a focused provider triage encounter, findings are as follows:    Brief history of present illness:  62 year old male presents to ED for intermittent dizziness and seeing white spots for the past 3 weeks; patient denies chest pain and shortness of breath; CBG and triage 3 O2    Vitals:    12/07/22 1100   BP: 128/78   Pulse: 93   Resp: 16   Temp: 98.8 °F (37.1 °C)   TempSrc: Temporal   SpO2: 98%   Weight: 90.7 kg (200 lb)   Height: 5' 11" (1.803 m)       Pertinent physical exam:  Awake, alert    Brief workup plan:  CBC, CMP, EKG, troponin, UA, UDS, TSH, free T4, magnesium, chest x-ray    Preliminary workup initiated; this workup will be continued and followed by the physician or advanced practice provider that is assigned to the patient when roomed.  "

## 2022-12-07 NOTE — TELEPHONE ENCOUNTER
Returned call left message for call back    ----- Message from Wilfrido Morrison sent at 12/7/2022  9:17 AM CST -----  Regarding: Rx effects  Patient calling to speak to coordinator regarding Rx effects.  Requesting a call back.      Call: [phone] ([email or web address])

## 2022-12-07 NOTE — ED PROVIDER NOTES
"Encounter Date: 12/7/2022    SCRIBE #1 NOTE: I, Nicolette Adam, am scribing for, and in the presence of,  Jose Fitzpatrick MD. I have scribed the following portions of the note - the EKG reading. Other sections scribed: HPI, ROS, PE.     History     Chief Complaint   Patient presents with    Dizziness     Intermittent dizziness and "seeing white spots" x 3 weeks. Pt states dizziness not improving today. Denies chest pain, sob. Hx of DM.      62 year old male w/ hx of cirrhosis type C w/ liver transplant (05/17/2020), DM, and HTN presents to ED for dizziness. Pt states he's had new intermittent episodes of dizziness and seeing spots for the past 2-3 weeks, but recently they're lasting longer and this morning, he almost fell and decided to come to the ED. Pt also c/o SOB, nausea, and abdominal pain. Pt is on norco for pain, but stopped taking it recently because he wasn't sure if it was causing his dizziness. He denies any chest pain.    The history is provided by the patient. No  was used.   Dizziness  This is a new problem. The current episode started more than 1 week ago (Past 2-3 weeks). The problem occurs daily. The problem has been gradually worsening. Associated symptoms include abdominal pain and shortness of breath. Pertinent negatives include no chest pain. The symptoms are aggravated by exertion.   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    Esophageal varices     GERD (gastroesophageal reflux disease)     Hepatic encephalopathy 07/08/2014    Hepatitis C virus infection 07/08/2014    tx with interferon 3-4 mos- stopped for unclear reasons Tattoos-first at age 17 only risk factor (HCVAB negative 08/2017)    Hypertension     Personal history of " colonic polyps     Renal mass, left 07/08/2014    6.3 x 5.7 x 5.6 complex mass    Splenomegaly 07/08/2014    Umbilical hernia 07/08/2014    Weight gain 02/08/2021    30 lbs since transplant     Past Surgical History:   Procedure Laterality Date    CARPAL TUNNEL RELEASE Bilateral     CHOLECYSTECTOMY      lap    COLONOSCOPY N/A 09/08/2017    Procedure: COLONOSCOPY;  Surgeon: Savannah Llanos MD;  Location: [facility];  Service: Endoscopy;  Laterality: N/A;    COLONOSCOPY Left 03/14/2018    Procedure: COLONOSCOPY;  Surgeon: Savannah Llanos MD;  Location: [facility];  Service: Endoscopy;  Laterality: Left;    COLONOSCOPY  02/21/2019    COLONOSCOPY W/ POLYPECTOMY  06/19/2014    ESOPHAGOGASTRODUODENOSCOPY  01/2014    ESOPHAGOGASTRODUODENOSCOPY  02/15/2017    grade II varices    ESOPHAGOGASTRODUODENOSCOPY  04/10/2017    grade I varices    ESOPHAGOGASTRODUODENOSCOPY N/A 10/18/2019    Procedure: EGD (ESOPHAGOGASTRODUODENOSCOPY);  Surgeon: Flavio Escalera MD;  Location: [facility] ([address] [place] [address]);  Service: Endoscopy;  Laterality: N/A;    ESOPHAGOGASTRODUODENOSCOPY W/ BANDING  01/26/2017    grade II varices    HERNIA REPAIR      LIVER TRANSPLANT N/A 05/17/2020    Procedure: TRANSPLANT, LIVER;  Surgeon: Danny Thakkar MD;  Location: [address];  Service: Transplant;  Laterality: N/A;    NECK SURGERY      fusion on C5 and C6    THROMBECTOMY  05/17/2020    Procedure: THROMBECTOMY;  Surgeon: Danny Thakkar MD;  Location: [address];  Service: Transplant;;  portal vein thrombectomy    ULNAR NERVE TRANSPOSITION Left     UMBILICAL HERNIA REPAIR N/A 02/22/2020    Procedure: REPAIR, HERNIA, PRIMARY WIHTOUT MESH AND PLACEMENT OF DRAIN;  Surgeon: Sherri Brunner MD;  Location: [address];  Service: Transplant;  Laterality: N/A;    vericose veins removed       Family History   Problem Relation Age of Onset    Hyperlipidemia Mother     No Known Problems Father 36        accident on oil rig    No Known  Problems Sister     Cancer Brother         kidney    Alcohol abuse Brother     No Known Problems Sister      Social History     Tobacco Use    Smoking status: Former     Types: Cigarettes     Quit date: 2010     Years since quittin.9    Smokeless tobacco: Former     Types: Chew     Quit date: 1990    Tobacco comments:     former 1 pack/week quit 2010   Substance Use Topics    Alcohol use: No     Comment: former heavy beer but quit 2010    Drug use: No     Review of Systems   Constitutional:  Negative for fever.   HENT:  Negative for sore throat.    Eyes:  Negative for visual disturbance.   Respiratory:  Positive for shortness of breath.    Cardiovascular:  Negative for chest pain.   Gastrointestinal:  Positive for abdominal pain and nausea.   Genitourinary:  Negative for dysuria.   Musculoskeletal:  Negative for joint swelling.   Skin:  Negative for rash.   Neurological:  Positive for dizziness. Negative for weakness.   Psychiatric/Behavioral:  Negative for confusion.    All other systems reviewed and are negative.    Physical Exam     Initial Vitals [22 1100]   BP Pulse Resp Temp SpO2   128/78 93 16 98.8 °F (37.1 °C) 98 %      MAP       --         Physical Exam    Nursing note and vitals reviewed.  Constitutional: He appears well-developed and well-nourished.   HENT:   Head: Normocephalic and atraumatic.   Eyes: EOM are normal. Pupils are equal, round, and reactive to light.   Neck:   Normal range of motion.  Cardiovascular:  Normal rate, regular rhythm, normal heart sounds and intact distal pulses.           No murmur heard.  Pulmonary/Chest: Breath sounds normal. No respiratory distress. He has no wheezes. He has no rales.   Abdominal: Abdomen is soft. He exhibits no distension. A surgical scar is present. There is abdominal tenderness.   Old surgical scar from liver transplant to the R abdomen.   Diffuse abdominal tenderness to the palpation.  There is no rebound.   Musculoskeletal:          General: No tenderness or edema. Normal range of motion.      Cervical back: Normal range of motion.     Neurological: He is alert and oriented to person, place, and time. He has normal strength. No cranial nerve deficit. GCS score is 15. GCS eye subscore is 4. GCS verbal subscore is 5. GCS motor subscore is 6.   No focal deficits.   5/5 strength in all extremities.    Skin: Skin is warm and dry.   Psychiatric: He has a normal mood and affect.       ED Course   Procedures  Labs Reviewed   COMPREHENSIVE METABOLIC PANEL - Abnormal; Notable for the following components:       Result Value    Glucose Level 315 (*)     Bilirubin Total 1.9 (*)     All other components within normal limits   CBC WITH DIFFERENTIAL - Abnormal; Notable for the following components:    MPV 12.8 (*)     All other components within normal limits   CBC WITH DIFFERENTIAL - Abnormal; Notable for the following components:    MPV 12.1 (*)     All other components within normal limits   POCT GLUCOSE - Abnormal; Notable for the following components:    POCT Glucose 302 (*)     All other components within normal limits   TROPONIN I - Normal   T4, FREE - Normal   TSH - Normal   MAGNESIUM - Normal   COVID/FLU A&B PCR - Normal    Narrative:     The Xpert Xpress SARS-CoV-2/FLU/RSV plus is a rapid, multiplexed real-time PCR test intended for the simultaneous qualitative detection and differentiation of SARS-CoV-2, Influenza A, Influenza B, and respiratory syncytial virus (RSV) viral RNA in either nasopharyngeal swab or nasal swab specimens.         CBC W/ AUTO DIFFERENTIAL    Narrative:     The following orders were created for panel order CBC Auto Differential.  Procedure                               Abnormality         Status                     ---------                               -----------         ------                     CBC with Differential[[id number]]        Abnormal            Final result                 Please view results for these  tests on the individual orders.   CBC W/ AUTO DIFFERENTIAL    Narrative:     The following orders were created for panel order CBC auto differential.  Procedure                               Abnormality         Status                     ---------                               -----------         ------                     CBC with Differential[[id number]]        Abnormal            Final result                 Please view results for these tests on the individual orders.     EKG Readings: (Independently Interpreted)   Initial Reading: No STEMI. Rhythm: Normal Sinus Rhythm. Heart Rate: 91. Ectopy: No Ectopy. Conduction: Normal. ST Segments: Normal ST Segments. T Waves: Normal. Axis: Normal. Clinical Impression: Normal Sinus Rhythm   Taken at 1054, 12/07/22.  Normal intervals.   Artifacts in AVF.    ECG Results              EKG 12-lead (Final result)  Result time 12/07/22 21:46:05      Final result by Interface, Lab In [facility] (12/07/22 21:46:05)                   Narrative:    Test Reason : R42,    Vent. Rate : 091 BPM     Atrial Rate : 091 BPM     P-R Int : 148 ms          QRS Dur : 084 ms      QT Int : 368 ms       P-R-T Axes : 058 -09 051 degrees     QTc Int : 452 ms    Normal sinus rhythm      Confirmed by Melvin CHAN, Cuauhtemoc ([id number]) on 12/7/2022 9:45:51 PM    Referred By:             Confirmed By:Cuauhtemoc Charles MD                                     EKG 12-LEAD (Final result)  Result time 12/19/22 12:48:44      Final result by Unknown User (12/19/22 12:48:44)                                      Imaging Results              MRI Brain Without Contrast (Final result)  Result time 12/07/22 18:11:32      Final result by Darius Thao MD (12/07/22 18:11:32)                   Impression:      Chronic age related changes    Otherwise unremarkable      Electronically signed by: Darius Thao  Date:    12/07/2022  Time:    18:11               Narrative:    EXAMINATION:  MRI BRAIN WITHOUT CONTRAST    CLINICAL  HISTORY:  Dizziness, persistent/recurrent, cardiac or vascular cause suspected;    TECHNIQUE:  Multiplanar multisequence MR imaging of the brain was performed without contrast.    COMPARISON:  CT scan of the head performed earlier today    FINDINGS:  No intracranial mass or lesion is seen.  No hemorrhage is seen.  No acute infarct is seen.  No diffusion abnormality seen.  There is diffuse cerebral atrophy seen along with some compensatory ventricular dilatation and periventricular white matter change consistent with patient's age.  Posterior fossa appears normal.  Calvarium is intact.  Paranasal sinuses appear grossly unremarkable.                                       CT Head Without Contrast (Final result)  Result time 12/07/22 16:03:58      Final result by Mookie Hernandez MD (12/07/22 16:03:58)                   Impression:      No acute intracranial findings.      Electronically signed by: Mookie Hernandez  Date:    12/07/2022  Time:    16:03               Narrative:    EXAMINATION:  CT HEAD WITHOUT CONTRAST    CLINICAL HISTORY:  Dizziness, persistent/recurrent, cardiac or vascular cause suspected;    TECHNIQUE:  CT imaging of the head performed from the skull base to the vertex without intravenous contrast. DLP 2105 mGycm. Automatic exposure control, adjustment of mA/kV or iterative reconstruction technique was used to reduce radiation.    COMPARISON:  10 July 2022    FINDINGS:  There is no acute cortical infarct, hemorrhage or mass lesion.  The ventricles are normal in size.  There are vascular calcifications.    Visualized paranasal sinuses and mastoid air cells are clear.                                       CT Abdomen Pelvis With Contrast (Final result)  Result time 12/07/22 16:09:39      Final result by Gracia Salgado MD (12/07/22 16:09:39)                   Impression:      1. No appreciable acute intra-abdominal abnormality  2. Postop liver transplant.  Hepatic cyst and too small to characterize  hepatic hypodensities.  No biliary ductal dilatation.  3. Splenomegaly.      Electronically signed by: Gracia Salgado  Date:    12/07/2022  Time:    16:09               Narrative:    EXAMINATION:  CT ABDOMEN PELVIS WITH CONTRAST    CLINICAL HISTORY:  Abdominal abscess/infection suspected;Bowel obstruction suspected;Epigastric pain;Hx of liver transplant, abdominal pain;    TECHNIQUE:  Helically acquired images with axial, sagittal and coronal reformations were obtained from the lung bases to the pubic symphysis after the IV administration of contrast.    Automated tube current modulation, weight-based exposure dosing, and/or iterative reconstruction technique utilized to reach lowest reasonably achievable exposure rate.    DLP: 450 sonogram 07/14/2022 mGy*cm    COMPARISON:  No relevant prior available for comparison at the time of dictation.    FINDINGS:  HEART: Normal in size. No pericardial effusion.    LUNG BASES: Well aerated.    LIVER: Patient is status post liver transplant.  The liver measures 16 cm craniocaudal at the midclavicular line.  There are small hepatic hypodensities.  The largest measures 1.1 cm at the left lobe of the liver.  This corresponds with hepatic cyst seen on sonogram.    BILIARY: Gallbladder is surgically absent.  No biliary ductal dilatation.    PANCREAS: No inflammatory change.    SPLEEN: The spleen measures 17 cm cranial caudal.  There is a splenule in the left upper quadrant.    ADRENALS: No mass.    KIDNEYS/URETERS: The kidneys enhance symmetrically.  No hydronephrosis.    GI TRACT/MESENTERY: Question mild edema at the GE junction.  No evidence of bowel obstruction or inflammation. Sequela of epiploic appendagitis at the descending colon (2, 53).  The appendix is normal.    PERITONEUM: No free fluid.No free air.    LYMPH NODES: No enlarged lymph nodes by size criteria.    VASCULATURE: Aortoiliac atherosclerosis.  Portal vein is patent.  Hepatic artery is patent.    BLADDER: Normal  appearance given degree of distention.    REPRODUCTIVE ORGANS: Normal as visualized.    SOFT TISSUES: There are surgical clips at the bilateral inguinal regions.    BONES: No acute osseous abnormality.                                       X-Ray Chest PA And Lateral (Final result)  Result time 12/07/22 11:40:30      Final result by Suraj Sewell MD (12/07/22 11:40:30)                   Impression:      No acute cardiopulmonary process.      Electronically signed by: Suraj Sewell  Date:    12/07/2022  Time:    11:40               Narrative:    EXAMINATION:  XR CHEST PA AND LATERAL    CLINICAL HISTORY:  Dizziness and giddiness    TECHNIQUE:  Two views of the chest    COMPARISON:  07/13/2022    FINDINGS:  No focal opacification, pleural effusion, or pneumothorax.    The cardiomediastinal silhouette is within normal limits.    No acute osseous abnormality.                                       Medications   meclizine tablet 50 mg (50 mg Oral Given 12/7/22 1211)   iopamidoL (ISOVUE-370) injection 100 mL (100 mLs Intravenous Given 12/7/22 1556)   lactated ringers bolus 1,000 mL (0 mLs Intravenous Stopped 12/7/22 1740)     Medical Decision Making:   History:   I obtained history from: primary care / consultant.  Old Records Summarized: other records.       <> Summary of Records: Reviewed previous records  Initial Assessment:   See HPI  Differential Diagnosis:   Vertigo, CVA, polypharmacy, dehydration, FRANCISCO, electrolyte abnormality, dysrhythmia   Clinical Tests:   Lab Tests: Ordered and Reviewed  Radiological Study: Ordered and Reviewed  Medical Tests: Ordered and Reviewed  ED Management:  Alfa is a 63 y/o male presents for dizziness.  See HPI/exam.  Labs/imaging as noted.  No evidence of CVA on MRI.  Likely peripheral etiology of vertigo, continue meclizine, f/u outpaitnet.  EKG as noted Labs as noted. Reassessed patient.  Patient is resting comfortably.  Discussed all results.  Discussed need for follow-up.   Discussed return precautions.  Answered all questions at this time.  Hemodynamically stable for continued outpatient management with strict return precautions.  Patient  verbalized understanding agreed to plan.          Scribe Attestation:   Scribe #1: I performed the above scribed service and the documentation accurately describes the services I performed. I attest to the accuracy of the note.    Attending Attestation:           Physician Attestation for Scribe:  Physician Attestation Statement for Scribe #1: I, Jose Fitzpatrick MD, reviewed documentation, as scribed by Nicolette Adam in my presence, and it is both accurate and complete.           ED Course as of 22 1213   Wed Dec 07, 2022   173 Consulted with Dr. Matthew: Recommended a stat MRI and if normal the pt can go home. [AA]      ED Course User Index  [AA] Kia Will                 Clinical Impression:   Final diagnoses:  [R42] Dizziness  [R42] Vertigo (Primary)  [H81.10] Benign paroxysmal positional vertigo, unspecified laterality  [J32.9] Sinusitis, unspecified chronicity, unspecified location        ED Disposition Condition    Discharge Stable          ED Prescriptions       Medication Sig Dispense Start Date End Date Auth. Provider    meclizine (ANTIVERT) 25 mg tablet () Take 1 tablet (25 mg total) by mouth 3 (three) times daily as needed for Dizziness or Nausea. 21 tablet 2022 Jose Fitzpatrick MD    amoxicillin-clavulanate 875-125mg (AUGMENTIN) 875-125 mg per tablet Take 1 tablet by mouth 2 (two) times daily. 14 tablet 2022 -- Jose Fitzpatrick MD          Follow-up Information       Follow up With Specialties Details Why Contact Info    Preston Matthew II, MD Internal Medicine   [address] [place] [id number]  [phone]               Jose Fitzpatrick MD  22 3999

## 2022-12-08 NOTE — DISCHARGE INSTRUCTIONS
Follow up with the primary care physician.      Follow up with ear nose and throat doctor.      Return to the emergency department if you have any new symptoms, worsening symptoms, nausea, vomiting, difficulty breathing, fever, or any of the symptoms.

## 2022-12-12 ENCOUNTER — DOCUMENTATION ONLY (OUTPATIENT)
Dept: INTERNAL MEDICINE | Facility: CLINIC | Age: 62
End: 2022-12-12

## 2022-12-12 DIAGNOSIS — Z94.4 LIVER REPLACED BY TRANSPLANT: ICD-10-CM

## 2022-12-12 RX ORDER — TACROLIMUS 1 MG/1
CAPSULE ORAL
Qty: 150 CAPSULE | Refills: 11 | Status: SHIPPED | OUTPATIENT
Start: 2022-12-12 | End: 2023-03-27 | Stop reason: SDUPTHER

## 2022-12-12 NOTE — TELEPHONE ENCOUNTER
----- Message from Elizabeth Meneses MD sent at 12/12/2022  8:48 AM CST -----  The Labs are stable; increase prograf to 3/2 from 2/2 and repeat labs in one month - please let patient know.

## 2022-12-12 NOTE — TELEPHONE ENCOUNTER
Returned call to patient let him know to increase Tacrolimus to 3 mg in the morning in the evening  and do labs on 01/09/23    ----- Message from Wilfrido Morrison sent at 12/12/2022 12:30 PM CST -----  Regarding: return call  Patient returned call to coordinator. Message in chart was read to patient and he verbalizes understanding.

## 2022-12-13 ENCOUNTER — TELEPHONE (OUTPATIENT)
Dept: TRANSPLANT | Facility: CLINIC | Age: 62
End: 2022-12-13
Payer: MEDICARE

## 2022-12-13 NOTE — TELEPHONE ENCOUNTER
Returned call patient wanted to let me know he got my message about the medication change and he may be moving to a friends house in [place].  I told him to let me know so I can change his labs location if needed.    ----- Message from Triny Cardona sent at 12/13/2022  2:29 PM CST -----  Regarding: Speak to nurse  Patient called requesting to speak to nurse  No further information provided    Patient can be contacted @# [phone]

## 2022-12-27 DIAGNOSIS — Z94.4 LIVER TRANSPLANTED: ICD-10-CM

## 2022-12-27 RX ORDER — HYDROCODONE BITARTRATE AND ACETAMINOPHEN 10; 325 MG/1; MG/1
1 TABLET ORAL EVERY 12 HOURS PRN
Qty: 60 TABLET | Refills: 0 | Status: SHIPPED | OUTPATIENT
Start: 2022-12-27 | End: 2023-01-25 | Stop reason: SDUPTHER

## 2022-12-27 NOTE — TELEPHONE ENCOUNTER
----- Message from Cookie Pinzon sent at 12/27/2022 10:17 AM CST -----  Regarding: med  Pt is req Refill of Pain med    [facility]  [phone]

## 2022-12-30 DIAGNOSIS — E11.42 DIABETIC PERIPHERAL NEUROPATHY: ICD-10-CM

## 2022-12-30 RX ORDER — GABAPENTIN 600 MG/1
600 TABLET ORAL 3 TIMES DAILY
Qty: 270 TABLET | Refills: 1 | Status: SHIPPED | OUTPATIENT
Start: 2022-12-30 | End: 2023-03-30 | Stop reason: SDUPTHER

## 2023-01-09 ENCOUNTER — TELEPHONE (OUTPATIENT)
Dept: TRANSPLANT | Facility: CLINIC | Age: 63
End: 2023-01-09
Payer: MEDICARE

## 2023-01-09 NOTE — TELEPHONE ENCOUNTER
----- Message from Triny Brendan sent at 1/9/2023  4:09 PM CST -----  Regarding: speak to staff r/s missed appt/lab  Patient was unable to make appts today due to lack of transportation  Patient is requesting that the labs be rescheduled at [facility] - it is closer for him   Please call to reschedule appt w/Dr. Meneses    Patient can be contacted @# [phone]

## 2023-01-10 ENCOUNTER — TELEPHONE (OUTPATIENT)
Dept: TRANSPLANT | Facility: CLINIC | Age: 63
End: 2023-01-10
Payer: MEDICARE

## 2023-01-10 ENCOUNTER — LAB VISIT (OUTPATIENT)
Dept: LAB | Facility: HOSPITAL | Age: 63
End: 2023-01-10
Attending: INTERNAL MEDICINE
Payer: MEDICARE

## 2023-01-10 DIAGNOSIS — Z94.4 LIVER REPLACED BY TRANSPLANT: Primary | ICD-10-CM

## 2023-01-10 DIAGNOSIS — Z94.4 LIVER REPLACED BY TRANSPLANT: ICD-10-CM

## 2023-01-10 LAB
ALBUMIN SERPL-MCNC: 4.5 G/DL (ref 3.4–4.8)
ALBUMIN/GLOB SERPL: 1.6 RATIO (ref 1.1–2)
ALP SERPL-CCNC: 98 UNIT/L (ref 40–150)
ALT SERPL-CCNC: 15 UNIT/L (ref 0–55)
AST SERPL-CCNC: 19 UNIT/L (ref 5–34)
BASOPHILS # BLD AUTO: 0.04 X10(3)/MCL (ref 0–0.2)
BASOPHILS NFR BLD AUTO: 0.7 %
BILIRUBIN DIRECT+TOT PNL SERPL-MCNC: 1.2 MG/DL
BUN SERPL-MCNC: 9.4 MG/DL (ref 8.4–25.7)
CALCIUM SERPL-MCNC: 9.6 MG/DL (ref 8.8–10)
CHLORIDE SERPL-SCNC: 106 MMOL/L (ref 98–107)
CO2 SERPL-SCNC: 24 MMOL/L (ref 23–31)
CREAT SERPL-MCNC: 1.01 MG/DL (ref 0.73–1.18)
EOSINOPHIL # BLD AUTO: 0.11 X10(3)/MCL (ref 0–0.9)
EOSINOPHIL NFR BLD AUTO: 2 %
ERYTHROCYTE [DISTWIDTH] IN BLOOD BY AUTOMATED COUNT: 16.8 % (ref 11.5–17)
GFR SERPLBLD CREATININE-BSD FMLA CKD-EPI: >60 MLS/MIN/1.73/M2
GLOBULIN SER-MCNC: 2.9 GM/DL (ref 2.4–3.5)
GLUCOSE SERPL-MCNC: 226 MG/DL (ref 82–115)
HCT VFR BLD AUTO: 48.7 % (ref 42–52)
HGB BLD-MCNC: 16 GM/DL (ref 14–18)
IMM GRANULOCYTES # BLD AUTO: 0.05 X10(3)/MCL (ref 0–0.04)
IMM GRANULOCYTES NFR BLD AUTO: 0.9 %
LYMPHOCYTES # BLD AUTO: 0.81 X10(3)/MCL (ref 0.6–4.6)
LYMPHOCYTES NFR BLD AUTO: 14.7 %
MCH RBC QN AUTO: 28.8 PG
MCHC RBC AUTO-ENTMCNC: 32.9 MG/DL (ref 33–36)
MCV RBC AUTO: 87.7 FL (ref 80–94)
MONOCYTES # BLD AUTO: 0.33 X10(3)/MCL (ref 0.1–1.3)
MONOCYTES NFR BLD AUTO: 6 %
NEUTROPHILS # BLD AUTO: 4.16 X10(3)/MCL (ref 2.1–9.2)
NEUTROPHILS NFR BLD AUTO: 75.7 %
NRBC BLD AUTO-RTO: 0 %
PLATELET # BLD AUTO: 131 X10(3)/MCL (ref 130–400)
PMV BLD AUTO: 11.9 FL (ref 7.4–10.4)
POTASSIUM SERPL-SCNC: 4.7 MMOL/L (ref 3.5–5.1)
PROT SERPL-MCNC: 7.4 GM/DL (ref 5.8–7.6)
RBC # BLD AUTO: 5.55 X10(6)/MCL (ref 4.7–6.1)
SODIUM SERPL-SCNC: 142 MMOL/L (ref 136–145)
WBC # SPEC AUTO: 5.5 X10(3)/MCL (ref 4.5–11.5)

## 2023-01-10 PROCEDURE — 85025 COMPLETE CBC W/AUTO DIFF WBC: CPT

## 2023-01-10 PROCEDURE — 36415 COLL VENOUS BLD VENIPUNCTURE: CPT

## 2023-01-10 PROCEDURE — 80197 ASSAY OF TACROLIMUS: CPT

## 2023-01-10 PROCEDURE — 80321 ALCOHOLS BIOMARKERS 1OR 2: CPT

## 2023-01-10 PROCEDURE — 80307 DRUG TEST PRSMV CHEM ANLYZR: CPT

## 2023-01-10 PROCEDURE — 80053 COMPREHEN METABOLIC PANEL: CPT

## 2023-01-10 NOTE — TELEPHONE ENCOUNTER
Returned call left voicemail for call back    ----- Message from July Giles sent at 1/10/2023 10:55 AM CST -----  Regarding: Speak to coordinator  Pt calling to speak to coordinator regarding labs and follow up        [phone]

## 2023-01-11 LAB — TACROLIMUS TROUGH BLD-MCNC: 8.6 NG/ML

## 2023-01-12 ENCOUNTER — TELEPHONE (OUTPATIENT)
Dept: TRANSPLANT | Facility: CLINIC | Age: 63
End: 2023-01-12
Payer: MEDICARE

## 2023-01-12 NOTE — TELEPHONE ENCOUNTER
Continue routine labs no changes needed.  Letter sent for next lab appointment 4/03/23      ----- Message from Elizabeth Meneses MD sent at 1/12/2023  8:22 AM CST -----  The Labs are stable - please let patient know.

## 2023-01-13 LAB
CLINICAL BIOCHEMIST REVIEW: NORMAL
PLPETH BLD-MCNC: NORMAL NG/ML
POPETH BLD-MCNC: 172 NG/ML

## 2023-01-14 LAB
AMPHETAMINES SERPL QL SCN: NORMAL NG/ML
BARBITURATES SERPL QL SCN: NORMAL MCG/ML
BENZODIAZ SERPL QL SCN: NORMAL NG/ML
CANNABINOIDS SERPL QL SCN: NORMAL NG/ML
COCAINE+BZE SERPLBLD QL SCN: NORMAL NG/ML
M FENTANYL/ACETYL FENTANYL: NORMAL NG/ML
METHADONE SERPL QL SCN: NORMAL NG/ML
METHAMPHET+MDMA SERPLBLD QL SCN: NORMAL NG/ML
OPIATES SERPL QL SCN: NORMAL NG/ML
OXYCODONE+OXYMORPHONE SERPLBLD QL SCN: NORMAL NG/ML
PCP SERPL QL SCN: NORMAL NG/ML

## 2023-01-17 ENCOUNTER — TELEPHONE (OUTPATIENT)
Dept: TRANSPLANT | Facility: CLINIC | Age: 63
End: 2023-01-17
Payer: MEDICARE

## 2023-01-17 NOTE — TELEPHONE ENCOUNTER
Returned spoke to him about his positive PETH test and about not drinking.  Told him about going to AA, getting a therapist and seeing PCP about depression.    ----- Message from Wilfrido Morrison sent at 1/17/2023  3:40 PM CST -----  Regarding: lab results  Patient calling to get lab results. Requesting a call back.      Call: [phone] (Mobile

## 2023-01-24 ENCOUNTER — TELEPHONE (OUTPATIENT)
Dept: ADMINISTRATIVE | Facility: HOSPITAL | Age: 63
End: 2023-01-24
Payer: MEDICARE

## 2023-01-25 DIAGNOSIS — Z79.4 TYPE 2 DIABETES MELLITUS WITHOUT COMPLICATION, WITH LONG-TERM CURRENT USE OF INSULIN: Primary | ICD-10-CM

## 2023-01-25 DIAGNOSIS — E11.9 TYPE 2 DIABETES MELLITUS WITHOUT COMPLICATION, WITH LONG-TERM CURRENT USE OF INSULIN: Primary | ICD-10-CM

## 2023-01-25 DIAGNOSIS — Z79.4 TYPE 2 DIABETES MELLITUS WITH DIABETIC POLYNEUROPATHY, WITH LONG-TERM CURRENT USE OF INSULIN: ICD-10-CM

## 2023-01-25 DIAGNOSIS — Z94.4 LIVER TRANSPLANTED: ICD-10-CM

## 2023-01-25 DIAGNOSIS — E11.42 TYPE 2 DIABETES MELLITUS WITH DIABETIC POLYNEUROPATHY, WITH LONG-TERM CURRENT USE OF INSULIN: ICD-10-CM

## 2023-01-25 RX ORDER — INSULIN ASPART 100 [IU]/ML
10 INJECTION, SOLUTION INTRAVENOUS; SUBCUTANEOUS
Qty: 15 ML | Refills: 11 | Status: SHIPPED | OUTPATIENT
Start: 2023-01-25 | End: 2023-12-12

## 2023-01-25 RX ORDER — HYDROCODONE BITARTRATE AND ACETAMINOPHEN 10; 325 MG/1; MG/1
1 TABLET ORAL EVERY 12 HOURS PRN
Qty: 60 TABLET | Refills: 0 | Status: SHIPPED | OUTPATIENT
Start: 2023-01-25 | End: 2023-02-20 | Stop reason: SDUPTHER

## 2023-02-06 ENCOUNTER — OFFICE VISIT (OUTPATIENT)
Dept: TRANSPLANT | Facility: CLINIC | Age: 63
End: 2023-02-06
Payer: MEDICARE

## 2023-02-06 VITALS
BODY MASS INDEX: 26.39 KG/M2 | OXYGEN SATURATION: 97 % | WEIGHT: 188.5 LBS | SYSTOLIC BLOOD PRESSURE: 124 MMHG | HEIGHT: 71 IN | DIASTOLIC BLOOD PRESSURE: 81 MMHG | RESPIRATION RATE: 19 BRPM | TEMPERATURE: 98 F | HEART RATE: 109 BPM

## 2023-02-06 DIAGNOSIS — B19.21 HEPATITIS C VIRUS INFECTION WITH HEPATIC COMA, UNSPECIFIED CHRONICITY: ICD-10-CM

## 2023-02-06 DIAGNOSIS — Z79.60 LONG-TERM USE OF IMMUNOSUPPRESSANT MEDICATION: Primary | ICD-10-CM

## 2023-02-06 DIAGNOSIS — Z94.4 HISTORY OF LIVER TRANSPLANT: ICD-10-CM

## 2023-02-06 DIAGNOSIS — R22.1 LUMP IN NECK: ICD-10-CM

## 2023-02-06 PROCEDURE — 3079F DIAST BP 80-89 MM HG: CPT | Mod: CPTII,S$GLB,, | Performed by: INTERNAL MEDICINE

## 2023-02-06 PROCEDURE — 3074F SYST BP LT 130 MM HG: CPT | Mod: CPTII,S$GLB,, | Performed by: INTERNAL MEDICINE

## 2023-02-06 PROCEDURE — 1159F PR MEDICATION LIST DOCUMENTED IN MEDICAL RECORD: ICD-10-PCS | Mod: CPTII,S$GLB,, | Performed by: INTERNAL MEDICINE

## 2023-02-06 PROCEDURE — 1160F RVW MEDS BY RX/DR IN RCRD: CPT | Mod: CPTII,S$GLB,, | Performed by: INTERNAL MEDICINE

## 2023-02-06 PROCEDURE — 1159F MED LIST DOCD IN RCRD: CPT | Mod: CPTII,S$GLB,, | Performed by: INTERNAL MEDICINE

## 2023-02-06 PROCEDURE — 99214 OFFICE O/P EST MOD 30 MIN: CPT | Mod: S$GLB,,, | Performed by: INTERNAL MEDICINE

## 2023-02-06 PROCEDURE — 3079F PR MOST RECENT DIASTOLIC BLOOD PRESSURE 80-89 MM HG: ICD-10-PCS | Mod: CPTII,S$GLB,, | Performed by: INTERNAL MEDICINE

## 2023-02-06 PROCEDURE — 3008F PR BODY MASS INDEX (BMI) DOCUMENTED: ICD-10-PCS | Mod: CPTII,S$GLB,, | Performed by: INTERNAL MEDICINE

## 2023-02-06 PROCEDURE — 99999 PR PBB SHADOW E&M-EST. PATIENT-LVL V: CPT | Mod: PBBFAC,,, | Performed by: INTERNAL MEDICINE

## 2023-02-06 PROCEDURE — 3074F PR MOST RECENT SYSTOLIC BLOOD PRESSURE < 130 MM HG: ICD-10-PCS | Mod: CPTII,S$GLB,, | Performed by: INTERNAL MEDICINE

## 2023-02-06 PROCEDURE — 99214 PR OFFICE/OUTPT VISIT, EST, LEVL IV, 30-39 MIN: ICD-10-PCS | Mod: S$GLB,,, | Performed by: INTERNAL MEDICINE

## 2023-02-06 PROCEDURE — 3008F BODY MASS INDEX DOCD: CPT | Mod: CPTII,S$GLB,, | Performed by: INTERNAL MEDICINE

## 2023-02-06 PROCEDURE — 1160F PR REVIEW ALL MEDS BY PRESCRIBER/CLIN PHARMACIST DOCUMENTED: ICD-10-PCS | Mod: CPTII,S$GLB,, | Performed by: INTERNAL MEDICINE

## 2023-02-06 PROCEDURE — 99999 PR PBB SHADOW E&M-EST. PATIENT-LVL V: ICD-10-PCS | Mod: PBBFAC,,, | Performed by: INTERNAL MEDICINE

## 2023-02-06 NOTE — Clinical Note
1. Colonoscopy 2023 2. Bone density in 2024 3. See dermatologist  4. Soft tissue lump on neck-will send to surgeon to remove 5. Keep BS under good control

## 2023-02-06 NOTE — Clinical Note
February 6, 2023                     [facility] - Liver Transplant  [address] [address]  Phone: [phone]  Fax: [phone]   Patient: Colin Reilly   MR Number: [id number]   YOB: 1960   Date of Visit: 2/6/2023       Dear      Thank you for referring Colin Reilly to me for evaluation. Attached you will find relevant portions of my assessment and plan of care.    If you have questions, please do not hesitate to call me. I look forward to following Colin Reilly along with you.    Sincerely,    Elizabeth Meneses MD    Enclosure    If you would like to receive this communication electronically, please contact [email or web address] or [phone] to request [facility] access.    [employer] is a tool which provides read-only access to select patient information with whom you have a relationship. Its easy to use and provides real time access to review your patients record including encounter summaries, notes, results, and demographic information.    If you feel you have received this communication in error or would no longer like to receive these types of communications, please e-mail [email or web address]

## 2023-02-06 NOTE — PATIENT INSTRUCTIONS
Colonoscopy 2023  Bone density in 2024  See dermatologist   Soft tissue lump on neck-will send to surgeon to remove  Keep BS under good control

## 2023-02-06 NOTE — PROGRESS NOTES
Transplant Hepatology  Liver Transplant Recipient Follow-up    Transplant Date: 5/17/2020  UNOS Native Liver Dx: Alcoholic Cirrhosis    Colin is here for follow up of his liver transplant.    ORGAN: LIVER  Whole or Partial: whole liver  Donor Type: donation after circulatory death   CDC High Risk: no  Donor CMV Status: Negative  Donor HCV Status: Negative  Donor HBcAb: Negative  Donor HBV CEZAR: Negative  Donor HCV CEZAR: Negative  Biliary Anastomosis: end to end  Arterial Anatomy: standard  IVC reconstruction: end to end ivc  Portal vein status: partial patent    He has had the following complications since transplant: none.     Subjective:     Interval History: Interval History: Colin is now 2 years and 8 months post liver transplant. He is feeling well. He denies N/V, abdominal pain or diarrhea. His blood sugars are under better control. He has lost 20 lbs since 12/22. C/o lump in his neck, post right side and upper back    Allograft function 1/10/23: Tbil 1.2, ALT 15, AST 19, ALKP 98, creat 1.01, tacro 8.6  IS: prograf    Abdomen US 7/14/22: satisfactory doppler    Health maintenance  Colonoscopy 2018: small polyps-repeat 2023  Bone density 8/18/21: normal; repeat 2024  Dermatology: ND    Review of Systems   Constitutional: Negative.    HENT: Negative.    Eyes: Negative.    Respiratory: Negative.    Cardiovascular: Negative.    Gastrointestinal: Negative.    Genitourinary: Negative.    Musculoskeletal: Negative.    Skin: Negative.    Neurological: Negative.    Psychiatric/Behavioral: Negative.        Objective:     Vitals:    02/06/23 1127   BP: 124/81   Pulse: 109   Resp: 19   Temp: 98 °F (36.7 °C)       Physical Exam  Vitals reviewed.   Constitutional:       Appearance: He is well-developed.   HENT:      Head: Normocephalic and atraumatic.      Neck: post 1 cm soft mobile lesion  Eyes:      General: No scleral icterus.     Conjunctiva/sclera: Conjunctivae normal.      Pupils: Pupils are equal, round, and  reactive to light.   Neck:      Thyroid: No thyromegaly.   Cardiovascular:      Rate and Rhythm: Normal rate and regular rhythm.      Heart sounds: Normal heart sounds.   Pulmonary:      Effort: Pulmonary effort is normal.      Breath sounds: Normal breath sounds. No rales.   Abdominal:      General: Bowel sounds are normal. There is no distension.      Palpations: Abdomen is soft. There is no mass.      Tenderness: There is no abdominal tenderness.   Musculoskeletal:         General: Normal range of motion.      Cervical back: Normal range of motion and neck supple.      Thoracic back: 2 cm soft mobile mass  Skin:     General: Skin is warm and dry.      Findings: No rash.   Neurological:      Mental Status: He is alert and oriented to person, place, and time.       Lab Results   Component Value Date    BILITOT 1.2 01/10/2023    BILITOT 1.2 (H) 11/08/2022    AST 19 01/10/2023    AST 18 11/08/2022    AST 32 10/18/2016    ALT 15 01/10/2023    ALT 17 11/08/2022    ALT 32 10/18/2016    ALKPHOS 98 01/10/2023    ALKPHOS 112 11/08/2022    CREATININE 1.01 01/10/2023    CREATININE 1.1 11/08/2022    CREATININE 0.8 08/10/2020    ALBUMIN 4.5 01/10/2023    ALBUMIN 4.6 11/08/2022    ALBUMIN 3.80 10/18/2016     Lab Results   Component Value Date    WBC 5.5 01/10/2023    WBC 3.75 (L) 11/08/2022    WBC 4.6 10/18/2016    HGB 16.0 01/10/2023    HGB 14.9 11/08/2022    HCT 48.7 01/10/2023    HCT 45.8 11/08/2022    HCT 27 (L) 05/18/2020     01/10/2023     (L) 11/08/2022     Lab Results   Component Value Date    TACROLIMUS 9.7 11/08/2022       Assessment/Plan:   The patient is now 2 year and 8 months post liver transplant. Current recommendations:  1. Allograft function and control of IS:good allograft function; keep prograf at current dose; target should be 7-9;   2. DCD donor: last US satisfactory  3. Immunoprophylaxis: Aspirin: YES  4. Hx of alcohol abuse: periodic peth tests  5. DM: f/u endocrinologist  6. Weight gain-  to continue to lose wieght. Target BMI <25.  7.  Health maintenance: the patient should see a dermatologist annually to screen for skin cancer, perform regular colonoscopies (next due 2023),  8. R/O osteoporosis. I am recommending a repeat bone density to r/o osteoporosis (normal bone density in 2024).  9. Hx alcohol abuse: check periodic peth tests  10. Neck and back lesions: neck one is causing pain; will refer to gen sx for resection      Return 1 year        Elizabeth Meneses MD           [facility] Patient Status  Functional Status: 100% - Normal, no complaints, no evidence of disease  Physical Capacity: No Limitations

## 2023-02-15 ENCOUNTER — TELEPHONE (OUTPATIENT)
Dept: SURGERY | Facility: CLINIC | Age: 63
End: 2023-02-15
Payer: MEDICARE

## 2023-02-15 ENCOUNTER — TELEPHONE (OUTPATIENT)
Dept: TRANSPLANT | Facility: CLINIC | Age: 63
End: 2023-02-15
Payer: MEDICARE

## 2023-02-15 DIAGNOSIS — R22.1 LUMP IN NECK: Primary | ICD-10-CM

## 2023-02-15 NOTE — TELEPHONE ENCOUNTER
----- Message from Kamla Hardwick RN sent at 2/15/2023 10:47 AM CST -----  Dr Meneses sent in a referral for patient to be seen for a soft tissue lump on his neck.  Please call patient to schedule.    Thanks

## 2023-02-15 NOTE — TELEPHONE ENCOUNTER
Returned call left voicemail to let patient know referral has been placed for general surgery to set up appointment to see him and access the lump on his neck and treatment options.    ----- Message from Ilene Davies sent at 2/15/2023 10:34 AM CST -----  Regarding: Consult/Advisory    Name Of Caller:  Colin      Contact Preference?:   [phone]        Nature of Call?  Calling to speak with coordinator about setting up an appointment with someone to get a bump removed from the patient's neck.

## 2023-02-16 ENCOUNTER — TELEPHONE (OUTPATIENT)
Dept: TRANSPLANT | Facility: CLINIC | Age: 63
End: 2023-02-16
Payer: MEDICARE

## 2023-02-16 NOTE — TELEPHONE ENCOUNTER
Returned call let patient know he should have enough refills on his prescription.  Told him to let me know if he has any problems.    ----- Message from Leydi Curiel MA sent at 2/16/2023 12:06 PM CST -----  Regarding: FW: refill    ----- Message -----  From: Ilene Davies  Sent: 2/16/2023  11:34 AM CST  To: [facility] Staff  Subject: refill                                           RX Name and Strength:   tacrolimus (PROGRAF) 1 MG Cap         How is the patient currently taking it?   : Take 3 capsules (3 mg total) by mouth every morning AND 2 capsules (2 mg total) every evening.          Quantity?   150 capsules       Preferred Pharmacy with phone number:    [id number] - [place] - [address] AT [facility] [address] [address]  Phone: [phone] Fax: [phone]      Contact Preference:  [phone]

## 2023-02-20 DIAGNOSIS — Z94.4 LIVER TRANSPLANTED: ICD-10-CM

## 2023-02-20 RX ORDER — HYDROCODONE BITARTRATE AND ACETAMINOPHEN 10; 325 MG/1; MG/1
1 TABLET ORAL EVERY 12 HOURS PRN
Qty: 14 TABLET | Refills: 0 | Status: SHIPPED | OUTPATIENT
Start: 2023-02-20 | End: 2023-02-23 | Stop reason: SDUPTHER

## 2023-02-23 ENCOUNTER — LAB VISIT (OUTPATIENT)
Dept: LAB | Facility: HOSPITAL | Age: 63
End: 2023-02-23
Attending: INTERNAL MEDICINE
Payer: MEDICARE

## 2023-02-23 DIAGNOSIS — E11.42 DIABETIC PERIPHERAL NEUROPATHY: ICD-10-CM

## 2023-02-23 DIAGNOSIS — K72.10 END STAGE LIVER DISEASE: ICD-10-CM

## 2023-02-23 DIAGNOSIS — Z94.4 LIVER TRANSPLANTED: ICD-10-CM

## 2023-02-23 DIAGNOSIS — Z79.60 LONG-TERM USE OF IMMUNOSUPPRESSANT MEDICATION: ICD-10-CM

## 2023-02-23 DIAGNOSIS — Z79.891 LONG TERM PRESCRIPTION OPIATE USE: ICD-10-CM

## 2023-02-23 DIAGNOSIS — F10.11 ALCOHOL ABUSE, IN REMISSION: ICD-10-CM

## 2023-02-23 DIAGNOSIS — Z94.4 HISTORY OF LIVER TRANSPLANT: ICD-10-CM

## 2023-02-23 DIAGNOSIS — D69.6 THROMBOCYTOPENIA, UNSPECIFIED: ICD-10-CM

## 2023-02-23 DIAGNOSIS — E11.42 TYPE 2 DIABETES MELLITUS WITH DIABETIC POLYNEUROPATHY, WITH LONG-TERM CURRENT USE OF INSULIN: ICD-10-CM

## 2023-02-23 DIAGNOSIS — E78.5 DYSLIPIDEMIA: ICD-10-CM

## 2023-02-23 DIAGNOSIS — I10 PRIMARY HYPERTENSION: ICD-10-CM

## 2023-02-23 DIAGNOSIS — Z79.4 TYPE 2 DIABETES MELLITUS WITH DIABETIC POLYNEUROPATHY, WITH LONG-TERM CURRENT USE OF INSULIN: ICD-10-CM

## 2023-02-23 LAB
ALBUMIN SERPL-MCNC: 4.5 G/DL (ref 3.4–4.8)
ALBUMIN/GLOB SERPL: 1.6 RATIO (ref 1.1–2)
ALP SERPL-CCNC: 87 UNIT/L (ref 40–150)
ALT SERPL-CCNC: 22 UNIT/L (ref 0–55)
APPEARANCE UR: CLEAR
AST SERPL-CCNC: 24 UNIT/L (ref 5–34)
BACTERIA #/AREA URNS AUTO: ABNORMAL /HPF
BASOPHILS # BLD AUTO: 0.04 X10(3)/MCL (ref 0–0.2)
BASOPHILS NFR BLD AUTO: 0.8 %
BILIRUB UR QL STRIP.AUTO: NEGATIVE MG/DL
BILIRUBIN DIRECT+TOT PNL SERPL-MCNC: 1.1 MG/DL
BUN SERPL-MCNC: 10.3 MG/DL (ref 8.4–25.7)
CALCIUM SERPL-MCNC: 9.6 MG/DL (ref 8.8–10)
CHLORIDE SERPL-SCNC: 101 MMOL/L (ref 98–107)
CHOLEST SERPL-MCNC: 178 MG/DL
CHOLEST/HDLC SERPL: 5 {RATIO} (ref 0–5)
CO2 SERPL-SCNC: 30 MMOL/L (ref 23–31)
COLOR UR AUTO: YELLOW
CREAT SERPL-MCNC: 0.9 MG/DL (ref 0.73–1.18)
CREAT UR-MCNC: 97.9 MG/DL (ref 63–166)
EOSINOPHIL # BLD AUTO: 0.09 X10(3)/MCL (ref 0–0.9)
EOSINOPHIL NFR BLD AUTO: 1.8 %
ERYTHROCYTE [DISTWIDTH] IN BLOOD BY AUTOMATED COUNT: 14.6 % (ref 11.5–17)
EST. AVERAGE GLUCOSE BLD GHB EST-MCNC: 162.8 MG/DL
GFR SERPLBLD CREATININE-BSD FMLA CKD-EPI: >60 MLS/MIN/1.73/M2
GLOBULIN SER-MCNC: 2.9 GM/DL (ref 2.4–3.5)
GLUCOSE SERPL-MCNC: 193 MG/DL (ref 82–115)
GLUCOSE UR QL STRIP.AUTO: NEGATIVE MG/DL
HBA1C MFR BLD: 7.3 %
HCT VFR BLD AUTO: 47.3 % (ref 42–52)
HDLC SERPL-MCNC: 36 MG/DL (ref 35–60)
HGB BLD-MCNC: 15.6 G/DL (ref 14–18)
IMM GRANULOCYTES # BLD AUTO: 0.03 X10(3)/MCL (ref 0–0.04)
IMM GRANULOCYTES NFR BLD AUTO: 0.6 %
KETONES UR QL STRIP.AUTO: NEGATIVE MG/DL
LDLC SERPL CALC-MCNC: 78 MG/DL (ref 50–140)
LEUKOCYTE ESTERASE UR QL STRIP.AUTO: NEGATIVE UNIT/L
LYMPHOCYTES # BLD AUTO: 0.86 X10(3)/MCL (ref 0.6–4.6)
LYMPHOCYTES NFR BLD AUTO: 17.6 %
MCH RBC QN AUTO: 29.8 PG
MCHC RBC AUTO-ENTMCNC: 33 G/DL (ref 33–36)
MCV RBC AUTO: 90.3 FL (ref 80–94)
MICROALBUMIN UR-MCNC: 27.4 UG/ML
MICROALBUMIN/CREAT RATIO PNL UR: 28 MG/GM CR (ref 0–30)
MONOCYTES # BLD AUTO: 0.39 X10(3)/MCL (ref 0.1–1.3)
MONOCYTES NFR BLD AUTO: 8 %
NEUTROPHILS # BLD AUTO: 3.49 X10(3)/MCL (ref 2.1–9.2)
NEUTROPHILS NFR BLD AUTO: 71.2 %
NITRITE UR QL STRIP.AUTO: NEGATIVE
NRBC BLD AUTO-RTO: 0 %
PH UR STRIP.AUTO: 8.5 [PH]
PLATELET # BLD AUTO: 151 X10(3)/MCL (ref 130–400)
PMV BLD AUTO: 11.3 FL (ref 7.4–10.4)
POTASSIUM SERPL-SCNC: 5 MMOL/L (ref 3.5–5.1)
PROT SERPL-MCNC: 7.4 GM/DL (ref 5.8–7.6)
PROT UR QL STRIP.AUTO: ABNORMAL MG/DL
RBC # BLD AUTO: 5.24 X10(6)/MCL (ref 4.7–6.1)
RBC #/AREA URNS AUTO: <5 /HPF
RBC UR QL AUTO: NEGATIVE UNIT/L
SODIUM SERPL-SCNC: 140 MMOL/L (ref 136–145)
SP GR UR STRIP.AUTO: 1.02 (ref 1–1.03)
SPERM URNS QL MICRO: ABNORMAL /HPF
SQUAMOUS #/AREA URNS AUTO: <5 /HPF
TRIGL SERPL-MCNC: 318 MG/DL (ref 34–140)
UROBILINOGEN UR STRIP-ACNC: 2 MG/DL
VLDLC SERPL CALC-MCNC: 64 MG/DL
WBC # SPEC AUTO: 4.9 X10(3)/MCL (ref 4.5–11.5)
WBC #/AREA URNS AUTO: <5 /HPF

## 2023-02-23 PROCEDURE — 80061 LIPID PANEL: CPT

## 2023-02-23 PROCEDURE — 82043 UR ALBUMIN QUANTITATIVE: CPT

## 2023-02-23 PROCEDURE — 80053 COMPREHEN METABOLIC PANEL: CPT

## 2023-02-23 PROCEDURE — 85025 COMPLETE CBC W/AUTO DIFF WBC: CPT

## 2023-02-23 PROCEDURE — 36415 COLL VENOUS BLD VENIPUNCTURE: CPT

## 2023-02-23 PROCEDURE — 81001 URINALYSIS AUTO W/SCOPE: CPT

## 2023-02-23 PROCEDURE — 83036 HEMOGLOBIN GLYCOSYLATED A1C: CPT

## 2023-02-23 RX ORDER — HYDROCODONE BITARTRATE AND ACETAMINOPHEN 10; 325 MG/1; MG/1
1 TABLET ORAL EVERY 12 HOURS PRN
Qty: 60 TABLET | Refills: 0 | Status: SHIPPED | OUTPATIENT
Start: 2023-02-23 | End: 2023-03-21 | Stop reason: SDUPTHER

## 2023-02-27 ENCOUNTER — OFFICE VISIT (OUTPATIENT)
Dept: INTERNAL MEDICINE | Facility: CLINIC | Age: 63
End: 2023-02-27
Payer: MEDICARE

## 2023-02-27 ENCOUNTER — LAB VISIT (OUTPATIENT)
Dept: LAB | Facility: HOSPITAL | Age: 63
End: 2023-02-27
Attending: INTERNAL MEDICINE
Payer: MEDICARE

## 2023-02-27 VITALS
TEMPERATURE: 98 F | OXYGEN SATURATION: 97 % | WEIGHT: 185 LBS | BODY MASS INDEX: 25.9 KG/M2 | SYSTOLIC BLOOD PRESSURE: 120 MMHG | DIASTOLIC BLOOD PRESSURE: 72 MMHG | HEART RATE: 68 BPM | HEIGHT: 71 IN | RESPIRATION RATE: 16 BRPM

## 2023-02-27 DIAGNOSIS — R16.1 SPLENOMEGALY: ICD-10-CM

## 2023-02-27 DIAGNOSIS — I10 PRIMARY HYPERTENSION: ICD-10-CM

## 2023-02-27 DIAGNOSIS — Z94.4 HISTORY OF LIVER TRANSPLANT: ICD-10-CM

## 2023-02-27 DIAGNOSIS — D63.8 ANEMIA OF CHRONIC DISEASE: ICD-10-CM

## 2023-02-27 DIAGNOSIS — E11.42 DIABETIC PERIPHERAL NEUROPATHY: Primary | ICD-10-CM

## 2023-02-27 DIAGNOSIS — E78.5 DYSLIPIDEMIA: ICD-10-CM

## 2023-02-27 DIAGNOSIS — M54.40 CHRONIC LOW BACK PAIN WITH SCIATICA, SCIATICA LATERALITY UNSPECIFIED, UNSPECIFIED BACK PAIN LATERALITY: ICD-10-CM

## 2023-02-27 DIAGNOSIS — Z94.4 LIVER REPLACED BY TRANSPLANT: Primary | ICD-10-CM

## 2023-02-27 DIAGNOSIS — Z79.60 LONG-TERM USE OF IMMUNOSUPPRESSANT MEDICATION: ICD-10-CM

## 2023-02-27 DIAGNOSIS — Z79.4 TYPE 2 DIABETES MELLITUS WITH DIABETIC POLYNEUROPATHY, WITH LONG-TERM CURRENT USE OF INSULIN: ICD-10-CM

## 2023-02-27 DIAGNOSIS — E11.42 TYPE 2 DIABETES MELLITUS WITH DIABETIC POLYNEUROPATHY, WITH LONG-TERM CURRENT USE OF INSULIN: ICD-10-CM

## 2023-02-27 DIAGNOSIS — K72.10 END STAGE LIVER DISEASE: ICD-10-CM

## 2023-02-27 DIAGNOSIS — Z94.4 LIVER REPLACED BY TRANSPLANT: ICD-10-CM

## 2023-02-27 DIAGNOSIS — Z79.891 LONG TERM PRESCRIPTION OPIATE USE: ICD-10-CM

## 2023-02-27 DIAGNOSIS — G89.29 CHRONIC LOW BACK PAIN WITH SCIATICA, SCIATICA LATERALITY UNSPECIFIED, UNSPECIFIED BACK PAIN LATERALITY: ICD-10-CM

## 2023-02-27 DIAGNOSIS — F32.5 MAJOR DEPRESSION IN REMISSION: ICD-10-CM

## 2023-02-27 DIAGNOSIS — F10.11 ALCOHOL ABUSE, IN REMISSION: ICD-10-CM

## 2023-02-27 DIAGNOSIS — B19.21 HEPATITIS C VIRUS INFECTION WITH HEPATIC COMA, UNSPECIFIED CHRONICITY: ICD-10-CM

## 2023-02-27 LAB
ALBUMIN SERPL-MCNC: 4.4 G/DL (ref 3.4–4.8)
ALBUMIN/GLOB SERPL: 1.6 RATIO (ref 1.1–2)
ALP SERPL-CCNC: 82 UNIT/L (ref 40–150)
ALT SERPL-CCNC: 18 UNIT/L (ref 0–55)
AST SERPL-CCNC: 20 UNIT/L (ref 5–34)
BASOPHILS # BLD AUTO: 0.03 X10(3)/MCL (ref 0–0.2)
BASOPHILS NFR BLD AUTO: 0.5 %
BILIRUBIN DIRECT+TOT PNL SERPL-MCNC: 1 MG/DL
BUN SERPL-MCNC: 10.8 MG/DL (ref 8.4–25.7)
CALCIUM SERPL-MCNC: 9.4 MG/DL (ref 8.8–10)
CHLORIDE SERPL-SCNC: 103 MMOL/L (ref 98–107)
CO2 SERPL-SCNC: 25 MMOL/L (ref 23–31)
CREAT SERPL-MCNC: 1.21 MG/DL (ref 0.73–1.18)
EOSINOPHIL # BLD AUTO: 0.06 X10(3)/MCL (ref 0–0.9)
EOSINOPHIL NFR BLD AUTO: 1 %
ERYTHROCYTE [DISTWIDTH] IN BLOOD BY AUTOMATED COUNT: 14.8 % (ref 11.5–17)
GFR SERPLBLD CREATININE-BSD FMLA CKD-EPI: >60 MLS/MIN/1.73/M2
GLOBULIN SER-MCNC: 2.8 GM/DL (ref 2.4–3.5)
GLUCOSE SERPL-MCNC: 291 MG/DL (ref 82–115)
HCT VFR BLD AUTO: 47.3 % (ref 42–52)
HGB BLD-MCNC: 15.1 G/DL (ref 14–18)
IMM GRANULOCYTES # BLD AUTO: 0.02 X10(3)/MCL (ref 0–0.04)
IMM GRANULOCYTES NFR BLD AUTO: 0.3 %
LYMPHOCYTES # BLD AUTO: 1.28 X10(3)/MCL (ref 0.6–4.6)
LYMPHOCYTES NFR BLD AUTO: 20.4 %
MCH RBC QN AUTO: 30 PG
MCHC RBC AUTO-ENTMCNC: 31.9 G/DL (ref 33–36)
MCV RBC AUTO: 93.8 FL (ref 80–94)
MONOCYTES # BLD AUTO: 0.44 X10(3)/MCL (ref 0.1–1.3)
MONOCYTES NFR BLD AUTO: 7 %
NEUTROPHILS # BLD AUTO: 4.43 X10(3)/MCL (ref 2.1–9.2)
NEUTROPHILS NFR BLD AUTO: 70.8 %
NRBC BLD AUTO-RTO: 0 %
PLATELET # BLD AUTO: 156 X10(3)/MCL (ref 130–400)
PMV BLD AUTO: 11.7 FL (ref 7.4–10.4)
POTASSIUM SERPL-SCNC: 4.9 MMOL/L (ref 3.5–5.1)
PROT SERPL-MCNC: 7.2 GM/DL (ref 5.8–7.6)
RBC # BLD AUTO: 5.04 X10(6)/MCL (ref 4.7–6.1)
SODIUM SERPL-SCNC: 140 MMOL/L (ref 136–145)
WBC # SPEC AUTO: 6.3 X10(3)/MCL (ref 4.5–11.5)

## 2023-02-27 PROCEDURE — 3066F PR DOCUMENTATION OF TREATMENT FOR NEPHROPATHY: ICD-10-PCS | Mod: CPTII,,, | Performed by: INTERNAL MEDICINE

## 2023-02-27 PROCEDURE — 3078F PR MOST RECENT DIASTOLIC BLOOD PRESSURE < 80 MM HG: ICD-10-PCS | Mod: CPTII,,, | Performed by: INTERNAL MEDICINE

## 2023-02-27 PROCEDURE — 1160F PR REVIEW ALL MEDS BY PRESCRIBER/CLIN PHARMACIST DOCUMENTED: ICD-10-PCS | Mod: CPTII,,, | Performed by: INTERNAL MEDICINE

## 2023-02-27 PROCEDURE — 85025 COMPLETE CBC W/AUTO DIFF WBC: CPT

## 2023-02-27 PROCEDURE — 80197 ASSAY OF TACROLIMUS: CPT

## 2023-02-27 PROCEDURE — 3061F PR NEG MICROALBUMINURIA RESULT DOCUMENTED/REVIEW: ICD-10-PCS | Mod: CPTII,,, | Performed by: INTERNAL MEDICINE

## 2023-02-27 PROCEDURE — 3074F SYST BP LT 130 MM HG: CPT | Mod: CPTII,,, | Performed by: INTERNAL MEDICINE

## 2023-02-27 PROCEDURE — 1159F PR MEDICATION LIST DOCUMENTED IN MEDICAL RECORD: ICD-10-PCS | Mod: CPTII,,, | Performed by: INTERNAL MEDICINE

## 2023-02-27 PROCEDURE — 3066F NEPHROPATHY DOC TX: CPT | Mod: CPTII,,, | Performed by: INTERNAL MEDICINE

## 2023-02-27 PROCEDURE — 99214 PR OFFICE/OUTPT VISIT, EST, LEVL IV, 30-39 MIN: ICD-10-PCS | Mod: ,,, | Performed by: INTERNAL MEDICINE

## 2023-02-27 PROCEDURE — 3078F DIAST BP <80 MM HG: CPT | Mod: CPTII,,, | Performed by: INTERNAL MEDICINE

## 2023-02-27 PROCEDURE — 3008F BODY MASS INDEX DOCD: CPT | Mod: CPTII,,, | Performed by: INTERNAL MEDICINE

## 2023-02-27 PROCEDURE — 99214 OFFICE O/P EST MOD 30 MIN: CPT | Mod: ,,, | Performed by: INTERNAL MEDICINE

## 2023-02-27 PROCEDURE — 3061F NEG MICROALBUMINURIA REV: CPT | Mod: CPTII,,, | Performed by: INTERNAL MEDICINE

## 2023-02-27 PROCEDURE — 80053 COMPREHEN METABOLIC PANEL: CPT

## 2023-02-27 PROCEDURE — 1160F RVW MEDS BY RX/DR IN RCRD: CPT | Mod: CPTII,,, | Performed by: INTERNAL MEDICINE

## 2023-02-27 PROCEDURE — 3074F PR MOST RECENT SYSTOLIC BLOOD PRESSURE < 130 MM HG: ICD-10-PCS | Mod: CPTII,,, | Performed by: INTERNAL MEDICINE

## 2023-02-27 PROCEDURE — 1159F MED LIST DOCD IN RCRD: CPT | Mod: CPTII,,, | Performed by: INTERNAL MEDICINE

## 2023-02-27 PROCEDURE — 3008F PR BODY MASS INDEX (BMI) DOCUMENTED: ICD-10-PCS | Mod: CPTII,,, | Performed by: INTERNAL MEDICINE

## 2023-02-27 PROCEDURE — 36415 COLL VENOUS BLD VENIPUNCTURE: CPT

## 2023-02-27 NOTE — PROGRESS NOTES
"Subjective:       Patient ID: Colin Reilly is a 62 y.o. male.      Patient Care Team:  Preston Matthew II, MD as PCP - General (Internal Medicine)    Chief Complaint: Diabetes, Peripheral Neuropathy, Hypertension, Hyperlipidemia, Anemia, Back Pain, Depression, and end stage liver disease      62-year-old male was evaluated today for follow-up of chronic liver disease with cirrhosis, diabetes, and neuropathy among other conditions.      Review of Systems   Constitutional:  Negative for fever.   HENT:  Negative for nosebleeds.    Eyes:  Negative for visual disturbance.   Respiratory:  Negative for shortness of breath.    Cardiovascular:  Negative for chest pain.   Gastrointestinal:  Negative for abdominal pain.   Genitourinary:  Negative for dysuria.   Musculoskeletal:  Negative for gait problem.   Neurological:  Negative for headaches.         Patient Reported Health Risk Assessment         Objective:      Physical Exam  HENT:      Head: Normocephalic.      Mouth/Throat:      Pharynx: Oropharynx is clear.   Eyes:      Extraocular Movements: Extraocular movements intact.   Cardiovascular:      Rate and Rhythm: Normal rate and regular rhythm.   Pulmonary:      Breath sounds: Normal breath sounds.   Abdominal:      Palpations: Abdomen is soft.   Musculoskeletal:         General: No swelling.   Skin:     General: Skin is warm.   Neurological:      General: No focal deficit present.      Mental Status: He is alert and oriented to person, place, and time.   Psychiatric:         Mood and Affect: Mood normal.       Vitals:    02/27/23 1338   BP: 120/72   Pulse: 68   Resp: 16   Temp: 98.4 °F (36.9 °C)   SpO2: 97%   Weight: 83.9 kg (185 lb)   Height: 5' 11" (1.803 m)            No flowsheet data found.  Fall Risk Assessment - Outpatient 2/27/2023 2/6/2023 11/21/2022 7/29/2022 5/26/2022 8/6/2021 5/31/2021   Mobility Status Ambulatory Ambulatory Ambulatory Ambulatory Ambulatory Ambulatory Ambulatory   Number of falls 0 0 0 0 " 0 0 0   Identified as fall risk 0 0 0 0 0 0 0                  Assessment:       Problem List Items Addressed This Visit          Neuro    Diabetic peripheral neuropathy - Primary    Relevant Orders    CBC Auto Differential    Comprehensive Metabolic Panel    Lipid Panel    Microalbumin/Creatinine Ratio, Urine    Urinalysis, Reflex to Urine Culture    TSH    Hemoglobin A1C    Drug Screen, Urine       Psychiatric    Alcohol abuse, in remission    Relevant Orders    CBC Auto Differential    Comprehensive Metabolic Panel    Lipid Panel    Microalbumin/Creatinine Ratio, Urine    Urinalysis, Reflex to Urine Culture    TSH    Hemoglobin A1C    Drug Screen, Urine    Long term prescription opiate use    Relevant Orders    CBC Auto Differential    Comprehensive Metabolic Panel    Lipid Panel    Microalbumin/Creatinine Ratio, Urine    Urinalysis, Reflex to Urine Culture    TSH    Hemoglobin A1C    Drug Screen, Urine    Major depression in remission    Relevant Orders    CBC Auto Differential    Comprehensive Metabolic Panel    Lipid Panel    Microalbumin/Creatinine Ratio, Urine    Urinalysis, Reflex to Urine Culture    TSH    Hemoglobin A1C    Drug Screen, Urine       Cardiac/Vascular    Dyslipidemia    Relevant Orders    CBC Auto Differential    Comprehensive Metabolic Panel    Lipid Panel    Microalbumin/Creatinine Ratio, Urine    Urinalysis, Reflex to Urine Culture    TSH    Hemoglobin A1C    Drug Screen, Urine    Primary hypertension    Relevant Orders    CBC Auto Differential    Comprehensive Metabolic Panel    Lipid Panel    Microalbumin/Creatinine Ratio, Urine    Urinalysis, Reflex to Urine Culture    TSH    Hemoglobin A1C    Drug Screen, Urine       Oncology    Anemia of chronic disease    Relevant Orders    CBC Auto Differential    Comprehensive Metabolic Panel    Lipid Panel    Microalbumin/Creatinine Ratio, Urine    Urinalysis, Reflex to Urine Culture    TSH    Hemoglobin A1C    Drug Screen, Urine       Endocrine     Type 2 diabetes mellitus with diabetic polyneuropathy, with long-term current use of insulin    Relevant Orders    CBC Auto Differential    Comprehensive Metabolic Panel    Lipid Panel    Microalbumin/Creatinine Ratio, Urine    Urinalysis, Reflex to Urine Culture    TSH    Hemoglobin A1C    Drug Screen, Urine       GI    Splenomegaly    Relevant Orders    CBC Auto Differential    Comprehensive Metabolic Panel    Lipid Panel    Microalbumin/Creatinine Ratio, Urine    Urinalysis, Reflex to Urine Culture    TSH    Hemoglobin A1C    Drug Screen, Urine    End stage liver disease    Relevant Orders    CBC Auto Differential    Comprehensive Metabolic Panel    Lipid Panel    Microalbumin/Creatinine Ratio, Urine    Urinalysis, Reflex to Urine Culture    TSH    Hemoglobin A1C    Drug Screen, Urine    History of liver transplant    Relevant Orders    CBC Auto Differential    Comprehensive Metabolic Panel    Lipid Panel    Microalbumin/Creatinine Ratio, Urine    Urinalysis, Reflex to Urine Culture    TSH    Hemoglobin A1C    Drug Screen, Urine    Hepatitis C virus infection    Relevant Orders    CBC Auto Differential    Comprehensive Metabolic Panel    Lipid Panel    Microalbumin/Creatinine Ratio, Urine    Urinalysis, Reflex to Urine Culture    TSH    Hemoglobin A1C    Drug Screen, Urine       Orthopedic    Chronic back pain    Relevant Orders    CBC Auto Differential    Comprehensive Metabolic Panel    Lipid Panel    Microalbumin/Creatinine Ratio, Urine    Urinalysis, Reflex to Urine Culture    TSH    Hemoglobin A1C    Drug Screen, Urine       Palliative Care    Long-term use of immunosuppressant medication    Relevant Orders    CBC Auto Differential    Comprehensive Metabolic Panel    Lipid Panel    Microalbumin/Creatinine Ratio, Urine    Urinalysis, Reflex to Urine Culture    TSH    Hemoglobin A1C    Drug Screen, Urine         Medication List with Changes/Refills   Current Medications    ASPIRIN 81 MG CHEW    Take 1  "tablet (81 mg total) by mouth once daily.    BISACODYL (DULCOLAX) 5 MG EC TABLET    Take 2 tablets (10 mg total) by mouth daily as needed for Constipation.    BLOOD SUGAR DIAGNOSTIC (TRUE METRIX GLUCOSE TEST STRIP MISC)      TRUE METRIX SELF MONITORING BLOOD GLUCOSE STRIPS   Strip, See Instructions, TEST BLOOD SUGAR FOUR TIMES DAILY, # 400 unknown unit, 3 Refill(s), Pharmacy: [facility], 180, cm, Height/Length Dosing, 02/10/21 9:33:00 CST, 90.718, kg, W...    EASY TOUCH INSULIN SYRINGE 1 ML 31 GAUGE X 5/16 SYRG        ESCITALOPRAM OXALATE (LEXAPRO) 20 MG TABLET    TAKE 1 TABLET EVERY DAY    FINASTERIDE (PROSCAR) 5 MG TABLET    TAKE 1 TABLET EVERY DAY    GABAPENTIN (NEURONTIN) 600 MG TABLET    Take 1 tablet (600 mg total) by mouth 3 (three) times daily.    HYDROCODONE-ACETAMINOPHEN (NORCO)  MG PER TABLET    Take 1 tablet by mouth every 12 (twelve) hours as needed for Pain.    INSULIN ASPART U-100 (NOVOLOG FLEXPEN U-100 INSULIN) 100 UNIT/ML (3 ML) INPN PEN    Inject 10 Units into the skin 3 (three) times daily before meals.    LEVEMIR FLEXTOUCH U-100 INSULN 100 UNIT/ML (3 ML) INPN PEN    Inject 20 Units into the skin every evening.    MECLIZINE (ANTIVERT) 25 MG TABLET    Take 1 tablet (25 mg total) by mouth 3 (three) times daily as needed for Dizziness or Nausea.    METFORMIN (GLUCOPHAGE) 500 MG TABLET    Take 1 tablet (500 mg total) by mouth 2 (two) times daily with meals.    OMEPRAZOLE (PRILOSEC) 40 MG CAPSULE    Take 40 mg by mouth once daily.    OXYBUTYNIN (DITROPAN) 5 MG TAB    TAKE 1 TABLET EVERY DAY    PEN NEEDLE, DIABETIC (DROPLET PEN NEEDLE) 32 GAUGE X 5/32" NDLE    USE ONE TIME DAILY AT BEDTIME    QUETIAPINE (SEROQUEL) 25 MG TAB    Take 75 mg by mouth every evening.    ROSUVASTATIN (CRESTOR) 10 MG TABLET    Take 1 tablet (10 mg total) by mouth once daily.    SILDENAFIL (VIAGRA) 100 MG TABLET    Take 100 mg by mouth once daily.    TACROLIMUS (PROGRAF) 1 MG CAP    Take 3 capsules (3 mg " total) by mouth every morning AND 2 capsules (2 mg total) every evening.    TAMSULOSIN (FLOMAX) 0.4 MG CAP    TAKE 1 CAPSULE EVERY DAY    TRUE METRIX GLUCOSE TEST STRIP STRP    use to test blood sugar 4 times daily    TRUEPLUS LANCETS 30 GAUGE MISC       Discontinued Medications    AMOXICILLIN-CLAVULANATE 875-125MG (AUGMENTIN) 875-125 MG PER TABLET    Take 1 tablet by mouth 2 (two) times daily.    FAMOTIDINE (PEPCID) 20 MG TABLET    Take 1 tablet (20 mg total) by mouth every evening.        Plan:       1. Chronic liver disease with cirrhosis: Followed by Dr. Chandler. He had liver transplant in  and is doing well. No longer on diuretics. Continue immunosuppressants     2. Insulin dependent diabetes: Hemoglobin A1c is 7.3. He takes NovoLog sliding scale and Levemir.  Continue metformin twice a day     3.  Thrombocytopenia: Stable     4. Chronic back pain: Continue Norco and gabapentin. His pain management physician, Dr. Narayan Enriquez,  suddenly, so we will prescribe Norco for him. Decreased to BID in .      5. Chronic rhinosinusitis: Continue fluticasone     6. Diabetic peripheral Neuropathy: Decreased gabapentin because of edema.  He is back on TID     7. Insomnia: Continue trazodone     8. Hypertension: Stable     9. Benign prostatic hypertrophy: Continue Flomax. Followed by Dr. Ann and urology at [facility]. Self-cath 4 times per day     10. Major Depression in remission: Continue Lexapro. He went to an inpatient facility in 2017 in [facility] and is doing better.     11. Wellness: Pneumovax      Patient needs diabetic shoes and inserts     He lives in a trailer with his mother in [place]. They go to a [employer]        Medicare Annual Wellness and Personalized Prevention Plan:   Fall Risk + Home Safety + Hearing Impairment + Depression Screen + Cognitive Impairment Screen + Health Risk Assessment all reviewed    Health Maintenance Topics with due status: Not Due       Topic Last  Completion Date    TETANUS VACCINE 08/05/2019    Diabetes Urine Screening 02/23/2023    Lipid Panel 02/23/2023    Hemoglobin A1c 02/23/2023      The patient's Health Maintenance was reviewed and the following appears to be due at this time:   Health Maintenance Due   Topic Date Due    Foot Exam  Never done    Sign Pain Contract  Never done    Complete Opioid Risk Tool  Never done    Naloxone Prescription  Never done    Shingles Vaccine (2 of 2) 05/29/2018    Urine Drug Screen  06/05/2020    COVID-19 Vaccine (2 - Booster for [place] series) 12/23/2021    Colorectal Cancer Screening  02/21/2022    Eye Exam  08/20/2022    Pneumococcal Vaccines (Age 0-64) (3 - PCV) 10/18/2022       Advance Care Planning   I attest to discussing Advance Care Planning with patient and/or family member.  Education was provided including the importance of the Health Care Power of , Advance Directives, and/or [place] documentation.  The patient expressed understanding to the importance of this information and discussion.  Length of ACP conversation in minutes: 0       Opioid Screening: Patient medication list reviewed, patient is taking prescription opioids. Patient is not using additional opioids than prescribed. Patient is at low risk of substance abuse based on this opioid use history.     No follow-ups on file. In addition to their scheduled follow up, the patient has also been instructed to follow up on as needed basis.

## 2023-02-28 LAB — TACROLIMUS TROUGH BLD-MCNC: 6 NG/ML

## 2023-03-01 ENCOUNTER — TELEPHONE (OUTPATIENT)
Dept: TRANSPLANT | Facility: CLINIC | Age: 63
End: 2023-03-01
Payer: MEDICARE

## 2023-03-01 NOTE — LETTER
March 1, 2023    Colin Reilly  [address]  [place] [address]          Dear Colin Salazarucet:  MRN: [id number]    This is a follow up to your recent labs, your lab results were stable.  There are no medicine changes.  Please have your labs drawn again on 5/22/23.      If you cannot have your labs drawn on the scheduled date, it is your responsibility to call the transplant department to reschedule.  Please call [phone] and ask to speak to Maribeth Suarez, Medical  for all scheduling requests.     Sincerely,    Kamla Hardwick, RN      Your Liver Transplant Coordinator    [facility]  [address] [place] [id number]  [phone]

## 2023-03-01 NOTE — TELEPHONE ENCOUNTER
Continue routine labs no changes needed.  Letter sent for next lab appointment 5/22/23      ----- Message from Elizabeth Meneses MD sent at 2/28/2023  5:57 PM CST -----  The Labs are stable - please let patient know.

## 2023-03-06 ENCOUNTER — TELEPHONE (OUTPATIENT)
Dept: TRANSPLANT | Facility: CLINIC | Age: 63
End: 2023-03-06
Payer: MEDICARE

## 2023-03-07 ENCOUNTER — OFFICE VISIT (OUTPATIENT)
Dept: SURGERY | Facility: CLINIC | Age: 63
End: 2023-03-07
Payer: MEDICARE

## 2023-03-07 VITALS
BODY MASS INDEX: 25.54 KG/M2 | HEART RATE: 94 BPM | OXYGEN SATURATION: 99 % | DIASTOLIC BLOOD PRESSURE: 86 MMHG | SYSTOLIC BLOOD PRESSURE: 142 MMHG | HEIGHT: 71 IN | WEIGHT: 182.44 LBS

## 2023-03-07 DIAGNOSIS — R22.1 LUMP IN NECK: ICD-10-CM

## 2023-03-07 DIAGNOSIS — R22.1 NECK MASS: Primary | ICD-10-CM

## 2023-03-07 PROCEDURE — 1159F PR MEDICATION LIST DOCUMENTED IN MEDICAL RECORD: ICD-10-PCS | Mod: CPTII,S$GLB,, | Performed by: SURGERY

## 2023-03-07 PROCEDURE — 3077F PR MOST RECENT SYSTOLIC BLOOD PRESSURE >= 140 MM HG: ICD-10-PCS | Mod: CPTII,S$GLB,, | Performed by: SURGERY

## 2023-03-07 PROCEDURE — 99213 OFFICE O/P EST LOW 20 MIN: CPT | Mod: S$GLB,,, | Performed by: SURGERY

## 2023-03-07 PROCEDURE — 99999 PR PBB SHADOW E&M-EST. PATIENT-LVL V: ICD-10-PCS | Mod: PBBFAC,,, | Performed by: SURGERY

## 2023-03-07 PROCEDURE — 3066F NEPHROPATHY DOC TX: CPT | Mod: CPTII,S$GLB,, | Performed by: SURGERY

## 2023-03-07 PROCEDURE — 3079F PR MOST RECENT DIASTOLIC BLOOD PRESSURE 80-89 MM HG: ICD-10-PCS | Mod: CPTII,S$GLB,, | Performed by: SURGERY

## 2023-03-07 PROCEDURE — 3066F PR DOCUMENTATION OF TREATMENT FOR NEPHROPATHY: ICD-10-PCS | Mod: CPTII,S$GLB,, | Performed by: SURGERY

## 2023-03-07 PROCEDURE — 3077F SYST BP >= 140 MM HG: CPT | Mod: CPTII,S$GLB,, | Performed by: SURGERY

## 2023-03-07 PROCEDURE — 3008F BODY MASS INDEX DOCD: CPT | Mod: CPTII,S$GLB,, | Performed by: SURGERY

## 2023-03-07 PROCEDURE — 3061F PR NEG MICROALBUMINURIA RESULT DOCUMENTED/REVIEW: ICD-10-PCS | Mod: CPTII,S$GLB,, | Performed by: SURGERY

## 2023-03-07 PROCEDURE — 3008F PR BODY MASS INDEX (BMI) DOCUMENTED: ICD-10-PCS | Mod: CPTII,S$GLB,, | Performed by: SURGERY

## 2023-03-07 PROCEDURE — 3079F DIAST BP 80-89 MM HG: CPT | Mod: CPTII,S$GLB,, | Performed by: SURGERY

## 2023-03-07 PROCEDURE — 99213 PR OFFICE/OUTPT VISIT, EST, LEVL III, 20-29 MIN: ICD-10-PCS | Mod: S$GLB,,, | Performed by: SURGERY

## 2023-03-07 PROCEDURE — 1159F MED LIST DOCD IN RCRD: CPT | Mod: CPTII,S$GLB,, | Performed by: SURGERY

## 2023-03-07 PROCEDURE — 3061F NEG MICROALBUMINURIA REV: CPT | Mod: CPTII,S$GLB,, | Performed by: SURGERY

## 2023-03-07 PROCEDURE — 99999 PR PBB SHADOW E&M-EST. PATIENT-LVL V: CPT | Mod: PBBFAC,,, | Performed by: SURGERY

## 2023-03-07 NOTE — PROGRESS NOTES
Surgery Clinic Note - H and P    Subjective:     Colin Reilly is a 62 y.o. male with h/o liver transplant in 2020 for alcoholic cirrhosis who presents to clinic for lump in the neck. He has had this lump in the neck since the time of his transplant. It has been slowly growing over time. He denies erythema, induration or drainage from the mass. He also notes a mass on his right shoulder blade that came about at the same time. He endorses night sweats and an unexplained weight loss of 10-15 lbs, denies fevers.          PMH:   Past Medical History:   Diagnosis Date    Alcohol abuse, in remission 07/08/2014    Quit 01/04, 2011    Anemia of chronic disease 05/15/2020    Chronic back pain 07/08/2014    Hepatitis C virus infection 07/08/2014    tx with interferon 3-4 mos- stopped for unclear reasons Tattoos-first at age 17 only risk factor (HCVAB negative 08/2017)    Splenomegaly 07/08/2014       Past Surgical History:   Past Surgical History:   Procedure Laterality Date    CARPAL TUNNEL RELEASE Bilateral     CHOLECYSTECTOMY      lap    COLONOSCOPY N/A 09/08/2017    Procedure: COLONOSCOPY;  Surgeon: Savannah Llanos MD;  Location: [facility];  Service: Endoscopy;  Laterality: N/A;    COLONOSCOPY Left 03/14/2018    Procedure: COLONOSCOPY;  Surgeon: Savannah Llanos MD;  Location: [facility];  Service: Endoscopy;  Laterality: Left;    COLONOSCOPY  02/21/2019    COLONOSCOPY W/ POLYPECTOMY  06/19/2014    ESOPHAGOGASTRODUODENOSCOPY  01/2014    ESOPHAGOGASTRODUODENOSCOPY  02/15/2017    grade II varices    ESOPHAGOGASTRODUODENOSCOPY  04/10/2017    grade I varices    ESOPHAGOGASTRODUODENOSCOPY N/A 10/18/2019    Procedure: EGD (ESOPHAGOGASTRODUODENOSCOPY);  Surgeon: Flavio Escalera MD;  Location: [facility] ([address] [place] [address]);  Service: Endoscopy;  Laterality: N/A;    ESOPHAGOGASTRODUODENOSCOPY W/ BANDING  01/26/2017    grade II varices    HERNIA REPAIR      LIVER TRANSPLANT N/A 05/17/2020    Procedure: TRANSPLANT, LIVER;  Surgeon:  Danny Thakkar MD;  [facility];  Service: Transplant;  Laterality: N/A;    NECK SURGERY      fusion on C5 and C6    THROMBECTOMY  2020    Procedure: THROMBECTOMY;  Surgeon: Danny Thakkar MD;  [facility];  Service: Transplant;;  portal vein thrombectomy    ULNAR NERVE TRANSPOSITION Left     UMBILICAL HERNIA REPAIR N/A 2020    Procedure: REPAIR, HERNIA, PRIMARY WIHTOUT MESH AND PLACEMENT OF DRAIN;  Surgeon: Sherri Brunner MD;  [facility];  Service: Transplant;  Laterality: N/A;    vericose veins removed         Social History:  Social History     Socioeconomic History    Marital status:    Occupational History     Employer: [employer]   Tobacco Use    Smoking status: Former     Types: Cigarettes     Quit date: 2010     Years since quittin.1    Smokeless tobacco: Former     Types: Chew     Quit date: 1990    Tobacco comments:     former 1 pack/week quit 2010   Substance and Sexual Activity    Alcohol use: No     Comment: former heavy beer but quit 2010    Drug use: No    Sexual activity: Never     Comment:    Social History Narrative           Allergies: Review of patient's allergies indicates:  No Known Allergies    Medications:    Current Outpatient Medications on File Prior to Visit   Medication Sig Dispense Refill    aspirin 81 MG Chew Take 1 tablet (81 mg total) by mouth once daily. 30 tablet 5    bisacodyL (DULCOLAX) 5 mg EC tablet Take 2 tablets (10 mg total) by mouth daily as needed for Constipation.  0    blood sugar diagnostic (TRUE METRIX GLUCOSE TEST STRIP MISC)   TRUE METRIX SELF MONITORING BLOOD GLUCOSE STRIPS   Strip, See Instructions, TEST BLOOD SUGAR FOUR TIMES DAILY, # 400 unknown unit, 3 Refill(s), Pharmacy: [facility], 180, cm, Height/Length Dosing, 02/10/21 9:33:00 CST, 90.718, kg, W...      EASY TOUCH INSULIN SYRINGE 1 mL 31 gauge x 5/16 Syrg        "EScitalopram oxalate (LEXAPRO) 20 MG tablet TAKE 1 TABLET EVERY DAY 90 tablet 1    finasteride (PROSCAR) 5 mg tablet TAKE 1 TABLET EVERY DAY 90 tablet 1    gabapentin (NEURONTIN) 600 MG tablet Take 1 tablet (600 mg total) by mouth 3 (three) times daily. 270 tablet 1    HYDROcodone-acetaminophen (NORCO)  mg per tablet Take 1 tablet by mouth every 12 (twelve) hours as needed for Pain. 60 tablet 0    insulin aspart U-100 (NOVOLOG FLEXPEN U-100 INSULIN) 100 unit/mL (3 mL) InPn pen Inject 10 Units into the skin 3 (three) times daily before meals. 15 mL 11    LEVEMIR FLEXTOUCH U-100 INSULN 100 unit/mL (3 mL) InPn pen Inject 20 Units into the skin every evening.      meclizine (ANTIVERT) 25 mg tablet Take 1 tablet (25 mg total) by mouth 3 (three) times daily as needed for Dizziness or Nausea. 21 tablet 0    metFORMIN (GLUCOPHAGE) 500 MG tablet Take 1 tablet (500 mg total) by mouth 2 (two) times daily with meals. 60 tablet 11    omeprazole (PRILOSEC) 40 MG capsule Take 40 mg by mouth once daily.      oxybutynin (DITROPAN) 5 MG Tab TAKE 1 TABLET EVERY DAY 90 tablet 3    pen needle, diabetic (DROPLET PEN NEEDLE) 32 gauge x 5/32" Ndle USE ONE TIME DAILY AT BEDTIME 100 each 3    QUEtiapine (SEROQUEL) 25 MG Tab Take 75 mg by mouth every evening.      rosuvastatin (CRESTOR) 10 MG tablet Take 1 tablet (10 mg total) by mouth once daily. 90 tablet 3    sildenafiL (VIAGRA) 100 MG tablet Take 100 mg by mouth once daily.      tacrolimus (PROGRAF) 1 MG Cap Take 3 capsules (3 mg total) by mouth every morning AND 2 capsules (2 mg total) every evening. 150 capsule 11    tamsulosin (FLOMAX) 0.4 mg Cap TAKE 1 CAPSULE EVERY DAY 90 capsule 3    TRUE METRIX GLUCOSE TEST STRIP Strp use to test blood sugar 4 times daily 100 each 10    TRUEPLUS LANCETS 30 gauge Misc       [DISCONTINUED] furosemide (LASIX) 40 MG tablet TAKE 4 TABLETS EVERY  tablet 11    [DISCONTINUED] mycophenolate (CELLCEPT) 250 mg Cap Take 2 capsules (500 mg total) " by mouth 2 (two) times daily. Do this for 2 weeks then stop 180 capsule 11     No current facility-administered medications on file prior to visit.         Objective:     PHYSICAL EXAM:  Vital Signs (Most Recent)  Pulse: 94 (03/07/23 1325)  BP: (!) 142/86 (03/07/23 1325)  SpO2: 99 % (03/07/23 1325)    Review of Systems   Constitutional:  Negative for chills and fever.   Respiratory:  Negative for cough and shortness of breath.    Cardiovascular:  Negative for chest pain and palpitations.   Gastrointestinal:  Negative for nausea and vomiting.   Genitourinary:  Negative for dysuria and hematuria.   Musculoskeletal:  Positive for neck pain. Negative for myalgias.   Skin:  Negative for itching and rash.   Neurological:  Negative for weakness and headaches.   Endo/Heme/Allergies:  Negative for environmental allergies. Does not bruise/bleed easily.      Physical Exam:  Physical Exam  Constitutional:       General: He is not in acute distress.  HENT:      Head: Normocephalic and atraumatic.      Mouth/Throat:      Mouth: Mucous membranes are moist.      Pharynx: No oropharyngeal exudate.   Eyes:      Conjunctiva/sclera: Conjunctivae normal.      Pupils: Pupils are equal, round, and reactive to light.   Neck:     Cardiovascular:      Rate and Rhythm: Normal rate and regular rhythm.   Pulmonary:      Effort: Pulmonary effort is normal. No respiratory distress.      Breath sounds: No stridor.   Abdominal:      General: There is no distension.      Palpations: Abdomen is soft.      Tenderness: There is no abdominal tenderness.   Musculoskeletal:         General: No deformity or signs of injury.   Lymphadenopathy:      Cervical:      Left cervical: No superficial, deep or posterior cervical adenopathy.      Upper Body:      Right upper body: No supraclavicular or axillary adenopathy.      Left upper body: No supraclavicular or axillary adenopathy.   Skin:     General: Skin is warm and dry.      Comments: 4.5 cm mobile mass of  the right upper back. No erythema, induration or fluctuance   Neurological:      General: No focal deficit present.      Mental Status: He is alert and oriented to person, place, and time.         Assessment:     62 y.o. male with s/p liver transplant in 2020 who presents with mass on right neck and right back. Mass on back consistent with lipoma. Mass on neck more likely an enlarged lymph node.    Plan:     - US of right neck mass.  - Follow up with US results to discuss management.      Scarlett Amor MD   [facility]

## 2023-03-09 ENCOUNTER — TELEPHONE (OUTPATIENT)
Dept: TRANSPLANT | Facility: CLINIC | Age: 63
End: 2023-03-09
Payer: MEDICARE

## 2023-03-09 NOTE — TELEPHONE ENCOUNTER
Returned call went over labs with patient.  He may also be moving to [place] at the end of the month.      ----- Message from Lukas Swan sent at 3/9/2023 11:20 AM CST -----  Regarding: Speak to Nurse  Patient called in requesting to speak with his nurse Kamla. Reason undisclosed. Requested a call back to discuss further.                Contact: [phone] (new pt contact)

## 2023-03-13 ENCOUNTER — HOSPITAL ENCOUNTER (OUTPATIENT)
Dept: RADIOLOGY | Facility: HOSPITAL | Age: 63
Discharge: HOME | End: 2023-03-13
Attending: SURGERY
Payer: MEDICARE

## 2023-03-13 DIAGNOSIS — R22.1 NECK MASS: ICD-10-CM

## 2023-03-13 PROCEDURE — 76536 US EXAM OF HEAD AND NECK: CPT | Mod: TC

## 2023-03-16 ENCOUNTER — TELEPHONE (OUTPATIENT)
Dept: SURGERY | Facility: CLINIC | Age: 63
End: 2023-03-16
Payer: MEDICARE

## 2023-03-16 NOTE — TELEPHONE ENCOUNTER
Spoke with patient after Dr. Cronin's review of US of his neck:  Benign lipoma noted to his right neck.  Excision in the OR to be planned.  Pt requests that he be seen by a General Surgeon closer to his home in [place], [place].  This office will attempt to get an evaluation by a General Surgeon near [place], [place].  He verbalized understanding and is agreeable to this plan of care.

## 2023-03-16 NOTE — TELEPHONE ENCOUNTER
----- Message from Jim Cronin MD sent at 3/16/2023 10:55 AM CDT -----  We can set up for excision. Would do in the OR given location.

## 2023-03-21 DIAGNOSIS — Z94.4 LIVER TRANSPLANTED: ICD-10-CM

## 2023-03-21 RX ORDER — HYDROCODONE BITARTRATE AND ACETAMINOPHEN 10; 325 MG/1; MG/1
1 TABLET ORAL EVERY 12 HOURS PRN
Qty: 60 TABLET | Refills: 0 | Status: SHIPPED | OUTPATIENT
Start: 2023-03-21 | End: 2023-03-22 | Stop reason: SDUPTHER

## 2023-03-22 ENCOUNTER — TELEPHONE (OUTPATIENT)
Dept: SURGERY | Facility: CLINIC | Age: 63
End: 2023-03-22
Payer: MEDICARE

## 2023-03-22 DIAGNOSIS — Z94.4 LIVER TRANSPLANTED: ICD-10-CM

## 2023-03-22 RX ORDER — HYDROCODONE BITARTRATE AND ACETAMINOPHEN 10; 325 MG/1; MG/1
1 TABLET ORAL EVERY 12 HOURS PRN
Qty: 60 TABLET | Refills: 0 | Status: SHIPPED | OUTPATIENT
Start: 2023-03-22 | End: 2023-04-17 | Stop reason: SDUPTHER

## 2023-03-22 NOTE — TELEPHONE ENCOUNTER
Spoke with patient regarding his neck mass that needs excision.  He is seeing his PCP in [place] on 3/27/23 and will seek care in [place] for excision of his neck mass as he will be closer to home.  He may call anytime if he needs to return to [place] for excision.  He verbalized understanding and is agreeable to this plan of care.

## 2023-03-27 ENCOUNTER — OFFICE VISIT (OUTPATIENT)
Dept: INTERNAL MEDICINE | Facility: CLINIC | Age: 63
End: 2023-03-27
Payer: MEDICARE

## 2023-03-27 VITALS
HEART RATE: 80 BPM | HEIGHT: 71 IN | TEMPERATURE: 98 F | BODY MASS INDEX: 26.04 KG/M2 | RESPIRATION RATE: 14 BRPM | OXYGEN SATURATION: 97 % | WEIGHT: 186 LBS | SYSTOLIC BLOOD PRESSURE: 128 MMHG | DIASTOLIC BLOOD PRESSURE: 74 MMHG

## 2023-03-27 DIAGNOSIS — Z79.4 TYPE 2 DIABETES MELLITUS WITH DIABETIC POLYNEUROPATHY, WITH LONG-TERM CURRENT USE OF INSULIN: ICD-10-CM

## 2023-03-27 DIAGNOSIS — Z94.4 LIVER REPLACED BY TRANSPLANT: ICD-10-CM

## 2023-03-27 DIAGNOSIS — E78.5 DYSLIPIDEMIA: ICD-10-CM

## 2023-03-27 DIAGNOSIS — D17.0 LIPOMA OF NECK: ICD-10-CM

## 2023-03-27 DIAGNOSIS — E11.42 DIABETIC PERIPHERAL NEUROPATHY: Primary | ICD-10-CM

## 2023-03-27 DIAGNOSIS — Z79.891 LONG TERM PRESCRIPTION OPIATE USE: ICD-10-CM

## 2023-03-27 DIAGNOSIS — E11.42 TYPE 2 DIABETES MELLITUS WITH DIABETIC POLYNEUROPATHY, WITH LONG-TERM CURRENT USE OF INSULIN: ICD-10-CM

## 2023-03-27 PROCEDURE — 3078F DIAST BP <80 MM HG: CPT | Mod: CPTII,,, | Performed by: INTERNAL MEDICINE

## 2023-03-27 PROCEDURE — 3008F BODY MASS INDEX DOCD: CPT | Mod: CPTII,,, | Performed by: INTERNAL MEDICINE

## 2023-03-27 PROCEDURE — 3008F PR BODY MASS INDEX (BMI) DOCUMENTED: ICD-10-PCS | Mod: CPTII,,, | Performed by: INTERNAL MEDICINE

## 2023-03-27 PROCEDURE — 1160F PR REVIEW ALL MEDS BY PRESCRIBER/CLIN PHARMACIST DOCUMENTED: ICD-10-PCS | Mod: CPTII,,, | Performed by: INTERNAL MEDICINE

## 2023-03-27 PROCEDURE — 3074F PR MOST RECENT SYSTOLIC BLOOD PRESSURE < 130 MM HG: ICD-10-PCS | Mod: CPTII,,, | Performed by: INTERNAL MEDICINE

## 2023-03-27 PROCEDURE — 3066F NEPHROPATHY DOC TX: CPT | Mod: CPTII,,, | Performed by: INTERNAL MEDICINE

## 2023-03-27 PROCEDURE — 3078F PR MOST RECENT DIASTOLIC BLOOD PRESSURE < 80 MM HG: ICD-10-PCS | Mod: CPTII,,, | Performed by: INTERNAL MEDICINE

## 2023-03-27 PROCEDURE — 99213 OFFICE O/P EST LOW 20 MIN: CPT | Mod: ,,, | Performed by: INTERNAL MEDICINE

## 2023-03-27 PROCEDURE — 3066F PR DOCUMENTATION OF TREATMENT FOR NEPHROPATHY: ICD-10-PCS | Mod: CPTII,,, | Performed by: INTERNAL MEDICINE

## 2023-03-27 PROCEDURE — 1159F PR MEDICATION LIST DOCUMENTED IN MEDICAL RECORD: ICD-10-PCS | Mod: CPTII,,, | Performed by: INTERNAL MEDICINE

## 2023-03-27 PROCEDURE — 3074F SYST BP LT 130 MM HG: CPT | Mod: CPTII,,, | Performed by: INTERNAL MEDICINE

## 2023-03-27 PROCEDURE — 3061F NEG MICROALBUMINURIA REV: CPT | Mod: CPTII,,, | Performed by: INTERNAL MEDICINE

## 2023-03-27 PROCEDURE — 3061F PR NEG MICROALBUMINURIA RESULT DOCUMENTED/REVIEW: ICD-10-PCS | Mod: CPTII,,, | Performed by: INTERNAL MEDICINE

## 2023-03-27 PROCEDURE — 1159F MED LIST DOCD IN RCRD: CPT | Mod: CPTII,,, | Performed by: INTERNAL MEDICINE

## 2023-03-27 PROCEDURE — 99213 PR OFFICE/OUTPT VISIT, EST, LEVL III, 20-29 MIN: ICD-10-PCS | Mod: ,,, | Performed by: INTERNAL MEDICINE

## 2023-03-27 PROCEDURE — 1160F RVW MEDS BY RX/DR IN RCRD: CPT | Mod: CPTII,,, | Performed by: INTERNAL MEDICINE

## 2023-03-27 RX ORDER — TACROLIMUS 1 MG/1
CAPSULE ORAL
Qty: 150 CAPSULE | Refills: 0 | Status: SHIPPED | OUTPATIENT
Start: 2023-03-27 | End: 2023-04-25

## 2023-03-27 NOTE — PROGRESS NOTES
"Subjective:       Patient ID: Colin Reilly is a 62 y.o. male.    Chief Complaint: Mass    62-year-old male reports painless lesion on the right side of his neck that he has had for 5-10 years.  He also has a lipoma on his upper back.  He recently had an ultrasound of the neck confirming a benign-appearing lipoma.  He would like to discuss results today and get our opinion.    Review of Systems   Constitutional:  Negative for fever.   HENT:  Negative for nosebleeds.    Eyes:  Negative for visual disturbance.   Respiratory:  Negative for shortness of breath.    Cardiovascular:  Negative for chest pain.   Gastrointestinal:  Negative for abdominal pain.   Genitourinary:  Negative for dysuria.   Musculoskeletal:  Negative for gait problem.   Neurological:  Negative for headaches.       Objective:      Physical Exam  HENT:      Head:      Comments:   Nontender movable lesion on the right side of his neck     Mouth/Throat:      Pharynx: Oropharynx is clear.   Eyes:      Extraocular Movements: Extraocular movements intact.   Cardiovascular:      Rate and Rhythm: Normal rate.   Pulmonary:      Breath sounds: Normal breath sounds.   Abdominal:      Palpations: Abdomen is soft.   Musculoskeletal:         General: No swelling.   Skin:     General: Skin is warm.   Neurological:      General: No focal deficit present.      Mental Status: He is alert and oriented to person, place, and time.   Psychiatric:         Mood and Affect: Mood normal.       Vitals:    03/27/23 1250   BP: 128/74   Pulse: 80   Resp: 14   Temp: 98.2 °F (36.8 °C)   SpO2: 97%   Weight: 84.4 kg (186 lb)   Height: 5' 11" (1.803 m)      Assessment:       Problem List Items Addressed This Visit          Neuro    Diabetic peripheral neuropathy - Primary       Psychiatric    Long term prescription opiate use       Cardiac/Vascular    Dyslipidemia       Endocrine    Type 2 diabetes mellitus with diabetic polyneuropathy, with long-term current use of insulin " "    Other Visit Diagnoses       Lipoma of neck        Liver replaced by transplant        Relevant Medications    tacrolimus (PROGRAF) 1 MG Cap              Medication List with Changes/Refills   Current Medications    ASPIRIN 81 MG CHEW    Take 1 tablet (81 mg total) by mouth once daily.    BISACODYL (DULCOLAX) 5 MG EC TABLET    Take 2 tablets (10 mg total) by mouth daily as needed for Constipation.    BLOOD SUGAR DIAGNOSTIC (TRUE METRIX GLUCOSE TEST STRIP MISC)      TRUE METRIX SELF MONITORING BLOOD GLUCOSE STRIPS   Strip, See Instructions, TEST BLOOD SUGAR FOUR TIMES DAILY, # 400 unknown unit, 3 Refill(s), Pharmacy: [facility], 180, cm, Height/Length Dosing, 02/10/21 9:33:00 CST, 90.718, kg, W...    EASY TOUCH INSULIN SYRINGE 1 ML 31 GAUGE X 5/16 SYRG        ESCITALOPRAM OXALATE (LEXAPRO) 20 MG TABLET    TAKE 1 TABLET EVERY DAY    FINASTERIDE (PROSCAR) 5 MG TABLET    TAKE 1 TABLET EVERY DAY    GABAPENTIN (NEURONTIN) 600 MG TABLET    Take 1 tablet (600 mg total) by mouth 3 (three) times daily.    HYDROCODONE-ACETAMINOPHEN (NORCO)  MG PER TABLET    Take 1 tablet by mouth every 12 (twelve) hours as needed for Pain.    INSULIN ASPART U-100 (NOVOLOG FLEXPEN U-100 INSULIN) 100 UNIT/ML (3 ML) INPN PEN    Inject 10 Units into the skin 3 (three) times daily before meals.    LEVEMIR FLEXTOUCH U-100 INSULN 100 UNIT/ML (3 ML) INPN PEN    Inject 20 Units into the skin every evening.    MECLIZINE (ANTIVERT) 25 MG TABLET    Take 1 tablet (25 mg total) by mouth 3 (three) times daily as needed for Dizziness or Nausea.    METFORMIN (GLUCOPHAGE) 500 MG TABLET    Take 1 tablet (500 mg total) by mouth 2 (two) times daily with meals.    OMEPRAZOLE (PRILOSEC) 40 MG CAPSULE    Take 40 mg by mouth once daily.    OXYBUTYNIN (DITROPAN) 5 MG TAB    TAKE 1 TABLET EVERY DAY    PEN NEEDLE, DIABETIC (DROPLET PEN NEEDLE) 32 GAUGE X 5/32" NDLE    USE ONE TIME DAILY AT BEDTIME    QUETIAPINE (SEROQUEL) 25 MG TAB    Take 75 " mg by mouth every evening.    ROSUVASTATIN (CRESTOR) 10 MG TABLET    Take 1 tablet (10 mg total) by mouth once daily.    SILDENAFIL (VIAGRA) 100 MG TABLET    Take 100 mg by mouth once daily.    TAMSULOSIN (FLOMAX) 0.4 MG CAP    TAKE 1 CAPSULE EVERY DAY    TRUE METRIX GLUCOSE TEST STRIP STRP    use to test blood sugar 4 times daily    TRUEPLUS LANCETS 30 GAUGE MISC       Changed and/or Refilled Medications    Modified Medication Previous Medication    TACROLIMUS (PROGRAF) 1 MG CAP tacrolimus (PROGRAF) 1 MG Cap       Take 3 capsules (3 mg total) by mouth every morning AND 2 capsules (2 mg total) every evening.    Take 3 capsules (3 mg total) by mouth every morning AND 2 capsules (2 mg total) every evening.        Plan:        Lipoma:  Ultrasound 3/2023 confirmed benign-appearing lipoma.  He also has a lipoma on his upper back.  He has had these lipomas for many years.  He does not seem interested in surgery; reassurance provided

## 2023-03-29 ENCOUNTER — TELEPHONE (OUTPATIENT)
Dept: SURGERY | Facility: CLINIC | Age: 63
End: 2023-03-29
Payer: MEDICARE

## 2023-03-29 DIAGNOSIS — D17.0 LIPOMA OF NECK: Primary | ICD-10-CM

## 2023-03-29 NOTE — TELEPHONE ENCOUNTER
Pt called to schedule neck lipoma excision in the OR with Dr. Cronin for 4/26/23.  Pre-Op Instructions mailed to him.  He verbalized understanding and is agreeable to a surgery date of 4/26/23.

## 2023-03-29 NOTE — TELEPHONE ENCOUNTER
----- Message from Ti Tellez sent at 3/29/2023  1:40 PM CDT -----  Regarding: call back  Pt call to speak with someone in regards to labs results    Call

## 2023-03-30 ENCOUNTER — TELEPHONE (OUTPATIENT)
Dept: INTERNAL MEDICINE | Facility: CLINIC | Age: 63
End: 2023-03-30
Payer: MEDICARE

## 2023-03-30 DIAGNOSIS — E11.42 DIABETIC PERIPHERAL NEUROPATHY: ICD-10-CM

## 2023-03-30 RX ORDER — GABAPENTIN 600 MG/1
600 TABLET ORAL 3 TIMES DAILY
Qty: 270 TABLET | Refills: 1 | Status: SHIPPED | OUTPATIENT
Start: 2023-03-30 | End: 2023-11-28

## 2023-04-17 DIAGNOSIS — M54.40 CHRONIC LOW BACK PAIN WITH SCIATICA, SCIATICA LATERALITY UNSPECIFIED, UNSPECIFIED BACK PAIN LATERALITY: ICD-10-CM

## 2023-04-17 DIAGNOSIS — G89.29 CHRONIC LOW BACK PAIN WITH SCIATICA, SCIATICA LATERALITY UNSPECIFIED, UNSPECIFIED BACK PAIN LATERALITY: ICD-10-CM

## 2023-04-17 DIAGNOSIS — Z94.4 LIVER TRANSPLANTED: ICD-10-CM

## 2023-04-17 DIAGNOSIS — Z79.891 LONG TERM PRESCRIPTION OPIATE USE: Primary | ICD-10-CM

## 2023-04-17 NOTE — TELEPHONE ENCOUNTER
Please explain to him that we are decreasing his Norco down to once a day, based on guidelines.  If he would like us to refer him to pain management we can.  At this time, we can refill Ambler once a day for him

## 2023-04-17 NOTE — TELEPHONE ENCOUNTER
Spoke to pateint advised about decreasing to once daily due to guidelines. Will advise us if he would like referral.

## 2023-04-18 ENCOUNTER — TELEPHONE (OUTPATIENT)
Dept: TRANSPLANT | Facility: CLINIC | Age: 63
End: 2023-04-18
Payer: MEDICARE

## 2023-04-18 RX ORDER — HYDROCODONE BITARTRATE AND ACETAMINOPHEN 10; 325 MG/1; MG/1
1 TABLET ORAL DAILY PRN
Qty: 30 TABLET | Refills: 0 | Status: SHIPPED | OUTPATIENT
Start: 2023-04-18 | End: 2023-05-15 | Stop reason: SDUPTHER

## 2023-04-18 NOTE — TELEPHONE ENCOUNTER
I do not think I signed a prescription.  It was originally sent to me for twice a day and I sent a message saying I can fill once a day.

## 2023-04-18 NOTE — TELEPHONE ENCOUNTER
Returned call to let patient know what day his labs would be.    ----- Message from Maribeth Suarez MA sent at 4/18/2023  3:14 PM CDT -----  Regarding: FW: Speak to staff    ----- Message -----  From: July Giles  Sent: 4/18/2023   2:12 PM CDT  To: [facility]plant Non-Clinical  Subject: Speak to staff                                   Pt is calling to speak to staff to see if he have any labs that need to be scheduled, Please advise.      [phone]

## 2023-04-21 ENCOUNTER — TELEPHONE (OUTPATIENT)
Dept: SURGERY | Facility: CLINIC | Age: 63
End: 2023-04-21
Payer: MEDICARE

## 2023-04-21 NOTE — TELEPHONE ENCOUNTER
----- Message from Whitney Ramirez sent at 4/21/2023 11:48 AM CDT -----  Regarding: pt advice  Contact: [phone]  Pt calling in regards to procedure on 4/26/23. Pls having question, Pls call    
Left message on patient's voicemail that call was returned.    
Yes

## 2023-04-21 NOTE — TELEPHONE ENCOUNTER
----- Message from Julia Samson sent at 4/21/2023  4:30 PM CDT -----  Regarding: Call back  Contact: @[phone]  Pt calling about a surgery appt 4/26 please call and advise @[phone]

## 2023-04-21 NOTE — TELEPHONE ENCOUNTER
Pt called for a tentative arrival time for his surgery with Dr. Cronin on 4/26/23.  He is [facility] for 10:15am.  He verbalized understanding.

## 2023-04-24 ENCOUNTER — TELEPHONE (OUTPATIENT)
Dept: SURGERY | Facility: CLINIC | Age: 63
End: 2023-04-24
Payer: MEDICARE

## 2023-04-24 NOTE — TELEPHONE ENCOUNTER
Patient would like to reschedule his procedure on 4/26/23 to the beginning of May possibly. Will reach back out with available dates.

## 2023-04-25 ENCOUNTER — TELEPHONE (OUTPATIENT)
Dept: SURGERY | Facility: CLINIC | Age: 63
End: 2023-04-25
Payer: MEDICARE

## 2023-04-25 NOTE — TELEPHONE ENCOUNTER
Spoke with patient and rescheduled his neck mass removal with Dr. Cronin from 4/26/23 to 5/5/23.  He verbalized understanding and is agreeable to this new date.

## 2023-04-25 NOTE — TELEPHONE ENCOUNTER
----- Message from Ranjan Gregg RN sent at 4/24/2023 10:40 AM CDT -----  Regarding: Rescheduling procedure  Mr. Reilly would like to reschedule his procedure due to financial reasons. States he would like to schedule closed to 5/3/23 possibly. Please reach out to him with available dates.     Ranjan

## 2023-05-03 ENCOUNTER — DOCUMENTATION ONLY (OUTPATIENT)
Dept: ADMINISTRATIVE | Facility: HOSPITAL | Age: 63
End: 2023-05-03
Payer: MEDICARE

## 2023-05-04 ENCOUNTER — TELEPHONE (OUTPATIENT)
Dept: SURGERY | Facility: CLINIC | Age: 63
End: 2023-05-04
Payer: MEDICARE

## 2023-05-04 NOTE — TELEPHONE ENCOUNTER
Left message on patient's voicemail for his to arrive at the [facility] tomorrow 5/5/23 at 5:15am for surgery with Dr. Cronin.  Pre-Op instructions reinforced.  Direct phone number given for him to call with any questions or concerns.

## 2023-05-05 ENCOUNTER — HOSPITAL ENCOUNTER (OUTPATIENT)
Facility: HOSPITAL | Age: 63
Discharge: HOME | End: 2023-05-06
Attending: SURGERY | Admitting: SURGERY
Payer: MEDICARE

## 2023-05-05 ENCOUNTER — ANESTHESIA (OUTPATIENT)
Dept: SURGERY | Facility: HOSPITAL | Age: 63
End: 2023-05-05
Payer: MEDICARE

## 2023-05-05 ENCOUNTER — ANESTHESIA EVENT (OUTPATIENT)
Dept: SURGERY | Facility: HOSPITAL | Age: 63
End: 2023-05-05
Payer: MEDICARE

## 2023-05-05 DIAGNOSIS — D17.0 LIPOMA OF NECK: Primary | ICD-10-CM

## 2023-05-05 DIAGNOSIS — R22.1 NECK MASS: ICD-10-CM

## 2023-05-05 LAB
POCT GLUCOSE: 122 MG/DL (ref 70–110)
POCT GLUCOSE: 165 MG/DL (ref 70–110)
POCT GLUCOSE: 175 MG/DL (ref 70–110)
POCT GLUCOSE: 208 MG/DL (ref 70–110)

## 2023-05-05 PROCEDURE — 21555 EXC NECK LES SC < 3 CM: CPT | Mod: ,,, | Performed by: SURGERY

## 2023-05-05 PROCEDURE — 25000003 PHARM REV CODE 250: Performed by: NURSE ANESTHETIST, CERTIFIED REGISTERED

## 2023-05-05 PROCEDURE — D9220A PRA ANESTHESIA: Mod: ANES,,, | Performed by: ANESTHESIOLOGY

## 2023-05-05 PROCEDURE — D9220A PRA ANESTHESIA: Mod: CRNA,,, | Performed by: NURSE ANESTHETIST, CERTIFIED REGISTERED

## 2023-05-05 PROCEDURE — 82962 GLUCOSE BLOOD TEST: CPT | Performed by: SURGERY

## 2023-05-05 PROCEDURE — 25000003 PHARM REV CODE 250: Performed by: STUDENT IN AN ORGANIZED HEALTH CARE EDUCATION/TRAINING PROGRAM

## 2023-05-05 PROCEDURE — 88304 PR  SURG PATH,LEVEL III: ICD-10-PCS | Mod: 26,,, | Performed by: PATHOLOGY

## 2023-05-05 PROCEDURE — 71000044 HC DOSC ROUTINE RECOVERY FIRST HOUR: Performed by: SURGERY

## 2023-05-05 PROCEDURE — 36000707: Performed by: SURGERY

## 2023-05-05 PROCEDURE — G0378 HOSPITAL OBSERVATION PER HR: HCPCS

## 2023-05-05 PROCEDURE — 37000009 HC ANESTHESIA EA ADD 15 MINS: Performed by: SURGERY

## 2023-05-05 PROCEDURE — 37000008 HC ANESTHESIA 1ST 15 MINUTES: Performed by: SURGERY

## 2023-05-05 PROCEDURE — 36000706: Performed by: SURGERY

## 2023-05-05 PROCEDURE — 71000016 HC POSTOP RECOV ADDL HR: Performed by: SURGERY

## 2023-05-05 PROCEDURE — D9220A PRA ANESTHESIA: ICD-10-PCS | Mod: ANES,,, | Performed by: ANESTHESIOLOGY

## 2023-05-05 PROCEDURE — D9220A PRA ANESTHESIA: ICD-10-PCS | Mod: CRNA,,, | Performed by: NURSE ANESTHETIST, CERTIFIED REGISTERED

## 2023-05-05 PROCEDURE — 71000015 HC POSTOP RECOV 1ST HR: Performed by: SURGERY

## 2023-05-05 PROCEDURE — 88304 TISSUE EXAM BY PATHOLOGIST: CPT | Performed by: PATHOLOGY

## 2023-05-05 PROCEDURE — 88304 TISSUE EXAM BY PATHOLOGIST: CPT | Mod: 26,,, | Performed by: PATHOLOGY

## 2023-05-05 PROCEDURE — 25000003 PHARM REV CODE 250: Performed by: SURGERY

## 2023-05-05 PROCEDURE — 21555 PR EXC TUMOR SOFT TISSUE NECK/ANT THORAX SUBQ <3CM: ICD-10-PCS | Mod: ,,, | Performed by: SURGERY

## 2023-05-05 PROCEDURE — 63600175 PHARM REV CODE 636 W HCPCS: Performed by: NURSE ANESTHETIST, CERTIFIED REGISTERED

## 2023-05-05 PROCEDURE — 63600175 PHARM REV CODE 636 W HCPCS: Performed by: STUDENT IN AN ORGANIZED HEALTH CARE EDUCATION/TRAINING PROGRAM

## 2023-05-05 RX ORDER — FENTANYL CITRATE 50 UG/ML
INJECTION, SOLUTION INTRAMUSCULAR; INTRAVENOUS
Status: DISCONTINUED | OUTPATIENT
Start: 2023-05-05 | End: 2023-05-05

## 2023-05-05 RX ORDER — PROPOFOL 10 MG/ML
VIAL (ML) INTRAVENOUS
Status: DISCONTINUED | OUTPATIENT
Start: 2023-05-05 | End: 2023-05-05

## 2023-05-05 RX ORDER — ACETAMINOPHEN 325 MG/1
650 TABLET ORAL EVERY 6 HOURS
Status: DISCONTINUED | OUTPATIENT
Start: 2023-05-05 | End: 2023-05-06 | Stop reason: HOSPADM

## 2023-05-05 RX ORDER — ONDANSETRON 2 MG/ML
INJECTION INTRAMUSCULAR; INTRAVENOUS
Status: DISCONTINUED | OUTPATIENT
Start: 2023-05-05 | End: 2023-05-05

## 2023-05-05 RX ORDER — BUPIVACAINE HYDROCHLORIDE 5 MG/ML
INJECTION, SOLUTION EPIDURAL; INTRACAUDAL
Status: DISCONTINUED | OUTPATIENT
Start: 2023-05-05 | End: 2023-05-05 | Stop reason: HOSPADM

## 2023-05-05 RX ORDER — LIDOCAINE HYDROCHLORIDE 10 MG/ML
INJECTION, SOLUTION EPIDURAL; INFILTRATION; INTRACAUDAL; PERINEURAL
Status: DISPENSED
Start: 2023-05-05 | End: 2023-05-05

## 2023-05-05 RX ORDER — LIDOCAINE HYDROCHLORIDE 20 MG/ML
INJECTION INTRAVENOUS
Status: DISCONTINUED | OUTPATIENT
Start: 2023-05-05 | End: 2023-05-05

## 2023-05-05 RX ORDER — DEXAMETHASONE SODIUM PHOSPHATE 4 MG/ML
INJECTION, SOLUTION INTRA-ARTICULAR; INTRALESIONAL; INTRAMUSCULAR; INTRAVENOUS; SOFT TISSUE
Status: DISCONTINUED | OUTPATIENT
Start: 2023-05-05 | End: 2023-05-05

## 2023-05-05 RX ORDER — PHENYLEPHRINE HCL IN 0.9% NACL 1 MG/10 ML
SYRINGE (ML) INTRAVENOUS
Status: DISCONTINUED | OUTPATIENT
Start: 2023-05-05 | End: 2023-05-05

## 2023-05-05 RX ORDER — FENTANYL CITRATE 50 UG/ML
25 INJECTION, SOLUTION INTRAMUSCULAR; INTRAVENOUS EVERY 5 MIN PRN
Status: DISCONTINUED | OUTPATIENT
Start: 2023-05-05 | End: 2023-05-05 | Stop reason: HOSPADM

## 2023-05-05 RX ORDER — SODIUM CHLORIDE 0.9 % (FLUSH) 0.9 %
3 SYRINGE (ML) INJECTION
Status: DISCONTINUED | OUTPATIENT
Start: 2023-05-05 | End: 2023-05-05 | Stop reason: HOSPADM

## 2023-05-05 RX ORDER — ONDANSETRON 2 MG/ML
4 INJECTION INTRAMUSCULAR; INTRAVENOUS EVERY 6 HOURS PRN
Status: DISCONTINUED | OUTPATIENT
Start: 2023-05-05 | End: 2023-05-06 | Stop reason: HOSPADM

## 2023-05-05 RX ORDER — PROCHLORPERAZINE EDISYLATE 5 MG/ML
5 INJECTION INTRAMUSCULAR; INTRAVENOUS EVERY 30 MIN PRN
Status: DISCONTINUED | OUTPATIENT
Start: 2023-05-05 | End: 2023-05-05 | Stop reason: HOSPADM

## 2023-05-05 RX ORDER — MIDAZOLAM HYDROCHLORIDE 1 MG/ML
INJECTION, SOLUTION INTRAMUSCULAR; INTRAVENOUS
Status: DISCONTINUED | OUTPATIENT
Start: 2023-05-05 | End: 2023-05-05

## 2023-05-05 RX ORDER — ROCURONIUM BROMIDE 10 MG/ML
INJECTION, SOLUTION INTRAVENOUS
Status: DISCONTINUED | OUTPATIENT
Start: 2023-05-05 | End: 2023-05-05

## 2023-05-05 RX ORDER — HALOPERIDOL 5 MG/ML
INJECTION INTRAMUSCULAR
Status: DISCONTINUED | OUTPATIENT
Start: 2023-05-05 | End: 2023-05-05

## 2023-05-05 RX ORDER — SODIUM CHLORIDE 9 MG/ML
INJECTION, SOLUTION INTRAVENOUS CONTINUOUS
Status: ACTIVE | OUTPATIENT
Start: 2023-05-05

## 2023-05-05 RX ORDER — DEXMEDETOMIDINE HYDROCHLORIDE 100 UG/ML
INJECTION, SOLUTION INTRAVENOUS
Status: DISCONTINUED | OUTPATIENT
Start: 2023-05-05 | End: 2023-05-05

## 2023-05-05 RX ADMIN — ONDANSETRON 4 MG: 2 INJECTION INTRAMUSCULAR; INTRAVENOUS at 07:05

## 2023-05-05 RX ADMIN — DEXAMETHASONE SODIUM PHOSPHATE 4 MG: 4 INJECTION, SOLUTION INTRAMUSCULAR; INTRAVENOUS at 07:05

## 2023-05-05 RX ADMIN — ACETAMINOPHEN 650 MG: 325 TABLET ORAL at 01:05

## 2023-05-05 RX ADMIN — HALOPERIDOL LACTATE 1 MG: 5 INJECTION, SOLUTION INTRAMUSCULAR at 07:05

## 2023-05-05 RX ADMIN — DEXMEDETOMIDINE HYDROCHLORIDE 4 MCG: 100 INJECTION, SOLUTION INTRAVENOUS at 07:05

## 2023-05-05 RX ADMIN — Medication 200 MCG: at 07:05

## 2023-05-05 RX ADMIN — SODIUM CHLORIDE: 0.9 INJECTION, SOLUTION INTRAVENOUS at 06:05

## 2023-05-05 RX ADMIN — LIDOCAINE HYDROCHLORIDE 100 MG: 20 INJECTION INTRAVENOUS at 07:05

## 2023-05-05 RX ADMIN — PROPOFOL 150 MG: 10 INJECTION, EMULSION INTRAVENOUS at 07:05

## 2023-05-05 RX ADMIN — CEFAZOLIN 2 G: 2 INJECTION, POWDER, FOR SOLUTION INTRAMUSCULAR; INTRAVENOUS at 07:05

## 2023-05-05 RX ADMIN — FENTANYL CITRATE 100 MCG: 50 INJECTION, SOLUTION INTRAMUSCULAR; INTRAVENOUS at 07:05

## 2023-05-05 RX ADMIN — MIDAZOLAM HYDROCHLORIDE 2 MG: 1 INJECTION, SOLUTION INTRAMUSCULAR; INTRAVENOUS at 07:05

## 2023-05-05 RX ADMIN — SODIUM CHLORIDE: 0.9 INJECTION, SOLUTION INTRAVENOUS at 07:05

## 2023-05-05 RX ADMIN — ACETAMINOPHEN 650 MG: 325 TABLET ORAL at 07:05

## 2023-05-05 RX ADMIN — ROCURONIUM BROMIDE 50 MG: 10 INJECTION INTRAVENOUS at 07:05

## 2023-05-05 RX ADMIN — SUGAMMADEX 300 MG: 100 INJECTION, SOLUTION INTRAVENOUS at 08:05

## 2023-05-05 NOTE — PATIENT INSTRUCTIONS
Postoperative General Instructions    What to Expect:  It is normal to experience pain and swelling at the surgical site.  The pain usually decreases significantly after the first week but may last for many weeks.  Each day, the pain should be similar or better to the previous day. If it worsens, call the doctor's office.     Activity:  You should walk beginning on the day of surgery and increase activity slowly over the next two to four weeks.  Do not drive while taking prescription pain medication.  You may return to work when you feel ready.     Wound Care:  You may shower. Let soap and water run over the incisions and pat dry. Do not submerge in a bathtub or pool.     Diet:  You may resume your normal diet postoperatively.     Medication:  Pain Control  Take acetaminophen (Tylenol) 650 mg 4 times daily as needed for pain.  Take ibuprofen 600 mg 3 times daily as needed for pain. Stop taking this medication if it causes an upset stomach.  Previous Home Medication  Restart your previous home medication unless otherwise instructed.    Call Your Doctor's Office If You Experience:  Fevers greater than 101.3°F.  Vomiting.  Spreading redness or drainage from the incisions.  Opening of the incisions.  Worsening pain not controlled by medication.  Chest pain or shortness of breath.    Follow-Up:  Follow-up with your surgeon as scheduled in 2 weeks.  If no follow-up appointment has been scheduled, call to schedule an appointment within 1-2 weeks of discharge.     Contact Information:  During normal business hours call the clinic with any questions or concerns. On weekends and evenings call [phone] to have the operators page the surgery resident on call after hours with questions or concerns.

## 2023-05-05 NOTE — BRIEF OP NOTE
[facility] - Surgery ([facility])  Brief Operative Note    Surgery Date: 5/5/2023     Surgeon(s) and Role:     * Jordan Cronin MD - Primary  Yolanda Diaz MD -Resident, Assisting    Assisting Surgeon: None    Pre-op Diagnosis:  Lipoma of neck [D17.0]    Post-op Diagnosis:  Post-Op Diagnosis Codes:     * Lipoma of neck [D17.0]    Procedure(s) (LRB):  EXCISION, LIPOMA Right Lateral Neck (Right)    Anesthesia: Choice    Operative Findings: right lateral neck lipoma measuring 2 cm x 2 cm removed without incident    Estimated Blood Loss: <5 cc  Specimens:   Specimen (24h ago, onward)       Start     Ordered    05/05/23 0744  Specimen to Pathology, Surgery General Surgery  Once        Comments: Pre-op Diagnosis: Lipoma of neck [D17.0]Procedure(s):EXCISION, LIPOMA Right Lateral Neck Number of specimens: 1Name of specimens: 1.) RIGHT LATERAL NECK LIPOMA (PERM)     References:    Click here for ordering Quick Tip   Question Answer Comment   Procedure Type: General Surgery    Specimen Class: Routine/Screening    Which provider would you like to cc? JORDAN CRONIN    Release to patient Immediate        05/05/23 0745                      Discharge Note    OUTCOME: Patient tolerated treatment/procedure well without complication and is now ready for discharge.    DISPOSITION: Home or Self Care    FINAL DIAGNOSIS:  Lipoma of neck    FOLLOWUP: In clinic    DISCHARGE INSTRUCTIONS:    Discharge Procedure Orders   Diet Adult Regular     Notify your health care provider if you experience any of the following:  temperature >100.4     Notify your health care provider if you experience any of the following:  persistent nausea and vomiting or diarrhea     Notify your health care provider if you experience any of the following:  severe uncontrolled pain     Notify your health care provider if you experience any of the following:  redness, tenderness, or signs of infection (pain, swelling, redness, odor or green/yellow discharge around  incision site)     Notify your health care provider if you experience any of the following:  difficulty breathing or increased cough

## 2023-05-05 NOTE — OP NOTE
OPERATIVE REPORT    PATIENT: Colin Reilly, 62 y.o., male MRN:[id number]  :1960  DATE OF SURGERY: 2023  PREOP DIAGNOSIS:  Lipoma of neck [D17.0]  POSTOP DIAGNOSIS:  Lipoma of neck [D17.0]  PROCEDURE: EXCISION, LIPOMA Right Lateral Neck  ATTENDING: Jordan Cronin MD  HOUSE STAFF SURGEONS: Meir Machado MD, Yolanda Diaz MD  ANESTHESIA: Choice  FINDINGS:  right lateral neck lipoma measuring 2 cm x 2 cm removed without incident  EST BLOOD LOSS:  <5 mL    URINE OUTPUT:  not monitored  SPECIMENS:   Specimens (From admission, onward)       Start     Ordered    23 0744  Specimen to Pathology, Surgery General Surgery  Once        Comments: Pre-op Diagnosis: Lipoma of neck [D17.0]Procedure(s):EXCISION, LIPOMA Right Lateral Neck Number of specimens: 1Name of specimens: 1.) RIGHT LATERAL NECK LIPOMA (PERM)     References:    Click here for ordering Quick Tip   Question Answer Comment   Procedure Type: General Surgery    Specimen Class: Routine/Screening    Which provider would you like to cc? JORDAN CRONIN    Release to patient Immediate        23 0745                  DRAINS:  None  COMPLICATIONS: none apparent at the end of the case    INDICATIONS: Colin Reilly is a 62 y.o. male referred to General Surgery Clinic with a history of right lateral neck lipoma. We recommended removal and the patient agreed to proceed. The patient signed informed consent and expressed understanding of the risks and benefits of surgery.     OPERATIVE PROCEDURE:   Colin Reilly was identified in preoperative holding and questions were answered. The patient was brought back to the operating room and placed supine on the operating table. Monitors were applied and a smooth induction of general endotracheal anesthesia was accomplished. A bump was placed under the right shoulder/chest. The right lateral neck was prepped and draped in the standard sterile surgical fashion. A timeout was performed and all team members  present, agreed this was the correct procedure on the correct patient. We also confirmed administration of appropriate preoperative antibiotics.     0.5% Marcaine was injected to create a field block around the lipoma. Using a #15 blade a 3 cm incision was made over the lipoma. Using Bovie the incision was carried down through the subcutaneous tissue. Using a combination of blunt dissection and electrocautery the lipoma was freed of its attachments to the surrounding tissue. The specimen was then passed off. Hemostasis was achieved and the wound was closed in layers with interrupted 3-0 vicryl deep dermal sutures and running subcuticular 4-0 Monocryl. Dermabond was applied over the incision.    The patient was extubated in the operating room and transported to the PACU in stable condition. All sponge, instrument and needle counts were correct at the end of the case.

## 2023-05-05 NOTE — NURSING
"Resident on call Dr Lopez notified patient would like to take his own medication . He is a liver transplant patient and DM . He came to floor due to fact he did not have ride home , after his surgery. He is to go home tomorrow. Dr Lopez stated, " she needs to  check with primary team . "   "

## 2023-05-05 NOTE — PLAN OF CARE
5652-Spoke to Dr Diaz regarding patient taking a bus here from [place] and not having a ride home. Her suggestion was to call  for a voucher, however, the distance is to far.  Placed her on the phone with my  nurse to discuss admitting patient to obs for 24 hours.     3076-Spoke to Dr Pacheco,anesthesiologist,  regarding patient and stated he should stay the night due to having general surgery.

## 2023-05-05 NOTE — H&P
FOCUSED SURGICAL H&P    Colin Reilly is a 62 y.o. male. MRN is [id number].    CC: Here today for the following surgical procedure(s):  EXCISION, LIPOMA Right Lateral Neck (Right)    HPI: For a detailed history of the patients history of present illness please refer to the last progress note. In brief, this is a 62 y.o. male with a known history of right neck lipoma, here today for surgical intervention. There has been no recent changes in the patients health, including fevers, chest pain, or shortness of breath, and no new medications have been started. The patient has not had anything to eat or drink for the last 8 hours.     Past Medical History:   Past Medical History:   Diagnosis Date    Alcohol abuse, in remission 07/08/2014    Quit 01/04, 2011    Anemia of chronic disease 05/15/2020    Chronic back pain 07/08/2014    Hepatitis C virus infection 07/08/2014    tx with interferon 3-4 mos- stopped for unclear reasons Tattoos-first at age 17 only risk factor (HCVAB negative 08/2017)    Splenomegaly 07/08/2014       Past Surgical History:   Past Surgical History:   Procedure Laterality Date    CARPAL TUNNEL RELEASE Bilateral     CHOLECYSTECTOMY      lap    COLONOSCOPY N/A 09/08/2017    Procedure: COLONOSCOPY;  Surgeon: Savannah Llanos MD;  Location: [facility];  Service: Endoscopy;  Laterality: N/A;    COLONOSCOPY Left 03/14/2018    Procedure: COLONOSCOPY;  Surgeon: Savannah Llanos MD;  Location: [facility];  Service: Endoscopy;  Laterality: Left;    COLONOSCOPY  02/21/2019    COLONOSCOPY W/ POLYPECTOMY  06/19/2014    ESOPHAGOGASTRODUODENOSCOPY  01/2014    ESOPHAGOGASTRODUODENOSCOPY  02/15/2017    grade II varices    ESOPHAGOGASTRODUODENOSCOPY  04/10/2017    grade I varices    ESOPHAGOGASTRODUODENOSCOPY N/A 10/18/2019    Procedure: EGD (ESOPHAGOGASTRODUODENOSCOPY);  Surgeon: Flavio Escalera MD;  Location: [facility] ([address] [place] [address]);  Service: Endoscopy;  Laterality: N/A;    ESOPHAGOGASTRODUODENOSCOPY W/ BANDING   2017    grade II varices    HERNIA REPAIR      LIVER TRANSPLANT N/A 2020    Procedure: TRANSPLANT, LIVER;  Surgeon: Danny Thakkar MD;  [facility];  Service: Transplant;  Laterality: N/A;    NECK SURGERY      fusion on C5 and C6    THROMBECTOMY  2020    Procedure: THROMBECTOMY;  Surgeon: Danny Thakkar MD;  Location: [facility] OR 2ND FLR;  Service: Transplant;;  portal vein thrombectomy    ULNAR NERVE TRANSPOSITION Left     UMBILICAL HERNIA REPAIR N/A 2020    Procedure: REPAIR, HERNIA, PRIMARY WIHTOUT MESH AND PLACEMENT OF DRAIN;  Surgeon: Sherri Brunner MD;  Location: [facility] OR [facility] FLR;  Service: Transplant;  Laterality: N/A;    vericose veins removed         Social History:   Social History     Socioeconomic History    Marital status:    Occupational History     Employer: [employer]   Tobacco Use    Smoking status: Former     Types: Cigarettes     Quit date: 2010     Years since quittin.3    Smokeless tobacco: Former     Types: Chew     Quit date: 1990    Tobacco comments:     former 1 pack/week quit 2010   Substance and Sexual Activity    Alcohol use: No     Comment: former heavy beer but quit 2010    Drug use: No    Sexual activity: Never     Comment:    Social History Narrative           Family History:   Family History   Problem Relation Age of Onset    Hyperlipidemia Mother     No Known Problems Father 36        accident on oil rig    No Known Problems Sister     Cancer Brother         kidney    Alcohol abuse Brother     No Known Problems Sister           Allergies:  Review of patient's allergies indicates:  No Known Allergies      Medications:    Current Facility-Administered Medications:     0.9%  NaCl infusion, , Intravenous, Continuous, Arnav Beltran PA-C    ceFAZolin 2 g in dextrose 5 % in water (D5W) 5 % 50 mL IVPB (MB+), 2 g, Intravenous, On Call Procedure, Arnav Beltran PA-C    LIDOcaine (PF) 10  mg/ml (1%) 10 mg/mL (1 %) injection, , , ,     Facility-Administered Medications Ordered in Other Encounters:     sodium chloride 0.9% infusion, , Intravenous, Continuous PRN, Valentine Bustillos, CRNA, New Bag at 05/05/23 0635                  Vital Signs:  Vitals:    05/05/23 0541   BP: (!) 151/85   Pulse: 71   Resp: 18   Temp: 97.8 °F (36.6 °C)         Physical Exam:  Neuro: awake, alert, no acute distress.  HEENT: PERRLA, neck supple, no lymphadenopathy.  Heart: regular rate/rhythm  Lungs: equal chest expansion bilaterally, no increased work of breathing on RA  Abdomen: soft, non-distended, non-tender to palpation.  Extremities: warm, well-perfused       Labs:  Lab Results   Component Value Date/Time    WBC 6.3 02/27/2023 12:43 PM    WBC 3.75 (L) 11/08/2022 07:00 AM    WBC 4.6 10/18/2016 12:00 AM    HGB 15.1 02/27/2023 12:43 PM    HGB 14.9 11/08/2022 07:00 AM    HCT 47.3 02/27/2023 12:43 PM    HCT 45.8 11/08/2022 07:00 AM    HCT 27 (L) 05/18/2020 01:07 AM     02/27/2023 12:43 PM     (L) 11/08/2022 07:00 AM    BAND 1.0 07/14/2022 06:00 AM    MCV 93.8 02/27/2023 12:43 PM    MCV 85 11/08/2022 07:00 AM    MCV 84.5 10/18/2016 12:00 AM     Lab Results   Component Value Date/Time     02/27/2023 12:43 PM     11/08/2022 07:00 AM     10/18/2016 12:00 AM    K 4.9 02/27/2023 12:43 PM    K 4.0 11/08/2022 07:00 AM    K 4.0 10/18/2016 12:00 AM     11/08/2022 07:00 AM     10/18/2016 12:00 AM    CO2 25 02/27/2023 12:43 PM    CO2 25 11/08/2022 07:00 AM    CO2 24.0 10/18/2016 12:00 AM    BUN 10.8 02/27/2023 12:43 PM    BUN 12 11/08/2022 07:00 AM    BUN 10 08/10/2020 12:00 AM     (H) 11/08/2022 07:00 AM     11/19/2015 12:00 AM    MG 1.60 12/07/2022 11:13 AM    MG 1.8 06/01/2020 06:26 AM    PHOS 2.9 06/01/2020 06:26 AM     Lab Results   Component Value Date/Time    INR 1.06 07/19/2022 09:04 AM    INR 1.0 07/14/2022 06:00 AM    INR 1.1 01/15/2016 12:00 AM    PTT 33.6  07/10/2022 05:28 PM     No components found for: TROPI  Lab Results   Component Value Date/Time    ALT 18 02/27/2023 12:43 PM    ALT 17 11/08/2022 07:00 AM    ALT 32 10/18/2016 12:00 AM    AST 20 02/27/2023 12:43 PM    AST 18 11/08/2022 07:00 AM    AST 32 10/18/2016 12:00 AM    LIPASE 6 05/15/2021 05:45 AM    LIPASE 27 02/21/2020 06:28 AM          Assessment/Plan:  62 y.o. male here today for the following surgical procedure:    EXCISION, LIPOMA Right Lateral Neck (Right)       The indications for surgery, highlighting the risks and benefits of the procedure were discussed with the patient. These included but are not limited to swelling, bleeding, pain, infection, and adverse anesthesia-related event. I also discussed risk of injury to nearby structures. The patient seems to understand the risks, as well as the alternatives including nonoperative observation/survellience and wishes to proceed with the surgical intervention.    Patient has been examined, consented, and marked for laterality.        Yolanda Diaz MD  General Surgery, PGY 1

## 2023-05-05 NOTE — TRANSFER OF CARE
"Anesthesia Transfer of Care Note    Patient: Colin Reilly    Procedure(s) Performed: Procedure(s) (LRB):  EXCISION, LIPOMA Right Lateral Neck (Right)    Patient location: PACU    Anesthesia Type: general    Transport from OR: Transported from OR on 6-10 L/min O2 by face mask with adequate spontaneous ventilation    Post pain: adequate analgesia    Post assessment: no apparent anesthetic complications and tolerated procedure well    Post vital signs: stable    Level of consciousness: responds to stimulation and sedated    Nausea/Vomiting: no nausea/vomiting    Complications: none    Transfer of care protocol was followed      Last vitals:   Visit Vitals  BP (!) 151/85 (BP Location: Left arm, Patient Position: Lying)   Pulse 71   Temp 36.6 °C (97.8 °F) (Oral)   Resp 18   Ht 5' 11" (1.803 m)   Wt 80.8 kg (178 lb 2.1 oz)   SpO2 100%   BMI 24.84 kg/m²     "

## 2023-05-05 NOTE — ANESTHESIA PROCEDURE NOTES
Intubation    Date/Time: 5/5/2023 7:19 AM  Performed by: Valentine Bustillos CRNA  Authorized by: Dee Dee Pacheco MD     Intubation:     Induction:  Intravenous    Intubated:  Postinduction    Mask Ventilation:  Easy mask    Attempts:  1    Attempted By:  CRNA    Method of Intubation:  Direct    Blade:  Aylin 3    Laryngeal View Grade: Grade I - full view of cords      Difficult Airway Encountered?: No      Complications:  None    Airway Device:  Oral endotracheal tube    Airway Device Size:  7.5    Style/Cuff Inflation:  Cuffed (inflated to minimal occlusive pressure)    Tube secured:  22    Secured at:  The lips    Placement Verified By:  Capnometry    Complicating Factors:  None    Findings Post-Intubation:  BS equal bilateral and atraumatic/condition of teeth unchanged

## 2023-05-05 NOTE — ANESTHESIA PREPROCEDURE EVALUATION
05/05/2023  Colin Reilly is a 62 y.o., male.  Pre-operative evaluation for Procedure(s) (LRB):  EXCISION, LIPOMA Right Lateral Neck (Right)    Colin Reilly is a 62 y.o. male     Patient Active Problem List   Diagnosis    Splenomegaly    Alcohol abuse, in remission    Chronic back pain    Anemia of chronic disease    Long-term use of immunosuppressant medication    Long term prescription opiate use    Diabetic peripheral neuropathy    Major depression in remission    End stage liver disease    History of liver transplant    Hepatitis C virus infection    Type 2 diabetes mellitus with diabetic polyneuropathy, with long-term current use of insulin    Dyslipidemia    Primary hypertension       Review of patient's allergies indicates:  No Known Allergies    No current facility-administered medications on file prior to encounter.     Current Outpatient Medications on File Prior to Encounter   Medication Sig Dispense Refill    EScitalopram oxalate (LEXAPRO) 20 MG tablet TAKE 1 TABLET EVERY DAY 90 tablet 1    finasteride (PROSCAR) 5 mg tablet TAKE 1 TABLET EVERY DAY 90 tablet 1    LEVEMIR FLEXTOUCH U-100 INSULN 100 unit/mL (3 mL) InPn pen Inject 20 Units into the skin every evening.      meclizine (ANTIVERT) 25 mg tablet Take 1 tablet (25 mg total) by mouth 3 (three) times daily as needed for Dizziness or Nausea. 21 tablet 0    metFORMIN (GLUCOPHAGE) 500 MG tablet Take 1 tablet (500 mg total) by mouth 2 (two) times daily with meals. 60 tablet 11    omeprazole (PRILOSEC) 40 MG capsule Take 40 mg by mouth once daily.      oxybutynin (DITROPAN) 5 MG Tab TAKE 1 TABLET EVERY DAY 90 tablet 3    QUEtiapine (SEROQUEL) 25 MG Tab Take 75 mg by mouth every evening.      rosuvastatin (CRESTOR) 10 MG tablet Take 1 tablet (10 mg total) by mouth once daily. 90 tablet 3    sildenafiL (VIAGRA) 100 MG  "tablet Take 100 mg by mouth once daily.      tamsulosin (FLOMAX) 0.4 mg Cap TAKE 1 CAPSULE EVERY DAY 90 capsule 3    aspirin 81 MG Chew Take 1 tablet (81 mg total) by mouth once daily. 30 tablet 5    bisacodyL (DULCOLAX) 5 mg EC tablet Take 2 tablets (10 mg total) by mouth daily as needed for Constipation.  0    blood sugar diagnostic (TRUE METRIX GLUCOSE TEST STRIP MISC)   TRUE METRIX SELF MONITORING BLOOD GLUCOSE STRIPS   Strip, See Instructions, TEST BLOOD SUGAR FOUR TIMES DAILY, # 400 unknown unit, 3 Refill(s), Pharmacy: [facility], 180, cm, Height/Length Dosing, 02/10/21 9:33:00 CST, 90.718, kg, W...      EASY TOUCH INSULIN SYRINGE 1 mL 31 gauge x 5/16 Syrg       insulin aspart U-100 (NOVOLOG FLEXPEN U-100 INSULIN) 100 unit/mL (3 mL) InPn pen Inject 10 Units into the skin 3 (three) times daily before meals. 15 mL 11    pen needle, diabetic (DROPLET PEN NEEDLE) 32 gauge x 5/32" Ndle USE ONE TIME DAILY AT BEDTIME 100 each 3    TRUEPLUS LANCETS 30 gauge Misc       [DISCONTINUED] furosemide (LASIX) 40 MG tablet TAKE 4 TABLETS EVERY  tablet 11    [DISCONTINUED] mycophenolate (CELLCEPT) 250 mg Cap Take 2 capsules (500 mg total) by mouth 2 (two) times daily. Do this for 2 weeks then stop 180 capsule 11       Past Surgical History:   Procedure Laterality Date    CARPAL TUNNEL RELEASE Bilateral     CHOLECYSTECTOMY      lap    COLONOSCOPY N/A 09/08/2017    Procedure: COLONOSCOPY;  Surgeon: Savannah Llanos MD;  Location: [facility] ENDO;  Service: Endoscopy;  Laterality: N/A;    COLONOSCOPY Left 03/14/2018    Procedure: COLONOSCOPY;  Surgeon: Savannah Llanos MD;  Location: [facility] ENDO;  Service: Endoscopy;  Laterality: Left;    COLONOSCOPY  02/21/2019    COLONOSCOPY W/ POLYPECTOMY  06/19/2014    ESOPHAGOGASTRODUODENOSCOPY  01/2014    ESOPHAGOGASTRODUODENOSCOPY  02/15/2017    grade II varices    ESOPHAGOGASTRODUODENOSCOPY  04/10/2017    grade I varices    " ESOPHAGOGASTRODUODENOSCOPY N/A 10/18/2019    Procedure: EGD (ESOPHAGOGASTRODUODENOSCOPY);  Surgeon: Flavio Escalera MD;  [facility]  Service: Endoscopy;  Laterality: N/A;    ESOPHAGOGASTRODUODENOSCOPY W/ BANDING  2017    grade II varices    HERNIA REPAIR      LIVER TRANSPLANT N/A 2020    Procedure: TRANSPLANT, LIVER;  Surgeon: Danny Thakkar MD;  [facility];  Service: Transplant;  Laterality: N/A;    NECK SURGERY      fusion on C5 and C6    THROMBECTOMY  2020    Procedure: THROMBECTOMY;  Surgeon: Danny Thakkar MD;  [facility];  Service: Transplant;;  portal vein thrombectomy    ULNAR NERVE TRANSPOSITION Left     UMBILICAL HERNIA REPAIR N/A 2020    Procedure: REPAIR, HERNIA, PRIMARY WIHTOUT MESH AND PLACEMENT OF DRAIN;  Surgeon: Sherri Brunner MD;  Location: [facility] OR [address] [place] [address];  Service: Transplant;  Laterality: N/A;    vericose veins removed         Social History     Socioeconomic History    Marital status:    Occupational History     Employer: Disabled   Tobacco Use    Smoking status: Former     Types: Cigarettes     Quit date: 2010     Years since quittin.3    Smokeless tobacco: Former     Types: Chew     Quit date: 1990    Tobacco comments:     former 1 pack/week quit 2010   Substance and Sexual Activity    Alcohol use: No     Comment: former heavy beer but quit 2010    Drug use: No    Sexual activity: Never     Comment:    Social History Narrative             CBC: No results for input(s): WBC, RBC, HGB, HCT, PLT, MCV, MCH, MCHC in the last 72 hours.    CMP: No results for input(s): NA, K, CL, CO2, BUN, CREATININE, GLU, MG, PHOS, CALCIUM, ALBUMIN, PROT, ALKPHOS, ALT, AST, BILITOT in the last 72 hours.    INR  No results for input(s): PT, INR, PROTIME, APTT in the last 72 hours.        Diagnostic Studies:      EKD Echo:  No results found. However, due  to the size of the patient record, not all encounters were searched. Please check Results Review for a complete set of results.        Pre-op Assessment    I have reviewed the Patient Summary Reports.    I have reviewed the NPO Status.      Review of Systems  Anesthesia Hx:  No problems with previous Anesthesia  History of prior surgery of interest to airway management or planning: liver transplant. Previous anesthesia: General Denies Family Hx of Anesthesia complications.   Denies Personal Hx of Anesthesia complications.   Cardiovascular:   Exercise tolerance: good Hypertension    Hepatic/GI:   Hepatitis, C    Endocrine:   Diabetes        Physical Exam  General: Well nourished, Cooperative, Alert and Oriented    Airway:  Mallampati: II   Mouth Opening: Normal  TM Distance: Normal  Tongue: Normal  Neck ROM: Normal ROM    Dental:  Edentulous    Chest/Lungs:  Clear to auscultation, Normal Respiratory Rate    Heart:  Rate: Normal  Rhythm: Regular Rhythm        Anesthesia Plan  Type of Anesthesia, risks & benefits discussed:    Anesthesia Type: Gen ETT  Intra-op Monitoring Plan: Standard ASA Monitors  Post Op Pain Control Plan: multimodal analgesia  Induction:  IV  Airway Plan: Direct, Post-Induction  Informed Consent: Informed consent signed with the Patient and all parties understand the risks and agree with anesthesia plan.  All questions answered.   ASA Score: 3    Ready For Surgery From Anesthesia Perspective.     .

## 2023-05-06 VITALS
TEMPERATURE: 98 F | HEIGHT: 71 IN | BODY MASS INDEX: 25.06 KG/M2 | HEART RATE: 91 BPM | RESPIRATION RATE: 16 BRPM | SYSTOLIC BLOOD PRESSURE: 134 MMHG | DIASTOLIC BLOOD PRESSURE: 81 MMHG | OXYGEN SATURATION: 96 % | WEIGHT: 179 LBS

## 2023-05-06 PROCEDURE — G0378 HOSPITAL OBSERVATION PER HR: HCPCS

## 2023-05-06 PROCEDURE — 25000003 PHARM REV CODE 250: Performed by: STUDENT IN AN ORGANIZED HEALTH CARE EDUCATION/TRAINING PROGRAM

## 2023-05-06 RX ADMIN — ACETAMINOPHEN 650 MG: 325 TABLET ORAL at 12:05

## 2023-05-06 RX ADMIN — ACETAMINOPHEN 650 MG: 325 TABLET ORAL at 05:05

## 2023-05-06 NOTE — PLAN OF CARE
SW reached out to team to assess for d/c readiness for d/c to set up transportation.    Pt is medically cleared for d/c. SW contacted pt to provide physical address. Pt is being transported to [address] [place] [address]. SW reached out to SHANDA Peters to make transport arrangements.         Pipo Rojas LMSW   Pediatric/PICU    [facility]  [phone]

## 2023-05-06 NOTE — ANESTHESIA POSTPROCEDURE EVALUATION
Anesthesia Post Evaluation    Patient: Colin Reilly    Procedure(s) Performed: Procedure(s) (LRB):  EXCISION, LIPOMA Right Lateral Neck (Right)    Final Anesthesia Type: general      Patient location during evaluation: PACU  Patient participation: Yes- Able to Participate  Level of consciousness: awake and alert  Post-procedure vital signs: reviewed and stable  Pain management: adequate  Airway patency: patent    PONV status at discharge: No PONV  Anesthetic complications: no      Cardiovascular status: blood pressure returned to baseline  Respiratory status: unassisted  Hydration status: euvolemic  Follow-up not needed.          Vitals Value Taken Time   /78 05/05/23 1230   Temp 36.6 °C (97.9 °F) 05/05/23 1230   Pulse 79 05/05/23 1230   Resp 13 05/05/23 1230   SpO2 96 % 05/05/23 1230         No case tracking events are documented in the log.      Pain/Lobito Score: Pain Rating Prior to Med Admin: 7 (5/6/2023  5:45 AM)  Pain Rating Post Med Admin: 0 (5/6/2023  1:05 AM)  Lobito Score: 8 (5/5/2023  8:30 AM)

## 2023-05-06 NOTE — SUBJECTIVE & OBJECTIVE
Interval History: NAEO. Patient resting in bed. Tolerating diet. Denies pain. Ready for discharge    Medications:  Continuous Infusions:   sodium chloride 0.9%       Scheduled Meds:   acetaminophen  650 mg Oral Q6H     PRN Meds:ondansetron     Review of patient's allergies indicates:  No Known Allergies  Objective:     Vital Signs (Most Recent):  Temp: 96.8 °F (36 °C) (05/06/23 0535)  Pulse: 74 (05/06/23 0542)  Resp: 20 (05/06/23 0535)  BP: (!) 165/80 (05/06/23 0542)  SpO2: 98 % (05/06/23 0535) Vital Signs (24h Range):  Temp:  [96.8 °F (36 °C)-98.6 °F (37 °C)] 96.8 °F (36 °C)  Pulse:  [67-87] 74  Resp:  [13-36] 20  SpO2:  [94 %-99 %] 98 %  BP: (125-165)/(74-97) 165/80     Weight: 81.2 kg (179 lb 0.2 oz)  Body mass index is 24.97 kg/m².    Intake/Output - Last 3 Shifts         05/04 0700  05/05 0659 05/05 0700  05/06 0659 05/06 0700  05/07 0659    P.O.  450     IV Piggyback  1000     Total Intake(mL/kg)  1450 (17.9)     Net  +1450                     Physical Exam  Constitutional:       General: He is not in acute distress.  HENT:      Head: Normocephalic and atraumatic.      Mouth/Throat:      Mouth: Mucous membranes are moist.      Pharynx: No oropharyngeal exudate.   Eyes:      Extraocular Movements: Extraocular movements intact.      Conjunctiva/sclera: Conjunctivae normal.   Neck:      Comments: Right lateral neck incision c/d/i  Cardiovascular:      Rate and Rhythm: Normal rate and regular rhythm.   Pulmonary:      Effort: Pulmonary effort is normal. No respiratory distress.   Abdominal:      General: There is no distension.      Palpations: Abdomen is soft.      Tenderness: There is no abdominal tenderness.   Musculoskeletal:         General: Normal range of motion.   Skin:     General: Skin is warm and dry.   Neurological:      General: No focal deficit present.      Mental Status: He is alert and oriented to person, place, and time. Mental status is at baseline.        Significant Labs:  I have reviewed all  pertinent lab results within the past 24 hours.    Significant Diagnostics:  I have reviewed all pertinent imaging results/findings within the past 24 hours.

## 2023-05-06 NOTE — PROGRESS NOTES
[facility] - [facility]  General Surgery  Progress Note    Subjective:     History of Present Illness:  No notes on file    Post-Op Info:  Procedure(s) (LRB):  EXCISION, LIPOMA Right Lateral Neck (Right)   1 Day Post-Op     Interval History: NAEO. Patient resting in bed. Tolerating diet. Denies pain. Ready for discharge    Medications:  Continuous Infusions:   sodium chloride 0.9%       Scheduled Meds:   acetaminophen  650 mg Oral Q6H     PRN Meds:ondansetron     Review of patient's allergies indicates:  No Known Allergies  Objective:     Vital Signs (Most Recent):  Temp: 96.8 °F (36 °C) (05/06/23 0535)  Pulse: 74 (05/06/23 0542)  Resp: 20 (05/06/23 0535)  BP: (!) 165/80 (05/06/23 0542)  SpO2: 98 % (05/06/23 0535) Vital Signs (24h Range):  Temp:  [96.8 °F (36 °C)-98.6 °F (37 °C)] 96.8 °F (36 °C)  Pulse:  [67-87] 74  Resp:  [13-36] 20  SpO2:  [94 %-99 %] 98 %  BP: (125-165)/(74-97) 165/80     Weight: 81.2 kg (179 lb 0.2 oz)  Body mass index is 24.97 kg/m².    Intake/Output - Last 3 Shifts         05/04 0700  05/05 0659 05/05 0700  05/06 0659 05/06 0700  05/07 0659    P.O.  450     IV Piggyback  1000     Total Intake(mL/kg)  1450 (17.9)     Net  +1450                     Physical Exam  Constitutional:       General: He is not in acute distress.  HENT:      Head: Normocephalic and atraumatic.      Mouth/Throat:      Mouth: Mucous membranes are moist.      Pharynx: No oropharyngeal exudate.   Eyes:      Extraocular Movements: Extraocular movements intact.      Conjunctiva/sclera: Conjunctivae normal.   Neck:      Comments: Right lateral neck incision c/d/i  Cardiovascular:      Rate and Rhythm: Normal rate and regular rhythm.   Pulmonary:      Effort: Pulmonary effort is normal. No respiratory distress.   Abdominal:      General: There is no distension.      Palpations: Abdomen is soft.      Tenderness: There is no abdominal tenderness.   Musculoskeletal:         General: Normal range of motion.   Skin:     General: Skin  is warm and dry.   Neurological:      General: No focal deficit present.      Mental Status: He is alert and oriented to person, place, and time. Mental status is at baseline.        Significant Labs:  I have reviewed all pertinent lab results within the past 24 hours.    Significant Diagnostics:  I have reviewed all pertinent imaging results/findings within the past 24 hours.    Assessment/Plan:     * Lipoma of neck  62yM ws/p excision of R lipoma. Had to stay overnight due to no ride availability.    - Tylenol for pain  - Regular diet  - Home meds  - Discharge to home today  - Will set up virtual f/u as patient lives several hours away        Yolanda Diaz MD  General Surgery  [facility]

## 2023-05-06 NOTE — NURSING
" Pt  received copy of discharged papers instructions. Pt denies pain at this time. Pt educated on signs and symptoms of when to call the doctor. All belongings with the patient . Pt verbalized understanding. Patient instructed transport van is coming in 1 hr to bring him home to [place]. Patient stated, ' ok thank you very much . "    "

## 2023-05-06 NOTE — ASSESSMENT & PLAN NOTE
62yM ws/p excision of R lipoma. Had to stay overnight due to no ride availability.    - Tylenol for pain  - Regular diet  - Home meds  - Discharge to home today  - Will set up virtual f/u as patient lives several hours away

## 2023-05-06 NOTE — NURSING
Nurses Note -- 4 Eyes      5/5/2023   7:39 PM      Skin assessed during: Admit      [] No Altered Skin Integrity Present    []Prevention Measures Documented      [x] Yes- Altered Skin Integrity Present or Discovered   [] LDA Added if Not in Epic (Describe Wound)   [x] New Altered Skin Integrity was Present on Admit and Documented in LDA   [] Wound Image Taken    Wound Care Consulted? Yes    Attending Nurse:  Soha Salas LPN     Second RN/Staff Member:sofi Askew RN

## 2023-05-09 NOTE — PLAN OF CARE
[facility] - Surgery  Discharge Final Note    Primary Care Provider: Preston Matthew II, MD    Expected Discharge Date: 5/6/2023    Final Discharge Note (most recent)       Final Note - 05/09/23 1020          Final Note    Assessment Type Final Discharge Note     Anticipated Discharge Disposition Home or Self Care     What phone number can be called within the next 1-3 days to see how you are doing after discharge? --   [phone]    Hospital Resources/Appts/Education Provided Appointments scheduled and added to AVS                     Important Message from Medicare           Future Appointments   Date Time Provider Department Center   5/22/2023  8:00 AM LAB, [facility] D[facility]   5/23/2023  1:45 PM Jim Cronin MD [facility]   8/31/2023 10:00 AM Preston Matthew II, MD [id number]   11/28/2023  1:30 PM Preston Matthew II, MD [facility] [id number]     Patient discharged home to care of family on 5/6/23.    Gayle Alcazar RNCM  Case Management  [facility]  [phone]

## 2023-05-13 LAB
FINAL PATHOLOGIC DIAGNOSIS: NORMAL
GROSS: NORMAL
Lab: NORMAL

## 2023-05-15 DIAGNOSIS — Z94.4 LIVER TRANSPLANTED: ICD-10-CM

## 2023-05-15 DIAGNOSIS — G89.29 CHRONIC LOW BACK PAIN WITH SCIATICA, SCIATICA LATERALITY UNSPECIFIED, UNSPECIFIED BACK PAIN LATERALITY: ICD-10-CM

## 2023-05-15 DIAGNOSIS — Z79.891 LONG TERM PRESCRIPTION OPIATE USE: ICD-10-CM

## 2023-05-15 DIAGNOSIS — M54.40 CHRONIC LOW BACK PAIN WITH SCIATICA, SCIATICA LATERALITY UNSPECIFIED, UNSPECIFIED BACK PAIN LATERALITY: ICD-10-CM

## 2023-05-15 RX ORDER — HYDROCODONE BITARTRATE AND ACETAMINOPHEN 10; 325 MG/1; MG/1
1 TABLET ORAL DAILY PRN
Qty: 30 TABLET | Refills: 0 | Status: SHIPPED | OUTPATIENT
Start: 2023-05-15 | End: 2023-06-12 | Stop reason: SDUPTHER

## 2023-05-15 NOTE — TELEPHONE ENCOUNTER
----- Message from Juana Reeves sent at 5/15/2023 10:41 AM CDT -----  Regarding: refill  Type:  RX Refill Request    Who Called: pt  Refill or New Rx:refill  RX Name and Strength:HYDROcodone-acetaminophen (NORCO)  mg per tablet  How is the patient currently taking it? (ex. 1XDay):1xday  Is this a 30 day or 90 day RX:30  Preferred Pharmacy with phone number:[facility] - [address]  Local or Mail Order:local  Ordering Provider:lissette rasmussen  Would the patient rather a call back or a response via [facility]?   Best Call Back Number:[phone]  Additional Information: pt called about needing a refill but would like to back to taking it twice a day. Please advise

## 2023-05-20 NOTE — DISCHARGE SUMMARY
HOSPITAL DISCHARGE SUMMARY      I.   IDENTIFYING DATA         A.  Name:Colin Reilly              Sex:male              MRN:[id number]              :1960              Date of admission:2023  4:45 AM              Date of discharge: 2023  3:30 PM       II. SUCCINCT SUMMARY OF ADMISSION STATUS AND HOSPITAL COURSE    For details of hospital stay, please refer to daily progress notes. Briefly, this is a 62 y.o. male presented on  for planned surgical intervention for right lateral neck lipoma. Patient was taken to OR and underwent excision of right lateral neck lipoma. Post-operatively patient was admitted to the floor due to inability to get a ride home.     On the day of discharge, the patient was ambulating without difficulty, voiding spontaneously, was tolerating a diet without nausea or vomiting, and pain was well controlled on PO pain medications. Discharge instructions were explained to the patient and appropriate follow-up was arranged.      III. PATIENT'S MEDICAL CONDITION AT DISCHARGE       good    IV. SUMMARY OF PROCEDURE(S) PERFORMED          Procedure(s) (LRB):  EXCISION, LIPOMA Right Lateral Neck (Right)      V.  DISCHARGE DIAGNOSES        Lipoma of neck       VI. RESULTS PENDING AT TIME OF DISCHARGE         None    VII. MEDICATIONS TAKEN PRIOR TO ADMISSION    Current Outpatient Medications   Medication Instructions    aspirin 81 mg, Oral, Daily    bisacodyL (DULCOLAX) 10 mg, Oral, Daily PRN    blood sugar diagnostic (TRUE METRIX GLUCOSE TEST STRIP MISC)   TRUE METRIX SELF MONITORING BLOOD GLUCOSE STRIPS   Strip, See Instructions, TEST BLOOD SUGAR FOUR TIMES DAILY, # 400 unknown unit, 3 Refill(s), Pharmacy: [email or web address], 180, cm, Height/Length Dosing, 02/10/21 9:33:00 CST, 90.718, kg, W...    blood sugar diagnostic (TRUE METRIX GLUCOSE TEST STRIP) Strp TEST BLOOD SUGAR FOUR TIMES DAILY    EASY TOUCH INSULIN SYRINGE 1 mL 31 gauge x 5/16 Syrg No dose, route, or frequency  "recorded.    EScitalopram oxalate (LEXAPRO) 20 MG tablet TAKE 1 TABLET EVERY DAY    finasteride (PROSCAR) 5 mg tablet TAKE 1 TABLET EVERY DAY    gabapentin (NEURONTIN) 600 mg, Oral, 3 times daily    HYDROcodone-acetaminophen (NORCO)  mg per tablet 1 tablet, Oral, Daily PRN    insulin aspart U-100 (NOVOLOG FLEXPEN U-100 INSULIN) 10 Units, Subcutaneous, 3 times daily before meals    LEVEMIR FLEXTOUCH U100 INSULIN 20 Units, Subcutaneous, Nightly    meclizine (ANTIVERT) 25 mg, Oral, 3 times daily PRN    metFORMIN (GLUCOPHAGE) 500 mg, Oral, 2 times daily with meals    omeprazole (PRILOSEC) 40 mg, Oral, Daily    oxybutynin (DITROPAN) 5 MG Tab TAKE 1 TABLET EVERY DAY    pen needle, diabetic (DROPLET PEN NEEDLE) 32 gauge x 5/32" Ndle USE ONE TIME DAILY AT BEDTIME    QUEtiapine (SEROQUEL) 75 mg, Oral, Nightly    rosuvastatin (CRESTOR) 10 mg, Oral, Daily    sildenafiL (VIAGRA) 100 mg, Oral, Daily    tacrolimus (PROGRAF) 1 MG Cap TAKE 3 CAPSULES EVERY 12 HOURS    tamsulosin (FLOMAX) 0.4 mg Cap TAKE 1 CAPSULE EVERY DAY    TRUEPLUS LANCETS 30 gauge Misc No dose, route, or frequency recorded.        VIII. MEDICATIONS TO BE TAKEN FOLLOWING DISCHARGE       Medication List        CONTINUE taking these medications      aspirin 81 MG Chew  Take 1 tablet (81 mg total) by mouth once daily.     bisacodyL 5 mg EC tablet  Commonly known as: DULCOLAX  Take 2 tablets (10 mg total) by mouth daily as needed for Constipation.     DROPLET PEN NEEDLE 32 gauge x 5/32" Ndle  Generic drug: pen needle, diabetic  USE ONE TIME DAILY AT BEDTIME     EASY TOUCH INSULIN SYRINGE 1 mL 31 gauge x 5/16 Syrg  Generic drug: insulin syringe-needle U-100     EScitalopram oxalate 20 MG tablet  Commonly known as: LEXAPRO  TAKE 1 TABLET EVERY DAY     finasteride 5 mg tablet  Commonly known as: PROSCAR  TAKE 1 TABLET EVERY DAY     gabapentin 600 MG tablet  Commonly known as: NEURONTIN  Take 1 tablet (600 mg total) by mouth 3 (three) times daily.     insulin " aspart U-100 100 unit/mL (3 mL) Inpn pen  Commonly known as: NovoLOG FlexPen U-100 Insulin  Inject 10 Units into the skin 3 (three) times daily before meals.     LEVEMIR FLEXTOUCH U100 INSULIN 100 unit/mL (3 mL) Inpn pen  Generic drug: insulin detemir U-100 (Levemir)     meclizine 25 mg tablet  Commonly known as: ANTIVERT  Take 1 tablet (25 mg total) by mouth 3 (three) times daily as needed for Dizziness or Nausea.     metFORMIN 500 MG tablet  Commonly known as: GLUCOPHAGE  Take 1 tablet (500 mg total) by mouth 2 (two) times daily with meals.     omeprazole 40 MG capsule  Commonly known as: PRILOSEC     oxybutynin 5 MG Tab  Commonly known as: DITROPAN  TAKE 1 TABLET EVERY DAY     QUEtiapine 25 MG Tab  Commonly known as: SEROQUEL     rosuvastatin 10 MG tablet  Commonly known as: CRESTOR  Take 1 tablet (10 mg total) by mouth once daily.     sildenafiL 100 MG tablet  Commonly known as: VIAGRA     tacrolimus 1 MG Cap  Commonly known as: PROGRAF  TAKE 3 CAPSULES EVERY 12 HOURS     tamsulosin 0.4 mg Cap  Commonly known as: FLOMAX  TAKE 1 CAPSULE EVERY DAY     * TRUE METRIX GLUCOSE TEST STRIP MISC     * TRUE METRIX GLUCOSE TEST STRIP Strp  Generic drug: blood sugar diagnostic  TEST BLOOD SUGAR FOUR TIMES DAILY     TRUEPLUS LANCETS 30 gauge Misc  Generic drug: lancets           * This list has 2 medication(s) that are the same as other medications prescribed for you. Read the directions carefully, and ask your doctor or other care provider to review them with you.                   IX.  DISCHARGE ORDERS AND INSTRUCTIONS    Discharge Procedure Orders   Diet Adult Regular     Diet Adult Regular     Notify your health care provider if you experience any of the following:  temperature >100.4     Notify your health care provider if you experience any of the following:  persistent nausea and vomiting or diarrhea     Notify your health care provider if you experience any of the following:  severe uncontrolled pain     Notify your  health care provider if you experience any of the following:  redness, tenderness, or signs of infection (pain, swelling, redness, odor or green/yellow discharge around incision site)     Notify your health care provider if you experience any of the following:  difficulty breathing or increased cough     Notify your health care provider if you experience any of the following:  temperature >100.4     Notify your health care provider if you experience any of the following:  persistent nausea and vomiting or diarrhea     Notify your health care provider if you experience any of the following:  severe uncontrolled pain     Notify your health care provider if you experience any of the following:  redness, tenderness, or signs of infection (pain, swelling, redness, odor or green/yellow discharge around incision site)     Notify your health care provider if you experience any of the following:  difficulty breathing or increased cough       X. DISCHARGE DISPOSITION          Home or Self Care      Yolanda Diaz M.D.  General Surgery, PGY-1

## 2023-05-22 ENCOUNTER — LAB VISIT (OUTPATIENT)
Dept: LAB | Facility: HOSPITAL | Age: 63
End: 2023-05-22
Attending: INTERNAL MEDICINE
Payer: MEDICARE

## 2023-05-22 DIAGNOSIS — Z94.4 LIVER REPLACED BY TRANSPLANT: ICD-10-CM

## 2023-05-22 DIAGNOSIS — Z94.4 LIVER REPLACED BY TRANSPLANT: Primary | ICD-10-CM

## 2023-05-22 LAB
ABS NEUT CALC (OHS): 2.33 X10(3)/MCL (ref 2.1–9.2)
ALBUMIN SERPL-MCNC: 4.9 G/DL (ref 3.4–5)
ALBUMIN/GLOB SERPL: 2 RATIO
ALP SERPL-CCNC: 80 UNIT/L (ref 50–144)
ALT SERPL-CCNC: 33 UNIT/L (ref 1–45)
ANION GAP SERPL CALC-SCNC: 10 MEQ/L (ref 2–13)
AST SERPL-CCNC: 46 UNIT/L (ref 17–59)
BILIRUBIN DIRECT+TOT PNL SERPL-MCNC: 0.8 MG/DL (ref 0–1)
BUN SERPL-MCNC: 10 MG/DL (ref 7–20)
CALCIUM SERPL-MCNC: 9.1 MG/DL (ref 8.4–10.2)
CHLORIDE SERPL-SCNC: 104 MMOL/L (ref 98–110)
CO2 SERPL-SCNC: 25 MMOL/L (ref 21–32)
CREAT SERPL-MCNC: 0.85 MG/DL (ref 0.66–1.25)
CREAT/UREA NIT SERPL: 12 (ref 12–20)
EOSINOPHIL NFR BLD MANUAL: 0.08 X10(3)/MCL (ref 0–0.9)
EOSINOPHIL NFR BLD MANUAL: 2 % (ref 0–8)
ERYTHROCYTE [DISTWIDTH] IN BLOOD BY AUTOMATED COUNT: 14.1 %
GFR SERPLBLD CREATININE-BSD FMLA CKD-EPI: >90 MLS/MIN/1.73/M2
GLOBULIN SER-MCNC: 2.5 GM/DL (ref 2–3.9)
GLUCOSE SERPL-MCNC: 213 MG/DL (ref 70–115)
HCT VFR BLD AUTO: 45.8 % (ref 36–52)
HGB BLD-MCNC: 15.7 G/DL (ref 13–18)
IMM GRANULOCYTES # BLD AUTO: 0.03 X10(3)/MCL (ref 0–0.03)
IMM GRANULOCYTES NFR BLD AUTO: 0.8 % (ref 0–0.5)
LYMPHOCYTES NFR BLD MANUAL: 1.2 X10(3)/MCL
LYMPHOCYTES NFR BLD MANUAL: 31 % (ref 13–40)
MCH RBC QN AUTO: 30.6 PG (ref 27–34)
MCHC RBC AUTO-ENTMCNC: 34.3 G/DL (ref 31–37)
MCV RBC AUTO: 89.3 FL (ref 79–99)
MONOCYTES NFR BLD MANUAL: 0.27 X10(3)/MCL (ref 0.1–1.3)
MONOCYTES NFR BLD MANUAL: 7 % (ref 2–11)
NEUTROPHILS NFR BLD MANUAL: 60 % (ref 47–80)
NRBC BLD AUTO-RTO: 0 %
PLATELET # BLD AUTO: 121 X10(3)/MCL (ref 140–371)
PLATELET # BLD EST: ADEQUATE 10*3/UL
PMV BLD AUTO: 11.4 FL (ref 9.4–12.4)
POTASSIUM SERPL-SCNC: 5 MMOL/L (ref 3.5–5.1)
PROT SERPL-MCNC: 7.4 GM/DL (ref 6.3–8.2)
RBC # BLD AUTO: 5.13 X10(6)/MCL (ref 4–6)
SODIUM SERPL-SCNC: 139 MMOL/L (ref 135–145)
WBC # SPEC AUTO: 3.88 X10(3)/MCL (ref 4–11.5)

## 2023-05-22 PROCEDURE — 85027 COMPLETE CBC AUTOMATED: CPT

## 2023-05-22 PROCEDURE — 80053 COMPREHEN METABOLIC PANEL: CPT

## 2023-05-22 PROCEDURE — 36415 COLL VENOUS BLD VENIPUNCTURE: CPT

## 2023-05-22 PROCEDURE — 80197 ASSAY OF TACROLIMUS: CPT | Mod: 90

## 2023-05-22 PROCEDURE — 85025 COMPLETE CBC W/AUTO DIFF WBC: CPT

## 2023-05-23 ENCOUNTER — TELEPHONE (OUTPATIENT)
Dept: SURGERY | Facility: CLINIC | Age: 63
End: 2023-05-23
Payer: MEDICARE

## 2023-05-23 LAB — TACROLIMUS TROUGH BLD-MCNC: 5.7 NG/ML

## 2023-05-23 NOTE — TELEPHONE ENCOUNTER
Spoke with patient as follow up from Lipoma removal in the OR by Dr. Cronin.  He reports feeling well, has no pain, and the Dermabond has peeled off.  Pathology was benign lipoma.  He verbalized understanding and will f/u as needed.  Reported to Dr. Cronin.

## 2023-05-24 ENCOUNTER — TELEPHONE (OUTPATIENT)
Dept: INTERNAL MEDICINE | Facility: CLINIC | Age: 63
End: 2023-05-24
Payer: MEDICARE

## 2023-05-24 DIAGNOSIS — G89.29 CHRONIC LOW BACK PAIN WITH SCIATICA, SCIATICA LATERALITY UNSPECIFIED, UNSPECIFIED BACK PAIN LATERALITY: Primary | ICD-10-CM

## 2023-05-24 DIAGNOSIS — Z94.4 LIVER REPLACED BY TRANSPLANT: ICD-10-CM

## 2023-05-24 DIAGNOSIS — M54.40 CHRONIC LOW BACK PAIN WITH SCIATICA, SCIATICA LATERALITY UNSPECIFIED, UNSPECIFIED BACK PAIN LATERALITY: Primary | ICD-10-CM

## 2023-06-07 ENCOUNTER — TELEPHONE (OUTPATIENT)
Dept: INTERNAL MEDICINE | Facility: CLINIC | Age: 63
End: 2023-06-07
Payer: MEDICARE

## 2023-06-07 NOTE — TELEPHONE ENCOUNTER
Spoke with patient at this time. Explained we could not increase to bid and that it is too soon to fill. Not due until next week. He verbalized understanding.

## 2023-06-08 ENCOUNTER — TELEPHONE (OUTPATIENT)
Dept: TRANSPLANT | Facility: CLINIC | Age: 63
End: 2023-06-08
Payer: MEDICARE

## 2023-06-08 DIAGNOSIS — Z94.4 LIVER REPLACED BY TRANSPLANT: ICD-10-CM

## 2023-06-08 RX ORDER — TACROLIMUS 1 MG/1
3 CAPSULE ORAL EVERY 12 HOURS
Qty: 540 CAPSULE | Refills: 11 | Status: CANCELLED | OUTPATIENT
Start: 2023-06-08

## 2023-06-08 NOTE — TELEPHONE ENCOUNTER
Attempted to return call to patient. No answer and unable to leave message.  Will retry to review labs.      ----- Message from Allegra Morrison RN sent at 6/7/2023  4:26 PM CDT -----  Regarding: FW: Consult/Advisory    ----- Message -----  From: Wilfrido Morrison  Sent: 6/7/2023   1:12 PM CDT  To: [facility]-Liver Transplant Clinical  Subject: Consult/Advisory                                 Consult/Advisory    Name Of Caller: Colin Salazarucet      Contact Preference: [phone] (home)      Nature of call: Patient calling to get lab results and to see if his prograf levels have changed the medication dosage. Requesting a call back.

## 2023-06-08 NOTE — TELEPHONE ENCOUNTER
----- Message from Elizabeth Meneses MD sent at 6/8/2023  2:31 PM CDT -----  Increase by 1 mg per day and repeat labs in one week  ----- Message -----  From: Allegra Morrison RN  Sent: 6/8/2023   1:58 PM CDT  To: Elizabeth Meneses MD    Prograf level is 5.7  ----- Message -----  From: Elizabeth Meneses MD  Sent: 6/8/2023   1:30 PM CDT  To: [facility]-Liver Transplant Clinical    Still waiting on prograf level- labs are up

## 2023-06-09 DIAGNOSIS — Z94.4 LIVER REPLACED BY TRANSPLANT: Primary | ICD-10-CM

## 2023-06-09 NOTE — TELEPHONE ENCOUNTER
Labs reviewed via telephone.  Pt confirmed current dose of prograf is 3/2.  Instructed to increase dose to 3/3.  Pt will begin tonight and will repeat labs 6/19/23.    ----- Message from Elizabeth Meneses MD sent at 6/8/2023  6:25 PM CDT -----  Yes tx  ----- Message -----  From: Allegra Morrison RN  Sent: 6/8/2023   4:51 PM CDT  To: Elizabeth Meneses MD    Please confirm.  Pt is currently on 3mg bid.  Did you mean 4mg in the am, 3 mg in the pm?  ----- Message -----  From: Elizabeth Meneses MD  Sent: 6/8/2023   2:31 PM CDT  To: Allegra Morrison RN    Increase by 1 mg per day and repeat labs in one week  ----- Message -----  From: Allegra Morrison RN  Sent: 6/8/2023   1:58 PM CDT  To: Elizabeth Meneses MD    Prograf level is 5.7  ----- Message -----  From: Elizabeth Meneses MD  Sent: 6/8/2023   1:30 PM CDT  To: [facility] Post-Liver Transplant Clinical    Still waiting on prograf level- labs are up

## 2023-06-12 DIAGNOSIS — G89.29 CHRONIC LOW BACK PAIN WITH SCIATICA, SCIATICA LATERALITY UNSPECIFIED, UNSPECIFIED BACK PAIN LATERALITY: ICD-10-CM

## 2023-06-12 DIAGNOSIS — Z94.4 LIVER TRANSPLANTED: ICD-10-CM

## 2023-06-12 DIAGNOSIS — E11.42 TYPE 2 DIABETES MELLITUS WITH DIABETIC POLYNEUROPATHY, WITH LONG-TERM CURRENT USE OF INSULIN: ICD-10-CM

## 2023-06-12 DIAGNOSIS — Z79.891 LONG TERM PRESCRIPTION OPIATE USE: ICD-10-CM

## 2023-06-12 DIAGNOSIS — Z79.4 TYPE 2 DIABETES MELLITUS WITH DIABETIC POLYNEUROPATHY, WITH LONG-TERM CURRENT USE OF INSULIN: ICD-10-CM

## 2023-06-12 DIAGNOSIS — M54.40 CHRONIC LOW BACK PAIN WITH SCIATICA, SCIATICA LATERALITY UNSPECIFIED, UNSPECIFIED BACK PAIN LATERALITY: ICD-10-CM

## 2023-06-12 RX ORDER — HYDROCODONE BITARTRATE AND ACETAMINOPHEN 10; 325 MG/1; MG/1
1 TABLET ORAL DAILY PRN
Qty: 30 TABLET | Refills: 0 | Status: SHIPPED | OUTPATIENT
Start: 2023-06-12 | End: 2023-07-06 | Stop reason: SDUPTHER

## 2023-06-22 DIAGNOSIS — E11.42 DIABETIC PERIPHERAL NEUROPATHY: ICD-10-CM

## 2023-06-22 RX ORDER — FINASTERIDE 5 MG/1
TABLET, FILM COATED ORAL
Qty: 90 TABLET | Refills: 1 | Status: SHIPPED | OUTPATIENT
Start: 2023-06-22 | End: 2023-08-30 | Stop reason: SDUPTHER

## 2023-06-25 ENCOUNTER — HOSPITAL ENCOUNTER (EMERGENCY)
Facility: HOSPITAL | Age: 63
Discharge: HOME | End: 2023-06-25
Attending: INTERNAL MEDICINE
Payer: MEDICARE

## 2023-06-25 ENCOUNTER — NURSE TRIAGE (OUTPATIENT)
Dept: ADMINISTRATIVE | Facility: CLINIC | Age: 63
End: 2023-06-25
Payer: MEDICARE

## 2023-06-25 VITALS
WEIGHT: 195 LBS | BODY MASS INDEX: 27.3 KG/M2 | TEMPERATURE: 98 F | DIASTOLIC BLOOD PRESSURE: 99 MMHG | HEIGHT: 71 IN | RESPIRATION RATE: 20 BRPM | OXYGEN SATURATION: 97 % | HEART RATE: 78 BPM | SYSTOLIC BLOOD PRESSURE: 147 MMHG

## 2023-06-25 DIAGNOSIS — R11.2 NAUSEA AND VOMITING, UNSPECIFIED VOMITING TYPE: Primary | ICD-10-CM

## 2023-06-25 DIAGNOSIS — R10.84 GENERALIZED ABDOMINAL PAIN: ICD-10-CM

## 2023-06-25 DIAGNOSIS — R19.7 DIARRHEA: ICD-10-CM

## 2023-06-25 DIAGNOSIS — A08.4 VIRAL GASTROENTERITIS: ICD-10-CM

## 2023-06-25 LAB
ALBUMIN SERPL-MCNC: 4 G/DL (ref 3.4–4.8)
ALBUMIN/GLOB SERPL: 1.3 RATIO (ref 1.1–2)
ALP SERPL-CCNC: 81 UNIT/L (ref 40–150)
ALT SERPL-CCNC: 23 UNIT/L (ref 0–55)
AMMONIA PLAS-MSCNC: 43.4 UMOL/L (ref 18–72)
APPEARANCE UR: CLEAR
AST SERPL-CCNC: 24 UNIT/L (ref 5–34)
BACTERIA #/AREA URNS AUTO: ABNORMAL /HPF
BASOPHILS # BLD AUTO: 0.03 X10(3)/MCL
BASOPHILS NFR BLD AUTO: 0.6 %
BILIRUB UR QL STRIP.AUTO: NEGATIVE MG/DL
BILIRUBIN DIRECT+TOT PNL SERPL-MCNC: 1.3 MG/DL
BUN SERPL-MCNC: 11 MG/DL (ref 8.4–25.7)
CALCIUM SERPL-MCNC: 9.1 MG/DL (ref 8.8–10)
CHLORIDE SERPL-SCNC: 103 MMOL/L (ref 98–107)
CO2 SERPL-SCNC: 22 MMOL/L (ref 23–31)
COLOR UR: YELLOW
CREAT SERPL-MCNC: 0.98 MG/DL (ref 0.73–1.18)
EOSINOPHIL # BLD AUTO: 0.07 X10(3)/MCL (ref 0–0.9)
EOSINOPHIL NFR BLD AUTO: 1.5 %
ERYTHROCYTE [DISTWIDTH] IN BLOOD BY AUTOMATED COUNT: 14.6 % (ref 11.5–17)
GFR SERPLBLD CREATININE-BSD FMLA CKD-EPI: >60 MLS/MIN/1.73/M2
GLOBULIN SER-MCNC: 3 GM/DL (ref 2.4–3.5)
GLUCOSE SERPL-MCNC: 231 MG/DL (ref 82–115)
GLUCOSE UR QL STRIP.AUTO: 500 MG/DL
HCT VFR BLD AUTO: 41.9 % (ref 42–52)
HGB BLD-MCNC: 14.5 G/DL (ref 14–18)
IMM GRANULOCYTES # BLD AUTO: 0.03 X10(3)/MCL (ref 0–0.04)
IMM GRANULOCYTES NFR BLD AUTO: 0.6 %
KETONES UR QL STRIP.AUTO: NEGATIVE MG/DL
LEUKOCYTE ESTERASE UR QL STRIP.AUTO: NEGATIVE UNIT/L
LYMPHOCYTES # BLD AUTO: 1.11 X10(3)/MCL (ref 0.6–4.6)
LYMPHOCYTES NFR BLD AUTO: 23.3 %
MCH RBC QN AUTO: 31.2 PG (ref 27–31)
MCHC RBC AUTO-ENTMCNC: 34.6 G/DL (ref 33–36)
MCV RBC AUTO: 90.1 FL (ref 80–94)
MONOCYTES # BLD AUTO: 0.58 X10(3)/MCL (ref 0.1–1.3)
MONOCYTES NFR BLD AUTO: 12.2 %
MUCOUS THREADS URNS QL MICRO: ABNORMAL /LPF
NEUTROPHILS # BLD AUTO: 2.95 X10(3)/MCL (ref 2.1–9.2)
NEUTROPHILS NFR BLD AUTO: 61.8 %
NITRITE UR QL STRIP.AUTO: NEGATIVE
PH UR STRIP.AUTO: 6 [PH]
PLATELET # BLD AUTO: 121 X10(3)/MCL (ref 130–400)
PMV BLD AUTO: 11.3 FL (ref 7.4–10.4)
POTASSIUM SERPL-SCNC: 3.6 MMOL/L (ref 3.5–5.1)
PROT SERPL-MCNC: 7 GM/DL (ref 5.8–7.6)
PROT UR QL STRIP.AUTO: 30 MG/DL
RBC # BLD AUTO: 4.65 X10(6)/MCL (ref 4.7–6.1)
RBC #/AREA URNS AUTO: ABNORMAL /HPF
RBC UR QL AUTO: NEGATIVE UNIT/L
SODIUM SERPL-SCNC: 137 MMOL/L (ref 136–145)
SP GR UR STRIP.AUTO: >=1.03
SPERM URNS QL MICRO: ABNORMAL /HPF
SQUAMOUS #/AREA URNS AUTO: ABNORMAL /HPF
UROBILINOGEN UR STRIP-ACNC: 0.2 MG/DL
WBC # SPEC AUTO: 4.77 X10(3)/MCL (ref 4.5–11.5)
WBC #/AREA URNS AUTO: ABNORMAL /HPF

## 2023-06-25 PROCEDURE — 96374 THER/PROPH/DIAG INJ IV PUSH: CPT

## 2023-06-25 PROCEDURE — 25000003 PHARM REV CODE 250: Performed by: NURSE PRACTITIONER

## 2023-06-25 PROCEDURE — 63600175 PHARM REV CODE 636 W HCPCS: Performed by: NURSE PRACTITIONER

## 2023-06-25 PROCEDURE — 81001 URINALYSIS AUTO W/SCOPE: CPT | Performed by: NURSE PRACTITIONER

## 2023-06-25 PROCEDURE — 82140 ASSAY OF AMMONIA: CPT | Performed by: NURSE PRACTITIONER

## 2023-06-25 PROCEDURE — 80053 COMPREHEN METABOLIC PANEL: CPT | Performed by: NURSE PRACTITIONER

## 2023-06-25 PROCEDURE — 96372 THER/PROPH/DIAG INJ SC/IM: CPT | Mod: 59 | Performed by: NURSE PRACTITIONER

## 2023-06-25 PROCEDURE — 99285 EMERGENCY DEPT VISIT HI MDM: CPT | Mod: 25

## 2023-06-25 PROCEDURE — 85025 COMPLETE CBC W/AUTO DIFF WBC: CPT | Performed by: NURSE PRACTITIONER

## 2023-06-25 RX ORDER — DICYCLOMINE HYDROCHLORIDE 10 MG/ML
20 INJECTION INTRAMUSCULAR
Status: COMPLETED | OUTPATIENT
Start: 2023-06-25 | End: 2023-06-25

## 2023-06-25 RX ORDER — CIPROFLOXACIN 500 MG/1
500 TABLET ORAL
Status: COMPLETED | OUTPATIENT
Start: 2023-06-25 | End: 2023-06-25

## 2023-06-25 RX ORDER — ONDANSETRON 2 MG/ML
4 INJECTION INTRAMUSCULAR; INTRAVENOUS
Status: COMPLETED | OUTPATIENT
Start: 2023-06-25 | End: 2023-06-25

## 2023-06-25 RX ORDER — METRONIDAZOLE 500 MG/1
500 TABLET ORAL EVERY 12 HOURS
Qty: 14 TABLET | Refills: 0 | Status: SHIPPED | OUTPATIENT
Start: 2023-06-25 | End: 2023-07-02

## 2023-06-25 RX ORDER — METRONIDAZOLE 250 MG/1
500 TABLET ORAL
Status: COMPLETED | OUTPATIENT
Start: 2023-06-25 | End: 2023-06-25

## 2023-06-25 RX ORDER — SODIUM CHLORIDE 9 MG/ML
1000 INJECTION, SOLUTION INTRAVENOUS
Status: COMPLETED | OUTPATIENT
Start: 2023-06-25 | End: 2023-06-25

## 2023-06-25 RX ORDER — CIPROFLOXACIN 500 MG/1
500 TABLET ORAL 2 TIMES DAILY
Qty: 14 TABLET | Refills: 0 | Status: SHIPPED | OUTPATIENT
Start: 2023-06-25 | End: 2023-07-02

## 2023-06-25 RX ORDER — DICYCLOMINE HYDROCHLORIDE 20 MG/1
20 TABLET ORAL 2 TIMES DAILY
Qty: 20 TABLET | Refills: 0 | Status: SHIPPED | OUTPATIENT
Start: 2023-06-25 | End: 2023-07-05

## 2023-06-25 RX ORDER — ONDANSETRON 4 MG/1
4 TABLET, ORALLY DISINTEGRATING ORAL 3 TIMES DAILY PRN
Qty: 10 TABLET | Refills: 0 | Status: SHIPPED | OUTPATIENT
Start: 2023-06-25 | End: 2023-06-28

## 2023-06-25 RX ADMIN — ONDANSETRON 4 MG: 2 INJECTION INTRAMUSCULAR; INTRAVENOUS at 08:06

## 2023-06-25 RX ADMIN — SODIUM CHLORIDE 1000 ML: 9 INJECTION, SOLUTION INTRAVENOUS at 06:06

## 2023-06-25 RX ADMIN — CIPROFLOXACIN 500 MG: 500 TABLET, FILM COATED ORAL at 08:06

## 2023-06-25 RX ADMIN — DICYCLOMINE HYDROCHLORIDE 20 MG: 20 INJECTION, SOLUTION INTRAMUSCULAR at 08:06

## 2023-06-25 RX ADMIN — METRONIDAZOLE 500 MG: 250 TABLET ORAL at 08:06

## 2023-06-25 NOTE — ED PROVIDER NOTES
Encounter Date: 6/25/2023       History     Chief Complaint   Patient presents with    Diarrhea     Pt states diarrhea x 4 days with 4 episodes of vomiting. Hx of liver transplant  states he talked to transplant doc who instructed him to come to er.     Patient is a 62-year-old male with significant history of a liver transplant, he is followed by the transplant team in [place].  The patient states that he started having diarrhea last Tuesday, states that he initially attributed it to food poisoning, he can not identify particular foods that would cause situation, he states the diarrhea persisted, that he started to have a a vague diffuse abdominal pain, worse in his right upper quadrant midepigastric region and left lower quadrant, he states that he became nauseated at about 1:00 a.m. and had to vomit at that time.  He called the transplant team at [place] and they advised to come to the emergency department for about patient and treatment.  He denies any chest pain, denies any shortness of breath, he appears uncomfortable but not toxic, he is afebrile.  He is compliant with his anti rejection medications, he is a diabetic and states that he does his blood sugars in the average no higher than 200.  He denies any other associated complaints or conditions, he states that he does not leave his house and has not had any guests or sick contacts.    Review of patient's allergies indicates:  No Known Allergies  Past Medical History:   Diagnosis Date    Alcohol abuse, in remission 07/08/2014    Quit 01/04, 2011    Anemia of chronic disease 05/15/2020    Chronic back pain 07/08/2014    Hepatitis C virus infection 07/08/2014    tx with interferon 3-4 mos- stopped for unclear reasons Tattoos-first at age 17 only risk factor (HCVAB negative 08/2017)    Splenomegaly 07/08/2014     Past Surgical History:   Procedure Laterality Date    CARPAL TUNNEL RELEASE Bilateral     CHOLECYSTECTOMY      lap    COLONOSCOPY N/A  09/08/2017    Procedure: COLONOSCOPY;  Surgeon: Savannah Llaons MD;  Location: [facility];  Service: Endoscopy;  Laterality: N/A;    COLONOSCOPY Left 03/14/2018    Procedure: COLONOSCOPY;  Surgeon: Savannah Llanos MD;  Location: [facility] ENDO;  Service: Endoscopy;  Laterality: Left;    COLONOSCOPY  02/21/2019    COLONOSCOPY W/ POLYPECTOMY  06/19/2014    ESOPHAGOGASTRODUODENOSCOPY  01/2014    ESOPHAGOGASTRODUODENOSCOPY  02/15/2017    grade II varices    ESOPHAGOGASTRODUODENOSCOPY  04/10/2017    grade I varices    ESOPHAGOGASTRODUODENOSCOPY N/A 10/18/2019    Procedure: EGD (ESOPHAGOGASTRODUODENOSCOPY);  Surgeon: Flavio Escalera MD;  Location: [facility] (2ND FLR);  Service: Endoscopy;  Laterality: N/A;    ESOPHAGOGASTRODUODENOSCOPY W/ BANDING  01/26/2017    grade II varices    HERNIA REPAIR      LIPOMA RESECTION Right 5/5/2023    Procedure: EXCISION, LIPOMA Right Lateral Neck;  Surgeon: Jim Cronin MD;  [address]  Service: General;  Laterality: Right;    LIVER TRANSPLANT N/A 05/17/2020    Procedure: TRANSPLANT, LIVER;  Surgeon: Danny Thakkar MD;  Location: [address];  Service: Transplant;  Laterality: N/A;    NECK SURGERY      fusion on C5 and C6    THROMBECTOMY  05/17/2020    Procedure: THROMBECTOMY;  Surgeon: Danny Thakkar MD;  Location: [facility] OR [address] [place] [address];  Service: Transplant;;  portal vein thrombectomy    ULNAR NERVE TRANSPOSITION Left     UMBILICAL HERNIA REPAIR N/A 02/22/2020    Procedure: REPAIR, HERNIA, PRIMARY WIHTOUT MESH AND PLACEMENT OF DRAIN;  Surgeon: Sherri Brunner MD;  [facility];  Service: Transplant;  Laterality: N/A;    vericose veins removed       Family History   Problem Relation Age of Onset    Hyperlipidemia Mother     No Known Problems Father 36        accident on oil rig    No Known Problems Sister     Cancer Brother         kidney    Alcohol abuse Brother     No Known Problems Sister      Social History     Tobacco Use     Smoking status: Former     Types: Cigarettes     Quit date: 2010     Years since quittin.4    Smokeless tobacco: Former     Types: Chew     Quit date: 1990    Tobacco comments:     former 1 pack/week quit 2010   Substance Use Topics    Alcohol use: No     Comment: former heavy beer but quit 2010    Drug use: No     Review of Systems   All other systems reviewed and are negative.    Physical Exam     Initial Vitals [23 1646]   BP Pulse Resp Temp SpO2   127/89 78 18 98.3 °F (36.8 °C) 99 %      MAP       --         Physical Exam    Constitutional: He appears well-developed and well-nourished.   HENT:   Head: Normocephalic and atraumatic.   Mouth/Throat: Oropharynx is clear and moist.   Eyes: Conjunctivae are normal. Pupils are equal, round, and reactive to light.   Neck: Neck supple.   Normal range of motion.  Cardiovascular:  Normal rate, regular rhythm and normal heart sounds.           Pulmonary/Chest: Breath sounds normal.   Abdominal: Abdomen is protuberant. Bowel sounds are normal. He exhibits shifting dullness and distension. There is generalized abdominal tenderness and tenderness in the right upper quadrant, epigastric area and left lower quadrant. There is rigidity.   Musculoskeletal:         General: Normal range of motion.      Cervical back: Normal range of motion and neck supple.     Neurological: He is alert and oriented to person, place, and time. He has normal strength. GCS score is 15. GCS eye subscore is 4. GCS verbal subscore is 5. GCS motor subscore is 6.   Skin: Skin is warm and dry.   Psychiatric: He has a normal mood and affect. His behavior is normal. Judgment and thought content normal.       ED Course   Procedures  Labs Reviewed   COMPREHENSIVE METABOLIC PANEL - Abnormal; Notable for the following components:       Result Value    Carbon Dioxide 22 (*)     Glucose Level 231 (*)     All other components within normal limits   URINALYSIS, REFLEX TO URINE CULTURE -  Abnormal; Notable for the following components:    Protein, UA 30 (*)     Glucose,  (*)     All other components within normal limits   CBC WITH DIFFERENTIAL - Abnormal; Notable for the following components:    RBC 4.65 (*)     Hct 41.9 (*)     MCH 31.2 (*)     Platelet 121 (*)     MPV 11.3 (*)     All other components within normal limits   URINALYSIS, MICROSCOPIC - Abnormal; Notable for the following components:    Mucous, UA Small (*)     Sperm, UA Rare (*)     All other components within normal limits   AMMONIA - Normal   CBC W/ AUTO DIFFERENTIAL    Narrative:     The following orders were created for panel order CBC auto differential.  Procedure                               Abnormality         Status                     ---------                               -----------         ------                     CBC with Differential[[id number]]        Abnormal            Final result                 Please view results for these tests on the individual orders.        Admission on 06/25/2023, Discharged on 06/25/2023   Component Date Value Ref Range Status    Sodium Level 06/25/2023 137  136 - 145 mmol/L Final    Potassium Level 06/25/2023 3.6  3.5 - 5.1 mmol/L Final    Chloride 06/25/2023 103  98 - 107 mmol/L Final    Carbon Dioxide 06/25/2023 22 (L)  23 - 31 mmol/L Final    Glucose Level 06/25/2023 231 (H)  82 - 115 mg/dL Final    Blood Urea Nitrogen 06/25/2023 11.0  8.4 - 25.7 mg/dL Final    Creatinine 06/25/2023 0.98  0.73 - 1.18 mg/dL Final    Calcium Level Total 06/25/2023 9.1  8.8 - 10.0 mg/dL Final    Protein Total 06/25/2023 7.0  5.8 - 7.6 gm/dL Final    Albumin Level 06/25/2023 4.0  3.4 - 4.8 g/dL Final    Globulin 06/25/2023 3.0  2.4 - 3.5 gm/dL Final    Albumin/Globulin Ratio 06/25/2023 1.3  1.1 - 2.0 ratio Final    Bilirubin Total 06/25/2023 1.3  <=1.5 mg/dL Final    Alkaline Phosphatase 06/25/2023 81  40 - 150 unit/L Final    Alanine Aminotransferase 06/25/2023 23  0 - 55 unit/L Final    Aspartate  Aminotransferase 06/25/2023 24  5 - 34 unit/L Final    eGFR 06/25/2023 >60  mls/min/1.73/m2 Final    Ammonia Level 06/25/2023 43.4  18.0 - 72.0 umol/L Final    Color, UA 06/25/2023 Yellow  Yellow, Light-Yellow, Dark Yellow, Denita, Straw Final    Appearance, UA 06/25/2023 Clear  Clear Final    Specific Gravity, UA 06/25/2023 >=1.030   Final    pH, UA 06/25/2023 6.0  5.0 - 8.5 Final    Protein, UA 06/25/2023 30 (A)  Negative mg/dL Final    Glucose, UA 06/25/2023 500 (A)  Negative, Normal mg/dL Final    Ketones, UA 06/25/2023 Negative  Negative mg/dL Final    Blood, UA 06/25/2023 Negative  Negative unit/L Final    Bilirubin, UA 06/25/2023 Negative  Negative mg/dL Final    Urobilinogen, UA 06/25/2023 0.2  0.2, 1.0, Normal mg/dL Final    Nitrites, UA 06/25/2023 Negative  Negative Final    Leukocyte Esterase, UA 06/25/2023 Negative  Negative unit/L Final    WBC 06/25/2023 4.77  4.50 - 11.50 x10(3)/mcL Final    RBC 06/25/2023 4.65 (L)  4.70 - 6.10 x10(6)/mcL Final    Hgb 06/25/2023 14.5  14.0 - 18.0 g/dL Final    Hct 06/25/2023 41.9 (L)  42.0 - 52.0 % Final    MCV 06/25/2023 90.1  80.0 - 94.0 fL Final    MCH 06/25/2023 31.2 (H)  27.0 - 31.0 pg Final    MCHC 06/25/2023 34.6  33.0 - 36.0 g/dL Final    RDW 06/25/2023 14.6  11.5 - 17.0 % Final    Platelet 06/25/2023 121 (L)  130 - 400 x10(3)/mcL Final    MPV 06/25/2023 11.3 (H)  7.4 - 10.4 fL Final    Neut % 06/25/2023 61.8  % Final    Lymph % 06/25/2023 23.3  % Final    Mono % 06/25/2023 12.2  % Final    Eos % 06/25/2023 1.5  % Final    Basophil % 06/25/2023 0.6  % Final    Lymph # 06/25/2023 1.11  0.6 - 4.6 x10(3)/mcL Final    Neut # 06/25/2023 2.95  2.1 - 9.2 x10(3)/mcL Final    Mono # 06/25/2023 0.58  0.1 - 1.3 x10(3)/mcL Final    Eos # 06/25/2023 0.07  0 - 0.9 x10(3)/mcL Final    Baso # 06/25/2023 0.03  <=0.2 x10(3)/mcL Final    IG# 06/25/2023 0.03  0 - 0.04 x10(3)/mcL Final    IG% 06/25/2023 0.6  % Final    Bacteria, UA 06/25/2023 None Seen  None Seen, Rare, Occasional  /HPF Final    Mucous, UA 06/25/2023 Small (A)  None Seen /LPF Final    Sperm, UA 06/25/2023 Rare (A)  None Seen /HPF Final    RBC, UA 06/25/2023 0-2  None Seen, 0-2, 3-5, 0-5 /HPF Final    WBC, UA 06/25/2023 None Seen  None Seen, 0-2, 3-5, 0-5 /HPF Final    Squamous Epithelial Cells, UA 06/25/2023 Rare  None Seen, Rare, Occasional, Occ /HPF Final        Imaging Results              CT Abdomen Pelvis  Without Contrast (Preliminary result)  Result time 06/25/23 17:31:09      Preliminary result by León Wan MD (06/25/23 17:31:09)                   Narrative:    START OF REPORT:  Technique: CT of the abdomen and pelvis was performed with axial images as well as sagittal and coronal reconstruction images without intravenous contrast.    Comparison: Comparison is with study dated 2022-12-07 15:29:49.    Clinical History: Diarrhea, abdominal pain.    Dosage Information: Automated Exposure Control was utilized 278.43 mGy.cm.    Findings:  Lines and Tubes: None.  Thorax:  Lungs: There is minimal nonspecific dependent change at the lung bases.  Pleura: No effusions or thickening.  Heart: The heart size is within normal limits.  Abdomen:  Abdominal Wall: No abdominal wall pathology is seen.  Liver: The liver appears unremarkable. Note of surgical clips anterior to segment IV of the liver which may be from known liver transplantation.  Biliary System: No intrahepatic or extrahepatic biliary duct dilatation is seen.  Gallbladder: Surgical clips are seen in the gallbladder fossa which may reflect prior cholecystectomy correlate with surgical history.  Pancreas: The pancreas appears unremarkable.  Spleen: The spleen appears unremarkable.  Adrenals: The adrenal glands appear unremarkable.  Kidneys: The kidneys appear unremarkable with no stones cysts masses or hydronephrosis on this noncontrast study.  Aorta: There is mild calcification of the abdominal aorta.  IVC: Unremarkable.  Bowel:  Esophagus: The visualized esophagus  appears unremarkable.  Stomach: The stomach appears unremarkable.  Duodenum: Unremarkable appearing duodenum.  Small Bowel: The small bowel appears unremarkable.  Colon: Nondistended.  Appendix: The appendix appears unremarkable and is seen on Image 102, Series 2 through Image 112, Series 2.  Peritoneum: No intraperitoneal free air or ascites is seen.    Pelvis: There are multiple surgical staples noted in the lower anterior pelvic wall, correlate with surgical history.  Bladder: The bladder is nondistended and cannot be definitively evaluated.  Male:  Prostate gland: The prostate gland appears unremarkable.  Inguinal Findings: Incidental note is made of a few prominent inguinal lymph nodes bilaterally.  Inguinal Hernia: Incidental note is made of small uncomplicated mesenteric fat containing bilateral inguinal hernias.    Bony structures:  Dorsal Spine: There is mild to moderate spondylosis of the visualized dorsal spine.  Bony Pelvis: The visualized bony structures of the pelvis appear unremarkable.      Impression:  1. No acute intraabdominal or pelvic solid organ or bowel pathology identified. Details and findings as discussed.                                         Medications   0.9%  NaCl infusion (0 mLs Intravenous Stopped 6/25/23 1900)   ciprofloxacin HCl tablet 500 mg (500 mg Oral Given 6/25/23 2035)   metroNIDAZOLE tablet 500 mg (500 mg Oral Given 6/25/23 2036)   ondansetron injection 4 mg (4 mg Intravenous Given 6/25/23 2036)   dicyclomine injection 20 mg (20 mg Intramuscular Given 6/25/23 2035)      This patient presents with abdominal pain of unclear etiology. A CT scan was performed to evaluate for potential causes of the abdominal pain, however, neither the clinical exam nor the CT has identified an emergent etiology for the abdominal pain. Specifically, given the benign exam, the laboratory studies, and unremarkable CT, I have a very low suspicion for appendicitis, ischemic bowel, bowel perforation,  or any other life threatening disease. I have discussed with the patient the level of uncertainty with undifferentiated abdominal pain and clearly explained the need to follow-up as noted on the discharge instructions, or return to the Emergency Department immediately if the pain worsens, develops fever, persistent and uncontrollable vomiting, or for any new symptoms or concerns.            ED Course as of 06/26/23 0049   Sun Jun 25, 2023   0225 CT abdomen pelvis no acute findings, grossly unremarkable    Differential diagnosis is include food poisoning, viral gastroenteritis, sepsis, dehydration, acquired illness related to being immunocompromised, acute abdominal process   [EB]   1800 The patient's electrolytes are within normal limits, his renal function is good, his LFTs are within normal limits, he does not have a white count, 4.77, UA was negative for acute findings, ammonia level is 43.4 just similar does baseline findings upon chart review.  CT results pending [EB]   2030 Discussion was had with the patient, reassurance was given to, he asked about staying in the hospital and I advised him that due is rejection medications that he was not acutely ill and he was at higher risk staying in the hospital going home, I will cover him with Cipro and Flagyl, I will give him Bentyl along with Zofran with instructions to stay hydrated, advance his diet as tolerated, return to the emergency department if his condition worsens and follow up with his PCP/transplant team this week.  He agreed with this plan of care and will be discharged at this time. [EB]      ED Course User Index  [EB] FABIO Brasher                 Clinical Impression:   Final diagnoses:  [R19.7] Diarrhea  [R11.2] Nausea and vomiting, unspecified vomiting type (Primary)  [A08.4] Viral gastroenteritis  [R10.84] Generalized abdominal pain        ED Disposition Condition    Discharge Stable          ED Prescriptions       Medication Sig Dispense  Start Date End Date Auth. Provider    ciprofloxacin HCl (CIPRO) 500 MG tablet Take 1 tablet (500 mg total) by mouth 2 (two) times daily. for 7 days 14 tablet 6/25/2023 7/2/2023 FABIO Brasher    metroNIDAZOLE (FLAGYL) 500 MG tablet Take 1 tablet (500 mg total) by mouth every 12 (twelve) hours. for 7 days 14 tablet 6/25/2023 7/2/2023 FABIO Brasher    dicyclomine (BENTYL) 20 mg tablet Take 1 tablet (20 mg total) by mouth 2 (two) times daily. for 10 days 20 tablet 6/25/2023 7/5/2023 FABIO Brasher    ondansetron (ZOFRAN-ODT) 4 MG TbDL Take 1 tablet (4 mg total) by mouth 3 (three) times daily as needed (nausea and vomiting). 10 tablet 6/25/2023 6/28/2023 FABIO Brasher          Follow-up Information       Follow up With Specialties Details Why Contact Info    Preston Matthew II, MD Internal Medicine In 2 days  [address] [facility] [id number] [phone]      [facility] - Emergency Dept Emergency Medicine  As needed, If symptoms worsen [address] [facility] [id number] [phone]             FABIO Brasher  06/26/23 0049

## 2023-06-25 NOTE — TELEPHONE ENCOUNTER
Spoke with patient who states she has been having diarrhea since last Tuesday.  Patient states he is having abdominal pain.  He states he does not feel right.  Patient initially stated yes and no when asked if shock was suspected reviewed symptoms with him.  He then stated no.  Patient states yes to being confused.  Advised that he call [id number]. Patient verbalized understanding.  He is home with another adult.  Care coordination contact information line #3 of the [facility] was given to patient in case the ambulance brings him to the hospital.  The rest did not apply as he was advised to call EMS-911 vs ED dispo.   Reason for Disposition   Difficult to awaken or acting confused (e.g., disoriented, slurred speech)    Additional Information   Negative: Shock suspected (e.g., cold/pale/clammy skin, too weak to stand, low BP, rapid pulse)    Protocols used: Diarrhea-A-AH

## 2023-06-26 ENCOUNTER — LAB VISIT (OUTPATIENT)
Dept: LAB | Facility: HOSPITAL | Age: 63
End: 2023-06-26
Attending: INTERNAL MEDICINE
Payer: MEDICARE

## 2023-06-26 DIAGNOSIS — Z94.4 LIVER REPLACED BY TRANSPLANT: ICD-10-CM

## 2023-06-26 LAB
ALBUMIN SERPL-MCNC: 4.5 G/DL (ref 3.4–4.8)
ALBUMIN/GLOB SERPL: 1.3 RATIO (ref 1.1–2)
ALP SERPL-CCNC: 91 UNIT/L (ref 40–150)
ALT SERPL-CCNC: 25 UNIT/L (ref 0–55)
AST SERPL-CCNC: 27 UNIT/L (ref 5–34)
BASOPHILS # BLD AUTO: 0.05 X10(3)/MCL
BASOPHILS NFR BLD AUTO: 1 %
BILIRUBIN DIRECT+TOT PNL SERPL-MCNC: 1.4 MG/DL
BUN SERPL-MCNC: 9 MG/DL (ref 8.4–25.7)
CALCIUM SERPL-MCNC: 9.4 MG/DL (ref 8.8–10)
CHLORIDE SERPL-SCNC: 104 MMOL/L (ref 98–107)
CO2 SERPL-SCNC: 23 MMOL/L (ref 23–31)
CREAT SERPL-MCNC: 0.87 MG/DL (ref 0.73–1.18)
EOSINOPHIL # BLD AUTO: 0.1 X10(3)/MCL (ref 0–0.9)
EOSINOPHIL NFR BLD AUTO: 2 %
ERYTHROCYTE [DISTWIDTH] IN BLOOD BY AUTOMATED COUNT: 14.6 % (ref 11.5–17)
GFR SERPLBLD CREATININE-BSD FMLA CKD-EPI: >60 MLS/MIN/1.73/M2
GLOBULIN SER-MCNC: 3.6 GM/DL (ref 2.4–3.5)
GLUCOSE SERPL-MCNC: 190 MG/DL (ref 82–115)
HCT VFR BLD AUTO: 49.1 % (ref 42–52)
HGB BLD-MCNC: 16.4 G/DL (ref 14–18)
IMM GRANULOCYTES # BLD AUTO: 0.03 X10(3)/MCL (ref 0–0.04)
IMM GRANULOCYTES NFR BLD AUTO: 0.6 %
LYMPHOCYTES # BLD AUTO: 1.14 X10(3)/MCL (ref 0.6–4.6)
LYMPHOCYTES NFR BLD AUTO: 23.3 %
MCH RBC QN AUTO: 30.8 PG (ref 27–31)
MCHC RBC AUTO-ENTMCNC: 33.4 G/DL (ref 33–36)
MCV RBC AUTO: 92.3 FL (ref 80–94)
MONOCYTES # BLD AUTO: 0.55 X10(3)/MCL (ref 0.1–1.3)
MONOCYTES NFR BLD AUTO: 11.2 %
NEUTROPHILS # BLD AUTO: 3.03 X10(3)/MCL (ref 2.1–9.2)
NEUTROPHILS NFR BLD AUTO: 61.9 %
PLATELET # BLD AUTO: 133 X10(3)/MCL (ref 130–400)
PMV BLD AUTO: 11.6 FL (ref 7.4–10.4)
POTASSIUM SERPL-SCNC: 3.8 MMOL/L (ref 3.5–5.1)
PROT SERPL-MCNC: 8.1 GM/DL (ref 5.8–7.6)
RBC # BLD AUTO: 5.32 X10(6)/MCL (ref 4.7–6.1)
SODIUM SERPL-SCNC: 139 MMOL/L (ref 136–145)
WBC # SPEC AUTO: 4.9 X10(3)/MCL (ref 4.5–11.5)

## 2023-06-26 PROCEDURE — 36415 COLL VENOUS BLD VENIPUNCTURE: CPT

## 2023-06-26 PROCEDURE — 85025 COMPLETE CBC W/AUTO DIFF WBC: CPT

## 2023-06-26 PROCEDURE — 80197 ASSAY OF TACROLIMUS: CPT

## 2023-06-26 PROCEDURE — 80053 COMPREHEN METABOLIC PANEL: CPT

## 2023-06-27 LAB — TACROLIMUS TROUGH BLD-MCNC: 5.2 NG/ML

## 2023-06-30 ENCOUNTER — TELEPHONE (OUTPATIENT)
Dept: TRANSPLANT | Facility: CLINIC | Age: 63
End: 2023-06-30
Payer: MEDICARE

## 2023-06-30 NOTE — LETTER
June 30, 2023    Colin Reilly  [address]  [facility] [address]          Dear Colin Salazarucet:  MRN: [id number]    This is a follow up to your recent labs, your lab results were stable.  There are no medicine changes.  Please have your labs drawn again on 7/10/23.      If you cannot have your labs drawn on the scheduled date, it is your responsibility to call the transplant department to reschedule.  Please call [phone] and ask to speak to [facility]  for all scheduling requests.     Sincerely,        Your Liver Transplant Coordinator    [facility]  [address] [place] [phone]

## 2023-06-30 NOTE — TELEPHONE ENCOUNTER
----- Message from Elizabeth Meneses MD sent at 6/30/2023  2:19 PM CDT -----  Regarding: RE: Diarrhea  Refer to GI  ----- Message -----  From: Allegra Morrison RN  Sent: 6/28/2023   4:56 PM CDT  To: Elizabeth Meneses MD, Louann Suarez MD  Subject: Diarrhea                                         Pt states diarrhea, nausea & vomiting for the past 6 days now.  Called on-call and was instructed to report to ED.  Went to ED 6/25 and discharged. Labs and imaging were unremarkable.  Differential diagnosis was food poisoning, viral gastroenteritis, sepsis, dehydration, acquired illness related to being immunocompromised, acute abdominal process.  Meds at d/c Cipro, Flagyl, Bentyl and Zofran.  Diarrhea continues but nausea and vomiting has resolved.  Pt not on Cellcept, just [place].  Please advise  ----- Message -----  From: Louann Suarez MD  Sent: 6/26/2023   3:24 PM CDT  To: [facility]    Labs ok awaiting immunosuppression level

## 2023-06-30 NOTE — TELEPHONE ENCOUNTER
Telephone call to patient for lab review.  Labs stable with no medication changes warranted.  Will repeat labs 7/10/23.     ----- Message from Michelle Spivey MD sent at 6/29/2023  1:49 PM CDT -----  Lab reviewed, stable, labs per protocol

## 2023-07-03 DIAGNOSIS — Z94.4 LIVER REPLACED BY TRANSPLANT: Primary | ICD-10-CM

## 2023-07-06 DIAGNOSIS — M54.40 CHRONIC LOW BACK PAIN WITH SCIATICA, SCIATICA LATERALITY UNSPECIFIED, UNSPECIFIED BACK PAIN LATERALITY: ICD-10-CM

## 2023-07-06 DIAGNOSIS — Z79.891 LONG TERM PRESCRIPTION OPIATE USE: ICD-10-CM

## 2023-07-06 DIAGNOSIS — G89.29 CHRONIC LOW BACK PAIN WITH SCIATICA, SCIATICA LATERALITY UNSPECIFIED, UNSPECIFIED BACK PAIN LATERALITY: ICD-10-CM

## 2023-07-06 DIAGNOSIS — Z94.4 LIVER TRANSPLANTED: ICD-10-CM

## 2023-07-06 RX ORDER — HYDROCODONE BITARTRATE AND ACETAMINOPHEN 10; 325 MG/1; MG/1
1 TABLET ORAL DAILY PRN
Qty: 30 TABLET | Refills: 0 | Status: SHIPPED | OUTPATIENT
Start: 2023-07-06 | End: 2023-08-01 | Stop reason: SDUPTHER

## 2023-07-06 NOTE — TELEPHONE ENCOUNTER
----- Message from Pricilla Traylor sent at 7/6/2023 10:12 AM CDT -----  Regarding: refill  Type:  RX Refill Request    Who Called: Colin   Refill or New Rx:refill  RX Name and Strength:HYDROcodone-acetaminophen (NORCO)  mg per tablet  How is the patient currently taking it? (ex. 1XDay):  Is this a 30 day or 90 day RX:  Preferred Pharmacy with phone number:[facility]  Local or Mail Order:  Ordering Provider:  Would the patient rather a call back or a response via [facility]?   Best Call Back Number:  Additional Information:

## 2023-07-19 ENCOUNTER — LAB VISIT (OUTPATIENT)
Dept: LAB | Facility: HOSPITAL | Age: 63
End: 2023-07-19
Attending: INTERNAL MEDICINE
Payer: MEDICARE

## 2023-07-19 DIAGNOSIS — Z94.4 LIVER REPLACED BY TRANSPLANT: ICD-10-CM

## 2023-07-19 LAB
ALBUMIN SERPL-MCNC: 4.7 G/DL (ref 3.4–4.8)
ALBUMIN/GLOB SERPL: 1.6 RATIO (ref 1.1–2)
ALP SERPL-CCNC: 65 UNIT/L (ref 40–150)
ALT SERPL-CCNC: 28 UNIT/L (ref 0–55)
AST SERPL-CCNC: 27 UNIT/L (ref 5–34)
BASOPHILS # BLD AUTO: 0.02 X10(3)/MCL
BASOPHILS NFR BLD AUTO: 0.4 %
BILIRUBIN DIRECT+TOT PNL SERPL-MCNC: 1.2 MG/DL
BUN SERPL-MCNC: 9 MG/DL (ref 8.4–25.7)
CALCIUM SERPL-MCNC: 9.8 MG/DL (ref 8.8–10)
CHLORIDE SERPL-SCNC: 103 MMOL/L (ref 98–107)
CO2 SERPL-SCNC: 27 MMOL/L (ref 23–31)
CREAT SERPL-MCNC: 1.02 MG/DL (ref 0.73–1.18)
EOSINOPHIL # BLD AUTO: 0.07 X10(3)/MCL (ref 0–0.9)
EOSINOPHIL NFR BLD AUTO: 1.6 %
ERYTHROCYTE [DISTWIDTH] IN BLOOD BY AUTOMATED COUNT: 15.3 % (ref 11.5–17)
GFR SERPLBLD CREATININE-BSD FMLA CKD-EPI: >60 MLS/MIN/1.73/M2
GLOBULIN SER-MCNC: 3 GM/DL (ref 2.4–3.5)
GLUCOSE SERPL-MCNC: 236 MG/DL (ref 82–115)
HCT VFR BLD AUTO: 48 % (ref 42–52)
HGB BLD-MCNC: 16.3 G/DL (ref 14–18)
IMM GRANULOCYTES # BLD AUTO: 0.02 X10(3)/MCL (ref 0–0.04)
IMM GRANULOCYTES NFR BLD AUTO: 0.4 %
LYMPHOCYTES # BLD AUTO: 0.97 X10(3)/MCL (ref 0.6–4.6)
LYMPHOCYTES NFR BLD AUTO: 21.8 %
MCH RBC QN AUTO: 32 PG (ref 27–31)
MCHC RBC AUTO-ENTMCNC: 34 G/DL (ref 33–36)
MCV RBC AUTO: 94.1 FL (ref 80–94)
MONOCYTES # BLD AUTO: 0.36 X10(3)/MCL (ref 0.1–1.3)
MONOCYTES NFR BLD AUTO: 8.1 %
NEUTROPHILS # BLD AUTO: 3.01 X10(3)/MCL (ref 2.1–9.2)
NEUTROPHILS NFR BLD AUTO: 67.7 %
PLATELET # BLD AUTO: 147 X10(3)/MCL (ref 130–400)
PMV BLD AUTO: 11.5 FL (ref 7.4–10.4)
POTASSIUM SERPL-SCNC: 4.9 MMOL/L (ref 3.5–5.1)
PROT SERPL-MCNC: 7.7 GM/DL (ref 5.8–7.6)
RBC # BLD AUTO: 5.1 X10(6)/MCL (ref 4.7–6.1)
SODIUM SERPL-SCNC: 140 MMOL/L (ref 136–145)
WBC # SPEC AUTO: 4.45 X10(3)/MCL (ref 4.5–11.5)

## 2023-07-19 PROCEDURE — 36415 COLL VENOUS BLD VENIPUNCTURE: CPT

## 2023-07-19 PROCEDURE — 85025 COMPLETE CBC W/AUTO DIFF WBC: CPT

## 2023-07-19 PROCEDURE — 80053 COMPREHEN METABOLIC PANEL: CPT

## 2023-07-19 PROCEDURE — 80197 ASSAY OF TACROLIMUS: CPT

## 2023-07-20 LAB — TACROLIMUS TROUGH BLD-MCNC: 6.4 NG/ML

## 2023-07-24 ENCOUNTER — TELEPHONE (OUTPATIENT)
Dept: TRANSPLANT | Facility: CLINIC | Age: 63
End: 2023-07-24
Payer: MEDICARE

## 2023-07-24 NOTE — TELEPHONE ENCOUNTER
Telephone call to patient for lab review.  Labs stable with no medication changes warranted.  Will repeat labs 10/9/23 per protocol.      ----- Message from Franky Pascal MD sent at 7/20/2023  3:30 PM CDT -----  Results reviewed. No change in immunosuppression

## 2023-08-01 DIAGNOSIS — M54.40 CHRONIC LOW BACK PAIN WITH SCIATICA, SCIATICA LATERALITY UNSPECIFIED, UNSPECIFIED BACK PAIN LATERALITY: ICD-10-CM

## 2023-08-01 DIAGNOSIS — G89.29 CHRONIC LOW BACK PAIN WITH SCIATICA, SCIATICA LATERALITY UNSPECIFIED, UNSPECIFIED BACK PAIN LATERALITY: ICD-10-CM

## 2023-08-01 DIAGNOSIS — Z94.4 LIVER TRANSPLANTED: ICD-10-CM

## 2023-08-01 DIAGNOSIS — Z79.891 LONG TERM PRESCRIPTION OPIATE USE: ICD-10-CM

## 2023-08-01 RX ORDER — HYDROCODONE BITARTRATE AND ACETAMINOPHEN 10; 325 MG/1; MG/1
1 TABLET ORAL DAILY PRN
Qty: 30 TABLET | Refills: 0 | Status: SHIPPED | OUTPATIENT
Start: 2023-08-01 | End: 2023-08-31 | Stop reason: SDUPTHER

## 2023-08-01 NOTE — TELEPHONE ENCOUNTER
----- Message from Juana Reeves sent at 8/1/2023  9:15 AM CDT -----  Regarding: refill  Type:  RX Refill Request    Who Called: pt  Refill or New Rx:refill  RX Name and Strength:HYDROcodone-acetaminophen (NORCO)  mg per tablet  How is the patient currently taking it? (ex. 1XDay):1xday  Is this a 30 day or 90 day RX:30  [facility] [address]  Local or Mail Order:local  Ordering Provider:lissette rasmussen  Would the patient rather a call back or a response via [facility]?   Best Call Back Number:[phone]  Additional Information: pt called about needing a refill on medication

## 2023-08-22 DIAGNOSIS — E11.42 DIABETIC PERIPHERAL NEUROPATHY: ICD-10-CM

## 2023-08-22 RX ORDER — ESCITALOPRAM OXALATE 20 MG/1
TABLET ORAL
Qty: 90 TABLET | Refills: 1 | Status: SHIPPED | OUTPATIENT
Start: 2023-08-22 | End: 2023-08-30 | Stop reason: SDUPTHER

## 2023-08-30 ENCOUNTER — TELEPHONE (OUTPATIENT)
Dept: INTERNAL MEDICINE | Facility: CLINIC | Age: 63
End: 2023-08-30
Payer: MEDICARE

## 2023-08-30 DIAGNOSIS — K72.10 END STAGE LIVER DISEASE: ICD-10-CM

## 2023-08-30 DIAGNOSIS — E11.42 DIABETIC PERIPHERAL NEUROPATHY: ICD-10-CM

## 2023-08-30 RX ORDER — FINASTERIDE 5 MG/1
5 TABLET, FILM COATED ORAL DAILY
Qty: 90 TABLET | Refills: 1 | Status: SHIPPED | OUTPATIENT
Start: 2023-08-30 | End: 2024-02-28

## 2023-08-30 RX ORDER — OXYBUTYNIN CHLORIDE 5 MG/1
5 TABLET ORAL DAILY
Qty: 90 TABLET | Refills: 3 | Status: SHIPPED | OUTPATIENT
Start: 2023-08-30

## 2023-08-30 RX ORDER — ESCITALOPRAM OXALATE 20 MG/1
20 TABLET ORAL DAILY
Qty: 90 TABLET | Refills: 1 | Status: SHIPPED | OUTPATIENT
Start: 2023-08-30

## 2023-08-30 NOTE — TELEPHONE ENCOUNTER
----- Message from Ranjan Curiel sent at 8/30/2023  9:01 AM CDT -----  .Type:  RX Refill Request    Who Called: Pharmacy tech  Refill or New Rx:Refill  RX Name and Strength:oxybutynin (DITROPAN) 5 MG Tab  How is the patient currently taking it? (ex. 1XDay):1xday  Is this a 30 day or 90 day RX:90 day  Preferred Pharmacy with phone number:[facility] [address] [address]  Local or Mail Order:local  Ordering Provider:  Would the patient rather a call back or a response via [facility]?   Best Call Back [phone]  Additional Information: pt [facility]     .Type:  RX Refill Request    Who Called: Pharmacy Tech  Refill or New Rx:Refill  RX Name and Strength:finasteride (PROSCAR) 5 mg tablet  How is the patient currently taking it? (ex. 1XDay):1xday  Is this a 30 day or 90 day RX:90 day  Preferred Pharmacy with phone number:[facility] [address] [place] [address]  Local or Mail Order:local  Ordering Provider:  Would the patient rather a call back or a response via [facility]?   Best Call Back [phone]  Additional Information: pt no longer wants to use [facility]     .Type:  RX Refill Request    Who Called: Pharmacy Tech  Refill or New Rx:Refill  RX Name and Strength:EScitalopram oxalate (LEXAPRO) 20 MG tablet  How is the patient currently taking it? (ex. 1XDay):1xday  Is this a 30 day or 90 day RX:90 day  Preferred Pharmacy with phone number:[facility] [address] [place] [address]  Local or Mail Order:local  Ordering Provider:  Would the patient rather a call back or a response via [facility]? Call back  Best Call Back [phone]  Additional Information:

## 2023-08-31 DIAGNOSIS — Z79.891 LONG TERM PRESCRIPTION OPIATE USE: ICD-10-CM

## 2023-08-31 DIAGNOSIS — G89.29 CHRONIC LOW BACK PAIN WITH SCIATICA, SCIATICA LATERALITY UNSPECIFIED, UNSPECIFIED BACK PAIN LATERALITY: ICD-10-CM

## 2023-08-31 DIAGNOSIS — Z94.4 LIVER TRANSPLANTED: ICD-10-CM

## 2023-08-31 DIAGNOSIS — M54.40 CHRONIC LOW BACK PAIN WITH SCIATICA, SCIATICA LATERALITY UNSPECIFIED, UNSPECIFIED BACK PAIN LATERALITY: ICD-10-CM

## 2023-08-31 RX ORDER — HYDROCODONE BITARTRATE AND ACETAMINOPHEN 10; 325 MG/1; MG/1
1 TABLET ORAL DAILY PRN
Qty: 30 TABLET | Refills: 0 | Status: SHIPPED | OUTPATIENT
Start: 2023-08-31 | End: 2023-09-27 | Stop reason: SDUPTHER

## 2023-08-31 NOTE — TELEPHONE ENCOUNTER
----- Message from Ranjan Moisés sent at 8/31/2023  2:08 PM CDT -----  .Type:  RX Refill Request    Who Called: pt  Refill or New Rx:Refill  RX Name and Strength:HYDROcodone-acetaminophen (NORCO)  mg per tablet  How is the patient currently taking it? (ex. 1XDay):Take 1 tablet by mouth daily as needed for Pain. - Oral  Is this a 30 day or 90 day RX:30 day  Preferred Pharmacy with phone number:[facility] - [address] [place] [address]  Local or Mail Order:local  Ordering Provider:  Would the patient rather a call back or a response via [facility]? Call back  Best Call Back Number:  Additional Information:

## 2023-09-08 ENCOUNTER — LAB VISIT (OUTPATIENT)
Dept: LAB | Facility: HOSPITAL | Age: 63
End: 2023-09-08
Attending: INTERNAL MEDICINE
Payer: MEDICARE

## 2023-09-08 DIAGNOSIS — F32.5 MAJOR DEPRESSION IN REMISSION: ICD-10-CM

## 2023-09-08 DIAGNOSIS — E78.5 DYSLIPIDEMIA: ICD-10-CM

## 2023-09-08 DIAGNOSIS — R16.1 SPLENOMEGALY: ICD-10-CM

## 2023-09-08 DIAGNOSIS — Z79.891 LONG TERM PRESCRIPTION OPIATE USE: ICD-10-CM

## 2023-09-08 DIAGNOSIS — Z79.4 TYPE 2 DIABETES MELLITUS WITH DIABETIC POLYNEUROPATHY, WITH LONG-TERM CURRENT USE OF INSULIN: ICD-10-CM

## 2023-09-08 DIAGNOSIS — Z79.60 LONG-TERM USE OF IMMUNOSUPPRESSANT MEDICATION: ICD-10-CM

## 2023-09-08 DIAGNOSIS — G89.29 CHRONIC LOW BACK PAIN WITH SCIATICA, SCIATICA LATERALITY UNSPECIFIED, UNSPECIFIED BACK PAIN LATERALITY: ICD-10-CM

## 2023-09-08 DIAGNOSIS — B19.21 HEPATITIS C VIRUS INFECTION WITH HEPATIC COMA, UNSPECIFIED CHRONICITY: ICD-10-CM

## 2023-09-08 DIAGNOSIS — F10.11 ALCOHOL ABUSE, IN REMISSION: ICD-10-CM

## 2023-09-08 DIAGNOSIS — D63.8 ANEMIA OF CHRONIC DISEASE: ICD-10-CM

## 2023-09-08 DIAGNOSIS — M54.40 CHRONIC LOW BACK PAIN WITH SCIATICA, SCIATICA LATERALITY UNSPECIFIED, UNSPECIFIED BACK PAIN LATERALITY: ICD-10-CM

## 2023-09-08 DIAGNOSIS — E11.42 TYPE 2 DIABETES MELLITUS WITH DIABETIC POLYNEUROPATHY, WITH LONG-TERM CURRENT USE OF INSULIN: ICD-10-CM

## 2023-09-08 DIAGNOSIS — E11.42 DIABETIC PERIPHERAL NEUROPATHY: ICD-10-CM

## 2023-09-08 DIAGNOSIS — Z94.4 HISTORY OF LIVER TRANSPLANT: ICD-10-CM

## 2023-09-08 DIAGNOSIS — I10 PRIMARY HYPERTENSION: ICD-10-CM

## 2023-09-08 DIAGNOSIS — K72.10 END STAGE LIVER DISEASE: ICD-10-CM

## 2023-09-08 LAB
ALBUMIN SERPL-MCNC: 4.7 G/DL (ref 3.4–4.8)
ALBUMIN/GLOB SERPL: 1.5 RATIO (ref 1.1–2)
ALP SERPL-CCNC: 80 UNIT/L (ref 40–150)
ALT SERPL-CCNC: 25 UNIT/L (ref 0–55)
AMPHET UR QL SCN: NEGATIVE
APPEARANCE UR: CLEAR
AST SERPL-CCNC: 28 UNIT/L (ref 5–34)
BARBITURATE SCN PRESENT UR: NEGATIVE
BASOPHILS # BLD AUTO: 0.04 X10(3)/MCL
BASOPHILS NFR BLD AUTO: 0.9 %
BENZODIAZ UR QL SCN: NEGATIVE
BILIRUB SERPL-MCNC: 1.3 MG/DL
BILIRUB UR QL STRIP.AUTO: NEGATIVE
BUN SERPL-MCNC: 7 MG/DL (ref 8.4–25.7)
CALCIUM SERPL-MCNC: 9.9 MG/DL (ref 8.8–10)
CANNABINOIDS UR QL SCN: NEGATIVE
CHLORIDE SERPL-SCNC: 105 MMOL/L (ref 98–107)
CHOLEST SERPL-MCNC: 156 MG/DL
CHOLEST/HDLC SERPL: 4 {RATIO} (ref 0–5)
CO2 SERPL-SCNC: 24 MMOL/L (ref 23–31)
COCAINE UR QL SCN: NEGATIVE
COLOR UR: YELLOW
CREAT SERPL-MCNC: 0.8 MG/DL (ref 0.73–1.18)
CREAT UR-MCNC: 75.7 MG/DL (ref 63–166)
EOSINOPHIL # BLD AUTO: 0.06 X10(3)/MCL (ref 0–0.9)
EOSINOPHIL NFR BLD AUTO: 1.3 %
ERYTHROCYTE [DISTWIDTH] IN BLOOD BY AUTOMATED COUNT: 13.4 % (ref 11.5–17)
EST. AVERAGE GLUCOSE BLD GHB EST-MCNC: 134.1 MG/DL
FENTANYL UR QL SCN: NEGATIVE
GFR SERPLBLD CREATININE-BSD FMLA CKD-EPI: >60 MLS/MIN/1.73/M2
GLOBULIN SER-MCNC: 3.2 GM/DL (ref 2.4–3.5)
GLUCOSE SERPL-MCNC: 134 MG/DL (ref 82–115)
GLUCOSE UR QL STRIP.AUTO: NEGATIVE
HBA1C MFR BLD: 6.3 %
HCT VFR BLD AUTO: 46.8 % (ref 42–52)
HDLC SERPL-MCNC: 42 MG/DL (ref 35–60)
HGB BLD-MCNC: 16.7 G/DL (ref 14–18)
IMM GRANULOCYTES # BLD AUTO: 0.05 X10(3)/MCL (ref 0–0.04)
IMM GRANULOCYTES NFR BLD AUTO: 1.1 %
KETONES UR QL STRIP.AUTO: ABNORMAL
LDLC SERPL CALC-MCNC: 48 MG/DL (ref 50–140)
LEUKOCYTE ESTERASE UR QL STRIP.AUTO: NEGATIVE
LYMPHOCYTES # BLD AUTO: 1.12 X10(3)/MCL (ref 0.6–4.6)
LYMPHOCYTES NFR BLD AUTO: 24.2 %
MCH RBC QN AUTO: 32.9 PG (ref 27–31)
MCHC RBC AUTO-ENTMCNC: 35.7 G/DL (ref 33–36)
MCV RBC AUTO: 92.1 FL (ref 80–94)
MDMA UR QL SCN: NEGATIVE
MICROALBUMIN UR-MCNC: 12 UG/ML
MICROALBUMIN/CREAT RATIO PNL UR: 15.9 MG/GM CR (ref 0–30)
MONOCYTES # BLD AUTO: 0.26 X10(3)/MCL (ref 0.1–1.3)
MONOCYTES NFR BLD AUTO: 5.6 %
NEUTROPHILS # BLD AUTO: 3.1 X10(3)/MCL (ref 2.1–9.2)
NEUTROPHILS NFR BLD AUTO: 66.9 %
NITRITE UR QL STRIP.AUTO: NEGATIVE
OPIATES UR QL SCN: NEGATIVE
PCP UR QL: NEGATIVE
PH UR STRIP.AUTO: 5.5 [PH]
PH UR: 5.5 [PH] (ref 3–11)
PLATELET # BLD AUTO: 142 X10(3)/MCL (ref 130–400)
PMV BLD AUTO: 11.9 FL (ref 7.4–10.4)
POTASSIUM SERPL-SCNC: 4.2 MMOL/L (ref 3.5–5.1)
PROT SERPL-MCNC: 7.9 GM/DL (ref 5.8–7.6)
PROT UR QL STRIP.AUTO: NEGATIVE
RBC # BLD AUTO: 5.08 X10(6)/MCL (ref 4.7–6.1)
RBC UR QL AUTO: NEGATIVE
SODIUM SERPL-SCNC: 142 MMOL/L (ref 136–145)
SP GR UR STRIP.AUTO: 1.01 (ref 1–1.03)
SPECIFIC GRAVITY, URINE AUTO (.000) (OHS): 1.01 (ref 1–1.03)
TRIGL SERPL-MCNC: 329 MG/DL (ref 34–140)
TSH SERPL-ACNC: 0.94 UIU/ML (ref 0.35–4.94)
UROBILINOGEN UR STRIP-ACNC: 0.2
VLDLC SERPL CALC-MCNC: 66 MG/DL
WBC # SPEC AUTO: 4.63 X10(3)/MCL (ref 4.5–11.5)

## 2023-09-08 PROCEDURE — 84443 ASSAY THYROID STIM HORMONE: CPT

## 2023-09-08 PROCEDURE — 80307 DRUG TEST PRSMV CHEM ANLYZR: CPT

## 2023-09-08 PROCEDURE — 36415 COLL VENOUS BLD VENIPUNCTURE: CPT

## 2023-09-08 PROCEDURE — 85025 COMPLETE CBC W/AUTO DIFF WBC: CPT

## 2023-09-08 PROCEDURE — 81003 URINALYSIS AUTO W/O SCOPE: CPT | Mod: 59

## 2023-09-08 PROCEDURE — 82043 UR ALBUMIN QUANTITATIVE: CPT

## 2023-09-08 PROCEDURE — 80061 LIPID PANEL: CPT

## 2023-09-08 PROCEDURE — 83036 HEMOGLOBIN GLYCOSYLATED A1C: CPT

## 2023-09-08 PROCEDURE — 80053 COMPREHEN METABOLIC PANEL: CPT

## 2023-09-11 ENCOUNTER — TELEPHONE (OUTPATIENT)
Dept: INTERNAL MEDICINE | Facility: CLINIC | Age: 63
End: 2023-09-11
Payer: MEDICARE

## 2023-09-11 NOTE — TELEPHONE ENCOUNTER
----- Message from April Stewart sent at 9/11/2023  9:53 AM CDT -----  Regarding: med advice  .Type:  Needs Medical Advice    Who Called:  [facility]  Symptoms (please be specific):    How long has patient had these symptoms:    Pharmacy name and phone #:    Would the patient rather a call back or a response via [facility]? Call back  Best Call Back Number:  [phone]  Additional Information:  clarification is needed on the patient's metformin prescription. Please advise.

## 2023-09-26 ENCOUNTER — TELEPHONE (OUTPATIENT)
Dept: INTERNAL MEDICINE | Facility: CLINIC | Age: 63
End: 2023-09-26
Payer: MEDICARE

## 2023-09-26 DIAGNOSIS — N40.0 BENIGN PROSTATIC HYPERPLASIA, UNSPECIFIED WHETHER LOWER URINARY TRACT SYMPTOMS PRESENT: ICD-10-CM

## 2023-09-26 RX ORDER — TAMSULOSIN HYDROCHLORIDE 0.4 MG/1
1 CAPSULE ORAL DAILY
Qty: 90 CAPSULE | Refills: 3 | Status: SHIPPED | OUTPATIENT
Start: 2023-09-26 | End: 2023-12-28

## 2023-09-26 NOTE — TELEPHONE ENCOUNTER
Received a call from [facility] on behalf of the patient.  The patient no longer wants to use the mail service for his Tamsulosin.  RX has been sent to [facility].

## 2023-09-27 DIAGNOSIS — G89.29 CHRONIC LOW BACK PAIN WITH SCIATICA, SCIATICA LATERALITY UNSPECIFIED, UNSPECIFIED BACK PAIN LATERALITY: ICD-10-CM

## 2023-09-27 DIAGNOSIS — M54.40 CHRONIC LOW BACK PAIN WITH SCIATICA, SCIATICA LATERALITY UNSPECIFIED, UNSPECIFIED BACK PAIN LATERALITY: ICD-10-CM

## 2023-09-27 DIAGNOSIS — Z94.4 LIVER TRANSPLANTED: ICD-10-CM

## 2023-09-27 DIAGNOSIS — Z79.891 LONG TERM PRESCRIPTION OPIATE USE: ICD-10-CM

## 2023-09-27 RX ORDER — HYDROCODONE BITARTRATE AND ACETAMINOPHEN 10; 325 MG/1; MG/1
1 TABLET ORAL DAILY PRN
Qty: 30 TABLET | Refills: 0 | Status: SHIPPED | OUTPATIENT
Start: 2023-09-27 | End: 2023-10-23 | Stop reason: SDUPTHER

## 2023-09-27 NOTE — TELEPHONE ENCOUNTER
----- Message from Juanastarla Reeves sent at 9/27/2023 10:49 AM CDT -----  Regarding: refilll  Type:  RX Refill Request    Who Called:pt  Refill or New Rx:refill  RX Name and Strength:HYDROcodone-acetaminophen (NORCO)  mg per tablet  How is the patient currently taking it? (ex. 1XDay):  Is this a 30 day or 90 day RX:  Preferred Pharmacy with phone number:[facility]  Local or Mail Order:local  Ordering Provider:lissette rasmussen  Would the patient rather a call back or a response via [facility]?   Best Call Back Number:[phone]  Additional Information: pt called about needing a refill on medication. Please advise

## 2023-10-10 ENCOUNTER — LAB VISIT (OUTPATIENT)
Dept: LAB | Facility: HOSPITAL | Age: 63
End: 2023-10-10
Attending: INTERNAL MEDICINE
Payer: MEDICARE

## 2023-10-10 DIAGNOSIS — Z94.4 LIVER REPLACED BY TRANSPLANT: ICD-10-CM

## 2023-10-10 LAB
ALBUMIN SERPL-MCNC: 4.5 G/DL (ref 3.4–4.8)
ALBUMIN/GLOB SERPL: 1.7 RATIO (ref 1.1–2)
ALP SERPL-CCNC: 66 UNIT/L (ref 40–150)
ALT SERPL-CCNC: 17 UNIT/L (ref 0–55)
AST SERPL-CCNC: 19 UNIT/L (ref 5–34)
BASOPHILS # BLD AUTO: 0.06 X10(3)/MCL
BASOPHILS NFR BLD AUTO: 1.1 %
BILIRUB SERPL-MCNC: 1.5 MG/DL
BUN SERPL-MCNC: 8 MG/DL (ref 8.4–25.7)
CALCIUM SERPL-MCNC: 9.3 MG/DL (ref 8.8–10)
CHLORIDE SERPL-SCNC: 103 MMOL/L (ref 98–107)
CO2 SERPL-SCNC: 27 MMOL/L (ref 23–31)
CREAT SERPL-MCNC: 0.84 MG/DL (ref 0.73–1.18)
EOSINOPHIL # BLD AUTO: 0.09 X10(3)/MCL (ref 0–0.9)
EOSINOPHIL NFR BLD AUTO: 1.7 %
ERYTHROCYTE [DISTWIDTH] IN BLOOD BY AUTOMATED COUNT: 14.3 % (ref 11.5–17)
GFR SERPLBLD CREATININE-BSD FMLA CKD-EPI: >60 MLS/MIN/1.73/M2
GLOBULIN SER-MCNC: 2.7 GM/DL (ref 2.4–3.5)
GLUCOSE SERPL-MCNC: 185 MG/DL (ref 82–115)
HCT VFR BLD AUTO: 46.7 % (ref 42–52)
HGB BLD-MCNC: 16.3 G/DL (ref 14–18)
IMM GRANULOCYTES # BLD AUTO: 0.06 X10(3)/MCL (ref 0–0.04)
IMM GRANULOCYTES NFR BLD AUTO: 1.1 %
LYMPHOCYTES # BLD AUTO: 1.12 X10(3)/MCL (ref 0.6–4.6)
LYMPHOCYTES NFR BLD AUTO: 21.4 %
MCH RBC QN AUTO: 32.9 PG (ref 27–31)
MCHC RBC AUTO-ENTMCNC: 34.9 G/DL (ref 33–36)
MCV RBC AUTO: 94.3 FL (ref 80–94)
MONOCYTES # BLD AUTO: 0.34 X10(3)/MCL (ref 0.1–1.3)
MONOCYTES NFR BLD AUTO: 6.5 %
NEUTROPHILS # BLD AUTO: 3.57 X10(3)/MCL (ref 2.1–9.2)
NEUTROPHILS NFR BLD AUTO: 68.2 %
PLATELET # BLD AUTO: 140 X10(3)/MCL (ref 130–400)
PMV BLD AUTO: 11.6 FL (ref 7.4–10.4)
POTASSIUM SERPL-SCNC: 4.4 MMOL/L (ref 3.5–5.1)
PROT SERPL-MCNC: 7.2 GM/DL (ref 5.8–7.6)
RBC # BLD AUTO: 4.95 X10(6)/MCL (ref 4.7–6.1)
SODIUM SERPL-SCNC: 139 MMOL/L (ref 136–145)
WBC # SPEC AUTO: 5.24 X10(3)/MCL (ref 4.5–11.5)

## 2023-10-10 PROCEDURE — 80053 COMPREHEN METABOLIC PANEL: CPT

## 2023-10-10 PROCEDURE — 36415 COLL VENOUS BLD VENIPUNCTURE: CPT

## 2023-10-10 PROCEDURE — 80197 ASSAY OF TACROLIMUS: CPT

## 2023-10-10 PROCEDURE — 85025 COMPLETE CBC W/AUTO DIFF WBC: CPT

## 2023-10-11 LAB — TACROLIMUS TROUGH BLD-MCNC: 4.7 NG/ML

## 2023-10-13 DIAGNOSIS — Z94.4 LIVER REPLACED BY TRANSPLANT: ICD-10-CM

## 2023-10-13 NOTE — TELEPHONE ENCOUNTER
Telephone call to patient for lab review.  Labs stable, will increase prograf to 4/3. Will repeat labs 11/13/23 per protocol.  Pt repeated back instructions with verbalized understanding.     ----- Message from Elizabeth Meneses MD sent at 10/11/2023  9:16 PM CDT -----  The Labs are stable; increase prograf to 4/3 from 3/3 and repeat labs in one months - please let patient know.

## 2023-10-14 RX ORDER — TACROLIMUS 1 MG/1
CAPSULE ORAL
Qty: 540 CAPSULE | Refills: 11 | Status: SHIPPED | OUTPATIENT
Start: 2023-10-14 | End: 2023-11-21 | Stop reason: DRUGHIGH

## 2023-10-17 ENCOUNTER — HOSPITAL ENCOUNTER (EMERGENCY)
Facility: HOSPITAL | Age: 63
Discharge: HOME | End: 2023-10-17
Attending: FAMILY MEDICINE
Payer: MEDICARE

## 2023-10-17 VITALS
HEART RATE: 91 BPM | BODY MASS INDEX: 26.11 KG/M2 | RESPIRATION RATE: 18 BRPM | WEIGHT: 186.5 LBS | OXYGEN SATURATION: 95 % | SYSTOLIC BLOOD PRESSURE: 162 MMHG | DIASTOLIC BLOOD PRESSURE: 93 MMHG | TEMPERATURE: 99 F | HEIGHT: 71 IN

## 2023-10-17 DIAGNOSIS — S02.2XXA CLOSED FRACTURE OF NASAL BONE, INITIAL ENCOUNTER: ICD-10-CM

## 2023-10-17 DIAGNOSIS — S00.83XA CONTUSION OF FACE, INITIAL ENCOUNTER: ICD-10-CM
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
## 2023-10-17 PROCEDURE — 99284 EMERGENCY DEPT VISIT MOD MDM: CPT | Mod: 25

## 2023-10-17 PROCEDURE — 25000003 PHARM REV CODE 250: Performed by: FAMILY MEDICINE

## 2023-10-17 RX ORDER — OXYCODONE AND ACETAMINOPHEN 5; 325 MG/1; MG/1
2 TABLET ORAL
Status: COMPLETED | OUTPATIENT
Start: 2023-10-17 | End: 2023-10-17

## 2023-10-17 RX ADMIN — OXYCODONE HYDROCHLORIDE AND ACETAMINOPHEN 2 TABLET: 5; 325 TABLET ORAL at 08:10

## 2023-10-17 NOTE — ED NOTES
Pt seen and evaluated by Dr. Hopkins. Pt has no complaints at this time and verbalized understanding of education. Pt ambulated out of ER with steady gait.  No acute distress noted. See provider notes.
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
## 2023-10-23 DIAGNOSIS — Z94.4 LIVER TRANSPLANTED: ICD-10-CM

## 2023-10-23 DIAGNOSIS — M54.40 CHRONIC LOW BACK PAIN WITH SCIATICA, SCIATICA LATERALITY UNSPECIFIED, UNSPECIFIED BACK PAIN LATERALITY: ICD-10-CM

## 2023-10-23 DIAGNOSIS — Z79.891 LONG TERM PRESCRIPTION OPIATE USE: ICD-10-CM

## 2023-10-23 DIAGNOSIS — G89.29 CHRONIC LOW BACK PAIN WITH SCIATICA, SCIATICA LATERALITY UNSPECIFIED, UNSPECIFIED BACK PAIN LATERALITY: ICD-10-CM

## 2023-10-23 RX ORDER — HYDROCODONE BITARTRATE AND ACETAMINOPHEN 10; 325 MG/1; MG/1
1 TABLET ORAL DAILY PRN
Qty: 30 TABLET | Refills: 0 | Status: SHIPPED | OUTPATIENT
Start: 2023-10-23 | End: 2023-11-20

## 2023-10-23 NOTE — TELEPHONE ENCOUNTER
----- Message from Ranjan Moisés sent at 10/23/2023  1:06 PM CDT -----  .Type:  RX Refill Request    Who Called: pt  Refill or New Rx:Refill  RX Name and Strength:HYDROcodone-acetaminophen (NORCO)  mg per tablet  How is the patient currently taking it? (ex. 1XDay):1xday  Is this a 30 day or 90 day RX:30 day  Preferred Pharmacy with phone number:[facility] - [place] - [address] [place] [address]  Local or Mail Order:local  Ordering Provider:  Would the patient rather a call back or a response via [facility]? Call back  Best Call Back Number:  Additional Information:

## 2023-10-30 ENCOUNTER — TELEPHONE (OUTPATIENT)
Dept: INTERNAL MEDICINE | Facility: CLINIC | Age: 63
End: 2023-10-30
Payer: MEDICARE

## 2023-10-30 DIAGNOSIS — F32.5 MAJOR DEPRESSION IN REMISSION: Primary | ICD-10-CM

## 2023-10-30 RX ORDER — QUETIAPINE FUMARATE 50 MG/1
50 TABLET, FILM COATED ORAL NIGHTLY
COMMUNITY
Start: 2023-07-29 | End: 2023-10-30 | Stop reason: SDUPTHER

## 2023-10-30 RX ORDER — QUETIAPINE FUMARATE 50 MG/1
50 TABLET, FILM COATED ORAL NIGHTLY
Qty: 30 TABLET | Refills: 1 | Status: SHIPPED | OUTPATIENT
Start: 2023-10-30 | End: 2023-11-28

## 2023-10-30 NOTE — TELEPHONE ENCOUNTER
Patient is calling for a refill of his Seroquel 25mg.  Looks like Dr. Matthew hasn't prescribed this medication.      [facility] in [place].  Please advise..      P#[phone]

## 2023-11-14 ENCOUNTER — LAB VISIT (OUTPATIENT)
Dept: LAB | Facility: HOSPITAL | Age: 63
End: 2023-11-14
Attending: INTERNAL MEDICINE
Payer: MEDICARE

## 2023-11-14 DIAGNOSIS — Z94.4 LIVER REPLACED BY TRANSPLANT: Primary | ICD-10-CM

## 2023-11-14 LAB
ALBUMIN SERPL-MCNC: 4.3 G/DL (ref 3.4–4.8)
ALBUMIN/GLOB SERPL: 1.5 RATIO (ref 1.1–2)
ALP SERPL-CCNC: 83 UNIT/L (ref 40–150)
ALT SERPL-CCNC: 21 UNIT/L (ref 0–55)
AST SERPL-CCNC: 23 UNIT/L (ref 5–34)
BASOPHILS # BLD AUTO: 0.03 X10(3)/MCL
BASOPHILS NFR BLD AUTO: 0.7 %
BILIRUB SERPL-MCNC: 1.3 MG/DL
BUN SERPL-MCNC: 8 MG/DL (ref 8.4–25.7)
CALCIUM SERPL-MCNC: 9.7 MG/DL (ref 8.8–10)
CHLORIDE SERPL-SCNC: 103 MMOL/L (ref 98–107)
CO2 SERPL-SCNC: 26 MMOL/L (ref 23–31)
CREAT SERPL-MCNC: 0.83 MG/DL (ref 0.73–1.18)
EOSINOPHIL # BLD AUTO: 0.1 X10(3)/MCL (ref 0–0.9)
EOSINOPHIL NFR BLD AUTO: 2.2 %
ERYTHROCYTE [DISTWIDTH] IN BLOOD BY AUTOMATED COUNT: 13.7 % (ref 11.5–17)
GFR SERPLBLD CREATININE-BSD FMLA CKD-EPI: >60 MLS/MIN/1.73/M2
GLOBULIN SER-MCNC: 2.9 GM/DL (ref 2.4–3.5)
GLUCOSE SERPL-MCNC: 235 MG/DL (ref 82–115)
HCT VFR BLD AUTO: 45.5 % (ref 42–52)
HGB BLD-MCNC: 15.8 G/DL (ref 14–18)
IMM GRANULOCYTES # BLD AUTO: 0.05 X10(3)/MCL (ref 0–0.04)
IMM GRANULOCYTES NFR BLD AUTO: 1.1 %
LYMPHOCYTES # BLD AUTO: 1.04 X10(3)/MCL (ref 0.6–4.6)
LYMPHOCYTES NFR BLD AUTO: 23.1 %
MCH RBC QN AUTO: 32.6 PG (ref 27–31)
MCHC RBC AUTO-ENTMCNC: 34.7 G/DL (ref 33–36)
MCV RBC AUTO: 94 FL (ref 80–94)
MONOCYTES # BLD AUTO: 0.31 X10(3)/MCL (ref 0.1–1.3)
MONOCYTES NFR BLD AUTO: 6.9 %
NEUTROPHILS # BLD AUTO: 2.97 X10(3)/MCL (ref 2.1–9.2)
NEUTROPHILS NFR BLD AUTO: 66 %
PLATELET # BLD AUTO: 121 X10(3)/MCL (ref 130–400)
PMV BLD AUTO: 11.8 FL (ref 7.4–10.4)
POTASSIUM SERPL-SCNC: 4.4 MMOL/L (ref 3.5–5.1)
PROT SERPL-MCNC: 7.2 GM/DL (ref 5.8–7.6)
RBC # BLD AUTO: 4.84 X10(6)/MCL (ref 4.7–6.1)
SODIUM SERPL-SCNC: 139 MMOL/L (ref 136–145)
WBC # SPEC AUTO: 4.5 X10(3)/MCL (ref 4.5–11.5)

## 2023-11-14 PROCEDURE — 85025 COMPLETE CBC W/AUTO DIFF WBC: CPT

## 2023-11-14 PROCEDURE — 80053 COMPREHEN METABOLIC PANEL: CPT

## 2023-11-14 PROCEDURE — 80197 ASSAY OF TACROLIMUS: CPT

## 2023-11-14 PROCEDURE — 36415 COLL VENOUS BLD VENIPUNCTURE: CPT

## 2023-11-15 LAB — TACROLIMUS TROUGH BLD-MCNC: 7.5 NG/ML

## 2023-11-16 ENCOUNTER — TELEPHONE (OUTPATIENT)
Dept: INTERNAL MEDICINE | Facility: CLINIC | Age: 63
End: 2023-11-16
Payer: MEDICARE

## 2023-11-16 DIAGNOSIS — G89.29 CHRONIC LOW BACK PAIN WITH SCIATICA, SCIATICA LATERALITY UNSPECIFIED, UNSPECIFIED BACK PAIN LATERALITY: ICD-10-CM

## 2023-11-16 DIAGNOSIS — Z79.891 LONG TERM PRESCRIPTION OPIATE USE: ICD-10-CM

## 2023-11-16 DIAGNOSIS — M54.40 CHRONIC LOW BACK PAIN WITH SCIATICA, SCIATICA LATERALITY UNSPECIFIED, UNSPECIFIED BACK PAIN LATERALITY: ICD-10-CM

## 2023-11-16 DIAGNOSIS — Z94.4 LIVER TRANSPLANTED: ICD-10-CM

## 2023-11-16 NOTE — TELEPHONE ENCOUNTER
----- Message from Lyly Lobo sent at 11/16/2023  9:31 AM CST -----  Regarding: med refill  .Type:  RX Refill Request    Who Called: Pt  Refill or New Rx:Refill  RX Name and Strength:HYDROcodone-acetaminophen (NORCO)  mg per tablet   How is the patient currently taking it? (ex. 1XDay):1xday  Is this a 30 day or 90 day RX:90  Preferred Pharmacy with phone number:[facility] - [place] [address]  Local or Mail Order:local  Ordering Provider:Voitier  Would the patient rather a call back or a response via [facility]? Call back  Best Call Back Number:[phone]  Additional Information:

## 2023-11-17 ENCOUNTER — TELEPHONE (OUTPATIENT)
Dept: INTERNAL MEDICINE | Facility: CLINIC | Age: 63
End: 2023-11-17
Payer: MEDICARE

## 2023-11-17 NOTE — TELEPHONE ENCOUNTER
----- Message from Jennifer Langford sent at 11/17/2023 10:46 AM CST -----  .Type:  RX Refill Request    Who Called: pt  Refill or New Rx:refill  RX Name and Strength:HYDROcodone-acetaminophen (NORCO)  mg per tablet  How is the patient currently taking it? 1XDay  Is this a 30 day or 90 day RX:30  Preferred Pharmacy with phone number:[facility] - [place] - [address] [place] [address]  Local or Mail Order:local  Ordering Provider:  Would the patient rather a call back or a response via [facility]?   Best Call Back Number:[phone]  Additional Information: refill request

## 2023-11-18 DIAGNOSIS — M54.40 CHRONIC LOW BACK PAIN WITH SCIATICA, SCIATICA LATERALITY UNSPECIFIED, UNSPECIFIED BACK PAIN LATERALITY: ICD-10-CM

## 2023-11-18 DIAGNOSIS — G89.29 CHRONIC LOW BACK PAIN WITH SCIATICA, SCIATICA LATERALITY UNSPECIFIED, UNSPECIFIED BACK PAIN LATERALITY: ICD-10-CM

## 2023-11-18 DIAGNOSIS — Z79.891 LONG TERM PRESCRIPTION OPIATE USE: ICD-10-CM

## 2023-11-18 DIAGNOSIS — Z94.4 LIVER TRANSPLANTED: ICD-10-CM

## 2023-11-20 ENCOUNTER — TELEPHONE (OUTPATIENT)
Dept: INTERNAL MEDICINE | Facility: CLINIC | Age: 63
End: 2023-11-20
Payer: MEDICARE

## 2023-11-20 RX ORDER — HYDROCODONE BITARTRATE AND ACETAMINOPHEN 10; 325 MG/1; MG/1
1 TABLET ORAL DAILY PRN
Qty: 30 TABLET | Refills: 0 | Status: SHIPPED | OUTPATIENT
Start: 2023-11-20 | End: 2023-12-18 | Stop reason: SDUPTHER

## 2023-11-20 NOTE — TELEPHONE ENCOUNTER
Spoke with patient at this time. He shouldn't be out as it is prescribed once a day. He is trying to fill it too early and I explained this to him again.

## 2023-11-21 DIAGNOSIS — Z94.4 LIVER REPLACED BY TRANSPLANT: ICD-10-CM

## 2023-11-21 NOTE — TELEPHONE ENCOUNTER
Called pt for lab review.  Instructed to increase  FK to 4/4. Will repeat labs 12/11/23.    ----- Message from Elizabeth Meneses MD sent at 11/19/2023  3:20 PM CST -----  The Labs are stable; increases prograf to 4/4 from 4/3 and repeat labs in one month - please let patient know.

## 2023-11-22 RX ORDER — TACROLIMUS 1 MG/1
4 CAPSULE ORAL EVERY 12 HOURS
Qty: 240 CAPSULE | Refills: 11 | Status: SHIPPED | OUTPATIENT
Start: 2023-11-22

## 2023-11-28 DIAGNOSIS — Z79.4 TYPE 2 DIABETES MELLITUS WITH DIABETIC POLYNEUROPATHY, WITH LONG-TERM CURRENT USE OF INSULIN: Primary | ICD-10-CM

## 2023-11-28 DIAGNOSIS — F32.5 MAJOR DEPRESSION IN REMISSION: ICD-10-CM

## 2023-11-28 DIAGNOSIS — E11.42 DIABETIC PERIPHERAL NEUROPATHY: ICD-10-CM

## 2023-11-28 DIAGNOSIS — E11.42 TYPE 2 DIABETES MELLITUS WITH DIABETIC POLYNEUROPATHY, WITH LONG-TERM CURRENT USE OF INSULIN: Primary | ICD-10-CM

## 2023-11-28 RX ORDER — METFORMIN HYDROCHLORIDE 500 MG/1
500 TABLET ORAL 2 TIMES DAILY
Qty: 60 TABLET | Refills: 2 | Status: SHIPPED | OUTPATIENT
Start: 2023-11-28 | End: 2024-04-01

## 2023-11-28 RX ORDER — QUETIAPINE FUMARATE 50 MG/1
50 TABLET, FILM COATED ORAL NIGHTLY
Qty: 30 TABLET | Refills: 1 | Status: SHIPPED | OUTPATIENT
Start: 2023-11-28 | End: 2024-02-28

## 2023-11-28 RX ORDER — GABAPENTIN 600 MG/1
600 TABLET ORAL 3 TIMES DAILY
Qty: 270 TABLET | Refills: 0 | Status: SHIPPED | OUTPATIENT
Start: 2023-11-28 | End: 2024-02-28

## 2023-12-12 ENCOUNTER — TELEPHONE (OUTPATIENT)
Dept: INTERNAL MEDICINE | Facility: CLINIC | Age: 63
End: 2023-12-12
Payer: MEDICARE

## 2023-12-12 DIAGNOSIS — Z79.4 TYPE 2 DIABETES MELLITUS WITHOUT COMPLICATION, WITH LONG-TERM CURRENT USE OF INSULIN: ICD-10-CM

## 2023-12-12 DIAGNOSIS — E11.9 TYPE 2 DIABETES MELLITUS WITHOUT COMPLICATION, WITH LONG-TERM CURRENT USE OF INSULIN: ICD-10-CM

## 2023-12-12 DIAGNOSIS — E11.42 TYPE 2 DIABETES MELLITUS WITH DIABETIC POLYNEUROPATHY, WITH LONG-TERM CURRENT USE OF INSULIN: ICD-10-CM

## 2023-12-12 DIAGNOSIS — Z79.4 TYPE 2 DIABETES MELLITUS WITH DIABETIC POLYNEUROPATHY, WITH LONG-TERM CURRENT USE OF INSULIN: ICD-10-CM

## 2023-12-12 RX ORDER — PEN NEEDLE, DIABETIC 32GX 5/32"
NEEDLE, DISPOSABLE MISCELLANEOUS
Qty: 100 EACH | Refills: 0 | Status: SHIPPED | OUTPATIENT
Start: 2023-12-12 | End: 2024-03-27

## 2023-12-12 RX ORDER — INSULIN DETEMIR 100 [IU]/ML
20 INJECTION, SOLUTION SUBCUTANEOUS NIGHTLY
Qty: 45 ML | Refills: 0 | Status: SHIPPED | OUTPATIENT
Start: 2023-12-12

## 2023-12-12 RX ORDER — INSULIN ASPART 100 [IU]/ML
10 INJECTION, SOLUTION INTRAVENOUS; SUBCUTANEOUS 3 TIMES DAILY
Qty: 45 ML | Refills: 0 | Status: SHIPPED | OUTPATIENT
Start: 2023-12-12

## 2023-12-12 NOTE — TELEPHONE ENCOUNTER
----- Message from Adam Hernandez sent at 12/12/2023 10:37 AM CST -----  .Type:  Needs Medical Advice    Who Called: Colin  Symptoms (please be specific):    How long has patient had these symptoms:    Pharmacy name and phone #:    Would the patient rather a call back or a response via [facility]?   Best Call Back Number: [phone]  Additional Information: Requested to speak with someone on the care team

## 2023-12-14 ENCOUNTER — LAB VISIT (OUTPATIENT)
Dept: LAB | Facility: HOSPITAL | Age: 63
End: 2023-12-14
Attending: INTERNAL MEDICINE
Payer: MEDICARE

## 2023-12-14 DIAGNOSIS — Z94.4 LIVER REPLACED BY TRANSPLANT: Primary | ICD-10-CM

## 2023-12-14 LAB
ALBUMIN SERPL-MCNC: 4.6 G/DL (ref 3.4–4.8)
ALBUMIN/GLOB SERPL: 1.5 RATIO (ref 1.1–2)
ALP SERPL-CCNC: 72 UNIT/L (ref 40–150)
ALT SERPL-CCNC: 22 UNIT/L (ref 0–55)
AST SERPL-CCNC: 22 UNIT/L (ref 5–34)
BASOPHILS # BLD AUTO: 0.04 X10(3)/MCL
BASOPHILS NFR BLD AUTO: 0.9 %
BILIRUB SERPL-MCNC: 1.2 MG/DL
BUN SERPL-MCNC: 14 MG/DL (ref 8.4–25.7)
CALCIUM SERPL-MCNC: 9.4 MG/DL (ref 8.8–10)
CHLORIDE SERPL-SCNC: 100 MMOL/L (ref 98–107)
CO2 SERPL-SCNC: 25 MMOL/L (ref 23–31)
CREAT SERPL-MCNC: 1.18 MG/DL (ref 0.73–1.18)
EOSINOPHIL # BLD AUTO: 0.06 X10(3)/MCL (ref 0–0.9)
EOSINOPHIL NFR BLD AUTO: 1.4 %
ERYTHROCYTE [DISTWIDTH] IN BLOOD BY AUTOMATED COUNT: 13.1 % (ref 11.5–17)
GFR SERPLBLD CREATININE-BSD FMLA CKD-EPI: >60 MLS/MIN/1.73/M2
GLOBULIN SER-MCNC: 3 GM/DL (ref 2.4–3.5)
GLUCOSE SERPL-MCNC: 219 MG/DL (ref 82–115)
HCT VFR BLD AUTO: 45.6 % (ref 42–52)
HGB BLD-MCNC: 16.2 G/DL (ref 14–18)
IMM GRANULOCYTES # BLD AUTO: 0.02 X10(3)/MCL (ref 0–0.04)
IMM GRANULOCYTES NFR BLD AUTO: 0.5 %
LYMPHOCYTES # BLD AUTO: 1.32 X10(3)/MCL (ref 0.6–4.6)
LYMPHOCYTES NFR BLD AUTO: 29.9 %
MCH RBC QN AUTO: 32.9 PG (ref 27–31)
MCHC RBC AUTO-ENTMCNC: 35.5 G/DL (ref 33–36)
MCV RBC AUTO: 92.7 FL (ref 80–94)
MONOCYTES # BLD AUTO: 0.33 X10(3)/MCL (ref 0.1–1.3)
MONOCYTES NFR BLD AUTO: 7.5 %
NEUTROPHILS # BLD AUTO: 2.65 X10(3)/MCL (ref 2.1–9.2)
NEUTROPHILS NFR BLD AUTO: 59.8 %
PLATELET # BLD AUTO: 117 X10(3)/MCL (ref 130–400)
PMV BLD AUTO: 12.1 FL (ref 7.4–10.4)
POTASSIUM SERPL-SCNC: 4.5 MMOL/L (ref 3.5–5.1)
PROT SERPL-MCNC: 7.6 GM/DL (ref 5.8–7.6)
RBC # BLD AUTO: 4.92 X10(6)/MCL (ref 4.7–6.1)
SODIUM SERPL-SCNC: 135 MMOL/L (ref 136–145)
WBC # SPEC AUTO: 4.42 X10(3)/MCL (ref 4.5–11.5)

## 2023-12-14 PROCEDURE — 85025 COMPLETE CBC W/AUTO DIFF WBC: CPT

## 2023-12-14 PROCEDURE — 36415 COLL VENOUS BLD VENIPUNCTURE: CPT

## 2023-12-14 PROCEDURE — 80053 COMPREHEN METABOLIC PANEL: CPT

## 2023-12-14 PROCEDURE — 80197 ASSAY OF TACROLIMUS: CPT

## 2023-12-15 LAB — TACROLIMUS TROUGH BLD-MCNC: 25.6 NG/ML

## 2023-12-18 DIAGNOSIS — Z94.4 LIVER TRANSPLANTED: ICD-10-CM

## 2023-12-18 DIAGNOSIS — M54.40 CHRONIC LOW BACK PAIN WITH SCIATICA, SCIATICA LATERALITY UNSPECIFIED, UNSPECIFIED BACK PAIN LATERALITY: ICD-10-CM

## 2023-12-18 DIAGNOSIS — Z79.891 LONG TERM PRESCRIPTION OPIATE USE: ICD-10-CM

## 2023-12-18 DIAGNOSIS — G89.29 CHRONIC LOW BACK PAIN WITH SCIATICA, SCIATICA LATERALITY UNSPECIFIED, UNSPECIFIED BACK PAIN LATERALITY: ICD-10-CM

## 2023-12-18 RX ORDER — HYDROCODONE BITARTRATE AND ACETAMINOPHEN 10; 325 MG/1; MG/1
1 TABLET ORAL DAILY PRN
Qty: 30 TABLET | Refills: 0 | Status: SHIPPED | OUTPATIENT
Start: 2023-12-18 | End: 2024-01-17 | Stop reason: SDUPTHER

## 2023-12-18 NOTE — TELEPHONE ENCOUNTER
----- Message from Briseida Horvath sent at 12/18/2023  8:47 AM CST -----  Regarding: refill  Type:  RX Refill Request    Who Called: pt  Refill or New Rx:refill  RX Name and Strength:HYDROcodone-acetaminophen (NORCO)  mg per tablet  How is the patient currently taking it? (ex. 1XDay):as needed  Is this a 30 day or 90 day RX:  Preferred Pharmacy with phone number:[facility]  Local or Mail Order:local  Ordering Provider:dr rasmussen  Would the patient rather a call back or a response via [facility]? C/b  Best Call Back Number:[phone]  Additional Information:

## 2023-12-22 ENCOUNTER — TELEPHONE (OUTPATIENT)
Dept: TRANSPLANT | Facility: CLINIC | Age: 63
End: 2023-12-22
Payer: MEDICARE

## 2023-12-22 NOTE — TELEPHONE ENCOUNTER
Attempted to return call and could not leave message.        ----- Message from Triny Cardona sent at 12/22/2023 11:41 AM CST -----  Regarding: discuss results  Contact:  [phone]  The patient called requesting to speak to Nurse Coordinator to discuss test results    No further information provided      Patient can be contacted @# [phone]

## 2023-12-26 ENCOUNTER — TELEPHONE (OUTPATIENT)
Dept: TRANSPLANT | Facility: CLINIC | Age: 63
End: 2023-12-26
Payer: MEDICARE

## 2023-12-28 DIAGNOSIS — N40.0 BENIGN PROSTATIC HYPERPLASIA, UNSPECIFIED WHETHER LOWER URINARY TRACT SYMPTOMS PRESENT: ICD-10-CM

## 2023-12-28 RX ORDER — TAMSULOSIN HYDROCHLORIDE 0.4 MG/1
1 CAPSULE ORAL
Qty: 90 CAPSULE | Refills: 0 | Status: SHIPPED | OUTPATIENT
Start: 2023-12-28 | End: 2024-03-27

## 2023-12-29 ENCOUNTER — TELEPHONE (OUTPATIENT)
Dept: INTERNAL MEDICINE | Facility: CLINIC | Age: 63
End: 2023-12-29
Payer: MEDICARE

## 2023-12-29 ENCOUNTER — TELEPHONE (OUTPATIENT)
Dept: TRANSPLANT | Facility: CLINIC | Age: 63
End: 2023-12-29
Payer: MEDICARE

## 2023-12-29 DIAGNOSIS — D63.8 ANEMIA OF CHRONIC DISEASE: ICD-10-CM

## 2023-12-29 DIAGNOSIS — E11.42 DIABETIC PERIPHERAL NEUROPATHY: ICD-10-CM

## 2023-12-29 DIAGNOSIS — Z79.4 TYPE 2 DIABETES MELLITUS WITH DIABETIC POLYNEUROPATHY, WITH LONG-TERM CURRENT USE OF INSULIN: ICD-10-CM

## 2023-12-29 DIAGNOSIS — K72.10 END STAGE LIVER DISEASE: ICD-10-CM

## 2023-12-29 DIAGNOSIS — Z94.4 LIVER REPLACED BY TRANSPLANT: ICD-10-CM

## 2023-12-29 DIAGNOSIS — E11.42 TYPE 2 DIABETES MELLITUS WITH DIABETIC POLYNEUROPATHY, WITH LONG-TERM CURRENT USE OF INSULIN: ICD-10-CM

## 2023-12-29 DIAGNOSIS — Z79.891 LONG TERM PRESCRIPTION OPIATE USE: Primary | ICD-10-CM

## 2023-12-29 DIAGNOSIS — I10 PRIMARY HYPERTENSION: ICD-10-CM

## 2023-12-29 DIAGNOSIS — M54.40 CHRONIC LOW BACK PAIN WITH SCIATICA, SCIATICA LATERALITY UNSPECIFIED, UNSPECIFIED BACK PAIN LATERALITY: ICD-10-CM

## 2023-12-29 DIAGNOSIS — N40.0 BENIGN PROSTATIC HYPERPLASIA, UNSPECIFIED WHETHER LOWER URINARY TRACT SYMPTOMS PRESENT: ICD-10-CM

## 2023-12-29 DIAGNOSIS — E78.5 DYSLIPIDEMIA: ICD-10-CM

## 2023-12-29 DIAGNOSIS — G89.29 CHRONIC LOW BACK PAIN WITH SCIATICA, SCIATICA LATERALITY UNSPECIFIED, UNSPECIFIED BACK PAIN LATERALITY: ICD-10-CM

## 2023-12-29 NOTE — TELEPHONE ENCOUNTER
Patient has appt 1/8. He has rescheduled this appt twice. He did labs in September do you want updated labs? If so, what?

## 2023-12-29 NOTE — TELEPHONE ENCOUNTER
Tried multiple times to reach patient and spoke with patient today and he doesn't recall if he'd taken his tacrolimus prior to labs and that it was a possibility.  Pt states he is also caring for his 84 y/o mother.  Pt denies symptoms of supratherapeutic level.  Will repeat labs 1/3/24      ----- Message from Elizabeth Meneses MD sent at 12/27/2023 10:38 AM CST -----  The Labs are stable; unlikely a trough. Repeat labs now - please let patient know.

## 2024-01-02 ENCOUNTER — TELEPHONE (OUTPATIENT)
Dept: TRANSPLANT | Facility: CLINIC | Age: 64
End: 2024-01-02
Payer: MEDICARE

## 2024-01-02 NOTE — TELEPHONE ENCOUNTER
----- Message from Elizabeth Meneses MD sent at 12/27/2023 10:38 AM CST -----  The Labs are stable; unlikely a trough. Repeat labs now - please let patient know.

## 2024-01-02 NOTE — TELEPHONE ENCOUNTER
Writer returned patient call but no answer, voicemail is full so unable to leave a message.        ----- Message from Ti Tellez sent at 1/2/2024  4:00 PM CST -----  Regarding: miss call  Pt states he's returning a miss call    Call

## 2024-01-05 ENCOUNTER — LAB VISIT (OUTPATIENT)
Dept: LAB | Facility: HOSPITAL | Age: 64
End: 2024-01-05
Attending: INTERNAL MEDICINE
Payer: MEDICARE

## 2024-01-05 DIAGNOSIS — E78.5 DYSLIPIDEMIA: ICD-10-CM

## 2024-01-05 DIAGNOSIS — Z94.4 LIVER REPLACED BY TRANSPLANT: Primary | ICD-10-CM

## 2024-01-05 DIAGNOSIS — M54.40 CHRONIC LOW BACK PAIN WITH SCIATICA, SCIATICA LATERALITY UNSPECIFIED, UNSPECIFIED BACK PAIN LATERALITY: ICD-10-CM

## 2024-01-05 DIAGNOSIS — N40.0 BENIGN PROSTATIC HYPERPLASIA, UNSPECIFIED WHETHER LOWER URINARY TRACT SYMPTOMS PRESENT: ICD-10-CM

## 2024-01-05 DIAGNOSIS — G89.29 CHRONIC LOW BACK PAIN WITH SCIATICA, SCIATICA LATERALITY UNSPECIFIED, UNSPECIFIED BACK PAIN LATERALITY: ICD-10-CM

## 2024-01-05 DIAGNOSIS — I10 PRIMARY HYPERTENSION: ICD-10-CM

## 2024-01-05 DIAGNOSIS — Z94.4 LIVER REPLACED BY TRANSPLANT: ICD-10-CM

## 2024-01-05 DIAGNOSIS — E11.42 TYPE 2 DIABETES MELLITUS WITH DIABETIC POLYNEUROPATHY, WITH LONG-TERM CURRENT USE OF INSULIN: ICD-10-CM

## 2024-01-05 DIAGNOSIS — D63.8 ANEMIA OF CHRONIC DISEASE: ICD-10-CM

## 2024-01-05 DIAGNOSIS — E11.42 DIABETIC PERIPHERAL NEUROPATHY: ICD-10-CM

## 2024-01-05 DIAGNOSIS — Z79.4 TYPE 2 DIABETES MELLITUS WITH DIABETIC POLYNEUROPATHY, WITH LONG-TERM CURRENT USE OF INSULIN: ICD-10-CM

## 2024-01-05 DIAGNOSIS — K72.10 END STAGE LIVER DISEASE: ICD-10-CM

## 2024-01-05 DIAGNOSIS — Z79.891 LONG TERM PRESCRIPTION OPIATE USE: ICD-10-CM

## 2024-01-05 LAB
ALBUMIN SERPL-MCNC: 4.7 G/DL (ref 3.4–4.8)
ALBUMIN/GLOB SERPL: 1.7 RATIO (ref 1.1–2)
ALP SERPL-CCNC: 68 UNIT/L (ref 40–150)
ALT SERPL-CCNC: 18 UNIT/L (ref 0–55)
AMPHET UR QL SCN: NEGATIVE
APPEARANCE UR: CLEAR
AST SERPL-CCNC: 15 UNIT/L (ref 5–34)
BARBITURATE SCN PRESENT UR: NEGATIVE
BASOPHILS # BLD AUTO: 0.04 X10(3)/MCL
BASOPHILS NFR BLD AUTO: 0.9 %
BENZODIAZ UR QL SCN: NEGATIVE
BILIRUB SERPL-MCNC: 1.8 MG/DL
BILIRUB UR QL STRIP.AUTO: NEGATIVE
BUN SERPL-MCNC: 16 MG/DL (ref 8.4–25.7)
CALCIUM SERPL-MCNC: 10.1 MG/DL (ref 8.8–10)
CANNABINOIDS UR QL SCN: NEGATIVE
CHLORIDE SERPL-SCNC: 99 MMOL/L (ref 98–107)
CHOLEST SERPL-MCNC: 149 MG/DL
CHOLEST/HDLC SERPL: 4 {RATIO} (ref 0–5)
CO2 SERPL-SCNC: 28 MMOL/L (ref 23–31)
COCAINE UR QL SCN: NEGATIVE
COLOR UR AUTO: YELLOW
CREAT SERPL-MCNC: 1.17 MG/DL (ref 0.73–1.18)
CREAT UR-MCNC: 130.4 MG/DL (ref 63–166)
EOSINOPHIL # BLD AUTO: 0.08 X10(3)/MCL (ref 0–0.9)
EOSINOPHIL NFR BLD AUTO: 1.8 %
ERYTHROCYTE [DISTWIDTH] IN BLOOD BY AUTOMATED COUNT: 13 % (ref 11.5–17)
EST. AVERAGE GLUCOSE BLD GHB EST-MCNC: 165.7 MG/DL
FENTANYL UR QL SCN: NEGATIVE
GFR SERPLBLD CREATININE-BSD FMLA CKD-EPI: >60 MLS/MIN/1.73/M2
GLOBULIN SER-MCNC: 2.8 GM/DL (ref 2.4–3.5)
GLUCOSE SERPL-MCNC: 222 MG/DL (ref 82–115)
GLUCOSE UR QL STRIP.AUTO: ABNORMAL
HBA1C MFR BLD: 7.4 %
HCT VFR BLD AUTO: 47 % (ref 42–52)
HDLC SERPL-MCNC: 40 MG/DL (ref 35–60)
HGB BLD-MCNC: 16.3 G/DL (ref 14–18)
IMM GRANULOCYTES # BLD AUTO: 0.03 X10(3)/MCL (ref 0–0.04)
IMM GRANULOCYTES NFR BLD AUTO: 0.7 %
KETONES UR QL STRIP.AUTO: NEGATIVE
LDLC SERPL CALC-MCNC: 73 MG/DL (ref 50–140)
LEUKOCYTE ESTERASE UR QL STRIP.AUTO: NEGATIVE
LYMPHOCYTES # BLD AUTO: 1.18 X10(3)/MCL (ref 0.6–4.6)
LYMPHOCYTES NFR BLD AUTO: 26.6 %
MCH RBC QN AUTO: 32.4 PG (ref 27–31)
MCHC RBC AUTO-ENTMCNC: 34.7 G/DL (ref 33–36)
MCV RBC AUTO: 93.4 FL (ref 80–94)
MDMA UR QL SCN: NEGATIVE
MICROALBUMIN UR-MCNC: 36.4 UG/ML
MICROALBUMIN/CREAT RATIO PNL UR: 27.9 MG/GM CR (ref 0–30)
MONOCYTES # BLD AUTO: 0.34 X10(3)/MCL (ref 0.1–1.3)
MONOCYTES NFR BLD AUTO: 7.7 %
NEUTROPHILS # BLD AUTO: 2.76 X10(3)/MCL (ref 2.1–9.2)
NEUTROPHILS NFR BLD AUTO: 62.3 %
NITRITE UR QL STRIP.AUTO: NEGATIVE
OPIATES UR QL SCN: NEGATIVE
PCP UR QL: NEGATIVE
PH UR STRIP.AUTO: 5.5 [PH]
PH UR: 5.5 [PH] (ref 3–11)
PLATELET # BLD AUTO: 136 X10(3)/MCL (ref 130–400)
PMV BLD AUTO: 11.9 FL (ref 7.4–10.4)
POTASSIUM SERPL-SCNC: 5.4 MMOL/L (ref 3.5–5.1)
PROT SERPL-MCNC: 7.5 GM/DL (ref 5.8–7.6)
PROT UR QL STRIP.AUTO: NEGATIVE
RBC # BLD AUTO: 5.03 X10(6)/MCL (ref 4.7–6.1)
RBC UR QL AUTO: NEGATIVE
SODIUM SERPL-SCNC: 138 MMOL/L (ref 136–145)
SP GR UR STRIP.AUTO: 1.02 (ref 1–1.03)
SPECIFIC GRAVITY, URINE AUTO (.000) (OHS): 1.02 (ref 1–1.03)
TRIGL SERPL-MCNC: 179 MG/DL (ref 34–140)
TSH SERPL-ACNC: 1.37 UIU/ML (ref 0.35–4.94)
UROBILINOGEN UR STRIP-ACNC: 0.2
VLDLC SERPL CALC-MCNC: 36 MG/DL
WBC # SPEC AUTO: 4.43 X10(3)/MCL (ref 4.5–11.5)

## 2024-01-05 PROCEDURE — 36415 COLL VENOUS BLD VENIPUNCTURE: CPT

## 2024-01-05 PROCEDURE — 84443 ASSAY THYROID STIM HORMONE: CPT

## 2024-01-05 PROCEDURE — 82043 UR ALBUMIN QUANTITATIVE: CPT

## 2024-01-05 PROCEDURE — 80307 DRUG TEST PRSMV CHEM ANLYZR: CPT

## 2024-01-05 PROCEDURE — 81003 URINALYSIS AUTO W/O SCOPE: CPT

## 2024-01-05 PROCEDURE — 83036 HEMOGLOBIN GLYCOSYLATED A1C: CPT

## 2024-01-05 PROCEDURE — 80053 COMPREHEN METABOLIC PANEL: CPT

## 2024-01-05 PROCEDURE — 80061 LIPID PANEL: CPT

## 2024-01-05 PROCEDURE — 80197 ASSAY OF TACROLIMUS: CPT

## 2024-01-05 PROCEDURE — 85025 COMPLETE CBC W/AUTO DIFF WBC: CPT

## 2024-01-06 LAB — TACROLIMUS TROUGH BLD-MCNC: 8.9 NG/ML

## 2024-01-17 DIAGNOSIS — Z79.891 LONG TERM PRESCRIPTION OPIATE USE: ICD-10-CM

## 2024-01-17 DIAGNOSIS — G89.29 CHRONIC LOW BACK PAIN WITH SCIATICA, SCIATICA LATERALITY UNSPECIFIED, UNSPECIFIED BACK PAIN LATERALITY: ICD-10-CM

## 2024-01-17 DIAGNOSIS — Z94.4 LIVER TRANSPLANTED: ICD-10-CM

## 2024-01-17 DIAGNOSIS — M54.40 CHRONIC LOW BACK PAIN WITH SCIATICA, SCIATICA LATERALITY UNSPECIFIED, UNSPECIFIED BACK PAIN LATERALITY: ICD-10-CM

## 2024-01-17 PROBLEM — D69.6 THROMBOCYTOPENIA, UNSPECIFIED: Status: ACTIVE | Noted: 2024-01-17

## 2024-01-17 PROBLEM — Z00.00 WELLNESS EXAMINATION: Status: ACTIVE | Noted: 2024-01-17

## 2024-01-17 RX ORDER — HYDROCODONE BITARTRATE AND ACETAMINOPHEN 10; 325 MG/1; MG/1
1 TABLET ORAL DAILY PRN
Qty: 30 TABLET | Refills: 0 | Status: SHIPPED | OUTPATIENT
Start: 2024-01-17 | End: 2024-03-01 | Stop reason: SDUPTHER

## 2024-01-17 NOTE — TELEPHONE ENCOUNTER
----- Message from Briseida Horvath sent at 1/17/2024  8:42 AM CST -----  Regarding: refill  Type:  RX Refill Request    Who Called: pt  Refill or New Rx:refill  RX Name and Strength:HYDROcodone-acetaminophen (NORCO)  mg per tablet  How is the patient currently taking it? (ex. 1XDay):1 day  Is this a 30 day or 90 day RX:30  Preferred Pharmacy with phone number:[facility]  Local or Mail Order:local  Ordering Provider:dr rasmussen  Would the patient rather a call back or a response via [facility]? C/b  Best Call Back Number:[phone]  Additional Information:

## 2024-01-25 PROBLEM — F32.2 MAJOR DEPRESSIVE DISORDER, SINGLE EPISODE, SEVERE WITHOUT PSYCHOTIC FEATURES: Status: ACTIVE | Noted: 2024-01-25

## 2024-02-01 PROBLEM — F32.2 MAJOR DEPRESSIVE DISORDER, SINGLE EPISODE, SEVERE WITHOUT PSYCHOTIC FEATURES: Status: RESOLVED | Noted: 2024-01-25 | Resolved: 2024-02-01

## 2024-02-02 ENCOUNTER — DOCUMENTATION ONLY (OUTPATIENT)
Dept: INTERNAL MEDICINE | Facility: CLINIC | Age: 64
End: 2024-02-02

## 2024-02-19 ENCOUNTER — TELEPHONE (OUTPATIENT)
Dept: INTERNAL MEDICINE | Facility: CLINIC | Age: 64
End: 2024-02-19
Payer: MEDICARE

## 2024-02-19 NOTE — TELEPHONE ENCOUNTER
----- Message from [facility] sent at 2/19/2024  8:39 AM CST -----  .Type:  RX Refill Request    Who Called:  pt    Refill or New Rx: refill    RX Name and Strength: HYDROcodone-acetaminophen (NORCO)  mg per tablet 30 tablet   Sig - Route: Take 1 tablet by mouth daily as needed for Pain.        How is the patient currently taking it? (ex. 1XDay): one day    Is this a 30 day or 90 day RX: 30    Preferred Pharmacy with phone number: [facility] in [place]    Local or Mail Order: local    Ordering Provider: Preston    Would the patient rather a call back or a response via [facility]?  Cb if need    Best Call Back Number: [phone]    Additional Information:  refill

## 2024-02-19 NOTE — TELEPHONE ENCOUNTER
----- Message from Ana Diaz sent at 2/19/2024 10:24 AM CST -----  Regarding: refill  .Type:  RX Refill Request    Who Called: kelley  Refill or New Rx:refill  RX Name and Strength:HYDROcodone-acetaminophen (NORCO)  mg per tablet  How is the patient currently taking it? (ex. 1XDay):1xday  Is this a 30 day or 90 day RX:90 day  Preferred Pharmacy with phone number:[facility]  Local or Mail Order:local  Ordering Provider:  Would the patient rather a call back or a response via [facility]?   Best Call Back Number:[phone]  Additional Information:

## 2024-02-19 NOTE — TELEPHONE ENCOUNTER
Spoke with patient at this time explained we are not filling norco at this time. He can discuss at visit with dr. Matthew. NELIDA

## 2024-02-28 DIAGNOSIS — E11.42 DIABETIC PERIPHERAL NEUROPATHY: ICD-10-CM

## 2024-02-28 DIAGNOSIS — Z94.4 LIVER TRANSPLANTED: ICD-10-CM

## 2024-02-28 DIAGNOSIS — F32.5 MAJOR DEPRESSION IN REMISSION: ICD-10-CM

## 2024-02-28 DIAGNOSIS — M54.40 CHRONIC LOW BACK PAIN WITH SCIATICA, SCIATICA LATERALITY UNSPECIFIED, UNSPECIFIED BACK PAIN LATERALITY: ICD-10-CM

## 2024-02-28 DIAGNOSIS — G89.29 CHRONIC LOW BACK PAIN WITH SCIATICA, SCIATICA LATERALITY UNSPECIFIED, UNSPECIFIED BACK PAIN LATERALITY: ICD-10-CM

## 2024-02-28 DIAGNOSIS — Z79.891 LONG TERM PRESCRIPTION OPIATE USE: ICD-10-CM

## 2024-02-28 RX ORDER — QUETIAPINE FUMARATE 50 MG/1
50 TABLET, FILM COATED ORAL NIGHTLY
Qty: 30 TABLET | Refills: 0 | Status: SHIPPED | OUTPATIENT
Start: 2024-02-28 | End: 2024-04-01

## 2024-02-28 RX ORDER — ROSUVASTATIN CALCIUM 10 MG/1
10 TABLET, COATED ORAL
Qty: 30 TABLET | Refills: 0 | Status: SHIPPED | OUTPATIENT
Start: 2024-02-28 | End: 2024-04-01

## 2024-02-28 RX ORDER — FINASTERIDE 5 MG/1
5 TABLET, FILM COATED ORAL DAILY
Qty: 30 TABLET | Refills: 0 | Status: SHIPPED | OUTPATIENT
Start: 2024-02-28 | End: 2024-04-01

## 2024-02-28 RX ORDER — HYDROCODONE BITARTRATE AND ACETAMINOPHEN 10; 325 MG/1; MG/1
1 TABLET ORAL DAILY PRN
Qty: 30 TABLET | Refills: 0 | OUTPATIENT
Start: 2024-02-28 | End: 2024-03-29

## 2024-02-28 RX ORDER — GABAPENTIN 600 MG/1
600 TABLET ORAL 3 TIMES DAILY
Qty: 90 TABLET | Refills: 0 | Status: SHIPPED | OUTPATIENT
Start: 2024-02-28 | End: 2024-04-01

## 2024-02-29 NOTE — PROGRESS NOTES
Subjective:       Patient ID: Colin Reilly is a 63 y.o. male.      Patient Care Team:  Preston Matthew II, MD as PCP - General (Internal Medicine)  Casimiro Wagner MD (Ophthalmology)  Yuliana Garrison LPN as Licensed Practical Nurse    Chief Complaint: Medicare AWV Follow Up, Diabetes, Peripheral Neuropathy, Dyslipidemia, Hypertension, and Anemia      63-year-old male was evaluated today for follow-up of chronic liver disease with cirrhosis, diabetes, and neuropathy among other conditions.        Review of Systems   Constitutional:  Negative for fever.   HENT:  Negative for nosebleeds.    Eyes:  Negative for visual disturbance.   Respiratory:  Negative for shortness of breath.    Cardiovascular:  Negative for chest pain.   Gastrointestinal:  Negative for abdominal pain.   Genitourinary:  Negative for dysuria.   Musculoskeletal:  Negative for gait problem.   Neurological:  Negative for headaches.           Patient Reported Health Risk Assessment  Are you male or female?: Male  During the past four weeks, how much have you been bothered by emotional problems such as feeling anxious, depressed, irritable, sad, or downhearted and blue?: Not at all  During the past five weeks, has your physical and/or emotional health limited your social activities with family, friends, neighbors, or groups?: Not at all  During the past four weeks, how much bodily pain have you generally had?: Moderate pain  During the past four weeks, was someone available to help if you needed and wanted help?: Yes, a little  During the past four weeks, what was the hardest physical activity you could do for at least two minutes?: Moderate  Can you get to places out of walking distance without help?  (For example, can you travel alone on buses or taxis, or drive your own car?): Yes  Can you go shopping for groceries or clothes without someone's help?: Yes  Can you prepare your own meals?: Yes  Can you do your own housework without help?: Yes  Because  of any health problems, do you need the help of another person with your personal care needs such as eating, bathing, dressing, or getting around the house?: No  Can you handle your own money without help?: Yes  During the past four weeks, how would you rate your health in general?: Good  How have things been going for you during the past four weeks?: Very well  Are you having difficulties driving your car?: No  Do you always fasten your seat belt when you are in a car?: Yes, usually  How often in the past four weeks have you been bothered by falling or dizzy when standing up?: Never  How often in the past four weeks have you been bothered by sexual problems?: Never  How often in the past four weeks have you been bothered by trouble eating well?: Never  How often in the past four weeks have you been bothered by teeth or denture problems?: Never  How often in the past four weeks have you been bothered with problems using the telephone?: Never  How often in the past four weeks have you been bothered by tiredness or fatigue?: Never  Have you fallen two or more times in the past year?: No  Are you afraid of falling?: No  Are you a smoker?: No  During the past four weeks, how many drinks of wine, beer, or other alcoholic beverages did you have?: No alcohol at all  Do you exercise for about 20 minutes three or more days a week?: No, I usually do not exercise this much  Have you been given any information to help you with hazards in your house that might hurt you?: No  Have you been given any information to help you with keeping track of your medications?: No  How often do you have trouble taking medicines the way you've been told to take them?: Sometimes I take them as prescribed  How confident are you that you can control and manage most of your health problems?: Somewhat confident  What is your race? (Check all that apply.):       Objective:      Physical Exam  HENT:      Head: Normocephalic.      Mouth/Throat:  "     Pharynx: Oropharynx is clear.   Eyes:      Extraocular Movements: Extraocular movements intact.   Cardiovascular:      Rate and Rhythm: Normal rate and regular rhythm.   Pulmonary:      Breath sounds: Normal breath sounds.   Abdominal:      Palpations: Abdomen is soft.   Musculoskeletal:         General: No swelling.   Skin:     General: Skin is warm.   Neurological:      General: No focal deficit present.      Mental Status: He is alert and oriented to person, place, and time.   Psychiatric:         Mood and Affect: Mood normal.         Vitals:    03/01/24 0835   BP: 130/78   Pulse: 76   Resp: 16   SpO2: 97%   Weight: 82.6 kg (182 lb)   Height: 5' 11" (1.803 m)                   No data to display                  3/1/2024     9:00 AM 3/27/2023     1:00 PM 3/7/2023     1:30 PM 2/27/2023     1:45 PM 2/6/2023     4:00 PM 11/21/2022     1:30 PM 7/29/2022     9:30 AM   Fall Risk Assessment - Outpatient   Mobility Status Ambulatory Ambulatory Ambulatory Ambulatory Ambulatory Ambulatory Ambulatory   Number of falls 1 0 0 0 0 0 0   Identified as fall risk False False False False False False False           Depression Screening  Over the past two weeks, has the patient felt down, depressed, or hopeless?: No  Over the past two weeks, has the patient felt little interest or pleasure in doing things?: No  Functional Ability/Safety Screening  Was the patient's timed Up & Go test unsteady or longer than 30 seconds?: No  Does the patient need help with phone, transportation, shopping, preparing meals, housework, laundry, meds, or managing money?: No  Does the patient's home have rugs in the hallway, lack grab bars in the bathroom, lack handrails on the stairs or have poor lighting?: No  Have you noticed any hearing difficulties?: No  Cognitive Function (Assessed through direct observation with due consideration of information obtained by way of patient reports and/or concerns raised by family, friends, caretakers, or " others)    Does the patient repeat questions/statements in the same day?: No  Does the patient have trouble remembering the date, year, and time?: No  Does the patient have difficulty managing finances?: No  Does the patient have a decreased sense of direction?: No      Assessment:       Problem List Items Addressed This Visit          Neuro    Diabetic peripheral neuropathy - Primary    Relevant Orders    CBC Auto Differential    Comprehensive Metabolic Panel    Lipid Panel    Microalbumin/Creatinine Ratio, Urine    Urinalysis, Reflex to Urine Culture    TSH    Hemoglobin A1C       Psychiatric    Alcohol abuse, in remission    Relevant Orders    CBC Auto Differential    Comprehensive Metabolic Panel    Lipid Panel    Microalbumin/Creatinine Ratio, Urine    Urinalysis, Reflex to Urine Culture    TSH    Hemoglobin A1C    Long term prescription opiate use    Relevant Medications    HYDROcodone-acetaminophen (NORCO)  mg per tablet    Other Relevant Orders    CBC Auto Differential    Comprehensive Metabolic Panel    Lipid Panel    Microalbumin/Creatinine Ratio, Urine    Urinalysis, Reflex to Urine Culture    TSH    Hemoglobin A1C    Major depression in remission    Relevant Orders    CBC Auto Differential    Comprehensive Metabolic Panel    Lipid Panel    Microalbumin/Creatinine Ratio, Urine    Urinalysis, Reflex to Urine Culture    TSH    Hemoglobin A1C       Cardiac/Vascular    Dyslipidemia    Relevant Orders    CBC Auto Differential    Comprehensive Metabolic Panel    Lipid Panel    Microalbumin/Creatinine Ratio, Urine    Urinalysis, Reflex to Urine Culture    TSH    Hemoglobin A1C    Primary hypertension    Relevant Orders    CBC Auto Differential    Comprehensive Metabolic Panel    Lipid Panel    Microalbumin/Creatinine Ratio, Urine    Urinalysis, Reflex to Urine Culture    TSH    Hemoglobin A1C       Hematology    Thrombocytopenia, unspecified    Relevant Orders    CBC Auto Differential    Comprehensive  Metabolic Panel    Lipid Panel    Microalbumin/Creatinine Ratio, Urine    Urinalysis, Reflex to Urine Culture    TSH    Hemoglobin A1C       Endocrine    Type 2 diabetes mellitus with diabetic polyneuropathy, with long-term current use of insulin    Relevant Orders    CBC Auto Differential    Comprehensive Metabolic Panel    Lipid Panel    Microalbumin/Creatinine Ratio, Urine    Urinalysis, Reflex to Urine Culture    TSH    Hemoglobin A1C    Ambulatory referral/consult to Optometry       GI    End stage liver disease    Relevant Orders    CBC Auto Differential    Comprehensive Metabolic Panel    Lipid Panel    Microalbumin/Creatinine Ratio, Urine    Urinalysis, Reflex to Urine Culture    TSH    Hemoglobin A1C    History of liver transplant    Relevant Orders    CBC Auto Differential    Comprehensive Metabolic Panel    Lipid Panel    Microalbumin/Creatinine Ratio, Urine    Urinalysis, Reflex to Urine Culture    TSH    Hemoglobin A1C       Orthopedic    Chronic back pain    Relevant Medications    HYDROcodone-acetaminophen (NORCO)  mg per tablet       Palliative Care    Long-term use of immunosuppressant medication    Relevant Orders    CBC Auto Differential    Comprehensive Metabolic Panel    Lipid Panel    Microalbumin/Creatinine Ratio, Urine    Urinalysis, Reflex to Urine Culture    TSH    Hemoglobin A1C       Other    Wellness examination    Relevant Orders    CBC Auto Differential    Comprehensive Metabolic Panel    Lipid Panel    Microalbumin/Creatinine Ratio, Urine    Urinalysis, Reflex to Urine Culture    TSH    Hemoglobin A1C     Other Visit Diagnoses       Liver transplanted        Relevant Medications    HYDROcodone-acetaminophen (NORCO)  mg per tablet              Medication List with Changes/Refills   Current Medications    ASPIRIN 81 MG CHEW    Take 1 tablet (81 mg total) by mouth once daily.    BISACODYL (DULCOLAX) 5 MG EC TABLET    Take 2 tablets (10 mg total) by mouth daily as needed for  "Constipation.    BLOOD SUGAR DIAGNOSTIC (TRUE METRIX GLUCOSE TEST STRIP MISC)      TRUE METRIX SELF MONITORING BLOOD GLUCOSE STRIPS   Strip, See Instructions, TEST BLOOD SUGAR FOUR TIMES DAILY, # 400 unknown unit, 3 Refill(s), Pharmacy[facility], 180, cm, Height/Length Dosing, 02/10/21 9:33:00 CST, 90.718, kg, W...    BLOOD SUGAR DIAGNOSTIC (TRUE METRIX GLUCOSE TEST STRIP) STRP    TEST BLOOD SUGAR FOUR TIMES DAILY    EASY TOUCH INSULIN SYRINGE 1 ML 31 GAUGE X 5/16 SYRG        ESCITALOPRAM OXALATE (LEXAPRO) 20 MG TABLET    Take 1 tablet (20 mg total) by mouth once daily.    FINASTERIDE (PROSCAR) 5 MG TABLET    TAKE 1 TABLET (5 MG TOTAL) BY MOUTH ONCE DAILY.    GABAPENTIN (NEURONTIN) 600 MG TABLET    TAKE ONE TABLET BY MOUTH THREE TIMES A DAY    INSULIN ASPART U-100 (NOVOLOG FLEXPEN U-100 INSULIN) 100 UNIT/ML (3 ML) INPN PEN    Inject 10 Units into the skin 3 (three) times daily.    INSULIN DETEMIR U-100, LEVEMIR, (LEVEMIR FLEXTOUCH U100 INSULIN) 100 UNIT/ML (3 ML) INPN PEN    Inject 20 Units into the skin every evening.    METFORMIN (GLUCOPHAGE) 500 MG TABLET    TAKE ONE TABLET BY MOUTH TWO TIMES A DAY    OMEPRAZOLE (PRILOSEC) 40 MG CAPSULE    Take 40 mg by mouth once daily.    OXYBUTYNIN (DITROPAN) 5 MG TAB    Take 1 tablet (5 mg total) by mouth once daily.    PEN NEEDLE, DIABETIC (DROPLET PEN NEEDLE) 32 GAUGE X 5/32" NDLE    USE ONE TIME DAILY AT BEDTIME    QUETIAPINE (SEROQUEL) 50 MG TABLET    TAKE 1 TABLET (50 MG TOTAL) BY MOUTH EVERY EVENING.    ROSUVASTATIN (CRESTOR) 10 MG TABLET    TAKE ONE TABLET BY MOUTH EVERY DAY    SILDENAFIL (VIAGRA) 100 MG TABLET    Take 100 mg by mouth once daily.    TACROLIMUS (PROGRAF) 1 MG CAP    Take 4 capsules (4 mg total) by mouth every 12 (twelve) hours.    TAMSULOSIN (FLOMAX) 0.4 MG CAP    TAKE 1 CAPSULE EVERY DAY    TRUEPLUS LANCETS 30 GAUGE MISC       Changed and/or Refilled Medications    Modified Medication Previous Medication    " HYDROCODONE-ACETAMINOPHEN (NORCO)  MG PER TABLET HYDROcodone-acetaminophen (NORCO)  mg per tablet       Take 1 tablet by mouth daily as needed for Pain.    Take 1 tablet by mouth daily as needed for Pain.   Discontinued Medications    MECLIZINE (ANTIVERT) 25 MG TABLET    Take 1 tablet (25 mg total) by mouth 3 (three) times daily as needed for Dizziness or Nausea.        Plan:       1. Chronic liver disease with cirrhosis: Followed by Dr. Chandler. He had liver transplant in 2020 and is doing well. No longer on diuretics. Continue immunosuppressants     2. Insulin dependent diabetes: Hemoglobin A1c is 7.4. He takes NovoLog sliding scale and Levemir.  Continue metformin twice a day.  He has not been taking insulin at night.  We decided to restart Levemir at a lower dose of 10 units nightly     3.  Thrombocytopenia: Stable, improved     4. Chronic back pain: Continue gabapentin.  Continue hydrocodone, 1 pill daily as needed, for chronic back pain     5. Chronic rhinosinusitis: Continue fluticasone     6. Diabetic peripheral Neuropathy:  Continue gabapentin TID     7. Insomnia: Continue trazodone     8. Hypertension: Stable     9. Benign prostatic hypertrophy: Continue Flomax. Followed by Dr. Ann and urology at [facility]. Self-cath 4 times per day     10. Major Depression in remission: Continue Lexapro. He went to an inpatient facility in 2017 in [place] ([facility]) and is doing better.     11. Wellness: Pneumovax 2021     He has diabetic neuropathy with decreased sensation in both feet.  Patient needs diabetic shoes and inserts       He lives in a trailer with his mother in [place]. They go [facility] a [employer].        Medicare Annual Wellness and Personalized Prevention Plan:   Fall Risk + Home Safety + Hearing Impairment + Depression Screen + Cognitive Impairment Screen + Health Risk Assessment all reviewed    Health Maintenance Topics with due status: Not Due       Topic Last Completion Date    TETANUS  VACCINE 08/05/2019    Diabetes Urine Screening 01/05/2024    Lipid Panel 01/05/2024    Hemoglobin A1c 01/05/2024      The patient's Health Maintenance was reviewed and the following appears to be due at this time:   Health Maintenance Due   Topic Date Due    Foot Exam  Never done    Sign Pain Contract  Never done    Complete Opioid Risk Tool  Never done    Naloxone Prescription  Never done    Shingles Vaccine (2 of 2) 05/29/2018    Urine Drug Screen  06/05/2020    RSV Vaccine (Age 60+ and Pregnant patients) (1 - 1-dose 60+ series) Never done    Colorectal Cancer Screening  02/21/2022    Eye Exam  08/20/2022    Pneumococcal Vaccines (Age 0-64) (3 of 3 - PCV) 10/18/2022    Influenza Vaccine (1) 09/01/2023    COVID-19 Vaccine (2 - 2023-24 season) 09/01/2023       Advance Care Planning   I attest to discussing Advance Care Planning with patient and/or family member.  Education was provided including the importance of the Health Care Power of , Advance Directives, and/or [place] documentation.  The patient expressed understanding to the importance of this information and discussion.  Length of ACP conversation in minutes: 1    Advance Care Planning     Date: 03/01/2024    Living Will  During this visit, I engaged the patient  in the voluntary advance care planning process.  The patient and I reviewed the role for advance directives and their purpose in directing future healthcare if the patient's unable to speak for him/herself.  At this point in time, the patient does have full decision-making capacity.  We discussed different extreme health states that he could experience, and reviewed what kind of medical care he would want in those situations.  The patient communicated that if he were comatose and had little chance of a meaningful recovery, he would want machines/life-sustaining treatments used. In addition to the above preference, other important end-of-life issues for the patient include  Aunt is POA . The  patient has completed a living will to reflect these preferences.  I spent a total of 1 minutes engaging the patient in this advance care planning discussion.                   Opioid Screening: Patient medication list reviewed, patient is taking prescription opioids. Patient is not using additional opioids than prescribed. Patient is at low risk of substance abuse based on this opioid use history.     No follow-ups on file. In addition to their scheduled follow up, the patient has also been instructed to follow up on as needed basis.

## 2024-03-01 ENCOUNTER — OFFICE VISIT (OUTPATIENT)
Dept: INTERNAL MEDICINE | Facility: CLINIC | Age: 64
End: 2024-03-01
Payer: MEDICARE

## 2024-03-01 VITALS
RESPIRATION RATE: 16 BRPM | SYSTOLIC BLOOD PRESSURE: 130 MMHG | HEART RATE: 76 BPM | HEIGHT: 71 IN | WEIGHT: 182 LBS | BODY MASS INDEX: 25.48 KG/M2 | OXYGEN SATURATION: 97 % | DIASTOLIC BLOOD PRESSURE: 78 MMHG

## 2024-03-01 DIAGNOSIS — Z00.00 WELLNESS EXAMINATION: ICD-10-CM

## 2024-03-01 DIAGNOSIS — Z79.60 LONG-TERM USE OF IMMUNOSUPPRESSANT MEDICATION: ICD-10-CM

## 2024-03-01 DIAGNOSIS — M54.40 CHRONIC LOW BACK PAIN WITH SCIATICA, SCIATICA LATERALITY UNSPECIFIED, UNSPECIFIED BACK PAIN LATERALITY: ICD-10-CM

## 2024-03-01 DIAGNOSIS — F10.11 ALCOHOL ABUSE, IN REMISSION: ICD-10-CM

## 2024-03-01 DIAGNOSIS — I10 PRIMARY HYPERTENSION: ICD-10-CM

## 2024-03-01 DIAGNOSIS — D69.6 THROMBOCYTOPENIA, UNSPECIFIED: ICD-10-CM

## 2024-03-01 DIAGNOSIS — Z79.891 LONG TERM PRESCRIPTION OPIATE USE: ICD-10-CM

## 2024-03-01 DIAGNOSIS — K72.10 END STAGE LIVER DISEASE: ICD-10-CM

## 2024-03-01 DIAGNOSIS — Z79.4 TYPE 2 DIABETES MELLITUS WITH DIABETIC POLYNEUROPATHY, WITH LONG-TERM CURRENT USE OF INSULIN: ICD-10-CM

## 2024-03-01 DIAGNOSIS — E11.42 TYPE 2 DIABETES MELLITUS WITH DIABETIC POLYNEUROPATHY, WITH LONG-TERM CURRENT USE OF INSULIN: ICD-10-CM

## 2024-03-01 DIAGNOSIS — E78.5 DYSLIPIDEMIA: ICD-10-CM

## 2024-03-01 DIAGNOSIS — Z94.4 HISTORY OF LIVER TRANSPLANT: ICD-10-CM

## 2024-03-01 DIAGNOSIS — G89.29 CHRONIC LOW BACK PAIN WITH SCIATICA, SCIATICA LATERALITY UNSPECIFIED, UNSPECIFIED BACK PAIN LATERALITY: ICD-10-CM

## 2024-03-01 DIAGNOSIS — Z94.4 LIVER TRANSPLANTED: ICD-10-CM

## 2024-03-01 DIAGNOSIS — F32.5 MAJOR DEPRESSION IN REMISSION: ICD-10-CM

## 2024-03-01 DIAGNOSIS — E11.42 DIABETIC PERIPHERAL NEUROPATHY: Primary | ICD-10-CM

## 2024-03-01 PROCEDURE — 3078F DIAST BP <80 MM HG: CPT | Mod: CPTII,,, | Performed by: INTERNAL MEDICINE

## 2024-03-01 PROCEDURE — 3066F NEPHROPATHY DOC TX: CPT | Mod: CPTII,,, | Performed by: INTERNAL MEDICINE

## 2024-03-01 PROCEDURE — 1160F RVW MEDS BY RX/DR IN RCRD: CPT | Mod: CPTII,,, | Performed by: INTERNAL MEDICINE

## 2024-03-01 PROCEDURE — 1159F MED LIST DOCD IN RCRD: CPT | Mod: CPTII,,, | Performed by: INTERNAL MEDICINE

## 2024-03-01 PROCEDURE — 3051F HG A1C>EQUAL 7.0%<8.0%: CPT | Mod: CPTII,,, | Performed by: INTERNAL MEDICINE

## 2024-03-01 PROCEDURE — 3075F SYST BP GE 130 - 139MM HG: CPT | Mod: CPTII,,, | Performed by: INTERNAL MEDICINE

## 2024-03-01 PROCEDURE — 3060F POS MICROALBUMINURIA REV: CPT | Mod: CPTII,,, | Performed by: INTERNAL MEDICINE

## 2024-03-01 PROCEDURE — G0439 PPPS, SUBSEQ VISIT: HCPCS | Mod: ,,, | Performed by: INTERNAL MEDICINE

## 2024-03-01 RX ORDER — HYDROCODONE BITARTRATE AND ACETAMINOPHEN 10; 325 MG/1; MG/1
1 TABLET ORAL DAILY PRN
Qty: 30 TABLET | Refills: 0 | Status: SHIPPED | OUTPATIENT
Start: 2024-03-01 | End: 2024-03-27 | Stop reason: SDUPTHER

## 2024-03-27 DIAGNOSIS — G89.29 CHRONIC LOW BACK PAIN WITH SCIATICA, SCIATICA LATERALITY UNSPECIFIED, UNSPECIFIED BACK PAIN LATERALITY: ICD-10-CM

## 2024-03-27 DIAGNOSIS — E11.9 TYPE 2 DIABETES MELLITUS WITHOUT COMPLICATION, WITH LONG-TERM CURRENT USE OF INSULIN: ICD-10-CM

## 2024-03-27 DIAGNOSIS — N40.0 BENIGN PROSTATIC HYPERPLASIA, UNSPECIFIED WHETHER LOWER URINARY TRACT SYMPTOMS PRESENT: ICD-10-CM

## 2024-03-27 DIAGNOSIS — Z79.891 LONG TERM PRESCRIPTION OPIATE USE: ICD-10-CM

## 2024-03-27 DIAGNOSIS — M54.40 CHRONIC LOW BACK PAIN WITH SCIATICA, SCIATICA LATERALITY UNSPECIFIED, UNSPECIFIED BACK PAIN LATERALITY: ICD-10-CM

## 2024-03-27 DIAGNOSIS — Z79.4 TYPE 2 DIABETES MELLITUS WITHOUT COMPLICATION, WITH LONG-TERM CURRENT USE OF INSULIN: ICD-10-CM

## 2024-03-27 DIAGNOSIS — Z94.4 LIVER TRANSPLANTED: ICD-10-CM

## 2024-03-27 RX ORDER — PEN NEEDLE, DIABETIC 32GX 5/32"
NEEDLE, DISPOSABLE MISCELLANEOUS
Qty: 100 EACH | Refills: 3 | Status: SHIPPED | OUTPATIENT
Start: 2024-03-27

## 2024-03-27 RX ORDER — TAMSULOSIN HYDROCHLORIDE 0.4 MG/1
1 CAPSULE ORAL
Qty: 90 CAPSULE | Refills: 3 | Status: SHIPPED | OUTPATIENT
Start: 2024-03-27

## 2024-03-27 RX ORDER — HYDROCODONE BITARTRATE AND ACETAMINOPHEN 10; 325 MG/1; MG/1
1 TABLET ORAL DAILY PRN
Qty: 30 TABLET | Refills: 0 | Status: SHIPPED | OUTPATIENT
Start: 2024-03-27 | End: 2024-04-26 | Stop reason: SDUPTHER

## 2024-03-27 NOTE — ASSESSMENT & PLAN NOTE
- 2/2 ESLD  - H/H stable  - reports having dark colored stools in the past week. no overt signs of bleeding  - cont to monitor     No lip focality evident on exam./within functional limits

## 2024-03-27 NOTE — TELEPHONE ENCOUNTER
----- Message from Malina Macdonald sent at 3/27/2024  9:06 AM CDT -----  .Type:  RX Refill Request    Who Called: pt   Refill or New Rx:refill   RX Name and Strength:HYDROcodone-acetaminophen (NORCO)  mg per tablet  How is the patient currently taking it? (ex. 1XDay):1x  Is this a 30 day or 90 day RX:30  Preferred Pharmacy with phone number[facility] [place] [address]  Local or Mail Order:local   Ordering Provider:voitier   Would the patient rather a call back or a response via [facility]? Call back   Best Call Back Number:[phone]  Additional Information:          [facility] Call Center    Phone Message    Name of Caller: Roberto    Phone Number: Other phone number:  [phone]    Best time to return call: Any    May a detailed message be left on voicemail: yes    Relation to patient: Self    Reason for Call: Other: Patient is calling to see if a VB3 prostate culture can be done here. Patient has orders from an outside orders from [facility] and the lab is uncertain if it can be done here. Wondering if urology clinic would know.      Action Taken: Message routed to:  Adult Clinics: Urology p [id number]

## 2024-04-01 DIAGNOSIS — E11.42 TYPE 2 DIABETES MELLITUS WITH DIABETIC POLYNEUROPATHY, WITH LONG-TERM CURRENT USE OF INSULIN: ICD-10-CM

## 2024-04-01 DIAGNOSIS — Z79.891 LONG TERM PRESCRIPTION OPIATE USE: ICD-10-CM

## 2024-04-01 DIAGNOSIS — E11.42 DIABETIC PERIPHERAL NEUROPATHY: ICD-10-CM

## 2024-04-01 DIAGNOSIS — F32.5 MAJOR DEPRESSION IN REMISSION: ICD-10-CM

## 2024-04-01 DIAGNOSIS — Z79.4 TYPE 2 DIABETES MELLITUS WITH DIABETIC POLYNEUROPATHY, WITH LONG-TERM CURRENT USE OF INSULIN: ICD-10-CM

## 2024-04-01 RX ORDER — ROSUVASTATIN CALCIUM 10 MG/1
10 TABLET, COATED ORAL
Qty: 30 TABLET | Refills: 0 | Status: SHIPPED | OUTPATIENT
Start: 2024-04-01 | End: 2024-05-01

## 2024-04-01 RX ORDER — GABAPENTIN 600 MG/1
600 TABLET ORAL 3 TIMES DAILY
Qty: 90 TABLET | Refills: 0 | Status: SHIPPED | OUTPATIENT
Start: 2024-04-01 | End: 2024-05-01

## 2024-04-01 RX ORDER — METFORMIN HYDROCHLORIDE 500 MG/1
500 TABLET ORAL 2 TIMES DAILY
Qty: 60 TABLET | Refills: 2 | Status: SHIPPED | OUTPATIENT
Start: 2024-04-01 | End: 2024-05-30

## 2024-04-01 RX ORDER — QUETIAPINE FUMARATE 50 MG/1
50 TABLET, FILM COATED ORAL NIGHTLY
Qty: 30 TABLET | Refills: 0 | Status: SHIPPED | OUTPATIENT
Start: 2024-04-01 | End: 2024-05-01

## 2024-04-01 RX ORDER — FINASTERIDE 5 MG/1
5 TABLET, FILM COATED ORAL DAILY
Qty: 30 TABLET | Refills: 0 | Status: SHIPPED | OUTPATIENT
Start: 2024-04-01 | End: 2024-05-01

## 2024-04-08 ENCOUNTER — LAB VISIT (OUTPATIENT)
Dept: LAB | Facility: HOSPITAL | Age: 64
End: 2024-04-08
Attending: INTERNAL MEDICINE
Payer: MEDICARE

## 2024-04-08 DIAGNOSIS — Z94.4 LIVER REPLACED BY TRANSPLANT: Primary | ICD-10-CM

## 2024-04-08 LAB
ALBUMIN SERPL-MCNC: 4.3 G/DL (ref 3.4–4.8)
ALBUMIN/GLOB SERPL: 1.6 RATIO (ref 1.1–2)
ALP SERPL-CCNC: 61 UNIT/L (ref 40–150)
ALT SERPL-CCNC: 17 UNIT/L (ref 0–55)
AST SERPL-CCNC: 17 UNIT/L (ref 5–34)
BASOPHILS # BLD AUTO: 0.04 X10(3)/MCL
BASOPHILS NFR BLD AUTO: 1 %
BILIRUB SERPL-MCNC: 1.3 MG/DL
BUN SERPL-MCNC: 8 MG/DL (ref 8.4–25.7)
CALCIUM SERPL-MCNC: 9.3 MG/DL (ref 8.8–10)
CHLORIDE SERPL-SCNC: 107 MMOL/L (ref 98–107)
CO2 SERPL-SCNC: 26 MMOL/L (ref 23–31)
CREAT SERPL-MCNC: 1.08 MG/DL (ref 0.73–1.18)
EOSINOPHIL # BLD AUTO: 0.09 X10(3)/MCL (ref 0–0.9)
EOSINOPHIL NFR BLD AUTO: 2.2 %
ERYTHROCYTE [DISTWIDTH] IN BLOOD BY AUTOMATED COUNT: 13.5 % (ref 11.5–17)
GFR SERPLBLD CREATININE-BSD FMLA CKD-EPI: >60 MLS/MIN/1.73/M2
GLOBULIN SER-MCNC: 2.7 GM/DL (ref 2.4–3.5)
GLUCOSE SERPL-MCNC: 147 MG/DL (ref 82–115)
HCT VFR BLD AUTO: 43.3 % (ref 42–52)
HGB BLD-MCNC: 15.1 G/DL (ref 14–18)
IMM GRANULOCYTES # BLD AUTO: 0.02 X10(3)/MCL (ref 0–0.04)
IMM GRANULOCYTES NFR BLD AUTO: 0.5 %
LYMPHOCYTES # BLD AUTO: 1.01 X10(3)/MCL (ref 0.6–4.6)
LYMPHOCYTES NFR BLD AUTO: 24.2 %
MCH RBC QN AUTO: 33.3 PG (ref 27–31)
MCHC RBC AUTO-ENTMCNC: 34.9 G/DL (ref 33–36)
MCV RBC AUTO: 95.4 FL (ref 80–94)
MONOCYTES # BLD AUTO: 0.28 X10(3)/MCL (ref 0.1–1.3)
MONOCYTES NFR BLD AUTO: 6.7 %
NEUTROPHILS # BLD AUTO: 2.73 X10(3)/MCL (ref 2.1–9.2)
NEUTROPHILS NFR BLD AUTO: 65.4 %
PLATELET # BLD AUTO: 133 X10(3)/MCL (ref 130–400)
PMV BLD AUTO: 11.6 FL (ref 7.4–10.4)
POTASSIUM SERPL-SCNC: 4.9 MMOL/L (ref 3.5–5.1)
PROT SERPL-MCNC: 7 GM/DL (ref 5.8–7.6)
RBC # BLD AUTO: 4.54 X10(6)/MCL (ref 4.7–6.1)
SODIUM SERPL-SCNC: 142 MMOL/L (ref 136–145)
WBC # SPEC AUTO: 4.17 X10(3)/MCL (ref 4.5–11.5)

## 2024-04-08 PROCEDURE — 80053 COMPREHEN METABOLIC PANEL: CPT

## 2024-04-08 PROCEDURE — 36415 COLL VENOUS BLD VENIPUNCTURE: CPT

## 2024-04-08 PROCEDURE — 80197 ASSAY OF TACROLIMUS: CPT

## 2024-04-08 PROCEDURE — 85025 COMPLETE CBC W/AUTO DIFF WBC: CPT

## 2024-04-09 LAB — TACROLIMUS TROUGH BLD-MCNC: 6.9 NG/ML

## 2024-04-10 ENCOUNTER — TELEPHONE (OUTPATIENT)
Dept: TRANSPLANT | Facility: CLINIC | Age: 64
End: 2024-04-10
Payer: MEDICARE

## 2024-04-10 NOTE — LETTER
April 10, 2024    Colin Reilly  [address]  [place] [address]          Dear Colin Salazarucet:  MRN: [id number]    This is a follow up to your recent labs, your lab results were stable.  There are no medicine changes.  Please have your labs drawn again on 7/1/2024.      If you cannot have your labs drawn on the scheduled date, it is your responsibility to call the transplant department to reschedule.  Please call [phone] and ask to speak to [facility]  for all scheduling requests.     Sincerely,      Allegra Morrison RN  Your Liver Transplant Coordinator    [facility] [place]  [facility] [place] [address]  [phone]

## 2024-04-10 NOTE — TELEPHONE ENCOUNTER
Labs reviewed and are stable.  Patient to repeat labs on 7/1/24.   Letter sent to patient.        ----- Message from Elizabeth Meneses MD sent at 4/9/2024  9:17 PM CDT -----  The Labs are stable - please let patient know.

## 2024-04-19 ENCOUNTER — TELEPHONE (OUTPATIENT)
Dept: INTERNAL MEDICINE | Facility: CLINIC | Age: 64
End: 2024-04-19
Payer: MEDICARE

## 2024-04-19 ENCOUNTER — TELEPHONE (OUTPATIENT)
Dept: ADMINISTRATIVE | Facility: HOSPITAL | Age: 64
End: 2024-04-19
Payer: MEDICARE

## 2024-04-19 DIAGNOSIS — E11.42 TYPE 2 DIABETES MELLITUS WITH DIABETIC POLYNEUROPATHY, WITH LONG-TERM CURRENT USE OF INSULIN: Primary | ICD-10-CM

## 2024-04-19 DIAGNOSIS — Z79.4 TYPE 2 DIABETES MELLITUS WITH DIABETIC POLYNEUROPATHY, WITH LONG-TERM CURRENT USE OF INSULIN: Primary | ICD-10-CM

## 2024-04-19 NOTE — TELEPHONE ENCOUNTER
----- Message from Lupe Burr sent at 4/19/2024  8:18 AM CDT -----  .Type:  Patient Returning Call    Who Called:Raven with Dr Javy Cmapbell Call Back Number:[phone]  Additional Information: Patient inform sent to wrong office Please send to Blaine Steele OD

## 2024-04-19 NOTE — TELEPHONE ENCOUNTER
Spoke with chris to questions the call. They received referral but do not take his insurance. Attempted to reach out to patient to find out where he wants to go for eye exam. Unable to leave voicemail.

## 2024-04-22 PROBLEM — Z00.00 WELLNESS EXAMINATION: Status: RESOLVED | Noted: 2024-01-17 | Resolved: 2024-04-22

## 2024-04-23 ENCOUNTER — TELEPHONE (OUTPATIENT)
Dept: TRANSPLANT | Facility: CLINIC | Age: 64
End: 2024-04-23
Payer: MEDICARE

## 2024-04-26 DIAGNOSIS — G89.29 CHRONIC LOW BACK PAIN WITH SCIATICA, SCIATICA LATERALITY UNSPECIFIED, UNSPECIFIED BACK PAIN LATERALITY: ICD-10-CM

## 2024-04-26 DIAGNOSIS — Z94.4 LIVER TRANSPLANTED: ICD-10-CM

## 2024-04-26 DIAGNOSIS — M54.40 CHRONIC LOW BACK PAIN WITH SCIATICA, SCIATICA LATERALITY UNSPECIFIED, UNSPECIFIED BACK PAIN LATERALITY: ICD-10-CM

## 2024-04-26 DIAGNOSIS — Z79.891 LONG TERM PRESCRIPTION OPIATE USE: ICD-10-CM

## 2024-04-26 RX ORDER — HYDROCODONE BITARTRATE AND ACETAMINOPHEN 10; 325 MG/1; MG/1
1 TABLET ORAL DAILY PRN
Qty: 30 TABLET | Refills: 0 | Status: SHIPPED | OUTPATIENT
Start: 2024-04-26 | End: 2024-05-24 | Stop reason: SDUPTHER

## 2024-04-26 NOTE — TELEPHONE ENCOUNTER
----- Message from Ranjan Moisés sent at 4/26/2024  8:40 AM CDT -----  .Type:  RX Refill Request    Who Called: pt  Refill or New Rx:Refill  RX Name and Strength:HYDROcodone-acetaminophen (NORCO)  mg per tablet  How is the patient currently taking it? (ex. 1XDay):Take 1 tablet by mouth daily as needed for Pain. - Oral  Is this a 30 day or 90 day RX:30 day  Preferred Pharmacy with phone number:[facility] - [place] - [address] [place] [address]  Local or Mail Order:local  Ordering Provider:  Would the patient rather a call back or a response via [facility]? Call back  Best Call Back Number:  Additional Information:

## 2024-05-01 DIAGNOSIS — F32.5 MAJOR DEPRESSION IN REMISSION: ICD-10-CM

## 2024-05-01 DIAGNOSIS — E11.42 DIABETIC PERIPHERAL NEUROPATHY: ICD-10-CM

## 2024-05-01 DIAGNOSIS — Z79.891 LONG TERM PRESCRIPTION OPIATE USE: ICD-10-CM

## 2024-05-01 RX ORDER — FINASTERIDE 5 MG/1
5 TABLET, FILM COATED ORAL DAILY
Qty: 30 TABLET | Refills: 11 | Status: SHIPPED | OUTPATIENT
Start: 2024-05-01 | End: 2025-05-01

## 2024-05-01 RX ORDER — ESCITALOPRAM OXALATE 20 MG/1
20 TABLET ORAL DAILY
Qty: 90 TABLET | Refills: 1 | Status: SHIPPED | OUTPATIENT
Start: 2024-05-01 | End: 2025-05-01

## 2024-05-01 RX ORDER — GABAPENTIN 600 MG/1
600 TABLET ORAL 3 TIMES DAILY
Qty: 90 TABLET | Refills: 5 | Status: SHIPPED | OUTPATIENT
Start: 2024-05-01 | End: 2025-05-01

## 2024-05-01 RX ORDER — ROSUVASTATIN CALCIUM 10 MG/1
10 TABLET, COATED ORAL NIGHTLY
Qty: 30 TABLET | Refills: 11 | Status: SHIPPED | OUTPATIENT
Start: 2024-05-01 | End: 2025-05-01

## 2024-05-01 RX ORDER — QUETIAPINE FUMARATE 50 MG/1
50 TABLET, FILM COATED ORAL NIGHTLY
Qty: 30 TABLET | Refills: 11 | Status: SHIPPED | OUTPATIENT
Start: 2024-05-01 | End: 2025-05-01

## 2024-05-02 ENCOUNTER — TELEPHONE (OUTPATIENT)
Dept: INTERNAL MEDICINE | Facility: CLINIC | Age: 64
End: 2024-05-02
Payer: MEDICARE

## 2024-05-02 DIAGNOSIS — E11.42 TYPE 2 DIABETES MELLITUS WITH DIABETIC POLYNEUROPATHY, WITH LONG-TERM CURRENT USE OF INSULIN: ICD-10-CM

## 2024-05-02 DIAGNOSIS — E11.42 TYPE 2 DIABETES MELLITUS WITH DIABETIC POLYNEUROPATHY, WITH LONG-TERM CURRENT USE OF INSULIN: Primary | ICD-10-CM

## 2024-05-02 DIAGNOSIS — Z79.4 TYPE 2 DIABETES MELLITUS WITH DIABETIC POLYNEUROPATHY, WITH LONG-TERM CURRENT USE OF INSULIN: Primary | ICD-10-CM

## 2024-05-02 DIAGNOSIS — Z79.4 TYPE 2 DIABETES MELLITUS WITH DIABETIC POLYNEUROPATHY, WITH LONG-TERM CURRENT USE OF INSULIN: ICD-10-CM

## 2024-05-02 RX ORDER — INSULIN DETEMIR 100 [IU]/ML
10 INJECTION, SOLUTION SUBCUTANEOUS NIGHTLY
Qty: 9 ML | Refills: 3 | Status: SHIPPED | OUTPATIENT
Start: 2024-05-02 | End: 2024-05-02

## 2024-05-02 RX ORDER — INSULIN GLARGINE 100 [IU]/ML
10 INJECTION, SOLUTION SUBCUTANEOUS NIGHTLY
Qty: 9 ML | Refills: 3 | Status: SHIPPED | OUTPATIENT
Start: 2024-05-02 | End: 2025-05-02

## 2024-05-02 RX ORDER — INSULIN ASPART 100 [IU]/ML
10 INJECTION, SOLUTION INTRAVENOUS; SUBCUTANEOUS 3 TIMES DAILY
Qty: 27 ML | Refills: 3 | Status: SHIPPED | OUTPATIENT
Start: 2024-05-02 | End: 2025-05-02

## 2024-05-02 RX ORDER — INSULIN ASPART 100 [IU]/ML
10 INJECTION, SOLUTION INTRAVENOUS; SUBCUTANEOUS 3 TIMES DAILY
Qty: 27 ML | Refills: 3 | Status: SHIPPED | OUTPATIENT
Start: 2024-05-02 | End: 2024-05-02 | Stop reason: SDUPTHER

## 2024-05-02 NOTE — TELEPHONE ENCOUNTER
----- Message from Lupe Burr sent at 5/2/2024  2:08 PM CDT -----  .Type:  RX Refill Request    Who Called: pt  Refill or New Rx:refill  RX Name and Strength:insulin aspart U-100 (NOVOLOG FLEXPEN U-100 INSULIN) 100 unit/mL (3 mL) InPn pen  How is the patient currently taking it? (ex. 1XDay):2/day  Is this a 30 day or 90 day RX:90  Preferred Pharmacy with phone number:[facility]  Local or Mail Order:Local  Ordering Provider:Voitier  Would the patient rather a call back or a response via [facility]?   Best Call Back Number:[phone]  Additional Information: insulin aspart U-100 (NOVOLOG FLEXPEN U-100 INSULIN) 100 unit/mL (3 mL) InPn pen    .Type:  RX Refill Request    Who Called: PT  Refill or New Rx:REFILL  RX Name and Strength:insulin detemir U-100, Levemir, (LEVEMIR FLEXTOUCH U100 INSULIN) 100 unit/mL (3 mL) InPn pen  How is the patient currently taking it? (ex. 1XDay):1/day  Is this a 30 day or 90 day RX:90  Preferred Pharmacy with phone number:[facility]  Local or Mail Order:Local  Ordering Provider:Voitier  Would the patient rather a call back or a response via [email or web address]?   Best Call Back Number:[phone]  Additional Information: insulin detemir U-100, Levemir, (LEVEMIR FLEXTOUCH U100 INSULIN) 100 unit/mL (3 mL) InPn pen

## 2024-05-22 ENCOUNTER — TELEPHONE (OUTPATIENT)
Dept: INTERNAL MEDICINE | Facility: CLINIC | Age: 64
End: 2024-05-22
Payer: MEDICARE

## 2024-05-24 ENCOUNTER — TELEPHONE (OUTPATIENT)
Dept: HEPATOLOGY | Facility: CLINIC | Age: 64
End: 2024-05-24
Payer: MEDICARE

## 2024-05-24 DIAGNOSIS — Z79.891 LONG TERM PRESCRIPTION OPIATE USE: ICD-10-CM

## 2024-05-24 DIAGNOSIS — Z94.4 LIVER TRANSPLANTED: ICD-10-CM

## 2024-05-24 DIAGNOSIS — M54.40 CHRONIC LOW BACK PAIN WITH SCIATICA, SCIATICA LATERALITY UNSPECIFIED, UNSPECIFIED BACK PAIN LATERALITY: ICD-10-CM

## 2024-05-24 DIAGNOSIS — G89.29 CHRONIC LOW BACK PAIN WITH SCIATICA, SCIATICA LATERALITY UNSPECIFIED, UNSPECIFIED BACK PAIN LATERALITY: ICD-10-CM

## 2024-05-24 RX ORDER — HYDROCODONE BITARTRATE AND ACETAMINOPHEN 10; 325 MG/1; MG/1
1 TABLET ORAL DAILY PRN
Qty: 30 TABLET | Refills: 0 | Status: SHIPPED | OUTPATIENT
Start: 2024-05-24 | End: 2024-06-19 | Stop reason: SDUPTHER

## 2024-05-24 NOTE — TELEPHONE ENCOUNTER
----- Message from Lupe Burr sent at 5/24/2024  8:14 AM CDT -----  .Type:  RX Refill Request    Who Called: PT  Refill or New Rx:REFILL  RX Name and Strength:HYDROcodone-acetaminophen (NORCO)  mg per tablet  How is the patient currently taking it? (ex. 1XDay):1/day  Is this a 30 day or 90 day RX:30  Preferred Pharmacy with phone number:[facility] in [place]   Local or Mail Order:Local  Ordering Provider:Marckitier  Would the patient rather a call back or a response via [facility]?   Best Call Back Number:[phone]  Additional Information: HYDROcodone-acetaminophen (NORCO)  mg per tablet

## 2024-05-24 NOTE — TELEPHONE ENCOUNTER
----- Message from Modesta Benton sent at 5/24/2024  2:40 PM CDT -----  Regarding: Appt Access  Contact: [phone]  CONSULT/ADVISORY    Name of Caller:  NEGAR HARDING [[id number]]    Contact Preference:   [phone]    Nature of Call: Pt is calling to r/s his appt on 05/27/2024 to around 06/03/2024 around the time of the month he gets his check.  Please call to confirm.

## 2024-05-29 ENCOUNTER — TELEPHONE (OUTPATIENT)
Dept: INTERNAL MEDICINE | Facility: CLINIC | Age: 64
End: 2024-05-29
Payer: MEDICARE

## 2024-05-30 DIAGNOSIS — Z79.4 TYPE 2 DIABETES MELLITUS WITH DIABETIC POLYNEUROPATHY, WITH LONG-TERM CURRENT USE OF INSULIN: ICD-10-CM

## 2024-05-30 DIAGNOSIS — E11.42 TYPE 2 DIABETES MELLITUS WITH DIABETIC POLYNEUROPATHY, WITH LONG-TERM CURRENT USE OF INSULIN: ICD-10-CM

## 2024-05-30 RX ORDER — METFORMIN HYDROCHLORIDE 500 MG/1
500 TABLET ORAL 2 TIMES DAILY
Qty: 60 TABLET | Refills: 2 | Status: SHIPPED | OUTPATIENT
Start: 2024-05-30

## 2024-06-03 DIAGNOSIS — E11.9 TYPE 2 DIABETES MELLITUS NOT AT GOAL: ICD-10-CM

## 2024-06-03 DIAGNOSIS — Z94.4 LIVER TRANSPLANTED: ICD-10-CM

## 2024-06-03 RX ORDER — CALCIUM CITRATE/VITAMIN D3 200MG-6.25
TABLET ORAL
Qty: 350 STRIP | Refills: 3 | Status: SHIPPED | OUTPATIENT
Start: 2024-06-03

## 2024-06-05 ENCOUNTER — TELEPHONE (OUTPATIENT)
Dept: TRANSPLANT | Facility: CLINIC | Age: 64
End: 2024-06-05
Payer: MEDICARE

## 2024-06-05 PROBLEM — E11.42 DIABETIC PERIPHERAL NEUROPATHY: Chronic | Status: ACTIVE | Noted: 2022-05-26

## 2024-06-05 NOTE — TELEPHONE ENCOUNTER
----- Message from Rianna Workman sent at 6/5/2024 12:04 PM CDT -----  Regarding: Appt  Contact: Pt  [phone]              Caller:   Colin      Returning call to:      Caller can be reached at:    [phone]    Treating Physician: Elizabeth Meneses MD      Nature of the call:   Returning missed call to reschedule missed appt from  06/03/24

## 2024-06-19 DIAGNOSIS — Z94.4 LIVER TRANSPLANTED: ICD-10-CM

## 2024-06-19 DIAGNOSIS — Z79.891 LONG TERM PRESCRIPTION OPIATE USE: ICD-10-CM

## 2024-06-19 DIAGNOSIS — G89.29 CHRONIC LOW BACK PAIN WITH SCIATICA, SCIATICA LATERALITY UNSPECIFIED, UNSPECIFIED BACK PAIN LATERALITY: ICD-10-CM

## 2024-06-19 DIAGNOSIS — M54.40 CHRONIC LOW BACK PAIN WITH SCIATICA, SCIATICA LATERALITY UNSPECIFIED, UNSPECIFIED BACK PAIN LATERALITY: ICD-10-CM

## 2024-06-19 RX ORDER — HYDROCODONE BITARTRATE AND ACETAMINOPHEN 10; 325 MG/1; MG/1
1 TABLET ORAL DAILY PRN
Qty: 30 TABLET | Refills: 0 | Status: SHIPPED | OUTPATIENT
Start: 2024-06-19 | End: 2024-07-19

## 2024-06-19 NOTE — TELEPHONE ENCOUNTER
----- Message from Lupe Burr sent at 6/19/2024  9:14 AM CDT -----  .Type:  RX Refill Request    Who Called: pt  Refill or New Rx:refill  RX Name and Strength:HYDROcodone-acetaminophen (NORCO)  mg per tablet  How is the patient currently taking it? (ex. 1XDay):1/DAY  Is this a 30 day or 90 day RX:30  Preferred Pharmacy with phone number:[facility] [place]  Local or Mail Order:Local  Ordering Provider:Marckitier  Would the patient rather a call back or a response via [facility]?   Best Call Back Number:[phone]  Additional Information: HYDROcodone-acetaminophen (NORCO)  mg per tablet   - - -

## 2024-06-25 DIAGNOSIS — H53.10 UNSPECIFIED SUBJECTIVE VISUAL DISTURBANCES: Primary | ICD-10-CM

## 2024-06-25 DIAGNOSIS — R53.1 WEAKNESS: ICD-10-CM

## 2024-07-05 ENCOUNTER — HOSPITAL ENCOUNTER (OUTPATIENT)
Dept: RADIOLOGY | Facility: HOSPITAL | Age: 64
Discharge: HOME | End: 2024-07-05
Attending: NURSE PRACTITIONER
Payer: MEDICARE

## 2024-07-05 ENCOUNTER — TELEPHONE (OUTPATIENT)
Dept: TRANSPLANT | Facility: CLINIC | Age: 64
End: 2024-07-05
Payer: MEDICARE

## 2024-07-05 DIAGNOSIS — F10.11 ALCOHOL ABUSE, IN REMISSION: Primary | ICD-10-CM

## 2024-07-05 DIAGNOSIS — R53.1 WEAKNESS: ICD-10-CM

## 2024-07-05 DIAGNOSIS — H53.10 UNSPECIFIED SUBJECTIVE VISUAL DISTURBANCES: ICD-10-CM

## 2024-07-05 DIAGNOSIS — Z94.4 LIVER REPLACED BY TRANSPLANT: ICD-10-CM

## 2024-07-05 PROCEDURE — 70450 CT HEAD/BRAIN W/O DYE: CPT | Mod: TC

## 2024-07-05 NOTE — TELEPHONE ENCOUNTER
Attempted to reach patient for lab review via telephone and was not able to leave a voice message.  Alternate number for pt's mother was not a working number.  Lab appt (including PETH) has been scheduled for Monday 7/8/24.      ----- Message from Elizabeth Meneses MD sent at 7/5/2024  1:04 PM CDT -----  The Labs are up - repeat Monday and check peth - please let patient know.

## 2024-07-08 ENCOUNTER — TELEPHONE (OUTPATIENT)
Dept: HEPATOLOGY | Facility: CLINIC | Age: 64
End: 2024-07-08
Payer: MEDICARE

## 2024-07-08 NOTE — TELEPHONE ENCOUNTER
----- Message from Modesta Benton sent at 7/8/2024 11:29 AM CDT -----  Regarding: R/S Appt  Contact: [phone]  RESCHEDULE/APPTS    Current Appt Date: 07/08/2024    Type of Appt: CAIT    Physician: Gideon    Reason for Scheduling:  Pt is ill    Caller: NEGAR HARDING [[id number]]    Contact Preference:   [phone]

## 2024-07-12 ENCOUNTER — TELEPHONE (OUTPATIENT)
Dept: TRANSPLANT | Facility: CLINIC | Age: 64
End: 2024-07-12
Payer: MEDICARE

## 2024-07-12 NOTE — TELEPHONE ENCOUNTER
----- Message from Rianna Workman sent at 7/12/2024  8:41 AM CDT -----  Regarding: Appt  Contact: Pt  [phone]                Appt Type:   Ep      Date/Time Preference:   Next jailene      Treating Provider:  Elizabeth Meneses MD    Caller Name: Colin      Contact Prefer: [phone]    Comments/Notes:  Called to reschedule missed appt from 07/08/24

## 2024-07-17 ENCOUNTER — TELEPHONE (OUTPATIENT)
Dept: TRANSPLANT | Facility: CLINIC | Age: 64
End: 2024-07-17
Payer: MEDICARE

## 2024-07-17 NOTE — LETTER
July 17, 2024    Colinhernandez Reilly  [address]  [facility] [address]          Dear Colin Reilly:  MRN: [id number]    Your lab work was due to be drawn on 7/8/2024.  We contacted your lab and were unable to get test results.  It is very important to get your labs drawn as scheduled.  We cannot monitor you for rejection, infections, or drug toxicity side effects without lab results.  Please call us at [phone] as soon as possible to let us know when you plan to have labs drawn.    Sincerely,      Allegra Morrison RN  Your Liver Transplant Coordinator    [facility]  [address] [place] [id number]  [phone]

## 2024-07-18 ENCOUNTER — DOCUMENTATION ONLY (OUTPATIENT)
Dept: INTERNAL MEDICINE | Facility: CLINIC | Age: 64
End: 2024-07-18
Payer: MEDICARE

## 2024-07-18 DIAGNOSIS — Z79.891 LONG TERM PRESCRIPTION OPIATE USE: ICD-10-CM

## 2024-07-18 DIAGNOSIS — M54.40 CHRONIC LOW BACK PAIN WITH SCIATICA, SCIATICA LATERALITY UNSPECIFIED, UNSPECIFIED BACK PAIN LATERALITY: ICD-10-CM

## 2024-07-18 DIAGNOSIS — Z94.4 LIVER TRANSPLANTED: ICD-10-CM

## 2024-07-18 DIAGNOSIS — G89.29 CHRONIC LOW BACK PAIN WITH SCIATICA, SCIATICA LATERALITY UNSPECIFIED, UNSPECIFIED BACK PAIN LATERALITY: ICD-10-CM

## 2024-07-18 LAB
LEFT EYE DM RETINOPATHY: NEGATIVE
RIGHT EYE DM RETINOPATHY: NEGATIVE

## 2024-07-18 RX ORDER — HYDROCODONE BITARTRATE AND ACETAMINOPHEN 10; 325 MG/1; MG/1
1 TABLET ORAL DAILY PRN
Qty: 7 TABLET | Refills: 0 | Status: SHIPPED | OUTPATIENT
Start: 2024-07-18 | End: 2024-07-22 | Stop reason: SDUPTHER

## 2024-07-18 NOTE — TELEPHONE ENCOUNTER
----- Message from Adam Hernandez sent at 7/18/2024  8:35 AM CDT -----  .Type:  RX Refill Request    Who Called: Colin  Refill or New Rx: Refill   RX Name and Strength: HYDROcodone-acetaminophen (NORCO)  mg per tablet  How is the patient currently taking it? (ex. 1XDay):  Is this a 30 day or 90 day RX:  Preferred Pharmacy with phone number: [facility]  Local or Mail Order: Local   Ordering Provider: Vipul   Would the patient rather a call back or a response via [facility]?   Best Call Back Number: [phone]  Additional Information: Please call with any questions.

## 2024-07-19 ENCOUNTER — TELEPHONE (OUTPATIENT)
Dept: INTERNAL MEDICINE | Facility: CLINIC | Age: 64
End: 2024-07-19
Payer: MEDICARE

## 2024-07-19 NOTE — TELEPHONE ENCOUNTER
----- Message from Charmaine Sosa sent at 7/19/2024  8:35 AM CDT -----  Regarding: advice  Who Called: Colin Reilly    Caller is requesting assistance/information from provider's office.    Symptoms (please be specific):    How long has patient had these symptoms:    List of preferred pharmacies on file (remove unneeded): [unfilled]  If different, enter pharmacy into here including location and phone number:       Preferred Method of Contact: Phone Call  Patient's Preferred Phone Number on File: [phone]   Best Call Back Number, if different:  Additional Information: Pt is requesting a phone call stated he usually gets 30 day supply of HYDROcodone-acetaminophen (NORCO)  mg per tablet, stated he requested a refill yesterday however he only received a supply for seven days. Please advise

## 2024-07-19 NOTE — TELEPHONE ENCOUNTER
Notified pt he only received 7 days due to MD being out and on call submitted refill. He voiced understanding. He will call back next week to submit for his 30 day fill.

## 2024-07-22 DIAGNOSIS — Z94.4 LIVER TRANSPLANTED: ICD-10-CM

## 2024-07-22 DIAGNOSIS — Z79.891 LONG TERM PRESCRIPTION OPIATE USE: ICD-10-CM

## 2024-07-22 DIAGNOSIS — G89.29 CHRONIC LOW BACK PAIN WITH SCIATICA, SCIATICA LATERALITY UNSPECIFIED, UNSPECIFIED BACK PAIN LATERALITY: ICD-10-CM

## 2024-07-22 DIAGNOSIS — M54.40 CHRONIC LOW BACK PAIN WITH SCIATICA, SCIATICA LATERALITY UNSPECIFIED, UNSPECIFIED BACK PAIN LATERALITY: ICD-10-CM

## 2024-07-22 RX ORDER — HYDROCODONE BITARTRATE AND ACETAMINOPHEN 10; 325 MG/1; MG/1
1 TABLET ORAL DAILY PRN
Qty: 30 TABLET | Refills: 0 | Status: SHIPPED | OUTPATIENT
Start: 2024-07-22 | End: 2025-07-22

## 2024-07-22 NOTE — TELEPHONE ENCOUNTER
----- Message from Juana Reeves sent at 7/22/2024  8:26 AM CDT -----  Regarding: refill  Who Called: Colin Reilly    RX Name and Strength:HYDROcodone-acetaminophen (NORCO)  mg per tablet     List of preferred pharmacies on file (remove unneeded): [facility]       Patient's Preferred Phone Number on File: [phone]   Best Call Back Number, if different:  Additional Information:

## 2024-07-24 ENCOUNTER — TELEPHONE (OUTPATIENT)
Dept: TRANSPLANT | Facility: CLINIC | Age: 64
End: 2024-07-24
Payer: MEDICARE

## 2024-07-24 NOTE — TELEPHONE ENCOUNTER
"----- Message from Brandin Worthy sent at 7/24/2024 10:45 AM CDT -----  Scheduling Request    Name Of Caller: Self    Contact Preference?: [phone]     What is the nature of the call?: Calling to schedule labs @ [facility] on 8/9    Additional Notes:  "Thank you for all that you do for our patients"  "

## 2024-07-30 ENCOUNTER — LAB VISIT (OUTPATIENT)
Dept: LAB | Facility: HOSPITAL | Age: 64
End: 2024-07-30
Attending: INTERNAL MEDICINE
Payer: MEDICARE

## 2024-07-30 DIAGNOSIS — Z94.4 LIVER REPLACED BY TRANSPLANT: ICD-10-CM

## 2024-07-30 LAB
ALBUMIN SERPL-MCNC: 4.4 G/DL (ref 3.4–4.8)
ALBUMIN/GLOB SERPL: 1.7 RATIO (ref 1.1–2)
ALP SERPL-CCNC: 51 UNIT/L (ref 40–150)
ALT SERPL-CCNC: 16 UNIT/L (ref 0–55)
ANION GAP SERPL CALC-SCNC: 8 MEQ/L
AST SERPL-CCNC: 18 UNIT/L (ref 5–34)
BASOPHILS # BLD AUTO: 0.03 X10(3)/MCL
BASOPHILS NFR BLD AUTO: 0.8 %
BILIRUB SERPL-MCNC: 1.2 MG/DL
BUN SERPL-MCNC: 18 MG/DL (ref 8.4–25.7)
CALCIUM SERPL-MCNC: 9.5 MG/DL (ref 8.8–10)
CHLORIDE SERPL-SCNC: 110 MMOL/L (ref 98–107)
CO2 SERPL-SCNC: 23 MMOL/L (ref 23–31)
CREAT SERPL-MCNC: 1.43 MG/DL (ref 0.73–1.18)
CREAT/UREA NIT SERPL: 13
EOSINOPHIL # BLD AUTO: 0.06 X10(3)/MCL (ref 0–0.9)
EOSINOPHIL NFR BLD AUTO: 1.6 %
ERYTHROCYTE [DISTWIDTH] IN BLOOD BY AUTOMATED COUNT: 13.3 % (ref 11.5–17)
GFR SERPLBLD CREATININE-BSD FMLA CKD-EPI: 55 ML/MIN/1.73/M2
GLOBULIN SER-MCNC: 2.6 GM/DL (ref 2.4–3.5)
GLUCOSE SERPL-MCNC: 225 MG/DL (ref 82–115)
HCT VFR BLD AUTO: 40.5 % (ref 42–52)
HGB BLD-MCNC: 14.1 G/DL (ref 14–18)
IMM GRANULOCYTES # BLD AUTO: 0.08 X10(3)/MCL (ref 0–0.04)
IMM GRANULOCYTES NFR BLD AUTO: 2.2 %
LYMPHOCYTES # BLD AUTO: 0.82 X10(3)/MCL (ref 0.6–4.6)
LYMPHOCYTES NFR BLD AUTO: 22.5 %
MCH RBC QN AUTO: 33.6 PG (ref 27–31)
MCHC RBC AUTO-ENTMCNC: 34.8 G/DL (ref 33–36)
MCV RBC AUTO: 96.4 FL (ref 80–94)
MONOCYTES # BLD AUTO: 0.25 X10(3)/MCL (ref 0.1–1.3)
MONOCYTES NFR BLD AUTO: 6.8 %
NEUTROPHILS # BLD AUTO: 2.41 X10(3)/MCL (ref 2.1–9.2)
NEUTROPHILS NFR BLD AUTO: 66.1 %
PLATELET # BLD AUTO: 119 X10(3)/MCL (ref 130–400)
PMV BLD AUTO: 10.9 FL (ref 7.4–10.4)
POTASSIUM SERPL-SCNC: 4.7 MMOL/L (ref 3.5–5.1)
PROT SERPL-MCNC: 7 GM/DL (ref 5.8–7.6)
RBC # BLD AUTO: 4.2 X10(6)/MCL (ref 4.7–6.1)
SODIUM SERPL-SCNC: 141 MMOL/L (ref 136–145)
WBC # BLD AUTO: 3.65 X10(3)/MCL (ref 4.5–11.5)

## 2024-07-30 PROCEDURE — 80197 ASSAY OF TACROLIMUS: CPT

## 2024-07-30 PROCEDURE — 36415 COLL VENOUS BLD VENIPUNCTURE: CPT

## 2024-07-30 PROCEDURE — 80053 COMPREHEN METABOLIC PANEL: CPT

## 2024-07-30 PROCEDURE — 85025 COMPLETE CBC W/AUTO DIFF WBC: CPT

## 2024-07-31 LAB — TACROLIMUS TROUGH BLD-MCNC: 11.6 NG/ML

## 2024-08-02 ENCOUNTER — TELEPHONE (OUTPATIENT)
Dept: TRANSPLANT | Facility: CLINIC | Age: 64
End: 2024-08-02
Payer: MEDICARE

## 2024-08-02 DIAGNOSIS — Z94.4 LIVER REPLACED BY TRANSPLANT: ICD-10-CM

## 2024-08-02 RX ORDER — TACROLIMUS 1 MG/1
3 CAPSULE ORAL EVERY 12 HOURS
Qty: 180 CAPSULE | Refills: 11 | Status: SHIPPED | OUTPATIENT
Start: 2024-08-02

## 2024-08-02 NOTE — TELEPHONE ENCOUNTER
Labs and med changes reviewed via telephone and pt repeated back instructions.  Letter also sent with lab review and medication changes.  Pt will repeat labs the week of 8/12/24.    ----- Message from Odalys Kearney MD sent at 7/31/2024  9:33 PM CDT -----  Tac supratherapeutic. If true trough, recommend decreasing Tac to 3/3 from 4/4 with labs in 1 week.

## 2024-08-02 NOTE — LETTER
August 2, 2024    Colin Melba  [address]  [place] [address]          Dear Colin Reilly:  MRN: [id number]    This is a follow up to your recent labs, your lab results were stable.  Your prograf level was higher than it should be and you will need to lower your dose to 3 mg in the morning and 3 mg in the evening (every 12 hours).  Please have your labs drawn again the week of 8/12/2024.      If you cannot have your labs drawn on the scheduled date, it is your responsibility to call the transplant department to reschedule.  Please call [phone] and ask to speak to Maribeth Suarez, Medical Assistant for all scheduling requests.     Sincerely,        Allegra Morrison RN  Your Liver Transplant Coordinator    [facility]  [address] [place] [id number]  [phone]

## 2024-08-10 ENCOUNTER — HOSPITAL ENCOUNTER (EMERGENCY)
Facility: HOSPITAL | Age: 64
Discharge: HOME | End: 2024-08-10
Attending: INTERNAL MEDICINE
Payer: MEDICARE

## 2024-08-10 VITALS
TEMPERATURE: 98 F | BODY MASS INDEX: 26.6 KG/M2 | RESPIRATION RATE: 19 BRPM | HEART RATE: 75 BPM | OXYGEN SATURATION: 98 % | WEIGHT: 190 LBS | DIASTOLIC BLOOD PRESSURE: 88 MMHG | SYSTOLIC BLOOD PRESSURE: 156 MMHG | HEIGHT: 71 IN

## 2024-08-10 DIAGNOSIS — K59.00 CONSTIPATION, UNSPECIFIED CONSTIPATION TYPE: Primary | ICD-10-CM

## 2024-08-10 DIAGNOSIS — R10.84 GENERALIZED ABDOMINAL PAIN: ICD-10-CM

## 2024-08-10 DIAGNOSIS — Z94.4 LIVER TRANSPLANT STATUS: ICD-10-CM

## 2024-08-10 LAB
ALBUMIN SERPL-MCNC: 3.7 G/DL (ref 3.4–4.8)
ALBUMIN/GLOB SERPL: 1.6 RATIO (ref 1.1–2)
ALP SERPL-CCNC: 51 UNIT/L (ref 40–150)
ALT SERPL-CCNC: 14 UNIT/L (ref 0–55)
AMYLASE SERPL-CCNC: 52 UNIT/L (ref 25–125)
ANION GAP SERPL CALC-SCNC: 8 MEQ/L
AST SERPL-CCNC: 15 UNIT/L (ref 5–34)
BASOPHILS # BLD AUTO: 0.03 X10(3)/MCL
BASOPHILS NFR BLD AUTO: 1 %
BILIRUB SERPL-MCNC: 0.5 MG/DL
BILIRUB UR QL STRIP.AUTO: NEGATIVE
BUN SERPL-MCNC: 14 MG/DL (ref 8.4–25.7)
CALCIUM SERPL-MCNC: 8.6 MG/DL (ref 8.8–10)
CHLORIDE SERPL-SCNC: 108 MMOL/L (ref 98–107)
CLARITY UR: CLEAR
CO2 SERPL-SCNC: 22 MMOL/L (ref 23–31)
COLOR UR AUTO: YELLOW
CREAT SERPL-MCNC: 1.16 MG/DL (ref 0.73–1.18)
CREAT/UREA NIT SERPL: 12
EOSINOPHIL # BLD AUTO: 0.07 X10(3)/MCL (ref 0–0.9)
EOSINOPHIL NFR BLD AUTO: 2.4 %
ERYTHROCYTE [DISTWIDTH] IN BLOOD BY AUTOMATED COUNT: 12.9 % (ref 11.5–17)
GFR SERPLBLD CREATININE-BSD FMLA CKD-EPI: >60 ML/MIN/1.73/M2
GLOBULIN SER-MCNC: 2.3 GM/DL (ref 2.4–3.5)
GLUCOSE SERPL-MCNC: 260 MG/DL (ref 82–115)
GLUCOSE UR QL STRIP: ABNORMAL
HCT VFR BLD AUTO: 35.1 % (ref 42–52)
HGB BLD-MCNC: 12.5 G/DL (ref 14–18)
HGB UR QL STRIP: NEGATIVE
IMM GRANULOCYTES # BLD AUTO: 0.02 X10(3)/MCL (ref 0–0.04)
IMM GRANULOCYTES NFR BLD AUTO: 0.7 %
KETONES UR QL STRIP: NEGATIVE
LACTATE SERPL-SCNC: 1.4 MMOL/L (ref 0.5–2.2)
LEUKOCYTE ESTERASE UR QL STRIP: NEGATIVE
LIPASE SERPL-CCNC: 13 U/L
LYMPHOCYTES # BLD AUTO: 0.73 X10(3)/MCL (ref 0.6–4.6)
LYMPHOCYTES NFR BLD AUTO: 25.2 %
MCH RBC QN AUTO: 33.8 PG (ref 27–31)
MCHC RBC AUTO-ENTMCNC: 35.6 G/DL (ref 33–36)
MCV RBC AUTO: 94.9 FL (ref 80–94)
MONOCYTES # BLD AUTO: 0.27 X10(3)/MCL (ref 0.1–1.3)
MONOCYTES NFR BLD AUTO: 9.3 %
NEUTROPHILS # BLD AUTO: 1.78 X10(3)/MCL (ref 2.1–9.2)
NEUTROPHILS NFR BLD AUTO: 61.4 %
NITRITE UR QL STRIP: NEGATIVE
PH UR STRIP: 6 [PH]
PLATELET # BLD AUTO: 76 X10(3)/MCL (ref 130–400)
PMV BLD AUTO: 11.5 FL (ref 7.4–10.4)
POTASSIUM SERPL-SCNC: 4 MMOL/L (ref 3.5–5.1)
PROT SERPL-MCNC: 6 GM/DL (ref 5.8–7.6)
PROT UR QL STRIP: NEGATIVE
RBC # BLD AUTO: 3.7 X10(6)/MCL (ref 4.7–6.1)
SODIUM SERPL-SCNC: 138 MMOL/L (ref 136–145)
SP GR UR STRIP.AUTO: 1.02 (ref 1–1.03)
UROBILINOGEN UR STRIP-ACNC: 0.2
WBC # BLD AUTO: 2.9 X10(3)/MCL (ref 4.5–11.5)

## 2024-08-10 PROCEDURE — 80053 COMPREHEN METABOLIC PANEL: CPT | Performed by: INTERNAL MEDICINE

## 2024-08-10 PROCEDURE — 99284 EMERGENCY DEPT VISIT MOD MDM: CPT | Mod: 25

## 2024-08-10 PROCEDURE — 83690 ASSAY OF LIPASE: CPT | Performed by: INTERNAL MEDICINE

## 2024-08-10 PROCEDURE — 82150 ASSAY OF AMYLASE: CPT | Performed by: INTERNAL MEDICINE

## 2024-08-10 PROCEDURE — 85025 COMPLETE CBC W/AUTO DIFF WBC: CPT | Performed by: INTERNAL MEDICINE

## 2024-08-10 PROCEDURE — 83605 ASSAY OF LACTIC ACID: CPT | Performed by: INTERNAL MEDICINE

## 2024-08-10 PROCEDURE — 25000003 PHARM REV CODE 250: Performed by: INTERNAL MEDICINE

## 2024-08-10 PROCEDURE — 81003 URINALYSIS AUTO W/O SCOPE: CPT | Performed by: INTERNAL MEDICINE

## 2024-08-10 RX ORDER — LACTULOSE 10 G/15ML
20 SOLUTION ORAL
Status: COMPLETED | OUTPATIENT
Start: 2024-08-10 | End: 2024-08-10

## 2024-08-10 RX ADMIN — LACTULOSE 20 G: 20 SOLUTION ORAL at 03:08

## 2024-08-10 NOTE — ED PROVIDER NOTES
Encounter Date: 8/10/2024       History     Chief Complaint   Patient presents with    Abdominal Pain     Pt brought in [facility] with  c/o abd pain starting yesterday evening     63-year-old white male with a history of a liver transplant was brought in by ambulance secondary to epigastric pain      Review of patient's allergies indicates:  No Known Allergies  Past Medical History:   Diagnosis Date    Alcohol abuse, in remission 07/08/2014    Quit 01/04, 2011    Anemia of chronic disease 05/15/2020    Chronic back pain 07/08/2014    Hepatitis C virus infection 07/08/2014    tx with interferon 3-4 mos- stopped for unclear reasons Tattoos-first at age 17 only risk factor (HCVAB negative 08/2017)    Splenomegaly 07/08/2014    Wellness examination 1/17/2024     Past Surgical History:   Procedure Laterality Date    CARPAL TUNNEL RELEASE Bilateral     CHOLECYSTECTOMY      lap    COLONOSCOPY N/A 09/08/2017    Procedure: COLONOSCOPY;  Surgeon: Savannah Llanos MD;  Location: [facility];  Service: Endoscopy;  Laterality: N/A;    COLONOSCOPY Left 03/14/2018    Procedure: COLONOSCOPY;  Surgeon: Savannah Llanos MD;  Location: [facility];  Service: Endoscopy;  Laterality: Left;    COLONOSCOPY  02/21/2019    COLONOSCOPY W/ POLYPECTOMY  06/19/2014    ESOPHAGOGASTRODUODENOSCOPY  01/2014    ESOPHAGOGASTRODUODENOSCOPY  02/15/2017    grade II varices    ESOPHAGOGASTRODUODENOSCOPY  04/10/2017    grade I varices    ESOPHAGOGASTRODUODENOSCOPY N/A 10/18/2019    Procedure: EGD (ESOPHAGOGASTRODUODENOSCOPY);  Surgeon: Flavio Escalera MD;  Location: [facility] ([address] [place] [address]);  Service: Endoscopy;  Laterality: N/A;    ESOPHAGOGASTRODUODENOSCOPY W/ BANDING  01/26/2017    grade II varices    HERNIA REPAIR      LIPOMA RESECTION Right 5/5/2023    Procedure: EXCISION, LIPOMA Right Lateral Neck;  Surgeon: Jim Cronin MD;  Location: [address];  Service: General;  Laterality: Right;    LIVER TRANSPLANT N/A 05/17/2020     Procedure: TRANSPLANT, LIVER;  Surgeon: Danny Thakkar MD;  Location: [address];  Service: Transplant;  Laterality: N/A;    NECK SURGERY      fusion on C5 and C6    THROMBECTOMY  2020    Procedure: THROMBECTOMY;  Surgeon: Danny Thakkar MD;  [facility];  Service: Transplant;;  portal vein thrombectomy    ULNAR NERVE TRANSPOSITION Left     UMBILICAL HERNIA REPAIR N/A 2020    Procedure: REPAIR, HERNIA, PRIMARY WIHTOUT MESH AND PLACEMENT OF DRAIN;  Surgeon: Sherri Brunner MD;  Location: [facility] OR [address] [place] [address];  Service: Transplant;  Laterality: N/A;    vericose veins removed       Family History   Problem Relation Name Age of Onset    Hyperlipidemia Mother      No Known Problems Father  36        accident on oil rig    No Known Problems Sister      Cancer Brother          kidney    Alcohol abuse Brother      No Known Problems Sister       Social History     Tobacco Use    Smoking status: Former     Current packs/day: 0.00     Types: Cigarettes     Quit date: 2010     Years since quittin.6    Smokeless tobacco: Former     Types: Chew     Quit date: 1990    Tobacco comments:     former 1 pack/week quit 2010   Substance Use Topics    Alcohol use: No     Comment: former heavy beer but quit 2010    Drug use: No     Review of Systems   Constitutional: Negative.  Negative for activity change, appetite change, chills, diaphoresis, fatigue, fever and unexpected weight change.   HENT: Negative.  Negative for congestion, dental problem, drooling, ear discharge, ear pain, facial swelling, hearing loss, mouth sores, nosebleeds, postnasal drip, rhinorrhea, sinus pressure, sinus pain, sneezing, sore throat, tinnitus, trouble swallowing and voice change.    Eyes: Negative.  Negative for photophobia, pain, discharge, redness, itching and visual disturbance.   Respiratory: Negative.  Negative for apnea, cough, choking, chest tightness, shortness of breath, wheezing and  stridor.    Cardiovascular: Negative.  Negative for chest pain, palpitations and leg swelling.   Gastrointestinal:  Positive for abdominal pain. Negative for abdominal distention, anal bleeding, blood in stool, constipation, diarrhea, nausea, rectal pain and vomiting.   Endocrine: Negative.  Negative for cold intolerance, heat intolerance, polydipsia, polyphagia and polyuria.   Genitourinary: Negative.  Negative for decreased urine volume, difficulty urinating, dysuria, enuresis, flank pain, frequency, genital sores, hematuria, penile discharge, penile pain, penile swelling, scrotal swelling, testicular pain and urgency.   Musculoskeletal: Negative.  Negative for arthralgias, back pain, gait problem, joint swelling, myalgias, neck pain and neck stiffness.   Skin: Negative.  Negative for color change, pallor, rash and wound.   Allergic/Immunologic: Negative.  Negative for environmental allergies, food allergies and immunocompromised state.   Neurological: Negative.  Negative for dizziness, tremors, seizures, syncope, facial asymmetry, speech difficulty, weakness, light-headedness, numbness and headaches.   Hematological: Negative.  Negative for adenopathy. Does not bruise/bleed easily.   Psychiatric/Behavioral: Negative.  Negative for agitation, behavioral problems, confusion, decreased concentration, dysphoric mood, hallucinations, self-injury, sleep disturbance and suicidal ideas. The patient is not nervous/anxious and is not hyperactive.    All other systems reviewed and are negative.      Physical Exam     Initial Vitals [08/10/24 0126]   BP Pulse Resp Temp SpO2   (!) 160/90 72 16 97.4 °F (36.3 °C) 100 %      MAP       --         Physical Exam    Nursing note and vitals reviewed.  Constitutional: He appears well-developed and well-nourished.   HENT:   Head: Normocephalic and atraumatic.   Eyes: Conjunctivae and EOM are normal. Pupils are equal, round, and reactive to light.   Neck: Neck supple.   Normal range of  motion.  Cardiovascular:  Normal rate and regular rhythm.           Pulmonary/Chest: Breath sounds normal.   Abdominal: Abdomen is soft. Bowel sounds are normal. There is generalized abdominal tenderness.   Red light and indicates large healed scar from patient's transplant surgery   Musculoskeletal:         General: Normal range of motion.      Cervical back: Normal range of motion and neck supple.     Neurological: He is alert and oriented to person, place, and time.   Skin: Skin is warm and dry. Capillary refill takes less than 2 seconds.   Psychiatric: He has a normal mood and affect. His behavior is normal. Judgment and thought content normal.         ED Course   Procedures  Labs Reviewed   COMPREHENSIVE METABOLIC PANEL - Abnormal       Result Value    Sodium 138      Potassium 4.0      Chloride 108 (*)     CO2 22 (*)     Glucose 260 (*)     Blood Urea Nitrogen 14.0      Creatinine 1.16      Calcium 8.6 (*)     Protein Total 6.0      Albumin 3.7      Globulin 2.3 (*)     Albumin/Globulin Ratio 1.6      Bilirubin Total 0.5      ALP 51      ALT 14      AST 15      eGFR >60      Anion Gap 8.0      BUN/Creatinine Ratio 12     URINALYSIS, REFLEX TO URINE CULTURE - Abnormal    Color, UA Yellow      Appearance, UA Clear      Specific Gravity, UA 1.020      pH, UA 6.0      Protein, UA Negative      Glucose, UA 2+ (*)     Ketones, UA Negative      Blood, UA Negative      Bilirubin, UA Negative      Urobilinogen, UA 0.2      Nitrites, UA Negative      Leukocyte Esterase, UA Negative     CBC WITH DIFFERENTIAL - Abnormal    WBC 2.90 (*)     RBC 3.70 (*)     Hgb 12.5 (*)     Hct 35.1 (*)     MCV 94.9 (*)     MCH 33.8 (*)     MCHC 35.6      RDW 12.9      Platelet 76 (*)     MPV 11.5 (*)     Neut % 61.4      Lymph % 25.2      Mono % 9.3      Eos % 2.4      Basophil % 1.0      Lymph # 0.73      Neut # 1.78 (*)     Mono # 0.27      Eos # 0.07      Baso # 0.03      IG# 0.02      IG% 0.7     LACTIC ACID, PLASMA - Normal    Lactic  Acid Level 1.4     AMYLASE - Normal    Amylase Level 52     LIPASE - Normal    Lipase Level 13     CBC W/ AUTO DIFFERENTIAL    Narrative:     The following orders were created for panel order CBC auto differential.  Procedure                               Abnormality         Status                     ---------                               -----------         ------                     CBC with Differential[[id number]]       Abnormal            Final result                 Please view results for these tests on the individual orders.          Imaging Results              X-Ray Abdomen Flat And Erect (Preliminary result)  Result time 08/10/24 03:45:14      Wet Read by Jeancarlos Hernandez MD (08/10/24 03:45:14, [facility], Emergency Medicine)    Nonspecific bowel gas pattern with no evidence of obstruction or perforation.  Feces noted throughout the entire colon and large balls consistent with moderate constipation                                     Medications   lactulose 20 gram/30 mL solution Soln 20 g (20 g Oral Given 8/10/24 0353)     Medical Decision Making  63-year-old white male with a history of a liver transplant presents with epigastric and periumbilical abdominal pain.  Because the patient was medical history of the differential was wide and includes but it was not limited to transplant rejection, biliary obstruction, pancreatitis, gastritis, diverticulitis, bowel obstruction, constipation, perforated bowel.  Workup included blood work and an x-ray of his abdomen to start and urinalysis.  Blood work is relatively benign and in line with comparison to his previous labs.  X-ray shows a significant stool burden consistent with constipation and reexamine a lines in the pain area consistent with a large stool balls noted on his x-ray.  I reviewed these results with him and will treat with lactulose here told him if he does not have a very large bowel movement by today at lunch  that he needs to get some over-the-counter laxatives and cleaned his bowel.  He voiced understanding    Problems Addressed:  Constipation, unspecified constipation type: acute illness or injury with systemic symptoms  Generalized abdominal pain: acute illness or injury with systemic symptoms  Liver transplant status: chronic illness or injury that poses a threat to life or bodily functions    Amount and/or Complexity of Data Reviewed  External Data Reviewed: labs, radiology and notes.  Labs: ordered. Decision-making details documented in ED Course.  Radiology: ordered and independent interpretation performed. Decision-making details documented in ED Course.    Risk  OTC drugs.  Prescription drug management.  Diagnosis or treatment significantly limited by social determinants of health.                                      Clinical Impression:  Final diagnoses:  [K59.00] Constipation, unspecified constipation type (Primary)  [R10.84] Generalized abdominal pain  [Z94.4] Liver transplant status          ED Disposition Condition    Discharge Stable          ED Prescriptions    None       Follow-up Information       Follow up With Specialties Details Why Contact Info    Preston Matthew II, MD Internal Medicine In 3 days  [address] [facility] [address]  [phone]               Jeancarlos Hernandez MD  08/10/24 0354

## 2024-08-12 ENCOUNTER — PATIENT OUTREACH (OUTPATIENT)
Dept: EMERGENCY MEDICINE | Facility: HOSPITAL | Age: 64
End: 2024-08-12
Payer: MEDICARE

## 2024-08-12 ENCOUNTER — LAB VISIT (OUTPATIENT)
Dept: LAB | Facility: HOSPITAL | Age: 64
End: 2024-08-12
Attending: INTERNAL MEDICINE
Payer: MEDICARE

## 2024-08-12 DIAGNOSIS — Z94.4 LIVER REPLACED BY TRANSPLANT: ICD-10-CM

## 2024-08-12 DIAGNOSIS — E11.42 DIABETIC PERIPHERAL NEUROPATHY: Primary | ICD-10-CM

## 2024-08-12 LAB
ALBUMIN SERPL-MCNC: 4.1 G/DL (ref 3.4–4.8)
ALBUMIN/GLOB SERPL: 1.5 RATIO (ref 1.1–2)
ALP SERPL-CCNC: 51 UNIT/L (ref 40–150)
ALT SERPL-CCNC: 14 UNIT/L (ref 0–55)
ANION GAP SERPL CALC-SCNC: 8 MEQ/L
AST SERPL-CCNC: 18 UNIT/L (ref 5–34)
BASOPHILS # BLD AUTO: 0.04 X10(3)/MCL
BASOPHILS NFR BLD AUTO: 1.4 %
BILIRUB SERPL-MCNC: 0.6 MG/DL
BUN SERPL-MCNC: 15 MG/DL (ref 8.4–25.7)
CALCIUM SERPL-MCNC: 8.8 MG/DL (ref 8.8–10)
CHLORIDE SERPL-SCNC: 108 MMOL/L (ref 98–107)
CO2 SERPL-SCNC: 24 MMOL/L (ref 23–31)
CREAT SERPL-MCNC: 1.38 MG/DL (ref 0.73–1.18)
CREAT/UREA NIT SERPL: 11
EOSINOPHIL # BLD AUTO: 0.09 X10(3)/MCL (ref 0–0.9)
EOSINOPHIL NFR BLD AUTO: 3.1 %
ERYTHROCYTE [DISTWIDTH] IN BLOOD BY AUTOMATED COUNT: 13.1 % (ref 11.5–17)
GFR SERPLBLD CREATININE-BSD FMLA CKD-EPI: 58 ML/MIN/1.73/M2
GLOBULIN SER-MCNC: 2.7 GM/DL (ref 2.4–3.5)
GLUCOSE SERPL-MCNC: 265 MG/DL (ref 82–115)
HCT VFR BLD AUTO: 39.4 % (ref 42–52)
HGB BLD-MCNC: 13.8 G/DL (ref 14–18)
IMM GRANULOCYTES # BLD AUTO: 0.06 X10(3)/MCL (ref 0–0.04)
IMM GRANULOCYTES NFR BLD AUTO: 2.1 %
LYMPHOCYTES # BLD AUTO: 0.77 X10(3)/MCL (ref 0.6–4.6)
LYMPHOCYTES NFR BLD AUTO: 26.8 %
MCH RBC QN AUTO: 33.7 PG (ref 27–31)
MCHC RBC AUTO-ENTMCNC: 35 G/DL (ref 33–36)
MCV RBC AUTO: 96.3 FL (ref 80–94)
MONOCYTES # BLD AUTO: 0.22 X10(3)/MCL (ref 0.1–1.3)
MONOCYTES NFR BLD AUTO: 7.7 %
NEUTROPHILS # BLD AUTO: 1.69 X10(3)/MCL (ref 2.1–9.2)
NEUTROPHILS NFR BLD AUTO: 58.9 %
PLATELET # BLD AUTO: 100 X10(3)/MCL (ref 130–400)
PMV BLD AUTO: 11.6 FL (ref 7.4–10.4)
POTASSIUM SERPL-SCNC: 4.1 MMOL/L (ref 3.5–5.1)
PROT SERPL-MCNC: 6.8 GM/DL (ref 5.8–7.6)
RBC # BLD AUTO: 4.09 X10(6)/MCL (ref 4.7–6.1)
SODIUM SERPL-SCNC: 140 MMOL/L (ref 136–145)
WBC # BLD AUTO: 2.87 X10(3)/MCL (ref 4.5–11.5)

## 2024-08-12 PROCEDURE — 36415 COLL VENOUS BLD VENIPUNCTURE: CPT

## 2024-08-12 PROCEDURE — 80053 COMPREHEN METABOLIC PANEL: CPT

## 2024-08-12 PROCEDURE — 80197 ASSAY OF TACROLIMUS: CPT

## 2024-08-12 PROCEDURE — 85025 COMPLETE CBC W/AUTO DIFF WBC: CPT

## 2024-08-12 NOTE — PROGRESS NOTES
Mr Reilly returned call for scheduling his post ED 7-day follow up with PCP. At first Mr Reilly agreed to scheduling a follow up with PCP on 8/14/24 but then remembered he has a follow up with [facility] with his transplant team and asked for the appointment to be cancelled. Patient stated he would reach out to his PCP to schedule a follow up and declined assistance at this time.

## 2024-08-13 LAB — TACROLIMUS TROUGH BLD-MCNC: 8.7 NG/ML

## 2024-08-19 ENCOUNTER — TELEPHONE (OUTPATIENT)
Dept: TRANSPLANT | Facility: CLINIC | Age: 64
End: 2024-08-19
Payer: MEDICARE

## 2024-08-19 NOTE — TELEPHONE ENCOUNTER
Telephone call to patient for lab review.  Labs stable with no medication changes warranted.  Will repeat labs 11/11/24..        ----- Message from Elizabeth Meneses MD sent at 8/16/2024 12:37 PM CDT -----  The Labs are stable - please let patient know.

## 2024-08-21 DIAGNOSIS — Z79.891 LONG TERM PRESCRIPTION OPIATE USE: ICD-10-CM

## 2024-08-21 DIAGNOSIS — M54.40 CHRONIC LOW BACK PAIN WITH SCIATICA, SCIATICA LATERALITY UNSPECIFIED, UNSPECIFIED BACK PAIN LATERALITY: ICD-10-CM

## 2024-08-21 DIAGNOSIS — Z94.4 LIVER TRANSPLANTED: ICD-10-CM

## 2024-08-21 DIAGNOSIS — G89.29 CHRONIC LOW BACK PAIN WITH SCIATICA, SCIATICA LATERALITY UNSPECIFIED, UNSPECIFIED BACK PAIN LATERALITY: ICD-10-CM

## 2024-08-21 RX ORDER — HYDROCODONE BITARTRATE AND ACETAMINOPHEN 10; 325 MG/1; MG/1
1 TABLET ORAL DAILY PRN
Qty: 30 TABLET | Refills: 0 | Status: SHIPPED | OUTPATIENT
Start: 2024-08-21 | End: 2025-08-21

## 2024-08-21 NOTE — TELEPHONE ENCOUNTER
----- Message from Betzaida Aguero sent at 8/21/2024  9:27 AM CDT -----  .Who Called: Colin Reilly    Refill or New Rx:Refill  RX Name and Strength:HYDROcodone-acetaminophen (NORCO)  mg per tablet  How is the patient currently taking it? (ex. 1XDay):1x day  Is this a 30 day or 90 day RX:30  Local or Mail Order:local  List of preferred pharmacies on file (remove unneeded): [unfilled]  If different Pharmacy is requested, enter Pharmacy information here including location and phone number: same   Ordering Provider:Vipul      Preferred Method of Contact: Phone Call  Patient's Preferred Phone Number on File: [phone]   Best Call Back Number, if different:  Additional Information: need refills

## 2024-08-29 ENCOUNTER — TELEPHONE (OUTPATIENT)
Dept: INTERNAL MEDICINE | Facility: CLINIC | Age: 64
End: 2024-08-29
Payer: MEDICARE

## 2024-08-31 DIAGNOSIS — K72.10 END STAGE LIVER DISEASE: ICD-10-CM

## 2024-08-31 DIAGNOSIS — E11.42 DIABETIC PERIPHERAL NEUROPATHY: ICD-10-CM

## 2024-09-03 RX ORDER — OXYBUTYNIN CHLORIDE 5 MG/1
5 TABLET ORAL DAILY
Qty: 90 TABLET | Refills: 3 | Status: SHIPPED | OUTPATIENT
Start: 2024-09-03

## 2024-09-05 ENCOUNTER — TELEPHONE (OUTPATIENT)
Dept: INTERNAL MEDICINE | Facility: CLINIC | Age: 64
End: 2024-09-05

## 2024-09-16 ENCOUNTER — LAB VISIT (OUTPATIENT)
Dept: LAB | Facility: HOSPITAL | Age: 64
End: 2024-09-16
Attending: INTERNAL MEDICINE
Payer: MEDICARE

## 2024-09-16 DIAGNOSIS — Z94.4 HISTORY OF LIVER TRANSPLANT: ICD-10-CM

## 2024-09-16 DIAGNOSIS — E11.42 DIABETIC PERIPHERAL NEUROPATHY: ICD-10-CM

## 2024-09-16 DIAGNOSIS — E11.42 TYPE 2 DIABETES MELLITUS WITH DIABETIC POLYNEUROPATHY, WITH LONG-TERM CURRENT USE OF INSULIN: ICD-10-CM

## 2024-09-16 DIAGNOSIS — F10.11 ALCOHOL ABUSE, IN REMISSION: ICD-10-CM

## 2024-09-16 DIAGNOSIS — F32.5 MAJOR DEPRESSION IN REMISSION: ICD-10-CM

## 2024-09-16 DIAGNOSIS — I10 PRIMARY HYPERTENSION: ICD-10-CM

## 2024-09-16 DIAGNOSIS — D69.6 THROMBOCYTOPENIA, UNSPECIFIED: ICD-10-CM

## 2024-09-16 DIAGNOSIS — Z79.60 LONG-TERM USE OF IMMUNOSUPPRESSANT MEDICATION: ICD-10-CM

## 2024-09-16 DIAGNOSIS — Z79.4 TYPE 2 DIABETES MELLITUS WITH DIABETIC POLYNEUROPATHY, WITH LONG-TERM CURRENT USE OF INSULIN: ICD-10-CM

## 2024-09-16 DIAGNOSIS — E78.5 DYSLIPIDEMIA: ICD-10-CM

## 2024-09-16 DIAGNOSIS — Z00.00 WELLNESS EXAMINATION: ICD-10-CM

## 2024-09-16 DIAGNOSIS — Z79.891 LONG TERM PRESCRIPTION OPIATE USE: ICD-10-CM

## 2024-09-16 DIAGNOSIS — K72.10 END STAGE LIVER DISEASE: ICD-10-CM

## 2024-09-16 LAB
ALBUMIN SERPL-MCNC: 4.5 G/DL (ref 3.4–4.8)
ALBUMIN/GLOB SERPL: 1.7 RATIO (ref 1.1–2)
ALP SERPL-CCNC: 61 UNIT/L (ref 40–150)
ALT SERPL-CCNC: 20 UNIT/L (ref 0–55)
ANION GAP SERPL CALC-SCNC: 7 MEQ/L
AST SERPL-CCNC: 26 UNIT/L (ref 5–34)
BASOPHILS # BLD AUTO: 0.02 X10(3)/MCL
BASOPHILS NFR BLD AUTO: 0.6 %
BILIRUB SERPL-MCNC: 1.2 MG/DL
BILIRUB UR QL STRIP.AUTO: NEGATIVE
BUN SERPL-MCNC: 13 MG/DL (ref 8.4–25.7)
CALCIUM SERPL-MCNC: 9 MG/DL (ref 8.8–10)
CHLORIDE SERPL-SCNC: 107 MMOL/L (ref 98–107)
CHOLEST SERPL-MCNC: 155 MG/DL
CHOLEST/HDLC SERPL: 3 {RATIO} (ref 0–5)
CLARITY UR: ABNORMAL
CO2 SERPL-SCNC: 25 MMOL/L (ref 23–31)
COLOR UR AUTO: YELLOW
CREAT SERPL-MCNC: 1.4 MG/DL (ref 0.73–1.18)
CREAT UR-MCNC: 125.3 MG/DL (ref 63–166)
CREAT/UREA NIT SERPL: 9
EOSINOPHIL # BLD AUTO: 0.07 X10(3)/MCL (ref 0–0.9)
EOSINOPHIL NFR BLD AUTO: 1.9 %
ERYTHROCYTE [DISTWIDTH] IN BLOOD BY AUTOMATED COUNT: 13.3 % (ref 11.5–17)
EST. AVERAGE GLUCOSE BLD GHB EST-MCNC: 131.2 MG/DL
GFR SERPLBLD CREATININE-BSD FMLA CKD-EPI: 56 ML/MIN/1.73/M2
GLOBULIN SER-MCNC: 2.6 GM/DL (ref 2.4–3.5)
GLUCOSE SERPL-MCNC: 161 MG/DL (ref 82–115)
GLUCOSE UR QL STRIP: NEGATIVE
HBA1C MFR BLD: 6.2 %
HCT VFR BLD AUTO: 42.4 % (ref 42–52)
HDLC SERPL-MCNC: 53 MG/DL (ref 35–60)
HGB BLD-MCNC: 14.9 G/DL (ref 14–18)
HGB UR QL STRIP: NEGATIVE
IMM GRANULOCYTES # BLD AUTO: 0.02 X10(3)/MCL (ref 0–0.04)
IMM GRANULOCYTES NFR BLD AUTO: 0.6 %
KETONES UR QL STRIP: NEGATIVE
LDLC SERPL CALC-MCNC: 65 MG/DL (ref 50–140)
LEUKOCYTE ESTERASE UR QL STRIP: NEGATIVE
LYMPHOCYTES # BLD AUTO: 0.91 X10(3)/MCL (ref 0.6–4.6)
LYMPHOCYTES NFR BLD AUTO: 25.1 %
MCH RBC QN AUTO: 33.6 PG (ref 27–31)
MCHC RBC AUTO-ENTMCNC: 35.1 G/DL (ref 33–36)
MCV RBC AUTO: 95.7 FL (ref 80–94)
MICROALBUMIN UR-MCNC: 21.6 UG/ML
MICROALBUMIN/CREAT RATIO PNL UR: 17.2 MG/GM CR (ref 0–30)
MONOCYTES # BLD AUTO: 0.26 X10(3)/MCL (ref 0.1–1.3)
MONOCYTES NFR BLD AUTO: 7.2 %
NEUTROPHILS # BLD AUTO: 2.34 X10(3)/MCL (ref 2.1–9.2)
NEUTROPHILS NFR BLD AUTO: 64.6 %
NITRITE UR QL STRIP: NEGATIVE
PH UR STRIP: 5.5 [PH]
PLATELET # BLD AUTO: 117 X10(3)/MCL (ref 130–400)
PMV BLD AUTO: 11.3 FL (ref 7.4–10.4)
POTASSIUM SERPL-SCNC: 4.9 MMOL/L (ref 3.5–5.1)
PROT SERPL-MCNC: 7.1 GM/DL (ref 5.8–7.6)
PROT UR QL STRIP: NEGATIVE
RBC # BLD AUTO: 4.43 X10(6)/MCL (ref 4.7–6.1)
SODIUM SERPL-SCNC: 139 MMOL/L (ref 136–145)
SP GR UR STRIP.AUTO: 1.01 (ref 1–1.03)
TRIGL SERPL-MCNC: 187 MG/DL (ref 34–140)
TSH SERPL-ACNC: 1.46 UIU/ML (ref 0.35–4.94)
UROBILINOGEN UR STRIP-ACNC: 0.2
VLDLC SERPL CALC-MCNC: 37 MG/DL
WBC # BLD AUTO: 3.62 X10(3)/MCL (ref 4.5–11.5)

## 2024-09-16 PROCEDURE — 83036 HEMOGLOBIN GLYCOSYLATED A1C: CPT

## 2024-09-16 PROCEDURE — 82043 UR ALBUMIN QUANTITATIVE: CPT

## 2024-09-16 PROCEDURE — 80061 LIPID PANEL: CPT

## 2024-09-16 PROCEDURE — 85025 COMPLETE CBC W/AUTO DIFF WBC: CPT

## 2024-09-16 PROCEDURE — 84443 ASSAY THYROID STIM HORMONE: CPT

## 2024-09-16 PROCEDURE — 80053 COMPREHEN METABOLIC PANEL: CPT

## 2024-09-16 PROCEDURE — 81003 URINALYSIS AUTO W/O SCOPE: CPT

## 2024-09-16 PROCEDURE — 82570 ASSAY OF URINE CREATININE: CPT

## 2024-09-16 PROCEDURE — 36415 COLL VENOUS BLD VENIPUNCTURE: CPT

## 2024-09-17 ENCOUNTER — TELEPHONE (OUTPATIENT)
Dept: INTERNAL MEDICINE | Facility: CLINIC | Age: 64
End: 2024-09-17
Payer: MEDICARE

## 2024-09-17 NOTE — TELEPHONE ENCOUNTER
----- Message from Maxine Shay sent at 9/17/2024  8:58 AM CDT -----  Regarding: refill  Who Called: Colin Reilly    Refill or New Rx:Refill  RX Name and Strength:HYDROcodone-acetaminophen (NORCO)  mg per tablet  How is the patient currently taking it? (ex. 1XDay):1x day  Is this a 30 day or 90 day RX:30  Local or Mail Order:local  List of preferred pharmacies on file (remove unneeded): [unfilled]  If different Pharmacy is requested, enter Pharmacy information here including location and phone number: [facility] - [place], [address]        Ordering Provider:      Preferred Method of Contact: Phone Call  Patient's Preferred Phone Number on File: [phone]   Best Call Back Number, if different:  Additional Information:

## 2024-09-17 NOTE — PROGRESS NOTES
"Subjective:       Patient ID: Colin Reilly is a 64 y.o. male.      Patient Care Team:  Preston Matthew II, MD as PCP - General (Internal Medicine)  Casimiro Wagner MD (Ophthalmology)  Yuliana Garrison LPN as Licensed Practical Nurse  Denisse Park MA as ED Navigator    Chief Complaint: Diabetes, Peripheral Neuropathy, Dyslipidemia, Hypertension, and Anemia      64-year-old male was evaluated today for follow-up of chronic liver disease with cirrhosis, diabetes, and neuropathy among other conditions.        Review of Systems   Constitutional:  Negative for fever.   HENT:  Negative for nosebleeds.    Eyes:  Negative for visual disturbance.   Respiratory:  Negative for shortness of breath.    Cardiovascular:  Negative for chest pain.   Gastrointestinal:  Negative for abdominal pain.   Genitourinary:  Negative for dysuria.   Musculoskeletal:  Negative for gait problem.   Neurological:  Negative for headaches.           Patient Reported Health Risk Assessment         Objective:      Physical Exam  HENT:      Head: Normocephalic.      Mouth/Throat:      Pharynx: Oropharynx is clear.   Eyes:      Extraocular Movements: Extraocular movements intact.   Cardiovascular:      Rate and Rhythm: Normal rate and regular rhythm.   Pulmonary:      Breath sounds: Normal breath sounds.   Abdominal:      Palpations: Abdomen is soft.   Musculoskeletal:         General: No swelling.   Skin:     General: Skin is warm.   Neurological:      General: No focal deficit present.      Mental Status: He is alert and oriented to person, place, and time.   Psychiatric:         Mood and Affect: Mood normal.         Vitals:    09/18/24 1246   BP: 134/70   Pulse: 78   Resp: 16   Temp: 98.6 °F (37 °C)   SpO2: 98%   Weight: 83.5 kg (184 lb)   Height: 5' 11" (1.803 m)                     No data to display                  9/18/2024     1:15 PM 3/1/2024     9:00 AM 3/27/2023     1:00 PM 3/7/2023     1:30 PM 2/27/2023     1:45 PM 2/6/2023     4:00 PM " 11/21/2022     1:30 PM   Fall Risk Assessment - Outpatient   Mobility Status Ambulatory Ambulatory Ambulatory Ambulatory Ambulatory Ambulatory Ambulatory   Number of falls 0 1 0 0 0 0 0   Identified as fall risk False False False False False False False                  Assessment:       Problem List Items Addressed This Visit          Neuro    Diabetic peripheral neuropathy    Relevant Orders    CBC Auto Differential    Comprehensive Metabolic Panel    Lipid Panel    Microalbumin/Creatinine Ratio, Urine    Urinalysis, Reflex to Urine Culture    TSH    Hemoglobin A1C    Drug Screen, Urine       Psychiatric    Alcohol abuse, in remission    Relevant Orders    CBC Auto Differential    Comprehensive Metabolic Panel    Lipid Panel    Microalbumin/Creatinine Ratio, Urine    Urinalysis, Reflex to Urine Culture    TSH    Hemoglobin A1C    Drug Screen, Urine    Long term prescription opiate use    Relevant Orders    CBC Auto Differential    Comprehensive Metabolic Panel    Lipid Panel    Microalbumin/Creatinine Ratio, Urine    Urinalysis, Reflex to Urine Culture    TSH    Hemoglobin A1C    Drug Screen, Urine    Major depression in remission    Relevant Orders    CBC Auto Differential    Comprehensive Metabolic Panel    Lipid Panel    Microalbumin/Creatinine Ratio, Urine    Urinalysis, Reflex to Urine Culture    TSH    Hemoglobin A1C    Drug Screen, Urine       Cardiac/Vascular    Dyslipidemia    Relevant Orders    CBC Auto Differential    Comprehensive Metabolic Panel    Lipid Panel    Microalbumin/Creatinine Ratio, Urine    Urinalysis, Reflex to Urine Culture    TSH    Hemoglobin A1C    Drug Screen, Urine    Primary hypertension    Relevant Orders    CBC Auto Differential    Comprehensive Metabolic Panel    Lipid Panel    Microalbumin/Creatinine Ratio, Urine    Urinalysis, Reflex to Urine Culture    TSH    Hemoglobin A1C    Drug Screen, Urine       Hematology    Thrombocytopenia, unspecified    Relevant Orders    CBC Auto  Differential    Comprehensive Metabolic Panel    Lipid Panel    Microalbumin/Creatinine Ratio, Urine    Urinalysis, Reflex to Urine Culture    TSH    Hemoglobin A1C    Drug Screen, Urine       Endocrine    Type 2 diabetes mellitus with diabetic polyneuropathy, with long-term current use of insulin    Relevant Orders    CBC Auto Differential    Comprehensive Metabolic Panel    Lipid Panel    Microalbumin/Creatinine Ratio, Urine    Urinalysis, Reflex to Urine Culture    TSH    Hemoglobin A1C    Drug Screen, Urine       GI    End stage liver disease    Relevant Orders    CBC Auto Differential    Comprehensive Metabolic Panel    Lipid Panel    Microalbumin/Creatinine Ratio, Urine    Urinalysis, Reflex to Urine Culture    TSH    Hemoglobin A1C    Drug Screen, Urine    History of liver transplant    Relevant Orders    CBC Auto Differential    Comprehensive Metabolic Panel    Lipid Panel    Microalbumin/Creatinine Ratio, Urine    Urinalysis, Reflex to Urine Culture    TSH    Hemoglobin A1C    Drug Screen, Urine       Palliative Care    Long-term use of immunosuppressant medication    Relevant Orders    CBC Auto Differential    Comprehensive Metabolic Panel    Lipid Panel    Microalbumin/Creatinine Ratio, Urine    Urinalysis, Reflex to Urine Culture    TSH    Hemoglobin A1C    Drug Screen, Urine       Other    RESOLVED: Wellness examination - Primary    Relevant Orders    CBC Auto Differential    Comprehensive Metabolic Panel    Lipid Panel    Microalbumin/Creatinine Ratio, Urine    Urinalysis, Reflex to Urine Culture    TSH    Hemoglobin A1C    Drug Screen, Urine         Medication List with Changes/Refills   Current Medications    BISACODYL (DULCOLAX) 5 MG EC TABLET    Take 2 tablets (10 mg total) by mouth daily as needed for Constipation.    BLOOD SUGAR DIAGNOSTIC (TRUE METRIX GLUCOSE TEST STRIP MISC)      TRUE METRIX SELF MONITORING BLOOD GLUCOSE STRIPS   Strip, See Instructions, TEST BLOOD SUGAR FOUR TIMES DAILY, # 400  "unknown unit, 3 Refill(s), Pharmacy: [facility], 180, cm, Height/Length Dosing, 02/10/21 9:33:00 CST, 90.718, kg, W...    DROPLET PEN NEEDLE 32 GAUGE X 5/32" NDLE    USE ONE TIME DAILY AT BEDTIME    EASY TOUCH INSULIN SYRINGE 1 ML 31 GAUGE X 5/16 SYRG        ESCITALOPRAM OXALATE (LEXAPRO) 20 MG TABLET    Take 1 tablet (20 mg total) by mouth once daily.    FINASTERIDE (PROSCAR) 5 MG TABLET    Take 1 tablet (5 mg total) by mouth once daily.    GABAPENTIN (NEURONTIN) 600 MG TABLET    Take 1 tablet (600 mg total) by mouth 3 (three) times daily.    HYDROCODONE-ACETAMINOPHEN (NORCO)  MG PER TABLET    Take 1 tablet by mouth daily as needed for Pain.    INSULIN (LANTUS SOLOSTAR U-100 INSULIN) GLARGINE 100 UNITS/ML SUBQ PEN    Inject 10 Units into the skin every evening.    METFORMIN (GLUCOPHAGE) 500 MG TABLET    TAKE ONE TABLET BY MOUTH TWO TIMES A DAY    NOVOLOG FLEXPEN U-100 INSULIN 100 UNIT/ML (3 ML) INPN PEN    Inject 10 Units into the skin 3 (three) times daily.    OMEPRAZOLE (PRILOSEC) 40 MG CAPSULE    Take 40 mg by mouth once daily.    OXYBUTYNIN (DITROPAN) 5 MG TAB    TAKE 1 TABLET (5 MG TOTAL) BY MOUTH ONCE DAILY.    QUETIAPINE (SEROQUEL) 50 MG TABLET    Take 1 tablet (50 mg total) by mouth every evening.    ROSUVASTATIN (CRESTOR) 10 MG TABLET    Take 1 tablet (10 mg total) by mouth every evening.    SILDENAFIL (VIAGRA) 100 MG TABLET    Take 100 mg by mouth once daily.    TACROLIMUS (PROGRAF) 1 MG CAP    Take 3 capsules (3 mg total) by mouth every 12 (twelve) hours.    TAMSULOSIN (FLOMAX) 0.4 MG CAP    TAKE 1 CAPSULE EVERY DAY    TRUE METRIX GLUCOSE TEST STRIP STRP    TEST BLOOD SUGAR FOUR TIMES DAILY    TRUEPLUS LANCETS 30 GAUGE MISC       Discontinued Medications    ASPIRIN 81 MG CHEW    Take 1 tablet (81 mg total) by mouth once daily.        Plan:       1. Chronic liver disease with cirrhosis: Followed by Dr. Chandler. He had liver transplant in 2020 and is doing well. No longer on " diuretics. Continue immunosuppressants     2. Insulin dependent diabetes: Hemoglobin A1c is 6.2. He takes NovoLog sliding scale and Levemir.  Continue metformin twice a day.  And Levemir 10 units nightly     3.  Thrombocytopenia: Stable     4. Chronic back pain: Continue gabapentin.  Continue hydrocodone, 1 pill daily as needed, for chronic back pain (decreased from previous doses)     5. Chronic rhinosinusitis: Continue fluticasone     6. Diabetic peripheral Neuropathy:  Continue gabapentin TID     7. Insomnia: Continue trazodone     8. Hypertension: Stable     9. Benign prostatic hypertrophy: Continue Flomax. Followed by Dr. Ann and urology at [facility]. No longer self-cath     10. Major Depression in remission: Continue Lexapro. He went to an inpatient facility in 2017 in [place] ([facility]) and is doing better.     11. Wellness: Pneumovax 2021, Prevnar today     He has diabetic neuropathy with decreased sensation in both feet.  Patient needs diabetic shoes and inserts       He lives in a trailer with his mother in [place]. They go to a [employer].        Medicare Annual Wellness and Personalized Prevention Plan:   Fall Risk + Home Safety + Hearing Impairment + Depression Screen + Cognitive Impairment Screen + Health Risk Assessment all reviewed    Health Maintenance Topics with due status: Not Due       Topic Last Completion Date    TETANUS VACCINE 08/05/2019    Eye Exam 07/18/2024    Diabetes Urine Screening 09/16/2024    Lipid Panel 09/16/2024    Hemoglobin A1c 09/16/2024      The patient's Health Maintenance was reviewed and the following appears to be due at this time:   Health Maintenance Due   Topic Date Due    Foot Exam  Never done    Sign Pain Contract  Never done    Complete Opioid Risk Tool  Never done    Naloxone Prescription  Never done    Shingles Vaccine (2 of 2) 05/29/2018    RSV Vaccine (Age 60+ and Pregnant patients) (1 - 1-dose 60+ series) Never done    COVID-19 Vaccine (2 - [place] risk  series) 11/25/2021    Colorectal Cancer Screening  02/21/2022    Pneumococcal Vaccines (Age 0-64) (3 of 3 - PCV) 10/18/2022    Urine Drug Screen  07/05/2024    Influenza Vaccine (1) 09/01/2024       Advance Care Planning   I attest to discussing Advance Care Planning with patient and/or family member.  Education was provided including the importance of the Health Care Power of , Advance Directives, and/or [place] documentation.  The patient expressed understanding to the importance of this information and discussion.  Length of ACP conversation in minutes: 0    Advance Care Planning     Date: 03/01/2024    Living Will  During this visit, I engaged the patient  in the voluntary advance care planning process.  The patient and I reviewed the role for advance directives and their purpose in directing future healthcare if the patient's unable to speak for him/herself.  At this point in time, the patient does have full decision-making capacity.  We discussed different extreme health states that he could experience, and reviewed what kind of medical care he would want in those situations.  The patient communicated that if he were comatose and had little chance of a meaningful recovery, he would want machines/life-sustaining treatments used. In addition to the above preference, other important end-of-life issues for the patient include  Aunt is POA . The patient has completed a living will to reflect these preferences.  I spent a total of 1 minutes engaging the patient in this advance care planning discussion.                   Opioid Screening: Patient medication list reviewed, patient is taking prescription opioids. Patient is not using additional opioids than prescribed. Patient is at low risk of substance abuse based on this opioid use history.     No follow-ups on file. In addition to their scheduled follow up, the patient has also been instructed to follow up on as needed basis.

## 2024-09-18 ENCOUNTER — OFFICE VISIT (OUTPATIENT)
Dept: INTERNAL MEDICINE | Facility: CLINIC | Age: 64
End: 2024-09-18
Payer: MEDICARE

## 2024-09-18 ENCOUNTER — TELEPHONE (OUTPATIENT)
Dept: TRANSPLANT | Facility: CLINIC | Age: 64
End: 2024-09-18
Payer: MEDICARE

## 2024-09-18 VITALS
WEIGHT: 184 LBS | SYSTOLIC BLOOD PRESSURE: 134 MMHG | OXYGEN SATURATION: 98 % | TEMPERATURE: 99 F | DIASTOLIC BLOOD PRESSURE: 70 MMHG | HEIGHT: 71 IN | HEART RATE: 78 BPM | BODY MASS INDEX: 25.76 KG/M2 | RESPIRATION RATE: 16 BRPM

## 2024-09-18 DIAGNOSIS — I10 PRIMARY HYPERTENSION: ICD-10-CM

## 2024-09-18 DIAGNOSIS — M54.40 CHRONIC LOW BACK PAIN WITH SCIATICA, SCIATICA LATERALITY UNSPECIFIED, UNSPECIFIED BACK PAIN LATERALITY: ICD-10-CM

## 2024-09-18 DIAGNOSIS — G89.29 CHRONIC LOW BACK PAIN WITH SCIATICA, SCIATICA LATERALITY UNSPECIFIED, UNSPECIFIED BACK PAIN LATERALITY: ICD-10-CM

## 2024-09-18 DIAGNOSIS — E11.42 TYPE 2 DIABETES MELLITUS WITH DIABETIC POLYNEUROPATHY, WITH LONG-TERM CURRENT USE OF INSULIN: ICD-10-CM

## 2024-09-18 DIAGNOSIS — Z94.4 LIVER TRANSPLANTED: ICD-10-CM

## 2024-09-18 DIAGNOSIS — F10.11 ALCOHOL ABUSE, IN REMISSION: ICD-10-CM

## 2024-09-18 DIAGNOSIS — Z23 NEED FOR VACCINATION: ICD-10-CM

## 2024-09-18 DIAGNOSIS — Z79.891 LONG TERM PRESCRIPTION OPIATE USE: ICD-10-CM

## 2024-09-18 DIAGNOSIS — E11.42 DIABETIC PERIPHERAL NEUROPATHY: ICD-10-CM

## 2024-09-18 DIAGNOSIS — Z00.00 WELLNESS EXAMINATION: Primary | ICD-10-CM

## 2024-09-18 DIAGNOSIS — Z79.4 TYPE 2 DIABETES MELLITUS WITH DIABETIC POLYNEUROPATHY, WITH LONG-TERM CURRENT USE OF INSULIN: ICD-10-CM

## 2024-09-18 DIAGNOSIS — Z79.60 LONG-TERM USE OF IMMUNOSUPPRESSANT MEDICATION: ICD-10-CM

## 2024-09-18 DIAGNOSIS — E78.5 DYSLIPIDEMIA: ICD-10-CM

## 2024-09-18 DIAGNOSIS — K72.10 END STAGE LIVER DISEASE: ICD-10-CM

## 2024-09-18 DIAGNOSIS — Z94.4 HISTORY OF LIVER TRANSPLANT: ICD-10-CM

## 2024-09-18 DIAGNOSIS — D69.6 THROMBOCYTOPENIA, UNSPECIFIED: ICD-10-CM

## 2024-09-18 DIAGNOSIS — F32.5 MAJOR DEPRESSION IN REMISSION: ICD-10-CM

## 2024-09-18 PROCEDURE — 1159F MED LIST DOCD IN RCRD: CPT | Mod: CPTII,,, | Performed by: INTERNAL MEDICINE

## 2024-09-18 PROCEDURE — G0009 ADMIN PNEUMOCOCCAL VACCINE: HCPCS | Mod: ,,, | Performed by: INTERNAL MEDICINE

## 2024-09-18 PROCEDURE — 90677 PCV20 VACCINE IM: CPT | Mod: ,,, | Performed by: INTERNAL MEDICINE

## 2024-09-18 PROCEDURE — 3078F DIAST BP <80 MM HG: CPT | Mod: CPTII,,, | Performed by: INTERNAL MEDICINE

## 2024-09-18 PROCEDURE — 1160F RVW MEDS BY RX/DR IN RCRD: CPT | Mod: CPTII,,, | Performed by: INTERNAL MEDICINE

## 2024-09-18 PROCEDURE — 3044F HG A1C LEVEL LT 7.0%: CPT | Mod: CPTII,,, | Performed by: INTERNAL MEDICINE

## 2024-09-18 PROCEDURE — 2023F DILAT RTA XM W/O RTNOPTHY: CPT | Mod: CPTII,,, | Performed by: INTERNAL MEDICINE

## 2024-09-18 PROCEDURE — 3061F NEG MICROALBUMINURIA REV: CPT | Mod: CPTII,,, | Performed by: INTERNAL MEDICINE

## 2024-09-18 PROCEDURE — 3008F BODY MASS INDEX DOCD: CPT | Mod: CPTII,,, | Performed by: INTERNAL MEDICINE

## 2024-09-18 PROCEDURE — 3075F SYST BP GE 130 - 139MM HG: CPT | Mod: CPTII,,, | Performed by: INTERNAL MEDICINE

## 2024-09-18 PROCEDURE — 3066F NEPHROPATHY DOC TX: CPT | Mod: CPTII,,, | Performed by: INTERNAL MEDICINE

## 2024-09-18 PROCEDURE — G0439 PPPS, SUBSEQ VISIT: HCPCS | Mod: ,,, | Performed by: INTERNAL MEDICINE

## 2024-09-18 RX ORDER — HYDROCODONE BITARTRATE AND ACETAMINOPHEN 10; 325 MG/1; MG/1
1 TABLET ORAL DAILY PRN
Qty: 30 TABLET | Refills: 0 | Status: SHIPPED | OUTPATIENT
Start: 2024-09-18 | End: 2025-09-18

## 2024-09-18 NOTE — TELEPHONE ENCOUNTER
"----- Message from Zulema Damico MA sent at 9/18/2024  2:04 PM CDT -----  Regarding: FW: call back    ----- Message -----  From: Allegra Morrison RN  Sent: 9/18/2024   9:37 AM CDT  To: Zulema Damico MA  Subject: RE: call back                                    Spoke to pt.  He prefers an appt closer to the 3rd of the month.  ----- Message -----  From: Zulema Damico MA  Sent: 9/18/2024   9:08 AM CDT  To: Allegra Morrison RN  Subject: FW: call back                                    Please have a conversation with him about keeping his appointments.  ----- Message -----  From: Zulema Damico MA  Sent: 9/17/2024   1:19 PM CDT  To: Zulema Damico MA  Subject: FW: call back                                      ----- Message -----  From: Ti Tellez  Sent: 9/17/2024   1:05 PM CDT  To: [facility] Non-Clinical  Subject: call back                                        Consult/Advisory:        Name Of Caller: Self     Contact Preference?:[phone]     What is the nature of the call?: Calling to speak w/someone in regards to rescheduling his yearly appt requesting call back          Additional Notes:  "Thank you for all that you do for our patients"  "

## 2024-10-01 DIAGNOSIS — E11.42 TYPE 2 DIABETES MELLITUS WITH DIABETIC POLYNEUROPATHY, WITH LONG-TERM CURRENT USE OF INSULIN: ICD-10-CM

## 2024-10-01 DIAGNOSIS — E11.42 DIABETIC PERIPHERAL NEUROPATHY: ICD-10-CM

## 2024-10-01 DIAGNOSIS — Z79.4 TYPE 2 DIABETES MELLITUS WITH DIABETIC POLYNEUROPATHY, WITH LONG-TERM CURRENT USE OF INSULIN: ICD-10-CM

## 2024-10-01 RX ORDER — METFORMIN HYDROCHLORIDE 500 MG/1
500 TABLET ORAL 2 TIMES DAILY
Qty: 180 TABLET | Refills: 3 | Status: SHIPPED | OUTPATIENT
Start: 2024-10-01 | End: 2025-10-01

## 2024-10-01 RX ORDER — ESCITALOPRAM OXALATE 20 MG/1
20 TABLET ORAL DAILY
Qty: 90 TABLET | Refills: 3 | Status: SHIPPED | OUTPATIENT
Start: 2024-10-01 | End: 2025-10-01

## 2024-10-14 ENCOUNTER — TELEPHONE (OUTPATIENT)
Dept: TRANSPLANT | Facility: CLINIC | Age: 64
End: 2024-10-14
Payer: MEDICARE

## 2024-10-14 NOTE — TELEPHONE ENCOUNTER
Called this patient back several times. The first time he hung the phone up and the next 3 calls he sent me to [facility] which is not set up.

## 2024-10-14 NOTE — TELEPHONE ENCOUNTER
"----- Message from Ti sent at 10/14/2024 11:01 AM CDT -----  Regarding: call back  Consult/Advisory:        Name Of Caller: Self     Contact Preference?:[phone]     What is the nature of the call?: Calling to speak w/ someone in regards to scheduling appt requesting call back      Additional Notes:  "Thank you for all that you do for our patients"  "

## 2024-10-15 DIAGNOSIS — G89.29 CHRONIC LOW BACK PAIN WITH SCIATICA, SCIATICA LATERALITY UNSPECIFIED, UNSPECIFIED BACK PAIN LATERALITY: ICD-10-CM

## 2024-10-15 DIAGNOSIS — Z94.4 LIVER TRANSPLANTED: ICD-10-CM

## 2024-10-15 DIAGNOSIS — M54.40 CHRONIC LOW BACK PAIN WITH SCIATICA, SCIATICA LATERALITY UNSPECIFIED, UNSPECIFIED BACK PAIN LATERALITY: ICD-10-CM

## 2024-10-15 DIAGNOSIS — Z79.891 LONG TERM PRESCRIPTION OPIATE USE: ICD-10-CM

## 2024-10-15 RX ORDER — HYDROCODONE BITARTRATE AND ACETAMINOPHEN 10; 325 MG/1; MG/1
1 TABLET ORAL DAILY PRN
Qty: 30 TABLET | Refills: 0 | Status: SHIPPED | OUTPATIENT
Start: 2024-10-15 | End: 2025-10-15

## 2024-10-15 NOTE — TELEPHONE ENCOUNTER
----- Message from Colby Gonzales sent at 10/15/2024 10:32 AM CDT -----  Regarding: refill  Who Called: Colin Reilly    Refill or New Rx:Refill    RX Name and Strength: HYDROcodone-acetaminophen (NORCO)  mg per tablet     How is the patient currently taking it? (ex. 1XDay): 1x    Is this a 30 day or 90 day RX: 30    Local or Mail Order:  List of preferred pharmacies on file (remove unneeded): [unfilled]  If different Pharmacy is requested, enter Pharmacy information here including location and phone number:    Ordering Provider:      Preferred Method of Contact: Phone Call  Patient's Preferred Phone Number on File: [phone]   Best Call Back Number, if different:    Additional Information: refill request

## 2024-10-28 ENCOUNTER — TELEPHONE (OUTPATIENT)
Dept: INTERNAL MEDICINE | Facility: CLINIC | Age: 64
End: 2024-10-28
Payer: MEDICARE

## 2024-10-28 DIAGNOSIS — N52.9 ERECTILE DYSFUNCTION, UNSPECIFIED ERECTILE DYSFUNCTION TYPE: Primary | ICD-10-CM

## 2024-10-28 DIAGNOSIS — E11.42 DIABETIC PERIPHERAL NEUROPATHY: ICD-10-CM

## 2024-10-28 RX ORDER — GABAPENTIN 600 MG/1
600 TABLET ORAL 3 TIMES DAILY
Qty: 90 TABLET | Refills: 5 | Status: SHIPPED | OUTPATIENT
Start: 2024-10-28 | End: 2025-10-28

## 2024-10-28 RX ORDER — SILDENAFIL 100 MG/1
100 TABLET, FILM COATED ORAL
Qty: 5 TABLET | Refills: 11 | Status: SHIPPED | OUTPATIENT
Start: 2024-10-28

## 2024-11-13 DIAGNOSIS — M54.40 CHRONIC LOW BACK PAIN WITH SCIATICA, SCIATICA LATERALITY UNSPECIFIED, UNSPECIFIED BACK PAIN LATERALITY: ICD-10-CM

## 2024-11-13 DIAGNOSIS — Z79.891 LONG TERM PRESCRIPTION OPIATE USE: ICD-10-CM

## 2024-11-13 DIAGNOSIS — G89.29 CHRONIC LOW BACK PAIN WITH SCIATICA, SCIATICA LATERALITY UNSPECIFIED, UNSPECIFIED BACK PAIN LATERALITY: ICD-10-CM

## 2024-11-13 DIAGNOSIS — Z94.4 LIVER TRANSPLANTED: ICD-10-CM

## 2024-11-13 RX ORDER — HYDROCODONE BITARTRATE AND ACETAMINOPHEN 10; 325 MG/1; MG/1
1 TABLET ORAL DAILY PRN
Qty: 30 TABLET | Refills: 0 | Status: SHIPPED | OUTPATIENT
Start: 2024-11-13 | End: 2025-11-13

## 2024-11-13 NOTE — TELEPHONE ENCOUNTER
----- Message from Ayana sent at 11/13/2024 10:27 AM CST -----  Who Called: Colin RAYGOZA Melba    Refill or New Rx:Refill  RX Name and Strength:HYDROcodone-acetaminophen (NORCO)  mg per tablet  How is the patient currently taking it? (ex. 1XDay): Take 1 tablet by mouth daily as needed for Pain. - Oral  Is this a 30 day or 90 day RX:30  Local or Mail Order:local   List of preferred pharmacies on file (remove unneeded): [facility] - [place], [address]    Ordering Provider:Preston Matthew II, MD      Preferred Method of Contact: Phone Call  Patient's Preferred Phone Number on File: [phone]   Best Call Back Number, if different:  Additional Information:

## 2024-11-18 ENCOUNTER — TELEPHONE (OUTPATIENT)
Dept: TRANSPLANT | Facility: CLINIC | Age: 64
End: 2024-11-18
Payer: MEDICARE

## 2024-11-19 ENCOUNTER — TELEPHONE (OUTPATIENT)
Dept: TRANSPLANT | Facility: CLINIC | Age: 64
End: 2024-11-19
Payer: MEDICARE

## 2024-11-19 NOTE — TELEPHONE ENCOUNTER
----- Message from Med Assistant Stahl sent at 11/19/2024  4:27 PM CST -----  Regarding: FW: Consult/Advisory  Contact: pt @  [phone]    ----- Message -----  From: Eliana Reese  Sent: 11/19/2024   3:15 PM CST  To: Maribeth Suarez MA  Subject: FW: Consult/Advisory                             Please reschedule patient labs to tomorrow. Thanks.  ----- Message -----  From: Maddi Aragon  Sent: 11/19/2024   2:31 PM CST  To: [facility]Liver Transplant Clinical  Subject: Consult/Advisory                                 Consult/Advisory     Name Of Caller: Colin Reilly    Contact Preference:  [phone]     Nature of call: message is for Allegra, due to the weather today pt did not go and do labs today. Asking if he can go do labs on tomorrow. Asking for a call back

## 2024-11-20 ENCOUNTER — LAB VISIT (OUTPATIENT)
Dept: LAB | Facility: HOSPITAL | Age: 64
End: 2024-11-20
Attending: INTERNAL MEDICINE
Payer: MEDICARE

## 2024-11-20 ENCOUNTER — TELEPHONE (OUTPATIENT)
Dept: TRANSPLANT | Facility: CLINIC | Age: 64
End: 2024-11-20
Payer: MEDICARE

## 2024-11-20 DIAGNOSIS — Z94.4 LIVER REPLACED BY TRANSPLANT: ICD-10-CM

## 2024-11-20 DIAGNOSIS — Z94.4 S/P LIVER TRANSPLANT: ICD-10-CM

## 2024-11-20 DIAGNOSIS — E11.42 DIABETIC PERIPHERAL NEUROPATHY: Primary | ICD-10-CM

## 2024-11-20 DIAGNOSIS — Z79.891 LONG TERM PRESCRIPTION OPIATE USE: ICD-10-CM

## 2024-11-20 DIAGNOSIS — Z94.4 S/P LIVER TRANSPLANT: Primary | ICD-10-CM

## 2024-11-20 DIAGNOSIS — F10.11 ALCOHOL ABUSE, IN REMISSION: ICD-10-CM

## 2024-11-20 LAB
ALBUMIN SERPL-MCNC: 4.4 G/DL (ref 3.4–4.8)
ALBUMIN/GLOB SERPL: 1.6 RATIO (ref 1.1–2)
ALP SERPL-CCNC: 60 UNIT/L (ref 40–150)
ALT SERPL-CCNC: 20 UNIT/L (ref 0–55)
ANION GAP SERPL CALC-SCNC: 11 MEQ/L
AST SERPL-CCNC: 25 UNIT/L (ref 5–34)
BILIRUB SERPL-MCNC: 1.2 MG/DL
BUN SERPL-MCNC: 13 MG/DL (ref 8.4–25.7)
CALCIUM SERPL-MCNC: 9 MG/DL (ref 8.8–10)
CHLORIDE SERPL-SCNC: 107 MMOL/L (ref 98–107)
CO2 SERPL-SCNC: 24 MMOL/L (ref 23–31)
CREAT SERPL-MCNC: 1.21 MG/DL (ref 0.72–1.25)
CREAT/UREA NIT SERPL: 11
GFR SERPLBLD CREATININE-BSD FMLA CKD-EPI: >60 ML/MIN/1.73/M2
GLOBULIN SER-MCNC: 2.8 GM/DL (ref 2.4–3.5)
GLUCOSE SERPL-MCNC: 146 MG/DL (ref 82–115)
POTASSIUM SERPL-SCNC: 4.4 MMOL/L (ref 3.5–5.1)
PROT SERPL-MCNC: 7.2 GM/DL (ref 5.8–7.6)
SODIUM SERPL-SCNC: 142 MMOL/L (ref 136–145)

## 2024-11-20 PROCEDURE — 80197 ASSAY OF TACROLIMUS: CPT

## 2024-11-20 PROCEDURE — 80321 ALCOHOLS BIOMARKERS 1OR 2: CPT

## 2024-11-20 PROCEDURE — 80053 COMPREHEN METABOLIC PANEL: CPT

## 2024-11-20 PROCEDURE — 36415 COLL VENOUS BLD VENIPUNCTURE: CPT

## 2024-11-20 NOTE — TELEPHONE ENCOUNTER
Per  Chaya of [facility], CBC was not done.  This writer spoke with patient who refused to return for missed test, stating lab is 28 miles away and an inconvenience.  Will notify MD.

## 2024-11-22 ENCOUNTER — TELEPHONE (OUTPATIENT)
Dept: TRANSPLANT | Facility: CLINIC | Age: 64
End: 2024-11-22
Payer: MEDICARE

## 2024-11-22 LAB — TACROLIMUS TROUGH BLD-MCNC: 8.5 NG/ML

## 2024-11-22 NOTE — TELEPHONE ENCOUNTER
Telephone call to patient for lab review.  Labs stable with no medication changes warranted.  Will repeat labs 2/10/25.  Letter also sent.        ----- Message from Elizabeth Meneses MD sent at 11/22/2024  4:15 PM CST -----  The Labs are stable - please let patient know.

## 2024-11-22 NOTE — LETTER
November 22, 2024    Colin Reilly  [address]  [place] [address]          Dear Colin Salazarucet:  MRN: [id number]    This is a follow up to your recent labs, your lab results were stable.  There are no medicine changes.  Please have your labs drawn again on 2/10/25.      If you cannot have your labs drawn on the scheduled date, it is your responsibility to call the transplant department to reschedule.  Please call [phone] and ask to speak to [facility]  for all scheduling requests.     Sincerely,      Allegra Morrison RN  Your Liver Transplant Coordinator    [facility]  [facility] [place] [address]  [phone]

## 2024-11-23 LAB
PLPETH BLD-MCNC: 82 NG/ML
POPETH BLD-MCNC: 113 NG/ML
TOXICOLOGIST REVIEW: NORMAL

## 2024-11-25 ENCOUNTER — TELEPHONE (OUTPATIENT)
Dept: TRANSPLANT | Facility: CLINIC | Age: 64
End: 2024-11-25
Payer: MEDICARE

## 2024-11-25 NOTE — TELEPHONE ENCOUNTER
"SW contacted patient and patient did not answer, VM not set up. SW unable to leave message.      PM Update: Patient answered call upon second attempt. SW discussed patient's recent PETH results. Patient does admit he drank about 3 weeks ago and reports "it was a one time thing". Patient reports he drank about 12 beers. SW discussed patient's previous positive PETH test a year ago and patient reports he will occasionally drink when friends come over. SW discussed importance of complete abstinence moving forward and risks of ongoing alcohol use post transplant. Patient verbalizes understanding and reports he will stop alcohol use. SW also recommended patient to engage in a program for support but patient [place] and there are not many resources near him. SW advised patient to engage in virtual AA meetings at minimum and patient is open to this. SW will mail patient IOP and AA resources to address in [facility] which patient confirmed. SW notifying patient's Hepatologist and Coordinator. SW recommends additional PETH test to ensure patient is following recommendations. SW remains available as needed.         ----- Message from Allegra Morrison sent at 11/25/2024 12:29 PM CST -----  Regarding: Positive PEth    ----- Message -----  From: Elizabeth Meneses MD  Sent: 11/23/2024   4:43 PM CST  To: [facility] Trans[facility] Clinical    Have SW reach out to pt since is actively drinking- please let patient know.  "

## 2024-12-09 DIAGNOSIS — Z79.891 LONG TERM PRESCRIPTION OPIATE USE: ICD-10-CM

## 2024-12-09 DIAGNOSIS — M54.40 CHRONIC LOW BACK PAIN WITH SCIATICA, SCIATICA LATERALITY UNSPECIFIED, UNSPECIFIED BACK PAIN LATERALITY: ICD-10-CM

## 2024-12-09 DIAGNOSIS — G89.29 CHRONIC LOW BACK PAIN WITH SCIATICA, SCIATICA LATERALITY UNSPECIFIED, UNSPECIFIED BACK PAIN LATERALITY: ICD-10-CM

## 2024-12-09 DIAGNOSIS — Z94.4 LIVER TRANSPLANTED: ICD-10-CM

## 2024-12-09 RX ORDER — HYDROCODONE BITARTRATE AND ACETAMINOPHEN 10; 325 MG/1; MG/1
1 TABLET ORAL DAILY PRN
Qty: 30 TABLET | Refills: 0 | Status: SHIPPED | OUTPATIENT
Start: 2024-12-09 | End: 2025-12-09

## 2024-12-09 NOTE — TELEPHONE ENCOUNTER
----- Message from Ayana sent at 12/9/2024 10:00 AM CST -----  Who Called: Colin RAYGOZA Melba    Refill or New Rx:Refill  RX Name and Strength:HYDROcodone-acetaminophen (NORCO)  mg per tablet  How is the patient currently taking it? (ex. 1XDay):Take 1 tablet by mouth daily as needed for Pain. - Oral  Is this a 30 day or 90 day RX:30  Local or Mail Order:local   List of preferred pharmacies on file (remove unneeded): [facility] - [place], [address]  Ordering Provider:Preston Matthew II, MD      Preferred Method of Contact: Phone Call  Patient's Preferred Phone Number on File: [phone]   Best Call Back Number, if different:  Additional Information:

## 2024-12-30 ENCOUNTER — LAB VISIT (OUTPATIENT)
Dept: LAB | Facility: HOSPITAL | Age: 64
End: 2024-12-30
Attending: INTERNAL MEDICINE
Payer: MEDICARE

## 2024-12-30 DIAGNOSIS — Z94.4 S/P LIVER TRANSPLANT: ICD-10-CM

## 2024-12-30 DIAGNOSIS — E11.42 DIABETIC PERIPHERAL NEUROPATHY: Primary | ICD-10-CM

## 2024-12-30 DIAGNOSIS — Z79.891 LONG TERM PRESCRIPTION OPIATE USE: ICD-10-CM

## 2024-12-30 DIAGNOSIS — Z94.4 LIVER REPLACED BY TRANSPLANT: ICD-10-CM

## 2024-12-30 LAB
ALBUMIN SERPL-MCNC: 4.5 G/DL (ref 3.4–4.8)
ALBUMIN/GLOB SERPL: 1.6 RATIO (ref 1.1–2)
ALP SERPL-CCNC: 59 UNIT/L (ref 40–150)
ALT SERPL-CCNC: 20 UNIT/L (ref 0–55)
ANION GAP SERPL CALC-SCNC: 8 MEQ/L
AST SERPL-CCNC: 22 UNIT/L (ref 5–34)
BASOPHILS # BLD AUTO: 0.03 X10(3)/MCL
BASOPHILS NFR BLD AUTO: 0.6 %
BILIRUB SERPL-MCNC: 1 MG/DL
BUN SERPL-MCNC: 15 MG/DL (ref 8.4–25.7)
CALCIUM SERPL-MCNC: 9.5 MG/DL (ref 8.8–10)
CHLORIDE SERPL-SCNC: 109 MMOL/L (ref 98–107)
CO2 SERPL-SCNC: 24 MMOL/L (ref 23–31)
CREAT SERPL-MCNC: 1.14 MG/DL (ref 0.72–1.25)
CREAT/UREA NIT SERPL: 13
EOSINOPHIL # BLD AUTO: 0.07 X10(3)/MCL (ref 0–0.9)
EOSINOPHIL NFR BLD AUTO: 1.3 %
ERYTHROCYTE [DISTWIDTH] IN BLOOD BY AUTOMATED COUNT: 12.7 % (ref 11.5–17)
GFR SERPLBLD CREATININE-BSD FMLA CKD-EPI: >60 ML/MIN/1.73/M2
GLOBULIN SER-MCNC: 2.9 GM/DL (ref 2.4–3.5)
GLUCOSE SERPL-MCNC: 113 MG/DL (ref 82–115)
HCT VFR BLD AUTO: 44.3 % (ref 42–52)
HGB BLD-MCNC: 15.7 G/DL (ref 14–18)
IMM GRANULOCYTES # BLD AUTO: 0.04 X10(3)/MCL (ref 0–0.04)
IMM GRANULOCYTES NFR BLD AUTO: 0.8 %
LYMPHOCYTES # BLD AUTO: 1.13 X10(3)/MCL (ref 0.6–4.6)
LYMPHOCYTES NFR BLD AUTO: 21.7 %
MCH RBC QN AUTO: 34 PG (ref 27–31)
MCHC RBC AUTO-ENTMCNC: 35.4 G/DL (ref 33–36)
MCV RBC AUTO: 95.9 FL (ref 80–94)
MONOCYTES # BLD AUTO: 0.39 X10(3)/MCL (ref 0.1–1.3)
MONOCYTES NFR BLD AUTO: 7.5 %
NEUTROPHILS # BLD AUTO: 3.54 X10(3)/MCL (ref 2.1–9.2)
NEUTROPHILS NFR BLD AUTO: 68.1 %
PLATELET # BLD AUTO: 112 X10(3)/MCL (ref 130–400)
PMV BLD AUTO: 11.3 FL (ref 7.4–10.4)
POTASSIUM SERPL-SCNC: 4.5 MMOL/L (ref 3.5–5.1)
PROT SERPL-MCNC: 7.4 GM/DL (ref 5.8–7.6)
RBC # BLD AUTO: 4.62 X10(6)/MCL (ref 4.7–6.1)
SODIUM SERPL-SCNC: 141 MMOL/L (ref 136–145)
WBC # BLD AUTO: 5.2 X10(3)/MCL (ref 4.5–11.5)

## 2024-12-30 PROCEDURE — 85025 COMPLETE CBC W/AUTO DIFF WBC: CPT

## 2024-12-30 PROCEDURE — 80197 ASSAY OF TACROLIMUS: CPT

## 2024-12-30 PROCEDURE — 36415 COLL VENOUS BLD VENIPUNCTURE: CPT

## 2024-12-30 PROCEDURE — 80053 COMPREHEN METABOLIC PANEL: CPT

## 2024-12-31 LAB — TACROLIMUS TROUGH BLD-MCNC: 8.4 NG/ML

## 2025-01-02 ENCOUNTER — TELEPHONE (OUTPATIENT)
Dept: TRANSPLANT | Facility: CLINIC | Age: 65
End: 2025-01-02

## 2025-01-02 NOTE — LETTER
January 2, 2025    Colin Melba  [address]  [place] [address]          Dear Colin Reilly:  MRN: [id number]    This is a follow up to your recent labs, your lab results were stable.  There are no medicine changes.  Please have your labs drawn again on 3/24/2025.      If you cannot have your labs drawn on the scheduled date, it is your responsibility to call the transplant department to reschedule.  Please call [phone] and ask to speak to [facility]  for all scheduling requests.     Sincerely,      Allegra Morrison RN  Your Liver Transplant Coordinator    [facility]  [address] [place] [id number]  [phone]

## 2025-01-02 NOTE — TELEPHONE ENCOUNTER
Labs reviewed and are stable.  Patient to repeat labs on 3/24/25.   Letter sent to patient.        ----- Message from Elizabeth Meneses MD sent at 12/31/2024  4:10 PM CST -----  The Labs are stable - please let patient know.

## 2025-01-03 ENCOUNTER — TELEPHONE (OUTPATIENT)
Dept: TRANSPLANT | Facility: CLINIC | Age: 65
End: 2025-01-03

## 2025-01-03 NOTE — TELEPHONE ENCOUNTER
I have attempted without success to contact this patient by phone to discuss lab results.  Unable to leave VM.       ----- Message from Maddi sent at 1/3/2025  1:07 PM CST -----  Regarding: Consult/Advisory  Contact: pt @ [phone]  Consult/Advisory     Name Of Caller: Colin Reilly     Contact Preference: [phone]     Nature of call: message is for Allegra pt is calling to get lab results. Asking for a call back

## 2025-01-06 ENCOUNTER — TELEPHONE (OUTPATIENT)
Dept: TRANSPLANT | Facility: CLINIC | Age: 65
End: 2025-01-06
Payer: MEDICARE

## 2025-01-06 NOTE — TELEPHONE ENCOUNTER
Called patient to verify that he is aware that appointment today will be at [facility]. Informed patient that Dr. Meneses has relocated to [facility]--1st floor Hepatology/Multi-organ Transplant Clinic.   Patient verbalized understanding.

## 2025-01-07 DIAGNOSIS — M54.40 CHRONIC LOW BACK PAIN WITH SCIATICA, SCIATICA LATERALITY UNSPECIFIED, UNSPECIFIED BACK PAIN LATERALITY: ICD-10-CM

## 2025-01-07 DIAGNOSIS — G89.29 CHRONIC LOW BACK PAIN WITH SCIATICA, SCIATICA LATERALITY UNSPECIFIED, UNSPECIFIED BACK PAIN LATERALITY: ICD-10-CM

## 2025-01-07 DIAGNOSIS — Z79.891 LONG TERM PRESCRIPTION OPIATE USE: ICD-10-CM

## 2025-01-07 DIAGNOSIS — Z94.4 LIVER TRANSPLANTED: ICD-10-CM

## 2025-01-07 RX ORDER — HYDROCODONE BITARTRATE AND ACETAMINOPHEN 10; 325 MG/1; MG/1
1 TABLET ORAL DAILY PRN
Qty: 30 TABLET | Refills: 0 | Status: SHIPPED | OUTPATIENT
Start: 2025-01-07 | End: 2026-01-07

## 2025-01-07 NOTE — TELEPHONE ENCOUNTER
----- Message from Lauryn sent at 1/7/2025 11:28 AM CST -----  Regarding: med refill  .Who Called: Colin Reilly    Refill or New Rx:Refill  RX Name and Strength:HYDROcodone-acetaminophen (NORCO)  mg per tablet  How is the patient currently taking it? (ex. 1XDay):  Is this a 30 day or 90 day RX:30  Local or Mail Order:local  List of preferred pharmacies on file (remove unneeded): [facility] - [place]   Phone: [phone]  Fax: [phone]        Ordering Provider:Vipul      Preferred Method of Contact: Phone Call  Patient's Preferred Phone Number on File: [phone]   Best Call Back Number, if different:  Additional Information: pt needs authorization from  to refill prescription

## 2025-01-23 ENCOUNTER — TELEPHONE (OUTPATIENT)
Dept: TRANSPLANT | Facility: CLINIC | Age: 65
End: 2025-01-23

## 2025-01-23 NOTE — TELEPHONE ENCOUNTER
"Returned call, patient was not able to make his 1/6/25 appt with Dr. Meneses.  Let patient know our transplant  will reach out to him to reschedule.  Reminded patient that if he cannot make an appt to let us know so someone else can have that appt time ----- Message from Ti sent at 1/23/2025 10:45 AM CST -----  Regarding: call back  Consult/Advisory:        Name Of Caller: Self     Contact Preference?:[phone]     What is the nature of the call?: Calling to speak w/  someone in regards to schedule a follow up appt requesting call back      Additional Notes:  "Thank you for all that you do for our patients"  "

## 2025-01-30 ENCOUNTER — TELEPHONE (OUTPATIENT)
Dept: TRANSPLANT | Facility: CLINIC | Age: 65
End: 2025-01-30

## 2025-01-30 DIAGNOSIS — N40.0 BENIGN PROSTATIC HYPERPLASIA, UNSPECIFIED WHETHER LOWER URINARY TRACT SYMPTOMS PRESENT: ICD-10-CM

## 2025-01-30 RX ORDER — TAMSULOSIN HYDROCHLORIDE 0.4 MG/1
0.4 CAPSULE ORAL DAILY
Qty: 90 CAPSULE | Refills: 3 | Status: SHIPPED | OUTPATIENT
Start: 2025-01-30 | End: 2026-01-30

## 2025-01-30 NOTE — TELEPHONE ENCOUNTER
I have attempted without success to contact this patient by phone to will try again later.  No voicemail available.       ----- Message from Stipple sent at 1/30/2025 11:14 AM CST -----  Regarding: Consult/Advisory  Contact: :Colin Reilly     Consult/Advisory     Name Of Caller:Colin Reilly         Contact Preference:[phone] (home)       Nature of call:Patient is calling to speak to Allegra about a letter he receive in mail and has questions. Requesting a call back

## 2025-02-06 DIAGNOSIS — Z94.4 LIVER TRANSPLANTED: ICD-10-CM

## 2025-02-06 DIAGNOSIS — M54.40 CHRONIC LOW BACK PAIN WITH SCIATICA, SCIATICA LATERALITY UNSPECIFIED, UNSPECIFIED BACK PAIN LATERALITY: ICD-10-CM

## 2025-02-06 DIAGNOSIS — Z79.891 LONG TERM PRESCRIPTION OPIATE USE: ICD-10-CM

## 2025-02-06 DIAGNOSIS — G89.29 CHRONIC LOW BACK PAIN WITH SCIATICA, SCIATICA LATERALITY UNSPECIFIED, UNSPECIFIED BACK PAIN LATERALITY: ICD-10-CM

## 2025-02-06 RX ORDER — HYDROCODONE BITARTRATE AND ACETAMINOPHEN 10; 325 MG/1; MG/1
1 TABLET ORAL DAILY PRN
Qty: 30 TABLET | Refills: 0 | Status: SHIPPED | OUTPATIENT
Start: 2025-02-06 | End: 2026-02-06

## 2025-02-06 NOTE — TELEPHONE ENCOUNTER
----- Message from Marla sent at 2/6/2025 10:53 AM CST -----  Who Called: Colin IDALMIS Melba    Patient is returning phone call    Who Left Message for Patient:  Does the patient know what this is regarding?:      Preferred Method of Contact: Phone Call  Patient's Preferred Phone Number on File: [phone]   Best Call Back Number, if different:  Additional Information: pt called to get refill on norco's sent to [facility] - [place], [place] - [address]    but doesn't see it in pt chart pls advise

## 2025-02-11 ENCOUNTER — LAB VISIT (OUTPATIENT)
Dept: LAB | Facility: HOSPITAL | Age: 65
End: 2025-02-11
Attending: INTERNAL MEDICINE
Payer: MEDICARE

## 2025-02-11 DIAGNOSIS — Z94.4 S/P LIVER TRANSPLANT: ICD-10-CM

## 2025-02-11 LAB
ALBUMIN SERPL-MCNC: 4.8 G/DL (ref 3.4–4.8)
ALBUMIN/GLOB SERPL: 1.5 RATIO (ref 1.1–2)
ALP SERPL-CCNC: 56 UNIT/L (ref 40–150)
ALT SERPL-CCNC: 19 UNIT/L (ref 0–55)
ANION GAP SERPL CALC-SCNC: 9 MEQ/L
AST SERPL-CCNC: 23 UNIT/L (ref 5–34)
BASOPHILS # BLD AUTO: 0.01 X10(3)/MCL
BASOPHILS NFR BLD AUTO: 0.2 %
BILIRUB SERPL-MCNC: 1 MG/DL
BUN SERPL-MCNC: 10 MG/DL (ref 8.4–25.7)
CALCIUM SERPL-MCNC: 8.8 MG/DL (ref 8.8–10)
CHLORIDE SERPL-SCNC: 108 MMOL/L (ref 98–107)
CO2 SERPL-SCNC: 24 MMOL/L (ref 23–31)
CREAT SERPL-MCNC: 1.14 MG/DL (ref 0.72–1.25)
CREAT/UREA NIT SERPL: 9
EOSINOPHIL # BLD AUTO: 0.08 X10(3)/MCL (ref 0–0.9)
EOSINOPHIL NFR BLD AUTO: 2 %
ERYTHROCYTE [DISTWIDTH] IN BLOOD BY AUTOMATED COUNT: 13 % (ref 11.5–17)
GFR SERPLBLD CREATININE-BSD FMLA CKD-EPI: >60 ML/MIN/1.73/M2
GLOBULIN SER-MCNC: 3.1 GM/DL (ref 2.4–3.5)
GLUCOSE SERPL-MCNC: 160 MG/DL (ref 82–115)
HCT VFR BLD AUTO: 45.9 % (ref 42–52)
HGB BLD-MCNC: 15.9 G/DL (ref 14–18)
IMM GRANULOCYTES # BLD AUTO: 0.01 X10(3)/MCL (ref 0–0.04)
IMM GRANULOCYTES NFR BLD AUTO: 0.2 %
LYMPHOCYTES # BLD AUTO: 0.89 X10(3)/MCL (ref 0.6–4.6)
LYMPHOCYTES NFR BLD AUTO: 22.1 %
MCH RBC QN AUTO: 33.9 PG (ref 27–31)
MCHC RBC AUTO-ENTMCNC: 34.6 G/DL (ref 33–36)
MCV RBC AUTO: 97.9 FL (ref 80–94)
MONOCYTES # BLD AUTO: 0.29 X10(3)/MCL (ref 0.1–1.3)
MONOCYTES NFR BLD AUTO: 7.2 %
NEUTROPHILS # BLD AUTO: 2.75 X10(3)/MCL (ref 2.1–9.2)
NEUTROPHILS NFR BLD AUTO: 68.3 %
PLATELET # BLD AUTO: 113 X10(3)/MCL (ref 130–400)
PMV BLD AUTO: 11.3 FL (ref 7.4–10.4)
POTASSIUM SERPL-SCNC: 4.6 MMOL/L (ref 3.5–5.1)
PROT SERPL-MCNC: 7.9 GM/DL (ref 5.8–7.6)
RBC # BLD AUTO: 4.69 X10(6)/MCL (ref 4.7–6.1)
SODIUM SERPL-SCNC: 141 MMOL/L (ref 136–145)
WBC # BLD AUTO: 4.03 X10(3)/MCL (ref 4.5–11.5)

## 2025-02-11 PROCEDURE — 85025 COMPLETE CBC W/AUTO DIFF WBC: CPT

## 2025-02-11 PROCEDURE — 80053 COMPREHEN METABOLIC PANEL: CPT

## 2025-02-11 PROCEDURE — 80197 ASSAY OF TACROLIMUS: CPT

## 2025-02-11 PROCEDURE — 36415 COLL VENOUS BLD VENIPUNCTURE: CPT | Mod: 91

## 2025-02-12 LAB — TACROLIMUS TROUGH BLD-MCNC: 7.2 NG/ML

## 2025-02-14 ENCOUNTER — TELEPHONE (OUTPATIENT)
Dept: TRANSPLANT | Facility: CLINIC | Age: 65
End: 2025-02-14
Payer: MEDICARE

## 2025-02-14 NOTE — TELEPHONE ENCOUNTER
I have attempted without success to contact this patient by phone to return their call.  There was no answer and  was not set up.      ----- Message from Allegra Morrison sent at 2/13/2025 12:57 PM CST -----  Regarding: FW: Consult/Advisory/Results  Contact: [phone]    ----- Message -----  From: Raudel Villalta  Sent: 2/13/2025  11:19 AM CST  To: [facility]  Subject: Consult/Advisory/Results                         Consult/Advisory/Results      Name Of Caller: pt         Contact Preference:   [phone]     Nature of call: Would like to go over recent lab results, please call to advise thank you

## 2025-02-17 ENCOUNTER — TELEPHONE (OUTPATIENT)
Dept: TRANSPLANT | Facility: CLINIC | Age: 65
End: 2025-02-17
Payer: MEDICARE

## 2025-02-17 ENCOUNTER — RESULTS FOLLOW-UP (OUTPATIENT)
Dept: TRANSPLANT | Facility: CLINIC | Age: 65
End: 2025-02-17
Payer: MEDICARE

## 2025-02-17 NOTE — TELEPHONE ENCOUNTER
Labs reviewed and are stable.  Patient to repeat labs on 3/24/25.   Letter sent to patient.        ----- Message from Elizabeth Meneses MD sent at 2/13/2025  1:12 PM CST -----  The Labs are stable - please let patient know.

## 2025-02-17 NOTE — TELEPHONE ENCOUNTER
Returned call to patient.  Telephone call to patient for lab review.  Labs stable with no medication changes warranted.  Will repeat labs 3/24/25.  Pt v/u.      ----- Message from Demond sent at 2/17/2025 10:15 AM CST -----  Regarding: Returning a Missed Call  Contact: [phone]  Returning a Missed Call Caller: Colin  Returning call to: Allegra Morrison Caller can be reached @:  [phone] Nature of the call: Returning call about results

## 2025-02-17 NOTE — LETTER
February 17, 2025    Colin Reilly  [address]  [facility] [address]          Dear Colin Melba:  MRN: [id number]    This is a follow up to your recent labs, your lab results were stable.  There are no medicine changes.  Please have your labs drawn again on 3/24/2025.      If you cannot have your labs drawn on the scheduled date, it is your responsibility to call the transplant department to reschedule.  Please call [phone] and ask to speak to [facility]  for all scheduling requests.     Sincerely,        Your Liver Transplant Coordinator    [facility] [place]  [address] [place] [address]  [phone]

## 2025-02-20 DIAGNOSIS — Z94.4 S/P LIVER TRANSPLANT: Primary | ICD-10-CM

## 2025-02-21 DIAGNOSIS — F10.11 ALCOHOL ABUSE, IN REMISSION: ICD-10-CM

## 2025-02-21 DIAGNOSIS — Z94.4 S/P LIVER TRANSPLANT: Primary | ICD-10-CM

## 2025-02-25 ENCOUNTER — TELEPHONE (OUTPATIENT)
Dept: INTERNAL MEDICINE | Facility: CLINIC | Age: 65
End: 2025-02-25
Payer: MEDICARE

## 2025-02-26 ENCOUNTER — TELEPHONE (OUTPATIENT)
Dept: INTERNAL MEDICINE | Facility: CLINIC | Age: 65
End: 2025-02-26
Payer: MEDICARE

## 2025-03-05 DIAGNOSIS — Z94.4 LIVER TRANSPLANTED: ICD-10-CM

## 2025-03-05 DIAGNOSIS — M54.40 CHRONIC LOW BACK PAIN WITH SCIATICA, SCIATICA LATERALITY UNSPECIFIED, UNSPECIFIED BACK PAIN LATERALITY: ICD-10-CM

## 2025-03-05 DIAGNOSIS — G89.29 CHRONIC LOW BACK PAIN WITH SCIATICA, SCIATICA LATERALITY UNSPECIFIED, UNSPECIFIED BACK PAIN LATERALITY: ICD-10-CM

## 2025-03-05 DIAGNOSIS — Z79.891 LONG TERM PRESCRIPTION OPIATE USE: ICD-10-CM

## 2025-03-05 RX ORDER — HYDROCODONE BITARTRATE AND ACETAMINOPHEN 10; 325 MG/1; MG/1
1 TABLET ORAL DAILY PRN
Qty: 30 TABLET | Refills: 0 | Status: SHIPPED | OUTPATIENT
Start: 2025-03-05 | End: 2026-03-05

## 2025-03-05 NOTE — TELEPHONE ENCOUNTER
1. Are there any outstanding tasks in the patient's chart? Yes, fasting labs    2. Is there any documentation in the chart? No, labs needed

## 2025-03-05 NOTE — TELEPHONE ENCOUNTER
----- Message from Lupe sent at 3/5/2025  1:57 PM CST -----  .Type:  RX Refill RequestWho Called: ptRefill or New Rx:refill RX Name and Strength:HYDROcodone-acetaminophen (NORCO)  mg per tabletHow is the patient currently taking it? (ex. 1XDay):1/dayIs this a 30 day or 90 day RX:30Preferred Pharmacy with phone number:[facility] Local or Mail Order:LocalOrdering Provider:VoitierWould the patient rather a call back or a response via [facility]? [facility] Call Back Number:[phone] Information: HYDROcodone-acetaminophen (NORCO)  mg per tablet

## 2025-03-10 ENCOUNTER — LAB VISIT (OUTPATIENT)
Dept: LAB | Facility: HOSPITAL | Age: 65
End: 2025-03-10
Attending: INTERNAL MEDICINE
Payer: MEDICARE

## 2025-03-10 DIAGNOSIS — E11.42 TYPE 2 DIABETES MELLITUS WITH DIABETIC POLYNEUROPATHY, WITH LONG-TERM CURRENT USE OF INSULIN: ICD-10-CM

## 2025-03-10 DIAGNOSIS — E78.5 DYSLIPIDEMIA: ICD-10-CM

## 2025-03-10 DIAGNOSIS — K72.10 END STAGE LIVER DISEASE: ICD-10-CM

## 2025-03-10 DIAGNOSIS — Z79.60 LONG-TERM USE OF IMMUNOSUPPRESSANT MEDICATION: ICD-10-CM

## 2025-03-10 DIAGNOSIS — E11.42 DIABETIC PERIPHERAL NEUROPATHY: ICD-10-CM

## 2025-03-10 DIAGNOSIS — Z00.00 WELLNESS EXAMINATION: ICD-10-CM

## 2025-03-10 DIAGNOSIS — Z94.4 HISTORY OF LIVER TRANSPLANT: ICD-10-CM

## 2025-03-10 DIAGNOSIS — I10 PRIMARY HYPERTENSION: ICD-10-CM

## 2025-03-10 DIAGNOSIS — Z79.4 TYPE 2 DIABETES MELLITUS WITH DIABETIC POLYNEUROPATHY, WITH LONG-TERM CURRENT USE OF INSULIN: ICD-10-CM

## 2025-03-10 DIAGNOSIS — F10.11 ALCOHOL ABUSE, IN REMISSION: ICD-10-CM

## 2025-03-10 DIAGNOSIS — F32.5 MAJOR DEPRESSION IN REMISSION: ICD-10-CM

## 2025-03-10 DIAGNOSIS — Z79.891 LONG TERM PRESCRIPTION OPIATE USE: ICD-10-CM

## 2025-03-10 DIAGNOSIS — D69.6 THROMBOCYTOPENIA, UNSPECIFIED: ICD-10-CM

## 2025-03-10 LAB
ACCEPTIBLE SP GR UR QL: 1.02 (ref 1–1.03)
ALBUMIN SERPL-MCNC: 4.4 G/DL (ref 3.4–4.8)
ALBUMIN/GLOB SERPL: 1.5 RATIO (ref 1.1–2)
ALP SERPL-CCNC: 54 UNIT/L (ref 40–150)
ALT SERPL-CCNC: 23 UNIT/L (ref 0–55)
AMPHET UR QL SCN: NEGATIVE
ANION GAP SERPL CALC-SCNC: 10 MEQ/L
AST SERPL-CCNC: 22 UNIT/L (ref 5–34)
BACTERIA #/AREA URNS AUTO: ABNORMAL /HPF
BARBITURATE SCN PRESENT UR: NEGATIVE
BASOPHILS # BLD AUTO: 0.04 X10(3)/MCL
BASOPHILS NFR BLD AUTO: 1.2 %
BENZODIAZ UR QL SCN: NEGATIVE
BILIRUB SERPL-MCNC: 0.7 MG/DL
BILIRUB UR QL STRIP.AUTO: NEGATIVE
BUN SERPL-MCNC: 13 MG/DL (ref 8.4–25.7)
CALCIUM SERPL-MCNC: 9 MG/DL (ref 8.8–10)
CANNABINOIDS UR QL SCN: NEGATIVE
CHLORIDE SERPL-SCNC: 110 MMOL/L (ref 98–107)
CHOLEST SERPL-MCNC: 134 MG/DL
CHOLEST/HDLC SERPL: 3 {RATIO} (ref 0–5)
CLARITY UR: CLEAR
CO2 SERPL-SCNC: 22 MMOL/L (ref 23–31)
COCAINE UR QL SCN: NEGATIVE
COLOR UR AUTO: YELLOW
CREAT SERPL-MCNC: 0.97 MG/DL (ref 0.72–1.25)
CREAT UR-MCNC: 145.2 MG/DL (ref 63–166)
CREAT/UREA NIT SERPL: 13
EOSINOPHIL # BLD AUTO: 0.07 X10(3)/MCL (ref 0–0.9)
EOSINOPHIL NFR BLD AUTO: 2.1 %
ERYTHROCYTE [DISTWIDTH] IN BLOOD BY AUTOMATED COUNT: 13.2 % (ref 11.5–17)
EST. AVERAGE GLUCOSE BLD GHB EST-MCNC: 116.9 MG/DL
FENTANYL UR QL SCN: NEGATIVE
GFR SERPLBLD CREATININE-BSD FMLA CKD-EPI: >60 ML/MIN/1.73/M2
GLOBULIN SER-MCNC: 3 GM/DL (ref 2.4–3.5)
GLUCOSE SERPL-MCNC: 179 MG/DL (ref 82–115)
GLUCOSE UR QL STRIP: ABNORMAL
HBA1C MFR BLD: 5.7 %
HCT VFR BLD AUTO: 45.2 % (ref 42–52)
HDLC SERPL-MCNC: 46 MG/DL (ref 35–60)
HGB BLD-MCNC: 15.8 G/DL (ref 14–18)
HGB UR QL STRIP: NEGATIVE
IMM GRANULOCYTES # BLD AUTO: 0.03 X10(3)/MCL (ref 0–0.04)
IMM GRANULOCYTES NFR BLD AUTO: 0.9 %
KETONES UR QL STRIP: NEGATIVE
LDLC SERPL CALC-MCNC: 63 MG/DL (ref 50–140)
LEUKOCYTE ESTERASE UR QL STRIP: NEGATIVE
LYMPHOCYTES # BLD AUTO: 0.93 X10(3)/MCL (ref 0.6–4.6)
LYMPHOCYTES NFR BLD AUTO: 27.7 %
MCH RBC QN AUTO: 33.8 PG (ref 27–31)
MCHC RBC AUTO-ENTMCNC: 35 G/DL (ref 33–36)
MCV RBC AUTO: 96.8 FL (ref 80–94)
MDMA UR QL SCN: NEGATIVE
MICROALBUMIN UR-MCNC: 95.1 UG/ML
MICROALBUMIN/CREAT RATIO PNL UR: 65.5 MG/GM CR (ref 0–30)
MONOCYTES # BLD AUTO: 0.23 X10(3)/MCL (ref 0.1–1.3)
MONOCYTES NFR BLD AUTO: 6.8 %
MUCOUS THREADS URNS QL MICRO: ABNORMAL /LPF
NEUTROPHILS # BLD AUTO: 2.06 X10(3)/MCL (ref 2.1–9.2)
NEUTROPHILS NFR BLD AUTO: 61.3 %
NITRITE UR QL STRIP: NEGATIVE
NRBC BLD AUTO-RTO: 0 %
OPIATES UR QL SCN: NEGATIVE
PCP UR QL: NEGATIVE
PH UR STRIP: 5.5 [PH]
PH UR: 5.5 [PH] (ref 3–11)
PLATELET # BLD AUTO: 115 X10(3)/MCL (ref 130–400)
PMV BLD AUTO: 11.9 FL (ref 7.4–10.4)
POTASSIUM SERPL-SCNC: 4.2 MMOL/L (ref 3.5–5.1)
PROT SERPL-MCNC: 7.4 GM/DL (ref 5.8–7.6)
PROT UR QL STRIP: ABNORMAL
RBC # BLD AUTO: 4.67 X10(6)/MCL (ref 4.7–6.1)
RBC #/AREA URNS AUTO: ABNORMAL /HPF
SODIUM SERPL-SCNC: 142 MMOL/L (ref 136–145)
SP GR UR STRIP.AUTO: 1.02 (ref 1–1.03)
SQUAMOUS #/AREA URNS AUTO: ABNORMAL /HPF
TRIGL SERPL-MCNC: 127 MG/DL (ref 34–140)
TSH SERPL-ACNC: 1.14 UIU/ML (ref 0.35–4.94)
UROBILINOGEN UR STRIP-ACNC: 0.2
VLDLC SERPL CALC-MCNC: 25 MG/DL
WBC # BLD AUTO: 3.36 X10(3)/MCL (ref 4.5–11.5)
WBC #/AREA URNS AUTO: ABNORMAL /HPF

## 2025-03-10 PROCEDURE — 85025 COMPLETE CBC W/AUTO DIFF WBC: CPT

## 2025-03-10 PROCEDURE — 80053 COMPREHEN METABOLIC PANEL: CPT

## 2025-03-10 PROCEDURE — 80307 DRUG TEST PRSMV CHEM ANLYZR: CPT

## 2025-03-10 PROCEDURE — 84443 ASSAY THYROID STIM HORMONE: CPT

## 2025-03-10 PROCEDURE — 82570 ASSAY OF URINE CREATININE: CPT

## 2025-03-10 PROCEDURE — 83036 HEMOGLOBIN GLYCOSYLATED A1C: CPT

## 2025-03-10 PROCEDURE — 36415 COLL VENOUS BLD VENIPUNCTURE: CPT

## 2025-03-10 PROCEDURE — 81001 URINALYSIS AUTO W/SCOPE: CPT

## 2025-03-10 PROCEDURE — 80061 LIPID PANEL: CPT

## 2025-03-11 NOTE — PROGRESS NOTES
"Subjective:       Patient ID: Colin Reilly is a 64 y.o. male.      Patient Care Team:  Preston Matthew II, MD as PCP - General (Internal Medicine)  Casimiro Wagner MD (Ophthalmology)  Yuliana Garrison LPN as Licensed Practical Nurse    Chief Complaint: Medicare AWV Follow Up, Peripheral Neuropathy, Anemia, Dyslipidemia, Hypertension, and Diabetes      64-year-old male was evaluated today for follow-up of chronic liver disease with cirrhosis, diabetes, and neuropathy among other conditions.        Review of Systems   Constitutional:  Negative for fever.   HENT:  Negative for nosebleeds.    Eyes:  Negative for visual disturbance.   Respiratory:  Negative for shortness of breath.    Cardiovascular:  Negative for chest pain.   Gastrointestinal:  Negative for abdominal pain.   Genitourinary:  Negative for dysuria.   Musculoskeletal:  Negative for gait problem.   Neurological:  Negative for headaches.           Patient Reported Health Risk Assessment         Objective:      Physical Exam  HENT:      Head: Normocephalic.      Mouth/Throat:      Pharynx: Oropharynx is clear.   Eyes:      Extraocular Movements: Extraocular movements intact.   Cardiovascular:      Rate and Rhythm: Normal rate and regular rhythm.   Pulmonary:      Breath sounds: Normal breath sounds.   Abdominal:      Palpations: Abdomen is soft.   Musculoskeletal:         General: No swelling.   Skin:     General: Skin is warm.   Neurological:      General: No focal deficit present.      Mental Status: He is alert and oriented to person, place, and time.   Psychiatric:         Mood and Affect: Mood normal.         Vitals:    03/12/25 0923   BP: 110/68   Pulse: 72   Resp: 16   Temp: 97.9 °F (36.6 °C)   SpO2: 96%   Weight: 84.4 kg (186 lb)   Height: 5' 11" (1.803 m)                       No data to display                  3/12/2025     9:15 AM 9/18/2024     1:15 PM 3/1/2024     9:00 AM 3/27/2023     1:00 PM 3/7/2023     1:30 PM 2/27/2023     1:45 PM 2/6/2023 " "    4:00 PM   Fall Risk Assessment - Outpatient   Mobility Status Ambulatory Ambulatory Ambulatory Ambulatory Ambulatory Ambulatory Ambulatory   Number of falls 0 0 1 0 0 0 0   Identified as fall risk False False False False False False False                  Assessment:       Problem List Items Addressed This Visit       Alcohol abuse, in remission    Long-term use of immunosuppressant medication    Long term prescription opiate use    Diabetic peripheral neuropathy - Primary    Major depression in remission    History of liver transplant    Type 2 diabetes mellitus with diabetic polyneuropathy, with long-term current use of insulin    Dyslipidemia    Primary hypertension    RESOLVED: Wellness examination    Thrombocytopenia, unspecified         Medication List with Changes/Refills   Current Medications    BISACODYL (DULCOLAX) 5 MG EC TABLET    Take 2 tablets (10 mg total) by mouth daily as needed for Constipation.    BLOOD SUGAR DIAGNOSTIC (TRUE METRIX GLUCOSE TEST STRIP MISC)      TRUE METRIX SELF MONITORING BLOOD GLUCOSE STRIPS   Strip, See Instructions, TEST BLOOD SUGAR FOUR TIMES DAILY, # 400 unknown unit, 3 Refill(s), Pharmacy: [facility] Mail Delivery, 180, cm, Height/Length Dosing, 02/10/21 9:33:00 CST, 90.718, kg, W...    DROPLET PEN NEEDLE 32 GAUGE X 5/32" NDLE    USE ONE TIME DAILY AT BEDTIME    EASY TOUCH INSULIN SYRINGE 1 ML 31 GAUGE X 5/16 SYRG        ESCITALOPRAM OXALATE (LEXAPRO) 20 MG TABLET    Take 1 tablet (20 mg total) by mouth once daily.    FINASTERIDE (PROSCAR) 5 MG TABLET    Take 1 tablet (5 mg total) by mouth once daily.    GABAPENTIN (NEURONTIN) 600 MG TABLET    TAKE 1 TABLET (600 MG TOTAL) BY MOUTH 3 (THREE) TIMES DAILY.    HYDROCODONE-ACETAMINOPHEN (NORCO)  MG PER TABLET    Take 1 tablet by mouth daily as needed for Pain.    INSULIN (LANTUS SOLOSTAR U-100 INSULIN) GLARGINE 100 UNITS/ML SUBQ PEN    Inject 10 Units into the skin every evening.    METFORMIN (GLUCOPHAGE) 500 MG " TABLET    Take 1 tablet (500 mg total) by mouth 2 (two) times daily.    NOVOLOG FLEXPEN U-100 INSULIN 100 UNIT/ML (3 ML) INPN PEN    Inject 10 Units into the skin 3 (three) times daily.    OMEPRAZOLE (PRILOSEC) 40 MG CAPSULE    Take 40 mg by mouth once daily.    OXYBUTYNIN (DITROPAN) 5 MG TAB    TAKE 1 TABLET (5 MG TOTAL) BY MOUTH ONCE DAILY.    QUETIAPINE (SEROQUEL) 50 MG TABLET    Take 1 tablet (50 mg total) by mouth every evening.    ROSUVASTATIN (CRESTOR) 10 MG TABLET    Take 1 tablet (10 mg total) by mouth every evening.    SILDENAFIL (VIAGRA) 100 MG TABLET    Take 1 tablet (100 mg total) by mouth as needed for Erectile Dysfunction.    TACROLIMUS (PROGRAF) 1 MG CAP    Take 3 capsules (3 mg total) by mouth every 12 (twelve) hours.    TAMSULOSIN (FLOMAX) 0.4 MG CAP    Take 1 capsule (0.4 mg total) by mouth once daily.    TRUE METRIX GLUCOSE TEST STRIP STRP    TEST BLOOD SUGAR FOUR TIMES DAILY    TRUEPLUS LANCETS 30 GAUGE MISC            Plan:       1. Chronic liver disease with cirrhosis: Followed by Dr. Chandler. He had liver transplant in 2020 and is doing well. No longer on diuretics. Continue immunosuppressants     2. Insulin dependent diabetes: Hemoglobin A1c is 5.7. He takes NovoLog sliding scale and Levemir (not using insulin much).  Continue metformin twice a day.      3.  Thrombocytopenia: Stable     4. Chronic back pain: Continue gabapentin.  Continue hydrocodone, 1 pill daily as needed, for chronic back pain (decreased from previous doses)     5. Chronic rhinosinusitis: Continue fluticasone     6. Diabetic peripheral Neuropathy:  Continue gabapentin TID     7. Insomnia: Continue trazodone     8. Hypertension: Stable     9. Benign prostatic hypertrophy: Continue Flomax. Followed by Dr. Ann and urology at [facility]. No longer self-cath     10. Major Depression in remission: Continue Lexapro. He went to an inpatient facility in 2017 in [facility] and is doing better.     11. Wellness: Pneumovax  2021, Prevnar 2024     He has diabetic neuropathy with decreased sensation in both feet.  Patient needs diabetic shoes and inserts     CLEARED for cataract surgery    He lives in a trailer with his mother in [place]. They go to a [employer].        Medicare Annual Wellness and Personalized Prevention Plan:   Fall Risk + Home Safety + Hearing Impairment + Depression Screen + Cognitive Impairment Screen + Health Risk Assessment all reviewed    Health Maintenance Topics with due status: Not Due       Topic Last Completion Date    TETANUS VACCINE 08/05/2019    Diabetic Eye Exam 07/18/2024    Urine Drug Screen 03/10/2025    Diabetes Urine Screening 03/10/2025    Lipid Panel 03/10/2025    Hemoglobin A1c 03/10/2025      The patient's Health Maintenance was reviewed and the following appears to be due at this time:   Health Maintenance Due   Topic Date Due    Foot Exam  Never done    Sign Pain Contract  Never done    Complete Opioid Risk Tool  Never done    Naloxone Prescription  Never done    Shingles Vaccine (2 of 2) 05/29/2018    RSV Vaccine (Age 60+ and Pregnant patients) (1 - Risk 60-74 years 1-dose series) Never done    Colorectal Cancer Screening  02/21/2022       Advance Care Planning   I attest to discussing Advance Care Planning with patient and/or family member.  Education was provided including the importance of the Health Care Power of , Advance Directives, and/or [place] documentation.  The patient expressed understanding to the importance of this information and discussion.  Length of ACP conversation in minutes: 1    Advance Care Planning     Date: 03/11/2025  Patient did not wish or was not able to name a surrogate decision maker or provide an Advance Care Plan.                Opioid Screening: Patient medication list reviewed, patient is taking prescription opioids. Patient is not using additional opioids than prescribed. Patient is at low risk of substance abuse based on this opioid use history.      No follow-ups on file. In addition to their scheduled follow up, the patient has also been instructed to follow up on as needed basis.

## 2025-03-12 ENCOUNTER — OFFICE VISIT (OUTPATIENT)
Dept: INTERNAL MEDICINE | Facility: CLINIC | Age: 65
End: 2025-03-12
Payer: MEDICARE

## 2025-03-12 VITALS
DIASTOLIC BLOOD PRESSURE: 68 MMHG | BODY MASS INDEX: 26.04 KG/M2 | RESPIRATION RATE: 16 BRPM | SYSTOLIC BLOOD PRESSURE: 110 MMHG | HEART RATE: 72 BPM | TEMPERATURE: 98 F | WEIGHT: 186 LBS | OXYGEN SATURATION: 96 % | HEIGHT: 71 IN

## 2025-03-12 DIAGNOSIS — E11.42 DIABETIC PERIPHERAL NEUROPATHY: Primary | ICD-10-CM

## 2025-03-12 DIAGNOSIS — Z94.4 HISTORY OF LIVER TRANSPLANT: ICD-10-CM

## 2025-03-12 DIAGNOSIS — Z00.00 WELLNESS EXAMINATION: ICD-10-CM

## 2025-03-12 DIAGNOSIS — Z79.4 TYPE 2 DIABETES MELLITUS WITH DIABETIC POLYNEUROPATHY, WITH LONG-TERM CURRENT USE OF INSULIN: ICD-10-CM

## 2025-03-12 DIAGNOSIS — Z79.891 LONG TERM PRESCRIPTION OPIATE USE: ICD-10-CM

## 2025-03-12 DIAGNOSIS — E78.5 DYSLIPIDEMIA: ICD-10-CM

## 2025-03-12 DIAGNOSIS — I10 PRIMARY HYPERTENSION: ICD-10-CM

## 2025-03-12 DIAGNOSIS — E11.42 TYPE 2 DIABETES MELLITUS WITH DIABETIC POLYNEUROPATHY, WITH LONG-TERM CURRENT USE OF INSULIN: ICD-10-CM

## 2025-03-12 DIAGNOSIS — F32.5 MAJOR DEPRESSION IN REMISSION: ICD-10-CM

## 2025-03-12 DIAGNOSIS — D69.6 THROMBOCYTOPENIA, UNSPECIFIED: ICD-10-CM

## 2025-03-12 DIAGNOSIS — Z79.60 LONG-TERM USE OF IMMUNOSUPPRESSANT MEDICATION: ICD-10-CM

## 2025-03-12 DIAGNOSIS — F10.11 ALCOHOL ABUSE, IN REMISSION: ICD-10-CM

## 2025-03-20 ENCOUNTER — TELEPHONE (OUTPATIENT)
Dept: PHARMACY | Facility: CLINIC | Age: 65
End: 2025-03-20
Payer: MEDICARE

## 2025-03-20 NOTE — TELEPHONE ENCOUNTER
[facility] Chart Review & Patient Outreach Details For Medication Adherence Project    Reason for Outreach Encounter: 3rd Party payor non-compliance report (Hum[employer], BCBS, UHC, etc)  2.  Patient Outreach Method: Reviewed patient chart   3.   Medication in question:    Diabetes Medications              insulin (LANTUS SOLOSTAR U-100 INSULIN) glargine 100 units/mL SubQ pen Inject 10 Units into the skin every evening.    metFORMIN (GLUCOPHAGE) 500 MG tablet Take 1 tablet (500 mg total) by mouth 2 (two) times daily.    NOVOLOG FLEXPEN U-100 INSULIN 100 unit/mL (3 mL) InPn pen Inject 10 Units into the skin 3 (three) times daily.              Hyperlipidemia Medications              rosuvastatin (CRESTOR) 10 MG tablet Take 1 tablet (10 mg total) by mouth every evening.                  Metformin LF 30 ds 3/3/25  Rosuvastatin LF 30 ds 3/3/25      4.  Reviewed and or Updates Made To: Patient Chart  5. Outreach Outcomes and/or actions taken: Patient filled medication and is on track to be adherent  Additional Notes:

## 2025-03-21 ENCOUNTER — TELEPHONE (OUTPATIENT)
Dept: TRANSPLANT | Facility: CLINIC | Age: 65
End: 2025-03-21
Payer: MEDICARE

## 2025-03-21 NOTE — TELEPHONE ENCOUNTER
----- Message from [email or web address] sent at 3/21/2025 10:07 AM CDT -----  Regarding: Reschedule Existing Appointment  Contact: :Colin Reilly  Reschedule Existing Appointment Current appt date:03/24 Type of appt :Labs Physician: Reason for rescheduling:Patient is calling to reschedule to 03/25 due to appt conflict. Requesting a call back Caller:Colin Reilly  Contact Preference:[phone] (home)

## 2025-03-26 ENCOUNTER — LAB VISIT (OUTPATIENT)
Dept: LAB | Facility: HOSPITAL | Age: 65
End: 2025-03-26
Attending: INTERNAL MEDICINE
Payer: MEDICARE

## 2025-03-26 DIAGNOSIS — F10.11 ALCOHOL ABUSE, IN REMISSION: ICD-10-CM

## 2025-03-26 DIAGNOSIS — Z94.4 S/P LIVER TRANSPLANT: ICD-10-CM

## 2025-03-26 DIAGNOSIS — Z94.4 LIVER TRANSPLANTED: Primary | ICD-10-CM

## 2025-03-26 LAB
ALBUMIN SERPL-MCNC: 4.4 G/DL (ref 3.4–4.8)
ALBUMIN/GLOB SERPL: 1.6 RATIO (ref 1.1–2)
ALP SERPL-CCNC: 49 UNIT/L (ref 40–150)
ALT SERPL-CCNC: 20 UNIT/L (ref 0–55)
ANION GAP SERPL CALC-SCNC: 7 MEQ/L
AST SERPL-CCNC: 20 UNIT/L (ref 11–45)
BASOPHILS # BLD AUTO: 0.02 X10(3)/MCL
BASOPHILS NFR BLD AUTO: 0.6 %
BILIRUB SERPL-MCNC: 0.7 MG/DL
BUN SERPL-MCNC: 22 MG/DL (ref 8.4–25.7)
CALCIUM SERPL-MCNC: 8.8 MG/DL (ref 8.8–10)
CHLORIDE SERPL-SCNC: 110 MMOL/L (ref 98–107)
CO2 SERPL-SCNC: 21 MMOL/L (ref 23–31)
CREAT SERPL-MCNC: 1.43 MG/DL (ref 0.72–1.25)
CREAT/UREA NIT SERPL: 15
EOSINOPHIL # BLD AUTO: 0.08 X10(3)/MCL (ref 0–0.9)
EOSINOPHIL NFR BLD AUTO: 2.3 %
ERYTHROCYTE [DISTWIDTH] IN BLOOD BY AUTOMATED COUNT: 13.1 % (ref 11.5–17)
GFR SERPLBLD CREATININE-BSD FMLA CKD-EPI: 55 ML/MIN/1.73/M2
GLOBULIN SER-MCNC: 2.8 GM/DL (ref 2.4–3.5)
GLUCOSE SERPL-MCNC: 183 MG/DL (ref 82–115)
HCT VFR BLD AUTO: 43.5 % (ref 42–52)
HGB BLD-MCNC: 15.3 G/DL (ref 14–18)
IMM GRANULOCYTES # BLD AUTO: 0.03 X10(3)/MCL (ref 0–0.04)
IMM GRANULOCYTES NFR BLD AUTO: 0.9 %
LYMPHOCYTES # BLD AUTO: 1.1 X10(3)/MCL (ref 0.6–4.6)
LYMPHOCYTES NFR BLD AUTO: 31.3 %
MCH RBC QN AUTO: 34.5 PG (ref 27–31)
MCHC RBC AUTO-ENTMCNC: 35.2 G/DL (ref 33–36)
MCV RBC AUTO: 98.2 FL (ref 80–94)
MONOCYTES # BLD AUTO: 0.3 X10(3)/MCL (ref 0.1–1.3)
MONOCYTES NFR BLD AUTO: 8.5 %
NEUTROPHILS # BLD AUTO: 1.98 X10(3)/MCL (ref 2.1–9.2)
NEUTROPHILS NFR BLD AUTO: 56.4 %
NRBC BLD AUTO-RTO: 0 %
PLATELET # BLD AUTO: 110 X10(3)/MCL (ref 130–400)
PMV BLD AUTO: 12 FL (ref 7.4–10.4)
POTASSIUM SERPL-SCNC: 4.2 MMOL/L (ref 3.5–5.1)
PROT SERPL-MCNC: 7.2 GM/DL (ref 5.8–7.6)
RBC # BLD AUTO: 4.43 X10(6)/MCL (ref 4.7–6.1)
SODIUM SERPL-SCNC: 138 MMOL/L (ref 136–145)
WBC # BLD AUTO: 3.51 X10(3)/MCL (ref 4.5–11.5)

## 2025-03-26 PROCEDURE — 80197 ASSAY OF TACROLIMUS: CPT

## 2025-03-26 PROCEDURE — 80053 COMPREHEN METABOLIC PANEL: CPT

## 2025-03-26 PROCEDURE — 36415 COLL VENOUS BLD VENIPUNCTURE: CPT

## 2025-03-26 PROCEDURE — 85025 COMPLETE CBC W/AUTO DIFF WBC: CPT

## 2025-03-26 PROCEDURE — 80321 ALCOHOLS BIOMARKERS 1OR 2: CPT

## 2025-03-27 ENCOUNTER — RESULTS FOLLOW-UP (OUTPATIENT)
Dept: TRANSPLANT | Facility: CLINIC | Age: 65
End: 2025-03-27
Payer: MEDICARE

## 2025-03-27 LAB — TACROLIMUS TROUGH BLD-MCNC: 29.2 NG/ML

## 2025-03-27 NOTE — LETTER
March 28, 2025    Colin Reilly  [address]  [facility] [address]          Dear Colin Reilly:  MRN: [id number]    This is a follow up to your recent labs, your lab results.  Your Prograf level was too high and we need to know if you had taken your medications before the  gloria your blood.  Please give us a call ASAP so that we can discuss.  We were not able to reach you or your alternate .      If you cannot have your labs drawn on the scheduled date, it is your responsibility to call the transplant department to reschedule.  Please call [phone] and ask to speak to [facility]  for all scheduling requests.     Sincerely,      Allegra Morrison RN  Your Liver Transplant Coordinator    [facility] [place]  [address] [place] [id number] [phone]

## 2025-03-28 LAB
PLPETH BLD-MCNC: 50 NG/ML
POPETH BLD-MCNC: 80 NG/ML
TOXICOLOGIST REVIEW: NORMAL

## 2025-03-28 NOTE — TELEPHONE ENCOUNTER
Attempted to reach pt by phone and both phone numbers, pt and alternate contact, are disconnected.  Certified letter has been sent.      ----- Message from Elizabeth Meneses MD sent at 3/27/2025  4:19 PM CDT -----  Is this a trough? Repeat labs tomorrow- please let patient know.  ----- Message -----  From: Lab, Background User  Sent: 3/26/2025   9:30 AM CDT  To: Elizabeth Meneses MD

## 2025-04-02 ENCOUNTER — TELEPHONE (OUTPATIENT)
Dept: TRANSPLANT | Facility: CLINIC | Age: 65
End: 2025-04-02
Payer: MEDICARE

## 2025-04-02 NOTE — TELEPHONE ENCOUNTER
Called patient a total of 3 times today no  set up to leave a message, called mother's number and the number is either disconnected or changed

## 2025-04-03 DIAGNOSIS — Z79.891 LONG TERM PRESCRIPTION OPIATE USE: ICD-10-CM

## 2025-04-03 DIAGNOSIS — G89.29 CHRONIC LOW BACK PAIN WITH SCIATICA, SCIATICA LATERALITY UNSPECIFIED, UNSPECIFIED BACK PAIN LATERALITY: ICD-10-CM

## 2025-04-03 DIAGNOSIS — Z94.4 LIVER TRANSPLANTED: ICD-10-CM

## 2025-04-03 DIAGNOSIS — M54.40 CHRONIC LOW BACK PAIN WITH SCIATICA, SCIATICA LATERALITY UNSPECIFIED, UNSPECIFIED BACK PAIN LATERALITY: ICD-10-CM

## 2025-04-03 RX ORDER — HYDROCODONE BITARTRATE AND ACETAMINOPHEN 10; 325 MG/1; MG/1
1 TABLET ORAL DAILY PRN
Qty: 30 TABLET | Refills: 0 | Status: SHIPPED | OUTPATIENT
Start: 2025-04-03 | End: 2026-04-03

## 2025-04-07 ENCOUNTER — LAB VISIT (OUTPATIENT)
Dept: LAB | Facility: HOSPITAL | Age: 65
End: 2025-04-07
Attending: INTERNAL MEDICINE
Payer: MEDICARE

## 2025-04-07 DIAGNOSIS — Z94.4 LIVER TRANSPLANTED: Primary | ICD-10-CM

## 2025-04-07 DIAGNOSIS — Z94.4 S/P LIVER TRANSPLANT: ICD-10-CM

## 2025-04-07 LAB
ALBUMIN SERPL-MCNC: 4.4 G/DL (ref 3.4–4.8)
ALBUMIN/GLOB SERPL: 1.4 RATIO (ref 1.1–2)
ALP SERPL-CCNC: 75 UNIT/L (ref 40–150)
ALT SERPL-CCNC: 16 UNIT/L (ref 0–55)
ANION GAP SERPL CALC-SCNC: 9 MEQ/L
AST SERPL-CCNC: 21 UNIT/L (ref 11–45)
BASOPHILS # BLD AUTO: 0.03 X10(3)/MCL
BASOPHILS NFR BLD AUTO: 0.7 %
BILIRUB SERPL-MCNC: 1.2 MG/DL
BUN SERPL-MCNC: 10 MG/DL (ref 8.4–25.7)
CALCIUM SERPL-MCNC: 9.5 MG/DL (ref 8.8–10)
CHLORIDE SERPL-SCNC: 105 MMOL/L (ref 98–107)
CO2 SERPL-SCNC: 25 MMOL/L (ref 23–31)
CREAT SERPL-MCNC: 0.98 MG/DL (ref 0.72–1.25)
CREAT/UREA NIT SERPL: 10
EOSINOPHIL # BLD AUTO: 0.14 X10(3)/MCL (ref 0–0.9)
EOSINOPHIL NFR BLD AUTO: 3.4 %
ERYTHROCYTE [DISTWIDTH] IN BLOOD BY AUTOMATED COUNT: 12.4 % (ref 11.5–17)
GFR SERPLBLD CREATININE-BSD FMLA CKD-EPI: >60 ML/MIN/1.73/M2
GLOBULIN SER-MCNC: 3.1 GM/DL (ref 2.4–3.5)
GLUCOSE SERPL-MCNC: 116 MG/DL (ref 82–115)
HCT VFR BLD AUTO: 45.6 % (ref 42–52)
HGB BLD-MCNC: 16.2 G/DL (ref 14–18)
IMM GRANULOCYTES # BLD AUTO: 0.04 X10(3)/MCL (ref 0–0.04)
IMM GRANULOCYTES NFR BLD AUTO: 1 %
LYMPHOCYTES # BLD AUTO: 0.93 X10(3)/MCL (ref 0.6–4.6)
LYMPHOCYTES NFR BLD AUTO: 22.9 %
MCH RBC QN AUTO: 34 PG (ref 27–31)
MCHC RBC AUTO-ENTMCNC: 35.5 G/DL (ref 33–36)
MCV RBC AUTO: 95.6 FL (ref 80–94)
MONOCYTES # BLD AUTO: 0.28 X10(3)/MCL (ref 0.1–1.3)
MONOCYTES NFR BLD AUTO: 6.9 %
NEUTROPHILS # BLD AUTO: 2.64 X10(3)/MCL (ref 2.1–9.2)
NEUTROPHILS NFR BLD AUTO: 65.1 %
NRBC BLD AUTO-RTO: 0 %
PLATELET # BLD AUTO: 117 X10(3)/MCL (ref 130–400)
PMV BLD AUTO: 11.9 FL (ref 7.4–10.4)
POTASSIUM SERPL-SCNC: 4.2 MMOL/L (ref 3.5–5.1)
PROT SERPL-MCNC: 7.5 GM/DL (ref 5.8–7.6)
RBC # BLD AUTO: 4.77 X10(6)/MCL (ref 4.7–6.1)
SODIUM SERPL-SCNC: 139 MMOL/L (ref 136–145)
WBC # BLD AUTO: 4.06 X10(3)/MCL (ref 4.5–11.5)

## 2025-04-07 PROCEDURE — 80053 COMPREHEN METABOLIC PANEL: CPT

## 2025-04-07 PROCEDURE — 36415 COLL VENOUS BLD VENIPUNCTURE: CPT

## 2025-04-07 PROCEDURE — 80197 ASSAY OF TACROLIMUS: CPT

## 2025-04-07 PROCEDURE — 85025 COMPLETE CBC W/AUTO DIFF WBC: CPT

## 2025-04-09 LAB — TACROLIMUS TROUGH BLD-MCNC: 7 NG/ML

## 2025-04-30 DIAGNOSIS — Z94.4 LIVER TRANSPLANTED: ICD-10-CM

## 2025-04-30 DIAGNOSIS — M54.40 CHRONIC LOW BACK PAIN WITH SCIATICA, SCIATICA LATERALITY UNSPECIFIED, UNSPECIFIED BACK PAIN LATERALITY: ICD-10-CM

## 2025-04-30 DIAGNOSIS — Z79.891 LONG TERM PRESCRIPTION OPIATE USE: ICD-10-CM

## 2025-04-30 DIAGNOSIS — F32.5 MAJOR DEPRESSION IN REMISSION: ICD-10-CM

## 2025-04-30 DIAGNOSIS — Z79.4 TYPE 2 DIABETES MELLITUS WITH DIABETIC POLYNEUROPATHY, WITH LONG-TERM CURRENT USE OF INSULIN: Primary | ICD-10-CM

## 2025-04-30 DIAGNOSIS — E11.42 DIABETIC PERIPHERAL NEUROPATHY: ICD-10-CM

## 2025-04-30 DIAGNOSIS — G89.29 CHRONIC LOW BACK PAIN WITH SCIATICA, SCIATICA LATERALITY UNSPECIFIED, UNSPECIFIED BACK PAIN LATERALITY: ICD-10-CM

## 2025-04-30 DIAGNOSIS — E11.42 TYPE 2 DIABETES MELLITUS WITH DIABETIC POLYNEUROPATHY, WITH LONG-TERM CURRENT USE OF INSULIN: Primary | ICD-10-CM

## 2025-04-30 RX ORDER — ROSUVASTATIN CALCIUM 10 MG/1
10 TABLET, COATED ORAL NIGHTLY
Qty: 30 TABLET | Refills: 11 | Status: SHIPPED | OUTPATIENT
Start: 2025-04-30 | End: 2026-04-30

## 2025-04-30 RX ORDER — HYDROCODONE BITARTRATE AND ACETAMINOPHEN 10; 325 MG/1; MG/1
1 TABLET ORAL DAILY PRN
Qty: 30 TABLET | Refills: 0 | Status: SHIPPED | OUTPATIENT
Start: 2025-04-30 | End: 2026-04-30

## 2025-04-30 RX ORDER — GABAPENTIN 600 MG/1
600 TABLET ORAL 3 TIMES DAILY
Qty: 90 TABLET | Refills: 5 | Status: SHIPPED | OUTPATIENT
Start: 2025-04-30 | End: 2026-04-30

## 2025-04-30 RX ORDER — FINASTERIDE 5 MG/1
5 TABLET, FILM COATED ORAL DAILY
Qty: 30 TABLET | Refills: 11 | Status: SHIPPED | OUTPATIENT
Start: 2025-04-30 | End: 2026-04-30

## 2025-04-30 RX ORDER — QUETIAPINE FUMARATE 50 MG/1
50 TABLET, FILM COATED ORAL NIGHTLY
Qty: 30 TABLET | Refills: 11 | Status: SHIPPED | OUTPATIENT
Start: 2025-04-30 | End: 2026-04-30

## 2025-04-30 NOTE — TELEPHONE ENCOUNTER
----- Message from Senait sent at 4/30/2025 11:23 AM CDT -----  .Who Called: Colin GriertRefill or New Rx:RefillRX Name and Strength:HYDROcodone-acetaminophen (NORCO)  mg per tabletHow is the patient currently taking it? (ex. 1XDay):1x per day Is this a 30 day or 90 day RX:30 Local or Mail Order:localList of preferred pharmacies on file (remove unneeded): [facility] CURRENTIf different Pharmacy is requested, enter Pharmacy information here including location and phone number: Ordering Provider:voitierPreferred Method of Contact: Phone CallPatient's Preferred Phone Number on File: [phone] Best Call Back Number, if different:Additional Information: HYDROcodone-acetaminophen (NORCO)  mg per tablet.Who Called: Colin IDALMIS GriertPatient is returning phone callWho Left Message for Patient:PT Does the patient know what this is regarding?:Pt requesting diabetic strips Eliud-metrixPreferred Method of Contact: Phone CallPatient's Preferred Phone Number on File: [phone] Best Call Back Number, if different:Additional Information: Pt requesting diabetic strips Eliud-metrix

## 2025-05-26 DIAGNOSIS — Z79.891 LONG TERM PRESCRIPTION OPIATE USE: ICD-10-CM

## 2025-05-26 DIAGNOSIS — Z94.4 LIVER TRANSPLANTED: ICD-10-CM

## 2025-05-26 DIAGNOSIS — G89.29 CHRONIC LOW BACK PAIN WITH SCIATICA, SCIATICA LATERALITY UNSPECIFIED, UNSPECIFIED BACK PAIN LATERALITY: ICD-10-CM

## 2025-05-26 DIAGNOSIS — M54.40 CHRONIC LOW BACK PAIN WITH SCIATICA, SCIATICA LATERALITY UNSPECIFIED, UNSPECIFIED BACK PAIN LATERALITY: ICD-10-CM

## 2025-05-26 NOTE — TELEPHONE ENCOUNTER
1st atc  Called patient to inform him that  is out today, but the medication will be forwarded to him once he return tomorrow. Patient did not answer, and I was unable to leave a vm

## 2025-05-26 NOTE — TELEPHONE ENCOUNTER
Copied from CRM #[id number]. Topic: Medications - Medication Refill  >> May 23, 2025 12:49 PM Tamiko wrote:  Who Called: Colin Reilly    Refill or New Rx:Refill    RX Name and Strength:HYDROcodone-acetaminophen (NORCO)  mg per tablet  How is the patient currently taking it? (ex. 1XDay):  Is this a 30 day or 90 day R  Local or Mail Order:  List of preferred pharmacies on file (remove unneeded): [unfilled]  If different Pharmacy is requested, enter Pharmacy information here including location and phone number: [facility] CUR[facility] - [place], [place] - [address] [place] [address] [address]       Ordering Provider:        Patient's Preferred Phone Number on File: [phone]   Best Call Back Number, if different:  Additional Information:

## 2025-05-27 RX ORDER — HYDROCODONE BITARTRATE AND ACETAMINOPHEN 10; 325 MG/1; MG/1
1 TABLET ORAL DAILY PRN
Qty: 30 TABLET | Refills: 0 | Status: SHIPPED | OUTPATIENT
Start: 2025-05-27 | End: 2026-05-27

## 2025-05-28 ENCOUNTER — TELEPHONE (OUTPATIENT)
Dept: PHARMACY | Facility: CLINIC | Age: 65
End: 2025-05-28
Payer: MEDICARE

## 2025-05-28 NOTE — TELEPHONE ENCOUNTER
[facility] Chart Review & Patient Outreach Details For Medication Adherence Project    Reason for Outreach Encounter: 3rd Party payor non-compliance report (Humana, BCBS, C, etc)  2.  Patient Outreach Method: Reviewed patient chart  and [place] message  3.   Medication in question:    Diabetes Medications              insulin (LANTUS SOLOSTAR U-100 INSULIN) glargine 100 units/mL SubQ pen Inject 10 Units into the skin every evening.    metFORMIN (GLUCOPHAGE) 500 MG tablet Take 1 tablet (500 mg total) by mouth 2 (two) times daily.    NOVOLOG FLEXPEN U-100 INSULIN 100 unit/mL (3 mL) InPn pen Inject 10 Units into the skin 3 (three) times daily.              Hyperlipidemia Medications              rosuvastatin (CRESTOR) 10 MG tablet Take 1 tablet (10 mg total) by mouth every evening.               Metformin LF 30 ds 4/30/25  Rosuvastatin LF 30 ds 4/30/25    4.  Reviewed and or Updates Made To: Patient Chart  5. Outreach Outcomes and/or actions taken: Patient filled medication and is on track to be adherent and Patient reminded to  prescription  Additional Notes:

## 2025-06-23 ENCOUNTER — RESULTS FOLLOW-UP (OUTPATIENT)
Dept: TRANSPLANT | Facility: CLINIC | Age: 65
End: 2025-06-23
Payer: MEDICARE

## 2025-06-23 ENCOUNTER — LAB VISIT (OUTPATIENT)
Dept: LAB | Facility: HOSPITAL | Age: 65
End: 2025-06-23
Attending: INTERNAL MEDICINE
Payer: MEDICARE

## 2025-06-23 DIAGNOSIS — G89.29 CHRONIC LOW BACK PAIN WITH SCIATICA, SCIATICA LATERALITY UNSPECIFIED, UNSPECIFIED BACK PAIN LATERALITY: ICD-10-CM

## 2025-06-23 DIAGNOSIS — M54.40 CHRONIC LOW BACK PAIN WITH SCIATICA, SCIATICA LATERALITY UNSPECIFIED, UNSPECIFIED BACK PAIN LATERALITY: ICD-10-CM

## 2025-06-23 DIAGNOSIS — Z94.4 LIVER TRANSPLANTED: ICD-10-CM

## 2025-06-23 DIAGNOSIS — Z94.4 S/P LIVER TRANSPLANT: ICD-10-CM

## 2025-06-23 DIAGNOSIS — Z79.891 LONG TERM PRESCRIPTION OPIATE USE: ICD-10-CM

## 2025-06-23 LAB
ALBUMIN SERPL-MCNC: 4.3 G/DL (ref 3.4–4.8)
ALBUMIN/GLOB SERPL: 1.4 RATIO (ref 1.1–2)
ALP SERPL-CCNC: 52 UNIT/L (ref 40–150)
ALT SERPL-CCNC: 19 UNIT/L (ref 0–55)
ANION GAP SERPL CALC-SCNC: 7 MEQ/L
AST SERPL-CCNC: 19 UNIT/L (ref 11–45)
BASOPHILS # BLD AUTO: 0.03 X10(3)/MCL
BASOPHILS NFR BLD AUTO: 0.6 %
BILIRUB SERPL-MCNC: 1.2 MG/DL
BUN SERPL-MCNC: 13 MG/DL (ref 8.4–25.7)
CALCIUM SERPL-MCNC: 9 MG/DL (ref 8.8–10)
CHLORIDE SERPL-SCNC: 108 MMOL/L (ref 98–107)
CO2 SERPL-SCNC: 24 MMOL/L (ref 23–31)
CREAT SERPL-MCNC: 1.24 MG/DL (ref 0.72–1.25)
CREAT/UREA NIT SERPL: 10
EOSINOPHIL # BLD AUTO: 0.05 X10(3)/MCL (ref 0–0.9)
EOSINOPHIL NFR BLD AUTO: 1 %
ERYTHROCYTE [DISTWIDTH] IN BLOOD BY AUTOMATED COUNT: 13.2 % (ref 11.5–17)
GFR SERPLBLD CREATININE-BSD FMLA CKD-EPI: >60 ML/MIN/1.73/M2
GLOBULIN SER-MCNC: 3 GM/DL (ref 2.4–3.5)
GLUCOSE SERPL-MCNC: 198 MG/DL (ref 82–115)
HCT VFR BLD AUTO: 41.9 % (ref 42–52)
HGB BLD-MCNC: 14.6 G/DL (ref 14–18)
IMM GRANULOCYTES # BLD AUTO: 0.02 X10(3)/MCL (ref 0–0.04)
IMM GRANULOCYTES NFR BLD AUTO: 0.4 %
LYMPHOCYTES # BLD AUTO: 1.22 X10(3)/MCL (ref 0.6–4.6)
LYMPHOCYTES NFR BLD AUTO: 25.4 %
MCH RBC QN AUTO: 34.2 PG (ref 27–31)
MCHC RBC AUTO-ENTMCNC: 34.8 G/DL (ref 33–36)
MCV RBC AUTO: 98.1 FL (ref 80–94)
MONOCYTES # BLD AUTO: 0.38 X10(3)/MCL (ref 0.1–1.3)
MONOCYTES NFR BLD AUTO: 7.9 %
NEUTROPHILS # BLD AUTO: 3.1 X10(3)/MCL (ref 2.1–9.2)
NEUTROPHILS NFR BLD AUTO: 64.7 %
NRBC BLD AUTO-RTO: 0 %
PLATELET # BLD AUTO: 102 X10(3)/MCL (ref 130–400)
PMV BLD AUTO: 12 FL (ref 7.4–10.4)
POTASSIUM SERPL-SCNC: 4.4 MMOL/L (ref 3.5–5.1)
PROT SERPL-MCNC: 7.3 GM/DL (ref 5.8–7.6)
RBC # BLD AUTO: 4.27 X10(6)/MCL (ref 4.7–6.1)
SODIUM SERPL-SCNC: 139 MMOL/L (ref 136–145)
WBC # BLD AUTO: 4.8 X10(3)/MCL (ref 4.5–11.5)

## 2025-06-23 PROCEDURE — 36415 COLL VENOUS BLD VENIPUNCTURE: CPT

## 2025-06-23 PROCEDURE — 80197 ASSAY OF TACROLIMUS: CPT

## 2025-06-23 PROCEDURE — 85025 COMPLETE CBC W/AUTO DIFF WBC: CPT

## 2025-06-23 PROCEDURE — 80053 COMPREHEN METABOLIC PANEL: CPT

## 2025-06-23 RX ORDER — HYDROCODONE BITARTRATE AND ACETAMINOPHEN 10; 325 MG/1; MG/1
1 TABLET ORAL DAILY PRN
Qty: 30 TABLET | Refills: 0 | Status: SHIPPED | OUTPATIENT
Start: 2025-06-23 | End: 2026-06-23

## 2025-06-23 NOTE — TELEPHONE ENCOUNTER
Copied from CRM #[id number]. Topic: Medications - Medication Refill  >> Jun 23, 2025  8:38 AM Arline Sousa wrote:  Who Called: Colin Reilly    Refill or New Rx:Refill  RX Name and Strength:HYDROcodone-acetaminophen (NORCO)  mg per tablet  How is the patient currently taking it? (ex. 1XDay):1/daily  Is this a 30 day or 90 day RX:30 day  Local or Mail Order:Local  List of preferred pharmacies on file (remove unneeded): [facility] CUR[facility] - [place], [address]   Ordering Provider:Vipul      Preferred Method of Contact: Phone Call  Patient's Preferred Phone Number on File: [phone]   Best Call Back Number, if different:  Additional Information: N/A

## 2025-06-24 LAB — TACROLIMUS TROUGH BLD-MCNC: 17 NG/ML

## 2025-06-25 ENCOUNTER — TELEPHONE (OUTPATIENT)
Dept: TRANSPLANT | Facility: CLINIC | Age: 65
End: 2025-06-25
Payer: MEDICARE

## 2025-06-25 NOTE — TELEPHONE ENCOUNTER
I have attempted to contact this patient by phone regarding recent labs, with the following results: no answer and unable to leave message.  Voicemail not set up.

## 2025-06-25 NOTE — TELEPHONE ENCOUNTER
Labs reviewed with patient via telephone.  Pt states he did take his meds prior to lab.  Pt will repeat labs tomorrow.  Reminded pt to hold meds until after blood has been drawn.       ----- Message from Elizabeth Meneses MD sent at 6/24/2025 10:10 PM CDT -----  Repeat labs with prograf trough. Doubt this labs is a trough - please let patient know.  ----- Message -----  From: Lab, Background User  Sent: 6/23/2025  11:43 AM CDT  To: Elizabeth Meneses MD

## 2025-06-26 ENCOUNTER — RESULTS FOLLOW-UP (OUTPATIENT)
Dept: HEPATOLOGY | Facility: CLINIC | Age: 65
End: 2025-06-26
Payer: MEDICARE

## 2025-06-26 ENCOUNTER — LAB VISIT (OUTPATIENT)
Dept: LAB | Facility: HOSPITAL | Age: 65
End: 2025-06-26
Attending: INTERNAL MEDICINE
Payer: MEDICARE

## 2025-06-26 DIAGNOSIS — Z94.4 S/P LIVER TRANSPLANT: Primary | ICD-10-CM

## 2025-06-26 DIAGNOSIS — F10.11 ALCOHOL ABUSE, IN REMISSION: ICD-10-CM

## 2025-06-26 LAB
ALBUMIN SERPL-MCNC: 4.6 G/DL (ref 3.4–4.8)
ALBUMIN/GLOB SERPL: 1.4 RATIO (ref 1.1–2)
ALP SERPL-CCNC: 64 UNIT/L (ref 40–150)
ALT SERPL-CCNC: 21 UNIT/L (ref 0–55)
ANION GAP SERPL CALC-SCNC: 8 MEQ/L
AST SERPL-CCNC: 22 UNIT/L (ref 11–45)
BASOPHILS # BLD AUTO: 0.03 X10(3)/MCL
BASOPHILS NFR BLD AUTO: 0.6 %
BILIRUB SERPL-MCNC: 1.8 MG/DL
BUN SERPL-MCNC: 13 MG/DL (ref 8.4–25.7)
CALCIUM SERPL-MCNC: 8.9 MG/DL (ref 8.8–10)
CHLORIDE SERPL-SCNC: 106 MMOL/L (ref 98–107)
CO2 SERPL-SCNC: 25 MMOL/L (ref 23–31)
CREAT SERPL-MCNC: 1.08 MG/DL (ref 0.72–1.25)
CREAT/UREA NIT SERPL: 12
EOSINOPHIL # BLD AUTO: 0.06 X10(3)/MCL (ref 0–0.9)
EOSINOPHIL NFR BLD AUTO: 1.2 %
ERYTHROCYTE [DISTWIDTH] IN BLOOD BY AUTOMATED COUNT: 13.1 % (ref 11.5–17)
GFR SERPLBLD CREATININE-BSD FMLA CKD-EPI: >60 ML/MIN/1.73/M2
GLOBULIN SER-MCNC: 3.2 GM/DL (ref 2.4–3.5)
GLUCOSE SERPL-MCNC: 178 MG/DL (ref 82–115)
HCT VFR BLD AUTO: 44.9 % (ref 42–52)
HGB BLD-MCNC: 16.1 G/DL (ref 14–18)
IMM GRANULOCYTES # BLD AUTO: 0.02 X10(3)/MCL (ref 0–0.04)
IMM GRANULOCYTES NFR BLD AUTO: 0.4 %
LYMPHOCYTES # BLD AUTO: 0.93 X10(3)/MCL (ref 0.6–4.6)
LYMPHOCYTES NFR BLD AUTO: 19.3 %
MCH RBC QN AUTO: 34.4 PG (ref 27–31)
MCHC RBC AUTO-ENTMCNC: 35.9 G/DL (ref 33–36)
MCV RBC AUTO: 95.9 FL (ref 80–94)
MONOCYTES # BLD AUTO: 0.34 X10(3)/MCL (ref 0.1–1.3)
MONOCYTES NFR BLD AUTO: 7 %
NEUTROPHILS # BLD AUTO: 3.45 X10(3)/MCL (ref 2.1–9.2)
NEUTROPHILS NFR BLD AUTO: 71.5 %
NRBC BLD AUTO-RTO: 0 %
PLATELET # BLD AUTO: 131 X10(3)/MCL (ref 130–400)
PMV BLD AUTO: 11.9 FL (ref 7.4–10.4)
POTASSIUM SERPL-SCNC: 4.2 MMOL/L (ref 3.5–5.1)
PROT SERPL-MCNC: 7.8 GM/DL (ref 5.8–7.6)
RBC # BLD AUTO: 4.68 X10(6)/MCL (ref 4.7–6.1)
SODIUM SERPL-SCNC: 139 MMOL/L (ref 136–145)
WBC # BLD AUTO: 4.83 X10(3)/MCL (ref 4.5–11.5)

## 2025-06-26 PROCEDURE — 36415 COLL VENOUS BLD VENIPUNCTURE: CPT

## 2025-06-26 PROCEDURE — 85025 COMPLETE CBC W/AUTO DIFF WBC: CPT

## 2025-06-26 PROCEDURE — 80321 ALCOHOLS BIOMARKERS 1OR 2: CPT

## 2025-06-26 PROCEDURE — 80053 COMPREHEN METABOLIC PANEL: CPT

## 2025-06-26 PROCEDURE — 80197 ASSAY OF TACROLIMUS: CPT

## 2025-06-26 NOTE — LETTER
July 1, 2025    Colin Reilly  [address]  [facility] [address]          Dear Colin Reilly:  MRN: [id number]    This is a follow up to your recent labs, your lab results were stable.  There are no medicine changes.  Your recent lab results shows that you have been drinking alcohol.  Please know that the use of alcohol can harm your transplanted liver being that it is toxic to your liver.  It can also interfere with medications for it affects how your body metabolizes certain medications, including your anti-rejection medications.  Alcohol can threaten your overall well-being as this can jeopardize your overall health.  Social workers will reach out to touch bases with you.  Please have your labs drawn again on 9/22/2025.      If you cannot have your labs drawn on the scheduled date, it is your responsibility to call the transplant department to reschedule.  Please call [phone] and ask to speak to [facility]  for all scheduling requests.     Sincerely,      Allegra Morrison RN  Your Liver Transplant Coordinator    [facility] [place]  [address] [place] [address]  [phone]

## 2025-06-27 LAB — TACROLIMUS TROUGH BLD-MCNC: 7.6 NG/ML

## 2025-06-28 LAB
PLPETH BLD-MCNC: 41 NG/ML
POPETH BLD-MCNC: 75 NG/ML
TOXICOLOGIST REVIEW: NORMAL

## 2025-07-01 NOTE — TELEPHONE ENCOUNTER
Labs reviewed and are stable.  Patient to repeat labs on 9/22/25.   Letter sent to patient.  SW has been notified of positive Peth testing for follow up.        ----- Message from Elizabeth Meneses MD sent at 6/27/2025  5:33 PM CDT -----  The Labs are stable - please let patient know.  ----- Message -----  From: Lab, Background User  Sent: 6/26/2025   9:22 AM CDT  To: Elizabeth Meneses MD

## 2025-07-02 ENCOUNTER — TELEPHONE (OUTPATIENT)
Dept: TRANSPLANT | Facility: CLINIC | Age: 65
End: 2025-07-02
Payer: MEDICARE

## 2025-07-02 NOTE — TELEPHONE ENCOUNTER
Returned call to patient and reviewed recent lab results.  Advised pt to stop drinking alcohol as it would ultimately affect his liver.  Pt v/u and states he will comply.  Will repeat labs 9/22/25.  Pt aware lab review has also been sent via letter in the mail.

## 2025-07-03 ENCOUNTER — TELEPHONE (OUTPATIENT)
Dept: TRANSPLANT | Facility: CLINIC | Age: 65
End: 2025-07-03
Payer: MEDICARE

## 2025-07-03 NOTE — TELEPHONE ENCOUNTER
SW Note  SW called patient's number, VM is not set up.  ([phone])    SW called patient's mother's number, not in service. ([phone])    SW called patient again, no answer, VM not set up.     SW went though patient's chart, found many phone numbers for patient and tried them all ([phone]- not in service, [phone]-no VM, [phone]- left VM, [phone]- not in service)    SW also called patient's backup caregiver, aunt Day Benton ([phone]). Last contact was in 2020, phone rang but was picked up and hung up. SW attempted to call again but the same thing happened.     SW will send patient a message through the portal and if no contact from patient will mail patient referral information   [facility]  [facility]  [place], [place] [address] [phone]  Fax [phone]    [facility]

## 2025-07-17 ENCOUNTER — TELEPHONE (OUTPATIENT)
Dept: INTERNAL MEDICINE | Facility: CLINIC | Age: 65
End: 2025-07-17
Payer: MEDICARE

## 2025-07-17 NOTE — TELEPHONE ENCOUNTER
[facility] patient x 1 at [phone] unable to leave voicemail. [facility] is TOO SOON to fill. Cannot fill until next week. Last fill was 6/26/25 takes one a day.

## 2025-07-17 NOTE — TELEPHONE ENCOUNTER
Copied from CRM #[id number]. Topic: Medications - Medication Refill  >> Jul 17, 2025 10:09 AM Olimpia wrote:  Who Called: Colin Reilly    Refill or New Rx:Refill  RX Name and Strength: HYDROcodone-acetaminophen (NORCO)  mg per table  How is the patient currently taking it? (ex. 1XDay):  Is this a 30 day or 90 day RX:  Local or Mail Order:  List of preferred pharmacies on file (remove unneeded):   [facility] CUR[facility] - [place], [place] - [address] [place] [address]  Ordering Provider:      Preferred Method of Contact: Phone Call  Patient's Preferred Phone Number on File: [phone]   Best Call Back Number, if different:  Additional Information:

## 2025-07-21 DIAGNOSIS — Z79.891 LONG TERM PRESCRIPTION OPIATE USE: ICD-10-CM

## 2025-07-21 DIAGNOSIS — G89.29 CHRONIC LOW BACK PAIN WITH SCIATICA, SCIATICA LATERALITY UNSPECIFIED, UNSPECIFIED BACK PAIN LATERALITY: ICD-10-CM

## 2025-07-21 DIAGNOSIS — Z94.4 LIVER TRANSPLANTED: ICD-10-CM

## 2025-07-21 DIAGNOSIS — M54.40 CHRONIC LOW BACK PAIN WITH SCIATICA, SCIATICA LATERALITY UNSPECIFIED, UNSPECIFIED BACK PAIN LATERALITY: ICD-10-CM

## 2025-07-21 RX ORDER — HYDROCODONE BITARTRATE AND ACETAMINOPHEN 10; 325 MG/1; MG/1
1 TABLET ORAL DAILY PRN
Qty: 30 TABLET | Refills: 0 | Status: SHIPPED | OUTPATIENT
Start: 2025-07-21 | End: 2026-07-21

## 2025-07-21 NOTE — TELEPHONE ENCOUNTER
Copied from CRM #[id number]. Topic: Medications - Medication Refill  >> Jul 21, 2025  9:48 AM Marla wrote:  Who Called: Colin Griert    Refill or New Rx:Refill  RX Name and Strength:HYDROcodone-acetaminophen (NORCO)  mg per tablet  How is the patient currently taking it? (ex. 1XDay):as needed  Is this a 30 day or 90 day RX:30  Local or Mail Order:local  List of preferred pharmacies on file (remove unneeded): [unfilled][facility] CUR[facility] - [place], [place] - [address] [address]  Phone: [phone] Fax: [phone]      If different Pharmacy is requested, enter Pharmacy information here including location and phone number:    Ordering Provider:Vipul      Preferred Method of Contact: Phone Call  Patient's Preferred Phone Number on File: [phone]   Best Call Back Number, if different:  Additional Information: pt called for med refill pls advise

## 2025-08-18 DIAGNOSIS — G89.29 CHRONIC LOW BACK PAIN WITH SCIATICA, SCIATICA LATERALITY UNSPECIFIED, UNSPECIFIED BACK PAIN LATERALITY: ICD-10-CM

## 2025-08-18 DIAGNOSIS — Z94.4 LIVER TRANSPLANTED: ICD-10-CM

## 2025-08-18 DIAGNOSIS — Z79.891 LONG TERM PRESCRIPTION OPIATE USE: ICD-10-CM

## 2025-08-18 DIAGNOSIS — M54.40 CHRONIC LOW BACK PAIN WITH SCIATICA, SCIATICA LATERALITY UNSPECIFIED, UNSPECIFIED BACK PAIN LATERALITY: ICD-10-CM

## 2025-08-18 RX ORDER — HYDROCODONE BITARTRATE AND ACETAMINOPHEN 10; 325 MG/1; MG/1
1 TABLET ORAL DAILY PRN
Qty: 30 TABLET | Refills: 0 | Status: SHIPPED | OUTPATIENT
Start: 2025-08-18 | End: 2026-08-18

## 2025-09-02 DIAGNOSIS — E11.42 DIABETIC PERIPHERAL NEUROPATHY: ICD-10-CM

## 2025-09-02 DIAGNOSIS — K72.10 END STAGE LIVER DISEASE: ICD-10-CM

## 2025-09-02 RX ORDER — OXYBUTYNIN CHLORIDE 5 MG/1
5 TABLET ORAL DAILY
Qty: 90 TABLET | Refills: 3 | Status: SHIPPED | OUTPATIENT
Start: 2025-09-02 | End: 2026-09-02

## 2025-09-04 DIAGNOSIS — Z94.4 LIVER REPLACED BY TRANSPLANT: ICD-10-CM

## 2025-09-04 RX ORDER — TACROLIMUS 1 MG/1
3 CAPSULE ORAL EVERY 12 HOURS
Qty: 180 CAPSULE | Refills: 11 | Status: SHIPPED | OUTPATIENT
Start: 2025-09-04

## (undated) DEVICE — BOVIE SUCTION

## (undated) DEVICE — KIT SAHARA DRAPE DRAW/LIFT

## (undated) DEVICE — TRAY MINOR GEN SURG OMC

## (undated) DEVICE — PACK UNIVERSAL SPLIT II

## (undated) DEVICE — SOL NS 1000CC

## (undated) DEVICE — SUT PDS BV 6-0

## (undated) DEVICE — SUT PROLENE 6-0 BV-1 30IN

## (undated) DEVICE — DRESSING ADH ISLAND 3.6 X 14

## (undated) DEVICE — GOWN SURGICAL X-LARGE

## (undated) DEVICE — DRAIN CHANNEL ROUND 19FR

## (undated) DEVICE — SUT SILK 2-0 STRANDS 30IN

## (undated) DEVICE — SUT SILK 0 STRANDS 30IN BLK

## (undated) DEVICE — SUT ETHILON 3-0 PS2 18 BLK

## (undated) DEVICE — SUT 1 36IN PDS II VIO MONO

## (undated) DEVICE — SUT 3-0 12-18IN SILK

## (undated) DEVICE — SET DECANTER MEDICHOICE

## (undated) DEVICE — DRESSING MEPORE ADH 3.5X12

## (undated) DEVICE — SUT 4-0 12-30IN SILK

## (undated) DEVICE — STAPLER SKIN PROXIMATE WIDE

## (undated) DEVICE — WIPE ESENTA BARR STNG FREE 3ML

## (undated) DEVICE — NDL MONOPTY BIOPSY 14GX10CM

## (undated) DEVICE — SEE MEDLINE ITEM 156901

## (undated) DEVICE — SUT ETHICON 3-0 BLK MONO PS

## (undated) DEVICE — EVACUATOR WOUND BULB 100CC

## (undated) DEVICE — ELECTRODE REM PLYHSV RETURN 9

## (undated) DEVICE — CLIP SPRING 6MM

## (undated) DEVICE — DRESSING AQUACEL SACRAL 9 X 9

## (undated) DEVICE — TOWEL OR XRAY WHITE 17X26IN

## (undated) DEVICE — TRAY MINOR GEN SURG

## (undated) DEVICE — SUT SILK 3-0 STRANDS 30IN

## (undated) DEVICE — GAUZE SPONGE 4X4 12PLY

## (undated) DEVICE — SUT 2-0 12-18IN SILK

## (undated) DEVICE — PAD K-THERMIA 24IN X 60IN

## (undated) DEVICE — SUT PROLENE 3-0 SH DA 36 BL

## (undated) DEVICE — SET EXTENSION STERILE 30IN

## (undated) DEVICE — SEE MEDLINE ITEM 146417

## (undated) DEVICE — BLADE 4 INCH EDGE UN-INS

## (undated) DEVICE — TAPE MEDIPORE 4IN X 2YDS

## (undated) DEVICE — SUT 4-0 12-18IN SILK BLACK

## (undated) DEVICE — SEALER LIGASURE MARYLAND 23CM

## (undated) DEVICE — CATH URETHRAL RED RUBBER 18FR

## (undated) DEVICE — SET IV ADMIN 15DROP 3 CARESITE

## (undated) DEVICE — FIBRILLAR ABS HEMOSTAT 4X4

## (undated) DEVICE — BOOT AIR FLUID HEEL ADLT STD

## (undated) DEVICE — SUT SILK 3-0 SH 18IN BLACK

## (undated) DEVICE — SUT PROLENE 4-0 SH BLU 36IN

## (undated) DEVICE — SOL 9P NACL IRR PIC IL

## (undated) DEVICE — SEE MEDLINE ITEM 146420

## (undated) DEVICE — APPLICATOR CHLORAPREP ORN 26ML

## (undated) DEVICE — DRESSING AQUACEL FOAM 5 X 5

## (undated) DEVICE — ADHESIVE DERMABOND ADVANCED

## (undated) DEVICE — GLOVE GAMMEX SURG LF PI SZ 7.5

## (undated) DEVICE — DRAPE BAG ISOLATION 20 X 20

## (undated) DEVICE — ELECTRODE PAD DEFIB STERILE

## (undated) DEVICE — COVER LIGHT HANDLE 80/CA

## (undated) DEVICE — TRAY FOLEY 16FR INFECTION CONT

## (undated) DEVICE — DRAPE LAP T SHT W/ INSTR PAD

## (undated) DEVICE — SUT PROLENE 5-0 36IN C-1

## (undated) DEVICE — HANDSET ARGON PLUS

## (undated) DEVICE — ADHESIVE MASTISOL VIAL 48/BX

## (undated) DEVICE — DRAPE STERI INSTRUMENT 1018

## (undated) DEVICE — SUT VICRYL 3-0 27 SH

## (undated) DEVICE — HEMOSTAT SURGICEL NU-KNIT 6X9

## (undated) DEVICE — TIP YANKAUERS BULB NO VENT

## (undated) DEVICE — WARMER DRAPE STERILE LF

## (undated) DEVICE — CONTAINER SPECIMEN STRL 4OZ